# Patient Record
Sex: FEMALE | Race: WHITE | NOT HISPANIC OR LATINO | Employment: UNEMPLOYED | URBAN - METROPOLITAN AREA
[De-identification: names, ages, dates, MRNs, and addresses within clinical notes are randomized per-mention and may not be internally consistent; named-entity substitution may affect disease eponyms.]

---

## 2017-01-24 ENCOUNTER — APPOINTMENT (EMERGENCY)
Dept: RADIOLOGY | Facility: HOSPITAL | Age: 42
End: 2017-01-24
Payer: COMMERCIAL

## 2017-01-24 ENCOUNTER — HOSPITAL ENCOUNTER (EMERGENCY)
Facility: HOSPITAL | Age: 42
Discharge: HOME | End: 2017-01-24
Attending: EMERGENCY MEDICINE | Admitting: EMERGENCY MEDICINE
Payer: COMMERCIAL

## 2017-01-24 VITALS
RESPIRATION RATE: 20 BRPM | BODY MASS INDEX: 31.28 KG/M2 | TEMPERATURE: 98.1 F | WEIGHT: 170 LBS | OXYGEN SATURATION: 97 % | HEART RATE: 87 BPM | DIASTOLIC BLOOD PRESSURE: 75 MMHG | HEIGHT: 62 IN | SYSTOLIC BLOOD PRESSURE: 114 MMHG

## 2017-01-24 DIAGNOSIS — K80.20 CHOLELITHIASIS: ICD-10-CM

## 2017-01-24 DIAGNOSIS — S20.219A RIB CONTUSION: Primary | ICD-10-CM

## 2017-01-24 LAB — HCG UR QL: NORMAL

## 2017-01-24 PROCEDURE — 71101 X-RAY EXAM UNILAT RIBS/CHEST: CPT

## 2017-01-24 PROCEDURE — 74176 CT ABD & PELVIS W/O CONTRAST: CPT

## 2017-01-24 PROCEDURE — 99284 EMERGENCY DEPT VISIT MOD MDM: CPT

## 2017-01-24 PROCEDURE — 93005 ELECTROCARDIOGRAM TRACING: CPT | Performed by: EMERGENCY MEDICINE

## 2017-01-24 PROCEDURE — 81025 URINE PREGNANCY TEST: CPT | Performed by: EMERGENCY MEDICINE

## 2017-01-24 RX ORDER — OXYCODONE HYDROCHLORIDE AND ACETAMINOPHEN 5; 325 MG/1; MG/1
1 TABLET ORAL EVERY 8 HOURS PRN
Qty: 10 TABLET | Refills: 0 | Status: SHIPPED | OUTPATIENT
Start: 2017-01-24 | End: 2017-01-27

## 2017-01-24 RX ORDER — FENTANYL 25 UG/H
1 PATCH TRANSDERMAL
Status: ON HOLD | COMMUNITY
End: 2017-05-04

## 2017-01-26 LAB
ATRIAL RATE: 76 BPM
P AXIS: 61 DEGREES
PR INTERVAL: 164 MS
QRS AXIS: 51 DEGREES
QRSD INTERVAL: 78 MS
QT INTERVAL: 408 MS
QTC INTERVAL: 459 MS
T WAVE AXIS: 35 DEGREES
VENTRICULAR RATE: 76 BPM

## 2017-02-07 ENCOUNTER — HOSPITAL ENCOUNTER (EMERGENCY)
Facility: HOSPITAL | Age: 42
Discharge: HOME | End: 2017-02-07
Attending: EMERGENCY MEDICINE | Admitting: EMERGENCY MEDICINE
Payer: COMMERCIAL

## 2017-02-07 VITALS
RESPIRATION RATE: 20 BRPM | TEMPERATURE: 98.8 F | WEIGHT: 170 LBS | HEIGHT: 62 IN | DIASTOLIC BLOOD PRESSURE: 73 MMHG | BODY MASS INDEX: 31.28 KG/M2 | HEART RATE: 87 BPM | SYSTOLIC BLOOD PRESSURE: 124 MMHG | OXYGEN SATURATION: 95 %

## 2017-02-07 DIAGNOSIS — R53.1 WEAKNESS GENERALIZED: Primary | ICD-10-CM

## 2017-02-07 LAB
ALBUMIN SERPL BCP-MCNC: 3.8 G/DL (ref 3.5–5)
ALP SERPL-CCNC: 71 U/L (ref 46–116)
ALT SERPL W P-5'-P-CCNC: 45 U/L (ref 12–78)
AMPHETAMINES SERPL QL SCN: NEGATIVE
ANION GAP SERPL CALCULATED.3IONS-SCNC: 10 MMOL/L (ref 4–13)
AST SERPL W P-5'-P-CCNC: 53 U/L (ref 5–45)
ATRIAL RATE: 83 BPM
BARBITURATES UR QL: NEGATIVE
BASOPHILS # BLD AUTO: 0.2 THOUSANDS/ΜL (ref 0–0.1)
BASOPHILS NFR BLD AUTO: 2 % (ref 0–1)
BENZODIAZ UR QL: POSITIVE
BILIRUB SERPL-MCNC: 0.5 MG/DL (ref 0.2–1)
BUN SERPL-MCNC: 11 MG/DL (ref 5–25)
CALCIUM SERPL-MCNC: 8.2 MG/DL (ref 8.3–10.1)
CHLORIDE SERPL-SCNC: 103 MMOL/L (ref 100–108)
CO2 SERPL-SCNC: 26 MMOL/L (ref 21–32)
COCAINE UR QL: NEGATIVE
CREAT SERPL-MCNC: 0.84 MG/DL (ref 0.6–1.3)
EOSINOPHIL # BLD AUTO: 0.1 THOUSAND/ΜL (ref 0–0.61)
EOSINOPHIL NFR BLD AUTO: 2 % (ref 0–6)
ERYTHROCYTE [DISTWIDTH] IN BLOOD BY AUTOMATED COUNT: 15.3 % (ref 11.6–15.1)
ETHANOL SERPL-MCNC: <3 MG/DL (ref 0–3)
GFR SERPL CREATININE-BSD FRML MDRD: >60 ML/MIN/1.73SQ M
GLUCOSE SERPL-MCNC: 83 MG/DL (ref 65–140)
HCT VFR BLD AUTO: 41.3 % (ref 37–47)
HGB BLD-MCNC: 13.8 G/DL (ref 12–16)
LYMPHOCYTES # BLD AUTO: 1.8 THOUSANDS/ΜL (ref 0.6–4.47)
LYMPHOCYTES NFR BLD AUTO: 21 % (ref 14–44)
MCH RBC QN AUTO: 30.4 PG (ref 27–31)
MCHC RBC AUTO-ENTMCNC: 33.4 G/DL (ref 31.4–37.4)
MCV RBC AUTO: 91 FL (ref 82–98)
METHADONE UR QL: NEGATIVE
MONOCYTES # BLD AUTO: 0.5 THOUSAND/ΜL (ref 0.17–1.22)
MONOCYTES NFR BLD AUTO: 6 % (ref 4–12)
NEUTROPHILS # BLD AUTO: 6 THOUSANDS/ΜL (ref 1.85–7.62)
NEUTS SEG NFR BLD AUTO: 70 % (ref 43–75)
OPIATES UR QL SCN: NEGATIVE
P AXIS: 54 DEGREES
PCP UR QL: NEGATIVE
PLATELET # BLD AUTO: 186 THOUSANDS/UL (ref 130–400)
PMV BLD AUTO: 9.6 FL (ref 8.9–12.7)
POTASSIUM SERPL-SCNC: 5.5 MMOL/L (ref 3.5–5.3)
PR INTERVAL: 168 MS
PROT SERPL-MCNC: 7.7 G/DL (ref 6.4–8.2)
QRS AXIS: 51 DEGREES
QRSD INTERVAL: 76 MS
QT INTERVAL: 364 MS
QTC INTERVAL: 427 MS
RBC # BLD AUTO: 4.54 MILLION/UL (ref 4.2–5.4)
SODIUM SERPL-SCNC: 139 MMOL/L (ref 136–145)
T WAVE AXIS: 34 DEGREES
THC UR QL: NEGATIVE
VENTRICULAR RATE: 83 BPM
WBC # BLD AUTO: 8.6 THOUSAND/UL (ref 4.8–10.8)

## 2017-02-07 PROCEDURE — 36415 COLL VENOUS BLD VENIPUNCTURE: CPT | Performed by: EMERGENCY MEDICINE

## 2017-02-07 PROCEDURE — 80320 DRUG SCREEN QUANTALCOHOLS: CPT | Performed by: EMERGENCY MEDICINE

## 2017-02-07 PROCEDURE — 80053 COMPREHEN METABOLIC PANEL: CPT | Performed by: EMERGENCY MEDICINE

## 2017-02-07 PROCEDURE — 85025 COMPLETE CBC W/AUTO DIFF WBC: CPT | Performed by: EMERGENCY MEDICINE

## 2017-02-07 PROCEDURE — G0480 DRUG TEST DEF 1-7 CLASSES: HCPCS | Performed by: EMERGENCY MEDICINE

## 2017-02-07 PROCEDURE — 99284 EMERGENCY DEPT VISIT MOD MDM: CPT

## 2017-02-07 PROCEDURE — 80307 DRUG TEST PRSMV CHEM ANLYZR: CPT | Performed by: EMERGENCY MEDICINE

## 2017-02-07 PROCEDURE — 93005 ELECTROCARDIOGRAM TRACING: CPT | Performed by: EMERGENCY MEDICINE

## 2017-05-03 ENCOUNTER — ANESTHESIA EVENT (OUTPATIENT)
Dept: PERIOP | Facility: HOSPITAL | Age: 42
End: 2017-05-03
Payer: COMMERCIAL

## 2017-05-04 ENCOUNTER — HOSPITAL ENCOUNTER (OUTPATIENT)
Facility: HOSPITAL | Age: 42
Setting detail: OUTPATIENT SURGERY
Discharge: HOME | End: 2017-05-04
Attending: OBSTETRICS & GYNECOLOGY | Admitting: OBSTETRICS & GYNECOLOGY
Payer: COMMERCIAL

## 2017-05-04 ENCOUNTER — ANESTHESIA (OUTPATIENT)
Dept: PERIOP | Facility: HOSPITAL | Age: 42
End: 2017-05-04
Payer: COMMERCIAL

## 2017-05-04 VITALS
TEMPERATURE: 96.8 F | HEART RATE: 54 BPM | DIASTOLIC BLOOD PRESSURE: 70 MMHG | SYSTOLIC BLOOD PRESSURE: 108 MMHG | OXYGEN SATURATION: 100 % | RESPIRATION RATE: 18 BRPM

## 2017-05-04 PROCEDURE — C1771 REP DEV, URINARY, W/SLING: HCPCS | Performed by: OBSTETRICS & GYNECOLOGY

## 2017-05-04 PROCEDURE — A9270 NON-COVERED ITEM OR SERVICE: HCPCS | Performed by: ANESTHESIOLOGY

## 2017-05-04 DEVICE — IMPLANTABLE DEVICE: Type: IMPLANTABLE DEVICE | Site: VAGINA | Status: FUNCTIONAL

## 2017-05-04 RX ORDER — GLYCOPYRROLATE 0.2 MG/ML
INJECTION INTRAMUSCULAR; INTRAVENOUS AS NEEDED
Status: DISCONTINUED | OUTPATIENT
Start: 2017-05-04 | End: 2017-05-04 | Stop reason: SURG

## 2017-05-04 RX ORDER — FENTANYL CITRATE/PF 50 MCG/ML
25 SYRINGE (ML) INJECTION
Status: COMPLETED | OUTPATIENT
Start: 2017-05-04 | End: 2017-05-04

## 2017-05-04 RX ORDER — ONDANSETRON 2 MG/ML
INJECTION INTRAMUSCULAR; INTRAVENOUS AS NEEDED
Status: DISCONTINUED | OUTPATIENT
Start: 2017-05-04 | End: 2017-05-04 | Stop reason: SURG

## 2017-05-04 RX ORDER — ONDANSETRON 2 MG/ML
4 INJECTION INTRAMUSCULAR; INTRAVENOUS ONCE
Status: DISCONTINUED | OUTPATIENT
Start: 2017-05-04 | End: 2017-05-04 | Stop reason: HOSPADM

## 2017-05-04 RX ORDER — SODIUM CHLORIDE, SODIUM LACTATE, POTASSIUM CHLORIDE, CALCIUM CHLORIDE 600; 310; 30; 20 MG/100ML; MG/100ML; MG/100ML; MG/100ML
125 INJECTION, SOLUTION INTRAVENOUS CONTINUOUS
Status: DISCONTINUED | OUTPATIENT
Start: 2017-05-04 | End: 2017-05-04 | Stop reason: HOSPADM

## 2017-05-04 RX ORDER — OXYCODONE HYDROCHLORIDE AND ACETAMINOPHEN 5; 325 MG/1; MG/1
1 TABLET ORAL EVERY 4 HOURS PRN
Status: DISCONTINUED | OUTPATIENT
Start: 2017-05-04 | End: 2017-05-04 | Stop reason: HOSPADM

## 2017-05-04 RX ORDER — FENTANYL CITRATE 50 UG/ML
INJECTION, SOLUTION INTRAMUSCULAR; INTRAVENOUS AS NEEDED
Status: DISCONTINUED | OUTPATIENT
Start: 2017-05-04 | End: 2017-05-04 | Stop reason: SURG

## 2017-05-04 RX ORDER — PROPOFOL 10 MG/ML
INJECTION, EMULSION INTRAVENOUS AS NEEDED
Status: DISCONTINUED | OUTPATIENT
Start: 2017-05-04 | End: 2017-05-04 | Stop reason: SURG

## 2017-05-04 RX ORDER — SODIUM CHLORIDE, SODIUM LACTATE, POTASSIUM CHLORIDE, CALCIUM CHLORIDE 600; 310; 30; 20 MG/100ML; MG/100ML; MG/100ML; MG/100ML
125 INJECTION, SOLUTION INTRAVENOUS CONTINUOUS
Status: DISCONTINUED | OUTPATIENT
Start: 2017-05-04 | End: 2017-05-04 | Stop reason: SDUPTHER

## 2017-05-04 RX ORDER — MIDAZOLAM HYDROCHLORIDE 1 MG/ML
INJECTION INTRAMUSCULAR; INTRAVENOUS AS NEEDED
Status: DISCONTINUED | OUTPATIENT
Start: 2017-05-04 | End: 2017-05-04 | Stop reason: SURG

## 2017-05-04 RX ORDER — 0.9 % SODIUM CHLORIDE 0.9 %
VIAL (ML) INJECTION AS NEEDED
Status: DISCONTINUED | OUTPATIENT
Start: 2017-05-04 | End: 2017-05-04 | Stop reason: HOSPADM

## 2017-05-04 RX ORDER — ROCURONIUM BROMIDE 10 MG/ML
INJECTION, SOLUTION INTRAVENOUS AS NEEDED
Status: DISCONTINUED | OUTPATIENT
Start: 2017-05-04 | End: 2017-05-04 | Stop reason: SURG

## 2017-05-04 RX ORDER — LEVOFLOXACIN 5 MG/ML
500 INJECTION, SOLUTION INTRAVENOUS ONCE
Status: COMPLETED | OUTPATIENT
Start: 2017-05-04 | End: 2017-05-04

## 2017-05-04 RX ADMIN — LEVOFLOXACIN 500 MG: 5 INJECTION, SOLUTION INTRAVENOUS at 07:40

## 2017-05-04 RX ADMIN — NEOSTIGMINE METHYLSULFATE 4 MG: 1 INJECTION INTRAMUSCULAR; INTRAVENOUS; SUBCUTANEOUS at 09:41

## 2017-05-04 RX ADMIN — SODIUM CHLORIDE, SODIUM LACTATE, POTASSIUM CHLORIDE, AND CALCIUM CHLORIDE 125 ML/HR: .6; .31; .03; .02 INJECTION, SOLUTION INTRAVENOUS at 07:13

## 2017-05-04 RX ADMIN — ONDANSETRON 4 MG: 2 INJECTION INTRAMUSCULAR; INTRAVENOUS at 09:41

## 2017-05-04 RX ADMIN — PROPOFOL 200 MG: 10 INJECTION, EMULSION INTRAVENOUS at 07:44

## 2017-05-04 RX ADMIN — OXYCODONE HYDROCHLORIDE AND ACETAMINOPHEN 1 TABLET: 5; 325 TABLET ORAL at 11:17

## 2017-05-04 RX ADMIN — LEVOFLOXACIN 500 MG: 5 INJECTION, SOLUTION INTRAVENOUS at 07:14

## 2017-05-04 RX ADMIN — FENTANYL CITRATE 50 MCG: 50 INJECTION, SOLUTION INTRAMUSCULAR; INTRAVENOUS at 08:46

## 2017-05-04 RX ADMIN — GLYCOPYRROLATE 0.2 MG: 0.2 INJECTION, SOLUTION INTRAMUSCULAR; INTRAVENOUS at 09:41

## 2017-05-04 RX ADMIN — FENTANYL CITRATE 50 MCG: 50 INJECTION, SOLUTION INTRAMUSCULAR; INTRAVENOUS at 07:44

## 2017-05-04 RX ADMIN — ROCURONIUM BROMIDE 50 MG: 10 INJECTION, SOLUTION INTRAVENOUS at 07:44

## 2017-05-04 RX ADMIN — FENTANYL CITRATE 50 MCG: 50 INJECTION, SOLUTION INTRAMUSCULAR; INTRAVENOUS at 08:53

## 2017-05-04 RX ADMIN — DEXAMETHASONE SODIUM PHOSPHATE 4 MG: 10 INJECTION INTRAMUSCULAR; INTRAVENOUS at 09:41

## 2017-05-04 RX ADMIN — FENTANYL CITRATE 50 MCG: 50 INJECTION, SOLUTION INTRAMUSCULAR; INTRAVENOUS at 07:42

## 2017-05-04 RX ADMIN — FENTANYL CITRATE 25 MCG: 50 INJECTION INTRAMUSCULAR; INTRAVENOUS at 10:33

## 2017-05-04 RX ADMIN — FENTANYL CITRATE 25 MCG: 50 INJECTION INTRAMUSCULAR; INTRAVENOUS at 11:10

## 2017-05-04 RX ADMIN — SODIUM CHLORIDE, SODIUM LACTATE, POTASSIUM CHLORIDE, AND CALCIUM CHLORIDE: .6; .31; .03; .02 INJECTION, SOLUTION INTRAVENOUS at 07:30

## 2017-05-04 RX ADMIN — FENTANYL CITRATE 25 MCG: 50 INJECTION INTRAMUSCULAR; INTRAVENOUS at 11:04

## 2017-05-04 RX ADMIN — FENTANYL CITRATE 25 MCG: 50 INJECTION INTRAMUSCULAR; INTRAVENOUS at 10:54

## 2017-05-04 RX ADMIN — MIDAZOLAM HYDROCHLORIDE 2 MG: 1 INJECTION, SOLUTION INTRAMUSCULAR; INTRAVENOUS at 07:35

## 2017-07-16 ENCOUNTER — HOSPITAL ENCOUNTER (EMERGENCY)
Facility: HOSPITAL | Age: 42
Discharge: HOME | End: 2017-07-17
Attending: EMERGENCY MEDICINE | Admitting: EMERGENCY MEDICINE
Payer: COMMERCIAL

## 2017-07-16 VITALS
WEIGHT: 175.93 LBS | OXYGEN SATURATION: 98 % | HEIGHT: 62 IN | TEMPERATURE: 97.7 F | DIASTOLIC BLOOD PRESSURE: 76 MMHG | HEART RATE: 80 BPM | RESPIRATION RATE: 18 BRPM | SYSTOLIC BLOOD PRESSURE: 120 MMHG | BODY MASS INDEX: 32.37 KG/M2

## 2017-07-16 DIAGNOSIS — Z76.0 MEDICINE REFILL: ICD-10-CM

## 2017-07-16 DIAGNOSIS — M54.9 EXACERBATION OF CHRONIC BACK PAIN: ICD-10-CM
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
## 2017-07-16 DIAGNOSIS — G89.29 EXACERBATION OF CHRONIC BACK PAIN: ICD-10-CM

## 2017-07-16 RX ORDER — HYDROMORPHONE HYDROCHLORIDE 8 MG/1
8 TABLET ORAL DAILY
COMMUNITY
End: 2018-07-12

## 2017-07-17 ENCOUNTER — APPOINTMENT (EMERGENCY)
Dept: RADIOLOGY | Facility: HOSPITAL | Age: 42
End: 2017-07-17
Payer: COMMERCIAL

## 2017-07-17 PROCEDURE — 99283 EMERGENCY DEPT VISIT LOW MDM: CPT

## 2017-07-17 PROCEDURE — 72100 X-RAY EXAM L-S SPINE 2/3 VWS: CPT

## 2017-07-17 RX ORDER — OXYCODONE HYDROCHLORIDE AND ACETAMINOPHEN 5; 325 MG/1; MG/1
2 TABLET ORAL ONCE
Status: COMPLETED | OUTPATIENT
Start: 2017-07-17 | End: 2017-07-17

## 2017-07-17 RX ADMIN — OXYCODONE HYDROCHLORIDE AND ACETAMINOPHEN 2 TABLET: 5; 325 TABLET ORAL at 00:46

## 2017-08-03 ENCOUNTER — ALLSCRIPTS OFFICE VISIT (OUTPATIENT)
Dept: OTHER | Facility: OTHER | Age: 42
End: 2017-08-03

## 2017-08-03 ENCOUNTER — TRANSCRIBE ORDERS (OUTPATIENT)
Dept: ADMINISTRATIVE | Facility: HOSPITAL | Age: 42
End: 2017-08-03

## 2017-08-03 DIAGNOSIS — R42 DIZZINESS AND GIDDINESS: ICD-10-CM

## 2017-08-03 DIAGNOSIS — R07.9 CHEST PAIN: ICD-10-CM

## 2017-08-03 DIAGNOSIS — I73.9 PERIPHERAL VASCULAR DISEASE (HCC): ICD-10-CM

## 2017-08-03 DIAGNOSIS — R07.9 CHEST PAIN, UNSPECIFIED: Primary | ICD-10-CM

## 2017-08-14 ENCOUNTER — HOSPITAL ENCOUNTER (OUTPATIENT)
Dept: NON INVASIVE DIAGNOSTICS | Facility: HOSPITAL | Age: 42
Discharge: HOME | End: 2017-08-14
Attending: INTERNAL MEDICINE
Payer: COMMERCIAL

## 2017-08-14 ENCOUNTER — HOSPITAL ENCOUNTER (OUTPATIENT)
Dept: RADIOLOGY | Facility: HOSPITAL | Age: 42
Discharge: HOME | End: 2017-08-14
Attending: INTERNAL MEDICINE
Payer: COMMERCIAL

## 2017-08-14 DIAGNOSIS — R07.9 CHEST PAIN, UNSPECIFIED: ICD-10-CM

## 2017-08-14 LAB
MAX DIASTOLIC BP: 72 MMHG
MAX HEART RATE: 90 BPM
MAX PREDICTED HEART RATE: 179 BPM
MAX. SYSTOLIC BP: 135 MMHG
PROTOCOL NAME: NORMAL
TIME IN EXERCISE PHASE: 60 S

## 2017-08-14 PROCEDURE — 93017 CV STRESS TEST TRACING ONLY: CPT

## 2017-08-14 PROCEDURE — A9502 TC99M TETROFOSMIN: HCPCS

## 2017-08-14 PROCEDURE — 78452 HT MUSCLE IMAGE SPECT MULT: CPT

## 2017-08-14 RX ADMIN — REGADENOSON 0.4 MG: 0.08 INJECTION, SOLUTION INTRAVENOUS at 10:46

## 2017-09-05 ENCOUNTER — HOSPITAL ENCOUNTER (EMERGENCY)
Facility: HOSPITAL | Age: 42
Discharge: HOME | End: 2017-09-05
Attending: EMERGENCY MEDICINE | Admitting: EMERGENCY MEDICINE
Payer: COMMERCIAL

## 2017-09-05 VITALS
RESPIRATION RATE: 14 BRPM | OXYGEN SATURATION: 98 % | WEIGHT: 176 LBS | DIASTOLIC BLOOD PRESSURE: 83 MMHG | SYSTOLIC BLOOD PRESSURE: 141 MMHG | TEMPERATURE: 97.7 F | HEART RATE: 78 BPM | BODY MASS INDEX: 32.19 KG/M2

## 2017-09-05 DIAGNOSIS — B02.9 HERPES ZOSTER WITHOUT COMPLICATION: Primary | ICD-10-CM

## 2017-09-05 LAB
ANION GAP SERPL CALCULATED.3IONS-SCNC: 8 MMOL/L (ref 4–13)
BUN SERPL-MCNC: 22 MG/DL (ref 5–25)
CALCIUM SERPL-MCNC: 8.4 MG/DL (ref 8.3–10.1)
CHLORIDE SERPL-SCNC: 102 MMOL/L (ref 100–108)
CO2 SERPL-SCNC: 29 MMOL/L (ref 21–32)
CREAT SERPL-MCNC: 1.12 MG/DL (ref 0.6–1.3)
GFR SERPL CREATININE-BSD FRML MDRD: 61 ML/MIN/1.73SQ M
GLUCOSE SERPL-MCNC: 106 MG/DL (ref 65–140)
POTASSIUM SERPL-SCNC: 3.7 MMOL/L (ref 3.5–5.3)
SODIUM SERPL-SCNC: 139 MMOL/L (ref 136–145)

## 2017-09-05 PROCEDURE — 99283 EMERGENCY DEPT VISIT LOW MDM: CPT

## 2017-09-05 PROCEDURE — 36415 COLL VENOUS BLD VENIPUNCTURE: CPT | Performed by: EMERGENCY MEDICINE

## 2017-09-05 PROCEDURE — 80048 BASIC METABOLIC PNL TOTAL CA: CPT | Performed by: EMERGENCY MEDICINE

## 2017-09-05 RX ORDER — OXYCODONE HYDROCHLORIDE AND ACETAMINOPHEN 5; 325 MG/1; MG/1
1 TABLET ORAL ONCE
Status: COMPLETED | OUTPATIENT
Start: 2017-09-05 | End: 2017-09-05

## 2017-09-05 RX ORDER — OXYCODONE HYDROCHLORIDE AND ACETAMINOPHEN 5; 325 MG/1; MG/1
TABLET ORAL
Status: COMPLETED
Start: 2017-09-05 | End: 2017-09-05

## 2017-09-05 RX ORDER — ACYCLOVIR 200 MG/1
400 CAPSULE ORAL ONCE
Status: COMPLETED | OUTPATIENT
Start: 2017-09-05 | End: 2017-09-05

## 2017-09-05 RX ORDER — LIDOCAINE 50 MG/G
1 PATCH TOPICAL DAILY
Qty: 30 PATCH | Refills: 0 | Status: SHIPPED | OUTPATIENT
Start: 2017-09-05 | End: 2018-07-12

## 2017-09-05 RX ORDER — PREDNISONE 10 MG/1
20 TABLET ORAL DAILY
Qty: 10 TABLET | Refills: 0 | Status: SHIPPED | OUTPATIENT
Start: 2017-09-05 | End: 2017-09-10

## 2017-09-05 RX ORDER — ACYCLOVIR 200 MG/1
CAPSULE ORAL
Status: COMPLETED
Start: 2017-09-05 | End: 2017-09-05

## 2017-09-05 RX ORDER — PREDNISONE 20 MG/1
40 TABLET ORAL ONCE
Status: COMPLETED | OUTPATIENT
Start: 2017-09-05 | End: 2017-09-05

## 2017-09-05 RX ORDER — PREDNISONE 10 MG/1
TABLET ORAL
Status: DISCONTINUED
Start: 2017-09-05 | End: 2017-09-05 | Stop reason: WASHOUT

## 2017-09-05 RX ORDER — ACYCLOVIR 400 MG/1
400 TABLET ORAL 4 TIMES DAILY
Qty: 40 TABLET | Refills: 0 | Status: SHIPPED | OUTPATIENT
Start: 2017-09-05 | End: 2018-10-15

## 2017-09-05 RX ADMIN — OXYCODONE HYDROCHLORIDE AND ACETAMINOPHEN 1 TABLET: 5; 325 TABLET ORAL at 22:26

## 2017-09-05 RX ADMIN — ACYCLOVIR 400 MG: 200 CAPSULE ORAL at 22:27

## 2017-09-05 RX ADMIN — OXYCODONE HYDROCHLORIDE AND ACETAMINOPHEN 1 TABLET: 5; 325 TABLET ORAL at 21:15

## 2017-09-05 RX ADMIN — PREDNISONE 40 MG: 20 TABLET ORAL at 22:26

## 2017-09-13 ENCOUNTER — TELEPHONE (OUTPATIENT)
Dept: NON INVASIVE DIAGNOSTICS | Facility: HOSPITAL | Age: 42
End: 2017-09-13

## 2017-09-13 ENCOUNTER — GENERIC CONVERSION - ENCOUNTER (OUTPATIENT)
Dept: OTHER | Facility: OTHER | Age: 42
End: 2017-09-13

## 2017-09-14 ENCOUNTER — HOSPITAL ENCOUNTER (OUTPATIENT)
Dept: NON INVASIVE DIAGNOSTICS | Facility: HOSPITAL | Age: 42
Discharge: HOME | End: 2017-09-14
Attending: INTERNAL MEDICINE | Admitting: INTERNAL MEDICINE
Payer: COMMERCIAL

## 2017-09-14 ENCOUNTER — GENERIC CONVERSION - ENCOUNTER (OUTPATIENT)
Dept: OTHER | Facility: OTHER | Age: 42
End: 2017-09-14

## 2017-09-14 VITALS
TEMPERATURE: 98 F | OXYGEN SATURATION: 100 % | DIASTOLIC BLOOD PRESSURE: 80 MMHG | SYSTOLIC BLOOD PRESSURE: 141 MMHG | HEART RATE: 73 BPM

## 2017-09-14 DIAGNOSIS — R07.9 CHEST PAIN, UNSPECIFIED: ICD-10-CM

## 2017-09-14 LAB
ANION GAP SERPL CALCULATED.3IONS-SCNC: 11 MMOL/L (ref 4–13)
BUN SERPL-MCNC: 23 MG/DL (ref 5–25)
CALCIUM SERPL-MCNC: 9.2 MG/DL (ref 8.3–10.1)
CHLORIDE SERPL-SCNC: 103 MMOL/L (ref 100–108)
CO2 SERPL-SCNC: 25 MMOL/L (ref 21–32)
CREAT SERPL-MCNC: 1.01 MG/DL (ref 0.6–1.3)
GFR SERPL CREATININE-BSD FRML MDRD: 69 ML/MIN/1.73SQ M
GLUCOSE P FAST SERPL-MCNC: 96 MG/DL (ref 65–99)
GLUCOSE SERPL-MCNC: 96 MG/DL (ref 65–140)
POTASSIUM SERPL-SCNC: 4 MMOL/L (ref 3.5–5.3)
SODIUM SERPL-SCNC: 139 MMOL/L (ref 136–145)

## 2017-09-14 PROCEDURE — 80048 BASIC METABOLIC PNL TOTAL CA: CPT | Performed by: INTERNAL MEDICINE

## 2017-09-14 PROCEDURE — C1769 GUIDE WIRE: HCPCS

## 2017-09-14 PROCEDURE — 93458 L HRT ARTERY/VENTRICLE ANGIO: CPT

## 2017-09-14 PROCEDURE — C1894 INTRO/SHEATH, NON-LASER: HCPCS

## 2017-09-14 RX ORDER — DIPHENHYDRAMINE HYDROCHLORIDE 50 MG/ML
INJECTION INTRAMUSCULAR; INTRAVENOUS CODE/TRAUMA/SEDATION MEDICATION
Status: COMPLETED | OUTPATIENT
Start: 2017-09-14 | End: 2017-09-14

## 2017-09-14 RX ORDER — NITROGLYCERIN 20 MG/100ML
INJECTION INTRAVENOUS CODE/TRAUMA/SEDATION MEDICATION
Status: COMPLETED | OUTPATIENT
Start: 2017-09-14 | End: 2017-09-14

## 2017-09-14 RX ORDER — VERAPAMIL HCL 2.5 MG/ML
AMPUL (ML) INTRAVENOUS CODE/TRAUMA/SEDATION MEDICATION
Status: COMPLETED | OUTPATIENT
Start: 2017-09-14 | End: 2017-09-14

## 2017-09-14 RX ORDER — VERAPAMIL HYDROCHLORIDE 2.5 MG/ML
INJECTION, SOLUTION INTRAVENOUS CODE/TRAUMA/SEDATION MEDICATION
Status: COMPLETED | OUTPATIENT
Start: 2017-09-14 | End: 2017-09-14

## 2017-09-14 RX ORDER — HEPARIN SODIUM 1000 [USP'U]/ML
INJECTION, SOLUTION INTRAVENOUS; SUBCUTANEOUS CODE/TRAUMA/SEDATION MEDICATION
Status: COMPLETED | OUTPATIENT
Start: 2017-09-14 | End: 2017-09-14

## 2017-09-14 RX ORDER — FENTANYL CITRATE 50 UG/ML
INJECTION, SOLUTION INTRAMUSCULAR; INTRAVENOUS CODE/TRAUMA/SEDATION MEDICATION
Status: COMPLETED | OUTPATIENT
Start: 2017-09-14 | End: 2017-09-14

## 2017-09-14 RX ORDER — NITROGLYCERIN 0.4 MG/1
TABLET SUBLINGUAL CODE/TRAUMA/SEDATION MEDICATION
Status: COMPLETED | OUTPATIENT
Start: 2017-09-14 | End: 2017-09-14

## 2017-09-14 RX ORDER — LIDOCAINE HYDROCHLORIDE 10 MG/ML
INJECTION, SOLUTION INFILTRATION; PERINEURAL CODE/TRAUMA/SEDATION MEDICATION
Status: COMPLETED | OUTPATIENT
Start: 2017-09-14 | End: 2017-09-14

## 2017-09-14 RX ORDER — SODIUM CHLORIDE 9 MG/ML
100 INJECTION, SOLUTION INTRAVENOUS CONTINUOUS
Status: DISPENSED | OUTPATIENT
Start: 2017-09-14 | End: 2017-09-14

## 2017-09-14 RX ORDER — MIDAZOLAM HYDROCHLORIDE 1 MG/ML
INJECTION INTRAMUSCULAR; INTRAVENOUS CODE/TRAUMA/SEDATION MEDICATION
Status: COMPLETED | OUTPATIENT
Start: 2017-09-14 | End: 2017-09-14

## 2017-09-14 RX ADMIN — VERAPAMIL HYDROCHLORIDE 2.5 MG: 2.5 INJECTION, SOLUTION INTRAVENOUS at 07:47

## 2017-09-14 RX ADMIN — MIDAZOLAM HYDROCHLORIDE 1 MG: 1 INJECTION, SOLUTION INTRAMUSCULAR; INTRAVENOUS at 07:31

## 2017-09-14 RX ADMIN — VERAPAMIL HYDROCHLORIDE 2.5 MG: 2.5 INJECTION, SOLUTION INTRAVENOUS at 08:06

## 2017-09-14 RX ADMIN — DIPHENHYDRAMINE HYDROCHLORIDE 25 MG: 50 INJECTION INTRAMUSCULAR; INTRAVENOUS at 08:07

## 2017-09-14 RX ADMIN — FENTANYL CITRATE 25 MCG: 50 INJECTION, SOLUTION INTRAMUSCULAR; INTRAVENOUS at 07:50

## 2017-09-14 RX ADMIN — IODIXANOL 55 ML: 320 INJECTION, SOLUTION INTRAVASCULAR at 09:05

## 2017-09-14 RX ADMIN — MIDAZOLAM HYDROCHLORIDE 0.5 MG: 1 INJECTION, SOLUTION INTRAMUSCULAR; INTRAVENOUS at 07:38

## 2017-09-14 RX ADMIN — MIDAZOLAM HYDROCHLORIDE 1 MG: 1 INJECTION, SOLUTION INTRAMUSCULAR; INTRAVENOUS at 07:34

## 2017-09-14 RX ADMIN — NITROGLYCERIN 200 MCG: 20 INJECTION INTRAVENOUS at 07:47

## 2017-09-14 RX ADMIN — MIDAZOLAM HYDROCHLORIDE 0.5 MG: 1 INJECTION, SOLUTION INTRAMUSCULAR; INTRAVENOUS at 07:40

## 2017-09-14 RX ADMIN — FENTANYL CITRATE 25 MCG: 50 INJECTION, SOLUTION INTRAMUSCULAR; INTRAVENOUS at 07:38

## 2017-09-14 RX ADMIN — HEPARIN SODIUM 2000 UNITS: 1000 INJECTION INTRAVENOUS; SUBCUTANEOUS at 07:47

## 2017-09-14 RX ADMIN — FENTANYL CITRATE 25 MCG: 50 INJECTION, SOLUTION INTRAMUSCULAR; INTRAVENOUS at 07:40

## 2017-09-14 RX ADMIN — FENTANYL CITRATE 50 MCG: 50 INJECTION, SOLUTION INTRAMUSCULAR; INTRAVENOUS at 07:31

## 2017-09-14 RX ADMIN — MIDAZOLAM HYDROCHLORIDE 1 MG: 1 INJECTION, SOLUTION INTRAMUSCULAR; INTRAVENOUS at 07:50

## 2017-09-14 RX ADMIN — MIDAZOLAM HYDROCHLORIDE 1 MG: 1 INJECTION, SOLUTION INTRAMUSCULAR; INTRAVENOUS at 07:44

## 2017-09-14 RX ADMIN — NITROGLYCERIN 0.4 MG: 0.4 TABLET SUBLINGUAL at 08:01

## 2017-09-14 RX ADMIN — LIDOCAINE HYDROCHLORIDE 5 ML: 10 INJECTION, SOLUTION INFILTRATION; PERINEURAL at 07:40

## 2017-09-14 RX ADMIN — FENTANYL CITRATE 25 MCG: 50 INJECTION, SOLUTION INTRAMUSCULAR; INTRAVENOUS at 07:44

## 2017-09-14 RX ADMIN — DIPHENHYDRAMINE HYDROCHLORIDE 25 MG: 50 INJECTION INTRAMUSCULAR; INTRAVENOUS at 07:50

## 2018-01-12 NOTE — MISCELLANEOUS
Message  Return to work or school:   Praneeth Rocha is under my professional care  She was seen in my office on 06/06/2016       Patient will be undergoing spine surgery on 8/12/2016, she will likely be unable to attend her scheduled court date on 8/19/2016 due to postoperative pain levels and limited mobility at that time          Signatures   Electronically signed by : Raymundo Thornton, [facility]; Jul 26 2016  4:17PM EST                       (Author)

## 2018-01-13 NOTE — PROCEDURES
Procedures by Taco Roberts MD at 9/14/2017  10:00 AM      Author:  Taco Roberts MD Service:  Cardiology Author Type:  Physician    Filed:  9/14/2017 10:11 AM Date of Service:  9/14/2017 10:00 AM Status:  Signed    :  Taco Roberts MD (Physician)         Procedures  Cardiac Catheterization Operative Report    Brennan Anthony  [id number]  9/14/2017  Rashida Hilton MD    Indication: Abnormal stress test, smoking, hld  : Taco Roberts MD  Procedure: L Coronary Angiogram                     L Heart Catheterization  Access: R Radial  Contrast: 75cc    Procedure Details  The risks, benefits, complications, treatment options, and expected outcomes were discussed with the patient  The patient and/or family concurred with the proposed plan, giving informed consent  Patient was brought to the cath lab after IV hydration was  begun and oral premedication was given  She was further sedated with fentanyl and midazolam  She was difficult to sedate given prior opiate usage  She was prepped and draped in the usual manner  Using the modified Seldinger  access technique, a 5 Irish sheath was placed in the radial artery  Heparin, nitroglycerin, and verapamil was administered through the sheath  A left heart catheterization was done when JR crossed the aortic valve  A right coronary angiogram was completed  The patient was noted to have spasming of radial artery on advancement of JL catheter  Therefore a 4 German catheter was inserted after given vasodilators  Angiograms were done of the L coronary  After the procedure was completed, sedation was stopped  and the sheaths and catheters were all removed  Hemostasis was achieved with manual pressure  Findings:    Hemodynamics  LVEDP 9mmHg  No gradient on pullback   Left Main  Bifurcated to LAD and LCX  Widely patent  RCA  Dominant  Supplies average PL and PDA branch  Mild luminal irregularities   LAD  Average caliber vessel tapers distally   Minimal luminal irregularities   Circ  Non-dominant  Minimal luminal irregularities    LV  Normal LV size, Low normal EF50-55%  No focal wall motion abnormalities       Complications  None     Estimated Blood Loss:  Minimal         Complications:  None           A/P Minimal non-obstructive CAD         Chest pain likely non-cardiac         Opiate tolerance          Nicotine dependence  -- TR band 2 hours  -- follow-up in 2 weeks for arteriotomy check                   Viki GOLDEN    Sep 14 2017 10:12AM [facility] Standard Time

## 2018-01-14 VITALS — OXYGEN SATURATION: 80 % | DIASTOLIC BLOOD PRESSURE: 80 MMHG | SYSTOLIC BLOOD PRESSURE: 105 MMHG | HEART RATE: 75 BPM

## 2018-01-22 VITALS
BODY MASS INDEX: 33.33 KG/M2 | HEART RATE: 78 BPM | OXYGEN SATURATION: 98 % | DIASTOLIC BLOOD PRESSURE: 80 MMHG | WEIGHT: 181.13 LBS | SYSTOLIC BLOOD PRESSURE: 118 MMHG | HEIGHT: 62 IN

## 2018-07-12 ENCOUNTER — HOSPITAL ENCOUNTER (EMERGENCY)
Facility: HOSPITAL | Age: 43
Discharge: HOME | End: 2018-07-12
Attending: EMERGENCY MEDICINE
Payer: COMMERCIAL

## 2018-07-12 VITALS
WEIGHT: 190 LBS | BODY MASS INDEX: 34.75 KG/M2 | TEMPERATURE: 98.6 F | OXYGEN SATURATION: 98 % | SYSTOLIC BLOOD PRESSURE: 145 MMHG | DIASTOLIC BLOOD PRESSURE: 73 MMHG | HEART RATE: 78 BPM | RESPIRATION RATE: 16 BRPM

## 2018-07-12 DIAGNOSIS — T50.905A MEDICATION SIDE EFFECTS, INITIAL ENCOUNTER: Primary | ICD-10-CM

## 2018-07-12 PROCEDURE — 99283 EMERGENCY DEPT VISIT LOW MDM: CPT

## 2018-07-12 RX ORDER — BACLOFEN 10 MG/1
10 TABLET ORAL 3 TIMES DAILY
COMMUNITY
End: 2022-06-01

## 2018-07-12 NOTE — DISCHARGE INSTRUCTIONS
Safe Use of NSAIDs, Ambulatory Care   GENERAL INFORMATION:   NSAIDs  are medicines that are used to decrease pain, swelling, and fever  NSAIDs are available with or without a doctor's order  NSAIDs that you can buy without a doctor's order include aspirin, ibuprofen, and naproxen  Seek immediate care for the following symptoms:   · Swelling around your mouth or trouble breathing    · Fast breathing or heartbeat    · Nausea, vomiting, or abdominal pain    · Blood in your vomit or bowel movements    · A seizure  How to safely give NSAIDs to your child:   · Read the directions on the label  Find out if the medicine is right for your child's age and how much to give to your child  The dose for your child's weight or age should be listed  Do not  give your child more than the recommended amount  · Use the measuring tool that came with the medicine  Do not  use another measuring tool, such as a kitchen spoon  Other measuring tools do not provide the right amount of medicine  How to safely take NSAIDs:   · Read the directions on the label to learn how much medicine you should take and often to take it  Do not take more than the recommended amount  · Talk to your healthcare provider if you need take NSAIDs for more than 30 days  The longer you take NSAIDs, the higher your risk of side effects will be  You may need to take other medicines to decrease your risk of side effects such as stomach bleeding  · Do not take an over-the-counter NSAIDs with prescription NSAIDs  The combined amount of NSAIDs may be too high  · Tell your healthcare provider about other medicines you take  Some medicines can increase the risk of side effects from NSAIDs  Your healthcare provider will tell you if it is okay to take NSAIDs and how to take them  Who should not take NSAIDs:  Certain people should avoid or limit NSAIDs   Do not  give NSAIDs to children under 10months of age without direction from your child's healthcare provider  Do not give aspirin to children under 25years of age  Your child could develop Reye syndrome if he takes aspirin  Reye syndrome can cause life-threatening brain and liver damage  Check your child's medicine labels for aspirin, salicylates, or oil of wintergreen  Talk to your healthcare provider before you take NSAIDs if any of the following apply to you:  · You have reflux disease, a peptic ulcer, H  pylori infection, or bleeding in your stomach or intestines  · You have a bleeding disorder, or you take blood-thinning medicine  · You are allergic to aspirin or other NSAIDs  · You have liver or kidney or disease  · You have high blood pressure or heart disease  · You have 3 or more alcoholic drinks each day    · You are pregnant  What you need to know about NSAIDs overdose:  Certain health problems can occur if you take too much NSAIDs at one time or over time  These include nausea, vomiting, and abdominal pain  They also include gastritis, peptic ulcers, and stomach bleeding  You may also develop fluid retention, heart problems, and kidney problems  NSAIDs can also worsen high blood pressure  You may become confused, or you may have a headache, hearing loss, or hallucinations  An overdose of aspirin may also cause rapid breathing, a rapid heartbeat, or seizures  What to do if you think your child or you took too much NSAIDs:  Call the [facility] at [phone] immediately  CARE AGREEMENT:   You have the right to help plan your care  Learn about your health condition and how it may be treated  Discuss treatment options with your caregivers to decide what care you want to receive  You always have the right to refuse treatment  The above information is an  only  It is not intended as medical advice for individual conditions or treatments   Talk to your doctor, nurse or pharmacist before following any medical regimen to see if it is safe and effective for you  © 2014 [address] is for End User's use only and may not be sold, redistributed or otherwise used for commercial purposes  All illustrations and images included in CareNotes® are the copyrighted property of A D A M [employer]  or [facility]

## 2018-07-12 NOTE — ED PROVIDER NOTES
History  Chief Complaint   Patient presents with    Dizziness     client took a diet product called Fredrica France today at 6pm and has felt dizzy and faint like since     Patient is attempting to lose weight and tried a new product called ketocoffee today, which is a stimulant  The patient has felt anxious and nervous about her ingestion since and is lightheaded  Patient has a history of anxiety            Prior to Admission Medications   Prescriptions Last Dose Informant Patient Reported? Taking? ALPRAZolam (XANAX) 2 MG tablet 7/11/2018 at Unknown time  Yes Yes   Sig: Take 2 mg by mouth 3 (three) times a day Takes 1/2 tab a m ,and 1700 and 1 tab hs    Tapentadol HCl ER (NUCYNTA ER) 200 MG TB12 7/11/2018 at Unknown time  Yes Yes   Sig: Take by mouth 2 (two) times a day   acyclovir (ZOVIRAX) 400 MG tablet   No No   Sig: Take 1 tablet by mouth 4 (four) times a day for 10 days   baclofen 10 mg tablet 7/11/2018 at Unknown time  Yes Yes   Sig: Take 10 mg by mouth 3 (three) times a day   calcitriol (ROCALTROL) 0 25 mcg capsule 7/11/2018 at Unknown time  Yes Yes   Sig: Take 0 25 mcg by mouth daily  calcium gluconate 500 mg tablet 7/11/2018 at Unknown time  Yes Yes   Sig: Take 500 mg by mouth 4 (four) times a day  ferrous sulfate 325 (65 Fe) mg tablet 7/11/2018 at Unknown time  Yes Yes   Sig: Take 325 mg by mouth 2 (two) times a day    gabapentin (NEURONTIN) 600 MG tablet 7/11/2018 at Unknown time  Yes Yes   Sig: Take 600 mg by mouth 2 (two) times a day  gemfibrozil (LOPID) 600 mg tablet 7/11/2018 at Unknown time  Yes Yes   Sig: Take 600 mg by mouth 2 (two) times a day before meals  ibuprofen (MOTRIN) 200 mg tablet   Yes No   Sig: Take 200 mg by mouth every 6 (six) hours as needed for mild pain or headaches   levothyroxine 150 mcg tablet 7/11/2018 at Unknown time  Yes Yes   Sig: Take 150 mcg by mouth daily     magnesium oxide (MAG-OX) 400 mg 7/11/2018 at Unknown time  Yes Yes   Sig: Take 400 mg by mouth daily at bedtime   ondansetron (ZOFRAN) 4 mg tablet 2018 at Unknown time  Yes Yes   Sig: Take 4 mg by mouth every 8 (eight) hours as needed for nausea or vomiting  oxyCODONE-acetaminophen (PERCOCET) 5-325 mg per tablet 2018 at Unknown time  Yes Yes   Sig: Take 2 tablets by mouth 3 (three) times a day  potassium chloride (K-DUR,KLOR-CON) 20 mEq tablet 2018 at Unknown time  Yes Yes   Sig: Take 20 mEq by mouth 2 (two) times a day Takes 2 tabs bid       Facility-Administered Medications: None       Past Medical History:   Diagnosis Date    Acid reflux     Acute renal failure (HCC)     multiple episodes    Anemia     Anxiety     Chronic pain     DDD (degenerative disc disease), lumbar     Disease of thyroid gland     DVT (deep venous thrombosis) ([facility] [address] )     s/p ankle fracture    Hypercalcemia     Hyperlipidemia     Hyperthyroidism     Migraine     Psychiatric disorder     anxiety depression    Seizures (HCC)     Spondylolisthesis of lumbar region     Treatment     spinal pain injections  last was 2016       Past Surgical History:   Procedure Laterality Date    BACK SURGERY       SECTION      x5    DISCOGRAM      PARATHYROIDECTOMY      VT ANTERIOR COLPORRAPHY RPR CYSTOCELE W/CYSTO N/A 2017    Procedure: CYSTOCELE REPAIR;  Surgeon: Juliana James MD;  Location: [address] [place] [address];  Service: Gynecology    VT ARTHRODESIS POSTERIOR INTERBODY LUMBAR N/A 2016    Procedure: L4-S1 LUMBAR LAMINECTOMY/DECOMPRESSION;  INSTRUMENTED POSTEROLATERAL FUSION/ INTERBODY L5-S1; ALLOGRAFT AND AUTOGRAFT (IMPULSE) ;   Surgeon: Real Gutierrez MD;  Location:  MAIN OR;  Service: Orthopedics    VT SLING OPER STRES INCONTINENCE N/A 2017    Procedure: MID URETHRAL SLING;  Surgeon: Huyen Jorgensen MD;  Location: [facility];  Service: Urology    THYROIDECTOMY      TONSILECTOMY AND ADNOIDECTOMY         Family History   Problem Relation Age of Onset    Diabetes Mother     Hypertension Mother     Hyperlipidemia Father     Arrhythmia Father     Lung cancer Father     Diabetes Father      I have reviewed and agree with the history as documented  Social History   Substance Use Topics    Smoking status: Current Every Day Smoker     Packs/day: 0 50     Years: 25 00     Types: Cigarettes    Smokeless tobacco: Never Used      Comment: patient states she quit but had a few cigarettes 7/23    Alcohol use No        Review of Systems   Constitutional: Negative for chills and fever  HENT: Negative for congestion and sore throat  Respiratory: Negative for shortness of breath  Cardiovascular: Negative for chest pain and leg swelling  Gastrointestinal: Negative for abdominal pain and vomiting  Musculoskeletal: Negative for joint swelling  Skin: Negative for rash  Neurological: Positive for dizziness, weakness and light-headedness  Psychiatric/Behavioral: The patient is nervous/anxious  All other systems reviewed and are negative  Physical Exam  Physical Exam   Constitutional: She is oriented to person, place, and time  She appears well-developed and well-nourished  HENT:   Head: Normocephalic and atraumatic  Mouth/Throat: Oropharynx is clear and moist    Eyes: Conjunctivae are normal    Neck: Normal range of motion  Neck supple  Cardiovascular: Normal rate, regular rhythm and normal heart sounds  Pulmonary/Chest: Effort normal and breath sounds normal    Abdominal: Soft  Bowel sounds are normal  There is no tenderness  Musculoskeletal: Normal range of motion  She exhibits no edema  Neurological: She is alert and oriented to person, place, and time  Skin: Skin is warm and dry  Psychiatric: She has a normal mood and affect  Her behavior is normal    Nursing note and vitals reviewed        Vital Signs  ED Triage Vitals [07/12/18 0154]   Temperature Pulse Respirations Blood Pressure SpO2   98 6 °F (37 °C) 78 16 145/73 98 %      Temp Source Heart Rate Source Patient Position - Orthostatic VS BP Location FiO2 (%)   Tympanic Monitor Lying Right arm --      Pain Score       No Pain           Vitals:    07/12/18 0154   BP: 145/73   Pulse: 78   Patient Position - Orthostatic VS: Lying       Visual Acuity      ED Medications  Medications - No data to display    Diagnostic Studies  Results Reviewed     None                 No orders to display              Procedures  Procedures       Phone Contacts  ED Phone Contact    ED Course                               MDM  Number of Diagnoses or Management Options  Medication side effects, initial encounter:   Diagnosis management comments: Patient reassured that her dose of keto coffee is self-limited and safe  But I did advise her to not take anymore    CritCare Time    Disposition  Final diagnoses:   Medication side effects, initial encounter     Time reflects when diagnosis was documented in both MDM as applicable and the Disposition within this note     Time User Action Codes Description Comment    7/12/2018  2:23 AM Wesly Erwin [T88  7XXA] Medication side effects, initial encounter       ED Disposition     ED Disposition Condition Comment    Discharge  Fco Rai discharge to home/self care  Condition at discharge: Stable        Follow-up Information     Follow up With Specialties Details Why Contact Info    Lexi Myers MD Internal Medicine Schedule an appointment as soon as possible for a visit in 1 day As needed [address]  [phone]            Discharge Medication List as of 7/12/2018  2:24 AM      CONTINUE these medications which have NOT CHANGED    Details   ALPRAZolam (XANAX) 2 MG tablet Take 2 mg by mouth 3 (three) times a day Takes 1/2 tab a m ,and 1700 and 1 tab hs , Until Discontinued, Historical Med      baclofen 10 mg tablet Take 10 mg by mouth 3 (three) times a day, Historical Med      calcitriol (ROCALTROL) 0 25 mcg capsule Take 0 25 mcg by mouth daily  , Until Discontinued, Historical Med      calcium gluconate 500 mg tablet Take 500 mg by mouth 4 (four) times a day , Until Discontinued, Historical Med      ferrous sulfate 325 (65 Fe) mg tablet Take 325 mg by mouth 2 (two) times a day , Until Discontinued, Historical Med      gabapentin (NEURONTIN) 600 MG tablet Take 600 mg by mouth 2 (two) times a day , Until Discontinued, Historical Med      gemfibrozil (LOPID) 600 mg tablet Take 600 mg by mouth 2 (two) times a day before meals  , Until Discontinued, Historical Med      levothyroxine 150 mcg tablet Take 150 mcg by mouth daily  , Until Discontinued, Historical Med      magnesium oxide (MAG-OX) 400 mg Take 400 mg by mouth daily at bedtime, Until Discontinued, Historical Med      ondansetron (ZOFRAN) 4 mg tablet Take 4 mg by mouth every 8 (eight) hours as needed for nausea or vomiting , Until Discontinued, Historical Med      oxyCODONE-acetaminophen (PERCOCET) 5-325 mg per tablet Take 2 tablets by mouth 3 (three) times a day   , Until Discontinued, Historical Med      potassium chloride (K-DUR,KLOR-CON) 20 mEq tablet Take 20 mEq by mouth 2 (two) times a day Takes 2 tabs bid , Until Discontinued, Historical Med      Tapentadol HCl ER (NUCYNTA ER) 200 MG TB12 Take by mouth 2 (two) times a day, Historical Med      acyclovir (ZOVIRAX) 400 MG tablet Take 1 tablet by mouth 4 (four) times a day for 10 days, Starting Tue 9/5/2017, Until Fri 9/15/2017, Print      ibuprofen (MOTRIN) 200 mg tablet Take 200 mg by mouth every 6 (six) hours as needed for mild pain or headaches, Until Discontinued, Historical Med           No discharge procedures on file      ED Provider  Electronically Signed by           Parul Quezada MD  07/13/18 8776

## 2018-10-15 ENCOUNTER — APPOINTMENT (EMERGENCY)
Dept: RADIOLOGY | Facility: HOSPITAL | Age: 43
End: 2018-10-15
Payer: COMMERCIAL

## 2018-10-15 ENCOUNTER — HOSPITAL ENCOUNTER (EMERGENCY)
Facility: HOSPITAL | Age: 43
Discharge: HOME | End: 2018-10-15
Attending: EMERGENCY MEDICINE | Admitting: EMERGENCY MEDICINE
Payer: COMMERCIAL

## 2018-10-15 VITALS
RESPIRATION RATE: 16 BRPM | DIASTOLIC BLOOD PRESSURE: 73 MMHG | OXYGEN SATURATION: 99 % | HEART RATE: 89 BPM | TEMPERATURE: 98.4 F | SYSTOLIC BLOOD PRESSURE: 139 MMHG

## 2018-10-15 DIAGNOSIS — S93.402A LEFT ANKLE SPRAIN: Primary | ICD-10-CM

## 2018-10-15 PROCEDURE — 99283 EMERGENCY DEPT VISIT LOW MDM: CPT

## 2018-10-15 PROCEDURE — 73610 X-RAY EXAM OF ANKLE: CPT

## 2018-10-15 RX ORDER — NAPROXEN 500 MG/1
500 TABLET ORAL ONCE
Status: COMPLETED | OUTPATIENT
Start: 2018-10-15 | End: 2018-10-15

## 2018-10-15 RX ORDER — NAPROXEN 500 MG/1
500 TABLET ORAL 2 TIMES DAILY WITH MEALS
Qty: 20 TABLET | Refills: 0 | Status: SHIPPED | OUTPATIENT
Start: 2018-10-15 | End: 2018-11-08

## 2018-10-15 RX ADMIN — NAPROXEN 500 MG: 500 TABLET ORAL at 20:23

## 2018-10-16 NOTE — ED PROVIDER NOTES
History  Chief Complaint   Patient presents with    Ankle Pain     L ankle pain for two months after twisted ankle  never saw anyone for it  had severe pain in it after working last night     45-year-old female presents to the ER with complaint of left ankle pain for 2 months  Patient states that pain started after she twisted her left ankle getting out of the car  Patient did not seek any medical attention up until this point  Patient came to the ER as her swelling and pain persists  Patient is able to walk on the left ankle with some difficulty  Ankle Pain   Associated symptoms: no back pain and no fever        Prior to Admission Medications   Prescriptions Last Dose Informant Patient Reported? Taking? ALPRAZolam (XANAX) 2 MG tablet   Yes No   Sig: Take 2 mg by mouth 3 (three) times a day Takes 1/2 tab a m ,and 1700 and 1 tab hs    Tapentadol HCl ER (NUCYNTA ER) 200 MG TB12   Yes No   Sig: Take by mouth 2 (two) times a day   baclofen 10 mg tablet   Yes No   Sig: Take 10 mg by mouth 3 (three) times a day   calcitriol (ROCALTROL) 0 25 mcg capsule   Yes No   Sig: Take 0 25 mcg by mouth daily  calcium gluconate 500 mg tablet   Yes No   Sig: Take 500 mg by mouth 4 (four) times a day  ferrous sulfate 325 (65 Fe) mg tablet   Yes No   Sig: Take 325 mg by mouth 2 (two) times a day    gabapentin (NEURONTIN) 600 MG tablet   Yes No   Sig: Take 600 mg by mouth 2 (two) times a day  gemfibrozil (LOPID) 600 mg tablet   Yes No   Sig: Take 600 mg by mouth 2 (two) times a day before meals  ibuprofen (MOTRIN) 200 mg tablet   Yes No   Sig: Take 200 mg by mouth every 6 (six) hours as needed for mild pain or headaches   levothyroxine 150 mcg tablet   Yes No   Sig: Take 150 mcg by mouth daily  magnesium oxide (MAG-OX) 400 mg   Yes No   Sig: Take 400 mg by mouth daily at bedtime   ondansetron (ZOFRAN) 4 mg tablet   Yes No   Sig: Take 4 mg by mouth every 8 (eight) hours as needed for nausea or vomiting  oxyCODONE-acetaminophen (PERCOCET) 5-325 mg per tablet   Yes No   Sig: Take 2 tablets by mouth 3 (three) times a day  potassium chloride (K-DUR,KLOR-CON) 20 mEq tablet   Yes No   Sig: Take 20 mEq by mouth 2 (two) times a day Takes 2 tabs bid       Facility-Administered Medications: None       Past Medical History:   Diagnosis Date    Acid reflux     Acute renal failure (HCC)     multiple episodes    Anemia     Anxiety     Chronic pain     DDD (degenerative disc disease), lumbar     Disease of thyroid gland     DVT (deep venous thrombosis) ([facility] [address] )     s/p ankle fracture    Hypercalcemia     Hyperlipidemia     Hyperthyroidism     Migraine     Psychiatric disorder     anxiety depression    Seizures (HCC)     Spondylolisthesis of lumbar region     Treatment     spinal pain injections  last was 2016       Past Surgical History:   Procedure Laterality Date    BACK SURGERY       SECTION      x5    DISCOGRAM      PARATHYROIDECTOMY      [place] ANTERIOR COLPORRAPHY RPR CYSTOCELE W/CYSTO N/A 2017    Procedure: CYSTOCELE REPAIR;  Surgeon: Raza Du MD;  Location: [address] [place] [address];  Service: Gynecology    [place] ARTHRODESIS POSTERIOR INTERBODY LUMBAR N/A 2016    Procedure: L4-S1 LUMBAR LAMINECTOMY/DECOMPRESSION;  INSTRUMENTED POSTEROLATERAL FUSION/ INTERBODY L5-S1; ALLOGRAFT AND AUTOGRAFT (IMPULSE) ; Surgeon: Jadon Hester MD;  Location:  MAIN OR;  Service: Orthopedics    [place] SLING OPER STRES INCONTINENCE N/A 2017    Procedure: MID URETHRAL SLING;  Surgeon: Aneudy Carrera MD;  Location: [place] [facility];  Service: Urology    THYROIDECTOMY      TONSILECTOMY AND ADNOIDECTOMY         Family History   Problem Relation Age of Onset    Diabetes Mother     Hypertension Mother     Hyperlipidemia Father     Arrhythmia Father     Lung cancer Father     Diabetes Father      I have reviewed and agree with the history as documented      Social History   Substance Use Topics    Smoking status: Current Every Day Smoker     Packs/day: 0 50     Years: 25 00     Types: Cigarettes    Smokeless tobacco: Never Used      Comment: patient states she quit but had a few cigarettes 7/23    Alcohol use No        Review of Systems   Constitutional: Negative for activity change, appetite change, chills and fever  HENT: Negative for congestion and ear pain  Eyes: Negative for pain and discharge  Respiratory: Negative for cough, chest tightness, shortness of breath, wheezing and stridor  Cardiovascular: Negative for chest pain and palpitations  Gastrointestinal: Negative for abdominal distention, abdominal pain, constipation, diarrhea and nausea  Endocrine: Negative for cold intolerance  Genitourinary: Negative for dysuria, frequency and urgency  Musculoskeletal: Positive for arthralgias  Negative for back pain  Skin: Negative for color change and rash  Allergic/Immunologic: Negative for environmental allergies and food allergies  Neurological: Negative for dizziness, weakness, numbness and headaches  Hematological: Negative for adenopathy  Psychiatric/Behavioral: Negative for agitation, behavioral problems and confusion  The patient is not nervous/anxious  All other systems reviewed and are negative  Physical Exam  Physical Exam   Constitutional: She is oriented to person, place, and time  She appears well-developed and well-nourished  HENT:   Head: Normocephalic and atraumatic  Mouth/Throat: Oropharynx is clear and moist    Eyes: Conjunctivae and EOM are normal    Neck: Normal range of motion  Neck supple  Cardiovascular: Normal rate, regular rhythm, normal heart sounds and intact distal pulses  Pulmonary/Chest: Effort normal and breath sounds normal    Abdominal: Soft  Bowel sounds are normal  She exhibits no distension  There is no tenderness  Musculoskeletal: Normal range of motion  Pulses intact bilateral lower extremities    Mild edema noted anterior to lateral malleoli on the left side  No ecchymosis, erythema, warm to touch noted  Pain noted with eversion and inversion of left ankle  Neurological: She is alert and oriented to person, place, and time  Skin: Skin is warm and dry  Psychiatric: She has a normal mood and affect  Her behavior is normal  Judgment and thought content normal    Nursing note and vitals reviewed  Vital Signs  ED Triage Vitals [10/15/18 1824]   Temperature Pulse Respirations Blood Pressure SpO2   98 4 °F (36 9 °C) 89 16 139/73 99 %      Temp Source Heart Rate Source Patient Position - Orthostatic VS BP Location FiO2 (%)   Tympanic Monitor Sitting Right arm --      Pain Score       8           Vitals:    10/15/18 1824   BP: 139/73   Pulse: 89   Patient Position - Orthostatic VS: Sitting       Visual Acuity      ED Medications  Medications   naproxen (NAPROSYN) tablet 500 mg (not administered)       Diagnostic Studies  Results Reviewed     None                 XR ankle 3+ views LEFT   Final Result by Krys Ordonez MD (10/15 1941)      No acute osseous abnormality  Workstation performed: [id number]                    Procedures  Procedures       Phone Contacts  ED Phone Contact    ED Course                               MDM  Number of Diagnoses or Management Options  Left ankle sprain:   Diagnosis management comments: Obtain x-ray of left ankle to rule out any fractures  Give naproxen for pain  Amount and/or Complexity of Data Reviewed  Tests in the radiology section of CPT®: ordered and reviewed  Tests in the medicine section of CPT®: ordered and reviewed  Independent visualization of images, tracings, or specimens: yes    Risk of Complications, Morbidity, and/or Mortality  General comments: No fractures noted on x-ray  At this point patient's symptoms are secondary to musculoskeletal strain/sprain  Ace wrap and air cast applied in the ER and patient is discharged home with follow-up to orthopedic surgery  Patient states that she already has crutches at home  Close return instructions given to return to the ER for any worsening symptoms  Patient agrees with discharge plan  Patient well appearing at time of discharge  Patient Progress  Patient progress: stable    CritCare Time    Disposition  Final diagnoses:   Left ankle sprain     Time reflects when diagnosis was documented in both MDM as applicable and the Disposition within this note     Time User Action Codes Description Comment    10/15/2018  8:08 PM Harper Oliveira Add [[id number]] Left ankle sprain       ED Disposition     ED Disposition Condition Comment    Discharge  Albert Mcconnell discharge to home/self care  Condition at discharge: Good        Follow-up Information     Follow up With Specialties Details Why Contact Info    Brandie Guzman MD Orthopedic Surgery In 2 days  [facility] [address] [place] [address]  [phone]            Patient's Medications   Discharge Prescriptions    NAPROXEN (EC NAPROSYN) 500 MG EC TABLET    Take 1 tablet (500 mg total) by mouth 2 (two) times a day with meals       Start Date: 10/15/2018End Date: --       Order Dose: 500 mg       Quantity: 20 tablet    Refills: 0     No discharge procedures on file      ED Provider  Electronically Signed by           Kyle Flores DO  10/15/18 2013

## 2018-10-16 NOTE — DISCHARGE INSTRUCTIONS
Please take a list of all of your medications and discharge paperwork with you to all of your follow-up medical visits  Please take all of your medications as directed  Please call your family doctor or return to the ER if you have increased shortness of breath, chest pain, fevers, chills, nausea, vomiting, diarrhea, or any other worsening symptoms  Ankle Sprain   WHAT YOU NEED TO KNOW:   An ankle sprain happens when 1 or more ligaments in your ankle joint stretch or tear  Ligaments are tough tissues that connect bones  Ligaments support your joints and keep your bones in place  DISCHARGE INSTRUCTIONS:   Return to the emergency department if:   · You have severe pain in your ankle  · Your foot or toes are cold or numb  · Your ankle becomes more weak or unstable (wobbly)  · You are unable to put any weight on your ankle or foot  · Your swelling has increased or returned  Contact your healthcare provider if:   · Your pain does not go away, even after treatment  · You have questions or concerns about your condition or care  Medicines: You may need any of the following:  · NSAIDs , such as ibuprofen, help decrease swelling, pain, and fever  This medicine is available with or without a doctor's order  NSAIDs can cause stomach bleeding or kidney problems in certain people  If you take blood thinner medicine, always ask your healthcare provider if NSAIDs are safe for you  Always read the medicine label and follow directions  · Acetaminophen  decreases pain  It is available without a doctor's order  Ask how much to take and how often to take it  Follow directions  Acetaminophen can cause liver damage if not taken correctly  · Prescription pain medicine  may be given  Ask how to take this medicine safely  · Take your medicine as directed  Contact your healthcare provider if you think your medicine is not helping or if you have side effects   Tell him or her if you are allergic to any medicine  Keep a list of the medicines, vitamins, and herbs you take  Include the amounts, and when and why you take them  Bring the list or the pill bottles to follow-up visits  Carry your medicine list with you in case of an emergency  Self care:   · Use support devices,  such as a brace, cast, or splint, may be needed to limit your movement and protect your joint  You may need to use crutches to decrease your pain as you move around  · Go to physical therapy as directed  A physical therapist teaches you exercises to help improve movement and strength, and to decrease pain  · Rest  your ankle so that it can heal  Return to normal activities as directed  · Apply ice on your ankle for 15 to 20 minutes every hour or as directed  Use an ice pack, or put crushed ice in a plastic bag  Cover it with a towel  Ice helps prevent tissue damage and decreases swelling and pain  · Compress  your ankle  Ask if you should wrap an elastic bandage around your injured ligament  An elastic bandage provides support and helps decrease swelling and movement so your joint can heal  Wear as long as directed  · Elevate  your ankle above the level of your heart as often as you can  This will help decrease swelling and pain  Prop your ankle on pillows or blankets to keep it elevated comfortably  Prevent another ankle sprain:   · Let your ankle heal   Find out how long your ligament needs to heal  Do not do any physical activity until your healthcare provider says it is okay  If you start activity too soon, you may develop a more serious injury  · Always warm up and stretch  before you exercise or play sports  · Use the right equipment  Always wear shoes that fit well and are made for the activity that you are doing  You may also need ankle supports, elbow and knee pads, or braces  Follow up with your healthcare provider as directed:  Write down your questions so you remember to ask them during your visits     © 2017 [address] [facility] Information is for End User's use only and may not be sold, redistributed or otherwise used for commercial purposes  All illustrations and images included in CareNotes® are the copyrighted property of A D A M , Inc  or Satnam Horvath  The above information is an  only  It is not intended as medical advice for individual conditions or treatments  Talk to your doctor, nurse or pharmacist before following any medical regimen to see if it is safe and effective for you

## 2018-10-24 ENCOUNTER — OFFICE VISIT (OUTPATIENT)
Dept: OBGYN CLINIC | Facility: CLINIC | Age: 43
End: 2018-10-24
Payer: COMMERCIAL

## 2018-10-24 VITALS
SYSTOLIC BLOOD PRESSURE: 116 MMHG | HEIGHT: 62 IN | WEIGHT: 184 LBS | DIASTOLIC BLOOD PRESSURE: 76 MMHG | BODY MASS INDEX: 33.86 KG/M2 | HEART RATE: 81 BPM

## 2018-10-24 DIAGNOSIS — S93.402A SPRAIN OF UNSPECIFIED LIGAMENT OF LEFT ANKLE, INITIAL ENCOUNTER: Primary | ICD-10-CM

## 2018-10-24 PROCEDURE — 99214 OFFICE O/P EST MOD 30 MIN: CPT | Performed by: ORTHOPAEDIC SURGERY

## 2018-10-24 RX ORDER — CALCITRIOL 0.25 UG/1
1 CAPSULE, LIQUID FILLED ORAL DAILY
COMMUNITY
End: 2021-06-07 | Stop reason: SDUPTHER

## 2018-10-24 RX ORDER — GABAPENTIN 600 MG/1
TABLET ORAL 3 TIMES DAILY
COMMUNITY
End: 2018-10-24 | Stop reason: SDUPTHER

## 2018-10-24 RX ORDER — BIOTIN 1 MG
TABLET ORAL
COMMUNITY
End: 2020-09-06

## 2018-10-24 RX ORDER — ALPRAZOLAM 2 MG/1
1 TABLET ORAL
COMMUNITY
End: 2018-10-24 | Stop reason: SDUPTHER

## 2018-10-24 RX ORDER — OYSTER SHELL CALCIUM WITH VITAMIN D 500; 200 MG/1; [IU]/1
TABLET, FILM COATED ORAL
Status: ON HOLD | COMMUNITY
Start: 2018-12-07 | End: 2018-12-07

## 2018-10-24 RX ORDER — ONDANSETRON 8 MG/1
1 TABLET, ORALLY DISINTEGRATING ORAL
COMMUNITY
End: 2018-10-24 | Stop reason: SDUPTHER

## 2018-10-24 RX ORDER — VARENICLINE TARTRATE 1 MG/1
TABLET, FILM COATED ORAL
COMMUNITY
End: 2018-11-08

## 2018-10-24 RX ORDER — LEVOTHYROXINE SODIUM 0.15 MG/1
1 TABLET ORAL DAILY
COMMUNITY
End: 2021-06-07 | Stop reason: SDUPTHER

## 2018-10-24 RX ORDER — FERROUS SULFATE 325(65) MG
1 TABLET ORAL 2 TIMES DAILY
COMMUNITY
End: 2018-10-24 | Stop reason: SDUPTHER

## 2018-10-24 RX ORDER — BACLOFEN 10 MG/1
TABLET ORAL 3 TIMES DAILY
COMMUNITY
End: 2018-10-24 | Stop reason: SDUPTHER

## 2018-10-24 RX ORDER — GABAPENTIN 300 MG/1
CAPSULE ORAL 3 TIMES DAILY
COMMUNITY
End: 2018-11-08

## 2018-10-24 RX ORDER — GEMFIBROZIL 600 MG/1
TABLET, FILM COATED ORAL
COMMUNITY
Start: 2017-08-03 | End: 2018-10-24 | Stop reason: SDUPTHER

## 2018-10-24 NOTE — PROGRESS NOTES
Assessment/Plan:  1  Sprain of unspecified ligament of left ankle, initial encounter  Ambulatory referral to Physical Therapy       Scribe Attestation    I,:   Cony Shahid MA am acting as a scribe while in the presence of the attending physician :        I,:   Charleen Willis MD personally performed the services described in this documentation    as scribed in my presence :              I discussed with Vivek Montelongo today that her signs and symptoms are consistent with a left ankle sprain  Her ankle is stable on examination today but she is very stiff with ROM  X-rays were discussed with her today that demonstrates a well healed distal fibula fracture with no signs of any fracture of dislocations  Treatment options were discussed with her today in the form of formal physical therapy as well as a lace-up ankle brace  A referral was provided for physical therapy and she was fitted and provided with a lace-up ankle brace  She will follow up in 5 weeks for repeat clinical evaluation and if she does not see improvement for physical therapy we may consider ordering an MRI  Subjective:   Du Herrera is a 44 y o  female who presents to the office today for evaluation of left ankle pain  She states this has been ongoing for approximately 2 months after she rolled her ankle getting out of her car  She states about 1 month ago she rolled her ankle again  She does have a history of a left ankle fracture that was sustained in 2009 and treated nonoperatively  She states her left ankle pain is located on the lateral aspect of her ankle and is a sharp shooting pain and is increased with prolonged standing, walking, and stairs  She does note swelling after being on her feet for long period of times  She notes increased difficulty with ambulating  She presented to the ED on 10/15/18 where x-rays were taken and told to e normal and told to follow up with orthopedics   She does wear an OTC ankle brace which she states provided her with mild relief  She does take Percocet for her pain with she states provides her with mild pain relief  Review of Systems   Constitutional: Negative for chills and fever  HENT: Negative for drooling and sneezing  Eyes: Negative for redness  Respiratory: Negative for cough and wheezing  Gastrointestinal: Negative for nausea and vomiting  Musculoskeletal: Positive for arthralgias and myalgias  Neurological: Positive for dizziness, numbness and headaches  Negative for weakness  Psychiatric/Behavioral: Negative for behavioral problems  The patient is nervous/anxious  Past Medical History:   Diagnosis Date    Acid reflux     Acute renal failure (HCC)     multiple episodes    Anemia     Anxiety     Chronic pain     DDD (degenerative disc disease), lumbar     Disease of thyroid gland     DVT [facility] thrombosis) ([facility] [address] )     s/p ankle fracture    Hypercalcemia     Hyperlipidemia     Hyperthyroidism     Migraine     Psychiatric disorder     anxiety depression    Seizures (HCC)     Spondylolisthesis of lumbar region     Treatment     spinal pain injections  last was 2016       Past Surgical History:   Procedure Laterality Date    BACK SURGERY       SECTION      x5    DISCOGRAM      PARATHYROIDECTOMY      [place] ANTERIOR COLPORRAPHY RPR CYSTOCELE W/CYSTO N/A 2017    Procedure: CYSTOCELE REPAIR;  Surgeon: Felecia Renner MD;  Location: [address] [place] [address];  Service: Gynecology    [place] ARTHRODESIS POSTERIOR INTERBODY LUMBAR N/A 2016    Procedure: L4-S1 LUMBAR LAMINECTOMY/DECOMPRESSION;  INSTRUMENTED POSTEROLATERAL FUSION/ INTERBODY L5-S1; ALLOGRAFT AND AUTOGRAFT (IMPULSE) ;   Surgeon: Uche Fraser MD;  Location:  MAIN OR;  Service: Orthopedics    [place] SLING OPER STRES INCONTINENCE N/A 2017    Procedure: MID URETHRAL SLING;  Surgeon: Imelda Gilmore MD;  Location: [address] [place] [address];  Service: Urology    THYROIDECTOMY      TONSILECTOMY AND ADNOIDECTOMY         Family History   Problem Relation Age of Onset    Diabetes Mother     Hypertension Mother     Hyperlipidemia Father     Arrhythmia Father     Lung cancer Father     Diabetes Father        Social History     Occupational History    Not on file  Social History Main Topics    Smoking status: Current Every Day Smoker     Packs/day: 0 50     Years: 25 00     Types: Cigarettes    Smokeless tobacco: Never Used      Comment: patient states she quit but had a few cigarettes 7/23    Alcohol use No    Drug use: No    Sexual activity: Not on file         Current Outpatient Prescriptions:     ALPRAZolam (XANAX) 2 MG tablet, Take 2 mg by mouth 3 (three) times a day Takes 1/2 tab a m ,and 1700 and 1 tab hs , Disp: , Rfl:     baclofen 10 mg tablet, Take 10 mg by mouth 3 (three) times a day, Disp: , Rfl:     calcitriol (ROCALTROL) 0 25 mcg capsule, Take 1 capsule by mouth daily, Disp: , Rfl:     Calcium Carb-Cholecalciferol (CALCIUM 1000 + D) 1000-800 MG-UNIT TABS, Take 1 tablet by mouth 3 (three) times a day, Disp: , Rfl:     calcium gluconate 500 mg tablet, Take 500 mg by mouth 4 (four) times a day , Disp: , Rfl:     Calcium-Magnesium (CALCIUM MAGNESIUM 750) 300-300 MG TABS, Take by mouth, Disp: , Rfl:     calcium-vitamin D (OSCAL 500 + D) 500 mg-200 units per tablet, Calcium 500 + D (D3), Disp: , Rfl:     Cholecalciferol (VITAMIN D3) 1000 units CAPS, Vitamin D-3 with Aloe, Disp: , Rfl:     diclofenac sodium (VOLTAREN) 1 %, APPLY 2 GRAM TO THE AFFECTED AREA(S) BY TOPICAL ROUTE 4 TIMES PER DAY, Disp: , Rfl:     ferrous sulfate 325 (65 Fe) mg tablet, Take 325 mg by mouth 2 (two) times a day , Disp: , Rfl:     gabapentin (NEURONTIN) 300 mg capsule, 3 (three) times a day, Disp: , Rfl:     gabapentin (NEURONTIN) 600 MG tablet, Take 600 mg by mouth 2 (two) times a day , Disp: , Rfl:     gemfibrozil (LOPID) 600 mg tablet, Take 600 mg by mouth 2 (two) times a day before meals  , Disp: , Rfl:     ibuprofen (MOTRIN) 200 mg tablet, Take 200 mg by mouth every 6 (six) hours as needed for mild pain or headaches, Disp: , Rfl:     levothyroxine (SYNTHROID) 150 mcg tablet, Take 1 tablet by mouth daily, Disp: , Rfl:     magnesium oxide (MAG-OX) 400 mg, Take 400 mg by mouth daily at bedtime, Disp: , Rfl:     naproxen (EC NAPROSYN) 500 MG EC tablet, Take 1 tablet (500 mg total) by mouth 2 (two) times a day with meals, Disp: 20 tablet, Rfl: 0    ondansetron (ZOFRAN) 4 mg tablet, Take 4 mg by mouth every 8 (eight) hours as needed for nausea or vomiting , Disp: , Rfl:     oxyCODONE-acetaminophen (PERCOCET) 5-325 mg per tablet, Take 2 tablets by mouth 3 (three) times a day   , Disp: , Rfl:     potassium chloride (K-DUR,KLOR-CON) 20 mEq tablet, Take 20 mEq by mouth 2 (two) times a day Takes 2 tabs bid , Disp: , Rfl:     Tapentadol HCl ER (NUCYNTA ER) 200 MG TB12, every 12 (twelve) hours, Disp: , Rfl:     varenicline (CHANTIX) 1 mg tablet, Chantix 1 mg tablet, Disp: , Rfl:     Allergies   Allergen Reactions    Other Anaphylaxis     Adhesive from bandaids - denies Latex allergy      Vicodin [Hydrocodone-Acetaminophen] Rash    Morphine And Related GI Intolerance     Nausea         Objective:  Vitals:    10/24/18 1423   BP: 116/76   Pulse: 81       Left Ankle Exam     Range of Motion   Dorsiflexion: 5   Plantar flexion: 20   Inversion: 5   Eversion: 5     Muscle Strength   Dorsiflexion:  4/5   Plantar flexion:  4/5     Tests   Anterior drawer: negative    Other   Erythema: absent  Sensation: normal  Pulse: present    Comments:  3/5 eversion  4/5 inversion  Negative proximal squeeze test  Negative hoyman's           Strength/Myotome Testing     Left Ankle/Foot   Dorsiflexion: 4  Plantar flexion: 4      Physical Exam   Constitutional: She is oriented to person, place, and time  She appears well-developed and well-nourished  HENT:   Head: Normocephalic and atraumatic     Eyes: Conjunctivae and EOM are normal  Neck: Normal range of motion  Cardiovascular: Normal rate and intact distal pulses  Pulmonary/Chest: Effort normal  No respiratory distress  Musculoskeletal:   As noted in HPI   Neurological: She is alert and oriented to person, place, and time  Skin: Skin is warm and dry  Psychiatric: She has a normal mood and affect  Her behavior is normal  Judgment and thought content normal        I have personally reviewed pertinent films in PACS and my interpretation is as follows: Well healed distal fibula fracture  No signs of fractures or dislocations

## 2018-11-08 ENCOUNTER — HOSPITAL ENCOUNTER (EMERGENCY)
Facility: HOSPITAL | Age: 43
Discharge: HOME | End: 2018-11-08
Attending: EMERGENCY MEDICINE | Admitting: EMERGENCY MEDICINE
Payer: COMMERCIAL

## 2018-11-08 VITALS
BODY MASS INDEX: 31.09 KG/M2 | WEIGHT: 170 LBS | OXYGEN SATURATION: 97 % | HEART RATE: 77 BPM | DIASTOLIC BLOOD PRESSURE: 82 MMHG | TEMPERATURE: 99.2 F | RESPIRATION RATE: 18 BRPM | SYSTOLIC BLOOD PRESSURE: 138 MMHG

## 2018-11-08 DIAGNOSIS — R19.7 VOMITING AND DIARRHEA: ICD-10-CM

## 2018-11-08 DIAGNOSIS — R11.10 VOMITING AND DIARRHEA: ICD-10-CM

## 2018-11-08 DIAGNOSIS — N93.9 ABNORMAL VAGINAL BLEEDING: Primary | ICD-10-CM

## 2018-11-08 LAB
ALBUMIN SERPL BCP-MCNC: 3.9 G/DL (ref 3.5–5)
ALP SERPL-CCNC: 57 U/L (ref 46–116)
ALT SERPL W P-5'-P-CCNC: 26 U/L (ref 12–78)
ANION GAP SERPL CALCULATED.3IONS-SCNC: 9 MMOL/L (ref 4–13)
AST SERPL W P-5'-P-CCNC: 10 U/L (ref 5–45)
BACTERIA UR QL AUTO: ABNORMAL /HPF
BASOPHILS # BLD AUTO: 0.03 THOUSANDS/ΜL (ref 0–0.1)
BASOPHILS NFR BLD AUTO: 0 % (ref 0–1)
BILIRUB SERPL-MCNC: 0.4 MG/DL (ref 0.2–1)
BILIRUB UR QL STRIP: ABNORMAL
BUN SERPL-MCNC: 13 MG/DL (ref 5–25)
CALCIUM SERPL-MCNC: 8.6 MG/DL (ref 8.3–10.1)
CHLORIDE SERPL-SCNC: 106 MMOL/L (ref 100–108)
CLARITY UR: ABNORMAL
CO2 SERPL-SCNC: 25 MMOL/L (ref 21–32)
COLOR UR: ABNORMAL
CREAT SERPL-MCNC: 1.01 MG/DL (ref 0.6–1.3)
EOSINOPHIL # BLD AUTO: 0.12 THOUSAND/ΜL (ref 0–0.61)
EOSINOPHIL NFR BLD AUTO: 2 % (ref 0–6)
ERYTHROCYTE [DISTWIDTH] IN BLOOD BY AUTOMATED COUNT: 14.1 % (ref 11.6–15.1)
EXT PREG TEST URINE: NEGATIVE
GFR SERPL CREATININE-BSD FRML MDRD: 69 ML/MIN/1.73SQ M
GLUCOSE SERPL-MCNC: 88 MG/DL (ref 65–140)
GLUCOSE UR STRIP-MCNC: NEGATIVE MG/DL
HCT VFR BLD AUTO: 39.4 % (ref 34.8–46.1)
HGB BLD-MCNC: 12.5 G/DL (ref 11.5–15.4)
HGB UR QL STRIP.AUTO: ABNORMAL
IMM GRANULOCYTES # BLD AUTO: 0.02 THOUSAND/UL (ref 0–0.2)
IMM GRANULOCYTES NFR BLD AUTO: 0 % (ref 0–2)
KETONES UR STRIP-MCNC: NEGATIVE MG/DL
LEUKOCYTE ESTERASE UR QL STRIP: NEGATIVE
LIPASE SERPL-CCNC: 281 U/L (ref 73–393)
LYMPHOCYTES # BLD AUTO: 1.99 THOUSANDS/ΜL (ref 0.6–4.47)
LYMPHOCYTES NFR BLD AUTO: 29 % (ref 14–44)
MCH RBC QN AUTO: 28.9 PG (ref 26.8–34.3)
MCHC RBC AUTO-ENTMCNC: 31.7 G/DL (ref 31.4–37.4)
MCV RBC AUTO: 91 FL (ref 82–98)
MONOCYTES # BLD AUTO: 0.47 THOUSAND/ΜL (ref 0.17–1.22)
MONOCYTES NFR BLD AUTO: 7 % (ref 4–12)
NEUTROPHILS # BLD AUTO: 4.33 THOUSANDS/ΜL (ref 1.85–7.62)
NEUTS SEG NFR BLD AUTO: 62 % (ref 43–75)
NITRITE UR QL STRIP: NEGATIVE
NON-SQ EPI CELLS URNS QL MICRO: ABNORMAL /HPF
NRBC BLD AUTO-RTO: 0 /100 WBCS
PH UR STRIP.AUTO: 6 [PH] (ref 5–9)
PLATELET # BLD AUTO: 209 THOUSANDS/UL (ref 149–390)
PMV BLD AUTO: 10.5 FL (ref 8.9–12.7)
POTASSIUM SERPL-SCNC: 3.7 MMOL/L (ref 3.5–5.3)
PROT SERPL-MCNC: 7.5 G/DL (ref 6.4–8.2)
PROT UR STRIP-MCNC: ABNORMAL MG/DL
RBC # BLD AUTO: 4.32 MILLION/UL (ref 3.81–5.12)
RBC #/AREA URNS AUTO: ABNORMAL /HPF
SODIUM SERPL-SCNC: 140 MMOL/L (ref 136–145)
SP GR UR STRIP.AUTO: >=1.03 (ref 1–1.03)
UROBILINOGEN UR QL STRIP.AUTO: 0.2 E.U./DL
WBC # BLD AUTO: 6.96 THOUSAND/UL (ref 4.31–10.16)
WBC #/AREA URNS AUTO: ABNORMAL /HPF

## 2018-11-08 PROCEDURE — 96360 HYDRATION IV INFUSION INIT: CPT

## 2018-11-08 PROCEDURE — 85025 COMPLETE CBC W/AUTO DIFF WBC: CPT | Performed by: EMERGENCY MEDICINE

## 2018-11-08 PROCEDURE — 99284 EMERGENCY DEPT VISIT MOD MDM: CPT

## 2018-11-08 PROCEDURE — 36415 COLL VENOUS BLD VENIPUNCTURE: CPT

## 2018-11-08 PROCEDURE — 81001 URINALYSIS AUTO W/SCOPE: CPT | Performed by: EMERGENCY MEDICINE

## 2018-11-08 PROCEDURE — 87070 CULTURE OTHR SPECIMN AEROBIC: CPT | Performed by: EMERGENCY MEDICINE

## 2018-11-08 PROCEDURE — 87491 CHLMYD TRACH DNA AMP PROBE: CPT | Performed by: EMERGENCY MEDICINE

## 2018-11-08 PROCEDURE — 87591 N.GONORRHOEAE DNA AMP PROB: CPT | Performed by: EMERGENCY MEDICINE

## 2018-11-08 PROCEDURE — 80053 COMPREHEN METABOLIC PANEL: CPT | Performed by: EMERGENCY MEDICINE

## 2018-11-08 PROCEDURE — 81025 URINE PREGNANCY TEST: CPT | Performed by: EMERGENCY MEDICINE

## 2018-11-08 PROCEDURE — 83690 ASSAY OF LIPASE: CPT | Performed by: EMERGENCY MEDICINE

## 2018-11-08 RX ORDER — MEGESTROL ACETATE 40 MG/1
40 TABLET ORAL 2 TIMES DAILY
Status: ON HOLD | COMMUNITY
End: 2018-12-07

## 2018-11-08 RX ADMIN — SODIUM CHLORIDE 1000 ML: 0.9 INJECTION, SOLUTION INTRAVENOUS at 20:48

## 2018-11-09 LAB
C TRACH DNA SPEC QL NAA+PROBE: NEGATIVE
N GONORRHOEA DNA SPEC QL NAA+PROBE: NEGATIVE

## 2018-11-09 NOTE — ED NOTES
Vaginal exam done by ER MD and specimens obtained and sent to lab  Patient noted to have some bleeding prior to exam in vaginal area       Debora Yeung RN  11/08/18 4242

## 2018-11-09 NOTE — ED PROVIDER NOTES
History  Chief Complaint   Patient presents with    Vomiting     States vomiting and diarrhea for 3 days and seen by Dr Gertrude Mcbride and told acute sinusitis and flu  States her employer would not accept her work note and med that Dr Gertrude Mcbride prescribed she picked up from pharmacy but did not start any of them    Vaginal Bleeding     States Dr Asael Becerra put her on Megace 40 mg twice a day since 10/10 and she is still bleeding, states he gave her a work note to be off until Monday and her employer will not accept it  44 yowf c/o not feeling well x several days, has congestion, cough, vomiting and diarrhea   + feels dizzy and aches and fatigued  Saw PMD who prescribed meds but pt  Didn't start them yet  Says her work refused to accept work note from her PMD   Also c/o vaginal bleeding and passing clots that started last night, has h/o abnormal vaginal bleeding and is on Megace  Has appointment with DR Asael Becerra but wants us to check that as well  No fever  No abdominal pain  No syncope  History provided by:  Patient   used: No    Vomiting   Associated symptoms: diarrhea and myalgias    Associated symptoms: no abdominal pain, no cough, no fever and no headaches    Vaginal Bleeding   Associated symptoms: fatigue    Associated symptoms: no abdominal pain, no back pain, no dizziness, no dysuria, no fever and no nausea        Prior to Admission Medications   Prescriptions Last Dose Informant Patient Reported? Taking?    ALPRAZolam (XANAX) 2 MG tablet 11/8/2018 at Unknown time Self Yes Yes   Sig: Take 2 mg by mouth 2 (two) times a day Takes 1/2 tab a m ,and 1700 and 1 tab hs    Cholecalciferol (VITAMIN D3) 1000 units CAPS Past Month at Unknown time  Yes Yes   Sig: Vitamin D-3 with Aloe   Tapentadol HCl ER (NUCYNTA ER) 200 MG TB12 11/8/2018 at 0900  Yes Yes   Sig: every 12 (twelve) hours   baclofen 10 mg tablet 11/8/2018 at 0400  Yes Yes   Sig: Take 10 mg by mouth 3 (three) times a day calcitriol (ROCALTROL) 0 25 mcg capsule 11/8/2018 at Unknown time  Yes Yes   Sig: Take 1 capsule by mouth daily   calcium-vitamin D (OSCAL 500 + D) 500 mg-200 units per tablet 11/8/2018 at Unknown time  Yes Yes   Sig: Calcium 500 + D (D3)   diclofenac sodium (VOLTAREN) 1 % 11/8/2018 at Unknown time  Yes Yes   Sig: APPLY 2 GRAM TO THE AFFECTED AREA(S) BY TOPICAL ROUTE 4 TIMES PER DAY   ferrous sulfate 325 (65 Fe) mg tablet 11/8/2018 at Unknown time  Yes Yes   Sig: Take 325 mg by mouth 2 (two) times a day    gabapentin (NEURONTIN) 600 MG tablet 11/7/2018 at Unknown time Self Yes Yes   Sig: Take 600 mg by mouth 3 (three) times a day as needed     gemfibrozil (LOPID) 600 mg tablet 11/8/2018 at Unknown time  Yes Yes   Sig: Take 600 mg by mouth 2 (two) times a day before meals  levothyroxine (SYNTHROID) 150 mcg tablet 11/8/2018 at Unknown time  Yes Yes   Sig: Take 1 tablet by mouth daily   magnesium oxide (MAG-OX) 400 mg 11/8/2018 at Unknown time  Yes Yes   Sig: Take 400 mg by mouth daily at bedtime   megestrol (MEGACE) 40 mg tablet 11/8/2018 at 0900 Self Yes Yes   Sig: Take 40 mg by mouth 2 (two) times a day   ondansetron (ZOFRAN) 4 mg tablet 11/8/2018 at Unknown time  Yes Yes   Sig: Take 4 mg by mouth every 8 (eight) hours as needed for nausea or vomiting     oxyCODONE-acetaminophen (PERCOCET) 5-325 mg per tablet 11/8/2018 at 1700 Self Yes Yes   Sig: Take 2 tablets by mouth every 6 (six) hours as needed     potassium chloride (K-DUR,KLOR-CON) 20 mEq tablet 11/8/2018 at 1100  Yes Yes   Sig: Take 20 mEq by mouth 2 (two) times a day Takes 2 tabs bid       Facility-Administered Medications: None       Past Medical History:   Diagnosis Date    Acid reflux     Acute renal failure (HCC)     multiple episodes    Anemia     Anxiety     Chronic pain     DDD (degenerative disc disease), lumbar     Disease of thyroid gland     [facility]VT [facility]deep venous thrombosis) ([facility] [address] ) 2009    s/p ankle fracture    Hypercalcemia     Hyperlipidemia     Hyperthyroidism     Migraine     Psychiatric disorder     anxiety depression    Seizures (HCC)     Spondylolisthesis of lumbar region     Treatment     spinal pain injections  last was 2016       Past Surgical History:   Procedure Laterality Date    BACK SURGERY       SECTION      x5    DISCOGRAM      PARATHYROIDECTOMY      MT ANTERIOR COLPORRAPHY RPR CYSTOCELE W/CYSTO N/A 2017    Procedure: CYSTOCELE REPAIR;  Surgeon: Kaleb Finley MD;  Location: [address] [place] [address];  Service: Gynecology    MT ARTHRODESIS POSTERIOR INTERBODY LUMBAR N/A 2016    Procedure: L4-S1 LUMBAR LAMINECTOMY/DECOMPRESSION;  INSTRUMENTED POSTEROLATERAL FUSION/ INTERBODY L5-S1; ALLOGRAFT AND AUTOGRAFT (IMPULSE) ; Surgeon: Idalia Fleming MD;  Location:  MAIN OR;  Service: Orthopedics    MT SLING OPER STRES INCONTINENCE N/A 2017    Procedure: MID URETHRAL SLING;  Surgeon: Ivonne Butterfield MD;  Location: [facility];  Service: Urology    THYROIDECTOMY      TONSILECTOMY AND ADNOIDECTOMY         Family History   Problem Relation Age of Onset    Diabetes Mother     Hypertension Mother     Hyperlipidemia Father     Arrhythmia Father     Lung cancer Father     Diabetes Father      I have reviewed and agree with the history as documented  Social History   Substance Use Topics    Smoking status: Current Every Day Smoker     Packs/day: 0 50     Years: 25 00     Types: Cigarettes    Smokeless tobacco: Never Used      Comment: patient states she quit but had a few cigarettes     Alcohol use No        Review of Systems   Constitutional: Positive for fatigue  Negative for fever  HENT: Positive for congestion  Eyes: Negative  Respiratory: Negative  Negative for cough and shortness of breath  Cardiovascular: Negative  Negative for chest pain  Gastrointestinal: Positive for diarrhea and vomiting  Negative for abdominal pain and nausea     Genitourinary: Positive for vaginal bleeding  Negative for dysuria and flank pain  Musculoskeletal: Positive for myalgias  Negative for back pain  Skin: Negative  Negative for rash and wound  Neurological: Negative  Negative for dizziness and headaches  Hematological: Does not bruise/bleed easily  Psychiatric/Behavioral: Negative  All other systems reviewed and are negative  Physical Exam  Physical Exam   Constitutional: She is oriented to person, place, and time  She appears well-developed and well-nourished  No distress  HENT:   Head: Normocephalic and atraumatic  Eyes: Pupils are equal, round, and reactive to light  Conjunctivae are normal    Neck: Normal range of motion  Neck supple  Cardiovascular: Normal rate, regular rhythm and normal heart sounds  No murmur heard  Pulmonary/Chest: Effort normal and breath sounds normal  No respiratory distress  Abdominal: Soft  Bowel sounds are normal  She exhibits no distension  There is no tenderness  Genitourinary:   Genitourinary Comments: Os closed, no clots, mild-mod blood in vault  Musculoskeletal: Normal range of motion  She exhibits no edema or deformity  Neurological: She is alert and oriented to person, place, and time  No cranial nerve deficit  She exhibits normal muscle tone  Skin: Skin is warm and dry  No rash noted  She is not diaphoretic  No pallor  Psychiatric: She has a normal mood and affect  Her behavior is normal    Nursing note and vitals reviewed        Vital Signs  ED Triage Vitals [11/08/18 1939]   Temperature Pulse Respirations Blood Pressure SpO2   99 2 °F (37 3 °C) 77 18 138/82 97 %      Temp Source Heart Rate Source Patient Position - Orthostatic VS BP Location FiO2 (%)   Tympanic Monitor Sitting Left arm --      Pain Score       6           Vitals:    11/08/18 1939   BP: 138/82   Pulse: 77   Patient Position - Orthostatic VS: Sitting       Visual Acuity      ED Medications  Medications   sodium chloride 0 9 % bolus 1,000 mL (0 mL Intravenous Stopped 11/8/18 2156)       Diagnostic Studies  Results Reviewed     Procedure Component Value Units Date/Time    Genital Comprehensive Culture [[id number]] Collected:  11/08/18 2156    Lab Status: In process Specimen:  Genital from Cervix Updated:  11/08/18 2158    Urine Microscopic [[id number]]  (Abnormal) Collected:  11/08/18 2128    Lab Status:  Final result Specimen:  Urine from Urine, Clean Catch Updated:  11/08/18 2140     RBC, UA Innumerable (A) /hpf      WBC, UA 2-4 (A) /hpf      Epithelial Cells Occasional /hpf      Bacteria, UA None Seen /hpf     UA w Reflex to Microscopic [[id number]]  (Abnormal) Collected:  11/08/18 2128    Lab Status:  Final result Specimen:  Urine from Urine, Clean Catch Updated:  11/08/18 2135     Color, UA Ann     Clarity, UA Cloudy     Specific Gravity, UA >=1 030     pH, UA 6 0     Leukocytes, UA Negative     Nitrite, UA Negative     Protein, UA 30 (1+) (A) mg/dl      Glucose, UA Negative mg/dl      Ketones, UA Negative mg/dl      Urobilinogen, UA 0 2 E U /dl      Bilirubin, UA Interference- unable to analyze (A)     Blood, UA Large (A)    Chlamydia/GC amplified DNA by PCR [[id number]] Collected:  11/08/18 2128    Lab Status:   In process Specimen:  Urine from Urine, Other Updated:  11/08/18 2132    POCT pregnancy, urine [[id number]]  (Normal) Resulted:  11/08/18 2131    Lab Status:  Final result Updated:  11/08/18 2131     EXT PREG TEST UR (Ref: Negative) Negative    Comprehensive metabolic panel [[id number]] Collected:  11/08/18 2047    Lab Status:  Final result Specimen:  Blood from Arm, Left Updated:  11/08/18 2108     Sodium 140 mmol/L      Potassium 3 7 mmol/L      Chloride 106 mmol/L      CO2 25 mmol/L      ANION GAP 9 mmol/L      BUN 13 mg/dL      Creatinine 1 01 mg/dL      Glucose 88 mg/dL      Calcium 8 6 mg/dL      AST 10 U/L      ALT 26 U/L      Alkaline Phosphatase 57 U/L      Total Protein 7 5 g/dL      Albumin 3 9 g/dL      Total Bilirubin 0 40 mg/dL      eGFR 69 ml/min/1 73sq m     Narrative:         National Kidney Disease Education Program recommendations are as follows:  GFR calculation is accurate only with a steady state creatinine  Chronic Kidney disease less than 60 ml/min/1 73 sq  meters  Kidney failure less than 15 ml/min/1 73 sq  meters  Lipase [[id number]]  (Normal) Collected:  11/08/18 2047    Lab Status:  Final result Specimen:  Blood from Arm, Left Updated:  11/08/18 2108     Lipase 281 u/L     CBC and differential [[id number]] Collected:  11/08/18 2047    Lab Status:  Final result Specimen:  Blood from Arm, Left Updated:  11/08/18 2053     WBC 6 96 Thousand/uL      RBC 4 32 Million/uL      Hemoglobin 12 5 g/dL      Hematocrit 39 4 %      MCV 91 fL      MCH 28 9 pg      MCHC 31 7 g/dL      RDW 14 1 %      MPV 10 5 fL      Platelets 259 Thousands/uL      nRBC 0 /100 WBCs      Neutrophils Relative 62 %      Immat GRANS % 0 %      Lymphocytes Relative 29 %      Monocytes Relative 7 %      Eosinophils Relative 2 %      Basophils Relative 0 %      Neutrophils Absolute 4 33 Thousands/µL      Immature Grans Absolute 0 02 Thousand/uL      Lymphocytes Absolute 1 99 Thousands/µL      Monocytes Absolute 0 47 Thousand/µL      Eosinophils Absolute 0 12 Thousand/µL      Basophils Absolute 0 03 Thousands/µL                  No orders to display              Procedures  Procedures       Phone Contacts  ED Phone Contact    ED Course                               MDM  Number of Diagnoses or Management Options  Abnormal vaginal bleeding:   Vomiting and diarrhea:   Diagnosis management comments: Pt  Needs work note to specifically say she has to be on bedrest   Evaluation negative, pt  Given liter IVF  Will discharge, advised rest, fluids, follow up outpt  Pt  Has meds from Dr Elizabet Flores already prescribed      CritCare Time    Disposition  Final diagnoses:   Abnormal vaginal bleeding   Vomiting and diarrhea     Time reflects when diagnosis was documented in both MDM as applicable and the Disposition within this note     Time User Action Codes Description Comment    37/8/1077  3:90 PM Azam PALACIO Add [C77 9] Abnormal vaginal bleeding     83/6/4414  9:11 PM Mitch Kruse Add [B31 26,  R19 7] Vomiting and diarrhea       ED Disposition     ED Disposition Condition Comment    Discharge  Lauren Ponto discharge to home/self care  Condition at discharge: Stable        Follow-up Information     Follow up With Specialties Details Why Carlos Moss MD Obstetrics and Gynecology, Obstetrics, Gynecology  as planned [place] [id number]  [phone]            Patient's Medications   Discharge Prescriptions    No medications on file     No discharge procedures on file      ED Provider  Electronically Signed by           Reinier Yost MD  21/13/99 2119

## 2018-11-09 NOTE — DISCHARGE INSTRUCTIONS
Dysfunctional Uterine Bleeding   WHAT YOU NEED TO KNOW:   Dysfunctional uterine bleeding (DUB) is abnormal uterine bleeding that is caused by a problem with your hormones  You may have bleeding from your uterus at times other than your normal monthly period  Your monthly periods may last longer or shorter, and bleeding may be heavier or lighter than usual    DISCHARGE INSTRUCTIONS:   Medicines:   · Hormones  help decrease bleeding by making your monthly periods more regular  Sometimes this medicine may be given as birth control pills  · NSAIDs  help decrease swelling, pain, and fever  This medicine is available with or without a doctor's order  NSAIDs can cause stomach bleeding or kidney problems in certain people  If you take blood thinner medicine, always ask your healthcare provider if NSAIDs are safe for you  Always read the medicine label and follow directions  · Iron supplements  may be given if your blood iron level decreases because of heavy bleeding  Iron may make you constipated  Ask your healthcare provider for ways to prevent or treat constipation  Iron may also make your bowel movements turn dark or black  · Take your medicine as directed  Contact your healthcare provider if you think your medicine is not helping or if you have side effects  Tell him or her if you are allergic to any medicine  Keep a list of the medicines, vitamins, and herbs you take  Include the amounts, and when and why you take them  Bring the list or the pill bottles to follow-up visits  Carry your medicine list with you in case of an emergency  Follow up with your healthcare provider as directed:  Write down your questions so you remember to ask them during your visits  Self-care:   · Apply heat  on your lower abdomen for 20 to 30 minutes every 2 hours for as many days as directed  Heat helps decrease pain and muscle spasms  · Include foods high in iron  if needed   Examples of foods high in iron are leafy green vegetables, beef, pork, liver, eggs, and whole-grain breads and cereals  · Keep a diary of your menstrual cycles  Keep track of the number of tampons or pads you use each day  · Talk to your healthcare provider before you start a weight loss program   You may need to wait until the abnormal bleeding has stopped before you try to lose weight  The amount of iron in your blood should be normal before you lose weight  Contact your healthcare provider if:   · You need to change your sanitary pad or tampon more than once an hour  · Your medicine causes nausea, vomiting, or diarrhea  · You have questions or concerns about your condition or care  Return to the emergency department if:   · You continue to bleed heavily, or you feel faint  © 2017 [address]  Information is for End User's use only and may not be sold, redistributed or otherwise used for commercial purposes  All illustrations and images included in CareNotes® are the copyrighted property of [employer]  or Satnam Horvath  The above information is an  only  It is not intended as medical advice for individual conditions or treatments  Talk to your doctor, nurse or pharmacist before following any medical regimen to see if it is safe and effective for you

## 2018-11-11 LAB — BACTERIA GENITAL AEROBE CULT: NORMAL

## 2018-11-16 ENCOUNTER — TRANSCRIBE ORDERS (OUTPATIENT)
Dept: ADMINISTRATIVE | Facility: HOSPITAL | Age: 43
End: 2018-11-16

## 2018-11-16 DIAGNOSIS — Z01.818 PRE-OP TESTING: ICD-10-CM

## 2018-11-16 DIAGNOSIS — N92.0 EXCESSIVE OR FREQUENT MENSTRUATION: Primary | ICD-10-CM

## 2018-11-20 ENCOUNTER — HOSPITAL ENCOUNTER (OUTPATIENT)
Dept: RADIOLOGY | Facility: HOSPITAL | Age: 43
Discharge: HOME | End: 2018-11-20
Attending: OBSTETRICS & GYNECOLOGY
Payer: COMMERCIAL

## 2018-11-20 DIAGNOSIS — N92.0 EXCESSIVE OR FREQUENT MENSTRUATION: ICD-10-CM

## 2018-11-20 PROCEDURE — 76856 US EXAM PELVIC COMPLETE: CPT

## 2018-11-20 PROCEDURE — 76830 TRANSVAGINAL US NON-OB: CPT

## 2018-11-29 ENCOUNTER — APPOINTMENT (OUTPATIENT)
Dept: PREADMISSION TESTING | Facility: HOSPITAL | Age: 43
End: 2018-11-29
Payer: COMMERCIAL

## 2018-11-29 ENCOUNTER — APPOINTMENT (OUTPATIENT)
Dept: LAB | Facility: HOSPITAL | Age: 43
End: 2018-11-29
Attending: OBSTETRICS & GYNECOLOGY
Payer: COMMERCIAL

## 2018-11-29 DIAGNOSIS — Z01.818 PRE-OP TESTING: ICD-10-CM

## 2018-11-29 DIAGNOSIS — N92.0 EXCESSIVE OR FREQUENT MENSTRUATION: ICD-10-CM

## 2018-11-29 LAB
ALBUMIN SERPL BCP-MCNC: 3.6 G/DL (ref 3.5–5)
ALP SERPL-CCNC: 65 U/L (ref 46–116)
ALT SERPL W P-5'-P-CCNC: 31 U/L (ref 12–78)
ANION GAP SERPL CALCULATED.3IONS-SCNC: 13 MMOL/L (ref 4–13)
APTT PPP: 25 SECONDS (ref 24–33)
AST SERPL W P-5'-P-CCNC: 11 U/L (ref 5–45)
BACTERIA UR QL AUTO: ABNORMAL /HPF
BASOPHILS # BLD AUTO: 0.05 THOUSANDS/ΜL (ref 0–0.1)
BASOPHILS NFR BLD AUTO: 1 % (ref 0–1)
BILIRUB SERPL-MCNC: 0.3 MG/DL (ref 0.2–1)
BILIRUB UR QL STRIP: NEGATIVE
BUN SERPL-MCNC: 11 MG/DL (ref 5–25)
CALCIUM SERPL-MCNC: 8.8 MG/DL (ref 8.3–10.1)
CHLORIDE SERPL-SCNC: 106 MMOL/L (ref 100–108)
CLARITY UR: CLEAR
CO2 SERPL-SCNC: 22 MMOL/L (ref 21–32)
COLOR UR: YELLOW
CREAT SERPL-MCNC: 0.93 MG/DL (ref 0.6–1.3)
EOSINOPHIL # BLD AUTO: 0.15 THOUSAND/ΜL (ref 0–0.61)
EOSINOPHIL NFR BLD AUTO: 3 % (ref 0–6)
ERYTHROCYTE [DISTWIDTH] IN BLOOD BY AUTOMATED COUNT: 15.3 % (ref 11.6–15.1)
EST. AVERAGE GLUCOSE BLD GHB EST-MCNC: 108 MG/DL
GFR SERPL CREATININE-BSD FRML MDRD: 76 ML/MIN/1.73SQ M
GLUCOSE P FAST SERPL-MCNC: 101 MG/DL (ref 65–99)
GLUCOSE UR STRIP-MCNC: NEGATIVE MG/DL
HBA1C MFR BLD: 5.4 % (ref 4.2–6.3)
HCT VFR BLD AUTO: 38.6 % (ref 34.8–46.1)
HGB BLD-MCNC: 12.2 G/DL (ref 11.5–15.4)
HGB UR QL STRIP.AUTO: ABNORMAL
HYALINE CASTS #/AREA URNS LPF: ABNORMAL /LPF
IMM GRANULOCYTES # BLD AUTO: 0.03 THOUSAND/UL (ref 0–0.2)
IMM GRANULOCYTES NFR BLD AUTO: 1 % (ref 0–2)
INR PPP: 0.89 (ref 0.86–1.16)
KETONES UR STRIP-MCNC: NEGATIVE MG/DL
LEUKOCYTE ESTERASE UR QL STRIP: NEGATIVE
LYMPHOCYTES # BLD AUTO: 1.54 THOUSANDS/ΜL (ref 0.6–4.47)
LYMPHOCYTES NFR BLD AUTO: 28 % (ref 14–44)
MCH RBC QN AUTO: 29.2 PG (ref 26.8–34.3)
MCHC RBC AUTO-ENTMCNC: 31.6 G/DL (ref 31.4–37.4)
MCV RBC AUTO: 92 FL (ref 82–98)
MONOCYTES # BLD AUTO: 0.5 THOUSAND/ΜL (ref 0.17–1.22)
MONOCYTES NFR BLD AUTO: 9 % (ref 4–12)
MUCOUS THREADS UR QL AUTO: ABNORMAL
NEUTROPHILS # BLD AUTO: 3.27 THOUSANDS/ΜL (ref 1.85–7.62)
NEUTS SEG NFR BLD AUTO: 58 % (ref 43–75)
NITRITE UR QL STRIP: NEGATIVE
NON-SQ EPI CELLS URNS QL MICRO: ABNORMAL /HPF
NRBC BLD AUTO-RTO: 0 /100 WBCS
PH UR STRIP.AUTO: 5.5 [PH] (ref 5–9)
PLATELET # BLD AUTO: 277 THOUSANDS/UL (ref 149–390)
PMV BLD AUTO: 10.9 FL (ref 8.9–12.7)
POTASSIUM SERPL-SCNC: 4.1 MMOL/L (ref 3.5–5.3)
PROT SERPL-MCNC: 7.4 G/DL (ref 6.4–8.2)
PROT UR STRIP-MCNC: NEGATIVE MG/DL
PROTHROMBIN TIME: 9.4 SECONDS (ref 9.4–11.7)
RBC # BLD AUTO: 4.18 MILLION/UL (ref 3.81–5.12)
RBC #/AREA URNS AUTO: ABNORMAL /HPF
SODIUM SERPL-SCNC: 141 MMOL/L (ref 136–145)
SP GR UR STRIP.AUTO: >=1.03 (ref 1–1.03)
UROBILINOGEN UR QL STRIP.AUTO: 0.2 E.U./DL
WBC # BLD AUTO: 5.54 THOUSAND/UL (ref 4.31–10.16)
WBC #/AREA URNS AUTO: ABNORMAL /HPF

## 2018-11-29 PROCEDURE — 85025 COMPLETE CBC W/AUTO DIFF WBC: CPT | Performed by: OBSTETRICS & GYNECOLOGY

## 2018-11-29 PROCEDURE — 86850 RBC ANTIBODY SCREEN: CPT

## 2018-11-29 PROCEDURE — 85610 PROTHROMBIN TIME: CPT | Performed by: OBSTETRICS & GYNECOLOGY

## 2018-11-29 PROCEDURE — 81001 URINALYSIS AUTO W/SCOPE: CPT

## 2018-11-29 PROCEDURE — 80053 COMPREHEN METABOLIC PANEL: CPT

## 2018-11-29 PROCEDURE — 85730 THROMBOPLASTIN TIME PARTIAL: CPT

## 2018-11-29 PROCEDURE — 36415 COLL VENOUS BLD VENIPUNCTURE: CPT

## 2018-11-29 PROCEDURE — 86900 BLOOD TYPING SEROLOGIC ABO: CPT

## 2018-11-29 PROCEDURE — 87081 CULTURE SCREEN ONLY: CPT

## 2018-11-29 PROCEDURE — 86901 BLOOD TYPING SEROLOGIC RH(D): CPT

## 2018-11-29 PROCEDURE — 83036 HEMOGLOBIN GLYCOSYLATED A1C: CPT

## 2018-11-29 RX ORDER — IBUPROFEN 200 MG
400 TABLET ORAL EVERY 6 HOURS PRN
Status: ON HOLD | COMMUNITY
End: 2018-12-07

## 2018-11-29 NOTE — PRE-PROCEDURE INSTRUCTIONS
My Surgical Experience    The following information was developed to assist you to prepare for your operation  What do I need to do before coming to the hospital?   Arrange for a responsible person to drive you to and from the hospital    Arrange care for your children at home  Children are not allowed in the recovery areas of the hospital   Plan to wear clothing that is easy to put on and take off  If you are having shoulder surgery, wear a shirt that buttons or zippers in the front  Bathing  o Shower the evening before and the morning of your surgery with an antibacterial soap  Please refer to the Pre Op Showering Instructions for Surgery Patients Sheet   o Remove nail polish and all body piercing jewelry  o Do not shave any body part for at least 24 hours before surgery-this includes face, arms, legs and upper body  Food  o Nothing to eat or drink after midnight the night before your surgery  This includes candy and chewing gum  o Exception: If your surgery is after 12:00pm (noon), you may have clear liquids such as 7-Up®, ginger ale, apple or cranberry juice, Jell-O®, water, or clear broth until 8:00 am  o Do not drink milk or juice with pulp on the morning before surgery  o Do not drink alcohol 24 hours before surgery  Medicine  o Follow instructions you received from your surgeon about which medicines you may take on the day of surgery  o If instructed to take medicine on the morning of surgery, take pills with just a small sip of water  Call your prescribing doctor for specific infroamtion on what to do if you take insulin    What should I bring to the hospital?    Bring:  Landy Sweet or a walker, if you have them, for foot or knee surgery   A list of the daily medicines, vitamins, minerals, herbals and nutritional supplements you take   Include the dosages of medicines and the time you take them each day   Glasses, dentures or hearing aids   Minimal clothing; you will be wearing hospital sleepwear   Photo ID; required to verify your identity   If you have a Living Will or Power of , bring a copy of the documents   If you have an ostomy, bring an extra pouch and any supplies you use    Do not bring   Medicines or inhalers   Money, valuables or jewelry    What other information should I know about the day of surgery?  Notify your surgeons if you develop a cold, sore throat, cough, fever, rash or any other illness   Report to the Ambulatory Surgical/Same Day Surgery Unit   You will be instructed to stop at Registration only if you have not been pre-registered   Inform your  fi they do not stay that they will be asked by the staff to leave a phone number where they can be reached   Be available to be reached before surgery  In the event the operating room schedule changes, you may be asked to come in earlier or later than expected    *It is important to tell your doctor and others involved in your health care if you are taking or have been taking any non-prescription drugs, vitamins, minerals, herbals or other nutritional supplements  Any of these may interact with some food or medicines and cause a reaction      Pre-Surgery Instructions:   Medication Instructions    ALPRAZolam (XANAX) 2 MG tablet Instructed patient per Anesthesia Guidelines   baclofen 10 mg tablet Instructed patient per Anesthesia Guidelines   calcitriol (ROCALTROL) 0 25 mcg capsule Instructed patient per Anesthesia Guidelines   calcium-vitamin D (OSCAL 500 + D) 500 mg-200 units per tablet Instructed patient per Anesthesia Guidelines   ferrous sulfate 325 (65 Fe) mg tablet Instructed patient per Anesthesia Guidelines   gabapentin (NEURONTIN) 600 MG tablet Instructed patient per Anesthesia Guidelines   gemfibrozil (LOPID) 600 mg tablet Instructed patient per Anesthesia Guidelines   ibuprofen (MOTRIN) 200 mg tablet Instructed patient per Anesthesia Guidelines      levothyroxine (SYNTHROID) 150 mcg tablet Instructed patient per Anesthesia Guidelines   magnesium oxide (MAG-OX) 400 mg Instructed patient per Anesthesia Guidelines   megestrol (MEGACE) 40 mg tablet Instructed patient per Anesthesia Guidelines   oxyCODONE-acetaminophen (PERCOCET) 5-325 mg per tablet Instructed patient per Anesthesia Guidelines   potassium chloride (K-DUR,KLOR-CON) 20 mEq tablet Instructed patient per Anesthesia Guidelines   Tapentadol HCl ER (NUCYNTA ER) 200 MG TB12 Instructed patient per Anesthesia Guidelines  To take synthroid, xanax, calcitriol a m  Of surgery

## 2018-11-30 ENCOUNTER — ANESTHESIA EVENT (OUTPATIENT)
Dept: PERIOP | Facility: HOSPITAL | Age: 43
End: 2018-11-30
Payer: COMMERCIAL

## 2018-11-30 LAB
ABO GROUP BLD: NORMAL
BLD GP AB SCN SERPL QL: NEGATIVE
MRSA NOSE QL CULT: NORMAL
RH BLD: POSITIVE
SPECIMEN EXPIRATION DATE: NORMAL

## 2018-12-02 NOTE — ANESTHESIA PREPROCEDURE EVALUATION
Review of Systems/Medical History  Patient summary reviewed  Chart reviewed  No history of anesthetic complications     Cardiovascular  Hyperlipidemia, DVT (2009)   Pulmonary  Smoker cigarette smoker  ,        GI/Hepatic    GERD poorly controlled,             Endo/Other  History of thyroid disease (s/p thyroidectomy) , hypothyroidism, Parathyroid disease (s/p parathyroidectomy) hypoparathyroidism,   Obesity    GYN      Comment: S/p tubal     Hematology  Anemia ,     Musculoskeletal  Back pain (DDD) , lumbar pain,   Comment: Neuropathy in right leg Arthritis     Neurology  Seizures (petit mal 4 years ago) well controlled,  Headaches (migraines),    Psychology   Anxiety, Depression ,   Chronic pain,            Physical Exam    Airway    Mallampati score: III  TM Distance: >3 FB  Neck ROM: full     Dental       Cardiovascular  Rhythm: regular, Rate: normal,     Pulmonary  Breath sounds clear to auscultation,     Other Findings        Anesthesia Plan  ASA Score- 3     Anesthesia Type- general and regional with ASA Monitors  Additional Monitors:   Airway Plan: ETT  Comment: Bilateral TAP block with Exparel at end of procedure prior to emergence  Plan Factors-    Induction- intravenous  Postoperative Plan- Plan for postoperative opioid use  Planned trial extubation    Informed Consent- Anesthetic plan and risks discussed with patient  I personally reviewed this patient with the CRNA  Discussed and agreed on the Anesthesia Plan with the CRNA  Edilma Peralta

## 2018-12-03 ENCOUNTER — ANESTHESIA (OUTPATIENT)
Dept: PERIOP | Facility: HOSPITAL | Age: 43
End: 2018-12-03
Payer: COMMERCIAL

## 2018-12-03 ENCOUNTER — HOSPITAL ENCOUNTER (OUTPATIENT)
Facility: HOSPITAL | Age: 43
Setting detail: OUTPATIENT SURGERY
Discharge: HOME | End: 2018-12-03
Attending: UROLOGY | Admitting: UROLOGY
Payer: COMMERCIAL

## 2018-12-03 VITALS
BODY MASS INDEX: 33.13 KG/M2 | DIASTOLIC BLOOD PRESSURE: 71 MMHG | SYSTOLIC BLOOD PRESSURE: 125 MMHG | RESPIRATION RATE: 18 BRPM | WEIGHT: 180 LBS | HEIGHT: 62 IN | OXYGEN SATURATION: 96 % | HEART RATE: 98 BPM | TEMPERATURE: 98.6 F

## 2018-12-03 LAB — GLUCOSE SERPL-MCNC: 108 MG/DL (ref 65–140)

## 2018-12-03 PROCEDURE — 82948 REAGENT STRIP/BLOOD GLUCOSE: CPT

## 2018-12-03 RX ORDER — SODIUM CHLORIDE 9 MG/ML
75 INJECTION, SOLUTION INTRAVENOUS CONTINUOUS
Status: DISCONTINUED | OUTPATIENT
Start: 2018-12-03 | End: 2018-12-03 | Stop reason: HOSPADM

## 2018-12-03 NOTE — PROGRESS NOTES
This morning Dayne Johns (CRNA) came to the pharmacy with Fentanyl citrate 50 mcg syringe and Midazolam 2mg syringe that needs to be wasted because the patient surgery was cancelled  Contacted Dayne Johns and suggested to add a progress note in epic in case it happens in the future     Wasted Midazolam 2 mg syringe and Fentanyl Citrate 50 mcg and witnessed by Joe Posey (PharmD )

## 2018-12-06 ENCOUNTER — ANESTHESIA EVENT (OUTPATIENT)
Dept: PERIOP | Facility: HOSPITAL | Age: 43
DRG: 743 | End: 2018-12-06
Payer: COMMERCIAL

## 2018-12-06 NOTE — ANESTHESIA PREPROCEDURE EVALUATION
Review of Systems/Medical History  Patient summary reviewed  Chart reviewed  No history of anesthetic complications     Cardiovascular  Hyperlipidemia, DVT (2009)   Pulmonary  Smoker cigarette smoker  ,        GI/Hepatic    GERD poorly controlled,             Endo/Other  History of thyroid disease (s/p thyroidectomy) , hypothyroidism, Parathyroid disease (s/p parathyroidectomy) hypoparathyroidism,   Obesity    GYN      Comment: S/p tubal     Hematology  Anemia ,     Musculoskeletal  Back pain (DDD) , lumbar pain,   Comment: Neuropathy in right leg Arthritis     Neurology  Seizures (petit mal 4 years ago) well controlled,  Headaches (migraines),    Psychology   Anxiety, Depression ,   Chronic pain,            Physical Exam    Airway    Mallampati score: III  TM Distance: >3 FB  Neck ROM: full     Dental       Cardiovascular  Rhythm: regular, Rate: normal,     Pulmonary  Breath sounds clear to auscultation,     Other Findings        Anesthesia Plan  ASA Score- 3     Anesthesia Type- general and regional with ASA Monitors  Additional Monitors:   Airway Plan: ETT  Comment: Bilateral TAP block with Exparel at end of procedure prior to emergence  Plan Factors-    Induction- intravenous  Postoperative Plan- Plan for postoperative opioid use  Planned trial extubation    Informed Consent- Anesthetic plan and risks discussed with patient  I personally reviewed this patient with the CRNA  Discussed and agreed on the Anesthesia Plan with the CRNA  Marcia Bryson

## 2018-12-07 ENCOUNTER — ANESTHESIA (OUTPATIENT)
Dept: PERIOP | Facility: HOSPITAL | Age: 43
DRG: 743 | End: 2018-12-07
Payer: COMMERCIAL

## 2018-12-07 ENCOUNTER — HOSPITAL ENCOUNTER (INPATIENT)
Facility: HOSPITAL | Age: 43
LOS: 2 days | Discharge: HOME | DRG: 743 | End: 2018-12-09
Attending: UROLOGY | Admitting: OBSTETRICS & GYNECOLOGY
Payer: COMMERCIAL

## 2018-12-07 DIAGNOSIS — R10.2 PELVIC AND PERINEAL PAIN: ICD-10-CM

## 2018-12-07 DIAGNOSIS — N92.0 EXCESSIVE AND FREQUENT MENSTRUATION WITH REGULAR CYCLE: ICD-10-CM

## 2018-12-07 LAB — GLUCOSE SERPL-MCNC: 87 MG/DL (ref 65–140)

## 2018-12-07 PROCEDURE — 0UT90ZL RESECTION OF UTERUS, SUPRACERVICAL, OPEN APPROACH: ICD-10-PCS | Performed by: OBSTETRICS & GYNECOLOGY

## 2018-12-07 PROCEDURE — 88302 TISSUE EXAM BY PATHOLOGIST: CPT | Performed by: PATHOLOGY

## 2018-12-07 PROCEDURE — 88307 TISSUE EXAM BY PATHOLOGIST: CPT | Performed by: PATHOLOGY

## 2018-12-07 PROCEDURE — 0T784ZZ DILATION OF BILATERAL URETERS, PERCUTANEOUS ENDOSCOPIC APPROACH: ICD-10-PCS | Performed by: UROLOGY

## 2018-12-07 PROCEDURE — C1765 ADHESION BARRIER: HCPCS | Performed by: UROLOGY

## 2018-12-07 PROCEDURE — 82948 REAGENT STRIP/BLOOD GLUCOSE: CPT

## 2018-12-07 PROCEDURE — 87081 CULTURE SCREEN ONLY: CPT | Performed by: OBSTETRICS & GYNECOLOGY

## 2018-12-07 PROCEDURE — C9290 INJ, BUPIVACAINE LIPOSOME: HCPCS | Performed by: ANESTHESIOLOGY

## 2018-12-07 RX ORDER — FENTANYL CITRATE/PF 50 MCG/ML
25 SYRINGE (ML) INJECTION
Status: DISCONTINUED | OUTPATIENT
Start: 2018-12-07 | End: 2018-12-07 | Stop reason: HOSPADM

## 2018-12-07 RX ORDER — ALPRAZOLAM 0.5 MG/1
2 TABLET ORAL
Status: DISCONTINUED | OUTPATIENT
Start: 2018-12-07 | End: 2018-12-09 | Stop reason: HOSPADM

## 2018-12-07 RX ORDER — LEVOTHYROXINE SODIUM 0.15 MG/1
150 TABLET ORAL
Status: DISCONTINUED | OUTPATIENT
Start: 2018-12-08 | End: 2018-12-09 | Stop reason: HOSPADM

## 2018-12-07 RX ORDER — LIDOCAINE HYDROCHLORIDE 10 MG/ML
INJECTION, SOLUTION INFILTRATION; PERINEURAL AS NEEDED
Status: DISCONTINUED | OUTPATIENT
Start: 2018-12-07 | End: 2018-12-07 | Stop reason: SURG

## 2018-12-07 RX ORDER — FENTANYL CITRATE/PF 50 MCG/ML
50 SYRINGE (ML) INJECTION
Status: DISCONTINUED | OUTPATIENT
Start: 2018-12-07 | End: 2018-12-07 | Stop reason: HOSPADM

## 2018-12-07 RX ORDER — B-COMPLEX WITH VITAMIN C
1 TABLET ORAL ONCE
Status: COMPLETED | OUTPATIENT
Start: 2018-12-07 | End: 2018-12-07

## 2018-12-07 RX ORDER — ONDANSETRON 2 MG/ML
4 INJECTION INTRAMUSCULAR; INTRAVENOUS ONCE AS NEEDED
Status: COMPLETED | OUTPATIENT
Start: 2018-12-07 | End: 2018-12-07

## 2018-12-07 RX ORDER — GLYCOPYRROLATE 0.2 MG/ML
INJECTION INTRAMUSCULAR; INTRAVENOUS AS NEEDED
Status: DISCONTINUED | OUTPATIENT
Start: 2018-12-07 | End: 2018-12-07 | Stop reason: SURG

## 2018-12-07 RX ORDER — POTASSIUM CHLORIDE 20 MEQ/1
20 TABLET, EXTENDED RELEASE ORAL 2 TIMES DAILY
Status: DISCONTINUED | OUTPATIENT
Start: 2018-12-07 | End: 2018-12-07

## 2018-12-07 RX ORDER — PROMETHAZINE HYDROCHLORIDE 25 MG/ML
12.5 INJECTION, SOLUTION INTRAMUSCULAR; INTRAVENOUS ONCE AS NEEDED
Status: DISCONTINUED | OUTPATIENT
Start: 2018-12-07 | End: 2018-12-07 | Stop reason: HOSPADM

## 2018-12-07 RX ORDER — ALPRAZOLAM 0.5 MG/1
2 TABLET ORAL 2 TIMES DAILY
Status: DISCONTINUED | OUTPATIENT
Start: 2018-12-07 | End: 2018-12-09 | Stop reason: HOSPADM

## 2018-12-07 RX ORDER — BACLOFEN 10 MG/1
10 TABLET ORAL 3 TIMES DAILY PRN
Status: DISCONTINUED | OUTPATIENT
Start: 2018-12-07 | End: 2018-12-09 | Stop reason: HOSPADM

## 2018-12-07 RX ORDER — ALPRAZOLAM 0.5 MG/1
1 TABLET ORAL 2 TIMES DAILY PRN
Status: DISCONTINUED | OUTPATIENT
Start: 2018-12-07 | End: 2018-12-09 | Stop reason: HOSPADM

## 2018-12-07 RX ORDER — FENTANYL CITRATE 50 UG/ML
INJECTION, SOLUTION INTRAMUSCULAR; INTRAVENOUS AS NEEDED
Status: DISCONTINUED | OUTPATIENT
Start: 2018-12-07 | End: 2018-12-07 | Stop reason: SURG

## 2018-12-07 RX ORDER — ONDANSETRON 4 MG/1
4 TABLET, ORALLY DISINTEGRATING ORAL EVERY 8 HOURS PRN
Status: DISCONTINUED | OUTPATIENT
Start: 2018-12-07 | End: 2018-12-09 | Stop reason: HOSPADM

## 2018-12-07 RX ORDER — CEFAZOLIN SODIUM 2 G/50ML
SOLUTION INTRAVENOUS AS NEEDED
Status: DISCONTINUED | OUTPATIENT
Start: 2018-12-07 | End: 2018-12-07 | Stop reason: SURG

## 2018-12-07 RX ORDER — HYDROMORPHONE HCL/PF 1 MG/ML
0.5 SYRINGE (ML) INJECTION
Status: DISCONTINUED | OUTPATIENT
Start: 2018-12-07 | End: 2018-12-09 | Stop reason: HOSPADM

## 2018-12-07 RX ORDER — ONDANSETRON 4 MG/1
4 TABLET, FILM COATED ORAL EVERY 8 HOURS PRN
Status: DISCONTINUED | OUTPATIENT
Start: 2018-12-07 | End: 2018-12-07 | Stop reason: CLARIF

## 2018-12-07 RX ORDER — HYDROMORPHONE HCL/PF 1 MG/ML
0.5 SYRINGE (ML) INJECTION
Status: DISCONTINUED | OUTPATIENT
Start: 2018-12-07 | End: 2018-12-07 | Stop reason: HOSPADM

## 2018-12-07 RX ORDER — B-COMPLEX WITH VITAMIN C
1 TABLET ORAL
COMMUNITY
End: 2020-12-05 | Stop reason: SDUPTHER

## 2018-12-07 RX ORDER — MIDAZOLAM HYDROCHLORIDE 1 MG/ML
INJECTION INTRAMUSCULAR; INTRAVENOUS AS NEEDED
Status: DISCONTINUED | OUTPATIENT
Start: 2018-12-07 | End: 2018-12-07 | Stop reason: SURG

## 2018-12-07 RX ORDER — MEPERIDINE HYDROCHLORIDE 25 MG/ML
12.5 INJECTION INTRAMUSCULAR; INTRAVENOUS; SUBCUTANEOUS
Status: DISCONTINUED | OUTPATIENT
Start: 2018-12-07 | End: 2018-12-07 | Stop reason: HOSPADM

## 2018-12-07 RX ORDER — SODIUM CHLORIDE, SODIUM LACTATE, POTASSIUM CHLORIDE, CALCIUM CHLORIDE 600; 310; 30; 20 MG/100ML; MG/100ML; MG/100ML; MG/100ML
125 INJECTION, SOLUTION INTRAVENOUS CONTINUOUS
Status: DISCONTINUED | OUTPATIENT
Start: 2018-12-07 | End: 2018-12-09 | Stop reason: HOSPADM

## 2018-12-07 RX ORDER — BIOTIN 1 MG
TABLET ORAL DAILY
Status: DISCONTINUED | OUTPATIENT
Start: 2018-12-07 | End: 2018-12-07

## 2018-12-07 RX ORDER — GABAPENTIN 300 MG/1
600 CAPSULE ORAL DAILY
Status: DISCONTINUED | OUTPATIENT
Start: 2018-12-07 | End: 2018-12-09 | Stop reason: HOSPADM

## 2018-12-07 RX ORDER — POTASSIUM CHLORIDE 20 MEQ/1
20 TABLET, EXTENDED RELEASE ORAL EVERY 12 HOURS
Status: DISCONTINUED | OUTPATIENT
Start: 2018-12-07 | End: 2018-12-09 | Stop reason: HOSPADM

## 2018-12-07 RX ORDER — B-COMPLEX WITH VITAMIN C
1 TABLET ORAL DAILY
Status: DISCONTINUED | OUTPATIENT
Start: 2018-12-10 | End: 2018-12-07 | Stop reason: SDUPTHER

## 2018-12-07 RX ORDER — NEOSTIGMINE METHYLSULFATE 1 MG/ML
INJECTION INTRAVENOUS AS NEEDED
Status: DISCONTINUED | OUTPATIENT
Start: 2018-12-07 | End: 2018-12-07 | Stop reason: SURG

## 2018-12-07 RX ORDER — OYSTER SHELL CALCIUM WITH VITAMIN D 500; 200 MG/1; [IU]/1
1 TABLET, FILM COATED ORAL DAILY
Status: DISCONTINUED | OUTPATIENT
Start: 2018-12-07 | End: 2018-12-07 | Stop reason: CLARIF

## 2018-12-07 RX ORDER — MELATONIN
1000 DAILY
Status: DISCONTINUED | OUTPATIENT
Start: 2018-12-08 | End: 2018-12-09 | Stop reason: HOSPADM

## 2018-12-07 RX ORDER — MAGNESIUM HYDROXIDE 1200 MG/15ML
LIQUID ORAL AS NEEDED
Status: DISCONTINUED | OUTPATIENT
Start: 2018-12-07 | End: 2018-12-07 | Stop reason: HOSPADM

## 2018-12-07 RX ORDER — ALPRAZOLAM 1 MG/1
1 TABLET ORAL DAILY
COMMUNITY
End: 2022-07-30 | Stop reason: ALTCHOICE

## 2018-12-07 RX ORDER — OXYCODONE HYDROCHLORIDE 10 MG/1
10 TABLET ORAL EVERY 4 HOURS PRN
Status: DISCONTINUED | OUTPATIENT
Start: 2018-12-07 | End: 2018-12-09 | Stop reason: HOSPADM

## 2018-12-07 RX ORDER — CALCITRIOL 0.25 UG/1
0.25 CAPSULE, LIQUID FILLED ORAL DAILY
Status: DISCONTINUED | OUTPATIENT
Start: 2018-12-07 | End: 2018-12-09 | Stop reason: HOSPADM

## 2018-12-07 RX ORDER — SODIUM CHLORIDE 9 MG/ML
75 INJECTION, SOLUTION INTRAVENOUS CONTINUOUS
Status: DISCONTINUED | OUTPATIENT
Start: 2018-12-07 | End: 2018-12-07

## 2018-12-07 RX ORDER — ONDANSETRON 2 MG/ML
4 INJECTION INTRAMUSCULAR; INTRAVENOUS EVERY 6 HOURS PRN
Status: DISCONTINUED | OUTPATIENT
Start: 2018-12-07 | End: 2018-12-09 | Stop reason: HOSPADM

## 2018-12-07 RX ORDER — ONDANSETRON 2 MG/ML
INJECTION INTRAMUSCULAR; INTRAVENOUS AS NEEDED
Status: DISCONTINUED | OUTPATIENT
Start: 2018-12-07 | End: 2018-12-07 | Stop reason: SURG

## 2018-12-07 RX ORDER — GEMFIBROZIL 600 MG/1
600 TABLET, FILM COATED ORAL
Status: DISCONTINUED | OUTPATIENT
Start: 2018-12-07 | End: 2018-12-09 | Stop reason: HOSPADM

## 2018-12-07 RX ORDER — PROPOFOL 10 MG/ML
INJECTION, EMULSION INTRAVENOUS AS NEEDED
Status: DISCONTINUED | OUTPATIENT
Start: 2018-12-07 | End: 2018-12-07 | Stop reason: SURG

## 2018-12-07 RX ORDER — ROCURONIUM BROMIDE 10 MG/ML
INJECTION, SOLUTION INTRAVENOUS AS NEEDED
Status: DISCONTINUED | OUTPATIENT
Start: 2018-12-07 | End: 2018-12-07 | Stop reason: SURG

## 2018-12-07 RX ORDER — B-COMPLEX WITH VITAMIN C
1 TABLET ORAL 4 TIMES DAILY
Status: DISCONTINUED | OUTPATIENT
Start: 2018-12-07 | End: 2018-12-09 | Stop reason: HOSPADM

## 2018-12-07 RX ADMIN — NEOSTIGMINE METHYLSULFATE 5 MG: 1 INJECTION INTRAVENOUS at 10:00

## 2018-12-07 RX ADMIN — FENTANYL CITRATE 50 MCG: 50 INJECTION, SOLUTION INTRAMUSCULAR; INTRAVENOUS at 08:22

## 2018-12-07 RX ADMIN — Medication 400 MG: at 22:43

## 2018-12-07 RX ADMIN — HYDROMORPHONE HYDROCHLORIDE 0.5 MG: 1 INJECTION, SOLUTION INTRAMUSCULAR; INTRAVENOUS; SUBCUTANEOUS at 17:53

## 2018-12-07 RX ADMIN — FENTANYL CITRATE 25 MCG: 50 INJECTION, SOLUTION INTRAMUSCULAR; INTRAVENOUS at 08:28

## 2018-12-07 RX ADMIN — FENTANYL CITRATE 25 MCG: 50 INJECTION, SOLUTION INTRAMUSCULAR; INTRAVENOUS at 08:26

## 2018-12-07 RX ADMIN — CALCIUM CARBONATE 500 MG (1,250 MG)-VITAMIN D3 200 UNIT TABLET 1 TABLET: at 19:44

## 2018-12-07 RX ADMIN — POTASSIUM CHLORIDE 20 MEQ: 1500 TABLET, EXTENDED RELEASE ORAL at 22:44

## 2018-12-07 RX ADMIN — SODIUM CHLORIDE, SODIUM LACTATE, POTASSIUM CHLORIDE, AND CALCIUM CHLORIDE 125 ML/HR: .6; .31; .03; .02 INJECTION, SOLUTION INTRAVENOUS at 14:03

## 2018-12-07 RX ADMIN — FENTANYL CITRATE 50 MCG: 50 INJECTION, SOLUTION INTRAMUSCULAR; INTRAVENOUS at 11:12

## 2018-12-07 RX ADMIN — OXYCODONE HYDROCHLORIDE 10 MG: 10 TABLET ORAL at 22:43

## 2018-12-07 RX ADMIN — ONDANSETRON 4 MG: 2 INJECTION INTRAMUSCULAR; INTRAVENOUS at 10:56

## 2018-12-07 RX ADMIN — POTASSIUM CHLORIDE 20 MEQ: 1500 TABLET, EXTENDED RELEASE ORAL at 14:19

## 2018-12-07 RX ADMIN — SODIUM CHLORIDE, SODIUM LACTATE, POTASSIUM CHLORIDE, AND CALCIUM CHLORIDE 125 ML/HR: .6; .31; .03; .02 INJECTION, SOLUTION INTRAVENOUS at 22:05

## 2018-12-07 RX ADMIN — ONDANSETRON 4 MG: 2 INJECTION INTRAMUSCULAR; INTRAVENOUS at 13:32

## 2018-12-07 RX ADMIN — SODIUM CHLORIDE 75 ML/HR: 0.9 INJECTION, SOLUTION INTRAVENOUS at 07:05

## 2018-12-07 RX ADMIN — CALCITRIOL 0.25 MCG: 0.25 CAPSULE ORAL at 15:07

## 2018-12-07 RX ADMIN — ONDANSETRON 4 MG: 2 INJECTION INTRAMUSCULAR; INTRAVENOUS at 19:53

## 2018-12-07 RX ADMIN — PROPOFOL 200 MG: 10 INJECTION, EMULSION INTRAVENOUS at 07:49

## 2018-12-07 RX ADMIN — ROCURONIUM BROMIDE 10 MG: 10 INJECTION INTRAVENOUS at 08:24

## 2018-12-07 RX ADMIN — CALCIUM CARBONATE 500 MG (1,250 MG)-VITAMIN D3 200 UNIT TABLET 1 TABLET: at 22:45

## 2018-12-07 RX ADMIN — CALCIUM CARBONATE 500 MG (1,250 MG)-VITAMIN D3 200 UNIT TABLET 1 TABLET: at 17:54

## 2018-12-07 RX ADMIN — MIDAZOLAM HYDROCHLORIDE 1 MG: 1 INJECTION, SOLUTION INTRAMUSCULAR; INTRAVENOUS at 07:40

## 2018-12-07 RX ADMIN — DEXAMETHASONE SODIUM PHOSPHATE 8 MG: 10 INJECTION INTRAMUSCULAR; INTRAVENOUS at 07:55

## 2018-12-07 RX ADMIN — HYDROMORPHONE HYDROCHLORIDE 0.5 MG: 1 INJECTION, SOLUTION INTRAMUSCULAR; INTRAVENOUS; SUBCUTANEOUS at 13:32

## 2018-12-07 RX ADMIN — FENTANYL CITRATE 50 MCG: 50 INJECTION, SOLUTION INTRAMUSCULAR; INTRAVENOUS at 10:31

## 2018-12-07 RX ADMIN — FENTANYL CITRATE 50 MCG: 50 INJECTION, SOLUTION INTRAMUSCULAR; INTRAVENOUS at 08:20

## 2018-12-07 RX ADMIN — ROCURONIUM BROMIDE 50 MG: 10 INJECTION INTRAVENOUS at 07:49

## 2018-12-07 RX ADMIN — Medication 800 MG: at 16:55

## 2018-12-07 RX ADMIN — ONDANSETRON 4 MG: 2 INJECTION INTRAMUSCULAR; INTRAVENOUS at 07:55

## 2018-12-07 RX ADMIN — GLYCOPYRROLATE 0.8 MG: 0.2 INJECTION, SOLUTION INTRAMUSCULAR; INTRAVENOUS at 10:00

## 2018-12-07 RX ADMIN — MIDAZOLAM HYDROCHLORIDE 1 MG: 1 INJECTION, SOLUTION INTRAMUSCULAR; INTRAVENOUS at 07:46

## 2018-12-07 RX ADMIN — LIDOCAINE HYDROCHLORIDE 50 MG: 10 INJECTION, SOLUTION INFILTRATION; PERINEURAL at 07:49

## 2018-12-07 RX ADMIN — FENTANYL CITRATE 25 MCG: 50 INJECTION, SOLUTION INTRAMUSCULAR; INTRAVENOUS at 12:37

## 2018-12-07 RX ADMIN — CEFAZOLIN SODIUM 2000 MG: 2 SOLUTION INTRAVENOUS at 07:55

## 2018-12-07 RX ADMIN — GABAPENTIN 600 MG: 300 CAPSULE ORAL at 14:20

## 2018-12-07 RX ADMIN — OXYCODONE HYDROCHLORIDE 10 MG: 10 TABLET ORAL at 14:21

## 2018-12-07 RX ADMIN — GEMFIBROZIL 600 MG: 600 TABLET ORAL at 22:43

## 2018-12-07 RX ADMIN — FENTANYL CITRATE 100 MCG: 50 INJECTION, SOLUTION INTRAMUSCULAR; INTRAVENOUS at 07:49

## 2018-12-07 RX ADMIN — ROCURONIUM BROMIDE 10 MG: 10 INJECTION INTRAVENOUS at 09:04

## 2018-12-07 RX ADMIN — MAGNESIUM OXIDE TAB 400 MG (241.3 MG ELEMENTAL MG) 400 MG: 400 (241.3 MG) TAB at 14:19

## 2018-12-07 RX ADMIN — FENTANYL CITRATE 25 MCG: 50 INJECTION, SOLUTION INTRAMUSCULAR; INTRAVENOUS at 12:43

## 2018-12-07 RX ADMIN — FENTANYL CITRATE 50 MCG: 50 INJECTION, SOLUTION INTRAMUSCULAR; INTRAVENOUS at 11:58

## 2018-12-07 NOTE — ANESTHESIA PROCEDURE NOTES
Peripheral Block    Patient location during procedure: OR  Start time: 12/7/2018 10:06 AM  Reason for block: post-op pain management  Staffing  Anesthesiologist: Bakari Mary  Resident/CRNA: Colt Galan  Preanesthetic Checklist  Completed: patient identified, site marked, surgical consent, pre-op evaluation, timeout performed, IV checked, risks and benefits discussed and monitors and equipment checked  Peripheral Block  Patient position: supine  Prep: ChloraPrep  Patient monitoring: heart rate, cardiac monitor, continuous pulse ox and frequent blood pressure checks  Block type: TAP  Laterality: bilateral  Injection technique: single-shot  Procedures: ultrasound guided  Ultrasound permanent image saved  Local infiltration: bupivacaine (Exparel 20ml and Normal Saline 20 ml, mixed with bupivacaine and distributed 30 ml to each side)  Infiltration strength: 0 5 %  Dose: 20 mL  Needle  Needle type: Stimuplex   Needle gauge: 22 G  Needle length: 15 cm  Needle localization: ultrasound guidance  Assessment  Injection assessment: negative aspiration for heme  Paresthesia pain: none  Heart rate change: no  Slow fractionated injection: yes  Post-procedure:  site cleaned  patient tolerated the procedure well with no immediate complications

## 2018-12-07 NOTE — PERIOPERATIVE NURSING NOTE
Awaiting available bed on [address]  Pain decreased after second dose of fentanyl   Urine output adequate

## 2018-12-07 NOTE — H&P
Updated H&P is on the chart from the office; no changes; heart and lungs are normal today--patient to have supracervical hysterectomy with ovarian conservation because of excessive bleeding unresponsive to medical or prior surgical treatment

## 2018-12-07 NOTE — OP NOTE
OPERATIVE REPORT  PATIENT NAME: Carol Last    :  1975  MRN: [id number]  Pt Location: [facility] OR ROOM 01    SURGERY DATE: 2018    Surgeon(s) and Role:  Panel 1:     * Brenda Lancaster MD - Primary    Panel 2:     * Allan Whittaker MD - Primary     * Dorothy Servin MD - Assisting    Preop Diagnosis:  Excessive and frequent menstruation with regular cycle [N92 0]  Pelvic and perineal pain [R10 2]    Post-Op Diagnosis Codes:     * Excessive and frequent menstruation with regular cycle [N92 0]     * Pelvic and perineal pain [R10 2]    Procedure(s) (LRB):  INSERTION URETERAL CATHETERS PREOP (Bilateral)  SUPRACERVICAL HYSTERECTOMY (N/A)    Specimen(s):  ID Type Source Tests Collected by Time Destination   1 :  Tissue Fallopian Tube, Left TISSUE EXAM Brenda Lancaster MD 2018 3075    2 :  Tissue Uterus TISSUE EXAM Allan Whittaker MD 2018 0841        Estimated Blood Loss:   80 mL    Drains:  Urethral Catheter Latex 16 Fr  (Active)   Number of days: 0       [REMOVED] Ureteral Drain/Stent Right ureter (Removed)   Number of days: 0       [REMOVED] Ureteral Drain/Stent Left ureter (Removed)   Number of days: 0       Anesthesia Type:   General    Operative Indications:  Excessive and frequent menstruation with regular cycle [N92 0]  Pelvic and perineal pain [R10 2]  Excessive uterine bleeding unresponsive to medical treatment    Operative Findings:  Enlarged boggy uterus possibly secondary to adenomyosis; normal-appearing ovaries; absent right fallopian tube    Complications:   None    Procedure and Technique:  The patient was brought into the operating room placed in lithotomy position prepped and draped in usual manner  Dr Smith Held:  Amaury Harper was inserted bilateral ureteral catheters with delineation of the ureters during this procedure    This was done because of the patient's history of 5 prior  sections; after the bilateral catheters were inserted these were connected to an indwelling Florence catheter  The patient was then placed in supine position prepped and draped in usual manner; 1st knife was used to make a transverse suprapubic modified Pfannenstiel incision above the patient's prior  section scar 2nd knife was used to dissect through subcutaneous and fat tissue to the fascial layer  The fascia was then nicked in the midline extended laterally on both sides and dissected off the rectus muscle  The rectus muscle was then split in the midline revealing the peritoneum  Dr Charlette Navarrete who was my surgical assistant for this case dissected the omentum off of the anterior abdominal wall without damage to surrounding organs  A self-retaining retractor was placed within the incision with the skin edges guarded using wet Ray-Randi sponges the abdominal contents were elevated out of the pelvis using the bowel for extender and the bladder was pushed down into the pelvis with a bladder retractor  The uterus appeared enlarged and boggy to touch  Both fallopian tubes had benign cysts on them and both were punctured and aspirated     The right fallopian tube was not present the left fallopian tube was adherent to the left ovary; a uterine tenaculum was placed on the fundus of the uterus  Both round ligaments were cut and ligated the left fallopian was removed from the left ovary; a rent was made in the ligaments underneath both adnexal complexes--Mihaela clamps were placed through the rent close to the uterus in order to isolate and dissect them off of their attachments to the uterus which was done without bleeding  The bladder was dissected off of the surrounding tissue without injury to the bladder    The uterine vessels were doubly clamped and cut after identifying that the ureters were not near the surgical field but down deep in the pelvis; ; a wet sponge was placed in back of the uterus guarding the rectum---the uterine fundus was dissected off of the cervix without damage to surrounding organs; the cervical canal was fulgurated to prevent endometrial cells from descending into the cervix and causing postoperative bleeding; all aspects of the cervix were fixed to the lateral ligaments after which the anterior to posterior portions of the cervix were reapproximated with excellent hemostasis  The pelvis was lavaged until clear and there was no bleeding; whatsoever in the pelvis a piece of Interceed adhesion barrier was placed on top of both adnexal complexes on the raw edge of the cervix to prevent postoperative adhesions  With no bleeding whatsoever and the Florence showing no gross blood  The rectus muscle was reapproximated in the midline with interrupted sutures of 0 Vicryl  The fascia was closed on the lateral borders meeting in the midline with sutures of 0 Vicryl subcutaneous and fat area was reapproximated using interrupted sutures of plain gut and the skin was closed using 4 on dyed Vicryl in a subcuticular fashion with Steri-Strips approximating the skin edges the wound was then covered with the water occlusive dressing at this point anesthesia was going to put a tap block to decrease postoperative pain along the incision after they did this the patient was to go to recovery    She was left in good condition   I was present for the entire procedure    Patient Disposition:  PACU     SIGNATURE: Karin Rider MD  DATE: December 7, 2018  TIME: 10:09 AM

## 2018-12-07 NOTE — OP NOTE
OPERATIVE REPORT- Dr Ronny Pham  PATIENT NAME: Deondre March    :  1975  MRN: [id number]  Pt Location: [facility] ROOM 01    SURGERY DATE: 2018    Surgeon: Rajeev Jacques MD    Pre-op Diagnosis:  1  Menorrhagia  2  Need for ureteral catheters intraop    Post-op Diagnosis:  1  same    Procedure:  1  Cystoscopy bilateral ureteral catheters    Specimen(s): * No specimens in log *    Estimated Blood Loss: Minimal    Complications: None    Drains:  1  Bilateral whistle tip catheters    Anesthesia type: General    Indications for surgery:  1  Ureteral catheters requested for ureteral ID during supracervical hysterectomy    Findings:  1  No BT or stones  No issues with ureters  Procedure and Technique:   After obtaining consent and identifying the patient, antibiotics were given as ordered and the patient was brought to the room  All appropriate leads and monitors were placed and the patient was appropriately positioned on the table  Anesthesia was administered and the patient was sterilely prepped and draped  A timeout was performed where the patient name, , procedure, antibiotics, allergies, etc were discussed  All in the room were satisfied before the start of the operative procedure  What follows are the operative findings and events  Cystoscopy was undertaken  The bladder was surveyed and found to be free of stones tumors or infection  The left ureteral orifice was identified and cannulated with a five Western Kylie whistle-tip catheter  The catheter was placed up to the 22 cm bruce  The same procedure was done on the right side  The procedure was terminated and the patient was transferred to the treating surgeon without incident and he was advised to remove the catheters if there was no injury to the ureters and to contact me with any questions       Plan:  1   As above    SIGNATURE: Rajeev Jacques MD  DATE: 2018  TIME: 8:15 AM    Portions of the record may have been created with voice recognition software   Occasional wrong word or "sound alike" substitutions may have occurred due to the inherent limitations of voice recognition software   Read the chart carefully and recognize, using context, where substitutions have occurred

## 2018-12-07 NOTE — CONSULTS
Consultation - Charis Smith 44 y o  female MRN: [id number]    Unit/Bed#: [address] [place] [address] Encounter: [id number]      Assessment/Plan     Assessment:  Status post supracervical hysterectomy  Excessive in frequent menstruation  Chronic pain syndrome  Status post thyroidectomy postop hypothyroid  Hypercalcemia  Hypocalcemia  History of seizure  History of migraine  Chronic intractable back pain  Plan:  Patient is postop complaining of pain pain CB control under current regimen NPO IV fluids  Once patient's start p  O  Then resume home meds  The patient is on Xanax for chronic anxiety and panic disorder  Check CBC BMP in a m  DVT prophylaxis     History of Present Illness     HPI: Charis Smith is a 44y o  year old female who presents with excessive and frequent menstruation and the medical and prior surgical therapy unsuccessful so patient underwent supracervical hysterectomy patient is postop complaining of pain no chills fever chest pain difficulty breathing some headaches and some nausea multiple medical condition are listed in past medical history all reviewed the labs reviewed    Consults all consult reviewed    Review of Systems   Constitutional: Positive for activity change, appetite change and chills  Negative for diaphoresis, fatigue, fever and unexpected weight change  HENT: Negative for congestion, dental problem, drooling, ear discharge, ear pain, facial swelling, hearing loss, mouth sores, nosebleeds, postnasal drip, rhinorrhea, sinus pressure, sneezing, sore throat, tinnitus, trouble swallowing and voice change  Eyes: Negative for photophobia, pain, discharge, redness, itching and visual disturbance  Respiratory: Positive for cough  Negative for apnea, choking, chest tightness, shortness of breath, wheezing and stridor  Cardiovascular: Negative for chest pain, palpitations and leg swelling     Gastrointestinal: Negative for abdominal distention, abdominal pain, anal bleeding, blood in stool, constipation, diarrhea, nausea, rectal pain and vomiting  Endocrine: Negative for cold intolerance, heat intolerance, polydipsia, polyphagia and polyuria  Genitourinary: Positive for dysuria, frequency, menstrual problem, pelvic pain, vaginal bleeding and vaginal pain  Negative for decreased urine volume, difficulty urinating, enuresis, flank pain, genital sores, hematuria and urgency  Musculoskeletal: Negative for arthralgias, back pain, gait problem, joint swelling, myalgias, neck pain and neck stiffness  Skin: Negative for color change, pallor, rash and wound  Allergic/Immunologic: Negative  Negative for environmental allergies, food allergies and immunocompromised state  Neurological: Negative for dizziness, tremors, seizures, syncope, facial asymmetry, speech difficulty, weakness, light-headedness, numbness and headaches  Psychiatric/Behavioral: Negative for agitation, behavioral problems, confusion, decreased concentration, dysphoric mood, hallucinations, self-injury, sleep disturbance and suicidal ideas  The patient is not nervous/anxious and is not hyperactive  Historical Information   Past Medical History:   Diagnosis Date    Acid reflux     Acute renal failure (HCC)     multiple episodes    Anemia     Anxiety     Chronic pain     DDD (degenerative disc disease), lumbar     Disease of thyroid gland     had surgery and now hypo    DVT (deep venous thrombosis) ([facility] [address] )     s/p ankle fracture    Hypercalcemia     Hyperlipidemia     Hyperthyroidism     Hypocalcemia     post op 2016    Migraine     Psychiatric disorder     anxiety depression    Seizures (HCC)     petit mal x1  4 years ago- all tests were neg      Spondylolisthesis of lumbar region     Treatment     spinal pain injections  last was 2016     Past Surgical History:   Procedure Laterality Date    BACK SURGERY       SECTION      x5    DISCOGRAM      PARATHYROIDECTOMY      MI ANTERIOR COLPORRAPHY RPR CYSTOCELE W/CYSTO N/A 5/4/2017    Procedure: CYSTOCELE REPAIR;  Surgeon: Paolo Nair MD;  Location: [address] [place] [address];  Service: Gynecology    AK ARTHRODESIS POSTERIOR INTERBODY LUMBAR N/A 8/12/2016    Procedure: L4-S1 LUMBAR LAMINECTOMY/DECOMPRESSION;  INSTRUMENTED POSTEROLATERAL FUSION/ INTERBODY L5-S1; ALLOGRAFT AND AUTOGRAFT (IMPULSE) ; Surgeon: Frieda Reyez MD;  [facility];  Service: Orthopedics    AK SLING OPER STRES INCONTINENCE N/A 5/4/2017    Procedure: MID URETHRAL SLING;  Surgeon: Faviola Dodson MD;  Location: [address] [place] [address];  Service: Urology    THYROIDECTOMY      TONSILECTOMY AND ADNOIDECTOMY      TONSILLECTOMY      TUBAL LIGATION       Social History   History   Alcohol Use No     History   Drug Use No     History   Smoking Status    Current Every Day Smoker    Packs/day: 0 50    Years: 25 00    Types: Cigarettes   Smokeless Tobacco    Never Used     Comment: patient states she quit but had a few cigarettes 7/23     Family History:   Family History   Problem Relation Age of Onset    Diabetes Mother     Hypertension Mother     Hyperlipidemia Father     Arrhythmia Father     Lung cancer Father     Diabetes Father        Meds/Allergies   all current active meds have been reviewed  Allergies   Allergen Reactions    Other Anaphylaxis     Adhesive from bandaids - denies Latex allergy      Vicodin [Hydrocodone-Acetaminophen] Rash    Morphine And Related GI Intolerance     Nausea         Objective   Vitals: Blood pressure 126/73, pulse 70, temperature 98 4 °F (36 9 °C), temperature source Oral, resp  rate 20, height 5' 2" (1 575 m), weight 81 6 kg (180 lb), last menstrual period 11/27/2018, SpO2 99 %, not currently breastfeeding      Intake/Output Summary (Last 24 hours) at 12/07/18 1348  Last data filed at 12/07/18 1121   Gross per 24 hour   Intake             1000 ml   Output              410 ml   Net              590 ml     Invasive Devices Peripheral Intravenous Line            Peripheral IV 12/03/18 Right Antecubital 4 days    Peripheral IV 12/07/18 Right Antecubital less than 1 day          Drain            Urethral Catheter Latex 16 Fr  less than 1 day                Physical Exam   Constitutional: She is oriented to person, place, and time  She appears well-developed and well-nourished  Non-toxic appearance  She does not have a sickly appearance  She does not appear ill  She appears distressed  HENT:   Head: Normocephalic and atraumatic  Right Ear: External ear normal    Left Ear: External ear normal    Nose: Nose normal    Mouth/Throat: Oropharynx is clear and moist  No oropharyngeal exudate  Eyes: Pupils are equal, round, and reactive to light  Conjunctivae, EOM and lids are normal  Lids are everted and swept, no foreign bodies found  Right eye exhibits no discharge  Left eye exhibits no discharge  No scleral icterus  Neck: Normal range of motion  Neck supple  Normal carotid pulses, no hepatojugular reflux and no JVD present  No tracheal tenderness, no spinous process tenderness and no muscular tenderness present  Carotid bruit is not present  No neck rigidity  No tracheal deviation, no edema, no erythema and normal range of motion present  No thyromegaly present  Cardiovascular: Normal rate, regular rhythm, normal heart sounds, intact distal pulses and normal pulses  Exam reveals no gallop and no friction rub  No murmur heard  Pulmonary/Chest: Effort normal and breath sounds normal  No stridor  No respiratory distress  She has no wheezes  She has no rales  She exhibits no tenderness  Abdominal: Soft  Bowel sounds are normal  She exhibits distension  She exhibits no ascites and no mass  There is tenderness  There is no rebound and no guarding  Musculoskeletal: Normal range of motion  She exhibits edema  She exhibits no tenderness or deformity     Lymphadenopathy:        Head (right side): No submental, no submandibular, no tonsillar, no preauricular and no posterior auricular adenopathy present  Head (left side): No submental, no submandibular, no tonsillar, no preauricular, no posterior auricular and no occipital adenopathy present  She has no cervical adenopathy  Right cervical: No superficial cervical, no deep cervical and no posterior cervical adenopathy present  Left cervical: No superficial cervical, no deep cervical and no posterior cervical adenopathy present  Right axillary: No pectoral and no lateral adenopathy present  Left axillary: No pectoral and no lateral adenopathy present  Neurological: She is alert and oriented to person, place, and time  She has normal strength  She displays no tremor and normal reflexes  No cranial nerve deficit or sensory deficit  She exhibits normal muscle tone  She displays a negative Romberg sign  She displays no seizure activity  Coordination and gait normal    Skin: Skin is warm and dry  No rash noted  She is not diaphoretic  No erythema  No pallor  Psychiatric: She has a normal mood and affect  Her behavior is normal  Judgment and thought content normal        Lab Results: I have personally reviewed pertinent reports  Imaging Studies: I have personally reviewed pertinent reports  EKG, Pathology, and Other Studies: I have personally reviewed pertinent reports  VTE Prophylaxis: Sequential compression device Bernida Massing)     Code Status: Prior  Advance Directive and Living Will:      Power of :    POLST:      Counseling / Coordination of Care  Total floor / unit time spent today 55 minutes  Greater than 50% of total time was spent with the patient and / or family counseling and / or coordination of care  A description of the counseling / coordination of care:   At length with the nurse patient

## 2018-12-08 LAB
ERYTHROCYTE [DISTWIDTH] IN BLOOD BY AUTOMATED COUNT: 15.4 % (ref 11.6–15.1)
HCT VFR BLD AUTO: 29.2 % (ref 34.8–46.1)
HGB BLD-MCNC: 9.4 G/DL (ref 11.5–15.4)
MCH RBC QN AUTO: 29.6 PG (ref 26.8–34.3)
MCHC RBC AUTO-ENTMCNC: 32.2 G/DL (ref 31.4–37.4)
MCV RBC AUTO: 92 FL (ref 82–98)
PLATELET # BLD AUTO: 167 THOUSANDS/UL (ref 149–390)
PMV BLD AUTO: 10.5 FL (ref 8.9–12.7)
RBC # BLD AUTO: 3.18 MILLION/UL (ref 3.81–5.12)
WBC # BLD AUTO: 7.68 THOUSAND/UL (ref 4.31–10.16)

## 2018-12-08 PROCEDURE — 85027 COMPLETE CBC AUTOMATED: CPT | Performed by: OBSTETRICS & GYNECOLOGY

## 2018-12-08 RX ADMIN — GEMFIBROZIL 600 MG: 600 TABLET ORAL at 21:33

## 2018-12-08 RX ADMIN — SODIUM CHLORIDE, SODIUM LACTATE, POTASSIUM CHLORIDE, AND CALCIUM CHLORIDE 125 ML/HR: .6; .31; .03; .02 INJECTION, SOLUTION INTRAVENOUS at 06:19

## 2018-12-08 RX ADMIN — SODIUM CHLORIDE, SODIUM LACTATE, POTASSIUM CHLORIDE, AND CALCIUM CHLORIDE 125 ML/HR: .6; .31; .03; .02 INJECTION, SOLUTION INTRAVENOUS at 13:29

## 2018-12-08 RX ADMIN — POTASSIUM CHLORIDE 20 MEQ: 1500 TABLET, EXTENDED RELEASE ORAL at 11:33

## 2018-12-08 RX ADMIN — OXYCODONE HYDROCHLORIDE 10 MG: 10 TABLET ORAL at 03:54

## 2018-12-08 RX ADMIN — POTASSIUM CHLORIDE 20 MEQ: 1500 TABLET, EXTENDED RELEASE ORAL at 21:32

## 2018-12-08 RX ADMIN — Medication 800 MG: at 11:34

## 2018-12-08 RX ADMIN — ALPRAZOLAM 1 MG: 0.5 TABLET ORAL at 09:50

## 2018-12-08 RX ADMIN — OXYCODONE HYDROCHLORIDE 10 MG: 10 TABLET ORAL at 18:03

## 2018-12-08 RX ADMIN — ALPRAZOLAM 1 MG: 0.5 TABLET ORAL at 18:03

## 2018-12-08 RX ADMIN — CALCIUM CARBONATE 500 MG (1,250 MG)-VITAMIN D3 200 UNIT TABLET 1 TABLET: at 21:33

## 2018-12-08 RX ADMIN — Medication 400 MG: at 21:32

## 2018-12-08 RX ADMIN — CALCITRIOL 0.25 MCG: 0.25 CAPSULE ORAL at 11:33

## 2018-12-08 RX ADMIN — CALCIUM CARBONATE 500 MG (1,250 MG)-VITAMIN D3 200 UNIT TABLET 1 TABLET: at 11:36

## 2018-12-08 RX ADMIN — Medication 800 MG: at 09:47

## 2018-12-08 RX ADMIN — OXYCODONE HYDROCHLORIDE 10 MG: 10 TABLET ORAL at 09:04

## 2018-12-08 RX ADMIN — GEMFIBROZIL 600 MG: 600 TABLET ORAL at 11:29

## 2018-12-08 RX ADMIN — CALCIUM CARBONATE 500 MG (1,250 MG)-VITAMIN D3 200 UNIT TABLET 1 TABLET: at 13:31

## 2018-12-08 RX ADMIN — ALPRAZOLAM 2 MG: 0.5 TABLET ORAL at 00:07

## 2018-12-08 RX ADMIN — CALCIUM CARBONATE 500 MG (1,250 MG)-VITAMIN D3 200 UNIT TABLET 1 TABLET: at 19:12

## 2018-12-08 RX ADMIN — OXYCODONE HYDROCHLORIDE 10 MG: 10 TABLET ORAL at 13:25

## 2018-12-08 RX ADMIN — ENOXAPARIN SODIUM 40 MG: 40 INJECTION SUBCUTANEOUS at 11:28

## 2018-12-08 NOTE — PLAN OF CARE
DISCHARGE PLANNING     Discharge to home or other facility with appropriate resources Progressing        GASTROINTESTINAL - ADULT     Minimal or absence of nausea and/or vomiting Progressing     Maintains or returns to baseline bowel function Progressing     Maintains adequate nutritional intake Progressing     Establish and maintain optimal ostomy function Progressing        METABOLIC, FLUID AND ELECTROLYTES - ADULT     Electrolytes maintained within normal limits Progressing     Fluid balance maintained Progressing     Glucose maintained within target range Progressing        MUSCULOSKELETAL - ADULT     Maintain or return mobility to safest level of function Progressing     Maintain proper alignment of affected body part Progressing        PAIN - ADULT     Verbalizes/displays adequate comfort level or baseline comfort level Progressing        SKIN/TISSUE INTEGRITY - ADULT     Skin integrity remains intact Progressing     Incision(s), wounds(s) or drain site(s) healing without S/S of infection Progressing     Oral mucous membranes remain intact Progressing

## 2018-12-08 NOTE — UTILIZATION REVIEW
Initial Clinical Review    Age/Sex: 44 y o  female    Surgery Date: 12/7/18    Procedure: Procedure(s) (LRB):  INSERTION URETERAL CATHETERS PREOP (Bilateral)  SUPRACERVICAL HYSTERECTOMY (N/A)    Anesthesia: GENERAL    Admission Orders: Date/Time/Statement: 12/7/18 @ 1036     Orders Placed This Encounter   Procedures    Inpatient Admission     Standing Status:   Standing     Number of Occurrences:   1     Order Specific Question:   Admitting Physician     Answer:   Vitor Salas [[id number]]     Order Specific Question:   Level of Care     Answer:   Med Surg [16]     Order Specific Question:   Bed request comments     Answer:   nl med surg bed     Order Specific Question:   Estimated length of stay     Answer:   More than 2 Midnights     Order Specific Question:   Certification     Answer:   I certify that inpatient services are medically necessary for this patient for a duration of greater than two midnights  See H&P and MD Progress Notes for additional information about the patient's course of treatment  Comments:   yes       Vital Signs: /58 (BP Location: Left arm)   Pulse 82   Temp 98 7 °F (37 1 °C) (Oral)   Resp 18   Ht 5' 2" (1 575 m)   Wt 81 6 kg (180 lb) Comment: Stated  LMP 12/06/2018   SpO2 96%   BMI 32 92 kg/m²     Diet:        Diet Orders            Start     Ordered    12/08/18 0855  Diet Regular; Regular House; Full Liquid  Diet effective now     Comments:  X 6 hours; then advance to full liquid diet until patient passes flatus or has BM; then advance to regular diet   Question Answer Comment   Diet Type Regular    Regular Regular House    Other Restriction(s): Full Liquid    RD to adjust diet per protocol?  Yes        12/08/18 0856    12/08/18 0807  Room Service  Once     Question:  Type of Service  Answer:  Room Service-Appropriate    12/08/18 7293          Mobility: UP AS TOLERATES    DVT Prophylaxis: LOVENOX    Pain Control:   Pain Medications             baclofen 10 mg tablet Take 10 mg by mouth 3 (three) times a day as needed      gabapentin (NEURONTIN) 600 MG tablet Take 600 mg by mouth daily as needed      oxyCODONE-acetaminophen (PERCOCET) 5-325 mg per tablet Take 2 tablets by mouth every 6 (six) hours as needed          IS  MAINTAIN URINARY CATHETER  Scheduled Meds:  Current Facility-Administered Medications:  ALPRAZolam 1 mg Oral BID PRN Allan Whittaker MD    ALPRAZolam 2 mg Oral BID Allan Whittaker MD    ALPRAZolam 2 mg Oral HS PRN Allan Whittaker MD    baclofen 10 mg Oral TID PRN Allan Whittaker MD    calcitriol 0 25 mcg Oral Daily Allan Whittaker MD    calcium carbonate-vitamin D 1 tablet Oral 4x Daily Allan Whittaker MD    cholecalciferol 1,000 Units Oral Daily Allan Whittaker MD    enoxaparin 30 mg Subcutaneous Q24H [address] Allan Whittaker MD    gabapentin 600 mg Oral Daily Allan Whittaker MD    gemfibrozil 600 mg Oral BID AC Allan Whittaker MD    HYDROmorphone 0 5 mg Intravenous Q1H PRN Allan Whittaker MD    lactated ringers 125 mL/hr Intravenous Continuous Allan Whittaker MD Last Rate: 125 mL/hr (12/08/18 2171)   levothyroxine 150 mcg Oral Early Morning Allan Whittaker MD    magnesium oxide 400 mg Oral Daily Allan Whittaker MD    magnesium oxide 800 mg Oral Daily Allan Whittaker MD    ondansetron 4 mg Intravenous Q6H PRN Allan Whittaker MD    ondansetron 4 mg Oral Q8H PRN Allan Whittaker MD    oxyCODONE 10 mg Oral Q4H PRN Allan Whittaker MD    potassium chloride 20 mEq Oral Q12H Allan Whittaker MD      Continuous Infusions:  lactated ringers 125 mL/hr Last Rate: 125 mL/hr (12/08/18 8019)     PRN Meds:   ALPRAZolam    ALPRAZolam    baclofen    HYDROmorphone    ondansetron    ondansetron    oxyCODONE

## 2018-12-08 NOTE — UTILIZATION REVIEW
Continued Stay Review    Date/POD#: 12/8/19 POD 1    Vital Signs: /58 (BP Location: Left arm)   Pulse 82   Temp 98 7 °F (37 1 °C) (Oral)   Resp 18   Ht 5' 2" (1 575 m)   Wt 81 6 kg (180 lb) Comment: Stated  LMP 12/06/2018   SpO2 96%   BMI 32 92 kg/m²     POD 1  IS  ADVANCE DIET REGULAR   XANAX X2  OXYCODONE X2   Medication:   Scheduled Meds:   Current Facility-Administered Medications:  ALPRAZolam 1 mg Oral BID PRN Christopher Sloan MD    ALPRAZolam 2 mg Oral BID Christopher Sloan MD    ALPRAZolam 2 mg Oral HS PRN Christopher Sloan MD    baclofen 10 mg Oral TID PRN Christopher Sloan MD    calcitriol 0 25 mcg Oral Daily Christopher Sloan MD    calcium carbonate-vitamin D 1 tablet Oral 4x Daily Christopher Sloan MD    cholecalciferol 1,000 Units Oral Daily Christopher Sloan MD    enoxaparin 30 mg Subcutaneous Q24H [address] Christopher Sloan MD    gabapentin 600 mg Oral Daily Christopher Sloan MD    gemfibrozil 600 mg Oral BID AC Christopher Sloan MD    HYDROmorphone 0 5 mg Intravenous Q1H PRN Christopher Sloan MD    lactated ringers 125 mL/hr Intravenous Continuous Christopher Sloan MD Last Rate: 125 mL/hr (12/08/18 1916)   levothyroxine 150 mcg Oral Early Morning Christopher Sloan MD    magnesium oxide 400 mg Oral Daily Christopher Sloan MD    magnesium oxide 800 mg Oral Daily Christopher Sloan MD    ondansetron 4 mg Intravenous Q6H PRN Christopher Sloan MD    ondansetron 4 mg Oral Q8H PRN Christopher Sloan MD    oxyCODONE 10 mg Oral Q4H PRN Christopher Sloan MD    potassium chloride 20 mEq Oral Q12H Christopher Sloan MD      Continuous Infusions:   lactated ringers 125 mL/hr Last Rate: 125 mL/hr (12/08/18 0619)     PRN Meds:   ALPRAZolam    ALPRAZolam    baclofen    HYDROmorphone    ondansetron    ondansetron    oxyCODONE    Abnormal Labs/Diagnostic Results: H/H 9 4/29 2     Age/Sex: 44 y o  female     SURGEON  Vsstable, afebrile  Pain control adeq w PO medication  BS+, flatus +---> to have regular diet w lunch today  Dressing intact  No calf or leg pain  Pt to get OOB today w removal of venodyne stockings  To be eval for disch charlene'w am if able to get OOB, pain control is good

## 2018-12-08 NOTE — PROGRESS NOTES
Progress Note - Carol Dus 44 y o  female MRN: [id number]    Unit/Bed#: 2 [place] [address] Encounter: [id number]      Subjective:   Patient postop supracervical hysterectomy doing fine complains of pain comfortable no bowel movements yet no other acute events overnight remaining review of system unremarkable unchanged since yesterday total of 12 done    Objective:       Vitals: Blood pressure 109/58, pulse 82, temperature 98 7 °F (37 1 °C), temperature source Oral, resp  rate 18, height 5' 2" (1 575 m), weight 81 6 kg (180 lb), last menstrual period 12/06/2018, SpO2 96 %, not currently breastfeeding  ,Body mass index is 32 92 kg/m²      Intake/Output:    Intake/Output Summary (Last 24 hours) at 12/08/18 1141  Last data filed at 12/08/18 0900   Gross per 24 hour   Intake             2125 ml   Output             4050 ml   Net            -1925 ml       Physical Exam:  General Appearance:  Alert orient x3 not any acute distress  Skin:  No rash  H/ENT:  no congestion  Eyes:  No redness Cardiac:  Regular  Pulmonary:  No wheezing  Gastrointestinal:  No mass palpable abdomen diffuse tenderness more so lower abdomen no guarding rigidity Extremities:  No edema  Musculoskeletal:  No new joint swelling  Neuro:  No new deficit    Current Facility-Administered Medications   Medication Dose Route Frequency    ALPRAZolam (XANAX) tablet 1 mg  1 mg Oral BID PRN    ALPRAZolam (XANAX) tablet 2 mg  2 mg Oral BID    ALPRAZolam (XANAX) tablet 2 mg  2 mg Oral HS PRN    baclofen tablet 10 mg  10 mg Oral TID PRN    calcitriol (ROCALTROL) capsule 0 25 mcg  0 25 mcg Oral Daily    calcium carbonate-vitamin D (OSCAL-D) 500 mg-200 units per tablet 1 tablet  1 tablet Oral 4x Daily    cholecalciferol (VITAMIN D3) tablet 1,000 Units  1,000 Units Oral Daily    enoxaparin (LOVENOX) subcutaneous injection 40 mg  40 mg Subcutaneous Q24H [facility]    gabapentin (NEURONTIN) capsule 600 mg  600 mg Oral Daily    gemfibrozil (LOPID) tablet 600 mg 600 mg Oral BID AC    HYDROmorphone (DILAUDID) injection 0 5 mg  0 5 mg Intravenous Q1H PRN    lactated ringers infusion  125 mL/hr Intravenous Continuous    levothyroxine tablet 150 mcg  150 mcg Oral Early Morning    magnesium oxide (MAG-OX) tablet 400 mg  400 mg Oral Daily    magnesium oxide (MAG-OX) tablet 800 mg  800 mg Oral Daily    ondansetron (ZOFRAN) injection 4 mg  4 mg Intravenous Q6H PRN    ondansetron (ZOFRAN-ODT) dispersible tablet 4 mg  4 mg Oral Q8H PRN    oxyCODONE (ROXICODONE) immediate release tablet 10 mg  10 mg Oral Q4H PRN    potassium chloride (K-DUR,KLOR-CON) CR tablet 20 mEq  20 mEq Oral Q12H       Radiology Results:  Reviewed    Lab, Imaging and other studies:   CBC: Lab Results   Component Value Date    WBC 7 68 12/08/2018    RBC 3 18 (L) 12/08/2018     BMP: Lab Results   Component Value Date    SODIUM 141 11/29/2018    CO2 22 11/29/2018    BUN 11 11/29/2018    CREATININE 0 93 11/29/2018    CALCIUM 8 8 11/29/2018     Coagulation:   Lab Results   Component Value Date    INR 0 89 11/29/2018     Cardiac markers: No results found for: CKMB, TROPONINT, MYOGLOBIN  ABGs: No results found for: [address]         Invalid input(s): LABALBU    Assessment:  Active Problems:    * No active hospital problems   *   Postop supracervical hysterectomy  History of seizure disorder  History of migraine headache  Chronic pain syndrome  Chronic anxiety  Anemia due to blood loss due to surgery  History of hypothyroidism  History of hyper and hypocalcemia    Plan:  Continue current treatment  Cut her to current regimen  Out of bed in chair ambulate as tolerated  Continue DVT prophylaxis  Check H&H      VTE Pharmacologic Prophylaxis: Enoxaparin (Lovenox)    Jonas Ramos MD  12/8/2018, 11:41 AM

## 2018-12-08 NOTE — PROGRESS NOTES
Vsstable, afebrile  Pain control adeq w PO medication  BS+, flatus +---> to have regular diet w lunch today  Dressing intact  No calf or leg pain  Pt to get OOB today w removal of venodyne stockings  To be eval for disch charlene'w am if able to get OOB, pain control is good

## 2018-12-08 NOTE — PROGRESS NOTES
Dr Erasmo Flores was made aware about patient's concern regarding her home med list, on the time it is being profiled here in the hospital and the mg that she is taking at home as oppose to what is being ordered here  Dr Erasmo Flores had ordered via telephone to give the xanax and oscal according to how the patient takes it at home  RN placed these orders on behalf of Dr Erasmo Flores as requested

## 2018-12-09 VITALS
OXYGEN SATURATION: 96 % | SYSTOLIC BLOOD PRESSURE: 106 MMHG | HEART RATE: 75 BPM | HEIGHT: 62 IN | RESPIRATION RATE: 18 BRPM | WEIGHT: 180 LBS | BODY MASS INDEX: 33.13 KG/M2 | TEMPERATURE: 98.8 F | DIASTOLIC BLOOD PRESSURE: 63 MMHG

## 2018-12-09 LAB — MRSA NOSE QL CULT: NORMAL

## 2018-12-09 RX ADMIN — CALCITRIOL 0.25 MCG: 0.25 CAPSULE ORAL at 11:04

## 2018-12-09 RX ADMIN — POTASSIUM CHLORIDE 20 MEQ: 1500 TABLET, EXTENDED RELEASE ORAL at 10:06

## 2018-12-09 RX ADMIN — OXYCODONE HYDROCHLORIDE 10 MG: 10 TABLET ORAL at 05:19

## 2018-12-09 RX ADMIN — OXYCODONE HYDROCHLORIDE 10 MG: 10 TABLET ORAL at 00:51

## 2018-12-09 RX ADMIN — ENOXAPARIN SODIUM 40 MG: 40 INJECTION SUBCUTANEOUS at 10:06

## 2018-12-09 RX ADMIN — SODIUM CHLORIDE, SODIUM LACTATE, POTASSIUM CHLORIDE, AND CALCIUM CHLORIDE 125 ML/HR: .6; .31; .03; .02 INJECTION, SOLUTION INTRAVENOUS at 00:42

## 2018-12-09 RX ADMIN — ALPRAZOLAM 1 MG: 0.5 TABLET ORAL at 08:29

## 2018-12-09 RX ADMIN — Medication 800 MG: at 10:07

## 2018-12-09 RX ADMIN — LEVOTHYROXINE SODIUM 150 MCG: 150 TABLET ORAL at 08:29

## 2018-12-09 RX ADMIN — OXYCODONE HYDROCHLORIDE 10 MG: 10 TABLET ORAL at 09:30

## 2018-12-09 RX ADMIN — CALCIUM CARBONATE 500 MG (1,250 MG)-VITAMIN D3 200 UNIT TABLET 1 TABLET: at 10:06

## 2018-12-09 RX ADMIN — GEMFIBROZIL 600 MG: 600 TABLET ORAL at 11:04

## 2018-12-09 NOTE — PROGRESS NOTES
Progress Note - Severiano Overland 44 y o  female MRN: [id number]    Unit/Bed#: 2 [place] [address] Encounter: [id number]      Subjective:   Patient denies any complaints no difficulty breathing no chest pain no shortness of breath no leg pain no fever chills no other acute events overnight remaining review of system unremarkable unchanged since yesterday total of 12 done    Objective:       Vitals: Blood pressure 106/63, pulse 75, temperature 98 8 °F (37 1 °C), temperature source Oral, resp  rate 18, height 5' 2" (1 575 m), weight 81 6 kg (180 lb), last menstrual period 12/06/2018, SpO2 96 %, not currently breastfeeding  ,Body mass index is 32 92 kg/m²      Intake/Output:    Intake/Output Summary (Last 24 hours) at 12/09/18 1254  Last data filed at 12/09/18 7155   Gross per 24 hour   Intake                0 ml   Output             3300 ml   Net            -3300 ml       Physical Exam:  General Appearance:  Alert orient x3 not any acute distress normal  Skin:  No rash  H/ENT:  no congestion  Eyes:  No redness Cardiac:  Regular  Pulmonary:  No wheezing  Gastrointestinal:  No mass palpable abdomen diffuse tenderness more so lower abdomen no guarding rigidity normal exam Extremities:  No edema  Musculoskeletal:  No new joint swelling  Neuro:  No new deficit  Complete exam done completely normal the except the pain at the incision site and tenderness minimal no discharge  Current Facility-Administered Medications   Medication Dose Route Frequency    ALPRAZolam (XANAX) tablet 1 mg  1 mg Oral BID PRN    ALPRAZolam (XANAX) tablet 2 mg  2 mg Oral BID    ALPRAZolam (XANAX) tablet 2 mg  2 mg Oral HS PRN    baclofen tablet 10 mg  10 mg Oral TID PRN    calcitriol (ROCALTROL) capsule 0 25 mcg  0 25 mcg Oral Daily    calcium carbonate-vitamin D (OSCAL-D) 500 mg-200 units per tablet 1 tablet  1 tablet Oral 4x Daily    cholecalciferol (VITAMIN D3) tablet 1,000 Units  1,000 Units Oral Daily    enoxaparin (LOVENOX) subcutaneous injection 40 mg  40 mg Subcutaneous Q24H [facility]    gabapentin (NEURONTIN) capsule 600 mg  600 mg Oral Daily    gemfibrozil (LOPID) tablet 600 mg  600 mg Oral BID AC    HYDROmorphone (DILAUDID) injection 0 5 mg  0 5 mg Intravenous Q1H PRN    lactated ringers infusion  125 mL/hr Intravenous Continuous    levothyroxine tablet 150 mcg  150 mcg Oral Early Morning    magnesium oxide (MAG-OX) tablet 400 mg  400 mg Oral Daily    magnesium oxide (MAG-OX) tablet 800 mg  800 mg Oral Daily    ondansetron (ZOFRAN) injection 4 mg  4 mg Intravenous Q6H PRN    ondansetron (ZOFRAN-ODT) dispersible tablet 4 mg  4 mg Oral Q8H PRN    oxyCODONE (ROXICODONE) immediate release tablet 10 mg  10 mg Oral Q4H PRN    potassium chloride (K-DUR,KLOR-CON) CR tablet 20 mEq  20 mEq Oral Q12H       Radiology Results:  Reviewed    Lab, Imaging and other studies:   CBC:   Lab Results   Component Value Date    WBC 7 68 12/08/2018    RBC 3 18 (L) 12/08/2018     BMP:   Lab Results   Component Value Date    SODIUM 141 11/29/2018    CO2 22 11/29/2018    BUN 11 11/29/2018    CREATININE 0 93 11/29/2018    CALCIUM 8 8 11/29/2018     Coagulation:   Lab Results   Component Value Date    INR 0 89 11/29/2018     Cardiac markers: No results found for: CKMB, TROPONINT, MYOGLOBIN  ABGs: No results found for: [address]         Invalid input(s): LABALBU    Assessment:  Active Problems:    * No active hospital problems   *   Postop supracervical hysterectomy  History of seizure disorder  History of migraine headache  Chronic pain syndrome  Chronic anxiety  Anemia due to blood loss due to surgery  History of hypothyroidism  History of hyper and hypocalcemia    Plan:  The patient medically stable exam unremarkable medically stable to be discharged today      VTE Pharmacologic Prophylaxis: Enoxaparin (Lovenox)    Liban Child MD  12/9/2018, 12:54 PM

## 2018-12-09 NOTE — PLAN OF CARE
DISCHARGE PLANNING     Discharge to home or other facility with appropriate resources Adequate for Discharge        GASTROINTESTINAL - ADULT     Minimal or absence of nausea and/or vomiting Adequate for Discharge     Maintains or returns to baseline bowel function Adequate for Discharge     Maintains adequate nutritional intake Adequate for Discharge     Establish and maintain optimal ostomy function Adequate for Discharge        METABOLIC, FLUID AND ELECTROLYTES - ADULT     Electrolytes maintained within normal limits Adequate for Discharge     Fluid balance maintained Adequate for Discharge     Glucose maintained within target range Adequate for Discharge        MUSCULOSKELETAL - ADULT     Maintain or return mobility to safest level of function Adequate for Discharge     Maintain proper alignment of affected body part Adequate for Discharge        PAIN - ADULT     Verbalizes/displays adequate comfort level or baseline comfort level Adequate for Discharge        Potential for Falls     Patient will remain free of falls Adequate for Discharge        SKIN/TISSUE INTEGRITY - ADULT     Skin integrity remains intact Adequate for Discharge     Incision(s), wounds(s) or drain site(s) healing without S/S of infection Adequate for Discharge     Oral mucous membranes remain intact Adequate for Discharge

## 2018-12-09 NOTE — PROGRESS NOTES
12/09/18 [facility]    Patient Information   Mental Status Alert   Primary Caregiver Self   Support System Immediate family   Activities of Daily Living Prior to Admission   Functional Status Independent   Assistive Device No device needed   Living Arrangement House   Ambulation Independent     Pt deemed medically stable for DC to home today  Pt DC prior to meeting with CM  Per unit RN, pt is independent without DCP needs

## 2018-12-09 NOTE — PLAN OF CARE
DISCHARGE PLANNING     Discharge to home or other facility with appropriate resources Progressing        GASTROINTESTINAL - ADULT     Minimal or absence of nausea and/or vomiting Progressing     Maintains or returns to baseline bowel function Progressing     Maintains adequate nutritional intake Progressing     Establish and maintain optimal ostomy function Progressing        METABOLIC, FLUID AND ELECTROLYTES - ADULT     Electrolytes maintained within normal limits Progressing     Fluid balance maintained Progressing     Glucose maintained within target range Progressing        MUSCULOSKELETAL - ADULT     Maintain or return mobility to safest level of function Progressing     Maintain proper alignment of affected body part Progressing        PAIN - ADULT     Verbalizes/displays adequate comfort level or baseline comfort level Progressing        Potential for Falls     Patient will remain free of falls Progressing        SKIN/TISSUE INTEGRITY - ADULT     Skin integrity remains intact Progressing     Incision(s), wounds(s) or drain site(s) healing without S/S of infection Progressing     Oral mucous membranes remain intact Progressing

## 2018-12-09 NOTE — DISCHARGE SUMMARY
Post-op day #2  vsstable , afebrile  Pt OOB w assistance  Tolerating diet well; BS+, flatus +  Dressing dry and intact; adeq urine output/clear  Calves NT, neg Jenny's sign  Pt to be disch this am w RTO in 1 week  Post-op instructions given

## 2018-12-09 NOTE — NURSING NOTE
Pt d/c from unit to home  D/c instructions and medications reviewed with the pt, all questions answered  Pt verbalized understanding  One prescription given to pt  IV removed prior to d/c  Pt left the unit via wheelchair accompanied by her friend who is providing a ride home for her  Pt left the unit with all belongings in her possession

## 2018-12-18 ENCOUNTER — HOSPITAL ENCOUNTER (EMERGENCY)
Facility: HOSPITAL | Age: 43
Discharge: HOME | End: 2018-12-19
Attending: EMERGENCY MEDICINE | Admitting: EMERGENCY MEDICINE
Payer: COMMERCIAL

## 2018-12-18 DIAGNOSIS — R42 LIGHTHEADEDNESS: Primary | ICD-10-CM

## 2018-12-18 PROCEDURE — 84443 ASSAY THYROID STIM HORMONE: CPT | Performed by: EMERGENCY MEDICINE

## 2018-12-18 PROCEDURE — 36415 COLL VENOUS BLD VENIPUNCTURE: CPT | Performed by: EMERGENCY MEDICINE

## 2018-12-18 PROCEDURE — 80053 COMPREHEN METABOLIC PANEL: CPT | Performed by: EMERGENCY MEDICINE

## 2018-12-18 PROCEDURE — 85025 COMPLETE CBC W/AUTO DIFF WBC: CPT | Performed by: EMERGENCY MEDICINE

## 2018-12-18 PROCEDURE — 99282 EMERGENCY DEPT VISIT SF MDM: CPT

## 2018-12-19 VITALS
SYSTOLIC BLOOD PRESSURE: 144 MMHG | OXYGEN SATURATION: 97 % | WEIGHT: 180 LBS | BODY MASS INDEX: 33.13 KG/M2 | DIASTOLIC BLOOD PRESSURE: 65 MMHG | HEIGHT: 62 IN | RESPIRATION RATE: 20 BRPM | TEMPERATURE: 98.5 F | HEART RATE: 96 BPM

## 2018-12-19 LAB
ALBUMIN SERPL BCP-MCNC: 3.5 G/DL (ref 3.5–5)
ALP SERPL-CCNC: 82 U/L (ref 46–116)
ALT SERPL W P-5'-P-CCNC: 22 U/L (ref 12–78)
ANION GAP SERPL CALCULATED.3IONS-SCNC: 13 MMOL/L (ref 4–13)
AST SERPL W P-5'-P-CCNC: 11 U/L (ref 5–45)
BASOPHILS # BLD AUTO: 0.11 THOUSANDS/ΜL (ref 0–0.1)
BASOPHILS NFR BLD AUTO: 1 % (ref 0–1)
BILIRUB SERPL-MCNC: 0.3 MG/DL (ref 0.2–1)
BUN SERPL-MCNC: 11 MG/DL (ref 5–25)
CALCIUM SERPL-MCNC: 9 MG/DL (ref 8.3–10.1)
CHLORIDE SERPL-SCNC: 104 MMOL/L (ref 100–108)
CO2 SERPL-SCNC: 24 MMOL/L (ref 21–32)
CREAT SERPL-MCNC: 1.12 MG/DL (ref 0.6–1.3)
EOSINOPHIL # BLD AUTO: 0.6 THOUSAND/ΜL (ref 0–0.61)
EOSINOPHIL NFR BLD AUTO: 7 % (ref 0–6)
ERYTHROCYTE [DISTWIDTH] IN BLOOD BY AUTOMATED COUNT: 15.9 % (ref 11.6–15.1)
GFR SERPL CREATININE-BSD FRML MDRD: 61 ML/MIN/1.73SQ M
GLUCOSE SERPL-MCNC: 138 MG/DL (ref 65–140)
HCT VFR BLD AUTO: 32.9 % (ref 34.8–46.1)
HGB BLD-MCNC: 10.5 G/DL (ref 11.5–15.4)
IMM GRANULOCYTES # BLD AUTO: 0.08 THOUSAND/UL (ref 0–0.2)
IMM GRANULOCYTES NFR BLD AUTO: 1 % (ref 0–2)
LYMPHOCYTES # BLD AUTO: 2.01 THOUSANDS/ΜL (ref 0.6–4.47)
LYMPHOCYTES NFR BLD AUTO: 23 % (ref 14–44)
MCH RBC QN AUTO: 29.1 PG (ref 26.8–34.3)
MCHC RBC AUTO-ENTMCNC: 31.9 G/DL (ref 31.4–37.4)
MCV RBC AUTO: 91 FL (ref 82–98)
MONOCYTES # BLD AUTO: 0.44 THOUSAND/ΜL (ref 0.17–1.22)
MONOCYTES NFR BLD AUTO: 5 % (ref 4–12)
NEUTROPHILS # BLD AUTO: 5.4 THOUSANDS/ΜL (ref 1.85–7.62)
NEUTS SEG NFR BLD AUTO: 63 % (ref 43–75)
NRBC BLD AUTO-RTO: 0 /100 WBCS
PLATELET # BLD AUTO: 265 THOUSANDS/UL (ref 149–390)
PMV BLD AUTO: 10.2 FL (ref 8.9–12.7)
POTASSIUM SERPL-SCNC: 3.5 MMOL/L (ref 3.5–5.3)
PROT SERPL-MCNC: 6.9 G/DL (ref 6.4–8.2)
RBC # BLD AUTO: 3.61 MILLION/UL (ref 3.81–5.12)
SODIUM SERPL-SCNC: 141 MMOL/L (ref 136–145)
TSH SERPL DL<=0.05 MIU/L-ACNC: 0.19 UIU/ML (ref 0.36–3.74)
WBC # BLD AUTO: 8.64 THOUSAND/UL (ref 4.31–10.16)

## 2018-12-19 NOTE — ED PROVIDER NOTES
History  Chief Complaint   Patient presents with    Blood Pressure Check     Patient states she is having low blood pressure,feelings of hotness that comes and goes,feels sob at times with light headedness     Patient was at her physician today to  a refill on her narcotic pain medication which she has been on for about 4 years, in addition to Xanax which she has been taking since age 17  She states she did not feel right tonight lightheaded and feels like her blood pressure was low  Patient states this is how she feels when her thyroid was off  She states that she had had a partial hysterectomy for bleeding during which time her thyroid was regulated and now she thinks it is out of regulation and would like it checked tonight  Patient's blood pressure is noted to be normal on arrival   She continues to smoke daily            Prior to Admission Medications   Prescriptions Last Dose Informant Patient Reported? Taking? ALPRAZolam (XANAX) 1 mg tablet   Yes Yes   Sig: Take 1 mg by mouth 2 (two) times a day 9am and 5pm   ALPRAZolam (XANAX) 2 MG tablet  Self Yes Yes   Sig: Take 2 mg by mouth daily at bedtime     Cholecalciferol (VITAMIN D3) 1000 units CAPS   Yes Yes   Sig: Vitamin D-3 with Aloe   baclofen 10 mg tablet   Yes Yes   Sig: Take 10 mg by mouth 3 (three) times a day as needed     calcitriol (ROCALTROL) 0 25 mcg capsule   Yes Yes   Sig: Take 1 capsule by mouth daily   calcium carbonate-vitamin D (OSCAL-D) 500 mg-200 units per tablet   Yes Yes   Sig: Take 1 tablet by mouth 4 (four) times a day 9a 2p 7p 9p   gabapentin (NEURONTIN) 600 MG tablet  Self Yes Yes   Sig: Take 600 mg by mouth daily as needed     gemfibrozil (LOPID) 600 mg tablet   Yes Yes   Sig: Take 600 mg by mouth 2 (two) times a day before meals     levothyroxine (SYNTHROID) 150 mcg tablet   Yes Yes   Sig: Take 1 tablet by mouth daily   magnesium oxide (MAG-OX) 400 mg   Yes Yes   Sig: Take 400 mg by mouth daily 9pm   ondansetron (ZOFRAN) 4 mg tablet   Yes Yes   Sig: Take 4 mg by mouth every 8 (eight) hours as needed for nausea or vomiting  oxyCODONE-acetaminophen (PERCOCET) 5-325 mg per tablet  Self Yes Yes   Sig: Take 2 tablets by mouth every 6 (six) hours as needed     potassium chloride (K-DUR,KLOR-CON) 20 mEq tablet   Yes Yes   Sig: Take 20 mEq by mouth 2 (two) times a day Takes 2 tabs bid       Facility-Administered Medications: None       Past Medical History:   Diagnosis Date    Acid reflux     Acute renal failure (HCC)     multiple episodes    Anemia     Anxiety     Chronic pain     DDD (degenerative disc disease), lumbar     Disease of thyroid gland     had surgery and now hypo    DVT (deep venous thrombosis) ([facility] [address] )     s/p ankle fracture    Hypercalcemia     Hyperlipidemia     Hyperthyroidism     Hypocalcemia     post op 2016    Migraine     Psychiatric disorder     anxiety depression    Seizures (HCC)     petit mal x1  4 years ago- all tests were neg   Spondylolisthesis of lumbar region     Treatment     spinal pain injections  last was 2016       Past Surgical History:   Procedure Laterality Date    BACK SURGERY       SECTION      x5    DISCOGRAM      PARATHYROIDECTOMY      WA ANTERIOR COLPORRAPHY RPR CYSTOCELE W/CYSTO N/A 2017    Procedure: CYSTOCELE REPAIR;  Surgeon: Russell Jones MD;  Location: [address] [place] [address];  Service: Gynecology    WA ARTHRODESIS POSTERIOR INTERBODY LUMBAR N/A 2016    Procedure: L4-S1 LUMBAR LAMINECTOMY/DECOMPRESSION;  INSTRUMENTED POSTEROLATERAL FUSION/ INTERBODY L5-S1; ALLOGRAFT AND AUTOGRAFT (IMPULSE) ;   Surgeon: Diana Savage MD;  [facility];  Service: Orthopedics    WA CYSTOURETHROSCOPY,URETER CATHETER Bilateral 2018    Procedure: INSERTION URETERAL CATHETERS PREOP;  Surgeon: Clarita Brower MD;  Location: [address] [place] [address];  Service: Urology    WA SLING OPER STRES INCONTINENCE N/A 2017    Procedure: MID URETHRAL SLING;  Surgeon: Freddie Vidal Gala Gamino MD;  Location: [address] [place] [address];  Service: Urology    DE SUPRACERV ABD HYSTERECTOMY N/A 12/7/2018    Procedure: SUPRACERVICAL HYSTERECTOMY;  Surgeon: Kaleb Finley MD;  Location: [facility];  Service: Gynecology    THYROIDECTOMY      TONSILECTOMY AND ADNOIDECTOMY      TONSILLECTOMY      TUBAL LIGATION         Family History   Problem Relation Age of Onset    Diabetes Mother     Hypertension Mother     Hyperlipidemia Father     Arrhythmia Father     Lung cancer Father     Diabetes Father      I have reviewed and agree with the history as documented  Social History   Substance Use Topics    Smoking status: Current Every Day Smoker     Packs/day: 0 50     Years: 25 00     Types: Cigarettes    Smokeless tobacco: Never Used      Comment: patient states she quit but had a few cigarettes 7/23    Alcohol use No        Review of Systems   Constitutional: Negative for chills and fever  HENT: Negative for congestion and sore throat  Eyes: Negative for visual disturbance  Respiratory: Negative for shortness of breath  Cardiovascular: Negative for chest pain  Gastrointestinal: Negative for abdominal pain and vomiting  Genitourinary: Negative for dysuria  Musculoskeletal: Positive for arthralgias, back pain and neck pain  Skin: Negative for rash and wound  Neurological: Positive for weakness, light-headedness and headaches  Hematological: Bruises/bleeds easily  Psychiatric/Behavioral: The patient is nervous/anxious  All other systems reviewed and are negative  Physical Exam  Physical Exam   Constitutional: She is oriented to person, place, and time  She appears well-developed and well-nourished  HENT:   Head: Normocephalic and atraumatic  Mouth/Throat: Oropharynx is clear and moist    Eyes: Conjunctivae are normal    Neck: Normal range of motion  Neck supple  Well-healed thyroidectomy scar    There is no masses   Cardiovascular: Normal rate, regular rhythm and normal heart sounds  Pulmonary/Chest: Effort normal and breath sounds normal    Abdominal: Soft  Bowel sounds are normal  There is no tenderness  Musculoskeletal: Normal range of motion  She exhibits no edema  Neurological: She is alert and oriented to person, place, and time  Skin: Skin is warm and dry  Psychiatric: She has a normal mood and affect  Her behavior is normal    Nursing note and vitals reviewed  Vital Signs  ED Triage Vitals [12/18/18 2335]   Temperature Pulse Respirations Blood Pressure SpO2   98 5 °F (36 9 °C) 105 20 144/65 98 %      Temp Source Heart Rate Source Patient Position - Orthostatic VS BP Location FiO2 (%)   Tympanic Monitor Lying Left arm --      Pain Score       --           Vitals:    12/18/18 2335 12/19/18 0000 12/19/18 0015 12/19/18 0030   BP: 144/65      Pulse: 105 92 96 96   Patient Position - Orthostatic VS: Lying          Visual Acuity      ED Medications  Medications - No data to display    Diagnostic Studies  Results Reviewed     Procedure Component Value Units Date/Time    Comprehensive metabolic panel [[id number]] Collected:  12/18/18 2357    Lab Status:  Final result Specimen:  Blood from Arm, Right Updated:  12/19/18 0039     Sodium 141 mmol/L      Potassium 3 5 mmol/L      Chloride 104 mmol/L      CO2 24 mmol/L      ANION GAP 13 mmol/L      BUN 11 mg/dL      Creatinine 1 12 mg/dL      Glucose 138 mg/dL      Calcium 9 0 mg/dL      AST 11 U/L      ALT 22 U/L      Alkaline Phosphatase 82 U/L      Total Protein 6 9 g/dL      Albumin 3 5 g/dL      Total Bilirubin 0 30 mg/dL      eGFR 61 ml/min/1 73sq m     Narrative:         National Kidney Disease Education Program recommendations are as follows:  GFR calculation is accurate only with a steady state creatinine  Chronic Kidney disease less than 60 ml/min/1 73 sq  meters  Kidney failure less than 15 ml/min/1 73 sq  meters      TSH [[id number]]  (Abnormal) Collected:  12/18/18 2357    Lab Status:  Final result Specimen: Blood from Arm, Right Updated:  12/19/18 0039     TSH 3RD GENERATON 0 192 (L) uIU/mL     Narrative:         Patients undergoing fluorescein dye angiography may retain small amounts of fluorescein in the body for 48-72 hours post procedure  Samples containing fluorescein can produce falsely depressed TSH values  If the patient had this procedure,a specimen should be resubmitted post fluorescein clearance  The recommended reference ranges for TSH during pregnancy are as follows:  First trimester 0 1 to 2 5 uIU/mL  Second trimester  0 2 to 3 0 uIU/mL  Third trimester 0 3 to 3 0 uIU/m      CBC and differential [[id number]]  (Abnormal) Collected:  12/18/18 6000    Lab Status:  Final result Specimen:  Blood from Arm, Right Updated:  12/19/18 0002     WBC 8 64 Thousand/uL      RBC 3 61 (L) Million/uL      Hemoglobin 10 5 (L) g/dL      Hematocrit 32 9 (L) %      MCV 91 fL      MCH 29 1 pg      MCHC 31 9 g/dL      RDW 15 9 (H) %      MPV 10 2 fL      Platelets 641 Thousands/uL      nRBC 0 /100 WBCs      Neutrophils Relative 63 %      Immat GRANS % 1 %      Lymphocytes Relative 23 %      Monocytes Relative 5 %      Eosinophils Relative 7 (H) %      Basophils Relative 1 %      Neutrophils Absolute 5 40 Thousands/µL      Immature Grans Absolute 0 08 Thousand/uL      Lymphocytes Absolute 2 01 Thousands/µL      Monocytes Absolute 0 44 Thousand/µL      Eosinophils Absolute 0 60 Thousand/µL      Basophils Absolute 0 11 (H) Thousands/µL                  No orders to display              Procedures  Procedures       Phone Contacts  ED Phone Contact    ED Course                               MDM  Number of Diagnoses or Management Options  Diagnosis management comments: I cautioned the patient against use of long-term addictive medications both narcotics and benzodiazepines in combination which in my opinion is malpractice      CritCare Time    Disposition  Final diagnoses:   Lightheadedness     Time reflects when diagnosis was documented in both MDM as applicable and the Disposition within this note     Time User Action Codes Description Comment    12/19/2018 12:42 AM Danielito Teran Add [R42] Lightheadedness       ED Disposition     ED Disposition Condition Comment    Discharge  Trinda Mirza discharge to home/self care  Condition at discharge: Stable        Follow-up Information     Follow up With Specialties Details Why Contact Info    Gurmeet Campbell MD Internal Medicine Schedule an appointment as soon as possible for a visit in 1 day  [facility]  [phone]            Patient's Medications   Discharge Prescriptions    No medications on file     No discharge procedures on file      ED Provider  Electronically Signed by           Mickey Grace MD  12/19/18 9772

## 2018-12-19 NOTE — DISCHARGE INSTRUCTIONS
Lightheadedness   WHAT YOU NEED TO KNOW:   Lightheadedness is the feeling that you may faint, but you do not  Your heartbeat may be fast or feel like it flutters  Lightheadedness may occur when you take certain medicines, such as medicine to lower your blood pressure  Dehydration, low sodium, low blood sugar, an abnormal heart rhythm, and anxiety are other common causes  DISCHARGE INSTRUCTIONS:   Return to the emergency department if:   · You have sudden chest pain  · You have trouble breathing or shortness of breath  · You have vision changes, are sweating, and have nausea while you are sitting or lying down  · You feel flushed and your heart is fluttering  · You faint  Contact your healthcare provider if:   · You feel lightheaded often  · Your heart beats faster or slower than usual      · You have questions or concerns about your condition or care  Follow up with your healthcare provider as directed: You may need more tests to help find the cause of your lightheadedness  The tests will help healthcare providers plan the best treatment for you  Write down your questions so you remember to ask them during your visits  Self-care:  Talk with your healthcare provider about these and other ways to manage your symptoms:  · Lie down  when you feel lightheaded, your throat gets tight, or your vision changes  Raise your legs above the level of your heart  · Stand up slowly  Sit on the side of the bed or couch for a few minutes before you stand up  · Take slow, deep breaths when you feel lightheaded  This can help decrease the feeling that you might faint  · Ask if you need to avoid hot baths and saunas  These may make your symptoms worse  Watch for signs of low blood sugar: These include hunger, nervousness, sweating, and fast or fluttery heartbeats  Talk with your healthcare provider about ways to keep your blood sugar level steady    Check your blood pressure often:  You should do this especially if you take medicine to lower your blood pressure  Check your blood pressure when you are lying down and when you are standing  Ask how often to check during the day  Keep a record of your blood pressure numbers  Your healthcare provider may use the record to help plan your treatment  Keep a record of your lightheadedness episodes:  Include your symptoms and your activity before and after the episode  The record can help your healthcare provider find the cause of your lightheadedness and help you manage episodes  © 2017 [address] [facility] Information is for End User's use only and may not be sold, redistributed or otherwise used for commercial purposes  All illustrations and images included in CareNotes® are the copyrighted property of A D A M , Inc  or Satnam Horvath  The above information is an  only  It is not intended as medical advice for individual conditions or treatments  Talk to your doctor, nurse or pharmacist before following any medical regimen to see if it is safe and effective for you

## 2019-02-25 ENCOUNTER — TELEPHONE (OUTPATIENT)
Dept: CARDIOLOGY CLINIC | Facility: CLINIC | Age: 44
End: 2019-02-25

## 2019-02-25 NOTE — TELEPHONE ENCOUNTER
Fax rcd requesting chantix  Patient has not been seen since 2017  Made pharmacy aware that she needs an appointment before we can refill prescription for this patient

## 2019-07-16 ENCOUNTER — TELEPHONE (OUTPATIENT)
Dept: GASTROENTEROLOGY | Facility: AMBULARY SURGERY CENTER | Age: 44
End: 2019-07-16

## 2019-07-16 NOTE — TELEPHONE ENCOUNTER
lmom for pt to contact office re appt w/ dr Susy Cheng 7/17/19  No labs have been scanned into pt chart and no blood work has been done since 12/2018

## 2019-07-17 ENCOUNTER — OFFICE VISIT (OUTPATIENT)
Dept: GASTROENTEROLOGY | Facility: CLINIC | Age: 44
End: 2019-07-17
Payer: COMMERCIAL

## 2019-07-17 VITALS
DIASTOLIC BLOOD PRESSURE: 60 MMHG | RESPIRATION RATE: 16 BRPM | HEART RATE: 87 BPM | HEIGHT: 62 IN | SYSTOLIC BLOOD PRESSURE: 118 MMHG | WEIGHT: 180 LBS | BODY MASS INDEX: 33.13 KG/M2 | TEMPERATURE: 98.5 F

## 2019-07-17 DIAGNOSIS — R74.01 ELEVATED ALT MEASUREMENT: Primary | ICD-10-CM

## 2019-07-17 PROCEDURE — 99244 OFF/OP CNSLTJ NEW/EST MOD 40: CPT | Performed by: INTERNAL MEDICINE

## 2019-07-17 NOTE — PROGRESS NOTES
Consultation - [facility]  Luciano Bullard 44 y o  female MRN: [id number]          Assessment & Plan:    52-year-old female with a history of a hypothyroidism, chronic pain, elevated ALT and history of loose stools  1  Elevated ALT:  Generally not to be concerning given the degree of elevation is about twice the upper limit of normal  -we will repeat LFTs at a chronic hepatitis panel given her history of multiple tattoos  -further workup thereafter, if LFTs remain elevated or worsening will consider more extensive serologic evaluation  2  Loose stools:  Patient reports she has had chronic loose stools for the past several years since her thyroid surgery  -we will see the patient back in 3 years time for further evaluation        _____________________________________________________________        CC:  Elevated LFTs    HPI:  Luciano Bullard is a 44 y  o female who was referred for evaluation of elevated ALT  As you know this is a 52-year-old female treated for chronic back pain, hypothyroidism, recent laboratory studies demonstrated an ALT of 61  She had normal ALT in December  No recent change in medications, no history of alcohol use  Patient denies any abdominal pain, nausea, vomiting  Reports that her bowel movements have been loose for several years about 3 to 4 bowel movements on a regular basis  Denies any melena, occasions me small amounts of rectal bleeding  This has started since her thyroid surgery  Patient denies any nausea, vomiting, heartburn, dysphagia  Her weight has been stable  She has had multiple surgeries including recent hysterectomy and bladder sling  Smokes about half pack a day denies any alcohol, no history of drug use, she works at [employer]  She does have multiple tattoos  Unclear if she has had blood transfusions in the past     Family history is notable for liver disease in her father          ROS:  The remainder of the ROS was negative except for the pertinent positives mentioned in HPI  Allergies: Other; Vicodin [hydrocodone-acetaminophen]; and Morphine and related    Medications:   Current Outpatient Medications:     ALPRAZolam (XANAX) 1 mg tablet, Take 1 mg by mouth 2 (two) times a day 9am and 5pm, Disp: , Rfl:     ALPRAZolam (XANAX) 2 MG tablet, Take 2 mg by mouth daily at bedtime  , Disp: , Rfl:     baclofen 10 mg tablet, Take 10 mg by mouth 3 (three) times a day as needed  , Disp: , Rfl:     calcitriol (ROCALTROL) 0 25 mcg capsule, Take 1 capsule by mouth daily, Disp: , Rfl:     calcium carbonate-vitamin D (OSCAL-D) 500 mg-200 units per tablet, Take 1 tablet by mouth 4 (four) times a day 9a 2p 7p 9p, Disp: , Rfl:     Cholecalciferol (VITAMIN D3) 1000 units CAPS, Vitamin D-3 with Aloe, Disp: , Rfl:     gemfibrozil (LOPID) 600 mg tablet, Take 600 mg by mouth 2 (two) times a day before meals  , Disp: , Rfl:     levothyroxine (SYNTHROID) 150 mcg tablet, Take 1 tablet by mouth daily, Disp: , Rfl:     magnesium oxide (MAG-OX) 400 mg, Take 400 mg by mouth daily 9pm, Disp: , Rfl:     ondansetron (ZOFRAN) 4 mg tablet, Take 4 mg by mouth every 8 (eight) hours as needed for nausea or vomiting , Disp: , Rfl:     oxyCODONE-acetaminophen (PERCOCET) 5-325 mg per tablet, Take 2 tablets by mouth every 6 (six) hours as needed  , Disp: , Rfl:     potassium chloride (K-DUR,KLOR-CON) 20 mEq tablet, Take 20 mEq by mouth 2 (two) times a day Takes 2 tabs bid , Disp: , Rfl:     gabapentin (NEURONTIN) 600 MG tablet, Take 600 mg by mouth daily as needed  , Disp: , Rfl: '    Past Medical History:   Diagnosis Date    Acid reflux     Acute renal failure (HCC)     multiple episodes    Anemia     Anxiety     Chronic pain     DDD (degenerative disc disease), lumbar     Disease of thyroid gland     had surgery and now hypo    DVT (deep venous thrombosis) ([facility] [address] ) 2009    s/p ankle fracture    Hypercalcemia     Hyperlipidemia     Hyperthyroidism     Hypocalcemia     post op 2016    Migraine     Psychiatric disorder     anxiety depression    Seizures (HCC)     petit mal x1  4 years ago- all tests were neg   Spondylolisthesis of lumbar region     Treatment     spinal pain injections  last was 2016       Past Surgical History:   Procedure Laterality Date    BACK SURGERY       SECTION      x5    DISCOGRAM      PARATHYROIDECTOMY      MA ANTERIOR COLPORRAPHY RPR CYSTOCELE W/CYSTO N/A 2017    Procedure: CYSTOCELE REPAIR;  Surgeon: Chaparro Venegas MD;  Location: [address] [place] [address];  Service: Gynecology    MA ARTHRODESIS POSTERIOR INTERBODY LUMBAR N/A 2016    Procedure: L4-S1 LUMBAR LAMINECTOMY/DECOMPRESSION;  INSTRUMENTED POSTEROLATERAL FUSION/ INTERBODY L5-S1; ALLOGRAFT AND AUTOGRAFT (IMPULSE) ; Surgeon: Kathleen Whyte MD;  Location:  MAIN OR;  Service: Orthopedics    MA CYSTOURETHROSCOPY,URETER CATHETER Bilateral 2018    Procedure: INSERTION URETERAL CATHETERS PREOP;  Surgeon: Kerry Reardon MD;  Location: [address] [place] [address];  Service: Urology    MA SLING OPER STRES INCONTINENCE N/A 2017    Procedure: MID URETHRAL SLING;  Surgeon: Jazmyne Boateng MD;  Location: [address] [place] [address];  Service: Urology    MA SUPRACERV ABD HYSTERECTOMY N/A 2018    Procedure: SUPRACERVICAL HYSTERECTOMY;  Surgeon: Chaparro Venegas MD;  [facility];  Service: Gynecology    THYROIDECTOMY      TONSILECTOMY AND ADNOIDECTOMY      TONSILLECTOMY      TUBAL LIGATION         Family History   Problem Relation Age of Onset    Diabetes Mother     Hypertension Mother     Hyperlipidemia Father     Arrhythmia Father     Lung cancer Father     Diabetes Father         reports that she has been smoking cigarettes  She has a 12 50 pack-year smoking history  She has never used smokeless tobacco  She reports that she does not drink alcohol or use drugs            Physical Exam:     /60 (BP Location: Left arm, Patient Position: Sitting, Cuff Size: Standard)   Pulse 87   Temp 98 5 °F (36 9 °C) (Tympanic)   Resp 16   Ht 5' 2" (1 575 m)   Wt 81 6 kg (180 lb)   BMI 32 92 kg/m²     Gen: wn/wd, NAD  HEENT: anicteric, MMM, no cervical LAD  CVS: RRR, no m/r/g  CHEST: CTA b/l  ABD: +BS, soft, NT,ND, no hepatosplenomegaly  EXT: no c/c/e  NEURO: aaox3  SKIN: NO rashes, multiple tattoos

## 2019-07-17 NOTE — LETTER
July 17, 2019     Robin Martines MD  [address] [facility] [address]    Patient: Betsey Simons   YOB: 1975   Date of Visit: 7/17/2019       Dear Dr Ar Lopez: Thank you for referring Betsey Simons to me for evaluation  Below are my notes for this consultation  If you have questions, please do not hesitate to call me  I look forward to following your patient along with you  Sincerely,        Noemi Cifuentes MD        CC: No Recipients  Noemi Cifuentes MD  7/17/2019 12:47 PM  Sign at close encounter  Consultation - [address] [facility]  Yanira Wiseman [address] 44 y o  female MRN: [id number]          Assessment & Plan:    43-year-old female with a history of a hypothyroidism, chronic pain, elevated ALT and history of loose stools  1  Elevated ALT:  Generally not to be concerning given the degree of elevation is about twice the upper limit of normal  -we will repeat LFTs at a chronic hepatitis panel given her history of multiple tattoos  -further workup thereafter, if LFTs remain elevated or worsening will consider more extensive serologic evaluation  2  Loose stools:  Patient reports she has had chronic loose stools for the past several years since her thyroid surgery  -we will see the patient back in 3 years time for further evaluation        _____________________________________________________________        CC:  Elevated LFTs    HPI:  Betsey Parent is a 44 y  o female who was referred for evaluation of elevated ALT  As you know this is a 43-year-old female treated for chronic back pain, hypothyroidism, recent laboratory studies demonstrated an ALT of 61  She had normal ALT in December  No recent change in medications, no history of alcohol use  Patient denies any abdominal pain, nausea, vomiting  Reports that her bowel movements have been loose for several years about 3 to 4 bowel movements on a regular basis    Denies any melena, occasions me small amounts of rectal bleeding  This has started since her thyroid surgery  Patient denies any nausea, vomiting, heartburn, dysphagia  Her weight has been stable  She has had multiple surgeries including recent hysterectomy and bladder sling  Smokes about half pack a day denies any alcohol, no history of drug use, she works at [employer]  She does have multiple tattoos  Unclear if she has had blood transfusions in the past     Family history is notable for liver disease in her father  ROS:  The remainder of the ROS was negative except for the pertinent positives mentioned in HPI  Allergies: Other; Vicodin [hydrocodone-acetaminophen]; and Morphine and related    Medications:   Current Outpatient Medications:     ALPRAZolam (XANAX) 1 mg tablet, Take 1 mg by mouth 2 (two) times a day 9am and 5pm, Disp: , Rfl:     ALPRAZolam (XANAX) 2 MG tablet, Take 2 mg by mouth daily at bedtime  , Disp: , Rfl:     baclofen 10 mg tablet, Take 10 mg by mouth 3 (three) times a day as needed  , Disp: , Rfl:     calcitriol (ROCALTROL) 0 25 mcg capsule, Take 1 capsule by mouth daily, Disp: , Rfl:     calcium carbonate-vitamin D (OSCAL-D) 500 mg-200 units per tablet, Take 1 tablet by mouth 4 (four) times a day 9a 2p 7p 9p, Disp: , Rfl:     Cholecalciferol (VITAMIN D3) 1000 units CAPS, Vitamin D-3 with Aloe, Disp: , Rfl:     gemfibrozil (LOPID) 600 mg tablet, Take 600 mg by mouth 2 (two) times a day before meals  , Disp: , Rfl:     levothyroxine (SYNTHROID) 150 mcg tablet, Take 1 tablet by mouth daily, Disp: , Rfl:     magnesium oxide (MAG-OX) 400 mg, Take 400 mg by mouth daily 9pm, Disp: , Rfl:     ondansetron (ZOFRAN) 4 mg tablet, Take 4 mg by mouth every 8 (eight) hours as needed for nausea or vomiting , Disp: , Rfl:     oxyCODONE-acetaminophen (PERCOCET) 5-325 mg per tablet, Take 2 tablets by mouth every 6 (six) hours as needed  , Disp: , Rfl:     potassium chloride (K-DUR,KLOR-CON) 20 mEq tablet, Take 20 mEq by mouth 2 (two) times a day Takes 2 tabs bid , Disp: , Rfl:     gabapentin (NEURONTIN) 600 MG tablet, Take 600 mg by mouth daily as needed  , Disp: , Rfl: '    Past Medical History:   Diagnosis Date    Acid reflux     Acute renal failure (HCC)     multiple episodes    Anemia     Anxiety     Chronic pain     DDD (degenerative disc disease), lumbar     Disease of thyroid gland     had surgery and now hypo    DVT (deep venous thrombosis) ([facility] [address] )     s/p ankle fracture    Hypercalcemia     Hyperlipidemia     Hyperthyroidism     Hypocalcemia     post op 2016    Migraine     Psychiatric disorder     anxiety depression    Seizures (HCC)     petit mal x1  4 years ago- all tests were neg   Spondylolisthesis of lumbar region     Treatment     spinal pain injections  last was 2016       Past Surgical History:   Procedure Laterality Date    BACK SURGERY       SECTION      x5    DISCOGRAM      PARATHYROIDECTOMY      [place] ANTERIOR COLPORRAPHY RPR CYSTOCELE W/CYSTO N/A 2017    Procedure: CYSTOCELE REPAIR;  Surgeon: Glenis Aguilar MD;  Location: [address] [place] [address];  Service: Gynecology    DC ARTHRODESIS POSTERIOR INTERBODY LUMBAR N/A 2016    Procedure: L4-S1 LUMBAR LAMINECTOMY/DECOMPRESSION;  INSTRUMENTED POSTEROLATERAL FUSION/ INTERBODY L5-S1; ALLOGRAFT AND AUTOGRAFT (IMPULSE) ;   Surgeon: Cori Mccormack MD;  [facility];  Service: Orthopedics    DC CYSTOURETHROSCOPY,URETER CATHETER Bilateral 2018    Procedure: INSERTION URETERAL CATHETERS PREOP;  Surgeon: Nia Rai MD;  Location: [address] [place] [address];  Service: Urology    DC SLING OPER STRES INCONTINENCE N/A 2017    Procedure: MID URETHRAL SLING;  Surgeon: Lattie Hatchet, MD;  Location: [address] [place] [address];  Service: Urology    DC [address] ABD HYSTERECTOMY N/A 2018    Procedure: SUPRACERVICAL HYSTERECTOMY;  Surgeon: Glenis Aguilar MD;  Location: [address] [place] [address];  Service: Gynecology    THYROIDECTOMY      TONSILECTOMY AND ADNOIDECTOMY      TONSILLECTOMY      TUBAL LIGATION         Family History   Problem Relation Age of Onset    Diabetes Mother     Hypertension Mother     Hyperlipidemia Father     Arrhythmia Father     Lung cancer Father     Diabetes Father         reports that she has been smoking cigarettes  She has a 12 50 pack-year smoking history  She has never used smokeless tobacco  She reports that she does not drink alcohol or use drugs            Physical Exam:     /60 (BP Location: Left arm, Patient Position: Sitting, Cuff Size: Standard)   Pulse 87   Temp 98 5 °F (36 9 °C) (Tympanic)   Resp 16   Ht 5' 2" (1 575 m)   Wt 81 6 kg (180 lb)   BMI 32 92 kg/m²      Gen: wn/wd, NAD  HEENT: anicteric, MMM, no cervical LAD  CVS: RRR, no m/r/g  CHEST: CTA b/l  ABD: +BS, soft, NT,ND, no hepatosplenomegaly  EXT: no c/c/e  NEURO: aaox3  SKIN: NO rashes, multiple tattoos

## 2019-07-19 ENCOUNTER — HOSPITAL ENCOUNTER (OUTPATIENT)
Dept: RADIOLOGY | Facility: HOSPITAL | Age: 44
Discharge: HOME | End: 2019-07-19
Attending: INTERNAL MEDICINE
Payer: COMMERCIAL

## 2019-07-19 DIAGNOSIS — R74.01 ELEVATED ALT MEASUREMENT: ICD-10-CM

## 2019-07-19 PROCEDURE — 76705 ECHO EXAM OF ABDOMEN: CPT

## 2019-07-29 ENCOUNTER — TELEPHONE (OUTPATIENT)
Dept: GASTROENTEROLOGY | Facility: CLINIC | Age: 44
End: 2019-07-29

## 2019-07-29 NOTE — TELEPHONE ENCOUNTER
----- Message from Batsheva Marcos MD sent at 7/28/2019 10:34 PM EDT -----  Please inform the patient that ultrasound shows small amount of fatty changes in her liver, some gallstones are noted as well  We will continue to monitor her LFTs, please have her call with any questions or concerns  These changes are not concerning changes and can be followed

## 2019-07-29 NOTE — LETTER
July 29, 2019     Trishkami Vernon  [address] [id number]      Dear Ms Lopezvickeydavid: We have attempted to reach you regarding your results  We ask that you please contact our office upon receipt of this letter to receive your results  Thank you in advance for your cooperation and assistance          Sincerely,   Reece Gonsalves [facility] Staff  [phone]

## 2020-03-07 ENCOUNTER — APPOINTMENT (EMERGENCY)
Dept: RADIOLOGY | Facility: HOSPITAL | Age: 45
End: 2020-03-07
Attending: EMERGENCY MEDICINE
Payer: COMMERCIAL

## 2020-03-07 ENCOUNTER — HOSPITAL ENCOUNTER (EMERGENCY)
Facility: HOSPITAL | Age: 45
Discharge: HOME | End: 2020-03-08
Attending: EMERGENCY MEDICINE | Admitting: EMERGENCY MEDICINE
Payer: COMMERCIAL

## 2020-03-07 DIAGNOSIS — E83.51 HYPOCALCEMIA: Primary | ICD-10-CM

## 2020-03-07 LAB
ALBUMIN SERPL BCP-MCNC: 3.7 G/DL (ref 3.5–5)
ALP SERPL-CCNC: 67 U/L (ref 46–116)
ALT SERPL W P-5'-P-CCNC: 39 U/L (ref 12–78)
ANION GAP SERPL CALCULATED.3IONS-SCNC: 11 MMOL/L (ref 4–13)
AST SERPL W P-5'-P-CCNC: 13 U/L (ref 5–45)
BASOPHILS # BLD AUTO: 0.06 THOUSANDS/ΜL (ref 0–0.1)
BASOPHILS NFR BLD AUTO: 1 % (ref 0–1)
BILIRUB SERPL-MCNC: 0.3 MG/DL (ref 0.2–1)
BUN SERPL-MCNC: 24 MG/DL (ref 5–25)
CA-I BLD-SCNC: 0.88 MMOL/L (ref 1.12–1.32)
CALCIUM SERPL-MCNC: 7 MG/DL (ref 8.3–10.1)
CHLORIDE SERPL-SCNC: 101 MMOL/L (ref 100–108)
CO2 SERPL-SCNC: 27 MMOL/L (ref 21–32)
CREAT SERPL-MCNC: 1.02 MG/DL (ref 0.6–1.3)
EOSINOPHIL # BLD AUTO: 0.09 THOUSAND/ΜL (ref 0–0.61)
EOSINOPHIL NFR BLD AUTO: 1 % (ref 0–6)
ERYTHROCYTE [DISTWIDTH] IN BLOOD BY AUTOMATED COUNT: 15.9 % (ref 11.6–15.1)
GFR SERPL CREATININE-BSD FRML MDRD: 68 ML/MIN/1.73SQ M
GLUCOSE SERPL-MCNC: 132 MG/DL (ref 65–140)
HCT VFR BLD AUTO: 40.9 % (ref 34.8–46.1)
HGB BLD-MCNC: 13.1 G/DL (ref 11.5–15.4)
IMM GRANULOCYTES # BLD AUTO: 0.05 THOUSAND/UL (ref 0–0.2)
IMM GRANULOCYTES NFR BLD AUTO: 1 % (ref 0–2)
LYMPHOCYTES # BLD AUTO: 2.57 THOUSANDS/ΜL (ref 0.6–4.47)
LYMPHOCYTES NFR BLD AUTO: 32 % (ref 14–44)
MAGNESIUM SERPL-MCNC: 1.8 MG/DL (ref 1.6–2.6)
MCH RBC QN AUTO: 29.6 PG (ref 26.8–34.3)
MCHC RBC AUTO-ENTMCNC: 32 G/DL (ref 31.4–37.4)
MCV RBC AUTO: 92 FL (ref 82–98)
MONOCYTES # BLD AUTO: 0.66 THOUSAND/ΜL (ref 0.17–1.22)
MONOCYTES NFR BLD AUTO: 8 % (ref 4–12)
NEUTROPHILS # BLD AUTO: 4.72 THOUSANDS/ΜL (ref 1.85–7.62)
NEUTS SEG NFR BLD AUTO: 57 % (ref 43–75)
NRBC BLD AUTO-RTO: 0 /100 WBCS
PLATELET # BLD AUTO: 204 THOUSANDS/UL (ref 149–390)
PMV BLD AUTO: 9.8 FL (ref 8.9–12.7)
POTASSIUM SERPL-SCNC: 3.9 MMOL/L (ref 3.5–5.3)
PROT SERPL-MCNC: 7.2 G/DL (ref 6.4–8.2)
RBC # BLD AUTO: 4.43 MILLION/UL (ref 3.81–5.12)
SODIUM SERPL-SCNC: 139 MMOL/L (ref 136–145)
TROPONIN I SERPL-MCNC: <0.02 NG/ML
TSH SERPL DL<=0.05 MIU/L-ACNC: 7.42 UIU/ML (ref 0.36–3.74)
WBC # BLD AUTO: 8.15 THOUSAND/UL (ref 4.31–10.16)

## 2020-03-07 PROCEDURE — 93005 ELECTROCARDIOGRAM TRACING: CPT

## 2020-03-07 PROCEDURE — 99285 EMERGENCY DEPT VISIT HI MDM: CPT

## 2020-03-07 PROCEDURE — 85025 COMPLETE CBC W/AUTO DIFF WBC: CPT | Performed by: EMERGENCY MEDICINE

## 2020-03-07 PROCEDURE — 82330 ASSAY OF CALCIUM: CPT | Performed by: EMERGENCY MEDICINE

## 2020-03-07 PROCEDURE — 83735 ASSAY OF MAGNESIUM: CPT | Performed by: EMERGENCY MEDICINE

## 2020-03-07 PROCEDURE — 84439 ASSAY OF FREE THYROXINE: CPT | Performed by: EMERGENCY MEDICINE

## 2020-03-07 PROCEDURE — 96365 THER/PROPH/DIAG IV INF INIT: CPT

## 2020-03-07 PROCEDURE — 99285 EMERGENCY DEPT VISIT HI MDM: CPT | Performed by: EMERGENCY MEDICINE

## 2020-03-07 PROCEDURE — 84443 ASSAY THYROID STIM HORMONE: CPT | Performed by: EMERGENCY MEDICINE

## 2020-03-07 PROCEDURE — 84484 ASSAY OF TROPONIN QUANT: CPT | Performed by: EMERGENCY MEDICINE

## 2020-03-07 PROCEDURE — 36415 COLL VENOUS BLD VENIPUNCTURE: CPT | Performed by: EMERGENCY MEDICINE

## 2020-03-07 PROCEDURE — 80053 COMPREHEN METABOLIC PANEL: CPT | Performed by: EMERGENCY MEDICINE

## 2020-03-07 PROCEDURE — 71046 X-RAY EXAM CHEST 2 VIEWS: CPT

## 2020-03-07 RX ORDER — FERROUS SULFATE 325(65) MG
325 TABLET ORAL
COMMUNITY

## 2020-03-07 RX ORDER — CALCIUM GLUCONATE 20 MG/ML
2 INJECTION, SOLUTION INTRAVENOUS ONCE
Status: COMPLETED | OUTPATIENT
Start: 2020-03-07 | End: 2020-03-08

## 2020-03-07 RX ORDER — FENOFIBRATE 160 MG/1
160 TABLET ORAL DAILY
COMMUNITY

## 2020-03-07 RX ORDER — PREGABALIN 75 MG/1
75 CAPSULE ORAL 3 TIMES DAILY PRN
COMMUNITY

## 2020-03-07 RX ADMIN — CALCIUM GLUCONATE 2 G: 20 INJECTION, SOLUTION INTRAVENOUS at 23:51

## 2020-03-08 VITALS
SYSTOLIC BLOOD PRESSURE: 113 MMHG | RESPIRATION RATE: 18 BRPM | WEIGHT: 190 LBS | HEIGHT: 62 IN | HEART RATE: 74 BPM | TEMPERATURE: 97.5 F | DIASTOLIC BLOOD PRESSURE: 64 MMHG | OXYGEN SATURATION: 95 % | BODY MASS INDEX: 34.96 KG/M2

## 2020-03-08 LAB — T4 FREE SERPL-MCNC: 1.43 NG/DL (ref 0.76–1.46)

## 2020-03-08 NOTE — ED NOTES
Received pt on CM with SR noted, VSS MD at bedside to eval  Skin warm pink and dry resp easy, unlabored, pt reports feeling sob, breathing deeply  Speaking in full sentences, symptoms have been intermittent and ongoing "for months" but increased in severity last night  ekg was completed  Pt denies any episodes of chest pain but reports entire body numbness including trunk face extremities and tongue b/l  Denies headache or visual disturbances  Reports increased anxiety and panic attacks as of late despite taking xanax   Has been taking care of ill son recently           Sivakumar Montenegro, DELMER  03/07/20 7013

## 2020-03-08 NOTE — ED PROVIDER NOTES
History  Chief Complaint   Patient presents with    Shortness of Breath     patient c/o shortness of breath, and numb and tingly feeling, does admit to hyperventilating and panic attacks, did take a xanax earlier today, this episode started yesterday     HPI     50-year-old female presents emergency department for evaluation of numb, tingly feeling over her entire body over the past several days  Denies any chest pain  Does describe shortness of breath  Did improve somewhat with Xanax  Concerned about hypocalcemia, similar to previous episodes  Does have history of parathyroidectomy several years ago  Denies any nausea, vomiting  Describes chronic diarrhea  No aggravating or alleviating symptoms  Denies any focal weakness  Prior to Admission Medications   Prescriptions Last Dose Informant Patient Reported? Taking?    ALPRAZolam (XANAX) 1 mg tablet 3/7/2020 at Unknown time  Yes Yes   Sig: Take 1 mg by mouth daily am   ALPRAZolam (XANAX) 2 MG tablet 3/6/2020 at Unknown time Self Yes Yes   Sig: Take 2 mg by mouth daily at bedtime as needed    Cholecalciferol (VITAMIN D3) 1000 units CAPS 3/7/2020 at Unknown time  Yes Yes   Sig: Vitamin D-3 with Aloe   baclofen 10 mg tablet Past Week at Unknown time  Yes Yes   Sig: Take 10 mg by mouth 3 (three) times a day as needed     calcitriol (ROCALTROL) 0 25 mcg capsule 3/7/2020 at Unknown time  Yes Yes   Sig: Take 1 capsule by mouth daily   calcium carbonate-vitamin D (OSCAL-D) 500 mg-200 units per tablet 3/7/2020 at Unknown time  Yes Yes   Sig: Take 1 tablet by mouth 4 (four) times a day 9a 2p 7p 9p   fenofibrate (TRIGLIDE) 160 MG tablet 3/7/2020 at Unknown time  Yes Yes   Sig: Take 160 mg by mouth daily   ferrous sulfate 325 (65 Fe) mg tablet 3/7/2020 at Unknown time  Yes Yes   Sig: Take 325 mg by mouth daily with breakfast   levothyroxine (SYNTHROID) 150 mcg tablet 3/7/2020 at Unknown time  Yes Yes   Sig: Take 1 tablet by mouth daily   magnesium oxide (MAG-OX) 400 mg 3/7/2020 at Unknown time  Yes Yes   Sig: Take 400 mg by mouth 3 (three) times a day 9pm    ondansetron (ZOFRAN) 4 mg tablet Past Week at Unknown time  Yes Yes   Sig: Take 4 mg by mouth every 8 (eight) hours as needed for nausea or vomiting  oxyCODONE-acetaminophen (PERCOCET) 5-325 mg per tablet 3/7/2020 at Unknown time Self Yes Yes   Sig: Take 2 tablets by mouth every 6 (six) hours as needed     potassium chloride (K-DUR,KLOR-CON) 20 mEq tablet 3/7/2020 at Unknown time  Yes Yes   Sig: Take 20 mEq by mouth 2 (two) times a day Takes 2 tabs bid    pregabalin (LYRICA) 75 mg capsule 3/7/2020 at Unknown time  Yes Yes   Sig: Take 75 mg by mouth 2 (two) times a day      Facility-Administered Medications: None       Past Medical History:   Diagnosis Date    Acid reflux     Acute renal failure (HCC)     multiple episodes    Anemia     Anxiety     Chronic pain     DDD (degenerative disc disease), lumbar     Disease of thyroid gland     had surgery and now hypo    DVT (deep venous thrombosis) ([facility] [address] )     s/p ankle fracture    Hypercalcemia     Hyperlipidemia     Hyperthyroidism     Hypocalcemia     post op 2016    Migraine     Psychiatric disorder     anxiety depression    Seizures (HCC)     petit mal x1  4 years ago- all tests were neg   Spondylolisthesis of lumbar region     Treatment     spinal pain injections  last was 2016       Past Surgical History:   Procedure Laterality Date    BACK SURGERY       SECTION      x5    DISCOGRAM      PARATHYROIDECTOMY      [place] ANTERIOR COLPORRAPHY RPR CYSTOCELE W/CYSTO N/A 2017    Procedure: CYSTOCELE REPAIR;  Surgeon: Stevphen Ganser, MD;  Location: [address] [place] [address];  Service: Gynecology    [place] ARTHRODESIS POSTERIOR INTERBODY LUMBAR N/A 2016    Procedure: L4-S1 LUMBAR LAMINECTOMY/DECOMPRESSION;  INSTRUMENTED POSTEROLATERAL FUSION/ INTERBODY L5-S1; ALLOGRAFT AND AUTOGRAFT (IMPULSE) ;   Surgeon: Stevenson Finley MD;  Location:  MAIN OR; Service: Orthopedics    OK CYSTOURETHROSCOPY,URETER CATHETER Bilateral 12/7/2018    Procedure: INSERTION URETERAL CATHETERS PREOP;  Surgeon: Anirudh Allen MD;  Location: [address] [place] [address];  Service: Urology    OK SLING OPER STRES INCONTINENCE N/A 5/4/2017    Procedure: MID URETHRAL SLING;  Surgeon: Marilu Baltazar MD;  Location: [address] [place] [address];  Service: Urology    OK SUPRACERV ABD HYSTERECTOMY N/A 12/7/2018    Procedure: SUPRACERVICAL HYSTERECTOMY;  Surgeon: Mely Colon MD;  Location: [facility];  Service: Gynecology    THYROIDECTOMY      TONSILECTOMY AND ADNOIDECTOMY      TONSILLECTOMY      TUBAL LIGATION         Family History   Problem Relation Age of Onset    Diabetes Mother     Hypertension Mother     Hyperlipidemia Father     Arrhythmia Father     Lung cancer Father     Diabetes Father      I have reviewed and agree with the history as documented  E-Cigarette/Vaping     E-Cigarette/Vaping Substances     Social History     Tobacco Use    Smoking status: Current Every Day Smoker     Packs/day: 0 50     Years: 25 00     Pack years: 12 50     Types: Cigarettes    Smokeless tobacco: Never Used    Tobacco comment: patient states she quit but had a few cigarettes 7/23   Substance Use Topics    Alcohol use: No    Drug use: No       Review of Systems   Constitutional: Negative for activity change, appetite change, chills, diaphoresis, fatigue and fever  HENT: Negative for congestion, dental problem, ear discharge, facial swelling, nosebleeds, rhinorrhea, sinus pressure and trouble swallowing  Eyes: Negative for photophobia, discharge, itching and visual disturbance  Respiratory: Positive for shortness of breath  Negative for choking and chest tightness  Cardiovascular: Negative for chest pain, palpitations and leg swelling  Gastrointestinal: Negative for abdominal distention, abdominal pain, constipation, diarrhea, nausea and vomiting     Endocrine: Negative for polydipsia and polyphagia  Genitourinary: Negative for decreased urine volume, difficulty urinating, dysuria, flank pain, frequency, hematuria, vaginal bleeding and vaginal discharge  Musculoskeletal: Negative for back pain, gait problem, joint swelling, neck pain and neck stiffness  Skin: Negative for color change and rash  Neurological: Positive for numbness  Negative for dizziness, facial asymmetry, speech difficulty, weakness and light-headedness  Tingling   Psychiatric/Behavioral: Negative for agitation and behavioral problems  The patient is not nervous/anxious and is not hyperactive  All other systems reviewed and are negative  Physical Exam  Physical Exam   Constitutional: She is oriented to person, place, and time  She appears well-developed and well-nourished  No distress  HENT:   Head: Normocephalic and atraumatic  Eyes: Pupils are equal, round, and reactive to light  EOM are normal    Neck: Normal range of motion  Neck supple  Cardiovascular: Normal rate, regular rhythm and normal heart sounds  No murmur heard  Pulmonary/Chest: Effort normal and breath sounds normal  No respiratory distress  She has no wheezes  She has no rales  Abdominal: Soft  Bowel sounds are normal  She exhibits no distension  There is no tenderness  There is no rebound and no guarding  Musculoskeletal: Normal range of motion  She exhibits no edema or deformity  Lymphadenopathy:     She has no cervical adenopathy  Neurological: She is alert and oriented to person, place, and time  No cranial nerve deficit  She exhibits normal muscle tone  Coordination normal    Normal cranial nerve exam   Normal strength and sensation bilateral upper and lower extremities  Normal coordination, normal gait  Skin: Capillary refill takes less than 2 seconds  No rash noted  No erythema  Psychiatric: She has a normal mood and affect  Her behavior is normal    Nursing note and vitals reviewed        Vital Signs  ED Triage Vitals   Temperature Pulse Respirations Blood Pressure SpO2   03/07/20 2242 03/07/20 2242 03/07/20 2242 03/07/20 2242 03/07/20 2242   97 5 °F (36 4 °C) 88 20 146/97 99 %      Temp Source Heart Rate Source Patient Position - Orthostatic VS BP Location FiO2 (%)   03/07/20 2242 03/07/20 2302 03/07/20 2302 03/07/20 2302 --   Oral Monitor Sitting Right arm       Pain Score       03/07/20 2242       No Pain           Vitals:    03/07/20 2345 03/08/20 0000 03/08/20 0015 03/08/20 0045   BP:  106/60 113/66 113/64   Pulse: 76 74 74 74   Patient Position - Orthostatic VS:             Visual Acuity  Visual Acuity      Most Recent Value   L Pupil Size (mm)  4   R Pupil Size (mm)  4          ED Medications  Medications   calcium gluconate 2 g in sodium chloride 0 9% 100 mL (premix) (0 g Intravenous Stopped 3/8/20 0059)       Diagnostic Studies  Results Reviewed     Procedure Component Value Units Date/Time    TSH, 3rd generation with Free T4 reflex [[id number]]  (Abnormal) Collected:  03/07/20 2300    Lab Status:  Final result Specimen:  Blood from Arm, Left Updated:  03/07/20 2335     TSH 3RD GENERATON 7 417 uIU/mL     Narrative:       Patients undergoing fluorescein dye angiography may retain small amounts of fluorescein in the body for 48-72 hours post procedure  Samples containing fluorescein can produce falsely depressed TSH values  If the patient had this procedure,a specimen should be resubmitted post fluorescein clearance  T4, free [[id number]] Collected:  03/07/20 2300    Lab Status:   In process Specimen:  Blood from Arm, Left Updated:  03/07/20 2335    Comprehensive metabolic panel [[id number]]  (Abnormal) Collected:  03/07/20 2300    Lab Status:  Final result Specimen:  Blood from Arm, Left Updated:  03/07/20 2335     Sodium 139 mmol/L      Potassium 3 9 mmol/L      Chloride 101 mmol/L      CO2 27 mmol/L      ANION GAP 11 mmol/L      BUN 24 mg/dL      Creatinine 1 02 mg/dL      Glucose 132 mg/dL      Calcium 7 0 mg/dL      AST 13 U/L      ALT 39 U/L      Alkaline Phosphatase 67 U/L      Total Protein 7 2 g/dL      Albumin 3 7 g/dL      Total Bilirubin 0 30 mg/dL      eGFR 68 ml/min/1 73sq m     Narrative:       [place] guidelines for Chronic Kidney Disease (CKD):     Stage 1 with normal or high GFR (GFR > 90 mL/min/1 73 square meters)    Stage 2 Mild CKD (GFR = 60-89 mL/min/1 73 square meters)    Stage 3A Moderate CKD (GFR = 45-59 mL/min/1 73 square meters)    Stage 3B Moderate CKD (GFR = 30-44 mL/min/1 73 square meters)    Stage 4 Severe CKD (GFR = 15-29 mL/min/1 73 square meters)    Stage 5 End Stage CKD (GFR <15 mL/min/1 73 square meters)  Note: GFR calculation is accurate only with a steady state creatinine    Magnesium [[id number]]  (Normal) Collected:  03/07/20 2300    Lab Status:  Final result Specimen:  Blood from Arm, Left Updated:  03/07/20 2335     Magnesium 1 8 mg/dL     Troponin I [[id number]]  (Normal) Collected:  03/07/20 2300    Lab Status:  Final result Specimen:  Blood from Arm, Left Updated:  03/07/20 2330     Troponin I <0 02 ng/mL     Calcium, ionized [[id number]]  (Abnormal) Collected:  03/07/20 2300    Lab Status:  Final result Specimen:  Blood from Arm, Left Updated:  03/07/20 2311     Calcium, Ionized 0 88 mmol/L     CBC and differential [[id number]]  (Abnormal) Collected:  03/07/20 2300    Lab Status:  Final result Specimen:  Blood from Arm, Left Updated:  03/07/20 2310     WBC 8 15 Thousand/uL      RBC 4 43 Million/uL      Hemoglobin 13 1 g/dL      Hematocrit 40 9 %      MCV 92 fL      MCH 29 6 pg      MCHC 32 0 g/dL      RDW 15 9 %      MPV 9 8 fL      Platelets 456 Thousands/uL      nRBC 0 /100 WBCs      Neutrophils Relative 57 %      Immat GRANS % 1 %      Lymphocytes Relative 32 %      Monocytes Relative 8 %      Eosinophils Relative 1 %      Basophils Relative 1 %      Neutrophils Absolute 4 72 Thousands/µL      Immature Grans Absolute 0 05 Thousand/uL Lymphocytes Absolute 2 57 Thousands/µL      Monocytes Absolute 0 66 Thousand/µL      Eosinophils Absolute 0 09 Thousand/µL      Basophils Absolute 0 06 Thousands/µL                  XR chest 2 views   ED Interpretation by Gray Gutierrez MD (03/07 2313)   NAD  Procedures  ECG 12 Lead Documentation Only  Date/Time: 3/8/2020 12:00 AM  Performed by: Gray Gutierrez MD  Authorized by: Gray Gutierrez MD     Indications / Diagnosis:  SOB  ECG reviewed by me, the ED Provider: yes    Rate:     ECG rate:  89    ECG rate assessment: normal    Rhythm:     Rhythm: sinus rhythm    Ectopy:     Ectopy: none    QRS:     QRS axis:  Normal    QRS intervals:  Normal  Conduction:     Conduction: normal    ST segments:     ST segments:  Normal  T waves:     T waves: normal    Comments:      QTc 464             ED Course         HEART Risk Score      Most Recent Value   Heart Score Risk Calculator   History  0 Filed at: 03/08/2020 0112   ECG  0 Filed at: 03/08/2020 0112   Age  0 Filed at: 03/08/2020 0112   Risk Factors  1 Filed at: 03/08/2020 0112   Troponin  0 Filed at: 03/08/2020 0112   HEART Score  1 Filed at: 03/08/2020 0112                            MDM  Number of Diagnoses or Management Options  Hypocalcemia: new and requires workup  Diagnosis management comments: Labs consistent with hypocalcemia  Patient symptomatic   2 g IV calcium given to patient  Strongly recommended admission for continued calcium, monitoring  EKG with no prolonged QTC  Does not wish to be admitted to the hospital at this time  Had extensive discussion with patient regarding return precautions for palpitations, worsening symptoms  She feels better after ER treatment and thus wishes to be discharged home  She understands risks of discharge  She will follow-up with her endocrinologist this week or return to ER with any new or concerning symptoms  Patient ambulatory, no acute distress at time of discharge    No chest pain, heart score 1, no 2nd troponin indicated  Amount and/or Complexity of Data Reviewed  Clinical lab tests: ordered and reviewed  Tests in the radiology section of CPT®: ordered and reviewed  Tests in the medicine section of CPT®: ordered and reviewed  Independent visualization of images, tracings, or specimens: yes    Risk of Complications, Morbidity, and/or Mortality  Presenting problems: high  Diagnostic procedures: moderate  Management options: high    Patient Progress  Patient progress: improved        Disposition  Final diagnoses:   Hypocalcemia     Time reflects when diagnosis was documented in both MDM as applicable and the Disposition within this note     Time User Action Codes Description Comment    3/8/2020  1:04 AM Carlos Shepherd Add [E83 51] Hypocalcemia       ED Disposition     ED Disposition Condition Date/Time Comment    Discharge Stable Sun Mar 8, 2020  1:04 AM [facility] discharge to home/self care  Follow-up Information     Follow up With Specialties Details Why Contact Info Additional Information    Fermin Steele MD Internal Medicine Schedule an appointment as soon as possible for a visit in 1 week  [facility]  [phone]       Your endocrinoligist   Call in 1 day Further instruction regarding low calcium level  [facility] Emergency Department Emergency Medicine Go to  If symptoms worsen (numbness, tingling, heart palpitations  ) [facility]  [phone] [facility] ED, [facility], [address]          Patient's Medications   Discharge Prescriptions    No medications on file     No discharge procedures on file      PDMP Review     None          ED Provider  Electronically Signed by           Qiana Solano MD  03/08/20 1100

## 2020-03-08 NOTE — ED NOTES
Pt reports she does not want to stay in hospital requesting discharge home, MD made aware      Angel Chang RN  03/08/20 9974

## 2020-03-08 NOTE — DISCHARGE INSTRUCTIONS
Your calcium level was 7 0  Your ionized calcium level was 0 88  You were given 2g of calcium gluconate in the ER

## 2020-03-09 LAB
ATRIAL RATE: 89 BPM
P AXIS: 49 DEGREES
PR INTERVAL: 152 MS
QRS AXIS: 36 DEGREES
QRSD INTERVAL: 80 MS
QT INTERVAL: 382 MS
QTC INTERVAL: 464 MS
T WAVE AXIS: 53 DEGREES
VENTRICULAR RATE: 89 BPM

## 2020-03-09 PROCEDURE — 93010 ELECTROCARDIOGRAM REPORT: CPT | Performed by: INTERNAL MEDICINE

## 2020-04-20 ENCOUNTER — TELEMEDICINE (OUTPATIENT)
Dept: FAMILY MEDICINE CLINIC | Facility: CLINIC | Age: 45
End: 2020-04-20
Payer: COMMERCIAL

## 2020-04-20 DIAGNOSIS — Z20.828 EXPOSURE TO SARS-ASSOCIATED CORONAVIRUS: Primary | ICD-10-CM

## 2020-04-20 PROCEDURE — 99213 OFFICE O/P EST LOW 20 MIN: CPT | Performed by: FAMILY MEDICINE

## 2020-04-21 DIAGNOSIS — Z20.828 EXPOSURE TO SARS-ASSOCIATED CORONAVIRUS: ICD-10-CM

## 2020-04-27 LAB — CORONAVIRUS 2019-NCOV: NOT DETECTED

## 2020-04-28 ENCOUNTER — TELEPHONE (OUTPATIENT)
Dept: FAMILY MEDICINE CLINIC | Facility: CLINIC | Age: 45
End: 2020-04-28

## 2020-06-02 ENCOUNTER — APPOINTMENT (EMERGENCY)
Dept: RADIOLOGY | Facility: HOSPITAL | Age: 45
End: 2020-06-02
Payer: COMMERCIAL

## 2020-06-02 ENCOUNTER — HOSPITAL ENCOUNTER (EMERGENCY)
Facility: HOSPITAL | Age: 45
Discharge: HOME | End: 2020-06-02
Attending: EMERGENCY MEDICINE | Admitting: EMERGENCY MEDICINE
Payer: COMMERCIAL

## 2020-06-02 VITALS
BODY MASS INDEX: 34.88 KG/M2 | DIASTOLIC BLOOD PRESSURE: 80 MMHG | TEMPERATURE: 99.3 F | SYSTOLIC BLOOD PRESSURE: 131 MMHG | OXYGEN SATURATION: 95 % | RESPIRATION RATE: 20 BRPM | WEIGHT: 190.7 LBS | HEART RATE: 115 BPM

## 2020-06-02 DIAGNOSIS — M54.9 MUSCULOSKELETAL BACK PAIN: Primary | ICD-10-CM

## 2020-06-02 PROCEDURE — 72072 X-RAY EXAM THORAC SPINE 3VWS: CPT

## 2020-06-02 PROCEDURE — 96372 THER/PROPH/DIAG INJ SC/IM: CPT

## 2020-06-02 PROCEDURE — 99284 EMERGENCY DEPT VISIT MOD MDM: CPT

## 2020-06-02 PROCEDURE — 99284 EMERGENCY DEPT VISIT MOD MDM: CPT | Performed by: EMERGENCY MEDICINE

## 2020-06-02 PROCEDURE — 72100 X-RAY EXAM L-S SPINE 2/3 VWS: CPT

## 2020-06-02 RX ORDER — KETOROLAC TROMETHAMINE 30 MG/ML
30 INJECTION, SOLUTION INTRAMUSCULAR; INTRAVENOUS ONCE
Status: COMPLETED | OUTPATIENT
Start: 2020-06-02 | End: 2020-06-02

## 2020-06-02 RX ORDER — IBUPROFEN 600 MG/1
600 TABLET ORAL EVERY 6 HOURS PRN
Qty: 16 TABLET | Refills: 0 | Status: SHIPPED | OUTPATIENT
Start: 2020-06-02

## 2020-06-02 RX ADMIN — KETOROLAC TROMETHAMINE 30 MG: 30 INJECTION, SOLUTION INTRAMUSCULAR at 20:35

## 2020-08-03 ENCOUNTER — TELEPHONE (OUTPATIENT)
Dept: NEUROLOGY | Facility: CLINIC | Age: 45
End: 2020-08-03

## 2020-08-07 ENCOUNTER — TRANSCRIBE ORDERS (OUTPATIENT)
Dept: NEUROLOGY | Facility: CLINIC | Age: 45
End: 2020-08-07

## 2020-08-07 ENCOUNTER — TELEPHONE (OUTPATIENT)
Dept: NEUROLOGY | Facility: CLINIC | Age: 45
End: 2020-08-07

## 2020-08-07 DIAGNOSIS — M54.2 CERVICALGIA: Primary | ICD-10-CM

## 2020-08-11 ENCOUNTER — HOSPITAL ENCOUNTER (EMERGENCY)
Facility: HOSPITAL | Age: 45
Discharge: HOME | End: 2020-08-11
Attending: EMERGENCY MEDICINE | Admitting: EMERGENCY MEDICINE
Payer: COMMERCIAL

## 2020-08-11 VITALS
TEMPERATURE: 96.8 F | RESPIRATION RATE: 20 BRPM | HEART RATE: 62 BPM | OXYGEN SATURATION: 97 % | SYSTOLIC BLOOD PRESSURE: 101 MMHG | DIASTOLIC BLOOD PRESSURE: 57 MMHG

## 2020-08-11 DIAGNOSIS — E83.51 LOW CALCIUM LEVELS: ICD-10-CM

## 2020-08-11 DIAGNOSIS — T67.5XXA HEAT EXHAUSTION, INITIAL ENCOUNTER: Primary | ICD-10-CM

## 2020-08-11 LAB
ANION GAP SERPL CALCULATED.3IONS-SCNC: 12 MMOL/L (ref 4–13)
BASOPHILS # BLD AUTO: 0.05 THOUSANDS/ΜL (ref 0–0.1)
BASOPHILS NFR BLD AUTO: 1 % (ref 0–1)
BILIRUB UR QL STRIP: NEGATIVE
BUN SERPL-MCNC: 16 MG/DL (ref 5–25)
CALCIUM SERPL-MCNC: 7.1 MG/DL (ref 8.3–10.1)
CHLORIDE SERPL-SCNC: 100 MMOL/L (ref 100–108)
CLARITY UR: NORMAL
CO2 SERPL-SCNC: 25 MMOL/L (ref 21–32)
COLOR UR: NORMAL
CREAT SERPL-MCNC: 0.96 MG/DL (ref 0.6–1.3)
EOSINOPHIL # BLD AUTO: 0.09 THOUSAND/ΜL (ref 0–0.61)
EOSINOPHIL NFR BLD AUTO: 2 % (ref 0–6)
ERYTHROCYTE [DISTWIDTH] IN BLOOD BY AUTOMATED COUNT: 16.1 % (ref 11.6–15.1)
GFR SERPL CREATININE-BSD FRML MDRD: 72 ML/MIN/1.73SQ M
GLUCOSE SERPL-MCNC: 143 MG/DL (ref 65–140)
GLUCOSE UR STRIP-MCNC: NEGATIVE MG/DL
HCT VFR BLD AUTO: 37.7 % (ref 34.8–46.1)
HGB BLD-MCNC: 12.6 G/DL (ref 11.5–15.4)
HGB UR QL STRIP.AUTO: NEGATIVE
IMM GRANULOCYTES # BLD AUTO: 0.06 THOUSAND/UL (ref 0–0.2)
IMM GRANULOCYTES NFR BLD AUTO: 1 % (ref 0–2)
KETONES UR STRIP-MCNC: NEGATIVE MG/DL
LEUKOCYTE ESTERASE UR QL STRIP: NEGATIVE
LYMPHOCYTES # BLD AUTO: 1.86 THOUSANDS/ΜL (ref 0.6–4.47)
LYMPHOCYTES NFR BLD AUTO: 33 % (ref 14–44)
MAGNESIUM SERPL-MCNC: 1.8 MG/DL (ref 1.6–2.6)
MCH RBC QN AUTO: 30 PG (ref 26.8–34.3)
MCHC RBC AUTO-ENTMCNC: 33.4 G/DL (ref 31.4–37.4)
MCV RBC AUTO: 90 FL (ref 82–98)
MONOCYTES # BLD AUTO: 0.47 THOUSAND/ΜL (ref 0.17–1.22)
MONOCYTES NFR BLD AUTO: 8 % (ref 4–12)
NEUTROPHILS # BLD AUTO: 3.15 THOUSANDS/ΜL (ref 1.85–7.62)
NEUTS SEG NFR BLD AUTO: 55 % (ref 43–75)
NITRITE UR QL STRIP: NEGATIVE
NRBC BLD AUTO-RTO: 0 /100 WBCS
PH UR STRIP.AUTO: 7.5 [PH]
PHOSPHATE SERPL-MCNC: 3.7 MG/DL (ref 2.7–4.5)
PLATELET # BLD AUTO: 178 THOUSANDS/UL (ref 149–390)
PMV BLD AUTO: 10.6 FL (ref 8.9–12.7)
POTASSIUM SERPL-SCNC: 3.6 MMOL/L (ref 3.5–5.3)
PROT UR STRIP-MCNC: NEGATIVE MG/DL
RBC # BLD AUTO: 4.2 MILLION/UL (ref 3.81–5.12)
SODIUM SERPL-SCNC: 137 MMOL/L (ref 136–145)
SP GR UR STRIP.AUTO: 1.01 (ref 1–1.03)
UROBILINOGEN UR QL STRIP.AUTO: 0.2 E.U./DL
WBC # BLD AUTO: 5.68 THOUSAND/UL (ref 4.31–10.16)

## 2020-08-11 PROCEDURE — 84100 ASSAY OF PHOSPHORUS: CPT | Performed by: EMERGENCY MEDICINE

## 2020-08-11 PROCEDURE — 81003 URINALYSIS AUTO W/O SCOPE: CPT | Performed by: EMERGENCY MEDICINE

## 2020-08-11 PROCEDURE — 83735 ASSAY OF MAGNESIUM: CPT | Performed by: EMERGENCY MEDICINE

## 2020-08-11 PROCEDURE — 99284 EMERGENCY DEPT VISIT MOD MDM: CPT

## 2020-08-11 PROCEDURE — 99282 EMERGENCY DEPT VISIT SF MDM: CPT | Performed by: EMERGENCY MEDICINE

## 2020-08-11 PROCEDURE — 36415 COLL VENOUS BLD VENIPUNCTURE: CPT | Performed by: EMERGENCY MEDICINE

## 2020-08-11 PROCEDURE — 96361 HYDRATE IV INFUSION ADD-ON: CPT

## 2020-08-11 PROCEDURE — 96365 THER/PROPH/DIAG IV INF INIT: CPT

## 2020-08-11 PROCEDURE — 80048 BASIC METABOLIC PNL TOTAL CA: CPT | Performed by: EMERGENCY MEDICINE

## 2020-08-11 PROCEDURE — 85025 COMPLETE CBC W/AUTO DIFF WBC: CPT | Performed by: EMERGENCY MEDICINE

## 2020-08-11 RX ORDER — CALCIUM GLUCONATE 20 MG/ML
1 INJECTION, SOLUTION INTRAVENOUS ONCE
Status: COMPLETED | OUTPATIENT
Start: 2020-08-11 | End: 2020-08-11

## 2020-08-11 RX ADMIN — CALCIUM GLUCONATE 1 G: 20 INJECTION, SOLUTION INTRAVENOUS at 18:59

## 2020-08-11 RX ADMIN — SODIUM CHLORIDE 1000 ML: 0.9 INJECTION, SOLUTION INTRAVENOUS at 17:30

## 2020-08-11 RX ADMIN — MAGNESIUM OXIDE TAB 400 MG (241.3 MG ELEMENTAL MG) 400 MG: 400 (241.3 MG) TAB at 18:58

## 2020-08-11 NOTE — ED PROVIDER NOTES
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  Patient then started to feel some tingling sensation and shortness of breath  Patient was not sure whether this was her anxiety versus she was having heat exhaustion  Patient felt mildly lightheaded  Patient then called EMS and was brought to the ED for further evaluation  By the time patient arrived to the ED she started to feel better under color temperature  Patient would like to have her electrolytes especially magnesium, potassium, calcium checked make sure she is not having any electrolyte imbalances that are causing her symptoms today  Patient states that when she gets extremely anxious she also feels tingling sensation  Patient is followed by PCP for her anxiety  Patient is currently on Xanax to control her anxiety  History provided by:  Patient      Prior to Admission Medications   Prescriptions Last Dose Informant Patient Reported? Taking?    ALPRAZolam (XANAX) 1 mg tablet   Yes No   Sig: Take 1 mg by mouth daily am   ALPRAZolam (XANAX) 2 MG tablet  Self Yes No   Sig: Take 2 mg by mouth daily at bedtime as needed    Cholecalciferol (VITAMIN D3) 1000 units CAPS   Yes No   Sig: Vitamin D-3 with Aloe   baclofen 10 mg tablet   Yes No   Sig: Take 10 mg by mouth 3 (three) times a day as needed     calcitriol (ROCALTROL) 0 25 mcg capsule   Yes No   Sig: Take 1 capsule by mouth daily   calcium carbonate-vitamin D (OSCAL-D) 500 mg-200 units per tablet   Yes No   Sig: Take 1 tablet by mouth 4 (four) times a day 9a 2p 7p 9p   fenofibrate (TRIGLIDE) 160 MG tablet   Yes No   Sig: Take 160 mg by mouth daily   ferrous sulfate 325 (65 Fe) mg tablet   Yes No   Sig: Take 325 mg by mouth daily with breakfast   ibuprofen (MOTRIN) 600 mg tablet   No No   Sig: Take 1 tablet (600 mg total) by mouth every 6 (six) hours as needed for mild pain or moderate pain   levothyroxine (SYNTHROID) 150 mcg tablet   Yes No   Sig: Take 1 tablet by mouth daily   magnesium oxide (MAG-OX) 400 mg   Yes No   Sig: Take 400 mg by mouth 3 (three) times a day 9pm    ondansetron (ZOFRAN) 4 mg tablet   Yes No   Sig: Take 4 mg by mouth every 8 (eight) hours as needed for nausea or vomiting  oxyCODONE-acetaminophen (PERCOCET) 5-325 mg per tablet  Self Yes No   Sig: Take 2 tablets by mouth every 6 (six) hours as needed     potassium chloride (K-DUR,KLOR-CON) 20 mEq tablet   Yes No   Sig: Take 20 mEq by mouth 2 (two) times a day Takes 2 tabs bid    pregabalin (LYRICA) 75 mg capsule   Yes No   Sig: Take 75 mg by mouth 2 (two) times a day      Facility-Administered Medications: None       Past Medical History:   Diagnosis Date    Acid reflux     Acute renal failure (HCC)     multiple episodes    Anemia     Anxiety     Chronic pain     DDD (degenerative disc disease), lumbar     Disease of thyroid gland     had surgery and now hypo    DVT (deep venous thrombosis) ([facility] [address] )     s/p ankle fracture    Hypercalcemia     Hyperlipidemia     Hyperthyroidism     Hypocalcemia     post op 2016    Migraine     Psychiatric disorder     anxiety depression    Seizures (HCC)     petit mal x1  4 years ago- all tests were neg      Spondylolisthesis of lumbar region     Treatment     spinal pain injections  last was 2016       Past Surgical History:   Procedure Laterality Date    BACK SURGERY       SECTION      x5    CYSTOCELE REPAIR 05/04/2017    DISCOGRAM      PARATHYROIDECTOMY      SD ANTERIOR COLPORRAPHY RPR CYSTOCELE W/CYSTO N/A 5/4/2017    Procedure: CYSTOCELE REPAIR;  Surgeon: Allan Whittaker MD;  Location: [address] [place] [address];  Service: Gynecology    SD ARTHRODESIS POSTERIOR INTERBODY LUMBAR N/A 8/12/2016    Procedure: L4-S1 LUMBAR LAMINECTOMY/DECOMPRESSION;  INSTRUMENTED POSTEROLATERAL FUSION/ INTERBODY L5-S1; ALLOGRAFT AND AUTOGRAFT (IMPULSE) ; Surgeon: Jil Landeros MD;  Location:  MAIN OR;  Service: Orthopedics    SD CYSTOURETHROSCOPY,URETER CATHETER Bilateral 12/7/2018    Procedure: INSERTION URETERAL CATHETERS PREOP;  Surgeon: Brenda Lancaster MD;  Location: [address] [place] [address];  Service: Urology    SD SLING OPER STRES INCONTINENCE N/A 5/4/2017    Procedure: MID URETHRAL SLING;  Surgeon: Tony Hall MD;  Location: [address] [place] [address];  Service: Urology    SD SUPRACERV ABD HYSTERECTOMY N/A 12/7/2018    Procedure: SUPRACERVICAL HYSTERECTOMY;  Surgeon: Allan Whittaker MD;  Location: [place] [facility];  Service: Gynecology    THYROIDECTOMY      TONSILECTOMY AND ADNOIDECTOMY      TONSILLECTOMY      TUBAL LIGATION         Family History   Problem Relation Age of Onset    Diabetes Mother     Hypertension Mother     Hyperlipidemia Father     Arrhythmia Father     Lung cancer Father     Diabetes Father      I have reviewed and agree with the history as documented  E-Cigarette/Vaping    E-Cigarette Use Never User      E-Cigarette/Vaping Substances     Social History     Tobacco Use    Smoking status: Current Every Day Smoker     Packs/day: 0 50     Years: 25 00     Pack years: 12 50     Types: Cigarettes    Smokeless tobacco: Never Used    Tobacco comment: patient states she quit but had a few cigarettes 7/23   Substance Use Topics    Alcohol use: No     Comment: Alcohol intake:   Occasional  - As per Umm Pennington Drug use: No       Review of Systems   Constitutional: Negative for activity change, appetite change, chills and fever  HENT: Negative for congestion and ear pain  Eyes: Negative for pain and discharge  Respiratory: Negative for cough, chest tightness, shortness of breath, wheezing and stridor  Cardiovascular: Negative for chest pain and palpitations  Gastrointestinal: Negative for abdominal distention, abdominal pain, constipation, diarrhea and nausea  Endocrine: Negative for cold intolerance  Genitourinary: Negative for dysuria, frequency and urgency  Musculoskeletal: Negative for arthralgias and back pain  Skin: Negative for color change and rash  Allergic/Immunologic: Negative for environmental allergies and food allergies  Neurological: Positive for light-headedness  Negative for dizziness, weakness, numbness and headaches  Tingling sensation   Hematological: Negative for adenopathy  Psychiatric/Behavioral: Negative for agitation, behavioral problems and confusion  The patient is not nervous/anxious  All other systems reviewed and are negative  Physical Exam  Physical Exam  Vitals signs and nursing note reviewed  Constitutional:       Appearance: She is well-developed  HENT:      Head: Normocephalic and atraumatic  Eyes:      Conjunctiva/sclera: Conjunctivae normal    Neck:      Musculoskeletal: Normal range of motion and neck supple  Cardiovascular:      Rate and Rhythm: Normal rate and regular rhythm  Heart sounds: Normal heart sounds  Pulmonary:      Effort: Pulmonary effort is normal       Breath sounds: Normal breath sounds  Abdominal:      General: Bowel sounds are normal  There is no distension  Palpations: Abdomen is soft  Tenderness: There is no abdominal tenderness  Musculoskeletal: Normal range of motion  Skin:     General: Skin is warm and dry  Neurological:      Mental Status: She is alert and oriented to person, place, and time  Psychiatric:         Behavior: Behavior normal          Thought Content:  Thought content normal  Judgment: Judgment normal          Vital Signs  ED Triage Vitals   Temperature Pulse Respirations Blood Pressure SpO2   08/11/20 1720 08/11/20 1708 08/11/20 1708 08/11/20 1708 08/11/20 1708   (!) 96 8 °F (36 °C) 87 20 128/66 97 %      Temp Source Heart Rate Source Patient Position - Orthostatic VS BP Location FiO2 (%)   08/11/20 1720 08/11/20 1708 08/11/20 1708 08/11/20 1708 --   Tympanic Monitor Lying Right arm       Pain Score       08/11/20 1708       No Pain           Vitals:    08/11/20 1715 08/11/20 1730 08/11/20 1830 08/11/20 1932   BP: 128/66 108/74 102/66 101/57   Pulse: 88 86 80 62   Patient Position - Orthostatic VS:  Lying Lying Lying         Visual Acuity  Visual Acuity      Most Recent Value   L Pupil Size (mm)  4   R Pupil Size (mm)  4          ED Medications  Medications   sodium chloride 0 9 % bolus 1,000 mL (0 mL Intravenous Stopped 8/11/20 1837)   calcium gluconate 1 g in sodium chloride 0 9% 50 mL (premix) (0 g Intravenous Stopped 8/11/20 1929)   magnesium oxide (MAG-OX) tablet 400 mg (400 mg Oral Given 8/11/20 1858)       Diagnostic Studies  Results Reviewed     Procedure Component Value Units Date/Time    UA w Reflex to Microscopic w Reflex to Culture [[id number]] Collected:  08/11/20 1806    Lab Status:  Final result Specimen:  Urine, Clean Catch Updated:  08/11/20 1816     Color, UA Light Yellow     Clarity, UA Slightly Cloudy     Specific Gravity, UA 1 015     pH, UA 7 5     Leukocytes, UA Negative     Nitrite, UA Negative     Protein, UA Negative mg/dl      Glucose, UA Negative mg/dl      Ketones, UA Negative mg/dl      Urobilinogen, UA 0 2 E U /dl      Bilirubin, UA Negative     Blood, UA Negative    Basic metabolic panel [[id number]]  (Abnormal) Collected:  08/11/20 1730    Lab Status:  Final result Specimen:  Blood from Arm, Left Updated:  08/11/20 1749     Sodium 137 mmol/L      Potassium 3 6 mmol/L      Chloride 100 mmol/L      CO2 25 mmol/L      ANION GAP 12 mmol/L      BUN 16 mg/dL Creatinine 0 96 mg/dL      Glucose 143 mg/dL      Calcium 7 1 mg/dL      eGFR 72 ml/min/1 73sq m     Narrative:       National Kidney Disease Foundation guidelines for Chronic Kidney Disease (CKD):     Stage 1 with normal or high GFR (GFR > 90 mL/min/1 73 square meters)    Stage 2 Mild CKD (GFR = 60-89 mL/min/1 73 square meters)    Stage 3A Moderate CKD (GFR = 45-59 mL/min/1 73 square meters)    Stage 3B Moderate CKD (GFR = 30-44 mL/min/1 73 square meters)    Stage 4 Severe CKD (GFR = 15-29 mL/min/1 73 square meters)    Stage 5 End Stage CKD (GFR <15 mL/min/1 73 square meters)  Note: GFR calculation is accurate only with a steady state creatinine    Phosphorus [[id number]]  (Normal) Collected:  08/11/20 1730    Lab Status:  Final result Specimen:  Blood from Arm, Left Updated:  08/11/20 1749     Phosphorus 3 7 mg/dL     Magnesium [[id number]]  (Normal) Collected:  08/11/20 1730    Lab Status:  Final result Specimen:  Blood from Arm, Left Updated:  08/11/20 1749     Magnesium 1 8 mg/dL     CBC and differential [[id number]]  (Abnormal) Collected:  08/11/20 1730    Lab Status:  Final result Specimen:  Blood from Arm, Left Updated:  08/11/20 1735     WBC 5 68 Thousand/uL      RBC 4 20 Million/uL      Hemoglobin 12 6 g/dL      Hematocrit 37 7 %      MCV 90 fL      MCH 30 0 pg      MCHC 33 4 g/dL      RDW 16 1 %      MPV 10 6 fL      Platelets 161 Thousands/uL      nRBC 0 /100 WBCs      Neutrophils Relative 55 %      Immat GRANS % 1 %      Lymphocytes Relative 33 %      Monocytes Relative 8 %      Eosinophils Relative 2 %      Basophils Relative 1 %      Neutrophils Absolute 3 15 Thousands/µL      Immature Grans Absolute 0 06 Thousand/uL      Lymphocytes Absolute 1 86 Thousands/µL      Monocytes Absolute 0 47 Thousand/µL      Eosinophils Absolute 0 09 Thousand/µL      Basophils Absolute 0 05 Thousands/µL                  No orders to display              Procedures  Procedures         ED Course       US AUDIT Most Recent Value   Initial Alcohol Screen: US AUDIT-C    1  How often do you have a drink containing alcohol? 3 Filed at: 08/11/2020 1706   2  How many drinks containing alcohol do you have on a typical day you are drinking? 1 Filed at: 08/11/2020 1706   3b  FEMALE Any Age, or MALE 65+: How often do you have 4 or more drinks on one occassion? 0 Filed at: 08/11/2020 1706   Audit-C Score  4 Filed at: 08/11/2020 1706                  COLETTE/DAST-10      Most Recent Value   How many times in the past year have you    Used an illegal drug or used a prescription medication for non-medical reasons? Never Filed at: 08/11/2020 1707                                MDM  Number of Diagnoses or Management Options  Heat exhaustion, initial encounter: new and requires workup  Low calcium levels: new and requires workup  Diagnosis management comments: Obtain blood work, UA  Give IV fluids and reassess       Amount and/or Complexity of Data Reviewed  Clinical lab tests: ordered and reviewed  Tests in the medicine section of CPT®: ordered and reviewed  Review and summarize past medical records: yes  Independent visualization of images, tracings, or specimens: yes    Risk of Complications, Morbidity, and/or Mortality  General comments: Patient's calcium and magnesium level was mildly low in the ED  Calcium and magnesium was repleted  Patient also takes over-the-counter calcium and magnesium supplements at home  Patient felt much better in the cooler temperature in the ED along with IV fluids  At this time symptoms are most likely secondary to heat exhaustion  At this time patient is discharged home on continuation of her home medications and follow-up to PCP  Close return instructions given to return to the ER for any worsening symptoms  Patient agrees with discharge plan  Patient well appearing at time of discharge        Patient Progress  Patient progress: improved        Disposition  Final diagnoses:   Heat exhaustion, initial encounter   Low calcium levels     Time reflects when diagnosis was documented in both MDM as applicable and the Disposition within this note     Time User Action Codes Description Comment    8/11/2020  7:23 PM Louis Signs Add Teresa Biggs  5XXA] Heat exhaustion, initial encounter     8/11/2020  7:23 PM Louis Signs Add [E83 51] Low calcium levels       ED Disposition     ED Disposition Condition Date/Time Comment    Discharge Stable Tue Aug 11, 2020  7:23 PM [facility] discharge to home/self care              Follow-up Information     Follow up With Specialties Details Why Contact Info    Andrei Haque MD Internal Medicine In 2 days  [place]  [phone]            Discharge Medication List as of 8/11/2020  7:29 PM      CONTINUE these medications which have NOT CHANGED    Details   !! ALPRAZolam (XANAX) 1 mg tablet Take 1 mg by mouth daily am, Historical Med      !! ALPRAZolam (XANAX) 2 MG tablet Take 2 mg by mouth daily at bedtime as needed , Starting Fri 12/7/2018, Historical Med      baclofen 10 mg tablet Take 10 mg by mouth 3 (three) times a day as needed  , Historical Med      calcitriol (ROCALTROL) 0 25 mcg capsule Take 1 capsule by mouth daily, Historical Med      calcium carbonate-vitamin D (OSCAL-D) 500 mg-200 units per tablet Take 1 tablet by mouth 4 (four) times a day 9a 2p 7p 9p, Historical Med      Cholecalciferol (VITAMIN D3) 1000 units CAPS Vitamin D-3 with Aloe, Historical Med      fenofibrate (TRIGLIDE) 160 MG tablet Take 160 mg by mouth daily, Historical Med      ferrous sulfate 325 (65 Fe) mg tablet Take 325 mg by mouth daily with breakfast, Historical Med      ibuprofen (MOTRIN) 600 mg tablet Take 1 tablet (600 mg total) by mouth every 6 (six) hours as needed for mild pain or moderate pain, Starting Tue 6/2/2020, Normal      levothyroxine (SYNTHROID) 150 mcg tablet Take 1 tablet by mouth daily, Historical Med      magnesium oxide (MAG-OX) 400 mg Take 400 mg by mouth 3 (three) times a day 9pm , Historical Med      ondansetron (ZOFRAN) 4 mg tablet Take 4 mg by mouth every 8 (eight) hours as needed for nausea or vomiting , Until Discontinued, Historical Med      oxyCODONE-acetaminophen (PERCOCET) 5-325 mg per tablet Take 2 tablets by mouth every 6 (six) hours as needed  , Historical Med      potassium chloride (K-DUR,KLOR-CON) 20 mEq tablet Take 20 mEq by mouth 2 (two) times a day Takes 2 tabs bid , Until Discontinued, Historical Med      pregabalin (LYRICA) 75 mg capsule Take 75 mg by mouth 2 (two) times a day, Historical Med       !! - Potential duplicate medications found  Please discuss with provider  No discharge procedures on file      PDMP Review     None          ED Provider  Electronically Signed by           Josiah Fraser DO  08/11/20 2014

## 2020-08-24 NOTE — TELEPHONE ENCOUNTER
1st attempt new order lm schedule consult   [place] asia/Spondylosis w/o Myelopathy or radiculopathy,[facility]    Secondary insurance only accepted in [address] office

## 2020-08-25 NOTE — TELEPHONE ENCOUNTER
2nd attempt new order lm schedule consult   [place] asia/Spondylosis w/o Myelopathy or radiculopathy,[email or web address]     Secondary insurance only accepted in [address] office

## 2020-08-26 NOTE — TELEPHONE ENCOUNTER
3rd attempt letter sent  new order lm schedule consult   [place] asia/Spondylosis w/o Myelopathy or radiculopathy,Thoracic/[employer] [facility]     Secondary insurance only accepted in [address] office

## 2020-09-06 ENCOUNTER — HOSPITAL ENCOUNTER (EMERGENCY)
Facility: HOSPITAL | Age: 45
Discharge: HOME | End: 2020-09-06
Attending: EMERGENCY MEDICINE
Payer: COMMERCIAL

## 2020-09-06 VITALS
HEART RATE: 64 BPM | TEMPERATURE: 96.7 F | WEIGHT: 182 LBS | BODY MASS INDEX: 33.29 KG/M2 | RESPIRATION RATE: 18 BRPM | SYSTOLIC BLOOD PRESSURE: 125 MMHG | OXYGEN SATURATION: 99 % | DIASTOLIC BLOOD PRESSURE: 75 MMHG

## 2020-09-06 DIAGNOSIS — R20.2 PARESTHESIAS: Primary | ICD-10-CM

## 2020-09-06 DIAGNOSIS — E83.51 HYPOCALCEMIA: ICD-10-CM

## 2020-09-06 LAB
ALBUMIN SERPL BCP-MCNC: 3.3 G/DL (ref 3.5–5)
ALP SERPL-CCNC: 73 U/L (ref 46–116)
ALT SERPL W P-5'-P-CCNC: 37 U/L (ref 12–78)
ANION GAP SERPL CALCULATED.3IONS-SCNC: 14 MMOL/L (ref 4–13)
AST SERPL W P-5'-P-CCNC: 11 U/L (ref 5–45)
BASOPHILS # BLD AUTO: 0.06 THOUSANDS/ΜL (ref 0–0.1)
BASOPHILS NFR BLD AUTO: 1 % (ref 0–1)
BILIRUB SERPL-MCNC: 0.3 MG/DL (ref 0.2–1)
BUN SERPL-MCNC: 24 MG/DL (ref 5–25)
CALCIUM SERPL-MCNC: 6.7 MG/DL (ref 8.3–10.1)
CHLORIDE SERPL-SCNC: 103 MMOL/L (ref 100–108)
CO2 SERPL-SCNC: 23 MMOL/L (ref 21–32)
CREAT SERPL-MCNC: 0.96 MG/DL (ref 0.6–1.3)
EOSINOPHIL # BLD AUTO: 0.15 THOUSAND/ΜL (ref 0–0.61)
EOSINOPHIL NFR BLD AUTO: 2 % (ref 0–6)
ERYTHROCYTE [DISTWIDTH] IN BLOOD BY AUTOMATED COUNT: 16.3 % (ref 11.6–15.1)
GFR SERPL CREATININE-BSD FRML MDRD: 72 ML/MIN/1.73SQ M
GLUCOSE SERPL-MCNC: 117 MG/DL (ref 65–140)
HCT VFR BLD AUTO: 38.9 % (ref 34.8–46.1)
HGB BLD-MCNC: 12.6 G/DL (ref 11.5–15.4)
HOLD SPECIMEN: NORMAL
IMM GRANULOCYTES # BLD AUTO: 0.05 THOUSAND/UL (ref 0–0.2)
IMM GRANULOCYTES NFR BLD AUTO: 1 % (ref 0–2)
LYMPHOCYTES # BLD AUTO: 2.47 THOUSANDS/ΜL (ref 0.6–4.47)
LYMPHOCYTES NFR BLD AUTO: 36 % (ref 14–44)
MAGNESIUM SERPL-MCNC: 1.9 MG/DL (ref 1.6–2.6)
MCH RBC QN AUTO: 29.5 PG (ref 26.8–34.3)
MCHC RBC AUTO-ENTMCNC: 32.4 G/DL (ref 31.4–37.4)
MCV RBC AUTO: 91 FL (ref 82–98)
MONOCYTES # BLD AUTO: 0.65 THOUSAND/ΜL (ref 0.17–1.22)
MONOCYTES NFR BLD AUTO: 10 % (ref 4–12)
NEUTROPHILS # BLD AUTO: 3.4 THOUSANDS/ΜL (ref 1.85–7.62)
NEUTS SEG NFR BLD AUTO: 50 % (ref 43–75)
NRBC BLD AUTO-RTO: 0 /100 WBCS
PLATELET # BLD AUTO: 174 THOUSANDS/UL (ref 149–390)
PMV BLD AUTO: 10.4 FL (ref 8.9–12.7)
POTASSIUM SERPL-SCNC: 3.6 MMOL/L (ref 3.5–5.3)
PROT SERPL-MCNC: 6.9 G/DL (ref 6.4–8.2)
RBC # BLD AUTO: 4.27 MILLION/UL (ref 3.81–5.12)
SODIUM SERPL-SCNC: 140 MMOL/L (ref 136–145)
WBC # BLD AUTO: 6.78 THOUSAND/UL (ref 4.31–10.16)

## 2020-09-06 PROCEDURE — 96365 THER/PROPH/DIAG IV INF INIT: CPT

## 2020-09-06 PROCEDURE — 99285 EMERGENCY DEPT VISIT HI MDM: CPT | Performed by: EMERGENCY MEDICINE

## 2020-09-06 PROCEDURE — 85025 COMPLETE CBC W/AUTO DIFF WBC: CPT | Performed by: EMERGENCY MEDICINE

## 2020-09-06 PROCEDURE — 80053 COMPREHEN METABOLIC PANEL: CPT | Performed by: EMERGENCY MEDICINE

## 2020-09-06 PROCEDURE — 99284 EMERGENCY DEPT VISIT MOD MDM: CPT

## 2020-09-06 PROCEDURE — 93005 ELECTROCARDIOGRAM TRACING: CPT

## 2020-09-06 PROCEDURE — 83735 ASSAY OF MAGNESIUM: CPT | Performed by: EMERGENCY MEDICINE

## 2020-09-06 PROCEDURE — 36415 COLL VENOUS BLD VENIPUNCTURE: CPT | Performed by: EMERGENCY MEDICINE

## 2020-09-06 RX ORDER — CALCIUM GLUCONATE 20 MG/ML
1 INJECTION, SOLUTION INTRAVENOUS ONCE
Status: COMPLETED | OUTPATIENT
Start: 2020-09-06 | End: 2020-09-06

## 2020-09-06 RX ADMIN — CALCIUM GLUCONATE 1 G: 20 INJECTION, SOLUTION INTRAVENOUS at 08:44

## 2020-09-06 NOTE — ED PROVIDER NOTES
History  Chief Complaint   Patient presents with    Numbness     Pt reports she thinks her calcium is low again  Reports she gets numb and tingly when it is and it started again a few hours ago  Patient states she had a bad night last night  Approximately 0400 hours in the morning she experience numbness and tingling in her extremities  She had no muscle twitching or tetany  She had no fever chills  Patient has a history of hypoparathyroidism due to thyroidectomy with recurrent hypocalcemia  Prior to Admission Medications   Prescriptions Last Dose Informant Patient Reported? Taking? ALPRAZolam (XANAX) 1 mg tablet 9/5/2020 at Unknown time  Yes Yes   Sig: Take 1 mg by mouth 2 (two) times a day as needed am   baclofen 10 mg tablet Past Week at Unknown time  Yes Yes   Sig: Take 10 mg by mouth 3 (three) times a day as needed     calcitriol (ROCALTROL) 0 25 mcg capsule 9/5/2020 at Unknown time  Yes Yes   Sig: Take 1 capsule by mouth daily   calcium carbonate-vitamin D (OSCAL-D) 500 mg-200 units per tablet 9/5/2020 at Unknown time  Yes Yes   Sig: Take 1 tablet by mouth 4 (four) times a day 9a 2p 7p 9p   fenofibrate (TRIGLIDE) 160 MG tablet 9/5/2020 at Unknown time  Yes Yes   Sig: Take 160 mg by mouth daily   ferrous sulfate 325 (65 Fe) mg tablet 9/5/2020 at Unknown time  Yes Yes   Sig: Take 325 mg by mouth daily with breakfast   ibuprofen (MOTRIN) 600 mg tablet Past Month at Unknown time  No Yes   Sig: Take 1 tablet (600 mg total) by mouth every 6 (six) hours as needed for mild pain or moderate pain   levothyroxine (SYNTHROID) 150 mcg tablet 9/5/2020 at Unknown time  Yes Yes   Sig: Take 1 tablet by mouth daily   magnesium oxide (MAG-OX) 400 mg 9/5/2020 at Unknown time  Yes Yes   Sig: Take 400 mg by mouth 3 (three) times a day 9pm    ondansetron (ZOFRAN) 4 mg tablet Unknown at Unknown time  Yes No   Sig: Take 4 mg by mouth every 8 (eight) hours as needed for nausea or vomiting  oxyCODONE-acetaminophen (PERCOCET) 5-325 mg per tablet 2020 at Unknown time Self Yes Yes   Sig: Take 2 tablets by mouth every 6 (six) hours as needed     potassium chloride (K-DUR,KLOR-CON) 20 mEq tablet 2020 at Unknown time  Yes Yes   Sig: Take 20 mEq by mouth 2 (two) times a day Takes 2 tabs bid    pregabalin (LYRICA) 75 mg capsule Past Week at Unknown time  Yes Yes   Sig: Take 75 mg by mouth 3 (three) times a day as needed       Facility-Administered Medications: None       Past Medical History:   Diagnosis Date    Acid reflux     Acute renal failure (HCC)     multiple episodes    Anemia     Anxiety     Chronic pain     DDD (degenerative disc disease), lumbar     Disease of thyroid gland     had surgery and now hypo    DVT (deep venous thrombosis) ([facility] [address] )     s/p ankle fracture    Hypercalcemia     Hyperlipidemia     Hyperthyroidism     Hypocalcemia     post op 2016    Migraine     Psychiatric disorder     anxiety depression    Seizures (HCC)     petit mal x1  4 years ago- all tests were neg   Spondylolisthesis of lumbar region     Treatment     spinal pain injections  last was 2016       Past Surgical History:   Procedure Laterality Date    BACK SURGERY       SECTION      x5    CYSTOCELE REPAIR  2017    DISCOGRAM      PARATHYROIDECTOMY      [place] ANTERIOR COLPORRAPHY RPR CYSTOCELE W/CYSTO N/A 2017    Procedure: CYSTOCELE REPAIR;  Surgeon: Christopher Sloan MD;  Location: [address] [place] [address];  Service: Gynecology    [place] ARTHRODESIS POSTERIOR INTERBODY LUMBAR N/A 2016    Procedure: L4-S1 LUMBAR LAMINECTOMY/DECOMPRESSION;  INSTRUMENTED POSTEROLATERAL FUSION/ INTERBODY L5-S1; ALLOGRAFT AND AUTOGRAFT (IMPULSE) ;   Surgeon: Fidelia Levin MD;  [facility];  Service: Orthopedics    [place] CYSTOURETHROSCOPY,URETER CATHETER Bilateral 2018    Procedure: INSERTION URETERAL CATHETERS PREOP;  Surgeon: Irving Paz MD;  Location: [address] [place] [address];  Service: Urology    MS SLING OPER STRES INCONTINENCE N/A 5/4/2017    Procedure: MID URETHRAL SLING;  Surgeon: Ming Cantu MD;  Location: [address] [place] [address];  Service: Urology    MS SUPRACERV ABD HYSTERECTOMY N/A 12/7/2018    Procedure: SUPRACERVICAL HYSTERECTOMY;  Surgeon: Christopher Sloan MD;  Location: [facility];  Service: Gynecology    THYROIDECTOMY      TONSILECTOMY AND ADNOIDECTOMY      TONSILLECTOMY      TUBAL LIGATION         Family History   Problem Relation Age of Onset    Diabetes Mother     Hypertension Mother     Hyperlipidemia Father     Arrhythmia Father     Lung cancer Father     Diabetes Father      I have reviewed and agree with the history as documented  E-Cigarette/Vaping    E-Cigarette Use Never User      E-Cigarette/Vaping Substances     Social History     Tobacco Use    Smoking status: Current Every Day Smoker     Packs/day: 0 50     Years: 25 00     Pack years: 12 50     Types: Cigarettes    Smokeless tobacco: Never Used   Substance Use Topics    Alcohol use: Not Currently     Frequency: 2-4 times a month     Comment: Alcohol intake:   Occasional  - As per Balaji Mckeon Drug use: No       Review of Systems   Constitutional: Negative for chills and fever  HENT: Negative for congestion and sore throat  Eyes: Negative for visual disturbance  Respiratory: Negative for shortness of breath  Cardiovascular: Positive for palpitations  Negative for chest pain and leg swelling  Gastrointestinal: Negative for abdominal pain  Genitourinary: Negative for dysuria  Musculoskeletal: Negative for back pain  Neurological: Positive for numbness  Negative for headaches  Hematological: Does not bruise/bleed easily  Psychiatric/Behavioral: The patient is nervous/anxious  All other systems reviewed and are negative  Physical Exam  Physical Exam  Vitals signs and nursing note reviewed  Constitutional:       Appearance: Normal appearance  HENT:      Head: Normocephalic  Right Ear: External ear normal       Left Ear: External ear normal       Nose: Nose normal       Mouth/Throat:      Pharynx: Oropharynx is clear  Eyes:      Conjunctiva/sclera: Conjunctivae normal    Neck:      Musculoskeletal: Normal range of motion and neck supple  Cardiovascular:      Rate and Rhythm: Normal rate and regular rhythm  Pulmonary:      Effort: Pulmonary effort is normal       Breath sounds: Normal breath sounds  Abdominal:      Palpations: Abdomen is soft  Tenderness: There is no abdominal tenderness  Musculoskeletal:         General: No swelling or tenderness  Skin:     General: Skin is warm and dry  Capillary Refill: Capillary refill takes less than 2 seconds  Neurological:      General: No focal deficit present  Mental Status: She is alert     Psychiatric:         Mood and Affect: Mood normal          Behavior: Behavior normal          Vital Signs  ED Triage Vitals [09/06/20 0705]   Temperature Pulse Respirations Blood Pressure SpO2   (!) 96 7 °F (35 9 °C) 80 18 157/93 99 %      Temp src Heart Rate Source Patient Position - Orthostatic VS BP Location FiO2 (%)   -- Monitor Sitting Right arm --      Pain Score       --           Vitals:    09/06/20 0705   BP: 157/93   Pulse: 80   Patient Position - Orthostatic VS: Sitting         Visual Acuity      ED Medications  Medications   calcium gluconate 1 g in sodium chloride 0 9% 50 mL (premix) (1 g Intravenous New Bag 9/6/20 0844)       Diagnostic Studies  Results Reviewed     Procedure Component Value Units Date/Time    Comprehensive metabolic panel [[id number]]  (Abnormal) Collected:  09/06/20 0736    Lab Status:  Final result Specimen:  Blood from Arm, Left Updated:  09/06/20 0804     Sodium 140 mmol/L      Potassium 3 6 mmol/L      Chloride 103 mmol/L      CO2 23 mmol/L      ANION GAP 14 mmol/L      BUN 24 mg/dL      Creatinine 0 96 mg/dL      Glucose 117 mg/dL      Calcium 6 7 mg/dL      AST 11 U/L      ALT 37 U/L Alkaline Phosphatase 73 U/L      Total Protein 6 9 g/dL      Albumin 3 3 g/dL      Total Bilirubin 0 30 mg/dL      eGFR 72 ml/min/1 73sq m     Narrative:       National Kid[employer] Disease Foundation guidelines for Chronic Kidney Disease (CKD):     Stage 1 with normal or high GFR (GFR > 90 mL/min/1 73 square meters)    Stage 2 Mild CKD (GFR = 60-89 mL/min/1 73 square meters)    Stage 3A Moderate CKD (GFR = 45-59 mL/min/1 73 square meters)    Stage 3B Moderate CKD (GFR = 30-44 mL/min/1 73 square meters)    Stage 4 Severe CKD (GFR = 15-29 mL/min/1 73 square meters)    Stage 5 End Stage CKD (GFR <15 mL/min/1 73 square meters)  Note: GFR calculation is accurate only with a steady state creatinine    Magnesium [[id number]]  (Normal) Collected:  09/06/20 0736    Lab Status:  Final result Specimen:  Blood from Arm, Left Updated:  09/06/20 0804     Magnesium 1 9 mg/dL     CBC and differential [[id number]]  (Abnormal) Collected:  09/06/20 0736    Lab Status:  Final result Specimen:  Blood from Arm, Left Updated:  09/06/20 0744     WBC 6 78 Thousand/uL      RBC 4 27 Million/uL      Hemoglobin 12 6 g/dL      Hematocrit 38 9 %      MCV 91 fL      MCH 29 5 pg      MCHC 32 4 g/dL      RDW 16 3 %      MPV 10 4 fL      Platelets 064 Thousands/uL      nRBC 0 /100 WBCs      Neutrophils Relative 50 %      Immat GRANS % 1 %      Lymphocytes Relative 36 %      Monocytes Relative 10 %      Eosinophils Relative 2 %      Basophils Relative 1 %      Neutrophils Absolute 3 40 Thousands/µL      Immature Grans Absolute 0 05 Thousand/uL      Lymphocytes Absolute 2 47 Thousands/µL      Monocytes Absolute 0 65 Thousand/µL      Eosinophils Absolute 0 15 Thousand/µL      Basophils Absolute 0 06 Thousands/µL     [place] draw [[id number]] Collected:  09/06/20 0736    Lab Status: In process Specimen:  Blood from Arm, Left Updated:  09/06/20 0740    Narrative: The following orders were created for panel order [place] draw    Procedure Abnormality         Status                     ---------                               -----------         ------                     Freddie Ferris / Black tube on XISS[[id number]]                       In process                   Please view results for these tests on the individual orders  No orders to display              Procedures  ECG 12 Lead Documentation Only    Date/Time: 9/6/2020 7:40 AM  Performed by: Adilia Hernandez MD  Authorized by: Adilia Hernandez MD     Indications / Diagnosis:  Hypocalcemia  ECG reviewed by me, the ED Provider: yes    Patient location:  ED  Interpretation:     Interpretation: normal    Rate:     ECG rate:  69    ECG rate assessment: normal    Rhythm:     Rhythm: sinus rhythm    Ectopy:     Ectopy: none    QRS:     QRS axis:  Normal    QRS intervals:  Normal  Conduction:     Conduction: normal    ST segments:     ST segments:  Normal  T waves:     T waves: normal               ED Course       US AUDIT      Most Recent Value   Initial Alcohol Screen: US AUDIT-C    1  How often do you have a drink containing alcohol? 3 Filed at: 09/06/2020 0706   Audit-C Score  3 Filed at: 09/06/2020 6740                  COLETTE/DAST-10      Most Recent Value   How many times in the past year have you    Used an illegal drug or used a prescription medication for non-medical reasons? Never Filed at: 09/06/2020 0706                                MDM  Number of Diagnoses or Management Options  Hypocalcemia:   Paresthesias:   Diagnosis management comments: Hypocalcemia likely due to low parathyroid hormone    Will maximize vitamin-D, consider increasing Rocaltrol 2 5  Patient has been on initial starting dose since surgery 2 years ago        Disposition  Final diagnoses:   Paresthesias   Hypocalcemia     Time reflects when diagnosis was documented in both MDM as applicable and the Disposition within this note     Time User Action Codes Description Comment    9/6/2020  8:45 AM Latrice Erazo Add [R20 2] Paresthesias     9/6/2020  8:46 AM Latrice Erazo Add [E83 51] Hypocalcemia       ED Disposition     ED Disposition Condition Date/Time Comment    Discharge Stable Sun Sep 6, 2020  8:45 AM [facility] discharge to home/self care  Follow-up Information     Follow up With Specialties Details Why Contact Info    Endocrinologist   Call on Tuesday           Patient's Medications   Discharge Prescriptions    No medications on file     No discharge procedures on file      PDMP Review     None          ED Provider  Electronically Signed by           Kayy Mooney MD  09/06/20 0900

## 2020-09-06 NOTE — DISCHARGE INSTRUCTIONS
Call your endocrinologist on Tuesday    Consider possibly increasing Rocaltrol to 0 5 daily    Continue vitamin-D as discussed

## 2020-09-08 LAB
ATRIAL RATE: 69 BPM
P AXIS: 50 DEGREES
PR INTERVAL: 170 MS
QRS AXIS: 31 DEGREES
QRSD INTERVAL: 74 MS
QT INTERVAL: 402 MS
QTC INTERVAL: 430 MS
T WAVE AXIS: 38 DEGREES
VENTRICULAR RATE: 69 BPM

## 2020-09-08 PROCEDURE — 93010 ELECTROCARDIOGRAM REPORT: CPT | Performed by: INTERNAL MEDICINE

## 2020-09-20 ENCOUNTER — HOSPITAL ENCOUNTER (EMERGENCY)
Facility: HOSPITAL | Age: 45
Discharge: HOME | End: 2020-09-21
Attending: EMERGENCY MEDICINE | Admitting: EMERGENCY MEDICINE
Payer: COMMERCIAL

## 2020-09-20 ENCOUNTER — HOSPITAL ENCOUNTER (EMERGENCY)
Facility: HOSPITAL | Age: 45
Discharge: HOME | End: 2020-09-20
Attending: EMERGENCY MEDICINE | Admitting: EMERGENCY MEDICINE
Payer: COMMERCIAL

## 2020-09-20 ENCOUNTER — APPOINTMENT (EMERGENCY)
Dept: RADIOLOGY | Facility: HOSPITAL | Age: 45
End: 2020-09-20
Payer: COMMERCIAL

## 2020-09-20 VITALS
SYSTOLIC BLOOD PRESSURE: 127 MMHG | DIASTOLIC BLOOD PRESSURE: 68 MMHG | OXYGEN SATURATION: 98 % | BODY MASS INDEX: 33.29 KG/M2 | TEMPERATURE: 96.8 F | RESPIRATION RATE: 18 BRPM | HEART RATE: 76 BPM | WEIGHT: 182 LBS

## 2020-09-20 DIAGNOSIS — E83.51 HYPOCALCEMIA: Primary | ICD-10-CM

## 2020-09-20 DIAGNOSIS — R20.2 PARESTHESIAS: ICD-10-CM

## 2020-09-20 LAB
ANION GAP SERPL CALCULATED.3IONS-SCNC: 16 MMOL/L (ref 4–13)
BASOPHILS # BLD AUTO: 0.04 THOUSANDS/ΜL (ref 0–0.1)
BASOPHILS NFR BLD AUTO: 0 % (ref 0–1)
BUN SERPL-MCNC: 27 MG/DL (ref 5–25)
CA-I BLD-SCNC: 0.91 MMOL/L (ref 1.12–1.32)
CALCIUM SERPL-MCNC: 7 MG/DL (ref 8.3–10.1)
CHLORIDE SERPL-SCNC: 100 MMOL/L (ref 100–108)
CO2 SERPL-SCNC: 21 MMOL/L (ref 21–32)
CREAT SERPL-MCNC: 1.25 MG/DL (ref 0.6–1.3)
EOSINOPHIL # BLD AUTO: 0.09 THOUSAND/ΜL (ref 0–0.61)
EOSINOPHIL NFR BLD AUTO: 1 % (ref 0–6)
ERYTHROCYTE [DISTWIDTH] IN BLOOD BY AUTOMATED COUNT: 16.3 % (ref 11.6–15.1)
GFR SERPL CREATININE-BSD FRML MDRD: 52 ML/MIN/1.73SQ M
GLUCOSE SERPL-MCNC: 97 MG/DL (ref 65–140)
HCT VFR BLD AUTO: 41.1 % (ref 34.8–46.1)
HGB BLD-MCNC: 13.4 G/DL (ref 11.5–15.4)
IMM GRANULOCYTES # BLD AUTO: 0.11 THOUSAND/UL (ref 0–0.2)
IMM GRANULOCYTES NFR BLD AUTO: 1 % (ref 0–2)
LYMPHOCYTES # BLD AUTO: 2.63 THOUSANDS/ΜL (ref 0.6–4.47)
LYMPHOCYTES NFR BLD AUTO: 28 % (ref 14–44)
MAGNESIUM SERPL-MCNC: 2.2 MG/DL (ref 1.6–2.6)
MCH RBC QN AUTO: 30.1 PG (ref 26.8–34.3)
MCHC RBC AUTO-ENTMCNC: 32.6 G/DL (ref 31.4–37.4)
MCV RBC AUTO: 92 FL (ref 82–98)
MONOCYTES # BLD AUTO: 0.86 THOUSAND/ΜL (ref 0.17–1.22)
MONOCYTES NFR BLD AUTO: 9 % (ref 4–12)
NEUTROPHILS # BLD AUTO: 5.58 THOUSANDS/ΜL (ref 1.85–7.62)
NEUTS SEG NFR BLD AUTO: 61 % (ref 43–75)
NRBC BLD AUTO-RTO: 0 /100 WBCS
PLATELET # BLD AUTO: 231 THOUSANDS/UL (ref 149–390)
PMV BLD AUTO: 10.4 FL (ref 8.9–12.7)
POTASSIUM SERPL-SCNC: 4 MMOL/L (ref 3.5–5.3)
RBC # BLD AUTO: 4.45 MILLION/UL (ref 3.81–5.12)
SODIUM SERPL-SCNC: 137 MMOL/L (ref 136–145)
TROPONIN I SERPL-MCNC: <0.02 NG/ML
WBC # BLD AUTO: 9.31 THOUSAND/UL (ref 4.31–10.16)

## 2020-09-20 PROCEDURE — 99285 EMERGENCY DEPT VISIT HI MDM: CPT

## 2020-09-20 PROCEDURE — 96365 THER/PROPH/DIAG IV INF INIT: CPT

## 2020-09-20 PROCEDURE — 80048 BASIC METABOLIC PNL TOTAL CA: CPT | Performed by: EMERGENCY MEDICINE

## 2020-09-20 PROCEDURE — 36415 COLL VENOUS BLD VENIPUNCTURE: CPT | Performed by: EMERGENCY MEDICINE

## 2020-09-20 PROCEDURE — 81025 URINE PREGNANCY TEST: CPT | Performed by: EMERGENCY MEDICINE

## 2020-09-20 PROCEDURE — 93005 ELECTROCARDIOGRAM TRACING: CPT

## 2020-09-20 PROCEDURE — 71045 X-RAY EXAM CHEST 1 VIEW: CPT

## 2020-09-20 PROCEDURE — 84484 ASSAY OF TROPONIN QUANT: CPT | Performed by: EMERGENCY MEDICINE

## 2020-09-20 PROCEDURE — 99285 EMERGENCY DEPT VISIT HI MDM: CPT | Performed by: EMERGENCY MEDICINE

## 2020-09-20 PROCEDURE — 83735 ASSAY OF MAGNESIUM: CPT | Performed by: EMERGENCY MEDICINE

## 2020-09-20 PROCEDURE — 82330 ASSAY OF CALCIUM: CPT | Performed by: EMERGENCY MEDICINE

## 2020-09-20 PROCEDURE — 85025 COMPLETE CBC W/AUTO DIFF WBC: CPT | Performed by: EMERGENCY MEDICINE

## 2020-09-20 RX ORDER — CALCIUM GLUCONATE 20 MG/ML
1 INJECTION, SOLUTION INTRAVENOUS ONCE
Status: COMPLETED | OUTPATIENT
Start: 2020-09-20 | End: 2020-09-20

## 2020-09-20 RX ADMIN — SODIUM CHLORIDE 1000 ML: 0.9 INJECTION, SOLUTION INTRAVENOUS at 06:00

## 2020-09-20 RX ADMIN — CALCIUM GLUCONATE 1 G: 20 INJECTION, SOLUTION INTRAVENOUS at 06:29

## 2020-09-20 NOTE — ED PROVIDER NOTES
History  Chief Complaint   Patient presents with    Tingling     Pt states tingling all over her whole body   Chest Pain     Pt states pain in the center of her chest which started a few hours ago  HPI    This is a 26-year-old female who presents today with tingling along with chest pain  Patient states she has tingling all over her body  Patient states she has history of thyroid resection and no parathyroid gland  Patient states she often becomes hypocalcemic which results in tingling  Patient states she has been compliant with her calcium at home but ends up having episodes of low calcium  Patient states she has tingling all over her body  Denies any abdominal pain  She does states she has some chest pain in the center which has been having for the past few hours  States he has tingling in her chest   No shortness of breath  No travel outside the country  No history of cardiac disease  41 year female presents today with tingling  Will check basic blood work cardiac workup and treat accordingly    Prior to Admission Medications   Prescriptions Last Dose Informant Patient Reported? Taking?    ALPRAZolam (XANAX) 1 mg tablet 9/19/2020 at Unknown time  Yes Yes   Sig: Take 1 mg by mouth 2 (two) times a day as needed am   baclofen 10 mg tablet 9/19/2020 at Unknown time  Yes Yes   Sig: Take 10 mg by mouth 3 (three) times a day as needed     calcitriol (ROCALTROL) 0 25 mcg capsule 9/19/2020 at Unknown time  Yes Yes   Sig: Take 1 capsule by mouth 2 (two) times a day    calcium carbonate-vitamin D (OSCAL-D) 500 mg-200 units per tablet 9/19/2020 at Unknown time  Yes Yes   Sig: Take 1 tablet by mouth 5 (five) times a day 9a 2p 7p 9p    fenofibrate (TRIGLIDE) 160 MG tablet 9/19/2020 at Unknown time  Yes Yes   Sig: Take 160 mg by mouth daily   ferrous sulfate 325 (65 Fe) mg tablet 9/19/2020 at Unknown time  Yes Yes   Sig: Take 325 mg by mouth 2 (two) times a day    ibuprofen (MOTRIN) 600 mg tablet Not Taking at Unknown time  No No   Sig: Take 1 tablet (600 mg total) by mouth every 6 (six) hours as needed for mild pain or moderate pain   Patient not taking: Reported on 2020   levothyroxine (SYNTHROID) 150 mcg tablet 2020 at Unknown time  Yes Yes   Sig: Take 1 tablet by mouth daily   magnesium oxide (MAG-OX) 400 mg 2020 at Unknown time  Yes Yes   Sig: Take 400 mg by mouth 3 (three) times a day 9pm    ondansetron (ZOFRAN) 4 mg tablet 2020 at Unknown time  Yes Yes   Sig: Take 4 mg by mouth every 8 (eight) hours as needed for nausea or vomiting  oxyCODONE-acetaminophen (PERCOCET) 5-325 mg per tablet 2020 at Unknown time Self Yes Yes   Sig: Take 2 tablets by mouth every 6 (six) hours as needed     potassium chloride (K-DUR,KLOR-CON) 20 mEq tablet 2020 at Unknown time  Yes Yes   Sig: Take 20 mEq by mouth 2 (two) times a day Takes 2 tabs bid    pregabalin (LYRICA) 75 mg capsule Past Week at Unknown time  Yes Yes   Sig: Take 75 mg by mouth 3 (three) times a day as needed       Facility-Administered Medications: None       Past Medical History:   Diagnosis Date    Acid reflux     Acute renal failure (HCC)     multiple episodes    Anemia     Anxiety     Chronic pain     DDD (degenerative disc disease), lumbar     Disease of thyroid gland     had surgery and now hypo    DVT (deep venous thrombosis) ([facility] [address] )     s/p ankle fracture    Hypercalcemia     Hyperlipidemia     Hyperthyroidism     Hypocalcemia     post op 2016    Migraine     Psychiatric disorder     anxiety depression    Seizures (HCC)     petit mal x1  4 years ago- all tests were neg      Spondylolisthesis of lumbar region     Treatment     spinal pain injections  last was 2016       Past Surgical History:   Procedure Laterality Date    BACK SURGERY       SECTION      x5    CYSTOCELE REPAIR  2017    DISCOGRAM      PARATHYROIDECTOMY      VT ANTERIOR COLPORRAPHY RPR CYSTOCELE W/CYSTO N/A 2017 Procedure: CYSTOCELE REPAIR;  Surgeon: Jessica Palacio MD;  Location: [address] [place] [address];  Service: Gynecology    AK ARTHRODESIS POSTERIOR INTERBODY LUMBAR N/A 8/12/2016    Procedure: L4-S1 LUMBAR LAMINECTOMY/DECOMPRESSION;  INSTRUMENTED POSTEROLATERAL FUSION/ INTERBODY L5-S1; ALLOGRAFT AND AUTOGRAFT (IMPULSE) ; Surgeon: Veena nAderson MD;  Location:  MAIN OR;  Service: Orthopedics    AK CYSTOURETHROSCOPY,URETER CATHETER Bilateral 12/7/2018    Procedure: INSERTION URETERAL CATHETERS PREOP;  Surgeon: Johnathan Russ MD;  Location: [address] [place] [address];  Service: Urology    AK SLING OPER STRES INCONTINENCE N/A 5/4/2017    Procedure: MID URETHRAL SLING;  Surgeon: Mariaelena Carbone MD;  Location: [address] [place] [address];  Service: Urology    AK SUPRACERV ABD HYSTERECTOMY N/A 12/7/2018    Procedure: SUPRACERVICAL HYSTERECTOMY;  Surgeon: Jessica Palacio MD;  Location: [facility];  Service: Gynecology    THYROIDECTOMY      TONSILECTOMY AND ADNOIDECTOMY      TONSILLECTOMY      TUBAL LIGATION         Family History   Problem Relation Age of Onset    Diabetes Mother     Hypertension Mother     Hyperlipidemia Father     Arrhythmia Father     Lung cancer Father     Diabetes Father      I have reviewed and agree with the history as documented  E-Cigarette/Vaping    E-Cigarette Use Never User      E-Cigarette/Vaping Substances     Social History     Tobacco Use    Smoking status: Current Every Day Smoker     Packs/day: 0 50     Years: 25 00     Pack years: 12 50     Types: Cigarettes    Smokeless tobacco: Never Used   Substance Use Topics    Alcohol use: Not Currently     Frequency: 2-4 times a month     Comment: Alcohol intake:   Occasional  - As per Julee Daily Drug use: No       Review of Systems   Constitutional: Negative  Negative for diaphoresis and fever  HENT: Negative  Respiratory: Negative  Negative for cough, shortness of breath and wheezing  Cardiovascular: Positive for chest pain   Negative for palpitations and leg swelling  Gastrointestinal: Negative for abdominal distention, abdominal pain, nausea and vomiting  Genitourinary: Negative  Musculoskeletal: Negative  Skin: Negative  Neurological:        Paresthesias   Psychiatric/Behavioral: Negative  All other systems reviewed and are negative  Physical Exam  Physical Exam  Constitutional:       Appearance: She is well-developed  HENT:      Head: Normocephalic and atraumatic  Eyes:      Pupils: Pupils are equal, round, and reactive to light  Neck:      Musculoskeletal: Normal range of motion and neck supple  Cardiovascular:      Rate and Rhythm: Normal rate and regular rhythm  Heart sounds: Normal heart sounds  No murmur  Pulmonary:      Effort: Pulmonary effort is normal       Breath sounds: Normal breath sounds  Abdominal:      General: Bowel sounds are normal  There is no distension  Palpations: Abdomen is soft  Tenderness: There is no abdominal tenderness  There is no guarding or rebound  Musculoskeletal: Normal range of motion  Skin:     General: Skin is warm and dry  Neurological:      Mental Status: She is alert and oriented to person, place, and time           Vital Signs  ED Triage Vitals   Temperature Pulse Respirations Blood Pressure SpO2   09/20/20 0542 09/20/20 0544 09/20/20 0544 09/20/20 0544 09/20/20 0544   (!) 96 8 °F (36 °C) 79 16 163/81 99 %      Temp Source Heart Rate Source Patient Position - Orthostatic VS BP Location FiO2 (%)   09/20/20 0542 09/20/20 0544 09/20/20 0544 09/20/20 0544 --   Tympanic Monitor Sitting Left arm       Pain Score       09/20/20 0542       5           Vitals:    09/20/20 0544 09/20/20 0637 09/20/20 0645   BP: 163/81 129/71 127/68   Pulse: 79 82 76   Patient Position - Orthostatic VS: Sitting Lying          Visual Acuity      ED Medications  Medications   sodium chloride 0 9 % bolus 1,000 mL (0 mL Intravenous Stopped 9/20/20 0706)   calcium gluconate 1 g in sodium chloride 0 9% 50 mL (premix) (0 g Intravenous Stopped 9/20/20 0706)       Diagnostic Studies  Results Reviewed     Procedure Component Value Units Date/Time    Troponin I [[id number]]  (Normal) Collected:  09/20/20 0556    Lab Status:  Final result Specimen:  Blood from Arm, Left Updated:  09/20/20 0626     Troponin I <0 02 ng/mL     Basic metabolic panel [[id number]]  (Abnormal) Collected:  09/20/20 0556    Lab Status:  Final result Specimen:  Blood from Arm, Left Updated:  09/20/20 0616     Sodium 137 mmol/L      Potassium 4 0 mmol/L      Chloride 100 mmol/L      CO2 21 mmol/L      ANION GAP 16 mmol/L      BUN 27 mg/dL      Creatinine 1 25 mg/dL      Glucose 97 mg/dL      Calcium 7 0 mg/dL      eGFR 52 ml/min/1 73sq m     Narrative:       National Kid[employer] Disease Foundation guidelines for Chronic Kidney Disease (CKD):     Stage 1 with normal or high GFR (GFR > 90 mL/min/1 73 square meters)    Stage 2 Mild CKD (GFR = 60-89 mL/min/1 73 square meters)    Stage 3A Moderate CKD (GFR = 45-59 mL/min/1 73 square meters)    Stage 3B Moderate CKD (GFR = 30-44 mL/min/1 73 square meters)    Stage 4 Severe CKD (GFR = 15-29 mL/min/1 73 square meters)    Stage 5 End Stage CKD (GFR <15 mL/min/1 73 square meters)  Note: GFR calculation is accurate only with a steady state creatinine    Magnesium [[id number]]  (Normal) Collected:  09/20/20 0556    Lab Status:  Final result Specimen:  Blood from Arm, Left Updated:  09/20/20 0616     Magnesium 2 2 mg/dL     CBC and differential [[id number]]  (Abnormal) Collected:  09/20/20 0556    Lab Status:  Final result Specimen:  Blood from Arm, Left Updated:  09/20/20 0606     WBC 9 31 Thousand/uL      RBC 4 45 Million/uL      Hemoglobin 13 4 g/dL      Hematocrit 41 1 %      MCV 92 fL      MCH 30 1 pg      MCHC 32 6 g/dL      RDW 16 3 %      MPV 10 4 fL      Platelets 233 Thousands/uL      nRBC 0 /100 WBCs      Neutrophils Relative 61 %      Immat GRANS % 1 %      Lymphocytes Relative 28 % Monocytes Relative 9 %      Eosinophils Relative 1 %      Basophils Relative 0 %      Neutrophils Absolute 5 58 Thousands/µL      Immature Grans Absolute 0 11 Thousand/uL      Lymphocytes Absolute 2 63 Thousands/µL      Monocytes Absolute 0 86 Thousand/µL      Eosinophils Absolute 0 09 Thousand/µL      Basophils Absolute 0 04 Thousands/µL     Calcium, ionized [[id number]]  (Abnormal) Collected:  09/20/20 0556    Lab Status:  Final result Specimen:  Blood from Arm, Left Updated:  09/20/20 0605     Calcium, Ionized 0 91 mmol/L                  XR chest 1 view portable   Final Result by Celestino Lal MD (09/20 1031)      No acute cardiopulmonary disease  Workstation performed: [id number]                    Procedures  Procedures         ED Course  ED Course as of Sep 21 0405   Sun Sep 20, 2020   7250 Procedure Note: EKG  Date/Time: 09/20/20 5:48 AM   Performed by: Katherine Beach by: German Noriega   Indications / Diagnosis: tinglign  ECG reviewed by me, the ED Provider: yes   The EKG demonstrates:  Rhythm: normal sinus  Intervals: normal intervals  Axis: normal axis  QRS/Blocks: normal QRS  ST Changes: No acute ST Changes, no STD/KISHORE                                             MDM    Disposition  Final diagnoses:   Hypocalcemia     Time reflects when diagnosis was documented in both MDM as applicable and the Disposition within this note     Time User Action Codes Description Comment    9/20/2020  6:38 AM Mazin Aj Add [E83 [id number]] Hypocalcemia       ED Disposition     ED Disposition Condition Date/Time Comment    Discharge Stable [place] Sep 20, 2020  6:38 AM Demar Tsang [address] discharge to home/self care              Follow-up Information     Follow up With Specialties Details Why Contact Info    Inna Parra MD Endocrinology, Internal Medicine Schedule an appointment as soon as possible for a visit   [address]  [address] [place] [address] Rd  [phone]            Discharge Medication List as of 9/20/2020  6:39 AM      CONTINUE these medications which have NOT CHANGED    Details   ALPRAZolam (XANAX) 1 mg tablet Take 1 mg by mouth 2 (two) times a day as needed am, Historical Med      baclofen 10 mg tablet Take 10 mg by mouth 3 (three) times a day as needed  , Historical Med      calcitriol (ROCALTROL) 0 25 mcg capsule Take 1 capsule by mouth 2 (two) times a day , Historical Med      calcium carbonate-vitamin D (OSCAL-D) 500 mg-200 units per tablet Take 1 tablet by mouth 5 (five) times a day 9a 2p 7p 9p , Historical Med      fenofibrate (TRIGLIDE) 160 MG tablet Take 160 mg by mouth daily, Historical Med      ferrous sulfate 325 (65 Fe) mg tablet Take 325 mg by mouth 2 (two) times a day , Historical Med      levothyroxine (SYNTHROID) 150 mcg tablet Take 1 tablet by mouth daily, Historical Med      magnesium oxide (MAG-OX) 400 mg Take 400 mg by mouth 3 (three) times a day 9pm , Historical Med      oxyCODONE-acetaminophen (PERCOCET) 5-325 mg per tablet Take 2 tablets by mouth every 6 (six) hours as needed  , Historical Med      potassium chloride (K-DUR,KLOR-CON) 20 mEq tablet Take 20 mEq by mouth 2 (two) times a day Takes 2 tabs bid , Until Discontinued, Historical Med      pregabalin (LYRICA) 75 mg capsule Take 75 mg by mouth 3 (three) times a day as needed , Historical Med      ibuprofen (MOTRIN) 600 mg tablet Take 1 tablet (600 mg total) by mouth every 6 (six) hours as needed for mild pain or moderate pain, Starting Tue 6/2/2020, Normal      ondansetron (ZOFRAN) 4 mg tablet Take 4 mg by mouth every 8 (eight) hours as needed for nausea or vomiting , Until Discontinued, Historical Med           No discharge procedures on file      PDMP Review     None          ED Provider  Electronically Signed by           Princess Matt MD  09/21/20 4348

## 2020-09-21 ENCOUNTER — APPOINTMENT (EMERGENCY)
Dept: RADIOLOGY | Facility: HOSPITAL | Age: 45
End: 2020-09-21
Payer: COMMERCIAL

## 2020-09-21 VITALS
TEMPERATURE: 97.8 F | HEART RATE: 72 BPM | DIASTOLIC BLOOD PRESSURE: 64 MMHG | OXYGEN SATURATION: 96 % | SYSTOLIC BLOOD PRESSURE: 106 MMHG | RESPIRATION RATE: 16 BRPM

## 2020-09-21 LAB
ANION GAP SERPL CALCULATED.3IONS-SCNC: 12 MMOL/L (ref 4–13)
BACTERIA UR QL AUTO: ABNORMAL /HPF
BASOPHILS # BLD AUTO: 0.04 THOUSANDS/ΜL (ref 0–0.1)
BASOPHILS NFR BLD AUTO: 1 % (ref 0–1)
BILIRUB UR QL STRIP: NEGATIVE
BUN SERPL-MCNC: 23 MG/DL (ref 5–25)
CA-I BLD-SCNC: 0.97 MMOL/L (ref 1.12–1.32)
CALCIUM SERPL-MCNC: 7.6 MG/DL (ref 8.3–10.1)
CHLORIDE SERPL-SCNC: 100 MMOL/L (ref 100–108)
CLARITY UR: ABNORMAL
CO2 SERPL-SCNC: 26 MMOL/L (ref 21–32)
COLOR UR: YELLOW
CREAT SERPL-MCNC: 1.17 MG/DL (ref 0.6–1.3)
EOSINOPHIL # BLD AUTO: 0.12 THOUSAND/ΜL (ref 0–0.61)
EOSINOPHIL NFR BLD AUTO: 2 % (ref 0–6)
ERYTHROCYTE [DISTWIDTH] IN BLOOD BY AUTOMATED COUNT: 16.2 % (ref 11.6–15.1)
EXT PREG TEST URINE: NEGATIVE
EXT. CONTROL ED NAV: NORMAL
GFR SERPL CREATININE-BSD FRML MDRD: 57 ML/MIN/1.73SQ M
GLUCOSE SERPL-MCNC: 125 MG/DL (ref 65–140)
GLUCOSE UR STRIP-MCNC: NEGATIVE MG/DL
HCT VFR BLD AUTO: 40.3 % (ref 34.8–46.1)
HGB BLD-MCNC: 13 G/DL (ref 11.5–15.4)
HGB UR QL STRIP.AUTO: NEGATIVE
IMM GRANULOCYTES # BLD AUTO: 0.09 THOUSAND/UL (ref 0–0.2)
IMM GRANULOCYTES NFR BLD AUTO: 1 % (ref 0–2)
KETONES UR STRIP-MCNC: NEGATIVE MG/DL
LEUKOCYTE ESTERASE UR QL STRIP: ABNORMAL
LYMPHOCYTES # BLD AUTO: 2.45 THOUSANDS/ΜL (ref 0.6–4.47)
LYMPHOCYTES NFR BLD AUTO: 31 % (ref 14–44)
MAGNESIUM SERPL-MCNC: 1.9 MG/DL (ref 1.6–2.6)
MCH RBC QN AUTO: 29.6 PG (ref 26.8–34.3)
MCHC RBC AUTO-ENTMCNC: 32.3 G/DL (ref 31.4–37.4)
MCV RBC AUTO: 92 FL (ref 82–98)
MONOCYTES # BLD AUTO: 0.63 THOUSAND/ΜL (ref 0.17–1.22)
MONOCYTES NFR BLD AUTO: 8 % (ref 4–12)
NEUTROPHILS # BLD AUTO: 4.57 THOUSANDS/ΜL (ref 1.85–7.62)
NEUTS SEG NFR BLD AUTO: 57 % (ref 43–75)
NITRITE UR QL STRIP: NEGATIVE
NON-SQ EPI CELLS URNS QL MICRO: ABNORMAL /HPF
NRBC BLD AUTO-RTO: 0 /100 WBCS
PH UR STRIP.AUTO: 8.5 [PH]
PLATELET # BLD AUTO: 216 THOUSANDS/UL (ref 149–390)
PMV BLD AUTO: 10 FL (ref 8.9–12.7)
POTASSIUM SERPL-SCNC: 3.9 MMOL/L (ref 3.5–5.3)
PROT UR STRIP-MCNC: NEGATIVE MG/DL
RBC # BLD AUTO: 4.39 MILLION/UL (ref 3.81–5.12)
RBC #/AREA URNS AUTO: ABNORMAL /HPF
SODIUM SERPL-SCNC: 138 MMOL/L (ref 136–145)
SP GR UR STRIP.AUTO: 1.01 (ref 1–1.03)
TROPONIN I SERPL-MCNC: <0.02 NG/ML
TSH SERPL DL<=0.05 MIU/L-ACNC: 2.47 UIU/ML (ref 0.36–3.74)
UROBILINOGEN UR QL STRIP.AUTO: 0.2 E.U./DL
WBC # BLD AUTO: 7.9 THOUSAND/UL (ref 4.31–10.16)
WBC #/AREA URNS AUTO: ABNORMAL /HPF

## 2020-09-21 PROCEDURE — 83735 ASSAY OF MAGNESIUM: CPT | Performed by: EMERGENCY MEDICINE

## 2020-09-21 PROCEDURE — 81001 URINALYSIS AUTO W/SCOPE: CPT | Performed by: EMERGENCY MEDICINE

## 2020-09-21 PROCEDURE — 84484 ASSAY OF TROPONIN QUANT: CPT | Performed by: EMERGENCY MEDICINE

## 2020-09-21 PROCEDURE — 82330 ASSAY OF CALCIUM: CPT | Performed by: EMERGENCY MEDICINE

## 2020-09-21 PROCEDURE — 71046 X-RAY EXAM CHEST 2 VIEWS: CPT

## 2020-09-21 PROCEDURE — 99285 EMERGENCY DEPT VISIT HI MDM: CPT | Performed by: EMERGENCY MEDICINE

## 2020-09-21 PROCEDURE — 84443 ASSAY THYROID STIM HORMONE: CPT | Performed by: EMERGENCY MEDICINE

## 2020-09-21 PROCEDURE — 80048 BASIC METABOLIC PNL TOTAL CA: CPT | Performed by: EMERGENCY MEDICINE

## 2020-09-21 PROCEDURE — 96361 HYDRATE IV INFUSION ADD-ON: CPT

## 2020-09-21 PROCEDURE — 85025 COMPLETE CBC W/AUTO DIFF WBC: CPT | Performed by: EMERGENCY MEDICINE

## 2020-09-21 PROCEDURE — 96365 THER/PROPH/DIAG IV INF INIT: CPT

## 2020-09-21 PROCEDURE — 36415 COLL VENOUS BLD VENIPUNCTURE: CPT | Performed by: EMERGENCY MEDICINE

## 2020-09-21 RX ORDER — CALCIUM GLUCONATE 20 MG/ML
1 INJECTION, SOLUTION INTRAVENOUS ONCE
Status: COMPLETED | OUTPATIENT
Start: 2020-09-21 | End: 2020-09-21

## 2020-09-21 RX ADMIN — CALCIUM GLUCONATE 1 G: 20 INJECTION, SOLUTION INTRAVENOUS at 01:12

## 2020-09-21 RX ADMIN — SODIUM CHLORIDE 1000 ML: 0.9 INJECTION, SOLUTION INTRAVENOUS at 00:25

## 2020-09-21 NOTE — ED PROVIDER NOTES
History  Chief Complaint   Patient presents with    Chest Pain     pt c/o chest pains starting at 0800 pm  Pt c/o tingling all day, not as bad as yesterday  Pt stating that she took calcium pills at 9pm and rocaltrol at 8pm and felt a little better   Tingling     13-year-old female with past history of hypothyroidism after thyroidectomy, hypocalcemia, anxiety, depression, hyperlipidemia, anemia, degenerative disc disease, migraine, presents to the ED for evaluation of tingling sensation and chest discomfort  Patient was seen last night for similar complaints  Patient's calcium was found to be low  Calcium was repleted  Patient is on calcium supplement at home  Earlier this evening she did take her calcium supplement and felt a little better  Patient came to the ED for further evaluation as she feels her calcium level is low again  Patient is scheduled for appointment with an endocrinologist this week  Patient is compliant with her home medications  Patient has been seen multiple times in the ED for complaints of paresthesia and hypercalcemia in the past       History provided by:  Patient  Chest Pain   Associated symptoms: no abdominal pain, no back pain, no cough, no dizziness, no fever, no headache, no nausea, no numbness, no palpitations, no shortness of breath and no weakness        Prior to Admission Medications   Prescriptions Last Dose Informant Patient Reported? Taking?    ALPRAZolam (XANAX) 1 mg tablet 9/20/2020 at Unknown time  Yes Yes   Sig: Take 1 mg by mouth 2 (two) times a day as needed am   baclofen 10 mg tablet 9/20/2020 at Unknown time  Yes Yes   Sig: Take 10 mg by mouth 3 (three) times a day as needed     calcitriol (ROCALTROL) 0 25 mcg capsule 9/20/2020 at Unknown time  Yes Yes   Sig: Take 1 capsule by mouth 2 (two) times a day    calcium carbonate-vitamin D (OSCAL-D) 500 mg-200 units per tablet 9/20/2020 at Unknown time  Yes Yes   Sig: Take 1 tablet by mouth 5 (five) times a day 9a 2p 7p 9p    fenofibrate (TRIGLIDE) 160 MG tablet 9/20/2020 at Unknown time  Yes Yes   Sig: Take 160 mg by mouth daily   ferrous sulfate 325 (65 Fe) mg tablet 9/20/2020 at Unknown time  Yes Yes   Sig: Take 325 mg by mouth 2 (two) times a day    ibuprofen (MOTRIN) 600 mg tablet Not Taking at Unknown time  No No   Sig: Take 1 tablet (600 mg total) by mouth every 6 (six) hours as needed for mild pain or moderate pain   Patient not taking: Reported on 9/20/2020   levothyroxine (SYNTHROID) 150 mcg tablet 9/20/2020 at Unknown time  Yes Yes   Sig: Take 1 tablet by mouth daily   magnesium oxide (MAG-OX) 400 mg 9/20/2020 at Unknown time  Yes Yes   Sig: Take 400 mg by mouth 3 (three) times a day 9pm    ondansetron (ZOFRAN) 4 mg tablet Unknown at Unknown time  Yes No   Sig: Take 4 mg by mouth every 8 (eight) hours as needed for nausea or vomiting  oxyCODONE-acetaminophen (PERCOCET) 5-325 mg per tablet 9/20/2020 at Unknown time Self Yes Yes   Sig: Take 2 tablets by mouth every 6 (six) hours as needed     potassium chloride (K-DUR,KLOR-CON) 20 mEq tablet 9/20/2020 at Unknown time  Yes Yes   Sig: Take 20 mEq by mouth 2 (two) times a day Takes 2 tabs bid    pregabalin (LYRICA) 75 mg capsule 9/19/2020 at Unknown time  Yes Yes   Sig: Take 75 mg by mouth 3 (three) times a day as needed       Facility-Administered Medications: None       Past Medical History:   Diagnosis Date    Acid reflux     Acute renal failure (HCC)     multiple episodes    Anemia     Anxiety     Chronic pain     DDD (degenerative disc disease), lumbar     Disease of thyroid gland     had surgery and now hypo    [facility] thrombosis) ([facility] [address] ) 2009    s/p ankle fracture    Hypercalcemia     Hyperlipidemia     Hyperthyroidism     Hypocalcemia     post op 2016    Migraine     Psychiatric disorder     anxiety depression    Seizures (HCC)     petit mal x1  4 years ago- all tests were neg      Spondylolisthesis of lumbar region     Treatment spinal pain injections  last was 2016       Past Surgical History:   Procedure Laterality Date    BACK SURGERY       SECTION      x5    CYSTOCELE REPAIR  2017    DISCOGRAM      PARATHYROIDECTOMY      DC ANTERIOR COLPORRAPHY RPR CYSTOCELE W/CYSTO N/A 2017    Procedure: CYSTOCELE REPAIR;  Surgeon: Gustavo Whyte MD;  Location: [address] [place] [address];  Service: Gynecology    DC ARTHRODESIS POSTERIOR INTERBODY LUMBAR N/A 2016    Procedure: L4-S1 LUMBAR LAMINECTOMY/DECOMPRESSION;  INSTRUMENTED POSTEROLATERAL FUSION/ INTERBODY L5-S1; ALLOGRAFT AND AUTOGRAFT (IMPULSE) ; Surgeon: Franklyn Brower MD;  Location:  MAIN OR;  Service: Orthopedics    DC CYSTOURETHROSCOPY,URETER CATHETER Bilateral 2018    Procedure: INSERTION URETERAL CATHETERS PREOP;  Surgeon: Олег Gallegos MD;  Location: [address] [place] [address];  Service: Urology    DC SLING OPER STRES INCONTINENCE N/A 2017    Procedure: MID URETHRAL SLING;  Surgeon: Caterina Clemens MD;  Location: [address] [place] [address];  Service: Urology    DC SUPRACERV ABD HYSTERECTOMY N/A 2018    Procedure: SUPRACERVICAL HYSTERECTOMY;  Surgeon: Gustavo Whyte MD;  Location: [facility];  Service: Gynecology    THYROIDECTOMY      TONSILECTOMY AND ADNOIDECTOMY      TONSILLECTOMY      TUBAL LIGATION         Family History   Problem Relation Age of Onset    Diabetes Mother     Hypertension Mother     Hyperlipidemia Father     Arrhythmia Father     Lung cancer Father     Diabetes Father      I have reviewed and agree with the history as documented      E-Cigarette/Vaping    E-Cigarette Use Never User      E-Cigarette/Vaping Substances     Social History     Tobacco Use    Smoking status: Current Every Day Smoker     Packs/day: 0 50     Years: 25 00     Pack years: 12 50     Types: Cigarettes    Smokeless tobacco: Never Used   Substance Use Topics    Alcohol use: Not Currently     Frequency: 2-4 times a month     Comment: Alcohol intake:   Occasional  - As per Annie Nolan Drug use: No       Review of Systems   Constitutional: Negative for activity change, appetite change, chills and fever  HENT: Negative for congestion and ear pain  Eyes: Negative for pain and discharge  Respiratory: Negative for cough, chest tightness, shortness of breath, wheezing and stridor  Cardiovascular: Negative for palpitations  Chest discomfort   Gastrointestinal: Negative for abdominal distention, abdominal pain, constipation, diarrhea and nausea  Endocrine: Negative for cold intolerance  Genitourinary: Negative for dysuria, frequency and urgency  Musculoskeletal: Negative for arthralgias and back pain  Skin: Negative for color change and rash  Allergic/Immunologic: Negative for environmental allergies and food allergies  Neurological: Negative for dizziness, weakness, numbness and headaches  Paresthesia   Hematological: Negative for adenopathy  Psychiatric/Behavioral: Negative for agitation, behavioral problems and confusion  The patient is not nervous/anxious  All other systems reviewed and are negative  Physical Exam  Physical Exam  Vitals signs and nursing note reviewed  Constitutional:       Appearance: She is well-developed  HENT:      Head: Normocephalic and atraumatic  Eyes:      Conjunctiva/sclera: Conjunctivae normal    Neck:      Musculoskeletal: Normal range of motion and neck supple  Cardiovascular:      Rate and Rhythm: Normal rate and regular rhythm  Heart sounds: Normal heart sounds  Pulmonary:      Effort: Pulmonary effort is normal       Breath sounds: Normal breath sounds  Abdominal:      General: Bowel sounds are normal  There is no distension  Palpations: Abdomen is soft  Tenderness: There is no abdominal tenderness  Musculoskeletal: Normal range of motion  Skin:     General: Skin is warm and dry  Neurological:      Mental Status: She is alert and oriented to person, place, and time  Psychiatric:         Behavior: Behavior normal          Thought Content:  Thought content normal          Judgment: Judgment normal          Vital Signs  ED Triage Vitals [09/20/20 2350]   Temperature Pulse Respirations Blood Pressure SpO2   97 8 °F (36 6 °C) 84 18 137/80 96 %      Temp Source Heart Rate Source Patient Position - Orthostatic VS BP Location FiO2 (%)   Temporal Monitor Lying Right arm --      Pain Score       6           Vitals:    09/21/20 0030 09/21/20 0045 09/21/20 0115 09/21/20 0130   BP: 155/86 115/69 107/67 100/61   Pulse: 76 74 74 72   Patient Position - Orthostatic VS: Lying  Lying Lying         Visual Acuity  Visual Acuity      Most Recent Value   L Pupil Size (mm)  4   R Pupil Size (mm)  4          ED Medications  Medications   calcium gluconate 1 g in sodium chloride 0 9% 50 mL (premix) (1 g Intravenous New Bag 9/21/20 0112)   sodium chloride 0 9 % bolus 1,000 mL (1,000 mL Intravenous New Bag 9/21/20 0025)       Diagnostic Studies  Results Reviewed     Procedure Component Value Units Date/Time    Basic metabolic panel [[id number]]  (Abnormal) Collected:  09/21/20 0007    Lab Status:  Final result Specimen:  Blood from Arm, Left Updated:  09/21/20 0054     Sodium 138 mmol/L      Potassium 3 9 mmol/L      Chloride 100 mmol/L      CO2 26 mmol/L      ANION GAP 12 mmol/L      BUN 23 mg/dL      Creatinine 1 17 mg/dL      Glucose 125 mg/dL      Calcium 7 6 mg/dL      eGFR 57 ml/min/1 73sq m     Narrative:       [place] guidelines for Chronic Kidney Disease (CKD):     Stage 1 with normal or high GFR (GFR > 90 mL/min/1 73 square meters)    Stage 2 Mild CKD (GFR = 60-89 mL/min/1 73 square meters)    Stage 3A Moderate CKD (GFR = 45-59 mL/min/1 73 square meters)    Stage 3B Moderate CKD (GFR = 30-44 mL/min/1 73 square meters)    Stage 4 Severe CKD (GFR = 15-29 mL/min/1 73 square meters)    Stage 5 End Stage CKD (GFR <15 mL/min/1 73 square meters)  Note: GFR calculation is accurate only with a steady state creatinine    TSH, 3rd generation with Free T4 reflex [[id number]]  (Normal) Collected:  09/21/20 0007    Lab Status:  Final result Specimen:  Blood from Arm, Left Updated:  09/21/20 0054     TSH 3RD GENERATON 2 468 uIU/mL     Narrative:       Patients undergoing fluorescein dye angiography may retain small amounts of fluorescein in the body for 48-72 hours post procedure  Samples containing fluorescein can produce falsely depressed TSH values  If the patient had this procedure,a specimen should be resubmitted post fluorescein clearance        Magnesium [[id number]]  (Normal) Collected:  09/21/20 0007    Lab Status:  Final result Specimen:  Blood from Arm, Left Updated:  09/21/20 0054     Magnesium 1 9 mg/dL     Troponin I [[id number]]  (Normal) Collected:  09/21/20 0007    Lab Status:  Final result Specimen:  Blood from Arm, Left Updated:  09/21/20 0049     Troponin I <0 02 ng/mL     Urine Microscopic [[id number]]  (Abnormal) Collected:  09/21/20 0026    Lab Status:  Final result Specimen:  Urine, Clean Catch Updated:  09/21/20 0043     RBC, UA None Seen /hpf      WBC, UA 2-4 /hpf      Epithelial Cells Innumerable /hpf      Bacteria, UA Occasional /hpf     UA w Reflex to Microscopic w Reflex to Culture [[id number]]  (Abnormal) Collected:  09/21/20 0026    Lab Status:  Final result Specimen:  Urine, Clean Catch Updated:  09/21/20 0034     Color, UA Yellow     Clarity, UA Cloudy     Specific Gravity, UA 1 015     pH, UA 8 5     Leukocytes, UA Trace     Nitrite, UA Negative     Protein, UA Negative mg/dl      Glucose, UA Negative mg/dl      Ketones, UA Negative mg/dl      Urobilinogen, UA 0 2 E U /dl      Bilirubin, UA Negative     Blood, UA Negative    POCT pregnancy, urine [[id number]]  (Normal) Resulted:  09/21/20 0031    Lab Status:  Final result Updated:  09/21/20 0031     EXT PREG TEST UR (Ref: Negative) negative     Control valid    CBC and differential [[id number]]  (Abnormal) Collected:  09/21/20 0007    Lab Status:  Final result Specimen:  Blood from Arm, Left Updated:  09/21/20 0030     WBC 7 90 Thousand/uL      RBC 4 39 Million/uL      Hemoglobin 13 0 g/dL      Hematocrit 40 3 %      MCV 92 fL      MCH 29 6 pg      MCHC 32 3 g/dL      RDW 16 2 %      MPV 10 0 fL      Platelets 647 Thousands/uL      nRBC 0 /100 WBCs      Neutrophils Relative 57 %      Immat GRANS % 1 %      Lymphocytes Relative 31 %      Monocytes Relative 8 %      Eosinophils Relative 2 %      Basophils Relative 1 %      Neutrophils Absolute 4 57 Thousands/µL      Immature Grans Absolute 0 09 Thousand/uL      Lymphocytes Absolute 2 45 Thousands/µL      Monocytes Absolute 0 63 Thousand/µL      Eosinophils Absolute 0 12 Thousand/µL      Basophils Absolute 0 04 Thousands/µL     Calcium, ionized [[id number]]  (Abnormal) Collected:  09/21/20 0007    Lab Status:  Final result Specimen:  Blood from Arm, Left Updated:  09/21/20 0030     Calcium, Ionized 0 97 mmol/L                  XR chest 2 views    (Results Pending)              Procedures  ECG 12 Lead Documentation Only    Date/Time: 9/20/2020 11:51 PM  Performed by: Miko Florian DO  Authorized by: Miko Florian DO     Indications / Diagnosis:  Paresthesia, chest discomfort  ECG reviewed by me, the ED Provider: yes    Patient location:  ED  Previous ECG:     Previous ECG:  Compared to current    Similarity:  Changes noted    Comparison to cardiac monitor: Yes    Interpretation:     Interpretation: non-specific    Comments:      Sinus rhythm rate 79, right axis deviation, no acute ST/T-wave abnormalities noted, otherwise unremarkable EKG  ED Course  ED Course as of Sep 21 0137   Mon Sep 21, 2020   0132 Patient re-examined at bedside  Patient states that she feels significantly better after receiving IV calcium  Patient like to go home                                            MDM  Number of Diagnoses or Management Options  Hypocalcemia: new and requires workup  Paresthesias: new and requires workup  Diagnosis management comments: Obtain blood work including TSH, CBC, BMP, ionized calcium level, mac, EKG, chest x-ray  Give IV fluids and replete calcium if it is low  Amount and/or Complexity of Data Reviewed  Clinical lab tests: ordered and reviewed  Tests in the radiology section of CPT®: ordered and reviewed  Tests in the medicine section of CPT®: ordered and reviewed  Review and summarize past medical records: yes  Independent visualization of images, tracings, or specimens: yes    Risk of Complications, Morbidity, and/or Mortality  General comments: Patient's calcium level was higher compared to yesterday however still in the lower range  All other lab work and radiology results were unremarkable  Patient felt significantly better after given calcium gluconate IV in the ED  At this time patient is discharged home with continuation of her home medication regimen and follow up with PCP as well as endocrinologist   Patient has an appointment with her endocrinologist next week  Close return instructions given to return to the ER for any worsening symptoms  Patient agrees with discharge plan  Patient well appearing at time of discharge  Patient Progress  Patient progress: improved      Disposition  Final diagnoses:   Hypocalcemia   Paresthesias     Time reflects when diagnosis was documented in both MDM as applicable and the Disposition within this note     Time User Action Codes Description Comment    9/21/2020  1:34 AM Eliel Woods Add [E83 51] Hypocalcemia     9/21/2020  1:34 AM Eliel Woods Add [R20 2] Paresthesias       ED Disposition     ED Disposition Condition Date/Time Comment    Discharge Stable Mon Sep 21, 2020  1:35 AM 1204 [facility] discharge to home/self care              Follow-up Information     Follow up With Specialties Details Why Contact Info    Liban Child MD Internal Medicine In 3 days  [address] [place] [facility] [id number]  [phone]          Please follow-up with your endocrinologist as scheduled  Patient's Medications   Discharge Prescriptions    No medications on file     No discharge procedures on file      PDMP Review     None          ED Provider  Electronically Signed by           Elvin Thakkar DO  09/21/20 6550

## 2020-09-24 LAB
ATRIAL RATE: 76 BPM
ATRIAL RATE: 79 BPM
P AXIS: 54 DEGREES
PR INTERVAL: 154 MS
PR INTERVAL: 156 MS
QRS AXIS: 145 DEGREES
QRS AXIS: 26 DEGREES
QRSD INTERVAL: 80 MS
QRSD INTERVAL: 80 MS
QT INTERVAL: 394 MS
QT INTERVAL: 416 MS
QTC INTERVAL: 451 MS
QTC INTERVAL: 468 MS
T WAVE AXIS: 133 DEGREES
T WAVE AXIS: 42 DEGREES
VENTRICULAR RATE: 76 BPM
VENTRICULAR RATE: 79 BPM

## 2020-09-24 PROCEDURE — 93010 ELECTROCARDIOGRAM REPORT: CPT | Performed by: INTERNAL MEDICINE

## 2020-10-09 ENCOUNTER — HOSPITAL ENCOUNTER (EMERGENCY)
Facility: HOSPITAL | Age: 45
Discharge: HOME | End: 2020-10-09
Attending: EMERGENCY MEDICINE | Admitting: EMERGENCY MEDICINE
Payer: COMMERCIAL

## 2020-10-09 VITALS
RESPIRATION RATE: 21 BRPM | DIASTOLIC BLOOD PRESSURE: 59 MMHG | OXYGEN SATURATION: 95 % | SYSTOLIC BLOOD PRESSURE: 110 MMHG | HEART RATE: 84 BPM | TEMPERATURE: 97.2 F

## 2020-10-09 DIAGNOSIS — E83.51 HYPOCALCEMIA: Primary | ICD-10-CM

## 2020-10-09 LAB
ANION GAP SERPL CALCULATED.3IONS-SCNC: 12 MMOL/L (ref 4–13)
BUN SERPL-MCNC: 20 MG/DL (ref 5–25)
CA-I BLD-SCNC: 1.06 MMOL/L (ref 1.12–1.32)
CALCIUM SERPL-MCNC: 8.5 MG/DL (ref 8.3–10.1)
CHLORIDE SERPL-SCNC: 100 MMOL/L (ref 100–108)
CO2 SERPL-SCNC: 26 MMOL/L (ref 21–32)
CREAT SERPL-MCNC: 1.02 MG/DL (ref 0.6–1.3)
GFR SERPL CREATININE-BSD FRML MDRD: 67 ML/MIN/1.73SQ M
GLUCOSE SERPL-MCNC: 124 MG/DL (ref 65–140)
MAGNESIUM SERPL-MCNC: 1.9 MG/DL (ref 1.6–2.6)
POTASSIUM SERPL-SCNC: 3.6 MMOL/L (ref 3.5–5.3)
SODIUM SERPL-SCNC: 138 MMOL/L (ref 136–145)

## 2020-10-09 PROCEDURE — 80048 BASIC METABOLIC PNL TOTAL CA: CPT | Performed by: EMERGENCY MEDICINE

## 2020-10-09 PROCEDURE — 96365 THER/PROPH/DIAG IV INF INIT: CPT

## 2020-10-09 PROCEDURE — 93005 ELECTROCARDIOGRAM TRACING: CPT

## 2020-10-09 PROCEDURE — 99284 EMERGENCY DEPT VISIT MOD MDM: CPT

## 2020-10-09 PROCEDURE — 82330 ASSAY OF CALCIUM: CPT | Performed by: EMERGENCY MEDICINE

## 2020-10-09 PROCEDURE — 36415 COLL VENOUS BLD VENIPUNCTURE: CPT | Performed by: EMERGENCY MEDICINE

## 2020-10-09 PROCEDURE — 99284 EMERGENCY DEPT VISIT MOD MDM: CPT | Performed by: EMERGENCY MEDICINE

## 2020-10-09 PROCEDURE — 83735 ASSAY OF MAGNESIUM: CPT | Performed by: EMERGENCY MEDICINE

## 2020-10-09 RX ORDER — CALCIUM GLUCONATE 20 MG/ML
1 INJECTION, SOLUTION INTRAVENOUS ONCE
Status: COMPLETED | OUTPATIENT
Start: 2020-10-09 | End: 2020-10-09

## 2020-10-09 RX ADMIN — CALCIUM GLUCONATE 1 G: 20 INJECTION, SOLUTION INTRAVENOUS at 02:19

## 2020-10-09 RX ADMIN — SODIUM CHLORIDE 500 ML: 0.9 INJECTION, SOLUTION INTRAVENOUS at 02:20

## 2020-10-12 LAB
ATRIAL RATE: 91 BPM
P AXIS: 53 DEGREES
PR INTERVAL: 170 MS
QRS AXIS: 40 DEGREES
QRSD INTERVAL: 78 MS
QT INTERVAL: 366 MS
QTC INTERVAL: 450 MS
T WAVE AXIS: 42 DEGREES
VENTRICULAR RATE: 91 BPM

## 2020-10-12 PROCEDURE — 93010 ELECTROCARDIOGRAM REPORT: CPT | Performed by: INTERNAL MEDICINE

## 2020-10-19 ENCOUNTER — APPOINTMENT (EMERGENCY)
Dept: RADIOLOGY | Facility: HOSPITAL | Age: 45
End: 2020-10-19
Payer: COMMERCIAL

## 2020-10-19 ENCOUNTER — HOSPITAL ENCOUNTER (EMERGENCY)
Facility: HOSPITAL | Age: 45
Discharge: HOME | End: 2020-10-19
Attending: EMERGENCY MEDICINE | Admitting: EMERGENCY MEDICINE
Payer: COMMERCIAL

## 2020-10-19 VITALS
OXYGEN SATURATION: 97 % | TEMPERATURE: 97.9 F | HEART RATE: 76 BPM | WEIGHT: 190 LBS | DIASTOLIC BLOOD PRESSURE: 64 MMHG | RESPIRATION RATE: 21 BRPM | SYSTOLIC BLOOD PRESSURE: 126 MMHG | BODY MASS INDEX: 34.75 KG/M2

## 2020-10-19 DIAGNOSIS — R07.9 CHEST PAIN: ICD-10-CM

## 2020-10-19 DIAGNOSIS — E83.51 HYPOCALCEMIA: Primary | ICD-10-CM

## 2020-10-19 DIAGNOSIS — Z86.59 HISTORY OF ANXIETY: ICD-10-CM

## 2020-10-19 LAB
ALBUMIN SERPL BCP-MCNC: 4 G/DL (ref 3.5–5)
ALP SERPL-CCNC: 64 U/L (ref 46–116)
ALT SERPL W P-5'-P-CCNC: 45 U/L (ref 12–78)
ANION GAP SERPL CALCULATED.3IONS-SCNC: 11 MMOL/L (ref 4–13)
AST SERPL W P-5'-P-CCNC: 22 U/L (ref 5–45)
ATRIAL RATE: 83 BPM
BASOPHILS # BLD AUTO: 0.05 THOUSANDS/ΜL (ref 0–0.1)
BASOPHILS NFR BLD AUTO: 1 % (ref 0–1)
BILIRUB SERPL-MCNC: 0.5 MG/DL (ref 0.2–1)
BUN SERPL-MCNC: 20 MG/DL (ref 5–25)
CA-I BLD-SCNC: 1.08 MMOL/L (ref 1.12–1.32)
CALCIUM SERPL-MCNC: 8.6 MG/DL (ref 8.3–10.1)
CHLORIDE SERPL-SCNC: 102 MMOL/L (ref 100–108)
CO2 SERPL-SCNC: 25 MMOL/L (ref 21–32)
CREAT SERPL-MCNC: 1.19 MG/DL (ref 0.6–1.3)
EOSINOPHIL # BLD AUTO: 0.08 THOUSAND/ΜL (ref 0–0.61)
EOSINOPHIL NFR BLD AUTO: 1 % (ref 0–6)
ERYTHROCYTE [DISTWIDTH] IN BLOOD BY AUTOMATED COUNT: 15.8 % (ref 11.6–15.1)
GFR SERPL CREATININE-BSD FRML MDRD: 56 ML/MIN/1.73SQ M
GLUCOSE SERPL-MCNC: 112 MG/DL (ref 65–140)
HCT VFR BLD AUTO: 41.4 % (ref 34.8–46.1)
HGB BLD-MCNC: 13.5 G/DL (ref 11.5–15.4)
IMM GRANULOCYTES # BLD AUTO: 0.03 THOUSAND/UL (ref 0–0.2)
IMM GRANULOCYTES NFR BLD AUTO: 0 % (ref 0–2)
LYMPHOCYTES # BLD AUTO: 2.69 THOUSANDS/ΜL (ref 0.6–4.47)
LYMPHOCYTES NFR BLD AUTO: 37 % (ref 14–44)
MCH RBC QN AUTO: 29.6 PG (ref 26.8–34.3)
MCHC RBC AUTO-ENTMCNC: 32.6 G/DL (ref 31.4–37.4)
MCV RBC AUTO: 91 FL (ref 82–98)
MONOCYTES # BLD AUTO: 0.61 THOUSAND/ΜL (ref 0.17–1.22)
MONOCYTES NFR BLD AUTO: 8 % (ref 4–12)
NEUTROPHILS # BLD AUTO: 3.89 THOUSANDS/ΜL (ref 1.85–7.62)
NEUTS SEG NFR BLD AUTO: 53 % (ref 43–75)
NRBC BLD AUTO-RTO: 0 /100 WBCS
P AXIS: 20 DEGREES
PLATELET # BLD AUTO: 193 THOUSANDS/UL (ref 149–390)
PMV BLD AUTO: 10.5 FL (ref 8.9–12.7)
POTASSIUM SERPL-SCNC: 3.7 MMOL/L (ref 3.5–5.3)
PR INTERVAL: 160 MS
PROT SERPL-MCNC: 7.8 G/DL (ref 6.4–8.2)
QRS AXIS: 32 DEGREES
QRSD INTERVAL: 82 MS
QT INTERVAL: 392 MS
QTC INTERVAL: 460 MS
RBC # BLD AUTO: 4.56 MILLION/UL (ref 3.81–5.12)
SODIUM SERPL-SCNC: 138 MMOL/L (ref 136–145)
T WAVE AXIS: 34 DEGREES
TROPONIN I SERPL-MCNC: <0.02 NG/ML
VENTRICULAR RATE: 83 BPM
WBC # BLD AUTO: 7.35 THOUSAND/UL (ref 4.31–10.16)

## 2020-10-19 PROCEDURE — 36415 COLL VENOUS BLD VENIPUNCTURE: CPT | Performed by: EMERGENCY MEDICINE

## 2020-10-19 PROCEDURE — 96365 THER/PROPH/DIAG IV INF INIT: CPT

## 2020-10-19 PROCEDURE — 93010 ELECTROCARDIOGRAM REPORT: CPT | Performed by: INTERNAL MEDICINE

## 2020-10-19 PROCEDURE — 82330 ASSAY OF CALCIUM: CPT | Performed by: EMERGENCY MEDICINE

## 2020-10-19 PROCEDURE — 99285 EMERGENCY DEPT VISIT HI MDM: CPT | Performed by: EMERGENCY MEDICINE

## 2020-10-19 PROCEDURE — 99285 EMERGENCY DEPT VISIT HI MDM: CPT

## 2020-10-19 PROCEDURE — 85025 COMPLETE CBC W/AUTO DIFF WBC: CPT | Performed by: EMERGENCY MEDICINE

## 2020-10-19 PROCEDURE — 80053 COMPREHEN METABOLIC PANEL: CPT | Performed by: EMERGENCY MEDICINE

## 2020-10-19 PROCEDURE — 93005 ELECTROCARDIOGRAM TRACING: CPT

## 2020-10-19 PROCEDURE — 84484 ASSAY OF TROPONIN QUANT: CPT | Performed by: EMERGENCY MEDICINE

## 2020-10-19 PROCEDURE — 71045 X-RAY EXAM CHEST 1 VIEW: CPT

## 2020-10-19 RX ORDER — ZOLPIDEM TARTRATE 6.25 MG/1
6.25 TABLET, FILM COATED, EXTENDED RELEASE ORAL
COMMUNITY
End: 2022-04-19

## 2020-10-19 RX ORDER — CALCIUM GLUCONATE 20 MG/ML
1 INJECTION, SOLUTION INTRAVENOUS ONCE
Status: COMPLETED | OUTPATIENT
Start: 2020-10-19 | End: 2020-10-19

## 2020-10-19 RX ADMIN — CALCIUM GLUCONATE 1 G: 20 INJECTION, SOLUTION INTRAVENOUS at 02:43

## 2020-11-16 ENCOUNTER — APPOINTMENT (EMERGENCY)
Dept: RADIOLOGY | Facility: HOSPITAL | Age: 45
End: 2020-11-16
Payer: COMMERCIAL

## 2020-11-16 ENCOUNTER — HOSPITAL ENCOUNTER (EMERGENCY)
Facility: HOSPITAL | Age: 45
Discharge: HOME | End: 2020-11-16
Attending: EMERGENCY MEDICINE | Admitting: EMERGENCY MEDICINE
Payer: COMMERCIAL

## 2020-11-16 VITALS
DIASTOLIC BLOOD PRESSURE: 69 MMHG | WEIGHT: 189.8 LBS | OXYGEN SATURATION: 100 % | HEART RATE: 68 BPM | BODY MASS INDEX: 34.71 KG/M2 | TEMPERATURE: 97.5 F | RESPIRATION RATE: 18 BRPM | SYSTOLIC BLOOD PRESSURE: 110 MMHG

## 2020-11-16 DIAGNOSIS — E83.51 HYPOCALCEMIA: Primary | ICD-10-CM

## 2020-11-16 LAB
ALBUMIN SERPL BCP-MCNC: 4.1 G/DL (ref 3.5–5)
ALP SERPL-CCNC: 58 U/L (ref 46–116)
ALT SERPL W P-5'-P-CCNC: 43 U/L (ref 12–78)
ANION GAP SERPL CALCULATED.3IONS-SCNC: 10 MMOL/L (ref 4–13)
AST SERPL W P-5'-P-CCNC: 21 U/L (ref 5–45)
BASOPHILS # BLD AUTO: 0.03 THOUSANDS/ΜL (ref 0–0.1)
BASOPHILS NFR BLD AUTO: 1 % (ref 0–1)
BILIRUB SERPL-MCNC: 0.6 MG/DL (ref 0.2–1)
BUN SERPL-MCNC: 14 MG/DL (ref 5–25)
CA-I BLD-SCNC: 1.04 MMOL/L (ref 1.12–1.32)
CALCIUM SERPL-MCNC: 8.5 MG/DL (ref 8.3–10.1)
CHLORIDE SERPL-SCNC: 99 MMOL/L (ref 100–108)
CO2 SERPL-SCNC: 28 MMOL/L (ref 21–32)
CREAT SERPL-MCNC: 1.02 MG/DL (ref 0.6–1.3)
EOSINOPHIL # BLD AUTO: 0.1 THOUSAND/ΜL (ref 0–0.61)
EOSINOPHIL NFR BLD AUTO: 2 % (ref 0–6)
ERYTHROCYTE [DISTWIDTH] IN BLOOD BY AUTOMATED COUNT: 15.7 % (ref 11.6–15.1)
GFR SERPL CREATININE-BSD FRML MDRD: 67 ML/MIN/1.73SQ M
GLUCOSE SERPL-MCNC: 97 MG/DL (ref 65–140)
HCT VFR BLD AUTO: 41.6 % (ref 34.8–46.1)
HGB BLD-MCNC: 13.4 G/DL (ref 11.5–15.4)
IMM GRANULOCYTES # BLD AUTO: 0.02 THOUSAND/UL (ref 0–0.2)
IMM GRANULOCYTES NFR BLD AUTO: 0 % (ref 0–2)
LYMPHOCYTES # BLD AUTO: 1.5 THOUSANDS/ΜL (ref 0.6–4.47)
LYMPHOCYTES NFR BLD AUTO: 28 % (ref 14–44)
MCH RBC QN AUTO: 29.5 PG (ref 26.8–34.3)
MCHC RBC AUTO-ENTMCNC: 32.2 G/DL (ref 31.4–37.4)
MCV RBC AUTO: 92 FL (ref 82–98)
MONOCYTES # BLD AUTO: 0.39 THOUSAND/ΜL (ref 0.17–1.22)
MONOCYTES NFR BLD AUTO: 7 % (ref 4–12)
NEUTROPHILS # BLD AUTO: 3.4 THOUSANDS/ΜL (ref 1.85–7.62)
NEUTS SEG NFR BLD AUTO: 62 % (ref 43–75)
NRBC BLD AUTO-RTO: 0 /100 WBCS
PLATELET # BLD AUTO: 187 THOUSANDS/UL (ref 149–390)
PMV BLD AUTO: 10.2 FL (ref 8.9–12.7)
POTASSIUM SERPL-SCNC: 3.8 MMOL/L (ref 3.5–5.3)
PROT SERPL-MCNC: 8 G/DL (ref 6.4–8.2)
RBC # BLD AUTO: 4.54 MILLION/UL (ref 3.81–5.12)
SODIUM SERPL-SCNC: 137 MMOL/L (ref 136–145)
TROPONIN I SERPL-MCNC: <0.02 NG/ML
TSH SERPL DL<=0.05 MIU/L-ACNC: 2.04 UIU/ML (ref 0.36–3.74)
WBC # BLD AUTO: 5.44 THOUSAND/UL (ref 4.31–10.16)

## 2020-11-16 PROCEDURE — 71046 X-RAY EXAM CHEST 2 VIEWS: CPT

## 2020-11-16 PROCEDURE — 93005 ELECTROCARDIOGRAM TRACING: CPT

## 2020-11-16 PROCEDURE — 82330 ASSAY OF CALCIUM: CPT | Performed by: EMERGENCY MEDICINE

## 2020-11-16 PROCEDURE — 84443 ASSAY THYROID STIM HORMONE: CPT | Performed by: EMERGENCY MEDICINE

## 2020-11-16 PROCEDURE — 99285 EMERGENCY DEPT VISIT HI MDM: CPT | Performed by: EMERGENCY MEDICINE

## 2020-11-16 PROCEDURE — 85025 COMPLETE CBC W/AUTO DIFF WBC: CPT | Performed by: EMERGENCY MEDICINE

## 2020-11-16 PROCEDURE — 80053 COMPREHEN METABOLIC PANEL: CPT | Performed by: EMERGENCY MEDICINE

## 2020-11-16 PROCEDURE — 96365 THER/PROPH/DIAG IV INF INIT: CPT

## 2020-11-16 PROCEDURE — 84484 ASSAY OF TROPONIN QUANT: CPT | Performed by: EMERGENCY MEDICINE

## 2020-11-16 PROCEDURE — 96375 TX/PRO/DX INJ NEW DRUG ADDON: CPT

## 2020-11-16 PROCEDURE — 99284 EMERGENCY DEPT VISIT MOD MDM: CPT

## 2020-11-16 PROCEDURE — 36415 COLL VENOUS BLD VENIPUNCTURE: CPT | Performed by: EMERGENCY MEDICINE

## 2020-11-16 RX ORDER — ACETAMINOPHEN 325 MG/1
650 TABLET ORAL ONCE
Status: COMPLETED | OUTPATIENT
Start: 2020-11-16 | End: 2020-11-16

## 2020-11-16 RX ORDER — KETOROLAC TROMETHAMINE 30 MG/ML
15 INJECTION, SOLUTION INTRAMUSCULAR; INTRAVENOUS ONCE
Status: COMPLETED | OUTPATIENT
Start: 2020-11-16 | End: 2020-11-16

## 2020-11-16 RX ORDER — CALCIUM GLUCONATE 20 MG/ML
1 INJECTION, SOLUTION INTRAVENOUS ONCE
Status: COMPLETED | OUTPATIENT
Start: 2020-11-16 | End: 2020-11-16

## 2020-11-16 RX ADMIN — ACETAMINOPHEN 650 MG: 325 TABLET, FILM COATED ORAL at 19:56

## 2020-11-16 RX ADMIN — CALCIUM GLUCONATE 1 G: 20 INJECTION, SOLUTION INTRAVENOUS at 20:36

## 2020-11-16 RX ADMIN — KETOROLAC TROMETHAMINE 15 MG: 30 INJECTION, SOLUTION INTRAMUSCULAR at 20:31

## 2020-11-18 LAB
ATRIAL RATE: 75 BPM
P AXIS: 49 DEGREES
PR INTERVAL: 176 MS
QRS AXIS: 34 DEGREES
QRSD INTERVAL: 82 MS
QT INTERVAL: 420 MS
QTC INTERVAL: 469 MS
T WAVE AXIS: 35 DEGREES
VENTRICULAR RATE: 75 BPM

## 2020-11-18 PROCEDURE — 93010 ELECTROCARDIOGRAM REPORT: CPT | Performed by: INTERNAL MEDICINE

## 2020-12-05 ENCOUNTER — HOSPITAL ENCOUNTER (EMERGENCY)
Facility: HOSPITAL | Age: 45
Discharge: HOME | End: 2020-12-05
Attending: EMERGENCY MEDICINE | Admitting: EMERGENCY MEDICINE
Payer: COMMERCIAL

## 2020-12-05 VITALS
TEMPERATURE: 97.4 F | HEART RATE: 62 BPM | OXYGEN SATURATION: 99 % | BODY MASS INDEX: 35.96 KG/M2 | DIASTOLIC BLOOD PRESSURE: 59 MMHG | WEIGHT: 196.6 LBS | SYSTOLIC BLOOD PRESSURE: 110 MMHG | RESPIRATION RATE: 18 BRPM

## 2020-12-05 DIAGNOSIS — R20.2 PARESTHESIAS: Primary | ICD-10-CM

## 2020-12-05 DIAGNOSIS — E83.51 HYPOCALCEMIA: ICD-10-CM

## 2020-12-05 LAB
ALBUMIN SERPL BCP-MCNC: 3.6 G/DL (ref 3.5–5)
ALP SERPL-CCNC: 77 U/L (ref 46–116)
ALT SERPL W P-5'-P-CCNC: 39 U/L (ref 12–78)
ANION GAP SERPL CALCULATED.3IONS-SCNC: 11 MMOL/L (ref 4–13)
AST SERPL W P-5'-P-CCNC: 15 U/L (ref 5–45)
BASOPHILS # BLD AUTO: 0.03 THOUSANDS/ΜL (ref 0–0.1)
BASOPHILS NFR BLD AUTO: 0 % (ref 0–1)
BILIRUB SERPL-MCNC: 0.2 MG/DL (ref 0.2–1)
BUN SERPL-MCNC: 12 MG/DL (ref 5–25)
CA-I BLD-SCNC: 0.89 MMOL/L (ref 1.12–1.32)
CALCIUM SERPL-MCNC: 6.7 MG/DL (ref 8.3–10.1)
CHLORIDE SERPL-SCNC: 104 MMOL/L (ref 100–108)
CO2 SERPL-SCNC: 25 MMOL/L (ref 21–32)
CREAT SERPL-MCNC: 1 MG/DL (ref 0.6–1.3)
EOSINOPHIL # BLD AUTO: 0.08 THOUSAND/ΜL (ref 0–0.61)
EOSINOPHIL NFR BLD AUTO: 1 % (ref 0–6)
ERYTHROCYTE [DISTWIDTH] IN BLOOD BY AUTOMATED COUNT: 15.2 % (ref 11.6–15.1)
GFR SERPL CREATININE-BSD FRML MDRD: 69 ML/MIN/1.73SQ M
GLUCOSE SERPL-MCNC: 126 MG/DL (ref 65–140)
HCT VFR BLD AUTO: 41.4 % (ref 34.8–46.1)
HGB BLD-MCNC: 13.1 G/DL (ref 11.5–15.4)
IMM GRANULOCYTES # BLD AUTO: 0.15 THOUSAND/UL (ref 0–0.2)
IMM GRANULOCYTES NFR BLD AUTO: 2 % (ref 0–2)
LIPASE SERPL-CCNC: 234 U/L (ref 73–393)
LYMPHOCYTES # BLD AUTO: 2.19 THOUSANDS/ΜL (ref 0.6–4.47)
LYMPHOCYTES NFR BLD AUTO: 26 % (ref 14–44)
MAGNESIUM SERPL-MCNC: 1.6 MG/DL (ref 1.6–2.6)
MCH RBC QN AUTO: 29.8 PG (ref 26.8–34.3)
MCHC RBC AUTO-ENTMCNC: 31.6 G/DL (ref 31.4–37.4)
MCV RBC AUTO: 94 FL (ref 82–98)
MONOCYTES # BLD AUTO: 0.71 THOUSAND/ΜL (ref 0.17–1.22)
MONOCYTES NFR BLD AUTO: 8 % (ref 4–12)
NEUTROPHILS # BLD AUTO: 5.34 THOUSANDS/ΜL (ref 1.85–7.62)
NEUTS SEG NFR BLD AUTO: 63 % (ref 43–75)
NRBC BLD AUTO-RTO: 0 /100 WBCS
PLATELET # BLD AUTO: 173 THOUSANDS/UL (ref 149–390)
PMV BLD AUTO: 10.4 FL (ref 8.9–12.7)
POTASSIUM SERPL-SCNC: 3.1 MMOL/L (ref 3.5–5.3)
PROT SERPL-MCNC: 7.2 G/DL (ref 6.4–8.2)
RBC # BLD AUTO: 4.4 MILLION/UL (ref 3.81–5.12)
SODIUM SERPL-SCNC: 140 MMOL/L (ref 136–145)
WBC # BLD AUTO: 8.5 THOUSAND/UL (ref 4.31–10.16)

## 2020-12-05 PROCEDURE — 82330 ASSAY OF CALCIUM: CPT | Performed by: EMERGENCY MEDICINE

## 2020-12-05 PROCEDURE — 93005 ELECTROCARDIOGRAM TRACING: CPT

## 2020-12-05 PROCEDURE — 83690 ASSAY OF LIPASE: CPT | Performed by: EMERGENCY MEDICINE

## 2020-12-05 PROCEDURE — 36415 COLL VENOUS BLD VENIPUNCTURE: CPT | Performed by: EMERGENCY MEDICINE

## 2020-12-05 PROCEDURE — 99284 EMERGENCY DEPT VISIT MOD MDM: CPT

## 2020-12-05 PROCEDURE — 99284 EMERGENCY DEPT VISIT MOD MDM: CPT | Performed by: EMERGENCY MEDICINE

## 2020-12-05 PROCEDURE — 96365 THER/PROPH/DIAG IV INF INIT: CPT

## 2020-12-05 PROCEDURE — 96366 THER/PROPH/DIAG IV INF ADDON: CPT

## 2020-12-05 PROCEDURE — 85025 COMPLETE CBC W/AUTO DIFF WBC: CPT | Performed by: EMERGENCY MEDICINE

## 2020-12-05 PROCEDURE — 83735 ASSAY OF MAGNESIUM: CPT | Performed by: EMERGENCY MEDICINE

## 2020-12-05 PROCEDURE — 80053 COMPREHEN METABOLIC PANEL: CPT | Performed by: EMERGENCY MEDICINE

## 2020-12-05 RX ORDER — POTASSIUM CHLORIDE 20 MEQ/1
20 TABLET, EXTENDED RELEASE ORAL 2 TIMES DAILY
Qty: 30 TABLET | Refills: 0 | Status: SHIPPED | OUTPATIENT
Start: 2020-12-05 | End: 2021-06-07 | Stop reason: SDUPTHER

## 2020-12-05 RX ORDER — B-COMPLEX WITH VITAMIN C
1 TABLET ORAL
Qty: 150 TABLET | Refills: 0 | Status: SHIPPED | OUTPATIENT
Start: 2020-12-05 | End: 2021-03-07 | Stop reason: SDUPTHER

## 2020-12-05 RX ORDER — POTASSIUM CHLORIDE 20 MEQ/1
40 TABLET, EXTENDED RELEASE ORAL ONCE
Status: COMPLETED | OUTPATIENT
Start: 2020-12-05 | End: 2020-12-05

## 2020-12-05 RX ORDER — CALCIUM GLUCONATE 20 MG/ML
2 INJECTION, SOLUTION INTRAVENOUS ONCE
Status: COMPLETED | OUTPATIENT
Start: 2020-12-05 | End: 2020-12-05

## 2020-12-05 RX ADMIN — CALCIUM GLUCONATE 2 G: 20 INJECTION, SOLUTION INTRAVENOUS at 16:41

## 2020-12-05 RX ADMIN — POTASSIUM CHLORIDE 40 MEQ: 1500 TABLET, EXTENDED RELEASE ORAL at 17:01

## 2020-12-09 LAB
ATRIAL RATE: 73 BPM
P AXIS: 49 DEGREES
PR INTERVAL: 164 MS
QRS AXIS: 26 DEGREES
QRSD INTERVAL: 86 MS
QT INTERVAL: 426 MS
QTC INTERVAL: 469 MS
T WAVE AXIS: 32 DEGREES
VENTRICULAR RATE: 73 BPM

## 2020-12-09 PROCEDURE — 93010 ELECTROCARDIOGRAM REPORT: CPT | Performed by: INTERNAL MEDICINE

## 2020-12-15 ENCOUNTER — OFFICE VISIT (OUTPATIENT)
Dept: OBGYN CLINIC | Facility: CLINIC | Age: 45
End: 2020-12-15
Payer: COMMERCIAL

## 2020-12-15 VITALS
SYSTOLIC BLOOD PRESSURE: 146 MMHG | HEART RATE: 96 BPM | BODY MASS INDEX: 36.07 KG/M2 | HEIGHT: 62 IN | DIASTOLIC BLOOD PRESSURE: 87 MMHG | WEIGHT: 196 LBS

## 2020-12-15 DIAGNOSIS — G56.03 CARPAL TUNNEL SYNDROME, BILATERAL: Primary | ICD-10-CM

## 2020-12-15 PROCEDURE — 99213 OFFICE O/P EST LOW 20 MIN: CPT | Performed by: ORTHOPAEDIC SURGERY

## 2020-12-26 ENCOUNTER — APPOINTMENT (EMERGENCY)
Dept: RADIOLOGY | Facility: HOSPITAL | Age: 45
End: 2020-12-26
Payer: COMMERCIAL

## 2020-12-26 ENCOUNTER — HOSPITAL ENCOUNTER (EMERGENCY)
Facility: HOSPITAL | Age: 45
Discharge: HOME | End: 2020-12-26
Attending: EMERGENCY MEDICINE | Admitting: EMERGENCY MEDICINE
Payer: COMMERCIAL

## 2020-12-26 VITALS
OXYGEN SATURATION: 96 % | DIASTOLIC BLOOD PRESSURE: 85 MMHG | RESPIRATION RATE: 15 BRPM | SYSTOLIC BLOOD PRESSURE: 152 MMHG | TEMPERATURE: 97.6 F | HEART RATE: 66 BPM | BODY MASS INDEX: 34.75 KG/M2 | WEIGHT: 190 LBS

## 2020-12-26 DIAGNOSIS — M62.81 MUSCLE LEFT ARM WEAKNESS: Primary | ICD-10-CM

## 2020-12-26 LAB
ALBUMIN SERPL BCP-MCNC: 3.9 G/DL (ref 3.5–5)
ALP SERPL-CCNC: 77 U/L (ref 46–116)
ALT SERPL W P-5'-P-CCNC: 42 U/L (ref 12–78)
ANION GAP SERPL CALCULATED.3IONS-SCNC: 11 MMOL/L (ref 4–13)
AST SERPL W P-5'-P-CCNC: 23 U/L (ref 5–45)
BASOPHILS # BLD AUTO: 0.04 THOUSANDS/ΜL (ref 0–0.1)
BASOPHILS NFR BLD AUTO: 1 % (ref 0–1)
BILIRUB SERPL-MCNC: 0.3 MG/DL (ref 0.2–1)
BUN SERPL-MCNC: 19 MG/DL (ref 5–25)
CALCIUM SERPL-MCNC: 8.8 MG/DL (ref 8.3–10.1)
CHLORIDE SERPL-SCNC: 101 MMOL/L (ref 100–108)
CO2 SERPL-SCNC: 25 MMOL/L (ref 21–32)
CREAT SERPL-MCNC: 1.13 MG/DL (ref 0.6–1.3)
EOSINOPHIL # BLD AUTO: 0.1 THOUSAND/ΜL (ref 0–0.61)
EOSINOPHIL NFR BLD AUTO: 2 % (ref 0–6)
ERYTHROCYTE [DISTWIDTH] IN BLOOD BY AUTOMATED COUNT: 14.9 % (ref 11.6–15.1)
GFR SERPL CREATININE-BSD FRML MDRD: 59 ML/MIN/1.73SQ M
GLUCOSE SERPL-MCNC: 102 MG/DL (ref 65–140)
HCT VFR BLD AUTO: 42.7 % (ref 34.8–46.1)
HGB BLD-MCNC: 13.7 G/DL (ref 11.5–15.4)
IMM GRANULOCYTES # BLD AUTO: 0.02 THOUSAND/UL (ref 0–0.2)
IMM GRANULOCYTES NFR BLD AUTO: 0 % (ref 0–2)
LYMPHOCYTES # BLD AUTO: 2.25 THOUSANDS/ΜL (ref 0.6–4.47)
LYMPHOCYTES NFR BLD AUTO: 40 % (ref 14–44)
MCH RBC QN AUTO: 29.5 PG (ref 26.8–34.3)
MCHC RBC AUTO-ENTMCNC: 32.1 G/DL (ref 31.4–37.4)
MCV RBC AUTO: 92 FL (ref 82–98)
MONOCYTES # BLD AUTO: 0.5 THOUSAND/ΜL (ref 0.17–1.22)
MONOCYTES NFR BLD AUTO: 9 % (ref 4–12)
NEUTROPHILS # BLD AUTO: 2.72 THOUSANDS/ΜL (ref 1.85–7.62)
NEUTS SEG NFR BLD AUTO: 48 % (ref 43–75)
NRBC BLD AUTO-RTO: 0 /100 WBCS
PLATELET # BLD AUTO: 186 THOUSANDS/UL (ref 149–390)
PMV BLD AUTO: 9.7 FL (ref 8.9–12.7)
POTASSIUM SERPL-SCNC: 4.1 MMOL/L (ref 3.5–5.3)
PROT SERPL-MCNC: 7.9 G/DL (ref 6.4–8.2)
RBC # BLD AUTO: 4.65 MILLION/UL (ref 3.81–5.12)
SODIUM SERPL-SCNC: 137 MMOL/L (ref 136–145)
TROPONIN I SERPL-MCNC: <0.02 NG/ML
WBC # BLD AUTO: 5.63 THOUSAND/UL (ref 4.31–10.16)

## 2020-12-26 PROCEDURE — 99285 EMERGENCY DEPT VISIT HI MDM: CPT | Performed by: EMERGENCY MEDICINE

## 2020-12-26 PROCEDURE — 96374 THER/PROPH/DIAG INJ IV PUSH: CPT

## 2020-12-26 PROCEDURE — 36415 COLL VENOUS BLD VENIPUNCTURE: CPT | Performed by: EMERGENCY MEDICINE

## 2020-12-26 PROCEDURE — 80053 COMPREHEN METABOLIC PANEL: CPT | Performed by: EMERGENCY MEDICINE

## 2020-12-26 PROCEDURE — 85025 COMPLETE CBC W/AUTO DIFF WBC: CPT | Performed by: EMERGENCY MEDICINE

## 2020-12-26 PROCEDURE — 70498 CT ANGIOGRAPHY NECK: CPT

## 2020-12-26 PROCEDURE — 99285 EMERGENCY DEPT VISIT HI MDM: CPT

## 2020-12-26 PROCEDURE — 73060 X-RAY EXAM OF HUMERUS: CPT

## 2020-12-26 PROCEDURE — 84484 ASSAY OF TROPONIN QUANT: CPT | Performed by: EMERGENCY MEDICINE

## 2020-12-26 PROCEDURE — 93005 ELECTROCARDIOGRAM TRACING: CPT

## 2020-12-26 PROCEDURE — G1004 CDSM NDSC: HCPCS

## 2020-12-26 PROCEDURE — 70496 CT ANGIOGRAPHY HEAD: CPT

## 2020-12-26 RX ORDER — KETOROLAC TROMETHAMINE 30 MG/ML
15 INJECTION, SOLUTION INTRAMUSCULAR; INTRAVENOUS ONCE
Status: COMPLETED | OUTPATIENT
Start: 2020-12-26 | End: 2020-12-26

## 2020-12-26 RX ADMIN — IOHEXOL 85 ML: 350 INJECTION, SOLUTION INTRAVENOUS at 03:15

## 2020-12-26 RX ADMIN — KETOROLAC TROMETHAMINE 15 MG: 30 INJECTION, SOLUTION INTRAMUSCULAR at 03:09

## 2020-12-27 LAB
ATRIAL RATE: 75 BPM
P AXIS: 23 DEGREES
PR INTERVAL: 144 MS
QRS AXIS: 23 DEGREES
QRSD INTERVAL: 82 MS
QT INTERVAL: 396 MS
QTC INTERVAL: 442 MS
T WAVE AXIS: 31 DEGREES
VENTRICULAR RATE: 75 BPM

## 2020-12-27 PROCEDURE — 93010 ELECTROCARDIOGRAM REPORT: CPT | Performed by: INTERNAL MEDICINE

## 2021-01-11 ENCOUNTER — APPOINTMENT (OUTPATIENT)
Dept: RADIOLOGY | Facility: CLINIC | Age: 46
End: 2021-01-11
Payer: COMMERCIAL

## 2021-01-11 ENCOUNTER — OFFICE VISIT (OUTPATIENT)
Dept: OBGYN CLINIC | Facility: CLINIC | Age: 46
End: 2021-01-11
Payer: COMMERCIAL

## 2021-01-11 VITALS
WEIGHT: 190 LBS | DIASTOLIC BLOOD PRESSURE: 83 MMHG | BODY MASS INDEX: 34.96 KG/M2 | SYSTOLIC BLOOD PRESSURE: 117 MMHG | HEART RATE: 80 BPM | HEIGHT: 62 IN

## 2021-01-11 DIAGNOSIS — M67.912 TENDINOPATHY OF LEFT ROTATOR CUFF: Primary | ICD-10-CM

## 2021-01-11 DIAGNOSIS — M54.2 NECK PAIN: ICD-10-CM

## 2021-01-11 DIAGNOSIS — M77.12 LATERAL EPICONDYLITIS OF LEFT ELBOW: ICD-10-CM

## 2021-01-11 DIAGNOSIS — M25.512 LEFT SHOULDER PAIN, UNSPECIFIED CHRONICITY: ICD-10-CM

## 2021-01-11 DIAGNOSIS — R29.898 LEFT ARM WEAKNESS: ICD-10-CM

## 2021-01-11 DIAGNOSIS — S46.812A TRAPEZIUS MUSCLE STRAIN, LEFT, INITIAL ENCOUNTER: ICD-10-CM

## 2021-01-11 PROCEDURE — 3008F BODY MASS INDEX DOCD: CPT | Performed by: ORTHOPAEDIC SURGERY

## 2021-01-11 PROCEDURE — 72040 X-RAY EXAM NECK SPINE 2-3 VW: CPT

## 2021-01-11 PROCEDURE — 73030 X-RAY EXAM OF SHOULDER: CPT

## 2021-01-11 PROCEDURE — 99214 OFFICE O/P EST MOD 30 MIN: CPT | Performed by: ORTHOPAEDIC SURGERY

## 2021-01-28 ENCOUNTER — EVALUATION (OUTPATIENT)
Dept: PHYSICAL THERAPY | Facility: CLINIC | Age: 46
End: 2021-01-28
Payer: COMMERCIAL

## 2021-01-28 DIAGNOSIS — R29.898 LEFT ARM WEAKNESS: ICD-10-CM

## 2021-01-28 DIAGNOSIS — M54.12 CERVICAL RADICULOPATHY: Primary | ICD-10-CM

## 2021-01-28 DIAGNOSIS — M67.912 TENDINOPATHY OF LEFT ROTATOR CUFF: ICD-10-CM

## 2021-01-28 DIAGNOSIS — M77.12 LATERAL EPICONDYLITIS OF LEFT ELBOW: ICD-10-CM

## 2021-01-28 DIAGNOSIS — S46.812D STRAIN OF LEFT TRAPEZIUS MUSCLE, SUBSEQUENT ENCOUNTER: ICD-10-CM

## 2021-01-28 PROCEDURE — 97162 PT EVAL MOD COMPLEX 30 MIN: CPT | Performed by: PHYSICAL THERAPIST
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
## 2021-02-04 ENCOUNTER — TELEPHONE (OUTPATIENT)
Dept: PHYSICAL THERAPY | Facility: CLINIC | Age: 46
End: 2021-02-04

## 2021-02-04 NOTE — TELEPHONE ENCOUNTER
Call placed to pt to verify virtual PT appt for tomorrow 2/5 at 2 pm as well as review HEP issued and sent to pt via email after eval last week  Advised pt to call back with any questions   Zoom link sent to pt via email for tomorrow's Virtual session

## 2021-02-04 NOTE — TELEPHONE ENCOUNTER
Call placed to pt re: HEP  Pt states she is doing her HEP as tolerated  Pt reporting she is having a difficult time with her ADLs and any overhead activity due to pain L LOGAN  Will follow up again with pt tomorrow for virtual PT session

## 2021-02-05 ENCOUNTER — TELEMEDICINE (OUTPATIENT)
Dept: PHYSICAL THERAPY | Facility: CLINIC | Age: 46
End: 2021-02-05
Payer: COMMERCIAL

## 2021-02-05 DIAGNOSIS — S46.812D STRAIN OF LEFT TRAPEZIUS MUSCLE, SUBSEQUENT ENCOUNTER: ICD-10-CM

## 2021-02-05 DIAGNOSIS — M54.12 CERVICAL RADICULOPATHY: ICD-10-CM

## 2021-02-05 DIAGNOSIS — M77.12 LATERAL EPICONDYLITIS OF LEFT ELBOW: ICD-10-CM

## 2021-02-05 DIAGNOSIS — M67.912 TENDINOPATHY OF LEFT ROTATOR CUFF: Primary | ICD-10-CM

## 2021-02-05 PROCEDURE — 97112 NEUROMUSCULAR REEDUCATION: CPT | Performed by: PHYSICAL THERAPIST

## 2021-02-05 PROCEDURE — 97110 THERAPEUTIC EXERCISES: CPT | Performed by: PHYSICAL THERAPIST
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
## 2021-02-12 ENCOUNTER — TELEMEDICINE (OUTPATIENT)
Dept: PHYSICAL THERAPY | Facility: CLINIC | Age: 46
End: 2021-02-12
Payer: COMMERCIAL

## 2021-02-12 DIAGNOSIS — R29.898 LEFT ARM WEAKNESS: Primary | ICD-10-CM

## 2021-02-12 DIAGNOSIS — M77.12 LATERAL EPICONDYLITIS OF LEFT ELBOW: ICD-10-CM

## 2021-02-12 DIAGNOSIS — M67.912 TENDINOPATHY OF LEFT ROTATOR CUFF: ICD-10-CM

## 2021-02-12 DIAGNOSIS — S46.812D STRAIN OF LEFT TRAPEZIUS MUSCLE, SUBSEQUENT ENCOUNTER: ICD-10-CM

## 2021-02-12 PROCEDURE — 97112 NEUROMUSCULAR REEDUCATION: CPT | Performed by: PHYSICAL THERAPIST

## 2021-02-12 PROCEDURE — 97110 THERAPEUTIC EXERCISES: CPT | Performed by: PHYSICAL THERAPIST
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
## 2021-02-17 ENCOUNTER — TELEPHONE (OUTPATIENT)
Dept: PHYSICAL THERAPY | Facility: CLINIC | Age: 46
End: 2021-02-17

## 2021-02-17 NOTE — TELEPHONE ENCOUNTER
Call placed to pt re: call placed to Dr Anne Nye office to report that pt reporting no improvement with PT  Pt stating that symptoms are worsening  Call placed to pt and I advised pt to call MD office and make another follow up  Pt to see Enedina Bay on Tues 2/23  Will f/u with pt after MD visit

## 2021-02-18 ENCOUNTER — APPOINTMENT (OUTPATIENT)
Dept: PHYSICAL THERAPY | Facility: CLINIC | Age: 46
End: 2021-02-18
Payer: COMMERCIAL

## 2021-02-19 ENCOUNTER — APPOINTMENT (OUTPATIENT)
Dept: PHYSICAL THERAPY | Facility: CLINIC | Age: 46
End: 2021-02-19
Payer: COMMERCIAL

## 2021-02-23 ENCOUNTER — APPOINTMENT (OUTPATIENT)
Dept: RADIOLOGY | Facility: CLINIC | Age: 46
End: 2021-02-23
Payer: COMMERCIAL

## 2021-02-23 ENCOUNTER — OFFICE VISIT (OUTPATIENT)
Dept: OBGYN CLINIC | Facility: CLINIC | Age: 46
End: 2021-02-23
Payer: COMMERCIAL

## 2021-02-23 VITALS
SYSTOLIC BLOOD PRESSURE: 96 MMHG | WEIGHT: 190 LBS | DIASTOLIC BLOOD PRESSURE: 68 MMHG | HEART RATE: 85 BPM | BODY MASS INDEX: 34.96 KG/M2 | HEIGHT: 62 IN

## 2021-02-23 DIAGNOSIS — M24.812 INTERNAL DERANGEMENT OF LEFT SHOULDER: Primary | ICD-10-CM

## 2021-02-23 DIAGNOSIS — M77.12 LATERAL EPICONDYLITIS OF LEFT ELBOW: ICD-10-CM

## 2021-02-23 DIAGNOSIS — M25.522 PAIN IN LEFT ELBOW: ICD-10-CM

## 2021-02-23 PROCEDURE — 99213 OFFICE O/P EST LOW 20 MIN: CPT | Performed by: ORTHOPAEDIC SURGERY

## 2021-02-23 PROCEDURE — 73080 X-RAY EXAM OF ELBOW: CPT

## 2021-02-23 RX ORDER — DICLOFENAC SODIUM 75 MG/1
75 TABLET, DELAYED RELEASE ORAL 2 TIMES DAILY
Qty: 60 TABLET | Refills: 0 | Status: SHIPPED | OUTPATIENT
Start: 2021-02-23 | End: 2021-07-12
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
## 2021-02-25 DIAGNOSIS — U07.1 COVID-19: ICD-10-CM

## 2021-02-25 PROCEDURE — U0003 INFECTIOUS AGENT DETECTION BY NUCLEIC ACID (DNA OR RNA); SEVERE ACUTE RESPIRATORY SYNDROME CORONAVIRUS 2 (SARS-COV-2) (CORONAVIRUS DISEASE [COVID-19]), AMPLIFIED PROBE TECHNIQUE, MAKING USE OF HIGH THROUGHPUT TECHNOLOGIES AS DESCRIBED BY CMS-2020-01-R: HCPCS | Performed by: INTERNAL MEDICINE

## 2021-02-25 PROCEDURE — U0005 INFEC AGEN DETEC AMPLI PROBE: HCPCS | Performed by: INTERNAL MEDICINE

## 2021-02-26 ENCOUNTER — APPOINTMENT (OUTPATIENT)
Dept: PHYSICAL THERAPY | Facility: CLINIC | Age: 46
End: 2021-02-26
Payer: COMMERCIAL

## 2021-02-26 LAB
CREAT ?TM UR-SCNC: 109.5 UMOL/L
EXT MICROALBUMIN URINE RANDOM: 4.3
MICROALBUMIN/CREAT UR: 4 MG/G{CREAT}
SARS-COV-2 RNA RESP QL NAA+PROBE: NEGATIVE

## 2021-03-07 ENCOUNTER — HOSPITAL ENCOUNTER (EMERGENCY)
Facility: HOSPITAL | Age: 46
Discharge: HOME | End: 2021-03-07
Attending: EMERGENCY MEDICINE | Admitting: EMERGENCY MEDICINE
Payer: COMMERCIAL

## 2021-03-07 VITALS
TEMPERATURE: 97.8 F | OXYGEN SATURATION: 98 % | DIASTOLIC BLOOD PRESSURE: 64 MMHG | RESPIRATION RATE: 23 BRPM | SYSTOLIC BLOOD PRESSURE: 113 MMHG | HEART RATE: 67 BPM

## 2021-03-07 DIAGNOSIS — R20.2 PARESTHESIAS: Primary | ICD-10-CM

## 2021-03-07 DIAGNOSIS — E83.51 HYPOCALCEMIA: ICD-10-CM

## 2021-03-07 LAB
ALBUMIN SERPL BCP-MCNC: 3.6 G/DL (ref 3.5–5)
ALP SERPL-CCNC: 66 U/L (ref 46–116)
ALT SERPL W P-5'-P-CCNC: 55 U/L (ref 12–78)
ANION GAP SERPL CALCULATED.3IONS-SCNC: 10 MMOL/L (ref 4–13)
AST SERPL W P-5'-P-CCNC: 18 U/L (ref 5–45)
BASOPHILS # BLD AUTO: 0.05 THOUSANDS/ΜL (ref 0–0.1)
BASOPHILS NFR BLD AUTO: 1 % (ref 0–1)
BILIRUB SERPL-MCNC: 0.3 MG/DL (ref 0.2–1)
BUN SERPL-MCNC: 19 MG/DL (ref 5–25)
CA-I BLD-SCNC: 0.94 MMOL/L (ref 1.12–1.32)
CALCIUM SERPL-MCNC: 6.9 MG/DL (ref 8.3–10.1)
CHLORIDE SERPL-SCNC: 101 MMOL/L (ref 100–108)
CO2 SERPL-SCNC: 26 MMOL/L (ref 21–32)
CREAT SERPL-MCNC: 0.89 MG/DL (ref 0.6–1.3)
EOSINOPHIL # BLD AUTO: 0.14 THOUSAND/ΜL (ref 0–0.61)
EOSINOPHIL NFR BLD AUTO: 1 % (ref 0–6)
ERYTHROCYTE [DISTWIDTH] IN BLOOD BY AUTOMATED COUNT: 15.8 % (ref 11.6–15.1)
GFR SERPL CREATININE-BSD FRML MDRD: 79 ML/MIN/1.73SQ M
GLUCOSE SERPL-MCNC: 99 MG/DL (ref 65–140)
HCG SERPL QL: NEGATIVE
HCT VFR BLD AUTO: 42.7 % (ref 34.8–46.1)
HGB BLD-MCNC: 13.7 G/DL (ref 11.5–15.4)
IMM GRANULOCYTES # BLD AUTO: 0.14 THOUSAND/UL (ref 0–0.2)
IMM GRANULOCYTES NFR BLD AUTO: 1 % (ref 0–2)
LYMPHOCYTES # BLD AUTO: 2.63 THOUSANDS/ΜL (ref 0.6–4.47)
LYMPHOCYTES NFR BLD AUTO: 27 % (ref 14–44)
MAGNESIUM SERPL-MCNC: 1.9 MG/DL (ref 1.6–2.6)
MCH RBC QN AUTO: 30 PG (ref 26.8–34.3)
MCHC RBC AUTO-ENTMCNC: 32.1 G/DL (ref 31.4–37.4)
MCV RBC AUTO: 94 FL (ref 82–98)
MONOCYTES # BLD AUTO: 0.92 THOUSAND/ΜL (ref 0.17–1.22)
MONOCYTES NFR BLD AUTO: 9 % (ref 4–12)
NEUTROPHILS # BLD AUTO: 6.03 THOUSANDS/ΜL (ref 1.85–7.62)
NEUTS SEG NFR BLD AUTO: 61 % (ref 43–75)
NRBC BLD AUTO-RTO: 0 /100 WBCS
PLATELET # BLD AUTO: 226 THOUSANDS/UL (ref 149–390)
PMV BLD AUTO: 10 FL (ref 8.9–12.7)
POTASSIUM SERPL-SCNC: 3.8 MMOL/L (ref 3.5–5.3)
PROT SERPL-MCNC: 7.2 G/DL (ref 6.4–8.2)
RBC # BLD AUTO: 4.56 MILLION/UL (ref 3.81–5.12)
SODIUM SERPL-SCNC: 137 MMOL/L (ref 136–145)
WBC # BLD AUTO: 9.91 THOUSAND/UL (ref 4.31–10.16)

## 2021-03-07 PROCEDURE — 83735 ASSAY OF MAGNESIUM: CPT | Performed by: EMERGENCY MEDICINE

## 2021-03-07 PROCEDURE — 99284 EMERGENCY DEPT VISIT MOD MDM: CPT

## 2021-03-07 PROCEDURE — 82330 ASSAY OF CALCIUM: CPT | Performed by: EMERGENCY MEDICINE

## 2021-03-07 PROCEDURE — 96365 THER/PROPH/DIAG IV INF INIT: CPT

## 2021-03-07 PROCEDURE — 84703 CHORIONIC GONADOTROPIN ASSAY: CPT | Performed by: EMERGENCY MEDICINE

## 2021-03-07 PROCEDURE — 36415 COLL VENOUS BLD VENIPUNCTURE: CPT | Performed by: EMERGENCY MEDICINE

## 2021-03-07 PROCEDURE — 80053 COMPREHEN METABOLIC PANEL: CPT | Performed by: EMERGENCY MEDICINE

## 2021-03-07 PROCEDURE — 99285 EMERGENCY DEPT VISIT HI MDM: CPT | Performed by: EMERGENCY MEDICINE

## 2021-03-07 PROCEDURE — 85025 COMPLETE CBC W/AUTO DIFF WBC: CPT | Performed by: EMERGENCY MEDICINE

## 2021-03-07 PROCEDURE — 93005 ELECTROCARDIOGRAM TRACING: CPT

## 2021-03-07 RX ORDER — CALCIUM GLUCONATE 20 MG/ML
2 INJECTION, SOLUTION INTRAVENOUS ONCE
Status: COMPLETED | OUTPATIENT
Start: 2021-03-07 | End: 2021-03-07

## 2021-03-07 RX ORDER — B-COMPLEX WITH VITAMIN C
1 TABLET ORAL
Qty: 150 TABLET | Refills: 0 | Status: SHIPPED | OUTPATIENT
Start: 2021-03-07 | End: 2021-03-31 | Stop reason: SDUPTHER

## 2021-03-07 RX ADMIN — CALCIUM GLUCONATE 2 G: 20 INJECTION, SOLUTION INTRAVENOUS at 21:43

## 2021-03-08 LAB
ATRIAL RATE: 68 BPM
P AXIS: 48 DEGREES
PR INTERVAL: 158 MS
QRS AXIS: 24 DEGREES
QRSD INTERVAL: 80 MS
QT INTERVAL: 430 MS
QTC INTERVAL: 457 MS
T WAVE AXIS: 33 DEGREES
VENTRICULAR RATE: 68 BPM

## 2021-03-08 PROCEDURE — 93010 ELECTROCARDIOGRAM REPORT: CPT | Performed by: INTERNAL MEDICINE

## 2021-03-08 NOTE — ED PROVIDER NOTES
History  Chief Complaint   Patient presents with    Numbness     c/o generalized numbness and tingling, feels like my calcium is off again       History provided by:  Patient   used: No      Patient has a history of hypocalcemia and presents for evaluation of generalized numbness and tingling feels like her hypocalcemia  Patient states she had back injections several days ago, stopped taking her calcium after the procedure and has had the symptoms for roughly 1 day  Attempted to take additional oral calcium home without improvement  Denies any new or different symptoms  Denies any focal numbness, weakness, tingling  Denies any difficulty with her speech  Reports chronic diarrhea  Prior to Admission Medications   Prescriptions Last Dose Informant Patient Reported? Taking?    ALPRAZolam (XANAX) 1 mg tablet   Yes No   Sig: Take 1 mg by mouth 2 (two) times a day as needed am   baclofen 10 mg tablet   Yes No   Sig: Take 10 mg by mouth 3 (three) times a day as needed     calcitriol (ROCALTROL) 0 25 mcg capsule  Self Yes No   Sig: Take 1 capsule by mouth daily    calcium carbonate-vitamin D (OSCAL-D) 500 mg-200 units per tablet   No No   Sig: Take 1 tablet by mouth 5 (five) times a day 9a 2p 7p 9p   calcium carbonate-vitamin D (OSCAL-D) 500 mg-200 units per tablet   No No   Sig: Take 1 tablet by mouth 5 (five) times a day 9a 2p 7p 9p   diclofenac (VOLTAREN) 75 mg EC tablet   No No   Sig: Take 1 tablet (75 mg total) by mouth 2 (two) times a day   fenofibrate (TRIGLIDE) 160 MG tablet   Yes No   Sig: Take 160 mg by mouth daily   ferrous sulfate 325 (65 Fe) mg tablet   Yes No   Sig: Take 325 mg by mouth 2 (two) times a day    ibuprofen (MOTRIN) 600 mg tablet   No No   Sig: Take 1 tablet (600 mg total) by mouth every 6 (six) hours as needed for mild pain or moderate pain   levothyroxine (SYNTHROID) 150 mcg tablet  Self Yes No   Sig: Take 1 tablet by mouth daily Every day except for sundays magnesium oxide (MAG-OX) 400 mg   Yes No   Sig: Take 400 mg by mouth 3 (three) times a day 9pm    ondansetron (ZOFRAN) 4 mg tablet   Yes No   Sig: Take 4 mg by mouth every 8 (eight) hours as needed for nausea or vomiting  oxyCODONE-acetaminophen (PERCOCET) 5-325 mg per tablet  Self Yes No   Sig: Take 2 tablets by mouth every 6 (six) hours as needed     potassium chloride (K-DUR,KLOR-CON) 20 mEq tablet   No No   Sig: Take 1 tablet (20 mEq total) by mouth 2 (two) times a day Takes 2 tabs bid   pregabalin (LYRICA) 75 mg capsule   Yes No   Sig: Take 75 mg by mouth 3 (three) times a day as needed    zolpidem (AMBIEN CR) 6 25 MG CR tablet  Self Yes No   Sig: Take 6 25 mg by mouth daily at bedtime as needed for sleep      Facility-Administered Medications: None       Past Medical History:   Diagnosis Date    Acid reflux     Acute renal failure (HCC)     multiple episodes    Anemia     Anxiety     Chronic pain     DDD (degenerative disc disease), lumbar     Disease of thyroid gland     had surgery and now hypo    DVT (deep venous thrombosis) ([facility] [address] )     s/p ankle fracture    Hypercalcemia     Hyperlipidemia     Hyperthyroidism     Hypocalcemia     post op 2016    Migraine     Psychiatric disorder     anxiety depression    Seizures (HCC)     petit mal x1  4 years ago- all tests were neg   Spondylolisthesis of lumbar region     Treatment     spinal pain injections  last was 2016       Past Surgical History:   Procedure Laterality Date    BACK SURGERY       SECTION      x5    CYSTOCELE REPAIR  2017    DISCOGRAM      PARATHYROIDECTOMY      NE ANTERIOR COLPORRAPHY RPR CYSTOCELE W/CYSTO N/A 2017    Procedure: CYSTOCELE REPAIR;  Surgeon: Yahir Lackey MD;  Location: [address] [place] [address];  Service: Gynecology    NE ARTHRODESIS POSTERIOR INTERBODY LUMBAR N/A 2016    Procedure: L4-S1 LUMBAR LAMINECTOMY/DECOMPRESSION;  INSTRUMENTED POSTEROLATERAL FUSION/ INTERBODY L5-S1;   ALLOGRAFT AND AUTOGRAFT (IMPULSE) ; Surgeon: Enedina Manning MD;  Location:  MAIN OR;  Service: Orthopedics    SD CYSTOURETHROSCOPY,URETER CATHETER Bilateral 12/7/2018    Procedure: INSERTION URETERAL CATHETERS PREOP;  Surgeon: Carolyn Pearson MD;  Location: [address] [place] [address];  Service: Urology    SD SLING OPER STRES INCONTINENCE N/A 5/4/2017    Procedure: MID URETHRAL SLING;  Surgeon: Tata Salas MD;  Location: [address] [place] [address];  Service: Urology    SD SUPRACERV ABD HYSTERECTOMY N/A 12/7/2018    Procedure: SUPRACERVICAL HYSTERECTOMY;  Surgeon: Taco Michel MD;  Location: [facility];  Service: Gynecology    THYROIDECTOMY      TONSILECTOMY AND ADNOIDECTOMY      TONSILLECTOMY      TUBAL LIGATION         Family History   Problem Relation Age of Onset    Diabetes Mother     Hypertension Mother     Hyperlipidemia Father     Arrhythmia Father     Lung cancer Father     Diabetes Father      I have reviewed and agree with the history as documented  E-Cigarette/Vaping    E-Cigarette Use Never User      E-Cigarette/Vaping Substances    Nicotine No     THC No     CBD No     Flavoring No     Other No     Unknown No      Social History     Tobacco Use    Smoking status: Current Every Day Smoker     Packs/day: 0 50     Years: 25 00     Pack years: 12 50     Types: Cigarettes    Smokeless tobacco: Never Used   Substance Use Topics    Alcohol use: Not Currently     Frequency: 2-4 times a month     Comment: Alcohol intake:   Occasional  - As per Marisela Crawford Drug use: No       Review of Systems   Neurological: Positive for numbness  All other systems reviewed and are negative  Physical Exam  Physical Exam  Vitals signs and nursing note reviewed  Constitutional:       General: She is not in acute distress  Appearance: She is well-developed  HENT:      Head: Normocephalic and atraumatic  Eyes:      Pupils: Pupils are equal, round, and reactive to light     Neck:      Musculoskeletal: Normal range of motion and neck supple  Cardiovascular:      Rate and Rhythm: Normal rate and regular rhythm  Heart sounds: Normal heart sounds  No murmur  Pulmonary:      Effort: Pulmonary effort is normal  No respiratory distress  Breath sounds: Normal breath sounds  No wheezing or rales  Abdominal:      General: Bowel sounds are normal  There is no distension  Palpations: Abdomen is soft  Tenderness: There is no abdominal tenderness  There is no guarding or rebound  Musculoskeletal: Normal range of motion  General: No deformity  Lymphadenopathy:      Cervical: No cervical adenopathy  Skin:     Capillary Refill: Capillary refill takes less than 2 seconds  Findings: No erythema or rash  Neurological:      Mental Status: She is alert and oriented to person, place, and time  Cranial Nerves: No cranial nerve deficit  Motor: No abnormal muscle tone  Coordination: Coordination normal       Comments: Normal cranial nerve exam   Normal strength and sensation bilateral upper and lower extremities  Normal coordination, normal gait     Psychiatric:         Behavior: Behavior normal          Vital Signs  ED Triage Vitals [03/07/21 1913]   Temperature Pulse Respirations Blood Pressure SpO2   97 8 °F (36 6 °C) 82 16 135/80 96 %      Temp Source Heart Rate Source Patient Position - Orthostatic VS BP Location FiO2 (%)   Tympanic Monitor Sitting Right arm --      Pain Score       No Pain           Vitals:    03/07/21 1913 03/07/21 2253   BP: 135/80 113/64   Pulse: 82 67   Patient Position - Orthostatic VS: Sitting Lying         Visual Acuity      ED Medications  Medications   calcium gluconate 2 g in sodium chloride 0 9% 100 mL (premix) (0 g Intravenous Stopped 3/7/21 2251)       Diagnostic Studies  Results Reviewed     Procedure Component Value Units Date/Time    Magnesium [[id number]]  (Normal) Collected: 03/07/21 2106    Lab Status: Final result Specimen: Blood from Arm, Left Updated: 03/07/21 2148     Magnesium 1 9 mg/dL     hCG, qualitative pregnancy [[id number]]  (Normal) Collected: 03/07/21 2106    Lab Status: Final result Specimen: Blood from Arm, Left Updated: 03/07/21 2148     Preg, Serum Negative    Comprehensive metabolic panel [[id number]]  (Abnormal) Collected: 03/07/21 2106    Lab Status: Final result Specimen: Blood from Arm, Left Updated: 03/07/21 2127     Sodium 137 mmol/L      Potassium 3 8 mmol/L      Chloride 101 mmol/L      CO2 26 mmol/L      ANION GAP 10 mmol/L      BUN 19 mg/dL      Creatinine 0 89 mg/dL      Glucose 99 mg/dL      Calcium 6 9 mg/dL      AST 18 U/L      ALT 55 U/L      Alkaline Phosphatase 66 U/L      Total Protein 7 2 g/dL      Albumin 3 6 g/dL      Total Bilirubin 0 30 mg/dL      eGFR 79 ml/min/1 73sq m     Narrative:      [place] guidelines for Chronic Kidney Disease (CKD):     Stage 1 with normal or high GFR (GFR > 90 mL/min/1 73 square meters)    Stage 2 Mild CKD (GFR = 60-89 mL/min/1 73 square meters)    Stage 3A Moderate CKD (GFR = 45-59 mL/min/1 73 square meters)    Stage 3B Moderate CKD (GFR = 30-44 mL/min/1 73 square meters)    Stage 4 Severe CKD (GFR = 15-29 mL/min/1 73 square meters)    Stage 5 End Stage CKD (GFR <15 mL/min/1 73 square meters)  Note: GFR calculation is accurate only with a steady state creatinine    CBC and differential [[id number]]  (Abnormal) Collected: 03/07/21 2106    Lab Status: Final result Specimen: Blood from Arm, Left Updated: 03/07/21 2111     WBC 9 91 Thousand/uL      RBC 4 56 Million/uL      Hemoglobin 13 7 g/dL      Hematocrit 42 7 %      MCV 94 fL      MCH 30 0 pg      MCHC 32 1 g/dL      RDW 15 8 %      MPV 10 0 fL      Platelets 612 Thousands/uL      nRBC 0 /100 WBCs      Neutrophils Relative 61 %      Immat GRANS % 1 %      Lymphocytes Relative 27 %      Monocytes Relative 9 %      Eosinophils Relative 1 %      Basophils Relative 1 %      Neutrophils Absolute 6 03 Thousands/µL Immature Grans Absolute 0 14 Thousand/uL      Lymphocytes Absolute 2 63 Thousands/µL      Monocytes Absolute 0 92 Thousand/µL      Eosinophils Absolute 0 14 Thousand/µL      Basophils Absolute 0 05 Thousands/µL     Calcium, ionized [[id number]]  (Abnormal) Collected: 03/07/21 2106    Lab Status: Final result Specimen: Blood from Arm, Left Updated: 03/07/21 2111     Calcium, Ionized 0 94 mmol/L                  No orders to display              Procedures  ECG 12 Lead Documentation Only    Date/Time: 3/7/2021 9:07 PM  Performed by: Carlos Serrato MD  Authorized by: Carlos Serrato MD     Indications / Diagnosis:  Hypocalcemia  ECG reviewed by me, the ED Provider: yes    Patient location:  ED  Interpretation:     Interpretation: normal    Rate:     ECG rate:  68    ECG rate assessment: normal    Rhythm:     Rhythm: sinus rhythm    Ectopy:     Ectopy: none    QRS:     QRS axis:  Normal    QRS intervals:  Normal  Conduction:     Conduction: normal    ST segments:     ST segments:  Normal  T waves:     T waves: normal    Comments:                   ED Course                                           MDM  Number of Diagnoses or Management Options  Hypocalcemia: new and requires workup  Paresthesias: new and requires workup  Diagnosis management comments: Ionized calcium 0 94, patient symptomatic, 2 g IV calcium ordered for patient  Will monitor on telemetry in ER  Will check EKG, additional laboratory studies  Patient typically improves after IV calcium  Additional labs okay  No EKG abnormalities  Patient feels better after 2 g of IV calcium  Recommended continued outpatient follow-up with her PCP, endocrinologist   Patient given refill prescription for outpatient calcium supplementation as she is not sure if she has enough at home         Amount and/or Complexity of Data Reviewed  Clinical lab tests: ordered and reviewed  Tests in the medicine section of CPT®: reviewed and ordered    Risk of Complications, Morbidity, and/or Mortality  Presenting problems: high  Diagnostic procedures: moderate  Management options: moderate    Patient Progress  Patient progress: improved      Disposition  Final diagnoses:   Paresthesias   Hypocalcemia     Time reflects when diagnosis was documented in both MDM as applicable and the Disposition within this note     Time User Action Codes Description Comment    3/7/2021  9:13 PM Geradine Dex[place] Add [R20 2] Paresthesias     3/7/2021  9:13 PM Geradine [place] Add [E83 51] Hypocalcemia       ED Disposition     ED Disposition Condition Date/Time Comment    Discharge Stable Sun Mar 7, 2021 10:56 PM [facility] discharge to home/self care              Follow-up Information     Follow up With Specialties Details Why Contact Info Additional Information    Joaquina Russ MD Internal Medicine Schedule an appointment as soon as possible for a visit in 1 day As needed [address] [facility] Emergency Medicine Go to  If symptoms worsen [address] [address] Emergency Department, [place], [place], [facility]    Your endocrinologist  Schedule an appointment as soon as possible for a visit in 2 days ER follow-up for hypocalcemia            Current Discharge Medication List      CONTINUE these medications which have CHANGED    Details   calcium carbonate-vitamin D (OSCAL-D) 500 mg-200 units per tablet Take 1 tablet by mouth 5 (five) times a day 9a 2p 7p 9p  Qty: 150 tablet, Refills: 0    Associated Diagnoses: Hypocalcemia         CONTINUE these medications which have NOT CHANGED    Details   ALPRAZolam (XANAX) 1 mg tablet Take 1 mg by mouth 2 (two) times a day as needed am      baclofen 10 mg tablet Take 10 mg by mouth 3 (three) times a day as needed        calcitriol (ROCALTROL) 0 25 mcg capsule Take 1 capsule by mouth daily       diclofenac (VOLTAREN) 75 mg EC tablet Take 1 tablet (75 mg total) by mouth 2 (two) times a day  Qty: 60 tablet, Refills: 0    Associated Diagnoses: Pain in left elbow; Lateral epicondylitis of left elbow      fenofibrate (TRIGLIDE) 160 MG tablet Take 160 mg by mouth daily      ferrous sulfate 325 (65 Fe) mg tablet Take 325 mg by mouth 2 (two) times a day       ibuprofen (MOTRIN) 600 mg tablet Take 1 tablet (600 mg total) by mouth every 6 (six) hours as needed for mild pain or moderate pain  Qty: 16 tablet, Refills: 0    Associated Diagnoses: Musculoskeletal back pain      levothyroxine (SYNTHROID) 150 mcg tablet Take 1 tablet by mouth daily Every day except for sundays      magnesium oxide (MAG-OX) 400 mg Take 400 mg by mouth 3 (three) times a day 9pm       ondansetron (ZOFRAN) 4 mg tablet Take 4 mg by mouth every 8 (eight) hours as needed for nausea or vomiting  oxyCODONE-acetaminophen (PERCOCET) 5-325 mg per tablet Take 2 tablets by mouth every 6 (six) hours as needed        potassium chloride (K-DUR,KLOR-CON) 20 mEq tablet Take 1 tablet (20 mEq total) by mouth 2 (two) times a day Takes 2 tabs bid  Qty: 30 tablet, Refills: 0    Associated Diagnoses: Hypocalcemia      pregabalin (LYRICA) 75 mg capsule Take 75 mg by mouth 3 (three) times a day as needed       zolpidem (AMBIEN CR) 6 25 MG CR tablet Take 6 25 mg by mouth daily at bedtime as needed for sleep           No discharge procedures on file      PDMP Review     None          ED Provider  Electronically Signed by           Hector Farfan MD  03/07/21 7493

## 2021-03-11 ENCOUNTER — TELEPHONE (OUTPATIENT)
Dept: HEMATOLOGY ONCOLOGY | Facility: CLINIC | Age: 46
End: 2021-03-11

## 2021-03-11 NOTE — TELEPHONE ENCOUNTER
New Patient Encounter    New Patient Intake Form   Patient Details:  Salina Ward  1975  [id number]    Background Information:  [address] starts by opening a telephone encounter and gathering the following information   Who is calling to schedule? If not self, relationship to patient? self   Referring Provider Dr Jen Marin   What is the diagnosis? Abnormal bloodwork    Is this diagnosis confirmed? Yes   When was the diagnosis? 3/2021   Is there a confirmed diagnosis from a biopsy/tissue reviewed by pathology? na   Were outside slides requested? NA   Is patient aware of diagnosis? Yes   Is there a personal history and what kind? No   Is there a family history and what kind? No   Reason for visit? New Diagnosis   Have you had any imaging or labs done? If so: when, where? Yes - [facility]   Are records in [facility]? yes   If patient has a prior history of breast cancer were old records obtained? NA   Was the patient told to bring a disk? No   Does the patient smoke or Vape? Yes   If yes, how many packs or cartridges per day? 1/2 pk/day   Scheduling Information:   Preferred [place]:  [place]     Are there any dates/time the patient cannot be seen? Miscellaneous:    After completing the above information, please route to Financial Counselor and the appropriate Nurse Navigator for review

## 2021-03-15 ENCOUNTER — DOCUMENTATION (OUTPATIENT)
Dept: HEMATOLOGY ONCOLOGY | Facility: MEDICAL CENTER | Age: 46
End: 2021-03-15

## 2021-03-18 ENCOUNTER — CONSULT (OUTPATIENT)
Dept: HEMATOLOGY ONCOLOGY | Facility: MEDICAL CENTER | Age: 46
End: 2021-03-18
Payer: COMMERCIAL

## 2021-03-18 ENCOUNTER — TRANSCRIBE ORDERS (OUTPATIENT)
Dept: HEMATOLOGY ONCOLOGY | Facility: MEDICAL CENTER | Age: 46
End: 2021-03-18

## 2021-03-18 VITALS
OXYGEN SATURATION: 99 % | RESPIRATION RATE: 18 BRPM | DIASTOLIC BLOOD PRESSURE: 100 MMHG | WEIGHT: 202 LBS | TEMPERATURE: 96.8 F | HEART RATE: 90 BPM | HEIGHT: 62 IN | SYSTOLIC BLOOD PRESSURE: 140 MMHG | BODY MASS INDEX: 37.17 KG/M2

## 2021-03-18 DIAGNOSIS — R79.89 ELEVATED FERRITIN: Primary | ICD-10-CM

## 2021-03-18 DIAGNOSIS — Z83.2 FAMILY HISTORY OF COAGULATION DISORDER: ICD-10-CM

## 2021-03-18 DIAGNOSIS — Z86.2 HISTORY OF COAGULOPATHY: Primary | ICD-10-CM

## 2021-03-18 DIAGNOSIS — E78.2 HYPERCHOLESTEROLEMIA WITH HYPERTRIGLYCERIDEMIA: ICD-10-CM

## 2021-03-18 DIAGNOSIS — E83.51 HYPOCALCEMIA: ICD-10-CM

## 2021-03-18 DIAGNOSIS — Z86.39 HISTORY OF IRON DEFICIENCY: ICD-10-CM

## 2021-03-18 DIAGNOSIS — K76.0 NAFLD (NONALCOHOLIC FATTY LIVER DISEASE): ICD-10-CM

## 2021-03-18 DIAGNOSIS — Z86.2 HISTORY OF COAGULOPATHY: ICD-10-CM

## 2021-03-18 PROCEDURE — 99214 OFFICE O/P EST MOD 30 MIN: CPT | Performed by: PHYSICIAN ASSISTANT

## 2021-03-18 NOTE — PROGRESS NOTES
[facility] [address] [address]  Hematology Ambulatory Consult  Clive Barreto, 1975, [id number]  3/18/2021    Assessment/Plan:  1  Elevated ferritin  2/26/21 ferritin = 552, iron saturation = 25%, TIBC = 356    Patient was referred to us by endocrinology secondary to elevated ferritin levels in the concern for iron overload  Patient has been taking iron sulfate twice a day over the past several months  Moreover, the patient has had significant issues with underlying disease including  Fatty liver and hypercholesterolemia  typically patients who have underlying iron overload syndrome will have iron saturations greater than 40% females  Well patient's ferritin is elevated, this may actually in fact reflect underlying disease such as fatty liver disease  CT scan from 2017 did demonstrate that the patient has fatty liver  I did not order repeat ultrasound at this time  Patient has not had a menstruation in a year and a half  I do not believe that natural blood letting is contributing to iron saturation results  Regardless, workup in order to investigate a primary iron overload syndrome has been requested  Patient will go for hemochromatosis evaluation followed by repeat ferritin levels and iron saturation along with inflammatory markers to possibly explain other etiologies to elevated ferritin  CBC has also been requested  - CBC and differential; Future  - Hemochromatosis mutation; Future  - Ferritin; Future  - Iron Saturation %; Future  - Sedimentation rate, automated; Future  - C-reactive protein; Future    2  Hypercholesterolemia with hypertriglyceridemia; 3  NAFLD (nonalcoholic fatty liver disease); 4  Hypocalcemia    Patient has significant endocrinology issues  Patient has been seeing an endocrinologist outside of the [address] [place] [address] system and is looking to transition her care    Patient does become symptomatic of hypocalcemia  Patient has been to the emergency room for this issue  This office will help arrange follow-up with Endocrinology  I remain available to anybody in the endocrinology department if they have any questions or concerns regarding hematology issues  - Ambulatory referral to Endocrinology; Future  - Sedimentation rate, automated; Future  - C-reactive protein; Future    5  History of iron deficiency  See 1  Above  Historically patient has been very iron deficient  This is likely secondary to history of coagulopathy that is unknown and undiagnosed to the patient's knowledge  Regardless, patient has been taking iron supplements twice a day for several years  Considering the elevated ferritin level I have asked her to discontinue this and take a pill every other day  Regimen:    Iron  Sulfate 325 mg by mouth Monday, Wednesday and Friday  - Ferritin; Future    6  History of coagulopathy; 7  Family history of coagulation disorder    As a child, patient describes diagnostics for bleeding time result  Patient had this done repeatedly while she was young  Interestingly she states that her father had the same procedure  Patient has had several surgeries for which she has needed blood transfusions after each  This would include  thyroid removal and back surgery  Patient also states that she has had some problems with chronic blood loss due to  tract  Patient had surgery approximately year and half ago   Due to ongoing bleeding  I do not have PT / PTT results available  I will request this in addition to a bleeding diathesis workup  Patient gets blood work completed at [id number] and the appropriate referrals have been made for this blood test     - Protime-INR; Future  - APTT; Future    The patient is scheduled for follow-up in approximately 2-3 weeks with Dr Annie Marshall  Patient voiced agreement and understanding to the above   Patient knows to call the Hematology/Oncology office with any questions and concerns regarding the above  Barrier(s) to care: None  The patient is able to self care     -------------------------------------------------------------------------------------------------------    Chief Complaint   Patient presents with    Consult     Abnormal Labs     Referring provider:  No referring provider defined for this encounter  History of present illness: This is a 26-year-old female with past medical history of postsurgical hypo parathyroid is an, postsurgical hypothyroidism, degenerative disc disease status post fusion L4 - L5, Provoked DVT during Casting of left ankle, extremely elevated cholesterol and hypertriglyceridemia on Fenofibrate with some improvement,   Bladder prolapse S/P surgical intervention and symptomatic hypocalcemia seen by endocrinology,  history of  I childhood bleeding disorder who presents to the Hematology Clinic at the request of endocrinology due to elevated ferritin level  Patient denies history of this, but admits to iron deficiency in the past and is presently taking iron twice a day daily  Patient has had several surgeries  Patient reports that after the majority of her surgery she has needed blood products  Patient does state that her memory isn't as good as it used to be  Patient knows for certain that after her C-sections she needed blood products  Patient had several C-sections, five pregnancies  Patient recalls needing blood products after her thyroidectomy and approximately 2016 and patient believes that with her partial hysterectomy and bladder reconstruction, she also needed blood products however, in the computer I do not see any blood product transfusion at that time  In her childhood, patient remembers having her arm cut with a "razor blade "   And that she was diagnosed with some medic training in blood disorder    Review of patient's blood test did not demonstrate microcytic hemoglobin in fact MCV is rather normal at 90  Patient states her father also had the same disorder  Question von Willebrand's disease vs other bleeding disorder ( Factor VIII, Factor IX)  Patient's ancestry includes a mother who has "American "and a father from [place], [facility]  Patient's father had a similar bleeding disorder  Patient's 213year-old son has issues with magnesium problems in similar disorders for which his mother has  No specific issue with bleeding disorder  Other children seem unaffected  Interval history:    Patient notes that overall her health is quite poor  Patient states that her ability to remember anything has become compromised  Since that time does not exist   Patient has had a hospitalization recently secondary to hypocalcemia which was a result of removal of the parathyroid scans during thyroidectomy  Patient is looking to transition care to [facility]  Patient is in the process of looking for an endocrinologist     Primary Care provider:   Angelia Khan MD    Review of Systems   Constitutional: Positive for fatigue  Negative for appetite change, fever and unexpected weight change  HENT: Negative for nosebleeds  Respiratory: Negative for cough, choking and shortness of breath  Negative hemoptysis  Cardiovascular: Negative for chest pain, palpitations and leg swelling  Gastrointestinal: Negative  Negative for abdominal distention, abdominal pain, anal bleeding, blood in stool, constipation, diarrhea, nausea and vomiting  Endocrine: Negative  Negative for cold intolerance  Genitourinary: Negative  Negative for hematuria, menstrual problem, vaginal bleeding, vaginal discharge and vaginal pain  Musculoskeletal: Negative  Negative for arthralgias, myalgias, neck pain and neck stiffness  Skin: Negative  Negative for color change, pallor and rash  Allergic/Immunologic: Negative  Negative for immunocompromised state  Neurological: Negative  Negative for weakness and headaches  Hematological: Negative for adenopathy  Does not bruise/bleed easily  Psychiatric/Behavioral:        Memory imparment     All other systems reviewed and are negative  Patient Active Problem List   Diagnosis    DDD (degenerative disc disease), lumbar    [facility]ar[facility]roidism [facility]    [facility]    Postoperative hypothyroidism    Elevated ALT measurement     Past Medical History:   Diagnosis Date    Acid reflux     Acute renal failure (HCC)     multiple episodes    Anemia     Anxiety     Chronic pain     DDD (degenerative disc disease), lumbar     Disease of thyroid gland     had surgery and now hypo    DVT (deep venous thrombosis) ([facility] )     s/p ankle fracture    Hypercalcemia     Hyperlipidemia     Hyperthyroidism     Hypocalcemia     post op     Migraine     Psychiatric disorder     anxiety depression    Seizures (HCC)     petit mal x1  4 years ago- all tests were neg   Spondylolisthesis of lumbar region     Treatment     spinal pain injections  last was 2016     Past Surgical History:   Procedure Laterality Date    BACK SURGERY       SECTION      x5    CYSTOCELE REPAIR  2017    DISCOGRAM      PARATHYROIDECTOMY      [place] ANTERIOR COLPORRAPHY RPR CYSTOCELE W/CYSTO N/A 2017    Procedure: CYSTOCELE REPAIR;  Surgeon: Gabriela Smith MD;  Location: [address] [place] [address];  Service: Gynecology    [place] ARTHRODESIS POSTERIOR INTERBODY LUMBAR N/A 2016    Procedure: L4-S1 LUMBAR LAMINECTOMY/DECOMPRESSION;  INSTRUMENTED POSTEROLATERAL FUSION/ INTERBODY L5-S1; ALLOGRAFT AND AUTOGRAFT (IMPULSE) ;   Surgeon: Melissa Braswell MD;  Location:  MAIN OR;  Service: Orthopedics    [place] CYSTOURETHROSCOPY,URETER CATHETER Bilateral 2018    Procedure: INSERTION URETERAL CATHETERS PREOP;  Surgeon: Klairssa Bradley MD;  Location: [address] [place] [address];  Service: Urology    [place] [facility] INCONTINENCE N/A 5/4/2017    Procedure: MID URETHRAL SLING;  Surgeon: Errol Hester MD;  Location: [facility];  Service: Urology    [place] [address] ABD HYSTERECTOMY N/A 12/7/2018    Procedure: SUPRACERVICAL HYSTERECTOMY;  Surgeon: Adamaris Gross MD;  Location: [facility];  Service: Gynecology    THYROIDECTOMY      TONSILECTOMY AND ADNOIDECTOMY      TONSILLECTOMY      TUBAL LIGATION       Family History   Problem Relation Age of Onset    Diabetes Mother     Hypertension Mother     Hyperlipidemia Father     Arrhythmia Father     Lung cancer Father     Diabetes Father      Social History     Socioeconomic History    Marital status: /Civil Union     Spouse name: None    Number of children: None    Years of education: 15    Highest education level: None   Occupational History    None   Social Needs    Financial resource strain: None    Food insecurity     Worry: None     Inability: None    Transportation needs     Medical: None     Non-medical: None   Tobacco Use    Smoking status: Current Every Day Smoker     Packs/day: 0 50     Years: 25 00     Pack years: 12 50     Types: Cigarettes    Smokeless tobacco: Never Used   Substance and Sexual Activity    Alcohol use: Not Currently     Frequency: 2-4 times a month     Comment: Alcohol intake:   Occasional  - As per Zamzam Fermin Drug use: No    Sexual activity: Yes   Lifestyle    Physical activity     Days per week: None     Minutes per session: None    Stress: None   Relationships    Social connections     Talks on phone: None     Gets together: None     Attends Yazidism service: None     Active member of club or organization: None     Attends meetings of clubs or organizations: None     Relationship status: None    Intimate partner violence     Fear of current or ex partner: None     Emotionally abused: None     Physically abused: None     Forced sexual activity: None   Other Topics Concern    None   Social History Narrative    Most recent tobacco use screenin2018      General stress level:   Medium       Exercise level:   Occasional       Diet:   Regular      Caffeine intake:   Occasional     As per Bonnita Schlatter        Current Outpatient Medications:     ALPRAZolam (XANAX) 1 mg tablet, Take 1 mg by mouth daily am, Disp: , Rfl:     baclofen 10 mg tablet, Take 10 mg by mouth 3 (three) times a day , Disp: , Rfl:     calcitriol (ROCALTROL) 0 25 mcg capsule, Take 1 capsule by mouth daily , Disp: , Rfl:     calcium carbonate-vitamin D (OSCAL-D) 500 mg-200 units per tablet, Take 1 tablet by mouth 5 (five) times a day 9a 2p 7p 9p, Disp: 150 tablet, Rfl: 0    fenofibrate (TRIGLIDE) 160 MG tablet, Take 160 mg by mouth daily, Disp: , Rfl:     ferrous sulfate 325 (65 Fe) mg tablet, Take 325 mg by mouth Once Monday, wed, Friday, Disp: , Rfl:     ibuprofen (MOTRIN) 600 mg tablet, Take 1 tablet (600 mg total) by mouth every 6 (six) hours as needed for mild pain or moderate pain, Disp: 16 tablet, Rfl: 0    levothyroxine (SYNTHROID) 150 mcg tablet, Take 1 tablet by mouth daily Every day except for sundays, Disp: , Rfl:     magnesium oxide (MAG-OX) 400 mg, Take 400 mg by mouth 3 (three) times a day 9pm , Disp: , Rfl:     ondansetron (ZOFRAN) 4 mg tablet, Take 4 mg by mouth every 8 (eight) hours as needed for nausea or vomiting , Disp: , Rfl:     oxyCODONE-acetaminophen (PERCOCET) 5-325 mg per tablet, Take 2 tablets by mouth every 6 (six) hours as needed  , Disp: , Rfl:     potassium chloride (K-DUR,KLOR-CON) 20 mEq tablet, Take 1 tablet (20 mEq total) by mouth 2 (two) times a day Takes 2 tabs bid, Disp: 30 tablet, Rfl: 0    pregabalin (LYRICA) 75 mg capsule, Take 75 mg by mouth 3 (three) times a day as needed , Disp: , Rfl:     zolpidem (AMBIEN CR) 6 25 MG CR tablet, Take 6 25 mg by mouth daily at bedtime as needed for sleep, Disp: , Rfl:     diclofenac (VOLTAREN) 75 mg EC tablet, Take 1 tablet (75 mg total) by mouth 2 (two) times a day (Patient not taking: Reported on 3/18/2021), Disp: 60 tablet, Rfl: 0    Allergies   Allergen Reactions    Vicodin [Hydrocodone-Acetaminophen] Rash    Morphine And Related GI Intolerance     Nausea      Adhesive [Medical Tape] Rash     Objective:  /100 (BP Location: Left arm, Patient Position: Sitting, Cuff Size: Large)   Pulse 90   Temp (!) 96 8 °F (36 °C)   Resp 18   Ht 5' 2" (1 575 m)   Wt 91 6 kg (202 lb)   LMP 12/06/2018   SpO2 99%   BMI 36 95 kg/m²   Physical Exam  Constitutional:       General: She is not in acute distress  Appearance: She is well-developed  HENT:      Head: Normocephalic and atraumatic  Eyes:      General: No scleral icterus  Pupils: Pupils are equal, round, and reactive to light  Cardiovascular:      Rate and Rhythm: Normal rate and regular rhythm  Heart sounds: No murmur  Pulmonary:      Effort: Pulmonary effort is normal  No respiratory distress  Abdominal:      General: There is no distension  Musculoskeletal:      Right lower leg: No edema  Left lower leg: No edema  Skin:     General: Skin is warm  Coloration: Skin is not pale  Findings: No rash  Neurological:      Mental Status: She is alert and oriented to person, place, and time  Psychiatric:         Thought Content:  Thought content normal          Result Review  Labs:  Admission on 03/07/2021, Discharged on 03/07/2021   Component Date Value Ref Range Status    WBC 03/07/2021 9 91  4 31 - 10 16 Thousand/uL Final    RBC 03/07/2021 4 56  3 81 - 5 12 Million/uL Final    Hemoglobin 03/07/2021 13 7  11 5 - 15 4 g/dL Final    Hematocrit 03/07/2021 42 7  34 8 - 46 1 % Final    MCV 03/07/2021 94  82 - 98 fL Final    MCH 03/07/2021 30 0  26 8 - 34 3 pg Final    MCHC 03/07/2021 32 1  31 4 - 37 4 g/dL Final    RDW 03/07/2021 15 8* 11 6 - 15 1 % Final    MPV 03/07/2021 10 0  8 9 - 12 7 fL Final    Platelets 52/51/3463 226  149 - 390 Thousands/uL Final    nRBC 03/07/2021 0 /100 WBCs Final    Neutrophils Relative 03/07/2021 61  43 - 75 % Final    Immat GRANS % 03/07/2021 1  0 - 2 % Final    Lymphocytes Relative 03/07/2021 27  14 - 44 % Final    Monocytes Relative 03/07/2021 9  4 - 12 % Final    Eosinophils Relative 03/07/2021 1  0 - 6 % Final    Basophils Relative 03/07/2021 1  0 - 1 % Final    Neutrophils Absolute 03/07/2021 6 03  1 85 - 7 62 Thousands/µL Final    Immature Grans Absolute 03/07/2021 0 14  0 00 - 0 20 Thousand/uL Final    Lymphocytes Absolute 03/07/2021 2 63  0 60 - 4 47 Thousands/µL Final    Monocytes Absolute 03/07/2021 0 92  0 17 - 1 22 Thousand/µL Final    Eosinophils Absolute 03/07/2021 0 14  0 00 - 0 61 Thousand/µL Final    Basophils Absolute 03/07/2021 0 05  0 00 - 0 10 Thousands/µL Final    Sodium 03/07/2021 137  136 - 145 mmol/L Final    Potassium 03/07/2021 3 8  3 5 - 5 3 mmol/L Final    Chloride 03/07/2021 101  100 - 108 mmol/L Final    CO2 03/07/2021 26  21 - 32 mmol/L Final    ANION GAP 03/07/2021 10  4 - 13 mmol/L Final    BUN 03/07/2021 19  5 - 25 mg/dL Final    Creatinine 03/07/2021 0 89  0 60 - 1 30 mg/dL Final    Standardized to IDMS reference method    Glucose 03/07/2021 99  65 - 140 mg/dL Final    If the patient is fasting, the ADA then defines impaired fasting glucose as > 100 mg/dL and diabetes as > or equal to 123 mg/dL  Specimen collection should occur prior to Sulfasalazine administration due to the potential for falsely depressed results  Specimen collection should occur prior to Sulfapyridine administration due to the potential for falsely elevated results   Calcium 03/07/2021 6 9* 8 3 - 10 1 mg/dL Final    AST 03/07/2021 18  5 - 45 U/L Final    Specimen collection should occur prior to Sulfasalazine administration due to the potential for falsely depressed results       ALT 03/07/2021 55  12 - 78 U/L Final    Specimen collection should occur prior to Sulfasalazine administration due to the potential for falsely depressed results   Alkaline Phosphatase 03/07/2021 66  46 - 116 U/L Final    Total Protein 03/07/2021 7 2  6 4 - 8 2 g/dL Final    Albumin 03/07/2021 3 6  3 5 - 5 0 g/dL Final    Total Bilirubin 03/07/2021 0 30  0 20 - 1 00 mg/dL Final    Use of this assay is not recommended for patients undergoing treatment with eltrombopag due to the potential for falsely elevated results   eGFR 03/07/2021 79  ml/min/1 73sq m Final    Magnesium 03/07/2021 1 9  1 6 - 2 6 mg/dL Final    Preg, Serum 03/07/2021 Negative  Negative Final    Calcium, Ionized 03/07/2021 0 94* 1 12 - 1 32 mmol/L Final    Ventricular Rate 03/07/2021 68  BPM Final    Atrial Rate 03/07/2021 68  BPM Final    MI Interval 03/07/2021 158  ms Final    QRSD Interval 03/07/2021 80  ms Final    QT Interval 03/07/2021 430  ms Final    QTC Interval 03/07/2021 457  ms Final    P Axis 03/07/2021 48  degrees Final    QRS Axis 03/07/2021 24  degrees Final    T Wave Axis 03/07/2021 33  degrees Final     Lab Results   Component Value Date    CALCIUM 6 9 (L) 03/07/2021    PHOS 3 7 08/11/2020     Lab Results   Component Value Date    PTT 25 11/29/2018     Lab Results   Component Value Date    INR 0 89 11/29/2018    INR 0 89 08/01/2016    INR 0 92 02/29/2016    PROTIME 9 4 11/29/2018    PROTIME 12 2 08/01/2016    PROTIME 9 7 02/29/2016       Imaging:     CT AB & Pelvis w/o IV Contrast 1/24/2017  LIVER/BILIARY TREE:  Extensive fatty change is present in the liver  Liver is of normal size  IMPRESSION:  1  Fatty change in the liver  2   Suspected small gallstone in the gallbladder  This can be confirmed with an ultrasound  3   Postoperative changes in the lower lumbar spine as described above  Findings are consistent with the preliminary report from [facility] which was provided shortly after completion of the exam    Please note: This report has been generated by a voice recognition software system   Therefore there may be syntax, spelling, and/or grammatical errors  Please call if you have any questions

## 2021-03-25 NOTE — PROGRESS NOTES
3/25/21: Pt DC from skilled PT to MD for further medical assessment due to lack of progress with PT and increase in symptoms

## 2021-03-31 ENCOUNTER — CONSULT (OUTPATIENT)
Dept: ENDOCRINOLOGY | Facility: CLINIC | Age: 46
End: 2021-03-31
Payer: COMMERCIAL

## 2021-03-31 VITALS
BODY MASS INDEX: 36.29 KG/M2 | SYSTOLIC BLOOD PRESSURE: 136 MMHG | HEIGHT: 62 IN | WEIGHT: 197.2 LBS | HEART RATE: 88 BPM | DIASTOLIC BLOOD PRESSURE: 82 MMHG | TEMPERATURE: 97.3 F

## 2021-03-31 DIAGNOSIS — E83.51 HYPOCALCEMIA: ICD-10-CM

## 2021-03-31 DIAGNOSIS — E89.0 POSTOPERATIVE HYPOTHYROIDISM: Chronic | ICD-10-CM

## 2021-03-31 DIAGNOSIS — E55.9 VITAMIN D DEFICIENCY: ICD-10-CM

## 2021-03-31 DIAGNOSIS — E89.2 POSTSURGICAL HYPOPARATHYROIDISM (HCC): Primary | Chronic | ICD-10-CM

## 2021-03-31 DIAGNOSIS — F41.9 ANXIETY: ICD-10-CM

## 2021-03-31 PROCEDURE — 99245 OFF/OP CONSLTJ NEW/EST HI 55: CPT | Performed by: INTERNAL MEDICINE

## 2021-03-31 PROCEDURE — 3008F BODY MASS INDEX DOCD: CPT | Performed by: INTERNAL MEDICINE

## 2021-03-31 RX ORDER — ERGOCALCIFEROL 1.25 MG/1
50000 CAPSULE ORAL WEEKLY
Qty: 8 CAPSULE | Refills: 0 | Status: SHIPPED | OUTPATIENT
Start: 2021-03-31 | End: 2022-04-19

## 2021-03-31 RX ORDER — B-COMPLEX WITH VITAMIN C
2 TABLET ORAL
Qty: 180 TABLET | Refills: 3 | Status: SHIPPED | OUTPATIENT
Start: 2021-03-31 | End: 2021-07-12

## 2021-03-31 NOTE — PROGRESS NOTES
Jose Barnes 41 y o  female MRN: [id number]    Encounter: [id number]      Assessment/Plan     1   postoperative hypoparathyroidism   2  Hypocalcemia  3  Vitamin-D deficiency  - Increase calcium supplementation to 1000 mg 3 times a day with meals   - Continue Rocaltrol 0 25 mg orally daily   - start vitamin D2 50,000 international units orally once a week for 8 weeks  Then start maintenance with vitamin D3 2000 international units orally daily  -  Advised to take additional calcium, Tums if she develops symptoms of hypocalcemia  -  Check BMP, magnesium level in 1-2 weeks  - Repeat vitamin-D level in 3 months     4  Postoperative hypothyroidism   Has symptoms that could be associated with both hypo and hyperthyroidism  Continue levothyroxine 150 mcg orally daily for now   -check TSH, free T4      5  Anxiety- follow-up with primary care/behavioral health     CC:  Hypothyroidism, hypocalcemia    History of Present Illness     HPI:  Jose Barnes is a 41 y o  female who is here for a new consult for hypocalcemia  History of post operative hypothyroidism and hypoparathyroidism  Has a h/o goiter with multiple thyroid nodule with compressive symptoms   H/o total thyroidectomy approx 5 years ago at [address] hypocalcemia; says she was admitted to the ICU for 5 days, had stroke like symptoms       Has been following up with Dr Candance Plummer     Currently on the following   Levothyroxine 150 mcg orally daily   complaint, on an empty stomach   Takes calcium 1 hr after     Rocaltrol 0 25 mg orally daily   Calcium 500mg- VitaminD3 :  2 tablets with Breakfast, 1 lunch, 2 with dinner     says that if she takes 2 tablets daily her calcium drops and she needs hospitalization     Gets numbness/tingling whole body once in 2 weeks   When that happens also has difficulty in breathing and feels like she needs to gasp for air  Gets panic attacks  - takes nyquil and     C/o fatigue   Gaining weight  C/o diarrhea Feels hot all the time   Gets occasional chest pain   Gets shakes, no palpitations   Has anxiety X 2 years    Labs 2021:   Vitamin D 10 9   TSH 0 9, free T4 1 7   PTH 6    All other systems were reviewed and are negative  Review of Systems    Historical Information   Past Medical History:   Diagnosis Date    Acid reflux     Acute renal failure (HCC)     multiple episodes    Anemia     Anxiety     Chronic pain     DDD (degenerative disc disease), lumbar     Disease of thyroid gland     had surgery and now hypo    DVT (deep venous thrombosis) ([facility] [address] )     s/p ankle fracture    Hypercalcemia     Hyperlipidemia     Hyperthyroidism     Hypocalcemia     post op 2016    Migraine     Psychiatric disorder     anxiety depression    Seizures (HCC)     petit mal x1  4 years ago- all tests were neg   Spondylolisthesis of lumbar region     Treatment     spinal pain injections  last was 2016     Past Surgical History:   Procedure Laterality Date    BACK SURGERY       SECTION      x5    CYSTOCELE REPAIR  2017    DISCOGRAM      PARATHYROIDECTOMY      [place] ANTERIOR COLPORRAPHY RPR CYSTOCELE W/CYSTO N/A 2017    Procedure: CYSTOCELE REPAIR;  Surgeon: Frances Hua MD;  Location: [address] [place] [address];  Service: Gynecology    [place] ARTHRODESIS POSTERIOR INTERBODY LUMBAR N/A 2016    Procedure: L4-S1 LUMBAR LAMINECTOMY/DECOMPRESSION;  INSTRUMENTED POSTEROLATERAL FUSION/ INTERBODY L5-S1; ALLOGRAFT AND AUTOGRAFT (IMPULSE) ;   Surgeon: Ruddy Moreira MD;  [facility];  Service: Orthopedics    [place] CYSTOURETHROSCOPY,URETER CATHETER Bilateral 2018    Procedure: INSERTION URETERAL CATHETERS PREOP;  Surgeon: Carlos Teran MD;  Location: [address] [place] [address];  Service: Urology    [place] SLING OPER STRES INCONTINENCE N/A 2017    Procedure: MID URETHRAL SLING;  Surgeon: Kelly Meyer MD;  Location: [address] [place] [address];  Service: Urology    [place] [address] ABD HYSTERECTOMY N/A 2018 Procedure: SUPRACERVICAL HYSTERECTOMY;  Surgeon: Layton Avendaño MD;  [facility];  Service: Gynecology    THYROIDECTOMY      TONSILECTOMY AND ADNOIDECTOMY      TONSILLECTOMY      TUBAL LIGATION       Social History   Social History     Substance and Sexual Activity   Alcohol Use Not Currently    Frequency: 2-4 times a month    Comment: Alcohol intake:   Occasional  - As per [place]      Social History     Substance and Sexual Activity   Drug Use No     Social History     Tobacco Use   Smoking Status Current Every Day Smoker    Packs/day: 0 50    Years: 25 00    Pack years: 12 50    Types: Cigarettes   Smokeless Tobacco Never Used     Family History:   Family History   Problem Relation Age of Onset    Diabetes Mother     Hypertension Mother     Hyperlipidemia Father     Arrhythmia Father     Lung cancer Father     Diabetes Father        Meds/Allergies   Current Outpatient Medications   Medication Sig Dispense Refill    ALPRAZolam (XANAX) 1 mg tablet Take 1 mg by mouth daily am      baclofen 10 mg tablet Take 10 mg by mouth 3 (three) times a day       calcitriol (ROCALTROL) 0 25 mcg capsule Take 1 capsule by mouth daily       calcium carbonate-vitamin D (OSCAL-D) 500 mg-200 units per tablet Take 1 tablet by mouth 5 (five) times a day 9a 2p 7p 9p 150 tablet 0    fenofibrate (TRIGLIDE) 160 MG tablet Take 160 mg by mouth daily      ferrous sulfate 325 (65 Fe) mg tablet Take 325 mg by mouth Once Monday, wed, Friday      ibuprofen (MOTRIN) 600 mg tablet Take 1 tablet (600 mg total) by mouth every 6 (six) hours as needed for mild pain or moderate pain 16 tablet 0    levothyroxine (SYNTHROID) 150 mcg tablet Take 1 tablet by mouth daily Every day except for sundays      magnesium oxide (MAG-OX) 400 mg Take 400 mg by mouth 3 (three) times a day 9pm       ondansetron (ZOFRAN) 4 mg tablet Take 4 mg by mouth every 8 (eight) hours as needed for nausea or vomiting        oxyCODONE-acetaminophen (PERCOCET) 5-325 mg per tablet Take 2 tablets by mouth daily       potassium chloride (K-DUR,KLOR-CON) 20 mEq tablet Take 1 tablet (20 mEq total) by mouth 2 (two) times a day Takes 2 tabs bid 30 tablet 0    pregabalin (LYRICA) 75 mg capsule Take 75 mg by mouth 3 (three) times a day as needed       zolpidem (AMBIEN CR) 6 25 MG CR tablet Take 6 25 mg by mouth daily at bedtime as needed for sleep      diclofenac (VOLTAREN) 75 mg EC tablet Take 1 tablet (75 mg total) by mouth 2 (two) times a day (Patient not taking: Reported on 3/18/2021) 60 tablet 0     No current facility-administered medications for this visit  Allergies   Allergen Reactions    Vicodin [Hydrocodone-Acetaminophen] Rash    Morphine And Related GI Intolerance     Nausea      Adhesive [Medical Tape] Rash       Objective   Vitals: Blood pressure 136/82, pulse 88, temperature (!) 97 3 °F (36 3 °C), height 5' 2" (1 575 m), weight 89 4 kg (197 lb 3 2 oz), last menstrual period 12/06/2018, not currently breastfeeding  Physical Exam  Constitutional:       Appearance: She is well-developed  HENT:      Head: Normocephalic and atraumatic  Eyes:      Conjunctiva/sclera: Conjunctivae normal       Pupils: Pupils are equal, round, and reactive to light  Neck:      Musculoskeletal: Normal range of motion and neck supple  Thyroid: No thyromegaly  Comments: Well healed anterior scar +  Cardiovascular:      Rate and Rhythm: Normal rate and regular rhythm  Heart sounds: Normal heart sounds  No murmur  Pulmonary:      Effort: Pulmonary effort is normal       Breath sounds: Normal breath sounds  No wheezing  Abdominal:      General: There is no distension  Palpations: Abdomen is soft  Tenderness: There is no abdominal tenderness  Musculoskeletal: Normal range of motion  Lymphadenopathy:      Cervical: No cervical adenopathy  Skin:     General: Skin is warm and dry     Neurological:      Mental Status: She is alert and oriented to person, place, and time  Deep Tendon Reflexes: Reflexes normal       Comments: Fine tremors on the outstretched arms, left > right      Psychiatric:         Behavior: Behavior normal          The history was obtained from the review of the chart, patient  Lab Results:   Lab Results   Component Value Date/Time    TSH 3RD GENERATON 2 042 11/16/2020 07:53 PM    TSH 3RD GENERATON 2 468 09/21/2020 12:07 AM     Lab Results   Component Value Date    WBC 9 91 03/07/2021    HGB 13 7 03/07/2021    HCT 42 7 03/07/2021    MCV 94 03/07/2021     03/07/2021     Lab Results   Component Value Date    CREATININE 0 89 03/07/2021    BUN 19 03/07/2021    K 3 8 03/07/2021     03/07/2021    CO2 26 03/07/2021     Lab Results   Component Value Date    HGBA1C 5 4 11/29/2018     Component      Latest Ref Rng & Units 11/16/2020 12/5/2020 12/26/2020 3/7/2021   Calcium      8 3 - 10 1 mg/dL 8 5 6 7 (L) 8 8 6 9 (L)   AST      5 - 45 U/L 21 15 23 18   ALT      12 - 78 U/L 43 39 42 55   Alkaline Phosphatase      46 - 116 U/L 58 77 77 66   Total Protein      6 4 - 8 2 g/dL 8 0 7 2 7 9 7 2   Albumin      3 5 - 5 0 g/dL 4 1 3 6 3 9 3 6   TOTAL BILIRUBIN      0 20 - 1 00 mg/dL 0 60 0 20 0 30 0 30   eGFR      ml/min/1 73sq m 67 69 59 79   Phosphorus      2 7 - 4 5 mg/dL       Calcium, Ionized      1 12 - 1 32 mmol/L 1 04 (L) 0 89 (L)  0 94 (L)       Imaging Studies:        I have personally reviewed pertinent reports  Portions of the record may have been created with voice recognition software  Occasional wrong word or "sound a like" substitutions may have occurred due to the inherent limitations of voice recognition software  Read the chart carefully and recognize, using context, where substitutions have occurred

## 2021-03-31 NOTE — PATIENT INSTRUCTIONS
Continue levothyroxine 150 mg orally daily on an empty stomach   Increase calcium supplementation to 2 tablets 3 times a day with meals   Continue Rocaltrol 0 25 mg orally daily   If you have symptoms of hypocalcemia, please take additional calcium supplementation, it is okay to take calcium in the form of Tums     please have labs done as ordered in 1-2 weeks  Repeat vitamin D levels in 3 months    Follow up in 2-3 months

## 2021-05-10 DIAGNOSIS — U07.1 COVID-19: ICD-10-CM

## 2021-05-10 PROCEDURE — U0003 INFECTIOUS AGENT DETECTION BY NUCLEIC ACID (DNA OR RNA); SEVERE ACUTE RESPIRATORY SYNDROME CORONAVIRUS 2 (SARS-COV-2) (CORONAVIRUS DISEASE [COVID-19]), AMPLIFIED PROBE TECHNIQUE, MAKING USE OF HIGH THROUGHPUT TECHNOLOGIES AS DESCRIBED BY CMS-2020-01-R: HCPCS | Performed by: INTERNAL MEDICINE

## 2021-05-10 PROCEDURE — U0005 INFEC AGEN DETEC AMPLI PROBE: HCPCS | Performed by: INTERNAL MEDICINE

## 2021-05-11 LAB — SARS-COV-2 RNA RESP QL NAA+PROBE: NEGATIVE

## 2021-05-12 DIAGNOSIS — E83.51 HYPOCALCEMIA: ICD-10-CM

## 2021-05-12 RX ORDER — POTASSIUM CHLORIDE 20 MEQ/1
20 TABLET, EXTENDED RELEASE ORAL 2 TIMES DAILY
Qty: 60 TABLET | Refills: 3 | OUTPATIENT
Start: 2021-05-12 | End: 2021-06-11

## 2021-05-12 NOTE — TELEPHONE ENCOUNTER
Called pt and advised needs to be filled by PCP  She stated her other Endocrinologist use to refill for her,  Stated Dr Mikayla Taylor will not refill  She stated that she will go back to previous Endo doctor and hung up on me

## 2021-05-18 PROCEDURE — 99284 EMERGENCY DEPT VISIT MOD MDM: CPT

## 2021-05-19 ENCOUNTER — HOSPITAL ENCOUNTER (EMERGENCY)
Facility: HOSPITAL | Age: 46
Discharge: HOME | End: 2021-05-19
Attending: EMERGENCY MEDICINE
Payer: COMMERCIAL

## 2021-05-19 VITALS
SYSTOLIC BLOOD PRESSURE: 135 MMHG | OXYGEN SATURATION: 98 % | RESPIRATION RATE: 18 BRPM | DIASTOLIC BLOOD PRESSURE: 78 MMHG | TEMPERATURE: 96.7 F | HEART RATE: 62 BPM

## 2021-05-19 DIAGNOSIS — R20.2 TINGLING: Primary | ICD-10-CM

## 2021-05-19 LAB
ANION GAP SERPL CALCULATED.3IONS-SCNC: 13 MMOL/L (ref 4–13)
BUN SERPL-MCNC: 19 MG/DL (ref 5–25)
CA-I BLD-SCNC: 1.15 MMOL/L (ref 1.12–1.32)
CALCIUM SERPL-MCNC: 9.6 MG/DL (ref 8.3–10.1)
CHLORIDE SERPL-SCNC: 99 MMOL/L (ref 100–108)
CO2 SERPL-SCNC: 26 MMOL/L (ref 21–32)
CREAT SERPL-MCNC: 1.03 MG/DL (ref 0.6–1.3)
GFR SERPL CREATININE-BSD FRML MDRD: 66 ML/MIN/1.73SQ M
GLUCOSE SERPL-MCNC: 121 MG/DL (ref 65–140)
POTASSIUM SERPL-SCNC: 3.7 MMOL/L (ref 3.5–5.3)
SODIUM SERPL-SCNC: 138 MMOL/L (ref 136–145)

## 2021-05-19 PROCEDURE — 80048 BASIC METABOLIC PNL TOTAL CA: CPT | Performed by: EMERGENCY MEDICINE

## 2021-05-19 PROCEDURE — 96361 HYDRATE IV INFUSION ADD-ON: CPT

## 2021-05-19 PROCEDURE — 96365 THER/PROPH/DIAG IV INF INIT: CPT

## 2021-05-19 PROCEDURE — 82330 ASSAY OF CALCIUM: CPT | Performed by: EMERGENCY MEDICINE

## 2021-05-19 PROCEDURE — 99284 EMERGENCY DEPT VISIT MOD MDM: CPT | Performed by: EMERGENCY MEDICINE

## 2021-05-19 PROCEDURE — 36415 COLL VENOUS BLD VENIPUNCTURE: CPT | Performed by: EMERGENCY MEDICINE

## 2021-05-19 RX ORDER — CALCIUM GLUCONATE 20 MG/ML
1 INJECTION, SOLUTION INTRAVENOUS ONCE
Status: COMPLETED | OUTPATIENT
Start: 2021-05-19 | End: 2021-05-19

## 2021-05-19 RX ADMIN — CALCIUM GLUCONATE 1 G: 20 INJECTION, SOLUTION INTRAVENOUS at 01:53

## 2021-05-19 RX ADMIN — SODIUM CHLORIDE 1000 ML: 0.9 INJECTION, SOLUTION INTRAVENOUS at 01:54

## 2021-05-19 NOTE — ED PROVIDER NOTES
History  Chief Complaint   Patient presents with    Tingling     Pt reports feeling tingling similar to past experiences with hypocalcemia  67-year-old female with past history of hypothyroidism after thyroidectomy, hypocalcemia, anxiety, depression, hyperlipidemia, anemia, degenerative disc disease, migraine, presents to the ED for evaluation of tingling sensation throughout her body  Patient has history of hypocalcemia  Patient has outpatient calcium supplementation ordered  Patient states that her last calcium infusion was a month ago  Her medication regimen was also changed  Patient feels like her calcium level is low again  Patient started tingling sensation of her extremities earlier today  Patient came to the ED for calcium infusion  History provided by:  Patient      Prior to Admission Medications   Prescriptions Last Dose Informant Patient Reported? Taking?    ALPRAZolam (XANAX) 1 mg tablet  Self Yes No   Sig: Take 1 mg by mouth daily am   baclofen 10 mg tablet  Self Yes No   Sig: Take 10 mg by mouth 3 (three) times a day    calcitriol (ROCALTROL) 0 25 mcg capsule  Self Yes No   Sig: Take 1 capsule by mouth daily    calcium carbonate-vitamin D (OSCAL-D) 500 mg-200 units per tablet   No No   Sig: Take 2 tablets by mouth 3 (three) times a day with meals   diclofenac (VOLTAREN) 75 mg EC tablet  Self No No   Sig: Take 1 tablet (75 mg total) by mouth 2 (two) times a day   Patient not taking: Reported on 3/18/2021   ergocalciferol (VITAMIN D2) 50,000 units   No No   Sig: Take 1 capsule (50,000 Units total) by mouth once a week   fenofibrate (TRIGLIDE) 160 MG tablet  Self Yes No   Sig: Take 160 mg by mouth daily   ferrous sulfate 325 (65 Fe) mg tablet  Self Yes No   Sig: Take 325 mg by mouth Once Monday, wed, Friday   ibuprofen (MOTRIN) 600 mg tablet  Self No No   Sig: Take 1 tablet (600 mg total) by mouth every 6 (six) hours as needed for mild pain or moderate pain   levothyroxine (SYNTHROID) 150 mcg tablet  Self Yes No   Sig: Take 1 tablet by mouth daily Every day except for sundays   magnesium oxide (MAG-OX) 400 mg  Self Yes No   Sig: Take 400 mg by mouth 3 (three) times a day 9pm    ondansetron (ZOFRAN) 4 mg tablet  Self Yes No   Sig: Take 4 mg by mouth every 8 (eight) hours as needed for nausea or vomiting  oxyCODONE-acetaminophen (PERCOCET) 5-325 mg per tablet  Self Yes No   Sig: Take 2 tablets by mouth daily    potassium chloride (K-DUR,KLOR-CON) 20 mEq tablet   No No   Sig: Take 1 tablet (20 mEq total) by mouth 2 (two) times a day Takes 2 tabs bid   pregabalin (LYRICA) 75 mg capsule  Self Yes No   Sig: Take 75 mg by mouth 3 (three) times a day as needed    zolpidem (AMBIEN CR) 6 25 MG CR tablet  Self Yes No   Sig: Take 6 25 mg by mouth daily at bedtime as needed for sleep      Facility-Administered Medications: None       Past Medical History:   Diagnosis Date    Acid reflux     Acute renal failure (HCC)     multiple episodes    Anemia     Anxiety     Chronic pain     DDD (degenerative disc disease), lumbar     Disease of thyroid gland     had surgery and now hypo    DVT (deep venous thrombosis) ([facility] [address] )     s/p ankle fracture    Hypercalcemia     Hyperlipidemia     Hyperthyroidism     Hypocalcemia     post op 2016    Migraine     Psychiatric disorder     anxiety depression    Seizures (HCC)     petit mal x1  4 years ago- all tests were neg      Spondylolisthesis of lumbar region     Treatment     spinal pain injections  last was 2016       Past Surgical History:   Procedure Laterality Date    BACK SURGERY       SECTION      x5    CYSTOCELE REPAIR  2017    DISCOGRAM      PARATHYROIDECTOMY      [place] ANTERIOR COLPORRAPHY RPR CYSTOCELE W/CYSTO N/A 2017    Procedure: CYSTOCELE REPAIR;  Surgeon: Russell Jones MD;  Location: [address] [place] [address];  Service: Gynecology    [place] ARTHRODESIS POSTERIOR INTERBODY LUMBAR N/A 2016    Procedure: L4-S1 LUMBAR LAMINECTOMY/DECOMPRESSION;  INSTRUMENTED POSTEROLATERAL FUSION/ INTERBODY L5-S1; ALLOGRAFT AND AUTOGRAFT (IMPULSE) ; Surgeon: Sanam Moore MD;  Location:  MAIN OR;  Service: Orthopedics    [place] CYSTOURETHROSCOPY,URETER CATHETER Bilateral 12/7/2018    Procedure: INSERTION URETERAL CATHETERS PREOP;  Surgeon: Wally Jones MD;  Location: [address] [place] [address];  Service: Urology    [place] SLING OPER STRES INCONTINENCE N/A 5/4/2017    Procedure: MID URETHRAL SLING;  Surgeon: Raymon Shore MD;  Location: [address] [place] [address];  Service: Urology    [place] SUPRACERV ABD HYSTERECTOMY N/A 12/7/2018    Procedure: SUPRACERVICAL HYSTERECTOMY;  Surgeon: Miguel Mayer MD;  [facility];  Service: Gynecology    THYROIDECTOMY      TONSILECTOMY AND ADNOIDECTOMY      TONSILLECTOMY      TUBAL LIGATION         Family History   Problem Relation Age of Onset    Diabetes Mother     Hypertension Mother     Hyperlipidemia Father     Arrhythmia Father     Lung cancer Father     Diabetes Father      I have reviewed and agree with the history as documented  E-Cigarette/Vaping    E-Cigarette Use Never User      E-Cigarette/Vaping Substances    Nicotine No     THC No     CBD No     Flavoring No     Other No     Unknown No      Social History     Tobacco Use    Smoking status: Current Every Day Smoker     Packs/day: 0 50     Years: 25 00     Pack years: 12 50     Types: Cigarettes    Smokeless tobacco: Never Used   Substance Use Topics    Alcohol use: Not Currently     Frequency: 2-4 times a month     Comment: Alcohol intake:   Occasional  - As per Clydia Chew Drug use: No       Review of Systems   Constitutional: Negative for chills and fever  HENT: Negative for ear pain and sore throat  Eyes: Negative for pain and visual disturbance  Respiratory: Negative for cough and shortness of breath  Cardiovascular: Negative for chest pain and palpitations  Gastrointestinal: Negative for abdominal pain and vomiting  Genitourinary: Negative for dysuria and hematuria  Musculoskeletal: Negative for arthralgias and back pain  Skin: Negative for color change and rash  Neurological: Negative for seizures and syncope  Tingling sensation   All other systems reviewed and are negative  Physical Exam  Physical Exam  Vitals signs and nursing note reviewed  Constitutional:       General: She is not in acute distress  Appearance: She is well-developed  HENT:      Head: Normocephalic and atraumatic  Eyes:      Conjunctiva/sclera: Conjunctivae normal    Neck:      Musculoskeletal: Neck supple  Cardiovascular:      Rate and Rhythm: Normal rate and regular rhythm  Heart sounds: No murmur  Pulmonary:      Effort: Pulmonary effort is normal  No respiratory distress  Breath sounds: Normal breath sounds  Abdominal:      Palpations: Abdomen is soft  Tenderness: There is no abdominal tenderness  Musculoskeletal: Normal range of motion  Skin:     General: Skin is warm and dry  Neurological:      General: No focal deficit present  Mental Status: She is alert and oriented to person, place, and time     Psychiatric:         Mood and Affect: Mood normal          Vital Signs  ED Triage Vitals [05/19/21 0033]   Temperature Pulse Respirations Blood Pressure SpO2   (!) 96 7 °F (35 9 °C) 62 18 135/78 98 %      Temp Source Heart Rate Source Patient Position - Orthostatic VS BP Location FiO2 (%)   Tympanic Monitor Sitting Right arm --      Pain Score       --           Vitals:    05/19/21 0033   BP: 135/78   Pulse: 62   Patient Position - Orthostatic VS: Sitting         Visual Acuity      ED Medications  Medications   sodium chloride 0 9 % bolus 1,000 mL (1,000 mL Intravenous New Bag 5/19/21 0154)   calcium gluconate 1 g in sodium chloride 0 9% 50 mL (premix) (0 g Intravenous Stopped 5/19/21 2463)       Diagnostic Studies  Results Reviewed     Procedure Component Value Units Date/Time    Basic metabolic panel [[id number]]  (Abnormal) Collected: 05/19/21 0053    Lab Status: Final result Specimen: Blood from Arm, Right Updated: 05/19/21 0109     Sodium 138 mmol/L      Potassium 3 7 mmol/L      Chloride 99 mmol/L      CO2 26 mmol/L      ANION GAP 13 mmol/L      BUN 19 mg/dL      Creatinine 1 03 mg/dL      Glucose 121 mg/dL      Calcium 9 6 mg/dL      eGFR 66 ml/min/1 73sq m     Narrative:      [place] guidelines for Chronic Kidney Disease (CKD):     Stage 1 with normal or high GFR (GFR > 90 mL/min/1 73 square meters)    Stage 2 Mild CKD (GFR = 60-89 mL/min/1 73 square meters)    Stage 3A Moderate CKD (GFR = 45-59 mL/min/1 73 square meters)    Stage 3B Moderate CKD (GFR = 30-44 mL/min/1 73 square meters)    Stage 4 Severe CKD (GFR = 15-29 mL/min/1 73 square meters)    Stage 5 End Stage CKD (GFR <15 mL/min/1 73 square meters)  Note: GFR calculation is accurate only with a steady state creatinine    Calcium, ionized [[id number]]  (Normal) Collected: 05/19/21 0053    Lab Status: Final result Specimen: Blood from Arm, Right Updated: 05/19/21 0100     Calcium, Ionized 1 15 mmol/L                  No orders to display              Procedures  Procedures         ED Course  ED Course as of May 19 0256   Wed May 19, 2021   0235 Patient completed IV fluids and calcium  Patient feels much better and would like to go home  SBIRT 22yo+      Most Recent Value   SBIRT (24 yo +)   In order to provide better care to our patients, we are screening all of our patients for alcohol and drug use  Would it be okay to ask you these screening questions?   No Filed at: 05/19/2021 0103                    MDM  Number of Diagnoses or Management Options  Tingling: new and requires workup  Diagnosis management comments: Obtain BMP, ionized calcium level       Amount and/or Complexity of Data Reviewed  Clinical lab tests: ordered and reviewed  Tests in the medicine section of CPT®: ordered and reviewed  Review and summarize past medical records: yes  Independent visualization of images, tracings, or specimens: yes    Risk of Complications, Morbidity, and/or Mortality  General comments: Patient's calcium level was within normal range  Patient was given IV fluids and IV calcium gluconate and she felt much better  At this time patient discharged home with follow-up to her physicians  Close return instructions given to return to the ER for any worsening symptoms  Patient agrees with discharge plan  Patient well appearing at time of discharge  Please Note: Fluency Direct voice recognition software may have been used in the creation of this document  Wrong words or sound a like substitutions may have occurred due to the inherent limitations of the voice software  Patient Progress  Patient progress: improved      Disposition  Final diagnoses:   Tingling     Time reflects when diagnosis was documented in both MDM as applicable and the Disposition within this note     Time User Action Codes Description Comment    5/19/2021  2:47 AM Hobert Mustapha Add [R20 2] Tingling     5/19/2021  2:47 AM Hobert Mustapha Add [E83 51] Hypocalcemia syndrome     5/19/2021  2:47 AM Hobert Mustapha Remove [E83 51] Hypocalcemia syndrome       ED Disposition     ED Disposition Condition Date/Time Comment    Discharge Stable Wed May 19, 2021  2:47 AM 1204 [facility] discharge to home/self care  Follow-up Information     Follow up With Specialties Details Why Contact Info    Nima Harkins MD Internal Medicine In 2 days  [facility]   [phone]            Patient's Medications   Discharge Prescriptions    No medications on file     No discharge procedures on file      PDMP Review     None          ED Provider  Electronically Signed by           Jhon Harvey DO  05/19/21 9357

## 2021-05-30 ENCOUNTER — HOSPITAL ENCOUNTER (EMERGENCY)
Facility: HOSPITAL | Age: 46
Discharge: HOME | End: 2021-05-30
Attending: EMERGENCY MEDICINE
Payer: COMMERCIAL

## 2021-05-30 ENCOUNTER — APPOINTMENT (EMERGENCY)
Dept: RADIOLOGY | Facility: HOSPITAL | Age: 46
End: 2021-05-30
Attending: EMERGENCY MEDICINE
Payer: COMMERCIAL

## 2021-05-30 VITALS
RESPIRATION RATE: 16 BRPM | OXYGEN SATURATION: 99 % | SYSTOLIC BLOOD PRESSURE: 134 MMHG | TEMPERATURE: 97.2 F | DIASTOLIC BLOOD PRESSURE: 70 MMHG | HEART RATE: 72 BPM | WEIGHT: 195.55 LBS | BODY MASS INDEX: 35.77 KG/M2

## 2021-05-30 DIAGNOSIS — E83.51 HYPOCALCEMIA: ICD-10-CM

## 2021-05-30 DIAGNOSIS — R20.2 PARESTHESIAS: Primary | ICD-10-CM

## 2021-05-30 DIAGNOSIS — M79.644 THUMB PAIN, RIGHT: ICD-10-CM

## 2021-05-30 LAB
ANION GAP SERPL CALCULATED.3IONS-SCNC: 19 MMOL/L (ref 4–13)
BUN SERPL-MCNC: 22 MG/DL (ref 5–25)
CA-I BLD-SCNC: 1.02 MMOL/L (ref 1.12–1.32)
CALCIUM SERPL-MCNC: 7.7 MG/DL (ref 8.3–10.1)
CHLORIDE SERPL-SCNC: 103 MMOL/L (ref 100–108)
CO2 SERPL-SCNC: 19 MMOL/L (ref 21–32)
CREAT SERPL-MCNC: 0.99 MG/DL (ref 0.6–1.3)
GFR SERPL CREATININE-BSD FRML MDRD: 70 ML/MIN/1.73SQ M
GLUCOSE SERPL-MCNC: 138 MG/DL (ref 65–140)
MAGNESIUM SERPL-MCNC: 2.2 MG/DL (ref 1.6–2.6)
POTASSIUM SERPL-SCNC: 4.1 MMOL/L (ref 3.5–5.3)
SODIUM SERPL-SCNC: 141 MMOL/L (ref 136–145)

## 2021-05-30 PROCEDURE — 82330 ASSAY OF CALCIUM: CPT | Performed by: EMERGENCY MEDICINE

## 2021-05-30 PROCEDURE — 73130 X-RAY EXAM OF HAND: CPT

## 2021-05-30 PROCEDURE — 96365 THER/PROPH/DIAG IV INF INIT: CPT

## 2021-05-30 PROCEDURE — 36415 COLL VENOUS BLD VENIPUNCTURE: CPT | Performed by: EMERGENCY MEDICINE

## 2021-05-30 PROCEDURE — 99284 EMERGENCY DEPT VISIT MOD MDM: CPT

## 2021-05-30 PROCEDURE — 83735 ASSAY OF MAGNESIUM: CPT | Performed by: EMERGENCY MEDICINE

## 2021-05-30 PROCEDURE — 99285 EMERGENCY DEPT VISIT HI MDM: CPT | Performed by: EMERGENCY MEDICINE

## 2021-05-30 PROCEDURE — 80048 BASIC METABOLIC PNL TOTAL CA: CPT | Performed by: EMERGENCY MEDICINE

## 2021-05-30 RX ORDER — CALCIUM GLUCONATE 20 MG/ML
2 INJECTION, SOLUTION INTRAVENOUS ONCE
Status: COMPLETED | OUTPATIENT
Start: 2021-05-30 | End: 2021-05-30

## 2021-05-30 RX ADMIN — CALCIUM GLUCONATE 2 G: 20 INJECTION, SOLUTION INTRAVENOUS at 01:37

## 2021-05-30 RX ADMIN — SODIUM CHLORIDE 1000 ML: 0.9 INJECTION, SOLUTION INTRAVENOUS at 01:37

## 2021-05-30 NOTE — ED PROVIDER NOTES
History  Chief Complaint   Patient presents with    Numbness     Pt states "I think it's my calcium again  I am numb and tingly all over "    Thumb Pain     R thumb pain w/ uk injury  Patient presents for evaluation of tingling all over starting tonight  Patient thinks her calcium is low as she has felt this way when that has happened before  States she has been taking calcium supplements home as well as her medications  Patient also complains of right thumb pain states it has been hurting for for over 2 weeks he is not sure how she injured it but would like it checked out today as well  History provided by:  Patient   used: No        Prior to Admission Medications   Prescriptions Last Dose Informant Patient Reported? Taking?    ALPRAZolam (XANAX) 1 mg tablet  Self Yes No   Sig: Take 1 mg by mouth daily am   baclofen 10 mg tablet  Self Yes No   Sig: Take 10 mg by mouth 3 (three) times a day    calcitriol (ROCALTROL) 0 25 mcg capsule  Self Yes No   Sig: Take 1 capsule by mouth daily    calcium carbonate-vitamin D (OSCAL-D) 500 mg-200 units per tablet 5/30/2021 at Unknown time  No Yes   Sig: Take 2 tablets by mouth 3 (three) times a day with meals   diclofenac (VOLTAREN) 75 mg EC tablet  Self No No   Sig: Take 1 tablet (75 mg total) by mouth 2 (two) times a day   Patient not taking: Reported on 3/18/2021   ergocalciferol (VITAMIN D2) 50,000 units   No No   Sig: Take 1 capsule (50,000 Units total) by mouth once a week   fenofibrate (TRIGLIDE) 160 MG tablet  Self Yes No   Sig: Take 160 mg by mouth daily   ferrous sulfate 325 (65 Fe) mg tablet  Self Yes No   Sig: Take 325 mg by mouth Once Monday, wed, Friday   ibuprofen (MOTRIN) 600 mg tablet  Self No No   Sig: Take 1 tablet (600 mg total) by mouth every 6 (six) hours as needed for mild pain or moderate pain   levothyroxine (SYNTHROID) 150 mcg tablet  Self Yes No   Sig: Take 1 tablet by mouth daily Every day except for sundays   magnesium oxide (MAG-OX) 400 mg  Self Yes No   Sig: Take 400 mg by mouth 3 (three) times a day 9pm    ondansetron (ZOFRAN) 4 mg tablet  Self Yes No   Sig: Take 4 mg by mouth every 8 (eight) hours as needed for nausea or vomiting  oxyCODONE-acetaminophen (PERCOCET) 5-325 mg per tablet  Self Yes No   Sig: Take 2 tablets by mouth daily    potassium chloride (K-DUR,KLOR-CON) 20 mEq tablet   No No   Sig: Take 1 tablet (20 mEq total) by mouth 2 (two) times a day Takes 2 tabs bid   pregabalin (LYRICA) 75 mg capsule  Self Yes No   Sig: Take 75 mg by mouth 3 (three) times a day as needed    zolpidem (AMBIEN CR) 6 25 MG CR tablet  Self Yes No   Sig: Take 6 25 mg by mouth daily at bedtime as needed for sleep      Facility-Administered Medications: None       Past Medical History:   Diagnosis Date    Acid reflux     Acute renal failure (HCC)     multiple episodes    Anemia     Anxiety     Chronic pain     DDD (degenerative disc disease), lumbar     Disease of thyroid gland     had surgery and now hypo    DVT (deep venous thrombosis) ([facility] [address] )     s/p ankle fracture    Hypercalcemia     Hyperlipidemia     Hyperthyroidism     Hypocalcemia     post op 2016    Migraine     Psychiatric disorder     anxiety depression    Seizures (HCC)     petit mal x1  4 years ago- all tests were neg   Spondylolisthesis of lumbar region     Treatment     spinal pain injections  last was 2016       Past Surgical History:   Procedure Laterality Date    BACK SURGERY       SECTION      x5    CYSTOCELE REPAIR  2017    DISCOGRAM      PARATHYROIDECTOMY      AK ANTERIOR COLPORRAPHY RPR CYSTOCELE W/CYSTO N/A 2017    Procedure: CYSTOCELE REPAIR;  Surgeon: Allan Whittaker MD;  Location: [address] [place] [address];  Service: Gynecology    AK ARTHRODESIS POSTERIOR INTERBODY LUMBAR N/A 2016    Procedure: L4-S1 LUMBAR LAMINECTOMY/DECOMPRESSION;  INSTRUMENTED POSTEROLATERAL FUSION/ INTERBODY L5-S1;   ALLOGRAFT AND AUTOGRAFT (IMPULSE) ; Surgeon: Tiffany Back MD;  Location:  MAIN OR;  Service: Orthopedics    ID CYSTOURETHROSCOPY,URETER CATHETER Bilateral 12/7/2018    Procedure: INSERTION URETERAL CATHETERS PREOP;  Surgeon: Lorrayne Cooks, MD;  Location: [address] [place] [address];  Service: Urology    ID SLING OPER STRES INCONTINENCE N/A 5/4/2017    Procedure: MID URETHRAL SLING;  Surgeon: Brenda Johnson MD;  Location: [address] [place] [address];  Service: Urology    ID SUPRACERV ABD HYSTERECTOMY N/A 12/7/2018    Procedure: SUPRACERVICAL HYSTERECTOMY;  Surgeon: Davis Carrel, MD;  Location: [facility];  Service: Gynecology    THYROIDECTOMY      TONSILECTOMY AND ADNOIDECTOMY      TONSILLECTOMY      TUBAL LIGATION         Family History   Problem Relation Age of Onset    Diabetes Mother     Hypertension Mother     Hyperlipidemia Father     Arrhythmia Father     Lung cancer Father     Diabetes Father      I have reviewed and agree with the history as documented  E-Cigarette/Vaping    E-Cigarette Use Never User      E-Cigarette/Vaping Substances    Nicotine No     THC No     CBD No     Flavoring No     Other No     Unknown No      Social History     Tobacco Use    Smoking status: Current Every Day Smoker     Packs/day: 0 50     Years: 25 00     Pack years: 12 50     Types: Cigarettes    Smokeless tobacco: Never Used   Substance Use Topics    Alcohol use: Not Currently     Frequency: 2-4 times a month     Comment: Alcohol intake:   Occasional  - As per Nani Croabran Drug use: No       Review of Systems   Constitutional: Negative for chills and fever  HENT: Negative for congestion and trouble swallowing  Respiratory: Negative for cough and shortness of breath  Cardiovascular: Negative for chest pain and palpitations  Gastrointestinal: Negative for abdominal pain, nausea and vomiting  Genitourinary: Negative for difficulty urinating and dysuria  Musculoskeletal: Negative for back pain and neck pain  Neurological: Positive for numbness  Negative for weakness and headaches  All other systems reviewed and are negative  Physical Exam  Physical Exam  Vitals signs and nursing note reviewed  Constitutional:       General: She is not in acute distress  Appearance: Normal appearance  HENT:      Head: Atraumatic  Right Ear: External ear normal       Left Ear: External ear normal       Nose: Nose normal       Mouth/Throat:      Mouth: Mucous membranes are moist       Pharynx: Oropharynx is clear  Eyes:      General: No scleral icterus  Conjunctiva/sclera: Conjunctivae normal    Cardiovascular:      Rate and Rhythm: Normal rate and regular rhythm  Pulses: Normal pulses  Pulmonary:      Effort: Pulmonary effort is normal  No respiratory distress  Breath sounds: Normal breath sounds  Abdominal:      General: Abdomen is flat  Bowel sounds are normal  There is no distension  Palpations: Abdomen is soft  Tenderness: There is no abdominal tenderness  There is no guarding or rebound  Musculoskeletal: Normal range of motion  General: Tenderness present  No deformity  Arms:    Skin:     Capillary Refill: Capillary refill takes less than 2 seconds  Findings: No rash  Neurological:      General: No focal deficit present  Mental Status: She is alert and oriented to person, place, and time  Cranial Nerves: No cranial nerve deficit  Sensory: No sensory deficit  Motor: No weakness           Vital Signs  ED Triage Vitals [05/30/21 0050]   Temperature Pulse Respirations Blood Pressure SpO2   (!) 97 2 °F (36 2 °C) 89 16 142/78 98 %      Temp Source Heart Rate Source Patient Position - Orthostatic VS BP Location FiO2 (%)   Tympanic Monitor Sitting Right arm --      Pain Score       No Pain           Vitals:    05/30/21 0050 05/30/21 0100 05/30/21 0300   BP: 142/78 142/78 134/70   Pulse: 89 82 72   Patient Position - Orthostatic VS: Sitting  Lying         Visual Acuity      ED Medications  Medications   sodium chloride 0 9 % bolus 1,000 mL (0 mL Intravenous Stopped 5/30/21 0301)   calcium gluconate 2 g in sodium chloride 0 9% 100 mL (premix) (0 g Intravenous Stopped 5/30/21 0301)       Diagnostic Studies  Results Reviewed     Procedure Component Value Units Date/Time    Calcium, ionized [[id number]]  (Abnormal) Collected: 05/30/21 0124    Lab Status: Final result Specimen: Blood from Arm, Right Updated: 05/30/21 0130     Calcium, Ionized 1 02 mmol/L     Basic metabolic panel [[id number]]  (Abnormal) Collected: 05/30/21 0103    Lab Status: Final result Specimen: Blood from Arm, Right Updated: 05/30/21 0128     Sodium 141 mmol/L      Potassium 4 1 mmol/L      Chloride 103 mmol/L      CO2 19 mmol/L      ANION GAP 19 mmol/L      BUN 22 mg/dL      Creatinine 0 99 mg/dL      Glucose 138 mg/dL      Calcium 7 7 mg/dL      eGFR 70 ml/min/1 73sq m     Narrative:      [place] guidelines for Chronic Kidney Disease (CKD):     Stage 1 with normal or high GFR (GFR > 90 mL/min/1 73 square meters)    Stage 2 Mild CKD (GFR = 60-89 mL/min/1 73 square meters)    Stage 3A Moderate CKD (GFR = 45-59 mL/min/1 73 square meters)    Stage 3B Moderate CKD (GFR = 30-44 mL/min/1 73 square meters)    Stage 4 Severe CKD (GFR = 15-29 mL/min/1 73 square meters)    Stage 5 End Stage CKD (GFR <15 mL/min/1 73 square meters)  Note: GFR calculation is accurate only with a steady state creatinine    Magnesium [[id number]]  (Normal) Collected: 05/30/21 0103    Lab Status: Final result Specimen: Blood from Arm, Right Updated: 05/30/21 0128     Magnesium 2 2 mg/dL                  XR hand 3+ views RIGHT    (Results Pending)              Procedures  Procedures         ED Course                             SBIRT 20yo+      Most Recent Value   SBIRT (22 yo +)   In order to provide better care to our patients, we are screening all of our patients for alcohol and drug use   Would it be okay to ask you these screening questions? No Filed at: 05/30/2021 0101                    [facility]  Number of Diagnoses or Management Options  Hypocalcemia:   Paresthesias:   Thumb pain, right:   Diagnosis management comments: Pulse ox 99% on room air indicating adequate oxygenation  Xray R hand: no fx or dislocation as read by me    Calcium replaced had IV in the ER  Patient reported feeling better  Will give outpatient follow-up with hand surgery further evaluation of her thumb pain  Amount and/or Complexity of Data Reviewed  Clinical lab tests: ordered and reviewed  Tests in the radiology section of CPT®: ordered and reviewed  Decide to obtain previous medical records or to obtain history from someone other than the patient: yes  Review and summarize past medical records: yes  Independent visualization of images, tracings, or specimens: yes    Patient Progress  Patient progress: stable      Disposition  Final diagnoses:   Paresthesias   Hypocalcemia   Thumb pain, right     Time reflects when diagnosis was documented in both MDM as applicable and the Disposition within this note     Time User Action Codes Description Comment    5/30/2021  2:41 AM Billie DIXON Add [R20 2] Paresthesias     5/30/2021  2:41 AM Caleb Woodward Add [E83 51] Hypocalcemia     5/30/2021  2:41 AM Caleb Woodward Add [G43 328] Thumb pain, right       ED Disposition     ED Disposition Condition Date/Time Comment    Discharge Stable Sun May 30, 2021  2:41 AM Nanda Osborn discharge to home/self care              Follow-up Information     Follow up With Specialties Details Why Contact Info    Jaylyn Mcghee MD Internal Medicine In 1 week  [address]  [facility] [address]  Tayla Wilde, DO Orthopedic Surgery, Hand Surgery In 1 week  [facility], [address] [place] [address]  [phone]            Discharge Medication List as of 5/30/2021  2:42 AM      CONTINUE these medications which have NOT CHANGED    Details   calcium carbonate-vitamin D (OSCAL-D) 500 mg-200 units per tablet Take 2 tablets by mouth 3 (three) times a day with meals, Starting Wed 3/31/2021, Until Sun 5/30/2021, Normal      ALPRAZolam (XANAX) 1 mg tablet Take 1 mg by mouth daily am, Historical Med      baclofen 10 mg tablet Take 10 mg by mouth 3 (three) times a day , Historical Med      calcitriol (ROCALTROL) 0 25 mcg capsule Take 1 capsule by mouth daily , Historical Med      diclofenac (VOLTAREN) 75 mg EC tablet Take 1 tablet (75 mg total) by mouth 2 (two) times a day, Starting Tue 2/23/2021, Normal      ergocalciferol (VITAMIN D2) 50,000 units Take 1 capsule (50,000 Units total) by mouth once a week, Starting Wed 3/31/2021, Normal      fenofibrate (TRIGLIDE) 160 MG tablet Take 160 mg by mouth daily, Historical Med      ferrous sulfate 325 (65 Fe) mg tablet Take 325 mg by mouth Once Monday, wed, Friday, Historical Med      ibuprofen (MOTRIN) 600 mg tablet Take 1 tablet (600 mg total) by mouth every 6 (six) hours as needed for mild pain or moderate pain, Starting Tue 6/2/2020, Normal      levothyroxine (SYNTHROID) 150 mcg tablet Take 1 tablet by mouth daily Every day except for sundays, Historical Med      magnesium oxide (MAG-OX) 400 mg Take 400 mg by mouth 3 (three) times a day 9pm , Historical Med      ondansetron (ZOFRAN) 4 mg tablet Take 4 mg by mouth every 8 (eight) hours as needed for nausea or vomiting , Historical Med      oxyCODONE-acetaminophen (PERCOCET) 5-325 mg per tablet Take 2 tablets by mouth daily , Historical Med      potassium chloride (K-DUR,KLOR-CON) 20 mEq tablet Take 1 tablet (20 mEq total) by mouth 2 (two) times a day Takes 2 tabs bid, Starting Sat 12/5/2020, Until Wed 3/31/2021, Normal      pregabalin (LYRICA) 75 mg capsule Take 75 mg by mouth 3 (three) times a day as needed , Historical Med      zolpidem (AMBIEN CR) 6 25 MG CR tablet Take 6 25 mg by mouth daily at bedtime as needed for sleep, Historical Med           No discharge procedures on file      PDMP Review     None          ED Provider  Electronically Signed by           Low Raman DO  05/30/21 5846

## 2021-06-07 DIAGNOSIS — E83.51 HYPOCALCEMIA: ICD-10-CM

## 2021-06-07 RX ORDER — LEVOTHYROXINE SODIUM 0.15 MG/1
150 TABLET ORAL DAILY
Qty: 90 TABLET | Refills: 1 | Status: SHIPPED | OUTPATIENT
Start: 2021-06-07 | End: 2022-01-20

## 2021-06-07 RX ORDER — POTASSIUM CHLORIDE 20 MEQ/1
20 TABLET, EXTENDED RELEASE ORAL 2 TIMES DAILY
Qty: 90 TABLET | Refills: 1 | Status: SHIPPED | OUTPATIENT
Start: 2021-06-07 | End: 2021-07-12

## 2021-06-07 RX ORDER — CALCITRIOL 0.25 UG/1
0.25 CAPSULE, LIQUID FILLED ORAL DAILY
Qty: 90 CAPSULE | Refills: 0 | Status: SHIPPED | OUTPATIENT
Start: 2021-06-07 | End: 2021-08-30

## 2021-06-18 ENCOUNTER — HOSPITAL ENCOUNTER (EMERGENCY)
Facility: HOSPITAL | Age: 46
Discharge: HOME | End: 2021-06-18
Attending: EMERGENCY MEDICINE | Admitting: EMERGENCY MEDICINE
Payer: COMMERCIAL

## 2021-06-18 ENCOUNTER — APPOINTMENT (EMERGENCY)
Dept: RADIOLOGY | Facility: HOSPITAL | Age: 46
End: 2021-06-18
Attending: EMERGENCY MEDICINE
Payer: COMMERCIAL

## 2021-06-18 VITALS
TEMPERATURE: 97.4 F | DIASTOLIC BLOOD PRESSURE: 70 MMHG | HEART RATE: 80 BPM | OXYGEN SATURATION: 97 % | SYSTOLIC BLOOD PRESSURE: 130 MMHG | RESPIRATION RATE: 20 BRPM

## 2021-06-18 DIAGNOSIS — R20.2 PARESTHESIAS: Primary | ICD-10-CM

## 2021-06-18 DIAGNOSIS — E83.51 LOW CALCIUM LEVELS: ICD-10-CM

## 2021-06-18 LAB
ALBUMIN SERPL BCP-MCNC: 3.2 G/DL (ref 3.5–5)
ALP SERPL-CCNC: 87 U/L (ref 46–116)
ALT SERPL W P-5'-P-CCNC: 54 U/L (ref 12–78)
ANION GAP SERPL CALCULATED.3IONS-SCNC: 16 MMOL/L (ref 4–13)
AST SERPL W P-5'-P-CCNC: 46 U/L (ref 5–45)
BASOPHILS # BLD AUTO: 0.04 THOUSANDS/ΜL (ref 0–0.1)
BASOPHILS NFR BLD AUTO: 1 % (ref 0–1)
BILIRUB SERPL-MCNC: 0.28 MG/DL (ref 0.2–1)
BILIRUB UR QL STRIP: NEGATIVE
BUN SERPL-MCNC: 17 MG/DL (ref 5–25)
CALCIUM ALBUM COR SERPL-MCNC: 7.7 MG/DL (ref 8.3–10.1)
CALCIUM SERPL-MCNC: 7.1 MG/DL (ref 8.3–10.1)
CHLORIDE SERPL-SCNC: 102 MMOL/L (ref 100–108)
CLARITY UR: NORMAL
CO2 SERPL-SCNC: 20 MMOL/L (ref 21–32)
COLOR UR: NORMAL
CREAT SERPL-MCNC: 0.98 MG/DL (ref 0.6–1.3)
EOSINOPHIL # BLD AUTO: 0.1 THOUSAND/ΜL (ref 0–0.61)
EOSINOPHIL NFR BLD AUTO: 2 % (ref 0–6)
ERYTHROCYTE [DISTWIDTH] IN BLOOD BY AUTOMATED COUNT: 15.6 % (ref 11.6–15.1)
EXT PREG TEST URINE: NEGATIVE
EXT. CONTROL ED NAV: NORMAL
GFR SERPL CREATININE-BSD FRML MDRD: 70 ML/MIN/1.73SQ M
GLUCOSE SERPL-MCNC: 145 MG/DL (ref 65–140)
GLUCOSE UR STRIP-MCNC: NEGATIVE MG/DL
HCT VFR BLD AUTO: 35.9 % (ref 34.8–46.1)
HGB BLD-MCNC: 12.1 G/DL (ref 11.5–15.4)
HGB UR QL STRIP.AUTO: NEGATIVE
IMM GRANULOCYTES # BLD AUTO: 0.05 THOUSAND/UL (ref 0–0.2)
IMM GRANULOCYTES NFR BLD AUTO: 1 % (ref 0–2)
KETONES UR STRIP-MCNC: NEGATIVE MG/DL
LEUKOCYTE ESTERASE UR QL STRIP: NEGATIVE
LYMPHOCYTES # BLD AUTO: 1.89 THOUSANDS/ΜL (ref 0.6–4.47)
LYMPHOCYTES NFR BLD AUTO: 33 % (ref 14–44)
MAGNESIUM SERPL-MCNC: 1.8 MG/DL (ref 1.6–2.6)
MCH RBC QN AUTO: 31.3 PG (ref 26.8–34.3)
MCHC RBC AUTO-ENTMCNC: 33.7 G/DL (ref 31.4–37.4)
MCV RBC AUTO: 93 FL (ref 82–98)
MONOCYTES # BLD AUTO: 0.36 THOUSAND/ΜL (ref 0.17–1.22)
MONOCYTES NFR BLD AUTO: 6 % (ref 4–12)
NEUTROPHILS # BLD AUTO: 3.22 THOUSANDS/ΜL (ref 1.85–7.62)
NEUTS SEG NFR BLD AUTO: 57 % (ref 43–75)
NITRITE UR QL STRIP: NEGATIVE
NRBC BLD AUTO-RTO: 0 /100 WBCS
PH UR STRIP.AUTO: 8 [PH]
PHOSPHATE SERPL-MCNC: 4.7 MG/DL (ref 2.7–4.5)
PLATELET # BLD AUTO: 157 THOUSANDS/UL (ref 149–390)
PMV BLD AUTO: 10.4 FL (ref 8.9–12.7)
POTASSIUM SERPL-SCNC: 3.6 MMOL/L (ref 3.5–5.3)
PROT SERPL-MCNC: 6.7 G/DL (ref 6.4–8.2)
PROT UR STRIP-MCNC: NEGATIVE MG/DL
RBC # BLD AUTO: 3.86 MILLION/UL (ref 3.81–5.12)
SODIUM SERPL-SCNC: 138 MMOL/L (ref 136–145)
SP GR UR STRIP.AUTO: 1.02 (ref 1–1.03)
TROPONIN I SERPL-MCNC: <0.02 NG/ML
UROBILINOGEN UR QL STRIP.AUTO: 0.2 E.U./DL
WBC # BLD AUTO: 5.66 THOUSAND/UL (ref 4.31–10.16)

## 2021-06-18 PROCEDURE — 36415 COLL VENOUS BLD VENIPUNCTURE: CPT | Performed by: EMERGENCY MEDICINE

## 2021-06-18 PROCEDURE — 96365 THER/PROPH/DIAG IV INF INIT: CPT

## 2021-06-18 PROCEDURE — 84100 ASSAY OF PHOSPHORUS: CPT | Performed by: EMERGENCY MEDICINE

## 2021-06-18 PROCEDURE — 81003 URINALYSIS AUTO W/O SCOPE: CPT | Performed by: EMERGENCY MEDICINE

## 2021-06-18 PROCEDURE — 93005 ELECTROCARDIOGRAM TRACING: CPT

## 2021-06-18 PROCEDURE — 71045 X-RAY EXAM CHEST 1 VIEW: CPT

## 2021-06-18 PROCEDURE — 99284 EMERGENCY DEPT VISIT MOD MDM: CPT

## 2021-06-18 PROCEDURE — 85025 COMPLETE CBC W/AUTO DIFF WBC: CPT | Performed by: EMERGENCY MEDICINE

## 2021-06-18 PROCEDURE — 83735 ASSAY OF MAGNESIUM: CPT | Performed by: EMERGENCY MEDICINE

## 2021-06-18 PROCEDURE — 99285 EMERGENCY DEPT VISIT HI MDM: CPT | Performed by: EMERGENCY MEDICINE

## 2021-06-18 PROCEDURE — 80053 COMPREHEN METABOLIC PANEL: CPT | Performed by: EMERGENCY MEDICINE

## 2021-06-18 PROCEDURE — 81025 URINE PREGNANCY TEST: CPT | Performed by: EMERGENCY MEDICINE

## 2021-06-18 PROCEDURE — 84484 ASSAY OF TROPONIN QUANT: CPT | Performed by: EMERGENCY MEDICINE

## 2021-06-18 RX ORDER — CALCIUM GLUCONATE 20 MG/ML
2 INJECTION, SOLUTION INTRAVENOUS ONCE
Status: COMPLETED | OUTPATIENT
Start: 2021-06-18 | End: 2021-06-18

## 2021-06-18 RX ADMIN — CALCIUM GLUCONATE 2 G: 20 INJECTION, SOLUTION INTRAVENOUS at 22:52

## 2021-06-18 NOTE — Clinical Note
Kira Zoila was seen and treated in our emergency department on 6/18/2021  Diagnosis: low calcium    Senait Mcgowan    She may return on this date: 06/19/2021         If you have any questions or concerns, please don't hesitate to call        Gonzalo Robert MD    ______________________________           _______________          _______________  [facility] Representative                              Date                                Time

## 2021-06-19 NOTE — ED PROVIDER NOTES
History  Chief Complaint   Patient presents with    Numbness     numbness and tingling,  started at work today, generalized in face, usually happens when her calcium is low     HPI    42 yo F hx of anxiety chronic back pain on oxy and lyrica, hypercalcemia, hypocalcemia, presents to ed for eval of numbness and tingling  numbness is distributed everywhere, tongue eyes nose, whole body per patient  Started at work  Occurred in past when patient had low calcium, so coming to ER  She states she has intermittent sob but that this is chronic for her  No other complaints on ROS  Smokes 1/2 pack per day    Lmp: had hysterectomy 2 years ago    Prior to Admission Medications   Prescriptions Last Dose Informant Patient Reported? Taking?    ALPRAZolam (XANAX) 1 mg tablet  Self Yes No   Sig: Take 1 mg by mouth daily am   baclofen 10 mg tablet  Self Yes No   Sig: Take 10 mg by mouth 3 (three) times a day    calcitriol (ROCALTROL) 0 25 mcg capsule   No No   Sig: Take 1 capsule (0 25 mcg total) by mouth daily   calcium carbonate-vitamin D (OSCAL-D) 500 mg-200 units per tablet   No No   Sig: Take 2 tablets by mouth 3 (three) times a day with meals   diclofenac (VOLTAREN) 75 mg EC tablet  Self No No   Sig: Take 1 tablet (75 mg total) by mouth 2 (two) times a day   Patient not taking: Reported on 3/18/2021   ergocalciferol (VITAMIN D2) 50,000 units   No No   Sig: Take 1 capsule (50,000 Units total) by mouth once a week   fenofibrate (TRIGLIDE) 160 MG tablet  Self Yes No   Sig: Take 160 mg by mouth daily   ferrous sulfate 325 (65 Fe) mg tablet  Self Yes No   Sig: Take 325 mg by mouth Once Monday, wed, Friday   ibuprofen (MOTRIN) 600 mg tablet  Self No No   Sig: Take 1 tablet (600 mg total) by mouth every 6 (six) hours as needed for mild pain or moderate pain   levothyroxine (Synthroid) 150 mcg tablet   No No   Sig: Take 1 tablet (150 mcg total) by mouth daily   magnesium oxide (MAG-OX) 400 mg   No No   Sig: Take 1 tablet (400 mg total) by mouth 3 (three) times a day 9pm   ondansetron (ZOFRAN) 4 mg tablet  Self Yes No   Sig: Take 4 mg by mouth every 8 (eight) hours as needed for nausea or vomiting  oxyCODONE-acetaminophen (PERCOCET) 5-325 mg per tablet  Self Yes No   Sig: Take 2 tablets by mouth daily    potassium chloride (K-DUR,KLOR-CON) 20 mEq tablet   No No   Sig: Take 1 tablet (20 mEq total) by mouth 2 (two) times a day   pregabalin (LYRICA) 75 mg capsule  Self Yes No   Sig: Take 75 mg by mouth 3 (three) times a day as needed    zolpidem (AMBIEN CR) 6 25 MG CR tablet  Self Yes No   Sig: Take 6 25 mg by mouth daily at bedtime as needed for sleep      Facility-Administered Medications: None       Past Medical History:   Diagnosis Date    Acid reflux     Acute renal failure (HCC)     multiple episodes    Anemia     Anxiety     Chronic pain     DDD (degenerative disc disease), lumbar     Disease of thyroid gland     had surgery and now hypo    DVT (deep venous thrombosis) ([facility] [address] )     s/p ankle fracture    Hypercalcemia     Hyperlipidemia     Hyperthyroidism     Hypocalcemia     post op 2016    Migraine     Psychiatric disorder     anxiety depression    Seizures (HCC)     petit mal x1  4 years ago- all tests were neg   Spondylolisthesis of lumbar region     Treatment     spinal pain injections  last was 2016       Past Surgical History:   Procedure Laterality Date    BACK SURGERY       SECTION      x5    CYSTOCELE REPAIR  2017    DISCOGRAM      PARATHYROIDECTOMY      [place] ANTERIOR COLPORRAPHY RPR CYSTOCELE W/CYSTO N/A 2017    Procedure: CYSTOCELE REPAIR;  Surgeon: Jose Lenz MD;  Location: [address] [place] [address];  Service: Gynecology    [place] ARTHRODESIS POSTERIOR INTERBODY LUMBAR N/A 2016    Procedure: L4-S1 LUMBAR LAMINECTOMY/DECOMPRESSION;  INSTRUMENTED POSTEROLATERAL FUSION/ INTERBODY L5-S1; ALLOGRAFT AND AUTOGRAFT (IMPULSE) ;   Surgeon: Walter Diaz MD;  Location:  MAIN OR;  Service: Orthopedics    MI CYSTOURETHROSCOPY,URETER CATHETER Bilateral 12/7/2018    Procedure: INSERTION URETERAL CATHETERS PREOP;  Surgeon: Brenda Lancaster MD;  Location: [address] [place] [address];  Service: Urology    MI SLING OPER STRES INCONTINENCE N/A 5/4/2017    Procedure: MID URETHRAL SLING;  Surgeon: Tony Hall MD;  Location: [address] [place] [address];  Service: Urology    MI SUPRACERV ABD HYSTERECTOMY N/A 12/7/2018    Procedure: SUPRACERVICAL HYSTERECTOMY;  Surgeon: Allan Whittaker MD;  Location[facility];  Service: Gynecology    THYROIDECTOMY      TONSILECTOMY AND ADNOIDECTOMY      TONSILLECTOMY      TUBAL LIGATION         Family History   Problem Relation Age of Onset    Diabetes Mother     Hypertension Mother     Hyperlipidemia Father     Arrhythmia Father     Lung cancer Father     Diabetes Father      I have reviewed and agree with the history as documented  E-Cigarette/Vaping    E-Cigarette Use Never User      E-Cigarette/Vaping Substances    Nicotine No     THC No     CBD No     Flavoring No     Other No     Unknown No      Social History     Tobacco Use    Smoking status: Current Every Day Smoker     Packs/day: 0 50     Years: 25 00     Pack years: 12 50     Types: Cigarettes    Smokeless tobacco: Never Used   Vaping Use    Vaping Use: Never used   Substance Use Topics    Alcohol use: Not Currently     Comment: Alcohol intake:   Occasional  - As per Umm Pennington Drug use: No       Review of Systems   Constitutional: Negative for chills, fatigue and fever  HENT: Negative for sore throat  Eyes: Negative for redness and visual disturbance  Respiratory: Positive for shortness of breath  Negative for cough  Cardiovascular: Negative for chest pain  Gastrointestinal: Negative for abdominal pain, diarrhea and nausea  Genitourinary: Negative for difficulty urinating, dysuria and pelvic pain  Musculoskeletal: Negative for back pain  Skin: Negative for rash     Neurological: Positive for numbness  Negative for syncope, weakness and headaches  All other systems reviewed and are negative  Physical Exam  Physical Exam  Vitals and nursing note reviewed  Constitutional:       General: She is not in acute distress  HENT:      Head: Normocephalic and atraumatic  Eyes:      Conjunctiva/sclera: Conjunctivae normal    Cardiovascular:      Rate and Rhythm: Normal rate and regular rhythm  Heart sounds: Normal heart sounds  Pulmonary:      Effort: Pulmonary effort is normal  No respiratory distress  Breath sounds: Normal breath sounds  Abdominal:      General: Bowel sounds are normal       Palpations: Abdomen is soft  Tenderness: There is no abdominal tenderness  Musculoskeletal:         General: Normal range of motion  Cervical back: Normal range of motion  Skin:     General: Skin is warm and dry  Findings: No rash  Neurological:      Mental Status: She is alert and oriented to person, place, and time  Cranial Nerves: No cranial nerve deficit  Sensory: No sensory deficit  Motor: No abnormal muscle tone        Coordination: Coordination normal          Vital Signs  ED Triage Vitals [06/18/21 2103]   Temperature Pulse Respirations Blood Pressure SpO2   (!) 97 4 °F (36 3 °C) 77 20 134/77 97 %      Temp Source Heart Rate Source Patient Position - Orthostatic VS BP Location FiO2 (%)   Tympanic Monitor Sitting Right arm --      Pain Score       No Pain           Vitals:    06/18/21 2103 06/18/21 2245   BP: 134/77 130/70   Pulse: 77 80   Patient Position - Orthostatic VS: Sitting Lying         Visual Acuity      ED Medications  Medications   calcium gluconate 2 g in sodium chloride 0 9% 100 mL (premix) (0 g Intravenous Stopped 6/18/21 7287)       Diagnostic Studies  Results Reviewed     Procedure Component Value Units Date/Time    POCT pregnancy, urine [[id number]]  (Normal) Resulted: 06/18/21 2254    Lab Status: Final result Updated: 06/18/21 2254     EXT PREG TEST UR (Ref: Negative) negative     Control valid    Comprehensive metabolic panel [[id number]]  (Abnormal) Collected: 06/18/21 2146    Lab Status: Final result Specimen: Blood from Arm, Right Updated: 06/18/21 2241     Sodium 138 mmol/L      Potassium 3 6 mmol/L      Chloride 102 mmol/L      CO2 20 mmol/L      ANION GAP 16 mmol/L      BUN 17 mg/dL      Creatinine 0 98 mg/dL      Glucose 145 mg/dL      Calcium 7 1 mg/dL      Corrected Calcium 7 7 mg/dL      AST 46 U/L      ALT 54 U/L      Alkaline Phosphatase 87 U/L      Total Protein 6 7 g/dL      Albumin 3 2 g/dL      Total Bilirubin 0 28 mg/dL      eGFR 70 ml/min/1 73sq m     Narrative:      [place] guidelines for Chronic Kidney Disease (CKD):     Stage 1 with normal or high GFR (GFR > 90 mL/min/1 73 square meters)    Stage 2 Mild CKD (GFR = 60-89 mL/min/1 73 square meters)    Stage 3A Moderate CKD (GFR = 45-59 mL/min/1 73 square meters)    Stage 3B Moderate CKD (GFR = 30-44 mL/min/1 73 square meters)    Stage 4 Severe CKD (GFR = 15-29 mL/min/1 73 square meters)    Stage 5 End Stage CKD (GFR <15 mL/min/1 73 square meters)  Note: GFR calculation is accurate only with a steady state creatinine    Phosphorus [[id number]]  (Abnormal) Collected: 06/18/21 2146    Lab Status: Final result Specimen: Blood from Arm, Right Updated: 06/18/21 2241     Phosphorus 4 7 mg/dL     Magnesium [[id number]]  (Normal) Collected: 06/18/21 2146    Lab Status: Final result Specimen: Blood from Arm, Right Updated: 06/18/21 2241     Magnesium 1 8 mg/dL     UA (URINE) with reflex to Scope [[id number]] Collected: 06/18/21 2232    Lab Status: Final result Specimen: Urine, Clean Catch Updated: 06/18/21 2239     Color, UA Light Yellow     Clarity, UA Cloudy     Specific [place], UA 1 020     pH, UA 8 0     Leukocytes, UA Negative     Nitrite, UA Negative     Protein, UA Negative mg/dl      Glucose, UA Negative mg/dl      Ketones, UA Negative mg/dl Urobilinogen, UA 0 2 E U /dl      Bilirubin, UA Negative     Blood, UA Negative    Troponin I [[id number]]  (Normal) Collected: 06/18/21 2146    Lab Status: Final result Specimen: Blood from Arm, Right Updated: 06/18/21 2218     Troponin I <0 02 ng/mL     CBC and differential [[id number]]  (Abnormal) Collected: 06/18/21 2146    Lab Status: Final result Specimen: Blood from Arm, Right Updated: 06/18/21 2157     WBC 5 66 Thousand/uL      RBC 3 86 Million/uL      Hemoglobin 12 1 g/dL      Hematocrit 35 9 %      MCV 93 fL      MCH 31 3 pg      MCHC 33 7 g/dL      RDW 15 6 %      MPV 10 4 fL      Platelets 322 Thousands/uL      nRBC 0 /100 WBCs      Neutrophils Relative 57 %      Immat GRANS % 1 %      Lymphocytes Relative 33 %      Monocytes Relative 6 %      Eosinophils Relative 2 %      Basophils Relative 1 %      Neutrophils Absolute 3 22 Thousands/µL      Immature Grans Absolute 0 05 Thousand/uL      Lymphocytes Absolute 1 89 Thousands/µL      Monocytes Absolute 0 36 Thousand/µL      Eosinophils Absolute 0 10 Thousand/µL      Basophils Absolute 0 04 Thousands/µL                  XR chest 1 view portable   ED Interpretation by Ghassan Butterfield MD (06/18 0632)   No acute cardiopulmonary pathology                 Procedures  Procedures         ED Course             MDM     40 yo F presents with paresthesias and sob  ekg nsr no acute st changes trop neg  cxr wnl  Patient has hx of hypocalcemia  Low calcium, given Ca here  Discharged with pcp follow up  States she has already been going to endocrinologist for past 5 years with no answers  The patient was instructed to follow up as documented  Strict return precautions were discussed with the patient and the patient was instructed to return to the emergency department immediately if symptoms worsen  The patient/patient family member acknowledged and were in agreement with plan         Disposition  Final diagnoses:   Paresthesias   Low calcium levels     Time reflects when diagnosis was documented in both MDM as applicable and the Disposition within this note     Time User Action Codes Description Comment    6/18/2021 11:44  [address], [address] [R20 2] Paresthesias     6/18/2021 11:44 PM Bridget Morris Add [E83 [id number]] Low calcium levels       ED Disposition     ED Disposition Condition Date/Time Comment    Discharge Stable Fri Jun 18, 2021 11:44 PM Nadeen Gowers discharge to home/self care  Follow-up Information     Follow up With Specialties Details Why Contact Info    Heather Gomez MD Internal Medicine Schedule an appointment as soon as possible for a visit in 1 day For follow up regarding your symptoms and recheck [facility] [address]  [phone]      Infolink  Call  To find a primary care doctor [phone]            Patient's Medications   Discharge Prescriptions    No medications on file     No discharge procedures on file      PDMP Review     None          ED Provider  Electronically Signed by           Jaspal Allen MD  06/18/21 6676

## 2021-06-23 LAB
ATRIAL RATE: 67 BPM
P AXIS: 46 DEGREES
PR INTERVAL: 172 MS
QRS AXIS: 20 DEGREES
QRSD INTERVAL: 80 MS
QT INTERVAL: 428 MS
QTC INTERVAL: 452 MS
T WAVE AXIS: 37 DEGREES
VENTRICULAR RATE: 67 BPM

## 2021-06-23 PROCEDURE — 93010 ELECTROCARDIOGRAM REPORT: CPT | Performed by: INTERNAL MEDICINE

## 2021-07-07 ENCOUNTER — HOSPITAL ENCOUNTER (EMERGENCY)
Facility: HOSPITAL | Age: 46
Discharge: HOME | End: 2021-07-07
Attending: EMERGENCY MEDICINE | Admitting: EMERGENCY MEDICINE
Payer: COMMERCIAL

## 2021-07-07 VITALS
RESPIRATION RATE: 18 BRPM | OXYGEN SATURATION: 98 % | SYSTOLIC BLOOD PRESSURE: 132 MMHG | WEIGHT: 197 LBS | HEART RATE: 72 BPM | TEMPERATURE: 98 F | DIASTOLIC BLOOD PRESSURE: 71 MMHG | BODY MASS INDEX: 36.03 KG/M2

## 2021-07-07 DIAGNOSIS — E83.51 HYPOCALCEMIA: Primary | ICD-10-CM

## 2021-07-07 DIAGNOSIS — R07.9 CHEST PAIN: ICD-10-CM

## 2021-07-07 LAB
ANION GAP SERPL CALCULATED.3IONS-SCNC: 18 MMOL/L (ref 4–13)
BASOPHILS # BLD AUTO: 0.03 THOUSANDS/ΜL (ref 0–0.1)
BASOPHILS NFR BLD AUTO: 1 % (ref 0–1)
BUN SERPL-MCNC: 17 MG/DL (ref 5–25)
CALCIUM SERPL-MCNC: 7.3 MG/DL (ref 8.3–10.1)
CHLORIDE SERPL-SCNC: 102 MMOL/L (ref 100–108)
CO2 SERPL-SCNC: 18 MMOL/L (ref 21–32)
CREAT SERPL-MCNC: 1.1 MG/DL (ref 0.6–1.3)
EOSINOPHIL # BLD AUTO: 0.1 THOUSAND/ΜL (ref 0–0.61)
EOSINOPHIL NFR BLD AUTO: 2 % (ref 0–6)
ERYTHROCYTE [DISTWIDTH] IN BLOOD BY AUTOMATED COUNT: 15.9 % (ref 11.6–15.1)
GFR SERPL CREATININE-BSD FRML MDRD: 61 ML/MIN/1.73SQ M
GLUCOSE SERPL-MCNC: 125 MG/DL (ref 65–140)
HCT VFR BLD AUTO: 38.6 % (ref 34.8–46.1)
HGB BLD-MCNC: 12.6 G/DL (ref 11.5–15.4)
IMM GRANULOCYTES # BLD AUTO: 0.02 THOUSAND/UL (ref 0–0.2)
IMM GRANULOCYTES NFR BLD AUTO: 0 % (ref 0–2)
LYMPHOCYTES # BLD AUTO: 1.8 THOUSANDS/ΜL (ref 0.6–4.47)
LYMPHOCYTES NFR BLD AUTO: 31 % (ref 14–44)
MAGNESIUM SERPL-MCNC: 2 MG/DL (ref 1.6–2.6)
MCH RBC QN AUTO: 29.6 PG (ref 26.8–34.3)
MCHC RBC AUTO-ENTMCNC: 32.6 G/DL (ref 31.4–37.4)
MCV RBC AUTO: 91 FL (ref 82–98)
MONOCYTES # BLD AUTO: 0.51 THOUSAND/ΜL (ref 0.17–1.22)
MONOCYTES NFR BLD AUTO: 9 % (ref 4–12)
NEUTROPHILS # BLD AUTO: 3.3 THOUSANDS/ΜL (ref 1.85–7.62)
NEUTS SEG NFR BLD AUTO: 57 % (ref 43–75)
NRBC BLD AUTO-RTO: 0 /100 WBCS
PHOSPHATE SERPL-MCNC: 5.1 MG/DL (ref 2.7–4.5)
PLATELET # BLD AUTO: 183 THOUSANDS/UL (ref 149–390)
PMV BLD AUTO: 10.7 FL (ref 8.9–12.7)
POTASSIUM SERPL-SCNC: 4 MMOL/L (ref 3.5–5.3)
RBC # BLD AUTO: 4.25 MILLION/UL (ref 3.81–5.12)
SODIUM SERPL-SCNC: 138 MMOL/L (ref 136–145)
TROPONIN I SERPL-MCNC: <0.02 NG/ML
WBC # BLD AUTO: 5.76 THOUSAND/UL (ref 4.31–10.16)

## 2021-07-07 PROCEDURE — 36415 COLL VENOUS BLD VENIPUNCTURE: CPT | Performed by: PHYSICIAN ASSISTANT

## 2021-07-07 PROCEDURE — 93005 ELECTROCARDIOGRAM TRACING: CPT

## 2021-07-07 PROCEDURE — 83735 ASSAY OF MAGNESIUM: CPT | Performed by: PHYSICIAN ASSISTANT

## 2021-07-07 PROCEDURE — 99284 EMERGENCY DEPT VISIT MOD MDM: CPT | Performed by: PHYSICIAN ASSISTANT

## 2021-07-07 PROCEDURE — 80048 BASIC METABOLIC PNL TOTAL CA: CPT | Performed by: PHYSICIAN ASSISTANT

## 2021-07-07 PROCEDURE — 84484 ASSAY OF TROPONIN QUANT: CPT | Performed by: PHYSICIAN ASSISTANT

## 2021-07-07 PROCEDURE — 85025 COMPLETE CBC W/AUTO DIFF WBC: CPT | Performed by: PHYSICIAN ASSISTANT

## 2021-07-07 PROCEDURE — 84100 ASSAY OF PHOSPHORUS: CPT | Performed by: PHYSICIAN ASSISTANT

## 2021-07-07 PROCEDURE — 96365 THER/PROPH/DIAG IV INF INIT: CPT

## 2021-07-07 PROCEDURE — 99285 EMERGENCY DEPT VISIT HI MDM: CPT

## 2021-07-07 RX ORDER — CALCIUM GLUCONATE 20 MG/ML
2 INJECTION, SOLUTION INTRAVENOUS ONCE
Status: COMPLETED | OUTPATIENT
Start: 2021-07-07 | End: 2021-07-07

## 2021-07-07 RX ORDER — CALCIUM GLUCONATE 20 MG/ML
1 INJECTION, SOLUTION INTRAVENOUS ONCE
Status: DISCONTINUED | OUTPATIENT
Start: 2021-07-07 | End: 2021-07-07

## 2021-07-07 RX ADMIN — CALCIUM GLUCONATE 2 G: 20 INJECTION, SOLUTION INTRAVENOUS at 18:26

## 2021-07-07 RX ADMIN — SODIUM CHLORIDE 1000 ML: 0.9 INJECTION, SOLUTION INTRAVENOUS at 18:25
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
## 2021-07-08 LAB
ATRIAL RATE: 80 BPM
P AXIS: 50 DEGREES
PR INTERVAL: 166 MS
QRS AXIS: 57 DEGREES
QRSD INTERVAL: 78 MS
QT INTERVAL: 388 MS
QTC INTERVAL: 447 MS
T WAVE AXIS: 61 DEGREES
VENTRICULAR RATE: 80 BPM

## 2021-07-08 PROCEDURE — 93010 ELECTROCARDIOGRAM REPORT: CPT | Performed by: INTERNAL MEDICINE

## 2021-07-09 ENCOUNTER — HOSPITAL ENCOUNTER (EMERGENCY)
Facility: HOSPITAL | Age: 46
Discharge: HOME | End: 2021-07-10
Attending: EMERGENCY MEDICINE | Admitting: EMERGENCY MEDICINE
Payer: COMMERCIAL

## 2021-07-09 DIAGNOSIS — E83.51 HYPOCALCEMIA: Primary | ICD-10-CM

## 2021-07-09 DIAGNOSIS — R20.2 TINGLING: ICD-10-CM

## 2021-07-09 LAB
BASOPHILS # BLD AUTO: 0.05 THOUSANDS/ΜL (ref 0–0.1)
BASOPHILS NFR BLD AUTO: 1 % (ref 0–1)
CA-I BLD-SCNC: 1.06 MMOL/L (ref 1.12–1.32)
EOSINOPHIL # BLD AUTO: 0.11 THOUSAND/ΜL (ref 0–0.61)
EOSINOPHIL NFR BLD AUTO: 2 % (ref 0–6)
ERYTHROCYTE [DISTWIDTH] IN BLOOD BY AUTOMATED COUNT: 15.5 % (ref 11.6–15.1)
HCT VFR BLD AUTO: 40 % (ref 34.8–46.1)
HGB BLD-MCNC: 13.7 G/DL (ref 11.5–15.4)
IMM GRANULOCYTES # BLD AUTO: 0.01 THOUSAND/UL (ref 0–0.2)
IMM GRANULOCYTES NFR BLD AUTO: 0 % (ref 0–2)
LYMPHOCYTES # BLD AUTO: 2.02 THOUSANDS/ΜL (ref 0.6–4.47)
LYMPHOCYTES NFR BLD AUTO: 32 % (ref 14–44)
MCH RBC QN AUTO: 31.4 PG (ref 26.8–34.3)
MCHC RBC AUTO-ENTMCNC: 34.3 G/DL (ref 31.4–37.4)
MCV RBC AUTO: 92 FL (ref 82–98)
MONOCYTES # BLD AUTO: 0.45 THOUSAND/ΜL (ref 0.17–1.22)
MONOCYTES NFR BLD AUTO: 7 % (ref 4–12)
NEUTROPHILS # BLD AUTO: 3.74 THOUSANDS/ΜL (ref 1.85–7.62)
NEUTS SEG NFR BLD AUTO: 58 % (ref 43–75)
NRBC BLD AUTO-RTO: 0 /100 WBCS
PLATELET # BLD AUTO: 200 THOUSANDS/UL (ref 149–390)
PMV BLD AUTO: 10.6 FL (ref 8.9–12.7)
RBC # BLD AUTO: 4.37 MILLION/UL (ref 3.81–5.12)
WBC # BLD AUTO: 6.38 THOUSAND/UL (ref 4.31–10.16)

## 2021-07-09 PROCEDURE — 85025 COMPLETE CBC W/AUTO DIFF WBC: CPT | Performed by: EMERGENCY MEDICINE

## 2021-07-09 PROCEDURE — 82330 ASSAY OF CALCIUM: CPT | Performed by: EMERGENCY MEDICINE

## 2021-07-09 PROCEDURE — 36415 COLL VENOUS BLD VENIPUNCTURE: CPT | Performed by: EMERGENCY MEDICINE

## 2021-07-09 PROCEDURE — 96361 HYDRATE IV INFUSION ADD-ON: CPT

## 2021-07-09 PROCEDURE — 99283 EMERGENCY DEPT VISIT LOW MDM: CPT

## 2021-07-09 PROCEDURE — 84100 ASSAY OF PHOSPHORUS: CPT | Performed by: EMERGENCY MEDICINE

## 2021-07-09 PROCEDURE — 80048 BASIC METABOLIC PNL TOTAL CA: CPT | Performed by: EMERGENCY MEDICINE

## 2021-07-09 PROCEDURE — 83735 ASSAY OF MAGNESIUM: CPT | Performed by: EMERGENCY MEDICINE

## 2021-07-09 RX ADMIN — SODIUM CHLORIDE 1000 ML: 0.9 INJECTION, SOLUTION INTRAVENOUS at 23:51

## 2021-07-10 VITALS
HEART RATE: 75 BPM | TEMPERATURE: 98.7 F | SYSTOLIC BLOOD PRESSURE: 145 MMHG | DIASTOLIC BLOOD PRESSURE: 51 MMHG | RESPIRATION RATE: 18 BRPM | OXYGEN SATURATION: 98 %

## 2021-07-10 LAB
AMORPH URATE CRY URNS QL MICRO: NORMAL /HPF
ANION GAP SERPL CALCULATED.3IONS-SCNC: 18 MMOL/L (ref 4–13)
BACTERIA UR QL AUTO: NORMAL /HPF
BILIRUB UR QL STRIP: NEGATIVE
BUN SERPL-MCNC: 16 MG/DL (ref 5–25)
CALCIUM SERPL-MCNC: 8 MG/DL (ref 8.3–10.1)
CHLORIDE SERPL-SCNC: 101 MMOL/L (ref 100–108)
CLARITY UR: ABNORMAL
CO2 SERPL-SCNC: 21 MMOL/L (ref 21–32)
COLOR UR: YELLOW
CREAT SERPL-MCNC: 0.94 MG/DL (ref 0.6–1.3)
EXT PREG TEST URINE: NEGATIVE
EXT. CONTROL ED NAV: NORMAL
GFR SERPL CREATININE-BSD FRML MDRD: 74 ML/MIN/1.73SQ M
GLUCOSE SERPL-MCNC: 159 MG/DL (ref 65–140)
GLUCOSE UR STRIP-MCNC: NEGATIVE MG/DL
HGB UR QL STRIP.AUTO: NEGATIVE
KETONES UR STRIP-MCNC: NEGATIVE MG/DL
LEUKOCYTE ESTERASE UR QL STRIP: ABNORMAL
MAGNESIUM SERPL-MCNC: 2 MG/DL (ref 1.6–2.6)
NITRITE UR QL STRIP: NEGATIVE
NON-SQ EPI CELLS URNS QL MICRO: NORMAL /HPF
PH UR STRIP.AUTO: 6 [PH]
PHOSPHATE SERPL-MCNC: 5.8 MG/DL (ref 2.7–4.5)
POTASSIUM SERPL-SCNC: 3.7 MMOL/L (ref 3.5–5.3)
PROT UR STRIP-MCNC: NEGATIVE MG/DL
RBC #/AREA URNS AUTO: NORMAL /HPF
SODIUM SERPL-SCNC: 140 MMOL/L (ref 136–145)
SP GR UR STRIP.AUTO: 1.02 (ref 1–1.03)
UROBILINOGEN UR QL STRIP.AUTO: 0.2 E.U./DL
WBC #/AREA URNS AUTO: NORMAL /HPF

## 2021-07-10 PROCEDURE — 96361 HYDRATE IV INFUSION ADD-ON: CPT

## 2021-07-10 PROCEDURE — 81025 URINE PREGNANCY TEST: CPT | Performed by: EMERGENCY MEDICINE

## 2021-07-10 PROCEDURE — 96365 THER/PROPH/DIAG IV INF INIT: CPT

## 2021-07-10 PROCEDURE — 96366 THER/PROPH/DIAG IV INF ADDON: CPT

## 2021-07-10 PROCEDURE — 81001 URINALYSIS AUTO W/SCOPE: CPT | Performed by: EMERGENCY MEDICINE

## 2021-07-10 PROCEDURE — 99284 EMERGENCY DEPT VISIT MOD MDM: CPT | Performed by: EMERGENCY MEDICINE

## 2021-07-10 RX ORDER — CALCIUM GLUCONATE 20 MG/ML
1 INJECTION, SOLUTION INTRAVENOUS ONCE
Status: COMPLETED | OUTPATIENT
Start: 2021-07-10 | End: 2021-07-10

## 2021-07-10 RX ADMIN — CALCIUM GLUCONATE 1 G: 20 INJECTION, SOLUTION INTRAVENOUS at 01:10

## 2021-07-10 RX ADMIN — SODIUM CHLORIDE 1000 ML: 0.9 INJECTION, SOLUTION INTRAVENOUS at 03:00

## 2021-07-10 NOTE — ED PROVIDER NOTES
History  Chief Complaint   Patient presents with    Abnormal Lab     pt presents to the ed stating that she " is in renal failure" and was at [facility] this morning  18-year-old female with past history of hypothyroidism after thyroidectomy, hypocalcemia, anxiety, depression, hyperlipidemia, anemia, degenerative disc disease, migraine, presents to the ED for evaluation of tingling sensation throughout her body  Patient has history of hypocalcemia  Patient has outpatient calcium supplementation ordered  Patient states that she has some tingling sensation in her extremities earlier today  Patient was seen at another hospital and had blood culture done that showed mild elevation and phosphorus level  Patient's calcium level was unremarkable at the hospital apparently  Patient was asked if she wanted to be admitted however patient wanted to come to our hospital as all of her follow-up physicians are here  History provided by:  Patient      Prior to Admission Medications   Prescriptions Last Dose Informant Patient Reported? Taking?    ALPRAZolam (XANAX) 1 mg tablet  Self Yes No   Sig: Take 1 mg by mouth daily am   baclofen 10 mg tablet  Self Yes No   Sig: Take 10 mg by mouth 3 (three) times a day    calcitriol (ROCALTROL) 0 25 mcg capsule   No No   Sig: Take 1 capsule (0 25 mcg total) by mouth daily   calcium carbonate-vitamin D (OSCAL-D) 500 mg-200 units per tablet   No No   Sig: Take 2 tablets by mouth 3 (three) times a day with meals   diclofenac (VOLTAREN) 75 mg EC tablet  Self No No   Sig: Take 1 tablet (75 mg total) by mouth 2 (two) times a day   Patient not taking: Reported on 3/18/2021   ergocalciferol (VITAMIN D2) 50,000 units   No No   Sig: Take 1 capsule (50,000 Units total) by mouth once a week   fenofibrate (TRIGLIDE) 160 MG tablet  Self Yes No   Sig: Take 160 mg by mouth daily   ferrous sulfate 325 (65 Fe) mg tablet  Self Yes No   Sig: Take 325 mg by mouth Once Monday, wed, Friday   ibuprofen (MOTRIN) 600 mg tablet  Self No No   Sig: Take 1 tablet (600 mg total) by mouth every 6 (six) hours as needed for mild pain or moderate pain   levothyroxine (Synthroid) 150 mcg tablet   No No   Sig: Take 1 tablet (150 mcg total) by mouth daily   magnesium oxide (MAG-OX) 400 mg   No No   Sig: Take 1 tablet (400 mg total) by mouth 3 (three) times a day 9pm   ondansetron (ZOFRAN) 4 mg tablet  Self Yes No   Sig: Take 4 mg by mouth every 8 (eight) hours as needed for nausea or vomiting  oxyCODONE-acetaminophen (PERCOCET) 5-325 mg per tablet  Self Yes No   Sig: Take 2 tablets by mouth daily    potassium chloride (K-DUR,KLOR-CON) 20 mEq tablet   No No   Sig: Take 1 tablet (20 mEq total) by mouth 2 (two) times a day   pregabalin (LYRICA) 75 mg capsule  Self Yes No   Sig: Take 75 mg by mouth 3 (three) times a day as needed    zolpidem (AMBIEN CR) 6 25 MG CR tablet  Self Yes No   Sig: Take 6 25 mg by mouth daily at bedtime as needed for sleep      Facility-Administered Medications: None       Past Medical History:   Diagnosis Date    Acid reflux     Acute renal failure (HCC)     multiple episodes    Anemia     Anxiety     Chronic pain     DDD (degenerative disc disease), lumbar     Disease of thyroid gland     had surgery and now hypo    DVT (deep venous thrombosis) ([facility] [address] )     s/p ankle fracture    Hypercalcemia     Hyperlipidemia     Hyperthyroidism     Hypocalcemia     post op 2016    Migraine     Psychiatric disorder     anxiety depression    Seizures (HCC)     petit mal x1  4 years ago- all tests were neg      Spondylolisthesis of lumbar region     Treatment     spinal pain injections  last was 2016       Past Surgical History:   Procedure Laterality Date    BACK SURGERY       SECTION      x5    CYSTOCELE REPAIR  2017    DISCOGRAM      PARATHYROIDECTOMY      W[place] ANTERIOR COLPORRAPHY RPR CYSTOCELE W/CYSTO N/A 2017    Procedure: CYSTOCELE REPAIR;  Surgeon: Glenis Aguilar MD; Location: [facility];  Service: Gynecology    NC ARTHRODESIS POSTERIOR INTERBODY LUMBAR N/A 8/12/2016    Procedure: L4-S1 LUMBAR LAMINECTOMY/DECOMPRESSION;  INSTRUMENTED POSTEROLATERAL FUSION/ INTERBODY L5-S1; ALLOGRAFT AND AUTOGRAFT (IMPULSE) ; Surgeon: Jyotsna Bejarano MD;  Location:  MAIN OR;  Service: Orthopedics    NC CYSTOURETHROSCOPY,URETER CATHETER Bilateral 12/7/2018    Procedure: INSERTION URETERAL CATHETERS PREOP;  Surgeon: Rome Tim MD;  Location: [address] [place] [address];  Service: Urology    NC SLING OPER STRES INCONTINENCE N/A 5/4/2017    Procedure: MID URETHRAL SLING;  Surgeon: Liane Amado MD;  Location: [address] [place] [address];  Service: Urology    NC SUPRACERV ABD HYSTERECTOMY N/A 12/7/2018    Procedure: SUPRACERVICAL HYSTERECTOMY;  Surgeon: Mark Anthony Moore MD;  Location: [facility];  Service: Gynecology    THYROIDECTOMY      TONSILECTOMY AND ADNOIDECTOMY      TONSILLECTOMY      TUBAL LIGATION         Family History   Problem Relation Age of Onset    Diabetes Mother     Hypertension Mother     Hyperlipidemia Father     Arrhythmia Father     Lung cancer Father     Diabetes Father      I have reviewed and agree with the history as documented  E-Cigarette/Vaping    E-Cigarette Use Never User      E-Cigarette/Vaping Substances    Nicotine No     THC No     CBD No     Flavoring No     Other No     Unknown No      Social History     Tobacco Use    Smoking status: Current Every Day Smoker     Packs/day: 0 50     Years: 25 00     Pack years: 12 50     Types: Cigarettes    Smokeless tobacco: Never Used   Vaping Use    Vaping Use: Never used   Substance Use Topics    Alcohol use: Not Currently     Comment: Alcohol intake:   Occasional  - As per Everlena [place] Drug use: No       Review of Systems   Constitutional: Negative for chills and fever  HENT: Negative for ear pain and sore throat  Eyes: Negative for pain and visual disturbance     Respiratory: Negative for cough and shortness of breath  Cardiovascular: Negative for chest pain and palpitations  Gastrointestinal: Negative for abdominal pain and vomiting  Genitourinary: Negative for dysuria and hematuria  Musculoskeletal: Negative for arthralgias and back pain  Skin: Negative for color change and rash  Neurological: Negative for seizures and syncope  Tingling sensation   All other systems reviewed and are negative  Physical Exam  Physical Exam  Vitals and nursing note reviewed  Constitutional:       General: She is not in acute distress  Appearance: She is well-developed  HENT:      Head: Normocephalic and atraumatic  Eyes:      Conjunctiva/sclera: Conjunctivae normal    Cardiovascular:      Rate and Rhythm: Normal rate and regular rhythm  Heart sounds: Normal heart sounds  No murmur heard  Pulmonary:      Effort: Pulmonary effort is normal  No respiratory distress  Breath sounds: Normal breath sounds  Abdominal:      General: Bowel sounds are normal  There is no distension  Palpations: Abdomen is soft  Tenderness: There is no abdominal tenderness  Musculoskeletal:         General: Normal range of motion  Cervical back: Normal range of motion and neck supple  Skin:     General: Skin is warm and dry  Neurological:      General: No focal deficit present  Mental Status: She is alert and oriented to person, place, and time  Psychiatric:         Mood and Affect: Mood normal          Behavior: Behavior normal          Thought Content:  Thought content normal          Judgment: Judgment normal          Vital Signs  ED Triage Vitals   Temperature Pulse Respirations Blood Pressure SpO2   07/10/21 0059 07/10/21 0030 07/10/21 0059 07/10/21 0059 07/10/21 0030   98 7 °F (37 1 °C) 82 18 155/54 99 %      Temp src Heart Rate Source Patient Position - Orthostatic VS BP Location FiO2 (%)   -- -- -- -- --             Pain Score       --                  Vitals:    07/10/21 0030 07/10/21 0059   BP:  155/54   Pulse: 82          Visual Acuity      ED Medications  Medications   sodium chloride 0 9 % bolus 1,000 mL (1,000 mL Intravenous New Bag 7/10/21 0300)   sodium chloride 0 9 % bolus 1,000 mL (0 mL Intravenous Stopped 7/10/21 0300)   calcium gluconate 1 g in sodium chloride 0 9% 50 mL (premix) (0 g Intravenous Stopped 7/10/21 0300)       Diagnostic Studies  Results Reviewed     Procedure Component Value Units Date/Time    Urine Microscopic [[id number]] Collected: 07/10/21 0059    Lab Status: Final result Specimen: Urine, Clean Catch Updated: 07/10/21 0124     RBC, UA 0-1 /hpf      WBC, UA 0-1 /hpf      Epithelial Cells Occasional /hpf      Bacteria, UA Occasional /hpf      AMORPH URATES Occasional /hpf     UA w Reflex to Microscopic w Reflex to Culture [[id number]]  (Abnormal) Collected: 07/10/21 0059    Lab Status: Final result Specimen: Urine, Clean Catch Updated: 07/10/21 0114     Color, UA Yellow     Clarity, UA Slightly Cloudy     Specific [place], UA 1 020     pH, UA 6 0     Leukocytes, UA Small     Nitrite, UA Negative     Protein, UA Negative mg/dl      Glucose, UA Negative mg/dl      Ketones, UA Negative mg/dl      Urobilinogen, UA 0 2 E U /dl      Bilirubin, UA Negative     Blood, UA Negative    POCT pregnancy, urine [[id number]]  (Normal) Resulted: 07/10/21 0105    Lab Status: Final result Updated: 07/10/21 0105     EXT PREG TEST UR (Ref: Negative) negative     Control valid    Basic metabolic panel [[id number]]  (Abnormal) Collected: 07/09/21 2351    Lab Status: Final result Specimen: Blood from Arm, Left Updated: 07/10/21 0011     Sodium 140 mmol/L      Potassium 3 7 mmol/L      Chloride 101 mmol/L      CO2 21 mmol/L      ANION GAP 18 mmol/L      BUN 16 mg/dL      Creatinine 0 94 mg/dL      Glucose 159 mg/dL      Calcium 8 0 mg/dL      eGFR 74 ml/min/1 73sq m     Narrative:      [place] guidelines for Chronic Kidney Disease (CKD):     Stage 1 with normal or high GFR (GFR > 90 mL/min/1 73 square meters)    Stage 2 Mild CKD (GFR = 60-89 mL/min/1 73 square meters)    Stage 3A Moderate CKD (GFR = 45-59 mL/min/1 73 square meters)    Stage 3B Moderate CKD (GFR = 30-44 mL/min/1 73 square meters)    Stage 4 Severe CKD (GFR = 15-29 mL/min/1 73 square meters)    Stage 5 End Stage CKD (GFR <15 mL/min/1 73 square meters)  Note: GFR calculation is accurate only with a steady state creatinine    Magnesium [[id number]]  (Normal) Collected: 07/09/21 2351    Lab Status: Final result Specimen: Blood from Arm, Left Updated: 07/10/21 0011     Magnesium 2 0 mg/dL     Phosphorus [[id number]]  (Abnormal) Collected: 07/09/21 2351    Lab Status: Final result Specimen: Blood from Arm, Left Updated: 07/10/21 0011     Phosphorus 5 8 mg/dL     Calcium, ionized [[id number]]  (Abnormal) Collected: 07/09/21 2351    Lab Status: Final result Specimen: Blood from Arm, Left Updated: 07/09/21 2357     Calcium, Ionized 1 06 mmol/L     CBC and differential [[id number]]  (Abnormal) Collected: 07/09/21 2351    Lab Status: Final result Specimen: Blood from Arm, Left Updated: 07/09/21 2357     WBC 6 38 Thousand/uL      RBC 4 37 Million/uL      Hemoglobin 13 7 g/dL      Hematocrit 40 0 %      MCV 92 fL      MCH 31 4 pg      MCHC 34 3 g/dL      RDW 15 5 %      MPV 10 6 fL      Platelets 869 Thousands/uL      nRBC 0 /100 WBCs      Neutrophils Relative 58 %      Immat GRANS % 0 %      Lymphocytes Relative 32 %      Monocytes Relative 7 %      Eosinophils Relative 2 %      Basophils Relative 1 %      Neutrophils Absolute 3 74 Thousands/µL      Immature Grans Absolute 0 01 Thousand/uL      Lymphocytes Absolute 2 02 Thousands/µL      Monocytes Absolute 0 45 Thousand/µL      Eosinophils Absolute 0 11 Thousand/µL      Basophils Absolute 0 05 Thousands/µL                  No orders to display              Procedures  Procedures         ED Course  ED Course as of Jul 10 0352   Sat Jul 10, 2021   4756 Patient examined at bedside  Patient is feeling better with IV fluids and calcium  Patient will be discharged after 2 L of fluid is completed  MDM  Number of Diagnoses or Management Options  Hypocalcemia: new and requires workup  Tingling: new and requires workup  Diagnosis management comments: Obtain CBC, BMP, ionized calcium level, UA  Give IV fluids and calcium repletion if needed       Amount and/or Complexity of Data Reviewed  Clinical lab tests: ordered and reviewed  Tests in the radiology section of CPT®: ordered and reviewed  Tests in the medicine section of CPT®: ordered and reviewed  Review and summarize past medical records: yes  Independent visualization of images, tracings, or specimens: yes    Risk of Complications, Morbidity, and/or Mortality  General comments: Patient's ionized calcium was low in the ED  After calcium was repleted along with IV fluids, patient felt much better  Patient's phosphorus level was mildly elevated  At this time patient is discharged home with follow-up to PCP in 2-3 days  Patient told to discuss repeat blood work with PCP to recheck phosphorus level  Close return instructions given to return to the ER for any worsening symptoms  Patient agrees with discharge plan  Patient well appearing at time of discharge  Please Note: Fluency Direct voice recognition software may have been used in the creation of this document  Wrong words or sound a like substitutions may have occurred due to the inherent limitations of the voice software         Patient Progress  Patient progress: improved      Disposition  Final diagnoses:   Hypocalcemia   Tingling     Time reflects when diagnosis was documented in both MDM as applicable and the Disposition within this note     Time User Action Codes Description Comment    7/10/2021  3:42 AM Rohini Brasher Add [E83 51] Hypocalcemia     7/10/2021  3:42 AM Timothy Logan Add [R20 2] Tingling       ED Disposition     ED Disposition Condition Date/Time Comment    Discharge Stable Sat Jul 10, 2021  3:42 AM [facility] discharge to home/self care  Follow-up Information     Follow up With Specialties Details Why Tara Obrien MD Internal Medicine In 3 days You phosphorus level was mildly elevated today  You will need a follow-up repeat blood work in a few days  Please discuss this with your family physician  [address]  [phone]            Patient's Medications   Discharge Prescriptions    No medications on file     No discharge procedures on file      PDMP Review     None          ED Provider  Electronically Signed by           Allison Claire DO  07/10/21 3039

## 2021-07-11 PROCEDURE — 99284 EMERGENCY DEPT VISIT MOD MDM: CPT

## 2021-07-12 ENCOUNTER — HOSPITAL ENCOUNTER (EMERGENCY)
Facility: HOSPITAL | Age: 46
Discharge: HOME | End: 2021-07-12
Attending: EMERGENCY MEDICINE | Admitting: EMERGENCY MEDICINE
Payer: COMMERCIAL

## 2021-07-12 VITALS
OXYGEN SATURATION: 98 % | TEMPERATURE: 97.6 F | RESPIRATION RATE: 16 BRPM | DIASTOLIC BLOOD PRESSURE: 72 MMHG | BODY MASS INDEX: 36.25 KG/M2 | HEIGHT: 62 IN | WEIGHT: 197 LBS | HEART RATE: 72 BPM | SYSTOLIC BLOOD PRESSURE: 140 MMHG

## 2021-07-12 DIAGNOSIS — E83.51 HYPOCALCEMIA: Primary | ICD-10-CM

## 2021-07-12 LAB
ALBUMIN SERPL BCP-MCNC: 3.4 G/DL (ref 3.5–5)
ALP SERPL-CCNC: 64 U/L (ref 46–116)
ALT SERPL W P-5'-P-CCNC: 43 U/L (ref 12–78)
ANION GAP SERPL CALCULATED.3IONS-SCNC: 12 MMOL/L (ref 4–13)
AST SERPL W P-5'-P-CCNC: 29 U/L (ref 5–45)
BASOPHILS # BLD AUTO: 0.04 THOUSANDS/ΜL (ref 0–0.1)
BASOPHILS NFR BLD AUTO: 1 % (ref 0–1)
BILIRUB SERPL-MCNC: 0.34 MG/DL (ref 0.2–1)
BUN SERPL-MCNC: 14 MG/DL (ref 5–25)
CA-I BLD-SCNC: 1.01 MMOL/L (ref 1.12–1.32)
CALCIUM ALBUM COR SERPL-MCNC: 8.3 MG/DL (ref 8.3–10.1)
CALCIUM SERPL-MCNC: 7.8 MG/DL (ref 8.3–10.1)
CHLORIDE SERPL-SCNC: 103 MMOL/L (ref 100–108)
CO2 SERPL-SCNC: 26 MMOL/L (ref 21–32)
CREAT SERPL-MCNC: 1.03 MG/DL (ref 0.6–1.3)
EOSINOPHIL # BLD AUTO: 0.14 THOUSAND/ΜL (ref 0–0.61)
EOSINOPHIL NFR BLD AUTO: 2 % (ref 0–6)
ERYTHROCYTE [DISTWIDTH] IN BLOOD BY AUTOMATED COUNT: 15.5 % (ref 11.6–15.1)
GFR SERPL CREATININE-BSD FRML MDRD: 66 ML/MIN/1.73SQ M
GLUCOSE SERPL-MCNC: 118 MG/DL (ref 65–140)
HCT VFR BLD AUTO: 38.3 % (ref 34.8–46.1)
HGB BLD-MCNC: 12.5 G/DL (ref 11.5–15.4)
IMM GRANULOCYTES # BLD AUTO: 0.02 THOUSAND/UL (ref 0–0.2)
IMM GRANULOCYTES NFR BLD AUTO: 0 % (ref 0–2)
LYMPHOCYTES # BLD AUTO: 1.91 THOUSANDS/ΜL (ref 0.6–4.47)
LYMPHOCYTES NFR BLD AUTO: 30 % (ref 14–44)
MAGNESIUM SERPL-MCNC: 2.1 MG/DL (ref 1.6–2.6)
MCH RBC QN AUTO: 29.8 PG (ref 26.8–34.3)
MCHC RBC AUTO-ENTMCNC: 32.6 G/DL (ref 31.4–37.4)
MCV RBC AUTO: 91 FL (ref 82–98)
MONOCYTES # BLD AUTO: 0.38 THOUSAND/ΜL (ref 0.17–1.22)
MONOCYTES NFR BLD AUTO: 6 % (ref 4–12)
NEUTROPHILS # BLD AUTO: 3.92 THOUSANDS/ΜL (ref 1.85–7.62)
NEUTS SEG NFR BLD AUTO: 61 % (ref 43–75)
NRBC BLD AUTO-RTO: 0 /100 WBCS
PHOSPHATE SERPL-MCNC: 5.2 MG/DL (ref 2.7–4.5)
PLATELET # BLD AUTO: 158 THOUSANDS/UL (ref 149–390)
PMV BLD AUTO: 10.4 FL (ref 8.9–12.7)
POTASSIUM SERPL-SCNC: 3.6 MMOL/L (ref 3.5–5.3)
PROT SERPL-MCNC: 7.1 G/DL (ref 6.4–8.2)
RBC # BLD AUTO: 4.2 MILLION/UL (ref 3.81–5.12)
SODIUM SERPL-SCNC: 141 MMOL/L (ref 136–145)
WBC # BLD AUTO: 6.41 THOUSAND/UL (ref 4.31–10.16)

## 2021-07-12 PROCEDURE — 80053 COMPREHEN METABOLIC PANEL: CPT | Performed by: EMERGENCY MEDICINE

## 2021-07-12 PROCEDURE — 96365 THER/PROPH/DIAG IV INF INIT: CPT

## 2021-07-12 PROCEDURE — 36415 COLL VENOUS BLD VENIPUNCTURE: CPT | Performed by: EMERGENCY MEDICINE

## 2021-07-12 PROCEDURE — 84100 ASSAY OF PHOSPHORUS: CPT | Performed by: EMERGENCY MEDICINE

## 2021-07-12 PROCEDURE — 82330 ASSAY OF CALCIUM: CPT | Performed by: EMERGENCY MEDICINE

## 2021-07-12 PROCEDURE — 99282 EMERGENCY DEPT VISIT SF MDM: CPT | Performed by: EMERGENCY MEDICINE

## 2021-07-12 PROCEDURE — 85025 COMPLETE CBC W/AUTO DIFF WBC: CPT | Performed by: EMERGENCY MEDICINE

## 2021-07-12 PROCEDURE — 83735 ASSAY OF MAGNESIUM: CPT | Performed by: EMERGENCY MEDICINE

## 2021-07-12 RX ORDER — CALCIUM GLUCONATE 20 MG/ML
2 INJECTION, SOLUTION INTRAVENOUS ONCE
Status: COMPLETED | OUTPATIENT
Start: 2021-07-12 | End: 2021-07-12

## 2021-07-12 RX ADMIN — CALCIUM GLUCONATE 2 G: 20 INJECTION, SOLUTION INTRAVENOUS at 02:36

## 2021-07-12 NOTE — ED CARE HANDOFF
Emergency Department Sign Out Note        Sign out and transfer of care from previous provider  See Separate Emergency Department note  The patient, Wali Fiztpatrick, was evaluated by the previous provider for hypocalcemia  Workup Completed:  Blood work    ED Course / Workup Pending (followup): Calcium gluconate infusion                                  ED Course as of Jul 12 0336 Mon Jul 12, 2021   7298 Patient resides at bedside  Patient feels significantly better after calcium gluconate infusion  Patient would like to go home  Procedures  MDM  Number of Diagnoses or Management Options  Risk of Complications, Morbidity, and/or Mortality  General comments: Patient has history of hypocalcemia  Patient came to the ED today for tingling sensation  Patient was evaluated by previous provider who had blood work done that showed low calcium level  Calcium gluconate was ordered  Patient felt better after calcium gluconate infusion  At this time patient is discharged home with follow-up to her endocrinologist as soon as possible  Close return instructions given to return to the ER for any worsening symptoms  Patient agrees with discharge plan  Patient well appearing at time of discharge  Please Note: Fluency Direct voice recognition software may have been used in the creation of this document  Wrong words or sound a like substitutions may have occurred due to the inherent limitations of the voice software         Patient Progress  Patient progress: improved      Disposition  Final diagnoses:   Hypocalcemia     Time reflects when diagnosis was documented in both MDM as applicable and the Disposition within this note     Time User Action Codes Description Comment    7/12/2021  2:00 AM Any Hutson Add [B13 35] Hypocalcemia       ED Disposition     ED Disposition Condition Date/Time Comment    Discharge Stable Mon Jul 12, 2021  2:00 AM Wali Fitzpatrick discharge to home/self care             Follow-up Information     Follow up With Specialties Details Why Prince Caraballo MD Internal Medicine Schedule an appointment as soon as possible for a visit  As needed [facility]   [phone]          Please follow-up with your endocrinologist as soon as possible  Patient's Medications   Discharge Prescriptions    No medications on file     No discharge procedures on file         ED Provider  Electronically Signed by     Magdaleno Ragland DO  07/12/21 5996

## 2021-07-13 PROCEDURE — U0003 INFECTIOUS AGENT DETECTION BY NUCLEIC ACID (DNA OR RNA); SEVERE ACUTE RESPIRATORY SYNDROME CORONAVIRUS 2 (SARS-COV-2) (CORONAVIRUS DISEASE [COVID-19]), AMPLIFIED PROBE TECHNIQUE, MAKING USE OF HIGH THROUGHPUT TECHNOLOGIES AS DESCRIBED BY CMS-2020-01-R: HCPCS | Performed by: INTERNAL MEDICINE

## 2021-07-13 PROCEDURE — U0005 INFEC AGEN DETEC AMPLI PROBE: HCPCS | Performed by: INTERNAL MEDICINE

## 2021-07-19 ENCOUNTER — HOSPITAL ENCOUNTER (EMERGENCY)
Facility: HOSPITAL | Age: 46
Discharge: HOME | End: 2021-07-19
Attending: EMERGENCY MEDICINE
Payer: COMMERCIAL

## 2021-07-19 VITALS
BODY MASS INDEX: 36.07 KG/M2 | HEART RATE: 80 BPM | OXYGEN SATURATION: 98 % | DIASTOLIC BLOOD PRESSURE: 60 MMHG | TEMPERATURE: 97.6 F | WEIGHT: 196 LBS | HEIGHT: 62 IN | RESPIRATION RATE: 18 BRPM | SYSTOLIC BLOOD PRESSURE: 131 MMHG

## 2021-07-19 DIAGNOSIS — E83.51 HYPOCALCEMIA: ICD-10-CM

## 2021-07-19 DIAGNOSIS — R20.2 PARESTHESIAS: Primary | ICD-10-CM

## 2021-07-19 LAB
ALBUMIN SERPL BCP-MCNC: 3.4 G/DL (ref 3.5–5)
ALP SERPL-CCNC: 83 U/L (ref 46–116)
ALT SERPL W P-5'-P-CCNC: 50 U/L (ref 12–78)
ANION GAP SERPL CALCULATED.3IONS-SCNC: 18 MMOL/L (ref 4–13)
AST SERPL W P-5'-P-CCNC: 49 U/L (ref 5–45)
BASOPHILS # BLD AUTO: 0.05 THOUSANDS/ΜL (ref 0–0.1)
BASOPHILS NFR BLD AUTO: 1 % (ref 0–1)
BILIRUB SERPL-MCNC: 0.34 MG/DL (ref 0.2–1)
BUN SERPL-MCNC: 20 MG/DL (ref 5–25)
CA-I BLD-SCNC: 0.9 MMOL/L (ref 1.12–1.32)
CALCIUM ALBUM COR SERPL-MCNC: 7.2 MG/DL (ref 8.3–10.1)
CALCIUM SERPL-MCNC: 6.7 MG/DL (ref 8.3–10.1)
CHLORIDE SERPL-SCNC: 100 MMOL/L (ref 100–108)
CO2 SERPL-SCNC: 20 MMOL/L (ref 21–32)
CREAT SERPL-MCNC: 0.88 MG/DL (ref 0.6–1.3)
EOSINOPHIL # BLD AUTO: 0.15 THOUSAND/ΜL (ref 0–0.61)
EOSINOPHIL NFR BLD AUTO: 2 % (ref 0–6)
ERYTHROCYTE [DISTWIDTH] IN BLOOD BY AUTOMATED COUNT: 15.6 % (ref 11.6–15.1)
GFR SERPL CREATININE-BSD FRML MDRD: 80 ML/MIN/1.73SQ M
GLUCOSE SERPL-MCNC: 132 MG/DL (ref 65–140)
HCT VFR BLD AUTO: 37.9 % (ref 34.8–46.1)
HGB BLD-MCNC: 12.6 G/DL (ref 11.5–15.4)
IMM GRANULOCYTES # BLD AUTO: 0.04 THOUSAND/UL (ref 0–0.2)
IMM GRANULOCYTES NFR BLD AUTO: 1 % (ref 0–2)
LYMPHOCYTES # BLD AUTO: 2.46 THOUSANDS/ΜL (ref 0.6–4.47)
LYMPHOCYTES NFR BLD AUTO: 30 % (ref 14–44)
MCH RBC QN AUTO: 30 PG (ref 26.8–34.3)
MCHC RBC AUTO-ENTMCNC: 33.2 G/DL (ref 31.4–37.4)
MCV RBC AUTO: 90 FL (ref 82–98)
MONOCYTES # BLD AUTO: 0.63 THOUSAND/ΜL (ref 0.17–1.22)
MONOCYTES NFR BLD AUTO: 8 % (ref 4–12)
NEUTROPHILS # BLD AUTO: 4.88 THOUSANDS/ΜL (ref 1.85–7.62)
NEUTS SEG NFR BLD AUTO: 58 % (ref 43–75)
NRBC BLD AUTO-RTO: 0 /100 WBCS
PLATELET # BLD AUTO: 158 THOUSANDS/UL (ref 149–390)
PMV BLD AUTO: 10 FL (ref 8.9–12.7)
POTASSIUM SERPL-SCNC: 3.8 MMOL/L (ref 3.5–5.3)
PROT SERPL-MCNC: 7 G/DL (ref 6.4–8.2)
RBC # BLD AUTO: 4.2 MILLION/UL (ref 3.81–5.12)
SODIUM SERPL-SCNC: 138 MMOL/L (ref 136–145)
WBC # BLD AUTO: 8.21 THOUSAND/UL (ref 4.31–10.16)

## 2021-07-19 PROCEDURE — 85025 COMPLETE CBC W/AUTO DIFF WBC: CPT | Performed by: EMERGENCY MEDICINE

## 2021-07-19 PROCEDURE — 99284 EMERGENCY DEPT VISIT MOD MDM: CPT

## 2021-07-19 PROCEDURE — 80053 COMPREHEN METABOLIC PANEL: CPT | Performed by: EMERGENCY MEDICINE

## 2021-07-19 PROCEDURE — 82330 ASSAY OF CALCIUM: CPT | Performed by: EMERGENCY MEDICINE

## 2021-07-19 PROCEDURE — 36415 COLL VENOUS BLD VENIPUNCTURE: CPT | Performed by: EMERGENCY MEDICINE

## 2021-07-19 PROCEDURE — 99284 EMERGENCY DEPT VISIT MOD MDM: CPT | Performed by: EMERGENCY MEDICINE

## 2021-07-19 PROCEDURE — 96365 THER/PROPH/DIAG IV INF INIT: CPT

## 2021-07-19 RX ORDER — POTASSIUM CHLORIDE 20 MEQ/1
20 TABLET, EXTENDED RELEASE ORAL 2 TIMES DAILY
COMMUNITY

## 2021-07-19 RX ORDER — CALCIUM GLUCONATE 20 MG/ML
2 INJECTION, SOLUTION INTRAVENOUS ONCE
Status: COMPLETED | OUTPATIENT
Start: 2021-07-19 | End: 2021-07-19

## 2021-07-19 RX ADMIN — CALCIUM GLUCONATE 2 G: 20 INJECTION, SOLUTION INTRAVENOUS at 19:24

## 2021-07-19 NOTE — ED PROVIDER NOTES
History  Chief Complaint   Patient presents with    Numbness     Patient states she has numbness and tingling over entire body that started last night  States last time she was here for the same thing her calcium was low  40 yo female with h/o hypocalcemia c/o feeling numb and tingly all over her body since last night which is typically how she feels when her calcium is very low  No associated symptoms  No recent illness  She is followed by endocrinology  History provided by:  Patient   used: No        Prior to Admission Medications   Prescriptions Last Dose Informant Patient Reported? Taking?    ALPRAZolam (XANAX) 1 mg tablet 7/19/2021 at [address] [facility] Yes Yes   Sig: Take 1 mg by mouth daily am   Calcium Carb-Cholecalciferol (CALCIUM 500+D PO) 7/19/2021 at Unknown time  Yes Yes   Sig: Take 500 mg by mouth 6 (six) times a day   baclofen 10 mg tablet Past Week at Unknown time Self Yes Yes   Sig: Take 10 mg by mouth 3 (three) times a day    calcitriol (ROCALTROL) 0 25 mcg capsule 7/19/2021 at 0845  No Yes   Sig: Take 1 capsule (0 25 mcg total) by mouth daily   ergocalciferol (VITAMIN D2) 50,000 units Past Month at Unknown time  No Yes   Sig: Take 1 capsule (50,000 Units total) by mouth once a week   fenofibrate (TRIGLIDE) 160 MG tablet Past Week at Unknown time Self Yes Yes   Sig: Take 160 mg by mouth daily   ferrous sulfate 325 (65 Fe) mg tablet 7/19/2021 at Unknown time Self Yes Yes   Sig: Take 325 mg by mouth Once Monday, wed, Friday   ibuprofen (MOTRIN) 600 mg tablet Past Month at Unknown time Self No Yes   Sig: Take 1 tablet (600 mg total) by mouth every 6 (six) hours as needed for mild pain or moderate pain   levothyroxine (Synthroid) 150 mcg tablet 7/19/2021 at 0845  No Yes   Sig: Take 1 tablet (150 mcg total) by mouth daily   magnesium oxide (MAG-OX) 400 mg 7/19/2021 at Unknown time  No Yes   Sig: Take 1 tablet (400 mg total) by mouth 3 (three) times a day 9pm   ondansetron (ZOFRAN) 4 mg tablet Past Month at Unknown time Self Yes Yes   Sig: Take 4 mg by mouth every 8 (eight) hours as needed for nausea or vomiting  oxyCODONE-acetaminophen (PERCOCET) 5-325 mg per tablet 2021 at Unknown time Self Yes Yes   Sig: Take 2 tablets by mouth daily    potassium chloride (K-DUR,KLOR-CON) 20 mEq tablet 2021 at Unknown time  Yes Yes   Sig: Take 20 mEq by mouth 2 (two) times a day   pregabalin (LYRICA) 75 mg capsule Past Week at Unknown time Self Yes Yes   Sig: Take 75 mg by mouth 3 (three) times a day as needed    zolpidem (AMBIEN CR) 6 25 MG CR tablet More than a month at Unknown time Self Yes No   Sig: Take 6 25 mg by mouth daily at bedtime as needed for sleep      Facility-Administered Medications: None       Past Medical History:   Diagnosis Date    Acid reflux     Acute renal failure (HCC)     multiple episodes    Anemia     Anxiety     Chronic pain     DDD (degenerative disc disease), lumbar     Disease of thyroid gland     had surgery and now hypo    DVT (deep venous thrombosis) ([facility] [address] )     s/p ankle fracture    Hypercalcemia     Hyperlipidemia     Hyperthyroidism     Hypocalcemia     post op 2016    Migraine     Psychiatric disorder     anxiety depression    Seizures (HCC)     petit mal x1  4 years ago- all tests were neg   Spondylolisthesis of lumbar region     Treatment     spinal pain injections  last was 2016       Past Surgical History:   Procedure Laterality Date    BACK SURGERY       SECTION      x5    CYSTOCELE REPAIR  2017    DISCOGRAM      PARATHYROIDECTOMY      MT ANTERIOR COLPORRAPHY RPR CYSTOCELE W/CYSTO N/A 2017    Procedure: CYSTOCELE REPAIR;  Surgeon: Brian Umana MD;  Location: [address] [place] [address];  Service: Gynecology    MT ARTHRODESIS POSTERIOR INTERBODY LUMBAR N/A 2016    Procedure: L4-S1 LUMBAR LAMINECTOMY/DECOMPRESSION;  INSTRUMENTED POSTEROLATERAL FUSION/ INTERBODY L5-S1;   ALLOGRAFT AND AUTOGRAFT (IMPULSE) ; Surgeon: Cori Mccormack MD;  Location:  MAIN OR;  Service: Orthopedics    CO CYSTOURETHROSCOPY,URETER CATHETER Bilateral 12/7/2018    Procedure: INSERTION URETERAL CATHETERS PREOP;  Surgeon: Nia Rai MD;  Location: [address] [place] [address];  Service: Urology    CO SLING OPER STRES INCONTINENCE N/A 5/4/2017    Procedure: MID URETHRAL SLING;  Surgeon: Lattie Hatchet, MD;  Location: [address] [place] [address];  Service: Urology    CO SUPRACERV ABD HYSTERECTOMY N/A 12/7/2018    Procedure: SUPRACERVICAL HYSTERECTOMY;  Surgeon: Glenis Aguilar MD;  Location: [facility];  Service: Gynecology    THYROIDECTOMY      TONSILECTOMY AND ADNOIDECTOMY      TONSILLECTOMY      TUBAL LIGATION         Family History   Problem Relation Age of Onset    Diabetes Mother     Hypertension Mother     Hyperlipidemia Father     Arrhythmia Father     Lung cancer Father     Diabetes Father      I have reviewed and agree with the history as documented  E-Cigarette/Vaping    E-Cigarette Use Never User      E-Cigarette/Vaping Substances    Nicotine No     THC No     CBD No     Flavoring No     Other No     Unknown No      Social History     Tobacco Use    Smoking status: Current Every Day Smoker     Packs/day: 0 50     Years: 25 00     Pack years: 12 50     Types: Cigarettes    Smokeless tobacco: Never Used   Vaping Use    Vaping Use: Never used   Substance Use Topics    Alcohol use: Not Currently     Comment: Alcohol intake:   Occasional  - As per Farhan Mckinley Drug use: No       Review of Systems   Constitutional: Negative for fever  Respiratory: Negative for cough and shortness of breath  Cardiovascular: Negative for chest pain  Gastrointestinal: Negative for diarrhea and vomiting  Genitourinary: Negative for dysuria  Musculoskeletal: Negative for back pain  Skin: Negative for rash and wound  Neurological: Positive for numbness  All other systems reviewed and are negative        Physical Exam  Physical Exam  Vitals and nursing note reviewed  Constitutional:       General: She is not in acute distress  Appearance: She is well-developed  She is not ill-appearing, toxic-appearing or diaphoretic  HENT:      Head: Normocephalic and atraumatic  Eyes:      Conjunctiva/sclera: Conjunctivae normal    Cardiovascular:      Rate and Rhythm: Normal rate and regular rhythm  Heart sounds: Normal heart sounds  No murmur heard  Pulmonary:      Effort: Pulmonary effort is normal  No respiratory distress  Breath sounds: Normal breath sounds  Abdominal:      General: There is no distension  Palpations: Abdomen is soft  Tenderness: There is no abdominal tenderness  Musculoskeletal:         General: No deformity  Normal range of motion  Cervical back: Neck supple  Right lower leg: No edema  Left lower leg: No edema  Skin:     General: Skin is warm and dry  Coloration: Skin is not pale  Findings: No rash  Neurological:      General: No focal deficit present  Mental Status: She is alert and oriented to person, place, and time  Cranial Nerves: No cranial nerve deficit     Psychiatric:         Mood and Affect: Mood normal          Behavior: Behavior normal          Vital Signs  ED Triage Vitals   Temperature Pulse Respirations Blood Pressure SpO2   07/19/21 1749 07/19/21 1749 07/19/21 1749 07/19/21 1749 07/19/21 1749   (!) 97 3 °F (36 3 °C) 89 19 118/79 96 %      Temp Source Heart Rate Source Patient Position - Orthostatic VS BP Location FiO2 (%)   07/19/21 1749 07/19/21 1749 07/19/21 1749 07/19/21 1749 --   Tympanic Monitor Sitting Right arm       Pain Score       07/19/21 1833       5           Vitals:    07/19/21 1749   BP: 118/79   Pulse: 89   Patient Position - Orthostatic VS: Sitting         Visual Acuity      ED Medications  Medications   calcium gluconate 2 g in sodium chloride 0 9% 100 mL (premix) (2 g Intravenous New Bag 7/19/21 1924)       Diagnostic Studies  Results Reviewed     Procedure Component Value Units Date/Time    Comprehensive metabolic panel [[id number]]  (Abnormal) Collected: 07/19/21 1828    Lab Status: Final result Specimen: Blood from Arm, Right Updated: 07/19/21 1904     Sodium 138 mmol/L      Potassium 3 8 mmol/L      Chloride 100 mmol/L      CO2 20 mmol/L      ANION GAP 18 mmol/L      BUN 20 mg/dL      Creatinine 0 88 mg/dL      Glucose 132 mg/dL      Calcium 6 7 mg/dL      Corrected Calcium 7 2 mg/dL      AST 49 U/L      ALT 50 U/L      Alkaline Phosphatase 83 U/L      Total Protein 7 0 g/dL      Albumin 3 4 g/dL      Total Bilirubin 0 34 mg/dL      eGFR 80 ml/min/1 73sq m     Narrative:      [employer] guidelines for Chronic Kidney Disease (CKD):     Stage 1 with normal or high GFR (GFR > 90 mL/min/1 73 square meters)    Stage 2 Mild CKD (GFR = 60-89 mL/min/1 73 square meters)    Stage 3A Moderate CKD (GFR = 45-59 mL/min/1 73 square meters)    Stage 3B Moderate CKD (GFR = 30-44 mL/min/1 73 square meters)    Stage 4 Severe CKD (GFR = 15-29 mL/min/1 73 square meters)    Stage 5 End Stage CKD (GFR <15 mL/min/1 73 square meters)  Note: GFR calculation is accurate only with a steady state creatinine    Calcium, ionized [[id number]]  (Abnormal) Collected: 07/19/21 1828    Lab Status: Final result Specimen: Blood from Arm, Right Updated: 07/19/21 1836     Calcium, Ionized 0 90 mmol/L     CBC and differential [[id number]]  (Abnormal) Collected: 07/19/21 1828    Lab Status: Final result Specimen: Blood from Arm, Right Updated: 07/19/21 1835     WBC 8 21 Thousand/uL      RBC 4 20 Million/uL      Hemoglobin 12 6 g/dL      Hematocrit 37 9 %      MCV 90 fL      MCH 30 0 pg      MCHC 33 2 g/dL      RDW 15 6 %      MPV 10 0 fL      Platelets 203 Thousands/uL      nRBC 0 /100 WBCs      Neutrophils Relative 58 %      Immat GRANS % 1 %      Lymphocytes Relative 30 %      Monocytes Relative 8 %      Eosinophils Relative 2 %      Basophils Relative 1 %      Neutrophils Absolute 4 88 Thousands/µL      Immature Grans Absolute 0 04 Thousand/uL      Lymphocytes Absolute 2 46 Thousands/µL      Monocytes Absolute 0 63 Thousand/µL      Eosinophils Absolute 0 15 Thousand/µL      Basophils Absolute 0 05 Thousands/µL                  No orders to display              Procedures  Procedures         ED Course                                           MDM  Number of Diagnoses or Management Options  Diagnosis management comments: Prior records reviewed  Will replace calcium and discharge  Disposition  Final diagnoses:   Paresthesias   Hypocalcemia     Time reflects when diagnosis was documented in both MDM as applicable and the Disposition within this note     Time User Action Codes Description Comment    5/67/3820  5:33 PM Delrae Dear Add [Z44 2] Paresthesias     3/04/1029  2:46 PM Delrae Dear Add [A23 68] Hypocalcemia       ED Disposition     ED Disposition Condition Date/Time Comment    Discharge Stable Mon Jul 19, 2021  8:04 PM Salma Fluke discharge to home/self care  Follow-up Information     Follow up With Specialties Details Why Contact Info    your endocrinologist              Patient's Medications   Discharge Prescriptions    No medications on file     No discharge procedures on file      PDMP Review     None          ED Provider  Electronically Signed by           Demi Bateman MD  47/92/29 8511

## 2021-07-26 ENCOUNTER — HOSPITAL ENCOUNTER (EMERGENCY)
Facility: HOSPITAL | Age: 46
Discharge: HOME | End: 2021-07-27
Attending: EMERGENCY MEDICINE | Admitting: EMERGENCY MEDICINE
Payer: COMMERCIAL

## 2021-07-26 VITALS
HEART RATE: 84 BPM | RESPIRATION RATE: 18 BRPM | TEMPERATURE: 97.9 F | BODY MASS INDEX: 35.48 KG/M2 | DIASTOLIC BLOOD PRESSURE: 72 MMHG | WEIGHT: 194 LBS | OXYGEN SATURATION: 99 % | SYSTOLIC BLOOD PRESSURE: 135 MMHG

## 2021-07-26 DIAGNOSIS — E83.51 HYPOCALCEMIA: Primary | ICD-10-CM

## 2021-07-26 LAB
ANION GAP SERPL CALCULATED.3IONS-SCNC: 15 MMOL/L (ref 4–13)
BASOPHILS # BLD AUTO: 0.04 THOUSANDS/ΜL (ref 0–0.1)
BASOPHILS NFR BLD AUTO: 1 % (ref 0–1)
BUN SERPL-MCNC: 18 MG/DL (ref 5–25)
CA-I BLD-SCNC: 1.01 MMOL/L (ref 1.12–1.32)
CALCIUM SERPL-MCNC: 7.5 MG/DL (ref 8.3–10.1)
CHLORIDE SERPL-SCNC: 101 MMOL/L (ref 100–108)
CO2 SERPL-SCNC: 22 MMOL/L (ref 21–32)
CREAT SERPL-MCNC: 0.92 MG/DL (ref 0.6–1.3)
EOSINOPHIL # BLD AUTO: 0.13 THOUSAND/ΜL (ref 0–0.61)
EOSINOPHIL NFR BLD AUTO: 2 % (ref 0–6)
ERYTHROCYTE [DISTWIDTH] IN BLOOD BY AUTOMATED COUNT: 16.5 % (ref 11.6–15.1)
GFR SERPL CREATININE-BSD FRML MDRD: 75 ML/MIN/1.73SQ M
GLUCOSE SERPL-MCNC: 131 MG/DL (ref 65–140)
HCT VFR BLD AUTO: 37.1 % (ref 34.8–46.1)
HGB BLD-MCNC: 12.1 G/DL (ref 11.5–15.4)
LYMPHOCYTES # BLD AUTO: 2 THOUSANDS/ΜL (ref 0.6–4.47)
LYMPHOCYTES NFR BLD AUTO: 33 % (ref 14–44)
MAGNESIUM SERPL-MCNC: 2.1 MG/DL (ref 1.6–2.6)
MCH RBC QN AUTO: 30.1 PG (ref 26.8–34.3)
MCHC RBC AUTO-ENTMCNC: 32.6 G/DL (ref 31.4–37.4)
MCV RBC AUTO: 92 FL (ref 82–98)
MONOCYTES # BLD AUTO: 0.47 THOUSAND/ΜL (ref 0.17–1.22)
MONOCYTES NFR BLD AUTO: 8 % (ref 4–12)
NEUTROPHILS # BLD AUTO: 3.45 THOUSANDS/ΜL (ref 1.85–7.62)
NEUTS SEG NFR BLD AUTO: 56 % (ref 43–75)
PHOSPHATE SERPL-MCNC: 4.4 MG/DL (ref 2.7–4.5)
PLATELET # BLD AUTO: 148 THOUSANDS/UL (ref 149–390)
PMV BLD AUTO: 10.5 FL (ref 8.9–12.7)
POTASSIUM SERPL-SCNC: 4.7 MMOL/L (ref 3.5–5.3)
RBC # BLD AUTO: 4.02 MILLION/UL (ref 3.81–5.12)
SODIUM SERPL-SCNC: 138 MMOL/L (ref 136–145)
WBC # BLD AUTO: 6.09 THOUSAND/UL (ref 4.31–10.16)

## 2021-07-26 PROCEDURE — 85025 COMPLETE CBC W/AUTO DIFF WBC: CPT | Performed by: EMERGENCY MEDICINE

## 2021-07-26 PROCEDURE — 93005 ELECTROCARDIOGRAM TRACING: CPT

## 2021-07-26 PROCEDURE — 82330 ASSAY OF CALCIUM: CPT

## 2021-07-26 PROCEDURE — 36415 COLL VENOUS BLD VENIPUNCTURE: CPT | Performed by: EMERGENCY MEDICINE

## 2021-07-26 PROCEDURE — 80048 BASIC METABOLIC PNL TOTAL CA: CPT | Performed by: EMERGENCY MEDICINE

## 2021-07-26 PROCEDURE — 96365 THER/PROPH/DIAG IV INF INIT: CPT

## 2021-07-26 PROCEDURE — 99285 EMERGENCY DEPT VISIT HI MDM: CPT | Performed by: EMERGENCY MEDICINE

## 2021-07-26 PROCEDURE — 84100 ASSAY OF PHOSPHORUS: CPT | Performed by: EMERGENCY MEDICINE

## 2021-07-26 PROCEDURE — 83735 ASSAY OF MAGNESIUM: CPT | Performed by: EMERGENCY MEDICINE

## 2021-07-26 PROCEDURE — 99284 EMERGENCY DEPT VISIT MOD MDM: CPT

## 2021-07-26 RX ORDER — CALCIUM GLUCONATE 20 MG/ML
1 INJECTION, SOLUTION INTRAVENOUS ONCE
Status: COMPLETED | OUTPATIENT
Start: 2021-07-26 | End: 2021-07-27

## 2021-07-26 RX ADMIN — CALCIUM GLUCONATE 1 G: 20 INJECTION, SOLUTION INTRAVENOUS at 23:13

## 2021-07-27 LAB
ATRIAL RATE: 71 BPM
P AXIS: 26 DEGREES
PR INTERVAL: 168 MS
QRS AXIS: 13 DEGREES
QRSD INTERVAL: 82 MS
QT INTERVAL: 424 MS
QTC INTERVAL: 460 MS
T WAVE AXIS: 38 DEGREES
VENTRICULAR RATE: 71 BPM

## 2021-07-27 PROCEDURE — 93010 ELECTROCARDIOGRAM REPORT: CPT | Performed by: INTERNAL MEDICINE

## 2021-07-27 NOTE — ED PROVIDER NOTES
History  Chief Complaint   Patient presents with    Tingling     Co of tinglings, possible low calcium     39 y/o female presents with tingling all over body, history of hypocalcemia  History of parathyroidectomy with subsequent hypocalcemia currently on calcium supplements at home  Recent visits to noted to the ED with similar complaints had low calcium which was repleted in the ED and discharged  She says she has been trying to get in with endocrinologist however is having difficulty  No other symptoms noted  History provided by:  Patient   used: No        Prior to Admission Medications   Prescriptions Last Dose Informant Patient Reported? Taking? ALPRAZolam (XANAX) 1 mg tablet  Self Yes No   Sig: Take 1 mg by mouth daily am   Calcium Carb-Cholecalciferol (CALCIUM 500+D PO) 7/26/2021 at Unknown time  Yes Yes   Sig: Take 500 mg by mouth 6 (six) times a day   baclofen 10 mg tablet  Self Yes No   Sig: Take 10 mg by mouth 3 (three) times a day    calcitriol (ROCALTROL) 0 25 mcg capsule   No No   Sig: Take 1 capsule (0 25 mcg total) by mouth daily   ergocalciferol (VITAMIN D2) 50,000 units   No No   Sig: Take 1 capsule (50,000 Units total) by mouth once a week   fenofibrate (TRIGLIDE) 160 MG tablet  Self Yes No   Sig: Take 160 mg by mouth daily   ferrous sulfate 325 (65 Fe) mg tablet  Self Yes No   Sig: Take 325 mg by mouth Once Monday, wed, Friday   ibuprofen (MOTRIN) 600 mg tablet  Self No No   Sig: Take 1 tablet (600 mg total) by mouth every 6 (six) hours as needed for mild pain or moderate pain   levothyroxine (Synthroid) 150 mcg tablet   No No   Sig: Take 1 tablet (150 mcg total) by mouth daily   magnesium oxide (MAG-OX) 400 mg   No No   Sig: Take 1 tablet (400 mg total) by mouth 3 (three) times a day 9pm   ondansetron (ZOFRAN) 4 mg tablet  Self Yes No   Sig: Take 4 mg by mouth every 8 (eight) hours as needed for nausea or vomiting     oxyCODONE-acetaminophen (PERCOCET) 5-325 mg per tablet  Self Yes No   Sig: Take 2 tablets by mouth daily    potassium chloride (K-DUR,KLOR-CON) 20 mEq tablet   Yes No   Sig: Take 20 mEq by mouth 2 (two) times a day   pregabalin (LYRICA) 75 mg capsule  Self Yes No   Sig: Take 75 mg by mouth 3 (three) times a day as needed    zolpidem (AMBIEN CR) 6 25 MG CR tablet  Self Yes No   Sig: Take 6 25 mg by mouth daily at bedtime as needed for sleep      Facility-Administered Medications: None       Past Medical History:   Diagnosis Date    Acid reflux     Acute renal failure (HCC)     multiple episodes    Anemia     Anxiety     Chronic pain     DDD (degenerative disc disease), lumbar     Disease of thyroid gland     had surgery and now hypo    DVT (deep venous thrombosis) ([facility] [address] )     s/p ankle fracture    Hypercalcemia     Hyperlipidemia     Hyperthyroidism     Hypocalcemia     post op 2016    Migraine     Psychiatric disorder     anxiety depression    Seizures (HCC)     petit mal x1  4 years ago- all tests were neg   Spondylolisthesis of lumbar region     Treatment     spinal pain injections  last was 2016       Past Surgical History:   Procedure Laterality Date    BACK SURGERY       SECTION      x5    CYSTOCELE REPAIR  2017    DISCOGRAM      PARATHYROIDECTOMY      AR ANTERIOR COLPORRAPHY RPR CYSTOCELE W/CYSTO N/A 2017    Procedure: CYSTOCELE REPAIR;  Surgeon: Tori Moore MD;  Location: [address] [place] [address];  Service: Gynecology    AR ARTHRODESIS POSTERIOR INTERBODY LUMBAR N/A 2016    Procedure: L4-S1 LUMBAR LAMINECTOMY/DECOMPRESSION;  INSTRUMENTED POSTEROLATERAL FUSION/ INTERBODY L5-S1; ALLOGRAFT AND AUTOGRAFT (IMPULSE) ;   Surgeon: Silvia Nixon MD;  Location:  MAIN OR;  Service: Orthopedics    AR CYSTOURETHROSCOPY,URETER CATHETER Bilateral 2018    Procedure: INSERTION URETERAL CATHETERS PREOP;  Surgeon: Belén Geller MD;  Location: [facility];  Service: Urology    AR SLING OPER STRES INCONTINENCE N/A 5/4/2017    Procedure: MID URETHRAL SLING;  Surgeon: Isaias Valdez MD;  Location: [address] [place] [address];  Service: Urology    OR SUPRACERV ABD HYSTERECTOMY N/A 12/7/2018    Procedure: SUPRACERVICAL HYSTERECTOMY;  Surgeon: Erma León MD;  Location: [facility];  Service: Gynecology    THYROIDECTOMY      TONSILECTOMY AND ADNOIDECTOMY      TONSILLECTOMY      TUBAL LIGATION         Family History   Problem Relation Age of Onset    Diabetes Mother     Hypertension Mother     Hyperlipidemia Father     Arrhythmia Father     Lung cancer Father     Diabetes Father      I have reviewed and agree with the history as documented  E-Cigarette/Vaping    E-Cigarette Use Never User      E-Cigarette/Vaping Substances    Nicotine No     THC No     CBD No     Flavoring No     Other No     Unknown No      Social History     Tobacco Use    Smoking status: Current Every Day Smoker     Packs/day: 0 50     Years: 25 00     Pack years: 12 50     Types: Cigarettes    Smokeless tobacco: Never Used   Vaping Use    Vaping Use: Never used   Substance Use Topics    Alcohol use: Not Currently     Comment: Alcohol intake:   Occasional  - As per Luciano Gilma Drug use: No       Review of Systems   Constitutional: Negative  HENT: Negative  Eyes: Negative  Respiratory: Negative  Cardiovascular: Negative  Gastrointestinal: Negative  Endocrine: Negative  Genitourinary: Negative  Musculoskeletal: Negative  Skin: Negative  Allergic/Immunologic: Negative  Neurological: Negative  Tingling paresthesias   Hematological: Negative  Psychiatric/Behavioral: Negative  All other systems reviewed and are negative  Physical Exam  Physical Exam  Constitutional:       Appearance: Normal appearance  HENT:      Head: Normocephalic and atraumatic  Nose: Nose normal       Mouth/Throat:      Mouth: Mucous membranes are moist    Eyes:      Extraocular Movements: Extraocular movements intact  Pupils: Pupils are equal, round, and reactive to light  Cardiovascular:      Rate and Rhythm: Normal rate and regular rhythm  Pulmonary:      Effort: Pulmonary effort is normal       Breath sounds: Normal breath sounds  Abdominal:      General: Abdomen is flat  Bowel sounds are normal       Palpations: Abdomen is soft  Musculoskeletal:         General: Normal range of motion  Cervical back: Normal range of motion and neck supple  Skin:     General: Skin is warm  Capillary Refill: Capillary refill takes less than 2 seconds  Neurological:      General: No focal deficit present  Mental Status: She is alert and oriented to person, place, and time  Mental status is at baseline  Cranial Nerves: No cranial nerve deficit  Sensory: No sensory deficit  Motor: No weakness  Coordination: Coordination normal       Gait: Gait normal       Deep Tendon Reflexes: Reflexes normal    Psychiatric:         Mood and Affect: Mood normal          Thought Content:  Thought content normal          Vital Signs  ED Triage Vitals [07/26/21 2040]   Temperature Pulse Respirations Blood Pressure SpO2   97 9 °F (36 6 °C) 84 18 135/72 99 %      Temp Source Heart Rate Source Patient Position - Orthostatic VS BP Location FiO2 (%)   Temporal Monitor Sitting Right arm --      Pain Score       No Pain           Vitals:    07/26/21 2040   BP: 135/72   Pulse: 84   Patient Position - Orthostatic VS: Sitting         Visual Acuity      ED Medications  Medications   calcium gluconate 1 g in sodium chloride 0 9% 50 mL (premix) (0 g Intravenous Stopped 7/27/21 0043)       Diagnostic Studies  Results Reviewed     Procedure Component Value Units Date/Time    Magnesium [[id number]]  (Normal) Collected: 07/26/21 2219    Lab Status: Final result Specimen: Blood from Arm, Left Updated: 07/26/21 2242     Magnesium 2 1 mg/dL     Phosphorus [[id number]]  (Normal) Collected: 07/26/21 2219    Lab Status: Final result Specimen: Blood from Arm, Left Updated: 07/26/21 2242     Phosphorus 4 4 mg/dL     Basic metabolic panel [[id number]]  (Abnormal) Collected: 07/26/21 2219    Lab Status: Final result Specimen: Blood from Arm, Left Updated: 07/26/21 2242     Sodium 138 mmol/L      Potassium 4 7 mmol/L      Chloride 101 mmol/L      CO2 22 mmol/L      ANION GAP 15 mmol/L      BUN 18 mg/dL      Creatinine 0 92 mg/dL      Glucose 131 mg/dL      Calcium 7 5 mg/dL      eGFR 75 ml/min/1 73sq m     Narrative:      National Kidney Disease Foundation guidelines for Chronic Kidney Disease (CKD):     Stage 1 with normal or high GFR (GFR > 90 mL/min/1 73 square meters)    Stage 2 Mild CKD (GFR = 60-89 mL/min/1 73 square meters)    Stage 3A Moderate CKD (GFR = 45-59 mL/min/1 73 square meters)    Stage 3B Moderate CKD (GFR = 30-44 mL/min/1 73 square meters)    Stage 4 Severe CKD (GFR = 15-29 mL/min/1 73 square meters)    Stage 5 End Stage CKD (GFR <15 mL/min/1 73 square meters)  Note: GFR calculation is accurate only with a steady state creatinine    CBC and differential [[id number]]  (Abnormal) Collected: 07/26/21 2216    Lab Status: Final result Specimen: Blood from Arm, Right Updated: 07/26/21 2228     WBC 6 09 Thousand/uL      RBC 4 02 Million/uL      Hemoglobin 12 1 g/dL      Hematocrit 37 1 %      MCV 92 fL      MCH 30 1 pg      MCHC 32 6 g/dL      RDW 16 5 %      MPV 10 5 fL      Platelets 995 Thousands/uL      Neutrophils Relative 56 %      Lymphocytes Relative 33 %      Monocytes Relative 8 %      Eosinophils Relative 2 %      Basophils Relative 1 %      Neutrophils Absolute 3 45 Thousands/µL      Lymphocytes Absolute 2 00 Thousands/µL      Monocytes Absolute 0 47 Thousand/µL      Eosinophils Absolute 0 13 Thousand/µL      Basophils Absolute 0 04 Thousands/µL     Calcium, ionized [[id number]]  (Abnormal) Collected: 07/26/21 2216    Lab Status: Final result Specimen: Blood from Arm, Right Updated: 07/26/21 2225     Calcium, Ionized 1 01 mmol/L No orders to display              Procedures  ECG 12 Lead Documentation Only    Date/Time: 7/26/2021 10:10 PM  Performed by: Candace Mahoney DO  Authorized by: Candace Mahoney DO     ECG reviewed by me, the ED Provider: yes    Patient location:  ED  Previous ECG:     Previous ECG:  Unavailable    Comparison to cardiac monitor: Yes    Interpretation:     Interpretation: non-specific    Rate:     ECG rate assessment: normal    Rhythm:     Rhythm: sinus rhythm    Ectopy:     Ectopy: none    QRS:     QRS axis:  Normal  Conduction:     Conduction: normal    ST segments:     ST segments:  Non-specific  T waves:     T waves: non-specific               ED Course                                           MDM  Number of Diagnoses or Management Options  Hypocalcemia  Diagnosis management comments: I told patient I would contact the Endocrinologist she was trying to get in touch with and recommend patient's name and date of birth so the doctor can have her office call the patient to expedite the appointment  I did tiger text Dr Aretha Anderson  Supplemented the Calcium and discharged  Amount and/or Complexity of Data Reviewed  Clinical lab tests: reviewed and ordered  Tests in the medicine section of CPT®: ordered and reviewed    Patient Progress  Patient progress: stable      Disposition  Final diagnoses:   Hypocalcemia     Time reflects when diagnosis was documented in both MDM as applicable and the Disposition within this note     Time User Action Codes Description Comment    7/26/2021 11:59 PM Bandar Escamilla Add [E83 51] Hypocalcemia       ED Disposition     ED Disposition Condition Date/Time Comment    Discharge Stable Mon Jul 26, 2021 11:59 PM [facility] discharge to home/self care              Follow-up Information     Follow up With Specialties Details Why Contact Info Stephanie Wiseman MD Internal Medicine Schedule an appointment as soon as possible for a visit   [phone] [place] [address] Emergency Department Emergency Medicine  If symptoms worsen [address] [address]  [address] [address] Emergency Department, Emmanuel Sheriff, [place], [address]    Emily Darby MD Endocrinology, Internal Medicine   [address]  [phone]             Discharge Medication List as of 7/26/2021 11:59 PM      CONTINUE these medications which have NOT CHANGED    Details   Calcium Carb-Cholecalciferol (CALCIUM 500+D PO) Take 500 mg by mouth 6 (six) times a day, Historical Med      ALPRAZolam (XANAX) 1 mg tablet Take 1 mg by mouth daily am, Historical Med      baclofen 10 mg tablet Take 10 mg by mouth 3 (three) times a day , Historical Med      calcitriol (ROCALTROL) 0 25 mcg capsule Take 1 capsule (0 25 mcg total) by mouth daily, Starting Mon 6/7/2021, Normal      ergocalciferol (VITAMIN D2) 50,000 units Take 1 capsule (50,000 Units total) by mouth once a week, Starting Wed 3/31/2021, Normal      fenofibrate (TRIGLIDE) 160 MG tablet Take 160 mg by mouth daily, Historical Med      ferrous sulfate 325 (65 Fe) mg tablet Take 325 mg by mouth Once Monday, wed, Friday, Historical Med      ibuprofen (MOTRIN) 600 mg tablet Take 1 tablet (600 mg total) by mouth every 6 (six) hours as needed for mild pain or moderate pain, Starting Tue 6/2/2020, Normal      levothyroxine (Synthroid) 150 mcg tablet Take 1 tablet (150 mcg total) by mouth daily, Starting Mon 6/7/2021, Normal      magnesium oxide (MAG-OX) 400 mg Take 1 tablet (400 mg total) by mouth 3 (three) times a day 9pm, Starting Mon 6/7/2021, Normal      ondansetron (ZOFRAN) 4 mg tablet Take 4 mg by mouth every 8 (eight) hours as needed for nausea or vomiting , Historical Med      oxyCODONE-acetaminophen (PERCOCET) 5-325 mg per tablet Take 2 tablets by mouth daily , Historical Med potassium chloride (K-DUR,KLOR-CON) 20 mEq tablet Take 20 mEq by mouth 2 (two) times a day, Historical Med      pregabalin (LYRICA) 75 mg capsule Take 75 mg by mouth 3 (three) times a day as needed , Historical Med      zolpidem (AMBIEN CR) 6 25 MG CR tablet Take 6 25 mg by mouth daily at bedtime as needed for sleep, Historical Med           No discharge procedures on file      PDMP Review     None          ED Provider  Electronically Signed by           Willi Macedo DO  07/27/21 0784

## 2021-07-28 ENCOUNTER — TELEPHONE (OUTPATIENT)
Dept: ENDOCRINOLOGY | Facility: CLINIC | Age: 46
End: 2021-07-28

## 2021-07-28 NOTE — TELEPHONE ENCOUNTER
Dr vinson  reach out to Dr Peter Christie  On behalf of a mutual  Patient  For early  Appointment  Both time I reach out to patient  Left a coupled of voice messages no respond  Will let Dr Peter Christie  Know that our office did reach out in her  half

## 2021-07-29 ENCOUNTER — TELEPHONE (OUTPATIENT)
Dept: ENDOCRINOLOGY | Facility: CLINIC | Age: 46
End: 2021-07-29

## 2021-07-29 NOTE — TELEPHONE ENCOUNTER
Patient call back after I  left  Several message  She is only [place] on  Tues and Thursdays  I explain to Patient  Dr Magui Saul is  Out until Jan for follow up  She is will to do Telemed- is something come up  I also explain to her that a message  Was  Sent to  [facility]  To see if she  could come in early  i reach out to her several time  because we did have a few cancellations  I will continued to check  see if we have any opening give patient a call back

## 2021-08-12 ENCOUNTER — HOSPITAL ENCOUNTER (EMERGENCY)
Facility: HOSPITAL | Age: 46
Discharge: HOME | End: 2021-08-12
Attending: EMERGENCY MEDICINE | Admitting: EMERGENCY MEDICINE
Payer: COMMERCIAL

## 2021-08-12 VITALS
WEIGHT: 199.74 LBS | HEIGHT: 62 IN | SYSTOLIC BLOOD PRESSURE: 112 MMHG | TEMPERATURE: 98.8 F | RESPIRATION RATE: 16 BRPM | OXYGEN SATURATION: 94 % | DIASTOLIC BLOOD PRESSURE: 68 MMHG | BODY MASS INDEX: 36.76 KG/M2 | HEART RATE: 66 BPM

## 2021-08-12 DIAGNOSIS — R20.2 PARESTHESIAS: Primary | ICD-10-CM

## 2021-08-12 LAB
ALBUMIN SERPL BCP-MCNC: 3.4 G/DL (ref 3.5–5)
ALP SERPL-CCNC: 59 U/L (ref 46–116)
ALT SERPL W P-5'-P-CCNC: 53 U/L (ref 12–78)
ANION GAP SERPL CALCULATED.3IONS-SCNC: 13 MMOL/L (ref 4–13)
AST SERPL W P-5'-P-CCNC: 24 U/L (ref 5–45)
BASOPHILS # BLD AUTO: 0.04 THOUSANDS/ΜL (ref 0–0.1)
BASOPHILS NFR BLD AUTO: 1 % (ref 0–1)
BILIRUB SERPL-MCNC: 0.3 MG/DL (ref 0.2–1)
BUN SERPL-MCNC: 16 MG/DL (ref 5–25)
CA-I BLD-SCNC: 1.13 MMOL/L (ref 1.12–1.32)
CALCIUM ALBUM COR SERPL-MCNC: 9.3 MG/DL (ref 8.3–10.1)
CALCIUM SERPL-MCNC: 8.8 MG/DL (ref 8.3–10.1)
CHLORIDE SERPL-SCNC: 103 MMOL/L (ref 100–108)
CO2 SERPL-SCNC: 23 MMOL/L (ref 21–32)
CREAT SERPL-MCNC: 1.22 MG/DL (ref 0.6–1.3)
EOSINOPHIL # BLD AUTO: 0.16 THOUSAND/ΜL (ref 0–0.61)
EOSINOPHIL NFR BLD AUTO: 3 % (ref 0–6)
ERYTHROCYTE [DISTWIDTH] IN BLOOD BY AUTOMATED COUNT: 15.8 % (ref 11.6–15.1)
GFR SERPL CREATININE-BSD FRML MDRD: 54 ML/MIN/1.73SQ M
GLUCOSE SERPL-MCNC: 126 MG/DL (ref 65–140)
HCT VFR BLD AUTO: 37 % (ref 34.8–46.1)
HGB BLD-MCNC: 12.1 G/DL (ref 11.5–15.4)
IMM GRANULOCYTES # BLD AUTO: 0.01 THOUSAND/UL (ref 0–0.2)
IMM GRANULOCYTES NFR BLD AUTO: 0 % (ref 0–2)
LYMPHOCYTES # BLD AUTO: 1.78 THOUSANDS/ΜL (ref 0.6–4.47)
LYMPHOCYTES NFR BLD AUTO: 32 % (ref 14–44)
MCH RBC QN AUTO: 29.8 PG (ref 26.8–34.3)
MCHC RBC AUTO-ENTMCNC: 32.7 G/DL (ref 31.4–37.4)
MCV RBC AUTO: 91 FL (ref 82–98)
MONOCYTES # BLD AUTO: 0.46 THOUSAND/ΜL (ref 0.17–1.22)
MONOCYTES NFR BLD AUTO: 8 % (ref 4–12)
NEUTROPHILS # BLD AUTO: 3.1 THOUSANDS/ΜL (ref 1.85–7.62)
NEUTS SEG NFR BLD AUTO: 56 % (ref 43–75)
NRBC BLD AUTO-RTO: 0 /100 WBCS
PLATELET # BLD AUTO: 171 THOUSANDS/UL (ref 149–390)
PMV BLD AUTO: 10.1 FL (ref 8.9–12.7)
POTASSIUM SERPL-SCNC: 3.8 MMOL/L (ref 3.5–5.3)
PROT SERPL-MCNC: 6.9 G/DL (ref 6.4–8.2)
RBC # BLD AUTO: 4.06 MILLION/UL (ref 3.81–5.12)
SODIUM SERPL-SCNC: 139 MMOL/L (ref 136–145)
WBC # BLD AUTO: 5.55 THOUSAND/UL (ref 4.31–10.16)

## 2021-08-12 PROCEDURE — 99282 EMERGENCY DEPT VISIT SF MDM: CPT | Performed by: EMERGENCY MEDICINE

## 2021-08-12 PROCEDURE — 80053 COMPREHEN METABOLIC PANEL: CPT | Performed by: EMERGENCY MEDICINE

## 2021-08-12 PROCEDURE — 99284 EMERGENCY DEPT VISIT MOD MDM: CPT

## 2021-08-12 PROCEDURE — 82330 ASSAY OF CALCIUM: CPT | Performed by: EMERGENCY MEDICINE

## 2021-08-12 PROCEDURE — 36415 COLL VENOUS BLD VENIPUNCTURE: CPT | Performed by: EMERGENCY MEDICINE

## 2021-08-12 PROCEDURE — 85025 COMPLETE CBC W/AUTO DIFF WBC: CPT | Performed by: EMERGENCY MEDICINE

## 2021-08-12 NOTE — Clinical Note
Karen Tucker was seen and treated in our emergency department on 8/12/2021  Diagnosis:     Kisha Durant  may return to work on return date  She may return on this date: 08/13/2021         If you have any questions or concerns, please don't hesitate to call        Tod Salazar DO    ______________________________           _______________          _______________  Hospital Representative                              Date                                Time

## 2021-08-12 NOTE — ED PROVIDER NOTES
History  Chief Complaint   Patient presents with    Numbness     Patient has chronic low calcium levels; started with generalized numbness and tingling tonight aroung 2000  Last calcium infusion was last week  Patient here with complaint of diffuse paresthesias, similar to what she experiences when she is hypocalcemic  Symptoms shortly after 11p  Patient's last calcium infusion was last week  She denies nausea, vomiting, fevers or chills  She denies other somatic complaints  History provided by:  Patient   used: No        Prior to Admission Medications   Prescriptions Last Dose Informant Patient Reported? Taking? ALPRAZolam (XANAX) 1 mg tablet  Self Yes No   Sig: Take 1 mg by mouth daily am   Calcium Carb-Cholecalciferol (CALCIUM 500+D PO)   Yes No   Sig: Take 500 mg by mouth 6 (six) times a day   baclofen 10 mg tablet  Self Yes No   Sig: Take 10 mg by mouth 3 (three) times a day    calcitriol (ROCALTROL) 0 25 mcg capsule   No No   Sig: Take 1 capsule (0 25 mcg total) by mouth daily   ergocalciferol (VITAMIN D2) 50,000 units   No No   Sig: Take 1 capsule (50,000 Units total) by mouth once a week   fenofibrate (TRIGLIDE) 160 MG tablet  Self Yes No   Sig: Take 160 mg by mouth daily   ferrous sulfate 325 (65 Fe) mg tablet  Self Yes No   Sig: Take 325 mg by mouth Once Monday, wed, Friday   ibuprofen (MOTRIN) 600 mg tablet  Self No No   Sig: Take 1 tablet (600 mg total) by mouth every 6 (six) hours as needed for mild pain or moderate pain   levothyroxine (Synthroid) 150 mcg tablet   No No   Sig: Take 1 tablet (150 mcg total) by mouth daily   magnesium oxide (MAG-OX) 400 mg   No No   Sig: Take 1 tablet (400 mg total) by mouth 3 (three) times a day 9pm   ondansetron (ZOFRAN) 4 mg tablet  Self Yes No   Sig: Take 4 mg by mouth every 8 (eight) hours as needed for nausea or vomiting     oxyCODONE-acetaminophen (PERCOCET) 5-325 mg per tablet  Self Yes No   Sig: Take 2 tablets by mouth daily potassium chloride (K-DUR,KLOR-CON) 20 mEq tablet   Yes No   Sig: Take 20 mEq by mouth 2 (two) times a day   pregabalin (LYRICA) 75 mg capsule  Self Yes No   Sig: Take 75 mg by mouth 3 (three) times a day as needed    zolpidem (AMBIEN CR) 6 25 MG CR tablet  Self Yes No   Sig: Take 6 25 mg by mouth daily at bedtime as needed for sleep      Facility-Administered Medications: None       Past Medical History:   Diagnosis Date    Acid reflux     Acute renal failure (HCC)     multiple episodes    Anemia     Anxiety     Chronic pain     DDD (degenerative disc disease), lumbar     Disease of thyroid gland     had surgery and now hypo    DVT (deep venous thrombosis) ([facility] [address] )     s/p ankle fracture    Hypercalcemia     Hyperlipidemia     Hyperthyroidism     Hypocalcemia     post op 2016    Migraine     Psychiatric disorder     anxiety depression    Seizures (HCC)     petit mal x1  4 years ago- all tests were neg   Spondylolisthesis of lumbar region     Treatment     spinal pain injections  last was 2016       Past Surgical History:   Procedure Laterality Date    BACK SURGERY       SECTION      x5    CYSTOCELE REPAIR  2017    DISCOGRAM      PARATHYROIDECTOMY      DC ANTERIOR COLPORRAPHY RPR CYSTOCELE W/CYSTO N/A 2017    Procedure: CYSTOCELE REPAIR;  Surgeon: Santana Mcdaniel MD;  Location: [address] [place] [address];  Service: Gynecology    DC ARTHRODESIS POSTERIOR INTERBODY LUMBAR N/A 2016    Procedure: L4-S1 LUMBAR LAMINECTOMY/DECOMPRESSION;  INSTRUMENTED POSTEROLATERAL FUSION/ INTERBODY L5-S1; ALLOGRAFT AND AUTOGRAFT (IMPULSE) ;   Surgeon: Ericka Moise MD;  [facility];  Service: Orthopedics    DC CYSTOURETHROSCOPY,URETER CATHETER Bilateral 2018    Procedure: INSERTION URETERAL CATHETERS PREOP;  Surgeon: Campos Newell MD;  Location: [address] [place] [address];  Service: Urology    DC SLING OPER STRES INCONTINENCE N/A 2017    Procedure: MID URETHRAL SLING;  Surgeon: Kellen Marino Meenu Carrasco MD;  Location: [facility];  Service: Urology    OR SUPRACERV ABD HYSTERECTOMY N/A 12/7/2018    Procedure: SUPRACERVICAL HYSTERECTOMY;  Surgeon: Marlon Cee MD;  Location: [facility];  Service: Gynecology    THYROIDECTOMY      TONSILECTOMY AND ADNOIDECTOMY      TONSILLECTOMY      TUBAL LIGATION         Family History   Problem Relation Age of Onset    Diabetes Mother     Hypertension Mother     Hyperlipidemia Father     Arrhythmia Father     Lung cancer Father     Diabetes Father      I have reviewed and agree with the history as documented  E-Cigarette/Vaping    E-Cigarette Use Never User      E-Cigarette/Vaping Substances    Nicotine No     THC No     CBD No     Flavoring No     Other No     Unknown No      Social History     Tobacco Use    Smoking status: Current Every Day Smoker     Packs/day: 0 50     Years: 25 00     Pack years: 12 50     Types: Cigarettes    Smokeless tobacco: Never Used   Vaping Use    Vaping Use: Never used   Substance Use Topics    Alcohol use: Not Currently     Comment: Alcohol intake:   Occasional  - As per [employer] New Drug use: No       Review of Systems   Constitutional: Negative for chills and fever  Respiratory: Negative for cough, chest tightness and shortness of breath  Gastrointestinal: Negative for abdominal pain, diarrhea, nausea and vomiting  Genitourinary: Negative for dysuria, frequency, hematuria and urgency  Musculoskeletal: Negative for back pain, neck pain and neck stiffness  Neurological: Negative for dizziness, facial asymmetry, weakness, light-headedness, numbness and headaches  All other systems reviewed and are negative  Physical Exam  Physical Exam  Vitals and nursing note reviewed  Constitutional:       General: She is not in acute distress  Appearance: She is well-developed  She is not diaphoretic  HENT:      Head: Normocephalic and atraumatic     Eyes:      Conjunctiva/sclera: Conjunctivae normal  Pupils: Pupils are equal, round, and reactive to light  Cardiovascular:      Rate and Rhythm: Normal rate and regular rhythm  Heart sounds: Normal heart sounds  No murmur heard  Pulmonary:      Effort: Pulmonary effort is normal  No respiratory distress  Breath sounds: Normal breath sounds  Abdominal:      General: Bowel sounds are normal  There is no distension  Palpations: Abdomen is soft  Tenderness: There is no abdominal tenderness  Musculoskeletal:         General: No deformity  Normal range of motion  Cervical back: Normal range of motion and neck supple  Skin:     General: Skin is warm and dry  Coloration: Skin is not pale  Findings: No rash  Neurological:      Mental Status: She is alert  Cranial Nerves: No cranial nerve deficit     Psychiatric:         Behavior: Behavior normal          Vital Signs  ED Triage Vitals [08/12/21 0114]   Temperature Pulse Respirations Blood Pressure SpO2   98 8 °F (37 1 °C) 73 16 124/66 95 %      Temp Source Heart Rate Source Patient Position - Orthostatic VS BP Location FiO2 (%)   Oral Monitor Lying Right arm --      Pain Score       --           Vitals:    08/12/21 0114 08/12/21 0145 08/12/21 0215   BP: 124/66 133/67 112/68   Pulse: 73 74 66   Patient Position - Orthostatic VS: Lying Sitting Sitting         Visual Acuity  Visual Acuity      Most Recent Value   L Pupil Size (mm)  3   R Pupil Size (mm)  3          ED Medications  Medications - No data to display    Diagnostic Studies  Results Reviewed     Procedure Component Value Units Date/Time    Comprehensive metabolic panel [[id number]]  (Abnormal) Collected: 08/12/21 0124    Lab Status: Final result Specimen: Blood from Arm, Right Updated: 08/12/21 0148     Sodium 139 mmol/L      Potassium 3 8 mmol/L      Chloride 103 mmol/L      CO2 23 mmol/L      ANION GAP 13 mmol/L      BUN 16 mg/dL      Creatinine 1 22 mg/dL      Glucose 126 mg/dL      Calcium 8 8 mg/dL Corrected Calcium 9 3 mg/dL      AST 24 U/L      ALT 53 U/L      Alkaline Phosphatase 59 U/L      Total Protein 6 9 g/dL      Albumin 3 4 g/dL      Total Bilirubin 0 30 mg/dL      eGFR 54 ml/min/1 73sq m     Narrative:      [place] guidelines for Chronic Kidney Disease (CKD):     Stage 1 with normal or high GFR (GFR > 90 mL/min/1 73 square meters)    Stage 2 Mild CKD (GFR = 60-89 mL/min/1 73 square meters)    Stage 3A Moderate CKD (GFR = 45-59 mL/min/1 73 square meters)    Stage 3B Moderate CKD (GFR = 30-44 mL/min/1 73 square meters)    Stage 4 Severe CKD (GFR = 15-29 mL/min/1 73 square meters)    Stage 5 End Stage CKD (GFR <15 mL/min/1 73 square meters)  Note: GFR calculation is accurate only with a steady state creatinine    Calcium, ionized [[id number]]  (Normal) Collected: 08/12/21 0124    Lab Status: Final result Specimen: Blood from Arm, Right Updated: 08/12/21 0132     Calcium, Ionized 1 13 mmol/L     CBC and differential [[id number]]  (Abnormal) Collected: 08/12/21 0124    Lab Status: Final result Specimen: Blood from Arm, Right Updated: 08/12/21 0132     WBC 5 55 Thousand/uL      RBC 4 06 Million/uL      Hemoglobin 12 1 g/dL      Hematocrit 37 0 %      MCV 91 fL      MCH 29 8 pg      MCHC 32 7 g/dL      RDW 15 8 %      MPV 10 1 fL      Platelets 479 Thousands/uL      nRBC 0 /100 WBCs      Neutrophils Relative 56 %      Immat GRANS % 0 %      Lymphocytes Relative 32 %      Monocytes Relative 8 %      Eosinophils Relative 3 %      Basophils Relative 1 %      Neutrophils Absolute 3 10 Thousands/µL      Immature Grans Absolute 0 01 Thousand/uL      Lymphocytes Absolute 1 78 Thousands/µL      Monocytes Absolute 0 46 Thousand/µL      Eosinophils Absolute 0 16 Thousand/µL      Basophils Absolute 0 04 Thousands/µL                  No orders to display              Procedures  Procedures         ED Course                             SBIRT 22yo+      Most Recent Value   SBIRT (22 yo +) In order to provide better care to our patients, we are screening all of our patients for alcohol and drug use  Would it be okay to ask you these screening questions? No Filed at: 08/12/2021 0215                    [facility]  Number of Diagnoses or Management Options  Paresthesias: new and requires workup     Amount and/or Complexity of Data Reviewed  Clinical lab tests: ordered and reviewed    Risk of Complications, Morbidity, and/or Mortality  Presenting problems: high  Diagnostic procedures: high  Management options: high    Patient Progress  Patient progress: improved      Disposition  Final diagnoses:   Paresthesias     Time reflects when diagnosis was documented in both MDM as applicable and the Disposition within this note     Time User Action Codes Description Comment    8/12/2021  2:08 AM Torey Kenney Add [R20 2] Paresthesias       ED Disposition     ED Disposition Condition Date/Time Comment    Discharge Stable u Aug 12, 2021  2:08 AM Virgie Vivian discharge to home/self care              Follow-up Information     Follow up With Specialties Details Why Jaguar Nguyen MD Internal Medicine Schedule an appointment as soon as possible for a visit in 2 days for follow up [address] [place] [id number] [facility]      Carroll Puente MD Neurology Schedule an appointment as soon as possible for a visit in 2 days for follow up [address]  [phone]            Discharge Medication List as of 8/12/2021  2:09 AM      CONTINUE these medications which have NOT CHANGED    Details   ALPRAZolam (XANAX) 1 mg tablet Take 1 mg by mouth daily am, Historical Med      baclofen 10 mg tablet Take 10 mg by mouth 3 (three) times a day , Historical Med      calcitriol (ROCALTROL) 0 25 mcg capsule Take 1 capsule (0 25 mcg total) by mouth daily, Starting Mon 6/7/2021, Normal      Calcium Carb-Cholecalciferol (CALCIUM 500+D PO) Take 500 mg by mouth 6 (six) times a day, Historical Med      ergocalciferol (VITAMIN D2) 50,000 units Take 1 capsule (50,000 Units total) by mouth once a week, Starting Wed 3/31/2021, Normal      fenofibrate (TRIGLIDE) 160 MG tablet Take 160 mg by mouth daily, Historical Med      ferrous sulfate 325 (65 Fe) mg tablet Take 325 mg by mouth Once Monday, wed, Friday, Historical Med      ibuprofen (MOTRIN) 600 mg tablet Take 1 tablet (600 mg total) by mouth every 6 (six) hours as needed for mild pain or moderate pain, Starting Tue 6/2/2020, Normal      levothyroxine (Synthroid) 150 mcg tablet Take 1 tablet (150 mcg total) by mouth daily, Starting Mon 6/7/2021, Normal      magnesium oxide (MAG-OX) 400 mg Take 1 tablet (400 mg total) by mouth 3 (three) times a day 9pm, Starting Mon 6/7/2021, Normal      ondansetron (ZOFRAN) 4 mg tablet Take 4 mg by mouth every 8 (eight) hours as needed for nausea or vomiting , Historical Med      oxyCODONE-acetaminophen (PERCOCET) 5-325 mg per tablet Take 2 tablets by mouth daily , Historical Med      potassium chloride (K-DUR,KLOR-CON) 20 mEq tablet Take 20 mEq by mouth 2 (two) times a day, Historical Med      pregabalin (LYRICA) 75 mg capsule Take 75 mg by mouth 3 (three) times a day as needed , Historical Med      zolpidem (AMBIEN CR) 6 25 MG CR tablet Take 6 25 mg by mouth daily at bedtime as needed for sleep, Historical Med           No discharge procedures on file      PDMP Review     None          ED Provider  Electronically Signed by           Gustavo Bush DO  08/12/21 6483

## 2021-08-12 NOTE — DISCHARGE INSTRUCTIONS
Return to the ER for further concerns or worsening symptoms  Follow up with your primary care physician and neurology in 1-2 days

## 2021-08-15 PROCEDURE — 87635 SARS-COV-2 COVID-19 AMP PRB: CPT | Performed by: INTERNAL MEDICINE

## 2021-08-26 ENCOUNTER — HOSPITAL ENCOUNTER (EMERGENCY)
Facility: HOSPITAL | Age: 46
Discharge: HOME | End: 2021-08-26
Attending: EMERGENCY MEDICINE
Payer: COMMERCIAL

## 2021-08-26 VITALS
RESPIRATION RATE: 20 BRPM | BODY MASS INDEX: 36.58 KG/M2 | DIASTOLIC BLOOD PRESSURE: 76 MMHG | SYSTOLIC BLOOD PRESSURE: 114 MMHG | TEMPERATURE: 98.2 F | WEIGHT: 200 LBS | OXYGEN SATURATION: 96 % | HEART RATE: 98 BPM

## 2021-08-26 DIAGNOSIS — R42 LIGHTHEADEDNESS: Primary | ICD-10-CM

## 2021-08-26 LAB
ALBUMIN SERPL BCP-MCNC: 3.7 G/DL (ref 3.5–5)
ALP SERPL-CCNC: 74 U/L (ref 46–116)
ALT SERPL W P-5'-P-CCNC: 63 U/L (ref 12–78)
ANION GAP SERPL CALCULATED.3IONS-SCNC: 12 MMOL/L (ref 4–13)
AST SERPL W P-5'-P-CCNC: 27 U/L (ref 5–45)
BASOPHILS # BLD AUTO: 0.05 THOUSANDS/ΜL (ref 0–0.1)
BASOPHILS NFR BLD AUTO: 1 % (ref 0–1)
BILIRUB SERPL-MCNC: 0.33 MG/DL (ref 0.2–1)
BUN SERPL-MCNC: 20 MG/DL (ref 5–25)
CA-I BLD-SCNC: 1.12 MMOL/L (ref 1.12–1.32)
CALCIUM SERPL-MCNC: 8.8 MG/DL (ref 8.3–10.1)
CHLORIDE SERPL-SCNC: 102 MMOL/L (ref 100–108)
CO2 SERPL-SCNC: 27 MMOL/L (ref 21–32)
CREAT SERPL-MCNC: 1.18 MG/DL (ref 0.6–1.3)
EOSINOPHIL # BLD AUTO: 0.13 THOUSAND/ΜL (ref 0–0.61)
EOSINOPHIL NFR BLD AUTO: 1 % (ref 0–6)
ERYTHROCYTE [DISTWIDTH] IN BLOOD BY AUTOMATED COUNT: 15.5 % (ref 11.6–15.1)
GFR SERPL CREATININE-BSD FRML MDRD: 56 ML/MIN/1.73SQ M
GLUCOSE SERPL-MCNC: 146 MG/DL (ref 65–140)
HCT VFR BLD AUTO: 39.2 % (ref 34.8–46.1)
HGB BLD-MCNC: 12.9 G/DL (ref 11.5–15.4)
IMM GRANULOCYTES # BLD AUTO: 0.02 THOUSAND/UL (ref 0–0.2)
IMM GRANULOCYTES NFR BLD AUTO: 0 % (ref 0–2)
LYMPHOCYTES # BLD AUTO: 1.93 THOUSANDS/ΜL (ref 0.6–4.47)
LYMPHOCYTES NFR BLD AUTO: 20 % (ref 14–44)
MAGNESIUM SERPL-MCNC: 1.9 MG/DL (ref 1.6–2.6)
MCH RBC QN AUTO: 29.3 PG (ref 26.8–34.3)
MCHC RBC AUTO-ENTMCNC: 32.9 G/DL (ref 31.4–37.4)
MCV RBC AUTO: 89 FL (ref 82–98)
MONOCYTES # BLD AUTO: 0.54 THOUSAND/ΜL (ref 0.17–1.22)
MONOCYTES NFR BLD AUTO: 6 % (ref 4–12)
NEUTROPHILS # BLD AUTO: 6.81 THOUSANDS/ΜL (ref 1.85–7.62)
NEUTS SEG NFR BLD AUTO: 72 % (ref 43–75)
NRBC BLD AUTO-RTO: 0 /100 WBCS
PLATELET # BLD AUTO: 173 THOUSANDS/UL (ref 149–390)
PMV BLD AUTO: 10.5 FL (ref 8.9–12.7)
POTASSIUM SERPL-SCNC: 3.5 MMOL/L (ref 3.5–5.3)
PROT SERPL-MCNC: 7.6 G/DL (ref 6.4–8.2)
RBC # BLD AUTO: 4.4 MILLION/UL (ref 3.81–5.12)
SODIUM SERPL-SCNC: 141 MMOL/L (ref 136–145)
WBC # BLD AUTO: 9.48 THOUSAND/UL (ref 4.31–10.16)

## 2021-08-26 PROCEDURE — 85025 COMPLETE CBC W/AUTO DIFF WBC: CPT

## 2021-08-26 PROCEDURE — 83735 ASSAY OF MAGNESIUM: CPT | Performed by: EMERGENCY MEDICINE

## 2021-08-26 PROCEDURE — 82330 ASSAY OF CALCIUM: CPT

## 2021-08-26 PROCEDURE — 99284 EMERGENCY DEPT VISIT MOD MDM: CPT | Performed by: EMERGENCY MEDICINE

## 2021-08-26 PROCEDURE — 80053 COMPREHEN METABOLIC PANEL: CPT

## 2021-08-26 PROCEDURE — 36415 COLL VENOUS BLD VENIPUNCTURE: CPT

## 2021-08-26 PROCEDURE — 99283 EMERGENCY DEPT VISIT LOW MDM: CPT

## 2021-08-26 PROCEDURE — 96365 THER/PROPH/DIAG IV INF INIT: CPT

## 2021-08-26 RX ORDER — MAGNESIUM SULFATE HEPTAHYDRATE 40 MG/ML
2 INJECTION, SOLUTION INTRAVENOUS ONCE
Status: DISCONTINUED | OUTPATIENT
Start: 2021-08-26 | End: 2021-08-26

## 2021-08-26 RX ORDER — CALCIUM GLUCONATE 20 MG/ML
1 INJECTION, SOLUTION INTRAVENOUS ONCE
Status: COMPLETED | OUTPATIENT
Start: 2021-08-26 | End: 2021-08-26

## 2021-08-26 RX ADMIN — CALCIUM GLUCONATE 1 G: 20 INJECTION, SOLUTION INTRAVENOUS at 02:20

## 2021-08-26 NOTE — ED PROVIDER NOTES
History  Chief Complaint   Patient presents with    Abnormal Lab     C/O numbness and tingling in B/L UE  Patient states "My calcium is low again "     History of parathyroidectomy presents with lightheadedness and parasthesias  Feels the same as when her calcium level is low  Frequent visits to the ED for the same  No other complaints  History provided by:  Patient   used: No        Prior to Admission Medications   Prescriptions Last Dose Informant Patient Reported? Taking? ALPRAZolam (XANAX) 1 mg tablet  Self Yes No   Sig: Take 1 mg by mouth daily am   Calcium Carb-Cholecalciferol (CALCIUM 500+D PO)   Yes No   Sig: Take 500 mg by mouth 6 (six) times a day   baclofen 10 mg tablet  Self Yes No   Sig: Take 10 mg by mouth 3 (three) times a day    calcitriol (ROCALTROL) 0 25 mcg capsule   No No   Sig: Take 1 capsule (0 25 mcg total) by mouth daily   ergocalciferol (VITAMIN D2) 50,000 units   No No   Sig: Take 1 capsule (50,000 Units total) by mouth once a week   fenofibrate (TRIGLIDE) 160 MG tablet  Self Yes No   Sig: Take 160 mg by mouth daily   ferrous sulfate 325 (65 Fe) mg tablet  Self Yes No   Sig: Take 325 mg by mouth Once Monday, wed, Friday   ibuprofen (MOTRIN) 600 mg tablet  Self No No   Sig: Take 1 tablet (600 mg total) by mouth every 6 (six) hours as needed for mild pain or moderate pain   levothyroxine (Synthroid) 150 mcg tablet   No No   Sig: Take 1 tablet (150 mcg total) by mouth daily   magnesium oxide (MAG-OX) 400 mg   No No   Sig: Take 1 tablet (400 mg total) by mouth 3 (three) times a day 9pm   ondansetron (ZOFRAN) 4 mg tablet  Self Yes No   Sig: Take 4 mg by mouth every 8 (eight) hours as needed for nausea or vomiting     oxyCODONE-acetaminophen (PERCOCET) 5-325 mg per tablet  Self Yes No   Sig: Take 2 tablets by mouth daily    potassium chloride (K-DUR,KLOR-CON) 20 mEq tablet   Yes No   Sig: Take 20 mEq by mouth 2 (two) times a day   pregabalin (LYRICA) 75 mg capsule  Self Yes No   Sig: Take 75 mg by mouth 3 (three) times a day as needed    zolpidem (AMBIEN CR) 6 25 MG CR tablet  Self Yes No   Sig: Take 6 25 mg by mouth daily at bedtime as needed for sleep      Facility-Administered Medications: None       Past Medical History:   Diagnosis Date    Acid reflux     Acute renal failure (HCC)     multiple episodes    Anemia     Anxiety     Chronic pain     DDD (degenerative disc disease), lumbar     Disease of thyroid gland     had surgery and now hypo    DVT (deep venous thrombosis) ([facility] [address] )     s/p ankle fracture    Hypercalcemia     Hyperlipidemia     Hyperthyroidism     Hypocalcemia     post op 2016    Migraine     Psychiatric disorder     anxiety depression    Seizures (HCC)     petit mal x1  4 years ago- all tests were neg   Spondylolisthesis of lumbar region     Treatment     spinal pain injections  last was 2016       Past Surgical History:   Procedure Laterality Date    BACK SURGERY       SECTION      x5    CYSTOCELE REPAIR  2017    DISCOGRAM      PARATHYROIDECTOMY      IA ANTERIOR COLPORRAPHY RPR CYSTOCELE W/CYSTO N/A 2017    Procedure: CYSTOCELE REPAIR;  Surgeon: La Paulino MD;  Location: [address] [place] [address];  Service: Gynecology    IA ARTHRODESIS POSTERIOR INTERBODY LUMBAR N/A 2016    Procedure: L4-S1 LUMBAR LAMINECTOMY/DECOMPRESSION;  INSTRUMENTED POSTEROLATERAL FUSION/ INTERBODY L5-S1; ALLOGRAFT AND AUTOGRAFT (IMPULSE) ;   Surgeon: Garen Nyhan, MD;  [facility];  Service: Orthopedics    IA CYSTOURETHROSCOPY,URETER CATHETER Bilateral 2018    Procedure: INSERTION URETERAL CATHETERS PREOP;  Surgeon: Olaf Moreno MD;  Location: [address] [place] [address];  Service: Urology    IA SLING OPER STRES INCONTINENCE N/A 2017    Procedure: MID URETHRAL SLING;  Surgeon: Mitchel Smith MD;  Location: [address] [place] [address];  Service: Urology    IA [address] ABD HYSTERECTOMY N/A 2018    Procedure: SUPRACERVICAL HYSTERECTOMY; Surgeon: Campbell Blunt MD;  Location: [facility];  Service: Gynecology    THYROIDECTOMY      TONSILECTOMY AND ADNOIDECTOMY      TONSILLECTOMY      TUBAL LIGATION         Family History   Problem Relation Age of Onset    Diabetes Mother     Hypertension Mother     Hyperlipidemia Father     Arrhythmia Father     Lung cancer Father     Diabetes Father      I have reviewed and agree with the history as documented  E-Cigarette/Vaping    E-Cigarette Use Never User      E-Cigarette/Vaping Substances    Nicotine No     THC No     CBD No     Flavoring No     Other No     Unknown No      Social History     Tobacco Use    Smoking status: Current Every Day Smoker     Packs/day: 0 50     Years: 25 00     Pack years: 12 50     Types: Cigarettes    Smokeless tobacco: Never Used   Vaping Use    Vaping Use: Never used   Substance Use Topics    Alcohol use: Not Currently     Comment: Alcohol intake:   Occasional  - As per [employer] Drug use: No       Review of Systems   Constitutional: Negative  HENT: Negative  Eyes: Negative  Respiratory: Negative  Cardiovascular: Negative  Gastrointestinal: Negative  Endocrine: Negative  Genitourinary: Negative  Musculoskeletal: Negative  Skin: Negative  Allergic/Immunologic: Negative  Neurological: Positive for light-headedness  Hematological: Negative  Psychiatric/Behavioral: Negative  All other systems reviewed and are negative  Physical Exam  Physical Exam  Constitutional:       Appearance: Normal appearance  HENT:      Head: Normocephalic and atraumatic  Nose: Nose normal       Mouth/Throat:      Mouth: Mucous membranes are moist    Eyes:      Extraocular Movements: Extraocular movements intact  Pupils: Pupils are equal, round, and reactive to light  Cardiovascular:      Rate and Rhythm: Normal rate and regular rhythm     Pulmonary:      Effort: Pulmonary effort is normal       Breath sounds: Normal breath sounds  Abdominal:      General: Abdomen is flat  Bowel sounds are normal       Palpations: Abdomen is soft  Musculoskeletal:         General: Normal range of motion  Cervical back: Normal range of motion and neck supple  Skin:     General: Skin is warm  Capillary Refill: Capillary refill takes less than 2 seconds  Neurological:      General: No focal deficit present  Mental Status: She is alert and oriented to person, place, and time  Mental status is at baseline  Psychiatric:         Mood and Affect: Mood normal          Thought Content:  Thought content normal          Vital Signs  ED Triage Vitals [08/26/21 0107]   Temperature Pulse Respirations Blood Pressure SpO2   98 2 °F (36 8 °C) 98 20 114/76 96 %      Temp Source Heart Rate Source Patient Position - Orthostatic VS BP Location FiO2 (%)   Tympanic Monitor Sitting Right arm --      Pain Score       --           Vitals:    08/26/21 0107   BP: 114/76   Pulse: 98   Patient Position - Orthostatic VS: Sitting         Visual Acuity  Visual Acuity      Most Recent Value   L Pupil Size (mm)  3   R Pupil Size (mm)  3          ED Medications  Medications   calcium gluconate 1 g in sodium chloride 0 9% 50 mL (premix) (0 g Intravenous Stopped 8/26/21 0255)       Diagnostic Studies  Results Reviewed     Procedure Component Value Units Date/Time    Comprehensive metabolic panel [[id number]]  (Abnormal) Collected: 08/26/21 0119    Lab Status: Final result Specimen: Blood from Arm, Left Updated: 08/26/21 0147     Sodium 141 mmol/L      Potassium 3 5 mmol/L      Chloride 102 mmol/L      CO2 27 mmol/L      ANION GAP 12 mmol/L      BUN 20 mg/dL      Creatinine 1 18 mg/dL      Glucose 146 mg/dL      Calcium 8 8 mg/dL      AST 27 U/L      ALT 63 U/L      Alkaline Phosphatase 74 U/L      Total Protein 7 6 g/dL      Albumin 3 7 g/dL      Total Bilirubin 0 33 mg/dL      eGFR 56 ml/min/1 73sq m     Narrative:      [place] guidelines for Chronic Kidney Disease (CKD):     Stage 1 with normal or high GFR (GFR > 90 mL/min/1 73 square meters)    Stage 2 Mild CKD (GFR = 60-89 mL/min/1 73 square meters)    Stage 3A Moderate CKD (GFR = 45-59 mL/min/1 73 square meters)    Stage 3B Moderate CKD (GFR = 30-44 mL/min/1 73 square meters)    Stage 4 Severe CKD (GFR = 15-29 mL/min/1 73 square meters)    Stage 5 End Stage CKD (GFR <15 mL/min/1 73 square meters)  Note: GFR calculation is accurate only with a steady state creatinine    Magnesium [[id number]]  (Normal) Collected: 08/26/21 0119    Lab Status: Final result Specimen: Blood from Arm, Left Updated: 08/26/21 0147     Magnesium 1 9 mg/dL     CBC and differential [[id number]]  (Abnormal) Collected: 08/26/21 0119    Lab Status: Final result Specimen: Blood from Arm, Left Updated: 08/26/21 0130     WBC 9 48 Thousand/uL      RBC 4 40 Million/uL      Hemoglobin 12 9 g/dL      Hematocrit 39 2 %      MCV 89 fL      MCH 29 3 pg      MCHC 32 9 g/dL      RDW 15 5 %      MPV 10 5 fL      Platelets 815 Thousands/uL      nRBC 0 /100 WBCs      Neutrophils Relative 72 %      Immat GRANS % 0 %      Lymphocytes Relative 20 %      Monocytes Relative 6 %      Eosinophils Relative 1 %      Basophils Relative 1 %      Neutrophils Absolute 6 81 Thousands/µL      Immature Grans Absolute 0 02 Thousand/uL      Lymphocytes Absolute 1 93 Thousands/µL      Monocytes Absolute 0 54 Thousand/µL      Eosinophils Absolute 0 13 Thousand/µL      Basophils Absolute 0 05 Thousands/µL     Calcium, ionized [[id number]]  (Normal) Collected: 08/26/21 0119    Lab Status: Final result Specimen: Blood from Arm, Left Updated: 08/26/21 0130     Calcium, Ionized 1 12 mmol/L                  No orders to display              Procedures  Procedures         ED Course                                           MDM  Number of Diagnoses or Management Options     Amount and/or Complexity of Data Reviewed  Clinical lab tests: ordered and reviewed  Tests in the medicine section of CPT®: reviewed and ordered    Patient Progress  Patient progress: stable      Disposition  Final diagnoses:   Lightheadedness     Time reflects when diagnosis was documented in both MDM as applicable and the Disposition within this note     Time User Action Codes Description Comment    8/26/2021  2:28 AM Rody Escamilla Add [R42] Lightheadedness       ED Disposition     ED Disposition Condition Date/Time Comment    Discharge Stable Thu Aug 26, 2021  2:28 AM Mitra Mcclain discharge to home/self care              Follow-up Information     Follow up With Specialties Details Why Contact Info Additional Information    Niyah Wakefield MD Internal Medicine Schedule an appointment as soon as possible for a visit   [id number]  [facility] [id number] [facility] Emergency Department Emergency Medicine  If symptoms worsen [address] [address] Emergency Department, [place], [place], [address]          Discharge Medication List as of 8/26/2021  3:13 AM      CONTINUE these medications which have NOT CHANGED    Details   ALPRAZolam (XANAX) 1 mg tablet Take 1 mg by mouth daily am, Historical Med      baclofen 10 mg tablet Take 10 mg by mouth 3 (three) times a day , Historical Med      calcitriol (ROCALTROL) 0 25 mcg capsule Take 1 capsule (0 25 mcg total) by mouth daily, Starting Mon 6/7/2021, Normal      Calcium Carb-Cholecalciferol (CALCIUM 500+D PO) Take 500 mg by mouth 6 (six) times a day, Historical Med      ergocalciferol (VITAMIN D2) 50,000 units Take 1 capsule (50,000 Units total) by mouth once a week, Starting Wed 3/31/2021, Normal      fenofibrate (TRIGLIDE) 160 MG tablet Take 160 mg by mouth daily, Historical Med      ferrous sulfate 325 (65 Fe) mg tablet Take 325 mg by mouth Once Monday, wed, Friday, Historical Med      ibuprofen (MOTRIN) 600 mg tablet Take 1 tablet (600 mg total) by mouth every 6 (six) hours as needed for mild pain or moderate pain, Starting Tue 6/2/2020, Normal      levothyroxine (Synthroid) 150 mcg tablet Take 1 tablet (150 mcg total) by mouth daily, Starting Mon 6/7/2021, Normal      magnesium oxide (MAG-OX) 400 mg Take 1 tablet (400 mg total) by mouth 3 (three) times a day 9pm, Starting Mon 6/7/2021, Normal      ondansetron (ZOFRAN) 4 mg tablet Take 4 mg by mouth every 8 (eight) hours as needed for nausea or vomiting , Historical Med      oxyCODONE-acetaminophen (PERCOCET) 5-325 mg per tablet Take 2 tablets by mouth daily , Historical Med      potassium chloride (K-DUR,KLOR-CON) 20 mEq tablet Take 20 mEq by mouth 2 (two) times a day, Historical Med      pregabalin (LYRICA) 75 mg capsule Take 75 mg by mouth 3 (three) times a day as needed , Historical Med      zolpidem (AMBIEN CR) 6 25 MG CR tablet Take 6 25 mg by mouth daily at bedtime as needed for sleep, Historical Med           No discharge procedures on file      PDMP Review     None          ED Provider  Electronically Signed by           Willi Macedo DO  08/27/21 0691

## 2021-08-30 DIAGNOSIS — E83.51 HYPOCALCEMIA: ICD-10-CM

## 2021-08-30 RX ORDER — CALCITRIOL 0.25 UG/1
CAPSULE, LIQUID FILLED ORAL
Qty: 90 CAPSULE | Refills: 0 | Status: SHIPPED | OUTPATIENT
Start: 2021-08-30 | End: 2021-11-29

## 2021-09-07 ENCOUNTER — HOSPITAL ENCOUNTER (EMERGENCY)
Facility: HOSPITAL | Age: 46
Discharge: HOME | End: 2021-09-08
Attending: EMERGENCY MEDICINE
Payer: COMMERCIAL

## 2021-09-07 DIAGNOSIS — E83.52 HYPERCALCEMIA: Primary | ICD-10-CM

## 2021-09-07 LAB
ANION GAP SERPL CALCULATED.3IONS-SCNC: 12 MMOL/L (ref 4–13)
BASOPHILS # BLD AUTO: 0.05 THOUSANDS/ΜL (ref 0–0.1)
BASOPHILS NFR BLD AUTO: 1 % (ref 0–1)
BUN SERPL-MCNC: 27 MG/DL (ref 5–25)
CA-I BLD-SCNC: 1.39 MMOL/L (ref 1.12–1.32)
CALCIUM SERPL-MCNC: 11.2 MG/DL (ref 8.3–10.1)
CHLORIDE SERPL-SCNC: 104 MMOL/L (ref 100–108)
CO2 SERPL-SCNC: 26 MMOL/L (ref 21–32)
CREAT SERPL-MCNC: 1.26 MG/DL (ref 0.6–1.3)
D DIMER PPP FEU-MCNC: 0.32 UG/ML FEU
EOSINOPHIL # BLD AUTO: 0.11 THOUSAND/ΜL (ref 0–0.61)
EOSINOPHIL NFR BLD AUTO: 2 % (ref 0–6)
ERYTHROCYTE [DISTWIDTH] IN BLOOD BY AUTOMATED COUNT: 16.2 % (ref 11.6–15.1)
GFR SERPL CREATININE-BSD FRML MDRD: 52 ML/MIN/1.73SQ M
GLUCOSE SERPL-MCNC: 120 MG/DL (ref 65–140)
HCT VFR BLD AUTO: 38.1 % (ref 34.8–46.1)
HGB BLD-MCNC: 12.4 G/DL (ref 11.5–15.4)
IMM GRANULOCYTES # BLD AUTO: 0.03 THOUSAND/UL (ref 0–0.2)
IMM GRANULOCYTES NFR BLD AUTO: 1 % (ref 0–2)
LYMPHOCYTES # BLD AUTO: 1.76 THOUSANDS/ΜL (ref 0.6–4.47)
LYMPHOCYTES NFR BLD AUTO: 32 % (ref 14–44)
MAGNESIUM SERPL-MCNC: 2.2 MG/DL (ref 1.6–2.6)
MCH RBC QN AUTO: 29.9 PG (ref 26.8–34.3)
MCHC RBC AUTO-ENTMCNC: 32.5 G/DL (ref 31.4–37.4)
MCV RBC AUTO: 92 FL (ref 82–98)
MONOCYTES # BLD AUTO: 0.43 THOUSAND/ΜL (ref 0.17–1.22)
MONOCYTES NFR BLD AUTO: 8 % (ref 4–12)
NEUTROPHILS # BLD AUTO: 3.2 THOUSANDS/ΜL (ref 1.85–7.62)
NEUTS SEG NFR BLD AUTO: 56 % (ref 43–75)
NRBC BLD AUTO-RTO: 0 /100 WBCS
PHOSPHATE SERPL-MCNC: 5.1 MG/DL (ref 2.7–4.5)
PLATELET # BLD AUTO: 147 THOUSANDS/UL (ref 149–390)
PMV BLD AUTO: 10.3 FL (ref 8.9–12.7)
POTASSIUM SERPL-SCNC: 3.7 MMOL/L (ref 3.5–5.3)
RBC # BLD AUTO: 4.15 MILLION/UL (ref 3.81–5.12)
SARS-COV-2 RNA RESP QL NAA+PROBE: NEGATIVE
SODIUM SERPL-SCNC: 142 MMOL/L (ref 136–145)
TROPONIN I SERPL-MCNC: <0.02 NG/ML
WBC # BLD AUTO: 5.58 THOUSAND/UL (ref 4.31–10.16)

## 2021-09-07 PROCEDURE — 85025 COMPLETE CBC W/AUTO DIFF WBC: CPT | Performed by: EMERGENCY MEDICINE

## 2021-09-07 PROCEDURE — 83735 ASSAY OF MAGNESIUM: CPT | Performed by: EMERGENCY MEDICINE

## 2021-09-07 PROCEDURE — 84484 ASSAY OF TROPONIN QUANT: CPT | Performed by: EMERGENCY MEDICINE

## 2021-09-07 PROCEDURE — 93005 ELECTROCARDIOGRAM TRACING: CPT

## 2021-09-07 PROCEDURE — 85379 FIBRIN DEGRADATION QUANT: CPT | Performed by: EMERGENCY MEDICINE

## 2021-09-07 PROCEDURE — 84100 ASSAY OF PHOSPHORUS: CPT | Performed by: EMERGENCY MEDICINE

## 2021-09-07 PROCEDURE — 99285 EMERGENCY DEPT VISIT HI MDM: CPT | Performed by: EMERGENCY MEDICINE

## 2021-09-07 PROCEDURE — 36415 COLL VENOUS BLD VENIPUNCTURE: CPT | Performed by: EMERGENCY MEDICINE

## 2021-09-07 PROCEDURE — U0005 INFEC AGEN DETEC AMPLI PROBE: HCPCS | Performed by: EMERGENCY MEDICINE

## 2021-09-07 PROCEDURE — U0003 INFECTIOUS AGENT DETECTION BY NUCLEIC ACID (DNA OR RNA); SEVERE ACUTE RESPIRATORY SYNDROME CORONAVIRUS 2 (SARS-COV-2) (CORONAVIRUS DISEASE [COVID-19]), AMPLIFIED PROBE TECHNIQUE, MAKING USE OF HIGH THROUGHPUT TECHNOLOGIES AS DESCRIBED BY CMS-2020-01-R: HCPCS | Performed by: EMERGENCY MEDICINE

## 2021-09-07 PROCEDURE — U0005 INFEC AGEN DETEC AMPLI PROBE: HCPCS | Performed by: INTERNAL MEDICINE

## 2021-09-07 PROCEDURE — 99285 EMERGENCY DEPT VISIT HI MDM: CPT

## 2021-09-07 PROCEDURE — U0003 INFECTIOUS AGENT DETECTION BY NUCLEIC ACID (DNA OR RNA); SEVERE ACUTE RESPIRATORY SYNDROME CORONAVIRUS 2 (SARS-COV-2) (CORONAVIRUS DISEASE [COVID-19]), AMPLIFIED PROBE TECHNIQUE, MAKING USE OF HIGH THROUGHPUT TECHNOLOGIES AS DESCRIBED BY CMS-2020-01-R: HCPCS | Performed by: INTERNAL MEDICINE

## 2021-09-07 PROCEDURE — 82330 ASSAY OF CALCIUM: CPT | Performed by: EMERGENCY MEDICINE

## 2021-09-07 PROCEDURE — 80048 BASIC METABOLIC PNL TOTAL CA: CPT | Performed by: EMERGENCY MEDICINE

## 2021-09-08 ENCOUNTER — TELEPHONE (OUTPATIENT)
Dept: ENDOCRINOLOGY | Facility: CLINIC | Age: 46
End: 2021-09-08

## 2021-09-08 VITALS
WEIGHT: 197 LBS | TEMPERATURE: 97.8 F | HEART RATE: 74 BPM | BODY MASS INDEX: 36.03 KG/M2 | SYSTOLIC BLOOD PRESSURE: 143 MMHG | OXYGEN SATURATION: 98 % | DIASTOLIC BLOOD PRESSURE: 83 MMHG | RESPIRATION RATE: 16 BRPM

## 2021-09-08 LAB — TROPONIN I SERPL-MCNC: <0.02 NG/ML

## 2021-09-08 PROCEDURE — 96360 HYDRATION IV INFUSION INIT: CPT

## 2021-09-08 PROCEDURE — 84484 ASSAY OF TROPONIN QUANT: CPT

## 2021-09-08 PROCEDURE — 36415 COLL VENOUS BLD VENIPUNCTURE: CPT

## 2021-09-08 RX ADMIN — SODIUM CHLORIDE 1000 ML: 0.9 INJECTION, SOLUTION INTRAVENOUS at 01:19

## 2021-09-08 NOTE — TELEPHONE ENCOUNTER
Patient needs follow-up in clinic  Has had multiple visits to the ER changes to her medications   If she was taking calcium 500 mg 7 times a day when her calcium was 11 3, she should reduce it to 5 times a day   Should keep well hydrated  Repeat total calcium , ionized calcium level in 3 days

## 2021-09-08 NOTE — ED PROVIDER NOTES
Final Diagnosis:  1  Hypercalcemia        Chief Complaint   Patient presents with    Chest Pain     c/o mid CP ,SOB and numbness and tigling all over the body started today  Pt report she has hx hypocalcemia taking calcium [id number]/day and states that if her calcium low she has numbness and tigngling sensation   Shortness of Breath    Numbness     HPI  39 yo with history of thyroidectomy parathyroidectomy electrolyte up abnormalities presents with numbness tingling as well as chest pain  She had a dose increase in her calcium to 8 times daily  She is unvaccinated for COVID  She also reports some shortness of breath but no cough  No fevers  No known sick contacts  Chest pain started 3:30 p m  Day presentation as she was driving home from work  No associated vomiting diaphoresis  No radiation     - No language barrier    - History obtained from patient  - There are no limitations to the history obtained  - Previous charting underwent limited review with attention to last ED visits, labs, ekgs, and prior imaging  PMH:   has a past medical history of Acid reflux, Acute renal failure ([address] ), Anemia, Anxiety, Chronic pain, DDD (degenerative disc disease), lumbar, Disease of thyroid gland, DVT (deep venous thrombosis) ([address] ) (), Hypercalcemia, Hyperlipidemia, Hyperthyroidism, Hypocalcemia, Migraine, Psychiatric disorder, Seizures ([address] ), Spondylolisthesis of lumbar region, and Treatment  PSH:   has a past surgical history that includes  section; TONSILECTOMY AND ADNOIDECTOMY; Thyroidectomy; Parathyroidectomy; Discogram; Back surgery; pr arthrodesis posterior interbody lumbar (N/A, 2016); pr anterior colporraphy rpr cystocele w/cysto (N/A, 2017); pr sling oper stres incontinence (N/A, 2017); Tonsillectomy; Tubal ligation; pr cystourethroscopy,ureter catheter (Bilateral, 2018); pr supracerv abd hysterectomy (N/A, 2018); and Cystocele repair (2017)  ROS:  Review of Systems   Constitutional: Negative for chills and fever  HENT: Negative for ear pain and sore throat  Eyes: Negative for pain and visual disturbance  Respiratory: Positive for shortness of breath  Negative for cough  Cardiovascular: Positive for chest pain  Negative for palpitations  Gastrointestinal: Positive for abdominal pain  Negative for vomiting  Genitourinary: Negative for dysuria and hematuria  Musculoskeletal: Negative for arthralgias and back pain  Skin: Negative for color change and rash  Neurological: Positive for numbness  Negative for seizures and syncope  All other systems reviewed and are negative  PE:     Physical exam highlights:   Physical Exam       Vitals:    09/07/21 1903 09/08/21 0026 09/08/21 0158   BP: 159/83 142/78 143/83   BP Location: Right arm Right arm Left arm   Pulse: 69 68 74   Resp: 20 18 16   Temp: 97 8 °F (36 6 °C)     TempSrc: Temporal     SpO2: 99% 100% 98%   Weight: 89 4 kg (197 lb)       Vitals reviewed by me  Nursing note reviewed  Chaperone present for all sensitive exam   Const: No acute distress  Alert  Nontoxic  Not diaphoretic  HEENT: External ears normal  No protrusion drainage swelling  Nose normal  No drainage/traumatic deformity  MMM  Mouth with baseline/symmetric movement  No trismus  Eyes: No squinting  No icterus  Tracks through the room with normal EOM  No tearing/swelling/drainage  Neck: ROM normal  No rigidity  No meningismus  Cards: Rate as per vitals  Compared to monitor sinus unless documented above  Regular  Well perfused  Pulm: able to verbalize without additional effort  Effort and excursion normal  No disress  No audible wheezing/ stridor  Normal resp rate  Abd: No distension beyond baseline  No fluctuant wave  Patient without peritoneal pain with shifting/bumping the bed  MSK: ROM normal and baseline  No deformity  Skin: No new rashes visible  Well perfused  Neuro: Nonfocal  Baseline   CN grossly intact  Moving all four with coordination  Psych: Normal behavior and affect  A:  - Nursing note reviewed  Ddx and MDM  Electrolyte abnormality  ACS                     ED Course as of Sep 08 0335   Tue Sep 07, 2021   5958 Procedure Note: EKG  Date/Time: 09/07/21 11:19 PM   Interpreted by: Stephan Carcamo  Indications / Diagnosis: CP  ECG reviewed by me, the ED Provider: yes   The EKG demonstrates:  Rhythm: sinus    Intervals: normal intervals  Axis: normal axis  QRS/Blocks: normal QRS  ST Changes: No acute ST Changes, no STD/KISHORE    T wave changes: none            No orders to display     Orders Placed This Encounter   Procedures    Novel Coronavirus (Covid-19),PCR [facility]    CBC and differential    Basic metabolic panel    Calcium, ionized    Troponin I    D-dimer, quantitative    Magnesium    Phosphorus    Troponin I    ECG 12 lead     Labs Reviewed   CBC AND DIFFERENTIAL - Abnormal       Result Value Ref Range Status    WBC 5 58  4 31 - 10 16 Thousand/uL Final    RBC 4 15  3 81 - 5 12 Million/uL Final    Hemoglobin 12 4  11 5 - 15 4 g/dL Final    Hematocrit 38 1  34 8 - 46 1 % Final    MCV 92  82 - 98 fL Final    MCH 29 9  26 8 - 34 3 pg Final    MCHC 32 5  31 4 - 37 4 g/dL Final    RDW 16 2 (*) 11 6 - 15 1 % Final    MPV 10 3  8 9 - 12 7 fL Final    Platelets 806 (*) 437 - 390 Thousands/uL Final    nRBC 0  /100 WBCs Final    Neutrophils Relative 56  43 - 75 % Final    Immat GRANS % 1  0 - 2 % Final    Lymphocytes Relative 32  14 - 44 % Final    Monocytes Relative 8  4 - 12 % Final    Eosinophils Relative 2  0 - 6 % Final    Basophils Relative 1  0 - 1 % Final    Neutrophils Absolute 3 20  1 85 - 7 62 Thousands/µL Final    Immature Grans Absolute 0 03  0 00 - 0 20 Thousand/uL Final    Lymphocytes Absolute 1 76  0 60 - 4 47 Thousands/µL Final    Monocytes Absolute 0 43  0 17 - 1 22 Thousand/µL Final    Eosinophils Absolute 0 11  0 00 - 0 61 Thousand/µL Final    Basophils Absolute 0 05 0 00 - 0 10 Thousands/µL Final   BASIC METABOLIC PANEL - Abnormal    Sodium 142  136 - 145 mmol/L Final    Potassium 3 7  3 5 - 5 3 mmol/L Final    Chloride 104  100 - 108 mmol/L Final    CO2 26  21 - 32 mmol/L Final    ANION GAP 12  4 - 13 mmol/L Final    BUN 27 (*) 5 - 25 mg/dL Final    Creatinine 1 26  0 60 - 1 30 mg/dL Final    Comment: Standardized to IDMS reference method    Glucose 120  65 - 140 mg/dL Final    Comment: If the patient is fasting, the ADA then defines impaired fasting glucose as > 100 mg/dL and diabetes as > or equal to 123 mg/dL  Specimen collection should occur prior to Sulfasalazine administration due to the potential for falsely depressed results  Specimen collection should occur prior to Sulfapyridine administration due to the potential for falsely elevated results  Calcium 11 2 (*) 8 3 - 10 1 mg/dL Final    eGFR 52  ml/min/1 73sq m Final    Narrative:     [place] guidelines for Chronic Kidney Disease (CKD):     Stage 1 with normal or high GFR (GFR > 90 mL/min/1 73 square meters)    Stage 2 Mild CKD (GFR = 60-89 mL/min/1 73 square meters)    Stage 3A Moderate CKD (GFR = 45-59 mL/min/1 73 square meters)    Stage 3B Moderate CKD (GFR = 30-44 mL/min/1 73 square meters)    Stage 4 Severe CKD (GFR = 15-29 mL/min/1 73 square meters)    Stage 5 End Stage CKD (GFR <15 mL/min/1 73 square meters)  Note: GFR calculation is accurate only with a steady state creatinine   CALCIUM, IONIZED - Abnormal    Calcium, Ionized 1 39 (*) 1 12 - 1 32 mmol/L Final   PHOSPHORUS - Abnormal    Phosphorus 5 1 (*) 2 7 - 4 5 mg/dL Final   NOVEL CORONAVIRUS (COVID-19), PCR [facility] - Normal    SARS-CoV-2 Negative  Negative Final    Narrative:      The specimen collection materials, transport medium, and/or testing methodology utilized in the production of these test results have been proven to be reliable in a limited validation with an abbreviated program under the Emergency Utilization Authorization provided by the FDA  Testing reported as "Presumptive positive" will be confirmed with secondary testing to ensure result accuracy  Clinical caution and judgement should be used with the interpretation of these results with consideration of the clinical impression and other laboratory testing  Testing reported as "Positive" or "Negative" has been proven to be accurate according to standard laboratory validation requirements  All testing is performed with control materials showing appropriate reactivity at standard intervals  TROPONIN I - Normal    Troponin I <0 02  <=0 04 ng/mL Final    Comment: Siemens Chemistry analyzer 99% cutoff is > 0 04 ng/mL in network labs     o cTnI 99% cutoff is useful only when applied to patients in the clinical setting of myocardial ischemia   o cTnI 99% cutoff should be interpreted in the context of clinical history, ECG findings and possibly cardiac imaging to establish correct diagnosis  o cTnI 99% cutoff may be suggestive but clearly not indicative of a coronary event without the clinical setting of myocardial ischemia  D-DIMER, QUANTITATIVE - Normal    D-Dimer, Quant 0 32  <0 50 ug/ml FEU Final    Comment: Reference and upper limits to exclude DVT and PE are the same  Do not use to exclude if clinical symptoms are present  Pregnant women:  1st trimester:  <0 22 - 1 06 ug/ml FEU  2nd trimester:  <0 22 - 1 88 ug/ml FEU  3rd trimester:   0 24 - 3 28 ug/ml FEU    Note: Normal ranges may not apply to patients who are transgender, non-binary, or whose legal sex, sex at birth, and gender identity differ       MAGNESIUM - Normal    Magnesium 2 2  1 6 - 2 6 mg/dL Final   TROPONIN I - Normal    Troponin I <0 02  <=0 04 ng/mL Final    Comment: Siemens Chemistry analyzer 99% cutoff is > 0 04 ng/mL in network labs     o cTnI 99% cutoff is useful only when applied to patients in the clinical setting of myocardial ischemia   o cTnI 99% cutoff should be interpreted in the context of clinical history, ECG findings and possibly cardiac imaging to establish correct diagnosis  o cTnI 99% cutoff may be suggestive but clearly not indicative of a coronary event without the clinical setting of myocardial ischemia  Final Diagnosis:  1  Hypercalcemia        P:  - hospital tx includes fluid resuscitation  - disposition   - additional tx intended, if consistent with primary provider decrease calcium to 7 daily  - patient to follow with PCP/endocrinology   - patient will call their PCP to let them know they were in the emergency department  We discuss return precautions     Medications   sodium chloride 0 9 % bolus 1,000 mL (0 mL Intravenous Stopped 9/8/21 0154)     Time reflects when diagnosis was documented in both MDM as applicable and the Disposition within this note     Time User Action Codes Description Comment    9/8/2021  1:13 AM Ana Carlson Add [E83 52] Hypercalcemia       ED Disposition     ED Disposition Condition Date/Time Comment    Discharge Stable Wed Sep 8, 2021  1:13 AM Nigel Freire discharge to home/self care              Follow-up Information     Follow up With Specialties Details Why Contact Info    Guido Benitez MD Internal Medicine   [address] [place] [address]      Rico Vasques MD Endocrinology, Internal Medicine   [address]  [phone]          Discharge Medication List as of 9/8/2021  1:14 AM      CONTINUE these medications which have NOT CHANGED    Details   ALPRAZolam (XANAX) 1 mg tablet Take 1 mg by mouth daily am, Historical Med      baclofen 10 mg tablet Take 10 mg by mouth 3 (three) times a day , Historical Med      calcitriol (ROCALTROL) 0 25 mcg capsule TAKE 1 CAPSULE BY MOUTH EVERY DAY, Normal      Calcium Carb-Cholecalciferol (CALCIUM 500+D PO) Take 500 mg by mouth 6 (six) times a day, Historical Med      ergocalciferol (VITAMIN D2) 50,000 units Take 1 capsule (50,000 Units total) by mouth once a week, Starting Wed 3/31/2021, Normal      fenofibrate (TRIGLIDE) 160 MG tablet Take 160 mg by mouth daily, Historical Med      ferrous sulfate 325 (65 Fe) mg tablet Take 325 mg by mouth Once Monday, wed, Friday, Historical Med      ibuprofen (MOTRIN) 600 mg tablet Take 1 tablet (600 mg total) by mouth every 6 (six) hours as needed for mild pain or moderate pain, Starting Tue 6/2/2020, Normal      levothyroxine (Synthroid) 150 mcg tablet Take 1 tablet (150 mcg total) by mouth daily, Starting Mon 6/7/2021, Normal      magnesium oxide (MAG-OX) 400 mg Take 1 tablet (400 mg total) by mouth 3 (three) times a day 9pm, Starting Mon 6/7/2021, Normal      ondansetron (ZOFRAN) 4 mg tablet Take 4 mg by mouth every 8 (eight) hours as needed for nausea or vomiting , Historical Med      oxyCODONE-acetaminophen (PERCOCET) 5-325 mg per tablet Take 2 tablets by mouth daily , Historical Med      potassium chloride (K-DUR,KLOR-CON) 20 mEq tablet Take 20 mEq by mouth 2 (two) times a day, Historical Med      pregabalin (LYRICA) 75 mg capsule Take 75 mg by mouth 3 (three) times a day as needed , Historical Med      zolpidem (AMBIEN CR) 6 25 MG CR tablet Take 6 25 mg by mouth daily at bedtime as needed for sleep, Historical Med           No discharge procedures on file  Prior to Admission Medications   Prescriptions Last Dose Informant Patient Reported? Taking?    ALPRAZolam (XANAX) 1 mg tablet  Self Yes No   Sig: Take 1 mg by mouth daily am   Calcium Carb-Cholecalciferol (CALCIUM 500+D PO)   Yes No   Sig: Take 500 mg by mouth 6 (six) times a day   baclofen 10 mg tablet  Self Yes No   Sig: Take 10 mg by mouth 3 (three) times a day    calcitriol (ROCALTROL) 0 25 mcg capsule   No No   Sig: TAKE 1 CAPSULE BY MOUTH EVERY DAY   ergocalciferol (VITAMIN D2) 50,000 units   No No   Sig: Take 1 capsule (50,000 Units total) by mouth once a week   fenofibrate (TRIGLIDE) 160 MG tablet  Self Yes No   Sig: Take 160 mg by mouth daily   ferrous sulfate 325 (65 Fe) mg tablet  Self Yes No   Sig: Take 325 mg by mouth Once Monday, wed, Friday   ibuprofen (MOTRIN) 600 mg tablet  Self No No   Sig: Take 1 tablet (600 mg total) by mouth every 6 (six) hours as needed for mild pain or moderate pain   levothyroxine (Synthroid) 150 mcg tablet   No No   Sig: Take 1 tablet (150 mcg total) by mouth daily   magnesium oxide (MAG-OX) 400 mg   No No   Sig: Take 1 tablet (400 mg total) by mouth 3 (three) times a day 9pm   ondansetron (ZOFRAN) 4 mg tablet  Self Yes No   Sig: Take 4 mg by mouth every 8 (eight) hours as needed for nausea or vomiting  oxyCODONE-acetaminophen (PERCOCET) 5-325 mg per tablet  Self Yes No   Sig: Take 2 tablets by mouth daily    potassium chloride (K-DUR,KLOR-CON) 20 mEq tablet   Yes No   Sig: Take 20 mEq by mouth 2 (two) times a day   pregabalin (LYRICA) 75 mg capsule  Self Yes No   Sig: Take 75 mg by mouth 3 (three) times a day as needed    zolpidem (AMBIEN CR) 6 25 MG CR tablet  Self Yes No   Sig: Take 6 25 mg by mouth daily at bedtime as needed for sleep      Facility-Administered Medications: None       Portions of the record may have been created with voice recognition software  Occasional wrong word or "sound a like" substitutions may have occurred due to the inherent limitations of voice recognition software  Read the chart carefully and recognize, using context, where substitutions have occurred      Electronically signed by:  MD Jessy Srivastava MD  09/08/21 2286

## 2021-09-08 NOTE — TELEPHONE ENCOUNTER
Patient called was in the ED last night with high Calciu  11 3  Wants to know how much calcium she should be taking    Currently taking 5 mg 7 times a day

## 2021-09-09 ENCOUNTER — HOSPITAL ENCOUNTER (EMERGENCY)
Facility: HOSPITAL | Age: 46
Discharge: HOME | End: 2021-09-09
Attending: EMERGENCY MEDICINE
Payer: COMMERCIAL

## 2021-09-09 VITALS
OXYGEN SATURATION: 98 % | HEART RATE: 88 BPM | TEMPERATURE: 99.8 F | RESPIRATION RATE: 16 BRPM | DIASTOLIC BLOOD PRESSURE: 72 MMHG | SYSTOLIC BLOOD PRESSURE: 130 MMHG

## 2021-09-09 DIAGNOSIS — R11.0 NAUSEA: ICD-10-CM

## 2021-09-09 DIAGNOSIS — R20.2 PARESTHESIAS: Primary | ICD-10-CM

## 2021-09-09 LAB
ANION GAP SERPL CALCULATED.3IONS-SCNC: 14 MMOL/L (ref 4–13)
BUN SERPL-MCNC: 23 MG/DL (ref 5–25)
CA-I BLD-SCNC: 1.18 MMOL/L (ref 1.12–1.32)
CALCIUM SERPL-MCNC: 9.1 MG/DL (ref 8.3–10.1)
CHLORIDE SERPL-SCNC: 105 MMOL/L (ref 100–108)
CO2 SERPL-SCNC: 26 MMOL/L (ref 21–32)
CREAT SERPL-MCNC: 1.4 MG/DL (ref 0.6–1.3)
GFR SERPL CREATININE-BSD FRML MDRD: 45 ML/MIN/1.73SQ M
GLUCOSE SERPL-MCNC: 97 MG/DL (ref 65–140)
HCG SERPL QL: NEGATIVE
POTASSIUM SERPL-SCNC: 3.8 MMOL/L (ref 3.5–5.3)
SODIUM SERPL-SCNC: 145 MMOL/L (ref 136–145)
T4 FREE SERPL-MCNC: 1.15 NG/DL (ref 0.76–1.46)
TSH SERPL DL<=0.05 MIU/L-ACNC: 4.68 UIU/ML (ref 0.36–3.74)

## 2021-09-09 PROCEDURE — 80048 BASIC METABOLIC PNL TOTAL CA: CPT | Performed by: EMERGENCY MEDICINE

## 2021-09-09 PROCEDURE — 99284 EMERGENCY DEPT VISIT MOD MDM: CPT | Performed by: EMERGENCY MEDICINE

## 2021-09-09 PROCEDURE — 82330 ASSAY OF CALCIUM: CPT | Performed by: EMERGENCY MEDICINE

## 2021-09-09 PROCEDURE — 99283 EMERGENCY DEPT VISIT LOW MDM: CPT

## 2021-09-09 PROCEDURE — 36415 COLL VENOUS BLD VENIPUNCTURE: CPT | Performed by: EMERGENCY MEDICINE

## 2021-09-09 PROCEDURE — 84439 ASSAY OF FREE THYROXINE: CPT | Performed by: EMERGENCY MEDICINE

## 2021-09-09 PROCEDURE — 84703 CHORIONIC GONADOTROPIN ASSAY: CPT | Performed by: EMERGENCY MEDICINE

## 2021-09-09 PROCEDURE — 84443 ASSAY THYROID STIM HORMONE: CPT | Performed by: EMERGENCY MEDICINE

## 2021-09-09 RX ORDER — METOCLOPRAMIDE 10 MG/1
10 TABLET ORAL ONCE
Status: COMPLETED | OUTPATIENT
Start: 2021-09-09 | End: 2021-09-09

## 2021-09-09 RX ORDER — METOCLOPRAMIDE 10 MG/1
10 TABLET ORAL EVERY 6 HOURS
Qty: 30 TABLET | Refills: 0 | Status: SHIPPED | OUTPATIENT
Start: 2021-09-09 | End: 2022-04-19

## 2021-09-09 RX ADMIN — METOCLOPRAMIDE 10 MG: 10 TABLET ORAL at 02:44

## 2021-09-11 LAB
ATRIAL RATE: 64 BPM
P AXIS: 51 DEGREES
PR INTERVAL: 186 MS
QRS AXIS: 20 DEGREES
QRSD INTERVAL: 84 MS
QT INTERVAL: 392 MS
QTC INTERVAL: 404 MS
T WAVE AXIS: 22 DEGREES
VENTRICULAR RATE: 64 BPM

## 2021-09-11 PROCEDURE — 93010 ELECTROCARDIOGRAM REPORT: CPT | Performed by: INTERNAL MEDICINE

## 2021-09-11 NOTE — ED PROVIDER NOTES
Final Diagnosis:  1  Paresthesias    2  Nausea        Chief Complaint   Patient presents with    Nausea     here last night with the same symptoms; c/o n/v and being unable to eat today  also c/o numbness and tingling  Thinks it's related to calcium  HPI   patient presents again for paresthesias in nausea  She is concerned about her prior diagnosis of hypercalcemia and feels as if her symptoms near what she has read about hypercalcemia in particular bones bones groans and psychiatric overtones  Presented yesterday found had mildly elevated calcium giving fluid bolus and told to decrease her calcium intake  She thereafter called her endocrinologist after discharge will still to further decrease her calcium intake which she did  However she has not had symptom relief so she presents here  It is sensation of tingling like her body has gone to sleep not numbness  She has a nonfocal neuro exam   She has no [place] sign her trip so sign      - No language barrier    - History obtained from patient  - There are no limitations to the history obtained  - Previous charting underwent limited review with attention to last ED visits, labs, ekgs, and prior imaging  PMH:   has a past medical history of Acid reflux, Acute renal failure ([address] ), Anemia, Anxiety, Chronic pain, DDD (degenerative disc disease), lumbar, Disease of thyroid gland, DVT (deep venous thrombosis) ([address] ) (), Hypercalcemia, Hyperlipidemia, Hyperthyroidism, Hypocalcemia, Migraine, Psychiatric disorder, Seizures ([address] ), Spondylolisthesis of lumbar region, and Treatment  PSH:   has a past surgical history that includes  section; TONSILECTOMY AND ADNOIDECTOMY; Thyroidectomy; Parathyroidectomy; Discogram; Back surgery; pr arthrodesis posterior interbody lumbar (N/A, 2016); pr anterior colporraphy rpr cystocele w/cysto (N/A, 2017); pr sling oper stres incontinence (N/A, 2017); Tonsillectomy;  Tubal ligation; pr cystourethroscopy,ureter catheter (Bilateral, 12/7/2018); pr supracerv abd hysterectomy (N/A, 12/7/2018); and Cystocele repair (05/04/2017)  Social History:   presents with her son has a smoking history    ROS:  Review of Systems   Constitutional: Negative for chills and fever  HENT: Negative for ear pain and sore throat  Eyes: Negative for pain and visual disturbance  Respiratory: Negative for cough and shortness of breath  Cardiovascular: Negative for chest pain and palpitations  Gastrointestinal: Positive for nausea  Negative for abdominal pain and vomiting  Genitourinary: Negative for dysuria and hematuria  Musculoskeletal: Negative for arthralgias and back pain  Skin: Negative for color change and rash  Neurological: Negative for seizures and syncope  All other systems reviewed and are negative  PE:     Physical exam highlights:   Physical Exam       Vitals:    09/09/21 0159 09/09/21 0513   BP: 159/87 130/72   BP Location: Right arm Right arm   Pulse: 81 88   Resp: 16 16   Temp: 99 8 °F (37 7 °C)    TempSrc: Oral    SpO2: 97% 98%     Vitals reviewed by me  Nursing note reviewed  Chaperone present for all sensitive exam   Const: No acute distress  Alert  Nontoxic  Not diaphoretic  HEENT: External ears normal  No protrusion drainage swelling  Nose normal  No drainage/traumatic deformity  MMM  Mouth with baseline/symmetric movement  No trismus  Eyes: No squinting  No icterus  Tracks through the room with normal EOM  No tearing/swelling/drainage  Neck: ROM normal  No rigidity  No meningismus  Cards: Rate as per vitals  Compared to monitor sinus unless documented above  Regular  Well perfused  Pulm: able to verbalize without additional effort  Effort and excursion normal  No disress  No audible wheezing/ stridor  Normal resp rate  Abd: No distension beyond baseline  No fluctuant wave  Patient without peritoneal pain with shifting/bumping the bed     MSK: ROM normal and baseline  No deformity  Skin: No new rashes visible  Well perfused  Neuro: Nonfocal  Baseline  CN grossly intact  Moving all four with coordination  Psych: Normal behavior and affect  A:  - Nursing note reviewed  Ddx and MDM   electrolyte abnormality thyroid dysfunction gastritis                      No orders to display     Orders Placed This Encounter   Procedures    Basic metabolic panel    Calcium, ionized    hCG, qualitative pregnancy    TSH, 3rd generation with Free T4 reflex     Labs Reviewed   BASIC METABOLIC PANEL - Abnormal       Result Value Ref Range Status    Sodium 145  136 - 145 mmol/L Final    Potassium 3 8  3 5 - 5 3 mmol/L Final    Chloride 105  100 - 108 mmol/L Final    CO2 26  21 - 32 mmol/L Final    ANION GAP 14 (*) 4 - 13 mmol/L Final    BUN 23  5 - 25 mg/dL Final    Creatinine 1 40 (*) 0 60 - 1 30 mg/dL Final    Comment: Standardized to IDMS reference method    Glucose 97  65 - 140 mg/dL Final    Comment: If the patient is fasting, the ADA then defines impaired fasting glucose as > 100 mg/dL and diabetes as > or equal to 123 mg/dL  Specimen collection should occur prior to Sulfasalazine administration due to the potential for falsely depressed results  Specimen collection should occur prior to Sulfapyridine administration due to the potential for falsely elevated results      Calcium 9 1  8 3 - 10 1 mg/dL Final    eGFR 45  ml/min/1 73sq m Final    Narrative:     [place] guidelines for Chronic Kidney Disease (CKD):     Stage 1 with normal or high GFR (GFR > 90 mL/min/1 73 square meters)    Stage 2 Mild CKD (GFR = 60-89 mL/min/1 73 square meters)    Stage 3A Moderate CKD (GFR = 45-59 mL/min/1 73 square meters)    Stage 3B Moderate CKD (GFR = 30-44 mL/min/1 73 square meters)    Stage 4 Severe CKD (GFR = 15-29 mL/min/1 73 square meters)    Stage 5 End Stage CKD (GFR <15 mL/min/1 73 square meters)  Note: GFR calculation is accurate only with a steady state creatinine   TSH, 3RD GENERATION WITH FREE T4 REFLEX - Abnormal    TSH 3RD GENERATON 4 679 (*) 0 358 - 3 740 uIU/mL Final    Comment: The recommended reference ranges for TSH during pregnancy are as follows:   First trimester 0 1 to 2 5 uIU/mL   Second trimester  0 2 to 3 0 uIU/mL   Third trimester 0 3 to 3 0 uIU/m    Note: Normal ranges may not apply to patients who are transgender, non-binary, or whose legal sex, sex at birth, and gender identity differ  Narrative:     Patients undergoing fluorescein dye angiography may retain small amounts of fluorescein in the body for 48-72 hours post procedure  Samples containing fluorescein can produce falsely depressed TSH values  If the patient had this procedure,a specimen should be resubmitted post fluorescein clearance  CALCIUM, IONIZED - Normal    Calcium, Ionized 1 18  1 12 - 1 32 mmol/L Final   PREGNANCY TEST (HCG QUALITATIVE) - Normal    Preg, Serum Negative  Negative Final       Final Diagnosis:  1  Paresthesias    2  Nausea        P:  - hospital tx includes reglan  - disposition home  - additional tx intended, if consistent with primary provider  - patient to follow with pcp   - patient will call their PCP to let them know they were in the emergency department  We discuss return precautions     Medications   metoclopramide (REGLAN) tablet 10 mg (10 mg Oral Given 9/9/21 0244)     Time reflects when diagnosis was documented in both MDM as applicable and the Disposition within this note     Time User Action Codes Description Comment    9/9/2021  4:31 AM Lily Melvin Add [R20 2] Paresthesias     9/9/2021  4:31 AM Lily Melvin Add [R11 0] Nausea       ED Disposition     ED Disposition Condition Date/Time Comment    Discharge Stable Thu Sep 9, 2021  4:31 AM 1204 [facility] discharge to home/self care              Follow-up Information     Follow up With Specialties Details Why Tere Matthews MD Internal Medicine Schedule an appointment as soon as possible for a visit   Ngoc Neves  [phone]          Discharge Medication List as of 9/9/2021  4:32 AM      START taking these medications    Details   metoclopramide (REGLAN) 10 mg tablet Take 1 tablet (10 mg total) by mouth every 6 (six) hours, Starting Thu 9/9/2021, Normal         CONTINUE these medications which have NOT CHANGED    Details   Calcium Carb-Cholecalciferol (CALCIUM 500+D PO) Take 500 mg by mouth 6 (six) times a day, Historical Med      ferrous sulfate 325 (65 Fe) mg tablet Take 325 mg by mouth Once Monday, wed, Friday, Historical Med      ALPRAZolam (XANAX) 1 mg tablet Take 1 mg by mouth daily am, Historical Med      baclofen 10 mg tablet Take 10 mg by mouth 3 (three) times a day , Historical Med      calcitriol (ROCALTROL) 0 25 mcg capsule TAKE 1 CAPSULE BY MOUTH EVERY DAY, Normal      ergocalciferol (VITAMIN D2) 50,000 units Take 1 capsule (50,000 Units total) by mouth once a week, Starting Wed 3/31/2021, Normal      fenofibrate (TRIGLIDE) 160 MG tablet Take 160 mg by mouth daily, Historical Med      ibuprofen (MOTRIN) 600 mg tablet Take 1 tablet (600 mg total) by mouth every 6 (six) hours as needed for mild pain or moderate pain, Starting Tue 6/2/2020, Normal      levothyroxine (Synthroid) 150 mcg tablet Take 1 tablet (150 mcg total) by mouth daily, Starting Mon 6/7/2021, Normal      magnesium oxide (MAG-OX) 400 mg Take 1 tablet (400 mg total) by mouth 3 (three) times a day 9pm, Starting Mon 6/7/2021, Normal      ondansetron (ZOFRAN) 4 mg tablet Take 4 mg by mouth every 8 (eight) hours as needed for nausea or vomiting , Historical Med      oxyCODONE-acetaminophen (PERCOCET) 5-325 mg per tablet Take 2 tablets by mouth daily , Historical Med      potassium chloride (K-DUR,KLOR-CON) 20 mEq tablet Take 20 mEq by mouth 2 (two) times a day, Historical Med      pregabalin (LYRICA) 75 mg capsule Take 75 mg by mouth 3 (three) times a day as needed , Historical Med      zolpidem (AMBIEN CR) 6 25 MG CR tablet Take 6 25 mg by mouth daily at bedtime as needed for sleep, Historical Med           No discharge procedures on file  Prior to Admission Medications   Prescriptions Last Dose Informant Patient Reported? Taking? ALPRAZolam (XANAX) 1 mg tablet  Self Yes No   Sig: Take 1 mg by mouth daily am   Calcium Carb-Cholecalciferol (CALCIUM 500+D PO) 9/9/2021 at Unknown time  Yes Yes   Sig: Take 500 mg by mouth 6 (six) times a day   baclofen 10 mg tablet  Self Yes No   Sig: Take 10 mg by mouth 3 (three) times a day    calcitriol (ROCALTROL) 0 25 mcg capsule   No No   Sig: TAKE 1 CAPSULE BY MOUTH EVERY DAY   ergocalciferol (VITAMIN D2) 50,000 units   No No   Sig: Take 1 capsule (50,000 Units total) by mouth once a week   fenofibrate (TRIGLIDE) 160 MG tablet  Self Yes No   Sig: Take 160 mg by mouth daily   ferrous sulfate 325 (65 Fe) mg tablet 9/9/2021 at Unknown time Self Yes Yes   Sig: Take 325 mg by mouth Once Monday, wed, Friday   ibuprofen (MOTRIN) 600 mg tablet  Self No No   Sig: Take 1 tablet (600 mg total) by mouth every 6 (six) hours as needed for mild pain or moderate pain   levothyroxine (Synthroid) 150 mcg tablet   No No   Sig: Take 1 tablet (150 mcg total) by mouth daily   magnesium oxide (MAG-OX) 400 mg   No No   Sig: Take 1 tablet (400 mg total) by mouth 3 (three) times a day 9pm   ondansetron (ZOFRAN) 4 mg tablet  Self Yes No   Sig: Take 4 mg by mouth every 8 (eight) hours as needed for nausea or vomiting     oxyCODONE-acetaminophen (PERCOCET) 5-325 mg per tablet  Self Yes No   Sig: Take 2 tablets by mouth daily    potassium chloride (K-DUR,KLOR-CON) 20 mEq tablet   Yes No   Sig: Take 20 mEq by mouth 2 (two) times a day   pregabalin (LYRICA) 75 mg capsule  Self Yes No   Sig: Take 75 mg by mouth 3 (three) times a day as needed    zolpidem (AMBIEN CR) 6 25 MG CR tablet  Self Yes No   Sig: Take 6 25 mg by mouth daily at bedtime as needed for sleep      Facility-Administered Medications: None       Portions of the record may have been created with voice recognition software  Occasional wrong word or "sound a like" substitutions may have occurred due to the inherent limitations of voice recognition software  Read the chart carefully and recognize, using context, where substitutions have occurred      Electronically signed by:  MD Jake Recio MD  09/11/21 [id number]

## 2021-09-22 PROCEDURE — U0003 INFECTIOUS AGENT DETECTION BY NUCLEIC ACID (DNA OR RNA); SEVERE ACUTE RESPIRATORY SYNDROME CORONAVIRUS 2 (SARS-COV-2) (CORONAVIRUS DISEASE [COVID-19]), AMPLIFIED PROBE TECHNIQUE, MAKING USE OF HIGH THROUGHPUT TECHNOLOGIES AS DESCRIBED BY CMS-2020-01-R: HCPCS | Performed by: INTERNAL MEDICINE

## 2021-09-22 PROCEDURE — U0005 INFEC AGEN DETEC AMPLI PROBE: HCPCS | Performed by: INTERNAL MEDICINE

## 2021-09-26 ENCOUNTER — APPOINTMENT (EMERGENCY)
Dept: RADIOLOGY | Facility: HOSPITAL | Age: 46
End: 2021-09-26
Payer: COMMERCIAL

## 2021-09-26 ENCOUNTER — HOSPITAL ENCOUNTER (EMERGENCY)
Facility: HOSPITAL | Age: 46
Discharge: HOME | End: 2021-09-26
Attending: EMERGENCY MEDICINE | Admitting: EMERGENCY MEDICINE
Payer: COMMERCIAL

## 2021-09-26 VITALS
OXYGEN SATURATION: 100 % | RESPIRATION RATE: 16 BRPM | WEIGHT: 204 LBS | BODY MASS INDEX: 37.31 KG/M2 | TEMPERATURE: 97 F | DIASTOLIC BLOOD PRESSURE: 83 MMHG | SYSTOLIC BLOOD PRESSURE: 138 MMHG | HEART RATE: 64 BPM

## 2021-09-26 DIAGNOSIS — R20.2 PARESTHESIAS: ICD-10-CM

## 2021-09-26 DIAGNOSIS — E83.51 HYPOCALCEMIA: Primary | ICD-10-CM

## 2021-09-26 DIAGNOSIS — J18.9 PNEUMONIA: ICD-10-CM

## 2021-09-26 LAB
ALBUMIN SERPL BCP-MCNC: 3.7 G/DL (ref 3.5–5)
ALP SERPL-CCNC: 74 U/L (ref 46–116)
ALT SERPL W P-5'-P-CCNC: 43 U/L (ref 12–78)
ANION GAP SERPL CALCULATED.3IONS-SCNC: 16 MMOL/L (ref 4–13)
AST SERPL W P-5'-P-CCNC: 16 U/L (ref 5–45)
BASOPHILS # BLD AUTO: 0.05 THOUSANDS/ΜL (ref 0–0.1)
BASOPHILS NFR BLD AUTO: 1 % (ref 0–1)
BILIRUB SERPL-MCNC: 0.26 MG/DL (ref 0.2–1)
BILIRUB UR QL STRIP: NEGATIVE
BUN SERPL-MCNC: 20 MG/DL (ref 5–25)
CA-I BLD-SCNC: 0.9 MMOL/L (ref 1.12–1.32)
CALCIUM SERPL-MCNC: 7 MG/DL (ref 8.3–10.1)
CHLORIDE SERPL-SCNC: 102 MMOL/L (ref 100–108)
CLARITY UR: NORMAL
CO2 SERPL-SCNC: 23 MMOL/L (ref 21–32)
COLOR UR: NORMAL
CREAT SERPL-MCNC: 0.93 MG/DL (ref 0.6–1.3)
D DIMER PPP FEU-MCNC: 0.55 UG/ML FEU
EOSINOPHIL # BLD AUTO: 0.14 THOUSAND/ΜL (ref 0–0.61)
EOSINOPHIL NFR BLD AUTO: 2 % (ref 0–6)
ERYTHROCYTE [DISTWIDTH] IN BLOOD BY AUTOMATED COUNT: 17 % (ref 11.6–15.1)
GFR SERPL CREATININE-BSD FRML MDRD: 74 ML/MIN/1.73SQ M
GLUCOSE SERPL-MCNC: 99 MG/DL (ref 65–140)
GLUCOSE UR STRIP-MCNC: NEGATIVE MG/DL
HCT VFR BLD AUTO: 38.7 % (ref 34.8–46.1)
HGB BLD-MCNC: 12.5 G/DL (ref 11.5–15.4)
HGB UR QL STRIP.AUTO: NEGATIVE
IMM GRANULOCYTES # BLD AUTO: 0.14 THOUSAND/UL (ref 0–0.2)
IMM GRANULOCYTES NFR BLD AUTO: 2 % (ref 0–2)
KETONES UR STRIP-MCNC: NEGATIVE MG/DL
LEUKOCYTE ESTERASE UR QL STRIP: NEGATIVE
LYMPHOCYTES # BLD AUTO: 2.71 THOUSANDS/ΜL (ref 0.6–4.47)
LYMPHOCYTES NFR BLD AUTO: 35 % (ref 14–44)
MAGNESIUM SERPL-MCNC: 1.8 MG/DL (ref 1.6–2.6)
MCH RBC QN AUTO: 29.8 PG (ref 26.8–34.3)
MCHC RBC AUTO-ENTMCNC: 32.3 G/DL (ref 31.4–37.4)
MCV RBC AUTO: 92 FL (ref 82–98)
MONOCYTES # BLD AUTO: 0.77 THOUSAND/ΜL (ref 0.17–1.22)
MONOCYTES NFR BLD AUTO: 10 % (ref 4–12)
NEUTROPHILS # BLD AUTO: 4.01 THOUSANDS/ΜL (ref 1.85–7.62)
NEUTS SEG NFR BLD AUTO: 50 % (ref 43–75)
NITRITE UR QL STRIP: NEGATIVE
NRBC BLD AUTO-RTO: 0 /100 WBCS
PH UR STRIP.AUTO: 5 [PH]
PLATELET # BLD AUTO: 187 THOUSANDS/UL (ref 149–390)
PMV BLD AUTO: 9.8 FL (ref 8.9–12.7)
POTASSIUM SERPL-SCNC: 4 MMOL/L (ref 3.5–5.3)
PROT SERPL-MCNC: 7.3 G/DL (ref 6.4–8.2)
PROT UR STRIP-MCNC: NEGATIVE MG/DL
RBC # BLD AUTO: 4.2 MILLION/UL (ref 3.81–5.12)
SARS-COV-2 RNA RESP QL NAA+PROBE: NEGATIVE
SODIUM SERPL-SCNC: 141 MMOL/L (ref 136–145)
SP GR UR STRIP.AUTO: 1.01 (ref 1–1.03)
TROPONIN I SERPL-MCNC: <0.02 NG/ML
UROBILINOGEN UR QL STRIP.AUTO: 0.2 E.U./DL
WBC # BLD AUTO: 7.82 THOUSAND/UL (ref 4.31–10.16)

## 2021-09-26 PROCEDURE — 80053 COMPREHEN METABOLIC PANEL: CPT | Performed by: EMERGENCY MEDICINE

## 2021-09-26 PROCEDURE — 99284 EMERGENCY DEPT VISIT MOD MDM: CPT

## 2021-09-26 PROCEDURE — 81003 URINALYSIS AUTO W/O SCOPE: CPT | Performed by: EMERGENCY MEDICINE

## 2021-09-26 PROCEDURE — 36415 COLL VENOUS BLD VENIPUNCTURE: CPT | Performed by: EMERGENCY MEDICINE

## 2021-09-26 PROCEDURE — U0003 INFECTIOUS AGENT DETECTION BY NUCLEIC ACID (DNA OR RNA); SEVERE ACUTE RESPIRATORY SYNDROME CORONAVIRUS 2 (SARS-COV-2) (CORONAVIRUS DISEASE [COVID-19]), AMPLIFIED PROBE TECHNIQUE, MAKING USE OF HIGH THROUGHPUT TECHNOLOGIES AS DESCRIBED BY CMS-2020-01-R: HCPCS | Performed by: EMERGENCY MEDICINE

## 2021-09-26 PROCEDURE — 99285 EMERGENCY DEPT VISIT HI MDM: CPT | Performed by: EMERGENCY MEDICINE

## 2021-09-26 PROCEDURE — G1004 CDSM NDSC: HCPCS

## 2021-09-26 PROCEDURE — 85379 FIBRIN DEGRADATION QUANT: CPT | Performed by: EMERGENCY MEDICINE

## 2021-09-26 PROCEDURE — 96365 THER/PROPH/DIAG IV INF INIT: CPT

## 2021-09-26 PROCEDURE — 71275 CT ANGIOGRAPHY CHEST: CPT

## 2021-09-26 PROCEDURE — 96361 HYDRATE IV INFUSION ADD-ON: CPT

## 2021-09-26 PROCEDURE — 83735 ASSAY OF MAGNESIUM: CPT | Performed by: EMERGENCY MEDICINE

## 2021-09-26 PROCEDURE — U0005 INFEC AGEN DETEC AMPLI PROBE: HCPCS | Performed by: EMERGENCY MEDICINE

## 2021-09-26 PROCEDURE — 85025 COMPLETE CBC W/AUTO DIFF WBC: CPT | Performed by: EMERGENCY MEDICINE

## 2021-09-26 PROCEDURE — 84484 ASSAY OF TROPONIN QUANT: CPT | Performed by: EMERGENCY MEDICINE

## 2021-09-26 PROCEDURE — 82330 ASSAY OF CALCIUM: CPT | Performed by: EMERGENCY MEDICINE

## 2021-09-26 RX ORDER — AZITHROMYCIN 250 MG/1
TABLET, FILM COATED ORAL
Qty: 6 TABLET | Refills: 0 | Status: SHIPPED | OUTPATIENT
Start: 2021-09-26 | End: 2021-09-30

## 2021-09-26 RX ORDER — ALBUTEROL SULFATE 90 UG/1
2 AEROSOL, METERED RESPIRATORY (INHALATION) ONCE
Status: COMPLETED | OUTPATIENT
Start: 2021-09-26 | End: 2021-09-26

## 2021-09-26 RX ORDER — AZITHROMYCIN 250 MG/1
500 TABLET, FILM COATED ORAL ONCE
Status: COMPLETED | OUTPATIENT
Start: 2021-09-26 | End: 2021-09-26

## 2021-09-26 RX ORDER — CALCIUM GLUCONATE 20 MG/ML
1 INJECTION, SOLUTION INTRAVENOUS ONCE
Status: COMPLETED | OUTPATIENT
Start: 2021-09-26 | End: 2021-09-26

## 2021-09-26 RX ORDER — ALBUTEROL SULFATE 90 UG/1
2 AEROSOL, METERED RESPIRATORY (INHALATION) EVERY 4 HOURS PRN
Qty: 18 G | Refills: 0 | Status: SHIPPED | OUTPATIENT
Start: 2021-09-26 | End: 2022-05-30

## 2021-09-26 RX ORDER — PREDNISONE 50 MG/1
50 TABLET ORAL DAILY
Qty: 5 TABLET | Refills: 0 | Status: SHIPPED | OUTPATIENT
Start: 2021-09-26 | End: 2021-10-01

## 2021-09-26 RX ADMIN — ALBUTEROL SULFATE 2 PUFF: 90 AEROSOL, METERED RESPIRATORY (INHALATION) at 19:30

## 2021-09-26 RX ADMIN — IOHEXOL 85 ML: 350 INJECTION, SOLUTION INTRAVENOUS at 16:04

## 2021-09-26 RX ADMIN — AZITHROMYCIN MONOHYDRATE 500 MG: 250 TABLET ORAL at 17:12

## 2021-09-26 RX ADMIN — CALCIUM GLUCONATE 1 G: 20 INJECTION, SOLUTION INTRAVENOUS at 15:32

## 2021-09-26 RX ADMIN — SODIUM CHLORIDE 1000 ML: 0.9 INJECTION, SOLUTION INTRAVENOUS at 14:55

## 2021-09-26 RX ADMIN — PREDNISONE 50 MG: 20 TABLET ORAL at 19:30

## 2021-09-28 ENCOUNTER — APPOINTMENT (OUTPATIENT)
Dept: LAB | Facility: HOSPITAL | Age: 46
End: 2021-09-28
Attending: INTERNAL MEDICINE
Payer: COMMERCIAL

## 2021-09-28 DIAGNOSIS — Z20.828 SARS-ASSOCIATED CORONAVIRUS EXPOSURE: ICD-10-CM

## 2021-09-28 LAB — SARS-COV-2 IGG+IGM SERPL QL IA: NORMAL

## 2021-09-28 PROCEDURE — 36415 COLL VENOUS BLD VENIPUNCTURE: CPT

## 2021-09-28 PROCEDURE — 86769 SARS-COV-2 COVID-19 ANTIBODY: CPT

## 2021-10-12 PROCEDURE — U0003 INFECTIOUS AGENT DETECTION BY NUCLEIC ACID (DNA OR RNA); SEVERE ACUTE RESPIRATORY SYNDROME CORONAVIRUS 2 (SARS-COV-2) (CORONAVIRUS DISEASE [COVID-19]), AMPLIFIED PROBE TECHNIQUE, MAKING USE OF HIGH THROUGHPUT TECHNOLOGIES AS DESCRIBED BY CMS-2020-01-R: HCPCS | Performed by: INTERNAL MEDICINE

## 2021-10-12 PROCEDURE — U0005 INFEC AGEN DETEC AMPLI PROBE: HCPCS | Performed by: INTERNAL MEDICINE

## 2021-10-17 PROCEDURE — U0003 INFECTIOUS AGENT DETECTION BY NUCLEIC ACID (DNA OR RNA); SEVERE ACUTE RESPIRATORY SYNDROME CORONAVIRUS 2 (SARS-COV-2) (CORONAVIRUS DISEASE [COVID-19]), AMPLIFIED PROBE TECHNIQUE, MAKING USE OF HIGH THROUGHPUT TECHNOLOGIES AS DESCRIBED BY CMS-2020-01-R: HCPCS | Performed by: INTERNAL MEDICINE

## 2021-10-17 PROCEDURE — U0005 INFEC AGEN DETEC AMPLI PROBE: HCPCS | Performed by: INTERNAL MEDICINE

## 2021-10-18 DIAGNOSIS — E83.51 HYPOCALCEMIA: ICD-10-CM

## 2021-10-26 PROCEDURE — U0005 INFEC AGEN DETEC AMPLI PROBE: HCPCS | Performed by: INTERNAL MEDICINE

## 2021-10-26 PROCEDURE — U0003 INFECTIOUS AGENT DETECTION BY NUCLEIC ACID (DNA OR RNA); SEVERE ACUTE RESPIRATORY SYNDROME CORONAVIRUS 2 (SARS-COV-2) (CORONAVIRUS DISEASE [COVID-19]), AMPLIFIED PROBE TECHNIQUE, MAKING USE OF HIGH THROUGHPUT TECHNOLOGIES AS DESCRIBED BY CMS-2020-01-R: HCPCS | Performed by: INTERNAL MEDICINE

## 2021-10-29 DIAGNOSIS — E83.51 HYPOCALCEMIA: Primary | ICD-10-CM

## 2021-10-29 PROCEDURE — U0005 INFEC AGEN DETEC AMPLI PROBE: HCPCS | Performed by: INTERNAL MEDICINE

## 2021-10-29 PROCEDURE — U0003 INFECTIOUS AGENT DETECTION BY NUCLEIC ACID (DNA OR RNA); SEVERE ACUTE RESPIRATORY SYNDROME CORONAVIRUS 2 (SARS-COV-2) (CORONAVIRUS DISEASE [COVID-19]), AMPLIFIED PROBE TECHNIQUE, MAKING USE OF HIGH THROUGHPUT TECHNOLOGIES AS DESCRIBED BY CMS-2020-01-R: HCPCS | Performed by: INTERNAL MEDICINE

## 2021-10-29 RX ORDER — MULTIVIT-MIN/IRON/FOLIC ACID/K 18-600-40
CAPSULE ORAL
Qty: 360 TABLET | Refills: 3 | Status: ON HOLD | OUTPATIENT
Start: 2021-10-29 | End: 2022-06-01 | Stop reason: SDUPTHER

## 2021-11-05 PROCEDURE — U0005 INFEC AGEN DETEC AMPLI PROBE: HCPCS | Performed by: INTERNAL MEDICINE

## 2021-11-05 PROCEDURE — U0003 INFECTIOUS AGENT DETECTION BY NUCLEIC ACID (DNA OR RNA); SEVERE ACUTE RESPIRATORY SYNDROME CORONAVIRUS 2 (SARS-COV-2) (CORONAVIRUS DISEASE [COVID-19]), AMPLIFIED PROBE TECHNIQUE, MAKING USE OF HIGH THROUGHPUT TECHNOLOGIES AS DESCRIBED BY CMS-2020-01-R: HCPCS | Performed by: INTERNAL MEDICINE

## 2021-11-12 PROCEDURE — U0003 INFECTIOUS AGENT DETECTION BY NUCLEIC ACID (DNA OR RNA); SEVERE ACUTE RESPIRATORY SYNDROME CORONAVIRUS 2 (SARS-COV-2) (CORONAVIRUS DISEASE [COVID-19]), AMPLIFIED PROBE TECHNIQUE, MAKING USE OF HIGH THROUGHPUT TECHNOLOGIES AS DESCRIBED BY CMS-2020-01-R: HCPCS | Performed by: INTERNAL MEDICINE

## 2021-11-12 PROCEDURE — U0005 INFEC AGEN DETEC AMPLI PROBE: HCPCS | Performed by: INTERNAL MEDICINE

## 2021-11-15 DIAGNOSIS — E83.51 HYPOCALCEMIA: ICD-10-CM

## 2021-11-15 RX ORDER — LANOLIN ALCOHOL/MO/W.PET/CERES
CREAM (GRAM) TOPICAL
Qty: 90 TABLET | Refills: 3 | Status: SHIPPED | OUTPATIENT
Start: 2021-11-15 | End: 2021-12-20

## 2021-11-18 ENCOUNTER — HOSPITAL ENCOUNTER (EMERGENCY)
Facility: HOSPITAL | Age: 46
Discharge: HOME | End: 2021-11-18
Attending: EMERGENCY MEDICINE
Payer: COMMERCIAL

## 2021-11-18 VITALS
OXYGEN SATURATION: 97 % | WEIGHT: 198 LBS | BODY MASS INDEX: 36.44 KG/M2 | RESPIRATION RATE: 16 BRPM | HEART RATE: 95 BPM | HEIGHT: 62 IN | DIASTOLIC BLOOD PRESSURE: 79 MMHG | TEMPERATURE: 97.2 F | SYSTOLIC BLOOD PRESSURE: 123 MMHG

## 2021-11-18 DIAGNOSIS — M54.30 SCIATIC PAIN: ICD-10-CM

## 2021-11-18 DIAGNOSIS — M54.9 BACK PAIN: Primary | ICD-10-CM

## 2021-11-18 LAB
ALBUMIN SERPL BCP-MCNC: 3.7 G/DL (ref 3.5–5)
ALP SERPL-CCNC: 86 U/L (ref 46–116)
ALT SERPL W P-5'-P-CCNC: 59 U/L (ref 12–78)
ANION GAP SERPL CALCULATED.3IONS-SCNC: 21 MMOL/L (ref 4–13)
AST SERPL W P-5'-P-CCNC: 38 U/L (ref 5–45)
BASOPHILS # BLD AUTO: 0.04 THOUSANDS/ΜL (ref 0–0.1)
BASOPHILS NFR BLD AUTO: 1 % (ref 0–1)
BILIRUB SERPL-MCNC: 0.34 MG/DL (ref 0.2–1)
BUN SERPL-MCNC: 13 MG/DL (ref 5–25)
CALCIUM SERPL-MCNC: 9.2 MG/DL (ref 8.3–10.1)
CHLORIDE SERPL-SCNC: 100 MMOL/L (ref 100–108)
CO2 SERPL-SCNC: 18 MMOL/L (ref 21–32)
CREAT SERPL-MCNC: 0.97 MG/DL (ref 0.6–1.3)
EOSINOPHIL # BLD AUTO: 0.12 THOUSAND/ΜL (ref 0–0.61)
EOSINOPHIL NFR BLD AUTO: 2 % (ref 0–6)
ERYTHROCYTE [DISTWIDTH] IN BLOOD BY AUTOMATED COUNT: 15.9 % (ref 11.6–15.1)
GFR SERPL CREATININE-BSD FRML MDRD: 71 ML/MIN/1.73SQ M
GLUCOSE SERPL-MCNC: 124 MG/DL (ref 65–140)
HCT VFR BLD AUTO: 40.3 % (ref 34.8–46.1)
HGB BLD-MCNC: 13.1 G/DL (ref 11.5–15.4)
IMM GRANULOCYTES # BLD AUTO: 0.02 THOUSAND/UL (ref 0–0.2)
IMM GRANULOCYTES NFR BLD AUTO: 0 % (ref 0–2)
LYMPHOCYTES # BLD AUTO: 1.83 THOUSANDS/ΜL (ref 0.6–4.47)
LYMPHOCYTES NFR BLD AUTO: 34 % (ref 14–44)
MCH RBC QN AUTO: 29.5 PG (ref 26.8–34.3)
MCHC RBC AUTO-ENTMCNC: 32.5 G/DL (ref 31.4–37.4)
MCV RBC AUTO: 91 FL (ref 82–98)
MONOCYTES # BLD AUTO: 0.46 THOUSAND/ΜL (ref 0.17–1.22)
MONOCYTES NFR BLD AUTO: 9 % (ref 4–12)
NEUTROPHILS # BLD AUTO: 2.94 THOUSANDS/ΜL (ref 1.85–7.62)
NEUTS SEG NFR BLD AUTO: 54 % (ref 43–75)
NRBC BLD AUTO-RTO: 0 /100 WBCS
PLATELET # BLD AUTO: 156 THOUSANDS/UL (ref 149–390)
PMV BLD AUTO: 9.7 FL (ref 8.9–12.7)
POTASSIUM SERPL-SCNC: 3.8 MMOL/L (ref 3.5–5.3)
PROT SERPL-MCNC: 7.6 G/DL (ref 6.4–8.2)
RBC # BLD AUTO: 4.44 MILLION/UL (ref 3.81–5.12)
SODIUM SERPL-SCNC: 139 MMOL/L (ref 136–145)
WBC # BLD AUTO: 5.41 THOUSAND/UL (ref 4.31–10.16)

## 2021-11-18 PROCEDURE — 36415 COLL VENOUS BLD VENIPUNCTURE: CPT | Performed by: EMERGENCY MEDICINE

## 2021-11-18 PROCEDURE — 99283 EMERGENCY DEPT VISIT LOW MDM: CPT

## 2021-11-18 PROCEDURE — 80053 COMPREHEN METABOLIC PANEL: CPT | Performed by: EMERGENCY MEDICINE

## 2021-11-18 PROCEDURE — 85025 COMPLETE CBC W/AUTO DIFF WBC: CPT | Performed by: EMERGENCY MEDICINE

## 2021-11-18 PROCEDURE — 99284 EMERGENCY DEPT VISIT MOD MDM: CPT | Performed by: EMERGENCY MEDICINE

## 2021-11-18 RX ORDER — BACLOFEN 20 MG/1
20 TABLET ORAL 3 TIMES DAILY
Qty: 30 TABLET | Refills: 0 | Status: SHIPPED | OUTPATIENT
Start: 2021-11-18

## 2021-11-18 RX ORDER — BACLOFEN 10 MG/1
20 TABLET ORAL ONCE
Status: COMPLETED | OUTPATIENT
Start: 2021-11-18 | End: 2021-11-18

## 2021-11-18 RX ORDER — KETOROLAC TROMETHAMINE 30 MG/ML
30 INJECTION, SOLUTION INTRAMUSCULAR; INTRAVENOUS ONCE
Status: DISCONTINUED | OUTPATIENT
Start: 2021-11-18 | End: 2021-11-18 | Stop reason: HOSPADM

## 2021-11-18 RX ORDER — PREDNISONE 20 MG/1
20 TABLET ORAL 2 TIMES DAILY
Qty: 20 TABLET | Refills: 0 | Status: SHIPPED | OUTPATIENT
Start: 2021-11-18 | End: 2021-11-28

## 2021-11-18 RX ADMIN — PREDNISONE 50 MG: 20 TABLET ORAL at 18:08

## 2021-11-18 RX ADMIN — BACLOFEN 20 MG: 10 TABLET ORAL at 18:04

## 2021-11-19 PROCEDURE — U0003 INFECTIOUS AGENT DETECTION BY NUCLEIC ACID (DNA OR RNA); SEVERE ACUTE RESPIRATORY SYNDROME CORONAVIRUS 2 (SARS-COV-2) (CORONAVIRUS DISEASE [COVID-19]), AMPLIFIED PROBE TECHNIQUE, MAKING USE OF HIGH THROUGHPUT TECHNOLOGIES AS DESCRIBED BY CMS-2020-01-R: HCPCS | Performed by: INTERNAL MEDICINE

## 2021-11-19 PROCEDURE — U0005 INFEC AGEN DETEC AMPLI PROBE: HCPCS | Performed by: INTERNAL MEDICINE

## 2021-11-26 PROCEDURE — U0005 INFEC AGEN DETEC AMPLI PROBE: HCPCS | Performed by: INTERNAL MEDICINE

## 2021-11-26 PROCEDURE — U0003 INFECTIOUS AGENT DETECTION BY NUCLEIC ACID (DNA OR RNA); SEVERE ACUTE RESPIRATORY SYNDROME CORONAVIRUS 2 (SARS-COV-2) (CORONAVIRUS DISEASE [COVID-19]), AMPLIFIED PROBE TECHNIQUE, MAKING USE OF HIGH THROUGHPUT TECHNOLOGIES AS DESCRIBED BY CMS-2020-01-R: HCPCS | Performed by: INTERNAL MEDICINE

## 2021-11-29 DIAGNOSIS — E83.51 HYPOCALCEMIA: ICD-10-CM

## 2021-11-29 RX ORDER — CALCITRIOL 0.25 UG/1
CAPSULE, LIQUID FILLED ORAL
Qty: 90 CAPSULE | Refills: 0 | Status: SHIPPED | OUTPATIENT
Start: 2021-11-29 | End: 2022-02-28

## 2021-12-05 ENCOUNTER — NURSE TRIAGE (OUTPATIENT)
Dept: OTHER | Facility: OTHER | Age: 46
End: 2021-12-05

## 2021-12-05 DIAGNOSIS — Z11.59 SPECIAL SCREENING EXAMINATION FOR VIRAL DISEASE: Primary | ICD-10-CM

## 2021-12-05 PROCEDURE — U0003 INFECTIOUS AGENT DETECTION BY NUCLEIC ACID (DNA OR RNA); SEVERE ACUTE RESPIRATORY SYNDROME CORONAVIRUS 2 (SARS-COV-2) (CORONAVIRUS DISEASE [COVID-19]), AMPLIFIED PROBE TECHNIQUE, MAKING USE OF HIGH THROUGHPUT TECHNOLOGIES AS DESCRIBED BY CMS-2020-01-R: HCPCS | Performed by: FAMILY MEDICINE

## 2021-12-05 PROCEDURE — U0005 INFEC AGEN DETEC AMPLI PROBE: HCPCS | Performed by: FAMILY MEDICINE

## 2021-12-06 ENCOUNTER — TELEPHONE (OUTPATIENT)
Dept: OTHER | Facility: OTHER | Age: 46
End: 2021-12-06

## 2021-12-15 PROCEDURE — U0003 INFECTIOUS AGENT DETECTION BY NUCLEIC ACID (DNA OR RNA); SEVERE ACUTE RESPIRATORY SYNDROME CORONAVIRUS 2 (SARS-COV-2) (CORONAVIRUS DISEASE [COVID-19]), AMPLIFIED PROBE TECHNIQUE, MAKING USE OF HIGH THROUGHPUT TECHNOLOGIES AS DESCRIBED BY CMS-2020-01-R: HCPCS | Performed by: INTERNAL MEDICINE

## 2021-12-15 PROCEDURE — U0005 INFEC AGEN DETEC AMPLI PROBE: HCPCS | Performed by: INTERNAL MEDICINE

## 2021-12-19 ENCOUNTER — HOSPITAL ENCOUNTER (EMERGENCY)
Facility: HOSPITAL | Age: 46
Discharge: HOME | End: 2021-12-19
Attending: EMERGENCY MEDICINE
Payer: COMMERCIAL

## 2021-12-19 ENCOUNTER — APPOINTMENT (EMERGENCY)
Dept: RADIOLOGY | Facility: HOSPITAL | Age: 46
End: 2021-12-19
Payer: COMMERCIAL

## 2021-12-19 VITALS
DIASTOLIC BLOOD PRESSURE: 75 MMHG | TEMPERATURE: 97 F | OXYGEN SATURATION: 95 % | SYSTOLIC BLOOD PRESSURE: 126 MMHG | RESPIRATION RATE: 16 BRPM | HEART RATE: 78 BPM

## 2021-12-19 DIAGNOSIS — Z20.822 COVID-19 RULED OUT BY LABORATORY TESTING: ICD-10-CM

## 2021-12-19 DIAGNOSIS — B34.9 VIRAL SYNDROME: Primary | ICD-10-CM

## 2021-12-19 DIAGNOSIS — E83.51 HYPOCALCEMIA: ICD-10-CM

## 2021-12-19 LAB
ALBUMIN SERPL BCP-MCNC: 3.8 G/DL (ref 3.5–5)
ALP SERPL-CCNC: 89 U/L (ref 46–116)
ALT SERPL W P-5'-P-CCNC: 84 U/L (ref 12–78)
ANION GAP SERPL CALCULATED.3IONS-SCNC: 16 MMOL/L (ref 4–13)
AST SERPL W P-5'-P-CCNC: 55 U/L (ref 5–45)
BASOPHILS # BLD AUTO: 0.04 THOUSANDS/ΜL (ref 0–0.1)
BASOPHILS NFR BLD AUTO: 1 % (ref 0–1)
BILIRUB SERPL-MCNC: 0.41 MG/DL (ref 0.2–1)
BILIRUB UR QL STRIP: NEGATIVE
BUN SERPL-MCNC: 13 MG/DL (ref 5–25)
CA-I BLD-SCNC: 1.11 MMOL/L (ref 1.12–1.32)
CALCIUM SERPL-MCNC: 8.8 MG/DL (ref 8.3–10.1)
CHLORIDE SERPL-SCNC: 96 MMOL/L (ref 100–108)
CLARITY UR: NORMAL
CO2 SERPL-SCNC: 22 MMOL/L (ref 21–32)
COLOR UR: YELLOW
CREAT SERPL-MCNC: 1.09 MG/DL (ref 0.6–1.3)
EOSINOPHIL # BLD AUTO: 0.06 THOUSAND/ΜL (ref 0–0.61)
EOSINOPHIL NFR BLD AUTO: 1 % (ref 0–6)
ERYTHROCYTE [DISTWIDTH] IN BLOOD BY AUTOMATED COUNT: 16.6 % (ref 11.6–15.1)
GFR SERPL CREATININE-BSD FRML MDRD: 61 ML/MIN/1.73SQ M
GLUCOSE SERPL-MCNC: 153 MG/DL (ref 65–140)
GLUCOSE UR STRIP-MCNC: NEGATIVE MG/DL
HCT VFR BLD AUTO: 44.2 % (ref 34.8–46.1)
HGB BLD-MCNC: 14.2 G/DL (ref 11.5–15.4)
HGB UR QL STRIP.AUTO: NEGATIVE
IMM GRANULOCYTES # BLD AUTO: 0.02 THOUSAND/UL (ref 0–0.2)
IMM GRANULOCYTES NFR BLD AUTO: 0 % (ref 0–2)
KETONES UR STRIP-MCNC: NEGATIVE MG/DL
LEUKOCYTE ESTERASE UR QL STRIP: NEGATIVE
LYMPHOCYTES # BLD AUTO: 0.89 THOUSANDS/ΜL (ref 0.6–4.47)
LYMPHOCYTES NFR BLD AUTO: 18 % (ref 14–44)
MAGNESIUM SERPL-MCNC: 2.1 MG/DL (ref 1.6–2.6)
MCH RBC QN AUTO: 28.7 PG (ref 26.8–34.3)
MCHC RBC AUTO-ENTMCNC: 32.1 G/DL (ref 31.4–37.4)
MCV RBC AUTO: 89 FL (ref 82–98)
MONOCYTES # BLD AUTO: 0.52 THOUSAND/ΜL (ref 0.17–1.22)
MONOCYTES NFR BLD AUTO: 11 % (ref 4–12)
NEUTROPHILS # BLD AUTO: 3.35 THOUSANDS/ΜL (ref 1.85–7.62)
NEUTS SEG NFR BLD AUTO: 69 % (ref 43–75)
NITRITE UR QL STRIP: NEGATIVE
NRBC BLD AUTO-RTO: 0 /100 WBCS
PH UR STRIP.AUTO: 6 [PH]
PLATELET # BLD AUTO: 148 THOUSANDS/UL (ref 149–390)
PMV BLD AUTO: 10.2 FL (ref 8.9–12.7)
POTASSIUM SERPL-SCNC: 3.4 MMOL/L (ref 3.5–5.3)
PROT SERPL-MCNC: 8 G/DL (ref 6.4–8.2)
PROT UR STRIP-MCNC: NEGATIVE MG/DL
RBC # BLD AUTO: 4.95 MILLION/UL (ref 3.81–5.12)
SODIUM SERPL-SCNC: 134 MMOL/L (ref 136–145)
SP GR UR STRIP.AUTO: >=1.03 (ref 1–1.03)
UROBILINOGEN UR QL STRIP.AUTO: 0.2 E.U./DL
WBC # BLD AUTO: 4.88 THOUSAND/UL (ref 4.31–10.16)

## 2021-12-19 PROCEDURE — 83735 ASSAY OF MAGNESIUM: CPT | Performed by: EMERGENCY MEDICINE

## 2021-12-19 PROCEDURE — 99284 EMERGENCY DEPT VISIT MOD MDM: CPT

## 2021-12-19 PROCEDURE — 71045 X-RAY EXAM CHEST 1 VIEW: CPT

## 2021-12-19 PROCEDURE — 80053 COMPREHEN METABOLIC PANEL: CPT | Performed by: EMERGENCY MEDICINE

## 2021-12-19 PROCEDURE — 36415 COLL VENOUS BLD VENIPUNCTURE: CPT | Performed by: EMERGENCY MEDICINE

## 2021-12-19 PROCEDURE — 96361 HYDRATE IV INFUSION ADD-ON: CPT

## 2021-12-19 PROCEDURE — 99284 EMERGENCY DEPT VISIT MOD MDM: CPT | Performed by: EMERGENCY MEDICINE

## 2021-12-19 PROCEDURE — 87636 SARSCOV2 & INF A&B AMP PRB: CPT | Performed by: EMERGENCY MEDICINE

## 2021-12-19 PROCEDURE — 85025 COMPLETE CBC W/AUTO DIFF WBC: CPT | Performed by: EMERGENCY MEDICINE

## 2021-12-19 PROCEDURE — 82330 ASSAY OF CALCIUM: CPT | Performed by: EMERGENCY MEDICINE

## 2021-12-19 PROCEDURE — 81003 URINALYSIS AUTO W/O SCOPE: CPT | Performed by: EMERGENCY MEDICINE

## 2021-12-19 PROCEDURE — 96365 THER/PROPH/DIAG IV INF INIT: CPT

## 2021-12-19 RX ORDER — ONDANSETRON 4 MG/1
4 TABLET, ORALLY DISINTEGRATING ORAL EVERY 6 HOURS PRN
Qty: 12 TABLET | Refills: 0 | Status: SHIPPED | OUTPATIENT
Start: 2021-12-19 | End: 2022-06-01

## 2021-12-19 RX ORDER — CALCIUM GLUCONATE 20 MG/ML
1 INJECTION, SOLUTION INTRAVENOUS ONCE
Status: COMPLETED | OUTPATIENT
Start: 2021-12-19 | End: 2021-12-19

## 2021-12-19 RX ORDER — ONDANSETRON 4 MG/1
4 TABLET, ORALLY DISINTEGRATING ORAL ONCE
Status: COMPLETED | OUTPATIENT
Start: 2021-12-19 | End: 2021-12-19

## 2021-12-19 RX ORDER — CALCIUM GLUCONATE 20 MG/ML
2 INJECTION, SOLUTION INTRAVENOUS ONCE
Status: DISCONTINUED | OUTPATIENT
Start: 2021-12-19 | End: 2021-12-19

## 2021-12-19 RX ORDER — ALPRAZOLAM 0.5 MG/1
0.5 TABLET ORAL ONCE
Status: COMPLETED | OUTPATIENT
Start: 2021-12-19 | End: 2021-12-19

## 2021-12-19 RX ADMIN — CALCIUM GLUCONATE 1 G: 20 INJECTION, SOLUTION INTRAVENOUS at 21:15

## 2021-12-19 RX ADMIN — ONDANSETRON 4 MG: 4 TABLET, ORALLY DISINTEGRATING ORAL at 23:14

## 2021-12-19 RX ADMIN — SODIUM CHLORIDE 1000 ML: 0.9 INJECTION, SOLUTION INTRAVENOUS at 20:19

## 2021-12-19 RX ADMIN — ALPRAZOLAM 0.5 MG: 0.5 TABLET ORAL at 23:25

## 2021-12-20 DIAGNOSIS — E83.51 HYPOCALCEMIA: ICD-10-CM

## 2021-12-20 LAB
FLUAV RNA RESP QL NAA+PROBE: NEGATIVE
FLUBV RNA RESP QL NAA+PROBE: NEGATIVE
SARS-COV-2 RNA RESP QL NAA+PROBE: POSITIVE

## 2021-12-20 RX ORDER — LANOLIN ALCOHOL/MO/W.PET/CERES
CREAM (GRAM) TOPICAL
Qty: 90 TABLET | Refills: 3 | Status: SHIPPED | OUTPATIENT
Start: 2021-12-20

## 2021-12-21 ENCOUNTER — TELEPHONE (OUTPATIENT)
Dept: FAMILY MEDICINE CLINIC | Facility: CLINIC | Age: 46
End: 2021-12-21

## 2021-12-21 RX ORDER — ACETAMINOPHEN 325 MG/1
650 TABLET ORAL ONCE AS NEEDED
Status: DISCONTINUED | OUTPATIENT
Start: 2021-12-22 | End: 2021-12-25 | Stop reason: HOSPADM

## 2021-12-21 RX ORDER — ALBUTEROL SULFATE 90 UG/1
3 AEROSOL, METERED RESPIRATORY (INHALATION) ONCE AS NEEDED
Status: DISCONTINUED | OUTPATIENT
Start: 2021-12-22 | End: 2021-12-25 | Stop reason: HOSPADM

## 2021-12-21 RX ORDER — SODIUM CHLORIDE 9 MG/ML
20 INJECTION, SOLUTION INTRAVENOUS CONTINUOUS
Status: DISCONTINUED | OUTPATIENT
Start: 2021-12-22 | End: 2021-12-25 | Stop reason: HOSPADM

## 2021-12-21 RX ORDER — ONDANSETRON 2 MG/ML
4 INJECTION INTRAMUSCULAR; INTRAVENOUS ONCE AS NEEDED
Status: DISCONTINUED | OUTPATIENT
Start: 2021-12-22 | End: 2021-12-25 | Stop reason: HOSPADM

## 2021-12-22 ENCOUNTER — HOSPITAL ENCOUNTER (OUTPATIENT)
Dept: INFUSION CENTER | Facility: HOSPITAL | Age: 46
Discharge: HOME | End: 2021-12-22
Payer: COMMERCIAL

## 2021-12-22 VITALS
OXYGEN SATURATION: 98 % | HEART RATE: 80 BPM | TEMPERATURE: 97.3 F | RESPIRATION RATE: 18 BRPM | DIASTOLIC BLOOD PRESSURE: 77 MMHG | SYSTOLIC BLOOD PRESSURE: 106 MMHG

## 2021-12-22 PROCEDURE — M0245 HB BAMLAN AND ETESEV INF ADMIN: HCPCS | Performed by: INTERNAL MEDICINE

## 2021-12-22 RX ADMIN — SODIUM CHLORIDE 20 ML/HR: 9 INJECTION, SOLUTION INTRAVENOUS at 09:32

## 2021-12-22 RX ADMIN — SODIUM CHLORIDE 2100 MG COMBINED: 9 INJECTION, SOLUTION INTRAVENOUS at 09:33

## 2021-12-24 PROCEDURE — 99285 EMERGENCY DEPT VISIT HI MDM: CPT

## 2021-12-25 ENCOUNTER — APPOINTMENT (EMERGENCY)
Dept: RADIOLOGY | Facility: HOSPITAL | Age: 46
End: 2021-12-25
Payer: COMMERCIAL

## 2021-12-25 ENCOUNTER — HOSPITAL ENCOUNTER (EMERGENCY)
Facility: HOSPITAL | Age: 46
Discharge: HOME | End: 2021-12-25
Attending: EMERGENCY MEDICINE | Admitting: EMERGENCY MEDICINE
Payer: COMMERCIAL

## 2021-12-25 VITALS
OXYGEN SATURATION: 97 % | DIASTOLIC BLOOD PRESSURE: 94 MMHG | HEART RATE: 100 BPM | RESPIRATION RATE: 20 BRPM | WEIGHT: 198 LBS | SYSTOLIC BLOOD PRESSURE: 156 MMHG | HEIGHT: 62 IN | BODY MASS INDEX: 36.44 KG/M2

## 2021-12-25 DIAGNOSIS — R07.9 CHEST PAIN: Primary | ICD-10-CM

## 2021-12-25 DIAGNOSIS — E83.51 HYPOCALCEMIA: ICD-10-CM

## 2021-12-25 LAB
ALBUMIN SERPL BCP-MCNC: 3.8 G/DL (ref 3.5–5)
ALP SERPL-CCNC: 79 U/L (ref 46–116)
ALT SERPL W P-5'-P-CCNC: 96 U/L (ref 12–78)
ANION GAP SERPL CALCULATED.3IONS-SCNC: 10 MMOL/L (ref 4–13)
AST SERPL W P-5'-P-CCNC: 42 U/L (ref 5–45)
ATRIAL RATE: 82 BPM
BASOPHILS # BLD AUTO: 0.03 THOUSANDS/ΜL (ref 0–0.1)
BASOPHILS NFR BLD AUTO: 0 % (ref 0–1)
BILIRUB SERPL-MCNC: 0.53 MG/DL (ref 0.2–1)
BUN SERPL-MCNC: 18 MG/DL (ref 5–25)
CA-I BLD-SCNC: 1.04 MMOL/L (ref 1.12–1.32)
CALCIUM SERPL-MCNC: 8.4 MG/DL (ref 8.3–10.1)
CARDIAC TROPONIN I PNL SERPL HS: 2 NG/L
CHLORIDE SERPL-SCNC: 99 MMOL/L (ref 100–108)
CO2 SERPL-SCNC: 26 MMOL/L (ref 21–32)
CREAT SERPL-MCNC: 0.97 MG/DL (ref 0.6–1.3)
EOSINOPHIL # BLD AUTO: 0.11 THOUSAND/ΜL (ref 0–0.61)
EOSINOPHIL NFR BLD AUTO: 2 % (ref 0–6)
ERYTHROCYTE [DISTWIDTH] IN BLOOD BY AUTOMATED COUNT: 16.6 % (ref 11.6–15.1)
GFR SERPL CREATININE-BSD FRML MDRD: 70 ML/MIN/1.73SQ M
GLUCOSE SERPL-MCNC: 149 MG/DL (ref 65–140)
HCT VFR BLD AUTO: 44.3 % (ref 34.8–46.1)
HGB BLD-MCNC: 14.3 G/DL (ref 11.5–15.4)
IMM GRANULOCYTES # BLD AUTO: 0.01 THOUSAND/UL (ref 0–0.2)
IMM GRANULOCYTES NFR BLD AUTO: 0 % (ref 0–2)
LYMPHOCYTES # BLD AUTO: 2.02 THOUSANDS/ΜL (ref 0.6–4.47)
LYMPHOCYTES NFR BLD AUTO: 29 % (ref 14–44)
MCH RBC QN AUTO: 28.4 PG (ref 26.8–34.3)
MCHC RBC AUTO-ENTMCNC: 32.3 G/DL (ref 31.4–37.4)
MCV RBC AUTO: 88 FL (ref 82–98)
MONOCYTES # BLD AUTO: 0.39 THOUSAND/ΜL (ref 0.17–1.22)
MONOCYTES NFR BLD AUTO: 6 % (ref 4–12)
NEUTROPHILS # BLD AUTO: 4.41 THOUSANDS/ΜL (ref 1.85–7.62)
NEUTS SEG NFR BLD AUTO: 63 % (ref 43–75)
NRBC BLD AUTO-RTO: 0 /100 WBCS
P AXIS: 37 DEGREES
PLATELET # BLD AUTO: 136 THOUSANDS/UL (ref 149–390)
PMV BLD AUTO: 10 FL (ref 8.9–12.7)
POTASSIUM SERPL-SCNC: 3.7 MMOL/L (ref 3.5–5.3)
PR INTERVAL: 180 MS
PROT SERPL-MCNC: 7.8 G/DL (ref 6.4–8.2)
QRS AXIS: 24 DEGREES
QRSD INTERVAL: 74 MS
QT INTERVAL: 370 MS
QTC INTERVAL: 432 MS
RBC # BLD AUTO: 5.04 MILLION/UL (ref 3.81–5.12)
SODIUM SERPL-SCNC: 135 MMOL/L (ref 136–145)
T WAVE AXIS: 45 DEGREES
VENTRICULAR RATE: 82 BPM
WBC # BLD AUTO: 6.97 THOUSAND/UL (ref 4.31–10.16)

## 2021-12-25 PROCEDURE — 93005 ELECTROCARDIOGRAM TRACING: CPT

## 2021-12-25 PROCEDURE — 85025 COMPLETE CBC W/AUTO DIFF WBC: CPT | Performed by: EMERGENCY MEDICINE

## 2021-12-25 PROCEDURE — 82330 ASSAY OF CALCIUM: CPT | Performed by: EMERGENCY MEDICINE

## 2021-12-25 PROCEDURE — 93010 ELECTROCARDIOGRAM REPORT: CPT | Performed by: INTERNAL MEDICINE

## 2021-12-25 PROCEDURE — 96365 THER/PROPH/DIAG IV INF INIT: CPT

## 2021-12-25 PROCEDURE — 36415 COLL VENOUS BLD VENIPUNCTURE: CPT | Performed by: EMERGENCY MEDICINE

## 2021-12-25 PROCEDURE — 84484 ASSAY OF TROPONIN QUANT: CPT | Performed by: EMERGENCY MEDICINE

## 2021-12-25 PROCEDURE — 96361 HYDRATE IV INFUSION ADD-ON: CPT

## 2021-12-25 PROCEDURE — 99283 EMERGENCY DEPT VISIT LOW MDM: CPT | Performed by: EMERGENCY MEDICINE

## 2021-12-25 PROCEDURE — 80053 COMPREHEN METABOLIC PANEL: CPT | Performed by: EMERGENCY MEDICINE

## 2021-12-25 RX ORDER — CALCIUM GLUCONATE 20 MG/ML
1 INJECTION, SOLUTION INTRAVENOUS ONCE
Status: COMPLETED | OUTPATIENT
Start: 2021-12-25 | End: 2021-12-25

## 2021-12-25 RX ADMIN — CALCIUM GLUCONATE 1 G: 20 INJECTION, SOLUTION INTRAVENOUS at 03:17

## 2021-12-25 RX ADMIN — SODIUM CHLORIDE 1000 ML: 0.9 INJECTION, SOLUTION INTRAVENOUS at 02:25

## 2021-12-30 ENCOUNTER — HOSPITAL ENCOUNTER (EMERGENCY)
Facility: HOSPITAL | Age: 46
Discharge: HOME | End: 2021-12-31
Attending: EMERGENCY MEDICINE | Admitting: EMERGENCY MEDICINE
Payer: COMMERCIAL

## 2021-12-30 DIAGNOSIS — U07.1 COVID-19 VIRUS INFECTION: ICD-10-CM

## 2021-12-30 DIAGNOSIS — R11.0 NAUSEA: ICD-10-CM

## 2021-12-30 DIAGNOSIS — F41.9 ANXIETY: ICD-10-CM

## 2021-12-30 DIAGNOSIS — B34.9 VIRAL SYNDROME: Primary | ICD-10-CM

## 2021-12-30 PROCEDURE — 93005 ELECTROCARDIOGRAM TRACING: CPT

## 2021-12-30 PROCEDURE — 99285 EMERGENCY DEPT VISIT HI MDM: CPT

## 2021-12-31 ENCOUNTER — APPOINTMENT (EMERGENCY)
Dept: RADIOLOGY | Facility: HOSPITAL | Age: 46
End: 2021-12-31
Payer: COMMERCIAL

## 2021-12-31 VITALS
SYSTOLIC BLOOD PRESSURE: 116 MMHG | TEMPERATURE: 99 F | DIASTOLIC BLOOD PRESSURE: 79 MMHG | RESPIRATION RATE: 15 BRPM | HEART RATE: 97 BPM | OXYGEN SATURATION: 93 %

## 2021-12-31 LAB
ANION GAP SERPL CALCULATED.3IONS-SCNC: 15 MMOL/L (ref 4–13)
BASOPHILS # BLD AUTO: 0.04 THOUSANDS/ΜL (ref 0–0.1)
BASOPHILS NFR BLD AUTO: 1 % (ref 0–1)
BILIRUB UR QL STRIP: NEGATIVE
BUN SERPL-MCNC: 22 MG/DL (ref 5–25)
CA-I BLD-SCNC: 1.17 MMOL/L (ref 1.12–1.32)
CALCIUM SERPL-MCNC: 9.7 MG/DL (ref 8.3–10.1)
CARDIAC TROPONIN I PNL SERPL HS: 3 NG/L
CHLORIDE SERPL-SCNC: 99 MMOL/L (ref 100–108)
CLARITY UR: NORMAL
CO2 SERPL-SCNC: 20 MMOL/L (ref 21–32)
COLOR UR: NORMAL
CREAT SERPL-MCNC: 0.98 MG/DL (ref 0.6–1.3)
D DIMER PPP FEU-MCNC: 0.64 UG/ML FEU
EOSINOPHIL # BLD AUTO: 0.09 THOUSAND/ΜL (ref 0–0.61)
EOSINOPHIL NFR BLD AUTO: 1 % (ref 0–6)
ERYTHROCYTE [DISTWIDTH] IN BLOOD BY AUTOMATED COUNT: 16.2 % (ref 11.6–15.1)
EXT PREG TEST URINE: NEGATIVE
EXT. CONTROL ED NAV: NORMAL
GFR SERPL CREATININE-BSD FRML MDRD: 69 ML/MIN/1.73SQ M
GLUCOSE SERPL-MCNC: 153 MG/DL (ref 65–140)
GLUCOSE UR STRIP-MCNC: NEGATIVE MG/DL
HCT VFR BLD AUTO: 42.4 % (ref 34.8–46.1)
HGB BLD-MCNC: 14.1 G/DL (ref 11.5–15.4)
HGB UR QL STRIP.AUTO: NEGATIVE
IMM GRANULOCYTES # BLD AUTO: 0.02 THOUSAND/UL (ref 0–0.2)
IMM GRANULOCYTES NFR BLD AUTO: 0 % (ref 0–2)
KETONES UR STRIP-MCNC: NEGATIVE MG/DL
LEUKOCYTE ESTERASE UR QL STRIP: NEGATIVE
LYMPHOCYTES # BLD AUTO: 2.26 THOUSANDS/ΜL (ref 0.6–4.47)
LYMPHOCYTES NFR BLD AUTO: 31 % (ref 14–44)
MAGNESIUM SERPL-MCNC: 2.3 MG/DL (ref 1.6–2.6)
MCH RBC QN AUTO: 28.5 PG (ref 26.8–34.3)
MCHC RBC AUTO-ENTMCNC: 33.3 G/DL (ref 31.4–37.4)
MCV RBC AUTO: 86 FL (ref 82–98)
MONOCYTES # BLD AUTO: 0.5 THOUSAND/ΜL (ref 0.17–1.22)
MONOCYTES NFR BLD AUTO: 7 % (ref 4–12)
NEUTROPHILS # BLD AUTO: 4.5 THOUSANDS/ΜL (ref 1.85–7.62)
NEUTS SEG NFR BLD AUTO: 60 % (ref 43–75)
NITRITE UR QL STRIP: NEGATIVE
NRBC BLD AUTO-RTO: 0 /100 WBCS
PH UR STRIP.AUTO: 7.5 [PH]
PLATELET # BLD AUTO: 160 THOUSANDS/UL (ref 149–390)
PMV BLD AUTO: 9.8 FL (ref 8.9–12.7)
POTASSIUM SERPL-SCNC: 3.8 MMOL/L (ref 3.5–5.3)
PROT UR STRIP-MCNC: NEGATIVE MG/DL
RBC # BLD AUTO: 4.95 MILLION/UL (ref 3.81–5.12)
SODIUM SERPL-SCNC: 134 MMOL/L (ref 136–145)
SP GR UR STRIP.AUTO: 1.01 (ref 1–1.03)
UROBILINOGEN UR QL STRIP.AUTO: 0.2 E.U./DL
WBC # BLD AUTO: 7.41 THOUSAND/UL (ref 4.31–10.16)

## 2021-12-31 PROCEDURE — 81025 URINE PREGNANCY TEST: CPT | Performed by: EMERGENCY MEDICINE

## 2021-12-31 PROCEDURE — 82330 ASSAY OF CALCIUM: CPT | Performed by: EMERGENCY MEDICINE

## 2021-12-31 PROCEDURE — 80048 BASIC METABOLIC PNL TOTAL CA: CPT | Performed by: EMERGENCY MEDICINE

## 2021-12-31 PROCEDURE — G1004 CDSM NDSC: HCPCS

## 2021-12-31 PROCEDURE — 96361 HYDRATE IV INFUSION ADD-ON: CPT

## 2021-12-31 PROCEDURE — 83735 ASSAY OF MAGNESIUM: CPT | Performed by: EMERGENCY MEDICINE

## 2021-12-31 PROCEDURE — 85379 FIBRIN DEGRADATION QUANT: CPT | Performed by: EMERGENCY MEDICINE

## 2021-12-31 PROCEDURE — 85025 COMPLETE CBC W/AUTO DIFF WBC: CPT | Performed by: EMERGENCY MEDICINE

## 2021-12-31 PROCEDURE — 81003 URINALYSIS AUTO W/O SCOPE: CPT | Performed by: EMERGENCY MEDICINE

## 2021-12-31 PROCEDURE — 84484 ASSAY OF TROPONIN QUANT: CPT | Performed by: EMERGENCY MEDICINE

## 2021-12-31 PROCEDURE — 96374 THER/PROPH/DIAG INJ IV PUSH: CPT

## 2021-12-31 PROCEDURE — 71275 CT ANGIOGRAPHY CHEST: CPT

## 2021-12-31 PROCEDURE — 99285 EMERGENCY DEPT VISIT HI MDM: CPT | Performed by: EMERGENCY MEDICINE

## 2021-12-31 PROCEDURE — 36415 COLL VENOUS BLD VENIPUNCTURE: CPT | Performed by: EMERGENCY MEDICINE

## 2021-12-31 RX ORDER — ALPRAZOLAM 0.5 MG/1
1 TABLET ORAL ONCE
Status: COMPLETED | OUTPATIENT
Start: 2021-12-31 | End: 2021-12-31

## 2021-12-31 RX ORDER — METOCLOPRAMIDE 10 MG/1
10 TABLET ORAL EVERY 6 HOURS PRN
Qty: 30 TABLET | Refills: 0 | Status: SHIPPED | OUTPATIENT
Start: 2021-12-31 | End: 2022-06-01

## 2021-12-31 RX ORDER — METOCLOPRAMIDE HYDROCHLORIDE 5 MG/ML
10 INJECTION INTRAMUSCULAR; INTRAVENOUS ONCE
Status: COMPLETED | OUTPATIENT
Start: 2021-12-31 | End: 2021-12-31

## 2021-12-31 RX ADMIN — SODIUM CHLORIDE 1000 ML: 0.9 INJECTION, SOLUTION INTRAVENOUS at 01:03

## 2021-12-31 RX ADMIN — IOHEXOL 85 ML: 350 INJECTION, SOLUTION INTRAVENOUS at 03:56

## 2021-12-31 RX ADMIN — ALPRAZOLAM 1 MG: 0.5 TABLET ORAL at 05:18

## 2021-12-31 RX ADMIN — METOCLOPRAMIDE 10 MG: 5 INJECTION, SOLUTION INTRAMUSCULAR; INTRAVENOUS at 05:19

## 2021-12-31 NOTE — ED PROVIDER NOTES
History  Chief Complaint   Patient presents with    Chest Pain     patient covid positive and history of low calcium     56-year-old female with past history of hypothyroidism after thyroidectomy, hypocalcemia, anxiety, depression, hyperlipidemia, anemia, degenerative disc disease, migraine, presents to the ED for evaluation of generalized body aches, weakness, chest discomfort over the past several days  Chest discomfort started about 3 days ago  Patient is not sure whether this is secondary to hypocalcemia  Patient was diagnosed with COVID-19 infection on 12/19/2021  Patient states that since her diagnosis she has been feeling very weak with viral syndrome  Patient also thinks that her calcium level is low  Patient is feeling some pressure in her chest   Patient came to the ED for further evaluation  Patient is following quarantine measures at home for COVID-19 infection  History provided by:  Patient  Chest Pain  Associated symptoms: no abdominal pain, no back pain, no cough, no dizziness, no fever, no headache, no nausea, no numbness, no palpitations, no shortness of breath and no weakness        Prior to Admission Medications   Prescriptions Last Dose Informant Patient Reported? Taking?    ALPRAZolam (XANAX) 1 mg tablet  Self Yes No   Sig: Take 1 mg by mouth daily am   Calcium Carb-Cholecalciferol (Calcium 500+D) 500-400 MG-UNIT TABS   No No   Sig: Take 500 mg by mouth 6 (six) times a day   albuterol (Ventolin HFA) 90 mcg/act inhaler   No No   Sig: Inhale 2 puffs every 4 (four) hours as needed for wheezing or shortness of breath   baclofen 10 mg tablet  Self Yes No   Sig: Take 10 mg by mouth 3 (three) times a day    baclofen 20 mg tablet   No No   Sig: Take 1 tablet (20 mg total) by mouth 3 (three) times a day   calcitriol (ROCALTROL) 0 25 mcg capsule   No No   Sig: TAKE 1 CAPSULE BY MOUTH EVERY DAY   ergocalciferol (VITAMIN D2) 50,000 units   No No   Sig: Take 1 capsule (50,000 Units total) by mouth once a week   fenofibrate (TRIGLIDE) 160 MG tablet  Self Yes No   Sig: Take 160 mg by mouth daily   ferrous sulfate 325 (65 Fe) mg tablet  Self Yes No   Sig: Take 325 mg by mouth Once Monday, wed, Friday   ibuprofen (MOTRIN) 600 mg tablet  Self No No   Sig: Take 1 tablet (600 mg total) by mouth every 6 (six) hours as needed for mild pain or moderate pain   levothyroxine (Synthroid) 150 mcg tablet   No No   Sig: Take 1 tablet (150 mcg total) by mouth daily   magnesium Oxide (MAG-OX) 400 mg TABS   No No   Sig: TAKE 1 TABLET (400 MG TOTAL) BY MOUTH THREE (THREE) TIMES A DAY 9PM   metoclopramide (REGLAN) 10 mg tablet   No No   Sig: Take 1 tablet (10 mg total) by mouth every 6 (six) hours   ondansetron (ZOFRAN) 4 mg tablet  Self Yes No   Sig: Take 4 mg by mouth every 8 (eight) hours as needed for nausea or vomiting     ondansetron (ZOFRAN-ODT) 4 mg disintegrating tablet   No No   Sig: Take 1 tablet (4 mg total) by mouth every 6 (six) hours as needed for nausea or vomiting   oxyCODONE-acetaminophen (PERCOCET) 5-325 mg per tablet  Self Yes No   Sig: Take 2 tablets by mouth daily    potassium chloride (K-DUR,KLOR-CON) 20 mEq tablet   Yes No   Sig: Take 20 mEq by mouth 2 (two) times a day   pregabalin (LYRICA) 75 mg capsule  Self Yes No   Sig: Take 75 mg by mouth 3 (three) times a day as needed    zolpidem (AMBIEN CR) 6 25 MG CR tablet  Self Yes No   Sig: Take 6 25 mg by mouth daily at bedtime as needed for sleep      Facility-Administered Medications: None       Past Medical History:   Diagnosis Date    Acid reflux     Acute renal failure (HCC)     multiple episodes    Anemia     Anxiety     Chronic pain     DDD (degenerative disc disease), lumbar     Disease of thyroid gland     had surgery and now hypo    DVT [facility]deep venous thrombosis) ([facility] [address] ) 2009    s/p ankle fracture    Hypercalcemia     Hyperlipidemia     Hyperthyroidism     Hypocalcemia     post op 2016    Migraine     Psychiatric disorder     anxiety depression    Seizures (HCC)     petit mal x1  4 years ago- all tests were neg   Spondylolisthesis of lumbar region     Treatment     spinal pain injections  last was 2016       Past Surgical History:   Procedure Laterality Date    BACK SURGERY       SECTION      x5    CYSTOCELE REPAIR  2017    DISCOGRAM      PARATHYROIDECTOMY      CT ANTERIOR COLPORRAPHY RPR CYSTOCELE W/CYSTO N/A 2017    Procedure: CYSTOCELE REPAIR;  Surgeon: Jessica Palacio MD;  Location: [address] [place] [address];  Service: Gynecology    CT ARTHRODESIS POSTERIOR INTERBODY LUMBAR N/A 2016    Procedure: L4-S1 LUMBAR LAMINECTOMY/DECOMPRESSION;  INSTRUMENTED POSTEROLATERAL FUSION/ INTERBODY L5-S1; ALLOGRAFT AND AUTOGRAFT (IMPULSE) ; Surgeon: Veena Anderson MD;  Location:  MAIN OR;  Service: Orthopedics    CT CYSTOURETHROSCOPY,URETER CATHETER Bilateral 2018    Procedure: INSERTION URETERAL CATHETERS PREOP;  Surgeon: Johnathan Russ MD;  Location: [address] [place] [address];  Service: Urology    CT SLING OPER STRES INCONTINENCE N/A 2017    Procedure: MID URETHRAL SLING;  Surgeon: Mariaelena Carbone MD;  Location: [address] [place] [address];  Service: Urology    CT SUPRACERV ABD HYSTERECTOMY N/A 2018    Procedure: SUPRACERVICAL HYSTERECTOMY;  Surgeon: Jessica Palacio MD;  Location: [facility];  Service: Gynecology    THYROIDECTOMY      TONSILECTOMY AND ADNOIDECTOMY      TONSILLECTOMY      TUBAL LIGATION         Family History   Problem Relation Age of Onset    Diabetes Mother     Hypertension Mother     Hyperlipidemia Father     Arrhythmia Father     Lung cancer Father     Diabetes Father      I have reviewed and agree with the history as documented      E-Cigarette/Vaping    E-Cigarette Use Never User      E-Cigarette/Vaping Substances    Nicotine No     THC No     CBD No     Flavoring No     Other No     Unknown No      Social History     Tobacco Use    Smoking status: Current Every Day Smoker     Packs/day: 0 50 Years: 25 00     Pack years: 12 50     Types: Cigarettes    Smokeless tobacco: Never Used   Vaping Use    Vaping Use: Never used   Substance Use Topics    Alcohol use: Not Currently     Comment: Alcohol intake:   Occasional  - As per Carmelo Haines Drug use: No       Review of Systems   Constitutional: Negative for activity change, appetite change, chills and fever  HENT: Negative for congestion and ear pain  Eyes: Negative for pain and discharge  Respiratory: Negative for cough, chest tightness, shortness of breath, wheezing and stridor  Cardiovascular: Positive for chest pain  Negative for palpitations  Gastrointestinal: Negative for abdominal distention, abdominal pain, constipation, diarrhea and nausea  Endocrine: Negative for cold intolerance  Genitourinary: Negative for dysuria, frequency and urgency  Musculoskeletal: Negative for arthralgias and back pain  Skin: Negative for color change and rash  Allergic/Immunologic: Negative for environmental allergies and food allergies  Neurological: Negative for dizziness, weakness, numbness and headaches  Hematological: Negative for adenopathy  Psychiatric/Behavioral: Negative for agitation, behavioral problems and confusion  The patient is not nervous/anxious  All other systems reviewed and are negative  Physical Exam  Physical Exam  Vitals and nursing note reviewed  Constitutional:       Appearance: She is well-developed  HENT:      Head: Normocephalic and atraumatic  Eyes:      Conjunctiva/sclera: Conjunctivae normal    Cardiovascular:      Rate and Rhythm: Normal rate and regular rhythm  Heart sounds: Normal heart sounds  Pulmonary:      Effort: Pulmonary effort is normal       Breath sounds: Normal breath sounds  Comments: Lungs are clear to auscultation bilateral   Abdominal:      General: Bowel sounds are normal  There is no distension  Palpations: Abdomen is soft  Tenderness:  There is no abdominal tenderness  Musculoskeletal:         General: Normal range of motion  Cervical back: Normal range of motion and neck supple  Skin:     General: Skin is warm and dry  Neurological:      Mental Status: She is alert and oriented to person, place, and time  Psychiatric:         Behavior: Behavior normal          Thought Content:  Thought content normal          Judgment: Judgment normal          Vital Signs  ED Triage Vitals   Temperature Pulse Respirations Blood Pressure SpO2   12/31/21 0012 12/31/21 0000 12/31/21 0000 12/31/21 0012 12/31/21 0012   99 °F (37 2 °C) (!) 106 22 114/77 95 %      Temp Source Heart Rate Source Patient Position - Orthostatic VS BP Location FiO2 (%)   12/31/21 0012 12/31/21 0012 12/31/21 0012 12/31/21 0012 --   Oral Monitor Lying Right arm       Pain Score       12/31/21 0012       4           Vitals:    12/31/21 0200 12/31/21 0230 12/31/21 0315 12/31/21 0415   BP: 116/79 116/79     Pulse: 88 84 98 97   Patient Position - Orthostatic VS: Sitting Lying           Visual Acuity      ED Medications  Medications   sodium chloride 0 9 % bolus 1,000 mL (1,000 mL Intravenous New Bag 12/31/21 0103)   iohexol (OMNIPAQUE) 350 MG/ML injection (SINGLE-DOSE) 85 mL (85 mL Intravenous Given 12/31/21 0356)   ALPRAZolam (XANAX) tablet 1 mg (1 mg Oral Given 12/31/21 6618)   metoclopramide (REGLAN) injection 10 mg (10 mg Intravenous Given 12/31/21 3854)       Diagnostic Studies  Results Reviewed     Procedure Component Value Units Date/Time    UA w Reflex to Microscopic w Reflex to Culture [[id number]] Collected: 12/31/21 0357    Lab Status: Final result Specimen: Urine, Clean Catch Updated: 12/31/21 0405     Color, UA Light Yellow     Clarity, UA Slightly Cloudy     Specific Gravity, UA 1 015     pH, UA 7 5     Leukocytes, UA Negative     Nitrite, UA Negative     Protein, UA Negative mg/dl      Glucose, UA Negative mg/dl      Ketones, UA Negative mg/dl      Urobilinogen, UA 0 2 E U /dl      Bilirubin, UA Negative     Blood, UA Negative    POCT pregnancy, urine [[id number]]  (Normal) Resulted: 12/31/21 0317    Lab Status: Final result Updated: 12/31/21 0317     EXT PREG TEST UR (Ref: Negative) negative     Control valid    Basic metabolic panel [[id number]]  (Abnormal) Collected: 12/31/21 0102    Lab Status: Final result Specimen: Blood from Arm, Left Updated: 12/31/21 0208     Sodium 134 mmol/L      Potassium 3 8 mmol/L      Chloride 99 mmol/L      CO2 20 mmol/L      ANION GAP 15 mmol/L      BUN 22 mg/dL      Creatinine 0 98 mg/dL      Glucose 153 mg/dL      Calcium 9 7 mg/dL      eGFR 69 ml/min/1 73sq m     Narrative:      [place] guidelines for Chronic Kidney Disease (CKD):     Stage 1 with normal or high GFR (GFR > 90 mL/min/1 73 square meters)    Stage 2 Mild CKD (GFR = 60-89 mL/min/1 73 square meters)    Stage 3A Moderate CKD (GFR = 45-59 mL/min/1 73 square meters)    Stage 3B Moderate CKD (GFR = 30-44 mL/min/1 73 square meters)    Stage 4 Severe CKD (GFR = 15-29 mL/min/1 73 square meters)    Stage 5 End Stage CKD (GFR <15 mL/min/1 73 square meters)  Note: GFR calculation is accurate only with a steady state creatinine    Magnesium [[id number]]  (Normal) Collected: 12/31/21 0102    Lab Status: Final result Specimen: Blood from Arm, Left Updated: 12/31/21 0208     Magnesium 2 3 mg/dL     D-Dimer [[id number]]  (Abnormal) Collected: 12/31/21 0102    Lab Status: Final result Specimen: Blood from Arm, Left Updated: 12/31/21 0140     D-Dimer, Quant 0 64 ug/ml FEU     HS Troponin 0hr (reflex protocol) [[id number]]  (Normal) Collected: 12/31/21 0102    Lab Status: Final result Specimen: Blood from Arm, Left Updated: 12/31/21 0138     hs TnI 0hr 3 ng/L     CBC and differential [[id number]]  (Abnormal) Collected: 12/31/21 0102    Lab Status: Final result Specimen: Blood from Arm, Left Updated: 12/31/21 0111     WBC 7 41 Thousand/uL      RBC 4 95 Million/uL      Hemoglobin 14 1 g/dL Hematocrit 42 4 %      MCV 86 fL      MCH 28 5 pg      MCHC 33 3 g/dL      RDW 16 2 %      MPV 9 8 fL      Platelets 736 Thousands/uL      nRBC 0 /100 WBCs      Neutrophils Relative 60 %      Immat GRANS % 0 %      Lymphocytes Relative 31 %      Monocytes Relative 7 %      Eosinophils Relative 1 %      Basophils Relative 1 %      Neutrophils Absolute 4 50 Thousands/µL      Immature Grans Absolute 0 02 Thousand/uL      Lymphocytes Absolute 2 26 Thousands/µL      Monocytes Absolute 0 50 Thousand/µL      Eosinophils Absolute 0 09 Thousand/µL      Basophils Absolute 0 04 Thousands/µL     Calcium, ionized [[id number]]  (Normal) Collected: 12/31/21 0102    Lab Status: Final result Specimen: Blood from Arm, Left Updated: 12/31/21 0111     Calcium, Ionized 1 17 mmol/L                  CTA ED chest PE Study   Final Result by Poornima Lorenzo MD (12/31 9753)      No pulmonary embolism or aortic dissection  No effusion, airspace disease, or pneumothorax  Workstation performed: [id number]                    Procedures  ECG 12 Lead Documentation Only    Date/Time: 12/30/2021 11:54 PM  Performed by: Geoff Borja DO  Authorized by: Geoff Borja DO     Indications / Diagnosis:  Chest pain  ECG reviewed by me, the ED Provider: yes    Patient location:  ED  Previous ECG:     Previous ECG:  Compared to current    Similarity:  No change    Comparison to cardiac monitor: Yes    Interpretation:     Interpretation: abnormal    Comments:      Sinus rhythm, rate 115, normal axis, normal intervals, no acute ST elevations noted, low amplitude T-waves noted throughout, deep S-waves noted in V1 through V3 suggesting septal/anterior ischemic changes, unchan from previous study  ED Course                               SBIRT 22yo+      Most Recent Value   SBIRT (22 yo +)    In order to provide better care to our patients, we are screening all of our patients for alcohol and drug use   Would it be okay to ask you these screening questions? No Filed at: 12/31/2021 0129                    MDM  Number of Diagnoses or Management Options  Anxiety: new and requires workup  COVID-19 virus infection: new and requires workup  Nausea: new and requires workup  Viral syndrome: new and requires workup  Diagnosis management comments: Obtain blood work, EKG, ionized calcium  Give IV fluids, replete calcium if needed       Amount and/or Complexity of Data Reviewed  Clinical lab tests: ordered and reviewed  Tests in the radiology section of CPT®: ordered and reviewed  Tests in the medicine section of CPT®: ordered and reviewed  Review and summarize past medical records: yes  Independent visualization of images, tracings, or specimens: yes    Risk of Complications, Morbidity, and/or Mortality  General comments: Patient's calcium level was within normal range today  EKG was at baseline  First troponin was 3  Patient's D-dimer was mildly elevated  Subsequently CT PE study did not show any acute pulmonary embolism or any other abnormalities  Patient's BMP showed concern for dehydration  Patient feels significantly better after IV fluid hydration  Patient's ionized calcium was within normal range  Patient appeared to be very anxious secondary to her COVID-19 infection  Patient was given a dose of Xanax in the ED  Patient is prescribed Xanax at home for her anxiety  Patient states that when her anxiety gets really were she feels nauseous and is not able to eat  Patient does have Zofran at home but she does not think it is working properly  Patient was switched to Reglan p r n  Nausea  At this time patient is discharged with follow-up to her PCP  Close return instructions given to return to the ER for any worsening symptoms  Patient agrees with discharge plan  Patient well appearing at time of discharge  Please Note: Fluency Direct voice recognition software may have been used in the creation of this document   Wrong words or sound a like substitutions may have occurred due to the inherent limitations of the voice software  Patient Progress  Patient progress: improved      Disposition  Final diagnoses:   Viral syndrome   Nausea   Anxiety   COVID-19 virus infection     Time reflects when diagnosis was documented in both MDM as applicable and the Disposition within this note     Time User Action Codes Description Comment    12/31/2021  5:15 AM Angelina Macanese Add [B34 9] Viral syndrome     12/31/2021  5:15 AM Vandana Loganon Add [R11 0] Nausea     12/31/2021  5:15 AM Angelina Macanese Add [F41 9] Anxiety     12/31/2021  5:16 AM Angelina Macanese Modify [F41 9] Anxiety     12/31/2021  5:16 AM Vandana Encarnacion Add [U07 1] COVID-19 virus infection       ED Disposition     ED Disposition Condition Date/Time Comment    Discharge Stable Fri Dec 31, 2021  5:15 AM [facility] discharge to home/self care  Follow-up Information     Follow up With Specialties Details Why Magaly Bernardo MD Internal Medicine In 3 days  [address] [place] [address]  [phone]            Patient's Medications   Discharge Prescriptions    METOCLOPRAMIDE (REGLAN) 10 MG TABLET    Take 1 tablet (10 mg total) by mouth every 6 (six) hours as needed (nausea)       Start Date: 12/31/2021End Date: --       Order Dose: 10 mg       Quantity: 30 tablet    Refills: 0       No discharge procedures on file      PDMP Review     None          ED Provider  Electronically Signed by           Carol Wilson DO  12/31/21 0522

## 2022-01-04 LAB
ATRIAL RATE: 115 BPM
P AXIS: 43 DEGREES
PR INTERVAL: 184 MS
QRS AXIS: 32 DEGREES
QRSD INTERVAL: 74 MS
QT INTERVAL: 330 MS
QTC INTERVAL: 456 MS
T WAVE AXIS: 29 DEGREES
VENTRICULAR RATE: 115 BPM

## 2022-01-04 PROCEDURE — 93010 ELECTROCARDIOGRAM REPORT: CPT | Performed by: INTERNAL MEDICINE

## 2022-01-20 DIAGNOSIS — E83.51 HYPOCALCEMIA: ICD-10-CM

## 2022-01-20 RX ORDER — LEVOTHYROXINE SODIUM 0.15 MG/1
TABLET ORAL
Qty: 90 TABLET | Refills: 1 | Status: SHIPPED | OUTPATIENT
Start: 2022-01-20

## 2022-02-23 ENCOUNTER — HOSPITAL ENCOUNTER (EMERGENCY)
Facility: HOSPITAL | Age: 47
Discharge: HOME | End: 2022-02-23
Attending: GENERAL PRACTICE | Admitting: GENERAL PRACTICE
Payer: COMMERCIAL

## 2022-02-23 VITALS
SYSTOLIC BLOOD PRESSURE: 146 MMHG | TEMPERATURE: 96.8 F | BODY MASS INDEX: 34.96 KG/M2 | DIASTOLIC BLOOD PRESSURE: 91 MMHG | OXYGEN SATURATION: 96 % | HEART RATE: 103 BPM | HEIGHT: 62 IN | WEIGHT: 190 LBS | RESPIRATION RATE: 16 BRPM

## 2022-02-23 DIAGNOSIS — K64.4 EXTERNAL HEMORRHOIDS WITHOUT COMPLICATION: ICD-10-CM

## 2022-02-23 DIAGNOSIS — K62.5 RECTAL BLEEDING: Primary | ICD-10-CM

## 2022-02-23 PROCEDURE — 99282 EMERGENCY DEPT VISIT SF MDM: CPT | Performed by: PHYSICIAN ASSISTANT

## 2022-02-23 PROCEDURE — 99284 EMERGENCY DEPT VISIT MOD MDM: CPT

## 2022-02-23 NOTE — ED PROVIDER NOTES
History  Chief Complaint   Patient presents with    Rectal Bleeding     Pt c/o maroon color bleeding from her rectom X 1 day  Hx of hemmroids  denies blood thinners      Patient is a 78-year-old female with past medical history significant for anxiety, hypothyroidism, recent hysterectomy, hemorrhoids, who presents today for evaluation of rectal bleeding  Patient states for the past 3 days she has had episodes of blood in the toilet with bowel movements  She has had this problem before but not for several years  She denies bleeding except in the context of defecation  She has no complaints of dizziness, shortness of breath, abdominal pain, diarrhea, melena  She called her primary care doctor who told her to present to the emergency department  History provided by:  Patient  Rectal Bleeding - Minor  Quality:  Bright red  Amount: Moderate  Duration:  3 days  Timing:  Intermittent (With bowel movement)  Context: constipation, defecation and hemorrhoids    Context: not anal fissures, not rectal injury and not rectal pain    Similar prior episodes: yes    Relieved by:  None tried  Worsened by:  Defecation and wiping  Ineffective treatments:  None tried  Associated symptoms: no abdominal pain, no dizziness, no epistaxis, no fever, no hematemesis, no light-headedness, no loss of consciousness, no recent illness and no vomiting    Risk factors: no anticoagulant use, no hx of colorectal cancer, no hx of colorectal surgery, no hx of IBD, no liver disease, no NSAID use and no steroid use        Prior to Admission Medications   Prescriptions Last Dose Informant Patient Reported? Taking?    ALPRAZolam (XANAX) 1 mg tablet  Self Yes No   Sig: Take 1 mg by mouth daily am   Calcium Carb-Cholecalciferol (Calcium 500+D) 500-400 MG-UNIT TABS   No No   Sig: Take 500 mg by mouth 6 (six) times a day   albuterol (Ventolin HFA) 90 mcg/act inhaler   No No   Sig: Inhale 2 puffs every 4 (four) hours as needed for wheezing or shortness of breath   baclofen 10 mg tablet  Self Yes No   Sig: Take 10 mg by mouth 3 (three) times a day    baclofen 20 mg tablet   No No   Sig: Take 1 tablet (20 mg total) by mouth 3 (three) times a day   calcitriol (ROCALTROL) 0 25 mcg capsule   No No   Sig: TAKE 1 CAPSULE BY MOUTH EVERY DAY   ergocalciferol (VITAMIN D2) 50,000 units   No No   Sig: Take 1 capsule (50,000 Units total) by mouth once a week   fenofibrate (TRIGLIDE) 160 MG tablet  Self Yes No   Sig: Take 160 mg by mouth daily   ferrous sulfate 325 (65 Fe) mg tablet  Self Yes No   Sig: Take 325 mg by mouth Once Monday, wed, Friday   ibuprofen (MOTRIN) 600 mg tablet  Self No No   Sig: Take 1 tablet (600 mg total) by mouth every 6 (six) hours as needed for mild pain or moderate pain   levothyroxine 150 mcg tablet   No No   Sig: TAKE 1 TABLET BY MOUTH EVERY DAY   magnesium Oxide (MAG-OX) 400 mg TABS   No No   Sig: TAKE 1 TABLET (400 MG TOTAL) BY MOUTH THREE (THREE) TIMES A DAY 9PM   metoclopramide (REGLAN) 10 mg tablet   No No   Sig: Take 1 tablet (10 mg total) by mouth every 6 (six) hours   metoclopramide (REGLAN) 10 mg tablet   No No   Sig: Take 1 tablet (10 mg total) by mouth every 6 (six) hours as needed (nausea)   ondansetron (ZOFRAN-ODT) 4 mg disintegrating tablet   No No   Sig: Take 1 tablet (4 mg total) by mouth every 6 (six) hours as needed for nausea or vomiting   oxyCODONE-acetaminophen (PERCOCET) 5-325 mg per tablet  Self Yes No   Sig: Take 2 tablets by mouth daily    potassium chloride (K-DUR,KLOR-CON) 20 mEq tablet   Yes No   Sig: Take 20 mEq by mouth 2 (two) times a day   pregabalin (LYRICA) 75 mg capsule  Self Yes No   Sig: Take 75 mg by mouth 3 (three) times a day as needed    zolpidem (AMBIEN CR) 6 25 MG CR tablet  Self Yes No   Sig: Take 6 25 mg by mouth daily at bedtime as needed for sleep      Facility-Administered Medications: None       Past Medical History:   Diagnosis Date    Acid reflux     Acute renal failure (HCC) multiple episodes    Anemia     Anxiety     Chronic pain     DDD (degenerative disc disease), lumbar     Disease of thyroid gland     had surgery and now hypo    DVT (deep venous thrombosis) ([facility] [address] )     s/p ankle fracture    Hypercalcemia     Hyperlipidemia     Hyperthyroidism     Hypocalcemia     post op     Migraine     Psychiatric disorder     anxiety depression    Seizures (HCC)     petit mal x1  4 years ago- all tests were neg   Spondylolisthesis of lumbar region     Treatment     spinal pain injections  last was 2016       Past Surgical History:   Procedure Laterality Date    BACK SURGERY       SECTION      x5    CYSTOCELE REPAIR  2017    DISCOGRAM      PARATHYROIDECTOMY      OK ANTERIOR COLPORRAPHY RPR CYSTOCELE W/CYSTO N/A 2017    Procedure: CYSTOCELE REPAIR;  Surgeon: Isidro Hansen MD;  Location: [address] [place] [address];  Service: Gynecology    OK ARTHRODESIS POSTERIOR INTERBODY LUMBAR N/A 2016    Procedure: L4-S1 LUMBAR LAMINECTOMY/DECOMPRESSION;  INSTRUMENTED POSTEROLATERAL FUSION/ INTERBODY L5-S1; ALLOGRAFT AND AUTOGRAFT (IMPULSE) ;   Surgeon: Sharyn Tripp MD;  Location:  MAIN OR;  Service: Orthopedics    OK CYSTOURETHROSCOPY,URETER CATHETER Bilateral 2018    Procedure: INSERTION URETERAL CATHETERS PREOP;  Surgeon: Claribel Kim MD;  Location: [address] [place] [address];  Service: Urology    OK SLING OPER STRES INCONTINENCE N/A 2017    Procedure: MID URETHRAL SLING;  Surgeon: Rosa Hernandez MD;  Location: [address] [place] [address];  Service: Urology    OK SUPRACERV ABD HYSTERECTOMY N/A 2018    Procedure: SUPRACERVICAL HYSTERECTOMY;  Surgeon: Isidro Hansen MD;  Location: [facility];  Service: Gynecology    THYROIDECTOMY      TONSILECTOMY AND ADNOIDECTOMY      TONSILLECTOMY      TUBAL LIGATION         Family History   Problem Relation Age of Onset    Diabetes Mother     Hypertension Mother     Hyperlipidemia Father     Arrhythmia Father     Lung cancer Father     Diabetes Father      I have reviewed and agree with the history as documented  E-Cigarette/Vaping    E-Cigarette Use Never User      E-Cigarette/Vaping Substances    Nicotine No     THC No     CBD No     Flavoring No     Other No     Unknown No      Social History     Tobacco Use    Smoking status: Current Every Day Smoker     Packs/day: 0 50     Years: 25 00     Pack years: 12 50     Types: Cigarettes    Smokeless tobacco: Never Used   Vaping Use    Vaping Use: Never used   Substance Use Topics    Alcohol use: Not Currently     Comment: Alcohol intake:   Occasional  - As per Mattie Jami Drug use: No       Review of Systems   Constitutional: Negative for chills and fever  HENT: Negative for ear pain, nosebleeds and sore throat  Eyes: Negative for pain and visual disturbance  Respiratory: Negative for cough and shortness of breath  Cardiovascular: Negative for chest pain and palpitations  Gastrointestinal: Positive for anal bleeding, blood in stool, constipation and hematochezia  Negative for abdominal distention, abdominal pain, hematemesis, rectal pain and vomiting  Genitourinary: Negative for hematuria, menstrual problem and pelvic pain  Musculoskeletal: Negative for arthralgias and back pain  Skin: Negative for color change and rash  Neurological: Negative for dizziness, seizures, loss of consciousness, syncope and light-headedness  All other systems reviewed and are negative  Physical Exam  Physical Exam  Vitals and nursing note reviewed  Constitutional:       General: She is not in acute distress  Appearance: She is well-developed  HENT:      Head: Normocephalic and atraumatic  Nose: Nose normal       Mouth/Throat:      Mouth: Mucous membranes are moist    Eyes:      Conjunctiva/sclera: Conjunctivae normal       Comments: Conjunctiva pink and well perfused   Cardiovascular:      Rate and Rhythm: Normal rate and regular rhythm        Heart sounds: No murmur heard  Pulmonary:      Effort: Pulmonary effort is normal  No respiratory distress  Breath sounds: Normal breath sounds  Abdominal:      Palpations: Abdomen is soft  Tenderness: There is no abdominal tenderness  Genitourinary:     Rectum: Guaiac result positive  Comments: Hemorrhoids noted surrounding the rectum  Guaiac-positive  Musculoskeletal:      Cervical back: Normal range of motion and neck supple  Right lower leg: No edema  Left lower leg: No edema  Skin:     General: Skin is warm and dry  Capillary Refill: Capillary refill takes less than 2 seconds  Neurological:      General: No focal deficit present  Mental Status: She is alert and oriented to person, place, and time  Psychiatric:      Comments: Anxious and tearful         Vital Signs  ED Triage Vitals [02/23/22 1729]   Temperature Pulse Respirations Blood Pressure SpO2   (!) 96 8 °F (36 °C) 103 16 146/91 96 %      Temp Source Heart Rate Source Patient Position - Orthostatic VS BP Location FiO2 (%)   Probe Monitor Sitting Right arm --      Pain Score       No Pain           Vitals:    02/23/22 1729   BP: 146/91   Pulse: 103   Patient Position - Orthostatic VS: Sitting         Visual Acuity      ED Medications  Medications - No data to display    Diagnostic Studies  Results Reviewed     None                 No orders to display              Procedures  Procedures         ED Course                               SBIRT 20yo+      Most Recent Value   SBIRT (22 yo +)    In order to provide better care to our patients, we are screening all of our patients for alcohol and drug use  Would it be okay to ask you these screening questions? Yes Filed at: 02/23/2022 1857   Initial Alcohol Screen: US AUDIT-C     1  How often do you have a drink containing alcohol? 0 Filed at: 02/23/2022 1857   2  How many drinks containing alcohol do you have on a typical day you are drinking?   0 Filed at: 02/23/2022 1857   3a  Male UNDER 65: How often do you have five or more drinks on one occasion? 0 Filed at: 02/23/2022 1857   3b  FEMALE Any Age, or MALE 65+: How often do you have 4 or more drinks on one occassion? 0 Filed at: 02/23/2022 1857   Audit-C Score 0 Filed at: 02/23/2022 1857   COLETTE: How many times in the past year have you    Used an illegal drug or used a prescription medication for non-medical reasons?  Never Filed at: 02/23/2022 1857                    MDM  Number of Diagnoses or Management Options  External hemorrhoids without complication: new and requires workup  Rectal bleeding: new and requires workup  Diagnosis management comments: Rectal bleeding secondary to external hemorrhoids without complication  On visual exam no thrombosis noted  Guaiac positive  No evidence of anemia on physical exam  Referral to Gastroenterology for further evaluation  Topical medications as needed for reduction of irritation         Amount and/or Complexity of Data Reviewed  Clinical lab tests: reviewed  Tests in the medicine section of CPT®: reviewed  Decide to obtain previous medical records or to obtain history from someone other than the patient: yes  Review and summarize past medical records: yes  Independent visualization of images, tracings, or specimens: yes    Risk of Complications, Morbidity, and/or Mortality  Presenting problems: moderate  Diagnostic procedures: moderate  Management options: moderate    Patient Progress  Patient progress: stable      Disposition  Final diagnoses:   Rectal bleeding   External hemorrhoids without complication     Time reflects when diagnosis was documented in both MDM as applicable and the Disposition within this note     Time User Action Codes Description Comment    2/23/2022  6:37 PM Karlene Holliday Add [K62 5] Rectal bleeding     2/23/2022  6:40 PM Karlene Lion Add [K64 4] External hemorrhoids without complication       ED Disposition     ED Disposition Condition Date/Time Comment    Discharge Stable Wed Feb 23, 2022  6:42 PM Seth Marr discharge to home/self care              Follow-up Information     Follow up With Specialties Details Why Magaly Bernardo MD Internal Medicine Call  As needed [address]      Noris Castro MD Gastroenterology Schedule an appointment as soon as possible for a visit   [address] [facility]  [phone]            Discharge Medication List as of 2/23/2022  6:42 PM      START taking these medications    Details   witch hazel-glycerin (TUCKS) topical pad Insert 1 pad into the rectum 3 (three) times a day for 14 days, Starting Wed 2/23/2022, Until Wed 3/9/2022, Normal         CONTINUE these medications which have NOT CHANGED    Details   albuterol (Ventolin HFA) 90 mcg/act inhaler Inhale 2 puffs every 4 (four) hours as needed for wheezing or shortness of breath, Starting Sun 9/26/2021, Normal      ALPRAZolam (XANAX) 1 mg tablet Take 1 mg by mouth daily am, Historical Med      !! baclofen 10 mg tablet Take 10 mg by mouth 3 (three) times a day , Historical Med      !! baclofen 20 mg tablet Take 1 tablet (20 mg total) by mouth 3 (three) times a day, Starting Thu 11/18/2021, Normal      calcitriol (ROCALTROL) 0 25 mcg capsule TAKE 1 CAPSULE BY MOUTH EVERY DAY, Normal      Calcium Carb-Cholecalciferol (Calcium 500+D) 500-400 MG-UNIT TABS Take 500 mg by mouth 6 (six) times a day, Normal      ergocalciferol (VITAMIN D2) 50,000 units Take 1 capsule (50,000 Units total) by mouth once a week, Starting Wed 3/31/2021, Normal      fenofibrate (TRIGLIDE) 160 MG tablet Take 160 mg by mouth daily, Historical Med      ferrous sulfate 325 (65 Fe) mg tablet Take 325 mg by mouth Once Monday, wed, Friday, Historical Med      ibuprofen (MOTRIN) 600 mg tablet Take 1 tablet (600 mg total) by mouth every 6 (six) hours as needed for mild pain or moderate pain, Starting Tue 6/2/2020, Normal      levothyroxine 150 mcg tablet TAKE 1 TABLET BY MOUTH EVERY DAY, Normal      magnesium Oxide (MAG-OX) 400 mg TABS TAKE 1 TABLET (400 MG TOTAL) BY MOUTH THREE (THREE) TIMES A DAY 9PM, Normal      !! metoclopramide (REGLAN) 10 mg tablet Take 1 tablet (10 mg total) by mouth every 6 (six) hours, Starting Thu 9/9/2021, Normal      !! metoclopramide (REGLAN) 10 mg tablet Take 1 tablet (10 mg total) by mouth every 6 (six) hours as needed (nausea), Starting Fri 12/31/2021, Normal      ondansetron (ZOFRAN-ODT) 4 mg disintegrating tablet Take 1 tablet (4 mg total) by mouth every 6 (six) hours as needed for nausea or vomiting, Starting Sun 12/19/2021, Normal      oxyCODONE-acetaminophen (PERCOCET) 5-325 mg per tablet Take 2 tablets by mouth daily , Historical Med      potassium chloride (K-DUR,KLOR-CON) 20 mEq tablet Take 20 mEq by mouth 2 (two) times a day, Historical Med      pregabalin (LYRICA) 75 mg capsule Take 75 mg by mouth 3 (three) times a day as needed , Historical Med      zolpidem (AMBIEN CR) 6 25 MG CR tablet Take 6 25 mg by mouth daily at bedtime as needed for sleep, Historical Med       !! - Potential duplicate medications found  Please discuss with provider  No discharge procedures on file      PDMP Review     None          ED Provider  Electronically Signed by           Noe Harvey PA-C  02/23/22 1929

## 2022-02-25 ENCOUNTER — OFFICE VISIT (OUTPATIENT)
Dept: GASTROENTEROLOGY | Facility: CLINIC | Age: 47
End: 2022-02-25
Payer: COMMERCIAL

## 2022-02-25 VITALS
TEMPERATURE: 97.7 F | BODY MASS INDEX: 37.39 KG/M2 | DIASTOLIC BLOOD PRESSURE: 89 MMHG | SYSTOLIC BLOOD PRESSURE: 109 MMHG | WEIGHT: 203.2 LBS | HEIGHT: 62 IN | HEART RATE: 104 BPM

## 2022-02-25 DIAGNOSIS — K62.5 RECTAL BLEEDING: ICD-10-CM

## 2022-02-25 DIAGNOSIS — R19.5 LOOSE STOOLS: ICD-10-CM

## 2022-02-25 DIAGNOSIS — K21.9 GASTROESOPHAGEAL REFLUX DISEASE, UNSPECIFIED WHETHER ESOPHAGITIS PRESENT: Primary | ICD-10-CM

## 2022-02-25 PROCEDURE — 99214 OFFICE O/P EST MOD 30 MIN: CPT | Performed by: INTERNAL MEDICINE

## 2022-02-25 PROCEDURE — 3008F BODY MASS INDEX DOCD: CPT | Performed by: INTERNAL MEDICINE

## 2022-02-25 RX ORDER — CLINDAMYCIN HYDROCHLORIDE 300 MG/1
CAPSULE ORAL
COMMUNITY
End: 2022-04-09

## 2022-02-25 RX ORDER — HYDROCORTISONE ACETATE 25 MG/1
25 SUPPOSITORY RECTAL 2 TIMES DAILY
Qty: 12 SUPPOSITORY | Refills: 0 | Status: SHIPPED | OUTPATIENT
Start: 2022-02-25 | End: 2022-05-30

## 2022-02-25 RX ORDER — TRAZODONE HYDROCHLORIDE 50 MG/1
TABLET ORAL
COMMUNITY
End: 2022-04-19

## 2022-02-25 RX ORDER — QUETIAPINE FUMARATE 25 MG/1
25 TABLET, FILM COATED ORAL
COMMUNITY
End: 2022-05-30

## 2022-02-25 RX ORDER — SODIUM PICOSULFATE, MAGNESIUM OXIDE, AND ANHYDROUS CITRIC ACID 10; 3.5; 12 MG/160ML; G/160ML; G/160ML
1 LIQUID ORAL ONCE
Qty: 160 ML | Refills: 0 | Status: SHIPPED | OUTPATIENT
Start: 2022-02-25 | End: 2022-02-25

## 2022-02-25 RX ORDER — PANTOPRAZOLE SODIUM 40 MG/1
40 TABLET, DELAYED RELEASE ORAL DAILY
Qty: 30 TABLET | Refills: 3 | Status: SHIPPED | OUTPATIENT
Start: 2022-02-25 | End: 2022-04-25 | Stop reason: ALTCHOICE

## 2022-02-25 RX ORDER — HYDROCORTISONE 25 MG/G
CREAM TOPICAL 2 TIMES DAILY
Qty: 30 G | Refills: 1 | Status: SHIPPED | OUTPATIENT
Start: 2022-02-25 | End: 2022-05-30

## 2022-02-25 RX ORDER — QUETIAPINE FUMARATE 50 MG/1
TABLET, FILM COATED ORAL
COMMUNITY
End: 2022-04-19

## 2022-02-25 NOTE — LETTER
February 27, 2022     Jaylyn Mcghee MD  [address] [facility] [address]    Patient: Nanda Osborn   YOB: 1975   Date of Visit: 2/25/2022       Dear Dr Jesus Hemphill: Thank you for referring Nanda Osborn to me for evaluation  Below are my notes for this consultation  If you have questions, please do not hesitate to call me  I look forward to following your patient along with you  Sincerely,        Carmelina Bruner MD        CC: No Recipients  Carmelina Bruner MD  2/27/2022  9:58 PM  Sign when Signing Visit  [facility]  Marta Rogers [facility] [address] 40 y o  female MRN: [id number]            Assessment & Plan:    71-year-old female, longstanding history of loose stools recent change in bowel movements with short-term constipation in the setting of iron supplementation with bright red blood per rectum over the past 3 days  Also has had significant worsening reflux with early satiety and nausea since COVID infection  1  Rectal bleeding and loose stools:  Longstanding history of loose stools, likely secondary to thyroid dysfunction versus irritable bowel syndrome   -suspect that rectal bleeding secondary to hemorrhoids given clinical history  -patient was given prescription for Anusol suppositories and cream, recommended fiber supplementation  -we will check a CBC  -we will proceed with colonoscopy to exclude luminal pathology    2  GERD with early satiety nausea:  Likely secondary to dysmotility from COVID infection  -will start the patient on PPI therapy  -will proceed with an upper endoscopy at the same time, if her symptoms are resolved with PPI therapy this can potentially be canceled at that time    2   Elevated LFTs:  Patient has had history of elevated LFTs secondary to fatty liver disease in the past, we had recommend serologic evaluation several years ago, she has lost weight her LFTs have improved  - will repeat LFTs at this time and consider workup pending results        Agnes Melgar was seen today for follow-up  Diagnoses and all orders for this visit:    Gastroesophageal reflux disease, unspecified whether esophagitis present  -     pantoprazole (PROTONIX) 40 mg tablet; Take 1 tablet (40 mg total) by mouth daily    Loose stools  -     TSH, 3rd generation with Free T4 reflex; Future  -     Hepatic function panel; Future  -     Basic metabolic panel; Future  -     Sod Picosulfate-Mag Ox-Cit Acd (Clenpiq) 10-3 5-12 MG-GM -GM/160ML SOLN; Take 1 kit by mouth 1 (one) time for 1 dose    Rectal bleeding  -     CBC and differential; Future  -     hydrocortisone (ANUSOL-HC) 25 mg suppository; Insert 1 suppository (25 mg total) into the rectum 2 (two) times a day  -     hydrocortisone (ANUSOL-HC) 2 5 % rectal cream; Apply topically 2 (two) times a day  -     Sod Picosulfate-Mag Ox-Cit Acd (Clenpiq) 10-3 5-12 MG-GM -GM/160ML SOLN; Take 1 kit by mouth 1 (one) time for 1 dose            _____________________________________________________________        CC:  Rectal bleeding    HPI:  Jorge Bullard is a 40 y  o female who is here for rectal bleeding  This is a 46-year-old female, we had seen her in the past when she presented with loose stools, she reports she has had a long standing history of loose stools recently she has stopped her iron supplementation and upon stopping them she developed some hard stools  She had a bowel movement during which she had to push and strain, associated with that she had bright red blood per rectum with some clots, she has had perianal itching, continue to have over the last 2 days some blood-tinged stools  Presented to the emergency room  She reports some lightheadedness and dizziness  She noted some blood on the toilet paper  She also reports some mild lower abdominal discomfort      Patient did develop COVID infection recently and ever since then she has had lower abdominal discomfort, worsening heartburn symptoms early satiety, nausea frequent reflux for which she has been taking over-the-counter Tums  This has significantly worsened since her COVID infection  She denies any dysphagia  Her weight has been stable  She has never had a colonoscopy  Patient had a thyroidectomy,  x5, hysterectomy, bladder sling  She also reports some urinary incontinence  Denies any family history of GI or associated malignancies  ROS:  The remainder of the ROS was negative except for the pertinent positives mentioned in HPI        Allergies: Vicodin [hydrocodone-acetaminophen], Morphine and related, and Adhesive [medical tape]    Medications:   Current Outpatient Medications:     albuterol (Ventolin HFA) 90 mcg/act inhaler, Inhale 2 puffs every 4 (four) hours as needed for wheezing or shortness of breath, Disp: 18 g, Rfl: 0    ALPRAZolam (XANAX) 1 mg tablet, Take 1 mg by mouth daily am, Disp: , Rfl:     baclofen 10 mg tablet, Take 10 mg by mouth 3 (three) times a day , Disp: , Rfl:     calcitriol (ROCALTROL) 0 25 mcg capsule, TAKE 1 CAPSULE BY MOUTH EVERY DAY, Disp: 90 capsule, Rfl: 0    ergocalciferol (VITAMIN D2) 50,000 units, Take 1 capsule (50,000 Units total) by mouth once a week, Disp: 8 capsule, Rfl: 0    fenofibrate (TRIGLIDE) 160 MG tablet, Take 160 mg by mouth daily, Disp: , Rfl:     ferrous sulfate 325 (65 Fe) mg tablet, Take 325 mg by mouth Once Monday, wed, Friday, Disp: , Rfl:     levothyroxine 150 mcg tablet, TAKE 1 TABLET BY MOUTH EVERY DAY, Disp: 90 tablet, Rfl: 1    magnesium Oxide (MAG-OX) 400 mg TABS, TAKE 1 TABLET (400 MG TOTAL) BY MOUTH THREE (THREE) TIMES A DAY 9PM, Disp: 90 tablet, Rfl: 3    oxyCODONE-acetaminophen (PERCOCET) 5-325 mg per tablet, Take 2 tablets by mouth daily , Disp: , Rfl:     potassium chloride (K-DUR,KLOR-CON) 20 mEq tablet, Take 20 mEq by mouth 2 (two) times a day, Disp: , Rfl:     pregabalin (LYRICA) 75 mg capsule, Take 75 mg by mouth 3 (three) times a day as needed , Disp: , Rfl:     QUEtiapine (SEROquel) 25 mg tablet, quetiapine 25 mg tablet, Disp: , Rfl:     witch hazel-glycerin (TUCKS) topical pad, Insert 1 pad into the rectum 3 (three) times a day for 14 days, Disp: 42 pad, Rfl: 0    baclofen 20 mg tablet, Take 1 tablet (20 mg total) by mouth 3 (three) times a day (Patient not taking: Reported on 2/25/2022 ), Disp: 30 tablet, Rfl: 0    Calcium Carb-Cholecalciferol (Calcium 500+D) 500-400 MG-UNIT TABS, Take 500 mg by mouth 6 (six) times a day (Patient not taking: Reported on 2/25/2022 ), Disp: 360 tablet, Rfl: 3    clindamycin (CLEOCIN) 300 MG capsule, clindamycin HCl 300 mg capsule (Patient not taking: Reported on 2/25/2022), Disp: , Rfl:     hydrocortisone (ANUSOL-HC) 2 5 % rectal cream, Apply topically 2 (two) times a day, Disp: 30 g, Rfl: 1    hydrocortisone (ANUSOL-HC) 25 mg suppository, Insert 1 suppository (25 mg total) into the rectum 2 (two) times a day, Disp: 12 suppository, Rfl: 0    ibuprofen (MOTRIN) 600 mg tablet, Take 1 tablet (600 mg total) by mouth every 6 (six) hours as needed for mild pain or moderate pain (Patient not taking: Reported on 2/25/2022 ), Disp: 16 tablet, Rfl: 0    metoclopramide (REGLAN) 10 mg tablet, Take 1 tablet (10 mg total) by mouth every 6 (six) hours (Patient not taking: Reported on 2/25/2022 ), Disp: 30 tablet, Rfl: 0    metoclopramide (REGLAN) 10 mg tablet, Take 1 tablet (10 mg total) by mouth every 6 (six) hours as needed (nausea) (Patient not taking: Reported on 2/25/2022 ), Disp: 30 tablet, Rfl: 0    ondansetron (ZOFRAN-ODT) 4 mg disintegrating tablet, Take 1 tablet (4 mg total) by mouth every 6 (six) hours as needed for nausea or vomiting (Patient not taking: Reported on 2/25/2022 ), Disp: 12 tablet, Rfl: 0    pantoprazole (PROTONIX) 40 mg tablet, Take 1 tablet (40 mg total) by mouth daily, Disp: 30 tablet, Rfl: 3    QUEtiapine (SEROquel) 50 mg tablet, quetiapine 50 mg tablet  TAKE 1 TABLET BY MOUTH EVERYDAY AT BEDTIME (Patient not taking: Reported on 2022), Disp: , Rfl:     traZODone (DESYREL) 50 mg tablet, trazodone 50 mg tablet  TAKE 1 TABLET BY MOUTH EVERY DAY AT BEDTIME AS NEEDED (Patient not taking: Reported on 2022), Disp: , Rfl:     zolpidem (AMBIEN CR) 6 25 MG CR tablet, Take 6 25 mg by mouth daily at bedtime as needed for sleep (Patient not taking: Reported on 2022 ), Disp: , Rfl:     Past Medical History:   Diagnosis Date    Acid reflux     Acute renal failure (HCC)     multiple episodes    Anemia     Anxiety     Chronic pain     DDD (degenerative disc disease), lumbar     Disease of thyroid gland     had surgery and now hypo    DVT (deep venous thrombosis) ([facility] [address] )     s/p ankle fracture    Hypercalcemia     Hyperlipidemia     Hyperthyroidism     Hypocalcemia     post op 2016    Migraine     Psychiatric disorder     anxiety depression    Seizures (HCC)     petit mal x1  4 years ago- all tests were neg   Spondylolisthesis of lumbar region     Treatment     spinal pain injections  last was 2016       Past Surgical History:   Procedure Laterality Date    BACK SURGERY       SECTION      x5    CYSTOCELE REPAIR  2017    DISCOGRAM      PARATHYROIDECTOMY      SC ANTERIOR COLPORRAPHY RPR CYSTOCELE W/CYSTO N/A 2017    Procedure: CYSTOCELE REPAIR;  Surgeon: Edgar Whittaker MD;  Location: [address] [place] [address];  Service: Gynecology    SC ARTHRODESIS POSTERIOR INTERBODY LUMBAR N/A 2016    Procedure: L4-S1 LUMBAR LAMINECTOMY/DECOMPRESSION;  INSTRUMENTED POSTEROLATERAL FUSION/ INTERBODY L5-S1; ALLOGRAFT AND AUTOGRAFT (IMPULSE) ;   Surgeon: Clarita Haynes MD;  [facility];  Service: Orthopedics    SC CYSTOURETHROSCOPY,URETER CATHETER Bilateral 2018    Procedure: INSERTION URETERAL CATHETERS PREOP;  Surgeon: Teto Laguna MD;  Location: [address] [place] [address];  Service: Urology    SC SLING OPER STRES INCONTINENCE N/A 2017    Procedure: MID URETHRAL SLING;  Surgeon: Spencer Mendiola MD;  Location: [address] [place] [address];  Service: Urology    HI SUPRACERV ABD HYSTERECTOMY N/A 12/7/2018    Procedure: SUPRACERVICAL HYSTERECTOMY;  Surgeon: Alisha Roberts MD;  [facility];  Service: Gynecology    THYROIDECTOMY      TONSILECTOMY AND ADNOIDECTOMY      TONSILLECTOMY      TUBAL LIGATION         Family History   Problem Relation Age of Onset    Diabetes Mother     Hypertension Mother     Hyperlipidemia Father     Arrhythmia Father     Lung cancer Father     Diabetes Father         reports that she has been smoking cigarettes  She has a 12 50 pack-year smoking history  She has never used smokeless tobacco  She reports previous alcohol use  She reports that she does not use drugs        Physical Exam:    /89 (BP Location: Right arm, Patient Position: Sitting, Cuff Size: Standard)   Pulse 104   Temp 97 7 °F (36 5 °C) (Temporal)   Ht 5' 2" (1 575 m)   Wt 92 2 kg (203 lb 3 2 oz)   LMP 12/06/2018   BMI 37 17 kg/m²     Gen: wn/wd, NAD, healthy-appearing female  HEENT: anicteric, MMM, no cervical LAD  CVS: RRR, no m/r/g  CHEST: CTA b/l  ABD: +BS, soft, NT,ND, no hepatosplenomegaly  EXT: no c/c/e  NEURO: aaox3  SKIN: NO rashes

## 2022-02-28 NOTE — PROGRESS NOTES
[facility]  Eligio Tj 40 y o  female MRN: [id number]            Assessment & Plan:    56-year-old female, longstanding history of loose stools recent change in bowel movements with short-term constipation in the setting of iron supplementation with bright red blood per rectum over the past 3 days  Also has had significant worsening reflux with early satiety and nausea since COVID infection  1  Rectal bleeding and loose stools:  Longstanding history of loose stools, likely secondary to thyroid dysfunction versus irritable bowel syndrome   -suspect that rectal bleeding secondary to hemorrhoids given clinical history  -patient was given prescription for Anusol suppositories and cream, recommended fiber supplementation  -we will check a CBC  -we will proceed with colonoscopy to exclude luminal pathology    2  GERD with early satiety nausea:  Likely secondary to dysmotility from COVID infection  -will start the patient on PPI therapy  -will proceed with an upper endoscopy at the same time, if her symptoms are resolved with PPI therapy this can potentially be canceled at that time    2  Elevated LFTs:  Patient has had history of elevated LFTs secondary to fatty liver disease in the past, we had recommend serologic evaluation several years ago, she has lost weight her LFTs have improved  - will repeat LFTs at this time and consider workup pending results        Emily Pressley was seen today for follow-up  Diagnoses and all orders for this visit:    Gastroesophageal reflux disease, unspecified whether esophagitis present  -     pantoprazole (PROTONIX) 40 mg tablet; Take 1 tablet (40 mg total) by mouth daily    Loose stools  -     TSH, 3rd generation with Free T4 reflex; Future  -     Hepatic function panel; Future  -     Basic metabolic panel;  Future  -     Sod Picosulfate-Mag Ox-Cit Acd (Clenpiq) 10-3 5-12 MG-GM -GM/160ML SOLN; Take 1 kit by mouth 1 (one) time for 1 dose    Rectal bleeding  -     CBC and differential; Future  -     hydrocortisone (ANUSOL-HC) 25 mg suppository; Insert 1 suppository (25 mg total) into the rectum 2 (two) times a day  -     hydrocortisone (ANUSOL-HC) 2 5 % rectal cream; Apply topically 2 (two) times a day  -     Sod Picosulfate-Mag Ox-Cit Acd (Clenpiq) 10-3 5-12 MG-GM -GM/160ML SOLN; Take 1 kit by mouth 1 (one) time for 1 dose            _____________________________________________________________        CC:  Rectal bleeding    HPI:  Johanna Yee is a 40 y  o female who is here for rectal bleeding  This is a 71-year-old female, we had seen her in the past when she presented with loose stools, she reports she has had a long standing history of loose stools recently she has stopped her iron supplementation and upon stopping them she developed some hard stools  She had a bowel movement during which she had to push and strain, associated with that she had bright red blood per rectum with some clots, she has had perianal itching, continue to have over the last 2 days some blood-tinged stools  Presented to the emergency room  She reports some lightheadedness and dizziness  She noted some blood on the toilet paper  She also reports some mild lower abdominal discomfort  Patient did develop COVID infection recently and ever since then she has had lower abdominal discomfort, worsening heartburn symptoms early satiety, nausea frequent reflux for which she has been taking over-the-counter Tums  This has significantly worsened since her COVID infection  She denies any dysphagia  Her weight has been stable  She has never had a colonoscopy  Patient had a thyroidectomy,  x5, hysterectomy, bladder sling  She also reports some urinary incontinence  Denies any family history of GI or associated malignancies  ROS:  The remainder of the ROS was negative except for the pertinent positives mentioned in HPI        Allergies: Vicodin [hydrocodone-acetaminophen], Morphine and related, and Adhesive [medical tape]    Medications:   Current Outpatient Medications:     albuterol (Ventolin HFA) 90 mcg/act inhaler, Inhale 2 puffs every 4 (four) hours as needed for wheezing or shortness of breath, Disp: 18 g, Rfl: 0    ALPRAZolam (XANAX) 1 mg tablet, Take 1 mg by mouth daily am, Disp: , Rfl:     baclofen 10 mg tablet, Take 10 mg by mouth 3 (three) times a day , Disp: , Rfl:     calcitriol (ROCALTROL) 0 25 mcg capsule, TAKE 1 CAPSULE BY MOUTH EVERY DAY, Disp: 90 capsule, Rfl: 0    ergocalciferol (VITAMIN D2) 50,000 units, Take 1 capsule (50,000 Units total) by mouth once a week, Disp: 8 capsule, Rfl: 0    fenofibrate (TRIGLIDE) 160 MG tablet, Take 160 mg by mouth daily, Disp: , Rfl:     ferrous sulfate 325 (65 Fe) mg tablet, Take 325 mg by mouth Once Monday, wed, Friday, Disp: , Rfl:     levothyroxine 150 mcg tablet, TAKE 1 TABLET BY MOUTH EVERY DAY, Disp: 90 tablet, Rfl: 1    magnesium Oxide (MAG-OX) 400 mg TABS, TAKE 1 TABLET (400 MG TOTAL) BY MOUTH THREE (THREE) TIMES A DAY 9PM, Disp: 90 tablet, Rfl: 3    oxyCODONE-acetaminophen (PERCOCET) 5-325 mg per tablet, Take 2 tablets by mouth daily , Disp: , Rfl:     potassium chloride (K-DUR,KLOR-CON) 20 mEq tablet, Take 20 mEq by mouth 2 (two) times a day, Disp: , Rfl:     pregabalin (LYRICA) 75 mg capsule, Take 75 mg by mouth 3 (three) times a day as needed , Disp: , Rfl:     QUEtiapine (SEROquel) 25 mg tablet, quetiapine 25 mg tablet, Disp: , Rfl:     witch hazel-glycerin (TUCKS) topical pad, Insert 1 pad into the rectum 3 (three) times a day for 14 days, Disp: 42 pad, Rfl: 0    baclofen 20 mg tablet, Take 1 tablet (20 mg total) by mouth 3 (three) times a day (Patient not taking: Reported on 2/25/2022 ), Disp: 30 tablet, Rfl: 0    Calcium Carb-Cholecalciferol (Calcium 500+D) 500-400 MG-UNIT TABS, Take 500 mg by mouth 6 (six) times a day (Patient not taking: Reported on 2/25/2022 ), Disp: 360 tablet, Rfl: 3    clindamycin (CLEOCIN) 300 MG capsule, clindamycin HCl 300 mg capsule (Patient not taking: Reported on 2/25/2022), Disp: , Rfl:     hydrocortisone (ANUSOL-HC) 2 5 % rectal cream, Apply topically 2 (two) times a day, Disp: 30 g, Rfl: 1    hydrocortisone (ANUSOL-HC) 25 mg suppository, Insert 1 suppository (25 mg total) into the rectum 2 (two) times a day, Disp: 12 suppository, Rfl: 0    ibuprofen (MOTRIN) 600 mg tablet, Take 1 tablet (600 mg total) by mouth every 6 (six) hours as needed for mild pain or moderate pain (Patient not taking: Reported on 2/25/2022 ), Disp: 16 tablet, Rfl: 0    metoclopramide (REGLAN) 10 mg tablet, Take 1 tablet (10 mg total) by mouth every 6 (six) hours (Patient not taking: Reported on 2/25/2022 ), Disp: 30 tablet, Rfl: 0    metoclopramide (REGLAN) 10 mg tablet, Take 1 tablet (10 mg total) by mouth every 6 (six) hours as needed (nausea) (Patient not taking: Reported on 2/25/2022 ), Disp: 30 tablet, Rfl: 0    ondansetron (ZOFRAN-ODT) 4 mg disintegrating tablet, Take 1 tablet (4 mg total) by mouth every 6 (six) hours as needed for nausea or vomiting (Patient not taking: Reported on 2/25/2022 ), Disp: 12 tablet, Rfl: 0    pantoprazole (PROTONIX) 40 mg tablet, Take 1 tablet (40 mg total) by mouth daily, Disp: 30 tablet, Rfl: 3    QUEtiapine (SEROquel) 50 mg tablet, quetiapine 50 mg tablet  TAKE 1 TABLET BY MOUTH EVERYDAY AT BEDTIME (Patient not taking: Reported on 2/25/2022), Disp: , Rfl:     traZODone (DESYREL) 50 mg tablet, trazodone 50 mg tablet  TAKE 1 TABLET BY MOUTH EVERY DAY AT BEDTIME AS NEEDED (Patient not taking: Reported on 2/25/2022), Disp: , Rfl:     zolpidem (AMBIEN CR) 6 25 MG CR tablet, Take 6 25 mg by mouth daily at bedtime as needed for sleep (Patient not taking: Reported on 2/25/2022 ), Disp: , Rfl:     Past Medical History:   Diagnosis Date    Acid reflux     Acute renal failure (HCC)     multiple episodes    Anemia     Anxiety     Chronic pain     DDD (degenerative disc disease), lumbar     Disease of thyroid gland     had surgery and now hypo    DVT (deep venous thrombosis) ([facility] [address] )     s/p ankle fracture    Hypercalcemia     Hyperlipidemia     Hyperthyroidism     Hypocalcemia     post op 2016    Migraine     Psychiatric disorder     anxiety depression    Seizures (HCC)     petit mal x1  4 years ago- all tests were neg   Spondylolisthesis of lumbar region     Treatment     spinal pain injections  last was 2016       Past Surgical History:   Procedure Laterality Date    BACK SURGERY       SECTION      x5    CYSTOCELE REPAIR  2017    DISCOGRAM      PARATHYROIDECTOMY      ID ANTERIOR COLPORRAPHY RPR CYSTOCELE W/CYSTO N/A 2017    Procedure: CYSTOCELE REPAIR;  Surgeon: David Ramirez MD;  Location: [address] [place] [address];  Service: Gynecology    ID ARTHRODESIS POSTERIOR INTERBODY LUMBAR N/A 2016    Procedure: L4-S1 LUMBAR LAMINECTOMY/DECOMPRESSION;  INSTRUMENTED POSTEROLATERAL FUSION/ INTERBODY L5-S1; ALLOGRAFT AND AUTOGRAFT (IMPULSE) ;   Surgeon: Ruth Phillips MD;  Location:  MAIN OR;  Service: Orthopedics    ID CYSTOURETHROSCOPY,URETER CATHETER Bilateral 2018    Procedure: INSERTION URETERAL CATHETERS PREOP;  Surgeon: Nicole Gabriel MD;  Location: [address] [place] [address];  Service: Urology    ID SLING OPER STRES INCONTINENCE N/A 2017    Procedure: MID URETHRAL SLING;  Surgeon: Juan Patel MD;  Location: [address] [place] [address];  Service: Urology    ID SUPRACERV ABD HYSTERECTOMY N/A 2018    Procedure: SUPRACERVICAL HYSTERECTOMY;  Surgeon: David Ramirez MD;  Location: [facility];  Service: Gynecology    THYROIDECTOMY      TONSILECTOMY AND ADNOIDECTOMY      TONSILLECTOMY      TUBAL LIGATION         Family History   Problem Relation Age of Onset    Diabetes Mother     Hypertension Mother     Hyperlipidemia Father     Arrhythmia Father     Lung cancer Father     Diabetes Father         reports that she has been smoking cigarettes  She has a 12 50 pack-year smoking history  She has never used smokeless tobacco  She reports previous alcohol use  She reports that she does not use drugs        Physical Exam:    /89 (BP Location: Right arm, Patient Position: Sitting, Cuff Size: Standard)   Pulse 104   Temp 97 7 °F (36 5 °C) (Temporal)   Ht 5' 2" (1 575 m)   Wt 92 2 kg (203 lb 3 2 oz)   LMP 12/06/2018   BMI 37 17 kg/m²     Gen: wn/wd, NAD, healthy-appearing female  HEENT: anicteric, MMM, no cervical LAD  CVS: RRR, no m/r/g  CHEST: CTA b/l  ABD: +BS, soft, NT,ND, no hepatosplenomegaly  EXT: no c/c/e  NEURO: aaox3  SKIN: NO rashes

## 2022-03-04 ENCOUNTER — LAB (OUTPATIENT)
Dept: LAB | Facility: HOSPITAL | Age: 47
End: 2022-03-04
Attending: INTERNAL MEDICINE
Payer: COMMERCIAL

## 2022-03-04 DIAGNOSIS — Z83.2 FAMILY HISTORY OF COAGULATION DISORDER: ICD-10-CM

## 2022-03-04 DIAGNOSIS — E55.9 VITAMIN D DEFICIENCY: ICD-10-CM

## 2022-03-04 DIAGNOSIS — Z86.39 HISTORY OF IRON DEFICIENCY: ICD-10-CM

## 2022-03-04 DIAGNOSIS — E89.2 POSTSURGICAL HYPOPARATHYROIDISM (HCC): Chronic | ICD-10-CM

## 2022-03-04 DIAGNOSIS — E83.51 HYPOCALCEMIA: ICD-10-CM

## 2022-03-04 DIAGNOSIS — K76.0 NAFLD (NONALCOHOLIC FATTY LIVER DISEASE): ICD-10-CM

## 2022-03-04 DIAGNOSIS — R19.5 LOOSE STOOLS: ICD-10-CM

## 2022-03-04 DIAGNOSIS — E89.0 POSTOPERATIVE HYPOTHYROIDISM: Chronic | ICD-10-CM

## 2022-03-04 DIAGNOSIS — R79.89 ELEVATED FERRITIN: ICD-10-CM

## 2022-03-04 DIAGNOSIS — K62.5 RECTAL BLEEDING: ICD-10-CM

## 2022-03-04 DIAGNOSIS — Z86.2 HISTORY OF COAGULOPATHY: ICD-10-CM

## 2022-03-04 LAB
25(OH)D3 SERPL-MCNC: 11.2 NG/ML (ref 30–100)
ALBUMIN SERPL BCP-MCNC: 3.6 G/DL (ref 3.5–5)
ALP SERPL-CCNC: 98 U/L (ref 46–116)
ALT SERPL W P-5'-P-CCNC: 97 U/L (ref 12–78)
ANION GAP SERPL CALCULATED.3IONS-SCNC: 16 MMOL/L (ref 4–13)
APTT PPP: 30 SECONDS (ref 23–37)
AST SERPL W P-5'-P-CCNC: 33 U/L (ref 5–45)
BASOPHILS # BLD AUTO: 0.05 THOUSANDS/ΜL (ref 0–0.1)
BASOPHILS NFR BLD AUTO: 1 % (ref 0–1)
BILIRUB DIRECT SERPL-MCNC: 0.05 MG/DL (ref 0–0.2)
BILIRUB SERPL-MCNC: 0.39 MG/DL (ref 0.2–1)
BUN SERPL-MCNC: 17 MG/DL (ref 5–25)
CA-I BLD-SCNC: 1.13 MMOL/L (ref 1.12–1.32)
CALCIUM SERPL-MCNC: 8.6 MG/DL (ref 8.3–10.1)
CHLORIDE SERPL-SCNC: 98 MMOL/L (ref 100–108)
CO2 SERPL-SCNC: 21 MMOL/L (ref 21–32)
CREAT SERPL-MCNC: 0.81 MG/DL (ref 0.6–1.3)
CRP SERPL QL: 6.2 MG/L
EOSINOPHIL # BLD AUTO: 0.11 THOUSAND/ΜL (ref 0–0.61)
EOSINOPHIL NFR BLD AUTO: 2 % (ref 0–6)
ERYTHROCYTE [DISTWIDTH] IN BLOOD BY AUTOMATED COUNT: 16.5 % (ref 11.6–15.1)
ERYTHROCYTE [SEDIMENTATION RATE] IN BLOOD: 50 MM/HOUR (ref 0–19)
FERRITIN SERPL-MCNC: 790 NG/ML (ref 8–388)
GFR SERPL CREATININE-BSD FRML MDRD: 87 ML/MIN/1.73SQ M
GLUCOSE P FAST SERPL-MCNC: 144 MG/DL (ref 65–99)
HCT VFR BLD AUTO: 40.9 % (ref 34.8–46.1)
HGB BLD-MCNC: 13.4 G/DL (ref 11.5–15.4)
IMM GRANULOCYTES # BLD AUTO: 0.02 THOUSAND/UL (ref 0–0.2)
IMM GRANULOCYTES NFR BLD AUTO: 0 % (ref 0–2)
INR PPP: 0.88 (ref 0.84–1.19)
IRON SATN MFR SERPL: 42 % (ref 15–50)
IRON SERPL-MCNC: 76 UG/DL (ref 50–170)
LYMPHOCYTES # BLD AUTO: 1.56 THOUSANDS/ΜL (ref 0.6–4.47)
LYMPHOCYTES NFR BLD AUTO: 27 % (ref 14–44)
MAGNESIUM SERPL-MCNC: 2 MG/DL (ref 1.6–2.6)
MCH RBC QN AUTO: 28.8 PG (ref 26.8–34.3)
MCHC RBC AUTO-ENTMCNC: 32.8 G/DL (ref 31.4–37.4)
MCV RBC AUTO: 88 FL (ref 82–98)
MONOCYTES # BLD AUTO: 0.44 THOUSAND/ΜL (ref 0.17–1.22)
MONOCYTES NFR BLD AUTO: 8 % (ref 4–12)
NEUTROPHILS # BLD AUTO: 3.62 THOUSANDS/ΜL (ref 1.85–7.62)
NEUTS SEG NFR BLD AUTO: 62 % (ref 43–75)
NRBC BLD AUTO-RTO: 0 /100 WBCS
PLATELET # BLD AUTO: 166 THOUSANDS/UL (ref 149–390)
PMV BLD AUTO: 10.3 FL (ref 8.9–12.7)
POTASSIUM SERPL-SCNC: 4 MMOL/L (ref 3.5–5.3)
PROT SERPL-MCNC: 7.5 G/DL (ref 6.4–8.2)
PROTHROMBIN TIME: 11.8 SECONDS (ref 11.6–14.5)
RBC # BLD AUTO: 4.65 MILLION/UL (ref 3.81–5.12)
SODIUM SERPL-SCNC: 135 MMOL/L (ref 136–145)
T4 FREE SERPL-MCNC: 1.15 NG/DL (ref 0.76–1.46)
TIBC SERPL-MCNC: 183 UG/DL (ref 250–450)
TSH SERPL DL<=0.05 MIU/L-ACNC: 1.88 UIU/ML (ref 0.36–3.74)
WBC # BLD AUTO: 5.8 THOUSAND/UL (ref 4.31–10.16)

## 2022-03-04 PROCEDURE — 80076 HEPATIC FUNCTION PANEL: CPT

## 2022-03-04 PROCEDURE — 83550 IRON BINDING TEST: CPT

## 2022-03-04 PROCEDURE — 81256 HFE GENE: CPT

## 2022-03-04 PROCEDURE — 84439 ASSAY OF FREE THYROXINE: CPT

## 2022-03-04 PROCEDURE — 85245 CLOT FACTOR VIII VW RISTOCTN: CPT

## 2022-03-04 PROCEDURE — 85240 CLOT FACTOR VIII AHG 1 STAGE: CPT

## 2022-03-04 PROCEDURE — 85410 FIBRINOLYTIC ANTIPLASMIN: CPT

## 2022-03-04 PROCEDURE — 85291 CLOT FACTOR XIII FIBRIN SCRN: CPT

## 2022-03-04 PROCEDURE — 82728 ASSAY OF FERRITIN: CPT

## 2022-03-04 PROCEDURE — 82306 VITAMIN D 25 HYDROXY: CPT

## 2022-03-04 PROCEDURE — 85025 COMPLETE CBC W/AUTO DIFF WBC: CPT

## 2022-03-04 PROCEDURE — 86140 C-REACTIVE PROTEIN: CPT

## 2022-03-04 PROCEDURE — 83540 ASSAY OF IRON: CPT

## 2022-03-04 PROCEDURE — 85415 FIBRINOLYTIC PLASMINOGEN: CPT

## 2022-03-04 PROCEDURE — 84443 ASSAY THYROID STIM HORMONE: CPT

## 2022-03-04 PROCEDURE — 80048 BASIC METABOLIC PNL TOTAL CA: CPT

## 2022-03-04 PROCEDURE — 85246 CLOT FACTOR VIII VW ANTIGEN: CPT

## 2022-03-04 PROCEDURE — 85270 CLOT FACTOR XI PTA: CPT

## 2022-03-04 PROCEDURE — 85652 RBC SED RATE AUTOMATED: CPT

## 2022-03-04 PROCEDURE — 85384 FIBRINOGEN ACTIVITY: CPT

## 2022-03-04 PROCEDURE — 85730 THROMBOPLASTIN TIME PARTIAL: CPT

## 2022-03-04 PROCEDURE — 36415 COLL VENOUS BLD VENIPUNCTURE: CPT

## 2022-03-04 PROCEDURE — 85610 PROTHROMBIN TIME: CPT

## 2022-03-04 PROCEDURE — 85250 CLOT FACTOR IX PTC/CHRSTMAS: CPT

## 2022-03-04 PROCEDURE — 82330 ASSAY OF CALCIUM: CPT

## 2022-03-04 PROCEDURE — 83735 ASSAY OF MAGNESIUM: CPT

## 2022-03-10 LAB — MISCELLANEOUS LAB TEST RESULT: NORMAL

## 2022-03-11 LAB — HFE GENE MUT ANL BLD/T: NORMAL

## 2022-03-14 NOTE — RESULT ENCOUNTER NOTE
Spoke with patient and informed her of the information Katharina Bauman provided  Patient states she does not feel the need to be seen by our office right now  I told the patient that if this were to conklin to contact our office and we can schedule an appointment

## 2022-03-15 ENCOUNTER — APPOINTMENT (EMERGENCY)
Dept: RADIOLOGY | Facility: HOSPITAL | Age: 47
End: 2022-03-15

## 2022-03-15 ENCOUNTER — HOSPITAL ENCOUNTER (EMERGENCY)
Facility: HOSPITAL | Age: 47
Discharge: HOME | End: 2022-03-16
Attending: EMERGENCY MEDICINE | Admitting: EMERGENCY MEDICINE

## 2022-03-15 VITALS
DIASTOLIC BLOOD PRESSURE: 87 MMHG | SYSTOLIC BLOOD PRESSURE: 123 MMHG | WEIGHT: 195 LBS | TEMPERATURE: 97.6 F | OXYGEN SATURATION: 100 % | HEIGHT: 62 IN | RESPIRATION RATE: 18 BRPM | HEART RATE: 127 BPM | BODY MASS INDEX: 35.88 KG/M2

## 2022-03-15 DIAGNOSIS — M54.2 NECK PAIN: ICD-10-CM

## 2022-03-15 DIAGNOSIS — M54.9 BACK PAIN: ICD-10-CM
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
## 2022-03-15 PROCEDURE — 99284 EMERGENCY DEPT VISIT MOD MDM: CPT

## 2022-03-15 PROCEDURE — 72131 CT LUMBAR SPINE W/O DYE: CPT

## 2022-03-15 PROCEDURE — 72125 CT NECK SPINE W/O DYE: CPT

## 2022-03-15 PROCEDURE — 72128 CT CHEST SPINE W/O DYE: CPT

## 2022-03-15 PROCEDURE — G1004 CDSM NDSC: HCPCS

## 2022-03-15 PROCEDURE — 99284 EMERGENCY DEPT VISIT MOD MDM: CPT | Performed by: EMERGENCY MEDICINE

## 2022-03-15 PROCEDURE — 96372 THER/PROPH/DIAG INJ SC/IM: CPT

## 2022-03-15 RX ORDER — LIDOCAINE 50 MG/G
1 PATCH TOPICAL ONCE
Status: DISCONTINUED | OUTPATIENT
Start: 2022-03-15 | End: 2022-03-16 | Stop reason: HOSPADM

## 2022-03-15 RX ORDER — KETOROLAC TROMETHAMINE 30 MG/ML
30 INJECTION, SOLUTION INTRAMUSCULAR; INTRAVENOUS ONCE
Status: COMPLETED | OUTPATIENT
Start: 2022-03-15 | End: 2022-03-15

## 2022-03-15 RX ORDER — ACETAMINOPHEN 325 MG/1
650 TABLET ORAL ONCE
Status: DISCONTINUED | OUTPATIENT
Start: 2022-03-15 | End: 2022-03-16 | Stop reason: HOSPADM

## 2022-03-15 RX ADMIN — LIDOCAINE 5% 1 PATCH: 700 PATCH TOPICAL at 23:50

## 2022-03-15 RX ADMIN — KETOROLAC TROMETHAMINE 30 MG: 30 INJECTION, SOLUTION INTRAMUSCULAR at 23:49

## 2022-03-16 RX ORDER — METHOCARBAMOL 500 MG/1
500 TABLET, FILM COATED ORAL 2 TIMES DAILY
Qty: 20 TABLET | Refills: 0 | Status: SHIPPED | OUTPATIENT
Start: 2022-03-16 | End: 2022-05-30

## 2022-03-16 RX ORDER — NAPROXEN 500 MG/1
500 TABLET ORAL 2 TIMES DAILY WITH MEALS
Qty: 30 TABLET | Refills: 0 | Status: SHIPPED | OUTPATIENT
Start: 2022-03-16 | End: 2022-04-25 | Stop reason: ALTCHOICE
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
## 2022-04-01 PROCEDURE — U0005 INFEC AGEN DETEC AMPLI PROBE: HCPCS | Performed by: INTERNAL MEDICINE

## 2022-04-01 PROCEDURE — U0003 INFECTIOUS AGENT DETECTION BY NUCLEIC ACID (DNA OR RNA); SEVERE ACUTE RESPIRATORY SYNDROME CORONAVIRUS 2 (SARS-COV-2) (CORONAVIRUS DISEASE [COVID-19]), AMPLIFIED PROBE TECHNIQUE, MAKING USE OF HIGH THROUGHPUT TECHNOLOGIES AS DESCRIBED BY CMS-2020-01-R: HCPCS | Performed by: INTERNAL MEDICINE

## 2022-04-09 ENCOUNTER — HOSPITAL ENCOUNTER (EMERGENCY)
Facility: HOSPITAL | Age: 47
Discharge: HOME | End: 2022-04-09
Attending: EMERGENCY MEDICINE
Payer: COMMERCIAL

## 2022-04-09 VITALS
OXYGEN SATURATION: 98 % | TEMPERATURE: 97.9 F | SYSTOLIC BLOOD PRESSURE: 144 MMHG | DIASTOLIC BLOOD PRESSURE: 81 MMHG | WEIGHT: 208.2 LBS | RESPIRATION RATE: 22 BRPM | BODY MASS INDEX: 38.08 KG/M2 | HEART RATE: 74 BPM

## 2022-04-09 DIAGNOSIS — E83.51 HYPOCALCEMIA: Primary | ICD-10-CM

## 2022-04-09 LAB
ALBUMIN SERPL BCP-MCNC: 3.1 G/DL (ref 3.5–5)
ALP SERPL-CCNC: 105 U/L (ref 46–116)
ALT SERPL W P-5'-P-CCNC: 60 U/L (ref 12–78)
ANION GAP SERPL CALCULATED.3IONS-SCNC: 21 MMOL/L (ref 4–13)
AST SERPL W P-5'-P-CCNC: 64 U/L (ref 5–45)
BILIRUB SERPL-MCNC: 0.27 MG/DL (ref 0.2–1)
BUN SERPL-MCNC: 20 MG/DL (ref 5–25)
CA-I BLD-SCNC: 0.85 MMOL/L (ref 1.12–1.32)
CALCIUM ALBUM COR SERPL-MCNC: 7.9 MG/DL (ref 8.3–10.1)
CALCIUM SERPL-MCNC: 7.2 MG/DL (ref 8.3–10.1)
CHLORIDE SERPL-SCNC: 102 MMOL/L (ref 100–108)
CO2 SERPL-SCNC: 15 MMOL/L (ref 21–32)
CREAT SERPL-MCNC: 0.88 MG/DL (ref 0.6–1.3)
GFR SERPL CREATININE-BSD FRML MDRD: 79 ML/MIN/1.73SQ M
GLUCOSE SERPL-MCNC: 145 MG/DL (ref 65–140)
MAGNESIUM SERPL-MCNC: 1.9 MG/DL (ref 1.6–2.6)
POTASSIUM SERPL-SCNC: 4.2 MMOL/L (ref 3.5–5.3)
PROT SERPL-MCNC: 6.9 G/DL (ref 6.4–8.2)
SODIUM SERPL-SCNC: 138 MMOL/L (ref 136–145)

## 2022-04-09 PROCEDURE — 83735 ASSAY OF MAGNESIUM: CPT | Performed by: EMERGENCY MEDICINE

## 2022-04-09 PROCEDURE — 99284 EMERGENCY DEPT VISIT MOD MDM: CPT | Performed by: EMERGENCY MEDICINE

## 2022-04-09 PROCEDURE — 99284 EMERGENCY DEPT VISIT MOD MDM: CPT

## 2022-04-09 PROCEDURE — 82330 ASSAY OF CALCIUM: CPT | Performed by: EMERGENCY MEDICINE

## 2022-04-09 PROCEDURE — 36415 COLL VENOUS BLD VENIPUNCTURE: CPT | Performed by: EMERGENCY MEDICINE

## 2022-04-09 PROCEDURE — 80053 COMPREHEN METABOLIC PANEL: CPT | Performed by: EMERGENCY MEDICINE

## 2022-04-09 PROCEDURE — 93005 ELECTROCARDIOGRAM TRACING: CPT

## 2022-04-09 RX ORDER — CALCIUM GLUCONATE 20 MG/ML
2 INJECTION, SOLUTION INTRAVENOUS ONCE
Status: COMPLETED | OUTPATIENT
Start: 2022-04-09 | End: 2022-04-09

## 2022-04-09 RX ADMIN — CALCIUM GLUCONATE 2 G: 20 INJECTION, SOLUTION INTRAVENOUS at 21:14

## 2022-04-09 NOTE — ED PROVIDER NOTES
History  Chief Complaint   Patient presents with    Numbness     States she thinks that her calcium is low because she started yesterday with numbness and tingling entire body  States she takes 6 calcium tabs ( 600 mg ) daily and she took a extra one at 1500  Pt in the ER with c/o diffuse paresthesias x 1 day  She is concerned that she is hypocalcemic, as she has a hx of similar symptoms  Pt states that she is compliant with her meds  She has a history of hypothyroidism, hyperlipidemia, chronic pain, anxiety, depression  History provided by:  Patient   used: No        Prior to Admission Medications   Prescriptions Last Dose Informant Patient Reported? Taking?    ALPRAZolam (XANAX) 1 mg tablet  Self Yes No   Sig: Take 1 mg by mouth daily am   Calcium Carb-Cholecalciferol (Calcium 500+D) 500-400 MG-UNIT TABS  Self No No   Sig: Take 500 mg by mouth 6 (six) times a day   Patient not taking: Reported on 2/25/2022    QUEtiapine (SEROquel) 25 mg tablet  Self Yes No   Sig: quetiapine 25 mg tablet   QUEtiapine (SEROquel) 50 mg tablet  Self Yes No   Sig: quetiapine 50 mg tablet   TAKE 1 TABLET BY MOUTH EVERYDAY AT BEDTIME   Patient not taking: Reported on 2/25/2022   albuterol (Ventolin HFA) 90 mcg/act inhaler  Self No No   Sig: Inhale 2 puffs every 4 (four) hours as needed for wheezing or shortness of breath   baclofen 10 mg tablet  Self Yes No   Sig: Take 10 mg by mouth 3 (three) times a day    baclofen 20 mg tablet  Self No No   Sig: Take 1 tablet (20 mg total) by mouth 3 (three) times a day   Patient not taking: Reported on 2/25/2022    calcitriol (ROCALTROL) 0 25 mcg capsule   No No   Sig: TAKE 1 CAPSULE BY MOUTH EVERY DAY   ergocalciferol (VITAMIN D2) 50,000 units  Self No No   Sig: Take 1 capsule (50,000 Units total) by mouth once a week   fenofibrate (TRIGLIDE) 160 MG tablet  Self Yes No   Sig: Take 160 mg by mouth daily   ferrous sulfate 325 (65 Fe) mg tablet  Self Yes No   Sig: Take 325 mg by mouth Once Monday, wed, Friday   hydrocortisone (ANUSOL-HC) 2 5 % rectal cream   No No   Sig: Apply topically 2 (two) times a day   hydrocortisone (ANUSOL-HC) 25 mg suppository   No No   Sig: Insert 1 suppository (25 mg total) into the rectum 2 (two) times a day   ibuprofen (MOTRIN) 600 mg tablet  Self No No   Sig: Take 1 tablet (600 mg total) by mouth every 6 (six) hours as needed for mild pain or moderate pain   Patient not taking: Reported on 2/25/2022    levothyroxine 150 mcg tablet  Self No No   Sig: TAKE 1 TABLET BY MOUTH EVERY DAY   magnesium Oxide (MAG-OX) 400 mg TABS  Self No No   Sig: TAKE 1 TABLET (400 MG TOTAL) BY MOUTH THREE (THREE) TIMES A DAY 9PM   methocarbamol (ROBAXIN) 500 mg tablet   No No   Sig: Take 1 tablet (500 mg total) by mouth 2 (two) times a day   metoclopramide (REGLAN) 10 mg tablet  Self No No   Sig: Take 1 tablet (10 mg total) by mouth every 6 (six) hours   Patient not taking: Reported on 2/25/2022    metoclopramide (REGLAN) 10 mg tablet  Self No No   Sig: Take 1 tablet (10 mg total) by mouth every 6 (six) hours as needed (nausea)   Patient not taking: Reported on 2/25/2022    naproxen (NAPROSYN) 500 mg tablet   No No   Sig: Take 1 tablet (500 mg total) by mouth 2 (two) times a day with meals   ondansetron (ZOFRAN-ODT) 4 mg disintegrating tablet  Self No No   Sig: Take 1 tablet (4 mg total) by mouth every 6 (six) hours as needed for nausea or vomiting   Patient not taking: Reported on 2/25/2022    oxyCODONE-acetaminophen (PERCOCET) 5-325 mg per tablet  Self Yes No   Sig: Take 2 tablets by mouth daily    pantoprazole (PROTONIX) 40 mg tablet   No No   Sig: Take 1 tablet (40 mg total) by mouth daily   potassium chloride (K-DUR,KLOR-CON) 20 mEq tablet  Self Yes No   Sig: Take 20 mEq by mouth 2 (two) times a day   pregabalin (LYRICA) 75 mg capsule  Self Yes No   Sig: Take 75 mg by mouth 3 (three) times a day as needed    traZODone (DESYREL) 50 mg tablet  Self Yes No   Sig: trazodone 50 mg tablet   TAKE 1 TABLET BY MOUTH EVERY DAY AT BEDTIME AS NEEDED   Patient not taking: Reported on 2022   witch hazel-glycerin (TUCKS) topical pad  Self No No   Sig: Insert 1 pad into the rectum 3 (three) times a day for 14 days   zolpidem (AMBIEN CR) 6 25 MG CR tablet  Self Yes No   Sig: Take 6 25 mg by mouth daily at bedtime as needed for sleep   Patient not taking: Reported on 2022       Facility-Administered Medications: None       Past Medical History:   Diagnosis Date    Acid reflux     Acute renal failure (HCC)     multiple episodes    Anemia     Anxiety     Chronic pain     DDD (degenerative disc disease), lumbar     Disease of thyroid gland     had surgery and now hypo    DVT (deep venous thrombosis) ([facility] [address] )     s/p ankle fracture    Hypercalcemia     Hyperlipidemia     Hyperthyroidism     Hypocalcemia     post op 2016    Migraine     Psychiatric disorder     anxiety depression    Seizures (HCC)     petit mal x1  4 years ago- all tests were neg   Spondylolisthesis of lumbar region     Treatment     spinal pain injections  last was 2016       Past Surgical History:   Procedure Laterality Date    BACK SURGERY       SECTION      x5    CYSTOCELE REPAIR  2017    DISCOGRAM      PARATHYROIDECTOMY      [place] ANTERIOR COLPORRAPHY RPR CYSTOCELE W/CYSTO N/A 2017    Procedure: CYSTOCELE REPAIR;  Surgeon: Mely Colon MD;  Location: [address] [place] [address];  Service: Gynecology    [place] ARTHRODESIS POSTERIOR INTERBODY LUMBAR N/A 2016    Procedure: L4-S1 LUMBAR LAMINECTOMY/DECOMPRESSION;  INSTRUMENTED POSTEROLATERAL FUSION/ INTERBODY L5-S1; ALLOGRAFT AND AUTOGRAFT (IMPULSE) ;   Surgeon: Gaetano Porter MD;  [facility];  Service: Orthopedics    [place] CYSTOURETHROSCOPY,URETER CATHETER Bilateral 2018    Procedure: INSERTION URETERAL CATHETERS PREOP;  Surgeon: Anirudh Allen MD;  Location: [address] [place] [address];  Service: Urology    [place] SLING OPER [facility] [address] N/A 5/4/2017    Procedure: MID URETHRAL SLING;  Surgeon: Heriberto Scott MD;  Location: [address] [place] [address];  Service: Urology    [place] SUPRACERV ABD HYSTERECTOMY N/A 12/7/2018    Procedure: SUPRACERVICAL HYSTERECTOMY;  Surgeon: Ulises Franco MD;  Location: [facility];  Service: Gynecology    THYROIDECTOMY      TONSILECTOMY AND ADNOIDECTOMY      TONSILLECTOMY      TUBAL LIGATION         Family History   Problem Relation Age of Onset    Diabetes Mother     Hypertension Mother     Hyperlipidemia Father     Arrhythmia Father     Lung cancer Father     Diabetes Father      I have reviewed and agree with the history as documented  E-Cigarette/Vaping    E-Cigarette Use Never User      E-Cigarette/Vaping Substances    Nicotine No     THC No     CBD No     Flavoring No     Other No     Unknown No      Social History     Tobacco Use    Smoking status: Current Every Day Smoker     Packs/day: 0 50     Years: 25 00     Pack years: 12 50     Types: Cigarettes    Smokeless tobacco: Never Used   Vaping Use    Vaping Use: Never used   Substance Use Topics    Alcohol use: Not Currently     Comment: Alcohol intake:   Occasional  - As per Li Andersen Drug use: No       Review of Systems   Constitutional: Negative for chills and fever  Respiratory: Negative for cough, chest tightness and shortness of breath  Gastrointestinal: Negative for abdominal pain, diarrhea, nausea and vomiting  Genitourinary: Negative for dysuria, frequency, hematuria and urgency  Musculoskeletal: Negative for back pain, neck pain and neck stiffness  Skin: Negative for color change, pallor, rash and wound  Neurological: Negative for seizures and weakness  All other systems reviewed and are negative  Physical Exam  Physical Exam  Vitals and nursing note reviewed  Constitutional:       General: She is not in acute distress  Appearance: She is well-developed  She is not diaphoretic     HENT:      Head: Normocephalic and atraumatic  Eyes:      Extraocular Movements: Extraocular movements intact  Conjunctiva/sclera: Conjunctivae normal       Pupils: Pupils are equal, round, and reactive to light  Cardiovascular:      Rate and Rhythm: Normal rate and regular rhythm  Heart sounds: Normal heart sounds  No murmur heard  Pulmonary:      Effort: Pulmonary effort is normal  No respiratory distress  Breath sounds: Normal breath sounds  Abdominal:      General: Bowel sounds are normal  There is no distension  Palpations: Abdomen is soft  Tenderness: There is no abdominal tenderness  Musculoskeletal:         General: No deformity  Normal range of motion  Cervical back: Normal range of motion and neck supple  Skin:     General: Skin is warm and dry  Capillary Refill: Capillary refill takes less than 2 seconds  Coloration: Skin is not pale  Findings: No rash  Neurological:      General: No focal deficit present  Mental Status: She is alert and oriented to person, place, and time  Cranial Nerves: No cranial nerve deficit  Psychiatric:         Mood and Affect: Mood is anxious           Behavior: Behavior normal          Vital Signs  ED Triage Vitals [04/09/22 1906]   Temperature Pulse Respirations Blood Pressure SpO2   97 7 °F (36 5 °C) 96 20 137/85 96 %      Temp Source Heart Rate Source Patient Position - Orthostatic VS BP Location FiO2 (%)   Tympanic Monitor Sitting Left arm --      Pain Score       7           Vitals:    04/09/22 2100 04/09/22 2130 04/09/22 2200 04/09/22 2230   BP: 145/76 136/73 136/65 144/81   Pulse: 82 78 78 74   Patient Position - Orthostatic VS:   Sitting          Visual Acuity  Visual Acuity      Most Recent Value   L Pupil Size (mm) 3   R Pupil Size (mm) 3          ED Medications  Medications   calcium gluconate 2 g in sodium chloride 0 9% 100 mL (premix) (0 g Intravenous Stopped 4/9/22 2227)       Diagnostic Studies  Results Reviewed     Procedure Component Value Units Date/Time    Magnesium [[id number]]  (Normal) Collected: 04/09/22 2005    Lab Status: Final result Specimen: Blood from Arm, Left Updated: 04/09/22 2118     Magnesium 1 9 mg/dL     Comprehensive metabolic panel [[id number]]  (Abnormal) Collected: 04/09/22 2005    Lab Status: Final result Specimen: Blood from Arm, Left Updated: 04/09/22 2110     Sodium 138 mmol/L      Potassium 4 2 mmol/L      Chloride 102 mmol/L      CO2 15 mmol/L      ANION GAP 21 mmol/L      BUN 20 mg/dL      Creatinine 0 88 mg/dL      Glucose 145 mg/dL      Calcium 7 2 mg/dL      Corrected Calcium 7 9 mg/dL      AST 64 U/L      ALT 60 U/L      Alkaline Phosphatase 105 U/L      Total Protein 6 9 g/dL      Albumin 3 1 g/dL      Total Bilirubin 0 27 mg/dL      eGFR 79 ml/min/1 73sq m     Narrative:      [place] guidelines for Chronic Kidney Disease (CKD):     Stage 1 with normal or high GFR (GFR > 90 mL/min/1 73 square meters)    Stage 2 Mild CKD (GFR = 60-89 mL/min/1 73 square meters)    Stage 3A Moderate CKD (GFR = 45-59 mL/min/1 73 square meters)    Stage 3B Moderate CKD (GFR = 30-44 mL/min/1 73 square meters)    Stage 4 Severe CKD (GFR = 15-29 mL/min/1 73 square meters)    Stage 5 End Stage CKD (GFR <15 mL/min/1 73 square meters)  Note: GFR calculation is accurate only with a steady state creatinine    Calcium, ionized [[id number]]  (Abnormal) Collected: 04/09/22 2005    Lab Status: Final result Specimen: Blood from Arm, Left Updated: 04/09/22 2015     Calcium, Ionized 0 85 mmol/L                  No orders to display              Procedures  Procedures         ED Course                                             MDM  Number of Diagnoses or Management Options  Hypocalcemia: new and requires workup     Amount and/or Complexity of Data Reviewed  Clinical lab tests: ordered and reviewed    Risk of Complications, Morbidity, and/or Mortality  Presenting problems: high  Diagnostic procedures: high  Management options: high    Patient Progress  Patient progress: improved      Disposition  Final diagnoses:   Hypocalcemia     Time reflects when diagnosis was documented in both MDM as applicable and the Disposition within this note     Time User Action Codes Description Comment    4/9/2022 10:02 PM Yahir Sandhu Rip [E83 [id number]] Hypocalcemia       ED Disposition     ED Disposition Condition Date/Time Comment    Discharge Stable Sat Apr 9, 2022 10:01 PM [facility] discharge to home/self care              Follow-up Information     Follow up With Specialties Details Why Bing Greco MD Internal Medicine Schedule an appointment as soon as possible for a visit in 2 days for follow up [address]  [phone]            Discharge Medication List as of 4/9/2022 10:03 PM      CONTINUE these medications which have NOT CHANGED    Details   albuterol (Ventolin HFA) 90 mcg/act inhaler Inhale 2 puffs every 4 (four) hours as needed for wheezing or shortness of breath, Starting Sun 9/26/2021, Normal      ALPRAZolam (XANAX) 1 mg tablet Take 1 mg by mouth daily am, Historical Med      !! baclofen 10 mg tablet Take 10 mg by mouth 3 (three) times a day , Historical Med      !! baclofen 20 mg tablet Take 1 tablet (20 mg total) by mouth 3 (three) times a day, Starting Thu 11/18/2021, Normal      calcitriol (ROCALTROL) 0 25 mcg capsule TAKE 1 CAPSULE BY MOUTH EVERY DAY, Normal      Calcium Carb-Cholecalciferol (Calcium 500+D) 500-400 MG-UNIT TABS Take 500 mg by mouth 6 (six) times a day, Normal      ergocalciferol (VITAMIN D2) 50,000 units Take 1 capsule (50,000 Units total) by mouth once a week, Starting Wed 3/31/2021, Normal      fenofibrate (TRIGLIDE) 160 MG tablet Take 160 mg by mouth daily, Historical Med      ferrous sulfate 325 (65 Fe) mg tablet Take 325 mg by mouth Once Monday, wed, Friday, Historical Med      hydrocortisone (ANUSOL-HC) 2 5 % rectal cream Apply topically 2 (two) times a day, Starting Fri 2/25/2022, Normal      hydrocortisone (ANUSOL-HC) 25 mg suppository Insert 1 suppository (25 mg total) into the rectum 2 (two) times a day, Starting Fri 2/25/2022, Normal      ibuprofen (MOTRIN) 600 mg tablet Take 1 tablet (600 mg total) by mouth every 6 (six) hours as needed for mild pain or moderate pain, Starting Tue 6/2/2020, Normal      levothyroxine 150 mcg tablet TAKE 1 TABLET BY MOUTH EVERY DAY, Normal      magnesium Oxide (MAG-OX) 400 mg TABS TAKE 1 TABLET (400 MG TOTAL) BY MOUTH THREE (THREE) TIMES A DAY 9PM, Normal      methocarbamol (ROBAXIN) 500 mg tablet Take 1 tablet (500 mg total) by mouth 2 (two) times a day, Starting Wed 3/16/2022, Normal      !! metoclopramide (REGLAN) 10 mg tablet Take 1 tablet (10 mg total) by mouth every 6 (six) hours, Starting Thu 9/9/2021, Normal      !! metoclopramide (REGLAN) 10 mg tablet Take 1 tablet (10 mg total) by mouth every 6 (six) hours as needed (nausea), Starting Fri 12/31/2021, Normal      naproxen (NAPROSYN) 500 mg tablet Take 1 tablet (500 mg total) by mouth 2 (two) times a day with meals, Starting Wed 3/16/2022, Normal      ondansetron (ZOFRAN-ODT) 4 mg disintegrating tablet Take 1 tablet (4 mg total) by mouth every 6 (six) hours as needed for nausea or vomiting, Starting Sun 12/19/2021, Normal      oxyCODONE-acetaminophen (PERCOCET) 5-325 mg per tablet Take 2 tablets by mouth daily , Historical Med      pantoprazole (PROTONIX) 40 mg tablet Take 1 tablet (40 mg total) by mouth daily, Starting Fri 2/25/2022, Normal      potassium chloride (K-DUR,KLOR-CON) 20 mEq tablet Take 20 mEq by mouth 2 (two) times a day, Historical Med      pregabalin (LYRICA) 75 mg capsule Take 75 mg by mouth 3 (three) times a day as needed , Historical Med      !! QUEtiapine (SEROquel) 25 mg tablet quetiapine 25 mg tablet, Historical Med      !! QUEtiapine (SEROquel) 50 mg tablet quetiapine 50 mg tablet   TAKE 1 TABLET BY MOUTH EVERYDAY AT BEDTIME, Historical Med      traZODone (DESYREL) 50 mg tablet trazodone 50 mg tablet   TAKE 1 TABLET BY MOUTH EVERY DAY AT BEDTIME AS NEEDED, Historical Med      witch hazel-glycerin (TUCKS) topical pad Insert 1 pad into the rectum 3 (three) times a day for 14 days, Starting Wed 2/23/2022, Until Wed 3/9/2022, Normal      zolpidem (AMBIEN CR) 6 25 MG CR tablet Take 6 25 mg by mouth daily at bedtime as needed for sleep, Historical Med       !! - Potential duplicate medications found  Please discuss with provider  No discharge procedures on file      PDMP Review     None          ED Provider  Electronically Signed by           Shade Mello DO  04/12/22 8305

## 2022-04-11 ENCOUNTER — HOSPITAL ENCOUNTER (EMERGENCY)
Facility: HOSPITAL | Age: 47
Discharge: HOME | End: 2022-04-11
Attending: EMERGENCY MEDICINE
Payer: COMMERCIAL

## 2022-04-11 VITALS
BODY MASS INDEX: 38.3 KG/M2 | HEIGHT: 62 IN | TEMPERATURE: 98.1 F | SYSTOLIC BLOOD PRESSURE: 113 MMHG | HEART RATE: 78 BPM | RESPIRATION RATE: 17 BRPM | WEIGHT: 208.11 LBS | DIASTOLIC BLOOD PRESSURE: 69 MMHG | OXYGEN SATURATION: 98 %

## 2022-04-11 DIAGNOSIS — R19.7 DIARRHEA WITH DEHYDRATION: ICD-10-CM

## 2022-04-11 DIAGNOSIS — E83.51 HYPOCALCEMIA: Primary | ICD-10-CM

## 2022-04-11 LAB
ALBUMIN SERPL BCP-MCNC: 3.3 G/DL (ref 3.5–5)
ALP SERPL-CCNC: 111 U/L (ref 46–116)
ALT SERPL W P-5'-P-CCNC: 80 U/L (ref 12–78)
ANION GAP SERPL CALCULATED.3IONS-SCNC: 16 MMOL/L (ref 4–13)
ANION GAP SERPL CALCULATED.3IONS-SCNC: 22 MMOL/L (ref 4–13)
AST SERPL W P-5'-P-CCNC: 86 U/L (ref 5–45)
ATRIAL RATE: 79 BPM
BILIRUB SERPL-MCNC: 0.26 MG/DL (ref 0.2–1)
BUN SERPL-MCNC: 18 MG/DL (ref 5–25)
BUN SERPL-MCNC: 21 MG/DL (ref 5–25)
CA-I BLD-SCNC: 0.98 MMOL/L (ref 1.12–1.32)
CALCIUM ALBUM COR SERPL-MCNC: 8.4 MG/DL (ref 8.3–10.1)
CALCIUM SERPL-MCNC: 7.2 MG/DL (ref 8.3–10.1)
CALCIUM SERPL-MCNC: 7.8 MG/DL (ref 8.3–10.1)
CHLORIDE SERPL-SCNC: 100 MMOL/L (ref 100–108)
CHLORIDE SERPL-SCNC: 96 MMOL/L (ref 100–108)
CO2 SERPL-SCNC: 14 MMOL/L (ref 21–32)
CO2 SERPL-SCNC: 18 MMOL/L (ref 21–32)
CREAT SERPL-MCNC: 0.77 MG/DL (ref 0.6–1.3)
CREAT SERPL-MCNC: 0.94 MG/DL (ref 0.6–1.3)
GFR SERPL CREATININE-BSD FRML MDRD: 73 ML/MIN/1.73SQ M
GFR SERPL CREATININE-BSD FRML MDRD: 93 ML/MIN/1.73SQ M
GLUCOSE SERPL-MCNC: 148 MG/DL (ref 65–140)
GLUCOSE SERPL-MCNC: 209 MG/DL (ref 65–140)
MAGNESIUM SERPL-MCNC: 2 MG/DL (ref 1.6–2.6)
P AXIS: 52 DEGREES
POTASSIUM SERPL-SCNC: 3.5 MMOL/L (ref 3.5–5.3)
POTASSIUM SERPL-SCNC: 4.3 MMOL/L (ref 3.5–5.3)
PR INTERVAL: 162 MS
PROT SERPL-MCNC: 7.2 G/DL (ref 6.4–8.2)
QRS AXIS: 24 DEGREES
QRSD INTERVAL: 80 MS
QT INTERVAL: 400 MS
QTC INTERVAL: 458 MS
SODIUM SERPL-SCNC: 132 MMOL/L (ref 136–145)
SODIUM SERPL-SCNC: 134 MMOL/L (ref 136–145)
T WAVE AXIS: 36 DEGREES
VENTRICULAR RATE: 79 BPM

## 2022-04-11 PROCEDURE — 93010 ELECTROCARDIOGRAM REPORT: CPT | Performed by: INTERNAL MEDICINE

## 2022-04-11 PROCEDURE — 83735 ASSAY OF MAGNESIUM: CPT | Performed by: EMERGENCY MEDICINE

## 2022-04-11 PROCEDURE — 99284 EMERGENCY DEPT VISIT MOD MDM: CPT | Performed by: EMERGENCY MEDICINE

## 2022-04-11 PROCEDURE — 96360 HYDRATION IV INFUSION INIT: CPT

## 2022-04-11 PROCEDURE — 96365 THER/PROPH/DIAG IV INF INIT: CPT

## 2022-04-11 PROCEDURE — 80053 COMPREHEN METABOLIC PANEL: CPT | Performed by: EMERGENCY MEDICINE

## 2022-04-11 PROCEDURE — 80048 BASIC METABOLIC PNL TOTAL CA: CPT | Performed by: EMERGENCY MEDICINE

## 2022-04-11 PROCEDURE — 82330 ASSAY OF CALCIUM: CPT | Performed by: EMERGENCY MEDICINE

## 2022-04-11 PROCEDURE — 99284 EMERGENCY DEPT VISIT MOD MDM: CPT

## 2022-04-11 PROCEDURE — 96361 HYDRATE IV INFUSION ADD-ON: CPT

## 2022-04-11 PROCEDURE — 36415 COLL VENOUS BLD VENIPUNCTURE: CPT | Performed by: EMERGENCY MEDICINE

## 2022-04-11 PROCEDURE — 96366 THER/PROPH/DIAG IV INF ADDON: CPT

## 2022-04-11 RX ORDER — CALCIUM GLUCONATE 20 MG/ML
2 INJECTION, SOLUTION INTRAVENOUS ONCE
Status: COMPLETED | OUTPATIENT
Start: 2022-04-11 | End: 2022-04-11

## 2022-04-11 RX ADMIN — CALCIUM GLUCONATE 2 G: 20 INJECTION, SOLUTION INTRAVENOUS at 02:35

## 2022-04-11 RX ADMIN — SODIUM CHLORIDE 1000 ML: 0.9 INJECTION, SOLUTION INTRAVENOUS at 03:22

## 2022-04-11 NOTE — DISCHARGE INSTRUCTIONS
Return to the ER for further concerns or worsening symptoms  Follow up with your primary care physician in 1-2 days  Continue your medication as prescribed

## 2022-04-11 NOTE — ED PROVIDER NOTES
History  Chief Complaint   Patient presents with    Numbness     Pt states she was in ER yesterday for hypocalcemia and was treated  Pt states after she went home she started to have numbness and tingling all over her body which proceeded into today  Pt states that this morning she was more fatigued then normal with some weakness  Pt reports she takes 6 calcium pills daily but today she took 8 to help with the symptoms  Patient with a history of hypocalcemia presents to the ER with complaint of persistent paresthesias  She was evaluated in the ER yesterday with similar symptoms, hypocalcemia was treated and patient was discharged to home  Patient states that she is compliant with the oral calcium supplement as prescribed  She also complains of decreased p o  Intake today and chronic diarrhea  She denies abdominal pain, fevers or chills  Patient also has a history of anxiety, chronic pain, hyperlipidemia  History provided by:  Patient   used: No        Prior to Admission Medications   Prescriptions Last Dose Informant Patient Reported? Taking?    ALPRAZolam (XANAX) 1 mg tablet  Self Yes No   Sig: Take 1 mg by mouth daily am   Calcium Carb-Cholecalciferol (Calcium 500+D) 500-400 MG-UNIT TABS  Self No No   Sig: Take 500 mg by mouth 6 (six) times a day   Patient not taking: Reported on 2/25/2022    QUEtiapine (SEROquel) 25 mg tablet  Self Yes No   Sig: quetiapine 25 mg tablet   QUEtiapine (SEROquel) 50 mg tablet  Self Yes No   Sig: quetiapine 50 mg tablet   TAKE 1 TABLET BY MOUTH EVERYDAY AT BEDTIME   Patient not taking: Reported on 2/25/2022   albuterol (Ventolin HFA) 90 mcg/act inhaler  Self No No   Sig: Inhale 2 puffs every 4 (four) hours as needed for wheezing or shortness of breath   baclofen 10 mg tablet  Self Yes No   Sig: Take 10 mg by mouth 3 (three) times a day    baclofen 20 mg tablet  Self No No   Sig: Take 1 tablet (20 mg total) by mouth 3 (three) times a day   Patient not taking: Reported on 2/25/2022    calcitriol (ROCALTROL) 0 25 mcg capsule   No No   Sig: TAKE 1 CAPSULE BY MOUTH EVERY DAY   ergocalciferol (VITAMIN D2) 50,000 units  Self No No   Sig: Take 1 capsule (50,000 Units total) by mouth once a week   fenofibrate (TRIGLIDE) 160 MG tablet  Self Yes No   Sig: Take 160 mg by mouth daily   ferrous sulfate 325 (65 Fe) mg tablet  Self Yes No   Sig: Take 325 mg by mouth Once Monday, wed, Friday   hydrocortisone (ANUSOL-HC) 2 5 % rectal cream   No No   Sig: Apply topically 2 (two) times a day   hydrocortisone (ANUSOL-HC) 25 mg suppository   No No   Sig: Insert 1 suppository (25 mg total) into the rectum 2 (two) times a day   ibuprofen (MOTRIN) 600 mg tablet  Self No No   Sig: Take 1 tablet (600 mg total) by mouth every 6 (six) hours as needed for mild pain or moderate pain   Patient not taking: Reported on 2/25/2022    levothyroxine 150 mcg tablet  Self No No   Sig: TAKE 1 TABLET BY MOUTH EVERY DAY   magnesium Oxide (MAG-OX) 400 mg TABS  Self No No   Sig: TAKE 1 TABLET (400 MG TOTAL) BY MOUTH THREE (THREE) TIMES A DAY 9PM   methocarbamol (ROBAXIN) 500 mg tablet   No No   Sig: Take 1 tablet (500 mg total) by mouth 2 (two) times a day   metoclopramide (REGLAN) 10 mg tablet  Self No No   Sig: Take 1 tablet (10 mg total) by mouth every 6 (six) hours   Patient not taking: Reported on 2/25/2022    metoclopramide (REGLAN) 10 mg tablet  Self No No   Sig: Take 1 tablet (10 mg total) by mouth every 6 (six) hours as needed (nausea)   Patient not taking: Reported on 2/25/2022    naproxen (NAPROSYN) 500 mg tablet   No No   Sig: Take 1 tablet (500 mg total) by mouth 2 (two) times a day with meals   ondansetron (ZOFRAN-ODT) 4 mg disintegrating tablet  Self No No   Sig: Take 1 tablet (4 mg total) by mouth every 6 (six) hours as needed for nausea or vomiting   Patient not taking: Reported on 2/25/2022    oxyCODONE-acetaminophen (PERCOCET) 5-325 mg per tablet  Self Yes No   Sig: Take 2 tablets by mouth daily    pantoprazole (PROTONIX) 40 mg tablet   No No   Sig: Take 1 tablet (40 mg total) by mouth daily   potassium chloride (K-DUR,KLOR-CON) 20 mEq tablet  Self Yes No   Sig: Take 20 mEq by mouth 2 (two) times a day   pregabalin (LYRICA) 75 mg capsule  Self Yes No   Sig: Take 75 mg by mouth 3 (three) times a day as needed    traZODone (DESYREL) 50 mg tablet  Self Yes No   Sig: trazodone 50 mg tablet   TAKE 1 TABLET BY MOUTH EVERY DAY AT BEDTIME AS NEEDED   Patient not taking: Reported on 2022   witch hazel-glycerin (TUCKS) topical pad  Self No No   Sig: Insert 1 pad into the rectum 3 (three) times a day for 14 days   zolpidem (AMBIEN CR) 6 25 MG CR tablet  Self Yes No   Sig: Take 6 25 mg by mouth daily at bedtime as needed for sleep   Patient not taking: Reported on 2022       Facility-Administered Medications: None       Past Medical History:   Diagnosis Date    Acid reflux     Acute renal failure (HCC)     multiple episodes    Anemia     Anxiety     Chronic pain     DDD (degenerative disc disease), lumbar     Disease of thyroid gland     had surgery and now hypo    D[facility]deep [facility] thrombosis) ([facility])     s/p ankle fracture    Hypercalcemia     Hyperlipidemia     Hyperthyroidism     Hypocalcemia     post op 2016    Migraine     Psychiatric disorder     anxiety depression    Seizures (HCC)     petit mal x1  4 years ago- all tests were neg      Spondylolisthesis of lumbar region     Treatment     spinal pain injections  last was 2016       Past Surgical History:   Procedure Laterality Date    BACK SURGERY       SECTION      x5    CYSTOCELE REPAIR  2017    DISCOGRAM      PARATHYROIDECTOMY      [place] ANTERIOR COLPORRAPHY RPR CYSTOCELE W/CYSTO N/A 2017    Procedure: CYSTOCELE REPAIR;  Surgeon: Caitlin Conn MD;  Location: [address] [place] [address];  Service: Gynecology    RI ARTHRODESIS POSTERIOR INTERBODY LUMBAR N/A 2016    Procedure: L4-S1 LUMBAR LAMINECTOMY/DECOMPRESSION;  INSTRUMENTED POSTEROLATERAL FUSION/ INTERBODY L5-S1; ALLOGRAFT AND AUTOGRAFT (IMPULSE) ; Surgeon: Enedina Manning MD;  Location:  MAIN OR;  Service: Orthopedics    [place] CYSTOURETHROSCOPY,URETER CATHETER Bilateral 12/7/2018    Procedure: INSERTION URETERAL CATHETERS PREOP;  Surgeon: Carolyn Pearson MD;  Location: [address] [place] [address];  Service: Urology    [place] SLING OPER STRES INCONTINENCE N/A 5/4/2017    Procedure: MID URETHRAL SLING;  Surgeon: Tata Salas MD;  Location: [address] [place] [address];  Service: Urology    [place] SUPRACERV ABD HYSTERECTOMY N/A 12/7/2018    Procedure: SUPRACERVICAL HYSTERECTOMY;  Surgeon: Taco Michel MD;  Location: [facility];  Service: Gynecology    THYROIDECTOMY      TONSILECTOMY AND ADNOIDECTOMY      TONSILLECTOMY      TUBAL LIGATION         Family History   Problem Relation Age of Onset    Diabetes Mother     Hypertension Mother     Hyperlipidemia Father     Arrhythmia Father     Lung cancer Father     Diabetes Father      I have reviewed and agree with the history as documented  E-Cigarette/Vaping    E-Cigarette Use Never User      E-Cigarette/Vaping Substances    Nicotine No     THC No     CBD No     Flavoring No     Other No     Unknown No      Social History     Tobacco Use    Smoking status: Current Every Day Smoker     Packs/day: 0 50     Years: 25 00     Pack years: 12 50     Types: Cigarettes    Smokeless tobacco: Never Used   Vaping Use    Vaping Use: Never used   Substance Use Topics    Alcohol use: Not Currently     Comment: Alcohol intake:   Occasional  - As per [facility] Drug use: No       Review of Systems   Constitutional: Negative for activity change, chills and fever  HENT: Negative for congestion and facial swelling  Respiratory: Negative for cough, chest tightness and shortness of breath  Gastrointestinal: Positive for diarrhea  Negative for abdominal pain, nausea and vomiting     Genitourinary: Negative for dysuria, frequency, hematuria and urgency  Musculoskeletal: Negative for back pain, neck pain and neck stiffness  Neurological: Negative for seizures, weakness, numbness and headaches  All other systems reviewed and are negative  Physical Exam  Physical Exam  Vitals and nursing note reviewed  Constitutional:       General: She is not in acute distress  Appearance: She is well-developed  She is not diaphoretic  HENT:      Head: Normocephalic and atraumatic  Mouth/Throat:      Mouth: Mucous membranes are dry  Eyes:      Conjunctiva/sclera: Conjunctivae normal       Pupils: Pupils are equal, round, and reactive to light  Cardiovascular:      Rate and Rhythm: Normal rate and regular rhythm  Heart sounds: Normal heart sounds  No murmur heard  Pulmonary:      Effort: Pulmonary effort is normal  No respiratory distress  Breath sounds: Normal breath sounds  Abdominal:      General: Bowel sounds are normal  There is no distension  Palpations: Abdomen is soft  Tenderness: There is no abdominal tenderness  Musculoskeletal:         General: No deformity  Normal range of motion  Cervical back: Normal range of motion and neck supple  Skin:     General: Skin is warm and dry  Capillary Refill: Capillary refill takes less than 2 seconds  Coloration: Skin is not pale  Findings: No rash  Neurological:      General: No focal deficit present  Mental Status: She is alert and oriented to person, place, and time  Cranial Nerves: No cranial nerve deficit  Psychiatric:         Mood and Affect: Mood is anxious  Behavior: Behavior normal          Thought Content:  Thought content normal          Judgment: Judgment normal          Vital Signs  ED Triage Vitals [04/11/22 0057]   Temperature Pulse Respirations Blood Pressure SpO2   97 7 °F (36 5 °C) 99 18 149/87 97 %      Temp Source Heart Rate Source Patient Position - Orthostatic VS BP Location FiO2 (%) Oral Monitor Sitting Right arm --      Pain Score       --           Vitals:    04/11/22 0515 04/11/22 0545 04/11/22 0615 04/11/22 0645   BP: 122/64 112/58 152/72 113/69   Pulse: 78 74 72 78   Patient Position - Orthostatic VS: Sitting  Lying Sitting         Visual Acuity  Visual Acuity      Most Recent Value   L Pupil Size (mm) 3          ED Medications  Medications   calcium gluconate 2 g in sodium chloride 0 9% 100 mL (premix) (0 g Intravenous Stopped 4/11/22 0442)   sodium chloride 0 9 % bolus 1,000 mL (0 mL Intravenous Stopped 4/11/22 0513)       Diagnostic Studies  Results Reviewed     Procedure Component Value Units Date/Time    Basic metabolic panel [[id number]]  (Abnormal) Collected: 04/11/22 0514    Lab Status: Final result Specimen: Blood from Arm, Left Updated: 04/11/22 0604     Sodium 134 mmol/L      Potassium 3 5 mmol/L      Chloride 100 mmol/L      CO2 18 mmol/L      ANION GAP 16 mmol/L      BUN 18 mg/dL      Creatinine 0 77 mg/dL      Glucose 209 mg/dL      Calcium 7 2 mg/dL      eGFR 93 ml/min/1 73sq m     Narrative:      [place] guidelines for Chronic Kidney Disease (CKD):     Stage 1 with normal or high GFR (GFR > 90 mL/min/1 73 square meters)    Stage 2 Mild CKD (GFR = 60-89 mL/min/1 73 square meters)    Stage 3A Moderate CKD (GFR = 45-59 mL/min/1 73 square meters)    Stage 3B Moderate CKD (GFR = 30-44 mL/min/1 73 square meters)    Stage 4 Severe CKD (GFR = 15-29 mL/min/1 73 square meters)    Stage 5 End Stage CKD (GFR <15 mL/min/1 73 square meters)  Note: GFR calculation is accurate only with a steady state creatinine    Comprehensive metabolic panel [[id number]]  (Abnormal) Collected: 04/11/22 0111    Lab Status: Final result Specimen: Blood from Arm, Right Updated: 04/11/22 0156     Sodium 132 mmol/L      Potassium 4 3 mmol/L      Chloride 96 mmol/L      CO2 14 mmol/L      ANION GAP 22 mmol/L      BUN 21 mg/dL      Creatinine 0 94 mg/dL      Glucose 148 mg/dL Calcium 7 8 mg/dL      Corrected Calcium 8 4 mg/dL      AST 86 U/L      ALT 80 U/L      Alkaline Phosphatase 111 U/L      Total Protein 7 2 g/dL      Albumin 3 3 g/dL      Total Bilirubin 0 26 mg/dL      eGFR 73 ml/min/1 73sq m     Narrative:      [place] guidelines for Chronic Kidney Disease (CKD):     Stage 1 with normal or high GFR (GFR > 90 mL/min/1 73 square meters)    Stage 2 Mild CKD (GFR = 60-89 mL/min/1 73 square meters)    Stage 3A Moderate CKD (GFR = 45-59 mL/min/1 73 square meters)    Stage 3B Moderate CKD (GFR = 30-44 mL/min/1 73 square meters)    Stage 4 Severe CKD (GFR = 15-29 mL/min/1 73 square meters)    Stage 5 End Stage CKD (GFR <15 mL/min/1 73 square meters)  Note: GFR calculation is accurate only with a steady state creatinine    Magnesium [[id number]]  (Normal) Collected: 04/11/22 0111    Lab Status: Final result Specimen: Blood from Arm, Right Updated: 04/11/22 0156     Magnesium 2 0 mg/dL     Calcium, ionized [[id number]]  (Abnormal) Collected: 04/11/22 0111    Lab Status: Final result Specimen: Blood from Arm, Right Updated: 04/11/22 0119     Calcium, Ionized 0 98 mmol/L                  No orders to display              Procedures  Procedures         ED Course                                             MDM  Number of Diagnoses or Management Options  Diarrhea with dehydration: new and requires workup  Hypocalcemia: new and requires workup     Amount and/or Complexity of Data Reviewed  Clinical lab tests: ordered and reviewed    Risk of Complications, Morbidity, and/or Mortality  Presenting problems: high  Diagnostic procedures: high  Management options: high    Patient Progress  Patient progress: improved      Disposition  Final diagnoses:   Hypocalcemia   Diarrhea with dehydration     Time reflects when diagnosis was documented in both MDM as applicable and the Disposition within this note     Time User Action Codes Description Comment    4/11/2022  6:21 AM Shira Hurst Add [E83 51] Hypocalcemia     4/11/2022  6:21 AM Shira Hurst Add [R19 7] Diarrhea with dehydration       ED Disposition     ED Disposition Condition Date/Time Comment    Discharge Stable Mon Apr 11, 2022  6:21 AM Jose Minors discharge to home/self care              Follow-up Information     Follow up With Specialties Details Why Nikolay Banuelos MD Internal Medicine Schedule an appointment as soon as possible for a visit in 1 day for follow up [place]  [phone]            Discharge Medication List as of 4/11/2022  6:22 AM      CONTINUE these medications which have NOT CHANGED    Details   albuterol (Ventolin HFA) 90 mcg/act inhaler Inhale 2 puffs every 4 (four) hours as needed for wheezing or shortness of breath, Starting Sun 9/26/2021, Normal      ALPRAZolam (XANAX) 1 mg tablet Take 1 mg by mouth daily am, Historical Med      !! baclofen 10 mg tablet Take 10 mg by mouth 3 (three) times a day , Historical Med      !! baclofen 20 mg tablet Take 1 tablet (20 mg total) by mouth 3 (three) times a day, Starting Thu 11/18/2021, Normal      calcitriol (ROCALTROL) 0 25 mcg capsule TAKE 1 CAPSULE BY MOUTH EVERY DAY, Normal      Calcium Carb-Cholecalciferol (Calcium 500+D) 500-400 MG-UNIT TABS Take 500 mg by mouth 6 (six) times a day, Normal      ergocalciferol (VITAMIN D2) 50,000 units Take 1 capsule (50,000 Units total) by mouth once a week, Starting Wed 3/31/2021, Normal      fenofibrate (TRIGLIDE) 160 MG tablet Take 160 mg by mouth daily, Historical Med      ferrous sulfate 325 (65 Fe) mg tablet Take 325 mg by mouth Once Monday, wed, Friday, Historical Med      hydrocortisone (ANUSOL-HC) 2 5 % rectal cream Apply topically 2 (two) times a day, Starting Fri 2/25/2022, Normal      hydrocortisone (ANUSOL-HC) 25 mg suppository Insert 1 suppository (25 mg total) into the rectum 2 (two) times a day, Starting Fri 2/25/2022, Normal      ibuprofen (MOTRIN) 600 mg tablet Take 1 tablet (600 mg total) by mouth every 6 (six) hours as needed for mild pain or moderate pain, Starting Tue 6/2/2020, Normal      levothyroxine 150 mcg tablet TAKE 1 TABLET BY MOUTH EVERY DAY, Normal      magnesium Oxide (MAG-OX) 400 mg TABS TAKE 1 TABLET (400 MG TOTAL) BY MOUTH THREE (THREE) TIMES A DAY 9PM, Normal      methocarbamol (ROBAXIN) 500 mg tablet Take 1 tablet (500 mg total) by mouth 2 (two) times a day, Starting Wed 3/16/2022, Normal      !! metoclopramide (REGLAN) 10 mg tablet Take 1 tablet (10 mg total) by mouth every 6 (six) hours, Starting u 9/9/2021, Normal      !! metoclopramide (REGLAN) 10 mg tablet Take 1 tablet (10 mg total) by mouth every 6 (six) hours as needed (nausea), Starting Fri 12/31/2021, Normal      naproxen (NAPROSYN) 500 mg tablet Take 1 tablet (500 mg total) by mouth 2 (two) times a day with meals, Starting Wed 3/16/2022, Normal      ondansetron (ZOFRAN-ODT) 4 mg disintegrating tablet Take 1 tablet (4 mg total) by mouth every 6 (six) hours as needed for nausea or vomiting, Starting Sun 12/19/2021, Normal      oxyCODONE-acetaminophen (PERCOCET) 5-325 mg per tablet Take 2 tablets by mouth daily , Historical Med      pantoprazole (PROTONIX) 40 mg tablet Take 1 tablet (40 mg total) by mouth daily, Starting Fri 2/25/2022, Normal      potassium chloride (K-DUR,KLOR-CON) 20 mEq tablet Take 20 mEq by mouth 2 (two) times a day, Historical Med      pregabalin (LYRICA) 75 mg capsule Take 75 mg by mouth 3 (three) times a day as needed , Historical Med      !! QUEtiapine (SEROquel) 25 mg tablet quetiapine 25 mg tablet, Historical Med      !! QUEtiapine (SEROquel) 50 mg tablet quetiapine 50 mg tablet   TAKE 1 TABLET BY MOUTH EVERYDAY AT BEDTIME, Historical Med      traZODone (DESYREL) 50 mg tablet trazodone 50 mg tablet   TAKE 1 TABLET BY MOUTH EVERY DAY AT BEDTIME AS NEEDED, Historical Med      witch hazel-glycerin (TUCKS) topical pad Insert 1 pad into the rectum 3 (three) times a day for 14 days, Starting Wed 2/23/2022, Until Wed 3/9/2022, Normal      zolpidem (AMBIEN CR) 6 25 MG CR tablet Take 6 25 mg by mouth daily at bedtime as needed for sleep, Historical Med       !! - Potential duplicate medications found  Please discuss with provider  No discharge procedures on file      PDMP Review     None          ED Provider  Electronically Signed by           Ej Gupta DO  04/11/22 1584

## 2022-04-21 PROCEDURE — U0005 INFEC AGEN DETEC AMPLI PROBE: HCPCS | Performed by: INTERNAL MEDICINE

## 2022-04-21 PROCEDURE — U0003 INFECTIOUS AGENT DETECTION BY NUCLEIC ACID (DNA OR RNA); SEVERE ACUTE RESPIRATORY SYNDROME CORONAVIRUS 2 (SARS-COV-2) (CORONAVIRUS DISEASE [COVID-19]), AMPLIFIED PROBE TECHNIQUE, MAKING USE OF HIGH THROUGHPUT TECHNOLOGIES AS DESCRIBED BY CMS-2020-01-R: HCPCS | Performed by: INTERNAL MEDICINE

## 2022-04-25 ENCOUNTER — ANESTHESIA (OUTPATIENT)
Dept: GASTROENTEROLOGY | Facility: AMBULARY SURGERY CENTER | Age: 47
End: 2022-04-25

## 2022-04-25 ENCOUNTER — ANESTHESIA EVENT (OUTPATIENT)
Dept: GASTROENTEROLOGY | Facility: AMBULARY SURGERY CENTER | Age: 47
End: 2022-04-25

## 2022-04-25 ENCOUNTER — HOSPITAL ENCOUNTER (OUTPATIENT)
Dept: GASTROENTEROLOGY | Facility: AMBULARY SURGERY CENTER | Age: 47
Setting detail: OUTPATIENT SURGERY
Discharge: HOME | End: 2022-04-25
Attending: INTERNAL MEDICINE
Payer: COMMERCIAL

## 2022-04-25 VITALS
OXYGEN SATURATION: 100 % | HEIGHT: 62 IN | BODY MASS INDEX: 38.28 KG/M2 | SYSTOLIC BLOOD PRESSURE: 108 MMHG | TEMPERATURE: 97.6 F | WEIGHT: 208 LBS | HEART RATE: 79 BPM | RESPIRATION RATE: 16 BRPM | DIASTOLIC BLOOD PRESSURE: 77 MMHG

## 2022-04-25 DIAGNOSIS — R19.5 LOOSE STOOLS: ICD-10-CM

## 2022-04-25 DIAGNOSIS — K62.5 RECTAL BLEEDING: ICD-10-CM

## 2022-04-25 DIAGNOSIS — K21.9 GASTROESOPHAGEAL REFLUX DISEASE, UNSPECIFIED WHETHER ESOPHAGITIS PRESENT: ICD-10-CM

## 2022-04-25 PROCEDURE — 88305 TISSUE EXAM BY PATHOLOGIST: CPT | Performed by: PATHOLOGY

## 2022-04-25 PROCEDURE — 43239 EGD BIOPSY SINGLE/MULTIPLE: CPT | Performed by: INTERNAL MEDICINE

## 2022-04-25 PROCEDURE — 45380 COLONOSCOPY AND BIOPSY: CPT | Performed by: INTERNAL MEDICINE

## 2022-04-25 RX ORDER — PROPOFOL 10 MG/ML
INJECTION, EMULSION INTRAVENOUS CONTINUOUS PRN
Status: DISCONTINUED | OUTPATIENT
Start: 2022-04-25 | End: 2022-04-25

## 2022-04-25 RX ORDER — PROPOFOL 10 MG/ML
INJECTION, EMULSION INTRAVENOUS AS NEEDED
Status: DISCONTINUED | OUTPATIENT
Start: 2022-04-25 | End: 2022-04-25

## 2022-04-25 RX ORDER — LIDOCAINE HYDROCHLORIDE 20 MG/ML
INJECTION, SOLUTION EPIDURAL; INFILTRATION; INTRACAUDAL; PERINEURAL AS NEEDED
Status: DISCONTINUED | OUTPATIENT
Start: 2022-04-25 | End: 2022-04-25

## 2022-04-25 RX ORDER — PANTOPRAZOLE SODIUM 40 MG/1
40 TABLET, DELAYED RELEASE ORAL DAILY
Qty: 30 TABLET | Refills: 6 | Status: SHIPPED | OUTPATIENT
Start: 2022-04-25 | End: 2022-06-01

## 2022-04-25 RX ORDER — SODIUM CHLORIDE, SODIUM LACTATE, POTASSIUM CHLORIDE, CALCIUM CHLORIDE 600; 310; 30; 20 MG/100ML; MG/100ML; MG/100ML; MG/100ML
125 INJECTION, SOLUTION INTRAVENOUS CONTINUOUS
Status: DISCONTINUED | OUTPATIENT
Start: 2022-04-25 | End: 2022-04-29 | Stop reason: HOSPADM

## 2022-04-25 RX ADMIN — PROPOFOL 50 MG: 10 INJECTION, EMULSION INTRAVENOUS at 15:00

## 2022-04-25 RX ADMIN — PROPOFOL 50 MG: 10 INJECTION, EMULSION INTRAVENOUS at 14:53

## 2022-04-25 RX ADMIN — PROPOFOL 50 MG: 10 INJECTION, EMULSION INTRAVENOUS at 14:55

## 2022-04-25 RX ADMIN — PROPOFOL 140 MCG/KG/MIN: 10 INJECTION, EMULSION INTRAVENOUS at 15:00

## 2022-04-25 RX ADMIN — LIDOCAINE HYDROCHLORIDE 90 MG: 20 INJECTION, SOLUTION EPIDURAL; INFILTRATION; INTRACAUDAL; PERINEURAL at 14:52

## 2022-04-25 RX ADMIN — PROPOFOL 100 MG: 10 INJECTION, EMULSION INTRAVENOUS at 14:52

## 2022-04-25 RX ADMIN — PROPOFOL 50 MG: 10 INJECTION, EMULSION INTRAVENOUS at 14:57

## 2022-04-25 RX ADMIN — SODIUM CHLORIDE, SODIUM LACTATE, POTASSIUM CHLORIDE, AND CALCIUM CHLORIDE 125 ML/HR: .6; .31; .03; .02 INJECTION, SOLUTION INTRAVENOUS at 12:36

## 2022-04-25 NOTE — ANESTHESIA POSTPROCEDURE EVALUATION
Post-Op Assessment Note    CV Status:  Stable  Pain Score: 0    Pain management: adequate     Mental Status:  Alert and awake   Hydration Status:  Euvolemic   PONV Controlled:  Controlled   Airway Patency:  Patent      Post Op Vitals Reviewed: Yes      Staff: CRNA, Anesthesiologist   Comments: vss        No complications documented      BP   101/60   Temp      Pulse  70   Resp   16   SpO2   99

## 2022-04-25 NOTE — H&P
History and Physical -  [facility]  Christine Licea 40 y o  female MRN: [id number]    HPI: Christine Licea is a 40y o  year old female who presents with GERD, reflux, loose stools  Review of Systems    Historical Information   Past Medical History:   Diagnosis Date    Acid reflux     Acute renal failure (HCC)     multiple episodes    Anemia     Anxiety     Chronic pain     DDD (degenerative disc disease), lumbar     Disease of thyroid gland     had surgery and now hypo    [facility] ([facility] [address] )     s/p ankle fracture    Hypercalcemia     Hyperlipidemia     Hyperthyroidism     Hypocalcemia     post op 2016    Kidney stone     Migraine     Psychiatric disorder     anxiety depression    Seizures (HCC)     petit mal x1  4 years ago- all tests were neg   Spondylolisthesis of lumbar region     Treatment     spinal pain injections  last was 2016    Wears glasses      Past Surgical History:   Procedure Laterality Date    BACK SURGERY      L4-5, S1-fusion-plate/screws     SECTION      x5    CYSTOCELE REPAIR  2017    CYSTOSCOPY      DISCOGRAM      HYSTERECTOMY      PARATHYROIDECTOMY      [place] ANTERIOR COLPORRAPHY RPR CYSTOCELE W/CYSTO N/A 2017    Procedure: CYSTOCELE REPAIR;  Surgeon: Layton Avendaño MD;  Location: [address] [place] [address];  Service: Gynecology    [place] ARTHRODESIS POSTERIOR INTERBODY LUMBAR N/A 2016    Procedure: L4-S1 LUMBAR LAMINECTOMY/DECOMPRESSION;  INSTRUMENTED POSTEROLATERAL FUSION/ INTERBODY L5-S1; ALLOGRAFT AND AUTOGRAFT (IMPULSE) ;   Surgeon: Janessa Daniels MD;  Location:  MAIN OR;  Service: Orthopedics    [place] CYSTOURETHROSCOPY,URETER CATHETER Bilateral 2018    Procedure: INSERTION URETERAL CATHETERS PREOP;  Surgeon: Hood Terry MD;  Location: [address] [place] [address];  Service: Urology    [place] SLING OPER STRES INCONTINENCE N/A 2017    Procedure: MID URETHRAL SLING;  Surgeon: Ishaan Becerril MD; Location: [facility];  Service: Urology    [place] SUPRACERV ABD HYSTERECTOMY N/A 12/7/2018    Procedure: SUPRACERVICAL HYSTERECTOMY;  Surgeon: Chaitanya Harkins MD;  Location: [facility];  Service: Gynecology    THYROIDECTOMY      TONSILECTOMY AND ADNOIDECTOMY      TONSILLECTOMY      TUBAL LIGATION       Social History   Social History     Substance and Sexual Activity   Alcohol Use Not Currently     Social History     Substance and Sexual Activity   Drug Use No     Social History     Tobacco Use   Smoking Status Current Every Day Smoker    Packs/day: 0 50    Years: 25 00    Pack years: 12 50    Types: Cigarettes   Smokeless Tobacco Never Used     Family History   Problem Relation Age of Onset    Diabetes Mother     Hypertension Mother     Hyperlipidemia Father     Arrhythmia Father     Lung cancer Father     Diabetes Father        Meds/Allergies     (Not in a hospital admission)      Allergies   Allergen Reactions    Hydrocodone-Acetaminophen Rash    Vicodin [Hydrocodone-Acetaminophen] Rash    Morphine And Related GI Intolerance     Nausea/vomiting      Adhesive [Medical Tape] Rash     Bandaids       Objective     /92   Pulse 99   Temp 97 6 °F (36 4 °C) (Temporal)   Resp 18   Ht 5' 2" (1 575 m)   Wt 94 3 kg (208 lb)   LMP 12/06/2018   SpO2 96%   BMI 38 04 kg/m²       PHYSICAL EXAM    Gen: NAD  CV: RRR  CHEST: Clear  ABD: soft, NT/ND  EXT: no edema  Neuro: AAO      ASSESSMENT/PLAN:  This is a 40y o  year old female here for GERD, reflux, loose stools       PLAN:   Procedure: egd/colonoscopy

## 2022-04-25 NOTE — ANESTHESIA PREPROCEDURE EVALUATION
Procedure:  COLONOSCOPY  EGD    Relevant Problems   ENDO   (+) Postoperative hypothyroidism   (+) Postsurgical hypoparathyroidism (HCC)      GI/HEPATIC   (+) Acid reflux   (+) Rectal bleeding      MUSCULOSKELETAL   (+) DDD (degenerative disc disease), lumbar      NEURO/PSYCH   (+) Anxiety   (+) History of coagulopathy   (+) Seizure disorder (HCC)        Physical Exam    Airway    Mallampati score: II  TM Distance: >3 FB  Neck ROM: full     Dental   No notable dental hx     Cardiovascular  Cardiovascular exam normal    Pulmonary  Pulmonary exam normal     Other Findings        Anesthesia Plan  ASA Score- 3     Anesthesia Type- IV sedation with anesthesia with ASA Monitors  Additional Monitors:   Airway Plan:           Plan Factors-Exercise tolerance (METS): >4 METS  Chart reviewed  Imaging results reviewed  Existing labs reviewed  Patient summary reviewed  Patient is not a current smoker  Induction-     Postoperative Plan-     Informed Consent- Anesthetic plan and risks discussed with patient  I personally reviewed this patient with the CRNA  Discussed and agreed on the Anesthesia Plan with the CRNA  Jose Rudolph

## 2022-05-02 PROCEDURE — U0003 INFECTIOUS AGENT DETECTION BY NUCLEIC ACID (DNA OR RNA); SEVERE ACUTE RESPIRATORY SYNDROME CORONAVIRUS 2 (SARS-COV-2) (CORONAVIRUS DISEASE [COVID-19]), AMPLIFIED PROBE TECHNIQUE, MAKING USE OF HIGH THROUGHPUT TECHNOLOGIES AS DESCRIBED BY CMS-2020-01-R: HCPCS | Performed by: INTERNAL MEDICINE

## 2022-05-02 PROCEDURE — U0005 INFEC AGEN DETEC AMPLI PROBE: HCPCS | Performed by: INTERNAL MEDICINE

## 2022-05-05 ENCOUNTER — TELEPHONE (OUTPATIENT)
Dept: GASTROENTEROLOGY | Facility: CLINIC | Age: 47
End: 2022-05-05

## 2022-05-05 NOTE — TELEPHONE ENCOUNTER
----- Message from Mitch Porter MD sent at 5/5/2022  6:12 AM EDT -----  The biopsies from small intestine showed some mild inflammation  I have ordered some blood work to check for celiac sprue, please complete this lab test   The biopsies from small intestine also showed some melanosis which is likely from the iron pills  Biopsies from stomach were negative for H pylori  Biopsies from the colonoscopy were negative for microscopic colitis which is good news  As we discussed, please take a daily fiber supplementyou will need to have a repeat colonoscopy in 1 year time due to the suboptimal prep during this examination    You should have a 2 day bowel prep at that time Please follow-up in the office with 1 of our PAs in 3 months, call with any questions or concerns

## 2022-05-05 NOTE — TELEPHONE ENCOUNTER
Called and spoke to patient discussed results she will get the labs done; she had to end the call due to having a procedure done today         EGD recall placed   Colon recall placed

## 2022-05-07 ENCOUNTER — HOSPITAL ENCOUNTER (EMERGENCY)
Facility: HOSPITAL | Age: 47
Discharge: HOME | End: 2022-05-08
Attending: EMERGENCY MEDICINE
Payer: COMMERCIAL

## 2022-05-07 VITALS
HEART RATE: 92 BPM | WEIGHT: 204.4 LBS | BODY MASS INDEX: 37.39 KG/M2 | SYSTOLIC BLOOD PRESSURE: 145 MMHG | TEMPERATURE: 96.1 F | RESPIRATION RATE: 18 BRPM | OXYGEN SATURATION: 97 % | DIASTOLIC BLOOD PRESSURE: 72 MMHG

## 2022-05-07 DIAGNOSIS — E83.51 HYPOCALCEMIA: Primary | ICD-10-CM

## 2022-05-07 PROCEDURE — 99285 EMERGENCY DEPT VISIT HI MDM: CPT

## 2022-05-08 LAB
ANION GAP SERPL CALCULATED.3IONS-SCNC: 16 MMOL/L (ref 4–13)
BUN SERPL-MCNC: 23 MG/DL (ref 5–25)
CA-I BLD-SCNC: 1.09 MMOL/L (ref 1.12–1.32)
CALCIUM SERPL-MCNC: 8.3 MG/DL (ref 8.3–10.1)
CARDIAC TROPONIN I PNL SERPL HS: 3 NG/L
CHLORIDE SERPL-SCNC: 99 MMOL/L (ref 100–108)
CO2 SERPL-SCNC: 22 MMOL/L (ref 21–32)
CREAT SERPL-MCNC: 1.29 MG/DL (ref 0.6–1.3)
GFR SERPL CREATININE-BSD FRML MDRD: 50 ML/MIN/1.73SQ M
GLUCOSE SERPL-MCNC: 124 MG/DL (ref 65–140)
HCG SERPL QL: NEGATIVE
MAGNESIUM SERPL-MCNC: 2.2 MG/DL (ref 1.6–2.6)
POTASSIUM SERPL-SCNC: 4.1 MMOL/L (ref 3.5–5.3)
SODIUM SERPL-SCNC: 137 MMOL/L (ref 136–145)

## 2022-05-08 PROCEDURE — 83735 ASSAY OF MAGNESIUM: CPT | Performed by: EMERGENCY MEDICINE

## 2022-05-08 PROCEDURE — 84484 ASSAY OF TROPONIN QUANT: CPT | Performed by: EMERGENCY MEDICINE

## 2022-05-08 PROCEDURE — 82330 ASSAY OF CALCIUM: CPT | Performed by: EMERGENCY MEDICINE

## 2022-05-08 PROCEDURE — 80048 BASIC METABOLIC PNL TOTAL CA: CPT | Performed by: EMERGENCY MEDICINE

## 2022-05-08 PROCEDURE — 99285 EMERGENCY DEPT VISIT HI MDM: CPT | Performed by: EMERGENCY MEDICINE

## 2022-05-08 PROCEDURE — 36415 COLL VENOUS BLD VENIPUNCTURE: CPT | Performed by: EMERGENCY MEDICINE

## 2022-05-08 PROCEDURE — 84703 CHORIONIC GONADOTROPIN ASSAY: CPT | Performed by: EMERGENCY MEDICINE

## 2022-05-08 PROCEDURE — 96366 THER/PROPH/DIAG IV INF ADDON: CPT

## 2022-05-08 PROCEDURE — 96365 THER/PROPH/DIAG IV INF INIT: CPT

## 2022-05-08 RX ORDER — CALCIUM GLUCONATE 20 MG/ML
1 INJECTION, SOLUTION INTRAVENOUS ONCE
Status: COMPLETED | OUTPATIENT
Start: 2022-05-08 | End: 2022-05-08

## 2022-05-08 RX ADMIN — CALCIUM GLUCONATE 1 G: 20 INJECTION, SOLUTION INTRAVENOUS at 01:10

## 2022-05-08 NOTE — ED PROVIDER NOTES
Final Diagnosis:  1  Hypocalcemia        Chief Complaint   Patient presents with    Chest Pain     States she had a steroid injection a few days ago which usually causes her calcium to drop  States has taken 2 extra Calcium tablets ( 8 tablets instead of 6 ) for past several days  Started with numbness, tingling and chest pain last night  EKG done in triage     Numbness     HPI  Patient presents with multiple complaints  She attributes them to hypocalcemia  She had a thyroid ectomy a while back and parathyroid injury  She now takes calcium tablets daily  She presents frequently with low calcium  Today she had numbness tingling some chest discomfort  EKGs normal   No associated diaphoresis vomiting nonexertional speaking in full sentences no apparent shortness of breath  She had to miss last endocrine appt  covid, now it's months out    - No language barrier    - History obtained from patient  - There are no limitations to the history obtained  - Previous charting underwent limited review with attention to last ED visits, labs, ekgs, and prior imaging  PMH:   has a past medical history of Acid reflux, Acute renal failure ([address] ), Anemia, Anxiety, Chronic pain, DDD (degenerative disc disease), lumbar, Disease of thyroid gland, DVT (deep venous thrombosis) ([address] ) (), Hypercalcemia, Hyperlipidemia, Hyperthyroidism, Hypocalcemia, Kidney stone, Migraine, Psychiatric disorder, Seizures ([address] ), Spondylolisthesis of lumbar region, Treatment, and Wears glasses  PSH:   has a past surgical history that includes  section; TONSILECTOMY AND ADNOIDECTOMY; Thyroidectomy; Parathyroidectomy; Discogram; pr arthrodesis posterior interbody lumbar (N/A, 2016); pr anterior colporraphy rpr cystocele w/cysto (N/A, 2017); pr sling oper stres incontinence (N/A, 2017); Tonsillectomy;  Tubal ligation; pr cystourethroscopy,ureter catheter (Bilateral, 2018); pr supracerv abd hysterectomy (N/A, 12/7/2018); Cystocele repair (05/04/2017); Cystoscopy; Hysterectomy; and Back surgery  Social History:    Tobacco Use: High Risk    Smoking Tobacco Use: Current Every Day Smoker    Smokeless Tobacco Use: Never Used     Alcohol Use: Unknown    Frequency of Alcohol Consumption: 2-4 times a month    Average Number of Drinks: Not on file    Frequency of Binge Drinking: Not on file     Patient with no illicit use    ROS:    Pertinent positives/negatives:   Review of Systems   Constitutional: Positive for fatigue  Respiratory: Positive for chest tightness  Neurological: Positive for numbness  CONSTITUTIONAL:  No dizziness  No weakness  No unexpected weight loss  EYES:  No pain, erythema, or discharge  No loss of vision  ENT:  No tinnitus, decreased hearing  No epistaxis/purulent drainage  No voice change, airway closing, trismus  CARDIOVASCULAR:  No chest pain  No palpitations  No new lower extremity edema  RESPIRATORY:  No purulent cough  No hemoptysis  No dyspnea  No paroxysmal nocturnal dyspnea  No stridor, audible wheezing bedside  GASTROINTESTINAL:  Normal appetite  No vomiting, diarrhea  No pain  No bloating  No melena  GENITOURINARY:  No frequency, urgency, nocturia  No hematuria or dysuria  No discharge  No sores/adenopathy  MUSCULOSKELETAL:  No arthralgias or myalgias that are new  INTEGUMENTARY:  No swelling  No unexpected contusions  No abrasions  No lymphangitis  NEUROLOGIC:  No meningismus  No numbness of the extremities  No new focal weakness  No postural instability  PSYCHIATRIC:  No SI HI AVH  HEMATOLOGICAL:  No bleeding  No petechiae  No bruising  ALLERGIES:  No urticaria  No sudden abd cramping  No stridor  PE:     Physical exam highlights:   Physical Exam       Vitals:    05/07/22 2157   BP: 145/72   BP Location: Left arm   Pulse: 92   Resp: 18   Temp: (!) 96 1 °F (35 6 °C)   TempSrc: Tympanic   SpO2: 97%   Weight: 92 7 kg (204 lb 6 4 oz)     Vitals reviewed by me  Nursing note reviewed  Chaperone present for all sensitive exam   Const: No acute distress  Alert  Nontoxic  Not diaphoretic  HEENT: External ears normal  No protrusion drainage swelling  Nose normal  No drainage/traumatic deformity  MMM  Mouth with baseline/symmetric movement  No trismus  Eyes: No squinting  No icterus  Tracks through the room with normal EOM  No tearing/swelling/drainage  Neck: ROM normal  No rigidity  No meningismus  Cards: Rate as per vitals  Compared to monitor sinus unless documented above  Regular  Well perfused  Pulm: able to verbalize without additional effort  Effort and excursion normal  No disress  No audible wheezing/ stridor  Normal resp rate  Abd: No distension beyond baseline  No fluctuant wave  Patient without peritoneal pain with shifting/bumping the bed  MSK: ROM normal and baseline  No deformity  Skin: No new rashes visible  Well perfused  Neuro: Nonfocal  Baseline  CN grossly intact  Moving all four with coordination  Psych: Normal behavior and affect  A:  - Nursing note reviewed  Ddx and MDM  Check electrolytes  Replete      Procedure Note: EKG  Date/Time: 05/08/22 6:07 AM   Interpreted by: Sergei Haider  Indications / Diagnosis: CP  ECG reviewed by me, the ED Provider: yes   The EKG demonstrates:  Rhythm: sinus    Intervals: normal intervals  Axis: normal axis  QRS/Blocks: normal QRS  ST Changes: No acute ST Changes, no STD/KISHORE    T wave changes: none                          No orders to display     Orders Placed This Encounter   Procedures    Calcium, ionized    Basic metabolic panel    Magnesium    hCG, qualitative pregnancy    HS Troponin 0hr (reflex protocol)    ECG 12 lead     Labs Reviewed   CALCIUM, IONIZED - Abnormal       Result Value Ref Range Status    Calcium, Ionized 1 09 (*) 1 12 - 1 32 mmol/L Final   BASIC METABOLIC PANEL - Abnormal    Sodium 137  136 - 145 mmol/L Final    Potassium 4 1  3 5 - 5 3 mmol/L Final    Chloride 99 (*) 100 - 108 mmol/L Final    CO2 22  21 - 32 mmol/L Final    Comment: Verified by repeat analysis    ANION GAP 16 (*) 4 - 13 mmol/L Final    BUN 23  5 - 25 mg/dL Final    Creatinine 1 29  0 60 - 1 30 mg/dL Final    Comment: Standardized to IDMS reference method    Glucose 124  65 - 140 mg/dL Final    Comment: If the patient is fasting, the ADA then defines impaired fasting glucose as > 100 mg/dL and diabetes as > or equal to 123 mg/dL  Specimen collection should occur prior to Sulfasalazine administration due to the potential for falsely depressed results  Specimen collection should occur prior to Sulfapyridine administration due to the potential for falsely elevated results  Calcium 8 3  8 3 - 10 1 mg/dL Final    eGFR 50  ml/min/1 73sq m Final    Narrative:     [place] guidelines for Chronic Kidney Disease (CKD):     Stage 1 with normal or high GFR (GFR > 90 mL/min/1 73 square meters)    Stage 2 Mild CKD (GFR = 60-89 mL/min/1 73 square meters)    Stage 3A Moderate CKD (GFR = 45-59 mL/min/1 73 square meters)    Stage 3B Moderate CKD (GFR = 30-44 mL/min/1 73 square meters)    Stage 4 Severe CKD (GFR = 15-29 mL/min/1 73 square meters)    Stage 5 End Stage CKD (GFR <15 mL/min/1 73 square meters)  Note: GFR calculation is accurate only with a steady state creatinine   MAGNESIUM - Normal    Magnesium 2 2  1 6 - 2 6 mg/dL Final   PREGNANCY TEST (HCG QUALITATIVE) - Normal    Preg, Serum Negative  Negative Final   HS TROPONIN I 0HR - Normal    hs TnI 0hr 3  "Refer to ACS Flowchart"- see link ng/L Final    Comment:                                              Initial (time 0) result  If >=50 ng/L, Myocardial injury suggested ;  Type of myocardial injury and treatment strategy  to be determined  If 5-49 ng/L, a delta result at 2 hours and or 4 hours will be needed to further evaluate    If <4 ng/L, and chest pain has been >3 hours since onset, patient may qualify for discharge based on the HEART score in the ED  If <5 ng/L and <3hours since onset of chest pain, a delta result at 2 hours will be needed to further evaluate  HS Troponin 99th Percentile URL of a Health Population=12 ng/L with a 95% Confidence Interval of 8-18 ng/L  Second Troponin (time 2 hours)  If calculated delta >= 20 ng/L,  Myocardial injury suggested ; Type of myocardial injury and treatment strategy to be determined  If 5-49 ng/L and the calculated delta is 5-19 ng/L, consult medical service for evaluation  Continue evaluation for ischemia on ecg and other possible etiology and repeat hs troponin at 4 hours  If delta is <5 ng/L at 2 hours, consider discharge based on risk stratification via the HEART score (if in ED), or AB risk score in IP/Observation  HS Troponin 99th Percentile URL of a Health Population=12 ng/L with a 95% Confidence Interval of 8-18 ng/L  Final Diagnosis:  1  Hypocalcemia        P:  - hospital tx includes   Medications   calcium gluconate 1 g in sodium chloride 0 9% 50 mL (premix) (0 g Intravenous Stopped 5/8/22 0320)         - disposition  Time reflects when diagnosis was documented in both MDM as applicable and the Disposition within this note     Time User Action Codes Description Comment    5/8/2022  1:12 AM M[place]le Model Add [E83 43] Hypocalcemia       ED Disposition     ED Disposition Condition Date/Time Comment    Discharge Stable Sun May 8, 2022  1:12 AM [facility] discharge to home/self care              Follow-up Information     Follow up With Specialties Details Why Contact Info Additional [address] [facility]   [phone] [facility] [address]  [phone] [address] [facility] [place], [address], [place], [address] [facility]    [address] [facility]   [phone] [facility] [address] [phone]  [address] for [facility] and Endocrinology [facility] [address], [place], [place], [address], [address] [facility] and Endocr[facility]ology [facility]   [address]  [address] [address]  [address] [facility] Endocr[facility] [place], One [facility]          - patient will call their PCP to let them know they were in the emergency department   We discuss return precautions       - additional tx intended, if consistent with primary provider:  - patient to follow with :      Discharge Medication List as of 5/8/2022  1:13 AM      CONTINUE these medications which have NOT CHANGED    Details   albuterol (Ventolin HFA) 90 mcg/act inhaler Inhale 2 puffs every 4 (four) hours as needed for wheezing or shortness of breath, Starting Sun 9/26/2021, Normal      ALPRAZolam (XANAX) 1 mg tablet Take 1 mg by mouth daily am, Historical Med      !! baclofen 10 mg tablet Take 10 mg by mouth 3 (three) times a day , Historical Med      !! baclofen 20 mg tablet Take 1 tablet (20 mg total) by mouth 3 (three) times a day, Starting Thu 11/18/2021, Normal      calcitriol (ROCALTROL) 0 25 mcg capsule TAKE 1 CAPSULE BY MOUTH EVERY DAY, Normal      Calcium Carb-Cholecalciferol (Calcium 500+D) 500-400 MG-UNIT TABS Take 500 mg by mouth 6 (six) times a day, Normal      celecoxib (CeleBREX) 200 mg capsule Daily, Historical Med      fenofibrate (TRIGLIDE) 160 MG tablet Take 160 mg by mouth daily, Historical Med      ferrous sulfate 325 (65 Fe) mg tablet Take 325 mg by mouth Once Monday, wed, Friday-Last dose 4/1/22 , Historical Med      hydrocortisone (ANUSOL-HC) 2 5 % rectal cream Apply topically 2 (two) times a day, Starting Fri 2/25/2022, Normal      hydrocortisone (ANUSOL-HC) 25 mg suppository Insert 1 suppository (25 mg total) into the rectum 2 (two) times a day, Starting Fri 2/25/2022, Normal      ibuprofen (MOTRIN) 600 mg tablet Take 1 tablet (600 mg total) by mouth every 6 (six) hours as needed for mild pain or moderate pain, Starting Tue 6/2/2020, Normal      levothyroxine 150 mcg tablet TAKE 1 TABLET BY MOUTH EVERY DAY, Normal      Lidocaine-Menthol 4-1 % PTCH if needed  , Historical Med      magnesium Oxide (MAG-OX) 400 mg TABS TAKE 1 TABLET (400 MG TOTAL) BY MOUTH THREE (THREE) TIMES A DAY 9PM, Normal      methocarbamol (ROBAXIN) 500 mg tablet Take 1 tablet (500 mg total) by mouth 2 (two) times a day, Starting Wed 3/16/2022, Normal      metoclopramide (REGLAN) 10 mg tablet Take 1 tablet (10 mg total) by mouth every 6 (six) hours as needed (nausea), Starting Fri 12/31/2021, Normal      ondansetron (ZOFRAN-ODT) 4 mg disintegrating tablet Take 1 tablet (4 mg total) by mouth every 6 (six) hours as needed for nausea or vomiting, Starting Sun 12/19/2021, Normal      oxyCODONE-acetaminophen (PERCOCET)  mg per tablet Take 1 tablet by mouth 4 (four) times a day, Historical Med      pantoprazole (PROTONIX) 40 mg tablet Take 1 tablet (40 mg total) by mouth daily, Starting Mon 4/25/2022, Normal      potassium chloride (K-DUR,KLOR-CON) 20 mEq tablet Take 20 mEq by mouth 2 (two) times a day, Historical Med      pregabalin (LYRICA) 75 mg capsule Take 75 mg by mouth 3 (three) times a day as needed , Historical Med      QUEtiapine (SEROquel) 25 mg tablet 25 mg daily at bedtime  , Historical Med      witch hazel-glycerin (TUCKS) topical pad Insert 1 pad into the rectum 3 (three) times a day for 14 days, Starting Wed 2/23/2022, Until Tue 4/19/2022, Normal       !! - Potential duplicate medications found  Please discuss with provider  No discharge procedures on file  Prior to Admission Medications   Prescriptions Last Dose Informant Patient Reported? Taking?    ALPRAZolam (XANAX) 1 mg tablet  Self Yes No   Sig: Take 1 mg by mouth daily am Calcium Carb-Cholecalciferol (Calcium 500+D) 500-400 MG-UNIT TABS  Self No No   Sig: Take 500 mg by mouth 6 (six) times a day   Lidocaine-Menthol 4-1 % PTCH   Yes No   Sig: if needed     QUEtiapine (SEROquel) 25 mg tablet  Self Yes No   Si mg daily at bedtime     albuterol (Ventolin HFA) 90 mcg/act inhaler  Self No No   Sig: Inhale 2 puffs every 4 (four) hours as needed for wheezing or shortness of breath   baclofen 10 mg tablet  Self Yes No   Sig: Take 10 mg by mouth 3 (three) times a day    baclofen 20 mg tablet  Self No No   Sig: Take 1 tablet (20 mg total) by mouth 3 (three) times a day   Patient taking differently: Take 20 mg by mouth 3 (three) times a day as needed     calcitriol (ROCALTROL) 0 25 mcg capsule   No No   Sig: TAKE 1 CAPSULE BY MOUTH EVERY DAY   celecoxib (CeleBREX) 200 mg capsule   Yes No   Sig: Daily   fenofibrate (TRIGLIDE) 160 MG tablet  Self Yes No   Sig: Take 160 mg by mouth daily   ferrous sulfate 325 (65 Fe) mg tablet  Self Yes No   Sig: Take 325 mg by mouth Once Monday, wed, Friday-Last dose 22    hydrocortisone (ANUSOL-HC) 2 5 % rectal cream   No No   Sig: Apply topically 2 (two) times a day   Patient taking differently: Apply topically 2 (two) times a day as needed     hydrocortisone (ANUSOL-HC) 25 mg suppository   No No   Sig: Insert 1 suppository (25 mg total) into the rectum 2 (two) times a day   Patient taking differently: Insert 25 mg into the rectum 2 (two) times a day as needed     ibuprofen (MOTRIN) 600 mg tablet  Self No No   Sig: Take 1 tablet (600 mg total) by mouth every 6 (six) hours as needed for mild pain or moderate pain   levothyroxine 150 mcg tablet  Self No No   Sig: TAKE 1 TABLET BY MOUTH EVERY DAY   Patient taking differently: 150 mcg every morning     magnesium Oxide (MAG-OX) 400 mg TABS  Self No No   Sig: TAKE 1 TABLET (400 MG TOTAL) BY MOUTH THREE (THREE) TIMES A DAY 9PM   methocarbamol (ROBAXIN) 500 mg tablet   No No   Sig: Take 1 tablet (500 mg total) by mouth 2 (two) times a day   metoclopramide (REGLAN) 10 mg tablet  Self No No   Sig: Take 1 tablet (10 mg total) by mouth every 6 (six) hours as needed (nausea)   ondansetron (ZOFRAN-ODT) 4 mg disintegrating tablet  Self No No   Sig: Take 1 tablet (4 mg total) by mouth every 6 (six) hours as needed for nausea or vomiting   oxyCODONE-acetaminophen (PERCOCET)  mg per tablet   Yes No   Sig: Take 1 tablet by mouth 4 (four) times a day   pantoprazole (PROTONIX) 40 mg tablet   No No   Sig: Take 1 tablet (40 mg total) by mouth daily   potassium chloride (K-DUR,KLOR-CON) 20 mEq tablet  Self Yes No   Sig: Take 20 mEq by mouth 2 (two) times a day   pregabalin (LYRICA) 75 mg capsule  Self Yes No   Sig: Take 75 mg by mouth 3 (three) times a day as needed    witch hazel-glycerin (TUCKS) topical pad  Self No No   Sig: Insert 1 pad into the rectum 3 (three) times a day for 14 days      Facility-Administered Medications: None       Portions of the record may have been created with voice recognition software  Occasional wrong word or "sound a like" substitutions may have occurred due to the inherent limitations of voice recognition software  Read the chart carefully and recognize, using context, where substitutions have occurred      Electronically signed by:  MD Maty Aranda MD  05/08/22 5461

## 2022-05-09 ENCOUNTER — HOSPITAL ENCOUNTER (EMERGENCY)
Facility: HOSPITAL | Age: 47
Discharge: HOME | End: 2022-05-10
Attending: EMERGENCY MEDICINE | Admitting: EMERGENCY MEDICINE
Payer: COMMERCIAL

## 2022-05-09 VITALS
RESPIRATION RATE: 17 BRPM | OXYGEN SATURATION: 96 % | TEMPERATURE: 97.2 F | BODY MASS INDEX: 37.54 KG/M2 | DIASTOLIC BLOOD PRESSURE: 81 MMHG | HEIGHT: 62 IN | HEART RATE: 85 BPM | WEIGHT: 204 LBS | SYSTOLIC BLOOD PRESSURE: 136 MMHG

## 2022-05-09 DIAGNOSIS — E83.51 HYPOCALCEMIA: Primary | ICD-10-CM

## 2022-05-09 PROCEDURE — 80048 BASIC METABOLIC PNL TOTAL CA: CPT | Performed by: EMERGENCY MEDICINE

## 2022-05-09 PROCEDURE — 82330 ASSAY OF CALCIUM: CPT | Performed by: EMERGENCY MEDICINE

## 2022-05-09 PROCEDURE — 36415 COLL VENOUS BLD VENIPUNCTURE: CPT | Performed by: EMERGENCY MEDICINE

## 2022-05-09 PROCEDURE — 99283 EMERGENCY DEPT VISIT LOW MDM: CPT

## 2022-05-09 RX ORDER — CALCIUM GLUCONATE 20 MG/ML
1 INJECTION, SOLUTION INTRAVENOUS ONCE
Status: COMPLETED | OUTPATIENT
Start: 2022-05-10 | End: 2022-05-10

## 2022-05-09 RX ADMIN — SODIUM CHLORIDE 500 ML: 0.9 INJECTION, SOLUTION INTRAVENOUS at 23:54

## 2022-05-10 ENCOUNTER — HOSPITAL ENCOUNTER (EMERGENCY)
Facility: HOSPITAL | Age: 47
Discharge: HOME | End: 2022-05-11
Attending: EMERGENCY MEDICINE
Payer: COMMERCIAL

## 2022-05-10 VITALS
HEART RATE: 93 BPM | DIASTOLIC BLOOD PRESSURE: 78 MMHG | RESPIRATION RATE: 18 BRPM | BODY MASS INDEX: 38.08 KG/M2 | OXYGEN SATURATION: 97 % | SYSTOLIC BLOOD PRESSURE: 132 MMHG | WEIGHT: 208.2 LBS | TEMPERATURE: 96.2 F

## 2022-05-10 DIAGNOSIS — R20.0 NUMBNESS AND TINGLING: Primary | ICD-10-CM

## 2022-05-10 DIAGNOSIS — E83.51 HYPOCALCEMIA: ICD-10-CM

## 2022-05-10 DIAGNOSIS — R20.2 NUMBNESS AND TINGLING: Primary | ICD-10-CM

## 2022-05-10 LAB
ANION GAP SERPL CALCULATED.3IONS-SCNC: 17 MMOL/L (ref 4–13)
ANION GAP SERPL CALCULATED.3IONS-SCNC: 18 MMOL/L (ref 4–13)
BUN SERPL-MCNC: 19 MG/DL (ref 5–25)
BUN SERPL-MCNC: 29 MG/DL (ref 5–25)
CA-I BLD-SCNC: 0.96 MMOL/L (ref 1.12–1.32)
CA-I BLD-SCNC: 1.02 MMOL/L (ref 1.12–1.32)
CALCIUM SERPL-MCNC: 7.4 MG/DL (ref 8.3–10.1)
CALCIUM SERPL-MCNC: 7.9 MG/DL (ref 8.3–10.1)
CHLORIDE SERPL-SCNC: 100 MMOL/L (ref 100–108)
CHLORIDE SERPL-SCNC: 101 MMOL/L (ref 100–108)
CO2 SERPL-SCNC: 20 MMOL/L (ref 21–32)
CO2 SERPL-SCNC: 20 MMOL/L (ref 21–32)
CREAT SERPL-MCNC: 0.98 MG/DL (ref 0.6–1.3)
CREAT SERPL-MCNC: 1.24 MG/DL (ref 0.6–1.3)
GFR SERPL CREATININE-BSD FRML MDRD: 52 ML/MIN/1.73SQ M
GFR SERPL CREATININE-BSD FRML MDRD: 69 ML/MIN/1.73SQ M
GLUCOSE SERPL-MCNC: 142 MG/DL (ref 65–140)
GLUCOSE SERPL-MCNC: 156 MG/DL (ref 65–140)
POTASSIUM SERPL-SCNC: 4.1 MMOL/L (ref 3.5–5.3)
POTASSIUM SERPL-SCNC: 4.4 MMOL/L (ref 3.5–5.3)
SODIUM SERPL-SCNC: 138 MMOL/L (ref 136–145)
SODIUM SERPL-SCNC: 138 MMOL/L (ref 136–145)

## 2022-05-10 PROCEDURE — 99284 EMERGENCY DEPT VISIT MOD MDM: CPT

## 2022-05-10 PROCEDURE — 99285 EMERGENCY DEPT VISIT HI MDM: CPT | Performed by: EMERGENCY MEDICINE

## 2022-05-10 PROCEDURE — 99284 EMERGENCY DEPT VISIT MOD MDM: CPT | Performed by: EMERGENCY MEDICINE

## 2022-05-10 PROCEDURE — 96365 THER/PROPH/DIAG IV INF INIT: CPT

## 2022-05-10 PROCEDURE — 82330 ASSAY OF CALCIUM: CPT | Performed by: EMERGENCY MEDICINE

## 2022-05-10 PROCEDURE — 80048 BASIC METABOLIC PNL TOTAL CA: CPT | Performed by: EMERGENCY MEDICINE

## 2022-05-10 PROCEDURE — 93005 ELECTROCARDIOGRAM TRACING: CPT

## 2022-05-10 PROCEDURE — 36415 COLL VENOUS BLD VENIPUNCTURE: CPT | Performed by: EMERGENCY MEDICINE

## 2022-05-10 RX ORDER — CALCIUM GLUCONATE 20 MG/ML
1 INJECTION, SOLUTION INTRAVENOUS ONCE
Status: COMPLETED | OUTPATIENT
Start: 2022-05-10 | End: 2022-05-11

## 2022-05-10 RX ADMIN — CALCIUM GLUCONATE 1 G: 20 INJECTION, SOLUTION INTRAVENOUS at 23:51

## 2022-05-10 RX ADMIN — CALCIUM GLUCONATE 1 G: 20 INJECTION, SOLUTION INTRAVENOUS at 00:01

## 2022-05-10 NOTE — ED PROVIDER NOTES
History  Chief Complaint   Patient presents with    Abnormal Lab     Patient presents stating her Calcium is low again  States she was here on Saturday and received 1g of calcium  States they just started her on steriod injections and is worried that is hurting her ability to absorb calcium  C/O tingling all over     29-year-old female with past history of hypothyroidism after thyroidectomy, hypocalcemia, anxiety, depression, hyperlipidemia, anemia, degenerative disc disease, migraine, presents to the ED for evaluation of generalized body aches, weakness, since this morning  Patient states that she recently received steroid injection to her back for chronic back pain  At that time she was told that her calcium level may be low as the steroid can inhibit calcium absorption  Patient was here 2 days ago receiving calcium repletion  She felt better until this morning  Patient thinks her calcium level is low again  Patient came to the ED for further evaluation  History provided by:  Patient      Prior to Admission Medications   Prescriptions Last Dose Informant Patient Reported? Taking?    ALPRAZolam (XANAX) 1 mg tablet  Self Yes No   Sig: Take 1 mg by mouth daily am   Calcium Carb-Cholecalciferol (Calcium 500+D) 500-400 MG-UNIT TABS  Self No No   Sig: Take 500 mg by mouth 6 (six) times a day   Lidocaine-Menthol 4-1 % PTCH   Yes No   Sig: if needed     QUEtiapine (SEROquel) 25 mg tablet  Self Yes No   Si mg daily at bedtime     albuterol (Ventolin HFA) 90 mcg/act inhaler  Self No No   Sig: Inhale 2 puffs every 4 (four) hours as needed for wheezing or shortness of breath   baclofen 10 mg tablet  Self Yes No   Sig: Take 10 mg by mouth 3 (three) times a day    baclofen 20 mg tablet  Self No No   Sig: Take 1 tablet (20 mg total) by mouth 3 (three) times a day   Patient taking differently: Take 20 mg by mouth 3 (three) times a day as needed     calcitriol (ROCALTROL) 0 25 mcg capsule   No No   Sig: TAKE 1 CAPSULE BY MOUTH EVERY DAY   celecoxib (CeleBREX) 200 mg capsule   Yes No   Sig: Daily   fenofibrate (TRIGLIDE) 160 MG tablet  Self Yes No   Sig: Take 160 mg by mouth daily   ferrous sulfate 325 (65 Fe) mg tablet  Self Yes No   Sig: Take 325 mg by mouth Once Monday, wed, Friday-Last dose 4/1/22    hydrocortisone (ANUSOL-HC) 2 5 % rectal cream   No No   Sig: Apply topically 2 (two) times a day   Patient taking differently: Apply topically 2 (two) times a day as needed     hydrocortisone (ANUSOL-HC) 25 mg suppository   No No   Sig: Insert 1 suppository (25 mg total) into the rectum 2 (two) times a day   Patient taking differently: Insert 25 mg into the rectum 2 (two) times a day as needed     ibuprofen (MOTRIN) 600 mg tablet  Self No No   Sig: Take 1 tablet (600 mg total) by mouth every 6 (six) hours as needed for mild pain or moderate pain   levothyroxine 150 mcg tablet  Self No No   Sig: TAKE 1 TABLET BY MOUTH EVERY DAY   Patient taking differently: 150 mcg every morning     magnesium Oxide (MAG-OX) 400 mg TABS  Self No No   Sig: TAKE 1 TABLET (400 MG TOTAL) BY MOUTH THREE (THREE) TIMES A DAY 9PM   methocarbamol (ROBAXIN) 500 mg tablet   No No   Sig: Take 1 tablet (500 mg total) by mouth 2 (two) times a day   metoclopramide (REGLAN) 10 mg tablet  Self No No   Sig: Take 1 tablet (10 mg total) by mouth every 6 (six) hours as needed (nausea)   ondansetron (ZOFRAN-ODT) 4 mg disintegrating tablet  Self No No   Sig: Take 1 tablet (4 mg total) by mouth every 6 (six) hours as needed for nausea or vomiting   oxyCODONE-acetaminophen (PERCOCET)  mg per tablet   Yes No   Sig: Take 1 tablet by mouth 4 (four) times a day   pantoprazole (PROTONIX) 40 mg tablet   No No   Sig: Take 1 tablet (40 mg total) by mouth daily   potassium chloride (K-DUR,KLOR-CON) 20 mEq tablet  Self Yes No   Sig: Take 20 mEq by mouth 2 (two) times a day   pregabalin (LYRICA) 75 mg capsule  Self Yes No   Sig: Take 75 mg by mouth 3 (three) times a day as needed    witch hazel-glycerin (TUCKS) topical pad  Self No No   Sig: Insert 1 pad into the rectum 3 (three) times a day for 14 days      Facility-Administered Medications: None       Past Medical History:   Diagnosis Date    Acid reflux     Acute renal failure (HCC)     multiple episodes    Anemia     Anxiety     Chronic pain     DDD (degenerative disc disease), lumbar     Disease of thyroid gland     had surgery and now hypo    DVT (deep venous thrombosis) ([facility] [address] )     s/p ankle fracture    Hypercalcemia     Hyperlipidemia     Hyperthyroidism     Hypocalcemia     post op 2016    Kidney stone     Migraine     Psychiatric disorder     anxiety depression    Seizures (HCC)     petit mal x1  4 years ago- all tests were neg   Spondylolisthesis of lumbar region     Treatment     spinal pain injections  last was 2016    Wears glasses        Past Surgical History:   Procedure Laterality Date    BACK SURGERY      L4-5, S1-fusion-plate/screws     SECTION      x5    CYSTOCELE REPAIR  2017    CYSTOSCOPY      DISCOGRAM      HYSTERECTOMY      PARATHYROIDECTOMY      MS ANTERIOR COLPORRAPHY RPR CYSTOCELE W/CYSTO N/A 2017    Procedure: CYSTOCELE REPAIR;  Surgeon: Adamaris Gross MD;  Location: [address] [place] [address];  Service: Gynecology    MS ARTHRODESIS POSTERIOR INTERBODY LUMBAR N/A 2016    Procedure: L4-S1 LUMBAR LAMINECTOMY/DECOMPRESSION;  INSTRUMENTED POSTEROLATERAL FUSION/ INTERBODY L5-S1; ALLOGRAFT AND AUTOGRAFT (IMPULSE) ;   Surgeon: Carol Sánchez MD;  [facility];  Service: Orthopedics    MS CYSTOURETHROSCOPY,URETER CATHETER Bilateral 2018    Procedure: INSERTION URETERAL CATHETERS PREOP;  Surgeon: Gaurav Martinez MD;  Location: [address] [place] [address];  Service: Urology    MS SLING OPER STRES INCONTINENCE N/A 2017    Procedure: MID URETHRAL SLING;  Surgeon: Errol Hester MD;  Location: [address] [place] [address];  Service: Urology    MS SUPRACERV ABD HYSTERECTOMY N/A 12/7/2018    Procedure: SUPRACERVICAL HYSTERECTOMY;  Surgeon: Caitlin Conn MD;  Location[facility];  Service: Gynecology    THYROIDECTOMY      TONSILECTOMY AND ADNOIDECTOMY      TONSILLECTOMY      TUBAL LIGATION         Family History   Problem Relation Age of Onset    Diabetes Mother     Hypertension Mother     Hyperlipidemia Father     Arrhythmia Father     Lung cancer Father     Diabetes Father      I have reviewed and agree with the history as documented  E-Cigarette/Vaping    E-Cigarette Use Never User      E-Cigarette/Vaping Substances    Nicotine No     THC No     CBD No     Flavoring No     Other No     Unknown No      Social History     Tobacco Use    Smoking status: Current Every Day Smoker     Packs/day: 0 50     Years: 25 00     Pack years: 12 50     Types: Cigarettes    Smokeless tobacco: Never Used   Vaping Use    Vaping Use: Never used   Substance Use Topics    Alcohol use: Not Currently    Drug use: No       Review of Systems   Constitutional: Negative for activity change, appetite change, chills and fever  HENT: Negative for congestion and ear pain  Eyes: Negative for pain and discharge  Respiratory: Negative for cough, chest tightness, shortness of breath, wheezing and stridor  Cardiovascular: Negative for chest pain and palpitations  Gastrointestinal: Negative for abdominal distention, abdominal pain, constipation, diarrhea and nausea  Endocrine: Negative for cold intolerance  Genitourinary: Negative for dysuria, frequency and urgency  Musculoskeletal: Negative for arthralgias and back pain  Skin: Negative for color change and rash  Allergic/Immunologic: Negative for environmental allergies and food allergies  Neurological: Positive for weakness  Negative for dizziness, numbness and headaches  Hematological: Negative for adenopathy  Psychiatric/Behavioral: Negative for agitation, behavioral problems and confusion   The patient is not nervous/anxious  All other systems reviewed and are negative  Physical Exam  Physical Exam  Vitals and nursing note reviewed  Constitutional:       Appearance: She is well-developed  HENT:      Head: Normocephalic and atraumatic  Eyes:      Conjunctiva/sclera: Conjunctivae normal    Cardiovascular:      Rate and Rhythm: Normal rate and regular rhythm  Heart sounds: Normal heart sounds  Pulmonary:      Effort: Pulmonary effort is normal       Breath sounds: Normal breath sounds  Abdominal:      General: Bowel sounds are normal  There is no distension  Palpations: Abdomen is soft  Tenderness: There is no abdominal tenderness  Musculoskeletal:         General: Normal range of motion  Cervical back: Normal range of motion and neck supple  Skin:     General: Skin is warm and dry  Neurological:      General: No focal deficit present  Mental Status: She is alert and oriented to person, place, and time  Psychiatric:         Behavior: Behavior normal          Thought Content:  Thought content normal          Judgment: Judgment normal          Vital Signs  ED Triage Vitals [05/09/22 2343]   Temperature Pulse Respirations Blood Pressure SpO2   (!) 97 2 °F (36 2 °C) 85 17 136/81 96 %      Temp Source Heart Rate Source Patient Position - Orthostatic VS BP Location FiO2 (%)   Oral Monitor Sitting Right arm --      Pain Score       --           Vitals:    05/09/22 2343   BP: 136/81   Pulse: 85   Patient Position - Orthostatic VS: Sitting         Visual Acuity      ED Medications  Medications   sodium chloride 0 9 % bolus 500 mL (500 mL Intravenous New Bag 5/9/22 2354)   calcium gluconate 1 g in sodium chloride 0 9% 50 mL (premix) (1 g Intravenous New Bag 5/10/22 0001)       Diagnostic Studies  Results Reviewed     Procedure Component Value Units Date/Time    Basic metabolic panel [[id number]]  (Abnormal) Collected: 05/09/22 2353    Lab Status: Final result Specimen: Blood from Arm, Right Updated: 05/10/22 0011     Sodium 138 mmol/L      Potassium 4 1 mmol/L      Chloride 101 mmol/L      CO2 20 mmol/L      ANION GAP 17 mmol/L      BUN 29 mg/dL      Creatinine 1 24 mg/dL      Glucose 142 mg/dL      Calcium 7 4 mg/dL      eGFR 52 ml/min/1 73sq m     Narrative:      [place] guidelines for Chronic Kidney Disease (CKD):     Stage 1 with normal or high GFR (GFR > 90 mL/min/1 73 square meters)    Stage 2 Mild CKD (GFR = 60-89 mL/min/1 73 square meters)    Stage 3A Moderate CKD (GFR = 45-59 mL/min/1 73 square meters)    Stage 3B Moderate CKD (GFR = 30-44 mL/min/1 73 square meters)    Stage 4 Severe CKD (GFR = 15-29 mL/min/1 73 square meters)    Stage 5 End Stage CKD (GFR <15 mL/min/1 73 square meters)  Note: GFR calculation is accurate only with a steady state creatinine    Calcium, ionized [[id number]]  (Abnormal) Collected: 05/09/22 2353    Lab Status: Final result Specimen: Blood from Arm, Right Updated: 05/10/22 0001     Calcium, Ionized 0 96 mmol/L                  No orders to display              Procedures  Procedures         ED Course  ED Course as of 05/10/22 0051   Tue May 10, 2022   0011 Patient will be discharged after IV calcium is completed  SBIRT 22yo+      Most Recent Value   SBIRT (22 yo +)    In order to provide better care to our patients, we are screening all of our patients for alcohol and drug use  Would it be okay to ask you these screening questions?  No Filed at: 05/09/2022 2359                    MDM  Number of Diagnoses or Management Options  Hypocalcemia  Diagnosis management comments: Obtain BMP, ionized calcium  Replete calcium is low       Amount and/or Complexity of Data Reviewed  Clinical lab tests: reviewed and ordered  Tests in the medicine section of CPT®: ordered and reviewed  Review and summarize past medical records: yes  Independent visualization of images, tracings, or specimens: yes    Risk of Complications, Morbidity, and/or Mortality  General comments: Patient's BMP showed mild elevation in anion gap suggesting some dehydration  IV fluid bolus given in the ED  Patient's ionized calcium was low  Patient given 1 g of calcium gluconate IV  At this time patient discharged home with follow-up to her endocrinologist and PCP  Patient felt better after IV fluids and calcium bolus  Close return instructions given to return to the ER for any worsening symptoms  Patient agrees with discharge plan  Patient well appearing at time of discharge  Please Note: Fluency Direct voice recognition software may have been used in the creation of this document  Wrong words or sound a like substitutions may have occurred due to the inherent limitations of the voice software  Patient Progress  Patient progress: improved      Disposition  Final diagnoses:   Hypocalcemia     Time reflects when diagnosis was documented in both MDM as applicable and the Disposition within this note     Time User Action Codes Description Comment    5/10/2022 12:45 AM Aly Kimble Add [E83 [id number]] Hypocalcemia       ED Disposition     ED Disposition Condition Date/Time Comment    Discharge Stable Tue May 10, 2022 12:45 AM Francia Pagan discharge to home/self care  Follow-up Information     Follow up With Specialties Details Why Contact Info    Ruslan Hardy MD Endocrinology, Internal Medicine Schedule an appointment as soon as possible for a visit   [address] MD Internal Medicine In 2 days  [place] [id number]  [phone]            Patient's Medications   Discharge Prescriptions    No medications on file       No discharge procedures on file      PDMP Review     None          ED Provider  Electronically Signed by           Dorcas Davies DO  05/10/22 7385

## 2022-05-11 PROCEDURE — 96366 THER/PROPH/DIAG IV INF ADDON: CPT

## 2022-05-11 RX ORDER — LORAZEPAM 1 MG/1
1 TABLET ORAL ONCE
Status: COMPLETED | OUTPATIENT
Start: 2022-05-11 | End: 2022-05-11

## 2022-05-11 RX ADMIN — LORAZEPAM 1 MG: 1 TABLET ORAL at 00:18

## 2022-05-11 NOTE — DISCHARGE INSTRUCTIONS
Continue current meds staying at the 8 pills of the calcium divided throughout the day and please check in with your endocrinologist tomorrow - let them know that you have had to come in 3 times in the last 4 days and see if they have any suggestions for you - maybe ask if you can go up on the Calcitrol?

## 2022-05-11 NOTE — ED PROVIDER NOTES
History  Chief Complaint   Patient presents with    Numbness     States numbness , tingling and fatigue  Seen here yesterday for same  41 yo female well known to ER c/o continued numbness and tingling all over her body  She has been seen twice for the same in the last 3 days  She has h/o chronic hypocalcemia due to lack of parathyroid glands  She did get calcium gluconate infusion yesterday but says it didn't help and she was still numb and tingly when she was discharged  She took 8 oral calcium pills today and that also didn't help  She says she can't sleep due to the symptoms  No fever, cough, vomiting  She did recently get steroid injections in her back for chronic pain and she thinks the steroid injection has made her hypocalcemia worse  History provided by:  Patient   used: No        Prior to Admission Medications   Prescriptions Last Dose Informant Patient Reported? Taking?    ALPRAZolam (XANAX) 1 mg tablet  Self Yes No   Sig: Take 1 mg by mouth daily am   Calcium Carb-Cholecalciferol (Calcium 500+D) 500-400 MG-UNIT TABS  Self No No   Sig: Take 500 mg by mouth 6 (six) times a day   Lidocaine-Menthol 4-1 % PTCH   Yes No   Sig: if needed     QUEtiapine (SEROquel) 25 mg tablet  Self Yes No   Si mg daily at bedtime     albuterol (Ventolin HFA) 90 mcg/act inhaler  Self No No   Sig: Inhale 2 puffs every 4 (four) hours as needed for wheezing or shortness of breath   baclofen 10 mg tablet  Self Yes No   Sig: Take 10 mg by mouth 3 (three) times a day    baclofen 20 mg tablet  Self No No   Sig: Take 1 tablet (20 mg total) by mouth 3 (three) times a day   Patient taking differently: Take 20 mg by mouth 3 (three) times a day as needed     calcitriol (ROCALTROL) 0 25 mcg capsule   No No   Sig: TAKE 1 CAPSULE BY MOUTH EVERY DAY   celecoxib (CeleBREX) 200 mg capsule   Yes No   Sig: Daily   fenofibrate (TRIGLIDE) 160 MG tablet  Self Yes No   Sig: Take 160 mg by mouth daily   ferrous sulfate 325 (65 Fe) mg tablet  Self Yes No   Sig: Take 325 mg by mouth Once Monday, wed, Friday-Last dose 4/1/22    hydrocortisone (ANUSOL-HC) 2 5 % rectal cream   No No   Sig: Apply topically 2 (two) times a day   Patient taking differently: Apply topically 2 (two) times a day as needed     hydrocortisone (ANUSOL-HC) 25 mg suppository   No No   Sig: Insert 1 suppository (25 mg total) into the rectum 2 (two) times a day   Patient taking differently: Insert 25 mg into the rectum 2 (two) times a day as needed     ibuprofen (MOTRIN) 600 mg tablet  Self No No   Sig: Take 1 tablet (600 mg total) by mouth every 6 (six) hours as needed for mild pain or moderate pain   levothyroxine 150 mcg tablet  Self No No   Sig: TAKE 1 TABLET BY MOUTH EVERY DAY   Patient taking differently: 150 mcg every morning     magnesium Oxide (MAG-OX) 400 mg TABS  Self No No   Sig: TAKE 1 TABLET (400 MG TOTAL) BY MOUTH THREE (THREE) TIMES A DAY 9PM   methocarbamol (ROBAXIN) 500 mg tablet   No No   Sig: Take 1 tablet (500 mg total) by mouth 2 (two) times a day   metoclopramide (REGLAN) 10 mg tablet  Self No No   Sig: Take 1 tablet (10 mg total) by mouth every 6 (six) hours as needed (nausea)   ondansetron (ZOFRAN-ODT) 4 mg disintegrating tablet  Self No No   Sig: Take 1 tablet (4 mg total) by mouth every 6 (six) hours as needed for nausea or vomiting   oxyCODONE-acetaminophen (PERCOCET)  mg per tablet   Yes No   Sig: Take 1 tablet by mouth 4 (four) times a day   pantoprazole (PROTONIX) 40 mg tablet   No No   Sig: Take 1 tablet (40 mg total) by mouth daily   potassium chloride (K-DUR,KLOR-CON) 20 mEq tablet  Self Yes No   Sig: Take 20 mEq by mouth 2 (two) times a day   pregabalin (LYRICA) 75 mg capsule  Self Yes No   Sig: Take 75 mg by mouth 3 (three) times a day as needed    witch hazel-glycerin (TUCKS) topical pad  Self No No   Sig: Insert 1 pad into the rectum 3 (three) times a day for 14 days      Facility-Administered Medications: None Past Medical History:   Diagnosis Date    Acid reflux     Acute renal failure (HCC)     multiple episodes    Anemia     Anxiety     Chronic pain     DDD (degenerative disc disease), lumbar     Disease of thyroid gland     had surgery and now hypo    DVT (deep venous thrombosis) ([facility] [address] )     s/p ankle fracture    Hypercalcemia     Hyperlipidemia     Hyperthyroidism     Hypocalcemia     post op 2016    Kidney stone     Migraine     Psychiatric disorder     anxiety depression    Seizures (HCC)     petit mal x1  4 years ago- all tests were neg   Spondylolisthesis of lumbar region     Treatment     spinal pain injections  last was 2016    Wears glasses        Past Surgical History:   Procedure Laterality Date    BACK SURGERY      L4-5, S1-fusion-plate/screws     SECTION      x5    CYSTOCELE REPAIR  2017    CYSTOSCOPY      DISCOGRAM      HYSTERECTOMY      PARATHYROIDECTOMY      AR ANTERIOR COLPORRAPHY RPR CYSTOCELE W/CYSTO N/A 2017    Procedure: CYSTOCELE REPAIR;  Surgeon: Ephraim Little MD;  Location: [address] [place] [address];  Service: Gynecology    AR ARTHRODESIS POSTERIOR INTERBODY LUMBAR N/A 2016    Procedure: L4-S1 LUMBAR LAMINECTOMY/DECOMPRESSION;  INSTRUMENTED POSTEROLATERAL FUSION/ INTERBODY L5-S1; ALLOGRAFT AND AUTOGRAFT (IMPULSE) ;   Surgeon: Eufemia Ham MD;  [facility]: [facility];  Service: Orthopedics    AR CYSTOURETHROSCOPY,URETER CATHETER Bilateral 2018    Procedure: INSERTION URETERAL CATHETERS PREOP;  Surgeon: Blue Knutson MD;  Location: [address] [place] [address];  Service: Urology    AR SLING OPER STRES INCONTINENCE N/A 2017    Procedure: MID URETHRAL SLING;  Surgeon: Sandra Mcdonald MD;  Location: [address] [place] [address];  Service: Urology    AR [address] ABD HYSTERECTOMY N/A 2018    Procedure: SUPRACERVICAL HYSTERECTOMY;  Surgeon: Ephraim Little MD;  Location: [address] [place] [address];  Service: Gynecology    THYROIDECTOMY      TONSILECTOMY AND ADNOIDECTOMY  TONSILLECTOMY      TUBAL LIGATION         Family History   Problem Relation Age of Onset    Diabetes Mother     Hypertension Mother     Hyperlipidemia Father     Arrhythmia Father     Lung cancer Father     Diabetes Father      I have reviewed and agree with the history as documented  E-Cigarette/Vaping    E-Cigarette Use Never User      E-Cigarette/Vaping Substances    Nicotine No     THC No     CBD No     Flavoring No     Other No     Unknown No      Social History     Tobacco Use    Smoking status: Current Every Day Smoker     Packs/day: 0 50     Years: 25 00     Pack years: 12 50     Types: Cigarettes    Smokeless tobacco: Never Used   Vaping Use    Vaping Use: Never used   Substance Use Topics    Alcohol use: Not Currently    Drug use: No       Review of Systems   Constitutional: Negative  Negative for fever  HENT: Negative  Eyes: Negative  Respiratory: Negative  Negative for cough and shortness of breath  Cardiovascular: Negative  Negative for chest pain  Gastrointestinal: Positive for diarrhea  Negative for abdominal pain, nausea and vomiting  Genitourinary: Negative  Negative for dysuria and flank pain  Musculoskeletal: Positive for back pain  Negative for myalgias  Skin: Negative  Negative for rash  Neurological: Positive for weakness and numbness  Negative for dizziness and headaches  Hematological: Does not bruise/bleed easily  Psychiatric/Behavioral: Negative  All other systems reviewed and are negative  Physical Exam  Physical Exam  Vitals and nursing note reviewed  Constitutional:       General: She is not in acute distress  Appearance: She is well-developed  She is not ill-appearing or diaphoretic  HENT:      Head: Normocephalic and atraumatic  Eyes:      General: No scleral icterus  Conjunctiva/sclera: Conjunctivae normal    Cardiovascular:      Rate and Rhythm: Normal rate and regular rhythm        Heart sounds: Normal heart sounds  No murmur heard  Pulmonary:      Effort: Pulmonary effort is normal  No respiratory distress  Breath sounds: Normal breath sounds  Abdominal:      General: Bowel sounds are normal  There is no distension  Palpations: Abdomen is soft  Tenderness: There is no abdominal tenderness  Musculoskeletal:         General: No deformity  Normal range of motion  Cervical back: Normal range of motion and neck supple  Right lower leg: No edema  Left lower leg: No edema  Skin:     General: Skin is warm and dry  Coloration: Skin is not pale  Findings: No rash  Neurological:      General: No focal deficit present  Mental Status: She is alert and oriented to person, place, and time  Cranial Nerves: No cranial nerve deficit  Motor: No weakness     Psychiatric:         Mood and Affect: Mood normal          Behavior: Behavior normal          Vital Signs  ED Triage Vitals [05/10/22 2221]   Temperature Pulse Respirations Blood Pressure SpO2   (!) 96 2 °F (35 7 °C) 93 18 132/78 97 %      Temp Source Heart Rate Source Patient Position - Orthostatic VS BP Location FiO2 (%)   Tympanic Monitor Sitting Left arm --      Pain Score       4           Vitals:    05/10/22 2221   BP: 132/78   Pulse: 93   Patient Position - Orthostatic VS: Sitting         Visual Acuity      ED Medications  Medications   calcium gluconate 1 g in sodium chloride 0 9% 50 mL (premix) (0 g Intravenous Stopped 5/11/22 0132)   LORazepam (ATIVAN) tablet 1 mg (1 mg Oral Given 5/11/22 0018)       Diagnostic Studies  Results Reviewed     Procedure Component Value Units Date/Time    Basic metabolic panel [[id number]]  (Abnormal) Collected: 05/10/22 2324    Lab Status: Final result Specimen: Blood from Line, Venous Updated: 05/10/22 2189     Sodium 138 mmol/L      Potassium 4 4 mmol/L      Chloride 100 mmol/L      CO2 20 mmol/L      ANION GAP 18 mmol/L      BUN 19 mg/dL      Creatinine 0 98 mg/dL Glucose 156 mg/dL      Calcium 7 9 mg/dL      eGFR 69 ml/min/1 73sq m     Narrative:      [place] guidelines for Chronic Kidney Disease (CKD):     Stage 1 with normal or high GFR (GFR > 90 mL/min/1 73 square meters)    Stage 2 Mild CKD (GFR = 60-89 mL/min/1 73 square meters)    Stage 3A Moderate CKD (GFR = 45-59 mL/min/1 73 square meters)    Stage 3B Moderate CKD (GFR = 30-44 mL/min/1 73 square meters)    Stage 4 Severe CKD (GFR = 15-29 mL/min/1 73 square meters)    Stage 5 End Stage CKD (GFR <15 mL/min/1 73 square meters)  Note: GFR calculation is accurate only with a steady state creatinine    Calcium, ionized [[id number]]  (Abnormal) Collected: 05/10/22 2324    Lab Status: Final result Specimen: Blood from Line, Venous Updated: 05/10/22 2329     Calcium, Ionized 1 02 mmol/L                  No orders to display              Procedures  ECG 12 Lead Documentation Only    Date/Time: 5/10/2022 11:20 PM  Performed by: Siddhartha Dobbs MD  Authorized by: Siddhartha Dobbs MD     Indications / Diagnosis:  Numbness, tingling  ECG reviewed by me, the ED Provider: yes    Patient location:  ED  Previous ECG:     Previous ECG:  Unavailable  Interpretation:     Interpretation: normal    Rate:     ECG rate:  88    ECG rate assessment: normal    Rhythm:     Rhythm: sinus rhythm    Ectopy:     Ectopy: none    QRS:     QRS axis:  Normal  Conduction:     Conduction: normal    ST segments:     ST segments:  Normal  T waves:     T waves: normal               ED Course                                             MDM  Number of Diagnoses or Management Options  Hypocalcemia  Numbness and tingling  Diagnosis management comments: Prior records reviewed  Magnesium was checked and normal 3 days ago and has been normal every time it has been checked in the last 6 months  IV calcium given again, pt  Advised follow up with endocrinology        Disposition  Final diagnoses:   Numbness and tingling   Hypocalcemia Time reflects when diagnosis was documented in both MDM as applicable and the Disposition within this note     Time User Action Codes Description Comment    2/29/8640 49:51 PM Obdulia Evans A Add [X13 1,  R20 2] Numbness and tingling     7/92/5082 48:96 PM Jacinda Heart Add [W76 55] Hypocalcemia       ED Disposition     ED Disposition   Discharge    Condition   Stable    Date/Time   Tue May 10, 2022 11:55 PM    Comment   [facility] discharge to home/self care                 Follow-up Information     Follow up With Specialties Details Why Jean Briggs MD Endocrinology Call  tomorrow [address] [facility]   [phone]            Discharge Medication List as of 5/10/2022 11:57 PM      CONTINUE these medications which have NOT CHANGED    Details   albuterol (Ventolin HFA) 90 mcg/act inhaler Inhale 2 puffs every 4 (four) hours as needed for wheezing or shortness of breath, Starting Sun 9/26/2021, Normal      ALPRAZolam (XANAX) 1 mg tablet Take 1 mg by mouth daily am, Historical Med      !! baclofen 10 mg tablet Take 10 mg by mouth 3 (three) times a day , Historical Med      !! baclofen 20 mg tablet Take 1 tablet (20 mg total) by mouth 3 (three) times a day, Starting Thu 11/18/2021, Normal      calcitriol (ROCALTROL) 0 25 mcg capsule TAKE 1 CAPSULE BY MOUTH EVERY DAY, Normal      Calcium Carb-Cholecalciferol (Calcium 500+D) 500-400 MG-UNIT TABS Take 500 mg by mouth 6 (six) times a day, Normal      celecoxib (CeleBREX) 200 mg capsule Daily, Historical Med      fenofibrate (TRIGLIDE) 160 MG tablet Take 160 mg by mouth daily, Historical Med      ferrous sulfate 325 (65 Fe) mg tablet Take 325 mg by mouth Once Monday, wed, Friday-Last dose 4/1/22 , Historical Med      hydrocortisone (ANUSOL-HC) 2 5 % rectal cream Apply topically 2 (two) times a day, Starting Fri 2/25/2022, Normal      hydrocortisone (ANUSOL-HC) 25 mg suppository Insert 1 suppository (25 mg total) into the rectum 2 (two) times a day, Starting Fri 2/25/2022, Normal      ibuprofen (MOTRIN) 600 mg tablet Take 1 tablet (600 mg total) by mouth every 6 (six) hours as needed for mild pain or moderate pain, Starting Tue 6/2/2020, Normal      levothyroxine 150 mcg tablet TAKE 1 TABLET BY MOUTH EVERY DAY, Normal      Lidocaine-Menthol 4-1 % PTCH if needed  , Historical Med      magnesium Oxide (MAG-OX) 400 mg TABS TAKE 1 TABLET (400 MG TOTAL) BY MOUTH THREE (THREE) TIMES A DAY 9PM, Normal      methocarbamol (ROBAXIN) 500 mg tablet Take 1 tablet (500 mg total) by mouth 2 (two) times a day, Starting Wed 3/16/2022, Normal      metoclopramide (REGLAN) 10 mg tablet Take 1 tablet (10 mg total) by mouth every 6 (six) hours as needed (nausea), Starting Fri 12/31/2021, Normal      ondansetron (ZOFRAN-ODT) 4 mg disintegrating tablet Take 1 tablet (4 mg total) by mouth every 6 (six) hours as needed for nausea or vomiting, Starting Sun 12/19/2021, Normal      oxyCODONE-acetaminophen (PERCOCET)  mg per tablet Take 1 tablet by mouth 4 (four) times a day, Historical Med      pantoprazole (PROTONIX) 40 mg tablet Take 1 tablet (40 mg total) by mouth daily, Starting Mon 4/25/2022, Normal      potassium chloride (K-DUR,KLOR-CON) 20 mEq tablet Take 20 mEq by mouth 2 (two) times a day, Historical Med      pregabalin (LYRICA) 75 mg capsule Take 75 mg by mouth 3 (three) times a day as needed , Historical Med      QUEtiapine (SEROquel) 25 mg tablet 25 mg daily at bedtime  , Historical Med      witch hazel-glycerin (TUCKS) topical pad Insert 1 pad into the rectum 3 (three) times a day for 14 days, Starting Wed 2/23/2022, Until Tue 4/19/2022, Normal       !! - Potential duplicate medications found  Please discuss with provider  No discharge procedures on file      PDMP Review     None          ED Provider  Electronically Signed by           Jose Wilson MD  48/59/07 0653

## 2022-05-12 LAB
ATRIAL RATE: 88 BPM
ATRIAL RATE: 90 BPM
P AXIS: 29 DEGREES
P AXIS: 34 DEGREES
PR INTERVAL: 156 MS
PR INTERVAL: 162 MS
QRS AXIS: 26 DEGREES
QRS AXIS: 26 DEGREES
QRSD INTERVAL: 66 MS
QRSD INTERVAL: 72 MS
QT INTERVAL: 372 MS
QT INTERVAL: 376 MS
QTC INTERVAL: 454 MS
QTC INTERVAL: 455 MS
T WAVE AXIS: 35 DEGREES
T WAVE AXIS: 40 DEGREES
VENTRICULAR RATE: 88 BPM
VENTRICULAR RATE: 90 BPM

## 2022-05-12 PROCEDURE — 93010 ELECTROCARDIOGRAM REPORT: CPT | Performed by: INTERNAL MEDICINE

## 2022-05-13 DIAGNOSIS — U07.1 COVID-19: ICD-10-CM

## 2022-05-13 PROCEDURE — U0005 INFEC AGEN DETEC AMPLI PROBE: HCPCS | Performed by: INTERNAL MEDICINE

## 2022-05-13 PROCEDURE — U0003 INFECTIOUS AGENT DETECTION BY NUCLEIC ACID (DNA OR RNA); SEVERE ACUTE RESPIRATORY SYNDROME CORONAVIRUS 2 (SARS-COV-2) (CORONAVIRUS DISEASE [COVID-19]), AMPLIFIED PROBE TECHNIQUE, MAKING USE OF HIGH THROUGHPUT TECHNOLOGIES AS DESCRIBED BY CMS-2020-01-R: HCPCS | Performed by: INTERNAL MEDICINE

## 2022-05-14 LAB — SARS-COV-2 RNA RESP QL NAA+PROBE: NEGATIVE

## 2022-05-29 ENCOUNTER — APPOINTMENT (EMERGENCY)
Dept: RADIOLOGY | Facility: HOSPITAL | Age: 47
End: 2022-05-29
Payer: COMMERCIAL

## 2022-05-29 ENCOUNTER — HOSPITAL ENCOUNTER (EMERGENCY)
Facility: HOSPITAL | Age: 47
Discharge: HOME | End: 2022-05-29
Attending: EMERGENCY MEDICINE
Payer: COMMERCIAL

## 2022-05-29 VITALS
HEART RATE: 78 BPM | OXYGEN SATURATION: 98 % | TEMPERATURE: 98.7 F | SYSTOLIC BLOOD PRESSURE: 151 MMHG | RESPIRATION RATE: 20 BRPM | DIASTOLIC BLOOD PRESSURE: 86 MMHG

## 2022-05-29 DIAGNOSIS — N17.9 AKI (ACUTE KIDNEY INJURY) (HCC): ICD-10-CM

## 2022-05-29 DIAGNOSIS — R53.1 WEAKNESS: Primary | ICD-10-CM

## 2022-05-29 DIAGNOSIS — E83.52 HYPERCALCEMIA: ICD-10-CM

## 2022-05-29 DIAGNOSIS — E86.0 DEHYDRATION: ICD-10-CM

## 2022-05-29 LAB
2HR DELTA HS TROPONIN: 1 NG/L
ALBUMIN SERPL BCP-MCNC: 4.1 G/DL (ref 3.5–5)
ALP SERPL-CCNC: 80 U/L (ref 46–116)
ALT SERPL W P-5'-P-CCNC: 54 U/L (ref 12–78)
ANION GAP SERPL CALCULATED.3IONS-SCNC: 10 MMOL/L (ref 4–13)
APTT PPP: 26 SECONDS (ref 23–37)
AST SERPL W P-5'-P-CCNC: 28 U/L (ref 5–45)
BASOPHILS # BLD AUTO: 0.04 THOUSANDS/ΜL (ref 0–0.1)
BASOPHILS NFR BLD AUTO: 1 % (ref 0–1)
BILIRUB SERPL-MCNC: 0.55 MG/DL (ref 0.2–1)
BILIRUB UR QL STRIP: NEGATIVE
BUN SERPL-MCNC: 26 MG/DL (ref 5–25)
CA-I BLD-SCNC: 1.67 MMOL/L (ref 1.12–1.32)
CALCIUM SERPL-MCNC: 14.6 MG/DL (ref 8.3–10.1)
CARDIAC TROPONIN I PNL SERPL HS: 3 NG/L
CARDIAC TROPONIN I PNL SERPL HS: 4 NG/L
CHLORIDE SERPL-SCNC: 98 MMOL/L (ref 100–108)
CLARITY UR: NORMAL
CO2 SERPL-SCNC: 29 MMOL/L (ref 21–32)
COLOR UR: YELLOW
CREAT SERPL-MCNC: 2.11 MG/DL (ref 0.6–1.3)
EOSINOPHIL # BLD AUTO: 0.1 THOUSAND/ΜL (ref 0–0.61)
EOSINOPHIL NFR BLD AUTO: 1 % (ref 0–6)
ERYTHROCYTE [DISTWIDTH] IN BLOOD BY AUTOMATED COUNT: 16.5 % (ref 11.6–15.1)
FLUAV RNA RESP QL NAA+PROBE: NEGATIVE
FLUBV RNA RESP QL NAA+PROBE: NEGATIVE
GFR SERPL CREATININE-BSD FRML MDRD: 27 ML/MIN/1.73SQ M
GLUCOSE SERPL-MCNC: 115 MG/DL (ref 65–140)
GLUCOSE UR STRIP-MCNC: NEGATIVE MG/DL
HCT VFR BLD AUTO: 39.1 % (ref 34.8–46.1)
HGB BLD-MCNC: 13.2 G/DL (ref 11.5–15.4)
HGB UR QL STRIP.AUTO: NEGATIVE
IMM GRANULOCYTES # BLD AUTO: 0.03 THOUSAND/UL (ref 0–0.2)
IMM GRANULOCYTES NFR BLD AUTO: 0 % (ref 0–2)
INR PPP: 0.92 (ref 0.84–1.19)
KETONES UR STRIP-MCNC: NEGATIVE MG/DL
LEUKOCYTE ESTERASE UR QL STRIP: NEGATIVE
LYMPHOCYTES # BLD AUTO: 1.77 THOUSANDS/ΜL (ref 0.6–4.47)
LYMPHOCYTES NFR BLD AUTO: 22 % (ref 14–44)
MCH RBC QN AUTO: 29.5 PG (ref 26.8–34.3)
MCHC RBC AUTO-ENTMCNC: 33.8 G/DL (ref 31.4–37.4)
MCV RBC AUTO: 87 FL (ref 82–98)
MONOCYTES # BLD AUTO: 0.59 THOUSAND/ΜL (ref 0.17–1.22)
MONOCYTES NFR BLD AUTO: 7 % (ref 4–12)
NEUTROPHILS # BLD AUTO: 5.44 THOUSANDS/ΜL (ref 1.85–7.62)
NEUTS SEG NFR BLD AUTO: 69 % (ref 43–75)
NITRITE UR QL STRIP: NEGATIVE
NRBC BLD AUTO-RTO: 0 /100 WBCS
PH UR STRIP.AUTO: 7 [PH]
PLATELET # BLD AUTO: 187 THOUSANDS/UL (ref 149–390)
PMV BLD AUTO: 10.8 FL (ref 8.9–12.7)
POTASSIUM SERPL-SCNC: 3.8 MMOL/L (ref 3.5–5.3)
PROT SERPL-MCNC: 7.9 G/DL (ref 6.4–8.2)
PROT UR STRIP-MCNC: NEGATIVE MG/DL
PROTHROMBIN TIME: 12.2 SECONDS (ref 11.6–14.5)
RBC # BLD AUTO: 4.48 MILLION/UL (ref 3.81–5.12)
RSV RNA RESP QL NAA+PROBE: NEGATIVE
SARS-COV-2 RNA RESP QL NAA+PROBE: NEGATIVE
SODIUM SERPL-SCNC: 137 MMOL/L (ref 136–145)
SP GR UR STRIP.AUTO: 1.01 (ref 1–1.03)
TSH SERPL DL<=0.05 MIU/L-ACNC: 2.25 UIU/ML (ref 0.45–4.5)
UROBILINOGEN UR QL STRIP.AUTO: 0.2 E.U./DL
WBC # BLD AUTO: 7.97 THOUSAND/UL (ref 4.31–10.16)

## 2022-05-29 PROCEDURE — 85610 PROTHROMBIN TIME: CPT | Performed by: EMERGENCY MEDICINE

## 2022-05-29 PROCEDURE — 84484 ASSAY OF TROPONIN QUANT: CPT | Performed by: EMERGENCY MEDICINE

## 2022-05-29 PROCEDURE — 99284 EMERGENCY DEPT VISIT MOD MDM: CPT | Performed by: EMERGENCY MEDICINE

## 2022-05-29 PROCEDURE — 99284 EMERGENCY DEPT VISIT MOD MDM: CPT

## 2022-05-29 PROCEDURE — 96361 HYDRATE IV INFUSION ADD-ON: CPT

## 2022-05-29 PROCEDURE — 85730 THROMBOPLASTIN TIME PARTIAL: CPT | Performed by: EMERGENCY MEDICINE

## 2022-05-29 PROCEDURE — 85025 COMPLETE CBC W/AUTO DIFF WBC: CPT | Performed by: EMERGENCY MEDICINE

## 2022-05-29 PROCEDURE — 0241U HB NFCT DS VIR RESP RNA 4 TRGT: CPT | Performed by: EMERGENCY MEDICINE

## 2022-05-29 PROCEDURE — 96360 HYDRATION IV INFUSION INIT: CPT

## 2022-05-29 PROCEDURE — 84443 ASSAY THYROID STIM HORMONE: CPT | Performed by: EMERGENCY MEDICINE

## 2022-05-29 PROCEDURE — 81003 URINALYSIS AUTO W/O SCOPE: CPT | Performed by: EMERGENCY MEDICINE

## 2022-05-29 PROCEDURE — 87086 URINE CULTURE/COLONY COUNT: CPT | Performed by: EMERGENCY MEDICINE

## 2022-05-29 PROCEDURE — 93005 ELECTROCARDIOGRAM TRACING: CPT

## 2022-05-29 PROCEDURE — 36415 COLL VENOUS BLD VENIPUNCTURE: CPT | Performed by: EMERGENCY MEDICINE

## 2022-05-29 PROCEDURE — 71045 X-RAY EXAM CHEST 1 VIEW: CPT

## 2022-05-29 PROCEDURE — 80053 COMPREHEN METABOLIC PANEL: CPT | Performed by: EMERGENCY MEDICINE

## 2022-05-29 PROCEDURE — 82330 ASSAY OF CALCIUM: CPT | Performed by: EMERGENCY MEDICINE

## 2022-05-29 RX ADMIN — SODIUM CHLORIDE 1000 ML: 0.9 INJECTION, SOLUTION INTRAVENOUS at 19:08

## 2022-05-29 NOTE — ED PROVIDER NOTES
History  Chief Complaint   Patient presents with    Fatigue     Pt states she has been sleeping a lot this past week  States she feels nauseous, has no appetite, hasn't been drinking water, vomited yesterday and today  Also complaining of urinary incontinence     43-year-old female states that she has been sleeping a lot this past week has been feeling nauseous states she has been drinking water has had couple episodes of vomiting yesterday and today also states that she is incontinent of urine when she gets up and walks to the bathroom  States that she already had her bladder lifted once  States she is not sure what is happening but she just feels constantly tired the whole time and is tired of feeling this way  Patient's medical history and medications are listed in her chart  History provided by:  Patient   used: No        Prior to Admission Medications   Prescriptions Last Dose Informant Patient Reported? Taking?    ALPRAZolam (XANAX) 1 mg tablet 2022 at Unknown time Self Yes Yes   Sig: Take 1 mg by mouth daily am   Calcium Carb-Cholecalciferol (Calcium 500+D) 500-400 MG-UNIT TABS 2022 at Unknown time Self No Yes   Sig: Take 500 mg by mouth 6 (six) times a day   Lidocaine-Menthol 4-1 % PTCH   Yes No   Sig: if needed     QUEtiapine (SEROquel) 25 mg tablet Past Week at Unknown time Self Yes Yes   Si mg daily at bedtime     albuterol (Ventolin HFA) 90 mcg/act inhaler Unknown at Unknown time Self No No   Sig: Inhale 2 puffs every 4 (four) hours as needed for wheezing or shortness of breath   baclofen 10 mg tablet 2022 at Unknown time Self Yes Yes   Sig: Take 10 mg by mouth 3 (three) times a day    baclofen 20 mg tablet  Self No No   Sig: Take 1 tablet (20 mg total) by mouth 3 (three) times a day   Patient taking differently: Take 20 mg by mouth 3 (three) times a day as needed     calcitriol (ROCALTROL) 0 25 mcg capsule 2022 at Unknown time  No Yes   Sig: TAKE 1 CAPSULE BY MOUTH EVERY DAY   celecoxib (CeleBREX) 200 mg capsule   Yes No   Sig: Daily   fenofibrate (TRIGLIDE) 160 MG tablet 5/29/2022 at Unknown time Self Yes Yes   Sig: Take 160 mg by mouth daily   ferrous sulfate 325 (65 Fe) mg tablet 5/29/2022 at Unknown time Self Yes Yes   Sig: Take 325 mg by mouth Once Monday, wed, Friday-Last dose 4/1/22    hydrocortisone (ANUSOL-HC) 2 5 % rectal cream   No No   Sig: Apply topically 2 (two) times a day   Patient taking differently: Apply topically 2 (two) times a day as needed     hydrocortisone (ANUSOL-HC) 25 mg suppository   No No   Sig: Insert 1 suppository (25 mg total) into the rectum 2 (two) times a day   Patient taking differently: Insert 25 mg into the rectum 2 (two) times a day as needed     ibuprofen (MOTRIN) 600 mg tablet Unknown at Unknown time Self No No   Sig: Take 1 tablet (600 mg total) by mouth every 6 (six) hours as needed for mild pain or moderate pain   levothyroxine 150 mcg tablet  Self No No   Sig: TAKE 1 TABLET BY MOUTH EVERY DAY   Patient taking differently: 150 mcg every morning     magnesium Oxide (MAG-OX) 400 mg TABS 5/29/2022 at Unknown time Self No Yes   Sig: TAKE 1 TABLET (400 MG TOTAL) BY MOUTH THREE (THREE) TIMES A DAY 9PM   methocarbamol (ROBAXIN) 500 mg tablet Unknown at Unknown time  No No   Sig: Take 1 tablet (500 mg total) by mouth 2 (two) times a day   metoclopramide (REGLAN) 10 mg tablet Not Taking at Unknown time Self No No   Sig: Take 1 tablet (10 mg total) by mouth every 6 (six) hours as needed (nausea)   Patient not taking: Reported on 5/29/2022   ondansetron (ZOFRAN-ODT) 4 mg disintegrating tablet  Self No No   Sig: Take 1 tablet (4 mg total) by mouth every 6 (six) hours as needed for nausea or vomiting   oxyCODONE-acetaminophen (PERCOCET)  mg per tablet Past Week at Unknown time  Yes Yes   Sig: Take 1 tablet by mouth 4 (four) times a day   pantoprazole (PROTONIX) 40 mg tablet Not Taking at Unknown time  No No   Sig: Take 1 tablet (40 mg total) by mouth daily   Patient not taking: Reported on 2022   potassium chloride (K-DUR,KLOR-CON) 20 mEq tablet 2022 at Unknown time Self Yes Yes   Sig: Take 20 mEq by mouth 2 (two) times a day   pregabalin (LYRICA) 75 mg capsule Past Week at Unknown time Self Yes Yes   Sig: Take 75 mg by mouth 3 (three) times a day as needed    witch hazel-glycerin (TUCKS) topical pad  Self No No   Sig: Insert 1 pad into the rectum 3 (three) times a day for 14 days      Facility-Administered Medications: None       Past Medical History:   Diagnosis Date    Acid reflux     Acute renal failure (HCC)     multiple episodes    Anemia     Anxiety     Chronic pain     DDD (degenerative disc disease), lumbar     Disease of thyroid gland     had surgery and now hypo    DVT (deep venous thrombosis) ([facility] [address] )     s/p ankle fracture    Hypercalcemia     Hyperlipidemia     Hyperthyroidism     Hypocalcemia     post op 2016    Kidney stone     Migraine     Psychiatric disorder     anxiety depression    Seizures (HCC)     petit mal x1  4 years ago- all tests were neg   Spondylolisthesis of lumbar region     Treatment     spinal pain injections  last was 2016    Wears glasses        Past Surgical History:   Procedure Laterality Date    BACK SURGERY      L4-5, S1-fusion-plate/screws     SECTION      x5    CYSTOCELE REPAIR  2017    CYSTOSCOPY      DISCOGRAM      HYSTERECTOMY      PARATHYROIDECTOMY      MI ANTERIOR COLPORRAPHY RPR CYSTOCELE W/CYSTO N/A 2017    Procedure: CYSTOCELE REPAIR;  Surgeon: Layton Avendaño MD;  Location: [address] [place] [address];  Service: Gynecology    MI ARTHRODESIS POSTERIOR INTERBODY LUMBAR N/A 2016    Procedure: L4-S1 LUMBAR LAMINECTOMY/DECOMPRESSION;  INSTRUMENTED POSTEROLATERAL FUSION/ INTERBODY L5-S1; ALLOGRAFT AND AUTOGRAFT (IMPULSE) ;   Surgeon: Janessa Daniels MD;  [facility];  Service: Orthopedics    MI CYSTOURETHROSCOPY,URETER CATHETER Bilateral 12/7/2018    Procedure: INSERTION URETERAL CATHETERS PREOP;  Surgeon: Colletta Feathers, MD;  Location: [address] [place] [address];  Service: Urology    MI SLING OPER STRES INCONTINENCE N/A 5/4/2017    Procedure: MID URETHRAL SLING;  Surgeon: Edy Alicea MD;  Location: [address] [place] [address];  Service: Urology    MI SUPRACERV ABD HYSTERECTOMY N/A 12/7/2018    Procedure: SUPRACERVICAL HYSTERECTOMY;  Surgeon: Linden Garvey MD;  Location: [facility];  Service: Gynecology    THYROIDECTOMY      TONSILECTOMY AND ADNOIDECTOMY      TONSILLECTOMY      TUBAL LIGATION         Family History   Problem Relation Age of Onset    Diabetes Mother     Hypertension Mother     Hyperlipidemia Father     Arrhythmia Father     Lung cancer Father     Diabetes Father      I have reviewed and agree with the history as documented  E-Cigarette/Vaping    E-Cigarette Use Never User      E-Cigarette/Vaping Substances    Nicotine No     THC No     CBD No     Flavoring No     Other No     Unknown No      Social History     Tobacco Use    Smoking status: Current Every Day Smoker     Packs/day: 0 50     Years: 25 00     Pack years: 12 50     Types: Cigarettes    Smokeless tobacco: Never Used   Vaping Use    Vaping Use: Never used   Substance Use Topics    Alcohol use: Not Currently    Drug use: No       Review of Systems   Constitutional: Positive for fatigue  Negative for activity change, chills, diaphoresis and fever  HENT: Negative for congestion, ear pain, nosebleeds, sore throat, trouble swallowing and voice change  Eyes: Negative for pain, discharge and redness  Respiratory: Negative for apnea, cough, choking, shortness of breath, wheezing and stridor  Cardiovascular: Negative for chest pain and palpitations  Gastrointestinal: Negative for abdominal distention, abdominal pain, constipation, diarrhea, nausea and vomiting  Endocrine: Negative for polydipsia     Genitourinary: Negative for difficulty urinating, dysuria, flank pain, frequency, hematuria and urgency  Musculoskeletal: Negative for back pain, gait problem, joint swelling, myalgias, neck pain and neck stiffness  Skin: Negative for pallor and rash  Neurological: Positive for weakness  Negative for dizziness, tremors, syncope, speech difficulty, numbness and headaches  Hematological: Negative for adenopathy  Psychiatric/Behavioral: Negative for confusion, hallucinations, self-injury and suicidal ideas  The patient is not nervous/anxious  Physical Exam  Physical Exam  Vitals and nursing note reviewed  Constitutional:       General: She is not in acute distress  Appearance: She is well-developed  She is not diaphoretic  HENT:      Head: Normocephalic and atraumatic  Right Ear: External ear normal       Left Ear: External ear normal       Nose: Nose normal    Eyes:      Conjunctiva/sclera: Conjunctivae normal       Pupils: Pupils are equal, round, and reactive to light  Cardiovascular:      Rate and Rhythm: Normal rate and regular rhythm  Heart sounds: Normal heart sounds  Pulmonary:      Effort: Pulmonary effort is normal       Breath sounds: Normal breath sounds  Abdominal:      General: Bowel sounds are normal       Palpations: Abdomen is soft  Musculoskeletal:         General: Normal range of motion  Cervical back: Normal range of motion and neck supple  Skin:     General: Skin is warm and dry  Neurological:      Mental Status: She is alert and oriented to person, place, and time  Deep Tendon Reflexes: Reflexes are normal and symmetric  Psychiatric:         Behavior: Behavior is cooperative           Vital Signs  ED Triage Vitals [05/29/22 1817]   Temperature Pulse Respirations Blood Pressure SpO2   98 7 °F (37 1 °C) (!) 114 18 143/87 96 %      Temp Source Heart Rate Source Patient Position - Orthostatic VS BP Location FiO2 (%)   Oral Monitor Lying Right arm --      Pain Score       -- Vitals:    05/29/22 1915 05/29/22 2030 05/29/22 2045 05/29/22 2145   BP: 127/83 136/82 136/82 151/86   Pulse: 94 80 80 78   Patient Position - Orthostatic VS: Lying Lying Lying Lying         Visual Acuity      ED Medications  Medications   sodium chloride 0 9 % bolus 1,000 mL (0 mL Intravenous Stopped 5/29/22 2151)       Diagnostic Studies  Results Reviewed     Procedure Component Value Units Date/Time    HS Troponin I 2hr [[id number]]  (Normal) Collected: 05/29/22 2041    Lab Status: Final result Specimen: Blood from Line, Venous Updated: 05/29/22 2139     hs TnI 2hr 4 ng/L      Delta 2hr hsTnI 1 ng/L     HS Troponin 0hr (reflex protocol) [[id number]]  (Normal) Collected: 05/29/22 1859    Lab Status: Final result Specimen: Blood from Arm, Right Updated: 05/29/22 2026     hs TnI 0hr 3 ng/L     HS Troponin I 4hr [[id number]]     Lab Status: No result Specimen: Blood     COVID/FLU/RSV - 2 hour TAT [[id number]]  (Normal) Collected: 05/29/22 1859    Lab Status: Final result Specimen: Nares from Nose Updated: 05/29/22 2024     SARS-CoV-2 Negative     INFLUENZA A PCR Negative     INFLUENZA B PCR Negative     RSV PCR Negative    Narrative:      FOR PEDIATRIC PATIENTS - copy/paste COVID Guidelines URL to browser: [email or web address]    SARS-CoV-2 assay is a Nucleic Acid Amplification assay intended for the  qualitative detection of nucleic acid from SARS-CoV-2 in nasopharyngeal  swabs  Results are for the presumptive identification of SARS-CoV-2 RNA  Positive results are indicative of infection with SARS-CoV-2, the virus  causing COVID-19, but do not rule out bacterial infection or co-infection  with other viruses  Laboratories within the [place] and its  territories are required to report all positive results to the appropriate  public health authorities   Negative results do not preclude SARS-CoV-2  infection and should not be used as the sole basis for treatment or other  patient management decisions  Negative results must be combined with  clinical observations, patient history, and epidemiological information  This test has not been FDA cleared or approved  This test has been authorized by FDA under an Emergency Use Authorization  (EUA)  This test is only authorized for the duration of time the  declaration that circumstances exist justifying the authorization of the  emergency use of an in vitro diagnostic tests for detection of SARS-CoV-2  virus and/or diagnosis of COVID-19 infection under section 564(b)(1) of  the Act, 21 U  S C  147DSL-1(P)(3), unless the authorization is terminated  or revoked sooner  The test has been validated but independent review by FDA  and CLIA is pending  Test performed using [employer] Gene[employer]pert: This RT-PCR assay targets N2,  a region unique to SARS-CoV-2  A conserved region in the E-gene was chosen  for pan-Sarbecovirus detection which includes SARS-CoV-2      Comprehensive metabolic panel [[id number]]  (Abnormal) Collected: 05/29/22 1859    Lab Status: Final result Specimen: Blood from Arm, Right Updated: 05/29/22 1955     Sodium 137 mmol/L      Potassium 3 8 mmol/L      Chloride 98 mmol/L      CO2 29 mmol/L      ANION GAP 10 mmol/L      BUN 26 mg/dL      Creatinine 2 11 mg/dL      Glucose 115 mg/dL      Calcium 14 6 mg/dL      AST 28 U/L      ALT 54 U/L      Alkaline Phosphatase 80 U/L      Total Protein 7 9 g/dL      Albumin 4 1 g/dL      Total Bilirubin 0 55 mg/dL      eGFR 27 ml/min/1 73sq m     Narrative:      [place] guidelines for Chronic Kidney Disease (CKD):     Stage 1 with normal or high GFR (GFR > 90 mL/min/1 73 square meters)    Stage 2 Mild CKD (GFR = 60-89 mL/min/1 73 square meters)    Stage 3A Moderate CKD (GFR = 45-59 mL/min/1 73 square meters)    Stage 3B Moderate CKD (GFR = 30-44 mL/min/1 73 square meters)    Stage 4 Severe CKD (GFR = 15-29 mL/min/1 73 square meters)    Stage 5 End Stage CKD (GFR <15 mL/min/1 73 square meters)  Note: GFR calculation is accurate only with a steady state creatinine    TSH [[id number]]  (Normal) Collected: 05/29/22 1859    Lab Status: Final result Specimen: Blood from Arm, Right Updated: 05/29/22 1950     TSH 3RD GENERATON 2 255 uIU/mL     Narrative:      Patients undergoing fluorescein dye angiography may retain small amounts of fluorescein in the body for 48-72 hours post procedure  Samples containing fluorescein can produce falsely depressed TSH values  If the patient had this procedure,a specimen should be resubmitted post fluorescein clearance  Calcium, ionized [[id number]]  (Abnormal) Collected: 05/29/22 1932    Lab Status: Final result Specimen: Blood from Arm, Right Updated: 05/29/22 1946     Calcium, Ionized 1 67 mmol/L     Protime-INR [[id number]]  (Normal) Collected: 05/29/22 1859    Lab Status: Final result Specimen: Blood from Arm, Right Updated: 05/29/22 1933     Protime 12 2 seconds      INR 0 92    APTT [[id number]]  (Normal) Collected: 05/29/22 1859    Lab Status: Final result Specimen: Blood from Arm, Right Updated: 05/29/22 1933     PTT 26 seconds     UA (URINE) with reflex to Scope [[id number]] Collected: 05/29/22 1904    Lab Status: Final result Specimen: Urine, Clean Catch Updated: 05/29/22 1925     Color, UA Yellow     Clarity, UA Slightly Cloudy     Specific Gravity, UA 1 015     pH, UA 7 0     Leukocytes, UA Negative     Nitrite, UA Negative     Protein, UA Negative mg/dl      Glucose, UA Negative mg/dl      Ketones, UA Negative mg/dl      Urobilinogen, UA 0 2 E U /dl      Bilirubin, UA Negative     Blood, UA Negative    Urine culture [[id number]] Collected: 05/29/22 1904    Lab Status:  In process Specimen: Urine, Clean Catch Updated: 05/29/22 1915    CBC and differential [[id number]]  (Abnormal) Collected: 05/29/22 1859    Lab Status: Final result Specimen: Blood from Arm, Right Updated: 05/29/22 1910     WBC 7 97 Thousand/uL RBC 4 48 Million/uL      Hemoglobin 13 2 g/dL      Hematocrit 39 1 %      MCV 87 fL      MCH 29 5 pg      MCHC 33 8 g/dL      RDW 16 5 %      MPV 10 8 fL      Platelets 188 Thousands/uL      nRBC 0 /100 WBCs      Neutrophils Relative 69 %      Immat GRANS % 0 %      Lymphocytes Relative 22 %      Monocytes Relative 7 %      Eosinophils Relative 1 %      Basophils Relative 1 %      Neutrophils Absolute 5 44 Thousands/µL      Immature Grans Absolute 0 03 Thousand/uL      Lymphocytes Absolute 1 77 Thousands/µL      Monocytes Absolute 0 59 Thousand/µL      Eosinophils Absolute 0 10 Thousand/µL      Basophils Absolute 0 04 Thousands/µL                  XR chest 1 view portable    (Results Pending)              Procedures  Procedures         ED Course                               SBIRT 22yo+    Flowsheet Row Most Recent Value   SBIRT (23 yo +)    In order to provide better care to our patients, we are screening all of our patients for alcohol and drug use  Would it be okay to ask you these screening questions? No Filed at: 05/29/2022 1932                    MDM    Disposition  Final diagnoses:   Weakness   Hypercalcemia   [place] (acute kidney injury) ([facility] [place] [address] )   Dehydration     Time reflects when diagnosis was documented in both MDM as applicable and the Disposition within this note     Time User Action Codes Description Comment    5/29/2022  9:43 PM Darra Santiago Add [R53 1] Weakness     5/29/2022  9:43 PM Darra Santiago Add [E83 52] Hypercalcemia     5/29/2022  9:43 PM Jeb DIXON Add [N17 9] LELAND (acute kidney injury) ([facility] [place] [address] )     5/29/2022  9:43 PM Darra Santiago Add [E86 0] Dehydration       ED Disposition     ED Disposition   Discharge    Condition   Stable    Date/Time   Sun May 29, 2022  9:43 PM    Comment   [facility] [facility] discharge to home/self care                 Follow-up Information     Follow up With Specialties Details Why Liila Reyes MD Internal Medicine Schedule an appointment as soon as possible for a visit  As needed [address]  [phone]            Discharge Medication List as of 5/29/2022  9:44 PM      CONTINUE these medications which have NOT CHANGED    Details   ALPRAZolam (XANAX) 1 mg tablet Take 1 mg by mouth daily am, Historical Med      !! baclofen 10 mg tablet Take 10 mg by mouth 3 (three) times a day , Historical Med      calcitriol (ROCALTROL) 0 25 mcg capsule TAKE 1 CAPSULE BY MOUTH EVERY DAY, Normal      Calcium Carb-Cholecalciferol (Calcium 500+D) 500-400 MG-UNIT TABS Take 500 mg by mouth 6 (six) times a day, Normal      fenofibrate (TRIGLIDE) 160 MG tablet Take 160 mg by mouth daily, Historical Med      ferrous sulfate 325 (65 Fe) mg tablet Take 325 mg by mouth Once Monday, wed, Friday-Last dose 4/1/22 , Historical Med      magnesium Oxide (MAG-OX) 400 mg TABS TAKE 1 TABLET (400 MG TOTAL) BY MOUTH THREE (THREE) TIMES A DAY 9PM, Normal      oxyCODONE-acetaminophen (PERCOCET)  mg per tablet Take 1 tablet by mouth 4 (four) times a day, Historical Med      potassium chloride (K-DUR,KLOR-CON) 20 mEq tablet Take 20 mEq by mouth 2 (two) times a day, Historical Med      pregabalin (LYRICA) 75 mg capsule Take 75 mg by mouth 3 (three) times a day as needed , Historical Med      QUEtiapine (SEROquel) 25 mg tablet 25 mg daily at bedtime  , Historical Med      albuterol (Ventolin HFA) 90 mcg/act inhaler Inhale 2 puffs every 4 (four) hours as needed for wheezing or shortness of breath, Starting Sun 9/26/2021, Normal      !! baclofen 20 mg tablet Take 1 tablet (20 mg total) by mouth 3 (three) times a day, Starting u 11/18/2021, Normal      celecoxib (CeleBREX) 200 mg capsule Daily, Historical Med      hydrocortisone (ANUSOL-HC) 2 5 % rectal cream Apply topically 2 (two) times a day, Starting Fri 2/25/2022, Normal      hydrocortisone (ANUSOL-HC) 25 mg suppository Insert 1 suppository (25 mg total) into the rectum 2 (two) times a day, Starting Fri 2/25/2022, Normal      ibuprofen (MOTRIN) 600 mg tablet Take 1 tablet (600 mg total) by mouth every 6 (six) hours as needed for mild pain or moderate pain, Starting Tue 6/2/2020, Normal      levothyroxine 150 mcg tablet TAKE 1 TABLET BY MOUTH EVERY DAY, Normal      Lidocaine-Menthol 4-1 % PTCH if needed  , Historical Med      methocarbamol (ROBAXIN) 500 mg tablet Take 1 tablet (500 mg total) by mouth 2 (two) times a day, Starting Wed 3/16/2022, Normal      metoclopramide (REGLAN) 10 mg tablet Take 1 tablet (10 mg total) by mouth every 6 (six) hours as needed (nausea), Starting Fri 12/31/2021, Normal      ondansetron (ZOFRAN-ODT) 4 mg disintegrating tablet Take 1 tablet (4 mg total) by mouth every 6 (six) hours as needed for nausea or vomiting, Starting Sun 12/19/2021, Normal      pantoprazole (PROTONIX) 40 mg tablet Take 1 tablet (40 mg total) by mouth daily, Starting Mon 4/25/2022, Normal      witch hazel-glycerin (TUCKS) topical pad Insert 1 pad into the rectum 3 (three) times a day for 14 days, Starting Wed 2/23/2022, Until Tue 4/19/2022, Normal       !! - Potential duplicate medications found  Please discuss with provider  No discharge procedures on file      PDMP Review     None          ED Provider  Electronically Signed by           Lily Mendez DO  05/29/22 7274

## 2022-05-30 ENCOUNTER — HOSPITAL ENCOUNTER (OUTPATIENT)
Facility: HOSPITAL | Age: 47
Setting detail: OBSERVATION
Discharge: LEFT BEFORE DISCHARGE | End: 2022-05-30
Attending: EMERGENCY MEDICINE | Admitting: INTERNAL MEDICINE
Payer: COMMERCIAL

## 2022-05-30 ENCOUNTER — HOSPITAL ENCOUNTER (OUTPATIENT)
Facility: HOSPITAL | Age: 47
Setting detail: OBSERVATION
Discharge: HOME | End: 2022-06-01
Attending: INTERNAL MEDICINE | Admitting: INTERNAL MEDICINE
Payer: COMMERCIAL

## 2022-05-30 VITALS
HEART RATE: 88 BPM | TEMPERATURE: 97.6 F | DIASTOLIC BLOOD PRESSURE: 82 MMHG | RESPIRATION RATE: 18 BRPM | SYSTOLIC BLOOD PRESSURE: 133 MMHG | OXYGEN SATURATION: 96 %

## 2022-05-30 DIAGNOSIS — E83.52 HYPERCALCEMIA: ICD-10-CM

## 2022-05-30 DIAGNOSIS — E83.51 HYPOCALCEMIA: ICD-10-CM

## 2022-05-30 DIAGNOSIS — N17.9 AKI (ACUTE KIDNEY INJURY) (HCC): Primary | ICD-10-CM

## 2022-05-30 DIAGNOSIS — F41.9 ANXIETY: ICD-10-CM

## 2022-05-30 LAB
ALBUMIN SERPL BCP-MCNC: 3.9 G/DL (ref 3.5–5)
ALP SERPL-CCNC: 78 U/L (ref 46–116)
ALT SERPL W P-5'-P-CCNC: 40 U/L (ref 12–78)
ANION GAP SERPL CALCULATED.3IONS-SCNC: 11 MMOL/L (ref 4–13)
AST SERPL W P-5'-P-CCNC: 19 U/L (ref 5–45)
ATRIAL RATE: 88 BPM
BILIRUB SERPL-MCNC: 0.52 MG/DL (ref 0.2–1)
BUN SERPL-MCNC: 33 MG/DL (ref 5–25)
CA-I BLD-SCNC: 1.37 MMOL/L (ref 1.12–1.32)
CALCIUM SERPL-MCNC: 11.6 MG/DL (ref 8.3–10.1)
CHLORIDE SERPL-SCNC: 100 MMOL/L (ref 100–108)
CO2 SERPL-SCNC: 26 MMOL/L (ref 21–32)
CREAT SERPL-MCNC: 2.16 MG/DL (ref 0.6–1.3)
FLUAV RNA RESP QL NAA+PROBE: NEGATIVE
FLUBV RNA RESP QL NAA+PROBE: NEGATIVE
GFR SERPL CREATININE-BSD FRML MDRD: 26 ML/MIN/1.73SQ M
GLUCOSE SERPL-MCNC: 113 MG/DL (ref 65–140)
P AXIS: 49 DEGREES
POTASSIUM SERPL-SCNC: 3.6 MMOL/L (ref 3.5–5.3)
PR INTERVAL: 170 MS
PROT SERPL-MCNC: 7.6 G/DL (ref 6.4–8.2)
QRS AXIS: 29 DEGREES
QRSD INTERVAL: 80 MS
QT INTERVAL: 336 MS
QTC INTERVAL: 406 MS
RSV RNA RESP QL NAA+PROBE: NEGATIVE
SARS-COV-2 RNA RESP QL NAA+PROBE: NEGATIVE
SODIUM SERPL-SCNC: 137 MMOL/L (ref 136–145)
T WAVE AXIS: 23 DEGREES
VENTRICULAR RATE: 88 BPM

## 2022-05-30 PROCEDURE — 80053 COMPREHEN METABOLIC PANEL: CPT | Performed by: EMERGENCY MEDICINE

## 2022-05-30 PROCEDURE — 0241U HB NFCT DS VIR RESP RNA 4 TRGT: CPT | Performed by: EMERGENCY MEDICINE

## 2022-05-30 PROCEDURE — 93010 ELECTROCARDIOGRAM REPORT: CPT | Performed by: INTERNAL MEDICINE

## 2022-05-30 PROCEDURE — 36415 COLL VENOUS BLD VENIPUNCTURE: CPT | Performed by: EMERGENCY MEDICINE

## 2022-05-30 PROCEDURE — 99220 PR INITIAL OBSERVATION CARE/DAY 70 MINUTES: CPT

## 2022-05-30 PROCEDURE — 99284 EMERGENCY DEPT VISIT MOD MDM: CPT | Performed by: EMERGENCY MEDICINE

## 2022-05-30 PROCEDURE — 96360 HYDRATION IV INFUSION INIT: CPT

## 2022-05-30 PROCEDURE — 99283 EMERGENCY DEPT VISIT LOW MDM: CPT

## 2022-05-30 PROCEDURE — 99284 EMERGENCY DEPT VISIT MOD MDM: CPT

## 2022-05-30 PROCEDURE — 82330 ASSAY OF CALCIUM: CPT | Performed by: EMERGENCY MEDICINE

## 2022-05-30 PROCEDURE — 93005 ELECTROCARDIOGRAM TRACING: CPT

## 2022-05-30 RX ORDER — FERROUS SULFATE 325(65) MG
325 TABLET ORAL
Status: DISCONTINUED | OUTPATIENT
Start: 2022-05-30 | End: 2022-06-01

## 2022-05-30 RX ORDER — ENOXAPARIN SODIUM 100 MG/ML
40 INJECTION SUBCUTANEOUS DAILY
Status: DISCONTINUED | OUTPATIENT
Start: 2022-05-31 | End: 2022-06-01 | Stop reason: HOSPADM

## 2022-05-30 RX ORDER — SODIUM CHLORIDE 9 MG/ML
100 INJECTION, SOLUTION INTRAVENOUS CONTINUOUS
Status: DISCONTINUED | OUTPATIENT
Start: 2022-05-30 | End: 2022-06-01 | Stop reason: HOSPADM

## 2022-05-30 RX ORDER — ALPRAZOLAM 0.5 MG/1
1 TABLET ORAL DAILY
Status: DISCONTINUED | OUTPATIENT
Start: 2022-05-31 | End: 2022-06-01 | Stop reason: HOSPADM

## 2022-05-30 RX ORDER — LEVOTHYROXINE SODIUM 0.15 MG/1
150 TABLET ORAL EVERY MORNING
Status: DISCONTINUED | OUTPATIENT
Start: 2022-05-31 | End: 2022-06-01 | Stop reason: HOSPADM

## 2022-05-30 RX ORDER — BACLOFEN 10 MG/1
10 TABLET ORAL 3 TIMES DAILY PRN
Status: DISCONTINUED | OUTPATIENT
Start: 2022-05-30 | End: 2022-06-01 | Stop reason: HOSPADM

## 2022-05-30 RX ORDER — ALPRAZOLAM 0.5 MG/1
0.5 TABLET ORAL ONCE
Status: DISCONTINUED | OUTPATIENT
Start: 2022-05-30 | End: 2022-05-30

## 2022-05-30 RX ORDER — LIDOCAINE 50 MG/G
1 PATCH TOPICAL DAILY
Status: DISCONTINUED | OUTPATIENT
Start: 2022-05-31 | End: 2022-06-01 | Stop reason: HOSPADM

## 2022-05-30 RX ORDER — ONDANSETRON 2 MG/ML
4 INJECTION INTRAMUSCULAR; INTRAVENOUS EVERY 6 HOURS PRN
Status: DISCONTINUED | OUTPATIENT
Start: 2022-05-30 | End: 2022-06-01 | Stop reason: HOSPADM

## 2022-05-30 RX ORDER — ALPRAZOLAM 0.5 MG/1
1 TABLET ORAL ONCE
Status: COMPLETED | OUTPATIENT
Start: 2022-05-30 | End: 2022-05-30

## 2022-05-30 RX ORDER — POTASSIUM CHLORIDE 20 MEQ/1
20 TABLET, EXTENDED RELEASE ORAL 2 TIMES DAILY
Status: DISCONTINUED | OUTPATIENT
Start: 2022-05-30 | End: 2022-06-01 | Stop reason: HOSPADM

## 2022-05-30 RX ORDER — OXYCODONE HYDROCHLORIDE AND ACETAMINOPHEN 5; 325 MG/1; MG/1
2 TABLET ORAL EVERY 6 HOURS PRN
Status: DISCONTINUED | OUTPATIENT
Start: 2022-05-30 | End: 2022-06-01 | Stop reason: HOSPADM

## 2022-05-30 RX ORDER — PRAMIPEXOLE DIHYDROCHLORIDE 0.5 MG/1
1 TABLET ORAL
Status: DISCONTINUED | OUTPATIENT
Start: 2022-05-30 | End: 2022-06-01 | Stop reason: HOSPADM

## 2022-05-30 RX ORDER — FENOFIBRATE 145 MG/1
145 TABLET, COATED ORAL DAILY
Status: DISCONTINUED | OUTPATIENT
Start: 2022-05-31 | End: 2022-06-01 | Stop reason: HOSPADM

## 2022-05-30 RX ADMIN — FERROUS SULFATE TAB 325 MG (65 MG ELEMENTAL FE) 325 MG: 325 (65 FE) TAB at 22:46

## 2022-05-30 RX ADMIN — SODIUM CHLORIDE 125 ML/HR: 0.9 INJECTION, SOLUTION INTRAVENOUS at 22:45

## 2022-05-30 RX ADMIN — ALPRAZOLAM 1 MG: 0.5 TABLET ORAL at 22:45

## 2022-05-30 RX ADMIN — OXYCODONE HYDROCHLORIDE AND ACETAMINOPHEN 2 TABLET: 5; 325 TABLET ORAL at 22:46

## 2022-05-30 RX ADMIN — POTASSIUM CHLORIDE 20 MEQ: 20 TABLET, EXTENDED RELEASE ORAL at 22:46

## 2022-05-30 RX ADMIN — PRAMIPEXOLE DIHYDROCHLORIDE 1 MG: 0.5 TABLET ORAL at 22:45

## 2022-05-30 RX ADMIN — SODIUM CHLORIDE 1000 ML: 0.9 INJECTION, SOLUTION INTRAVENOUS at 18:26

## 2022-05-30 RX ADMIN — MAGNESIUM OXIDE TAB 400 MG (241.3 MG ELEMENTAL MG) 400 MG: 400 (241.3 MG) TAB at 22:46

## 2022-05-30 NOTE — ED PROVIDER NOTES
History  Chief Complaint   Patient presents with    Abnormal Lab     Here yesterday for multiple symptoms and multiple labs that were off  Told to come back in for eval of labs     45-year-old female presents the ED have a recheck of her labs are abnormal yesterday states she has been drinking plain water tried get her creatinine down his 2 11 yesterday normally runs in the 1 range  Also she had a high calcium level which in the past she has been running low with calcium has received supplement at home  No other complaints states she feels like she has more energy than she did yesterday after hydrating  History provided by:  Patient   used: No        Prior to Admission Medications   Prescriptions Last Dose Informant Patient Reported? Taking?    ALPRAZolam (XANAX) 1 mg tablet  Self Yes No   Sig: Take 1 mg by mouth daily am   Calcium Carb-Cholecalciferol (Calcium 500+D) 500-400 MG-UNIT TABS  Self No No   Sig: Take 500 mg by mouth 6 (six) times a day   Lidocaine-Menthol 4-1 % PTCH   Yes No   Sig: if needed     QUEtiapine (SEROquel) 25 mg tablet  Self Yes No   Si mg daily at bedtime     albuterol (Ventolin HFA) 90 mcg/act inhaler  Self No No   Sig: Inhale 2 puffs every 4 (four) hours as needed for wheezing or shortness of breath   baclofen 10 mg tablet  Self Yes No   Sig: Take 10 mg by mouth 3 (three) times a day    baclofen 20 mg tablet  Self No No   Sig: Take 1 tablet (20 mg total) by mouth 3 (three) times a day   Patient taking differently: Take 20 mg by mouth 3 (three) times a day as needed     calcitriol (ROCALTROL) 0 25 mcg capsule   No No   Sig: TAKE 1 CAPSULE BY MOUTH EVERY DAY   celecoxib (CeleBREX) 200 mg capsule   Yes No   Sig: Daily   fenofibrate (TRIGLIDE) 160 MG tablet  Self Yes No   Sig: Take 160 mg by mouth daily   ferrous sulfate 325 (65 Fe) mg tablet  Self Yes No   Sig: Take 325 mg by mouth Once Monday, wed, Friday-Last dose 22    hydrocortisone (ANUSOL-HC) 2 5 % rectal cream   No No   Sig: Apply topically 2 (two) times a day   Patient taking differently: Apply topically 2 (two) times a day as needed     hydrocortisone (ANUSOL-HC) 25 mg suppository   No No   Sig: Insert 1 suppository (25 mg total) into the rectum 2 (two) times a day   Patient taking differently: Insert 25 mg into the rectum 2 (two) times a day as needed     ibuprofen (MOTRIN) 600 mg tablet  Self No No   Sig: Take 1 tablet (600 mg total) by mouth every 6 (six) hours as needed for mild pain or moderate pain   levothyroxine 150 mcg tablet  Self No No   Sig: TAKE 1 TABLET BY MOUTH EVERY DAY   Patient taking differently: 150 mcg every morning     magnesium Oxide (MAG-OX) 400 mg TABS  Self No No   Sig: TAKE 1 TABLET (400 MG TOTAL) BY MOUTH THREE (THREE) TIMES A DAY 9PM   methocarbamol (ROBAXIN) 500 mg tablet   No No   Sig: Take 1 tablet (500 mg total) by mouth 2 (two) times a day   metoclopramide (REGLAN) 10 mg tablet  Self No No   Sig: Take 1 tablet (10 mg total) by mouth every 6 (six) hours as needed (nausea)   Patient not taking: Reported on 5/29/2022   ondansetron (ZOFRAN-ODT) 4 mg disintegrating tablet  Self No No   Sig: Take 1 tablet (4 mg total) by mouth every 6 (six) hours as needed for nausea or vomiting   oxyCODONE-acetaminophen (PERCOCET)  mg per tablet   Yes No   Sig: Take 1 tablet by mouth 4 (four) times a day   pantoprazole (PROTONIX) 40 mg tablet   No No   Sig: Take 1 tablet (40 mg total) by mouth daily   Patient not taking: Reported on 5/29/2022   potassium chloride (K-DUR,KLOR-CON) 20 mEq tablet  Self Yes No   Sig: Take 20 mEq by mouth 2 (two) times a day   pregabalin (LYRICA) 75 mg capsule  Self Yes No   Sig: Take 75 mg by mouth 3 (three) times a day as needed    witch hazel-glycerin (TUCKS) topical pad  Self No No   Sig: Insert 1 pad into the rectum 3 (three) times a day for 14 days      Facility-Administered Medications: None       Past Medical History:   Diagnosis Date    Acid reflux  Acute renal failure (HCC)     multiple episodes    Anemia     Anxiety     Chronic pain     DDD (degenerative disc disease), lumbar     Disease of thyroid gland     had surgery and now hypo    DVT (deep venous thrombosis) ([facility] [address] [facility] )     s/p ankle fracture    Hypercalcemia     Hyperlipidemia     Hyperthyroidism     Hypocalcemia     post op 2016    Kidney stone     Migraine     Psychiatric disorder     anxiety depression    Seizures (HCC)     petit mal x1  4 years ago- all tests were neg   Spondylolisthesis of lumbar region     Treatment     spinal pain injections  last was 2016    Wears glasses        Past Surgical History:   Procedure Laterality Date    BACK SURGERY      L4-5, S1-fusion-plate/screws     SECTION      x5    CYSTOCELE REPAIR  2017    CYSTOSCOPY      DISCOGRAM      HYSTERECTOMY      PARATHYROIDECTOMY      KS ANTERIOR COLPORRAPHY RPR CYSTOCELE W/CYSTO N/A 2017    Procedure: CYSTOCELE REPAIR;  Surgeon: Ephraim Little MD;  Location: [address] [place] [address];  Service: Gynecology    [place] ARTHRODESIS POSTERIOR INTERBODY LUMBAR N/A 2016    Procedure: L4-S1 LUMBAR LAMINECTOMY/DECOMPRESSION;  INSTRUMENTED POSTEROLATERAL FUSION/ INTERBODY L5-S1; ALLOGRAFT AND AUTOGRAFT (IMPULSE) ;   Surgeon: Eufemia Ham MD;  Location:  MAIN OR;  Service: Orthopedics    KS CYSTOURETHROSCOPY,URETER CATHETER Bilateral 2018    Procedure: INSERTION URETERAL CATHETERS PREOP;  Surgeon: Blue Knutson MD;  Location: [address] [place] [address];  Service: Urology    KS SLING OPER STRES INCONTINENCE N/A 2017    Procedure: MID URETHRAL SLING;  Surgeon: Sandra Mcdonald MD;  Location: [address] [place] [address];  Service: Urology    [place] [address] ABD HYSTERECTOMY N/A 2018    Procedure: SUPRACERVICAL HYSTERECTOMY;  Surgeon: Ephraim Little MD;  Location: [facility];  Service: Gynecology    THYROIDECTOMY      TONSILECTOMY AND ADNOIDECTOMY      TONSILLECTOMY      TUBAL LIGATION         Family History Problem Relation Age of Onset    Diabetes Mother     Hypertension Mother     Hyperlipidemia Father     Arrhythmia Father     Lung cancer Father     Diabetes Father      I have reviewed and agree with the history as documented  E-Cigarette/Vaping    E-Cigarette Use Never User      E-Cigarette/Vaping Substances    Nicotine No     THC No     CBD No     Flavoring No     Other No     Unknown No      Social History     Tobacco Use    Smoking status: Current Every Day Smoker     Packs/day: 0 50     Years: 25 00     Pack years: 12 50     Types: Cigarettes    Smokeless tobacco: Never Used   Vaping Use    Vaping Use: Never used   Substance Use Topics    Alcohol use: Not Currently    Drug use: No       Review of Systems   Constitutional: Negative for activity change, chills, diaphoresis and fever  HENT: Negative for congestion, ear pain, nosebleeds, sore throat, trouble swallowing and voice change  Eyes: Negative for pain, discharge and redness  Respiratory: Negative for apnea, cough, choking, shortness of breath, wheezing and stridor  Cardiovascular: Negative for chest pain and palpitations  Gastrointestinal: Negative for abdominal distention, abdominal pain, constipation, diarrhea, nausea and vomiting  Endocrine: Negative for polydipsia  Genitourinary: Negative for difficulty urinating, dysuria, flank pain, frequency, hematuria and urgency  Musculoskeletal: Negative for back pain, gait problem, joint swelling, myalgias, neck pain and neck stiffness  Skin: Negative for pallor and rash  Neurological: Negative for dizziness, tremors, syncope, speech difficulty, weakness, numbness and headaches  Hematological: Negative for adenopathy  Psychiatric/Behavioral: Negative for confusion, hallucinations, self-injury and suicidal ideas  The patient is not nervous/anxious  Physical Exam  Physical Exam  Vitals and nursing note reviewed     Constitutional:       General: She is not in acute distress  Appearance: She is well-developed  She is not diaphoretic  HENT:      Head: Normocephalic and atraumatic  Right Ear: External ear normal       Left Ear: External ear normal       Nose: Nose normal    Eyes:      Conjunctiva/sclera: Conjunctivae normal       Pupils: Pupils are equal, round, and reactive to light  Cardiovascular:      Rate and Rhythm: Normal rate and regular rhythm  Heart sounds: Normal heart sounds  Pulmonary:      Effort: Pulmonary effort is normal       Breath sounds: Normal breath sounds  Abdominal:      General: Bowel sounds are normal       Palpations: Abdomen is soft  Musculoskeletal:         General: Normal range of motion  Cervical back: Normal range of motion and neck supple  Skin:     General: Skin is warm and dry  Neurological:      Mental Status: She is alert and oriented to person, place, and time  Deep Tendon Reflexes: Reflexes are normal and symmetric  Psychiatric:         Behavior: Behavior is cooperative  Vital Signs  ED Triage Vitals [05/30/22 1755]   Temperature Pulse Respirations Blood Pressure SpO2   97 6 °F (36 4 °C) (!) 114 20 133/82 95 %      Temp Source Heart Rate Source Patient Position - Orthostatic VS BP Location FiO2 (%)   Tympanic Monitor Sitting Right arm --      Pain Score       7           Vitals:    05/30/22 1755   BP: 133/82   Pulse: (!) 114   Patient Position - Orthostatic VS: Sitting         Visual Acuity      ED Medications  Medications   sodium chloride 0 9 % bolus 1,000 mL (1,000 mL Intravenous New Bag 5/30/22 1826)       Diagnostic Studies  Results Reviewed     Procedure Component Value Units Date/Time    COVID/FLU/RSV - 2 hour TAT [[id number]] Collected: 05/30/22 1943    Lab Status:  In process Specimen: Nares from Nose Updated: 05/30/22 1946    Comprehensive metabolic panel [[id number]]  (Abnormal) Collected: 05/30/22 1825    Lab Status: Final result Specimen: Blood from Arm, Right Updated: 05/30/22 1854     Sodium 137 mmol/L      Potassium 3 6 mmol/L      Chloride 100 mmol/L      CO2 26 mmol/L      ANION GAP 11 mmol/L      BUN 33 mg/dL      Creatinine 2 16 mg/dL      Glucose 113 mg/dL      Calcium 11 6 mg/dL      AST 19 U/L      ALT 40 U/L      Alkaline Phosphatase 78 U/L      Total Protein 7 6 g/dL      Albumin 3 9 g/dL      Total Bilirubin 0 52 mg/dL      eGFR 26 ml/min/1 73sq m     Narrative:      [place] guidelines for Chronic Kidney Disease (CKD):     Stage 1 with normal or high GFR (GFR > 90 mL/min/1 73 square meters)    Stage 2 Mild CKD (GFR = 60-89 mL/min/1 73 square meters)    Stage 3A Moderate CKD (GFR = 45-59 mL/min/1 73 square meters)    Stage 3B Moderate CKD (GFR = 30-44 mL/min/1 73 square meters)    Stage 4 Severe CKD (GFR = 15-29 mL/min/1 73 square meters)    Stage 5 End Stage CKD (GFR <15 mL/min/1 73 square meters)  Note: GFR calculation is accurate only with a steady state creatinine    Calcium, ionized [[id number]]  (Abnormal) Collected: 05/30/22 1825    Lab Status: Final result Specimen: Blood from Arm, Right Updated: 05/30/22 1837     Calcium, Ionized 1 37 mmol/L                  No orders to display              Procedures  Procedures         ED Course                                             MDM  Number of Diagnoses or Management Options  LELAND (acute kidney injury) ([facility] )  Diagnosis management comments: Patient now saying she will sign out against medical advice to go home and get her cooling pillow and her phone  and then she will return sign into the ER again to be admitted    Told her that is not the proper way to do that that she should not do that and she said she does not care because she has state insurance      Disposition  Final diagnoses:   LELAND (acute kidney injury) ([facility] )     Time reflects when diagnosis was documented in both MDM as applicable and the Disposition within this note     Time User Action Codes Description Comment    5/30/2022 7:23 PM Brandyn Inman Add [[id number]] LELAND (acute kidney injury) [facility])       ED Disposition     ED Disposition   AMA    Condition   --    Date/Time   Mon May 30, 2022  8:04 PM    Comment   Case was discussed with  Rosa and the patient's admission status was agreed to be Admission Status: observation status to the service of Dr Michelle Farias   Follow-up Information    None         Patient's Medications   Discharge Prescriptions    No medications on file       No discharge procedures on file      PDMP Review     None          ED Provider  Electronically Signed by           Trina Valladares DO  05/30/22 [address] DO [place]  05/30/22 2004

## 2022-05-30 NOTE — Clinical Note
Case was discussed with  Rosa and the patient's admission status was agreed to be Admission Status: observation status to the service of Dr Lj Diane

## 2022-05-31 ENCOUNTER — APPOINTMENT (OUTPATIENT)
Dept: RADIOLOGY | Facility: HOSPITAL | Age: 47
End: 2022-05-31
Payer: COMMERCIAL

## 2022-05-31 PROBLEM — E83.52 HYPERCALCEMIA: Status: ACTIVE | Noted: 2022-05-31

## 2022-05-31 PROBLEM — G89.4 CHRONIC PAIN SYNDROME: Status: ACTIVE | Noted: 2022-05-31

## 2022-05-31 PROBLEM — R11.2 NAUSEA AND VOMITING: Status: ACTIVE | Noted: 2022-05-31

## 2022-05-31 LAB
ANION GAP SERPL CALCULATED.3IONS-SCNC: 6 MMOL/L (ref 4–13)
ANION GAP SERPL CALCULATED.3IONS-SCNC: 8 MMOL/L (ref 4–13)
ATRIAL RATE: 84 BPM
BASOPHILS # BLD AUTO: 0.03 THOUSANDS/ΜL (ref 0–0.1)
BASOPHILS NFR BLD AUTO: 1 % (ref 0–1)
BUN SERPL-MCNC: 25 MG/DL (ref 5–25)
BUN SERPL-MCNC: 33 MG/DL (ref 5–25)
CALCIUM SERPL-MCNC: 9.2 MG/DL (ref 8.3–10.1)
CALCIUM SERPL-MCNC: 9.7 MG/DL (ref 8.3–10.1)
CHLORIDE SERPL-SCNC: 105 MMOL/L (ref 100–108)
CHLORIDE SERPL-SCNC: 107 MMOL/L (ref 100–108)
CO2 SERPL-SCNC: 26 MMOL/L (ref 21–32)
CO2 SERPL-SCNC: 27 MMOL/L (ref 21–32)
CREAT SERPL-MCNC: 1.41 MG/DL (ref 0.6–1.3)
CREAT SERPL-MCNC: 1.76 MG/DL (ref 0.6–1.3)
EOSINOPHIL # BLD AUTO: 0.13 THOUSAND/ΜL (ref 0–0.61)
EOSINOPHIL NFR BLD AUTO: 3 % (ref 0–6)
ERYTHROCYTE [DISTWIDTH] IN BLOOD BY AUTOMATED COUNT: 16.7 % (ref 11.6–15.1)
GFR SERPL CREATININE-BSD FRML MDRD: 34 ML/MIN/1.73SQ M
GFR SERPL CREATININE-BSD FRML MDRD: 45 ML/MIN/1.73SQ M
GLUCOSE SERPL-MCNC: 123 MG/DL (ref 65–140)
GLUCOSE SERPL-MCNC: 131 MG/DL (ref 65–140)
HCT VFR BLD AUTO: 32 % (ref 34.8–46.1)
HGB BLD-MCNC: 10.3 G/DL (ref 11.5–15.4)
IMM GRANULOCYTES # BLD AUTO: 0.02 THOUSAND/UL (ref 0–0.2)
IMM GRANULOCYTES NFR BLD AUTO: 0 % (ref 0–2)
LYMPHOCYTES # BLD AUTO: 1.74 THOUSANDS/ΜL (ref 0.6–4.47)
LYMPHOCYTES NFR BLD AUTO: 38 % (ref 14–44)
MCH RBC QN AUTO: 29.5 PG (ref 26.8–34.3)
MCHC RBC AUTO-ENTMCNC: 32.2 G/DL (ref 31.4–37.4)
MCV RBC AUTO: 92 FL (ref 82–98)
MONOCYTES # BLD AUTO: 0.3 THOUSAND/ΜL (ref 0.17–1.22)
MONOCYTES NFR BLD AUTO: 7 % (ref 4–12)
NEUTROPHILS # BLD AUTO: 2.4 THOUSANDS/ΜL (ref 1.85–7.62)
NEUTS SEG NFR BLD AUTO: 51 % (ref 43–75)
NRBC BLD AUTO-RTO: 0 /100 WBCS
P AXIS: 53 DEGREES
PLATELET # BLD AUTO: 138 THOUSANDS/UL (ref 149–390)
PMV BLD AUTO: 10 FL (ref 8.9–12.7)
POTASSIUM SERPL-SCNC: 3.4 MMOL/L (ref 3.5–5.3)
POTASSIUM SERPL-SCNC: 3.5 MMOL/L (ref 3.5–5.3)
PR INTERVAL: 176 MS
QRS AXIS: 26 DEGREES
QRSD INTERVAL: 80 MS
QT INTERVAL: 348 MS
QTC INTERVAL: 411 MS
RBC # BLD AUTO: 3.49 MILLION/UL (ref 3.81–5.12)
SODIUM SERPL-SCNC: 139 MMOL/L (ref 136–145)
SODIUM SERPL-SCNC: 140 MMOL/L (ref 136–145)
T WAVE AXIS: 19 DEGREES
VENTRICULAR RATE: 84 BPM
WBC # BLD AUTO: 4.62 THOUSAND/UL (ref 4.31–10.16)

## 2022-05-31 PROCEDURE — 80048 BASIC METABOLIC PNL TOTAL CA: CPT

## 2022-05-31 PROCEDURE — 99225 PR SBSQ OBSERVATION CARE/DAY 25 MINUTES: CPT | Performed by: FAMILY MEDICINE

## 2022-05-31 PROCEDURE — 74176 CT ABD & PELVIS W/O CONTRAST: CPT

## 2022-05-31 PROCEDURE — 93010 ELECTROCARDIOGRAM REPORT: CPT | Performed by: INTERNAL MEDICINE

## 2022-05-31 PROCEDURE — G1004 CDSM NDSC: HCPCS

## 2022-05-31 PROCEDURE — 80048 BASIC METABOLIC PNL TOTAL CA: CPT | Performed by: FAMILY MEDICINE

## 2022-05-31 PROCEDURE — 85025 COMPLETE CBC W/AUTO DIFF WBC: CPT

## 2022-05-31 RX ORDER — CALCIUM CARBONATE 500(1250)
1 TABLET ORAL 4 TIMES DAILY
Status: DISCONTINUED | OUTPATIENT
Start: 2022-06-01 | End: 2022-06-01 | Stop reason: HOSPADM

## 2022-05-31 RX ORDER — CALCITRIOL 0.25 UG/1
0.25 CAPSULE, LIQUID FILLED ORAL DAILY
Status: DISCONTINUED | OUTPATIENT
Start: 2022-05-31 | End: 2022-06-01 | Stop reason: HOSPADM

## 2022-05-31 RX ORDER — ALPRAZOLAM 0.5 MG/1
1 TABLET ORAL ONCE
Status: COMPLETED | OUTPATIENT
Start: 2022-05-31 | End: 2022-05-31

## 2022-05-31 RX ORDER — CALCIUM CARBONATE 500(1250)
1 TABLET ORAL 4 TIMES DAILY
Status: DISCONTINUED | OUTPATIENT
Start: 2022-05-31 | End: 2022-05-31

## 2022-05-31 RX ORDER — CALCIUM CARBONATE 500(1250)
1 TABLET ORAL 2 TIMES DAILY
Status: COMPLETED | OUTPATIENT
Start: 2022-05-31 | End: 2022-06-01

## 2022-05-31 RX ADMIN — MAGNESIUM OXIDE TAB 400 MG (241.3 MG ELEMENTAL MG) 400 MG: 400 (241.3 MG) TAB at 21:14

## 2022-05-31 RX ADMIN — CALCIUM 1 TABLET: 500 TABLET ORAL at 21:15

## 2022-05-31 RX ADMIN — POTASSIUM CHLORIDE 20 MEQ: 20 TABLET, EXTENDED RELEASE ORAL at 08:59

## 2022-05-31 RX ADMIN — CALCITRIOL 0.25 MCG: 0.25 CAPSULE, LIQUID FILLED ORAL at 18:36

## 2022-05-31 RX ADMIN — LIDOCAINE 5% 1 PATCH: 700 PATCH TOPICAL at 08:58

## 2022-05-31 RX ADMIN — FENOFIBRATE 145 MG: 145 TABLET, FILM COATED ORAL at 08:59

## 2022-05-31 RX ADMIN — MAGNESIUM OXIDE TAB 400 MG (241.3 MG ELEMENTAL MG) 400 MG: 400 (241.3 MG) TAB at 16:53

## 2022-05-31 RX ADMIN — CALCIUM 1 TABLET: 500 TABLET ORAL at 16:54

## 2022-05-31 RX ADMIN — CALCIUM 1 TABLET: 500 TABLET ORAL at 12:14

## 2022-05-31 RX ADMIN — LIDOCAINE 5% 1 PATCH: 700 PATCH TOPICAL at 08:57

## 2022-05-31 RX ADMIN — MAGNESIUM OXIDE TAB 400 MG (241.3 MG ELEMENTAL MG) 400 MG: 400 (241.3 MG) TAB at 08:58

## 2022-05-31 RX ADMIN — POTASSIUM CHLORIDE 20 MEQ: 20 TABLET, EXTENDED RELEASE ORAL at 16:53

## 2022-05-31 RX ADMIN — LEVOTHYROXINE SODIUM 150 MCG: 150 TABLET ORAL at 09:05

## 2022-05-31 RX ADMIN — ALPRAZOLAM 1 MG: 0.5 TABLET ORAL at 08:59

## 2022-05-31 RX ADMIN — PRAMIPEXOLE DIHYDROCHLORIDE 1 MG: 0.5 TABLET ORAL at 21:15

## 2022-05-31 RX ADMIN — ENOXAPARIN SODIUM 40 MG: 40 INJECTION SUBCUTANEOUS at 08:58

## 2022-05-31 RX ADMIN — ALPRAZOLAM 1 MG: 0.5 TABLET ORAL at 21:14

## 2022-05-31 RX ADMIN — LEVOTHYROXINE SODIUM 150 MCG: 150 TABLET ORAL at 06:07

## 2022-05-31 RX ADMIN — FERROUS SULFATE TAB 325 MG (65 MG ELEMENTAL FE) 325 MG: 325 (65 FE) TAB at 16:53

## 2022-05-31 RX ADMIN — SODIUM CHLORIDE 125 ML/HR: 0.9 INJECTION, SOLUTION INTRAVENOUS at 18:36

## 2022-05-31 NOTE — H&P
[address]  H&P- Lamberto Mendenhall 1975, 40 y o  female MRN: [id number]  Unit/Bed#: [address] [place] [address] Encounter: [id number]  Primary Care Provider: Saray Ellis MD   Date and time admitted to hospital: 5/30/2022  8:58 PM    * LELAND (acute kidney injury) [facility])  Assessment & Plan  Patient presents with fatigue, weakness, decreased appetite for the last week  · Creatinine 2 16, baseline 0 8-1 0   Suspect due to dehydration, poor oral intake   Start IVFs   Avoid nephrotoxins   Monitor with BMP      Hypercalcemia  Assessment & Plan  Calcium 11 6, ionized 1 37  · Patient usually presents with hypocalcemia  · Patient on calcium, calcitriol at home - no recent increase in dose  · Hold calcium, calcitriol    Anxiety  Assessment & Plan  Continue xanax prn    Postoperative hypothyroidism  Assessment & Plan  History of thyroidectomy, parathyroidectomy  · Continue levothyroxine 150mcg  · TSH normal      DDD (degenerative disc disease), lumbar  Assessment & Plan  Continue percocet, lidocaine patches for chronic pain    VTE Pharmacologic Prophylaxis: VTE Score: 3 Moderate Risk (Score 3-4) - Pharmacological DVT Prophylaxis Ordered: enoxaparin (Lovenox)  Code Status: Level 1 - Full Code   Discussion with family: Updated  (son) at bedside  Anticipated Length of Stay: Patient will be admitted on an observation basis with an anticipated length of stay of less than 2 midnights secondary to LELAND, hypercalcemia  Total Time for Visit, including Counseling / Coordination of Care: 45 minutes Greater than 50% of this total time spent on direct patient counseling and coordination of care  Chief Complaint: weakness    History of Present Illness:  Lamberto Mendenhall is a 40 y o  female with a PMH of thyroidectomy, parathyroidectomy, hypocalcemia who presents with weakness  Patient states that for the last week she has felt weakness and generalized malaise    Over the past few days she had a couple episodes of vomiting  She states she has not eaten or drank anything for the past week  She came to the ED yesterday and was found to have hypercalcemia and LELAND  Patient did not want to be admitted and went home  Today patient states she is feeling much better and less fatigued, resumed a normal diet, no n/v  Her calcium level is improved today but her creatinine is still rising  Denies hesitancy, decreased urination  Denies any fevers, chills, chest pain, palpitations, shortness of breath, cough, abdominal pain, constipation/diarrhea, dysuria, hematuria  Review of Systems:  Review of Systems   Constitutional: Positive for fatigue  HENT: Negative  Respiratory: Negative for cough, shortness of breath and wheezing  Gastrointestinal: Negative for abdominal pain, constipation and diarrhea  Genitourinary: Negative for difficulty urinating, flank pain and hematuria  Musculoskeletal: Negative  Neurological: Positive for weakness  Negative for dizziness, light-headedness and headaches  Hematological: Negative  Psychiatric/Behavioral: Negative  Past Medical and Surgical History:   Past Medical History:   Diagnosis Date    Acid reflux     Acute renal failure (HCC)     multiple episodes    Anemia     Anxiety     Arthritis     Asthma     Chronic pain     DDD (degenerative disc disease), lumbar     Disease of thyroid gland     had surgery and now hypo    DVT (deep venous thrombosis) ([facility] [address] ) 2009    s/p ankle fracture    History of transfusion     Hypercalcemia     Hyperlipidemia     Hyperthyroidism     Hypocalcemia     post op 2016    Kidney stone     Migraine     Psychiatric disorder     anxiety depression    Seizures (HCC)     petit mal x1  4 years ago- all tests were neg      Spondylolisthesis of lumbar region     Treatment     spinal pain injections  last was 7-7-2016    Wears glasses        Past Surgical History:   Procedure Laterality Date    BACK SURGERY      L4-5, S1-fusion-plate/screws     SECTION      x5    CYSTOCELE REPAIR  2017    CYSTOSCOPY      DISCOGRAM      HYSTERECTOMY      PARATHYROIDECTOMY      [place] ANTERIOR COLPORRAPHY RPR CYSTOCELE W/CYSTO N/A 2017    Procedure: CYSTOCELE REPAIR;  Surgeon: Cornelius Marina MD;  Location: [address] [place] [address];  Service: Gynecology    [place] ARTHRODESIS POSTERIOR INTERBODY LUMBAR N/A 2016    Procedure: L4-S1 LUMBAR LAMINECTOMY/DECOMPRESSION;  INSTRUMENTED POSTEROLATERAL FUSION/ INTERBODY L5-S1; ALLOGRAFT AND AUTOGRAFT (IMPULSE) ; Surgeon: Celina Lopez MD;  [facility];  Service: Orthopedics    RI CYSTOURETHROSCOPY,URETER CATHETER Bilateral 2018    Procedure: INSERTION URETERAL CATHETERS PREOP;  Surgeon: Jackie Gill MD;  Location: [address] [place] [address];  Service: Urology    RI SLING OPER STRES INCONTINENCE N/A 2017    Procedure: MID URETHRAL SLING;  Surgeon: Inna Whipple MD;  Location: [address] [place] [address];  Service: Urology    RI [address] ABD HYSTERECTOMY N/A 2018    Procedure: SUPRACERVICAL HYSTERECTOMY;  Surgeon: Cornelius Marina MD;  Location: [address] [place] [address];  Service: Gynecology    THYROIDECTOMY      TONSILECTOMY AND ADNOIDECTOMY      TONSILLECTOMY      TUBAL LIGATION         Meds/Allergies:  Prior to Admission medications    Medication Sig Start Date End Date Taking?  Authorizing Provider   ALPRAZolam [place]) 1 mg tablet Take 1 mg by mouth daily am   Yes Historical Provider, MD   baclofen 10 mg tablet Take 10 mg by mouth 3 (three) times a day    Yes Historical Provider, MD   calcitriol (ROCALTROL) 0 25 mcg capsule TAKE 1 CAPSULE BY MOUTH EVERY DAY 22  Yes Patsy Deal MD   fenofibrate (TRIGLIDE) 160 MG tablet Take 160 mg by mouth daily   Yes Historical Provider, MD   ferrous sulfate 325 (65 Fe) mg tablet Take 325 mg by mouth Once Monday, wed, Friday-Last dose 22    Yes Historical Provider, MD   ibuprofen (MOTRIN) 600 mg tablet Take 1 tablet (600 mg total) by mouth every 6 (six) hours as needed for mild pain or moderate pain 6/2/20  Yes Natalie Baker,    levothyroxine 150 mcg tablet TAKE 1 TABLET BY MOUTH EVERY DAY  Patient taking differently: 150 mcg every morning 1/20/22  Yes Farzana Paris MD   magnesium Oxide (MAG-OX) 400 mg TABS TAKE 1 TABLET (400 MG TOTAL) BY MOUTH THREE (THREE) TIMES A DAY 9PM 12/20/21  Yes Itzel Russo MD   oxyCODONE-acetaminophen (PERCOCET)  mg per tablet Take 1 tablet by mouth 4 (four) times a day   Yes Historical Provider, MD   potassium chloride (K-DUR,KLOR-CON) 20 mEq tablet Take 20 mEq by mouth 2 (two) times a day   Yes Historical Provider, MD   pregabalin (LYRICA) 75 mg capsule Take 75 mg by mouth 3 (three) times a day as needed    Yes Historical Provider, MD   baclofen 20 mg tablet Take 1 tablet (20 mg total) by mouth 3 (three) times a day  Patient taking differently: Take 20 mg by mouth 3 (three) times a day as needed   11/18/21   Paulo Raymond MD   Calcium Carb-Cholecalciferol (Calcium 500+D) 500-400 MG-UNIT TABS Take 500 mg by mouth 6 (six) times a day  Patient taking differently: 600 mg Take 500 mg by mouth 6 (six) times a day 10/29/21   Farzana Paris MD   celecoxib (CeleBREX) 200 mg capsule Daily    Historical Provider, MD   Lidocaine-Menthol 4-1 % PTCH if needed      Historical Provider, MD   metoclopramide (REGLAN) 10 mg tablet Take 1 tablet (10 mg total) by mouth every 6 (six) hours as needed (nausea)  Patient not taking: No sig reported 12/31/21   Trinity Parody Ferdous, DO   ondansetron (ZOFRAN-ODT) 4 mg disintegrating tablet Take 1 tablet (4 mg total) by mouth every 6 (six) hours as needed for nausea or vomiting  Patient not taking: Reported on 5/30/2022 12/19/21   Trinity Parody Ferdous, DO   pantoprazole (PROTONIX) 40 mg tablet Take 1 tablet (40 mg total) by mouth daily  Patient not taking: No sig reported 4/25/22   Huseyin Plate, MD   witch hazel-glycerin (TUCKS) topical pad Insert 1 pad into the rectum 3 (three) times a day for 14 days 2/23/22 4/19/22  Pam Higgins PA-C   albuterol (Ventolin HFA) 90 mcg/act inhaler Inhale 2 puffs every 4 (four) hours as needed for wheezing or shortness of breath 9/26/21 5/30/22  Georgeana Cooks Ferdous, DO   hydrocortisone (ANUSOL-HC) 2 5 % rectal cream Apply topically 2 (two) times a day  Patient taking differently: Apply topically 2 (two) times a day as needed 2/25/22 5/30/22  Jaguar Estrada MD   hydrocortisone (ANUSOL-HC) 25 mg suppository Insert 1 suppository (25 mg total) into the rectum 2 (two) times a day  Patient taking differently: Insert 25 mg into the rectum 2 (two) times a day as needed 2/25/22 5/30/22  Jaguar Estrada MD   methocarbamol (ROBAXIN) 500 mg tablet Take 1 tablet (500 mg total) by mouth 2 (two) times a day 3/16/22 5/30/22  Alice Tello MD   QUEtiapine (SEROquel) 25 mg tablet 25 mg daily at bedtime    5/30/22  Historical Provider, MD     I have reviewed home medications with patient personally  Allergies:    Allergies   Allergen Reactions    Hydrocodone-Acetaminophen Rash    Vicodin [Hydrocodone-Acetaminophen] Rash    Morphine And Related GI Intolerance     Nausea/vomiting      Adhesive [Medical Tape] Rash     Bandaids       Social History:  Marital Status: /Civil Union   Occupation:   Patient Pre-hospital Living Situation: Home  Patient Pre-hospital Level of Mobility: walks  Patient Pre-hospital Diet Restrictions: none  Substance Use History:   Social History     Substance and Sexual Activity   Alcohol Use Not Currently     Social History     Tobacco Use   Smoking Status Current Every Day Smoker    Packs/day: 0 50    Years: 25 00    Pack years: 12 50    Types: Cigarettes   Smokeless Tobacco Never Used     Social History     Substance and Sexual Activity   Drug Use No       Family History:  Family History   Problem Relation Age of Onset    Diabetes Mother     Hypertension Mother     Hyperlipidemia Father     Arrhythmia Father     Lung cancer Father     Diabetes Father        Physical Exam:     Vitals:   Blood Pressure: 153/88 (05/30/22 2213)  Pulse: 83 (05/30/22 2213)  Temperature: (!) 97 1 °F (36 2 °C) (05/30/22 2213)  Temp Source: Tympanic (05/30/22 2054)  Respirations: 20 (05/30/22 2054)  Height: 5' 2" (157 5 cm) (05/30/22 2213)  Weight - Scale: 92 6 kg (204 lb 3 2 oz) (05/30/22 2213)  SpO2: 97 % (05/30/22 2213)    Physical Exam  Vitals reviewed  Constitutional:       General: She is not in acute distress  Appearance: She is well-developed  HENT:      Head: Normocephalic and atraumatic  Eyes:      Conjunctiva/sclera: Conjunctivae normal    Cardiovascular:      Rate and Rhythm: Normal rate and regular rhythm  Heart sounds: No murmur heard  Pulmonary:      Effort: Pulmonary effort is normal  No respiratory distress  Breath sounds: Normal breath sounds  Abdominal:      Palpations: Abdomen is soft  Tenderness: There is no abdominal tenderness  Skin:     General: Skin is warm and dry  Neurological:      Mental Status: She is alert and oriented to person, place, and time  Motor: No weakness  Additional Data:     Lab Results:  Results from last 7 days   Lab Units 05/29/22  1859   WBC Thousand/uL 7 97   HEMOGLOBIN g/dL 13 2   HEMATOCRIT % 39 1   PLATELETS Thousands/uL 187   NEUTROS PCT % 69   LYMPHS PCT % 22   MONOS PCT % 7   EOS PCT % 1     Results from last 7 days   Lab Units 05/30/22  1825   SODIUM mmol/L 137   POTASSIUM mmol/L 3 6   CHLORIDE mmol/L 100   CO2 mmol/L 26   BUN mg/dL 33*   CREATININE mg/dL 2 16*   ANION GAP mmol/L 11   CALCIUM mg/dL 11 6*   ALBUMIN g/dL 3 9   TOTAL BILIRUBIN mg/dL 0 52   ALK PHOS U/L 78   ALT U/L 40   AST U/L 19   GLUCOSE RANDOM mg/dL 113     Results from last 7 days   Lab Units 05/29/22  1859   INR  0 92                   Imaging: No pertinent imaging reviewed  No orders to display       EKG and Other Studies Reviewed on Admission:   · EKG: NSR   HR 60     ** Please Note: This note has been constructed using a voice recognition system   **

## 2022-05-31 NOTE — ED PROVIDER NOTES
History  Chief Complaint   Patient presents with    Abnormal Lab     Here earlier and signed out so she could get her pillow and   Back for admission as she is in kidney failure     Patient was seen earlier today and it was decided that she could be admitted to the hospital for acute kidney injury  Patient decided to sign out against medical advice go home and get some things for home and return back to the ED  No new complaints since she left approximately 1 hour ago  Please see previous chart for full workup  Cannot display prior to admission medications because the patient has not been admitted in this contact  Past Medical History:   Diagnosis Date    Acid reflux     Acute renal failure (HCC)     multiple episodes    Anemia     Anxiety     Chronic pain     DDD (degenerative disc disease), lumbar     Disease of thyroid gland     had surgery and now hypo    DVT (deep venous thrombosis) ([facility] [address] )     s/p ankle fracture    Hypercalcemia     Hyperlipidemia     Hyperthyroidism     Hypocalcemia     post op 2016    Kidney stone     Migraine     Psychiatric disorder     anxiety depression    Seizures (HCC)     petit mal x1  4 years ago- all tests were neg   Spondylolisthesis of lumbar region     Treatment     spinal pain injections  last was 2016    Wears glasses        Past Surgical History:   Procedure Laterality Date    BACK SURGERY      L4-5, S1-fusion-plate/screws     SECTION      x5    CYSTOCELE REPAIR  2017    CYSTOSCOPY      DISCOGRAM      HYSTERECTOMY      PARATHYROIDECTOMY      [place] ANTERIOR COLPORRAPHY RPR CYSTOCELE W/CYSTO N/A 2017    Procedure: CYSTOCELE REPAIR;  Surgeon: Guanakito Hutson MD;  Location: [address] [place] [address];  Service: Gynecology    [place] ARTHRODESIS POSTERIOR INTERBODY LUMBAR N/A 2016    Procedure: L4-S1 LUMBAR LAMINECTOMY/DECOMPRESSION;  INSTRUMENTED POSTEROLATERAL FUSION/ INTERBODY L5-S1;   ALLOGRAFT AND AUTOGRAFT (IMPULSE) ;  Surgeon: Haylee Sarah MD;  Location:  MAIN OR;  Service: Orthopedics    NE CYSTOURETHROSCOPY,URETER CATHETER Bilateral 12/7/2018    Procedure: INSERTION URETERAL CATHETERS PREOP;  Surgeon: Wes Joy MD;  Location: [address] [place] [address];  Service: Urology    NE SLING OPER STRES INCONTINENCE N/A 5/4/2017    Procedure: MID URETHRAL SLING;  Surgeon: Radha Cardenas MD;  Location: [address] [place] [address];  Service: Urology    NE SUPRACERV ABD HYSTERECTOMY N/A 12/7/2018    Procedure: SUPRACERVICAL HYSTERECTOMY;  Surgeon: Jabier Cogan, MD;  Location: [facility];  Service: Gynecology    THYROIDECTOMY      TONSILECTOMY AND ADNOIDECTOMY      TONSILLECTOMY      TUBAL LIGATION         Family History   Problem Relation Age of Onset    Diabetes Mother     Hypertension Mother     Hyperlipidemia Father     Arrhythmia Father     Lung cancer Father     Diabetes Father      I have reviewed and agree with the history as documented  E-Cigarette/Vaping    E-Cigarette Use Never User      E-Cigarette/Vaping Substances    Nicotine No     THC No     CBD No     Flavoring No     Other No     Unknown No      Social History     Tobacco Use    Smoking status: Current Every Day Smoker     Packs/day: 0 50     Years: 25 00     Pack years: 12 50     Types: Cigarettes    Smokeless tobacco: Never Used   Vaping Use    Vaping Use: Never used   Substance Use Topics    Alcohol use: Not Currently    Drug use: No       Review of Systems   Constitutional: Negative for activity change, chills, diaphoresis and fever  HENT: Negative for congestion, ear pain, nosebleeds, sore throat, trouble swallowing and voice change  Eyes: Negative for pain, discharge and redness  Respiratory: Negative for apnea, cough, choking, shortness of breath, wheezing and stridor  Cardiovascular: Negative for chest pain and palpitations  Gastrointestinal: Negative for abdominal distention, abdominal pain, constipation, diarrhea, nausea and vomiting  Endocrine: Negative for polydipsia  Genitourinary: Negative for difficulty urinating, dysuria, flank pain, frequency, hematuria and urgency  Musculoskeletal: Negative for back pain, gait problem, joint swelling, myalgias, neck pain and neck stiffness  Skin: Negative for pallor and rash  Neurological: Positive for weakness  Negative for dizziness, tremors, syncope, speech difficulty, numbness and headaches  Hematological: Negative for adenopathy  Psychiatric/Behavioral: Negative for confusion, hallucinations, self-injury and suicidal ideas  The patient is not nervous/anxious  Physical Exam  Physical Exam  Vitals and nursing note reviewed  Constitutional:       General: She is not in acute distress  Appearance: She is well-developed  She is not diaphoretic  HENT:      Head: Normocephalic and atraumatic  Right Ear: External ear normal       Left Ear: External ear normal       Nose: Nose normal    Eyes:      Conjunctiva/sclera: Conjunctivae normal       Pupils: Pupils are equal, round, and reactive to light  Cardiovascular:      Rate and Rhythm: Normal rate and regular rhythm  Heart sounds: Normal heart sounds  Pulmonary:      Effort: Pulmonary effort is normal       Breath sounds: Normal breath sounds  Abdominal:      General: Bowel sounds are normal       Palpations: Abdomen is soft  Musculoskeletal:         General: Normal range of motion  Cervical back: Normal range of motion and neck supple  Skin:     General: Skin is warm and dry  Neurological:      Mental Status: She is alert and oriented to person, place, and time  Deep Tendon Reflexes: Reflexes are normal and symmetric  Psychiatric:         Behavior: Behavior is cooperative           Vital Signs  ED Triage Vitals [05/30/22 2054]   Temperature Pulse Respirations Blood Pressure SpO2   (!) 97 3 °F (36 3 °C) (!) 122 20 135/84 97 %      Temp Source Heart Rate Source Patient Position - Orthostatic VS BP Location FiO2 (%)   Tympanic Monitor Sitting Right arm --      Pain Score       --           Vitals:    05/30/22 2054   BP: 135/84   Pulse: (!) 122   Patient Position - Orthostatic VS: Sitting         Visual Acuity      ED Medications  Medications - No data to display    Diagnostic Studies  Results Reviewed     None                 No orders to display              Procedures  Procedures         ED Course                                             MDM    Disposition  Final diagnoses:   [facility]   Hypercalcemia     Time reflects when diagnosis was documented in both MDM as applicable and the Disposition within this note     Time User Action Codes Description Comment    5/30/2022  8:55 PM Angely DIXON Add [N17 [id number]] LELAND (acute kidney injury) ([facility] [address] )     5/30/2022  8:55 PM Leonard Marcus Add [[id number]] Hypercalcemia       ED Disposition     ED Disposition   Admit    Condition   Stable    Date/Time   Mon May 30, 2022  8:55 PM    Comment   Case was discussed with Rosa and the patient's admission status was agreed to be Admission Status: observation status to the service of Dr Aviva Driver   Follow-up Information    None         Patient's Medications   Discharge Prescriptions    No medications on file       No discharge procedures on file      PDMP Review     None          ED Provider  Electronically Signed by           Moose Marquez DO  05/30/22 2055

## 2022-05-31 NOTE — UTILIZATION REVIEW
Initial Clinical Review    Admission: Date/Time/Statement:   Admission Orders (From admission, onward)     Ordered        05/30/22 2055  Place in Observation  Once                      Orders Placed This Encounter   Procedures    Place in Observation     Standing Status:   Standing     Number of Occurrences:   1     Order Specific Question:   Level of Care     Answer:   Med Surg [16]     ED Arrival Information     Expected   -    Arrival   5/30/2022 20:43    Acuity   Urgent            Means of arrival   Walk-In    Escorted by   Self    Service   Hospitalist    Admission type   Urgent            Arrival complaint   abnormal labs           Chief Complaint   Patient presents with    Abnormal Lab     Here earlier and signed out so she could get her pillow and   Back for admission as she is in kidney failure       Initial Presentation:   40 yof  was seen earlier today and it was decided that she could be admitted to the hospital for acute kidney injury  Patient decided to sign out against medical advice go home and get some things for home and return back to the ED  No new complaints since she left approximately 1 hour ago    LELAND (acute kidney injury) [facility])  Assessment & Plan  Patient presents with fatigue, weakness, decreased appetite for the last week  · Creatinine 2 16, baseline 0 8-1 0  · Suspect due to dehydration, poor oral intake  · Start IVFs  · Avoid nephrotoxins  · Monitor with BMP        Hypercalcemia  Assessment & Plan  Calcium 11 6, ionized 1 37  · Patient usually presents with hypocalcemia  · Patient on calcium, calcitriol at home - no recent increase in dose  · Hold calcium, calcitriol        ED Triage Vitals   Temperature Pulse Respirations Blood Pressure SpO2   05/30/22 2054 05/30/22 2054 05/30/22 2054 05/30/22 2054 05/30/22 2054   (!) 97 3 °F (36 3 °C) (!) 122 20 135/84 97 %      Temp Source Heart Rate Source Patient Position - Orthostatic VS BP Location FiO2 (%)   05/30/22 2054 05/30/22 2054 05/30/22 2054 05/30/22 2054 --   Tympanic Monitor Sitting Right arm       Pain Score       05/30/22 2221       7          Wt Readings from Last 1 Encounters:   05/30/22 92 6 kg (204 lb 3 2 oz)     Additional Vital Signs:   Date/Time Temp Pulse Resp BP MAP (mmHg) SpO2 O2 Device Patient Position - Orthostatic VS   05/31/22 07:45:56 98 6 °F (37 °C) 75 -- 133/75 94 90 % -- --   05/30/22 22:13:29 97 1 °F (36 2 °C) Abnormal  83 -- 153/88 110 97 % -- --   05/30/22 2054 97 3 °F (36 3 °C) Abnormal  122 Abnormal  20 135/84 -- 97 % None (Room air) Sitting       Pertinent Labs/Diagnostic Test Results:   5/29  CXR=  No acute cardiopulmonary disease    5/29  Ekg=  Normal sinus rhythm  Nonspecific T wave abnormality    Results from last 7 days   Lab Units 05/30/22  1943 05/29/22  1859   SARS-COV-2  Negative Negative     Results from last 7 days   Lab Units 05/31/22  0456 05/29/22  1859   WBC Thousand/uL 4 62 7 97   HEMOGLOBIN g/dL 10 3* 13 2   HEMATOCRIT % 32 0* 39 1   PLATELETS Thousands/uL 138* 187   NEUTROS ABS Thousands/µL 2 40 5 44         Results from last 7 days   Lab Units 05/31/22 0456 05/30/22 1825 05/29/22  1932 05/29/22  1859   SODIUM mmol/L 140 137  --  137   POTASSIUM mmol/L 3 4* 3 6  --  3 8   CHLORIDE mmol/L 107 100  --  98*   CO2 mmol/L 27 26  --  29   ANION GAP mmol/L 6 11  --  10   BUN mg/dL 33* 33*  --  26*   CREATININE mg/dL 1 76* 2 16*  --  2 11*   EGFR ml/min/1 73sq m 34 26  --  27   CALCIUM mg/dL 9 7 11 6*  --  14 6*   CALCIUM, IONIZED mmol/L  --  1 37* 1 67*  --      Results from last 7 days   Lab Units 05/30/22 1825 05/29/22  1859   AST U/L 19 28   ALT U/L 40 54   ALK PHOS U/L 78 80   TOTAL PROTEIN g/dL 7 6 7 9   ALBUMIN g/dL 3 9 4 1   TOTAL BILIRUBIN mg/dL 0 52 0 55         Results from last 7 days   Lab Units 05/31/22  0456 05/30/22  1825 05/29/22  1859   GLUCOSE RANDOM mg/dL 131 113 115             No results found for: BETA-HYDROXYBUTYRATE                   Results from last 7 days   Lab Units 05/29/22 2041 05/29/22  1859   HS TNI 0HR ng/L  --  3   HS TNI 2HR ng/L 4  --    HSTNI D2 ng/L 1  --          Results from last 7 days   Lab Units 05/29/22  1859   PROTIME seconds 12 2   INR  0 92   PTT seconds 26     Results from last 7 days   Lab Units 05/29/22  1859   TSH 3RD GENERATON uIU/mL 2 255                                                 Results from last 7 days   Lab Units 05/29/22  1904   CLARITY UA  Slightly Cloudy   COLOR UA  Yellow   SPEC GRAV UA  1 015   PH UA  7 0   GLUCOSE UA mg/dl Negative   KETONES UA mg/dl Negative   BLOOD UA  Negative   PROTEIN UA mg/dl Negative   NITRITE UA  Negative   BILIRUBIN UA  Negative   UROBILINOGEN UA E U /dl 0 2   LEUKOCYTES UA  Negative     Results from last 7 days   Lab Units 05/30/22 1943 05/29/22 1859   INFLUENZA A PCR  Negative Negative   INFLUENZA B PCR  Negative Negative   RSV PCR  Negative Negative                                             Past Medical History:   Diagnosis Date    Acid reflux     Acute renal failure (HCC)     multiple episodes    Anemia     Anxiety     Arthritis     Asthma     Chronic pain     DDD (degenerative disc disease), lumbar     Disease of thyroid gland     had surgery and now hypo    [facility] 2009    s/p ankle fracture    History of transfusion     Hypercalcemia     Hyperlipidemia     Hyperthyroidism     Hypocalcemia     post op 2016    Kidney stone     Migraine     Psychiatric disorder     anxiety depression    Seizures (HCC)     petit mal x1  4 years ago- all tests were neg      Spondylolisthesis of lumbar region     Treatment     spinal pain injections  last was 7-7-2016    Wears glasses      Present on Admission:   Postoperative hypothyroidism   Anxiety   DDD (degenerative disc disease), lumbar      Admitting Diagnosis: Hypercalcemia [E83 52]  High calcium levels [E83 52]  LELAND (acute kidney injury) ([facility] ) [N17 9]  Age/Sex: 40 y o  female  Admission Orders:  Scd/foot pumps    Scheduled Medications:  ALPRAZolam, 1 mg, Oral, Daily  enoxaparin, 40 mg, Subcutaneous, Daily  fenofibrate, 145 mg, Oral, Daily  ferrous sulfate, 325 mg, Oral, Before Dinner  levothyroxine, 150 mcg, Oral, QAM  lidocaine, 1 patch, Topical, Daily  lidocaine, 1 patch, Topical, Daily  magnesium oxide, 400 mg, Oral, TID  potassium chloride, 20 mEq, Oral, BID  pramipexole, 1 mg, Oral, HS      Continuous IV Infusions:  sodium chloride, 125 mL/hr, Intravenous, Continuous      PRN Meds:  baclofen, 10 mg, Oral, TID PRN  ondansetron, 4 mg, Intravenous, Q6H PRN  oxyCODONE-acetaminophen, 2 tablet, Oral, Q6H PRN        IP CONSULT TO CASE MANAGEMENT    Network Utilization Review Department  ATTENTION: Please call with any questions or concerns to [phone] and carefully listen to the prompts so that you are directed to the right person  All voicemails are confidential   [employer] all requests for admission clinical reviews, approved or denied determinations and any other requests to dedicated fax number below belonging to the campus where the patient is receiving treatment   List of dedicated fax numbers for the Facilities:  [address] DENIALS (Administrative/Medical Necessity) [phone]   [address] (Maternity/NICU/Pediatrics) [phone]   [address]  [address] [address] [place] [address] [address] [facility] [place] [address] [phone]

## 2022-05-31 NOTE — ASSESSMENT & PLAN NOTE
Patient presents with fatigue, weakness, decreased appetite for the last week  · Creatinine 2 16, baseline 0 8-1 0   Suspect due to dehydration, poor oral intake   Start IVFs   Avoid nephrotoxins   Monitor with BMP

## 2022-05-31 NOTE — PLAN OF CARE
Problem: Potential for Falls  Goal: Patient will remain free of falls  Description: INTERVENTIONS:  - Educate patient/family on patient safety including physical limitations  - Instruct patient to call for assistance with activity   - Consult OT/PT to assist with strengthening/mobility   - Keep Call bell within reach  - Keep bed low and locked with side rails adjusted as appropriate  - Keep care items and personal belongings within reach  - Initiate and maintain comfort rounds  - Make Fall Risk Sign visible to staff  - Offer Toileting every 2 Hours, in advance of need  - Initiate/Maintain bed alarm  - Obtain necessary fall risk management equipment: yes  - Apply yellow socks and bracelet for high fall risk patients  - Consider moving patient to room near nurses station  Outcome: Progressing     Problem: Knowledge Deficit  Goal: Patient/family/caregiver demonstrates understanding of disease process, treatment plan, medications, and discharge instructions  Description: Complete learning assessment and assess knowledge base    Interventions:  - Provide teaching at level of understanding  - Provide teaching via preferred learning methods  Outcome: Progressing     Problem: GENITOURINARY - ADULT  Goal: Maintains or returns to baseline urinary function  Description: INTERVENTIONS:  - Assess urinary function  - Encourage oral fluids to ensure adequate hydration if ordered  - Administer IV fluids as ordered to ensure adequate hydration  - Administer ordered medications as needed  - Offer frequent toileting  - Follow urinary retention protocol if ordered  Outcome: Progressing     Problem: METABOLIC, FLUID AND ELECTROLYTES - ADULT  Goal: Electrolytes maintained within normal limits  Description: INTERVENTIONS:  - Monitor labs and assess patient for signs and symptoms of electrolyte imbalances  - Administer electrolyte replacement as ordered  - Monitor response to electrolyte replacements, including repeat lab results as appropriate  - Instruct patient on fluid and nutrition as appropriate  Outcome: Progressing

## 2022-05-31 NOTE — ASSESSMENT & PLAN NOTE
Calcium 11 6, ionized 1 37  · Patient usually presents with hypocalcemia  · Patient on calcium, calcitriol at home - no recent increase in dose  · Hold calcium, calcitriol

## 2022-06-01 ENCOUNTER — TELEPHONE (OUTPATIENT)
Dept: NEPHROLOGY | Facility: CLINIC | Age: 47
End: 2022-06-01

## 2022-06-01 VITALS
BODY MASS INDEX: 37.58 KG/M2 | TEMPERATURE: 97.9 F | HEIGHT: 62 IN | RESPIRATION RATE: 18 BRPM | DIASTOLIC BLOOD PRESSURE: 98 MMHG | SYSTOLIC BLOOD PRESSURE: 147 MMHG | WEIGHT: 204.2 LBS | HEART RATE: 86 BPM | OXYGEN SATURATION: 96 %

## 2022-06-01 PROBLEM — R11.2 NAUSEA AND VOMITING: Status: RESOLVED | Noted: 2022-05-31 | Resolved: 2022-06-01

## 2022-06-01 LAB
ALBUMIN SERPL BCP-MCNC: 3.3 G/DL (ref 3.5–5)
ALP SERPL-CCNC: 72 U/L (ref 46–116)
ALT SERPL W P-5'-P-CCNC: 38 U/L (ref 12–78)
ANION GAP SERPL CALCULATED.3IONS-SCNC: 10 MMOL/L (ref 4–13)
ANION GAP SERPL CALCULATED.3IONS-SCNC: 9 MMOL/L (ref 4–13)
AST SERPL W P-5'-P-CCNC: 18 U/L (ref 5–45)
BACTERIA UR CULT: NORMAL
BILIRUB SERPL-MCNC: 0.21 MG/DL (ref 0.2–1)
BUN SERPL-MCNC: 20 MG/DL (ref 5–25)
BUN SERPL-MCNC: 22 MG/DL (ref 5–25)
CALCIUM ALBUM COR SERPL-MCNC: 10 MG/DL (ref 8.3–10.1)
CALCIUM SERPL-MCNC: 9.1 MG/DL (ref 8.3–10.1)
CALCIUM SERPL-MCNC: 9.4 MG/DL (ref 8.3–10.1)
CHLORIDE SERPL-SCNC: 106 MMOL/L (ref 100–108)
CHLORIDE SERPL-SCNC: 107 MMOL/L (ref 100–108)
CO2 SERPL-SCNC: 23 MMOL/L (ref 21–32)
CO2 SERPL-SCNC: 26 MMOL/L (ref 21–32)
CREAT SERPL-MCNC: 1.3 MG/DL (ref 0.6–1.3)
CREAT SERPL-MCNC: 1.4 MG/DL (ref 0.6–1.3)
FLUAV RNA RESP QL NAA+PROBE: NEGATIVE
FLUBV RNA RESP QL NAA+PROBE: NEGATIVE
GFR SERPL CREATININE-BSD FRML MDRD: 45 ML/MIN/1.73SQ M
GFR SERPL CREATININE-BSD FRML MDRD: 49 ML/MIN/1.73SQ M
GLUCOSE P FAST SERPL-MCNC: 117 MG/DL (ref 65–99)
GLUCOSE SERPL-MCNC: 117 MG/DL (ref 65–140)
GLUCOSE SERPL-MCNC: 136 MG/DL (ref 65–140)
POTASSIUM SERPL-SCNC: 3.5 MMOL/L (ref 3.5–5.3)
POTASSIUM SERPL-SCNC: 3.6 MMOL/L (ref 3.5–5.3)
PROT SERPL-MCNC: 6.5 G/DL (ref 6.4–8.2)
RSV RNA RESP QL NAA+PROBE: NEGATIVE
SARS-COV-2 RNA RESP QL NAA+PROBE: NEGATIVE
SODIUM SERPL-SCNC: 139 MMOL/L (ref 136–145)
SODIUM SERPL-SCNC: 142 MMOL/L (ref 136–145)

## 2022-06-01 PROCEDURE — 0241U HB NFCT DS VIR RESP RNA 4 TRGT: CPT | Performed by: PHYSICIAN ASSISTANT

## 2022-06-01 PROCEDURE — 80053 COMPREHEN METABOLIC PANEL: CPT | Performed by: PHYSICIAN ASSISTANT

## 2022-06-01 PROCEDURE — 80048 BASIC METABOLIC PNL TOTAL CA: CPT | Performed by: FAMILY MEDICINE

## 2022-06-01 PROCEDURE — 99217 PR OBSERVATION CARE DISCHARGE MANAGEMENT: CPT | Performed by: PHYSICIAN ASSISTANT

## 2022-06-01 RX ORDER — MULTIVIT-MIN/IRON/FOLIC ACID/K 18-600-40
CAPSULE ORAL
Qty: 360 TABLET | Refills: 0 | Status: SHIPPED | OUTPATIENT
Start: 2022-06-01

## 2022-06-01 RX ORDER — FERROUS SULFATE 325(65) MG
325 TABLET ORAL 2 TIMES DAILY WITH MEALS
Status: DISCONTINUED | OUTPATIENT
Start: 2022-06-01 | End: 2022-06-01

## 2022-06-01 RX ORDER — FERROUS SULFATE 325(65) MG
325 TABLET ORAL 2 TIMES DAILY WITH MEALS
Status: DISCONTINUED | OUTPATIENT
Start: 2022-06-01 | End: 2022-06-01 | Stop reason: HOSPADM

## 2022-06-01 RX ORDER — MULTIVIT-MIN/IRON/FOLIC ACID/K 18-600-40
CAPSULE ORAL
Qty: 360 TABLET | Refills: 0 | Status: SHIPPED | OUTPATIENT
Start: 2022-06-01 | End: 2022-06-01 | Stop reason: SDUPTHER

## 2022-06-01 RX ADMIN — ONDANSETRON 4 MG: 2 INJECTION INTRAMUSCULAR; INTRAVENOUS at 08:00

## 2022-06-01 RX ADMIN — CALCIUM 1 TABLET: 500 TABLET ORAL at 09:12

## 2022-06-01 RX ADMIN — ALPRAZOLAM 1 MG: 0.5 TABLET ORAL at 09:12

## 2022-06-01 RX ADMIN — POTASSIUM CHLORIDE 20 MEQ: 20 TABLET, EXTENDED RELEASE ORAL at 09:12

## 2022-06-01 RX ADMIN — CALCIUM 1 TABLET: 500 TABLET ORAL at 00:03

## 2022-06-01 RX ADMIN — MAGNESIUM OXIDE TAB 400 MG (241.3 MG ELEMENTAL MG) 400 MG: 400 (241.3 MG) TAB at 09:12

## 2022-06-01 RX ADMIN — MAGNESIUM OXIDE TAB 400 MG (241.3 MG ELEMENTAL MG) 400 MG: 400 (241.3 MG) TAB at 12:35

## 2022-06-01 RX ADMIN — FENOFIBRATE 145 MG: 145 TABLET, FILM COATED ORAL at 09:12

## 2022-06-01 RX ADMIN — CALCIUM 1 TABLET: 500 TABLET ORAL at 10:53

## 2022-06-01 RX ADMIN — CALCITRIOL 0.25 MCG: 0.25 CAPSULE, LIQUID FILLED ORAL at 09:12

## 2022-06-01 RX ADMIN — FERROUS SULFATE TAB 325 MG (65 MG ELEMENTAL FE) 325 MG: 325 (65 FE) TAB at 12:19

## 2022-06-01 RX ADMIN — OXYCODONE HYDROCHLORIDE AND ACETAMINOPHEN 2 TABLET: 5; 325 TABLET ORAL at 00:03

## 2022-06-01 RX ADMIN — LEVOTHYROXINE SODIUM 150 MCG: 150 TABLET ORAL at 06:35

## 2022-06-01 NOTE — TELEPHONE ENCOUNTER
[employer] called office, she is currently in the hospital and wanted to schedule an apt with our office  She states she is been there for 3 days and she was told that she has LELAND and a nephrologist will come to see her but as of today no one has and she will get discharged  She is adamant that we schedule an apt  Explained to pt the if she needed to be seen by a nephrologist in the hospital I'm positive that they would have consulted us, or when she gets discharged they would recommend that she calls us and schedule an apt, so will be best to wait until after discharge to see, this way we avoid an unnecessary apt  I will ask Dr Saad Rubio ( since he is the provider that she would like to see) to review chart and he feels that pt will need to be seen we will call her to schedule apt

## 2022-06-01 NOTE — ASSESSMENT & PLAN NOTE
· History of thyroidectomy, parathyroidectomy  · Continue levothyroxine 150mcg  · TSH within normal limits

## 2022-06-01 NOTE — PLAN OF CARE
Problem: METABOLIC, FLUID AND ELECTROLYTES - ADULT  Goal: Electrolytes maintained within normal limits  Description: INTERVENTIONS:  - Monitor labs and assess patient for signs and symptoms of electrolyte imbalances  - Administer electrolyte replacement as ordered  - Monitor response to electrolyte replacements, including repeat lab results as appropriate  - Instruct patient on fluid and nutrition as appropriate  Outcome: Progressing     Problem: Knowledge Deficit  Goal: Patient/family/caregiver demonstrates understanding of disease process, treatment plan, medications, and discharge instructions  Description: Complete learning assessment and assess knowledge base    Interventions:  - Provide teaching at level of understanding  - Provide teaching via preferred learning methods  Outcome: Progressing

## 2022-06-01 NOTE — ASSESSMENT & PLAN NOTE
· Patient on calcium, calcitriol at home - no recent increase in dose and reports compliance with her medications  · Most likely in the setting of dehydration/Jayne  · Calcium level continues to improve with IVF hydration   · Discussed with endocrinology on-call on discharge will continue calcitriol 0 25 mcg daily and decrease calcium carbonate to 500 mg 3 times a day  · Will repeat outpatient lab work and encourage follow-up with primary endocrinologist

## 2022-06-01 NOTE — PROGRESS NOTES
As she is not getting her medications [facility] Internal Medicine Progress Note  Patient: Roberto Mitchell 40 y o  female   MRN: [id number]  PCP: Melodie Hernandez MD  Unit/Bed#: [address] [place] [address] Encounter: [id number]  Date Of Visit: 05/31/22    Problem List:    Principal Problem:    JAYNE (acute kidney injury) ([facility] )  Active Problems:    Hypercalcemia    Nausea and vomiting    DDD (degenerative disc disease), lumbar    Postoperative hypothyroidism    Anxiety      Assessment & Plan:    * JAYNE (acute kidney injury) ([facility] )  Assessment & Plan  Patient presented with fatigue, weakness, decreased appetite for the last week  · Creatinine 2 16, baseline 0 8-1 0   Suspect due to dehydration, poor oral intake   Continue IVF  Creatinine is trending down although not at baseline   Avoid nephrotoxins   Monitor with BMP    Results from last 7 days   Lab Units 05/31/22  1804 05/31/22  0456 05/30/22  1825 05/29/22  1859   BUN mg/dL 25 33* 33* 26*   CREATININE mg/dL 1 41* 1 76* 2 16* 2 11*         Hypercalcemia  Assessment & Plan  Calcium 11 6, ionized 1 37  · Patient usually presents with hypocalcemia  · Patient on calcium, calcitriol at home - no recent increase in dose and reports compliance with her medications  · Most likely in the setting of dehydration/Jayne  · Calcium level continues to improve  Patient adamant about getting her medications as she is extremely concerned that she is going to become hypocalcemic right away if she does not get her calcitriol and p o  Calcium  · After a long discussion with patient will do lower dose of calcium today 4 times a day  · Case was also discussed with endocrinology today and will discuss again tomorrow    Nausea and vomiting  Assessment & Plan  Patient with decreased p o  Intake, nausea and vomiting at home  Patient with episodes of nausea and vomiting even while here  CT abdomen/pelvis was ordered with p o   Contrast which she could not tolerate and therefore done without contrast which was negative for acute pathology  Supportive treatment    Anxiety  Assessment & Plan  On xanax prn    Postoperative hypothyroidism  Assessment & Plan  History of thyroidectomy, parathyroidectomy  · Continue levothyroxine 150mcg  · TSH within normal limits      DDD (degenerative disc disease), lumbar  Assessment & Plan  Continue lidocaine patch, percocet for chronic pain            VTE Pharmacologic Prophylaxis: VTE Score: 3 Moderate Risk (Score 3-4) - Pharmacological DVT Prophylaxis Ordered: enoxaparin (Lovenox)  Patient Centered Rounds: I performed bedside rounds with nursing staff today  Discussions with Specialists or Other Care Team Provider: yes - endo    Education and Discussions with Family / Patient: yes    Time Spent for Care: 45 minutes  More than 50% of total time spent on counseling and coordination of care as described above  Current Length of Stay: 0 day(s)  Current Patient Status: Observation   Certification Statement: The patient will continue to require additional inpatient hospital stay due to Acute kidney injury, hypercalcemia, nausea, vomiting  Discharge Plan: Anticipate discharge in 24-48 hrs to home  Code Status: Level 1 - Full Code    Subjective:     Patient seen earlier this morning and later in the evening  Patient wanted to go home as she is not getting her medications at the same time that she takes at home  Patient also adamant about getting her calcitriol and calcium as she is concerned that she is going to become hypocalcemic    Objective:     Vitals:   Temp (24hrs), Av 9 °F (36 6 °C), Min:97 1 °F (36 2 °C), Max:98 6 °F (37 °C)    Temp:  [97 1 °F (36 2 °C)-98 6 °F (37 °C)] 97 5 °F (36 4 °C)  HR:  [75-83] 75  Resp:  [16] 16  BP: (133-168)/() 168/103  SpO2:  [90 %-97 %] 96 %  Body mass index is 37 35 kg/m²  Input and Output Summary (last 24 hours):      Intake/Output Summary (Last 24 hours) at 2022 210  Last data filed at 2022 1001  Gross per 24 hour   Intake 140 ml   Output --   Net 140 ml       Physical Exam:   Physical Exam  Constitutional:       General: She is not in acute distress  Appearance: She is not diaphoretic  HENT:      Head: Normocephalic and atraumatic  Eyes:      General: No scleral icterus  Cardiovascular:      Rate and Rhythm: Normal rate and regular rhythm  Pulmonary:      Effort: Pulmonary effort is normal  No respiratory distress  Breath sounds: Normal breath sounds  No wheezing or rales  Abdominal:      General: Bowel sounds are normal  There is no distension  Palpations: Abdomen is soft  Tenderness: There is no abdominal tenderness  Neurological:      Mental Status: She is alert and oriented to person, place, and time  Additional Data:     Labs:  Results from last 7 days   Lab Units 05/31/22  0456   WBC Thousand/uL 4 62   HEMOGLOBIN g/dL 10 3*   HEMATOCRIT % 32 0*   PLATELETS Thousands/uL 138*   NEUTROS PCT % 51   LYMPHS PCT % 38   MONOS PCT % 7   EOS PCT % 3     Results from last 7 days   Lab Units 05/31/22  1804 05/31/22  0456 05/30/22  1825   SODIUM mmol/L 139   < > 137   POTASSIUM mmol/L 3 5   < > 3 6   CHLORIDE mmol/L 105   < > 100   CO2 mmol/L 26   < > 26   BUN mg/dL 25   < > 33*   CREATININE mg/dL 1 41*   < > 2 16*   ANION GAP mmol/L 8   < > 11   CALCIUM mg/dL 9 2   < > 11 6*   ALBUMIN g/dL  --   --  3 9   TOTAL BILIRUBIN mg/dL  --   --  0 52   ALK PHOS U/L  --   --  78   ALT U/L  --   --  40   AST U/L  --   --  19   GLUCOSE RANDOM mg/dL 123   < > 113    < > = values in this interval not displayed       Results from last 7 days   Lab Units 05/29/22  1859   INR  0 92                   Lines/Drains:  Invasive Devices  Report    Peripheral Intravenous Line  Duration           Peripheral IV 05/30/22 Right Forearm <1 day                      Imaging: Reviewed radiology reports from this admission including: abdominal/pelvic CT    Recent Cultures (last 7 days):   Results from last 7 days Lab Units 05/29/22 1904   URINE CULTURE  Culture too young- will reincubate       Last 24 Hours Medication List:   Current Facility-Administered Medications   Medication Dose Route Frequency Provider Last Rate    ALPRAZolam  1 mg Oral Daily Sandrita Vecellio, PA-SHI      ALPRAZolam  1 mg Oral Once Verle Ly, PA-C      baclofen  10 mg Oral TID PRN Verle Ly, PA-C      calcitriol  0 25 mcg Oral Daily Shirin Revankar, DO      calcium carbonate  1 tablet Oral BID Shirin Revankar, DO      [START ON 6/1/2022] calcium carbonate  1 tablet Oral 4x Daily Shirin Revankar, DO      enoxaparin  40 mg Subcutaneous Daily Sandrita Vecellio, PA-C      fenofibrate  145 mg Oral Daily Sandrita Vecellio, PA-C      ferrous sulfate  325 mg Oral Before Dinner Verle Ly, PA-C      levothyroxine  150 mcg Oral QAM Verle Ly, PA-C      lidocaine  1 patch Topical Daily Verle Ly, PA-C      lidocaine  1 patch Topical Daily Sandrita Vecellio, PA-C      magnesium oxide  400 mg Oral TID Verle Ly, PA-C      ondansetron  4 mg Intravenous Q6H PRN Verle Ly, PA-C      oxyCODONE-acetaminophen  2 tablet Oral Q6H PRN Verle Ly, PA-C      potassium chloride  20 mEq Oral BID Verle Ly, PA-C      pramipexole  1 mg Oral HS Sandrita Vecellio, PA-C      sodium chloride  125 mL/hr Intravenous Continuous Sandrita Vecellio, PA-C 125 mL/hr (05/31/22 1836)        Today, Patient Was Seen By: Darshana Almonte DO    ** Please Note: "This note has been constructed using a voice recognition system  Therefore there may be syntax, spelling, and/or grammatical errors   Please call if you have any questions  "**

## 2022-06-01 NOTE — NURSING NOTE
Patient's IV removed  Patient left with all belongings  AVS reviewed with patient  Patient verbalized understanding of AVS  Patient left in stable condition and given blanket upon discharge

## 2022-06-01 NOTE — UTILIZATION REVIEW
Continued Stay Review    Date: 6-1-22                          Current Patient Class: observation  Current Level of Care: med surg    HPI:46 y o  female initially admitted on 5-30-22     Assessment/Plan:     New orders for calcium daily and ferrous sulfate bid  Continue iv  9% ns bolus        Vital Signs:       Date/  Time Temp Pulse Resp BP MAP (mmHg) SpO2 O2 Device   06/01/22 07:23:05 97 8 °F (36 6 °C) 76 18 149/99 116 97 % None (Room air)   05/31/22 23:03:42 96 7 °F (35 9 °C)   Abnormal  76 18 134/86 102 97 % None (Room air)   05/31/22 19:36:41 97 5 °F (36 4 °C) 75 17 168/103   Abnormal  125 96 % None (Room air)   05/31/22 15:12:33 98 3 °F (36 8 °C) 82 -- 137/78 98 94 % --   05/31/22 07:45:56 98 6 °F (37 °C) 75 16 133/75 94 90 % None (Room air)           Pertinent Labs/Diagnostic Results:     Results from last 7 days   Lab Units 05/30/22  1943 05/29/22  1859   SARS-COV-2  Negative Negative     Results from last 7 days   Lab Units 05/31/22  0456 05/29/22  1859   WBC Thousand/uL 4 62 7 97   HEMOGLOBIN g/dL 10 3* 13 2   HEMATOCRIT % 32 0* 39 1   PLATELETS Thousands/uL 138* 187   NEUTROS ABS Thousands/µL 2 40 5 44         Results from last 7 days   Lab Units 06/01/22  0642 05/31/22  1804 05/31/22  0456 05/30/22  1825 05/29/22  1932 05/29/22  1859   SODIUM mmol/L 139 139 140 137  --  137   POTASSIUM mmol/L 3 6 3 5 3 4* 3 6  --  3 8   CHLORIDE mmol/L 107 105 107 100  --  98*   CO2 mmol/L 23 26 27 26  --  29   ANION GAP mmol/L 9 8 6 11  --  10   BUN mg/dL 22 25 33* 33*  --  26*   CREATININE mg/dL 1 30 1 41* 1 76* 2 16*  --  2 11*   EGFR ml/min/1 73sq m 49 45 34 26  --  27   CALCIUM mg/dL 9 1 9 2 9 7 11 6*  --  14 6*   CALCIUM, IONIZED mmol/L  --   --   --  1 37* 1 67*  --      Results from last 7 days   Lab Units 05/30/22  1825 05/29/22  1859   AST U/L 19 28   ALT U/L 40 54   ALK PHOS U/L 78 80   TOTAL PROTEIN g/dL 7 6 7 9   ALBUMIN g/dL 3 9 4 1   TOTAL BILIRUBIN mg/dL 0 52 0 55         Results from last 7 days   Lab Units 06/01/22  4188 05/31/22  1804 05/31/22  0456 05/30/22  1825 05/29/22  1859   GLUCOSE RANDOM mg/dL 117 123 131 113 115       Results from last 7 days   Lab Units 05/29/22 2041 05/29/22  1859   HS TNI 0HR ng/L  --  3   HS TNI 2HR ng/L 4  --    HSTNI D2 ng/L 1  --          Results from last 7 days   Lab Units 05/29/22  1859   PROTIME seconds 12 2   INR  0 92   PTT seconds 26     Results from last 7 days   Lab Units 05/29/22  1859   TSH 3RD GENERATON uIU/mL 2 255       Results from last 7 days   Lab Units 05/29/22  1904   CLARITY UA  Slightly Cloudy   COLOR UA  Yellow   SPEC GRAV UA  1 015   PH UA  7 0   GLUCOSE UA mg/dl Negative   KETONES UA mg/dl Negative   BLOOD UA  Negative   PROTEIN UA mg/dl Negative   NITRITE UA  Negative   BILIRUBIN UA  Negative   UROBILINOGEN UA E U /dl 0 2   LEUKOCYTES UA  Negative     Results from last 7 days   Lab Units 05/30/22  1943 05/29/22  1859   INFLUENZA A PCR  Negative Negative   INFLUENZA B PCR  Negative Negative   RSV PCR  Negative Negative       Results from last 7 days   Lab Units 05/29/22 1904   URINE CULTURE  50,000-59,000 cfu/ml        Scheduled Medications:    ALPRAZolam, 1 mg, Oral, Daily  calcitriol, 0 25 mcg, Oral, Daily  calcium carbonate, 1 tablet, Oral, 4x Daily  enoxaparin, 40 mg, Subcutaneous, Daily  fenofibrate, 145 mg, Oral, Daily  ferrous sulfate, 325 mg, Oral, BID With Meals  levothyroxine, 150 mcg, Oral, QAM  lidocaine, 1 patch, Topical, Daily  lidocaine, 1 patch, Topical, Daily  magnesium oxide, 400 mg, Oral, TID  potassium chloride, 20 mEq, Oral, BID  pramipexole, 1 mg, Oral, HS      Continuous IV Infusions:  sodium chloride, 100 mL/hr, Intravenous, Continuous      PRN Meds:  baclofen, 10 mg, Oral, TID PRN  ondansetron, 4 mg, Intravenous, Q6H PRN  oxyCODONE-acetaminophen, 2 tablet, Oral, Q6H PRN        Discharge Plan: to be determined     Network Utilization Review Department  ATTENTION: Please call with any questions or concerns to [phone] and carefully listen to the prompts so that you are directed to the right person  All voicemails are confidential   [place] [employer] all requests for admission clinical reviews, approved or denied determinations and any other requests to dedicated fax number below belonging to the campus where the patient is receiving treatment   List of dedicated fax numbers for the Facilities:  [address] DENIALS (Administrative/Medical Necessity) [phone]   [address] (Maternity/NICU/Pediatrics) [phone]   [address]  [address] Medical [place] [address] [address] [place] [address] [address] [place] [address] [facility] [phone]

## 2022-06-01 NOTE — DISCHARGE SUMMARY
Joseph 46  Discharge- Anisa Quinones 1975, 40 y o  female MRN: [id number]  Unit/Bed#: [address] [place] [address] Encounter: [id number]  Primary Care Provider: Angelia Khan MD   Date and time admitted to hospital: 5/30/2022  8:58 PM    * LELAND (acute kidney injury) [facility])  Assessment & Plan  · Patient presented with fatigue, weakness, decreased appetite for the last week  · Creatinine 2 16, baseline seems to go up to 1 3-1 4    Improving and close to BL with IVF and PO diet    We discussed outpatient labs Friday and f/u with PCP     Hypercalcemia  Assessment & Plan  · Patient on calcium, calcitriol at home - no recent increase in dose and reports compliance with her medications  · Most likely in the setting of dehydration/Leland  · Calcium level continues to improve with IVF hydration   · Discussed with endocrinology on-call on discharge will continue calcitriol 0 25 mcg daily and decrease calcium carbonate to 500 mg 3 times a day  · Will repeat outpatient lab work and encourage follow-up with primary endocrinologist    Postoperative hypothyroidism  Assessment & Plan  · History of thyroidectomy, parathyroidectomy  · Continue levothyroxine 150mcg  · TSH within normal limits    Anxiety  Assessment & Plan  · On xanax prn    DDD (degenerative disc disease), lumbar  Assessment & Plan  · Continue lidocaine patch, percocet for chronic pain    Nausea and vomiting-resolved as of 6/1/2022  Assessment & Plan  Now resolved     Medical Problems             Resolved Problems  Date Reviewed: 6/1/2022          Resolved    Nausea and vomiting 6/1/2022     Resolved by  Nicholas Sharif PA-C              Discharging Physician / Practitioner: Nicholas Sharif PA-C  PCP: Angelia Khan MD  Admission Date:   Admission Orders (From admission, onward)     Ordered        05/30/22 2055  Place in Observation  Once                      Discharge Date: 06/01/22    Consultations During [facility] Stay:  · None     Procedures Performed:   · None     Significant Findings / Test Results:   · Acute kidney injury   · Hypercalcemia     Incidental Findings:   · None      Test Results Pending at Discharge (will require follow up): · None      Outpatient Tests Requested:  · Follow up with PCP and endo     Complications:  None     Reason for Admission: N/V/[place]     Hospital Course:   Kitty Pineda is a 40 y o  female patient with past medical history significant for chronic pain, postoperative hypothyroidism, anxiety, who originally presented to the hospital on 5/30/2022 due to poor oral intake and acute renal failure  Patient was admitted and also found to have hypercalcemia  Patient was markedly dehydrated and treated with aggressive IV fluids  Creatinine improved and calcium also normalized  Case was discussed with on-call endocrinology and home calcium regimen has been decreased  We ordered outpatient follow-up lab work and encouraged follow-up with her care team in the outpatient setting  Patient expressed understanding and agreed with plan  Please see above list of diagnoses and related plan for additional information  Condition at Discharge: stable    Discharge Day Visit / Exam:   Subjective:  Patient seen examined at bedside  Diet intake improved  No numbness or tingling  No constipation  Vitals: Blood Pressure: 147/98 (06/01/22 1508)  Pulse: 86 (06/01/22 1508)  Temperature: 97 9 °F (36 6 °C) (06/01/22 1508)  Temp Source: Oral (06/01/22 0723)  Respirations: 18 (06/01/22 0723)  Height: 5' 2" (157 5 cm) (05/30/22 2213)  Weight - Scale: 92 6 kg (204 lb 3 2 oz) (05/30/22 2213)  SpO2: 96 % (06/01/22 1508)    Exam:   Physical Exam  Constitutional:       Appearance: Normal appearance  She is obese  She is not ill-appearing  HENT:      Head: Normocephalic and atraumatic        Mouth/Throat:      Mouth: Mucous membranes are moist    Eyes:      Extraocular Movements: Extraocular movements intact  Cardiovascular:      Rate and Rhythm: Normal rate and regular rhythm  Pulmonary:      Effort: Pulmonary effort is normal       Breath sounds: Normal breath sounds  Abdominal:      General: Abdomen is flat  Palpations: Abdomen is soft  Musculoskeletal:      Cervical back: Normal range of motion and neck supple  Skin:     General: Skin is warm and dry  Neurological:      General: No focal deficit present  Mental Status: She is alert and oriented to person, place, and time  Psychiatric:         Mood and Affect: Mood normal        Discussion with Family: Patient declined call to   Discharge instructions/Information to patient and family:   See after visit summary for information provided to patient and family  Provisions for Follow-Up Care:  See after visit summary for information related to follow-up care and any pertinent home health orders  Disposition:   Home    Planned Readmission: none     Discharge Statement:  I spent 45 minutes discharging the patient  This time was spent on the day of discharge  I had direct contact with the patient on the day of discharge  Greater than 50% of the total time was spent examining patient, answering all patient questions, arranging and discussing plan of care with patient as well as directly providing post-discharge instructions  Additional time then spent on discharge activities  Discharge Medications:  See after visit summary for reconciled discharge medications provided to patient and/or family        **Please Note: This note may have been constructed using a voice recognition system**

## 2022-06-01 NOTE — ASSESSMENT & PLAN NOTE
· Patient presented with fatigue, weakness, decreased appetite for the last week  · Creatinine 2 16, baseline seems to go up to 1 3-1 4    Improving and close to BL with IVF and PO diet    We discussed outpatient labs Friday and f/u with PCP

## 2022-06-01 NOTE — CASE MANAGEMENT
Case Management Assessment & Discharge Planning Note    Patient name Jose Barnes  Location [address] 201/2 850 [facility] MRN [id number]  : 1975 Date 2022       Current Admission Date: 2022  Current Admission Diagnosis:LELAND (acute kidney injury) [facility])   Patient Active Problem List    Diagnosis Date Noted    Hypercalcemia 2022    Nausea and vomiting 2022    LELAND (acute kidney injury) ([facility] [address] ) 2022    Acid reflux     Loose stools     Rectal bleeding     Vitamin D deficiency 2021    Hypocalcemia 2021    Anxiety 2021    History of coagulopathy 2021    Elevated ALT measurement 2019    Postsurgical hypoparathyroidism ([facility] [address] ) 2016    [facility]    Postoperative hypothyroidism 2016    DDD (degenerative disc disease), lumbar       LOS (days): 0  Geometric Mean LOS (GMLOS) (days):   Days to GMLOS:     OBJECTIVE:     Current admission status: Observation  Referral Reason: Advance Directive    Preferred Pharmacy:   [address], [address] [place] [address]  Phone: [phone] Fax: [phone] [address] [place] [address], [address] [place] [address]  Phone: [phone] Fax: [phone]    Primary Care Provider: Hussain Garcia MD    Primary Insurance: BLUE CROSS  Secondary Insurance: [address] [place], [place] [address]e [facility]    ASSESSMENT:  [facility] [place], [facility] Representative - Daughter   Primary Phone: [phone] (Mobile)               Advance Directives  Does patient have a [address] [place] [address] Avenue?: No  Was patient offered paperwork?: Yes ([facility] form given to pt)  Does patient currently have a Health Care decision maker?: Yes, please see Health Care Proxy section  Does patient have Advance Directives?: No  Was patient offered paperwork?: Yes (Living will form given to pt)  Primary Contact: [address] [facility]    Readmission Root Cause  30 Day Readmission: No    Patient Information  Admitted from[de-identified] Home  Mental Status: Alert  During Assessment patient was accompanied by: Not accompanied during assessment  Assessment information provided by[de-identified] Patient  Primary Caregiver: Self  Support Systems: Spouse/significant other, Terrell Giles Dr of Residence: [address] [place] [address] do you live in?: [place]  Type of Current Residence: Other (Comment) (private residence)  In the last 12 months, was there a time when you were not able to pay the mortgage or rent on time?: No  In the last 12 months, how many places have you lived?: 1  In the last 12 months, was there a time when you did not have a steady place to sleep or slept in a shelter (including now)?: No  Homeless/housing insecurity resource given?: N/A  Living Arrangements: Lives w/ Spouse/significant other, Other (Comment) (Lives with children)    Activities of Daily Living Prior to Admission  Functional Status: Independent  Completes ADLs independently?: Yes  Ambulates independently?: Yes  Does patient use assisted devices?: No  Does patient currently own DME?: No  Does patient have a history of Outpatient Therapy (PT/OT)?: No  Does the patient have a history of Short-Term Rehab?: No  Does patient have a history of HHC?: Yes (after back surgery)  Does patient currently have [address]?: No    Patient Information Continued  Income Source: Unemployed  Does patient have prescription coverage?: Yes ([employer])  Within the past 12 months, you worried that your food would run out before you got the money to buy more : Never true  Within the past 12 months, the food you bought just didn't last and you didn't have money to get more : Never true  Food insecurity resource given?: N/A  Does patient receive dialysis treatments?: No  Does patient have a history of substance abuse?: No  Does patient have a history of Mental Health Diagnosis?: No    Means of Transportation  Means of Transport to Appts[de-identified] Drives Self  In the past 12 months, has lack of transportation kept you from medical appointments or from getting medications?: No  In the past 12 months, has lack of transportation kept you from meetings, work, or from getting things needed for daily living?: No  Was application for public transport provided?: N/A      DISCHARGE DETAILS:    Discharge planning discussed with[de-identified] Patient  Freedom of Choice: Yes  Comments - Freedom of Choice: SW met with pt to assess needs and discuss plans  Pt's plan is to return home with family when discharged  No discharge needs expressed or anticipated by pt at this time  Offered assistance in future if needed    CM contacted family/caregiver?: No- see comments (per pt)  Were Treatment Team discharge recommendations reviewed with patient/caregiver?: Yes  Did patient/caregiver verbalize understanding of patient care needs?: Yes  Were patient/caregiver advised of the risks associated with not following Treatment Team discharge recommendations?: Yes    Requested [address]         Is the patient interested in [place] [address] at discharge?: No    DME Referral Provided  Referral made for DME?: No    Other Referral/Resources/Interventions Provided:  Interventions: Advanced Directives    Treatment Team Recommendation: Home  Discharge Destination Plan[de-identified] Home  Transport at Discharge : Self, Automobile (car in parking lot)

## 2022-06-01 NOTE — TELEPHONE ENCOUNTER
Given the acute nature the patient really ideally needs to see any nephrologist for the quickest appointment! Eventually she could switch over to me if she would prefer but I would strongly urge her to see anyone that has availability!   Thank you

## 2022-06-02 ENCOUNTER — TELEPHONE (OUTPATIENT)
Dept: NEPHROLOGY | Facility: CLINIC | Age: 47
End: 2022-06-02

## 2022-06-02 ENCOUNTER — HOSPITAL ENCOUNTER (EMERGENCY)
Facility: HOSPITAL | Age: 47
Discharge: HOME | End: 2022-06-03
Attending: EMERGENCY MEDICINE
Payer: COMMERCIAL

## 2022-06-02 VITALS
SYSTOLIC BLOOD PRESSURE: 143 MMHG | OXYGEN SATURATION: 98 % | BODY MASS INDEX: 37.46 KG/M2 | WEIGHT: 204.8 LBS | TEMPERATURE: 97.9 F | RESPIRATION RATE: 20 BRPM | DIASTOLIC BLOOD PRESSURE: 85 MMHG | HEART RATE: 102 BPM

## 2022-06-02 DIAGNOSIS — R20.2 PARESTHESIAS: Primary | ICD-10-CM

## 2022-06-02 DIAGNOSIS — E83.51 HYPOCALCEMIA: ICD-10-CM

## 2022-06-02 DIAGNOSIS — R51.9 HEADACHE: ICD-10-CM

## 2022-06-02 PROCEDURE — 80053 COMPREHEN METABOLIC PANEL: CPT | Performed by: EMERGENCY MEDICINE

## 2022-06-02 PROCEDURE — 99284 EMERGENCY DEPT VISIT MOD MDM: CPT

## 2022-06-02 PROCEDURE — 36415 COLL VENOUS BLD VENIPUNCTURE: CPT | Performed by: EMERGENCY MEDICINE

## 2022-06-02 PROCEDURE — 82330 ASSAY OF CALCIUM: CPT | Performed by: EMERGENCY MEDICINE

## 2022-06-02 PROCEDURE — 99284 EMERGENCY DEPT VISIT MOD MDM: CPT | Performed by: EMERGENCY MEDICINE

## 2022-06-02 NOTE — TELEPHONE ENCOUNTER
Left patient a message to let them know we have a new patient appointment opening tomorrow (6/3) with Dr Keiko Jarrett at 2 pm in the [place] [address] office  if she is available  According to the Drs  they would like her to be seen as soon as possible

## 2022-06-02 NOTE — TELEPHONE ENCOUNTER
New Patient Intake Form   Patient Details   Anabella Jhaveri [facility]     1975     [id number]     Appointment Information   Who is calling to schedule? If not patient, what is callers name? Patient    Referring Provider  Dr Cristo Do   Referring Provider Number n/a   Reason for Appt (Diagnosis) LELAND   Is patient aware of why they are being referred? yes   Does Patient have labs done at [place] [address]? If not, where do they go? yes    Has patient had labs / urine work done? List date of most recent lab / urine work yes    Has patient had a BMP & CBC done in the past 2 years? If so, list the date yes    Has patient been hospitalized recently? If yes, list name and location of hospital they were in yes - [facility]   Has patient been seen by a Nephrologist before? If yes, list name, location and phone number  no   Has patient been see by another Specialty before (ex  Neurology, urology, cardiology)? If yes, please list name, and specialty  endo- Dr Elin Stark   Has the patient had imaging done? If so, list the most recent date and type of imaging yes    Does the patient has a stone analysis report if history of kidney stones?   no   Appointment Details   Is there a referral on file? no    Appointment Date  6/3   Location [place]    Miscellaneous

## 2022-06-03 ENCOUNTER — CONSULT (OUTPATIENT)
Dept: NEPHROLOGY | Facility: CLINIC | Age: 47
End: 2022-06-03
Payer: COMMERCIAL

## 2022-06-03 VITALS
SYSTOLIC BLOOD PRESSURE: 130 MMHG | WEIGHT: 205 LBS | HEIGHT: 62 IN | BODY MASS INDEX: 37.73 KG/M2 | DIASTOLIC BLOOD PRESSURE: 70 MMHG | HEART RATE: 80 BPM

## 2022-06-03 DIAGNOSIS — E83.51 HYPOCALCEMIA: ICD-10-CM

## 2022-06-03 DIAGNOSIS — N17.9 AKI (ACUTE KIDNEY INJURY) (HCC): ICD-10-CM

## 2022-06-03 DIAGNOSIS — F41.9 ANXIETY: ICD-10-CM

## 2022-06-03 DIAGNOSIS — E83.42 HYPOMAGNESEMIA: Primary | ICD-10-CM

## 2022-06-03 LAB
ALBUMIN SERPL BCP-MCNC: 4 G/DL (ref 3.5–5)
ALP SERPL-CCNC: 83 U/L (ref 46–116)
ALT SERPL W P-5'-P-CCNC: 58 U/L (ref 12–78)
ANION GAP SERPL CALCULATED.3IONS-SCNC: 10 MMOL/L (ref 4–13)
AST SERPL W P-5'-P-CCNC: 28 U/L (ref 5–45)
BILIRUB SERPL-MCNC: 0.32 MG/DL (ref 0.2–1)
BUN SERPL-MCNC: 19 MG/DL (ref 5–25)
CA-I BLD-SCNC: 1.08 MMOL/L (ref 1.12–1.32)
CALCIUM SERPL-MCNC: 8.8 MG/DL (ref 8.3–10.1)
CHLORIDE SERPL-SCNC: 102 MMOL/L (ref 100–108)
CO2 SERPL-SCNC: 27 MMOL/L (ref 21–32)
CREAT SERPL-MCNC: 1.33 MG/DL (ref 0.6–1.3)
GFR SERPL CREATININE-BSD FRML MDRD: 48 ML/MIN/1.73SQ M
GLUCOSE SERPL-MCNC: 118 MG/DL (ref 65–140)
POTASSIUM SERPL-SCNC: 3.5 MMOL/L (ref 3.5–5.3)
PROT SERPL-MCNC: 7.7 G/DL (ref 6.4–8.2)
SODIUM SERPL-SCNC: 139 MMOL/L (ref 136–145)

## 2022-06-03 PROCEDURE — 96375 TX/PRO/DX INJ NEW DRUG ADDON: CPT

## 2022-06-03 PROCEDURE — 99204 OFFICE O/P NEW MOD 45 MIN: CPT | Performed by: INTERNAL MEDICINE

## 2022-06-03 PROCEDURE — 96365 THER/PROPH/DIAG IV INF INIT: CPT

## 2022-06-03 RX ORDER — PRAMIPEXOLE DIHYDROCHLORIDE 0.5 MG/1
0.5 TABLET ORAL
COMMUNITY

## 2022-06-03 RX ORDER — SERTRALINE HYDROCHLORIDE 25 MG/1
25 TABLET, FILM COATED ORAL DAILY
Qty: 30 TABLET | Refills: 5 | Status: SHIPPED | OUTPATIENT
Start: 2022-06-03 | End: 2022-06-16 | Stop reason: SDUPTHER

## 2022-06-03 RX ORDER — HYDROMORPHONE HCL/PF 1 MG/ML
0.5 SYRINGE (ML) INJECTION ONCE
Status: COMPLETED | OUTPATIENT
Start: 2022-06-03 | End: 2022-06-03

## 2022-06-03 RX ORDER — ACETAMINOPHEN 325 MG/1
650 TABLET ORAL ONCE
Status: COMPLETED | OUTPATIENT
Start: 2022-06-03 | End: 2022-06-03

## 2022-06-03 RX ORDER — CALCIUM GLUCONATE 20 MG/ML
1 INJECTION, SOLUTION INTRAVENOUS ONCE
Status: COMPLETED | OUTPATIENT
Start: 2022-06-03 | End: 2022-06-03

## 2022-06-03 RX ORDER — DIPHENHYDRAMINE HYDROCHLORIDE 50 MG/ML
50 INJECTION INTRAMUSCULAR; INTRAVENOUS ONCE
Status: COMPLETED | OUTPATIENT
Start: 2022-06-03 | End: 2022-06-03

## 2022-06-03 RX ADMIN — CALCIUM GLUCONATE 1 G: 20 INJECTION, SOLUTION INTRAVENOUS at 01:08

## 2022-06-03 RX ADMIN — ACETAMINOPHEN 650 MG: 325 TABLET ORAL at 00:21

## 2022-06-03 RX ADMIN — DIPHENHYDRAMINE HYDROCHLORIDE 50 MG: 50 INJECTION, SOLUTION INTRAMUSCULAR; INTRAVENOUS at 00:21

## 2022-06-03 RX ADMIN — HYDROMORPHONE HYDROCHLORIDE 0.5 MG: 1 INJECTION, SOLUTION INTRAMUSCULAR; INTRAVENOUS; SUBCUTANEOUS at 00:47

## 2022-06-03 NOTE — PROGRESS NOTES
Consultation - Nephrology   Alma Jalloh 40 y o  female MRN: [id number]  Unit/Bed#:  Encounter: [id number]      Assessment/Plan     Assessment / Plan:  1  Renal      Patient's baseline normal renal function with a creatinine is 0 8  She was admitted to the hospital in May with nausea vomiting in creatinine was elevated to 2 4 and with fluids it has declined to 1 3  No Nephrology saw her in the hospital but she took it under her own knowledge to make an appointment to follow-up  Creatinine was done yesterday was down to 1 3 etiology was related to volume depletion  She still not quite eating as much and having some nausea and vomiting during the day  We will repeat labs to make sure renal function returns to normal baseline  Repeat BMP and will contact the patient  2  Hypocalcemia    The patient was actually admitted with hypercalcemia  In talking to her she states that she has a history of under active parathyroid glands and has been on calcium and vitamin-D for many years  I suspect she became volume depleted was taking her supplements as she stated she continue to and developed hypercalcemia  With volume calcium corrected to normal   I am just going to check a PTH level continue calcium and vitamin-D and will monitor manage  The patient has had very frequent visits to the emergency room recently and she states that usually for tingling and concern over her low calcium  PTH intact, calcium and magnesium    2  Nausea/vomiting    Patient apparently had some nausea and vomiting recently  She did tell me that she had been on a benzodiazepine for anxiety for many years and her dose has been tapered and she feels that is when her sleeps really become disturbed in nausea and vomiting have occurred  She states she still has a lot of anxiety and I told her that maybe with the lower dose of the benzodiazepine these are symptoms related to that      We discussed in her going to start her on Zoloft 25 mg a day and she was told if there is any side effects to stop the medication let me know and hopefully this will help  We can titrate if the effect is not positive with a low dose  We did discuss that she probably should see a psychiatrist and she said she did contact and has an appointment in 4 months  History of Present Illness   Physician Requesting Consult: No att  providers found  Reason for Consult / Principal Problem:  Acute kidney injury  Hx and PE limited by:   HPI: Suellen Jauregui is a 40y o  year old female who has no history of chronic kidney disease but apparently has history of hypocalcemia as she states that she had parathyroids removed during thyroid surgery  She presented to the hospital as she has been having a lot of visits there for anxiety nausea vomiting periorbital numbness  She was admitted with acute kidney injury and actually had hypercalcemia was treated and is here for follow-up post hospitalization to establish care with Nephrology  History obtained from chart review and the patient  Consults    Review of Systems   Constitutional: Positive for appetite change and fatigue  Negative for chills, diaphoresis and fever  HENT: Negative for nosebleeds  Occasional periorbital tingling when her calcium is low  Eyes: Negative  Respiratory: Negative  Negative for cough, chest tightness, shortness of breath and wheezing  Cardiovascular: Negative  Negative for chest pain, palpitations and leg swelling  Gastrointestinal: Positive for nausea and vomiting  Negative for abdominal distention, abdominal pain and diarrhea  Endocrine: Negative  Genitourinary: Negative for difficulty urinating, dysuria, flank pain and hematuria  Skin: Negative  Negative for rash  Neurological: Negative for dizziness, seizures, syncope, light-headedness and headaches     Psychiatric/Behavioral: Negative for agitation, behavioral problems, confusion and hallucinations  The patient is nervous/anxious  Historical Information   Patient Active Problem List   Diagnosis    DDD (degenerative disc disease), lumbar    [facility]    [facility]    Postoperative hypothyroidism    Elevated ALT measurement    History of coagulopathy    Vitamin D deficiency    Hypocalcemia    Anxiety    Acid reflux    Loose stools    Rectal bleeding    LELAND [facility]acute kidney [facility]) [facility]    Hypercalcemia     Past Medical History:   Diagnosis Date    Acid reflux     Acute renal failure (HCC)     multiple episodes    Anemia     Anxiety     Arthritis     Asthma     Chronic pain     DDD (degenerative disc disease), lumbar     Disease of thyroid gland     had surgery and now hypo    DVT [facility]deep venous thrombosis) ([facility] )     s/p ankle fracture    History of transfusion     Hypercalcemia     Hyperlipidemia     Hyperthyroidism     Hypocalcemia     post op 2016    Kidney stone     Migraine     Psychiatric disorder     anxiety depression    Seizures (HCC)     petit mal x1  4 years ago- all tests were neg   Spondylolisthesis of lumbar region     Treatment     spinal pain injections  last was 2016    Wears glasses    No history of diabetes, heart disease, lung disease, cancer, stroke  Past Surgical History:   Procedure Laterality Date    BACK SURGERY      L4-5, S1-fusion-plate/screws     SECTION      x5    CYSTOCELE REPAIR  2017    CYSTOSCOPY      DISCOGRAM      HYSTERECTOMY      PARATHYROIDECTOMY      ME ANTERIOR COLPORRAPHY RPR CYSTOCELE W/CYSTO N/A 2017    Procedure: CYSTOCELE REPAIR;  Surgeon: Mackenzie Landa MD;  Location: [address] [place] [address];  Service: Gynecology    ME ARTHRODESIS POSTERIOR INTERBODY LUMBAR N/A 2016    Procedure: L4-S1 LUMBAR LAMINECTOMY/DECOMPRESSION;  INSTRUMENTED POSTEROLATERAL FUSION/ INTERBODY L5-S1; ALLOGRAFT AND AUTOGRAFT (IMPULSE) ;   Surgeon: Liban Ly MD; Location:  MAIN OR;  Service: Orthopedics    [place] CYSTOURETHROSCOPY,URETER CATHETER Bilateral 12/7/2018    Procedure: INSERTION URETERAL CATHETERS PREOP;  Surgeon: Carlos Teran MD;  Location: [facility];  Service: Urology    [place] SLING OPER STRES INCONTINENCE N/A 5/4/2017    Procedure: MID URETHRAL SLING;  Surgeon: Kelly Meyer MD;  Location: [facility];  Service: Urology    [place] SUPRACERV ABD HYSTERECTOMY N/A 12/7/2018    Procedure: SUPRACERVICAL HYSTERECTOMY;  Surgeon: Frances Hua MD;  Location: [facility];  Service: Gynecology    THYROIDECTOMY      TONSILECTOMY AND ADNOIDECTOMY      TONSILLECTOMY      TUBAL LIGATION       Social History   Social History     Substance and Sexual Activity   Alcohol Use Not Currently     Social History     Substance and Sexual Activity   Drug Use No     Social History     Tobacco Use   Smoking Status Current Every Day Smoker    Packs/day: 0 50    Years: 25 00    Pack years: 12 50    Types: Cigarettes   Smokeless Tobacco Never Used     Family History   Problem Relation Age of Onset    Diabetes Mother     Hypertension Mother     Hyperlipidemia Father     Arrhythmia Father     Lung cancer Father     Diabetes Father        Meds/Allergies   current meds:   No current facility-administered medications for this visit  Allergies   Allergen Reactions    Hydrocodone-Acetaminophen Rash    Vicodin [Hydrocodone-Acetaminophen] Rash    Morphine And Related GI Intolerance     Nausea/vomiting      Adhesive [Medical Tape] Rash     Bandaids       Objective   [unfilled]  Body mass index is 37 49 kg/m²  Invasive Devices:        PHYSICAL EXAM:  /70 (BP Location: Left arm, Patient Position: Sitting, Cuff Size: Standard)   Pulse 80   Ht 5' 2" (1 575 m)   Wt 93 kg (205 lb)   LMP 12/06/2018 Comment: partial hysterectomy   BMI 37 49 kg/m²     Physical Exam  Constitutional:       General: She is not in acute distress       Appearance: She is not ill-appearing, toxic-appearing or diaphoretic  HENT:      Head: Normocephalic and atraumatic  Nose:      Comments: Wearing mask  Mouth/Throat:      Comments: Wearing mask  Eyes:      General: No scleral icterus  Extraocular Movements: Extraocular movements intact  Cardiovascular:      Rate and Rhythm: Normal rate and regular rhythm  Heart sounds: No friction rub  No gallop  Comments: No edema  Pulmonary:      Effort: Pulmonary effort is normal  No respiratory distress  Breath sounds: Normal breath sounds  No wheezing, rhonchi or rales  Abdominal:      General: Bowel sounds are normal  There is no distension  Palpations: Abdomen is soft  Tenderness: There is no abdominal tenderness  There is no rebound  Musculoskeletal:      Cervical back: Normal range of motion and neck supple  Neurological:      General: No focal deficit present  Mental Status: She is alert and oriented to person, place, and time  Mental status is at baseline  Psychiatric:         Mood and Affect: Mood normal          Behavior: Behavior normal          Thought Content:  Thought content normal          Judgment: Judgment normal            Current Weight: Weight - Scale: 93 kg (205 lb)  First Weight: Weight - Scale: 93 kg (205 lb)    Lab Results:    Results from last 7 days   Lab Units 05/31/22  0456   WBC Thousand/uL 4 62   HEMOGLOBIN g/dL 10 3*   HEMATOCRIT % 32 0*   PLATELETS Thousands/uL 138*     Results from last 7 days   Lab Units 06/02/22  2351   POTASSIUM mmol/L 3 5   CHLORIDE mmol/L 102   CO2 mmol/L 27   BUN mg/dL 19   CREATININE mg/dL 1 33*   CALCIUM mg/dL 8 8     Results from last 7 days   Lab Units 06/02/22  2351   POTASSIUM mmol/L 3 5   CHLORIDE mmol/L 102   CO2 mmol/L 27   BUN mg/dL 19   CREATININE mg/dL 1 33*   CALCIUM mg/dL 8 8   ALK PHOS U/L 83   ALT U/L 58   AST U/L 28

## 2022-06-03 NOTE — ED PROVIDER NOTES
History  Chief Complaint   Patient presents with    Numbness     States was discharged yesterday , admitted for hypercalcemia and LELAND  Appointment with nephrology tomorrow  Started with headache tonight and feels numb and tingly and states her calcium had been decreased and now thinks her calcium level is low  41 yo female well known to ER c/o numbness and tingling today and worried her calcium is low again  She also has headache which started yesterday and thinks it is from decreasing her medicine  Had been in hospital last few days with dehydration and LELAND and calcium was high  Yesterday Cr  1 4 and calcium 9 4  Her oral calcium was decreased from 6x/day to 3x/day  She is scheduled to see the nephrologist tomorrow  She wants something for her headache  History provided by:  Patient   used: No        Prior to Admission Medications   Prescriptions Last Dose Informant Patient Reported? Taking?    ALPRAZolam (XANAX) 1 mg tablet  Self Yes No   Sig: Take 1 mg by mouth daily am   Calcium Carb-Cholecalciferol (Calcium 500+D) 500-400 MG-UNIT TABS   No No   Sig: Take 600 mg by mouth 3 times a day   Lidocaine-Menthol 4-1 % PTCH   Yes No   Sig: if needed     baclofen 20 mg tablet  Self No No   Sig: Take 1 tablet (20 mg total) by mouth 3 (three) times a day   Patient taking differently: Take 20 mg by mouth 3 (three) times a day as needed     calcitriol (ROCALTROL) 0 25 mcg capsule   No No   Sig: TAKE 1 CAPSULE BY MOUTH EVERY DAY   fenofibrate (TRIGLIDE) 160 MG tablet  Self Yes No   Sig: Take 160 mg by mouth daily   ferrous sulfate 325 (65 Fe) mg tablet  Self Yes No   Sig: Take 325 mg by mouth Once Monday, wed, Friday-Last dose 4/1/22    ibuprofen (MOTRIN) 600 mg tablet  Self No No   Sig: Take 1 tablet (600 mg total) by mouth every 6 (six) hours as needed for mild pain or moderate pain   levothyroxine 150 mcg tablet   No No   Sig: TAKE 1 TABLET BY MOUTH EVERY DAY   Patient taking differently: 150 mcg every morning   magnesium Oxide (MAG-OX) 400 mg TABS  Self No No   Sig: TAKE 1 TABLET (400 MG TOTAL) BY MOUTH THREE (THREE) TIMES A DAY 9PM   oxyCODONE-acetaminophen (PERCOCET)  mg per tablet   Yes No   Sig: Take 1 tablet by mouth 4 (four) times a day   potassium chloride (K-DUR,KLOR-CON) 20 mEq tablet  Self Yes No   Sig: Take 20 mEq by mouth 2 (two) times a day   pregabalin (LYRICA) 75 mg capsule  Self Yes No   Sig: Take 75 mg by mouth 3 (three) times a day as needed       Facility-Administered Medications: None       Past Medical History:   Diagnosis Date    Acid reflux     Acute renal failure (HCC)     multiple episodes    Anemia     Anxiety     Arthritis     Asthma     Chronic pain     DDD (degenerative disc disease), lumbar     Disease of thyroid gland     had surgery and now hypo    [facility])     s/p ankle fracture    History of transfusion     Hypercalcemia     Hyperlipidemia     Hyperthyroidism     Hypocalcemia     post op 2016    Kidney stone     Migraine     Psychiatric disorder     anxiety depression    Seizures (HCC)     petit mal x1  4 years ago- all tests were neg   Spondylolisthesis of lumbar region     Treatment     spinal pain injections  last was 2016    Wears glasses        Past Surgical History:   Procedure Laterality Date    BACK SURGERY      L4-5, S1-fusion-plate/screws     SECTION      x5    CYSTOCELE REPAIR  2017    CYSTOSCOPY      DISCOGRAM      HYSTERECTOMY      PARATHYROIDECTOMY      [place] ANTERIOR COLPORRAPHY RPR CYSTOCELE W/CYSTO N/A 2017    Procedure: CYSTOCELE REPAIR;  Surgeon: Chaitanya Harkins MD;  Location: [address] [place] [address];  Service: Gynecology    [place] ARTHRODESIS POSTERIOR INTERBODY LUMBAR N/A 2016    Procedure: L4-S1 LUMBAR LAMINECTOMY/DECOMPRESSION;  INSTRUMENTED POSTEROLATERAL FUSION/ INTERBODY L5-S1; ALLOGRAFT AND AUTOGRAFT (IMPULSE) ;   Surgeon: Abbey Hoyt MD;  Location: [facility] OR;  Service: Orthopedics    [place] CYSTOURETHROSCOPY,URETER CATHETER Bilateral 12/7/2018    Procedure: INSERTION URETERAL CATHETERS PREOP;  Surgeon: Rosa Chambers MD;  Location: [address] [place] [address];  Service: Urology    [place] SLING OPER STRES INCONTINENCE N/A 5/4/2017    Procedure: MID URETHRAL SLING;  Surgeon: Sunny Bearden MD;  Location: [address] [place] [address];  Service: Urology    [place] SUPRACERV ABD HYSTERECTOMY N/A 12/7/2018    Procedure: SUPRACERVICAL HYSTERECTOMY;  Surgeon: Samir Marrero MD;  Location: [facility];  Service: Gynecology    THYROIDECTOMY      TONSILECTOMY AND ADNOIDECTOMY      TONSILLECTOMY      TUBAL LIGATION         Family History   Problem Relation Age of Onset    Diabetes Mother     Hypertension Mother     Hyperlipidemia Father     Arrhythmia Father     Lung cancer Father     Diabetes Father      I have reviewed and agree with the history as documented  E-Cigarette/Vaping    E-Cigarette Use Never User      E-Cigarette/Vaping Substances    Nicotine No     THC No     CBD No     Flavoring No     Other No     Unknown No      Social History     Tobacco Use    Smoking status: Current Every Day Smoker     Packs/day: 0 50     Years: 25 00     Pack years: 12 50     Types: Cigarettes    Smokeless tobacco: Never Used   Vaping Use    Vaping Use: Never used   Substance Use Topics    Alcohol use: Not Currently    Drug use: No       Review of Systems   Constitutional: Negative for fever  Respiratory: Negative for cough  Gastrointestinal: Positive for diarrhea  Negative for vomiting  Neurological: Positive for numbness and headaches  Physical Exam  Physical Exam  Vitals and nursing note reviewed  Constitutional:       General: She is not in acute distress  Appearance: She is well-developed  She is not ill-appearing or diaphoretic  HENT:      Head: Normocephalic and atraumatic        Right Ear: External ear normal       Left Ear: External ear normal    Eyes:      General: No scleral icterus  Conjunctiva/sclera: Conjunctivae normal    Cardiovascular:      Rate and Rhythm: Normal rate and regular rhythm  Heart sounds: Normal heart sounds  No murmur heard  Pulmonary:      Effort: Pulmonary effort is normal  No respiratory distress  Breath sounds: Normal breath sounds  Abdominal:      General: There is no distension  Palpations: Abdomen is soft  Tenderness: There is no abdominal tenderness  Musculoskeletal:         General: No deformity  Normal range of motion  Cervical back: Normal range of motion and neck supple  Right lower leg: No edema  Left lower leg: No edema  Skin:     General: Skin is warm and dry  Coloration: Skin is not pale  Findings: No rash  Neurological:      General: No focal deficit present  Mental Status: She is alert and oriented to person, place, and time  Cranial Nerves: No cranial nerve deficit  Motor: No weakness     Psychiatric:         Mood and Affect: Mood normal          Behavior: Behavior normal          Vital Signs  ED Triage Vitals [06/02/22 2221]   Temperature Pulse Respirations Blood Pressure SpO2   97 9 °F (36 6 °C) 102 20 143/85 98 %      Temp Source Heart Rate Source Patient Position - Orthostatic VS BP Location FiO2 (%)   Tympanic Monitor Sitting Left arm --      Pain Score       7           Vitals:    06/02/22 2221   BP: 143/85   Pulse: 102   Patient Position - Orthostatic VS: Sitting         Visual Acuity      ED Medications  Medications   calcium gluconate 1 g in sodium chloride 0 9% 50 mL (premix) (has no administration in time range)   HYDROmorphone (DILAUDID) injection 0 5 mg (has no administration in time range)   acetaminophen (TYLENOL) tablet 650 mg (650 mg Oral Given 6/3/22 0021)   diphenhydrAMINE (BENADRYL) injection 50 mg (50 mg Intravenous Given 6/3/22 0021)       Diagnostic Studies  Results Reviewed     Procedure Component Value Units Date/Time    Comprehensive metabolic panel [[id number]]  (Abnormal) Collected: 06/02/22 2351    Lab Status: Final result Specimen: Blood from Line, Venous Updated: 06/03/22 0029     Sodium 139 mmol/L      Potassium 3 5 mmol/L      Chloride 102 mmol/L      CO2 27 mmol/L      ANION GAP 10 mmol/L      BUN 19 mg/dL      Creatinine 1 33 mg/dL      Glucose 118 mg/dL      Calcium 8 8 mg/dL      AST 28 U/L      ALT 58 U/L      Alkaline Phosphatase 83 U/L      Total Protein 7 7 g/dL      Albumin 4 0 g/dL      Total Bilirubin 0 32 mg/dL      eGFR 48 ml/min/1 73sq m     Narrative:      [place] guidelines for Chronic Kidney Disease (CKD):     Stage 1 with normal or high GFR (GFR > 90 mL/min/1 73 square meters)    Stage 2 Mild CKD (GFR = 60-89 mL/min/1 73 square meters)    Stage 3A Moderate CKD (GFR = 45-59 mL/min/1 73 square meters)    Stage 3B Moderate CKD (GFR = 30-44 mL/min/1 73 square meters)    Stage 4 Severe CKD (GFR = 15-29 mL/min/1 73 square meters)    Stage 5 End Stage CKD (GFR <15 mL/min/1 73 square meters)  Note: GFR calculation is accurate only with a steady state creatinine    Calcium, ionized [[id number]]  (Abnormal) Collected: 06/02/22 2351    Lab Status: Final result Specimen: Blood from Line, Venous Updated: 06/03/22 0002     Calcium, Ionized 1 08 mmol/L                  No orders to display              Procedures  Procedures         ED Course                                             MDM  Number of Diagnoses or Management Options  Headache  Hypocalcemia  Paresthesias  Diagnosis management comments: Pt  Wants the IV calcium since her ionized calcium is low and says the benadryl didn't help her headache  Can't do NDSAID due to kidney function  Will give her IV calcium gluconate, dilaudid for pain and discharge and pt  Will be seen by nephrology tomorrow as planned        Disposition  Final diagnoses:   Paresthesias   Headache   Hypocalcemia     Time reflects when diagnosis was documented in both MDM as applicable and the Disposition within this note     Time User Action Codes Description Comment    2/5/0552 74:23 AM Melisa Genevieveshandraer A Add [N81 7] Paresthesias     9/4/2393 70:32 AM Melisa Genevieveshandraer A Add [Q67 8] Headache     4/4/4471 98:84 AM Pauline  Add [P71 80] Hypocalcemia       ED Disposition     ED Disposition   Discharge    Condition   Stable    Date/Time   Fri Alberto 3, 2022 12:37 AM    Comment   [facility] discharge to home/self care  Follow-up Information    None         Patient's Medications   Discharge Prescriptions    No medications on file       No discharge procedures on file      PDMP Review       Value Time User    PDMP Reviewed  Yes 5/30/2022  9:45 PM Becca Jordan PA-C          ED Provider  Electronically Signed by           Mauro Hart MD  45/66/42 [id number]

## 2022-06-03 NOTE — LETTER
Katherine 3, 2022     Saray Ellis MD  [address] [facility] [address]    Patient: Lamberto Mendenhall   YOB: 1975   Date of Visit: 6/3/2022       Dear Dr Ml Vincent: Thank you for referring Lamberto Mendenhall to me for evaluation  Below are my notes for this consultation  If you have questions, please do not hesitate to call me  I look forward to following your patient along with you  Sincerely,        Ladan Mueller MD        CC: No Recipients  Ladan Mueller MD  6/3/2022  4:41 PM  Sign when Signing Visit  Consultation - Nephrology   Laine Muhammad [address] 40 y o  female MRN: [id number]  Unit/Bed#:  Encounter: [id number]      Assessment/Plan     Assessment / Plan:  1  Renal      Patient's baseline normal renal function with a creatinine is 0 8  She was admitted to the hospital in May with nausea vomiting in creatinine was elevated to 2 4 and with fluids it has declined to 1 3  No Nephrology saw her in the hospital but she took it under her own knowledge to make an appointment to follow-up  Creatinine was done yesterday was down to 1 3 etiology was related to volume depletion  She still not quite eating as much and having some nausea and vomiting during the day  We will repeat labs to make sure renal function returns to normal baseline  Repeat BMP and will contact the patient  2  Hypocalcemia    The patient was actually admitted with hypercalcemia  In talking to her she states that she has a history of under active parathyroid glands and has been on calcium and vitamin-D for many years  I suspect she became volume depleted was taking her supplements as she stated she continue to and developed hypercalcemia  With volume calcium corrected to normal   I am just going to check a PTH level continue calcium and vitamin-D and will monitor manage      The patient has had very frequent visits to the emergency room recently and she states that usually for tingling and concern over her low calcium  PTH intact, calcium and magnesium    2  Nausea/vomiting    Patient apparently had some nausea and vomiting recently  She did tell me that she had been on a benzodiazepine for anxiety for many years and her dose has been tapered and she feels that is when her sleeps really become disturbed in nausea and vomiting have occurred  She states she still has a lot of anxiety and I told her that maybe with the lower dose of the benzodiazepine these are symptoms related to that  We discussed in her going to start her on Zoloft 25 mg a day and she was told if there is any side effects to stop the medication let me know and hopefully this will help  We can titrate if the effect is not positive with a low dose  We did discuss that she probably should see a psychiatrist and she said she did contact and has an appointment in 4 months  History of Present Illness   Physician Requesting Consult: No att  providers found  Reason for Consult / Principal Problem:  Acute kidney injury  Hx and PE limited by:   HPI: Tawnya Dodson is a 40y o  year old female who has no history of chronic kidney disease but apparently has history of hypocalcemia as she states that she had parathyroids removed during thyroid surgery  She presented to the hospital as she has been having a lot of visits there for anxiety nausea vomiting periorbital numbness  She was admitted with acute kidney injury and actually had hypercalcemia was treated and is here for follow-up post hospitalization to establish care with Nephrology  History obtained from chart review and the patient  Consults    Review of Systems   Constitutional: Positive for appetite change and fatigue  Negative for chills, diaphoresis and fever  HENT: Negative for nosebleeds  Occasional periorbital tingling when her calcium is low  Eyes: Negative  Respiratory: Negative    Negative for cough, chest tightness, shortness of breath and wheezing  Cardiovascular: Negative  Negative for chest pain, palpitations and leg swelling  Gastrointestinal: Positive for nausea and vomiting  Negative for abdominal distention, abdominal pain and diarrhea  Endocrine: Negative  Genitourinary: Negative for difficulty urinating, dysuria, flank pain and hematuria  Skin: Negative  Negative for rash  Neurological: Negative for dizziness, seizures, syncope, light-headedness and headaches  Psychiatric/Behavioral: Negative for agitation, behavioral problems, confusion and hallucinations  The patient is nervous/anxious  Historical Information   Patient Active Problem List   Diagnosis    DDD (degenerative disc disease), lumbar    [facility]roidism [facility]    Seizure disorder ([facility]    Postoperative hypothyroidism    Elevated ALT measurement    History of coagulopathy    Vitamin D deficiency    Hypocalcemia    Anxiety    Acid reflux    Loose stools    Rectal bleeding    LELAND (acute kidney injury) ([facility] )    Hypercalcemia     Past Medical History:   Diagnosis Date    Acid reflux     Acute renal failure (HCC)     multiple episodes    Anemia     Anxiety     Arthritis     Asthma     Chronic pain     DDD (degenerative disc disease), lumbar     Disease of thyroid gland     had surgery and now hypo    DVT (deep venous thrombosis) ([facility] ) 2009    s/p ankle fracture    History of transfusion     Hypercalcemia     Hyperlipidemia     Hyperthyroidism     Hypocalcemia     post op 2016    Kidney stone     Migraine     Psychiatric disorder     anxiety depression    Seizures (HCC)     petit mal x1  4 years ago- all tests were neg   Spondylolisthesis of lumbar region     Treatment     spinal pain injections  last was 7-7-2016    Wears glasses    No history of diabetes, heart disease, lung disease, cancer, stroke    Past Surgical History:   Procedure Laterality Date    BACK SURGERY      L4-5, S1-fusion-plate/screws   SECTION      x5    CYSTOCELE REPAIR  2017    CYSTOSCOPY      DISCOGRAM      HYSTERECTOMY      PARATHYROIDECTOMY      ND ANTERIOR COLPORRAPHY RPR CYSTOCELE W/CYSTO N/A 2017    Procedure: CYSTOCELE REPAIR;  Surgeon: Sarahi Landaverde MD;  Location: [address] [place] [address];  Service: Gynecology    ND ARTHRODESIS POSTERIOR INTERBODY LUMBAR N/A 2016    Procedure: L4-S1 LUMBAR LAMINECTOMY/DECOMPRESSION;  INSTRUMENTED POSTEROLATERAL FUSION/ INTERBODY L5-S1; ALLOGRAFT AND AUTOGRAFT (IMPULSE) ; Surgeon: Angelia Barker MD;  Location:  MAIN OR;  Service: Orthopedics    ND CYSTOURETHROSCOPY,URETER CATHETER Bilateral 2018    Procedure: INSERTION URETERAL CATHETERS PREOP;  Surgeon: Og Madrigal MD;  Location: [facility];  Service: Urology    ND SLING OPER STRES INCONTINENCE N/A 2017    Procedure: MID URETHRAL SLING;  Surgeon: Rosendo Rodas MD;  Location: [facility];  Service: Urology    ND SUPRACERV ABD HYSTERECTOMY N/A 2018    Procedure: SUPRACERVICAL HYSTERECTOMY;  Surgeon: Saraih Landaverde MD;  Location: [facility];  Service: Gynecology    THYROIDECTOMY      TONSILECTOMY AND ADNOIDECTOMY      TONSILLECTOMY      TUBAL LIGATION       Social History   Social History     Substance and Sexual Activity   Alcohol Use Not Currently     Social History     Substance and Sexual Activity   Drug Use No     Social History     Tobacco Use   Smoking Status Current Every Day Smoker    Packs/day: 0 50    Years: 25 00    Pack years: 12 50    Types: Cigarettes   Smokeless Tobacco Never Used     Family History   Problem Relation Age of Onset    Diabetes Mother     Hypertension Mother     Hyperlipidemia Father     Arrhythmia Father     Lung cancer Father     Diabetes Father        Meds/Allergies   current meds:   No current facility-administered medications for this visit       Allergies   Allergen Reactions    Hydrocodone-Acetaminophen Rash    Vicodin [Hydrocodone-Acetaminophen] Rash    Morphine And Related GI Intolerance     Nausea/vomiting      Adhesive [Medical Tape] Rash     Bandaids       Objective   [unfilled]  Body mass index is 37 49 kg/m²  Invasive Devices:        PHYSICAL EXAM:  /70 (BP Location: Left arm, Patient Position: Sitting, Cuff Size: Standard)   Pulse 80   Ht 5' 2" (1 575 m)   Wt 93 kg (205 lb)   LMP 12/06/2018 Comment: partial hysterectomy   BMI 37 49 kg/m²     Physical Exam  Constitutional:       General: She is not in acute distress  Appearance: She is not ill-appearing, toxic-appearing or diaphoretic  HENT:      Head: Normocephalic and atraumatic  Nose:      Comments: Wearing mask  Mouth/Throat:      Comments: Wearing mask  Eyes:      General: No scleral icterus  Extraocular Movements: Extraocular movements intact  Cardiovascular:      Rate and Rhythm: Normal rate and regular rhythm  Heart sounds: No friction rub  No gallop  Comments: No edema  Pulmonary:      Effort: Pulmonary effort is normal  No respiratory distress  Breath sounds: Normal breath sounds  No wheezing, rhonchi or rales  Abdominal:      General: Bowel sounds are normal  There is no distension  Palpations: Abdomen is soft  Tenderness: There is no abdominal tenderness  There is no rebound  Musculoskeletal:      Cervical back: Normal range of motion and neck supple  Neurological:      General: No focal deficit present  Mental Status: She is alert and oriented to person, place, and time  Mental status is at baseline  Psychiatric:         Mood and Affect: Mood normal          Behavior: Behavior normal          Thought Content:  Thought content normal          Judgment: Judgment normal            Current Weight: Weight - Scale: 93 kg (205 lb)  First Weight: Weight - Scale: 93 kg (205 lb)    Lab Results:    Results from last 7 days   Lab Units 05/31/22  0456   WBC Thousand/uL 4 62   HEMOGLOBIN g/dL 10 3*   HEMATOCRIT % 32 0* PLATELETS Thousands/uL 138*     Results from last 7 days   Lab Units 06/02/22  2351   POTASSIUM mmol/L 3 5   CHLORIDE mmol/L 102   CO2 mmol/L 27   BUN mg/dL 19   CREATININE mg/dL 1 33*   CALCIUM mg/dL 8 8     Results from last 7 days   Lab Units 06/02/22  2351   POTASSIUM mmol/L 3 5   CHLORIDE mmol/L 102   CO2 mmol/L 27   BUN mg/dL 19   CREATININE mg/dL 1 33*   CALCIUM mg/dL 8 8   ALK PHOS U/L 83   ALT U/L 58   AST U/L 28

## 2022-06-03 NOTE — PATIENT INSTRUCTIONS
You are here for your initial visit post hospitalization  It sounds like you went in very dehydrated with not eating having vomiting and your creatinine which is the measure of the kidney function was elevated when it is usually normal   You were given fluids and was improving and the last 1 was down to 1 3 yesterday which is approaching a normal level  This should return to normal and we will monitor this  You also told me that you do not have parathyroid function due to surgery in the past and you been on calcium and vitamin-D  Your frequent visits the ER for symptoms of low calcium so we need to make sure your on a good dose and make sure that is maintained  For now due to 4 calcium tablets today with the calcitriol obtain lab work Monday and I will contact you with results and will go from there  Also you are having a lot of anxiety and you told me how you been taper down on her chronic use of Xanax it sounds like this could be causing her vomiting your anxiety your not sleeping  You do have been appointment to see psychiatry but it is in 4 months  We are going to start Zoloft 25 mg a day hopefully this will help with your anxiety  When I call you with your lab results Will tell me how your feeling  If there is any side effects from the medication just stop it let me know  Obtain lab work next week I will call you with results and will discuss follow-up and how your feeling

## 2022-06-06 ENCOUNTER — APPOINTMENT (OUTPATIENT)
Dept: LAB | Facility: HOSPITAL | Age: 47
End: 2022-06-06
Payer: COMMERCIAL

## 2022-06-06 DIAGNOSIS — R19.5 LOOSE STOOLS: ICD-10-CM

## 2022-06-06 DIAGNOSIS — E83.52 HYPERCALCEMIA: ICD-10-CM

## 2022-06-06 DIAGNOSIS — N17.9 AKI (ACUTE KIDNEY INJURY) (HCC): ICD-10-CM

## 2022-06-06 LAB
ALBUMIN SERPL BCP-MCNC: 3.6 G/DL (ref 3.5–5)
ALP SERPL-CCNC: 87 U/L (ref 46–116)
ALT SERPL W P-5'-P-CCNC: 43 U/L (ref 12–78)
ANION GAP SERPL CALCULATED.3IONS-SCNC: 11 MMOL/L (ref 4–13)
AST SERPL W P-5'-P-CCNC: 14 U/L (ref 5–45)
BILIRUB SERPL-MCNC: 0.43 MG/DL (ref 0.2–1)
BUN SERPL-MCNC: 17 MG/DL (ref 5–25)
CALCIUM SERPL-MCNC: 8.8 MG/DL (ref 8.3–10.1)
CHLORIDE SERPL-SCNC: 101 MMOL/L (ref 100–108)
CO2 SERPL-SCNC: 25 MMOL/L (ref 21–32)
CREAT SERPL-MCNC: 1.1 MG/DL (ref 0.6–1.3)
GFR SERPL CREATININE-BSD FRML MDRD: 60 ML/MIN/1.73SQ M
GLUCOSE SERPL-MCNC: 129 MG/DL (ref 65–140)
POTASSIUM SERPL-SCNC: 3.9 MMOL/L (ref 3.5–5.3)
PROT SERPL-MCNC: 7.5 G/DL (ref 6.4–8.2)
SODIUM SERPL-SCNC: 137 MMOL/L (ref 136–145)

## 2022-06-06 PROCEDURE — 80053 COMPREHEN METABOLIC PANEL: CPT

## 2022-06-06 PROCEDURE — 82784 ASSAY IGA/IGD/IGG/IGM EACH: CPT

## 2022-06-06 PROCEDURE — 86231 EMA EACH IG CLASS: CPT

## 2022-06-06 PROCEDURE — 86258 DGP ANTIBODY EACH IG CLASS: CPT

## 2022-06-06 PROCEDURE — 86364 TISS TRNSGLTMNASE EA IG CLAS: CPT

## 2022-06-07 LAB
ENDOMYSIUM IGA SER QL: NEGATIVE
GLIADIN PEPTIDE IGA SER-ACNC: 9 UNITS (ref 0–19)
GLIADIN PEPTIDE IGG SER-ACNC: 17 UNITS (ref 0–19)
IGA SERPL-MCNC: 245 MG/DL (ref 87–352)
TTG IGA SER-ACNC: 4 U/ML (ref 0–3)
TTG IGG SER-ACNC: <2 U/ML (ref 0–5)

## 2022-06-08 DIAGNOSIS — K21.9 GASTROESOPHAGEAL REFLUX DISEASE, UNSPECIFIED WHETHER ESOPHAGITIS PRESENT: Primary | ICD-10-CM

## 2022-06-08 RX ORDER — PANTOPRAZOLE SODIUM 40 MG/1
40 TABLET, DELAYED RELEASE ORAL DAILY
Qty: 30 TABLET | Refills: 3 | Status: SHIPPED | OUTPATIENT
Start: 2022-06-08 | End: 2022-07-30

## 2022-06-08 NOTE — TELEPHONE ENCOUNTER
I sent prescription for pantoprazole 40 mg daily 1/2 hour prior to breakfast to the pharmacy  Please notify patient    Thank you

## 2022-06-08 NOTE — TELEPHONE ENCOUNTER
Patient contacted the office back relayed the results in detail     Patient stated she was seen in the hospital and was told she will be needing a PPI medication but never received one from the pharmacy

## 2022-06-14 ENCOUNTER — TELEPHONE (OUTPATIENT)
Dept: GASTROENTEROLOGY | Facility: CLINIC | Age: 47
End: 2022-06-14

## 2022-06-14 DIAGNOSIS — K21.9 GASTROESOPHAGEAL REFLUX DISEASE, UNSPECIFIED WHETHER ESOPHAGITIS PRESENT: Primary | ICD-10-CM

## 2022-06-14 RX ORDER — OMEPRAZOLE 40 MG/1
40 CAPSULE, DELAYED RELEASE ORAL DAILY
Qty: 30 CAPSULE | Refills: 2 | Status: SHIPPED | OUTPATIENT
Start: 2022-06-14 | End: 2022-07-30 | Stop reason: SDUPTHER

## 2022-06-14 NOTE — TELEPHONE ENCOUNTER
Patients GI provider:  Dr Coelho [place]     Number to return call: [phone]     Reason for call: Pt calling stated she is returning a call regarding a prescription that is not covered by her insurance       Scheduled procedure/appointment date if applicable: Apt/procedure

## 2022-06-14 NOTE — TELEPHONE ENCOUNTER
Spoke with pt and with pharmacy, apparently 20mg will be covered, not 40mg  Started a CoverMyMeds for the last rx of omeprazole 40mg     Key: [id number]

## 2022-06-14 NOTE — TELEPHONE ENCOUNTER
Patients GI provider:  Dr Juan Francisco Ly     Number to return call: [phone]    Reason for call: Pt called and stated Pantoprazole medication is not cover under her insurance and wants to know if there is another medication that  can prescribe?      Scheduled procedure/appointment date if applicable: Appt 54/90/1420

## 2022-06-14 NOTE — TELEPHONE ENCOUNTER
We can try omeprazole and see if that is covered so I sent that to [facility] pharmacy

## 2022-06-16 ENCOUNTER — TELEPHONE (OUTPATIENT)
Dept: NEPHROLOGY | Facility: CLINIC | Age: 47
End: 2022-06-16

## 2022-06-16 DIAGNOSIS — F41.9 ANXIETY: ICD-10-CM

## 2022-06-16 DIAGNOSIS — N18.9 CHRONIC RENAL IMPAIRMENT, UNSPECIFIED CKD STAGE: Primary | ICD-10-CM

## 2022-06-20 ENCOUNTER — TELEPHONE (OUTPATIENT)
Dept: GASTROENTEROLOGY | Facility: CLINIC | Age: 47
End: 2022-06-20

## 2022-06-20 ENCOUNTER — APPOINTMENT (OUTPATIENT)
Dept: LAB | Facility: HOSPITAL | Age: 47
End: 2022-06-20
Payer: COMMERCIAL

## 2022-06-20 DIAGNOSIS — N18.9 CHRONIC RENAL IMPAIRMENT, UNSPECIFIED CKD STAGE: ICD-10-CM

## 2022-06-20 LAB
ANION GAP SERPL CALCULATED.3IONS-SCNC: 9 MMOL/L (ref 4–13)
BUN SERPL-MCNC: 19 MG/DL (ref 5–25)
CALCIUM SERPL-MCNC: 8.7 MG/DL (ref 8.3–10.1)
CHLORIDE SERPL-SCNC: 105 MMOL/L (ref 100–108)
CO2 SERPL-SCNC: 25 MMOL/L (ref 21–32)
CREAT SERPL-MCNC: 1.2 MG/DL (ref 0.6–1.3)
GFR SERPL CREATININE-BSD FRML MDRD: 54 ML/MIN/1.73SQ M
GLUCOSE SERPL-MCNC: 121 MG/DL (ref 65–140)
POTASSIUM SERPL-SCNC: 4 MMOL/L (ref 3.5–5.3)
SODIUM SERPL-SCNC: 139 MMOL/L (ref 136–145)

## 2022-06-20 PROCEDURE — 36415 COLL VENOUS BLD VENIPUNCTURE: CPT

## 2022-06-20 PROCEDURE — 80048 BASIC METABOLIC PNL TOTAL CA: CPT

## 2022-06-20 NOTE — TELEPHONE ENCOUNTER
Advised pt approval of omeprazole 40mg 1 capsule by mouth daily      Advised [facility] pharmacy of approval: 06/18/22-06/18/23    [place]: Hung MERCER Case ID: [id number]

## 2022-06-21 ENCOUNTER — TELEPHONE (OUTPATIENT)
Dept: NEPHROLOGY | Facility: CLINIC | Age: 47
End: 2022-06-21

## 2022-06-21 NOTE — TELEPHONE ENCOUNTER
----- Message from Damian Thomas MD sent at 6/20/2022  6:12 PM EDT -----  Call and let her know that her labs look great   ----- Message -----  From: Lab, Background User  Sent: 6/20/2022   2:08 PM EDT  To: Damian Thomas MD

## 2022-06-22 ENCOUNTER — HOSPITAL ENCOUNTER (EMERGENCY)
Facility: HOSPITAL | Age: 47
Discharge: HOME | End: 2022-06-22
Attending: EMERGENCY MEDICINE
Payer: COMMERCIAL

## 2022-06-22 VITALS
RESPIRATION RATE: 16 BRPM | SYSTOLIC BLOOD PRESSURE: 148 MMHG | TEMPERATURE: 97 F | HEIGHT: 62 IN | DIASTOLIC BLOOD PRESSURE: 89 MMHG | WEIGHT: 200 LBS | OXYGEN SATURATION: 96 % | BODY MASS INDEX: 36.8 KG/M2 | HEART RATE: 90 BPM

## 2022-06-22 DIAGNOSIS — R20.2 PARESTHESIAS: Primary | ICD-10-CM

## 2022-06-22 DIAGNOSIS — E83.51 HYPOCALCEMIA: ICD-10-CM

## 2022-06-22 LAB
ALBUMIN SERPL BCP-MCNC: 3.6 G/DL (ref 3.5–5)
ALP SERPL-CCNC: 92 U/L (ref 46–116)
ALT SERPL W P-5'-P-CCNC: 44 U/L (ref 12–78)
ANION GAP SERPL CALCULATED.3IONS-SCNC: 14 MMOL/L (ref 4–13)
AST SERPL W P-5'-P-CCNC: 19 U/L (ref 5–45)
BILIRUB SERPL-MCNC: 0.31 MG/DL (ref 0.2–1)
BUN SERPL-MCNC: 22 MG/DL (ref 5–25)
CA-I BLD-SCNC: 1.05 MMOL/L (ref 1.12–1.32)
CALCIUM SERPL-MCNC: 8.3 MG/DL (ref 8.3–10.1)
CHLORIDE SERPL-SCNC: 101 MMOL/L (ref 100–108)
CO2 SERPL-SCNC: 25 MMOL/L (ref 21–32)
CREAT SERPL-MCNC: 1.24 MG/DL (ref 0.6–1.3)
GFR SERPL CREATININE-BSD FRML MDRD: 52 ML/MIN/1.73SQ M
GLUCOSE SERPL-MCNC: 137 MG/DL (ref 65–140)
POTASSIUM SERPL-SCNC: 3.7 MMOL/L (ref 3.5–5.3)
PROT SERPL-MCNC: 7.3 G/DL (ref 6.4–8.2)
SODIUM SERPL-SCNC: 140 MMOL/L (ref 136–145)

## 2022-06-22 PROCEDURE — 96365 THER/PROPH/DIAG IV INF INIT: CPT

## 2022-06-22 PROCEDURE — 99282 EMERGENCY DEPT VISIT SF MDM: CPT | Performed by: EMERGENCY MEDICINE

## 2022-06-22 PROCEDURE — 99284 EMERGENCY DEPT VISIT MOD MDM: CPT

## 2022-06-22 PROCEDURE — 80053 COMPREHEN METABOLIC PANEL: CPT | Performed by: EMERGENCY MEDICINE

## 2022-06-22 PROCEDURE — 36415 COLL VENOUS BLD VENIPUNCTURE: CPT | Performed by: EMERGENCY MEDICINE

## 2022-06-22 PROCEDURE — 82330 ASSAY OF CALCIUM: CPT | Performed by: EMERGENCY MEDICINE

## 2022-06-22 RX ORDER — CALCIUM GLUCONATE 20 MG/ML
1 INJECTION, SOLUTION INTRAVENOUS ONCE
Status: COMPLETED | OUTPATIENT
Start: 2022-06-22 | End: 2022-06-22

## 2022-06-22 RX ORDER — ONDANSETRON 2 MG/ML
INJECTION INTRAMUSCULAR; INTRAVENOUS
Status: DISCONTINUED
Start: 2022-06-22 | End: 2022-06-22 | Stop reason: HOSPADM

## 2022-06-22 RX ADMIN — CALCIUM GLUCONATE 1 G: 20 INJECTION, SOLUTION INTRAVENOUS at 03:52

## 2022-06-23 NOTE — ED PROVIDER NOTES
History  Chief Complaint   Patient presents with    Tingling     Patient has history of low calcium levels; states she feels numb and tingly like she usually does when her levels are off  HPI    This is a 52-year-old female that presents today with tingling  Patient states she has history of low calcium and often comes here to get calcium replacement  Patient states she feels numbness and tingling all over  Denies any weakness  No other complaints of chest pain shortness of breath  40 year female that presents today with paresthesias  Will do ionzed calcium and replace as needed  Patient feels better after calcium    Prior to Admission Medications   Prescriptions Last Dose Informant Patient Reported? Taking?    ALPRAZolam (XANAX) 1 mg tablet  Self Yes No   Sig: Take 1 mg by mouth daily am   Calcium Carb-Cholecalciferol (Calcium 500+D) 500-400 MG-UNIT TABS  Self No No   Sig: Take 600 mg by mouth 3 times a day   Lidocaine-Menthol 4-1 % PTCH  Self Yes No   Sig: if needed     baclofen 20 mg tablet  Self No No   Sig: Take 1 tablet (20 mg total) by mouth 3 (three) times a day   Patient taking differently: Take 20 mg by mouth 3 (three) times a day as needed   calcitriol (ROCALTROL) 0 25 mcg capsule  Self No No   Sig: TAKE 1 CAPSULE BY MOUTH EVERY DAY   fenofibrate (TRIGLIDE) 160 MG tablet  Self Yes No   Sig: Take 160 mg by mouth daily   ferrous sulfate 325 (65 Fe) mg tablet  Self Yes No   Sig: Take 325 mg by mouth Once Monday, wed, Friday-Last dose 4/1/22    ibuprofen (MOTRIN) 600 mg tablet  Self No No   Sig: Take 1 tablet (600 mg total) by mouth every 6 (six) hours as needed for mild pain or moderate pain   levothyroxine 150 mcg tablet  Self No No   Sig: TAKE 1 TABLET BY MOUTH EVERY DAY   Patient taking differently: 150 mcg every morning   magnesium Oxide (MAG-OX) 400 mg TABS  Self No No   Sig: TAKE 1 TABLET (400 MG TOTAL) BY MOUTH THREE (THREE) TIMES A DAY 9PM   omeprazole (PriLOSEC) 40 MG capsule   No No Sig: Take 1 capsule (40 mg total) by mouth daily   oxyCODONE-acetaminophen (PERCOCET)  mg per tablet  Self Yes No   Sig: Take 1 tablet by mouth 4 (four) times a day   pantoprazole (PROTONIX) 40 mg tablet   No No   Sig: Take 1 tablet (40 mg total) by mouth daily Take 1/2 hour prior to breakfast daily in a haily Rudolph potassium chloride (K-DUR,KLOR-CON) 20 mEq tablet  Self Yes No   Sig: Take 20 mEq by mouth 2 (two) times a day   pramipexole (MIRAPEX) 0 5 mg tablet  Self Yes No   Sig: Take 0 5 mg by mouth daily at bedtime   pregabalin (LYRICA) 75 mg capsule  Self Yes No   Sig: Take 75 mg by mouth 3 (three) times a day as needed    sertraline (Zoloft) 50 mg tablet   No No   Sig: Take 1 tablet (50 mg total) by mouth daily      Facility-Administered Medications: None       Past Medical History:   Diagnosis Date    Acid reflux     Acute renal failure (HCC)     multiple episodes    Anemia     Anxiety     Arthritis     Asthma     Chronic pain     DDD (degenerative disc disease), lumbar     Disease of thyroid gland     had surgery and now hypo    DVT (deep venous thrombosis) ([facility] [address] )     s/p ankle fracture    History of transfusion     Hypercalcemia     Hyperlipidemia     Hyperthyroidism     Hypocalcemia     post op 2016    Kidney stone     Migraine     Psychiatric disorder     anxiety depression    Seizures (HCC)     petit mal x1  4 years ago- all tests were neg      Spondylolisthesis of lumbar region     Treatment     spinal pain injections  last was 2016    Wears glasses        Past Surgical History:   Procedure Laterality Date    BACK SURGERY      L4-5, S1-fusion-plate/screws     SECTION      x5    CYSTOCELE REPAIR  2017    CYSTOSCOPY      DISCOGRAM      HYSTERECTOMY      PARATHYROIDECTOMY      UT ANTERIOR COLPORRAPHY RPR CYSTOCELE W/CYSTO N/A 2017    Procedure: CYSTOCELE REPAIR;  Surgeon: Ephraim Little MD;  [facility];  Service: Gynecology    UT ARTHRODESIS POSTERIOR INTERBODY LUMBAR N/A 8/12/2016    Procedure: L4-S1 LUMBAR LAMINECTOMY/DECOMPRESSION;  INSTRUMENTED POSTEROLATERAL FUSION/ INTERBODY L5-S1; ALLOGRAFT AND AUTOGRAFT (IMPULSE) ; Surgeon: Hilda Bowser MD;  Location:  MAIN OR;  Service: Orthopedics    CA CYSTOURETHROSCOPY,URETER CATHETER Bilateral 12/7/2018    Procedure: INSERTION URETERAL CATHETERS PREOP;  Surgeon: Roberto Singh MD;  Location: [address] [place] [address];  Service: Urology    CA SLING OPER STRES INCONTINENCE N/A 5/4/2017    Procedure: MID URETHRAL SLING;  Surgeon: Emily Thomson MD;  Location: [address] [place] [address];  Service: Urology    CA SUPRACERV ABD HYSTERECTOMY N/A 12/7/2018    Procedure: SUPRACERVICAL HYSTERECTOMY;  Surgeon: Ivelisse Estrada MD;  Location: [facility];  Service: Gynecology    THYROIDECTOMY      TONSILECTOMY AND ADNOIDECTOMY      TONSILLECTOMY      TUBAL LIGATION         Family History   Problem Relation Age of Onset    Diabetes Mother     Hypertension Mother     Hyperlipidemia Father     Arrhythmia Father     Lung cancer Father     Diabetes Father      I have reviewed and agree with the history as documented  E-Cigarette/Vaping    E-Cigarette Use Never User      E-Cigarette/Vaping Substances    Nicotine No     THC No     CBD No     Flavoring No     Other No     Unknown No      Social History     Tobacco Use    Smoking status: Current Every Day Smoker     Packs/day: 0 50     Years: 25 00     Pack years: 12 50     Types: Cigarettes    Smokeless tobacco: Never Used   Vaping Use    Vaping Use: Never used   Substance Use Topics    Alcohol use: Not Currently    Drug use: No       Review of Systems   Constitutional: Negative  Negative for diaphoresis and fever  HENT: Negative  Respiratory: Negative  Negative for cough, shortness of breath and wheezing  Cardiovascular: Negative  Negative for chest pain, palpitations and leg swelling     Gastrointestinal: Negative for abdominal distention, abdominal pain, nausea and vomiting  Genitourinary: Negative  Musculoskeletal: Negative  Skin: Negative  Neurological: Negative  Psychiatric/Behavioral: Negative  All other systems reviewed and are negative  Physical Exam  Physical Exam  Vitals and nursing note reviewed  Constitutional:       General: She is not in acute distress  Appearance: She is well-developed  HENT:      Head: Normocephalic and atraumatic  Eyes:      Conjunctiva/sclera: Conjunctivae normal    Cardiovascular:      Rate and Rhythm: Normal rate and regular rhythm  Heart sounds: No murmur heard  Pulmonary:      Effort: Pulmonary effort is normal  No respiratory distress  Breath sounds: Normal breath sounds  Abdominal:      Palpations: Abdomen is soft  Tenderness: There is no abdominal tenderness  Musculoskeletal:      Cervical back: Neck supple  Skin:     General: Skin is warm and dry  Neurological:      Mental Status: She is alert           Vital Signs  ED Triage Vitals [06/22/22 0248]   Temperature Pulse Respirations Blood Pressure SpO2   (!) 97 °F (36 1 °C) 90 16 148/89 96 %      Temp Source Heart Rate Source Patient Position - Orthostatic VS BP Location FiO2 (%)   Oral Monitor Sitting Left arm --      Pain Score       No Pain           Vitals:    06/22/22 0248   BP: 148/89   Pulse: 90   Patient Position - Orthostatic VS: Sitting         Visual Acuity      ED Medications  Medications   calcium gluconate 1 g in sodium chloride 0 9% 50 mL (premix) (0 g Intravenous Stopped 6/22/22 0456)       Diagnostic Studies  Results Reviewed     Procedure Component Value Units Date/Time    Comprehensive metabolic panel [[id number]]  (Abnormal) Collected: 06/22/22 0258    Lab Status: Final result Specimen: Blood from Arm, Right Updated: 06/22/22 2637     Sodium 140 mmol/L      Potassium 3 7 mmol/L      Chloride 101 mmol/L      CO2 25 mmol/L      ANION GAP 14 mmol/L      BUN 22 mg/dL      Creatinine 1 24 mg/dL Glucose 137 mg/dL      Calcium 8 3 mg/dL      AST 19 U/L      ALT 44 U/L      Alkaline Phosphatase 92 U/L      Total Protein 7 3 g/dL      Albumin 3 6 g/dL      Total Bilirubin 0 31 mg/dL      eGFR 52 ml/min/1 73sq m     Narrative:      [place] guidelines for Chronic Kidney Disease (CKD):     Stage 1 with normal or high GFR (GFR > 90 mL/min/1 73 square meters)    Stage 2 Mild CKD (GFR = 60-89 mL/min/1 73 square meters)    Stage 3A Moderate CKD (GFR = 45-59 mL/min/1 73 square meters)    Stage 3B Moderate CKD (GFR = 30-44 mL/min/1 73 square meters)    Stage 4 Severe CKD (GFR = 15-29 mL/min/1 73 square meters)    Stage 5 End Stage CKD (GFR <15 mL/min/1 73 square meters)  Note: GFR calculation is accurate only with a steady state creatinine    Calcium, ionized [[id number]]  (Abnormal) Collected: 06/22/22 0258    Lab Status: Final result Specimen: Blood from Arm, Right Updated: 06/22/22 0304     Calcium, Ionized 1 05 mmol/L                  No orders to display              Procedures  Procedures         ED Course  ED Course as of 06/23/22 0209   Wed Jun 22, 2022   1350 Patient is feeling better will discharge                               SBIRT 22yo+    Flowsheet Row Most Recent Value   SBIRT (25 yo +)    In order to provide better care to our patients, we are screening all of our patients for alcohol and drug use  Would it be okay to ask you these screening questions?  No Filed at: 06/22/2022 0251                    MDM    Disposition  Final diagnoses:   Paresthesias   Hypocalcemia     Time reflects when diagnosis was documented in both MDM as applicable and the Disposition within this note     Time User Action Codes Description Comment    6/22/2022  4:39 AM Luciano Erwin [R20 2] Paresthesias     6/22/2022  4:39 AM Luciano Erwin [E83 51] Hypocalcemia       ED Disposition     ED Disposition   Discharge    Condition   Stable    Date/Time   Wed Jun 22, 2022  4:39 AM    Comment Roberto Eduin discharge to home/self care  Follow-up Information     Follow up With Specialties Details Why Sharan Beckford MD Internal Medicine Schedule an appointment as soon as possible for a visit   Ngoc Neves  [phone]            Discharge Medication List as of 6/22/2022  4:56 AM      CONTINUE these medications which have NOT CHANGED    Details   ALPRAZolam (XANAX) 1 mg tablet Take 1 mg by mouth daily am, Historical Med      baclofen 20 mg tablet Take 1 tablet (20 mg total) by mouth 3 (three) times a day, Starting Thu 11/18/2021, Normal      calcitriol (ROCALTROL) 0 25 mcg capsule TAKE 1 CAPSULE BY MOUTH EVERY DAY, Normal      Calcium Carb-Cholecalciferol (Calcium 500+D) 500-400 MG-UNIT TABS Take 600 mg by mouth 3 times a day, Normal      fenofibrate (TRIGLIDE) 160 MG tablet Take 160 mg by mouth daily, Historical Med      ferrous sulfate 325 (65 Fe) mg tablet Take 325 mg by mouth Once Monday, wed, Friday-Last dose 4/1/22 , Historical Med      ibuprofen (MOTRIN) 600 mg tablet Take 1 tablet (600 mg total) by mouth every 6 (six) hours as needed for mild pain or moderate pain, Starting Tue 6/2/2020, Normal      levothyroxine 150 mcg tablet TAKE 1 TABLET BY MOUTH EVERY DAY, Normal      Lidocaine-Menthol 4-1 % PTCH if needed  , Historical Med      magnesium Oxide (MAG-OX) 400 mg TABS TAKE 1 TABLET (400 MG TOTAL) BY MOUTH THREE (THREE) TIMES A DAY 9PM, Normal      omeprazole (PriLOSEC) 40 MG capsule Take 1 capsule (40 mg total) by mouth daily, Starting Tue 6/14/2022, Normal      oxyCODONE-acetaminophen (PERCOCET)  mg per tablet Take 1 tablet by mouth 4 (four) times a day, Historical Med      pantoprazole (PROTONIX) 40 mg tablet Take 1 tablet (40 mg total) by mouth daily Take 1/2 hour prior to breakfast daily in a m   , Starting Wed 6/8/2022, Until Fri 7/8/2022, Normal      potassium chloride (K-DUR,KLOR-CON) 20 mEq tablet Take 20 mEq by mouth 2 (two) times a day, Historical Med      pramipexole (MIRAPEX) 0 5 mg tablet Take 0 5 mg by mouth daily at bedtime, Historical Med      pregabalin (LYRICA) 75 mg capsule Take 75 mg by mouth 3 (three) times a day as needed , Historical Med      sertraline (Zoloft) 50 mg tablet Take 1 tablet (50 mg total) by mouth daily, Starting Thu 6/16/2022, Normal             No discharge procedures on file      PDMP Review       Value Time User    PDMP Reviewed  Yes 5/30/2022  9:45 PM Toshia Auguste PA-C          ED Provider  Electronically Signed by           Eric Hassan MD  06/23/22 8661

## 2022-06-27 ENCOUNTER — TELEPHONE (OUTPATIENT)
Dept: PSYCHIATRY | Facility: CLINIC | Age: 47
End: 2022-06-27

## 2022-06-27 ENCOUNTER — TELEPHONE (OUTPATIENT)
Dept: NEPHROLOGY | Facility: CLINIC | Age: 47
End: 2022-06-27

## 2022-06-27 NOTE — TELEPHONE ENCOUNTER
Patient stopped by the EO and requests a call back from Dr Tisha Mendez regarding ordering more lab work as she went to the hospital and was told that her "kidney function" went up from 1 0 to 2 0 to 2 4  Patient requests new lab work orders and a call from Dr Tisha Mendez  [phone]

## 2022-06-27 NOTE — TELEPHONE ENCOUNTER
Called patient and informed her of the following:    Dr Ab Mello is off today and will return on July 5th after the holiday  Rachid Austin PA-C reviewed labs  Last creatinine was 1 24 on 6/22  This seems to be stable after her LELAND in May as it has been in the low 1's since that time  She does have an order for a BMP that Dr Ab Mello ordered on 6/16 if she would like to repeat this  Patient seemed very upset with this information  I informed the patient that the numbers are still within range  Patient stated multiples times "yes its stable but its going up and that's what worries me "    I repeated that she could go get the repeat blood work drawn and we can discuss then  Patient stated she will have the blood work drawn

## 2022-06-27 NOTE — TELEPHONE ENCOUNTER
Reviewed labs  Last creatinine was 1 24 on 6/22  This seems to be stable after her LELAND in May as it has been in the low 1's since that time  She does have an order for a BMP that Dr Jerry Manley ordered on 6/16 if she would like to repeat

## 2022-06-29 ENCOUNTER — APPOINTMENT (OUTPATIENT)
Dept: LAB | Facility: HOSPITAL | Age: 47
End: 2022-06-29
Payer: COMMERCIAL

## 2022-06-29 ENCOUNTER — NURSE TRIAGE (OUTPATIENT)
Dept: OTHER | Facility: OTHER | Age: 47
End: 2022-06-29

## 2022-06-29 ENCOUNTER — TELEPHONE (OUTPATIENT)
Dept: OTHER | Facility: HOSPITAL | Age: 47
End: 2022-06-29

## 2022-06-29 DIAGNOSIS — N18.9 CHRONIC KIDNEY DISEASE, UNSPECIFIED CKD STAGE: ICD-10-CM

## 2022-06-29 DIAGNOSIS — E83.52 HYPERCALCEMIA: Primary | ICD-10-CM

## 2022-06-29 LAB
ANION GAP SERPL CALCULATED.3IONS-SCNC: 10 MMOL/L (ref 4–13)
BUN SERPL-MCNC: 25 MG/DL (ref 5–25)
CALCIUM SERPL-MCNC: 11.1 MG/DL (ref 8.3–10.1)
CHLORIDE SERPL-SCNC: 100 MMOL/L (ref 100–108)
CO2 SERPL-SCNC: 27 MMOL/L (ref 21–32)
CREAT SERPL-MCNC: 1.42 MG/DL (ref 0.6–1.3)
GFR SERPL CREATININE-BSD FRML MDRD: 44 ML/MIN/1.73SQ M
GLUCOSE P FAST SERPL-MCNC: 113 MG/DL (ref 65–99)
POTASSIUM SERPL-SCNC: 4.1 MMOL/L (ref 3.5–5.3)
SODIUM SERPL-SCNC: 137 MMOL/L (ref 136–145)

## 2022-06-29 PROCEDURE — 36415 COLL VENOUS BLD VENIPUNCTURE: CPT

## 2022-06-29 PROCEDURE — 80048 BASIC METABOLIC PNL TOTAL CA: CPT

## 2022-06-30 ENCOUNTER — HOSPITAL ENCOUNTER (EMERGENCY)
Facility: HOSPITAL | Age: 47
Discharge: HOME | End: 2022-07-01
Attending: EMERGENCY MEDICINE
Payer: COMMERCIAL

## 2022-06-30 DIAGNOSIS — R20.2 PARESTHESIAS: Primary | ICD-10-CM

## 2022-06-30 LAB
ANION GAP SERPL CALCULATED.3IONS-SCNC: 12 MMOL/L (ref 4–13)
BASOPHILS # BLD AUTO: 0.05 THOUSANDS/ΜL (ref 0–0.1)
BASOPHILS NFR BLD AUTO: 1 % (ref 0–1)
BUN SERPL-MCNC: 25 MG/DL (ref 5–25)
CA-I BLD-SCNC: 1.18 MMOL/L (ref 1.12–1.32)
CALCIUM SERPL-MCNC: 9.5 MG/DL (ref 8.3–10.1)
CHLORIDE SERPL-SCNC: 99 MMOL/L (ref 100–108)
CO2 SERPL-SCNC: 28 MMOL/L (ref 21–32)
CREAT SERPL-MCNC: 1.46 MG/DL (ref 0.6–1.3)
EOSINOPHIL # BLD AUTO: 0.16 THOUSAND/ΜL (ref 0–0.61)
EOSINOPHIL NFR BLD AUTO: 2 % (ref 0–6)
ERYTHROCYTE [DISTWIDTH] IN BLOOD BY AUTOMATED COUNT: 17.3 % (ref 11.6–15.1)
GFR SERPL CREATININE-BSD FRML MDRD: 43 ML/MIN/1.73SQ M
GLUCOSE SERPL-MCNC: 134 MG/DL (ref 65–140)
HCT VFR BLD AUTO: 37.9 % (ref 34.8–46.1)
HGB BLD-MCNC: 12.4 G/DL (ref 11.5–15.4)
IMM GRANULOCYTES # BLD AUTO: 0.02 THOUSAND/UL (ref 0–0.2)
IMM GRANULOCYTES NFR BLD AUTO: 0 % (ref 0–2)
LYMPHOCYTES # BLD AUTO: 2.17 THOUSANDS/ΜL (ref 0.6–4.47)
LYMPHOCYTES NFR BLD AUTO: 32 % (ref 14–44)
MCH RBC QN AUTO: 29 PG (ref 26.8–34.3)
MCHC RBC AUTO-ENTMCNC: 32.7 G/DL (ref 31.4–37.4)
MCV RBC AUTO: 89 FL (ref 82–98)
MONOCYTES # BLD AUTO: 0.41 THOUSAND/ΜL (ref 0.17–1.22)
MONOCYTES NFR BLD AUTO: 6 % (ref 4–12)
NEUTROPHILS # BLD AUTO: 3.89 THOUSANDS/ΜL (ref 1.85–7.62)
NEUTS SEG NFR BLD AUTO: 59 % (ref 43–75)
NRBC BLD AUTO-RTO: 0 /100 WBCS
PLATELET # BLD AUTO: 184 THOUSANDS/UL (ref 149–390)
PMV BLD AUTO: 10.1 FL (ref 8.9–12.7)
POTASSIUM SERPL-SCNC: 3.6 MMOL/L (ref 3.5–5.3)
RBC # BLD AUTO: 4.27 MILLION/UL (ref 3.81–5.12)
SODIUM SERPL-SCNC: 139 MMOL/L (ref 136–145)
WBC # BLD AUTO: 6.7 THOUSAND/UL (ref 4.31–10.16)

## 2022-06-30 PROCEDURE — 80048 BASIC METABOLIC PNL TOTAL CA: CPT | Performed by: EMERGENCY MEDICINE

## 2022-06-30 PROCEDURE — 99284 EMERGENCY DEPT VISIT MOD MDM: CPT

## 2022-06-30 PROCEDURE — 82330 ASSAY OF CALCIUM: CPT | Performed by: EMERGENCY MEDICINE

## 2022-06-30 PROCEDURE — 85025 COMPLETE CBC W/AUTO DIFF WBC: CPT | Performed by: EMERGENCY MEDICINE

## 2022-06-30 PROCEDURE — 36415 COLL VENOUS BLD VENIPUNCTURE: CPT | Performed by: EMERGENCY MEDICINE

## 2022-06-30 NOTE — TELEPHONE ENCOUNTER
Received tiger text from health call team, Patient called saying   ""I have a raised creatinine of 1 42 and calcium of 11 1, I have a really bad headache and nausea and I wasn't sure if because of these levels I need to be in the hospital or if I am ok?"  - informed to inform patient to stop taking all calcium supplements as her calcium level is elevated   It appears that she is also on calcitriol which could be contributing  May also need to adjust calcitriol dose if Calcium level remains elevated  Check BMP in one week   -also cr elevated to 1 4 likely prerenal due to hypercalcemia  Stressed on oral hydration  -PTH level was low at < 6 3 on June 6th  -BMP in one week ordered  -IF patient does not feel better and has worsening of symptoms, she can go to ED  -will inform her nephrologist Dr Tony May and her endocrinologist Dr Jeremiah Mendoza

## 2022-06-30 NOTE — TELEPHONE ENCOUNTER
Reason for Disposition   Patient sounds very sick or weak to the triager    Answer Assessment - Initial Assessment Questions  1  LOCATION: "Where does it hurt?"       Frontal area    2  ONSET: "When did the headache start?" (Minutes, hours or days)       Couple days ago    3  PATTERN: "Does the pain come and go, or has it been constant since it started?"      Constant    4  SEVERITY: "How bad is the pain?" and "What does it keep you from doing?"  (e g , Scale 1-10; mild, moderate, or severe)    - MILD (1-3): doesn't interfere with normal activities     - MODERATE (4-7): interferes with normal activities or awakens from sleep     - SEVERE (8-10): excruciating pain, unable to do any normal activities         3/10    5  RECURRENT SYMPTOM: "Have you ever had headaches before?" If Yes, ask: "When was the last time?" and "What happened that time?"       In the past    6  CAUSE: "What do you think is causing the headache?"      Lab tests that are elevated     7  MIGRAINE: "Have you been diagnosed with migraine headaches?" If Yes, ask: "Is this headache similar?"       Not in a long time    8  HEAD INJURY: "Has there been any recent injury to the head?"       Denies    9  OTHER SYMPTOMS: "Do you have any other symptoms?" (fever, stiff neck, eye pain, sore throat, cold symptoms)      Nausea, fatigue, fever 99 2    10   PREGNANCY: "Is there any chance you are pregnant?" "When was your last menstrual period?"        Denies    Protocols used: HEADACHE-ADULT-

## 2022-06-30 NOTE — TELEPHONE ENCOUNTER
Agree with holding all calcium supplementation, keeping well hydrated   Should have repeat BMP in 1 week   Needs sooner follow-up, any location  has not been seen in clinic > 1 year

## 2022-06-30 NOTE — TELEPHONE ENCOUNTER
Regarding: Nausea / Headache (Raised Calcium & Creatinine)  ----- Message from Kt Ibrahim sent at 6/29/2022  8:09 PM EDT -----  "I have a raised creatinine of 1 42 and calcium of 11 1, I have a really bad headache and nausea and I wasn't sure if because of these levels I need to be in the hospital or if I am ok?"

## 2022-07-01 VITALS
OXYGEN SATURATION: 96 % | HEART RATE: 79 BPM | WEIGHT: 201.4 LBS | BODY MASS INDEX: 36.84 KG/M2 | RESPIRATION RATE: 12 BRPM | TEMPERATURE: 98.7 F | DIASTOLIC BLOOD PRESSURE: 66 MMHG | SYSTOLIC BLOOD PRESSURE: 126 MMHG

## 2022-07-01 DIAGNOSIS — E83.51 HYPOCALCEMIA: Primary | ICD-10-CM

## 2022-07-01 PROCEDURE — 99284 EMERGENCY DEPT VISIT MOD MDM: CPT | Performed by: EMERGENCY MEDICINE

## 2022-07-01 NOTE — TELEPHONE ENCOUNTER
Left detailed message with recommendations to hold calcium supplements and keep well hydrated    Mailed lab to be done in 1 week

## 2022-07-01 NOTE — TELEPHONE ENCOUNTER
Patient call back offer earlier appt with another  Endo  Doctor  She  Decline she said that she have panic attacks and can not travel that  Far   She had one schedule for  Oct already plus add to wait list

## 2022-07-01 NOTE — ED PROVIDER NOTES
History  Chief Complaint   Patient presents with    Numbness     States she was told not to take her calcium because it was too high  Had bedtime tablet  States she thinks her calcium is low because she feels numb and tingly     26-year-old female presents to the ED stating that she has been not taking her calcium because her level was too high she states that she has had some numbness and tingling in her extremities and is not sure whether it is her withdrawing from her Xanax that she is coming off or whether it is calcium is off again  Social like to get it checked  History provided by:  Patient   used: No        Prior to Admission Medications   Prescriptions Last Dose Informant Patient Reported? Taking?    ALPRAZolam (XANAX) 1 mg tablet  Self Yes No   Sig: Take 1 mg by mouth daily am   Calcium Carb-Cholecalciferol (Calcium 500+D) 500-400 MG-UNIT TABS  Self No No   Sig: Take 600 mg by mouth 3 times a day   Lidocaine-Menthol 4-1 % PTCH  Self Yes No   Sig: if needed     baclofen 20 mg tablet  Self No No   Sig: Take 1 tablet (20 mg total) by mouth 3 (three) times a day   Patient taking differently: Take 20 mg by mouth 3 (three) times a day as needed   calcitriol (ROCALTROL) 0 25 mcg capsule  Self No No   Sig: TAKE 1 CAPSULE BY MOUTH EVERY DAY   fenofibrate (TRIGLIDE) 160 MG tablet  Self Yes No   Sig: Take 160 mg by mouth daily   ferrous sulfate 325 (65 Fe) mg tablet  Self Yes No   Sig: Take 325 mg by mouth Once Monday, wed, Friday-Last dose 4/1/22    ibuprofen (MOTRIN) 600 mg tablet  Self No No   Sig: Take 1 tablet (600 mg total) by mouth every 6 (six) hours as needed for mild pain or moderate pain   levothyroxine 150 mcg tablet  Self No No   Sig: TAKE 1 TABLET BY MOUTH EVERY DAY   Patient taking differently: 150 mcg every morning   magnesium Oxide (MAG-OX) 400 mg TABS  Self No No   Sig: TAKE 1 TABLET (400 MG TOTAL) BY MOUTH THREE (THREE) TIMES A DAY 9PM   omeprazole (PriLOSEC) 40 MG capsule No No   Sig: Take 1 capsule (40 mg total) by mouth daily   oxyCODONE-acetaminophen (PERCOCET)  mg per tablet  Self Yes No   Sig: Take 1 tablet by mouth 4 (four) times a day   pantoprazole (PROTONIX) 40 mg tablet   No No   Sig: Take 1 tablet (40 mg total) by mouth daily Take 1/2 hour prior to breakfast daily in a [facility] potassium chloride (K-DUR,KLOR-CON) 20 mEq tablet  Self Yes No   Sig: Take 20 mEq by mouth 2 (two) times a day   pramipexole (MIRAPEX) 0 5 mg tablet  Self Yes No   Sig: Take 0 5 mg by mouth daily at bedtime   pregabalin (LYRICA) 75 mg capsule  Self Yes No   Sig: Take 75 mg by mouth 3 (three) times a day as needed    sertraline (Zoloft) 50 mg tablet   No No   Sig: Take 1 tablet (50 mg total) by mouth daily      Facility-Administered Medications: None       Past Medical History:   Diagnosis Date    Acid reflux     Acute renal failure (HCC)     multiple episodes    Anemia     Anxiety     Arthritis     Asthma     Chronic pain     DDD (degenerative disc disease), lumbar     Disease of thyroid gland     had surgery and now hypo    DVT (deep venous thrombosis) ([facility] [address] )     s/p ankle fracture    History of transfusion     Hypercalcemia     Hyperlipidemia     Hyperthyroidism     Hypocalcemia     post op 2016    Kidney stone     Migraine     Psychiatric disorder     anxiety depression    Seizures (HCC)     petit mal x1  4 years ago- all tests were neg      Spondylolisthesis of lumbar region     Treatment     spinal pain injections  last was 2016    Wears glasses        Past Surgical History:   Procedure Laterality Date    BACK SURGERY      L4-5, S1-fusion-plate/screws     SECTION      x5    CYSTOCELE REPAIR  2017    CYSTOSCOPY      DISCOGRAM      HYSTERECTOMY      PARATHYROIDECTOMY      TN ANTERIOR COLPORRAPHY RPR CYSTOCELE W/CYSTO N/A 2017    Procedure: CYSTOCELE REPAIR;  Surgeon: Russell Jones MD;  Location: [facility];  Service: Gynecology    TN ARTHRODESIS POSTERIOR INTERBODY LUMBAR N/A 8/12/2016    Procedure: L4-S1 LUMBAR LAMINECTOMY/DECOMPRESSION;  INSTRUMENTED POSTEROLATERAL FUSION/ INTERBODY L5-S1; ALLOGRAFT AND AUTOGRAFT (IMPULSE) ; Surgeon: Sneha Becerril MD;  Location:  MAIN OR;  Service: Orthopedics    OH CYSTOURETHROSCOPY,URETER CATHETER Bilateral 12/7/2018    Procedure: INSERTION URETERAL CATHETERS PREOP;  Surgeon: Rey Clemons MD;  Location: [address] [place] [address];  Service: Urology    OH SLING OPER STRES INCONTINENCE N/A 5/4/2017    Procedure: MID URETHRAL SLING;  Surgeon: Ag Leija MD;  Location: [address] [place] [address];  Service: Urology    OH SUPRACERV ABD HYSTERECTOMY N/A 12/7/2018    Procedure: SUPRACERVICAL HYSTERECTOMY;  Surgeon: Diamond Hillman MD;  Location: [facility];  Service: Gynecology    THYROIDECTOMY      TONSILECTOMY AND ADNOIDECTOMY      TONSILLECTOMY      TUBAL LIGATION         Family History   Problem Relation Age of Onset    Diabetes Mother     Hypertension Mother     Hyperlipidemia Father     Arrhythmia Father     Lung cancer Father     Diabetes Father      I have reviewed and agree with the history as documented  E-Cigarette/Vaping    E-Cigarette Use Never User      E-Cigarette/Vaping Substances    Nicotine No     THC No     CBD No     Flavoring No     Other No     Unknown No      Social History     Tobacco Use    Smoking status: Current Every Day Smoker     Packs/day: 0 50     Years: 25 00     Pack years: 12 50     Types: Cigarettes    Smokeless tobacco: Never Used   Vaping Use    Vaping Use: Never used   Substance Use Topics    Alcohol use: Not Currently    Drug use: No       Review of Systems   Constitutional: Negative for activity change, chills, diaphoresis and fever  HENT: Negative for congestion, ear pain, nosebleeds, sore throat, trouble swallowing and voice change  Eyes: Negative for pain, discharge and redness     Respiratory: Negative for apnea, cough, choking, shortness of breath, wheezing and stridor  Cardiovascular: Negative for chest pain and palpitations  Gastrointestinal: Negative for abdominal distention, abdominal pain, constipation, diarrhea, nausea and vomiting  Endocrine: Negative for polydipsia  Genitourinary: Negative for difficulty urinating, dysuria, flank pain, frequency, hematuria and urgency  Musculoskeletal: Negative for back pain, gait problem, joint swelling, myalgias, neck pain and neck stiffness  Skin: Negative for pallor and rash  Neurological: Positive for weakness  Negative for dizziness, tremors, syncope, speech difficulty, numbness and headaches  Hematological: Negative for adenopathy  Psychiatric/Behavioral: Negative for confusion, hallucinations, self-injury and suicidal ideas  The patient is not nervous/anxious  Physical Exam  Physical Exam  Vitals and nursing note reviewed  Constitutional:       General: She is not in acute distress  Appearance: She is well-developed  She is not diaphoretic  HENT:      Head: Normocephalic and atraumatic  Right Ear: External ear normal       Left Ear: External ear normal       Nose: Nose normal    Eyes:      Conjunctiva/sclera: Conjunctivae normal       Pupils: Pupils are equal, round, and reactive to light  Cardiovascular:      Rate and Rhythm: Normal rate and regular rhythm  Heart sounds: Normal heart sounds  Pulmonary:      Effort: Pulmonary effort is normal       Breath sounds: Normal breath sounds  Abdominal:      General: Bowel sounds are normal       Palpations: Abdomen is soft  Musculoskeletal:         General: Normal range of motion  Cervical back: Normal range of motion and neck supple  Skin:     General: Skin is warm and dry  Neurological:      Mental Status: She is alert and oriented to person, place, and time  Deep Tendon Reflexes: Reflexes are normal and symmetric  Psychiatric:         Behavior: Behavior is cooperative           Vital Signs  ED Triage Vitals [06/30/22 2214]   Temperature Pulse Respirations Blood Pressure SpO2   98 7 °F (37 1 °C) 96 20 122/74 100 %      Temp Source Heart Rate Source Patient Position - Orthostatic VS BP Location FiO2 (%)   Tympanic Monitor Sitting Left arm --      Pain Score       --           Vitals:    06/30/22 2214 07/01/22 0017   BP: 122/74 126/66   Pulse: 96 79   Patient Position - Orthostatic VS: Sitting Sitting         Visual Acuity      ED Medications  Medications - No data to display    Diagnostic Studies  Results Reviewed     Procedure Component Value Units Date/Time    Basic metabolic panel [[id number]]  (Abnormal) Collected: 06/30/22 2325    Lab Status: Final result Specimen: Blood from Arm, Right Updated: 06/30/22 2348     Sodium 139 mmol/L      Potassium 3 6 mmol/L      Chloride 99 mmol/L      CO2 28 mmol/L      ANION GAP 12 mmol/L      BUN 25 mg/dL      Creatinine 1 46 mg/dL      Glucose 134 mg/dL      Calcium 9 5 mg/dL      eGFR 43 ml/min/1 73sq m     Narrative:      [place] guidelines for Chronic Kidney Disease (CKD):     Stage 1 with normal or high GFR (GFR > 90 mL/min/1 73 square meters)    Stage 2 Mild CKD (GFR = 60-89 mL/min/1 73 square meters)    Stage 3A Moderate CKD (GFR = 45-59 mL/min/1 73 square meters)    Stage 3B Moderate CKD (GFR = 30-44 mL/min/1 73 square meters)    Stage 4 Severe CKD (GFR = 15-29 mL/min/1 73 square meters)    Stage 5 End Stage CKD (GFR <15 mL/min/1 73 square meters)  Note: GFR calculation is accurate only with a steady state creatinine    Calcium, ionized [[id number]]  (Normal) Collected: 06/30/22 2325    Lab Status: Final result Specimen: Blood from Arm, Right Updated: 06/30/22 2333     Calcium, Ionized 1 18 mmol/L     CBC and differential [[id number]]  (Abnormal) Collected: 06/30/22 2325    Lab Status: Final result Specimen: Blood from Arm, Right Updated: 06/30/22 2331     WBC 6 70 Thousand/uL      RBC 4 27 Million/uL      Hemoglobin 12 4 g/dL Hematocrit 37 9 %      MCV 89 fL      MCH 29 0 pg      MCHC 32 7 g/dL      RDW 17 3 %      MPV 10 1 fL      Platelets 988 Thousands/uL      nRBC 0 /100 WBCs      Neutrophils Relative 59 %      Immat GRANS % 0 %      Lymphocytes Relative 32 %      Monocytes Relative 6 %      Eosinophils Relative 2 %      Basophils Relative 1 %      Neutrophils Absolute 3 89 Thousands/µL      Immature Grans Absolute 0 02 Thousand/uL      Lymphocytes Absolute 2 17 Thousands/µL      Monocytes Absolute 0 41 Thousand/µL      Eosinophils Absolute 0 16 Thousand/µL      Basophils Absolute 0 05 Thousands/µL                  No orders to display              Procedures  Procedures         ED Course                               SBIRT 22yo+    Flowsheet Row Most Recent Value   SBIRT (25 yo +)    In order to provide better care to our patients, we are screening all of our patients for alcohol and drug use  Would it be okay to ask you these screening questions? Yes Filed at: 06/30/2022 2342   Initial Alcohol Screen: US AUDIT-C     1  How often do you have a drink containing alcohol? 0 Filed at: 06/30/2022 2342   2  How many drinks containing alcohol do you have on a typical day you are drinking? 0 Filed at: 06/30/2022 2342   3a  Male UNDER 65: How often do you have five or more drinks on one occasion? 0 Filed at: 06/30/2022 2342   3b  FEMALE Any Age, or MALE 65+: How often do you have 4 or more drinks on one occassion? 0 Filed at: 06/30/2022 2342   Audit-C Score 0 Filed at: 06/30/2022 2342   COLETTE: How many times in the past year have you    Used an illegal drug or used a prescription medication for non-medical reasons?  Never Filed at: 06/30/2022 2342                    MDM    Disposition  Final diagnoses:   Paresthesias     Time reflects when diagnosis was documented in both MDM as applicable and the Disposition within this note     Time User Action Codes Description Comment    7/1/2022 12:10 AM Amy Erwin [R20 2] Paresthesias ED Disposition     ED Disposition   Discharge    Condition   Stable    Date/Time   Fri Jul 1, 2022 12:10 AM    Comment   [facility] discharge to home/self care                 Follow-up Information     Follow up With Specialties Details Why Contact Meghann Walker MD Internal Medicine Schedule an appointment as soon as possible for a visit  As needed [address]  [phone]            Discharge Medication List as of 7/1/2022 12:11 AM      CONTINUE these medications which have NOT CHANGED    Details   ALPRAZolam (XANAX) 1 mg tablet Take 1 mg by mouth daily am, Historical Med      baclofen 20 mg tablet Take 1 tablet (20 mg total) by mouth 3 (three) times a day, Starting Thu 11/18/2021, Normal      calcitriol (ROCALTROL) 0 25 mcg capsule TAKE 1 CAPSULE BY MOUTH EVERY DAY, Normal      Calcium Carb-Cholecalciferol (Calcium 500+D) 500-400 MG-UNIT TABS Take 600 mg by mouth 3 times a day, Normal      fenofibrate (TRIGLIDE) 160 MG tablet Take 160 mg by mouth daily, Historical Med      ferrous sulfate 325 (65 Fe) mg tablet Take 325 mg by mouth Once Monday, wed, Friday-Last dose 4/1/22 , Historical Med      ibuprofen (MOTRIN) 600 mg tablet Take 1 tablet (600 mg total) by mouth every 6 (six) hours as needed for mild pain or moderate pain, Starting Tue 6/2/2020, Normal      levothyroxine 150 mcg tablet TAKE 1 TABLET BY MOUTH EVERY DAY, Normal      Lidocaine-Menthol 4-1 % PTCH if needed  , Historical Med      magnesium Oxide (MAG-OX) 400 mg TABS TAKE 1 TABLET (400 MG TOTAL) BY MOUTH THREE (THREE) TIMES A DAY 9PM, Normal      omeprazole (PriLOSEC) 40 MG capsule Take 1 capsule (40 mg total) by mouth daily, Starting Tue 6/14/2022, Normal      oxyCODONE-acetaminophen (PERCOCET)  mg per tablet Take 1 tablet by mouth 4 (four) times a day, Historical Med      pantoprazole (PROTONIX) 40 mg tablet Take 1 tablet (40 mg total) by mouth daily Take 1/2 hour prior to breakfast daily in a m   , Starting Wed 6/8/2022, Until Fri 7/8/2022, Normal      potassium chloride (K-DUR,KLOR-CON) 20 mEq tablet Take 20 mEq by mouth 2 (two) times a day, Historical Med      pramipexole (MIRAPEX) 0 5 mg tablet Take 0 5 mg by mouth daily at bedtime, Historical Med      pregabalin (LYRICA) 75 mg capsule Take 75 mg by mouth 3 (three) times a day as needed , Historical Med      sertraline (Zoloft) 50 mg tablet Take 1 tablet (50 mg total) by mouth daily, Starting Thu 6/16/2022, Normal             No discharge procedures on file      PDMP Review       Value Time User    PDMP Reviewed  Yes 5/30/2022  9:45 PM Roshan Palomares PA-C          ED Provider  Electronically Signed by           Priya Gallegos DO  07/01/22 2082

## 2022-07-01 NOTE — TELEPHONE ENCOUNTER
Left  message to let her know still no sooner appt  but will see if any of our Endo office have any sooner  appt

## 2022-07-06 ENCOUNTER — TELEPHONE (OUTPATIENT)
Dept: NEPHROLOGY | Facility: CLINIC | Age: 47
End: 2022-07-06

## 2022-07-06 NOTE — TELEPHONE ENCOUNTER
----- Message from Jaspal Aquino MD sent at 7/6/2022 11:31 AM EDT -----  I did respond there is already a BMP slip ordered from 07/01 she can go get that done when she feels she needs to   ----- Message -----  From: Bradley Mcmanus  Sent: 7/6/2022  10:34 AM EDT  To: Jaspal Aquino MD    Please advise     ----- Message -----  From: Shanita Snyder  Sent: 7/5/2022   1:25 PM EDT  To: Jaspal Aquino MD, [facility]    Patient was recently hospitalized and was worried about blood test results  She requested if possible, could you check her tests and send her new scripts to get more tests done if needed

## 2022-07-11 ENCOUNTER — APPOINTMENT (OUTPATIENT)
Dept: LAB | Facility: HOSPITAL | Age: 47
End: 2022-07-11
Payer: COMMERCIAL

## 2022-07-11 DIAGNOSIS — E83.51 HYPOCALCEMIA: ICD-10-CM

## 2022-07-11 LAB
ANION GAP SERPL CALCULATED.3IONS-SCNC: 14 MMOL/L (ref 4–13)
BUN SERPL-MCNC: 15 MG/DL (ref 5–25)
CALCIUM SERPL-MCNC: 8.3 MG/DL (ref 8.3–10.1)
CHLORIDE SERPL-SCNC: 97 MMOL/L (ref 100–108)
CO2 SERPL-SCNC: 22 MMOL/L (ref 21–32)
CREAT SERPL-MCNC: 0.91 MG/DL (ref 0.6–1.3)
GFR SERPL CREATININE-BSD FRML MDRD: 76 ML/MIN/1.73SQ M
GLUCOSE P FAST SERPL-MCNC: 158 MG/DL (ref 65–99)
POTASSIUM SERPL-SCNC: 4.3 MMOL/L (ref 3.5–5.3)
SODIUM SERPL-SCNC: 133 MMOL/L (ref 136–145)

## 2022-07-11 PROCEDURE — 80048 BASIC METABOLIC PNL TOTAL CA: CPT

## 2022-07-11 PROCEDURE — 36415 COLL VENOUS BLD VENIPUNCTURE: CPT

## 2022-07-12 ENCOUNTER — TELEPHONE (OUTPATIENT)
Dept: NEPHROLOGY | Facility: CLINIC | Age: 47
End: 2022-07-12

## 2022-07-12 DIAGNOSIS — E83.51 HYPOCALCEMIA: ICD-10-CM

## 2022-07-12 DIAGNOSIS — N17.9 AKI (ACUTE KIDNEY INJURY) (HCC): Primary | ICD-10-CM

## 2022-07-12 NOTE — TELEPHONE ENCOUNTER
Lm for the patient advising that CR and calcium  are back to normal  No changes to be made at this time  - no drinking more than 2000 ml per day / 67 ounces     - BMP in epic  Mailed out

## 2022-07-12 NOTE — TELEPHONE ENCOUNTER
----- Message from [address] sent at 7/12/2022 11:32 AM EDT -----  Repeat BMP reviewed  Calcium better back to normal as well as creatinine  Okay to return to normal oral intake  No more then 2000 ml of fluid for now  Well repeat BMP in one week to ensure stability   thanks

## 2022-07-13 NOTE — TELEPHONE ENCOUNTER
Lm for the patient x2 no changes are to be made at this time  - do not exceed no more than 67oz per day   - BMP due in 1 week

## 2022-07-22 ENCOUNTER — APPOINTMENT (OUTPATIENT)
Dept: LAB | Facility: HOSPITAL | Age: 47
End: 2022-07-22
Payer: COMMERCIAL

## 2022-07-22 DIAGNOSIS — N17.9 AKI (ACUTE KIDNEY INJURY) (HCC): ICD-10-CM

## 2022-07-22 DIAGNOSIS — E83.51 HYPOCALCEMIA: ICD-10-CM

## 2022-07-22 LAB
ANION GAP SERPL CALCULATED.3IONS-SCNC: 14 MMOL/L (ref 4–13)
BUN SERPL-MCNC: 28 MG/DL (ref 5–25)
CALCIUM SERPL-MCNC: 9 MG/DL (ref 8.3–10.1)
CHLORIDE SERPL-SCNC: 102 MMOL/L (ref 96–108)
CO2 SERPL-SCNC: 21 MMOL/L (ref 21–32)
CREAT SERPL-MCNC: 1.18 MG/DL (ref 0.6–1.3)
GFR SERPL CREATININE-BSD FRML MDRD: 55 ML/MIN/1.73SQ M
GLUCOSE P FAST SERPL-MCNC: 137 MG/DL (ref 65–99)
POTASSIUM SERPL-SCNC: 4.3 MMOL/L (ref 3.5–5.3)
SODIUM SERPL-SCNC: 137 MMOL/L (ref 135–147)

## 2022-07-22 PROCEDURE — 36415 COLL VENOUS BLD VENIPUNCTURE: CPT

## 2022-07-22 PROCEDURE — 80048 BASIC METABOLIC PNL TOTAL CA: CPT

## 2022-07-23 ENCOUNTER — HOSPITAL ENCOUNTER (EMERGENCY)
Facility: HOSPITAL | Age: 47
Discharge: HOME | End: 2022-07-23
Attending: EMERGENCY MEDICINE
Payer: COMMERCIAL

## 2022-07-23 ENCOUNTER — APPOINTMENT (EMERGENCY)
Dept: RADIOLOGY | Facility: HOSPITAL | Age: 47
End: 2022-07-23
Payer: COMMERCIAL

## 2022-07-23 VITALS
DIASTOLIC BLOOD PRESSURE: 63 MMHG | SYSTOLIC BLOOD PRESSURE: 100 MMHG | RESPIRATION RATE: 17 BRPM | TEMPERATURE: 98 F | WEIGHT: 200 LBS | OXYGEN SATURATION: 95 % | HEART RATE: 77 BPM | BODY MASS INDEX: 36.58 KG/M2

## 2022-07-23 DIAGNOSIS — M79.646 THUMB PAIN: Primary | ICD-10-CM

## 2022-07-23 DIAGNOSIS — E83.51 HYPOCALCEMIA: ICD-10-CM

## 2022-07-23 LAB
ANION GAP SERPL CALCULATED.3IONS-SCNC: 16 MMOL/L (ref 4–13)
BASOPHILS # BLD AUTO: 0.05 THOUSANDS/ΜL (ref 0–0.1)
BASOPHILS NFR BLD AUTO: 1 % (ref 0–1)
BUN SERPL-MCNC: 22 MG/DL (ref 5–25)
CA-I BLD-SCNC: 1.07 MMOL/L (ref 1.12–1.32)
CALCIUM SERPL-MCNC: 8.3 MG/DL (ref 8.3–10.1)
CHLORIDE SERPL-SCNC: 100 MMOL/L (ref 96–108)
CO2 SERPL-SCNC: 20 MMOL/L (ref 21–32)
CREAT SERPL-MCNC: 1.2 MG/DL (ref 0.6–1.3)
EOSINOPHIL # BLD AUTO: 0.11 THOUSAND/ΜL (ref 0–0.61)
EOSINOPHIL NFR BLD AUTO: 2 % (ref 0–6)
ERYTHROCYTE [DISTWIDTH] IN BLOOD BY AUTOMATED COUNT: 17.7 % (ref 11.6–15.1)
GFR SERPL CREATININE-BSD FRML MDRD: 54 ML/MIN/1.73SQ M
GLUCOSE SERPL-MCNC: 136 MG/DL (ref 65–140)
HCT VFR BLD AUTO: 38 % (ref 34.8–46.1)
HGB BLD-MCNC: 12.2 G/DL (ref 11.5–15.4)
IMM GRANULOCYTES # BLD AUTO: 0.02 THOUSAND/UL (ref 0–0.2)
IMM GRANULOCYTES NFR BLD AUTO: 0 % (ref 0–2)
LYMPHOCYTES # BLD AUTO: 2.25 THOUSANDS/ΜL (ref 0.6–4.47)
LYMPHOCYTES NFR BLD AUTO: 33 % (ref 14–44)
MCH RBC QN AUTO: 28.4 PG (ref 26.8–34.3)
MCHC RBC AUTO-ENTMCNC: 32.1 G/DL (ref 31.4–37.4)
MCV RBC AUTO: 89 FL (ref 82–98)
MONOCYTES # BLD AUTO: 0.47 THOUSAND/ΜL (ref 0.17–1.22)
MONOCYTES NFR BLD AUTO: 7 % (ref 4–12)
NEUTROPHILS # BLD AUTO: 3.87 THOUSANDS/ΜL (ref 1.85–7.62)
NEUTS SEG NFR BLD AUTO: 57 % (ref 43–75)
NRBC BLD AUTO-RTO: 0 /100 WBCS
PLATELET # BLD AUTO: 170 THOUSANDS/UL (ref 149–390)
PMV BLD AUTO: 10 FL (ref 8.9–12.7)
POTASSIUM SERPL-SCNC: 3.8 MMOL/L (ref 3.5–5.3)
RBC # BLD AUTO: 4.29 MILLION/UL (ref 3.81–5.12)
SODIUM SERPL-SCNC: 136 MMOL/L (ref 135–147)
WBC # BLD AUTO: 6.77 THOUSAND/UL (ref 4.31–10.16)

## 2022-07-23 PROCEDURE — 85025 COMPLETE CBC W/AUTO DIFF WBC: CPT | Performed by: EMERGENCY MEDICINE

## 2022-07-23 PROCEDURE — 96365 THER/PROPH/DIAG IV INF INIT: CPT

## 2022-07-23 PROCEDURE — 82330 ASSAY OF CALCIUM: CPT | Performed by: EMERGENCY MEDICINE

## 2022-07-23 PROCEDURE — 99282 EMERGENCY DEPT VISIT SF MDM: CPT | Performed by: EMERGENCY MEDICINE

## 2022-07-23 PROCEDURE — 80048 BASIC METABOLIC PNL TOTAL CA: CPT | Performed by: EMERGENCY MEDICINE

## 2022-07-23 PROCEDURE — 73140 X-RAY EXAM OF FINGER(S): CPT

## 2022-07-23 PROCEDURE — 36415 COLL VENOUS BLD VENIPUNCTURE: CPT | Performed by: EMERGENCY MEDICINE

## 2022-07-23 PROCEDURE — 99284 EMERGENCY DEPT VISIT MOD MDM: CPT

## 2022-07-23 RX ORDER — CALCIUM GLUCONATE 20 MG/ML
1 INJECTION, SOLUTION INTRAVENOUS ONCE
Status: COMPLETED | OUTPATIENT
Start: 2022-07-23 | End: 2022-07-23

## 2022-07-23 RX ADMIN — CALCIUM GLUCONATE 1 G: 20 INJECTION, SOLUTION INTRAVENOUS at 21:00

## 2022-07-24 NOTE — ED PROVIDER NOTES
History  Chief Complaint   Patient presents with    Thumb Pain     R thumb pain and generalized body (head to toe) numbness     HPI    56 year female that presents today with right thumb pain along with generalized body numbness  Patient states for past month she has been having pain in the thumb  Denies any trauma or overuse injury  Patient denies any numbness of the thumb  She states pain diffusely  No bruising  Patient states she has history of hypokalemia  Often has generalized numbness  Her ionized calcium was low  She has been compliant with medications at home  Patient states today feeling numbness  56 year female that presents with right thumb pain and generalized numbness  Found to have slight low calcium will give her calcium  Will get an x-ray of the thumb  Prior to Admission Medications   Prescriptions Last Dose Informant Patient Reported? Taking?    ALPRAZolam (XANAX) 1 mg tablet  Self Yes No   Sig: Take 1 mg by mouth daily am   Calcium Carb-Cholecalciferol (Calcium 500+D) 500-400 MG-UNIT TABS  Self No No   Sig: Take 600 mg by mouth 3 times a day   Lidocaine-Menthol 4-1 % PTCH  Self Yes No   Sig: if needed     baclofen 20 mg tablet  Self No No   Sig: Take 1 tablet (20 mg total) by mouth 3 (three) times a day   Patient taking differently: Take 20 mg by mouth 3 (three) times a day as needed   calcitriol (ROCALTROL) 0 25 mcg capsule  Self No No   Sig: TAKE 1 CAPSULE BY MOUTH EVERY DAY   fenofibrate (TRIGLIDE) 160 MG tablet  Self Yes No   Sig: Take 160 mg by mouth daily   ferrous sulfate 325 (65 Fe) mg tablet  Self Yes No   Sig: Take 325 mg by mouth Once Monday, wed, Friday-Last dose 4/1/22    ibuprofen (MOTRIN) 600 mg tablet  Self No No   Sig: Take 1 tablet (600 mg total) by mouth every 6 (six) hours as needed for mild pain or moderate pain   levothyroxine 150 mcg tablet  Self No No   Sig: TAKE 1 TABLET BY MOUTH EVERY DAY   Patient taking differently: 150 mcg every morning   magnesium Oxide (MAG-OX) 400 mg TABS  Self No No   Sig: TAKE 1 TABLET (400 MG TOTAL) BY MOUTH THREE (THREE) TIMES A DAY 9PM   omeprazole (PriLOSEC) 40 MG capsule   No No   Sig: Take 1 capsule (40 mg total) by mouth daily   oxyCODONE-acetaminophen (PERCOCET)  mg per tablet  Self Yes No   Sig: Take 1 tablet by mouth 4 (four) times a day   pantoprazole (PROTONIX) 40 mg tablet   No No   Sig: Take 1 tablet (40 mg total) by mouth daily Take 1/2 hour prior to breakfast daily in a [facility] potassium chloride (K-DUR,KLOR-CON) 20 mEq tablet  Self Yes No   Sig: Take 20 mEq by mouth 2 (two) times a day   pramipexole (MIRAPEX) 0 5 mg tablet  Self Yes No   Sig: Take 0 5 mg by mouth daily at bedtime   pregabalin (LYRICA) 75 mg capsule  Self Yes No   Sig: Take 75 mg by mouth 3 (three) times a day as needed    sertraline (Zoloft) 50 mg tablet   No No   Sig: Take 1 tablet (50 mg total) by mouth daily      Facility-Administered Medications: None       Past Medical History:   Diagnosis Date    Acid reflux     Acute renal failure (HCC)     multiple episodes    Anemia     Anxiety     Arthritis     Asthma     Chronic pain     DDD (degenerative disc disease), lumbar     Disease of thyroid gland     had surgery and now hypo    DVT (deep venous thrombosis) ([facility] [address] )     s/p ankle fracture    History of transfusion     Hypercalcemia     Hyperlipidemia     Hyperthyroidism     Hypocalcemia     post op 2016    Kidney stone     Migraine     Psychiatric disorder     anxiety depression    Seizures (HCC)     petit mal x1  4 years ago- all tests were neg      Spondylolisthesis of lumbar region     Treatment     spinal pain injections  last was 2016    Wears glasses        Past Surgical History:   Procedure Laterality Date    BACK SURGERY      L4-5, S1-fusion-plate/screws     SECTION      x5    CYSTOCELE REPAIR  2017    CYSTOSCOPY      DISCOGRAM      HYSTERECTOMY      PARATHYROIDECTOMY      NH ANTERIOR COLPORRAPHY RPR CYSTOCELE W/CYSTO N/A 5/4/2017    Procedure: CYSTOCELE REPAIR;  Surgeon: Russell Jones MD;  Location: [address] [place] [address];  Service: Gynecology    NH ARTHRODESIS POSTERIOR INTERBODY LUMBAR N/A 8/12/2016    Procedure: L4-S1 LUMBAR LAMINECTOMY/DECOMPRESSION;  INSTRUMENTED POSTEROLATERAL FUSION/ INTERBODY L5-S1; ALLOGRAFT AND AUTOGRAFT (IMPULSE) ; Surgeon: Diana Savage MD;  Location:  MAIN OR;  Service: Orthopedics    NH CYSTOURETHROSCOPY,URETER CATHETER Bilateral 12/7/2018    Procedure: INSERTION URETERAL CATHETERS PREOP;  Surgeon: Clarita Brower MD;  Location: [address] [place] [address];  Service: Urology    NH SLING OPER STRES INCONTINENCE N/A 5/4/2017    Procedure: MID URETHRAL SLING;  Surgeon: Amanda Rodriguez MD;  Location: [address] [place] [address];  Service: Urology    NH SUPRACERV ABD HYSTERECTOMY N/A 12/7/2018    Procedure: SUPRACERVICAL HYSTERECTOMY;  Surgeon: Russell Jones MD;  Location: [facility];  Service: Gynecology    THYROIDECTOMY      TONSILECTOMY AND ADNOIDECTOMY      TONSILLECTOMY      TUBAL LIGATION         Family History   Problem Relation Age of Onset    Diabetes Mother     Hypertension Mother     Hyperlipidemia Father     Arrhythmia Father     Lung cancer Father     Diabetes Father      I have reviewed and agree with the history as documented  E-Cigarette/Vaping    E-Cigarette Use Never User      E-Cigarette/Vaping Substances    Nicotine No     THC No     CBD No     Flavoring No     Other No     Unknown No      Social History     Tobacco Use    Smoking status: Current Every Day Smoker     Packs/day: 0 50     Years: 25 00     Pack years: 12 50     Types: Cigarettes    Smokeless tobacco: Never Used   Vaping Use    Vaping Use: Never used   Substance Use Topics    Alcohol use: Not Currently    Drug use: No       Review of Systems   Constitutional: Negative  Negative for diaphoresis and fever  HENT: Negative  Respiratory: Negative    Negative for cough, shortness of breath and wheezing  Cardiovascular: Negative  Negative for chest pain, palpitations and leg swelling  Gastrointestinal: Negative for abdominal distention, abdominal pain, nausea and vomiting  Genitourinary: Negative  Musculoskeletal:        Right thumb: soft compartments  No bruising  Able to flex and extend without issues  No obvious deformity    Skin: Negative  Neurological: Positive for numbness  Psychiatric/Behavioral: Negative  All other systems reviewed and are negative  Physical Exam  Physical Exam  Constitutional:       Appearance: She is well-developed  HENT:      Head: Normocephalic and atraumatic  Eyes:      Pupils: Pupils are equal, round, and reactive to light  Cardiovascular:      Rate and Rhythm: Normal rate and regular rhythm  Heart sounds: Normal heart sounds  No murmur heard  Pulmonary:      Effort: Pulmonary effort is normal       Breath sounds: Normal breath sounds  Abdominal:      General: Bowel sounds are normal  There is no distension  Palpations: Abdomen is soft  Tenderness: There is no abdominal tenderness  There is no guarding or rebound  Musculoskeletal:         General: Normal range of motion  Cervical back: Normal range of motion and neck supple  Comments: Right thumb: soft compartments  No bruising  Able to flex and extend without issues  No obvious deformity      Skin:     General: Skin is warm and dry  Neurological:      Mental Status: She is alert and oriented to person, place, and time           Vital Signs  ED Triage Vitals [07/23/22 2019]   Temperature Pulse Respirations Blood Pressure SpO2   98 °F (36 7 °C) 102 18 121/84 98 %      Temp Source Heart Rate Source Patient Position - Orthostatic VS BP Location FiO2 (%)   Oral Monitor Sitting Right arm --      Pain Score       8           Vitals:    07/23/22 2045 07/23/22 2100 07/23/22 2130 07/23/22 2200   BP: 116/69 108/67 111/63 100/63   Pulse: 91 82 82 77   Patient Position - Orthostatic VS: Sitting Lying Sitting Lying         Visual Acuity      ED Medications  Medications   calcium gluconate 1 g in sodium chloride 0 9% 50 mL (premix) (0 g Intravenous Stopped 7/23/22 2159)       Diagnostic Studies  Results Reviewed     Procedure Component Value Units Date/Time    Basic metabolic panel [[id number]]  (Abnormal) Collected: 07/23/22 2042    Lab Status: Final result Specimen: Blood from Line, Venous Updated: 07/23/22 2059     Sodium 136 mmol/L      Potassium 3 8 mmol/L      Chloride 100 mmol/L      CO2 20 mmol/L      ANION GAP 16 mmol/L      BUN 22 mg/dL      Creatinine 1 20 mg/dL      Glucose 136 mg/dL      Calcium 8 3 mg/dL      eGFR 54 ml/min/1 73sq m     Narrative:      [place] guidelines for Chronic Kidney Disease (CKD):     Stage 1 with normal or high GFR (GFR > 90 mL/min/1 73 square meters)    Stage 2 Mild CKD (GFR = 60-89 mL/min/1 73 square meters)    Stage 3A Moderate CKD (GFR = 45-59 mL/min/1 73 square meters)    Stage 3B Moderate CKD (GFR = 30-44 mL/min/1 73 square meters)    Stage 4 Severe CKD (GFR = 15-29 mL/min/1 73 square meters)    Stage 5 End Stage CKD (GFR <15 mL/min/1 73 square meters)  Note: GFR calculation is accurate only with a steady state creatinine    Calcium, ionized [[id number]]  (Abnormal) Collected: 07/23/22 2042    Lab Status: Final result Specimen: Blood from Line, Venous Updated: 07/23/22 2049     Calcium, Ionized 1 07 mmol/L     CBC and differential [[id number]]  (Abnormal) Collected: 07/23/22 2042    Lab Status: Final result Specimen: Blood from Line, Venous Updated: 07/23/22 2049     WBC 6 77 Thousand/uL      RBC 4 29 Million/uL      Hemoglobin 12 2 g/dL      Hematocrit 38 0 %      MCV 89 fL      MCH 28 4 pg      MCHC 32 1 g/dL      RDW 17 7 %      MPV 10 0 fL      Platelets 277 Thousands/uL      nRBC 0 /100 WBCs      Neutrophils Relative 57 %      Immat GRANS % 0 %      Lymphocytes Relative 33 %      Monocytes Relative 7 % Eosinophils Relative 2 %      Basophils Relative 1 %      Neutrophils Absolute 3 87 Thousands/µL      Immature Grans Absolute 0 02 Thousand/uL      Lymphocytes Absolute 2 25 Thousands/µL      Monocytes Absolute 0 47 Thousand/µL      Eosinophils Absolute 0 11 Thousand/µL      Basophils Absolute 0 05 Thousands/µL                  XR thumb right first digit-min 2v   Final Result by Evelyne Tavares MD (07/24 1038)      No acute osseous abnormality  Workstation performed: [id number]                    Procedures  Procedures         ED Course                               SBIRT 22yo+    Flowsheet Row Most Recent Value   SBIRT (25 yo +)    In order to provide better care to our patients, we are screening all of our patients for alcohol and drug use  Would it be okay to ask you these screening questions? No Filed at: 07/23/2022 2051                    MDM    Disposition  Final diagnoses:   Thumb pain   Hypocalcemia     Time reflects when diagnosis was documented in both MDM as applicable and the Disposition within this note     Time User Action Codes Description Comment    7/23/2022  9:55 PM Cheryle Southerly Add [M79 646] Thumb pain     7/23/2022  9:55 PM Cheryle Southerly Add [E83 51] Hypocalcemia       ED Disposition     ED Disposition   Discharge    Condition   Stable    Date/Time   Sat Jul 23, 2022  9:55 PM    Comment   [address] [facility] discharge to home/self care                 Follow-up Information     Follow up With Specialties Details Why Contact Info Additional [address] [facility] Orthopedic Surgery Schedule an appointment as soon as possible for a visit   [address], [place] [address], [facility] [address] [facility], [address] [place] [address], [id number] [phone]          Discharge Medication List as of 7/23/2022  9:59 PM      CONTINUE these medications which have NOT CHANGED    Details   ALPRAZolam (XANAX) 1 mg tablet Take 1 mg by mouth daily am, Historical Med      baclofen 20 mg tablet Take 1 tablet (20 mg total) by mouth 3 (three) times a day, Starting Thu 11/18/2021, Normal      calcitriol (ROCALTROL) 0 25 mcg capsule TAKE 1 CAPSULE BY MOUTH EVERY DAY, Normal      Calcium Carb-Cholecalciferol (Calcium 500+D) 500-400 MG-UNIT TABS Take 600 mg by mouth 3 times a day, Normal      fenofibrate (TRIGLIDE) 160 MG tablet Take 160 mg by mouth daily, Historical Med      ferrous sulfate 325 (65 Fe) mg tablet Take 325 mg by mouth Once Monday, wed, Friday-Last dose 4/1/22 , Historical Med      ibuprofen (MOTRIN) 600 mg tablet Take 1 tablet (600 mg total) by mouth every 6 (six) hours as needed for mild pain or moderate pain, Starting Tue 6/2/2020, Normal      levothyroxine 150 mcg tablet TAKE 1 TABLET BY MOUTH EVERY DAY, Normal      Lidocaine-Menthol 4-1 % PTCH if needed  , Historical Med      magnesium Oxide (MAG-OX) 400 mg TABS TAKE 1 TABLET (400 MG TOTAL) BY MOUTH THREE (THREE) TIMES A DAY 9PM, Normal      omeprazole (PriLOSEC) 40 MG capsule Take 1 capsule (40 mg total) by mouth daily, Starting Tue 6/14/2022, Normal      oxyCODONE-acetaminophen (PERCOCET)  mg per tablet Take 1 tablet by mouth 4 (four) times a day, Historical Med      pantoprazole (PROTONIX) 40 mg tablet Take 1 tablet (40 mg total) by mouth daily Take 1/2 hour prior to breakfast daily in a m   , Starting Wed 6/8/2022, Until Fri 7/8/2022, Normal      potassium chloride (K-DUR,KLOR-CON) 20 mEq tablet Take 20 mEq by mouth 2 (two) times a day, Historical Med      pramipexole (MIRAPEX) 0 5 mg tablet Take 0 5 mg by mouth daily at bedtime, Historical Med      pregabalin (LYRICA) 75 mg capsule Take 75 mg by mouth 3 (three) times a day as needed , Historical Med      sertraline (Zoloft) 50 mg tablet Take 1 tablet (50 mg total) by mouth daily, Starting u 6/16/2022, Normal             No discharge procedures on file      PDMP Review       Value Time User    PDMP Reviewed  Yes 5/30/2022  9:45 PM Ken Reddy PA-C          ED Provider  Electronically Signed by           Rachel Hendricks MD  07/24/22 24 [id number]

## 2022-07-25 ENCOUNTER — DOCUMENTATION (OUTPATIENT)
Dept: NEPHROLOGY | Facility: CLINIC | Age: 47
End: 2022-07-25

## 2022-07-25 NOTE — PROGRESS NOTES
Called pt  Per Dr Ju Luo, calcium and lytes normal   However she was in the ER and would like to talk with Dr Ju Luo directly about her levels      Please call her [phone]        Let her know calcium and electrolytes normal

## 2022-07-30 PROBLEM — G89.29 CHRONIC INTRACTABLE PAIN: Status: ACTIVE | Noted: 2022-07-30

## 2022-07-30 PROBLEM — F41.0 PANIC ATTACKS: Status: ACTIVE | Noted: 2022-07-30

## 2022-07-31 ENCOUNTER — HOSPITAL ENCOUNTER (EMERGENCY)
Facility: HOSPITAL | Age: 47
Discharge: HOME | End: 2022-08-01
Attending: EMERGENCY MEDICINE
Payer: COMMERCIAL

## 2022-07-31 DIAGNOSIS — E83.51 HYPOCALCEMIA: Primary | ICD-10-CM

## 2022-07-31 LAB
BASOPHILS # BLD AUTO: 0.05 THOUSANDS/ΜL (ref 0–0.1)
BASOPHILS NFR BLD AUTO: 1 % (ref 0–1)
EOSINOPHIL # BLD AUTO: 0.11 THOUSAND/ΜL (ref 0–0.61)
EOSINOPHIL NFR BLD AUTO: 2 % (ref 0–6)
ERYTHROCYTE [DISTWIDTH] IN BLOOD BY AUTOMATED COUNT: 17.7 % (ref 11.6–15.1)
HCT VFR BLD AUTO: 39 % (ref 34.8–46.1)
HGB BLD-MCNC: 12.9 G/DL (ref 11.5–15.4)
IMM GRANULOCYTES # BLD AUTO: 0.05 THOUSAND/UL (ref 0–0.2)
IMM GRANULOCYTES NFR BLD AUTO: 1 % (ref 0–2)
LYMPHOCYTES # BLD AUTO: 2.12 THOUSANDS/ΜL (ref 0.6–4.47)
LYMPHOCYTES NFR BLD AUTO: 28 % (ref 14–44)
MCH RBC QN AUTO: 29.3 PG (ref 26.8–34.3)
MCHC RBC AUTO-ENTMCNC: 33.1 G/DL (ref 31.4–37.4)
MCV RBC AUTO: 88 FL (ref 82–98)
MONOCYTES # BLD AUTO: 0.39 THOUSAND/ΜL (ref 0.17–1.22)
MONOCYTES NFR BLD AUTO: 5 % (ref 4–12)
NEUTROPHILS # BLD AUTO: 4.86 THOUSANDS/ΜL (ref 1.85–7.62)
NEUTS SEG NFR BLD AUTO: 63 % (ref 43–75)
NRBC BLD AUTO-RTO: 0 /100 WBCS
PLATELET # BLD AUTO: 162 THOUSANDS/UL (ref 149–390)
PMV BLD AUTO: 10.7 FL (ref 8.9–12.7)
RBC # BLD AUTO: 4.41 MILLION/UL (ref 3.81–5.12)
WBC # BLD AUTO: 7.58 THOUSAND/UL (ref 4.31–10.16)

## 2022-07-31 PROCEDURE — 84484 ASSAY OF TROPONIN QUANT: CPT | Performed by: EMERGENCY MEDICINE

## 2022-07-31 PROCEDURE — 82330 ASSAY OF CALCIUM: CPT | Performed by: EMERGENCY MEDICINE

## 2022-07-31 PROCEDURE — 85025 COMPLETE CBC W/AUTO DIFF WBC: CPT | Performed by: EMERGENCY MEDICINE

## 2022-07-31 PROCEDURE — 36415 COLL VENOUS BLD VENIPUNCTURE: CPT | Performed by: EMERGENCY MEDICINE

## 2022-07-31 PROCEDURE — 99285 EMERGENCY DEPT VISIT HI MDM: CPT

## 2022-07-31 PROCEDURE — 93005 ELECTROCARDIOGRAM TRACING: CPT

## 2022-07-31 PROCEDURE — 99285 EMERGENCY DEPT VISIT HI MDM: CPT | Performed by: EMERGENCY MEDICINE

## 2022-07-31 PROCEDURE — 80048 BASIC METABOLIC PNL TOTAL CA: CPT | Performed by: EMERGENCY MEDICINE

## 2022-08-01 ENCOUNTER — OFFICE VISIT (OUTPATIENT)
Dept: INTERNAL MEDICINE CLINIC | Facility: CLINIC | Age: 47
End: 2022-08-01
Payer: COMMERCIAL

## 2022-08-01 VITALS
BODY MASS INDEX: 37.54 KG/M2 | WEIGHT: 204 LBS | HEIGHT: 62 IN | TEMPERATURE: 98.2 F | DIASTOLIC BLOOD PRESSURE: 70 MMHG | HEART RATE: 110 BPM | SYSTOLIC BLOOD PRESSURE: 108 MMHG | OXYGEN SATURATION: 96 %

## 2022-08-01 VITALS
SYSTOLIC BLOOD PRESSURE: 129 MMHG | DIASTOLIC BLOOD PRESSURE: 83 MMHG | RESPIRATION RATE: 18 BRPM | TEMPERATURE: 98 F | HEART RATE: 80 BPM | OXYGEN SATURATION: 96 % | BODY MASS INDEX: 37.6 KG/M2 | WEIGHT: 205.6 LBS

## 2022-08-01 DIAGNOSIS — M51.36 DDD (DEGENERATIVE DISC DISEASE), LUMBAR: Chronic | ICD-10-CM

## 2022-08-01 DIAGNOSIS — F41.0 PANIC ATTACK: ICD-10-CM

## 2022-08-01 DIAGNOSIS — Z86.2 HISTORY OF COAGULOPATHY: ICD-10-CM

## 2022-08-01 DIAGNOSIS — E83.52 HYPERCALCEMIA: ICD-10-CM

## 2022-08-01 DIAGNOSIS — N17.9 AKI (ACUTE KIDNEY INJURY) (HCC): ICD-10-CM

## 2022-08-01 DIAGNOSIS — E83.51 HYPOCALCEMIA: ICD-10-CM

## 2022-08-01 DIAGNOSIS — F41.9 ANXIETY: ICD-10-CM

## 2022-08-01 DIAGNOSIS — F41.0 PANIC ATTACKS: ICD-10-CM

## 2022-08-01 DIAGNOSIS — E55.9 VITAMIN D DEFICIENCY: ICD-10-CM

## 2022-08-01 DIAGNOSIS — E89.2 POSTSURGICAL HYPOPARATHYROIDISM (HCC): Primary | Chronic | ICD-10-CM

## 2022-08-01 DIAGNOSIS — R74.01 ELEVATED ALT MEASUREMENT: ICD-10-CM

## 2022-08-01 DIAGNOSIS — E89.0 POSTOPERATIVE HYPOTHYROIDISM: Chronic | ICD-10-CM

## 2022-08-01 LAB
ANION GAP SERPL CALCULATED.3IONS-SCNC: 15 MMOL/L (ref 4–13)
ATRIAL RATE: 92 BPM
BUN SERPL-MCNC: 25 MG/DL (ref 5–25)
CA-I BLD-SCNC: 1.07 MMOL/L (ref 1.12–1.32)
CALCIUM SERPL-MCNC: 8.5 MG/DL (ref 8.3–10.1)
CARDIAC TROPONIN I PNL SERPL HS: 2 NG/L
CHLORIDE SERPL-SCNC: 100 MMOL/L (ref 96–108)
CO2 SERPL-SCNC: 22 MMOL/L (ref 21–32)
CREAT SERPL-MCNC: 1.08 MG/DL (ref 0.6–1.3)
GFR SERPL CREATININE-BSD FRML MDRD: 61 ML/MIN/1.73SQ M
GLUCOSE SERPL-MCNC: 120 MG/DL (ref 65–140)
P AXIS: 46 DEGREES
POTASSIUM SERPL-SCNC: 3.8 MMOL/L (ref 3.5–5.3)
PR INTERVAL: 166 MS
QRS AXIS: 25 DEGREES
QRSD INTERVAL: 74 MS
QT INTERVAL: 362 MS
QTC INTERVAL: 447 MS
SODIUM SERPL-SCNC: 137 MMOL/L (ref 135–147)
T WAVE AXIS: 33 DEGREES
VENTRICULAR RATE: 92 BPM

## 2022-08-01 PROCEDURE — 96365 THER/PROPH/DIAG IV INF INIT: CPT

## 2022-08-01 PROCEDURE — 99213 OFFICE O/P EST LOW 20 MIN: CPT | Performed by: INTERNAL MEDICINE

## 2022-08-01 PROCEDURE — 93010 ELECTROCARDIOGRAM REPORT: CPT | Performed by: INTERNAL MEDICINE

## 2022-08-01 RX ORDER — CALCIUM GLUCONATE 20 MG/ML
1 INJECTION, SOLUTION INTRAVENOUS ONCE
Status: COMPLETED | OUTPATIENT
Start: 2022-08-01 | End: 2022-08-01

## 2022-08-01 RX ORDER — ALPRAZOLAM 0.5 MG/1
0.5 TABLET ORAL 2 TIMES DAILY PRN
COMMUNITY
End: 2022-08-01 | Stop reason: SDUPTHER

## 2022-08-01 RX ORDER — ALPRAZOLAM 0.5 MG/1
0.5 TABLET ORAL 2 TIMES DAILY PRN
Qty: 45 TABLET | Refills: 0 | Status: SHIPPED | OUTPATIENT
Start: 2022-08-01 | End: 2022-08-08

## 2022-08-01 RX ADMIN — CALCIUM GLUCONATE 1 G: 20 INJECTION, SOLUTION INTRAVENOUS at 00:50

## 2022-08-01 NOTE — ASSESSMENT & PLAN NOTE
Admission on 07/31/2022, Discharged on 08/01/2022   Component Date Value    WBC 07/31/2022 7 58     RBC 07/31/2022 4 41     Hemoglobin 07/31/2022 12 9     Hematocrit 07/31/2022 39 0     MCV 07/31/2022 88     MCH 07/31/2022 29 3     MCHC 07/31/2022 33 1     RDW 07/31/2022 17 7 (A)    MPV 07/31/2022 10 7     Platelets 32/83/9215 162     nRBC 07/31/2022 0     Neutrophils Relative 07/31/2022 63     Immat GRANS % 07/31/2022 1     Lymphocytes Relative 07/31/2022 28     Monocytes Relative 07/31/2022 5     Eosinophils Relative 07/31/2022 2     Basophils Relative 07/31/2022 1     Neutrophils Absolute 07/31/2022 4 86     Immature Grans Absolute 07/31/2022 0 05     Lymphocytes Absolute 07/31/2022 2 12     Monocytes Absolute 07/31/2022 0 39     Eosinophils Absolute 07/31/2022 0 11     Basophils Absolute 07/31/2022 0 05     Sodium 07/31/2022 137     Potassium 07/31/2022 3 8     Chloride 07/31/2022 100     CO2 07/31/2022 22     ANION GAP 07/31/2022 15 (A)    BUN 07/31/2022 25     Creatinine 07/31/2022 1 08     Glucose 07/31/2022 120     Calcium 07/31/2022 8 5     eGFR 07/31/2022 61     Calcium, Ionized 07/31/2022 1 07 (A)    hs TnI 0hr 07/31/2022 2     Ventricular Rate 07/31/2022 92     Atrial Rate 07/31/2022 92     MN Interval 07/31/2022 166     QRSD Interval 07/31/2022 74     QT Interval 07/31/2022 362     QTC Interval 07/31/2022 447     P Axis 07/31/2022 46     QRS Axis 07/31/2022 25     T Wave [place] 07/31/2022 33    Admission on 07/23/2022, Discharged on 07/23/2022   Component Date Value    WBC 07/23/2022 6 77     RBC 07/23/2022 4 29     Hemoglobin 07/23/2022 12 2     Hematocrit 07/23/2022 38 0     MCV 07/23/2022 89     MCH 07/23/2022 28 4     MCHC 07/23/2022 32 1     RDW 07/23/2022 17 7 (A)    MPV 07/23/2022 10 0     Platelets 30/20/3037 170     nRBC 07/23/2022 0     Neutrophils Relative 07/23/2022 57     Immat GRANS % 07/23/2022 0     Lymphocytes Relative 07/23/2022 33     Monocytes Relative 07/23/2022 7     Eosinophils Relative 07/23/2022 2     Basophils Relative 07/23/2022 1     Neutrophils Absolute 07/23/2022 3 87     Immature Grans Absolute 07/23/2022 0 02     Lymphocytes Absolute 07/23/2022 2 25     Monocytes Absolute 07/23/2022 0 47     Eosinophils Absolute 07/23/2022 0 11     Basophils Absolute 07/23/2022 0 05     Sodium 07/23/2022 136     Potassium 07/23/2022 3 8     Chloride 07/23/2022 100     CO2 07/23/2022 20 (A)    ANION GAP 07/23/2022 16 (A)    BUN 07/23/2022 22     Creatinine 07/23/2022 1 20     Glucose 07/23/2022 136     Calcium 07/23/2022 8 3     eGFR 07/23/2022 54     Calcium, Ionized 07/23/2022 1 07 (A)   Appointment on 07/22/2022   Component Date Value    Sodium 07/22/2022 137     Potassium 07/22/2022 4 3     Chloride 07/22/2022 102     CO2 07/22/2022 21     ANION GAP 07/22/2022 14 (A)    BUN 07/22/2022 28 (A)    Creatinine 07/22/2022 1 18     Glucose, Fasting 07/22/2022 137 (A)    Calcium 07/22/2022 9 0     eGFR 07/22/2022 55    Appointment on 07/11/2022   Component Date Value    Sodium 07/11/2022 133 (A)    Potassium 07/11/2022 4 3     Chloride 07/11/2022 97 (A)    CO2 07/11/2022 22     ANION GAP 07/11/2022 14 (A)    BUN 07/11/2022 15     Creatinine 07/11/2022 0 91     Glucose, Fasting 07/11/2022 158 (A)    Calcium 07/11/2022 8 3     eGFR 07/11/2022 76    Admission on 06/30/2022, Discharged on 07/01/2022   Component Date Value    Calcium, Ionized 06/30/2022 1 18     Sodium 06/30/2022 139     Potassium 06/30/2022 3 6     Chloride 06/30/2022 99 (A)    CO2 06/30/2022 28     ANION GAP 06/30/2022 12     BUN 06/30/2022 25     Creatinine 06/30/2022 1 46 (A)    Glucose 06/30/2022 134     Calcium 06/30/2022 9 5     eGFR 06/30/2022 43     WBC 06/30/2022 6 70     RBC 06/30/2022 4 27     Hemoglobin 06/30/2022 12 4     Hematocrit 06/30/2022 37 9     MCV 06/30/2022 89     MCH 06/30/2022 29 0     MCHC 06/30/2022 32 7     RDW 06/30/2022 17 3 (A)    MPV 06/30/2022 10 1     Platelets 19/18/0126 184     nRBC 06/30/2022 0     Neutrophils Relative 06/30/2022 59     Immat GRANS % 06/30/2022 0     Lymphocytes Relative 06/30/2022 32     Monocytes Relative 06/30/2022 6     Eosinophils Relative 06/30/2022 2     Basophils Relative 06/30/2022 1     Neutrophils Absolute 06/30/2022 3 89     Immature Grans Absolute 06/30/2022 0 02     Lymphocytes Absolute 06/30/2022 2 17     Monocytes Absolute 06/30/2022 0 41     Eosinophils Absolute 06/30/2022 0 16     Basophils Absolute 06/30/2022 0 05    Appointment on 06/29/2022   Component Date Value    Sodium 06/29/2022 137     Potassium 06/29/2022 4 1     Chloride 06/29/2022 100     CO2 06/29/2022 27     ANION GAP 06/29/2022 10     BUN 06/29/2022 25     Creatinine 06/29/2022 1 42 (A)    Glucose, Fasting 06/29/2022 113 (A)    Calcium 06/29/2022 11 1 (A)    eGFR 06/29/2022 44    Admission on 06/22/2022, Discharged on 06/22/2022   Component Date Value    Sodium 06/22/2022 140     Potassium 06/22/2022 3 7     Chloride 06/22/2022 101     CO2 06/22/2022 25     ANION GAP 06/22/2022 14 (A)    BUN 06/22/2022 22     Creatinine 06/22/2022 1 24     Glucose 06/22/2022 137     Calcium 06/22/2022 8 3     AST 06/22/2022 19     ALT 06/22/2022 44     Alkaline Phosphatase 06/22/2022 92     Total Protein 06/22/2022 7 3     Albumin 06/22/2022 3 6     Total Bilirubin 06/22/2022 0 31     eGFR 06/22/2022 52     Calcium, Ionized 06/22/2022 1 05 (A)   Appointment on 06/20/2022   Component Date Value    Sodium 06/20/2022 139     Potassium 06/20/2022 4 0     Chloride 06/20/2022 105     CO2 06/20/2022 25     ANION GAP 06/20/2022 9     BUN 06/20/2022 19     Creatinine 06/20/2022 1 20     Glucose 06/20/2022 121     Calcium 06/20/2022 8 7     eGFR 06/20/2022 54    Transcribe Orders on 06/06/2022   Component Date Value    PTH 06/06/2022 <6 3 (A)    Magnesium 06/06/2022 2 1 Appointment on 06/06/2022   Component Date Value    IgA 06/06/2022 245     Gliadin IgA 06/06/2022 9     Gliadin IgG 06/06/2022 17     Tissue Transglut Ab IGG 06/06/2022 <2     TISSUE TRANSGLUTAMINASE * 06/06/2022 4 (A)    Endomysial IgA 06/06/2022 Negative     Sodium 06/06/2022 137     Potassium 06/06/2022 3 9     Chloride 06/06/2022 101     CO2 06/06/2022 25     ANION GAP 06/06/2022 11     BUN 06/06/2022 17     Creatinine 06/06/2022 1 10     Glucose 06/06/2022 129     Calcium 06/06/2022 8 8     AST 06/06/2022 14     ALT 06/06/2022 43     Alkaline Phosphatase 06/06/2022 87     Total Protein 06/06/2022 7 5     Albumin 06/06/2022 3 6     Total Bilirubin 06/06/2022 0 43     eGFR 06/06/2022 60    There may be more visits with results that are not included      - 6 moths labs

## 2022-08-01 NOTE — PATIENT INSTRUCTIONS
Follow with Consultants as per their and our suggestion    Follow up in one month or as needed earlier    Follow all instructions as advised and discussed  Take your medications as prescribed  Call the office immediately if you experience any side effects  Ask questions if you do not understand  Keep your scheduled appointment as advised or come sooner if necessary or in doubt  Best time to call for non-urgent matter or questions on weekdays is between 9am and 12 noon  See physician for any new symptoms or worsening of current symptoms  Urgent or emergent situations call 911 and report to nearest emergency room      Patient should recommended to be seen by endocrinologist in to put    Patient is to get labs 1 week(s) prior to next visit if advised

## 2022-08-01 NOTE — ASSESSMENT & PLAN NOTE
Admission on 07/31/2022, Discharged on 08/01/2022   Component Date Value    WBC 07/31/2022 7 58     RBC 07/31/2022 4 41     Hemoglobin 07/31/2022 12 9     Hematocrit 07/31/2022 39 0     MCV 07/31/2022 88     MCH 07/31/2022 29 3     MCHC 07/31/2022 33 1     RDW 07/31/2022 17 7 (A)    MPV 07/31/2022 10 7     Platelets 53/48/6252 162     nRBC 07/31/2022 0     Neutrophils Relative 07/31/2022 63     Immat GRANS % 07/31/2022 1     Lymphocytes Relative 07/31/2022 28     Monocytes Relative 07/31/2022 5     Eosinophils Relative 07/31/2022 2     Basophils Relative 07/31/2022 1     Neutrophils Absolute 07/31/2022 4 86     Immature Grans Absolute 07/31/2022 0 05     Lymphocytes Absolute 07/31/2022 2 12     Monocytes Absolute 07/31/2022 0 39     Eosinophils Absolute 07/31/2022 0 11     Basophils Absolute 07/31/2022 0 05     Sodium 07/31/2022 137     Potassium 07/31/2022 3 8     Chloride 07/31/2022 100     CO2 07/31/2022 22     ANION GAP 07/31/2022 15 (A)    BUN 07/31/2022 25     Creatinine 07/31/2022 1 08     Glucose 07/31/2022 120     Calcium 07/31/2022 8 5     eGFR 07/31/2022 61     Calcium, Ionized 07/31/2022 1 07 (A)    hs TnI 0hr 07/31/2022 2     Ventricular Rate 07/31/2022 92     Atrial Rate 07/31/2022 92     NH Interval 07/31/2022 166     QRSD Interval 07/31/2022 74     QT Interval 07/31/2022 362     QTC Interval 07/31/2022 447     P Axis 07/31/2022 46     QRS Axis 07/31/2022 25     T Wave [place] 07/31/2022 33    Admission on 07/23/2022, Discharged on 07/23/2022   Component Date Value    WBC 07/23/2022 6 77     RBC 07/23/2022 4 29     Hemoglobin 07/23/2022 12 2     Hematocrit 07/23/2022 38 0     MCV 07/23/2022 89     MCH 07/23/2022 28 4     MCHC 07/23/2022 32 1     RDW 07/23/2022 17 7 (A)    MPV 07/23/2022 10 0     Platelets 15/36/4219 170     nRBC 07/23/2022 0     Neutrophils Relative 07/23/2022 57     Immat GRANS % 07/23/2022 0     Lymphocytes Relative 07/23/2022 33     Monocytes Relative 07/23/2022 7     Eosinophils Relative 07/23/2022 2     Basophils Relative 07/23/2022 1     Neutrophils Absolute 07/23/2022 3 87     Immature Grans Absolute 07/23/2022 0 02     Lymphocytes Absolute 07/23/2022 2 25     Monocytes Absolute 07/23/2022 0 47     Eosinophils Absolute 07/23/2022 0 11     Basophils Absolute 07/23/2022 0 05     Sodium 07/23/2022 136     Potassium 07/23/2022 3 8     Chloride 07/23/2022 100     CO2 07/23/2022 20 (A)    ANION GAP 07/23/2022 16 (A)    BUN 07/23/2022 22     Creatinine 07/23/2022 1 20     Glucose 07/23/2022 136     Calcium 07/23/2022 8 3     eGFR 07/23/2022 54     Calcium, Ionized 07/23/2022 1 07 (A)   Appointment on 07/22/2022   Component Date Value    Sodium 07/22/2022 137     Potassium 07/22/2022 4 3     Chloride 07/22/2022 102     CO2 07/22/2022 21     ANION GAP 07/22/2022 14 (A)    BUN 07/22/2022 28 (A)    Creatinine 07/22/2022 1 18     Glucose, Fasting 07/22/2022 137 (A)    Calcium 07/22/2022 9 0     eGFR 07/22/2022 55    Appointment on 07/11/2022   Component Date Value    Sodium 07/11/2022 133 (A)    Potassium 07/11/2022 4 3     Chloride 07/11/2022 97 (A)    CO2 07/11/2022 22     ANION GAP 07/11/2022 14 (A)    BUN 07/11/2022 15     Creatinine 07/11/2022 0 91     Glucose, Fasting 07/11/2022 158 (A)    Calcium 07/11/2022 8 3     eGFR 07/11/2022 76     - 2 month labs  Patient follow-up with the endocrinologist check calcium potassium magnesium level

## 2022-08-01 NOTE — ASSESSMENT & PLAN NOTE
Patient prescribed Xanax 0 5 mg twice a day as needed for panic attacks and anxiety dispense 45 pills

## 2022-08-01 NOTE — ASSESSMENT & PLAN NOTE
Patient followed by pain management and prescribed pain med by Pain Management follow-up monthly  pain management

## 2022-08-01 NOTE — ED PROVIDER NOTES
History  Chief Complaint   Patient presents with    Numbness     States " I think my calcium is low "  States entire body with numbness and tingling   Chest Pain     States started this morning with chest pain  Pain upper chest, no radiation, intermittent  HPI    This is a 56 year female who presents today with numbness  Patient states she has history of low calcium and often gets calcium infusions  Patient states her entire body feels numb and tingly  States she has chest pain sometimes whenever she has hypocalcemia  Patient denies any radiation of the pain no shortness of breath  Describes it as a mild pain  She believes is due to her low calcium  56 year female presents today with numbness    Prior to Admission Medications   Prescriptions Last Dose Informant Patient Reported? Taking?    Calcium Carb-Cholecalciferol (Calcium 500+D) 500-400 MG-UNIT TABS  Self No No   Sig: Take 600 mg by mouth 3 times a day   Lidocaine-Menthol 4-1 % PTCH  Self Yes No   Sig: if needed     baclofen 20 mg tablet  Self No No   Sig: Take 1 tablet (20 mg total) by mouth 3 (three) times a day   Patient taking differently: Take 20 mg by mouth 3 (three) times a day as needed   calcitriol (ROCALTROL) 0 25 mcg capsule  Self No No   Sig: TAKE 1 CAPSULE BY MOUTH EVERY DAY   fenofibrate (TRIGLIDE) 160 MG tablet  Self Yes No   Sig: Take 160 mg by mouth daily   ferrous sulfate 325 (65 Fe) mg tablet  Self Yes No   Sig: Take 325 mg by mouth Once Monday, wed, Friday-Last dose 4/1/22    ibuprofen (MOTRIN) 600 mg tablet  Self No No   Sig: Take 1 tablet (600 mg total) by mouth every 6 (six) hours as needed for mild pain or moderate pain   levothyroxine 150 mcg tablet  Self No No   Sig: TAKE 1 TABLET BY MOUTH EVERY DAY   Patient taking differently: 150 mcg every morning   magnesium Oxide (MAG-OX) 400 mg TABS  Self No No   Sig: TAKE 1 TABLET (400 MG TOTAL) BY MOUTH THREE (THREE) TIMES A DAY 9PM   oxyCODONE-acetaminophen (PERCOCET)  mg per tablet  Self Yes No   Sig: Take 1 tablet by mouth 4 (four) times a day   pantoprazole (PROTONIX) 40 mg tablet   No No   Sig: Take 1 tablet (40 mg total) by mouth daily Take 1/2 hour prior to breakfast daily in a [facility] potassium chloride (K-DUR,KLOR-CON) 20 mEq tablet  Self Yes No   Sig: Take 20 mEq by mouth 2 (two) times a day   pramipexole (MIRAPEX) 0 5 mg tablet  Self Yes No   Sig: Take 0 5 mg by mouth daily at bedtime   pregabalin (LYRICA) 75 mg capsule  Self Yes No   Sig: Take 75 mg by mouth 3 (three) times a day as needed    sertraline (Zoloft) 50 mg tablet   No No   Sig: Take 1 tablet (50 mg total) by mouth daily      Facility-Administered Medications: None       Past Medical History:   Diagnosis Date    Abdominal pain     Acid reflux     Acute renal failure (HCC)     multiple episodes    Anemia     Anxiety     Arthritis     Asthma     Back pain     Chronic pain     DDD (degenerative disc disease), lumbar     Depression     Disease of thyroid gland     had surgery and now hypo    DVT (deep venous thrombosis) ([facility] [address] )     s/p ankle fracture    Headache     History of transfusion     Hypercalcemia     Hyperlipidemia     Hyperthyroidism     Hypocalcemia     post op 2016    Kidney stone     Lightheadedness     Migraine     Obesity     Palpitations     Psychiatric disorder     anxiety depression    Seizures (HCC)     petit mal x1  4 years ago- all tests were neg      SOB (shortness of breath)     Spondylolisthesis of lumbar region     Treatment     spinal pain injections  last was 2016    Wears glasses        Past Surgical History:   Procedure Laterality Date    BACK SURGERY      L4-5, S1-fusion-plate/screws     SECTION      x5    CYSTOCELE REPAIR  2017    CYSTOSCOPY      DISCOGRAM      HYSTERECTOMY      PARATHYROIDECTOMY      NH ANTERIOR COLPORRAPHY RPR CYSTOCELE W/CYSTO N/A 2017    Procedure: CYSTOCELE REPAIR;  Surgeon: Bakari Shaw MD; Location: [facility];  Service: Gynecology    MD ARTHRODESIS POSTERIOR INTERBODY LUMBAR N/A 8/12/2016    Procedure: L4-S1 LUMBAR LAMINECTOMY/DECOMPRESSION;  INSTRUMENTED POSTEROLATERAL FUSION/ INTERBODY L5-S1; ALLOGRAFT AND AUTOGRAFT (IMPULSE) ; Surgeon: Roberto Guadarrama MD;  Location:  MAIN OR;  Service: Orthopedics    MD CYSTOURETHROSCOPY,URETER CATHETER Bilateral 12/7/2018    Procedure: INSERTION URETERAL CATHETERS PREOP;  Surgeon: Edgar Baltazar MD;  Location: [address] [place] [address];  Service: Urology    MD SLING OPER STRES INCONTINENCE N/A 5/4/2017    Procedure: MID URETHRAL SLING;  Surgeon: Andi Little MD;  Location: [address] [place] [address];  Service: Urology    MD SUPRACERV ABD HYSTERECTOMY N/A 12/7/2018    Procedure: SUPRACERVICAL HYSTERECTOMY;  Surgeon: Michelle Sesay MD;  Location: [facility];  Service: Gynecology    THYROIDECTOMY      TONSILECTOMY AND ADNOIDECTOMY      TONSILLECTOMY      TUBAL LIGATION         Family History   Problem Relation Age of Onset    Diabetes Mother     Hypertension Mother     Hyperlipidemia Father     Arrhythmia Father     Lung cancer Father     Diabetes Father      I have reviewed and agree with the history as documented  E-Cigarette/Vaping    E-Cigarette Use Never User      E-Cigarette/Vaping Substances    Nicotine No     THC No     CBD No     Flavoring No     Other No     Unknown No      Social History     Tobacco Use    Smoking status: Current Every Day Smoker     Packs/day: 0 50     Years: 25 00     Pack years: 12 50     Types: Cigarettes    Smokeless tobacco: Never Used   Vaping Use    Vaping Use: Never used   Substance Use Topics    Alcohol use: Not Currently    Drug use: No       Review of Systems   Constitutional: Negative  Negative for diaphoresis and fever  HENT: Negative  Respiratory: Negative  Negative for cough, shortness of breath and wheezing  Cardiovascular: Positive for chest pain  Negative for palpitations and leg swelling  Gastrointestinal: Negative for abdominal distention, abdominal pain, nausea and vomiting  Genitourinary: Negative  Musculoskeletal: Negative  Skin: Negative  Neurological: Positive for numbness  Psychiatric/Behavioral: Negative  All other systems reviewed and are negative  Physical Exam  Physical Exam  Constitutional:       Appearance: She is well-developed  HENT:      Head: Normocephalic and atraumatic  Eyes:      Pupils: Pupils are equal, round, and reactive to light  Cardiovascular:      Rate and Rhythm: Normal rate and regular rhythm  Heart sounds: Normal heart sounds  No murmur heard  Pulmonary:      Effort: Pulmonary effort is normal       Breath sounds: Normal breath sounds  Abdominal:      General: Bowel sounds are normal  There is no distension  Palpations: Abdomen is soft  Tenderness: There is no abdominal tenderness  There is no guarding or rebound  Musculoskeletal:         General: Normal range of motion  Cervical back: Normal range of motion and neck supple  Skin:     General: Skin is warm and dry  Capillary Refill: Capillary refill takes less than 2 seconds  Neurological:      General: No focal deficit present  Mental Status: She is alert and oriented to person, place, and time           Vital Signs  ED Triage Vitals [07/31/22 2107]   Temperature Pulse Respirations Blood Pressure SpO2   98 °F (36 7 °C) 100 20 128/80 97 %      Temp Source Heart Rate Source Patient Position - Orthostatic VS BP Location FiO2 (%)   Tympanic Monitor Sitting Left arm --      Pain Score       3           Vitals:    07/31/22 2107 07/31/22 2345 08/01/22 0153   BP: 128/80 119/84 129/83   Pulse: 100 91 80   Patient Position - Orthostatic VS: Sitting Lying Lying         Visual Acuity  Visual Acuity    Flowsheet Row Most Recent Value   L Pupil Size (mm) 3   R Pupil Size (mm) 3          ED Medications  Medications   calcium gluconate 1 g in sodium chloride 0 9% 50 mL (premix) (0 g Intravenous Stopped 8/1/22 0120)       Diagnostic Studies  Results Reviewed     Procedure Component Value Units Date/Time    HS Troponin 0hr (reflex protocol) [[id number]]  (Normal) Collected: 07/31/22 2350    Lab Status: Final result Specimen: Blood from Arm, Right Updated: 08/01/22 0029     hs TnI 0hr 2 ng/L     Calcium, ionized [[id number]]  (Abnormal) Collected: 07/31/22 2350    Lab Status: Final result Specimen: Blood from Arm, Right Updated: 08/01/22 0017     Calcium, Ionized 1 07 mmol/L     Basic metabolic panel [[id number]]  (Abnormal) Collected: 07/31/22 2350    Lab Status: Final result Specimen: Blood from Arm, Right Updated: 08/01/22 0016     Sodium 137 mmol/L      Potassium 3 8 mmol/L      Chloride 100 mmol/L      CO2 22 mmol/L      ANION GAP 15 mmol/L      BUN 25 mg/dL      Creatinine 1 08 mg/dL      Glucose 120 mg/dL      Calcium 8 5 mg/dL      eGFR 61 ml/min/1 73sq m     Narrative:      [facility] guidelines for Chronic Kidney Disease (CKD):     Stage 1 with normal or high GFR (GFR > 90 mL/min/1 73 square meters)    Stage 2 Mild CKD (GFR = 60-89 mL/min/1 73 square meters)    Stage 3A Moderate CKD (GFR = 45-59 mL/min/1 73 square meters)    Stage 3B Moderate CKD (GFR = 30-44 mL/min/1 73 square meters)    Stage 4 Severe CKD (GFR = 15-29 mL/min/1 73 square meters)    Stage 5 End Stage CKD (GFR <15 mL/min/1 73 square meters)  Note: GFR calculation is accurate only with a steady state creatinine    CBC and differential [[id number]]  (Abnormal) Collected: 07/31/22 2350    Lab Status: Final result Specimen: Blood from Arm, Right Updated: 07/31/22 2359     WBC 7 58 Thousand/uL      RBC 4 41 Million/uL      Hemoglobin 12 9 g/dL      Hematocrit 39 0 %      MCV 88 fL      MCH 29 3 pg      MCHC 33 1 g/dL      RDW 17 7 %      MPV 10 7 fL      Platelets 220 Thousands/uL      nRBC 0 /100 WBCs      Neutrophils Relative 63 %      Immat GRANS % 1 %      Lymphocytes Relative 28 % Monocytes Relative 5 %      Eosinophils Relative 2 %      Basophils Relative 1 %      Neutrophils Absolute 4 86 Thousands/µL      Immature Grans Absolute 0 05 Thousand/uL      Lymphocytes Absolute 2 12 Thousands/µL      Monocytes Absolute 0 39 Thousand/µL      Eosinophils Absolute 0 11 Thousand/µL      Basophils Absolute 0 05 Thousands/µL                  No orders to display              Procedures  Procedures         ED Course  ED Course as of 08/01/22 0552   [facility] Aug 01, 2022   0031 Procedure Note: EKG  Date/Time: 08/01/22 12:31 AM   Performed by: Agnes Matthews   Authorized by: Agnes Matthews   Indications / Diagnosis: CP  ECG reviewed by me, the ED Provider: yes   The EKG demonstrates:  Rhythm: normal sinus  Intervals: normal intervals  Axis: normal axis  QRS/Blocks: normal QRS  ST Changes No acute ST Changes, no STD/KISHORE  SBIRT 22yo+    Flowsheet Row Most Recent Value   SBIRT (23 yo +)    In order to provide better care to our patients, we are screening all of our patients for alcohol and drug use  Would it be okay to ask you these screening questions? No Filed at: 07/31/2022 2355                    MDM    Disposition  Final diagnoses:   Hypocalcemia     Time reflects when diagnosis was documented in both MDM as applicable and the Disposition within this note     Time User Action Codes Description Comment    8/1/2022  1:32 AM Sonja Qureshi Add [E83 [id number]] Hypocalcemia       ED Disposition     ED Disposition   Discharge    Condition   Stable    Date/Time   Mon Aug 1, 2022  1:32 AM    Comment   [address] discharge to home/self care                 Follow-up Information     Follow up With Specialties Details Why Contact Jenny Jett MD Internal Medicine  As needed [address]  [phone]            Discharge Medication List as of 8/1/2022  1:32 AM      CONTINUE these medications which have NOT CHANGED    Details baclofen 20 mg tablet Take 1 tablet (20 mg total) by mouth 3 (three) times a day, Starting Thu 11/18/2021, Normal      calcitriol (ROCALTROL) 0 25 mcg capsule TAKE 1 CAPSULE BY MOUTH EVERY DAY, Normal      Calcium Carb-Cholecalciferol (Calcium 500+D) 500-400 MG-UNIT TABS Take 600 mg by mouth 3 times a day, Normal      fenofibrate (TRIGLIDE) 160 MG tablet Take 160 mg by mouth daily, Historical Med      ferrous sulfate 325 (65 Fe) mg tablet Take 325 mg by mouth Once Monday, wed, Friday-Last dose 4/1/22 , Historical Med      ibuprofen (MOTRIN) 600 mg tablet Take 1 tablet (600 mg total) by mouth every 6 (six) hours as needed for mild pain or moderate pain, Starting Tue 6/2/2020, Normal      levothyroxine 150 mcg tablet TAKE 1 TABLET BY MOUTH EVERY DAY, Normal      Lidocaine-Menthol 4-1 % PTCH if needed  , Historical Med      magnesium Oxide (MAG-OX) 400 mg TABS TAKE 1 TABLET (400 MG TOTAL) BY MOUTH THREE (THREE) TIMES A DAY 9PM, Normal      oxyCODONE-acetaminophen (PERCOCET)  mg per tablet Take 1 tablet by mouth 4 (four) times a day, Historical Med      pantoprazole (PROTONIX) 40 mg tablet Take 1 tablet (40 mg total) by mouth daily Take 1/2 hour prior to breakfast daily in a m   , Starting Wed 6/8/2022, Until Sat 7/30/2022, Normal      potassium chloride (K-DUR,KLOR-CON) 20 mEq tablet Take 20 mEq by mouth 2 (two) times a day, Historical Med      pramipexole (MIRAPEX) 0 5 mg tablet Take 0 5 mg by mouth daily at bedtime, Historical Med      pregabalin (LYRICA) 75 mg capsule Take 75 mg by mouth 3 (three) times a day as needed , Historical Med      sertraline (Zoloft) 50 mg tablet Take 1 tablet (50 mg total) by mouth daily, Starting Thu 6/16/2022, Normal             No discharge procedures on file      PDMP Review       Value Time User    PDMP Reviewed  Yes 5/30/2022  9:45 PM Juvenal Palma PA-C          ED Provider  Electronically Signed by           Rula Cash MD  08/01/22 0751

## 2022-08-01 NOTE — PROGRESS NOTES
Dr Carpenter Foil Office Visit Note  22     Eligio Spencer 56 y o  female MRN: [id number]  : 1975    Assessment:     1   Postsurgical hypoparathyroidism [facility])  Assessment & Plan:  Admission on 2022, Discharged on 2022   Component Date Value    WBC 2022 7 58     RBC 2022 4 41     Hemoglobin 2022 12 9     Hematocrit 2022 39 0     MCV 2022 88     MCH 2022 29 3     MCHC 2022 33 1     RDW 2022 17 7 (A)    MPV 2022 10 7     Platelets  162     nRBC 2022 0     Neutrophils Relative 2022 63     Immat GRANS % 2022 1     Lymphocytes Relative 2022 28     Monocytes Relative 2022 5     Eosinophils Relative 2022 2     Basophils Relative 2022 1     Neutrophils Absolute 2022 4 86     Immature Grans Absolute 2022 0 05     Lymphocytes Absolute 2022 2 12     Monocytes Absolute 2022 0 39     Eosinophils Absolute 2022 0 11     Basophils Absolute 2022 0 05     Sodium 2022 137     Potassium 2022 3 8     Chloride 2022 100     CO2 2022 22     ANION GAP 2022 15 (A)    BUN 2022 25     Creatinine 2022 1 08     Glucose 2022 120     Calcium 2022 8 5     eGFR 2022 61     Calcium, Ionized 2022 1 07 (A)    hs TnI 0hr 2022 2     Ventricular Rate 2022 92     Atrial Rate 2022 92     NC Interval 2022 166     QRSD Interval 2022 74     QT Interval 2022 362     QTC Interval 2022 447     P Axis 2022 46     QRS Axis 2022 25     T Wave [place] 2022 33    Admission on 2022, Discharged on 2022   Component Date Value    WBC 2022 6 77     RBC 2022 4 29     Hemoglobin 2022 12 2     Hematocrit 2022 38 0     MCV 2022 89     MCH 2022 28 4     MCHC 2022 32 1     RDW 2022 17 7 (A)    MPV 07/23/2022 10 0     Platelets 71/32/4596 170     nRBC 07/23/2022 0     Neutrophils Relative 07/23/2022 57     Immat GRANS % 07/23/2022 0     Lymphocytes Relative 07/23/2022 33     Monocytes Relative 07/23/2022 7     Eosinophils Relative 07/23/2022 2     Basophils Relative 07/23/2022 1     Neutrophils Absolute 07/23/2022 3 87     Immature Grans Absolute 07/23/2022 0 02     Lymphocytes Absolute 07/23/2022 2 25     Monocytes Absolute 07/23/2022 0 47     Eosinophils Absolute 07/23/2022 0 11     Basophils Absolute 07/23/2022 0 05     Sodium 07/23/2022 136     Potassium 07/23/2022 3 8     Chloride 07/23/2022 100     CO2 07/23/2022 20 (A)    ANION GAP 07/23/2022 16 (A)    BUN 07/23/2022 22     Creatinine 07/23/2022 1 20     Glucose 07/23/2022 136     Calcium 07/23/2022 8 3     eGFR 07/23/2022 54     Calcium, Ionized 07/23/2022 1 07 (A)   Appointment on 07/22/2022   Component Date Value    Sodium 07/22/2022 137     Potassium 07/22/2022 4 3     Chloride 07/22/2022 102     CO2 07/22/2022 21     ANION GAP 07/22/2022 14 (A)    BUN 07/22/2022 28 (A)    Creatinine 07/22/2022 1 18     Glucose, Fasting 07/22/2022 137 (A)    Calcium 07/22/2022 9 0     eGFR 07/22/2022 55    Appointment on 07/11/2022   Component Date Value    Sodium 07/11/2022 133 (A)    Potassium 07/11/2022 4 3     Chloride 07/11/2022 97 (A)    CO2 07/11/2022 22     ANION GAP 07/11/2022 14 (A)    BUN 07/11/2022 15     Creatinine 07/11/2022 0 91     Glucose, Fasting 07/11/2022 158 (A)    Calcium 07/11/2022 8 3     eGFR 07/11/2022 76     - 2 month labs  Patient follow-up with the endocrinologist check calcium potassium magnesium level      2   Postoperative hypothyroidism  Assessment & Plan:  Admission on 07/31/2022, Discharged on 08/01/2022   Component Date Value    WBC 07/31/2022 7 58     RBC 07/31/2022 4 41     Hemoglobin 07/31/2022 12 9     Hematocrit 07/31/2022 39 0     MCV 07/31/2022 88     MCH 07/31/2022 29 3     MCHC 07/31/2022 33 1     RDW 07/31/2022 17 7 (A)    MPV 07/31/2022 10 7     Platelets 92/44/4942 162     nRBC 07/31/2022 0     Neutrophils Relative 07/31/2022 63     Immat GRANS % 07/31/2022 1     Lymphocytes Relative 07/31/2022 28     Monocytes Relative 07/31/2022 5     Eosinophils Relative 07/31/2022 2     Basophils Relative 07/31/2022 1     Neutrophils Absolute 07/31/2022 4 86     Immature Grans Absolute 07/31/2022 0 05     Lymphocytes Absolute 07/31/2022 2 12     Monocytes Absolute 07/31/2022 0 39     Eosinophils Absolute 07/31/2022 0 11     Basophils Absolute 07/31/2022 0 05     Sodium 07/31/2022 137     Potassium 07/31/2022 3 8     Chloride 07/31/2022 100     CO2 07/31/2022 22     ANION GAP 07/31/2022 15 (A)    BUN 07/31/2022 25     Creatinine 07/31/2022 1 08     Glucose 07/31/2022 120     Calcium 07/31/2022 8 5     eGFR 07/31/2022 61     Calcium, Ionized 07/31/2022 1 07 (A)    hs TnI 0hr 07/31/2022 2     Ventricular Rate 07/31/2022 92     Atrial Rate 07/31/2022 92     OH Interval 07/31/2022 166     QRSD Interval 07/31/2022 74     QT Interval 07/31/2022 362     QTC Interval 07/31/2022 447     P Axis 07/31/2022 46     QRS Axis 07/31/2022 25     T Wave [place] 07/31/2022 33    Admission on 07/23/2022, Discharged on 07/23/2022   Component Date Value    WBC 07/23/2022 6 77     RBC 07/23/2022 4 29     Hemoglobin 07/23/2022 12 2     Hematocrit 07/23/2022 38 0     MCV 07/23/2022 89     MCH 07/23/2022 28 4     MCHC 07/23/2022 32 1     RDW 07/23/2022 17 7 (A)    MPV 07/23/2022 10 0     Platelets 46/31/5594 170     nRBC 07/23/2022 0     Neutrophils Relative 07/23/2022 57     Immat GRANS % 07/23/2022 0     Lymphocytes Relative 07/23/2022 33     Monocytes Relative 07/23/2022 7     Eosinophils Relative 07/23/2022 2     Basophils Relative 07/23/2022 1     Neutrophils Absolute 07/23/2022 3 87     Immature Grans Absolute 07/23/2022 0 02     Lymphocytes Absolute 07/23/2022 2 25     Monocytes Absolute 07/23/2022 0 47     Eosinophils Absolute 07/23/2022 0 11     Basophils Absolute 07/23/2022 0 05     Sodium 07/23/2022 136     Potassium 07/23/2022 3 8     Chloride 07/23/2022 100     CO2 07/23/2022 20 (A)    ANION GAP 07/23/2022 16 (A)    BUN 07/23/2022 22     Creatinine 07/23/2022 1 20     Glucose 07/23/2022 136     Calcium 07/23/2022 8 3     eGFR 07/23/2022 54     Calcium, Ionized 07/23/2022 1 07 (A)   Appointment on 07/22/2022   Component Date Value    Sodium 07/22/2022 137     Potassium 07/22/2022 4 3     Chloride 07/22/2022 102     CO2 07/22/2022 21     ANION GAP 07/22/2022 14 (A)    BUN 07/22/2022 28 (A)    Creatinine 07/22/2022 1 18     Glucose, Fasting 07/22/2022 137 (A)    Calcium 07/22/2022 9 0     eGFR 07/22/2022 55    Appointment on 07/11/2022   Component Date Value    Sodium 07/11/2022 133 (A)    Potassium 07/11/2022 4 3     Chloride 07/11/2022 97 (A)    CO2 07/11/2022 22     ANION GAP 07/11/2022 14 (A)    BUN 07/11/2022 15     Creatinine 07/11/2022 0 91     Glucose, Fasting 07/11/2022 158 (A)    Calcium 07/11/2022 8 3     eGFR 07/11/2022 76    Admission on 06/30/2022, Discharged on 07/01/2022   Component Date Value    Calcium, Ionized 06/30/2022 1 18     Sodium 06/30/2022 139     Potassium 06/30/2022 3 6     Chloride 06/30/2022 99 (A)    CO2 06/30/2022 28     ANION GAP 06/30/2022 12     BUN 06/30/2022 25     Creatinine 06/30/2022 1 46 (A)    Glucose 06/30/2022 134     Calcium 06/30/2022 9 5     eGFR 06/30/2022 43     WBC 06/30/2022 6 70     RBC 06/30/2022 4 27     Hemoglobin 06/30/2022 12 4     Hematocrit 06/30/2022 37 9     MCV 06/30/2022 89     MCH 06/30/2022 29 0     MCHC 06/30/2022 32 7     RDW 06/30/2022 17 3 (A)    MPV 06/30/2022 10 1     Platelets 24/46/3857 184     nRBC 06/30/2022 0     Neutrophils Relative 06/30/2022 59     Immat GRANS % 06/30/2022 0     Lymphocytes Relative 06/30/2022 32  Monocytes Relative 06/30/2022 6     Eosinophils Relative 06/30/2022 2     Basophils Relative 06/30/2022 1     Neutrophils Absolute 06/30/2022 3 89     Immature Grans Absolute 06/30/2022 0 02     Lymphocytes Absolute 06/30/2022 2 17     Monocytes Absolute 06/30/2022 0 41     Eosinophils Absolute 06/30/2022 0 16     Basophils Absolute 06/30/2022 0 05    Appointment on 06/29/2022   Component Date Value    Sodium 06/29/2022 137     Potassium 06/29/2022 4 1     Chloride 06/29/2022 100     CO2 06/29/2022 27     ANION GAP 06/29/2022 10     BUN 06/29/2022 25     Creatinine 06/29/2022 1 42 (A)    Glucose, Fasting 06/29/2022 113 (A)    Calcium 06/29/2022 11 1 (A)    eGFR 06/29/2022 44    Admission on 06/22/2022, Discharged on 06/22/2022   Component Date Value    Sodium 06/22/2022 140     Potassium 06/22/2022 3 7     Chloride 06/22/2022 101     CO2 06/22/2022 25     ANION GAP 06/22/2022 14 (A)    BUN 06/22/2022 22     Creatinine 06/22/2022 1 24     Glucose 06/22/2022 137     Calcium 06/22/2022 8 3     AST 06/22/2022 19     ALT 06/22/2022 44     Alkaline Phosphatase 06/22/2022 92     Total Protein 06/22/2022 7 3     Albumin 06/22/2022 3 6     Total Bilirubin 06/22/2022 0 31     eGFR 06/22/2022 52     Calcium, Ionized 06/22/2022 1 05 (A)   Appointment on 06/20/2022   Component Date Value    Sodium 06/20/2022 139     Potassium 06/20/2022 4 0     Chloride 06/20/2022 105     CO2 06/20/2022 25     ANION GAP 06/20/2022 9     BUN 06/20/2022 19     Creatinine 06/20/2022 1 20     Glucose 06/20/2022 121     Calcium 06/20/2022 8 7     eGFR 06/20/2022 54    Transcribe Orders on 06/06/2022   Component Date Value    PTH 06/06/2022 <6 3 (A)    Magnesium 06/06/2022 2 1    Appointment on 06/06/2022   Component Date Value    IgA 06/06/2022 245     Gliadin IgA 06/06/2022 9     Gliadin IgG 06/06/2022 17     Tissue Transglut Ab IGG 06/06/2022 <2     TISSUE TRANSGLUTAMINASE * 06/06/2022 4 (A)  Endomysial IgA 06/06/2022 Negative     Sodium 06/06/2022 137     Potassium 06/06/2022 3 9     Chloride 06/06/2022 101     CO2 06/06/2022 25     ANION GAP 06/06/2022 11     BUN 06/06/2022 17     Creatinine 06/06/2022 1 10     Glucose 06/06/2022 129     Calcium 06/06/2022 8 8     AST 06/06/2022 14     ALT 06/06/2022 43     Alkaline Phosphatase 06/06/2022 87     Total Protein 06/06/2022 7 5     Albumin 06/06/2022 3 6     Total Bilirubin 06/06/2022 0 43     eGFR 06/06/2022 60    There may be more visits with results that are not included  - 6 moths labs      3  [facility] ([facility] )    4  DDD (degenerative disc disease), lumbar    5  History of coagulopathy    6  LELAND (acute kidney injury) ([facility] )    7  Elevated ALT measurement    8  Vitamin D deficiency    9  Hypocalcemia    10  Anxiety    11  Hypercalcemia    12  Panic attacks  Assessment & Plan:  Patient prescribed Xanax 0 5 mg twice a day as needed for panic attacks and anxiety dispense 45 pills      13  Panic attack  -     ALPRAZolam (XANAX) 0 5 mg tablet; Take 1 tablet (0 5 mg total) by mouth 2 (two) times a day as needed for anxiety (panic attack)          Discussion Summary and Plan: Today's care plan and medications were reviewed with patient in detail and all their questions answered to their satisfaction  Subjective:  HPI  Patient came in for follow-up for anxiety attacks refill of her meds Xanax  The following portions of the patient's history were reviewed and updated as appropriate: allergies, current medications, past family history, past medical history, past social history, past surgical history and problem list     Review of Systems   Constitutional: Positive for activity change  Negative for chills and fever  HENT: Negative for ear pain and sore throat  Eyes: Negative for pain and visual disturbance  Respiratory: Negative for cough and shortness of breath  Cardiovascular: Negative for chest pain and palpitations  Gastrointestinal: Negative for abdominal pain and vomiting  Genitourinary: Negative for dysuria and hematuria  Musculoskeletal: Positive for arthralgias  Negative for back pain  Skin: Negative for color change and rash  Neurological: Positive for dizziness, tremors and light-headedness  Negative for seizures and syncope  Psychiatric/Behavioral: Positive for agitation  The patient is nervous/anxious  All other systems reviewed and are negative  Historical Information   Patient Active Problem List   Diagnosis    DDD (degenerative disc disease), lumbar    Postsurgical hypoparathyroidism (HCC)    Postoperative hypothyroidism    Elevated ALT measurement    Vitamin D deficiency    Hypocalcemia    Anxiety    [facility]    Hypercalcemia    Panic attacks    Chronic intractable pain     Past Medical History:   Diagnosis Date    Abdominal pain     Acid reflux     Acute renal failure (HCC)     multiple episodes    Anemia     Anxiety     Arthritis     Asthma     Back pain     Chronic pain     DDD (degenerative disc disease), lumbar     Depression     Disease of thyroid gland     had surgery and now hypo    [facility]deep [facility] thrombosis) ([facility] ) 2009    s/p ankle fracture    Headache     History of transfusion     Hypercalcemia     Hyperlipidemia     Hyperthyroidism     Hypocalcemia     post op 2016    Kidney stone     Lightheadedness     Migraine     Obesity     Palpitations     Psychiatric disorder     anxiety depression    Seizures (HCC)     petit mal x1  4 years ago- all tests were neg      SOB (shortness of breath)     Spondylolisthesis of lumbar region     Treatment     spinal pain injections  last was 2016    Wears glasses      Past Surgical History:   Procedure Laterality Date    BACK SURGERY      L4-5, S1-fusion-plate/screws     SECTION      x5    CYSTOCELE REPAIR  2017    CYSTOSCOPY      DISCOGRAM      HYSTERECTOMY  PARATHYROIDECTOMY      UT ANTERIOR COLPORRAPHY RPR CYSTOCELE W/CYSTO N/A 5/4/2017    Procedure: CYSTOCELE REPAIR;  Surgeon: Diamond Hillman MD;  Location: [address] [place] [address];  Service: Gynecology    UT ARTHRODESIS POSTERIOR INTERBODY LUMBAR N/A 8/12/2016    Procedure: L4-S1 LUMBAR LAMINECTOMY/DECOMPRESSION;  INSTRUMENTED POSTEROLATERAL FUSION/ INTERBODY L5-S1; ALLOGRAFT AND AUTOGRAFT (IMPULSE) ;   Surgeon: Sneha Becerril MD;  Location:  MAIN OR;  Service: Orthopedics    UT CYSTOURETHROSCOPY,URETER CATHETER Bilateral 12/7/2018    Procedure: INSERTION URETERAL CATHETERS PREOP;  Surgeon: Rey Clemons MD;  Location: [facility] MAIN [facility];  Service: Urology    UT SLING OPER STRES INCONTINENCE N/A 5/4/2017    Procedure: MID URETHRAL SLING;  Surgeon: Ag Leija MD;  Location: [address] [place] [address];  Service: Urology    UT SUPRACERV ABD HYSTERECTOMY N/A 12/7/2018    Procedure: SUPRACERVICAL HYSTERECTOMY;  Surgeon: Diamond Hillman MD;  Location: [facility];  Service: Gynecology    THYROIDECTOMY      TONSILECTOMY AND ADNOIDECTOMY      TONSILLECTOMY      TUBAL LIGATION       Social History     Substance and Sexual Activity   Alcohol Use Not Currently     Social History     Substance and Sexual Activity   Drug Use No     Social History     Tobacco Use   Smoking Status Current Every Day Smoker    Packs/day: 0 50    Years: 25 00    Pack years: 12 50    Types: Cigarettes   Smokeless Tobacco Never Used     Family History   Problem Relation Age of Onset    Diabetes Mother     Hypertension Mother     Hyperlipidemia Father     Arrhythmia Father     Lung cancer Father     Diabetes Father      Health Maintenance Due   Topic    Hepatitis C Screening     COVID-19 Vaccine (1)    Pneumococcal Vaccine: Pediatrics (0 to 5 Years) and At-Risk Patients (6 to 59 Years) (1 - PCV)    Depression Screening     HIV Screening     BMI: Followup Plan     Annual Physical     DTaP,Tdap,and Td Vaccines (1 - Tdap)    Cervical Cancer Screening     Breast Cancer Screening: Mammogram     Influenza Vaccine (1)      Meds/Allergies       Current Outpatient Medications:     ALPRAZolam (XANAX) 0 5 mg tablet, Take 1 tablet (0 5 mg total) by mouth 2 (two) times a day as needed for anxiety (panic attack), Disp: 45 tablet, Rfl: 0    baclofen 20 mg tablet, Take 1 tablet (20 mg total) by mouth 3 (three) times a day (Patient taking differently: Take 20 mg by mouth 3 (three) times a day as needed), Disp: 30 tablet, Rfl: 0    calcitriol (ROCALTROL) 0 25 mcg capsule, TAKE 1 CAPSULE BY MOUTH EVERY DAY, Disp: 30 capsule, Rfl: 0    Calcium Carb-Cholecalciferol (Calcium 500+D) 500-400 MG-UNIT TABS, Take 600 mg by mouth 3 times a day, Disp: 360 tablet, Rfl: 0    fenofibrate (TRIGLIDE) 160 MG tablet, Take 160 mg by mouth daily, Disp: , Rfl:     ferrous sulfate 325 (65 Fe) mg tablet, Take 325 mg by mouth Once Monday, wed, Friday-Last dose 4/1/22 , Disp: , Rfl:     ibuprofen (MOTRIN) 600 mg tablet, Take 1 tablet (600 mg total) by mouth every 6 (six) hours as needed for mild pain or moderate pain, Disp: 16 tablet, Rfl: 0    levothyroxine 150 mcg tablet, TAKE 1 TABLET BY MOUTH EVERY DAY (Patient taking differently: 150 mcg every morning), Disp: 90 tablet, Rfl: 1    Lidocaine-Menthol 4-1 % PTCH, if needed  , Disp: , Rfl:     magnesium Oxide (MAG-OX) 400 mg TABS, TAKE 1 TABLET (400 MG TOTAL) BY MOUTH THREE (THREE) TIMES A DAY 9PM, Disp: 90 tablet, Rfl: 3    oxyCODONE-acetaminophen (PERCOCET)  mg per tablet, Take 1 tablet by mouth 4 (four) times a day, Disp: , Rfl:     pantoprazole (PROTONIX) 40 mg tablet, Take 1 tablet (40 mg total) by mouth daily Take 1/2 hour prior to breakfast daily in a m , Disp: 30 tablet, Rfl: 3    potassium chloride (K-DUR,KLOR-CON) 20 mEq tablet, Take 20 mEq by mouth 2 (two) times a day, Disp: , Rfl:     pramipexole (MIRAPEX) 0 5 mg tablet, Take 0 5 mg by mouth daily at bedtime, Disp: , Rfl:     pregabalin (LYRICA) 75 mg capsule, Take 75 mg by mouth 3 (three) times a day as needed , Disp: , Rfl:     sertraline (Zoloft) 50 mg tablet, Take 1 tablet (50 mg total) by mouth daily, Disp: 30 tablet, Rfl: 5  No current facility-administered medications for this visit  Objective:    Vitals:   /70   Pulse (!) 110   Temp 98 2 °F (36 8 °C)   Ht 5' 2" (1 575 m)   Wt 92 5 kg (204 lb)   LMP 12/06/2018 Comment: partial hysterectomy   SpO2 96%   BMI 37 31 kg/m²   Body mass index is 37 31 kg/m²  Vitals:    08/01/22 1508   Weight: 92 5 kg (204 lb)       Physical Exam  Constitutional:       General: She is not in acute distress  Appearance: She is well-developed  She is not ill-appearing, toxic-appearing or diaphoretic  HENT:      Head: Normocephalic and atraumatic  Right Ear: External ear normal       Left Ear: External ear normal       Nose: Nose normal       Mouth/Throat:      Pharynx: No oropharyngeal exudate  Eyes:      General: Lids are normal  Lids are everted, no foreign bodies appreciated  No scleral icterus  Right eye: No discharge  Left eye: No discharge  Conjunctiva/sclera: Conjunctivae normal       Pupils: Pupils are equal, round, and reactive to light  Neck:      Thyroid: No thyromegaly  Vascular: Normal carotid pulses  No carotid bruit, hepatojugular reflux or JVD  Trachea: No tracheal tenderness or tracheal deviation  Cardiovascular:      Rate and Rhythm: Normal rate and regular rhythm  Pulses: Normal pulses  Heart sounds: Normal heart sounds  No murmur heard  No friction rub  No gallop  Pulmonary:      Effort: Pulmonary effort is normal  No respiratory distress  Breath sounds: Normal breath sounds  No stridor  No wheezing or rales  Chest:      Chest wall: No tenderness  Abdominal:      General: Bowel sounds are normal  There is no distension  Palpations: Abdomen is soft  There is no mass  Tenderness: There is no abdominal tenderness  There is no guarding or rebound  Musculoskeletal:         General: No tenderness or deformity  Normal range of motion  Cervical back: Normal range of motion and neck supple  No edema, erythema or rigidity  No spinous process tenderness or muscular tenderness  Normal range of motion  Right lower leg: Edema present  Left lower leg: Edema present  Lymphadenopathy:      Head:      Right side of head: No submental, submandibular, tonsillar, preauricular or posterior auricular adenopathy  Left side of head: No submental, submandibular, tonsillar, preauricular, posterior auricular or occipital adenopathy  Cervical: No cervical adenopathy  Right cervical: No superficial, deep or posterior cervical adenopathy  Left cervical: No superficial, deep or posterior cervical adenopathy  Upper Body:      Right upper body: No pectoral adenopathy  Left upper body: No pectoral adenopathy  Skin:     General: Skin is warm and dry  Coloration: Skin is not pale  Findings: No erythema or rash  Neurological:      Mental Status: She is alert and oriented to person, place, and time  Cranial Nerves: No cranial nerve deficit  Sensory: No sensory deficit  Motor: Weakness present  No tremor, abnormal muscle tone or seizure activity  Coordination: Coordination normal       Gait: Gait normal       Deep Tendon Reflexes: Reflexes are normal and symmetric  Reflexes normal    Psychiatric:         Behavior: Behavior normal          Thought Content:  Thought content normal          Judgment: Judgment normal          Lab Review   Admission on 07/31/2022, Discharged on 08/01/2022   Component Date Value Ref Range Status    WBC 07/31/2022 7 58  4 31 - 10 16 Thousand/uL Final    RBC 07/31/2022 4 41  3 81 - 5 12 Million/uL Final    Hemoglobin 07/31/2022 12 9  11 5 - 15 4 g/dL Final    Hematocrit 07/31/2022 39 0  34 8 - 46 1 % Final    MCV 07/31/2022 88  82 - 98 fL Final   [facility] 07/31/2022 29 3  26 8 - 34 3 pg Final    MCHC 07/31/2022 33 1  31 4 - 37 4 g/dL Final    RDW 07/31/2022 17 7 (A) 11 6 - 15 1 % Final    MPV 07/31/2022 10 7  8 9 - 12 7 fL Final    Platelets 93/82/4791 162  149 - 390 Thousands/uL Final    nRBC 07/31/2022 0  /100 WBCs Final    Neutrophils Relative 07/31/2022 63  43 - 75 % Final    Immat GRANS % 07/31/2022 1  0 - 2 % Final    Lymphocytes Relative 07/31/2022 28  14 - 44 % Final    Monocytes Relative 07/31/2022 5  4 - 12 % Final    Eosinophils Relative 07/31/2022 2  0 - 6 % Final    Basophils Relative 07/31/2022 1  0 - 1 % Final    Neutrophils Absolute 07/31/2022 4 86  1 85 - 7 62 Thousands/µL Final    Immature Grans Absolute 07/31/2022 0 05  0 00 - 0 20 Thousand/uL Final    Lymphocytes Absolute 07/31/2022 2 12  0 60 - 4 47 Thousands/µL Final    Monocytes Absolute 07/31/2022 0 39  0 17 - 1 22 Thousand/µL Final    Eosinophils Absolute 07/31/2022 0 11  0 00 - 0 61 Thousand/µL Final    Basophils Absolute 07/31/2022 0 05  0 00 - 0 10 Thousands/µL Final    Sodium 07/31/2022 137  135 - 147 mmol/L Final    Potassium 07/31/2022 3 8  3 5 - 5 3 mmol/L Final    Chloride 07/31/2022 100  96 - 108 mmol/L Final    CO2 07/31/2022 22  21 - 32 mmol/L Final    ANION GAP 07/31/2022 15 (A) 4 - 13 mmol/L Final    BUN 07/31/2022 25  5 - 25 mg/dL Final    Creatinine 07/31/2022 1 08  0 60 - 1 30 mg/dL Final    Standardized to IDMS reference method    Glucose 07/31/2022 120  65 - 140 mg/dL Final    If the patient is fasting, the ADA then defines impaired fasting glucose as > 100 mg/dL and diabetes as > or equal to 123 mg/dL  Specimen collection should occur prior to Sulfasalazine administration due to the potential for falsely depressed results  Specimen collection should occur prior to Sulfapyridine administration due to the potential for falsely elevated results      Calcium 07/31/2022 8 5  8 3 - 10 1 mg/dL Final    eGFR 07/31/2022 61  ml/min/1 73sq m Final    Calcium, Ionized 07/31/2022 1 07 (A) 1 12 - 1 32 mmol/L Final    hs TnI 0hr 07/31/2022 2  "Refer to ACS Flowchart"- see link ng/L Final    Comment:                                              Initial (time 0) result  If >=50 ng/L, Myocardial injury suggested ;  Type of myocardial injury and treatment strategy  to be determined  If 5-49 ng/L, a delta result at 2 hours and or 4 hours will be needed to further evaluate  If <4 ng/L, and chest pain has been >3 hours since onset, patient may qualify for discharge based on the HEART score in the ED  If <5 ng/L and <3hours since onset of chest pain, a delta result at 2 hours will be needed to further evaluate  HS Troponin 99th Percentile [facility] of [facility] Health Population=12 ng/L with a 95% Confidence Interval of 8-18 ng/L  Second Troponin (time 2 hours)  If calculated delta >= 20 ng/L,  Myocardial injury suggested ; Type of myocardial injury and treatment strategy to be determined  If 5-49 ng/L and the calculated delta is 5-19 ng/L, consult medical service for evaluation  Continue evaluation for ischemia on ecg and other possible etiology and repeat hs troponin at 4 hours  If delta                            is <5 ng/L at 2 hours, consider discharge based on risk stratification via the HEART score (if in ED), or AB risk score in IP/Observation  HS Troponin 99th Percentile URL of a Health Population=12 ng/L with a 95% Confidence Interval of 8-18 ng/L      Ventricular Rate 07/31/2022 92  BPM Final    Atrial Rate 07/31/2022 92  BPM Final    SD Interval 07/31/2022 166  ms Final    QRSD Interval 07/31/2022 74  ms Final    QT Interval 07/31/2022 362  ms Final    QTC Interval 07/31/2022 447  ms Final    P Axis 07/31/2022 46  degrees Final    QRS Axis 07/31/2022 25  degrees Final    T Wave Axis 07/31/2022 33  degrees Final   Admission on 07/23/2022, Discharged on 07/23/2022   Component Date Value Ref Range Status    WBC 07/23/2022 6 77  4 31 - 10 16 Thousand/uL Final    RBC 07/23/2022 4 29  3 81 - 5 12 Million/uL Final    Hemoglobin 07/23/2022 12 2  11 5 - 15 4 g/dL Final    Hematocrit 07/23/2022 38 0  34 8 - 46 1 % Final    MCV 07/23/2022 89  82 - 98 fL Final    MCH 07/23/2022 28 4  26 8 - 34 3 pg Final    MCHC 07/23/2022 32 1  31 4 - 37 4 g/dL Final    RDW 07/23/2022 17 7 (A) 11 6 - 15 1 % Final    MPV 07/23/2022 10 0  8 9 - 12 7 fL Final    Platelets 39/49/8776 170  149 - 390 Thousands/uL Final    nRBC 07/23/2022 0  /100 WBCs Final    Neutrophils Relative 07/23/2022 57  43 - 75 % Final    Immat GRANS % 07/23/2022 0  0 - 2 % Final    Lymphocytes Relative 07/23/2022 33  14 - 44 % Final    Monocytes Relative 07/23/2022 7  4 - 12 % Final    Eosinophils Relative 07/23/2022 2  0 - 6 % Final    Basophils Relative 07/23/2022 1  0 - 1 % Final    Neutrophils Absolute 07/23/2022 3 87  1 85 - 7 62 Thousands/µL Final    Immature Grans Absolute 07/23/2022 0 02  0 00 - 0 20 Thousand/uL Final    Lymphocytes Absolute 07/23/2022 2 25  0 60 - 4 47 Thousands/µL Final    Monocytes Absolute 07/23/2022 0 47  0 17 - 1 22 Thousand/µL Final    Eosinophils Absolute 07/23/2022 0 11  0 00 - 0 61 Thousand/µL Final    Basophils Absolute 07/23/2022 0 05  0 00 - 0 10 Thousands/µL Final    Sodium 07/23/2022 136  135 - 147 mmol/L Final    Potassium 07/23/2022 3 8  3 5 - 5 3 mmol/L Final    Chloride 07/23/2022 100  96 - 108 mmol/L Final    CO2 07/23/2022 20 (A) 21 - 32 mmol/L Final    ANION GAP 07/23/2022 16 (A) 4 - 13 mmol/L Final    BUN 07/23/2022 22  5 - 25 mg/dL Final    Creatinine 07/23/2022 1 20  0 60 - 1 30 mg/dL Final    Standardized to IDMS reference method    Glucose 07/23/2022 136  65 - 140 mg/dL Final    If the patient is fasting, the ADA then defines impaired fasting glucose as > 100 mg/dL and diabetes as > or equal to 123 mg/dL  Specimen collection should occur prior to Sulfasalazine administration due to the potential for falsely depressed results   Specimen collection should occur prior to Sulfapyridine administration due to the potential for falsely elevated results   Calcium 07/23/2022 8 3  8 3 - 10 1 mg/dL Final    eGFR 07/23/2022 54  ml/min/1 73sq m Final    Calcium, Ionized 07/23/2022 1 07 (A) 1 12 - 1 32 mmol/L Final   Appointment on 07/22/2022   Component Date Value Ref Range Status    Sodium 07/22/2022 137  135 - 147 mmol/L Final    Potassium 07/22/2022 4 3  3 5 - 5 3 mmol/L Final    Chloride 07/22/2022 102  96 - 108 mmol/L Final    CO2 07/22/2022 21  21 - 32 mmol/L Final    ANION GAP 07/22/2022 14 (A) 4 - 13 mmol/L Final    BUN 07/22/2022 28 (A) 5 - 25 mg/dL Final    Creatinine 07/22/2022 1 18  0 60 - 1 30 mg/dL Final    Standardized to IDMS reference method    Glucose, Fasting 07/22/2022 137 (A) 65 - 99 mg/dL Final    Specimen collection should occur prior to Sulfasalazine administration due to the potential for falsely depressed results  Specimen collection should occur prior to Sulfapyridine administration due to the potential for falsely elevated results   Calcium 07/22/2022 9 0  8 3 - 10 1 mg/dL Final    eGFR 07/22/2022 55  ml/min/1 73sq m Final   Appointment on 07/11/2022   Component Date Value Ref Range Status    Sodium 07/11/2022 133 (A) 136 - 145 mmol/L Final    Potassium 07/11/2022 4 3  3 5 - 5 3 mmol/L Final    Slightly Hemolyzed; Results May be Affected    Chloride 07/11/2022 97 (A) 100 - 108 mmol/L Final    CO2 07/11/2022 22  21 - 32 mmol/L Final    ANION GAP 07/11/2022 14 (A) 4 - 13 mmol/L Final    BUN 07/11/2022 15  5 - 25 mg/dL Final    Creatinine 07/11/2022 0 91  0 60 - 1 30 mg/dL Final    Standardized to IDMS reference method    Glucose, Fasting 07/11/2022 158 (A) 65 - 99 mg/dL Final    Specimen collection should occur prior to Sulfasalazine administration due to the potential for falsely depressed results   Specimen collection should occur prior to Sulfapyridine administration due to the potential for falsely elevated results   Calcium 07/11/2022 8 3  8 3 - 10 1 mg/dL Final    eGFR 07/11/2022 76  ml/min/1 73sq m Final   Admission on 06/30/2022, Discharged on 07/01/2022   Component Date Value Ref Range Status    Calcium, Ionized 06/30/2022 1 18  1 12 - 1 32 mmol/L Final    Sodium 06/30/2022 139  136 - 145 mmol/L Final    Potassium 06/30/2022 3 6  3 5 - 5 3 mmol/L Final    Chloride 06/30/2022 99 (A) 100 - 108 mmol/L Final    CO2 06/30/2022 28  21 - 32 mmol/L Final    ANION GAP 06/30/2022 12  4 - 13 mmol/L Final    BUN 06/30/2022 25  5 - 25 mg/dL Final    Creatinine 06/30/2022 1 46 (A) 0 60 - 1 30 mg/dL Final    Standardized to IDMS reference method    Glucose 06/30/2022 134  65 - 140 mg/dL Final    If the patient is fasting, the ADA then defines impaired fasting glucose as > 100 mg/dL and diabetes as > or equal to 123 mg/dL  Specimen collection should occur prior to Sulfasalazine administration due to the potential for falsely depressed results  Specimen collection should occur prior to Sulfapyridine administration due to the potential for falsely elevated results      Calcium 06/30/2022 9 5  8 3 - 10 1 mg/dL Final    eGFR 06/30/2022 43  ml/min/1 73sq m Final    WBC 06/30/2022 6 70  4 31 - 10 16 Thousand/uL Final    RBC 06/30/2022 4 27  3 81 - 5 12 Million/uL Final    Hemoglobin 06/30/2022 12 4  11 5 - 15 4 g/dL Final    Hematocrit 06/30/2022 37 9  34 8 - 46 1 % Final    MCV 06/30/2022 89  82 - 98 fL Final    MCH 06/30/2022 29 0  26 8 - 34 3 pg Final    MCHC 06/30/2022 32 7  31 4 - 37 4 g/dL Final    RDW 06/30/2022 17 3 (A) 11 6 - 15 1 % Final    MPV 06/30/2022 10 1  8 9 - 12 7 fL Final    Platelets 40/42/1008 184  149 - 390 Thousands/uL Final    nRBC 06/30/2022 0  /100 WBCs Final    Neutrophils Relative 06/30/2022 59  43 - 75 % Final    Immat GRANS % 06/30/2022 0  0 - 2 % Final    Lymphocytes Relative 06/30/2022 32  14 - 44 % Final    Monocytes Relative 06/30/2022 6  4 - 12 % Final    Eosinophils Relative 06/30/2022 2  0 - 6 % Final    Basophils Relative 06/30/2022 1  0 - 1 % Final    Neutrophils Absolute 06/30/2022 3 89  1 85 - 7 62 Thousands/µL Final    Immature Grans Absolute 06/30/2022 0 02  0 00 - 0 20 Thousand/uL Final    Lymphocytes Absolute 06/30/2022 2 17  0 60 - 4 47 Thousands/µL Final    Monocytes Absolute 06/30/2022 0 41  0 17 - 1 22 Thousand/µL Final    Eosinophils Absolute 06/30/2022 0 16  0 00 - 0 61 Thousand/µL Final    Basophils Absolute 06/30/2022 0 05  0 00 - 0 10 Thousands/µL Final   Appointment on 06/29/2022   Component Date Value Ref Range Status    Sodium 06/29/2022 137  136 - 145 mmol/L Final    Potassium 06/29/2022 4 1  3 5 - 5 3 mmol/L Final    Chloride 06/29/2022 100  100 - 108 mmol/L Final    CO2 06/29/2022 27  21 - 32 mmol/L Final    ANION GAP 06/29/2022 10  4 - 13 mmol/L Final    BUN 06/29/2022 25  5 - 25 mg/dL Final    Creatinine 06/29/2022 1 42 (A) 0 60 - 1 30 mg/dL Final    Standardized to IDMS reference method    Glucose, Fasting 06/29/2022 113 (A) 65 - 99 mg/dL Final    Specimen collection should occur prior to Sulfasalazine administration due to the potential for falsely depressed results  Specimen collection should occur prior to Sulfapyridine administration due to the potential for falsely elevated results      Calcium 06/29/2022 11 1 (A) 8 3 - 10 1 mg/dL Final    eGFR 06/29/2022 44  ml/min/1 73sq m Final   Admission on 06/22/2022, Discharged on 06/22/2022   Component Date Value Ref Range Status    Sodium 06/22/2022 140  136 - 145 mmol/L Final    Potassium 06/22/2022 3 7  3 5 - 5 3 mmol/L Final    Chloride 06/22/2022 101  100 - 108 mmol/L Final    CO2 06/22/2022 25  21 - 32 mmol/L Final    ANION GAP 06/22/2022 14 (A) 4 - 13 mmol/L Final    BUN 06/22/2022 22  5 - 25 mg/dL Final    Creatinine 06/22/2022 1 24  0 60 - 1 30 mg/dL Final    Standardized to IDMS reference method    Glucose 06/22/2022 137  65 - 140 mg/dL Final    If the patient is fasting, the ADA then defines impaired fasting glucose as > 100 mg/dL and diabetes as > or equal to 123 mg/dL  Specimen collection should occur prior to Sulfasalazine administration due to the potential for falsely depressed results  Specimen collection should occur prior to Sulfapyridine administration due to the potential for falsely elevated results   Calcium 06/22/2022 8 3  8 3 - 10 1 mg/dL Final    AST 06/22/2022 19  5 - 45 U/L Final    6  Specimen collection should occur prior to Sulfasalazine administration due to the potential for falsely depressed results   ALT 06/22/2022 44  12 - 78 U/L Final    Specimen collection should occur prior to Sulfasalazine administration due to the potential for falsely depressed results   Alkaline Phosphatase 06/22/2022 92  46 - 116 U/L Final    Total Protein 06/22/2022 7 3  6 4 - 8 2 g/dL Final    Albumin 06/22/2022 3 6  3 5 - 5 0 g/dL Final    Total Bilirubin 06/22/2022 0 31  0 20 - 1 00 mg/dL Final    Use of this assay is not recommended for patients undergoing treatment with eltrombopag due to the potential for falsely elevated results   eGFR 06/22/2022 52  ml/min/1 73sq m Final    Calcium, Ionized 06/22/2022 1 05 (A) 1 12 - 1 32 mmol/L Final   Appointment on 06/20/2022   Component Date Value Ref Range Status    Sodium 06/20/2022 139  136 - 145 mmol/L Final    Potassium 06/20/2022 4 0  3 5 - 5 3 mmol/L Final    Chloride 06/20/2022 105  100 - 108 mmol/L Final    CO2 06/20/2022 25  21 - 32 mmol/L Final    ANION GAP 06/20/2022 9  4 - 13 mmol/L Final    BUN 06/20/2022 19  5 - 25 mg/dL Final    Creatinine 06/20/2022 1 20  0 60 - 1 30 mg/dL Final    Standardized to IDMS reference method    Glucose 06/20/2022 121  65 - 140 mg/dL Final    If the patient is fasting, the ADA then defines impaired fasting glucose as > 100 mg/dL and diabetes as > or equal to 123 mg/dL    Specimen collection should occur prior to Sulfasalazine administration due to the potential for falsely depressed results  Specimen collection should occur prior to Sulfapyridine administration due to the potential for falsely elevated results   Calcium 06/20/2022 8 7  8 3 - 10 1 mg/dL Final    eGFR 06/20/2022 54  ml/min/1 73sq m Final   Transcribe Orders on 06/06/2022   Component Date Value Ref Range Status    PTH 06/06/2022 <6 3 (A) 18 4 - 80 1 pg/mL Final    Magnesium 06/06/2022 2 1  1 6 - 2 6 mg/dL Final   Appointment on 06/06/2022   Component Date Value Ref Range Status    IgA 06/06/2022 245  87 - 352 mg/dL Final    Gliadin IgA 06/06/2022 9  0 - 19 units Final                       Negative                   0 - 19                     Weak Positive             20 - 30                     Moderate to Strong Positive   >30    Gliadin IgG 06/06/2022 17  0 - 19 units Final                       Negative                   0 - 19                     Weak Positive             20 - 30                     Moderate to Strong Positive   >30    Tissue Transglut Ab IGG 06/06/2022 <2  0 - 5 U/mL Final                                  Negative        0 - 5                                Weak Positive   6 - 9                                Positive           >9    TISSUE TRANSGLUTAMINASE IGA 06/06/2022 4 (A) 0 - 3 U/mL Final                                  Negative        0 -  3                                Weak Positive   4 - 10                                Positive           >10   Tissue Transglutaminase (tTG) has been identified   as the endomysial antigen  Studies have demonstr-   ated that endomysial IgA antibodies have over 99%   specificity for gluten sensitive enteropathy      Endomysial IgA 06/06/2022 Negative  Negative Final    Sodium 06/06/2022 137  136 - 145 mmol/L Final    Potassium 06/06/2022 3 9  3 5 - 5 3 mmol/L Final    Chloride 06/06/2022 101  100 - 108 mmol/L Final    CO2 06/06/2022 25  21 - 32 mmol/L Final    ANION GAP 06/06/2022 11  4 - 13 mmol/L Final    BUN 06/06/2022 17  5 - 25 mg/dL Final    Creatinine 06/06/2022 1 10  0 60 - 1 30 mg/dL Final    Standardized to IDMS reference method    Glucose 06/06/2022 129  65 - 140 mg/dL Final    If the patient is fasting, the ADA then defines impaired fasting glucose as > 100 mg/dL and diabetes as > or equal to 123 mg/dL  Specimen collection should occur prior to Sulfasalazine administration due to the potential for falsely depressed results  Specimen collection should occur prior to Sulfapyridine administration due to the potential for falsely elevated results   Calcium 06/06/2022 8 8  8 3 - 10 1 mg/dL Final    AST 06/06/2022 14  5 - 45 U/L Final    Specimen collection should occur prior to Sulfasalazine administration due to the potential for falsely depressed results   ALT 06/06/2022 43  12 - 78 U/L Final    Specimen collection should occur prior to Sulfasalazine administration due to the potential for falsely depressed results   Alkaline Phosphatase 06/06/2022 87  46 - 116 U/L Final    Total Protein 06/06/2022 7 5  6 4 - 8 2 g/dL Final    Albumin 06/06/2022 3 6  3 5 - 5 0 g/dL Final    Total Bilirubin 06/06/2022 0 43  0 20 - 1 00 mg/dL Final    Use of this assay is not recommended for patients undergoing treatment with eltrombopag due to the potential for falsely elevated results   eGFR 06/06/2022 60  ml/min/1 73sq m Final   There may be more visits with results that are not included  Patient Instructions   Follow with Consultants as per their and our suggestion    Follow up in one month or as needed earlier    Follow all instructions as advised and discussed  Take your medications as prescribed  Call the office immediately if you experience any side effects  Ask questions if you do not understand  Keep your scheduled appointment as advised or come sooner if necessary or in doubt  Best time to call for non-urgent matter or questions on weekdays is between 9am and 12 noon      See physician for any new symptoms or worsening of current symptoms  Urgent or emergent situations call 911 and report to nearest emergency room  Patient should recommended to be seen by endocrinologist in to put    Patient is to get labs 1 week(s) prior to next visit if advised        Cyd Goodpasture, MD        "This note has been constructed using a voice recognition system  Therefore there may be syntax, spelling, and/or grammatical errors   Please call if you have any questions  "

## 2022-08-02 ENCOUNTER — TELEPHONE (OUTPATIENT)
Dept: PSYCHIATRY | Facility: CLINIC | Age: 47
End: 2022-08-02

## 2022-08-02 NOTE — TELEPHONE ENCOUNTER
Behavorial Health Outpatient Intake Questions    Referred by: PCP    Please advised interviewee that they need to answer all questions truthfully to allow for best care and any misrepresentations of information may affect their ability to be seen at this clinic   => Was this discussed? Yes     [facility]     Presenting Problem (in patient's words): Anxiety, depression   Are there any developmental disabilities? ? If yes, can they speak to you on the phone? If they are too limited to speak to you on phone, refer out No    Are you taking any psychiatric medications? Yes    => If yes, who prescribes? If yes, are they injectable medications? Yuliana Dubose MD   Zoloft; Nadiya Godwin MD  Does the patient have a language barrier or hearing impairment? No    Have you been treated at [facility] by a therapist or a doctor in the past? If yes, who? No    Has the patient been hospitalized for mental health? Yes   If yes, how long ago was last hospitalization and where was it? 16years old, [facility] and [address] [place] [address] you actively use alcohol or marijuana or illegal substances? If yes, what and how much - refer out to Drug and alcohol treatment if use is excessive or daily use of illegal substances No concerns of substance abuse are reported  Do you have a community treatment team or ? No    Legal History-     Does the patient have any history of arrests, FPC/group home time, or DUIs? No  If Yes-  1) What types of charges? 2) When were they last incarcerated? 3) Are they currently on parole or probation? Minor Child-    Who has custody of the child? Is there a custody agreement? If there is a custody agreement remind parent that they must bring a copy to the first appt or they will not be seen       Intake Team, please check with provider before scheduling if flags come up such as:  - complex case  - legal history (other than DUI)  - communication barrier concerns are present  - if, in your judgment, this needs further review    ACCEPTED as a patient Yes  => Appointment Date: August 3, 2022 at 3:00pm with Dr Guido Boateng virtually   Will be using her cell phone: [phone]    Referred Elsewhere? No    Name of Insurance Co: [employer] ID# [id number]  Insurance Phone #  If ins is primary or secondary  If patient is a minor, parents information such as Name, D  O B of guarantor

## 2022-08-04 ENCOUNTER — OFFICE VISIT (OUTPATIENT)
Dept: GASTROENTEROLOGY | Facility: CLINIC | Age: 47
End: 2022-08-04
Payer: COMMERCIAL

## 2022-08-04 VITALS
HEIGHT: 62 IN | SYSTOLIC BLOOD PRESSURE: 118 MMHG | TEMPERATURE: 98 F | WEIGHT: 203 LBS | DIASTOLIC BLOOD PRESSURE: 86 MMHG | BODY MASS INDEX: 37.36 KG/M2 | HEART RATE: 97 BPM

## 2022-08-04 DIAGNOSIS — R16.1 SPLENOMEGALY: Primary | ICD-10-CM

## 2022-08-04 PROCEDURE — 99213 OFFICE O/P EST LOW 20 MIN: CPT | Performed by: INTERNAL MEDICINE

## 2022-08-04 RX ORDER — POTASSIUM CHLORIDE 1500 MG/1
1 TABLET, FILM COATED, EXTENDED RELEASE ORAL 2 TIMES DAILY
COMMUNITY
Start: 2022-07-11

## 2022-08-04 RX ORDER — QUETIAPINE FUMARATE 25 MG/1
25 TABLET, FILM COATED ORAL
COMMUNITY
Start: 2022-07-11 | End: 2022-09-01

## 2022-08-04 RX ORDER — BACLOFEN 10 MG/1
10 TABLET ORAL 3 TIMES DAILY PRN
COMMUNITY
Start: 2022-07-22

## 2022-08-04 NOTE — LETTER
August 4, 2022     Heather Gomez MD  [address] [place] [id number]    Patient: Nadeen Gowers   YOB: 1975   Date of Visit: 8/4/2022       Dear Dr Saw Figueroa: Thank you for referring Nadeen Gowers to me for evaluation  Below are my notes for this consultation  If you have questions, please do not hesitate to call me  I look forward to following your patient along with you  Sincerely,        Mamadou Price MD        CC: No Recipients  Mamadou Price MD  8/4/2022 11:03 PM  Sign when Signing Visit  [facility]  Devonmyrna Gridery [address] 56 y o  female MRN: [id number]            Assessment & Plan:  56 y, with incidental splenomegaly, fatigue, poor appetite, weight loss, night sweats  # 1 Splenomegaly  -check EBV, hep panel  -check abd us with dopplers, eval for clot (pt has a hx of DVT)  -if persistent splenomegaly and no signs of portal hypertension, will refer to oncology    #2 Loss of appetite:  -check GES (maybe due to tapering Xanax)    # Celiac: mildly positive biopsies and mildly positive celiac panel  -GF diet, reevaluate in the future      Shawna Dillard was seen today for follow-up  Diagnoses and all orders for this visit:    Splenomegaly  -     US abdomen complete with doppler; Future  -     EBV acute panel; Future  -     Hepatitis C antibody; Future  -     Hepatitis B surface antigen; Future  -     Hepatitis B surface antibody; Future  -     Hepatitis A antibody, total; Future  -     NM gastric emptying; Future            _____________________________________________________________        CC: splenomegaly    HPI:  Nadeen Gowers is a 56 y  o female who is here for splenomegaly  [place] acute ill in May, possibly from xanax withdrawal, with poor sleep, loss of appetite, [place] incidentaly found to have splenomegaly on CT  Continues with poor appetite, 4-5 BM/day, night sweats      EGD/Colon in April, relatively unremarkable    No sick contacts  No etoh, had been trying to lose weight  EGD with possible celiac, she's been on GF diet  ROS:  The remainder of the ROS was negative except for the pertinent positives mentioned in HPI        Allergies: Hydrocodone-acetaminophen, Vicodin [hydrocodone-acetaminophen], Morphine and related, and Adhesive [medical tape]    Medications:   Current Outpatient Medications:     baclofen 10 mg tablet, Take 10 mg by mouth 3 (three) times a day as needed, Disp: , Rfl:     baclofen 20 mg tablet, Take 1 tablet (20 mg total) by mouth 3 (three) times a day (Patient taking differently: Take 20 mg by mouth 3 (three) times a day as needed), Disp: 30 tablet, Rfl: 0    calcitriol (ROCALTROL) 0 25 mcg capsule, TAKE 1 CAPSULE BY MOUTH EVERY DAY, Disp: 30 capsule, Rfl: 0    Calcium Carb-Cholecalciferol (Calcium 500+D) 500-400 MG-UNIT TABS, Take 600 mg by mouth 3 times a day, Disp: 360 tablet, Rfl: 0    fenofibrate (TRIGLIDE) 160 MG tablet, Take 160 mg by mouth daily, Disp: , Rfl:     ferrous sulfate 325 (65 Fe) mg tablet, Take 325 mg by mouth Once Monday, wed, Friday-Last dose 4/1/22 , Disp: , Rfl:     ibuprofen (MOTRIN) 600 mg tablet, Take 1 tablet (600 mg total) by mouth every 6 (six) hours as needed for mild pain or moderate pain, Disp: 16 tablet, Rfl: 0    levothyroxine 150 mcg tablet, TAKE 1 TABLET BY MOUTH EVERY DAY (Patient taking differently: 150 mcg every morning), Disp: 90 tablet, Rfl: 1    Lidocaine-Menthol 4-1 % PTCH, if needed  , Disp: , Rfl:     magnesium Oxide (MAG-OX) 400 mg TABS, TAKE 1 TABLET (400 MG TOTAL) BY MOUTH THREE (THREE) TIMES A DAY 9PM, Disp: 90 tablet, Rfl: 3    oxyCODONE-acetaminophen (PERCOCET)  mg per tablet, Take 1 tablet by mouth 4 (four) times a day, Disp: , Rfl:     potassium chloride (K-DUR,KLOR-CON) 20 mEq tablet, Take 20 mEq by mouth 2 (two) times a day, Disp: , Rfl:     Potassium Chloride ER 20 MEQ TBCR, Take 1 tablet by mouth 2 (two) times a day, Disp: , Rfl:     pramipexole (MIRAPEX) 0 5 mg tablet, Take 0 5 mg by mouth daily at bedtime, Disp: , Rfl:     pregabalin (LYRICA) 75 mg capsule, Take 75 mg by mouth 3 (three) times a day as needed , Disp: , Rfl:     QUEtiapine (SEROquel) 25 mg tablet, Take 25 mg by mouth daily at bedtime, Disp: , Rfl:     sertraline (Zoloft) 50 mg tablet, Take 1 tablet (50 mg total) by mouth daily, Disp: 30 tablet, Rfl: 5    ALPRAZolam (XANAX) 0 5 mg tablet, Take 1 tablet (0 5 mg total) by mouth 2 (two) times a day as needed for anxiety (panic attack) (Patient not taking: Reported on 2022), Disp: 45 tablet, Rfl: 0    pantoprazole (PROTONIX) 40 mg tablet, Take 1 tablet (40 mg total) by mouth daily Take 1/2 hour prior to breakfast daily in a m , Disp: 30 tablet, Rfl: 3    Past Medical History:   Diagnosis Date    Abdominal pain     Acid reflux     Acute renal failure (HCC)     multiple episodes    Anemia     Anxiety     Arthritis     Asthma     Back pain     Chronic pain     DDD (degenerative disc disease), lumbar     Depression     Disease of thyroid gland     had surgery and now hypo    DVT (deep venous thrombosis) ([facility] [address] )     s/p ankle fracture    Headache     History of transfusion     Hypercalcemia     Hyperlipidemia     Hyperthyroidism     Hypocalcemia     post op 2016    Kidney stone     Lightheadedness     Migraine     Obesity     Palpitations     Psychiatric disorder     anxiety depression    Seizures (HCC)     petit mal x1  4 years ago- all tests were neg      SOB (shortness of breath)     Spondylolisthesis of lumbar region     Treatment     spinal pain injections  last was 2016    Wears glasses        Past Surgical History:   Procedure Laterality Date    BACK SURGERY      L4-5, S1-fusion-plate/screws     SECTION      x5    CYSTOCELE REPAIR  2017    CYSTOSCOPY      DISCOGRAM      HYSTERECTOMY      PARATHYROIDECTOMY      MT ANTERIOR COLPORRAPHY RPR CYSTOCELE W/CYSTO N/A 5/4/2017    Procedure: CYSTOCELE REPAIR;  Surgeon: Grant Mendoza MD;  Location: [address] [place] [address];  Service: Gynecology    IL ARTHRODESIS POSTERIOR INTERBODY LUMBAR N/A 8/12/2016    Procedure: L4-S1 LUMBAR LAMINECTOMY/DECOMPRESSION;  INSTRUMENTED POSTEROLATERAL FUSION/ INTERBODY L5-S1; ALLOGRAFT AND AUTOGRAFT (IMPULSE) ; Surgeon: Bryan Rockwell MD;  Location:  MAIN OR;  Service: Orthopedics    IL CYSTOURETHROSCOPY,URETER CATHETER Bilateral 12/7/2018    Procedure: INSERTION URETERAL CATHETERS PREOP;  Surgeon: Kristine Covarrubias MD;  Location: [address] [place] [address];  Service: Urology    IL SLING OPER STRES INCONTINENCE N/A 5/4/2017    Procedure: MID URETHRAL SLING;  Surgeon: Ming Velazquez MD;  Location: [address] [place] [address];  Service: Urology    IL SUPRACERV ABD HYSTERECTOMY N/A 12/7/2018    Procedure: SUPRACERVICAL HYSTERECTOMY;  Surgeon: Grant Mendoza MD;  Location: [facility];  Service: Gynecology    THYROIDECTOMY      TONSILECTOMY AND ADNOIDECTOMY      TONSILLECTOMY      TUBAL LIGATION         Family History   Problem Relation Age of Onset    Diabetes Mother     Hypertension Mother     Hyperlipidemia Father     Arrhythmia Father     Lung cancer Father     Diabetes Father         reports that she has been smoking cigarettes  She has a 12 50 pack-year smoking history  She has never used smokeless tobacco  She reports previous alcohol use  She reports that she does not use drugs        Physical Exam:    /86 (BP Location: Left arm, Patient Position: Sitting, Cuff Size: Standard)   Pulse 97   Temp 98 °F (36 7 °C) (Temporal)   Ht 5' 2" (1 575 m)   Wt 92 1 kg (203 lb)   LMP 12/06/2018 Comment: partial hysterectomy   BMI 37 13 kg/m²     Gen: wn/wd, NAD, overweight  HEENT: anicteric, MMM, no cervical LAD  CVS: RRR, no m/r/g  CHEST: CTA b/l  ABD: +BS, soft, left sided abd tenderness, no R/G  EXT: no c/c/e  NEURO: aaox3  SKIN: NO rashes

## 2022-08-05 ENCOUNTER — OFFICE VISIT (OUTPATIENT)
Dept: PSYCHIATRY | Facility: CLINIC | Age: 47
End: 2022-08-05
Payer: COMMERCIAL

## 2022-08-05 DIAGNOSIS — F41.9 ANXIETY: Primary | ICD-10-CM

## 2022-08-05 PROBLEM — F13.930 BENZODIAZEPINE WITHDRAWAL WITHOUT COMPLICATION (HCC): Status: ACTIVE | Noted: 2022-08-05

## 2022-08-05 PROBLEM — F13.230 BENZODIAZEPINE WITHDRAWAL WITHOUT COMPLICATION (HCC): Status: ACTIVE | Noted: 2022-08-05

## 2022-08-05 PROCEDURE — 90792 PSYCH DIAG EVAL W/MED SRVCS: CPT | Performed by: NURSE PRACTITIONER

## 2022-08-05 NOTE — PROGRESS NOTES
[facility]  Priya James 56 y o  female MRN: [id number]            Assessment & Plan:  56 y, with incidental splenomegaly, fatigue, poor appetite, weight loss, night sweats  # 1 Splenomegaly  -check EBV, hep panel  -check abd us with dopplers, eval for clot (pt has a hx of DVT)  -if persistent splenomegaly and no signs of portal hypertension, will refer to oncology    #2 Loss of appetite:  -check GES (maybe due to tapering Xanax)    # Celiac: mildly positive biopsies and mildly positive celiac panel  -GF diet, reevaluate in the future      Brandy Jerry was seen today for follow-up  Diagnoses and all orders for this visit:    Splenomegaly  -     US abdomen complete with doppler; Future  -     EBV acute panel; Future  -     Hepatitis C antibody; Future  -     Hepatitis B surface antigen; Future  -     Hepatitis B surface antibody; Future  -     Hepatitis A antibody, total; Future  -     NM gastric emptying; Future            _____________________________________________________________        CC: splenomegaly    HPI:  Priya James is a 56 y  o female who is here for splenomegaly  [place] acute ill in May, possibly from xanax withdrawal, with poor sleep, loss of appetite, [place] incidentaly found to have splenomegaly on CT  Continues with poor appetite, 4-5 BM/day, night sweats  EGD/Colon in April, relatively unremarkable    No sick contacts  No etoh, had been trying to lose weight  EGD with possible celiac, she's been on GF diet  ROS:  The remainder of the ROS was negative except for the pertinent positives mentioned in HPI        Allergies: Hydrocodone-acetaminophen, Vicodin [hydrocodone-acetaminophen], Morphine and related, and Adhesive [medical tape]    Medications:   Current Outpatient Medications:     baclofen 10 mg tablet, Take 10 mg by mouth 3 (three) times a day as needed, Disp: , Rfl:     baclofen 20 mg tablet, Take 1 tablet (20 mg total) by mouth 3 (three) times a day (Patient taking differently: Take 20 mg by mouth 3 (three) times a day as needed), Disp: 30 tablet, Rfl: 0    calcitriol (ROCALTROL) 0 25 mcg capsule, TAKE 1 CAPSULE BY MOUTH EVERY DAY, Disp: 30 capsule, Rfl: 0    Calcium Carb-Cholecalciferol (Calcium 500+D) 500-400 MG-UNIT TABS, Take 600 mg by mouth 3 times a day, Disp: 360 tablet, Rfl: 0    fenofibrate (TRIGLIDE) 160 MG tablet, Take 160 mg by mouth daily, Disp: , Rfl:     ferrous sulfate 325 (65 Fe) mg tablet, Take 325 mg by mouth Once Monday, wed, Friday-Last dose 4/1/22 , Disp: , Rfl:     ibuprofen (MOTRIN) 600 mg tablet, Take 1 tablet (600 mg total) by mouth every 6 (six) hours as needed for mild pain or moderate pain, Disp: 16 tablet, Rfl: 0    levothyroxine 150 mcg tablet, TAKE 1 TABLET BY MOUTH EVERY DAY (Patient taking differently: 150 mcg every morning), Disp: 90 tablet, Rfl: 1    Lidocaine-Menthol 4-1 % PTCH, if needed  , Disp: , Rfl:     magnesium Oxide (MAG-OX) 400 mg TABS, TAKE 1 TABLET (400 MG TOTAL) BY MOUTH THREE (THREE) TIMES A DAY 9PM, Disp: 90 tablet, Rfl: 3    oxyCODONE-acetaminophen (PERCOCET)  mg per tablet, Take 1 tablet by mouth 4 (four) times a day, Disp: , Rfl:     potassium chloride (K-DUR,KLOR-CON) 20 mEq tablet, Take 20 mEq by mouth 2 (two) times a day, Disp: , Rfl:     Potassium Chloride ER 20 MEQ TBCR, Take 1 tablet by mouth 2 (two) times a day, Disp: , Rfl:     pramipexole (MIRAPEX) 0 5 mg tablet, Take 0 5 mg by mouth daily at bedtime, Disp: , Rfl:     pregabalin (LYRICA) 75 mg capsule, Take 75 mg by mouth 3 (three) times a day as needed , Disp: , Rfl:     QUEtiapine (SEROquel) 25 mg tablet, Take 25 mg by mouth daily at bedtime, Disp: , Rfl:     sertraline (Zoloft) 50 mg tablet, Take 1 tablet (50 mg total) by mouth daily, Disp: 30 tablet, Rfl: 5    ALPRAZolam (XANAX) 0 5 mg tablet, Take 1 tablet (0 5 mg total) by mouth 2 (two) times a day as needed for anxiety (panic attack) (Patient not taking: Reported on 2022), Disp: 45 tablet, Rfl: 0    pantoprazole (PROTONIX) 40 mg tablet, Take 1 tablet (40 mg total) by mouth daily Take 1/2 hour prior to breakfast daily in a m , Disp: 30 tablet, Rfl: 3    Past Medical History:   Diagnosis Date    Abdominal pain     Acid reflux     Acute renal failure (HCC)     multiple episodes    Anemia     Anxiety     Arthritis     Asthma     Back pain     Chronic pain     DDD (degenerative disc disease), lumbar     Depression     Disease of thyroid gland     had surgery and now hypo    D[facility]) ([facility] [address] )     s/p ankle fracture    Headache     History of transfusion     Hypercalcemia     Hyperlipidemia     Hyperthyroidism     Hypocalcemia     post op 2016    Kidney stone     Lightheadedness     Migraine     Obesity     Palpitations     Psychiatric disorder     anxiety depression    Seizures (HCC)     petit mal x1  4 years ago- all tests were neg   SOB (shortness of breath)     Spondylolisthesis of lumbar region     Treatment     spinal pain injections  last was 2016    Wears glasses        Past Surgical History:   Procedure Laterality Date    BACK SURGERY      L4-5, S1-fusion-plate/screws     SECTION      x5    CYSTOCELE REPAIR  2017    CYSTOSCOPY      DISCOGRAM      HYSTERECTOMY      PARATHYROIDECTOMY      [place] ANTERIOR COLPORRAPHY RPR CYSTOCELE W/CYSTO N/A 2017    Procedure: CYSTOCELE REPAIR;  Surgeon: Naeem Hansen MD;  Location: [address] [place] [address];  Service: Gynecology    [place] ARTHRODESIS POSTERIOR INTERBODY LUMBAR N/A 2016    Procedure: L4-S1 LUMBAR LAMINECTOMY/DECOMPRESSION;  INSTRUMENTED POSTEROLATERAL FUSION/ INTERBODY L5-S1; ALLOGRAFT AND AUTOGRAFT (IMPULSE) ;   Surgeon: Clare Bynum MD;  [facility];  Service: Orthopedics    [place] CYSTOURETHROSCOPY,URETER CATHETER Bilateral 2018    Procedure: INSERTION URETERAL CATHETERS PREOP;  Surgeon: Kaylah Chavez MD;  Location: [facility] MA[facility];  Service: Urology    CT SLING OPER STRES INCONTINENCE N/A 5/4/2017    Procedure: MID URETHRAL SLING;  Surgeon: Aneudy Carrera MD;  Location: [address] [place] [address];  Service: Urology    CT SUPRACERV ABD HYSTERECTOMY N/A 12/7/2018    Procedure: SUPRACERVICAL HYSTERECTOMY;  Surgeon: Raza Du MD;  Location: [facility];  Service: Gynecology    THYROIDECTOMY      TONSILECTOMY AND ADNOIDECTOMY      TONSILLECTOMY      TUBAL LIGATION         Family History   Problem Relation Age of Onset    Diabetes Mother     Hypertension Mother     Hyperlipidemia Father     Arrhythmia Father     Lung cancer Father     Diabetes Father         reports that she has been smoking cigarettes  She has a 12 50 pack-year smoking history  She has never used smokeless tobacco  She reports previous alcohol use  She reports that she does not use drugs        Physical Exam:    /86 (BP Location: Left arm, Patient Position: Sitting, Cuff Size: Standard)   Pulse 97   Temp 98 °F (36 7 °C) (Temporal)   Ht 5' 2" (1 575 m)   Wt 92 1 kg (203 lb)   LMP 12/06/2018 Comment: partial hysterectomy   BMI 37 13 kg/m²     Gen: wn/wd, NAD, overweight  HEENT: anicteric, MMM, no cervical LAD  CVS: RRR, no m/r/g  CHEST: CTA b/l  ABD: +BS, soft, left sided abd tenderness, no R/G  EXT: no c/c/e  NEURO: aaox3  SKIN: NO rashes
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
## 2022-08-05 NOTE — BH TREATMENT PLAN
TREATMENT PLAN (Medication Management Only)        [facility]    Name and Date of Birth:  Morena Caballero 56 y o  1975  Date of Treatment Plan: August 5, 2022  Diagnosis/Diagnoses:  No diagnosis found  Strengths/Personal Resources for Self-Care: supportive family, supportive friends, taking medications as prescribed, ability to communicate well, ability to listen, ability to reason, average or above intelligence, general fund of knowledge, motivation for treatment  Area/Areas of need (in own words): anxiety symptoms  1  Long Term Goal: decrease anxiety  Target Date:1 month - 9/5/2022  Person/Persons responsible for completion of goal: Beverly Palmer  2  Short Term Objective (s) - How will we reach this goal?:   A  Provider new recommended medication/dosage changes and/or continue medication(s): continue current medications as prescribed  B  N/A   C  N/A  Target Date:3 months - 11/5/2022  Person/Persons Responsible for Completion of Goal: Beverly Palmer  Progress Towards Goals: continuing treatment  Treatment Modality: medication management every 3 months  Review due 180 days from date of this plan: 3 months - 11/5/2022  Expected length of service: ongoing treatment  My Physician/PA/NP and I have developed this plan together and I agree to work on the goals and objectives  I understand the treatment goals that were developed for my treatment

## 2022-08-08 ENCOUNTER — TELEPHONE (OUTPATIENT)
Dept: PSYCHIATRY | Facility: CLINIC | Age: 47
End: 2022-08-08

## 2022-08-08 DIAGNOSIS — F41.9 ANXIETY: Primary | ICD-10-CM

## 2022-08-08 RX ORDER — ALPRAZOLAM 0.5 MG/1
0.5 TABLET ORAL
Qty: 90 TABLET | Refills: 3 | Status: SHIPPED | OUTPATIENT
Start: 2022-08-08 | End: 2022-09-02

## 2022-08-08 RX ORDER — SERTRALINE HYDROCHLORIDE 100 MG/1
100 TABLET, FILM COATED ORAL DAILY
Qty: 30 TABLET | Refills: 3 | Status: SHIPPED | OUTPATIENT
Start: 2022-08-08 | End: 2022-10-03

## 2022-08-08 NOTE — TELEPHONE ENCOUNTER
Patient called in to get refills and clarification of medications ALPRAZolam (XANAX) 0 5 mg tablet and sertraline (Zoloft) 50 mg tablet   Patient states Dilan Jin has increased both of these medications and was to call these into the [address] [facility] at her last visit which was a new patient visit on 8/5/2022 and her next visit is virtual with Dilan Jin on 9/1/2022  Please advise for further instructions

## 2022-08-11 ENCOUNTER — HOSPITAL ENCOUNTER (EMERGENCY)
Facility: HOSPITAL | Age: 47
Discharge: HOME | End: 2022-08-11
Attending: EMERGENCY MEDICINE
Payer: COMMERCIAL

## 2022-08-11 ENCOUNTER — APPOINTMENT (EMERGENCY)
Dept: RADIOLOGY | Facility: HOSPITAL | Age: 47
End: 2022-08-11
Payer: COMMERCIAL

## 2022-08-11 VITALS
TEMPERATURE: 98.6 F | DIASTOLIC BLOOD PRESSURE: 79 MMHG | HEART RATE: 80 BPM | RESPIRATION RATE: 18 BRPM | SYSTOLIC BLOOD PRESSURE: 130 MMHG | OXYGEN SATURATION: 98 %

## 2022-08-11 DIAGNOSIS — M54.6 CHRONIC THORACIC SPINE PAIN: ICD-10-CM

## 2022-08-11 DIAGNOSIS — G89.29 CHRONIC THORACIC SPINE PAIN: ICD-10-CM

## 2022-08-11 DIAGNOSIS — E83.51 HYPOCALCEMIA: ICD-10-CM

## 2022-08-11 DIAGNOSIS — M79.672 LEFT FOOT PAIN: Primary | ICD-10-CM

## 2022-08-11 LAB
ALBUMIN SERPL BCP-MCNC: 3.5 G/DL (ref 3.5–5)
ALP SERPL-CCNC: 106 U/L (ref 46–116)
ALT SERPL W P-5'-P-CCNC: 40 U/L (ref 12–78)
ANION GAP SERPL CALCULATED.3IONS-SCNC: 15 MMOL/L (ref 4–13)
AST SERPL W P-5'-P-CCNC: 15 U/L (ref 5–45)
BASOPHILS # BLD AUTO: 0.05 THOUSANDS/ΜL (ref 0–0.1)
BASOPHILS NFR BLD AUTO: 1 % (ref 0–1)
BILIRUB SERPL-MCNC: 0.26 MG/DL (ref 0.2–1)
BUN SERPL-MCNC: 15 MG/DL (ref 5–25)
CA-I BLD-SCNC: 1.06 MMOL/L (ref 1.12–1.32)
CALCIUM SERPL-MCNC: 7.9 MG/DL (ref 8.3–10.1)
CHLORIDE SERPL-SCNC: 102 MMOL/L (ref 96–108)
CO2 SERPL-SCNC: 20 MMOL/L (ref 21–32)
CREAT SERPL-MCNC: 1.15 MG/DL (ref 0.6–1.3)
EOSINOPHIL # BLD AUTO: 0.14 THOUSAND/ΜL (ref 0–0.61)
EOSINOPHIL NFR BLD AUTO: 2 % (ref 0–6)
ERYTHROCYTE [DISTWIDTH] IN BLOOD BY AUTOMATED COUNT: 17.7 % (ref 11.6–15.1)
GFR SERPL CREATININE-BSD FRML MDRD: 57 ML/MIN/1.73SQ M
GLUCOSE SERPL-MCNC: 175 MG/DL (ref 65–140)
HCT VFR BLD AUTO: 40 % (ref 34.8–46.1)
HGB BLD-MCNC: 13 G/DL (ref 11.5–15.4)
IMM GRANULOCYTES # BLD AUTO: 0.02 THOUSAND/UL (ref 0–0.2)
IMM GRANULOCYTES NFR BLD AUTO: 0 % (ref 0–2)
LYMPHOCYTES # BLD AUTO: 2.14 THOUSANDS/ΜL (ref 0.6–4.47)
LYMPHOCYTES NFR BLD AUTO: 29 % (ref 14–44)
MCH RBC QN AUTO: 29 PG (ref 26.8–34.3)
MCHC RBC AUTO-ENTMCNC: 32.5 G/DL (ref 31.4–37.4)
MCV RBC AUTO: 89 FL (ref 82–98)
MONOCYTES # BLD AUTO: 0.46 THOUSAND/ΜL (ref 0.17–1.22)
MONOCYTES NFR BLD AUTO: 6 % (ref 4–12)
NEUTROPHILS # BLD AUTO: 4.49 THOUSANDS/ΜL (ref 1.85–7.62)
NEUTS SEG NFR BLD AUTO: 62 % (ref 43–75)
NRBC BLD AUTO-RTO: 0 /100 WBCS
PLATELET # BLD AUTO: 210 THOUSANDS/UL (ref 149–390)
PMV BLD AUTO: 10.3 FL (ref 8.9–12.7)
POTASSIUM SERPL-SCNC: 3.9 MMOL/L (ref 3.5–5.3)
PROT SERPL-MCNC: 7.5 G/DL (ref 6.4–8.4)
RBC # BLD AUTO: 4.48 MILLION/UL (ref 3.81–5.12)
SODIUM SERPL-SCNC: 137 MMOL/L (ref 135–147)
WBC # BLD AUTO: 7.3 THOUSAND/UL (ref 4.31–10.16)

## 2022-08-11 PROCEDURE — 80053 COMPREHEN METABOLIC PANEL: CPT | Performed by: EMERGENCY MEDICINE

## 2022-08-11 PROCEDURE — 73630 X-RAY EXAM OF FOOT: CPT

## 2022-08-11 PROCEDURE — 36415 COLL VENOUS BLD VENIPUNCTURE: CPT | Performed by: EMERGENCY MEDICINE

## 2022-08-11 PROCEDURE — 85025 COMPLETE CBC W/AUTO DIFF WBC: CPT | Performed by: EMERGENCY MEDICINE

## 2022-08-11 PROCEDURE — 96375 TX/PRO/DX INJ NEW DRUG ADDON: CPT

## 2022-08-11 PROCEDURE — 99284 EMERGENCY DEPT VISIT MOD MDM: CPT | Performed by: EMERGENCY MEDICINE

## 2022-08-11 PROCEDURE — 82330 ASSAY OF CALCIUM: CPT | Performed by: EMERGENCY MEDICINE

## 2022-08-11 PROCEDURE — 99284 EMERGENCY DEPT VISIT MOD MDM: CPT

## 2022-08-11 PROCEDURE — 96365 THER/PROPH/DIAG IV INF INIT: CPT

## 2022-08-11 RX ORDER — METHYLPREDNISOLONE 4 MG/1
TABLET ORAL
Qty: 21 TABLET | Refills: 0 | Status: SHIPPED | OUTPATIENT
Start: 2022-08-11 | End: 2022-10-06

## 2022-08-11 RX ORDER — CALCIUM GLUCONATE 20 MG/ML
1 INJECTION, SOLUTION INTRAVENOUS ONCE
Status: COMPLETED | OUTPATIENT
Start: 2022-08-11 | End: 2022-08-11

## 2022-08-11 RX ORDER — KETOROLAC TROMETHAMINE 30 MG/ML
15 INJECTION, SOLUTION INTRAMUSCULAR; INTRAVENOUS ONCE
Status: COMPLETED | OUTPATIENT
Start: 2022-08-11 | End: 2022-08-11

## 2022-08-11 RX ORDER — PREDNISONE 20 MG/1
60 TABLET ORAL ONCE
Status: COMPLETED | OUTPATIENT
Start: 2022-08-11 | End: 2022-08-11

## 2022-08-11 RX ADMIN — CALCIUM GLUCONATE 1 G: 20 INJECTION, SOLUTION INTRAVENOUS at 04:33

## 2022-08-11 RX ADMIN — PREDNISONE 60 MG: 20 TABLET ORAL at 05:11

## 2022-08-11 RX ADMIN — KETOROLAC TROMETHAMINE 15 MG: 30 INJECTION, SOLUTION INTRAMUSCULAR at 02:57
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
## 2022-08-13 ENCOUNTER — HOSPITAL ENCOUNTER (EMERGENCY)
Facility: HOSPITAL | Age: 47
Discharge: HOME | End: 2022-08-13
Attending: EMERGENCY MEDICINE
Payer: COMMERCIAL

## 2022-08-13 VITALS
SYSTOLIC BLOOD PRESSURE: 129 MMHG | DIASTOLIC BLOOD PRESSURE: 78 MMHG | RESPIRATION RATE: 19 BRPM | OXYGEN SATURATION: 98 % | HEART RATE: 61 BPM | TEMPERATURE: 98.1 F

## 2022-08-13 DIAGNOSIS — R20.2 PARESTHESIAS: Primary | ICD-10-CM

## 2022-08-13 DIAGNOSIS — E83.51 HYPOCALCEMIA: ICD-10-CM

## 2022-08-13 LAB
ANION GAP SERPL CALCULATED.3IONS-SCNC: 13 MMOL/L (ref 4–13)
BUN SERPL-MCNC: 25 MG/DL (ref 5–25)
CA-I BLD-SCNC: 0.97 MMOL/L (ref 1.12–1.32)
CALCIUM SERPL-MCNC: 7.7 MG/DL (ref 8.3–10.1)
CHLORIDE SERPL-SCNC: 99 MMOL/L (ref 96–108)
CO2 SERPL-SCNC: 24 MMOL/L (ref 21–32)
CREAT SERPL-MCNC: 0.98 MG/DL (ref 0.6–1.3)
GFR SERPL CREATININE-BSD FRML MDRD: 69 ML/MIN/1.73SQ M
GLUCOSE SERPL-MCNC: 112 MG/DL (ref 65–140)
POTASSIUM SERPL-SCNC: 4.1 MMOL/L (ref 3.5–5.3)
SODIUM SERPL-SCNC: 136 MMOL/L (ref 135–147)

## 2022-08-13 PROCEDURE — 96365 THER/PROPH/DIAG IV INF INIT: CPT

## 2022-08-13 PROCEDURE — 36415 COLL VENOUS BLD VENIPUNCTURE: CPT | Performed by: EMERGENCY MEDICINE

## 2022-08-13 PROCEDURE — 80048 BASIC METABOLIC PNL TOTAL CA: CPT | Performed by: EMERGENCY MEDICINE

## 2022-08-13 PROCEDURE — 82330 ASSAY OF CALCIUM: CPT | Performed by: EMERGENCY MEDICINE

## 2022-08-13 PROCEDURE — 93005 ELECTROCARDIOGRAM TRACING: CPT

## 2022-08-13 PROCEDURE — 99285 EMERGENCY DEPT VISIT HI MDM: CPT | Performed by: EMERGENCY MEDICINE

## 2022-08-13 PROCEDURE — 99284 EMERGENCY DEPT VISIT MOD MDM: CPT

## 2022-08-13 RX ORDER — CALCIUM GLUCONATE 20 MG/ML
1 INJECTION, SOLUTION INTRAVENOUS ONCE
Status: COMPLETED | OUTPATIENT
Start: 2022-08-13 | End: 2022-08-13

## 2022-08-13 RX ADMIN — CALCIUM GLUCONATE 1 G: 20 INJECTION, SOLUTION INTRAVENOUS at 15:47

## 2022-08-13 NOTE — ED PROVIDER NOTES
History  Chief Complaint   Patient presents with    Numbness     Pt states she thinks her calcium is low  Generalized numbness and tingling and chest pain      46-year-old female with past history of hypothyroidism after thyroidectomy, hypocalcemia, anxiety, depression, hyperlipidemia, anemia, degenerative disc disease, migraine, presents to the ED for evaluation of generalized tingling sensation throughout her body since early this morning  Patient has had recurrent episodes of hypocalcemia requiring calcium bolus in the emergency department  Patient is followed by her endocrinologist   Patient has an appointment scheduled in the future for follow-up of her hypocalcemia  Patient thinks her calcium level is low again today  Patient came to the ED for further evaluation  Patient denies any focal neuro deficits  Patient denies any weakness or dizziness  Patient denies any fevers or chills  Patient was seen in the emergency department 2 days ago for hypocalcemia and similar symptoms  History provided by:  Patient      Prior to Admission Medications   Prescriptions Last Dose Informant Patient Reported? Taking?    ALPRAZolam (XANAX) 0 5 mg tablet   No No   Sig: Take 1 tablet (0 5 mg total) by mouth daily at bedtime as needed for anxiety Take TWO o 5 mg tabs in am and ONE 0 5 mg in om   Calcium Carb-Cholecalciferol (Calcium 500+D) 500-400 MG-UNIT TABS  Self No No   Sig: Take 600 mg by mouth 3 times a day   Lidocaine-Menthol 4-1 % PTCH  Self Yes No   Sig: if needed     Potassium Chloride ER 20 MEQ TBCR  Self Yes No   Sig: Take 1 tablet by mouth 2 (two) times a day   QUEtiapine (SEROquel) 25 mg tablet  Self Yes No   Sig: Take 25 mg by mouth daily at bedtime   baclofen 10 mg tablet  Self Yes No   Sig: Take 10 mg by mouth 3 (three) times a day as needed   baclofen 20 mg tablet  Self No No   Sig: Take 1 tablet (20 mg total) by mouth 3 (three) times a day   Patient taking differently: Take 20 mg by mouth 3 (three) times a day as needed   calcitriol (ROCALTROL) 0 25 mcg capsule  Self No No   Sig: TAKE 1 CAPSULE BY MOUTH EVERY DAY   fenofibrate (TRIGLIDE) 160 MG tablet  Self Yes No   Sig: Take 160 mg by mouth daily   ferrous sulfate 325 (65 Fe) mg tablet  Self Yes No   Sig: Take 325 mg by mouth Once Monday, wed, Friday-Last dose 4/1/22    ibuprofen (MOTRIN) 600 mg tablet  Self No No   Sig: Take 1 tablet (600 mg total) by mouth every 6 (six) hours as needed for mild pain or moderate pain   levothyroxine 150 mcg tablet  Self No No   Sig: TAKE 1 TABLET BY MOUTH EVERY DAY   Patient taking differently: 150 mcg every morning   magnesium Oxide (MAG-OX) 400 mg TABS  Self No No   Sig: TAKE 1 TABLET (400 MG TOTAL) BY MOUTH THREE (THREE) TIMES A DAY 9PM   methylPREDNISolone 4 MG tablet therapy pack   No No   Sig: Use as directed on package   oxyCODONE-acetaminophen (PERCOCET)  mg per tablet  Self Yes No   Sig: Take 1 tablet by mouth 4 (four) times a day   pantoprazole (PROTONIX) 40 mg tablet   No No   Sig: Take 1 tablet (40 mg total) by mouth daily Take 1/2 hour prior to breakfast daily in a [facility]    potassium chloride (K-DUR,KLOR-CON) 20 mEq tablet  Self Yes No   Sig: Take 20 mEq by mouth 2 (two) times a day   pramipexole (MIRAPEX) 0 5 mg tablet  Self Yes No   Sig: Take 0 5 mg by mouth daily at bedtime   pregabalin (LYRICA) 75 mg capsule  Self Yes No   Sig: Take 75 mg by mouth 3 (three) times a day as needed    sertraline (ZOLOFT) 100 mg tablet   No No   Sig: Take 1 tablet (100 mg total) by mouth daily      Facility-Administered Medications: None       Past Medical History:   Diagnosis Date    Abdominal pain     Acid reflux     Acute renal failure (HCC)     multiple episodes    Anemia     Anxiety     Arthritis     Asthma     Back pain     Chronic pain     DDD (degenerative disc disease), lumbar     Depression     Disease of thyroid gland     had surgery and now hypo    [facility]p [facility]    Headache     History of transfusion     Hypercalcemia     Hyperlipidemia     Hyperthyroidism     Hypocalcemia     post op 2016    Kidney stone     Lightheadedness     Migraine     Obesity     Palpitations     Psychiatric disorder     anxiety depression    Seizures (HCC)     petit mal x1  4 years ago- all tests were neg   SOB (shortness of breath)     Spondylolisthesis of lumbar region     Treatment     spinal pain injections  last was 2016    Wears glasses        Past Surgical History:   Procedure Laterality Date    BACK SURGERY      L4-5, S1-fusion-plate/screws     SECTION      x5    CYSTOCELE REPAIR  2017    CYSTOSCOPY      DISCOGRAM      HYSTERECTOMY      PARATHYROIDECTOMY      NC ANTERIOR COLPORRAPHY RPR CYSTOCELE W/CYSTO N/A 2017    Procedure: CYSTOCELE REPAIR;  Surgeon: Renae Lopez MD;  Location: [address] [place] [address];  Service: Gynecology    NC ARTHRODESIS POSTERIOR INTERBODY LUMBAR N/A 2016    Procedure: L4-S1 LUMBAR LAMINECTOMY/DECOMPRESSION;  INSTRUMENTED POSTEROLATERAL FUSION/ INTERBODY L5-S1; ALLOGRAFT AND AUTOGRAFT (IMPULSE) ;   Surgeon: Corbin Tomlinson MD;  Location:  MAIN OR;  Service: Orthopedics    NC CYSTOURETHROSCOPY,URETER CATHETER Bilateral 2018    Procedure: INSERTION URETERAL CATHETERS PREOP;  Surgeon: Zayda Quezada MD;  Location: [address] [place] [address];  Service: Urology    NC SLING OPER STRES INCONTINENCE N/A 2017    Procedure: MID URETHRAL SLING;  Surgeon: Joyce Kevin MD;  Location: [address] [place] [address];  Service: Urology    NC SUPRACERV ABD HYSTERECTOMY N/A 2018    Procedure: SUPRACERVICAL HYSTERECTOMY;  Surgeon: Renae Lopez MD;  Location: [facility];  Service: Gynecology    THYROIDECTOMY      TONSILECTOMY AND ADNOIDECTOMY      TONSILLECTOMY      TUBAL LIGATION         Family History   Problem Relation Age of Onset    Diabetes Mother     Hypertension Mother     Hyperlipidemia Father     Arrhythmia Father     Lung cancer Father     Diabetes Father      I have reviewed and agree with the history as documented  E-Cigarette/Vaping    E-Cigarette Use Never User      E-Cigarette/Vaping Substances    Nicotine No     THC No     CBD No     Flavoring No     Other No     Unknown No      Social History     Tobacco Use    Smoking status: Current Every Day Smoker     Packs/day: 0 50     Years: 25 00     Pack years: 12 50     Types: Cigarettes    Smokeless tobacco: Never Used   Vaping Use    Vaping Use: Never used   Substance Use Topics    Alcohol use: Not Currently    Drug use: No       Review of Systems   Constitutional: Negative for activity change, appetite change, chills and fever  HENT: Negative for congestion and ear pain  Eyes: Negative for pain and discharge  Respiratory: Negative for cough, chest tightness, shortness of breath, wheezing and stridor  Cardiovascular: Negative for chest pain and palpitations  Gastrointestinal: Negative for abdominal distention, abdominal pain, constipation, diarrhea and nausea  Endocrine: Negative for cold intolerance  Genitourinary: Negative for dysuria, frequency and urgency  Musculoskeletal: Negative for arthralgias and back pain  Skin: Negative for color change and rash  Allergic/Immunologic: Negative for environmental allergies and food allergies  Neurological: Negative for dizziness, weakness, numbness and headaches  Paresthesia   Hematological: Negative for adenopathy  Psychiatric/Behavioral: Negative for agitation, behavioral problems and confusion  The patient is not nervous/anxious  All other systems reviewed and are negative  Physical Exam  Physical Exam  Vitals and nursing note reviewed  Constitutional:       Appearance: She is well-developed  HENT:      Head: Normocephalic and atraumatic     Eyes:      Conjunctiva/sclera: Conjunctivae normal    Cardiovascular:      Rate and Rhythm: Normal rate and regular rhythm  Heart sounds: Normal heart sounds  Pulmonary:      Effort: Pulmonary effort is normal       Breath sounds: Normal breath sounds  Abdominal:      General: Bowel sounds are normal  There is no distension  Palpations: Abdomen is soft  Tenderness: There is no abdominal tenderness  Musculoskeletal:         General: Normal range of motion  Cervical back: Normal range of motion and neck supple  Skin:     General: Skin is warm and dry  Neurological:      Mental Status: She is alert and oriented to person, place, and time  Psychiatric:         Behavior: Behavior normal          Thought Content:  Thought content normal          Judgment: Judgment normal          Vital Signs  ED Triage Vitals   Temperature Pulse Respirations Blood Pressure SpO2   08/13/22 1334 08/13/22 1334 08/13/22 1334 08/13/22 1334 08/13/22 1334   (!) 96 4 °F (35 8 °C) 87 18 156/88 98 %      Temp Source Heart Rate Source Patient Position - Orthostatic VS BP Location FiO2 (%)   08/13/22 1334 08/13/22 1334 08/13/22 1334 08/13/22 1334 --   Tympanic Monitor Sitting Right arm       Pain Score       08/13/22 1457       5           Vitals:    08/13/22 1334 08/13/22 1457 08/13/22 1630   BP: 156/88 116/72 129/78   Pulse: 87 66 61   Patient Position - Orthostatic VS: Sitting Lying Lying         Visual Acuity      ED Medications  Medications   calcium gluconate 1 g in sodium chloride 0 9% 50 mL (premix) (0 g Intravenous Stopped 8/13/22 1637)       Diagnostic Studies  Results Reviewed     Procedure Component Value Units Date/Time    Basic metabolic panel [[id number]]  (Abnormal) Collected: 08/13/22 1443    Lab Status: Final result Specimen: Blood from Arm, Right Updated: 08/13/22 1501     Sodium 136 mmol/L      Potassium 4 1 mmol/L      Chloride 99 mmol/L      CO2 24 mmol/L      ANION GAP 13 mmol/L      BUN 25 mg/dL      Creatinine 0 98 mg/dL      Glucose 112 mg/dL      Calcium 7 7 mg/dL      eGFR 69 ml/min/1 73sq m     Narrative: [facility] guidelines for Chronic Kidney Disease (CKD):     Stage 1 with normal or high GFR (GFR > 90 mL/min/1 73 square meters)    Stage 2 Mild CKD (GFR = 60-89 mL/min/1 73 square meters)    Stage 3A Moderate CKD (GFR = 45-59 mL/min/1 73 square meters)    Stage 3B Moderate CKD (GFR = 30-44 mL/min/1 73 square meters)    Stage 4 Severe CKD (GFR = 15-29 mL/min/1 73 square meters)    Stage 5 End Stage CKD (GFR <15 mL/min/1 73 square meters)  Note: GFR calculation is accurate only with a steady state creatinine    Calcium, ionized [[id number]]  (Abnormal) Collected: 08/13/22 1443    Lab Status: Final result Specimen: Blood from Arm, Right Updated: 08/13/22 1456     Calcium, Ionized 0 97 mmol/L                  No orders to display              Procedures  ECG 12 Lead Documentation Only    Date/Time: 8/13/2022 1:35 PM  Performed by: Meryle Sloop, DO  Authorized by: Meryle Sloop, DO     Indications / Diagnosis:  Paresthesia  ECG reviewed by me, the ED Provider: yes    Patient location:  ED  Previous ECG:     Previous ECG:  Compared to current    Similarity:  No change    Comparison to cardiac monitor: Yes    Interpretation:     Interpretation: non-specific    Comments:      Sinus rhythm rate 76, normal axis, normal intervals, no acute ST elevations noted, low amplitude T-waves noted throughout suggesting nonspecific T-wave abnormality is that is unchanged from previous study  ED Course  ED Course as of 08/13/22 1719   Sat Aug 13, 2022   1645 Patient examined at bedside  She is feeling much better after IV calcium bolus  Patient is requesting to go home                                               MDM  Number of Diagnoses or Management Options  Hypocalcemia: new and requires workup  Paresthesias: new and requires workup  Diagnosis management comments: Obtain BMP and ionized calcium       Amount and/or Complexity of Data Reviewed  Clinical lab tests: ordered and reviewed  Tests in the medicine section of CPT®: ordered and reviewed  Review and summarize past medical records: yes  Independent visualization of images, tracings, or specimens: yes    Risk of Complications, Morbidity, and/or Mortality  General comments: Patient's ionized calcium was decrease in the ED  Patient given calcium gluconate bolus in the ED after which she felt significantly better  At this time patient is discharged home with follow-up to her PCP as well as endocrinologist    Close return instructions given to return to the ER for any worsening symptoms  Patient agrees with discharge plan  Patient well appearing at time of discharge  Please Note: Fluency Direct voice recognition software may have been used in the creation of this document  Wrong words or sound a like substitutions may have occurred due to the inherent limitations of the voice software  Patient Progress  Patient progress: improved      Disposition  Final diagnoses:   Paresthesias   Hypocalcemia     Time reflects when diagnosis was documented in both MDM as applicable and the Disposition within this note     Time User Action Codes Description Comment    8/13/2022  5:03 PM Irean Frankel Add [R20 2] Paresthesias     8/13/2022  5:03 PM Irean Frankel Add [E83 51] Hypocalcemia       ED Disposition     ED Disposition   Discharge    Condition   Stable    Date/Time   Sat Aug 13, 2022  5:03 PM    [address] discharge to home/self care                 Follow-up Information     Follow up With Specialties Details Why Junior Velasquez MD Internal Medicine In 3 days  [facility]   [phone]      Your endocrinologist  Schedule an appointment as soon as possible for a visit             Discharge Medication List as of 8/13/2022  5:03 PM      CONTINUE these medications which have NOT CHANGED    Details   ALPRAZolam (XANAX) 0 5 mg tablet Take 1 tablet (0 5 mg total) by mouth daily at bedtime as needed for anxiety Take TWO o 5 mg tabs in am and ONE 0 5 mg in om, Starting Mon 8/8/2022, Normal      !! baclofen 10 mg tablet Take 10 mg by mouth 3 (three) times a day as needed, Starting Fri 7/22/2022, Historical Med      !! baclofen 20 mg tablet Take 1 tablet (20 mg total) by mouth 3 (three) times a day, Starting Thu 11/18/2021, Normal      calcitriol (ROCALTROL) 0 25 mcg capsule TAKE 1 CAPSULE BY MOUTH EVERY DAY, Normal      Calcium Carb-Cholecalciferol (Calcium 500+D) 500-400 MG-UNIT TABS Take 600 mg by mouth 3 times a day, Normal      fenofibrate (TRIGLIDE) 160 MG tablet Take 160 mg by mouth daily, Historical Med      ferrous sulfate 325 (65 Fe) mg tablet Take 325 mg by mouth Once Monday, wed, Friday-Last dose 4/1/22 , Historical Med      ibuprofen (MOTRIN) 600 mg tablet Take 1 tablet (600 mg total) by mouth every 6 (six) hours as needed for mild pain or moderate pain, Starting Tue 6/2/2020, Normal      levothyroxine 150 mcg tablet TAKE 1 TABLET BY MOUTH EVERY DAY, Normal      Lidocaine-Menthol 4-1 % PTCH if needed  , Historical Med      magnesium Oxide (MAG-OX) 400 mg TABS TAKE 1 TABLET (400 MG TOTAL) BY MOUTH THREE (THREE) TIMES A DAY 9PM, Normal      methylPREDNISolone 4 MG tablet therapy pack Use as directed on package, Normal      oxyCODONE-acetaminophen (PERCOCET)  mg per tablet Take 1 tablet by mouth 4 (four) times a day, Historical Med      pantoprazole (PROTONIX) 40 mg tablet Take 1 tablet (40 mg total) by mouth daily Take 1/2 hour prior to breakfast daily in a m   , Starting Wed 6/8/2022, Until Sat 7/30/2022, Normal      potassium chloride (K-DUR,KLOR-CON) 20 mEq tablet Take 20 mEq by mouth 2 (two) times a day, Historical Med      Potassium Chloride ER 20 MEQ TBCR Take 1 tablet by mouth 2 (two) times a day, Starting Mon 7/11/2022, Historical Med      pramipexole (MIRAPEX) 0 5 mg tablet Take 0 5 mg by mouth daily at bedtime, Historical Med      pregabalin (LYRICA) 75 mg capsule Take 75 mg by mouth 3 (three) times a day as needed , Historical Med      QUEtiapine (SEROquel) 25 mg tablet Take 25 mg by mouth daily at bedtime, Starting Mon 7/11/2022, Historical Med      sertraline (ZOLOFT) 100 mg tablet Take 1 tablet (100 mg total) by mouth daily, Starting Mon 8/8/2022, Normal       !! - Potential duplicate medications found  Please discuss with provider  No discharge procedures on file      PDMP Review       Value Time User    PDMP Reviewed  Yes 5/30/2022  9:45 PM Edgar Ngael PA-C          ED Provider  Electronically Signed by           Mike Rocha DO  08/13/22 Daron Mckeon DO  08/13/22 1059

## 2022-08-14 ENCOUNTER — HOSPITAL ENCOUNTER (EMERGENCY)
Facility: HOSPITAL | Age: 47
Discharge: HOME | End: 2022-08-14
Attending: EMERGENCY MEDICINE
Payer: COMMERCIAL

## 2022-08-14 VITALS
SYSTOLIC BLOOD PRESSURE: 102 MMHG | RESPIRATION RATE: 16 BRPM | WEIGHT: 203 LBS | TEMPERATURE: 97.7 F | HEIGHT: 62 IN | DIASTOLIC BLOOD PRESSURE: 71 MMHG | OXYGEN SATURATION: 98 % | BODY MASS INDEX: 37.36 KG/M2 | HEART RATE: 66 BPM

## 2022-08-14 DIAGNOSIS — E83.51 HYPOCALCEMIA: ICD-10-CM

## 2022-08-14 DIAGNOSIS — R20.2 PARESTHESIAS: Primary | ICD-10-CM

## 2022-08-14 LAB — CA-I BLD-SCNC: 1.03 MMOL/L (ref 1.12–1.32)

## 2022-08-14 PROCEDURE — 99284 EMERGENCY DEPT VISIT MOD MDM: CPT | Performed by: EMERGENCY MEDICINE

## 2022-08-14 PROCEDURE — 96365 THER/PROPH/DIAG IV INF INIT: CPT

## 2022-08-14 PROCEDURE — 99284 EMERGENCY DEPT VISIT MOD MDM: CPT

## 2022-08-14 PROCEDURE — 36415 COLL VENOUS BLD VENIPUNCTURE: CPT | Performed by: EMERGENCY MEDICINE

## 2022-08-14 PROCEDURE — 82330 ASSAY OF CALCIUM: CPT | Performed by: EMERGENCY MEDICINE

## 2022-08-14 RX ORDER — CALCIUM GLUCONATE 20 MG/ML
1 INJECTION, SOLUTION INTRAVENOUS ONCE
Status: COMPLETED | OUTPATIENT
Start: 2022-08-14 | End: 2022-08-14

## 2022-08-14 RX ADMIN — CALCIUM GLUCONATE 1 G: 20 INJECTION, SOLUTION INTRAVENOUS at 03:13

## 2022-08-14 NOTE — ED PROVIDER NOTES
History  Chief Complaint   Patient presents with    Numbness     Patient who was just here yesterday day time, and received calcium gluconate then, comes again this am complaining of same complaint from yesterday and her frequent visits as well of numbness and tingling     HPI    56 year female who presents today with numbness and tingling  Vision has been seen in the emergency department before for numbness and tingling secondary to low calcium  States she was here yesterday during the day for low calcium received calcium gluconate 1 Sarah still was not feeling any better so she came in again today  Denies any chest pain shortness of breath  56 year female who presents today with paresthesias    Prior to Admission Medications   Prescriptions Last Dose Informant Patient Reported? Taking?    ALPRAZolam (XANAX) 0 5 mg tablet   No No   Sig: Take 1 tablet (0 5 mg total) by mouth daily at bedtime as needed for anxiety Take TWO o 5 mg tabs in am and ONE 0 5 mg in om   Calcium Carb-Cholecalciferol (Calcium 500+D) 500-400 MG-UNIT TABS  Self No No   Sig: Take 600 mg by mouth 3 times a day   Lidocaine-Menthol 4-1 % PTCH  Self Yes No   Sig: if needed     Potassium Chloride ER 20 MEQ TBCR  Self Yes No   Sig: Take 1 tablet by mouth 2 (two) times a day   QUEtiapine (SEROquel) 25 mg tablet  Self Yes No   Sig: Take 25 mg by mouth daily at bedtime   baclofen 10 mg tablet  Self Yes No   Sig: Take 10 mg by mouth 3 (three) times a day as needed   baclofen 20 mg tablet  Self No No   Sig: Take 1 tablet (20 mg total) by mouth 3 (three) times a day   Patient taking differently: Take 20 mg by mouth 3 (three) times a day as needed   calcitriol (ROCALTROL) 0 25 mcg capsule  Self No No   Sig: TAKE 1 CAPSULE BY MOUTH EVERY DAY   fenofibrate (TRIGLIDE) 160 MG tablet  Self Yes No   Sig: Take 160 mg by mouth daily   ferrous sulfate 325 (65 Fe) mg tablet  Self Yes No   Sig: Take 325 mg by mouth Once Monday, wed, Friday-Last dose 4/1/22 ibuprofen (MOTRIN) 600 mg tablet  Self No No   Sig: Take 1 tablet (600 mg total) by mouth every 6 (six) hours as needed for mild pain or moderate pain   levothyroxine 150 mcg tablet  Self No No   Sig: TAKE 1 TABLET BY MOUTH EVERY DAY   Patient taking differently: 150 mcg every morning   magnesium Oxide (MAG-OX) 400 mg TABS  Self No No   Sig: TAKE 1 TABLET (400 MG TOTAL) BY MOUTH THREE (THREE) TIMES A DAY 9PM   methylPREDNISolone 4 MG tablet therapy pack   No No   Sig: Use as directed on package   oxyCODONE-acetaminophen (PERCOCET)  mg per tablet  Self Yes No   Sig: Take 1 tablet by mouth 4 (four) times a day   pantoprazole (PROTONIX) 40 mg tablet   No No   Sig: Take 1 tablet (40 mg total) by mouth daily Take 1/2 hour prior to breakfast daily in a m  Marcia Burow    potassium chloride (K-DUR,KLOR-CON) 20 mEq tablet  Self Yes No   Sig: Take 20 mEq by mouth 2 (two) times a day   pramipexole (MIRAPEX) 0 5 mg tablet  Self Yes No   Sig: Take 0 5 mg by mouth daily at bedtime   pregabalin (LYRICA) 75 mg capsule  Self Yes No   Sig: Take 75 mg by mouth 3 (three) times a day as needed    sertraline (ZOLOFT) 100 mg tablet   No No   Sig: Take 1 tablet (100 mg total) by mouth daily      Facility-Administered Medications: None       Past Medical History:   Diagnosis Date    Abdominal pain     Acid reflux     Acute renal failure (HCC)     multiple episodes    Anemia     Anxiety     Arthritis     Asthma     Back pain     Chronic pain     DDD (degenerative disc disease), lumbar     Depression     Disease of thyroid gland     had surgery and now hypo    DVT (deep venous thrombosis) ([facility] [address] ) 2009    s/p ankle fracture    Headache     History of transfusion     Hypercalcemia     Hyperlipidemia     Hyperthyroidism     Hypocalcemia     post op 2016    Kidney stone     Lightheadedness     Migraine     Obesity     Palpitations     Psychiatric disorder     anxiety depression    Seizures (HCC)     petit mal x1  4 years ago- all tests were neg   SOB (shortness of breath)     Spondylolisthesis of lumbar region     Treatment     spinal pain injections  last was 2016    Wears glasses        Past Surgical History:   Procedure Laterality Date    BACK SURGERY      L4-5, S1-fusion-plate/screws     SECTION      x5    CYSTOCELE REPAIR  2017    CYSTOSCOPY      DISCOGRAM      HYSTERECTOMY      PARATHYROIDECTOMY      SD ANTERIOR COLPORRAPHY RPR CYSTOCELE W/CYSTO N/A 2017    Procedure: CYSTOCELE REPAIR;  Surgeon: Gibran Toney MD;  Location: [address] [place] [address];  Service: Gynecology    SD ARTHRODESIS POSTERIOR INTERBODY LUMBAR N/A 2016    Procedure: L4-S1 LUMBAR LAMINECTOMY/DECOMPRESSION;  INSTRUMENTED POSTEROLATERAL FUSION/ INTERBODY L5-S1; ALLOGRAFT AND AUTOGRAFT (IMPULSE) ; Surgeon: Jazlyn Mena MD;  Location:  MAIN OR;  Service: Orthopedics    SD CYSTOURETHROSCOPY,URETER CATHETER Bilateral 2018    Procedure: INSERTION URETERAL CATHETERS PREOP;  Surgeon: Analy Pina MD;  Location: [address] [place] [address];  Service: Urology    SD SLING OPER STRES INCONTINENCE N/A 2017    Procedure: MID URETHRAL SLING;  Surgeon: Deb Benavidez MD;  Location: [address] [place] [address];  Service: Urology    SD SUPRACERV ABD HYSTERECTOMY N/A 2018    Procedure: SUPRACERVICAL HYSTERECTOMY;  Surgeon: Gibran Toney MD;  Location: [facility];  Service: Gynecology    THYROIDECTOMY      TONSILECTOMY AND ADNOIDECTOMY      TONSILLECTOMY      TUBAL LIGATION         Family History   Problem Relation Age of Onset    Diabetes Mother     Hypertension Mother     Hyperlipidemia Father     Arrhythmia Father     Lung cancer Father     Diabetes Father      I have reviewed and agree with the history as documented      E-Cigarette/Vaping    E-Cigarette Use Never User      E-Cigarette/Vaping Substances    Nicotine No     THC No     CBD No     Flavoring No     Other No     Unknown No      Social History     Tobacco Use    Smoking status: Current Every Day Smoker     Packs/day: 0 50     Years: 25 00     Pack years: 12 50     Types: Cigarettes    Smokeless tobacco: Never Used   Vaping Use    Vaping Use: Never used   Substance Use Topics    Alcohol use: Not Currently    Drug use: No       Review of Systems   Constitutional: Negative  Negative for diaphoresis and fever  HENT: Negative  Respiratory: Negative  Negative for cough, shortness of breath and wheezing  Cardiovascular: Negative  Negative for chest pain, palpitations and leg swelling  Gastrointestinal: Negative for abdominal distention, abdominal pain, nausea and vomiting  Genitourinary: Negative  Musculoskeletal: Negative  Skin: Negative  Neurological: Positive for numbness  Psychiatric/Behavioral: Negative  All other systems reviewed and are negative  Physical Exam  Physical Exam  Vitals and nursing note reviewed  Constitutional:       General: She is not in acute distress  Appearance: She is well-developed  HENT:      Head: Normocephalic and atraumatic  Eyes:      Conjunctiva/sclera: Conjunctivae normal    Cardiovascular:      Rate and Rhythm: Normal rate and regular rhythm  Heart sounds: No murmur heard  Pulmonary:      Effort: Pulmonary effort is normal  No respiratory distress  Breath sounds: Normal breath sounds  Abdominal:      Palpations: Abdomen is soft  Tenderness: There is no abdominal tenderness  Musculoskeletal:      Cervical back: Neck supple  Skin:     General: Skin is warm and dry  Capillary Refill: Capillary refill takes less than 2 seconds  Neurological:      General: No focal deficit present  Mental Status: She is alert     Psychiatric:         Mood and Affect: Mood normal          Vital Signs  ED Triage Vitals [08/14/22 0225]   Temperature Pulse Respirations Blood Pressure SpO2   98 °F (36 7 °C) 97 12 140/80 97 %      Temp Source Heart Rate Source Patient Position - Orthostatic VS BP Location FiO2 (%)   Oral Monitor Sitting Right arm --      Pain Score       4           Vitals:    08/14/22 0225 08/14/22 0408   BP: 140/80 102/71   Pulse: 97 66   Patient Position - Orthostatic VS: Sitting Lying         Visual Acuity  Visual Acuity    Flowsheet Row Most Recent Value   L Pupil Size (mm) 3   R Pupil Size (mm) 3          ED Medications  Medications   calcium gluconate 1 g in sodium chloride 0 9% 50 mL (premix) (0 g Intravenous Stopped 8/14/22 0402)       Diagnostic Studies  Results Reviewed     Procedure Component Value Units Date/Time    Calcium, ionized [[id number]]  (Abnormal) Collected: 08/14/22 0237    Lab Status: Final result Specimen: Blood from Arm, Right Updated: 08/14/22 0243     Calcium, Ionized 1 03 mmol/L                  No orders to display              Procedures  Procedures         ED Course                                             MDM    Disposition  Final diagnoses:   Paresthesias   Hypocalcemia     Time reflects when diagnosis was documented in both MDM as applicable and the Disposition within this note     Time User Action Codes Description Comment    8/14/2022  3:58 AM Cory Morales Add [R20 2] Paresthesias     8/14/2022  3:58 AM Juan Mcfarland Add [E83 51] Hypocalcemia       ED Disposition     ED Disposition   Discharge    Condition   Stable    Date/Time   Sun Aug 14, 2022  3:58 AM    Comment   [address] [facility] discharge to home/self care                 Follow-up Information     Follow up With Specialties Details Why Contact Info Additional Information    [facility]   [address] [place] [id number]  [address], [address] [phone]          Discharge Medication List as of 8/14/2022  3:59 AM      CONTINUE these medications which have NOT CHANGED    Details   ALPRAZolam (XANAX) 0 5 mg tablet Take 1 tablet (0 5 mg total) by mouth daily at bedtime as needed for anxiety Take TWO o 5 mg tabs in am and ONE 0 5 mg in om, Starting Mon 8/8/2022, Normal      !! baclofen 10 mg tablet Take 10 mg by mouth 3 (three) times a day as needed, Starting Fri 7/22/2022, Historical Med      !! baclofen 20 mg tablet Take 1 tablet (20 mg total) by mouth 3 (three) times a day, Starting Thu 11/18/2021, Normal      calcitriol (ROCALTROL) 0 25 mcg capsule TAKE 1 CAPSULE BY MOUTH EVERY DAY, Normal      Calcium Carb-Cholecalciferol (Calcium 500+D) 500-400 MG-UNIT TABS Take 600 mg by mouth 3 times a day, Normal      fenofibrate (TRIGLIDE) 160 MG tablet Take 160 mg by mouth daily, Historical Med      ferrous sulfate 325 (65 Fe) mg tablet Take 325 mg by mouth Once Monday, wed, Friday-Last dose 4/1/22 , Historical Med      ibuprofen (MOTRIN) 600 mg tablet Take 1 tablet (600 mg total) by mouth every 6 (six) hours as needed for mild pain or moderate pain, Starting Tue 6/2/2020, Normal      levothyroxine 150 mcg tablet TAKE 1 TABLET BY MOUTH EVERY DAY, Normal      Lidocaine-Menthol 4-1 % PTCH if needed  , Historical Med      magnesium Oxide (MAG-OX) 400 mg TABS TAKE 1 TABLET (400 MG TOTAL) BY MOUTH THREE (THREE) TIMES A DAY 9PM, Normal      methylPREDNISolone 4 MG tablet therapy pack Use as directed on package, Normal      oxyCODONE-acetaminophen (PERCOCET)  mg per tablet Take 1 tablet by mouth 4 (four) times a day, Historical Med      pantoprazole (PROTONIX) 40 mg tablet Take 1 tablet (40 mg total) by mouth daily Take 1/2 hour prior to breakfast daily in a m   , Starting Wed 6/8/2022, Until Sat 7/30/2022, Normal      potassium chloride (K-DUR,KLOR-CON) 20 mEq tablet Take 20 mEq by mouth 2 (two) times a day, Historical Med      Potassium Chloride ER 20 MEQ TBCR Take 1 tablet by mouth 2 (two) times a day, Starting Mon 7/11/2022, Historical Med      pramipexole (MIRAPEX) 0 5 mg tablet Take 0 5 mg by mouth daily at bedtime, Historical Med      pregabalin (LYRICA) 75 mg capsule Take 75 mg by mouth 3 (three) times a day as needed , Historical Med      QUEtiapine (SEROquel) 25 mg tablet Take 25 mg by mouth daily at bedtime, Starting Mon 7/11/2022, Historical Med      sertraline (ZOLOFT) 100 mg tablet Take 1 tablet (100 mg total) by mouth daily, Starting Mon 8/8/2022, Normal       !! - Potential duplicate medications found  Please discuss with provider  No discharge procedures on file      PDMP Review       Value Time User    PDMP Reviewed  Yes 5/30/2022  9:45 PM Roshan Palomares PA-C          ED Provider  Electronically Signed by           Reji Gibson MD  08/14/22 9577

## 2022-08-16 LAB
ATRIAL RATE: 76 BPM
P AXIS: 40 DEGREES
PR INTERVAL: 156 MS
QRS AXIS: 28 DEGREES
QRSD INTERVAL: 80 MS
QT INTERVAL: 404 MS
QTC INTERVAL: 454 MS
T WAVE AXIS: 49 DEGREES
VENTRICULAR RATE: 76 BPM

## 2022-08-16 PROCEDURE — 93010 ELECTROCARDIOGRAM REPORT: CPT | Performed by: INTERNAL MEDICINE

## 2022-08-19 DIAGNOSIS — E83.51 HYPOCALCEMIA: Primary | ICD-10-CM

## 2022-08-30 ENCOUNTER — HOSPITAL ENCOUNTER (EMERGENCY)
Facility: HOSPITAL | Age: 47
Discharge: HOME | End: 2022-08-30
Attending: EMERGENCY MEDICINE
Payer: COMMERCIAL

## 2022-08-30 ENCOUNTER — HOSPITAL ENCOUNTER (EMERGENCY)
Facility: HOSPITAL | Age: 47
Discharge: HOME | End: 2022-08-31
Attending: EMERGENCY MEDICINE
Payer: COMMERCIAL

## 2022-08-30 VITALS
SYSTOLIC BLOOD PRESSURE: 130 MMHG | DIASTOLIC BLOOD PRESSURE: 76 MMHG | WEIGHT: 210.8 LBS | OXYGEN SATURATION: 97 % | HEART RATE: 84 BPM | TEMPERATURE: 97 F | RESPIRATION RATE: 16 BRPM | BODY MASS INDEX: 38.56 KG/M2

## 2022-08-30 DIAGNOSIS — R20.2 PARESTHESIAS: Primary | ICD-10-CM

## 2022-08-30 DIAGNOSIS — E83.51 HYPOCALCEMIA: ICD-10-CM

## 2022-08-30 LAB
ANION GAP SERPL CALCULATED.3IONS-SCNC: 13 MMOL/L (ref 4–13)
ANION GAP SERPL CALCULATED.3IONS-SCNC: 14 MMOL/L (ref 4–13)
BUN SERPL-MCNC: 15 MG/DL (ref 5–25)
BUN SERPL-MCNC: 16 MG/DL (ref 5–25)
CA-I BLD-SCNC: 1.02 MMOL/L (ref 1.12–1.32)
CA-I BLD-SCNC: 1.04 MMOL/L (ref 1.12–1.32)
CALCIUM SERPL-MCNC: 7.6 MG/DL (ref 8.3–10.1)
CALCIUM SERPL-MCNC: 7.7 MG/DL (ref 8.3–10.1)
CHLORIDE SERPL-SCNC: 103 MMOL/L (ref 96–108)
CHLORIDE SERPL-SCNC: 104 MMOL/L (ref 96–108)
CO2 SERPL-SCNC: 22 MMOL/L (ref 21–32)
CO2 SERPL-SCNC: 24 MMOL/L (ref 21–32)
CREAT SERPL-MCNC: 1.1 MG/DL (ref 0.6–1.3)
CREAT SERPL-MCNC: 1.11 MG/DL (ref 0.6–1.3)
GFR SERPL CREATININE-BSD FRML MDRD: 59 ML/MIN/1.73SQ M
GFR SERPL CREATININE-BSD FRML MDRD: 60 ML/MIN/1.73SQ M
GLUCOSE SERPL-MCNC: 136 MG/DL (ref 65–140)
GLUCOSE SERPL-MCNC: 144 MG/DL (ref 65–140)
MAGNESIUM SERPL-MCNC: 1.9 MG/DL (ref 1.6–2.6)
POTASSIUM SERPL-SCNC: 3.7 MMOL/L (ref 3.5–5.3)
POTASSIUM SERPL-SCNC: 4 MMOL/L (ref 3.5–5.3)
SODIUM SERPL-SCNC: 139 MMOL/L (ref 135–147)
SODIUM SERPL-SCNC: 141 MMOL/L (ref 135–147)

## 2022-08-30 PROCEDURE — 80048 BASIC METABOLIC PNL TOTAL CA: CPT | Performed by: EMERGENCY MEDICINE

## 2022-08-30 PROCEDURE — 99284 EMERGENCY DEPT VISIT MOD MDM: CPT

## 2022-08-30 PROCEDURE — 82330 ASSAY OF CALCIUM: CPT | Performed by: EMERGENCY MEDICINE

## 2022-08-30 PROCEDURE — 36415 COLL VENOUS BLD VENIPUNCTURE: CPT | Performed by: EMERGENCY MEDICINE

## 2022-08-30 PROCEDURE — 96365 THER/PROPH/DIAG IV INF INIT: CPT

## 2022-08-30 PROCEDURE — 83735 ASSAY OF MAGNESIUM: CPT | Performed by: EMERGENCY MEDICINE

## 2022-08-30 PROCEDURE — 99284 EMERGENCY DEPT VISIT MOD MDM: CPT | Performed by: EMERGENCY MEDICINE

## 2022-08-30 PROCEDURE — 96361 HYDRATE IV INFUSION ADD-ON: CPT

## 2022-08-30 RX ORDER — CALCIUM GLUCONATE 20 MG/ML
1 INJECTION, SOLUTION INTRAVENOUS ONCE
Status: COMPLETED | OUTPATIENT
Start: 2022-08-30 | End: 2022-08-30

## 2022-08-30 RX ORDER — POTASSIUM CHLORIDE 20 MEQ/1
40 TABLET, EXTENDED RELEASE ORAL ONCE
Status: COMPLETED | OUTPATIENT
Start: 2022-08-30 | End: 2022-08-30

## 2022-08-30 RX ORDER — CALCIUM GLUCONATE 20 MG/ML
2 INJECTION, SOLUTION INTRAVENOUS ONCE
Status: COMPLETED | OUTPATIENT
Start: 2022-08-30 | End: 2022-08-31

## 2022-08-30 RX ADMIN — SODIUM CHLORIDE 1000 ML: 0.9 INJECTION, SOLUTION INTRAVENOUS at 02:19

## 2022-08-30 RX ADMIN — CALCIUM GLUCONATE 1 G: 20 INJECTION, SOLUTION INTRAVENOUS at 02:19

## 2022-08-30 RX ADMIN — CALCIUM GLUCONATE 2 G: 20 INJECTION, SOLUTION INTRAVENOUS at 23:56

## 2022-08-30 RX ADMIN — POTASSIUM CHLORIDE 40 MEQ: 1500 TABLET, EXTENDED RELEASE ORAL at 02:56

## 2022-08-30 RX ADMIN — MAGNESIUM OXIDE TAB 400 MG (241.3 MG ELEMENTAL MG) 400 MG: 400 (241.3 MG) TAB at 02:58

## 2022-08-30 NOTE — ED PROVIDER NOTES
History  Chief Complaint   Patient presents with    Numbness     Numbness, thinks its her calcium  52-year-old female with past history of hypothyroidism after thyroidectomy, hypocalcemia, anxiety, depression, hyperlipidemia, anemia, degenerative disc disease, migraine, presents to the ED for evaluation of generalized tingling sensation throughout her body since early this morning  Patient has had recurrent episodes of hypocalcemia requiring calcium bolus in the emergency department  Patient is followed by her endocrinologist   Patient has an appointment scheduled in the future for follow-up of her hypocalcemia  Patient thinks her calcium level is low again today  Patient came to the ED for further evaluation  Patient denies any focal neuro deficits  Patient denies any weakness or dizziness  Patient denies any fevers or chills  Patient was seen in the emergency department about 2 weeks ago with similar symptoms  Patient states that she is extremely stressed out as one of her family member is terminally ill  History provided by:  Patient      Prior to Admission Medications   Prescriptions Last Dose Informant Patient Reported? Taking?    ALPRAZolam (XANAX) 0 5 mg tablet   No No   Sig: Take 1 tablet (0 5 mg total) by mouth daily at bedtime as needed for anxiety Take TWO o 5 mg tabs in am and ONE 0 5 mg in om   Calcium Carb-Cholecalciferol (Calcium 500+D) 500-400 MG-UNIT TABS  Self No No   Sig: Take 600 mg by mouth 3 times a day   Calcium Carbonate-Vitamin D3 600-400 MG-UNIT TABS   No No   Sig: TAKE 1 TABLET BY MOUTH 6 (SIX) TIMES A DAY   Lidocaine-Menthol 4-1 % PTCH  Self Yes No   Sig: if needed     Potassium Chloride ER 20 MEQ TBCR  Self Yes No   Sig: Take 1 tablet by mouth 2 (two) times a day   QUEtiapine (SEROquel) 25 mg tablet  Self Yes No   Sig: Take 25 mg by mouth daily at bedtime   baclofen 10 mg tablet  Self Yes No   Sig: Take 10 mg by mouth 3 (three) times a day as needed   baclofen 20 mg tablet  Self No No   Sig: Take 1 tablet (20 mg total) by mouth 3 (three) times a day   Patient taking differently: Take 20 mg by mouth 3 (three) times a day as needed   calcitriol (ROCALTROL) 0 25 mcg capsule  Self No No   Sig: TAKE 1 CAPSULE BY MOUTH EVERY DAY   fenofibrate (TRIGLIDE) 160 MG tablet  Self Yes No   Sig: Take 160 mg by mouth daily   ferrous sulfate 325 (65 Fe) mg tablet  Self Yes No   Sig: Take 325 mg by mouth Once Monday, wed, Friday-Last dose 4/1/22    ibuprofen (MOTRIN) 600 mg tablet  Self No No   Sig: Take 1 tablet (600 mg total) by mouth every 6 (six) hours as needed for mild pain or moderate pain   levothyroxine 150 mcg tablet  Self No No   Sig: TAKE 1 TABLET BY MOUTH EVERY DAY   Patient taking differently: 150 mcg every morning   magnesium Oxide (MAG-OX) 400 mg TABS  Self No No   Sig: TAKE 1 TABLET (400 MG TOTAL) BY MOUTH THREE (THREE) TIMES A DAY 9PM   methylPREDNISolone 4 MG tablet therapy pack   No No   Sig: Use as directed on package   oxyCODONE-acetaminophen (PERCOCET)  mg per tablet  Self Yes No   Sig: Take 1 tablet by mouth 4 (four) times a day   pantoprazole (PROTONIX) 40 mg tablet   No No   Sig: Take 1 tablet (40 mg total) by mouth daily Take 1/2 hour prior to breakfast daily in a [facility]    potassium chloride (K-DUR,KLOR-CON) 20 mEq tablet  Self Yes No   Sig: Take 20 mEq by mouth 2 (two) times a day   pramipexole (MIRAPEX) 0 5 mg tablet  Self Yes No   Sig: Take 0 5 mg by mouth daily at bedtime   pregabalin (LYRICA) 75 mg capsule  Self Yes No   Sig: Take 75 mg by mouth 3 (three) times a day as needed    sertraline (ZOLOFT) 100 mg tablet   No No   Sig: Take 1 tablet (100 mg total) by mouth daily      Facility-Administered Medications: None       Past Medical History:   Diagnosis Date    Abdominal pain     Acid reflux     Acute renal failure (HCC)     multiple episodes    Anemia     Anxiety     Arthritis     Asthma     Back pain     Chronic pain     DDD (degenerative disc disease), lumbar     Depression     Disease of thyroid gland     had surgery and now hypo    DVT (deep venous thrombosis) ([facility] [address] )     s/p ankle fracture    Headache     History of transfusion     Hypercalcemia     Hyperlipidemia     Hyperthyroidism     Hypocalcemia     post op 2016    Kidney stone     Lightheadedness     Migraine     Obesity     Palpitations     Psychiatric disorder     anxiety depression    Seizures (HCC)     petit mal x1  4 years ago- all tests were neg   SOB (shortness of breath)     Spondylolisthesis of lumbar region     Treatment     spinal pain injections  last was 2016    Wears glasses        Past Surgical History:   Procedure Laterality Date    BACK SURGERY      L4-5, S1-fusion-plate/screws     SECTION      x5    CYSTOCELE REPAIR  2017    CYSTOSCOPY      DISCOGRAM      HYSTERECTOMY      PARATHYROIDECTOMY      MA ANTERIOR COLPORRAPHY RPR CYSTOCELE W/CYSTO N/A 2017    Procedure: CYSTOCELE REPAIR;  Surgeon: Naeem Hansen MD;  Location: [address] [place] [address];  Service: Gynecology    MA ARTHRODESIS POSTERIOR INTERBODY LUMBAR N/A 2016    Procedure: L4-S1 LUMBAR LAMINECTOMY/DECOMPRESSION;  INSTRUMENTED POSTEROLATERAL FUSION/ INTERBODY L5-S1; ALLOGRAFT AND AUTOGRAFT (IMPULSE) ;   Surgeon: Clare Bynum MD;  [facility];  Service: Orthopedics    MA CYSTOURETHROSCOPY,URETER CATHETER Bilateral 2018    Procedure: INSERTION URETERAL CATHETERS PREOP;  Surgeon: Kaylah Chavez MD;  Location: [address] [place] [address];  Service: Urology    MA SLING OPER STRES INCONTINENCE N/A 2017    Procedure: MID URETHRAL SLING;  Surgeon: Jamshid Quezada MD;  Location: [address] [place] [address];  Service: Urology    MA [address] ABD HYSTERECTOMY N/A 2018    Procedure: SUPRACERVICAL HYSTERECTOMY;  Surgeon: Naeem Hansen MD;  Location: [address] [place] [address];  Service: Gynecology    THYROIDECTOMY      TONSILECTOMY AND ADNOIDECTOMY      TONSILLECTOMY      TUBAL LIGATION Family History   Problem Relation Age of Onset    Diabetes Mother     Hypertension Mother     Hyperlipidemia Father     Arrhythmia Father     Lung cancer Father     Diabetes Father      I have reviewed and agree with the history as documented  E-Cigarette/Vaping    E-Cigarette Use Never User      E-Cigarette/Vaping Substances    Nicotine No     THC No     CBD No     Flavoring No     Other No     Unknown No      Social History     Tobacco Use    Smoking status: Current Every Day Smoker     Packs/day: 1 00     Years: 25 00     Pack years: 25 00     Types: Cigarettes    Smokeless tobacco: Never Used   Vaping Use    Vaping Use: Never used   Substance Use Topics    Alcohol use: Not Currently    Drug use: No       Review of Systems   Constitutional: Negative for activity change, appetite change, chills and fever  HENT: Negative for congestion and ear pain  Eyes: Negative for pain and discharge  Respiratory: Negative for cough, chest tightness, shortness of breath, wheezing and stridor  Cardiovascular: Negative for chest pain and palpitations  Gastrointestinal: Negative for abdominal distention, abdominal pain, constipation, diarrhea and nausea  Endocrine: Negative for cold intolerance  Genitourinary: Negative for dysuria, frequency and urgency  Musculoskeletal: Negative for arthralgias and back pain  Skin: Negative for color change and rash  Allergic/Immunologic: Negative for environmental allergies and food allergies  Neurological: Negative for dizziness, weakness, numbness and headaches  Paresthesia   Hematological: Negative for adenopathy  Psychiatric/Behavioral: Negative for agitation, behavioral problems and confusion  The patient is not nervous/anxious  All other systems reviewed and are negative  Physical Exam  Physical Exam  Vitals and nursing note reviewed  Constitutional:       Appearance: She is well-developed     HENT:      Head: Normocephalic and atraumatic  Eyes:      Conjunctiva/sclera: Conjunctivae normal    Cardiovascular:      Rate and Rhythm: Normal rate and regular rhythm  Heart sounds: Normal heart sounds  Pulmonary:      Effort: Pulmonary effort is normal       Breath sounds: Normal breath sounds  Abdominal:      General: Bowel sounds are normal  There is no distension  Palpations: Abdomen is soft  Tenderness: There is no abdominal tenderness  Musculoskeletal:         General: Normal range of motion  Cervical back: Normal range of motion and neck supple  Skin:     General: Skin is warm and dry  Neurological:      General: No focal deficit present  Mental Status: She is alert and oriented to person, place, and time  Comments: No focal neuro deficits noted  Psychiatric:         Behavior: Behavior normal          Thought Content:  Thought content normal          Judgment: Judgment normal          Vital Signs  ED Triage Vitals [08/30/22 0137]   Temperature Pulse Respirations Blood Pressure SpO2   (!) 97 °F (36 1 °C) 84 16 130/76 97 %      Temp Source Heart Rate Source Patient Position - Orthostatic VS BP Location FiO2 (%)   Tympanic Monitor Sitting Left arm --      Pain Score       No Pain           Vitals:    08/30/22 0137   BP: 130/76   Pulse: 84   Patient Position - Orthostatic VS: Sitting         Visual Acuity      ED Medications  Medications   calcium gluconate 1 g in sodium chloride 0 9% 50 mL (premix) (0 g Intravenous Stopped 8/30/22 0241)   sodium chloride 0 9 % bolus 1,000 mL (1,000 mL Intravenous New Bag 8/30/22 0219)   potassium chloride (K-DUR,KLOR-CON) CR tablet 40 mEq (40 mEq Oral Given 8/30/22 0256)   magnesium oxide (MAG-OX) tablet 400 mg (400 mg Oral Given 8/30/22 0258)       Diagnostic Studies  Results Reviewed     Procedure Component Value Units Date/Time    Magnesium [[id number]]  (Normal) Collected: 08/30/22 0145    Lab Status: Final result Specimen: Blood from Arm, Right Updated: 08/30/22 0219     Magnesium 1 9 mg/dL     Basic metabolic panel [[id number]]  (Abnormal) Collected: 08/30/22 0145    Lab Status: Final result Specimen: Blood from Arm, Right Updated: 08/30/22 0205     Sodium 139 mmol/L      Potassium 3 7 mmol/L      Chloride 103 mmol/L      CO2 22 mmol/L      ANION GAP 14 mmol/L      BUN 15 mg/dL      Creatinine 1 11 mg/dL      Glucose 144 mg/dL      Calcium 7 7 mg/dL      eGFR 59 ml/min/1 73sq m     Narrative:      [place] guidelines for Chronic Kidney Disease (CKD):     Stage 1 with normal or high GFR (GFR > 90 mL/min/1 73 square meters)    Stage 2 Mild CKD (GFR = 60-89 mL/min/1 73 square meters)    Stage 3A Moderate CKD (GFR = 45-59 mL/min/1 73 square meters)    Stage 3B Moderate CKD (GFR = 30-44 mL/min/1 73 square meters)    Stage 4 Severe CKD (GFR = 15-29 mL/min/1 73 square meters)    Stage 5 End Stage CKD (GFR <15 mL/min/1 73 square meters)  Note: GFR calculation is accurate only with a steady state creatinine    Calcium, ionized [[id number]]  (Abnormal) Collected: 08/30/22 0145    Lab Status: Final result Specimen: Blood from Arm, Right Updated: 08/30/22 0152     Calcium, Ionized 1 02 mmol/L                  No orders to display              Procedures  Procedures         ED Course                                             MDM  Number of Diagnoses or Management Options  Hypocalcemia: new and requires workup  Paresthesias: new and requires workup  Diagnosis management comments: Obtain BMP, magnesium level, ionized calcium level       Amount and/or Complexity of Data Reviewed  Clinical lab tests: ordered and reviewed  Tests in the medicine section of CPT®: ordered and reviewed  Review and summarize past medical records: yes  Independent visualization of images, tracings, or specimens: yes    Risk of Complications, Morbidity, and/or Mortality  General comments: Patient's anion gap was mildly elevated suggesting some dehydration  Patient's potassium and magnesium was mildly decreased  Potassium and magnesium were repleted in the ED  Ionized calcium was also low  Patient give 1 g of IV calcium gluconate as well as 1 L of IV fluid bolus  Patient felt better after treatment  At this time patient discharged home with follow-up to her endocrinologist and PCP  Close return instructions given to return to the ER for any worsening symptoms  Patient agrees with discharge plan  Patient well appearing at time of discharge  Please Note: Fluency Direct voice recognition software may have been used in the creation of this document  Wrong words or sound a like substitutions may have occurred due to the inherent limitations of the voice software  Patient Progress  Patient progress: improved      Disposition  Final diagnoses:   Paresthesias   Hypocalcemia     Time reflects when diagnosis was documented in both MDM as applicable and the Disposition within this note     Time User Action Codes Description Comment    8/30/2022  2:42 AM Zahraa Pries Add [R20 2] Paresthesias     8/30/2022  2:42 AM Zahraa Pries Add [E83 52] Hypercalcemia     8/30/2022  2:43 AM Melissa Logan Remove [E83 52] Hypercalcemia     8/30/2022  2:43 AM Zahraa Pries Add [E83 51] Hypocalcemia       ED Disposition     ED Disposition   Discharge    Condition   Stable    Date/Time   Tue Aug 30, 2022  3:16 AM    Comment   [facility] discharge to home/self care  Follow-up Information     Follow up With Specialties Details Why Contact Info    Jaylyn Mcghee MD Internal Medicine In 2 days  [facility]  [address] [place] [address]  [phone]      Your Endocrinologist  Schedule an appointment as soon as possible for a visit             Patient's Medications   Discharge Prescriptions    No medications on file       No discharge procedures on file      PDMP Review       Value Time User    PDMP Reviewed  Yes 5/30/2022  9:45 PM Clinton Rendon PA-C          ED Provider  Electronically Signed by           Doris Schmitz,   08/30/22 0244

## 2022-08-31 VITALS
DIASTOLIC BLOOD PRESSURE: 64 MMHG | RESPIRATION RATE: 18 BRPM | SYSTOLIC BLOOD PRESSURE: 123 MMHG | OXYGEN SATURATION: 99 % | TEMPERATURE: 97.8 F | HEART RATE: 77 BPM

## 2022-08-31 PROCEDURE — 99284 EMERGENCY DEPT VISIT MOD MDM: CPT | Performed by: EMERGENCY MEDICINE

## 2022-08-31 NOTE — ED PROVIDER NOTES
73-year-old female with past history of hypothyroidism after thyroidectomy, hypocalcemia, anxiety, depression, hyperlipidemia, anemia, degenerative disc disease, migraine, presents to the ED for evaluation of generalized tingling sensation throughout her body since early this morning  Patient has had recurrent episodes of hypocalcemia requiring calcium bolus in the emergency department  Patient is followed by her endocrinologist   Patient has an appointment scheduled in the future for follow-up of her hypocalcemia  Patient thinks her calcium level is low again today  Patient came to the ED for further evaluation  Patient denies any focal neuro deficits  Patient denies any weakness or dizziness  Patient denies any fevers or chills  Patient was seen in the emergency department about 2 weeks ago with similar symptoms  Patient states that she is extremely stressed out as one of her family member is terminally ill  History  Chief Complaint   Patient presents with    Numbness     Patient states her calcium is low, she is feeling numb and tingly     73-year-old female with past history of hypothyroidism after thyroidectomy, hypocalcemia, anxiety, depression, hyperlipidemia, anemia, degenerative disc disease, migraine, presents to the ED for evaluation of generalized tingling sensation throughout her body since early this morning  Patient has had recurrent episodes of hypocalcemia requiring calcium bolus in the emergency department  Patient was last seen at the emergency department for these symptoms last night  Patient states she does not think she received enough IV calcium last night as she continues to feel some paresthesias this morning  Patient is followed by her endocrinologist   Patient has an appointment scheduled in the future for follow-up of her hypocalcemia  Patient thinks her calcium level is low again today  Patient came to the ED for further evaluation    Patient denies any focal neuro deficits  Patient denies any weakness or dizziness  Patient denies any fevers or chills  Patient was seen in the emergency department about 2 weeks ago with similar symptoms  Patient states that she is extremely stressed out as one of her family member has been terminally ill and he passed away this morning  History provided by:  Patient      Prior to Admission Medications   Prescriptions Last Dose Informant Patient Reported? Taking?    ALPRAZolam (XANAX) 0 5 mg tablet   No No   Sig: Take 1 tablet (0 5 mg total) by mouth daily at bedtime as needed for anxiety Take TWO o 5 mg tabs in am and ONE 0 5 mg in om   Calcium Carb-Cholecalciferol (Calcium 500+D) 500-400 MG-UNIT TABS  Self No No   Sig: Take 600 mg by mouth 3 times a day   Calcium Carbonate-Vitamin D3 600-400 MG-UNIT TABS   No No   Sig: TAKE 1 TABLET BY MOUTH 6 (SIX) TIMES A DAY   Lidocaine-Menthol 4-1 % PTCH  Self Yes No   Sig: if needed     Potassium Chloride ER 20 MEQ TBCR  Self Yes No   Sig: Take 1 tablet by mouth 2 (two) times a day   QUEtiapine (SEROquel) 25 mg tablet  Self Yes No   Sig: Take 25 mg by mouth daily at bedtime   baclofen 10 mg tablet  Self Yes No   Sig: Take 10 mg by mouth 3 (three) times a day as needed   baclofen 20 mg tablet  Self No No   Sig: Take 1 tablet (20 mg total) by mouth 3 (three) times a day   Patient taking differently: Take 20 mg by mouth 3 (three) times a day as needed   calcitriol (ROCALTROL) 0 25 mcg capsule  Self No No   Sig: TAKE 1 CAPSULE BY MOUTH EVERY DAY   fenofibrate (TRIGLIDE) 160 MG tablet  Self Yes No   Sig: Take 160 mg by mouth daily   ferrous sulfate 325 (65 Fe) mg tablet  Self Yes No   Sig: Take 325 mg by mouth Once Monday, wed, Friday-Last dose 4/1/22    ibuprofen (MOTRIN) 600 mg tablet  Self No No   Sig: Take 1 tablet (600 mg total) by mouth every 6 (six) hours as needed for mild pain or moderate pain   levothyroxine 150 mcg tablet  Self No No   Sig: TAKE 1 TABLET BY MOUTH EVERY DAY   Patient taking differently: 150 mcg every morning   magnesium Oxide (MAG-OX) 400 mg TABS  Self No No   Sig: TAKE 1 TABLET (400 MG TOTAL) BY MOUTH THREE (THREE) TIMES A DAY 9PM   methylPREDNISolone 4 MG tablet therapy pack   No No   Sig: Use as directed on package   oxyCODONE-acetaminophen (PERCOCET)  mg per tablet  Self Yes No   Sig: Take 1 tablet by mouth 4 (four) times a day   pantoprazole (PROTONIX) 40 mg tablet   No No   Sig: Take 1 tablet (40 mg total) by mouth daily Take 1/2 hour prior to breakfast daily in a   Baxter Leonardo potassium chloride (K-DUR,KLOR-CON) 20 mEq tablet  Self Yes No   Sig: Take 20 mEq by mouth 2 (two) times a day   pramipexole (MIRAPEX) 0 5 mg tablet  Self Yes No   Sig: Take 0 5 mg by mouth daily at bedtime   pregabalin (LYRICA) 75 mg capsule  Self Yes No   Sig: Take 75 mg by mouth 3 (three) times a day as needed    sertraline (ZOLOFT) 100 mg tablet   No No   Sig: Take 1 tablet (100 mg total) by mouth daily      Facility-Administered Medications: None       Past Medical History:   Diagnosis Date    Abdominal pain     Acid reflux     Acute renal failure (HCC)     multiple episodes    Anemia     Anxiety     Arthritis     Asthma     Back pain     Chronic pain     DDD (degenerative disc disease), lumbar     Depression     Disease of thyroid gland     had surgery and now hypo    DVT (deep venous thrombosis) ([facility] [address] ) 2009    s/p ankle fracture    Headache     History of transfusion     Hypercalcemia     Hyperlipidemia     Hyperthyroidism     Hypocalcemia     post op 2016    Kidney stone     Lightheadedness     Migraine     Obesity     Palpitations     Psychiatric disorder     anxiety depression    Seizures (HCC)     petit mal x1  4 years ago- all tests were neg      SOB (shortness of breath)     Spondylolisthesis of lumbar region     Treatment     spinal pain injections  last was 7-7-2016    Wears glasses        Past Surgical History:   Procedure Laterality Date    BACK SURGERY L4-5, S1-fusion-plate/screws     SECTION      x5    CYSTOCELE REPAIR  2017    CYSTOSCOPY      DISCOGRAM      HYSTERECTOMY      PARATHYROIDECTOMY      TN ANTERIOR COLPORRAPHY RPR CYSTOCELE W/CYSTO N/A 2017    Procedure: CYSTOCELE REPAIR;  Surgeon: Raza Du MD;  Location: [address] [place] [address];  Service: Gynecology    TN ARTHRODESIS POSTERIOR INTERBODY LUMBAR N/A 2016    Procedure: L4-S1 LUMBAR LAMINECTOMY/DECOMPRESSION;  INSTRUMENTED POSTEROLATERAL FUSION/ INTERBODY L5-S1; ALLOGRAFT AND AUTOGRAFT (IMPULSE) ; Surgeon: Jadon Hester MD;  Location:  MAIN OR;  Service: Orthopedics    TN CYSTOURETHROSCOPY,URETER CATHETER Bilateral 2018    Procedure: INSERTION URETERAL CATHETERS PREOP;  Surgeon: Adrianne Bonilla MD;  Location: [address] [place] [address];  Service: Urology    TN SLING OPER STRES INCONTINENCE N/A 2017    Procedure: MID URETHRAL SLING;  Surgeon: Aneudy Carrera MD;  Location: [address] [place] [address];  Service: Urology    TN SUPRACERV ABD HYSTERECTOMY N/A 2018    Procedure: SUPRACERVICAL HYSTERECTOMY;  Surgeon: Raza Du MD;  Location: [facility];  Service: Gynecology    THYROIDECTOMY      TONSILECTOMY AND ADNOIDECTOMY      TONSILLECTOMY      TUBAL LIGATION         Family History   Problem Relation Age of Onset    Diabetes Mother     Hypertension Mother     Hyperlipidemia Father     Arrhythmia Father     Lung cancer Father     Diabetes Father      I have reviewed and agree with the history as documented      E-Cigarette/Vaping    E-Cigarette Use Never User      E-Cigarette/Vaping Substances    Nicotine No     THC No     CBD No     Flavoring No     Other No     Unknown No      Social History     Tobacco Use    Smoking status: Current Every Day Smoker     Packs/day: 1 00     Years: 25 00     Pack years: 25 00     Types: Cigarettes    Smokeless tobacco: Never Used   Vaping Use    Vaping Use: Never used   Substance Use Topics    Alcohol use: Not Currently    Drug use: No       Review of Systems   Constitutional: Negative for activity change, appetite change, chills and fever  HENT: Negative for congestion and ear pain  Eyes: Negative for pain and discharge  Respiratory: Negative for cough, chest tightness, shortness of breath, wheezing and stridor  Cardiovascular: Negative for chest pain and palpitations  Gastrointestinal: Negative for abdominal distention, abdominal pain, constipation, diarrhea and nausea  Endocrine: Negative for cold intolerance  Genitourinary: Negative for dysuria, frequency and urgency  Musculoskeletal: Negative for arthralgias and back pain  Skin: Negative for color change and rash  Allergic/Immunologic: Negative for environmental allergies and food allergies  Neurological: Negative for dizziness, weakness, numbness and headaches  Paresthesia   Hematological: Negative for adenopathy  Psychiatric/Behavioral: Negative for agitation, behavioral problems and confusion  The patient is not nervous/anxious  All other systems reviewed and are negative  Physical Exam  Physical Exam  Vitals and nursing note reviewed  Constitutional:       Appearance: She is well-developed  HENT:      Head: Normocephalic and atraumatic  Eyes:      Conjunctiva/sclera: Conjunctivae normal    Cardiovascular:      Rate and Rhythm: Normal rate and regular rhythm  Heart sounds: Normal heart sounds  Pulmonary:      Effort: Pulmonary effort is normal       Breath sounds: Normal breath sounds  Abdominal:      General: Bowel sounds are normal  There is no distension  Palpations: Abdomen is soft  Tenderness: There is no abdominal tenderness  Musculoskeletal:         General: Normal range of motion  Cervical back: Normal range of motion and neck supple  Skin:     General: Skin is warm and dry  Neurological:      General: No focal deficit present        Mental Status: She is alert and oriented to person, place, and time  Comments: No focal neuro deficits noted on exam   Psychiatric:         Behavior: Behavior normal          Thought Content:  Thought content normal          Judgment: Judgment normal          Vital Signs  ED Triage Vitals   Temperature Pulse Respirations Blood Pressure SpO2   08/30/22 2243 08/30/22 2251 08/30/22 2243 08/30/22 2243 08/30/22 2243   97 8 °F (36 6 °C) 87 18 125/68 99 %      Temp Source Heart Rate Source Patient Position - Orthostatic VS BP Location FiO2 (%)   08/30/22 2243 08/30/22 2243 08/31/22 0059 08/30/22 2243 --   Probe Monitor Lying Right arm       Pain Score       08/30/22 2317       4           Vitals:    08/30/22 2243 08/30/22 2251 08/31/22 0059   BP: 125/68  123/64   Pulse:  87 77   Patient Position - Orthostatic VS:   Lying         Visual Acuity      ED Medications  Medications   calcium gluconate 2 g in sodium chloride 0 9% 100 mL (premix) (0 g Intravenous Stopped 8/31/22 0056)       Diagnostic Studies  Results Reviewed     Procedure Component Value Units Date/Time    Basic metabolic panel [[id number]]  (Abnormal) Collected: 08/30/22 2306    Lab Status: Final result Specimen: Blood from Arm, Left Updated: 08/30/22 2324     Sodium 141 mmol/L      Potassium 4 0 mmol/L      Chloride 104 mmol/L      CO2 24 mmol/L      ANION GAP 13 mmol/L      BUN 16 mg/dL      Creatinine 1 10 mg/dL      Glucose 136 mg/dL      Calcium 7 6 mg/dL      eGFR 60 ml/min/1 73sq m     Narrative:      [place] guidelines for Chronic Kidney Disease (CKD):     Stage 1 with normal or high GFR (GFR > 90 mL/min/1 73 square meters)    Stage 2 Mild CKD (GFR = 60-89 mL/min/1 73 square meters)    Stage 3A Moderate CKD (GFR = 45-59 mL/min/1 73 square meters)    Stage 3B Moderate CKD (GFR = 30-44 mL/min/1 73 square meters)    Stage 4 Severe CKD (GFR = 15-29 mL/min/1 73 square meters)    Stage 5 End Stage CKD (GFR <15 mL/min/1 73 square meters)  Note: GFR calculation is accurate only with a steady state creatinine    Calcium, ionized [[id number]]  (Abnormal) Collected: 08/30/22 2306    Lab Status: Final result Specimen: Blood from Arm, Left Updated: 08/30/22 2314     Calcium, Ionized 1 04 mmol/L                  No orders to display              Procedures  Procedures         ED Course                               SBIRT 20yo+    Flowsheet Row Most Recent Value   SBIRT (23 yo +)    In order to provide better care to our patients, we are screening all of our patients for alcohol and drug use  Would it be okay to ask you these screening questions? No Filed at: 08/30/2022 2317                    MDM  Number of Diagnoses or Management Options  Hypocalcemia: new and requires workup  Paresthesias: new and requires workup  Diagnosis management comments: Obtain BMP as well as calcium       Amount and/or Complexity of Data Reviewed  Clinical lab tests: ordered and reviewed  Tests in the medicine section of CPT®: ordered and reviewed  Review and summarize past medical records: yes  Independent visualization of images, tracings, or specimens: yes    Risk of Complications, Morbidity, and/or Mortality  General comments: Patient's ionized calcium is low again today  Patient given 2 g of calcium gluconate IV  At this time patient feels better  Patient discharged with follow-up to her PCP and endocrinologist   Close return instructions given to return to the ER for any worsening symptoms  Patient agrees with discharge plan  Patient well appearing at time of discharge  Please Note: Fluency Direct voice recognition software may have been used in the creation of this document  Wrong words or sound a like substitutions may have occurred due to the inherent limitations of the voice software         Patient Progress  Patient progress: improved      Disposition  Final diagnoses:   Paresthesias   Hypocalcemia     Time reflects when diagnosis was documented in both MDM as applicable and the Disposition within this note Time User Action Codes Description Comment    8/31/2022  1:16 AM Cathy Linklisa Add [R20 2] Paresthesias     8/31/2022  1:16 AM Cathy Linklisa Add [E83 51] Hypocalcemia       ED Disposition     ED Disposition   Discharge    Condition   Stable    Date/Time   Wed Aug 31, 2022  1:16 AM    Comment   [facility] discharge to home/self care                 Follow-up Information     Follow up With Specialties Details Why Contact Info    Teri Grove MD Internal Medicine Schedule an appointment as soon as possible for a visit in 2 days  [address] [address]  [phone]      Your endocrinologist  Schedule an appointment as soon as possible for a visit             Discharge Medication List as of 8/31/2022  1:16 AM      CONTINUE these medications which have NOT CHANGED    Details   ALPRAZolam (XANAX) 0 5 mg tablet Take 1 tablet (0 5 mg total) by mouth daily at bedtime as needed for anxiety Take TWO o 5 mg tabs in am and ONE 0 5 mg in om, Starting Mon 8/8/2022, Normal      !! baclofen 10 mg tablet Take 10 mg by mouth 3 (three) times a day as needed, Starting Fri 7/22/2022, Historical Med      !! baclofen 20 mg tablet Take 1 tablet (20 mg total) by mouth 3 (three) times a day, Starting Thu 11/18/2021, Normal      calcitriol (ROCALTROL) 0 25 mcg capsule TAKE 1 CAPSULE BY MOUTH EVERY DAY, Normal      Calcium Carb-Cholecalciferol (Calcium 500+D) 500-400 MG-UNIT TABS Take 600 mg by mouth 3 times a day, Normal      Calcium Carbonate-Vitamin D3 600-400 MG-UNIT TABS TAKE 1 TABLET BY MOUTH 6 (SIX) TIMES A DAY, Normal      fenofibrate (TRIGLIDE) 160 MG tablet Take 160 mg by mouth daily, Historical Med      ferrous sulfate 325 (65 Fe) mg tablet Take 325 mg by mouth Once Monday, wed, Friday-Last dose 4/1/22 , Historical Med      ibuprofen (MOTRIN) 600 mg tablet Take 1 tablet (600 mg total) by mouth every 6 (six) hours as needed for mild pain or moderate pain, Starting Tue 6/2/2020, Normal      levothyroxine 150 mcg tablet TAKE 1 TABLET BY MOUTH EVERY DAY, Normal      Lidocaine-Menthol 4-1 % PTCH if needed  , Historical Med      magnesium Oxide (MAG-OX) 400 mg TABS TAKE 1 TABLET (400 MG TOTAL) BY MOUTH THREE (THREE) TIMES A DAY 9PM, Normal      methylPREDNISolone 4 MG tablet therapy pack Use as directed on package, Normal      oxyCODONE-acetaminophen (PERCOCET)  mg per tablet Take 1 tablet by mouth 4 (four) times a day, Historical Med      pantoprazole (PROTONIX) 40 mg tablet Take 1 tablet (40 mg total) by mouth daily Take 1/2 hour prior to breakfast daily in a m   , Starting Wed 6/8/2022, Until Sat 7/30/2022, Normal      potassium chloride (K-DUR,KLOR-CON) 20 mEq tablet Take 20 mEq by mouth 2 (two) times a day, Historical Med      Potassium Chloride ER 20 MEQ TBCR Take 1 tablet by mouth 2 (two) times a day, Starting Mon 7/11/2022, Historical Med      pramipexole (MIRAPEX) 0 5 mg tablet Take 0 5 mg by mouth daily at bedtime, Historical Med      pregabalin (LYRICA) 75 mg capsule Take 75 mg by mouth 3 (three) times a day as needed , Historical Med      QUEtiapine (SEROquel) 25 mg tablet Take 25 mg by mouth daily at bedtime, Starting Mon 7/11/2022, Historical Med      sertraline (ZOLOFT) 100 mg tablet Take 1 tablet (100 mg total) by mouth daily, Starting Mon 8/8/2022, Normal       !! - Potential duplicate medications found  Please discuss with provider  No discharge procedures on file      PDMP Review       Value Time User    PDMP Reviewed  Yes 5/30/2022  9:45 PM Cameron Willson PA-C          ED Provider  Electronically Signed by           Drew Drake DO  08/31/22 0126

## 2022-09-01 ENCOUNTER — OFFICE VISIT (OUTPATIENT)
Dept: PSYCHIATRY | Facility: CLINIC | Age: 47
End: 2022-09-01
Payer: COMMERCIAL

## 2022-09-01 ENCOUNTER — TELEPHONE (OUTPATIENT)
Dept: PSYCHIATRY | Facility: CLINIC | Age: 47
End: 2022-09-01

## 2022-09-01 DIAGNOSIS — F41.0 PANIC ATTACKS: ICD-10-CM

## 2022-09-01 DIAGNOSIS — F51.01 PRIMARY INSOMNIA: Primary | ICD-10-CM

## 2022-09-01 DIAGNOSIS — F41.9 ANXIETY: ICD-10-CM

## 2022-09-01 PROCEDURE — 90833 PSYTX W PT W E/M 30 MIN: CPT | Performed by: NURSE PRACTITIONER

## 2022-09-01 PROCEDURE — 99214 OFFICE O/P EST MOD 30 MIN: CPT | Performed by: NURSE PRACTITIONER

## 2022-09-01 RX ORDER — DOXEPIN HYDROCHLORIDE 25 MG/1
25 CAPSULE ORAL
Qty: 30 CAPSULE | Refills: 3 | Status: SHIPPED | OUTPATIENT
Start: 2022-09-01

## 2022-09-01 NOTE — PSYCH
This note was not shared with the patient due to reasonable likelihood of causing patient harm    PROGRESS NOTE        [facility]      Name and Date of Birth:  Morena Caballero 56 y o  1975    Date of Visit: 09/01/22    SUBJECTIVE:    Beverly Palmer was seen today for follow-up medication management  She was casually dressed, alert and oriented 3 X, and maintained good eye contact  Her speech was clear and of normal rate, rhythm and volume  Thought processes were clear and goal-directed  She reports continued anxiety and thought that the 1 5 mg of Xanax was not enough  Today I agreed to an increase to 2 mg, while reminding her that this is something that we need to eventually decrease and or change  No depression was reported today  She has difficulties with sleep and today I am giving her 25 mg of doxepin at bedtime no other new symptoms or side effects were reported  She denies suicidal ideation, intent or plan at present, has no suicidal ideation, intent or plan at present  She denies any auditory hallucinations and visual hallucinations, denies any other delusional thinking, denies any delusional thinking  She denies any side effects from medications    HPI ROS Appetite Changes and Sleep: normal appetite, normal sleep    Review Of Systems:      Constitutional As noted in HPI   ENT As noted in HPI   Cardiovascular As noted in HPI   Respiratory As noted in HPI   Gastrointestinal As noted in HPI   Genitourinary As noted in HPI   Musculoskeletal As noted in HPI   Integumentary As noted in HPI   Neurological As noted in HPI   Endocrine As noted in HPI   Other Symptoms Negative and None       Laboratory Results: No results found for this or any previous visit      Substance Abuse History:    Social History     Substance and Sexual Activity   Drug Use No       Family Psychiatric History:     Family History   Problem Relation Age of Onset    Diabetes Mother     Hypertension Mother     Hyperlipidemia Father     Arrhythmia Father     Lung cancer Father     Diabetes Father        The following portions of the patient's history were reviewed and updated as appropriate: past family history, past medical history, past social history, past surgical history and problem list     Social History     Socioeconomic History    Marital status: /Civil Union     Spouse name: Not on file    Number of children: Not on file    Years of education: 15    Highest education level: Not on file   Occupational History    Not on file   Tobacco Use    Smoking status: Current Every Day Smoker     Packs/day: 1 00     Years: 25 00     Pack years: 25 00     Types: Cigarettes    Smokeless tobacco: Never Used   Vaping Use    Vaping Use: Never used   Substance and Sexual Activity    Alcohol use: Not Currently    Drug use: No    Sexual activity: Yes     Birth control/protection: Surgical     Comment: hysterectomy   Other Topics Concern    Not on file   Social History Narrative    Most recent tobacco use screenin2018      General stress level:   Medium       Exercise level:   Occasional       Diet:   Regular      Caffeine intake:   Occasional     As per [place]      Social Determinants of Health     Financial Resource Strain: Not on file   Food Insecurity: No Food Insecurity    Worried About Running Out of Food in the Last Year: Never true    [place] of Food in the Last Year: Never true   Transportation Needs: No Transportation Needs    Lack of Transportation (Medical): No    Lack of Transportation (Non-Medical):  No   Physical Activity: Not on file   Stress: Not on file   Social Connections: Not on file   Intimate Partner Violence: Not on file   Housing Stability: Low Risk     Unable to Pay for Housing in the Last Year: No    Number of Places Lived in the Last Year: 1    Unstable Housing in the Last Year: No     Social History     Social History Narrative    Most recent tobacco use screenin2018      General stress level:   Medium       Exercise level:   Occasional       Diet:   Regular      Caffeine intake:   Occasional     As per [employer]         Social History     Tobacco History     Smoking Status  Current Every Day Smoker Smoking Frequency  1 pack/day for 25 years (25 pk yrs) Smoking Tobacco Type  Cigarettes    Smokeless Tobacco Use  Never Used          Alcohol History     Alcohol Use Status  Not Currently          Drug Use     Drug Use Status  No          Sexual Activity     Sexually Active  Yes Birth Control/Protection  Surgical Comment  hysterectomy          Activities of Daily Living    Not Asked                     OBJECTIVE:     Mental Status Evaluation:    Appearance age appropriate, casually dressed   Behavior pleasant, cooperative   Speech normal volume, normal pitch   Mood Appropriate   Affect Appropriate and full   Thought Processes logical   Associations intact associations   Thought Content normal   Perceptual Disturbances: none   Abnormal Thoughts  Risk Potential Suicidal ideation - None  Homicidal ideation - None  Potential for aggression - No   Orientation oriented to person, place, time/date and situation   Memory recent and remote memory grossly intact   Cosciousness alert and awake   Attention Span attention span and concentration are age appropriate   Intellect Appears to be of Average Intelligence   Insight age appropriate    Judgement good    Muscle Strength and  Gait muscle strength and tone were normal   Language Intact   Fund of Knowledge Intact   Pain none   Pain Scale 0       Assessment/Plan:       Diagnoses and all orders for this visit:    Primary insomnia  -     doxepin (SINEquan) 25 mg capsule;  Take 1 capsule (25 mg total) by mouth daily at bedtime    Anxiety    Panic attacks          Treatment Recommendations/Precautions:    Continue current medications:    Risks/Benefits      Risks, Benefits And Possible Side Effects Of Medications:    Risks, benefits, and possible side effects of medications explained to patient and patient verbalizes understanding and agreement for treatment  Controlled Medication Discussion:   Discussed with patient the risks of sedation, respiratory depression, impairment of ability to drive and potential for abuse and addiction related to treatment with benzodiazepine medications  The patient understands risk of treatment with benzodiazepine medications, agrees to not drive if feels impaired and agrees to take medications as prescribed  Psychotherapy Provided:     Individual psychotherapy provided:   Today I spent 20 minutes counseling with Laquita Soria

## 2022-09-01 NOTE — TELEPHONE ENCOUNTER
Pt saw Dr Sendy Chavez today and inquired about her mothers mental health records  (mom passed away in 2013)  She stated she was power of  along with her sister  I explained to her that we would need to see the legal documents showing that she is the power of   We would need her to sign a release of information as well  Her sister is experiencing some mental health concerns and is going to see a therapist   The therapist for her sister was asking pt if there was any mental health problems that ran in the family  That is the reason pt is asking for her mothers records' Lmm for pt to call the office to explain that    we are not able to give the records to her but we can send them to the therapist for her sisters care  We would need the name of the therapist and address

## 2022-09-01 NOTE — BH TREATMENT PLAN
TREATMENT PLAN (Medication Management Only)        [facility]    Name and Date of Birth:  Qing Garvey 56 y o  1975  Date of Treatment Plan: September 1, 2022  Diagnosis/Diagnoses:    1  Primary insomnia    2  Anxiety    3  Panic attacks      Strengths/Personal Resources for Self-Care: supportive family, supportive friends, taking medications as prescribed, ability to communicate well, ability to listen, average or above intelligence, motivation for treatment  Area/Areas of need (in own words): anxiety symptoms  1  Long Term Goal: improve anxiety  Target Date:3 months - 12/1/2022  Person/Persons responsible for completion of goal: Charles  2  Short Term Objective (s) - How will we reach this goal?:   A  Provider new recommended medication/dosage changes and/or continue medication(s): continue current medications as prescribed  B  N/A   C  N/A  Target Date:3 months - 12/1/2022  Person/Persons Responsible for Completion of Goal: Charles  Progress Towards Goals: improving gradually  Treatment Modality: medication management every 3 months  Review due 180 days from date of this plan: 3 months - 12/1/2022  Expected length of service: ongoing treatment  My Physician/PA/NP and I have developed this plan together and I agree to work on the goals and objectives  I understand the treatment goals that were developed for my treatment

## 2022-09-02 ENCOUNTER — TELEPHONE (OUTPATIENT)
Dept: PSYCHIATRY | Facility: CLINIC | Age: 47
End: 2022-09-02

## 2022-09-02 DIAGNOSIS — F41.9 ANXIETY: ICD-10-CM

## 2022-09-02 RX ORDER — ALPRAZOLAM 0.5 MG/1
1 TABLET ORAL 2 TIMES DAILY PRN
Qty: 60 TABLET | Refills: 3 | Status: SHIPPED | OUTPATIENT
Start: 2022-09-02 | End: 2022-10-06

## 2022-09-03 PROBLEM — G93.32 CHRONIC FATIGUE SYNDROME: Status: ACTIVE | Noted: 2022-04-22

## 2022-09-03 PROBLEM — F41.9 CHRONIC ANXIETY: Status: ACTIVE | Noted: 2022-04-22

## 2022-09-03 PROBLEM — R53.82 CHRONIC FATIGUE SYNDROME: Status: ACTIVE | Noted: 2022-04-22

## 2022-09-03 PROBLEM — E20.9 HYPOPARATHYROIDISM (HCC): Status: ACTIVE | Noted: 2022-04-22

## 2022-09-03 PROBLEM — F17.200 NICOTINE DEPENDENCE: Status: ACTIVE | Noted: 2017-09-13

## 2022-09-07 DIAGNOSIS — G47.00 INSOMNIA, UNSPECIFIED: ICD-10-CM

## 2022-09-07 RX ORDER — QUETIAPINE FUMARATE 25 MG/1
TABLET, FILM COATED ORAL
Qty: 30 TABLET | Refills: 2 | Status: SHIPPED | OUTPATIENT
Start: 2022-09-07 | End: 2022-10-02

## 2022-09-10 ENCOUNTER — NURSE TRIAGE (OUTPATIENT)
Dept: OTHER | Facility: OTHER | Age: 47
End: 2022-09-10

## 2022-09-10 NOTE — TELEPHONE ENCOUNTER
C/o poison ivy rash on face, and BUE, denies signs of infection  Patient asking to take medication given to her on 8/11 for back pain  Educated on use of medication prescribed for different purpose  No additional symptoms reported  Care advice given  Informed to call back if worsening symptoms  Verbalized understanding and agreeable with disposition  No further questions

## 2022-09-10 NOTE — TELEPHONE ENCOUNTER
Regarding: position edd -rash  ----- Message from Mytonomy Commander sent at 9/10/2022  1:11 AM EDT -----  "I ended up getting poison ivy, I had it before and the doctor gave me medicine methylPREDNISolone 4mg could I use this?"

## 2022-09-10 NOTE — TELEPHONE ENCOUNTER
Reason for Disposition   Mild rash from poison ivy, oak or sumac   [1] Face, eyes, lips or genitals are involved AND [2] more than a small rash    Answer Assessment - Initial Assessment Questions  1  APPEARANCE of RASH: "Describe the rash "      Red, and swelling  2  LOCATION: "Where is the rash located?"     Face , and arm   3  SIZE: "How large is the rash?"       Face, and arm (eye lids)  4  ONSET: "When did the rash begin?"       9/9  5  ITCHING: "Does the rash itch?" If Yes, ask: "How bad is it?"    - MILD - doesn't interfere with normal activities    - MODERATE-SEVERE: interferes with work, school, sleep, or other activities       Mild  6  PREGNANCY: "Is there any chance you are pregnant?" "When was your last menstrual period?"      Denies       Protocols used: POISON IVY - OAK - SUMAC-ADULT-

## 2022-09-11 ENCOUNTER — HOSPITAL ENCOUNTER (EMERGENCY)
Facility: HOSPITAL | Age: 47
Discharge: HOME | End: 2022-09-11
Attending: EMERGENCY MEDICINE
Payer: COMMERCIAL

## 2022-09-11 VITALS
OXYGEN SATURATION: 96 % | DIASTOLIC BLOOD PRESSURE: 55 MMHG | HEART RATE: 94 BPM | BODY MASS INDEX: 37.9 KG/M2 | TEMPERATURE: 97.4 F | WEIGHT: 207.2 LBS | RESPIRATION RATE: 18 BRPM | SYSTOLIC BLOOD PRESSURE: 113 MMHG

## 2022-09-11 DIAGNOSIS — E83.51 HYPOCALCEMIA: ICD-10-CM

## 2022-09-11 DIAGNOSIS — R20.2 PARESTHESIAS: Primary | ICD-10-CM

## 2022-09-11 LAB
ANION GAP SERPL CALCULATED.3IONS-SCNC: 14 MMOL/L (ref 4–13)
BUN SERPL-MCNC: 18 MG/DL (ref 5–25)
CA-I BLD-SCNC: 0.98 MMOL/L (ref 1.12–1.32)
CALCIUM SERPL-MCNC: 7.7 MG/DL (ref 8.3–10.1)
CHLORIDE SERPL-SCNC: 103 MMOL/L (ref 96–108)
CO2 SERPL-SCNC: 22 MMOL/L (ref 21–32)
CREAT SERPL-MCNC: 1.22 MG/DL (ref 0.6–1.3)
GFR SERPL CREATININE-BSD FRML MDRD: 53 ML/MIN/1.73SQ M
GLUCOSE SERPL-MCNC: 148 MG/DL (ref 65–140)
POTASSIUM SERPL-SCNC: 4.6 MMOL/L (ref 3.5–5.3)
SODIUM SERPL-SCNC: 139 MMOL/L (ref 135–147)

## 2022-09-11 PROCEDURE — 82330 ASSAY OF CALCIUM: CPT | Performed by: EMERGENCY MEDICINE

## 2022-09-11 PROCEDURE — 80048 BASIC METABOLIC PNL TOTAL CA: CPT | Performed by: EMERGENCY MEDICINE

## 2022-09-11 PROCEDURE — 99284 EMERGENCY DEPT VISIT MOD MDM: CPT

## 2022-09-11 PROCEDURE — 36415 COLL VENOUS BLD VENIPUNCTURE: CPT | Performed by: EMERGENCY MEDICINE

## 2022-09-11 PROCEDURE — 96365 THER/PROPH/DIAG IV INF INIT: CPT

## 2022-09-11 PROCEDURE — 99284 EMERGENCY DEPT VISIT MOD MDM: CPT | Performed by: EMERGENCY MEDICINE

## 2022-09-11 RX ORDER — CALCIUM GLUCONATE 20 MG/ML
2 INJECTION, SOLUTION INTRAVENOUS ONCE
Status: COMPLETED | OUTPATIENT
Start: 2022-09-11 | End: 2022-09-11

## 2022-09-11 RX ADMIN — CALCIUM GLUCONATE 2 G: 20 INJECTION, SOLUTION INTRAVENOUS at 21:42

## 2022-09-12 DIAGNOSIS — F41.9 ANXIETY: ICD-10-CM

## 2022-09-12 RX ORDER — ALPRAZOLAM 1 MG/1
1 TABLET ORAL 2 TIMES DAILY
Qty: 60 TABLET | Refills: 0 | OUTPATIENT
Start: 2022-09-12

## 2022-09-12 RX ORDER — ALPRAZOLAM 1 MG/1
1 TABLET ORAL
COMMUNITY
End: 2022-10-02

## 2022-09-12 NOTE — DISCHARGE INSTRUCTIONS
You were given 2 grams of calcium IV tonight  Continue current care  Rest as needed  Follow up for any problems or worsening

## 2022-09-12 NOTE — ED PROVIDER NOTES
History  Chief Complaint   Patient presents with    Numbness     States she is feeling numb and her calcium is low  Seen frequently in ED for same      57 yo female well known to ER c/o recurrent numbness and tingling since yesterday  She did take extra oral calcium yesterday but it got worse today  Has h/o same, requiring IV calcium frequently  No new or different symptoms  No fever, cough, vomiting, diarrhea  History provided by:  Patient   used: No        Prior to Admission Medications   Prescriptions Last Dose Informant Patient Reported? Taking?    ALPRAZolam (XANAX) 0 5 mg tablet   No No   Sig: Take 2 tablets (1 mg total) by mouth 2 (two) times a day as needed for anxiety Take TWO o 5 mg tabs in am and ONE 0 5 mg in om   Calcium Carb-Cholecalciferol (Calcium 500+D) 500-400 MG-UNIT TABS  Self No No   Sig: Take 600 mg by mouth 3 times a day   Calcium Carbonate-Vitamin D3 600-400 MG-UNIT TABS   No No   Sig: TAKE 1 TABLET BY MOUTH 6 (SIX) TIMES A DAY   Lidocaine-Menthol 4-1 % PTCH  Self Yes No   Sig: if needed     Potassium Chloride ER 20 MEQ TBCR  Self Yes No   Sig: Take 1 tablet by mouth 2 (two) times a day   QUEtiapine (SEROquel) 25 mg tablet   No No   Sig: TAKE 1 TABLET BY MOUTH EVERYDAY AT BEDTIME   baclofen 10 mg tablet  Self Yes No   Sig: Take 10 mg by mouth 3 (three) times a day as needed   baclofen 20 mg tablet  Self No No   Sig: Take 1 tablet (20 mg total) by mouth 3 (three) times a day   Patient taking differently: Take 20 mg by mouth 3 (three) times a day as needed   calcitriol (ROCALTROL) 0 25 mcg capsule  Self No No   Sig: TAKE 1 CAPSULE BY MOUTH EVERY DAY   doxepin (SINEquan) 25 mg capsule   No No   Sig: Take 1 capsule (25 mg total) by mouth daily at bedtime   fenofibrate (TRIGLIDE) 160 MG tablet  Self Yes No   Sig: Take 160 mg by mouth daily   ferrous sulfate 325 (65 Fe) mg tablet  Self Yes No   Sig: Take 325 mg by mouth Once Monday, wed, Friday-Last dose 4/1/22 ibuprofen (MOTRIN) 600 mg tablet  Self No No   Sig: Take 1 tablet (600 mg total) by mouth every 6 (six) hours as needed for mild pain or moderate pain   levothyroxine 150 mcg tablet  Self No No   Sig: TAKE 1 TABLET BY MOUTH EVERY DAY   Patient taking differently: 150 mcg every morning   magnesium Oxide (MAG-OX) 400 mg TABS  Self No No   Sig: TAKE 1 TABLET (400 MG TOTAL) BY MOUTH THREE (THREE) TIMES A DAY 9PM   methylPREDNISolone 4 MG tablet therapy pack   No No   Sig: Use as directed on package   oxyCODONE-acetaminophen (PERCOCET)  mg per tablet  Self Yes No   Sig: Take 1 tablet by mouth 4 (four) times a day   pantoprazole (PROTONIX) 40 mg tablet   No No   Sig: Take 1 tablet (40 mg total) by mouth daily Take 1/2 hour prior to breakfast daily in [facility]    potassium chloride (K-DUR,KLOR-CON) 20 mEq tablet  Self Yes No   Sig: Take 20 mEq by mouth 2 (two) times a day   pramipexole (MIRAPEX) 0 5 mg tablet  Self Yes No   Sig: Take 0 5 mg by mouth daily at bedtime   pregabalin (LYRICA) 75 mg capsule  Self Yes No   Sig: Take 75 mg by mouth 3 (three) times a day as needed    sertraline (ZOLOFT) 100 mg tablet   No No   Sig: Take 1 tablet (100 mg total) by mouth daily      Facility-Administered Medications: None       Past Medical History:   Diagnosis Date    Abdominal pain     Acid reflux     Acute renal failure (HCC)     multiple episodes    Anemia     Anxiety     Arthritis     Asthma     Back pain     Chronic pain     DDD (degenerative disc disease), lumbar     Depression     Disease of thyroid gland     had surgery and now hypo    DVT (deep venous thrombosis) ([facility] [address] ) 2009    s/p ankle fracture    Headache     History of transfusion     Hypercalcemia     Hyperlipidemia     Hyperthyroidism     Hypocalcemia     post op 2016    Kidney stone     Lightheadedness     Migraine     Obesity     Palpitations     Psychiatric disorder     anxiety depression    Seizures (HCC)     petit mal x1  4 years ago- all tests were neg   SOB (shortness of breath)     Spondylolisthesis of lumbar region     Treatment     spinal pain injections  last was 2016    Wears glasses        Past Surgical History:   Procedure Laterality Date    BACK SURGERY      L4-5, S1-fusion-plate/screws     SECTION      x5    CYSTOCELE REPAIR  2017    CYSTOSCOPY      DISCOGRAM      HYSTERECTOMY      PARATHYROIDECTOMY      UT ANTERIOR COLPORRAPHY RPR CYSTOCELE W/CYSTO N/A 2017    Procedure: CYSTOCELE REPAIR;  Surgeon: Raza Du MD;  Location: [address] [place] [address];  Service: Gynecology    UT ARTHRODESIS POSTERIOR INTERBODY LUMBAR N/A 2016    Procedure: L4-S1 LUMBAR LAMINECTOMY/DECOMPRESSION;  INSTRUMENTED POSTEROLATERAL FUSION/ INTERBODY L5-S1; ALLOGRAFT AND AUTOGRAFT (IMPULSE) ; Surgeon: Jadon Hester MD;  Location:  MAIN OR;  Service: Orthopedics    UT CYSTOURETHROSCOPY,URETER CATHETER Bilateral 2018    Procedure: INSERTION URETERAL CATHETERS PREOP;  Surgeon: Adrianne Bonilla MD;  Location: [address] [place] [address];  Service: Urology    UT SLING OPER STRES INCONTINENCE N/A 2017    Procedure: MID URETHRAL SLING;  Surgeon: Aneudy Carrera MD;  Location: [address] [place] [address];  Service: Urology    UT SUPRACERV ABD HYSTERECTOMY N/A 2018    Procedure: SUPRACERVICAL HYSTERECTOMY;  Surgeon: Raza Du MD;  Location: [facility];  Service: Gynecology    THYROIDECTOMY      TONSILECTOMY AND ADNOIDECTOMY      TONSILLECTOMY      TUBAL LIGATION         Family History   Problem Relation Age of Onset    Diabetes Mother     Hypertension Mother     Hyperlipidemia Father     Arrhythmia Father     Lung cancer Father     Diabetes Father      I have reviewed and agree with the history as documented      E-Cigarette/Vaping    E-Cigarette Use Never User      E-Cigarette/Vaping Substances    Nicotine No     THC No     CBD No     Flavoring No     Other No     Unknown No      Social History     Tobacco Use    Smoking status: Current Every Day Smoker     Packs/day: 1 00     Years: 25 00     Pack years: 25 00     Types: Cigarettes    Smokeless tobacco: Never Used   Vaping Use    Vaping Use: Never used   Substance Use Topics    Alcohol use: Not Currently    Drug use: No       Review of Systems   Constitutional: Negative for fever  Respiratory: Negative for cough  Gastrointestinal: Negative for diarrhea and vomiting  Skin: Negative for rash  Neurological: Positive for numbness  All other systems reviewed and are negative  Physical Exam  Physical Exam  Vitals and nursing note reviewed  Constitutional:       General: She is not in acute distress  Appearance: She is well-developed  She is not ill-appearing or diaphoretic  HENT:      Head: Normocephalic and atraumatic  Eyes:      General: No scleral icterus  Conjunctiva/sclera: Conjunctivae normal    Cardiovascular:      Rate and Rhythm: Normal rate and regular rhythm  Heart sounds: Normal heart sounds  No murmur heard  Pulmonary:      Effort: Pulmonary effort is normal  No respiratory distress  Breath sounds: Normal breath sounds  Abdominal:      General: Bowel sounds are normal  There is no distension  Palpations: Abdomen is soft  Tenderness: There is no abdominal tenderness  Musculoskeletal:         General: No deformity  Normal range of motion  Cervical back: Normal range of motion and neck supple  Right lower leg: No edema  Left lower leg: No edema  Skin:     General: Skin is warm and dry  Coloration: Skin is not pale  Findings: No rash  Neurological:      General: No focal deficit present  Mental Status: She is alert and oriented to person, place, and time  Cranial Nerves: No cranial nerve deficit  Motor: No weakness     Psychiatric:         Mood and Affect: Mood normal          Behavior: Behavior normal          Vital Signs  ED Triage Vitals [09/11/22 2038]   Temperature Pulse Respirations Blood Pressure SpO2   (!) 97 4 °F (36 3 °C) (!) 113 20 130/80 100 %      Temp Source Heart Rate Source Patient Position - Orthostatic VS BP Location FiO2 (%)   Tympanic Monitor Sitting Left arm --      Pain Score       --           Vitals:    09/11/22 2038   BP: 130/80   Pulse: (!) 113   Patient Position - Orthostatic VS: Sitting         Visual Acuity  Visual Acuity    Flowsheet Row Most Recent Value   L Pupil Size (mm) 3   R Pupil Size (mm) 3          ED Medications  Medications   calcium gluconate 2 g in sodium chloride 0 9% 100 mL (premix) (2 g Intravenous New Bag 9/11/22 2142)       Diagnostic Studies  Results Reviewed     Procedure Component Value Units Date/Time    Calcium, ionized [[id number]]  (Abnormal) Collected: 09/11/22 2054    Lab Status: Final result Specimen: Blood from Arm, Right Updated: 09/11/22 2122     Calcium, Ionized 0 98 mmol/L     Basic metabolic panel [[id number]]  (Abnormal) Collected: 09/11/22 2054    Lab Status: Final result Specimen: Blood from Arm, Right Updated: 09/11/22 2117     Sodium 139 mmol/L      Potassium 4 6 mmol/L      Chloride 103 mmol/L      CO2 22 mmol/L      ANION GAP 14 mmol/L      BUN 18 mg/dL      Creatinine 1 22 mg/dL      Glucose 148 mg/dL      Calcium 7 7 mg/dL      eGFR 53 ml/min/1 73sq m     Narrative:      [place] guidelines for Chronic Kidney Disease (CKD):     Stage 1 with normal or high GFR (GFR > 90 mL/min/1 73 square meters)    Stage 2 Mild CKD (GFR = 60-89 mL/min/1 73 square meters)    Stage 3A Moderate CKD (GFR = 45-59 mL/min/1 73 square meters)    Stage 3B Moderate CKD (GFR = 30-44 mL/min/1 73 square meters)    Stage 4 Severe CKD (GFR = 15-29 mL/min/1 73 square meters)    Stage 5 End Stage CKD (GFR <15 mL/min/1 73 square meters)  Note: GFR calculation is accurate only with a steady state creatinine                 No orders to display              Procedures  Procedures         ED Course MDM  Number of Diagnoses or Management Options  Hypocalcemia  Paresthesias  Diagnosis management comments: Given 2 g Calcium gluconate  Advised continue current care and follow up outpt  Disposition  Final diagnoses:   Paresthesias   Hypocalcemia     Time reflects when diagnosis was documented in both MDM as applicable and the Disposition within this note     Time User Action Codes Description Comment    1/19/9316 76:86 PM Ligia Vieras Add [N06 0] Paresthesias     6/27/6382 00:74 PM Ligia Vieras Add [K05 29] Hypocalcemia       ED Disposition     ED Disposition   Discharge    Condition   Stable    Date/Time   Sun Sep 11, 2022 10:12 PM    Comment   [facility] discharge to home/self care  Follow-up Information     Follow up With Specialties Details Why Contact Carolyn Adams MD Internal Medicine  As needed [id number] [address]  [phone]            Patient's Medications   Discharge Prescriptions    No medications on file       No discharge procedures on file      PDMP Review       Value Time User    PDMP Reviewed  Yes 9/3/2022  5:53 PM Juan Anna MD          ED Provider  Electronically Signed by           Shda Giles MD  57/06/55 [id number]

## 2022-09-21 ENCOUNTER — HOSPITAL ENCOUNTER (OUTPATIENT)
Dept: RADIOLOGY | Facility: HOSPITAL | Age: 47
Discharge: HOME | End: 2022-09-21
Attending: INTERNAL MEDICINE
Payer: COMMERCIAL

## 2022-09-21 DIAGNOSIS — R16.1 SPLENOMEGALY: ICD-10-CM

## 2022-09-21 PROCEDURE — 76700 US EXAM ABDOM COMPLETE: CPT

## 2022-09-22 ENCOUNTER — OFFICE VISIT (OUTPATIENT)
Dept: ENDOCRINOLOGY | Facility: CLINIC | Age: 47
End: 2022-09-22
Payer: COMMERCIAL

## 2022-09-22 VITALS
OXYGEN SATURATION: 94 % | HEART RATE: 102 BPM | DIASTOLIC BLOOD PRESSURE: 76 MMHG | SYSTOLIC BLOOD PRESSURE: 128 MMHG | BODY MASS INDEX: 37.79 KG/M2 | WEIGHT: 206.6 LBS

## 2022-09-22 DIAGNOSIS — E55.9 VITAMIN D DEFICIENCY: ICD-10-CM

## 2022-09-22 DIAGNOSIS — E89.0 POSTOPERATIVE HYPOTHYROIDISM: ICD-10-CM

## 2022-09-22 DIAGNOSIS — E83.51 HYPOCALCEMIA: ICD-10-CM

## 2022-09-22 DIAGNOSIS — E89.2 POSTSURGICAL HYPOPARATHYROIDISM (HCC): Primary | ICD-10-CM

## 2022-09-22 PROCEDURE — 99214 OFFICE O/P EST MOD 30 MIN: CPT | Performed by: INTERNAL MEDICINE

## 2022-09-22 RX ORDER — PREGABALIN 100 MG/1
CAPSULE ORAL
COMMUNITY
Start: 2022-09-20

## 2022-09-22 RX ORDER — CALCITRIOL 0.5 UG/1
0.5 CAPSULE, LIQUID FILLED ORAL DAILY
Qty: 90 CAPSULE | Refills: 1 | Status: SHIPPED | OUTPATIENT
Start: 2022-09-22
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
## 2022-09-22 NOTE — PATIENT INSTRUCTIONS
Increase Rocaltrol to 0 5 mg orally daily   Continue current dose of Synthroid, calcium supplementation   Please confirm what dose of vitamin-D you are taking  Please have labs done in 1 week   Keep well hydrated

## 2022-09-23 ENCOUNTER — TELEPHONE (OUTPATIENT)
Dept: GASTROENTEROLOGY | Facility: CLINIC | Age: 47
End: 2022-09-23

## 2022-09-23 ENCOUNTER — TELEPHONE (OUTPATIENT)
Dept: OTHER | Facility: OTHER | Age: 47
End: 2022-09-23

## 2022-09-23 DIAGNOSIS — R16.1 SPLENOMEGALY: Primary | ICD-10-CM

## 2022-09-23 DIAGNOSIS — R61 NIGHT SWEATS: ICD-10-CM

## 2022-09-23 NOTE — TELEPHONE ENCOUNTER
I spoke to patient, since she has been back on her Xanax her sweats have improved  However I discussed with her since she came to us with sweats particularly at night and mild splenomegaly I think she should still follow-up with Hematology and Oncology to exclude malignancy  She is in agreement with this    We should see her back in follow-up afterwards that most of this is all likely due to portal hypertension from her fatty liver disease      Patient was in agreement

## 2022-09-23 NOTE — TELEPHONE ENCOUNTER
Called and spoke to patient  She wanted to let me know that her symptoms of fatigue, night sweats were during the time of Xanax withdrawal   I discussed that I still would recommend oncology follow-up due to abnormal ultrasound findings which she is agreeable to  She has multiple questions regarding her ultrasound  I discussed again that our most likely suspected etiology of her abnormal ultrasound findings is fatty liver disease  However, I did discuss that once malignancy is ruled out, we will further investigate the liver  I discussed that this may include a liver biopsy to ensure no other etiology of her hepatomegaly  Patient asks if findings on ultrasound are life threatening  I discussed at this point there is no urgency for further evaluation of her liver  Our biggest concern would be ruling out malignancy and further liver workup could follow after that

## 2022-09-23 NOTE — TELEPHONE ENCOUNTER
Called and spoke to patient again after discussing with Dr Becky Cooper  Currently suspecting splenomegaly may be related to portal hypertension from fatty liver disease  However, due to persistent splenomegaly with some vague symptoms such as night sweats, would recommend referral to Oncology for further evaluation  This was all discussed with the patient  I placed referral and gave her number of office to call  I discussed that if oncologic etiology is ruled out we can further evaluate from a liver standpoint with possible liver biopsy and pressures to diagnose/confirm portal hypertension  Answered many questions and tried to provide reassurance for patient

## 2022-09-23 NOTE — TELEPHONE ENCOUNTER
Called and spoke to patient about results  Will discuss with Dr Le Santo but discussed with patient further evaluation likely including ultrasound elastography as well as oncology referral   Discussed hepatic steatosis in the importance of healthy diet and healthy weight loss

## 2022-09-23 NOTE — TELEPHONE ENCOUNTER
Patients GI provider:  Dr David Aldridge    Number to return call: [phone]    Reason for call: Romi from Radiology called with significant findings for the US ABD      Scheduled procedure/appointment date if applicable: Appt  77/99/18

## 2022-09-26 ENCOUNTER — TELEPHONE (OUTPATIENT)
Dept: INTERNAL MEDICINE CLINIC | Facility: CLINIC | Age: 47
End: 2022-09-26

## 2022-09-26 DIAGNOSIS — K21.9 GASTROESOPHAGEAL REFLUX DISEASE, UNSPECIFIED WHETHER ESOPHAGITIS PRESENT: Primary | ICD-10-CM

## 2022-09-26 PROBLEM — F13.930 BENZODIAZEPINE WITHDRAWAL WITHOUT COMPLICATION (HCC): Status: RESOLVED | Noted: 2022-08-05 | Resolved: 2022-09-26

## 2022-09-26 RX ORDER — OMEPRAZOLE 40 MG/1
40 CAPSULE, DELAYED RELEASE ORAL DAILY
Qty: 30 CAPSULE | Refills: 2 | Status: SHIPPED | OUTPATIENT
Start: 2022-09-26

## 2022-09-26 NOTE — TELEPHONE ENCOUNTER
Patient called regarding a follow up to her request to have seizure DX removed from her record , she is not able to file for disability for her back with that diagnosis on her record   Please update record and call patient so she can file her disability

## 2022-09-26 NOTE — TELEPHONE ENCOUNTER
Patients GI provider:  Dr Mishel Mcfarland    Number to return call: [phone]    Reason for call: Pt calling to refill her omeprazole  Omeprazole not coming up in her medication, chart reflects that it has been discontinued      Scheduled procedure/appointment date if applicable: Appt: 52/68/1515

## 2022-09-28 ENCOUNTER — APPOINTMENT (OUTPATIENT)
Dept: LAB | Facility: HOSPITAL | Age: 47
End: 2022-09-28
Payer: COMMERCIAL

## 2022-09-28 DIAGNOSIS — E89.2 POSTSURGICAL HYPOPARATHYROIDISM (HCC): ICD-10-CM

## 2022-09-28 DIAGNOSIS — R16.1 SPLENOMEGALY: ICD-10-CM

## 2022-09-28 DIAGNOSIS — E55.9 VITAMIN D DEFICIENCY: ICD-10-CM

## 2022-09-28 DIAGNOSIS — E83.51 HYPOCALCEMIA: ICD-10-CM

## 2022-09-28 DIAGNOSIS — E89.0 POSTOPERATIVE HYPOTHYROIDISM: ICD-10-CM

## 2022-09-28 LAB
25(OH)D3 SERPL-MCNC: 25.6 NG/ML (ref 30–100)
ALBUMIN SERPL BCP-MCNC: 3.9 G/DL (ref 3.5–5)
ALP SERPL-CCNC: 93 U/L (ref 46–116)
ALT SERPL W P-5'-P-CCNC: 43 U/L (ref 12–78)
ANION GAP SERPL CALCULATED.3IONS-SCNC: 12 MMOL/L (ref 4–13)
AST SERPL W P-5'-P-CCNC: 18 U/L (ref 5–45)
BILIRUB SERPL-MCNC: 0.37 MG/DL (ref 0.2–1)
BUN SERPL-MCNC: 23 MG/DL (ref 5–25)
CALCIUM SERPL-MCNC: 8.6 MG/DL (ref 8.3–10.1)
CHLORIDE SERPL-SCNC: 102 MMOL/L (ref 96–108)
CO2 SERPL-SCNC: 26 MMOL/L (ref 21–32)
CREAT SERPL-MCNC: 1.13 MG/DL (ref 0.6–1.3)
GFR SERPL CREATININE-BSD FRML MDRD: 58 ML/MIN/1.73SQ M
GLUCOSE P FAST SERPL-MCNC: 130 MG/DL (ref 65–99)
PHOSPHATE SERPL-MCNC: 5.7 MG/DL (ref 2.7–4.5)
POTASSIUM SERPL-SCNC: 4.1 MMOL/L (ref 3.5–5.3)
PROT SERPL-MCNC: 7.8 G/DL (ref 6.4–8.4)
PTH-INTACT SERPL-MCNC: <6.3 PG/ML (ref 18.4–80.1)
SODIUM SERPL-SCNC: 140 MMOL/L (ref 135–147)
T4 FREE SERPL-MCNC: 1.12 NG/DL (ref 0.76–1.46)
TSH SERPL DL<=0.05 MIU/L-ACNC: 5.43 UIU/ML (ref 0.45–4.5)

## 2022-09-28 PROCEDURE — 84100 ASSAY OF PHOSPHORUS: CPT

## 2022-09-28 PROCEDURE — 83970 ASSAY OF PARATHORMONE: CPT

## 2022-09-28 PROCEDURE — 80053 COMPREHEN METABOLIC PANEL: CPT

## 2022-09-28 PROCEDURE — 84439 ASSAY OF FREE THYROXINE: CPT

## 2022-09-28 PROCEDURE — 86663 EPSTEIN-BARR ANTIBODY: CPT

## 2022-09-28 PROCEDURE — 87340 HEPATITIS B SURFACE AG IA: CPT

## 2022-09-28 PROCEDURE — 86706 HEP B SURFACE ANTIBODY: CPT

## 2022-09-28 PROCEDURE — 86708 HEPATITIS A ANTIBODY: CPT

## 2022-09-28 PROCEDURE — 86665 EPSTEIN-BARR CAPSID VCA: CPT

## 2022-09-28 PROCEDURE — 36415 COLL VENOUS BLD VENIPUNCTURE: CPT

## 2022-09-28 PROCEDURE — 84443 ASSAY THYROID STIM HORMONE: CPT

## 2022-09-28 PROCEDURE — 86803 HEPATITIS C AB TEST: CPT

## 2022-09-28 PROCEDURE — 82306 VITAMIN D 25 HYDROXY: CPT

## 2022-09-28 PROCEDURE — 86664 EPSTEIN-BARR NUCLEAR ANTIGEN: CPT

## 2022-09-29 ENCOUNTER — TELEPHONE (OUTPATIENT)
Dept: ENDOCRINOLOGY | Facility: CLINIC | Age: 47
End: 2022-09-29

## 2022-09-29 LAB
EBV NA IGG SER IA-ACNC: 285 U/ML (ref 0–17.9)
EBV VCA IGG SER IA-ACNC: >600 U/ML (ref 0–17.9)
EBV VCA IGM SER IA-ACNC: 53.7 U/ML (ref 0–35.9)
HAV AB SER QL IA: NORMAL
HBV SURFACE AB SER-ACNC: 4.54 MIU/ML
HBV SURFACE AG SER QL: NORMAL
HCV AB SER QL: NORMAL
INTERPRETATION: ABNORMAL

## 2022-09-29 NOTE — TELEPHONE ENCOUNTER
Contacted via TC message that patient was requesting a call back from endocrinology to discuss results of abnormal labwork " and is afraid she might have a stroke"  Spoke with patient over the telephone, she denied any specific ongoing symptoms but reports that she wanted to discuss the abnormal results from her recent lab work  She thought her phosphorus level was high >5000 which she rechecked during our conversation and actually confirmed that it is 5 7  She also asked for a comment about her EBV antibody testing that was ordered by CATY ANTONY and I advised that she contact her gastroenterologist in the morning to discuss this  She had no further questions and appreciated the call back

## 2022-09-29 NOTE — TELEPHONE ENCOUNTER
Paged ramy via TC    Patient stated she recived abnormal lab results and is afraid she might have a stroke  She is requesting a call back from Patricia mccann

## 2022-09-30 ENCOUNTER — TELEPHONE (OUTPATIENT)
Dept: GASTROENTEROLOGY | Facility: CLINIC | Age: 47
End: 2022-09-30

## 2022-09-30 DIAGNOSIS — E55.9 VITAMIN D DEFICIENCY: Primary | ICD-10-CM

## 2022-09-30 DIAGNOSIS — E83.51 HYPOCALCEMIA: ICD-10-CM

## 2022-09-30 NOTE — TELEPHONE ENCOUNTER
I do not believe we ordered any recent lab work for her  I believe she is talking about endocrinology lab work    Please call to clarify however recommend reaching out to endo

## 2022-09-30 NOTE — TELEPHONE ENCOUNTER
Patients GI provider:  Dr Frankey Bryant    Number to return call: [phone]    Reason for call: Pt calling to go over lab results  Scheduled procedure/appointment date if applicable: Appt   10/13/22

## 2022-09-30 NOTE — TELEPHONE ENCOUNTER
Called and spoke to patient and she is aware  Nothing further at this time  She has follow-up with Oncology as we previously recommended

## 2022-09-30 NOTE — TELEPHONE ENCOUNTER
Spoke with patient, she received a call from endocrinologist but she is asking about the EBV acute panel she had done  The levels are elevated and she was told they are "dangerously high" she is not sure what this mean and would like a call back by end of today

## 2022-10-02 ENCOUNTER — APPOINTMENT (OUTPATIENT)
Dept: RADIOLOGY | Facility: HOSPITAL | Age: 47
End: 2022-10-02
Payer: COMMERCIAL

## 2022-10-02 ENCOUNTER — HOSPITAL ENCOUNTER (EMERGENCY)
Facility: HOSPITAL | Age: 47
Discharge: HOME | End: 2022-10-02
Attending: EMERGENCY MEDICINE
Payer: COMMERCIAL

## 2022-10-02 VITALS
TEMPERATURE: 97.9 F | DIASTOLIC BLOOD PRESSURE: 75 MMHG | RESPIRATION RATE: 17 BRPM | OXYGEN SATURATION: 97 % | HEART RATE: 64 BPM | SYSTOLIC BLOOD PRESSURE: 119 MMHG

## 2022-10-02 DIAGNOSIS — E83.51 HYPOCALCEMIA: Primary | ICD-10-CM

## 2022-10-02 LAB
ALBUMIN SERPL BCP-MCNC: 3.8 G/DL (ref 3.5–5)
ALP SERPL-CCNC: 95 U/L (ref 46–116)
ALT SERPL W P-5'-P-CCNC: 59 U/L (ref 12–78)
ANION GAP SERPL CALCULATED.3IONS-SCNC: 12 MMOL/L (ref 4–13)
AST SERPL W P-5'-P-CCNC: 23 U/L (ref 5–45)
BASOPHILS # BLD AUTO: 0.05 THOUSANDS/ΜL (ref 0–0.1)
BASOPHILS NFR BLD AUTO: 1 % (ref 0–1)
BILIRUB SERPL-MCNC: 0.4 MG/DL (ref 0.2–1)
BUN SERPL-MCNC: 17 MG/DL (ref 5–25)
CA-I BLD-SCNC: 1.04 MMOL/L (ref 1.12–1.32)
CALCIUM SERPL-MCNC: 8.3 MG/DL (ref 8.3–10.1)
CARDIAC TROPONIN I PNL SERPL HS: <2 NG/L
CHLORIDE SERPL-SCNC: 102 MMOL/L (ref 96–108)
CO2 SERPL-SCNC: 23 MMOL/L (ref 21–32)
CREAT SERPL-MCNC: 1.09 MG/DL (ref 0.6–1.3)
D DIMER PPP FEU-MCNC: 0.4 UG/ML FEU
EOSINOPHIL # BLD AUTO: 0.1 THOUSAND/ΜL (ref 0–0.61)
EOSINOPHIL NFR BLD AUTO: 2 % (ref 0–6)
ERYTHROCYTE [DISTWIDTH] IN BLOOD BY AUTOMATED COUNT: 16.6 % (ref 11.6–15.1)
GFR SERPL CREATININE-BSD FRML MDRD: 61 ML/MIN/1.73SQ M
GLUCOSE SERPL-MCNC: 111 MG/DL (ref 65–140)
HCT VFR BLD AUTO: 40.1 % (ref 34.8–46.1)
HGB BLD-MCNC: 13.1 G/DL (ref 11.5–15.4)
IMM GRANULOCYTES # BLD AUTO: 0.02 THOUSAND/UL (ref 0–0.2)
IMM GRANULOCYTES NFR BLD AUTO: 0 % (ref 0–2)
LIPASE SERPL-CCNC: 460 U/L (ref 73–393)
LYMPHOCYTES # BLD AUTO: 1.99 THOUSANDS/ΜL (ref 0.6–4.47)
LYMPHOCYTES NFR BLD AUTO: 35 % (ref 14–44)
MAGNESIUM SERPL-MCNC: 1.8 MG/DL (ref 1.6–2.6)
MCH RBC QN AUTO: 28.7 PG (ref 26.8–34.3)
MCHC RBC AUTO-ENTMCNC: 32.7 G/DL (ref 31.4–37.4)
MCV RBC AUTO: 88 FL (ref 82–98)
MONOCYTES # BLD AUTO: 0.41 THOUSAND/ΜL (ref 0.17–1.22)
MONOCYTES NFR BLD AUTO: 7 % (ref 4–12)
NEUTROPHILS # BLD AUTO: 3.1 THOUSANDS/ΜL (ref 1.85–7.62)
NEUTS SEG NFR BLD AUTO: 55 % (ref 43–75)
NRBC BLD AUTO-RTO: 0 /100 WBCS
NT-PROBNP SERPL-MCNC: 50 PG/ML
PHOSPHATE SERPL-MCNC: 3.8 MG/DL (ref 2.7–4.5)
PLATELET # BLD AUTO: 178 THOUSANDS/UL (ref 149–390)
PMV BLD AUTO: 10.5 FL (ref 8.9–12.7)
POTASSIUM SERPL-SCNC: 3.9 MMOL/L (ref 3.5–5.3)
PROT SERPL-MCNC: 7.6 G/DL (ref 6.4–8.4)
RBC # BLD AUTO: 4.57 MILLION/UL (ref 3.81–5.12)
SODIUM SERPL-SCNC: 137 MMOL/L (ref 135–147)
TSH SERPL DL<=0.05 MIU/L-ACNC: 3.73 UIU/ML (ref 0.45–4.5)
WBC # BLD AUTO: 5.67 THOUSAND/UL (ref 4.31–10.16)

## 2022-10-02 PROCEDURE — 83690 ASSAY OF LIPASE: CPT | Performed by: PHYSICIAN ASSISTANT

## 2022-10-02 PROCEDURE — 99285 EMERGENCY DEPT VISIT HI MDM: CPT

## 2022-10-02 PROCEDURE — 36415 COLL VENOUS BLD VENIPUNCTURE: CPT | Performed by: PHYSICIAN ASSISTANT

## 2022-10-02 PROCEDURE — 93005 ELECTROCARDIOGRAM TRACING: CPT

## 2022-10-02 PROCEDURE — 96365 THER/PROPH/DIAG IV INF INIT: CPT

## 2022-10-02 PROCEDURE — 96366 THER/PROPH/DIAG IV INF ADDON: CPT

## 2022-10-02 PROCEDURE — 96367 TX/PROPH/DG ADDL SEQ IV INF: CPT

## 2022-10-02 PROCEDURE — 83735 ASSAY OF MAGNESIUM: CPT | Performed by: PHYSICIAN ASSISTANT

## 2022-10-02 PROCEDURE — 84443 ASSAY THYROID STIM HORMONE: CPT | Performed by: PHYSICIAN ASSISTANT

## 2022-10-02 PROCEDURE — 85025 COMPLETE CBC W/AUTO DIFF WBC: CPT | Performed by: PHYSICIAN ASSISTANT

## 2022-10-02 PROCEDURE — 82330 ASSAY OF CALCIUM: CPT | Performed by: PHYSICIAN ASSISTANT

## 2022-10-02 PROCEDURE — 83880 ASSAY OF NATRIURETIC PEPTIDE: CPT | Performed by: PHYSICIAN ASSISTANT

## 2022-10-02 PROCEDURE — 85379 FIBRIN DEGRADATION QUANT: CPT | Performed by: PHYSICIAN ASSISTANT

## 2022-10-02 PROCEDURE — 80053 COMPREHEN METABOLIC PANEL: CPT | Performed by: PHYSICIAN ASSISTANT

## 2022-10-02 PROCEDURE — 99284 EMERGENCY DEPT VISIT MOD MDM: CPT | Performed by: PHYSICIAN ASSISTANT

## 2022-10-02 PROCEDURE — 84100 ASSAY OF PHOSPHORUS: CPT | Performed by: PHYSICIAN ASSISTANT

## 2022-10-02 PROCEDURE — 71046 X-RAY EXAM CHEST 2 VIEWS: CPT

## 2022-10-02 PROCEDURE — 84484 ASSAY OF TROPONIN QUANT: CPT | Performed by: PHYSICIAN ASSISTANT

## 2022-10-02 RX ORDER — CALCIUM GLUCONATE 20 MG/ML
2 INJECTION, SOLUTION INTRAVENOUS ONCE
Status: COMPLETED | OUTPATIENT
Start: 2022-10-02 | End: 2022-10-02

## 2022-10-02 RX ORDER — CALCIUM GLUCONATE 20 MG/ML
1 INJECTION, SOLUTION INTRAVENOUS ONCE
Status: COMPLETED | OUTPATIENT
Start: 2022-10-02 | End: 2022-10-02

## 2022-10-02 RX ADMIN — CALCIUM GLUCONATE 2 G: 20 INJECTION, SOLUTION INTRAVENOUS at 16:23

## 2022-10-02 RX ADMIN — SODIUM CHLORIDE, SODIUM LACTATE, POTASSIUM CHLORIDE, AND CALCIUM CHLORIDE 1000 ML: .6; .31; .03; .02 INJECTION, SOLUTION INTRAVENOUS at 14:38

## 2022-10-02 RX ADMIN — CALCIUM GLUCONATE 1 G: 20 INJECTION, SOLUTION INTRAVENOUS at 17:24

## 2022-10-02 NOTE — ED PROVIDER NOTES
History  Chief Complaint   Patient presents with    Chest Pain    Numbness     Pt c/o chest pain with tingling all over body  Pt has hx of problems with her calcium levels in the past      Patient is a 67-year-old female with past medical history significant for current everyday smoker, hypothyroidism following thyroid resection, hypocalcemia following parathyroidectomy, chronic back pain, anxiety, depression, GERD, and iron deficiency anemia who presents today for evaluation of chest pain that started last night approximately 11:00 p m  Bing Mackenzie Patient states that she was afraid to go to sleep last night because of the chest pain which she describes as sharp and stabbing located on the left parasternal area with radiation to the left shoulder  Patient states also states she had mild shortness of breath and later developed tingling in her hands  Patient denies dizziness, fatigue, fever, headache, lower extremity edema, nausea, near syncope, orthopnea, palpitations or PND  She does endorse back pain which is chronic        Chest Pain  Pain location:  Substernal area  Pain quality: sharp and stabbing    Pain radiates to:  L shoulder  Pain radiates to the back: no    Pain severity:  Moderate  Onset quality:  Sudden  Duration:  1 day  Timing:  Intermittent  Progression:  Worsening  Chronicity:  New  Context: at rest    Context: not breathing, no drug use, not eating, no intercourse, not lifting, no movement, not raising an arm, no stress and no trauma    Relieved by:  Nothing  Worsened by:  Nothing tried  Ineffective treatments:  None tried  Associated symptoms: anxiety, back pain and numbness    Associated symptoms: no abdominal pain, no AICD problem, no altered mental status, no anorexia, no claudication, no cough, no diaphoresis, no dizziness, no dysphagia, no fatigue, no fever, no headache, no heartburn, no lower extremity edema, no nausea, no near-syncope, no orthopnea, no palpitations, no PND, no shortness of breath, no syncope, not vomiting and no weakness    Risk factors: smoking    Risk factors: no aortic disease, no birth control, no coronary artery disease, no diabetes mellitus, no Win-Danlos syndrome, no high cholesterol, no hypertension, no immobilization, not male, no Marfan's syndrome, not obese, not pregnant, no prior DVT/PE and no surgery        Prior to Admission Medications   Prescriptions Last Dose Informant Patient Reported? Taking?    ALPRAZolam (XANAX) 0 5 mg tablet   No No   Sig: Take 2 tablets (1 mg total) by mouth 2 (two) times a day as needed for anxiety Take TWO o 5 mg tabs in am and ONE 0 5 mg in om   Patient taking differently: Take 2 mg by mouth Take TWO o 5 mg tabs in am and ONE 0 5 mg in om   Calcium Carb-Cholecalciferol (Calcium 500+D) 500-400 MG-UNIT TABS  Self No No   Sig: Take 600 mg by mouth 3 times a day   Patient not taking: Reported on 9/22/2022   Calcium Carbonate-Vitamin D3 600-400 MG-UNIT TABS   No No   Sig: TAKE 1 TABLET BY MOUTH 6 (SIX) TIMES A DAY   Lidocaine-Menthol 4-1 % PTCH  Self Yes No   Sig: if needed     Potassium Chloride ER 20 MEQ TBCR  Self Yes No   Sig: Take 1 tablet by mouth 2 (two) times a day   Patient not taking: Reported on 9/22/2022   baclofen 10 mg tablet  Self Yes No   Sig: Take 10 mg by mouth 3 (three) times a day as needed   baclofen 20 mg tablet   No No   Sig: Take 1 tablet (20 mg total) by mouth 3 (three) times a day   calcitriol (ROCALTROL) 0 5 MCG capsule   No No   Sig: Take 1 capsule (0 5 mcg total) by mouth daily   doxepin (SINEquan) 25 mg capsule   No Yes   Sig: Take 1 capsule (25 mg total) by mouth daily at bedtime   fenofibrate (TRIGLIDE) 160 MG tablet  Self Yes No   Sig: Take 160 mg by mouth daily   ferrous sulfate 325 (65 Fe) mg tablet  Self Yes No   Sig: Take 325 mg by mouth Once Monday, wed, Friday-Last dose 4/1/22    ibuprofen (MOTRIN) 600 mg tablet  Self No No   Sig: Take 1 tablet (600 mg total) by mouth every 6 (six) hours as needed for mild pain or moderate pain   levothyroxine 150 mcg tablet   No No   Sig: TAKE 1 TABLET BY MOUTH EVERY DAY   Patient taking differently: 150 mcg Take 6 days a week   magnesium Oxide (MAG-OX) 400 mg TABS  Self No No   Sig: TAKE 1 TABLET (400 MG TOTAL) BY MOUTH THREE (THREE) TIMES A DAY 9PM   methylPREDNISolone 4 MG tablet therapy pack   No No   Sig: Use as directed on package   Patient not taking: Reported on 9/22/2022   omeprazole (PriLOSEC) 40 MG capsule   No No   Sig: Take 1 capsule (40 mg total) by mouth daily   oxyCODONE-acetaminophen (PERCOCET)  mg per tablet  Self Yes No   Sig: Take 1 tablet by mouth 4 (four) times a day   pantoprazole (PROTONIX) 40 mg tablet   No No   Sig: Take 1 tablet (40 mg total) by mouth daily Take 1/2 hour prior to breakfast daily in a [facility]    Patient not taking: Reported on 9/22/2022   potassium chloride (K-DUR,KLOR-CON) 20 mEq tablet  Self Yes No   Sig: Take 20 mEq by mouth 2 (two) times a day   pramipexole (MIRAPEX) 0 5 mg tablet  Self Yes No   Sig: Take 0 5 mg by mouth daily at bedtime   pregabalin (LYRICA) 100 mg capsule   Yes No   Sig: TAKE 1 CAPSULE BY MOUTH TWICE A DAY AS DIRECTED FOR 30 DAYS   sertraline (ZOLOFT) 100 mg tablet   No Yes   Sig: Take 1 tablet (100 mg total) by mouth daily      Facility-Administered Medications: None       Past Medical History:   Diagnosis Date    Abdominal pain     Acid reflux     Acute renal failure (HCC)     multiple episodes    Anemia     Anxiety     Arthritis     Asthma     Back pain     Chronic pain     DDD (degenerative disc disease), lumbar     Depression     Disease of thyroid gland     had surgery and now hypo    D[facility]deep venous thrombosis) ([facility] [address] ) 2009    s/p ankle fracture    Headache     History of transfusion     Hypercalcemia     Hyperlipidemia     Hyperthyroidism     Hypocalcemia     post op 2016    Kidney stone     Lightheadedness     Migraine     Obesity     Palpitations     Psychiatric disorder     anxiety depression    Seizures (HCC)     petit mal x1  4 years ago- all tests were neg   SOB (shortness of breath)     Spondylolisthesis of lumbar region     Treatment     spinal pain injections  last was 2016    Wears glasses        Past Surgical History:   Procedure Laterality Date    BACK SURGERY      L4-5, S1-fusion-plate/screws     SECTION      x5    CYSTOCELE REPAIR  2017    CYSTOSCOPY      DISCOGRAM      HYSTERECTOMY      PARATHYROIDECTOMY      LA ANTERIOR COLPORRAPHY RPR CYSTOCELE W/CYSTO N/A 2017    Procedure: CYSTOCELE REPAIR;  Surgeon: Thao Griffin MD;  Location: [address] [place] [address];  Service: Gynecology    LA ARTHRODESIS POSTERIOR INTERBODY LUMBAR N/A 2016    Procedure: L4-S1 LUMBAR LAMINECTOMY/DECOMPRESSION;  INSTRUMENTED POSTEROLATERAL FUSION/ INTERBODY L5-S1; ALLOGRAFT AND AUTOGRAFT (IMPULSE) ; Surgeon: Violet Peters MD;  Location:  MAIN OR;  Service: Orthopedics    LA CYSTOURETHROSCOPY,URETER CATHETER Bilateral 2018    Procedure: INSERTION URETERAL CATHETERS PREOP;  Surgeon: Timothy Edward MD;  Location: [address] [place] [address];  Service: Urology    LA SLING OPER STRES INCONTINENCE N/A 2017    Procedure: MID URETHRAL SLING;  Surgeon: Joseph Askew MD;  Location: [address] [place] [address];  Service: Urology    LA SUPRACERV ABD HYSTERECTOMY N/A 2018    Procedure: SUPRACERVICAL HYSTERECTOMY;  Surgeon: Thao Griffin MD;  Location: [facility];  Service: Gynecology    THYROIDECTOMY      TONSILECTOMY AND ADNOIDECTOMY      TONSILLECTOMY      TUBAL LIGATION         Family History   Problem Relation Age of Onset    Diabetes Mother     Hypertension Mother     Hyperlipidemia Father     Arrhythmia Father     Lung cancer Father     Diabetes Father      I have reviewed and agree with the history as documented      E-Cigarette/Vaping    E-Cigarette Use Never User      E-Cigarette/Vaping Substances    Nicotine No     THC No     CBD No     Flavoring No     Other No  Unknown No      Social History     Tobacco Use    Smoking status: Current Every Day Smoker     Packs/day: 1 00     Years: 25 00     Pack years: 25 00     Types: Cigarettes    Smokeless tobacco: Never Used   Vaping Use    Vaping Use: Never used   Substance Use Topics    Alcohol use: Not Currently    Drug use: No       Review of Systems   Constitutional: Negative for chills, diaphoresis, fatigue and fever  HENT: Negative for ear pain, sore throat and trouble swallowing  Eyes: Negative for pain and visual disturbance  Respiratory: Negative for cough and shortness of breath  Cardiovascular: Positive for chest pain  Negative for palpitations, orthopnea, claudication, syncope, PND and near-syncope  Gastrointestinal: Negative for abdominal pain, anorexia, heartburn, nausea and vomiting  Endocrine: Negative  Genitourinary: Negative for dysuria and hematuria  Musculoskeletal: Positive for back pain  Negative for arthralgias  Skin: Negative for color change and rash  Allergic/Immunologic: Negative  Neurological: Positive for numbness  Negative for dizziness, seizures, syncope, weakness and headaches  Hematological: Negative  Psychiatric/Behavioral: Negative  All other systems reviewed and are negative  Physical Exam  Physical Exam  Vitals and nursing note reviewed  Constitutional:       General: She is not in acute distress  Appearance: She is well-developed  She is not ill-appearing  HENT:      Head: Normocephalic and atraumatic  Eyes:      Conjunctiva/sclera: Conjunctivae normal       Pupils: Pupils are equal, round, and reactive to light  Neck:      Vascular: No hepatojugular reflux or JVD  Cardiovascular:      Rate and Rhythm: Normal rate and regular rhythm  Pulses:           Carotid pulses are 2+ on the right side and 2+ on the left side  Radial pulses are 2+ on the right side and 2+ on the left side          Dorsalis pedis pulses are 2+ on the right side and 2+ on the left side  Posterior tibial pulses are 2+ on the right side and 2+ on the left side  Heart sounds: Normal heart sounds  No murmur heard  Pulmonary:      Effort: Pulmonary effort is normal  No accessory muscle usage or respiratory distress  Breath sounds: Normal breath sounds  No decreased breath sounds, wheezing, rhonchi or rales  Chest:      Chest wall: Tenderness present  Abdominal:      General: Bowel sounds are normal       Palpations: Abdomen is soft  There is hepatomegaly and splenomegaly  Tenderness: There is no abdominal tenderness  Musculoskeletal:         General: Normal range of motion  Cervical back: Normal range of motion and neck supple  Right lower leg: No tenderness  No edema  Left lower leg: Tenderness present  No edema  Skin:     General: Skin is warm and dry  Capillary Refill: Capillary refill takes less than 2 seconds  Neurological:      General: No focal deficit present  Mental Status: She is alert and oriented to person, place, and time  Cranial Nerves: No cranial nerve deficit           Vital Signs  ED Triage Vitals   Temperature Pulse Respirations Blood Pressure SpO2   10/02/22 1418 10/02/22 1415 10/02/22 1415 10/02/22 1415 10/02/22 1415   97 9 °F (36 6 °C) 92 21 116/71 95 %      Temp Source Heart Rate Source Patient Position - Orthostatic VS BP Location FiO2 (%)   10/02/22 1418 10/02/22 1418 10/02/22 1418 10/02/22 1418 --   Tympanic Monitor Lying Left arm       Pain Score       --                  Vitals:    10/02/22 1415 10/02/22 1418   BP: 116/71 119/75   Pulse: 92 92   Patient Position - Orthostatic VS:  Lying         Visual Acuity      ED Medications  Medications   lactated ringers bolus 1,000 mL (0 mL Intravenous Stopped 10/2/22 1610)   calcium gluconate 2 g in sodium chloride 0 9% 100 mL (premix) (0 g Intravenous Stopped 10/2/22 1724)     Followed by   calcium gluconate 1 g in sodium chloride 0 9% 50 mL (premix) (1 g Intravenous New Bag 10/2/22 1724)       Diagnostic Studies  Results Reviewed     Procedure Component Value Units Date/Time    Lipase [[id number]]  (Abnormal) Collected: 10/02/22 1434    Lab Status: Final result Specimen: Blood from Arm, Right Updated: 10/02/22 1728     Lipase 460 u/L     TSH [[id number]]  (Normal) Collected: 10/02/22 1434    Lab Status: Final result Specimen: Blood from Arm, Right Updated: 10/02/22 1519     TSH 3RD GENERATON 3 727 uIU/mL     Narrative:      Patients undergoing fluorescein dye angiography may retain small amounts of fluorescein in the body for 48-72 hours post procedure  Samples containing fluorescein can produce falsely depressed TSH values  If the patient had this procedure,a specimen should be resubmitted post fluorescein clearance        Magnesium [[id number]]  (Normal) Collected: 10/02/22 1434    Lab Status: Final result Specimen: Blood from Arm, Right Updated: 10/02/22 1519     Magnesium 1 8 mg/dL     Phosphorus [[id number]]  (Normal) Collected: 10/02/22 1434    Lab Status: Final result Specimen: Blood from Arm, Right Updated: 10/02/22 1519     Phosphorus 3 8 mg/dL     NT-BNP PRO [[id number]]  (Normal) Collected: 10/02/22 1434    Lab Status: Final result Specimen: Blood from Arm, Right Updated: 10/02/22 1519     NT-proBNP 50 pg/mL     HS Troponin 0hr (reflex protocol) [[id number]]  (Normal) Collected: 10/02/22 1434    Lab Status: Final result Specimen: Blood from Arm, Right Updated: 10/02/22 1519     hs TnI 0hr <2 ng/L     Comprehensive metabolic panel [[id number]] Collected: 10/02/22 1434    Lab Status: Final result Specimen: Blood from Arm, Right Updated: 10/02/22 1511     Sodium 137 mmol/L      Potassium 3 9 mmol/L      Chloride 102 mmol/L      CO2 23 mmol/L      ANION GAP 12 mmol/L      BUN 17 mg/dL      Creatinine 1 09 mg/dL      Glucose 111 mg/dL      Calcium 8 3 mg/dL      AST 23 U/L      ALT 59 U/L      Alkaline Phosphatase 95 U/L      Total Protein 7 6 g/dL Albumin 3 8 g/dL      Total Bilirubin 0 40 mg/dL      eGFR 61 ml/min/1 73sq m     Narrative:      [place] guidelines for Chronic Kidney Disease (CKD):     Stage 1 with normal or high GFR (GFR > 90 mL/min/1 73 square meters)    Stage 2 Mild CKD (GFR = 60-89 mL/min/1 73 square meters)    Stage 3A Moderate CKD (GFR = 45-59 mL/min/1 73 square meters)    Stage 3B Moderate CKD (GFR = 30-44 mL/min/1 73 square meters)    Stage 4 Severe CKD (GFR = 15-29 mL/min/1 73 square meters)    Stage 5 End Stage CKD (GFR <15 mL/min/1 73 square meters)  Note: GFR calculation is accurate only with a steady state creatinine    D-Dimer [[id number]]  (Normal) Collected: 10/02/22 1434    Lab Status: Final result Specimen: Blood from Arm, Right Updated: 10/02/22 1507     D-Dimer, Quant 0 40 ug/ml FEU     Calcium, ionized [[id number]]  (Abnormal) Collected: 10/02/22 1434    Lab Status: Final result Specimen: Blood from Arm, Right Updated: 10/02/22 1450     Calcium, Ionized 1 04 mmol/L     CBC and differential [[id number]]  (Abnormal) Collected: 10/02/22 1434    Lab Status: Final result Specimen: Blood from Arm, Right Updated: 10/02/22 1449     WBC 5 67 Thousand/uL      RBC 4 57 Million/uL      Hemoglobin 13 1 g/dL      Hematocrit 40 1 %      MCV 88 fL      MCH 28 7 pg      MCHC 32 7 g/dL      RDW 16 6 %      MPV 10 5 fL      Platelets 420 Thousands/uL      nRBC 0 /100 WBCs      Neutrophils Relative 55 %      Immat GRANS % 0 %      Lymphocytes Relative 35 %      Monocytes Relative 7 %      Eosinophils Relative 2 %      Basophils Relative 1 %      Neutrophils Absolute 3 10 Thousands/µL      Immature Grans Absolute 0 02 Thousand/uL      Lymphocytes Absolute 1 99 Thousands/µL      Monocytes Absolute 0 41 Thousand/µL      Eosinophils Absolute 0 10 Thousand/µL      Basophils Absolute 0 05 Thousands/µL                  XR chest 2 views   Final Result by Nicolás Reeves MD (10/02 1553)      No acute cardiopulmonary disease                    Workstation performed: [id number]                    Procedures  ECG 12 Lead Documentation Only    Date/Time: 10/2/2022 2:56 PM  Performed by: Diane Aldridge PA-C  Authorized by: Diane Aldridge PA-C     Patient location:  ED  Interpretation:     Interpretation: normal    Rate:     ECG rate:  89    ECG rate assessment: normal    Rhythm:     Rhythm: sinus rhythm    Ectopy:     Ectopy: none    Comments:      IVÁN [id number]             ED Course  ED Course as of 10/02/22 1800   Sun Oct 02, 2022   1609 Calcium, ionized(!)                                             MDM  Number of Diagnoses or Management Options  Hypocalcemia: new and requires workup  Diagnosis management comments: Patient with tingling and muscle cramping likely related to hypocalcemia  Calcium repleted with 3 g IV calcium gluconate  Patient discharged home in good condition  Patient educated on red flag symptoms that would necessitate return to the ED       Amount and/or Complexity of Data Reviewed  Clinical lab tests: ordered and reviewed  Tests in the radiology section of CPT®: ordered  Tests in the medicine section of CPT®: ordered and reviewed  Decide to obtain previous medical records or to obtain history from someone other than the patient: yes  Review and summarize past medical records: yes  Independent visualization of images, tracings, or specimens: yes    Risk of Complications, Morbidity, and/or Mortality  Presenting problems: moderate  Diagnostic procedures: moderate  Management options: moderate    Patient Progress  Patient progress: stable      Disposition  Final diagnoses:   Hypocalcemia     Time reflects when diagnosis was documented in both MDM as applicable and the Disposition within this note     Time User Action Codes Description Comment    10/2/2022  5:58 PM Jesica Dejesus Add [E83 51] Hypocalcemia       ED Disposition     ED Disposition   Discharge    Condition   Stable    Date/Time   Sun Oct 2, 2022  5:58 PM    Comment   [facility] discharge to home/self care  Follow-up Information     Follow up With Specialties Details Why Contact Info    Carlo Renteria MD Internal Medicine Call  As needed [address] Nicanor Mejia MD Endocrinology, Internal Medicine Schedule an appointment as soon as possible for a visit  As [place] [address]  [phone]            Patient's Medications   Discharge Prescriptions    No medications on file       No discharge procedures on file      PDMP Review       Value Time User    PDMP Reviewed  Yes 9/3/2022  5:53 PM Carlo Renteria MD          ED Provider  Electronically Signed by           Janice Chan PA-C  10/02/22 1800

## 2022-10-03 DIAGNOSIS — F41.9 ANXIETY: ICD-10-CM

## 2022-10-03 LAB
ATRIAL RATE: 89 BPM
P AXIS: 42 DEGREES
PR INTERVAL: 158 MS
QRS AXIS: 35 DEGREES
QRSD INTERVAL: 74 MS
QT INTERVAL: 364 MS
QTC INTERVAL: 442 MS
T WAVE AXIS: 58 DEGREES
VENTRICULAR RATE: 89 BPM

## 2022-10-03 PROCEDURE — 93010 ELECTROCARDIOGRAM REPORT: CPT | Performed by: INTERNAL MEDICINE

## 2022-10-03 RX ORDER — SERTRALINE HYDROCHLORIDE 100 MG/1
TABLET, FILM COATED ORAL
Qty: 90 TABLET | Refills: 2 | Status: SHIPPED | OUTPATIENT
Start: 2022-10-03 | End: 2022-10-28

## 2022-10-03 NOTE — TELEPHONE ENCOUNTER
Documentation by fellow reviewed  Also reviewed, patient presented to the ER yesterday 10/2 with chest pain, numbness  Was given IV calcium with improvement in symptoms  Of note, total calcium yesterday hospital was 8 3 which is at goal during management of chronic hypocalcemia, ionized calcium 1 04    Please call the patient and ask if she had missed any doses of calcitriol or supplemental calcium?    I would strongly recommend continuing the increased dose of rule calcitriol, taking a consistent amount of calcium, vitamin-D every day with repeat labs-CMP, phosphorus in 1 week      Component      Latest Ref Rng & Units 8/30/2022 8/30/2022 9/11/2022 9/28/2022           1:45 AM 11:06 PM     Calcium      8 3 - 10 1 mg/dL 7 7 (L) 7 6 (L) 7 7 (L) 8 6   AST      5 - 45 U/L    18   ALT      12 - 78 U/L    43   Alkaline Phosphatase      46 - 116 U/L    93   Total Protein      6 4 - 8 4 g/dL    7 8   Albumin      3 5 - 5 0 g/dL    3 9   TOTAL BILIRUBIN      0 20 - 1 00 mg/dL    0 37   eGFR      ml/min/1 73sq m 59 60 53 58   Glucose, Random      65 - 140 mg/dL 144 (H) 136 148 (H)    Calcium, Ionized      1 12 - 1 32 mmol/L 1 02 (L) 1 04 (L) 0 98 (L)    Vit D, 25-Hydroxy      30 0 - 100 0 ng/mL    25 6 (L)   PARATHYROID HORMONE      18 4 - 80 1 pg/mL    <6 3 (L)   Phosphorus      2 7 - 4 5 mg/dL         Component      Latest Ref Rng & Units 10/2/2022             Calcium      8 3 - 10 1 mg/dL 8 3   AST      5 - 45 U/L 23   ALT      12 - 78 U/L 59   Alkaline Phosphatase      46 - 116 U/L 95   Total Protein      6 4 - 8 4 g/dL 7 6   Albumin      3 5 - 5 0 g/dL 3 8   TOTAL BILIRUBIN      0 20 - 1 00 mg/dL 0 40   eGFR      ml/min/1 73sq m 61   Glucose, Random      65 - 140 mg/dL 111   Calcium, Ionized      1 12 - 1 32 mmol/L 1 04 (L)   Vit D, 25-Hydroxy      30 0 - 100 0 ng/mL    PARATHYROID HORMONE      18 4 - 80 1 pg/mL    Phosphorus      2 7 - 4 5 mg/dL 3 8

## 2022-10-05 ENCOUNTER — TELEMEDICINE (OUTPATIENT)
Dept: PSYCHIATRY | Facility: CLINIC | Age: 47
End: 2022-10-05

## 2022-10-05 ENCOUNTER — TELEPHONE (OUTPATIENT)
Dept: PSYCHIATRY | Facility: CLINIC | Age: 47
End: 2022-10-05

## 2022-10-05 DIAGNOSIS — F41.9 ANXIETY: Primary | ICD-10-CM

## 2022-10-05 PROCEDURE — NOSHOW: Performed by: NURSE PRACTITIONER

## 2022-10-05 NOTE — PSYCH
No Call  No Show   Charge    Rodo Carl no showed 10/05/22 appointment , staff called and left message to reschedule appointment     Treatment Plan not completed within required time limits due to: Rodo Carl no show appointment on 10/05/22

## 2022-10-06 ENCOUNTER — OFFICE VISIT (OUTPATIENT)
Dept: INTERNAL MEDICINE CLINIC | Facility: CLINIC | Age: 47
End: 2022-10-06
Payer: COMMERCIAL

## 2022-10-06 ENCOUNTER — TELEPHONE (OUTPATIENT)
Dept: PSYCHIATRY | Facility: CLINIC | Age: 47
End: 2022-10-06

## 2022-10-06 VITALS
DIASTOLIC BLOOD PRESSURE: 80 MMHG | HEIGHT: 62 IN | SYSTOLIC BLOOD PRESSURE: 120 MMHG | BODY MASS INDEX: 37.73 KG/M2 | TEMPERATURE: 98 F | OXYGEN SATURATION: 94 % | RESPIRATION RATE: 14 BRPM | WEIGHT: 205 LBS | HEART RATE: 100 BPM

## 2022-10-06 DIAGNOSIS — R07.9 CHEST PAIN, UNSPECIFIED TYPE: ICD-10-CM

## 2022-10-06 DIAGNOSIS — G47.30 SLEEP APNEA, UNSPECIFIED TYPE: Primary | ICD-10-CM

## 2022-10-06 DIAGNOSIS — E05.90 HYPERTHYROIDISM: ICD-10-CM

## 2022-10-06 DIAGNOSIS — E78.2 MIXED HYPERLIPIDEMIA: ICD-10-CM

## 2022-10-06 DIAGNOSIS — E89.2 POSTSURGICAL HYPOPARATHYROIDISM (HCC): Chronic | ICD-10-CM

## 2022-10-06 PROCEDURE — 99213 OFFICE O/P EST LOW 20 MIN: CPT | Performed by: INTERNAL MEDICINE

## 2022-10-06 RX ORDER — FENOFIBRATE 160 MG/1
160 TABLET ORAL DAILY
Qty: 90 TABLET | Refills: 3 | Status: SHIPPED | OUTPATIENT
Start: 2022-10-06

## 2022-10-06 NOTE — TELEPHONE ENCOUNTER
Medication Refill Request need update of 2 mg as per Dr Nic Henderson    Name of Medication xanax  Dose/Frequency 2mg/1tab daily  Quantity 30  Verified pharmacy   [x]  Verified ordering Provider   [x]  Does patient have enough for the next 3 days? Yes [] No [x]  Does patient have a follow-up appointment scheduled? Yes [x] No []    If so when is appointment: 10/07/22      As per your notes on last visit 09/01/22 you agreed to increase the dosage to 2 mg for a trial period :She reports continued anxiety and thought that the 1 5 mg of Xanax was not enough  Today I agreed to an increase to 2 mg, while reminding her that this is something that we need to eventually decrease and or change

## 2022-10-06 NOTE — TELEPHONE ENCOUNTER
Spoke to patient, she advised that she has not been taking just a vitamin D her PCP did not want to give her any because her calcium supplement has vit D in it   She is taking 4 tablets of calcium a day

## 2022-10-07 ENCOUNTER — TELEPHONE (OUTPATIENT)
Dept: PSYCHIATRY | Facility: CLINIC | Age: 47
End: 2022-10-07

## 2022-10-07 ENCOUNTER — OFFICE VISIT (OUTPATIENT)
Dept: PSYCHIATRY | Facility: CLINIC | Age: 47
End: 2022-10-07
Payer: COMMERCIAL

## 2022-10-07 DIAGNOSIS — F41.9 ANXIETY: Primary | ICD-10-CM

## 2022-10-07 DIAGNOSIS — F41.0 PANIC ATTACKS: ICD-10-CM

## 2022-10-07 DIAGNOSIS — F41.9 CHRONIC ANXIETY: ICD-10-CM

## 2022-10-07 PROCEDURE — 99214 OFFICE O/P EST MOD 30 MIN: CPT | Performed by: NURSE PRACTITIONER

## 2022-10-07 PROCEDURE — 90833 PSYTX W PT W E/M 30 MIN: CPT | Performed by: NURSE PRACTITIONER

## 2022-10-07 RX ORDER — ALPRAZOLAM 2 MG/1
2 TABLET, ORALLY DISINTEGRATING ORAL 2 TIMES DAILY
Qty: 60 TABLET | Refills: 3 | Status: SHIPPED | OUTPATIENT
Start: 2022-10-07 | End: 2022-10-07

## 2022-10-07 RX ORDER — ALPRAZOLAM 2 MG/1
2 TABLET, EXTENDED RELEASE ORAL 2 TIMES DAILY
Qty: 60 TABLET | Refills: 3 | Status: SHIPPED | OUTPATIENT
Start: 2022-10-07 | End: 2023-01-27 | Stop reason: SDUPTHER

## 2022-10-07 NOTE — PSYCH
This note was not shared with the patient due to reasonable likelihood of causing patient harm    PROGRESS NOTE        Júnior Levi      Name and Date of Birth:  Juan Olivas 56 y o  1975    Date of Visit: 10/07/22    SUBJECTIVE:    Jackie Perkins was seen today for follow-up and medication management  She was casually dressed, alert and oriented 3 X and maintained good eye contact  She ambulated with some difficulty on account of severe leg pain  Some depression was reported, a 4 out of a 10 point scale  Her anxiety continues and is not addressed by the 1 5 mg of alprazolam b i d  she requested an increase to 2 mg b i d  and I agreed to a prescription with some hesitation  I am getting her enrolled in individual psychotherapy and has her anxiety can be better controlled I do want to decrease the dose  She states that she has been on this dose since she was 16years old, will therefore she may require eventual at the detox and rehab  She denies suicidal ideation, intent or plan at present, has no suicidal ideation, intent or plan at present  She denies any auditory hallucinations and visual hallucinations, denies any other delusional thinking, denies any delusional thinking  She denies any side effects from medications    HPI ROS Appetite Changes and Sleep: normal appetite, normal sleep    Review Of Systems:      Constitutional As noted in HPI   ENT As noted in HPI   Cardiovascular As noted in HPI   Respiratory As noted in HPI   Gastrointestinal As noted in HPI   Genitourinary As noted in HPI   Musculoskeletal As noted in HPI   Integumentary As noted in HPI   Neurological As noted in HPI   Endocrine As noted in HPI   Other Symptoms Negative and None       Laboratory Results: No results found for this or any previous visit      Substance Abuse History:    Social History     Substance and Sexual Activity   Drug Use No       Family Psychiatric History:     Family History   Problem Relation Age of Onset    Diabetes Mother     Hypertension Mother     Hyperlipidemia Father     Arrhythmia Father     Lung cancer Father     Diabetes Father        The following portions of the patient's history were reviewed and updated as appropriate: past family history, past medical history, past social history, past surgical history and problem list     Social History     Socioeconomic History    Marital status: /Civil Union     Spouse name: Not on file    Number of children: Not on file    Years of education: 15    Highest education level: Not on file   Occupational History    Not on file   Tobacco Use    Smoking status: Current Every Day Smoker     Packs/day: 1 00     Years: 25 00     Pack years: 25 00     Types: Cigarettes    Smokeless tobacco: Never Used   Vaping Use    Vaping Use: Never used   Substance and Sexual Activity    Alcohol use: Not Currently    Drug use: No    Sexual activity: Yes     Birth control/protection: Surgical     Comment: hysterectomy   Other Topics Concern    Not on file   Social History Narrative    Most recent tobacco use screenin2018      General stress level:   Medium       Exercise level:   Occasional       Diet:   Regular      Caffeine intake:   Occasional     As per Delmy Briones      Social Determinants of Health     Financial Resource Strain: Not on file   Food Insecurity: No Food Insecurity    Worried About Running Out of Food in the Last Year: Never true    Barby of Food in the Last Year: Never true   Transportation Needs: No Transportation Needs    Lack of Transportation (Medical): No    Lack of Transportation (Non-Medical):  No   Physical Activity: Not on file   Stress: Not on file   Social Connections: Not on file   Intimate Partner Violence: Not on file   Housing Stability: Low Risk     Unable to Pay for Housing in the Last Year: No    Number of Places Lived in the Last Year: 1    Unstable Housing in the Last Year: No     Social History     Social History Narrative    Most recent tobacco use screenin2018      General stress level:   Medium       Exercise level:   Occasional       Diet:   Regular      Caffeine intake:   Occasional     As per [place]         Social History     Tobacco History     Smoking Status  Current Every Day Smoker Smoking Frequency  1 pack/day for 25 years (25 pk yrs) Smoking Tobacco Type  Cigarettes    Smokeless Tobacco Use  Never Used          Alcohol History     Alcohol Use Status  Not Currently          Drug Use     Drug Use Status  No          Sexual Activity     Sexually Active  Yes Birth Control/Protection  Surgical Comment  hysterectomy          Activities of Daily Living    Not Asked                     OBJECTIVE:     Mental Status Evaluation:    Appearance age appropriate, casually dressed   Behavior pleasant, cooperative   Speech normal volume, normal pitch   Mood Appropriate   Affect Appropriate   Thought Processes logical   Associations intact associations   Thought Content normal   Perceptual Disturbances: none   Abnormal Thoughts  Risk Potential Suicidal ideation - None  Homicidal ideation - None  Potential for aggression - No   Orientation oriented to person, place, time/date and situation   Memory recent and remote memory grossly intact   Cosciousness alert and awake   Attention Span attention span and concentration are age appropriate   Intellect Appears to be of Average Intelligence   Insight age appropriate    Judgement good    Muscle Strength and  Gait muscle strength and tone were normal   Language Intact   Fund of Knowledge Intact   Pain none   Pain Scale 0       Assessment/Plan:       Diagnoses and all orders for this visit:    Anxiety  -     ALPRAZolam (NIRAVAM) 2 MG dissolvable tablet;  Take 1 tablet (2 mg total) by mouth 2 (two) times a day    Panic attacks          Treatment Recommendations/Precautions:    Continue current medications:    Risks/Benefits      Risks, Benefits And Possible Side Effects Of Medications:    Risks, benefits, and possible side effects of medications explained to patient and patient verbalizes understanding and agreement for treatment  Controlled Medication Discussion:   Discussed with patient the risks of sedation, respiratory depression, impairment of ability to drive and potential for abuse and addiction related to treatment with benzodiazepine medications  The patient understands risk of treatment with benzodiazepine medications, agrees to not drive if feels impaired and agrees to take medications as prescribed  Psychotherapy Provided:     Individual psychotherapy provided:   Today I spent 20 minutes counseling with Charles

## 2022-10-07 NOTE — BH TREATMENT PLAN
TREATMENT PLAN (Medication Management Only)        "[employer]    Name and Date of Birth:  Priya Clay y o  1975  Date of Treatment Plan: October 7, 2022  Diagnosis/Diagnoses:    1  Anxiety    2  Panic attacks      Strengths/Personal Resources for Self-Care: supportive family, taking medications as prescribed, ability to communicate well, ability to listen, ability to reason, average or above intelligence, motivation for treatment  Area/Areas of need (in own words): anxiety symptoms  1  Long Term Goal: continue improvement in anxiety  Target Date:3 months - 1/7/2023  Person/Persons responsible for completion of goal: Brandy Jerry  2  Short Term Objective (s) - How will we reach this goal?:   A  Provider new recommended medication/dosage changes and/or continue medication(s): continue current medications as prescribed  B  N/A   C  N/A  Target Date:3 months - 1/7/2023  Person/Persons Responsible for Completion of Goal: Brandy Jerry  Progress Towards Goals: continuing treatment  Treatment Modality: medication management every 3 months  Review due 180 days from date of this plan: 3 months - 1/7/2023  Expected length of service: ongoing treatment  My Physician/PA/NP and I have developed this plan together and I agree to work on the goals and objectives  I understand the treatment goals that were developed for my treatment

## 2022-10-07 NOTE — TELEPHONE ENCOUNTER
Per chart another doctor was prescribing Alprazolam on 8/1/2022 but then Spencer Pimentel prescribed 8/8/2022

## 2022-10-07 NOTE — TELEPHONE ENCOUNTER
Medication Refill Request     Name of Medication Nirvam   Dose/Frequency 2 mg/1 tab 2x day  Quantity 60  Verified pharmacy   [x]  Verified ordering Provider   [x]  Does patient have enough for the next 3 days? Yes [x] No []  Does patient have a follow-up appointment scheduled? Yes [x] No []   If so when is appointment: 10/28/22  Patient called pharmacy they dont carry the dissolvable tab at [facility] patient is ok with the regular 2 mg pill

## 2022-10-08 NOTE — ASSESSMENT & PLAN NOTE
Admission on 10/02/2022, Discharged on 10/02/2022   Component Date Value    WBC 10/02/2022 5 67     RBC 10/02/2022 4 57     Hemoglobin 10/02/2022 13 1     Hematocrit 10/02/2022 40 1     MCV 10/02/2022 88     MCH 10/02/2022 28 7     MCHC 10/02/2022 32 7     RDW 10/02/2022 16 6 (A)    MPV 10/02/2022 10 5     Platelets 58/51/8557 178     nRBC 10/02/2022 0     Neutrophils Relative 10/02/2022 55     Immat GRANS % 10/02/2022 0     Lymphocytes Relative 10/02/2022 35     Monocytes Relative 10/02/2022 7     Eosinophils Relative 10/02/2022 2     Basophils Relative 10/02/2022 1     Neutrophils Absolute 10/02/2022 3 10     Immature Grans Absolute 10/02/2022 0 02     Lymphocytes Absolute 10/02/2022 1 99     Monocytes Absolute 10/02/2022 0 41     Eosinophils Absolute 10/02/2022 0 10     Basophils Absolute 10/02/2022 0 05     Sodium 10/02/2022 137     Potassium 10/02/2022 3 9     Chloride 10/02/2022 102     CO2 10/02/2022 23     ANION GAP 10/02/2022 12     BUN 10/02/2022 17     Creatinine 10/02/2022 1 09     Glucose 10/02/2022 111     Calcium 10/02/2022 8 3     AST 10/02/2022 23     ALT 10/02/2022 59     Alkaline Phosphatase 10/02/2022 95     Total Protein 10/02/2022 7 6     Albumin 10/02/2022 3 8     Total Bilirubin 10/02/2022 0 40     eGFR 10/02/2022 61     TSH 3RD GENERATON 10/02/2022 3 727     Magnesium 10/02/2022 1 8     Phosphorus 10/02/2022 3 8     D-Dimer, Quant 10/02/2022 0 40     NT-proBNP 10/02/2022 50     hs TnI 0hr 10/02/2022 <2     Calcium, Ionized 10/02/2022 1 04 (A)    Lipase 10/02/2022 460 (A)    Ventricular Rate 10/02/2022 89     Atrial Rate 10/02/2022 89     UT Interval 10/02/2022 158     QRSD Interval 10/02/2022 74     QT Interval 10/02/2022 364     QTC Interval 10/02/2022 442     P Axis 10/02/2022 42     QRS Axis 10/02/2022 35     T Wave [place] 10/02/2022 58    Appointment on 09/28/2022   Component Date Value    EBV VCA IgG 09/28/2022 >600 0 (A)    EBV VCA IgM 09/28/2022 53 7 (A)    EBV Nuclear Ag Ab 09/28/2022 285 0 (A)    INTERPRETATION 09/28/2022 Comment     Hepatitis C Ab 09/28/2022 Non-reactive     Hepatitis B Surface Ag 09/28/2022 Non-reactive     Hep B S Ab 09/28/2022 4 54     Hep A Total Ab 09/28/2022 Non-reactive     Vit D, 25-Hydroxy 09/28/2022 25 6 (A)    Sodium 09/28/2022 140     Potassium 09/28/2022 4 1     Chloride 09/28/2022 102     CO2 09/28/2022 26     ANION GAP 09/28/2022 12     BUN 09/28/2022 23     Creatinine 09/28/2022 1 13     Glucose, Fasting 09/28/2022 130 (A)    Calcium 09/28/2022 8 6     AST 09/28/2022 18     ALT 09/28/2022 43     Alkaline Phosphatase 09/28/2022 93     Total Protein 09/28/2022 7 8     Albumin 09/28/2022 3 9     Total Bilirubin 09/28/2022 0 37     eGFR 09/28/2022 58     Phosphorus 09/28/2022 5 7 (A)    TSH 3RD GENERATON 09/28/2022 5 431 (A)    Free T4 09/28/2022 1 12     PTH 09/28/2022 <6 3 (A)   Admission on 09/11/2022, Discharged on 09/11/2022   Component Date Value    Sodium 09/11/2022 139     Potassium 09/11/2022 4 6     Chloride 09/11/2022 103     CO2 09/11/2022 22     ANION GAP 09/11/2022 14 (A)    BUN 09/11/2022 18     Creatinine 09/11/2022 1 22     Glucose 09/11/2022 148 (A)    Calcium 09/11/2022 7 7 (A)    eGFR 09/11/2022 53     Calcium, Ionized 09/11/2022 0 98 (A)   Admission on 08/30/2022, Discharged on 08/31/2022   Component Date Value    Sodium 08/30/2022 141     Potassium 08/30/2022 4 0     Chloride 08/30/2022 104     CO2 08/30/2022 24     ANION GAP 08/30/2022 13     BUN 08/30/2022 16     Creatinine 08/30/2022 1 10     Glucose 08/30/2022 136     Calcium 08/30/2022 7 6 (A)    eGFR 08/30/2022 60     Calcium, Ionized 08/30/2022 1 04 (A)   Admission on 08/30/2022, Discharged on 08/30/2022   Component Date Value    Sodium 08/30/2022 139     Potassium 08/30/2022 3 7     Chloride 08/30/2022 103     CO2 08/30/2022 22     ANION GAP 08/30/2022 14 (A)    BUN 08/30/2022 15     Creatinine 08/30/2022 1 11     Glucose 08/30/2022 144 (A)    Calcium 08/30/2022 7 7 (A)    eGFR 08/30/2022 59     Calcium, Ionized 08/30/2022 1 02 (A)    Magnesium 08/30/2022 1 9    Admission on 08/14/2022, Discharged on 08/14/2022   Component Date Value    Calcium, Ionized 08/14/2022 1 03 (A)   Admission on 08/13/2022, Discharged on 08/13/2022   Component Date Value    Ventricular Rate 08/13/2022 76     Atrial Rate 08/13/2022 76     ID Interval 08/13/2022 156     QRSD Interval 08/13/2022 80     QT Interval 08/13/2022 404     QTC Interval 08/13/2022 454     P Axis 08/13/2022 40     QRS Axis 08/13/2022 28     T Wave Axis 08/13/2022 49     Sodium 08/13/2022 136     Potassium 08/13/2022 4 1     Chloride 08/13/2022 99     CO2 08/13/2022 24     ANION GAP 08/13/2022 13     BUN 08/13/2022 25     Creatinine 08/13/2022 0 98     Glucose 08/13/2022 112     Calcium 08/13/2022 7 7 (A)    eGFR 08/13/2022 69     Calcium, Ionized 08/13/2022 0 97 (A)   Admission on 08/11/2022, Discharged on 08/11/2022   Component Date Value    WBC 08/11/2022 7 30     RBC 08/11/2022 4 48     Hemoglobin 08/11/2022 13 0     Hematocrit 08/11/2022 40 0     MCV 08/11/2022 89     MCH 08/11/2022 29 0     MCHC 08/11/2022 32 5     RDW 08/11/2022 17 7 (A)    MPV 08/11/2022 10 3     Platelets 52/95/3943 210     nRBC 08/11/2022 0     Neutrophils Relative 08/11/2022 62     Immat GRANS % 08/11/2022 0     Lymphocytes Relative 08/11/2022 29     Monocytes Relative 08/11/2022 6     Eosinophils Relative 08/11/2022 2     Basophils Relative 08/11/2022 1     Neutrophils Absolute 08/11/2022 4 49     Immature Grans Absolute 08/11/2022 0 02     Lymphocytes Absolute 08/11/2022 2 14     Monocytes Absolute 08/11/2022 0 46     Eosinophils Absolute 08/11/2022 0 14     Basophils Absolute 08/11/2022 0 05     Sodium 08/11/2022 137     Potassium 08/11/2022 3 9     Chloride 08/11/2022 102     CO2 08/11/2022 20 (A)  ANION GAP 08/11/2022 15 (A)    BUN 08/11/2022 15     Creatinine 08/11/2022 1 15     Glucose 08/11/2022 175 (A)    Calcium 08/11/2022 7 9 (A)    AST 08/11/2022 15     ALT 08/11/2022 40     Alkaline Phosphatase 08/11/2022 106     Total Protein 08/11/2022 7 5     Albumin 08/11/2022 3 5     Total Bilirubin 08/11/2022 0 26     eGFR 08/11/2022 57     Calcium, Ionized 08/11/2022 1 06 (A)   Admission on 07/31/2022, Discharged on 08/01/2022   Component Date Value    WBC 07/31/2022 7 58     RBC 07/31/2022 4 41     Hemoglobin 07/31/2022 12 9     Hematocrit 07/31/2022 39 0     MCV 07/31/2022 88     MCH 07/31/2022 29 3     MCHC 07/31/2022 33 1     RDW 07/31/2022 17 7 (A)    MPV 07/31/2022 10 7     Platelets 07/61/3820 162     nRBC 07/31/2022 0     Neutrophils Relative 07/31/2022 63     Immat GRANS % 07/31/2022 1     Lymphocytes Relative 07/31/2022 28     Monocytes Relative 07/31/2022 5     Eosinophils Relative 07/31/2022 2     Basophils Relative 07/31/2022 1     Neutrophils Absolute 07/31/2022 4 86     Immature Grans Absolute 07/31/2022 0 05     Lymphocytes Absolute 07/31/2022 2 12     Monocytes Absolute 07/31/2022 0 39     Eosinophils Absolute 07/31/2022 0 11     Basophils Absolute 07/31/2022 0 05     Sodium 07/31/2022 137     Potassium 07/31/2022 3 8     Chloride 07/31/2022 100     CO2 07/31/2022 22     ANION GAP 07/31/2022 15 (A)    BUN 07/31/2022 25     Creatinine 07/31/2022 1 08     Glucose 07/31/2022 120     Calcium 07/31/2022 8 5     eGFR 07/31/2022 61     Calcium, Ionized 07/31/2022 1 07 (A)    hs TnI 0hr 07/31/2022 2     Ventricular Rate 07/31/2022 92     Atrial Rate 07/31/2022 92     UT Interval 07/31/2022 166     QRSD Interval 07/31/2022 74     QT Interval 07/31/2022 362     QTC Interval 07/31/2022 447     P Axis 07/31/2022 46     QRS Axis 07/31/2022 25     T Wave [place] 07/31/2022 33    Admission on 07/23/2022, Discharged on 07/23/2022   Component Date Value  WBC 07/23/2022 6 77     RBC 07/23/2022 4 29     Hemoglobin 07/23/2022 12 2     Hematocrit 07/23/2022 38 0     MCV 07/23/2022 89     MCH 07/23/2022 28 4     MCHC 07/23/2022 32 1     RDW 07/23/2022 17 7 (A)    MPV 07/23/2022 10 0     Platelets 41/11/5562 170     nRBC 07/23/2022 0     Neutrophils Relative 07/23/2022 57     Immat GRANS % 07/23/2022 0     Lymphocytes Relative 07/23/2022 33     Monocytes Relative 07/23/2022 7     Eosinophils Relative 07/23/2022 2     Basophils Relative 07/23/2022 1     Neutrophils Absolute 07/23/2022 3 87     Immature Grans Absolute 07/23/2022 0 02     Lymphocytes Absolute 07/23/2022 2 25     Monocytes Absolute 07/23/2022 0 47     Eosinophils Absolute 07/23/2022 0 11     Basophils Absolute 07/23/2022 0 05     Sodium 07/23/2022 136     Potassium 07/23/2022 3 8     Chloride 07/23/2022 100     CO2 07/23/2022 20 (A)    ANION GAP 07/23/2022 16 (A)    BUN 07/23/2022 22     Creatinine 07/23/2022 1 20     Glucose 07/23/2022 136     Calcium 07/23/2022 8 3     eGFR 07/23/2022 54     Calcium, Ionized 07/23/2022 1 07 (A)   There may be more visits with results that are not included      - 6 moths labs patient was recently in the emergency room with hypocalcemia treated with intravenous calcium improved and on supplemental calcium vitamin D follow-up with endocrinologist

## 2022-10-08 NOTE — PROGRESS NOTES
Dr Roldan Miller Office Visit Note  10/08/22     Miladys Parkinson Ne 56 y o  female MRN: [id number]  : 1975    Assessment:     1  Sleep apnea, unspecified type  -     Ambulatory Referral to Pulmonology; Future  -     fenofibrate 160 MG tablet; Take 1 tablet (160 mg total) by mouth daily    2  Mixed hyperlipidemia  -     Ambulatory Referral to Pulmonology; Future  -     fenofibrate 160 MG tablet; Take 1 tablet (160 mg total) by mouth daily    3  Chest pain, unspecified type  -     Ambulatory Referral to Cardiology; Future          Discussion Summary and Plan: Today's care plan and medications were reviewed with patient in detail and all their questions answered to their satisfaction  No chief complaint on file  Subjective:  Patient and came in for follow-up for sleep apnea testing patient was told that she might have sleep apnea does have snoring also episode of a chest pain 2 days ago at present time as symptomatic referred to Cardiology and Pulmonary for chest pain and sleep apnea followed by pain management and endocrinologist      The following portions of the patient's history were reviewed and updated as appropriate: allergies, current medications, past family history, past medical history, past social history, past surgical history and problem list     Review of Systems   Constitutional: Positive for fatigue  Negative for activity change, appetite change, chills, diaphoresis, fever and unexpected weight change  HENT: Negative for congestion, dental problem, drooling, ear discharge, ear pain, facial swelling, hearing loss, mouth sores, nosebleeds, postnasal drip, rhinorrhea, sinus pressure, sneezing, sore throat, tinnitus, trouble swallowing and voice change  Eyes: Negative for photophobia, pain, discharge, redness, itching and visual disturbance  Respiratory: Positive for shortness of breath  Negative for apnea, cough, choking, chest tightness, wheezing and stridor      Cardiovascular: Negative for chest pain, palpitations and leg swelling  Gastrointestinal: Negative for abdominal distention, abdominal pain, anal bleeding, blood in stool, constipation, diarrhea, nausea, rectal pain and vomiting  Endocrine: Negative for cold intolerance, heat intolerance, polydipsia, polyphagia and polyuria  Genitourinary: Negative for decreased urine volume, difficulty urinating, dysuria, enuresis, flank pain, frequency, genital sores, hematuria and urgency  Musculoskeletal: Positive for arthralgias, back pain, gait problem and myalgias  Negative for joint swelling, neck pain and neck stiffness  Skin: Negative for color change, pallor, rash and wound  Allergic/Immunologic: Negative  Negative for environmental allergies, food allergies and immunocompromised state  Neurological: Negative for dizziness, tremors, seizures, syncope, facial asymmetry, speech difficulty, weakness, light-headedness, numbness and headaches  Psychiatric/Behavioral: Negative for agitation, behavioral problems, confusion, decreased concentration, dysphoric mood, hallucinations, self-injury, sleep disturbance and suicidal ideas  The patient is not nervous/anxious and is not hyperactive            Historical Information   Patient Active Problem List   Diagnosis    DDD (degenerative disc disease), lumbar    Postsurgical hypoparathyroidism (HCC)    Postoperative hypothyroidism    Elevated ALT measurement    Vitamin D deficiency    Hypocalcemia    Anxiety    [facility]AND [facility]) ([facility] [address] [facility]    Hypercalcemia    Panic attacks    Chronic intractable pain    Splenomegaly    Anemia    Chronic anxiety    Chronic fatigue syndrome    Hyperthyroidism    Hypoparathyroidism (HCC)    Nicotine dependence     Past Medical History:   Diagnosis Date    Abdominal pain     Acid reflux     Acute renal failure (HCC)     multiple episodes    Anemia     Anxiety     Anxiety 3/31/2021    Arthritis     Asthma     Back pain     Chronic pain     DDD (degenerative disc disease), lumbar     Depression     Disease of thyroid gland     had surgery and now hypo    DVT [facility]deep venous thrombosis) ([facility] [address] )     s/p ankle fracture    Headache     History of transfusion     Hypercalcemia     Hyperlipidemia     Hyperthyroidism     Hypocalcemia     post op 2016    Kidney stone     Lightheadedness     Migraine     Obesity     Palpitations     Psychiatric disorder     anxiety depression    Seizures (HCC)     petit mal x1  4 years ago- all tests were neg   SOB (shortness of breath)     Spondylolisthesis of lumbar region     Treatment     spinal pain injections  last was 2016    Wears glasses      Past Surgical History:   Procedure Laterality Date    BACK SURGERY      L4-5, S1-fusion-plate/screws     SECTION      x5    CYSTOCELE REPAIR  2017    CYSTOSCOPY      DISCOGRAM      HYSTERECTOMY      PARATHYROIDECTOMY      [place] ANTERIOR COLPORRAPHY RPR CYSTOCELE W/CYSTO N/A 2017    Procedure: CYSTOCELE REPAIR;  Surgeon: Thao Griffin MD;  Location: [address] [place] [address];  Service: Gynecology    [place] ARTHRODESIS POSTERIOR INTERBODY LUMBAR N/A 2016    Procedure: L4-S1 LUMBAR LAMINECTOMY/DECOMPRESSION;  INSTRUMENTED POSTEROLATERAL FUSION/ INTERBODY L5-S1; ALLOGRAFT AND AUTOGRAFT (IMPULSE) ;   Surgeon: Violet Peters MD;  [facility];  Service: Orthopedics    [place] CYSTOURETHROSCOPY,URETER CATHETER Bilateral 2018    Procedure: INSERTION URETERAL CATHETERS PREOP;  Surgeon: Timothy Edward MD;  Location: [address] [place] [address];  Service: Urology    [place] SLING OPER STRES INCONTINENCE N/A 2017    Procedure: MID URETHRAL SLING;  Surgeon: Joseph Askew MD;  Location: [address] [place] [address];  Service: Urology    [place] [address] ABD HYSTERECTOMY N/A 2018    Procedure: SUPRACERVICAL HYSTERECTOMY;  Surgeon: Thao Griffin MD;  Location: [address] [place] [address];  Service: Gynecology    THYROIDECTOMY      TONSILECTOMY AND ADNOIDECTOMY     Tori Banuelos TONSILLECTOMY      TUBAL LIGATION       Social History     Substance and Sexual Activity   Alcohol Use Not Currently     Social History     Substance and Sexual Activity   Drug Use No     Social History     Tobacco Use   Smoking Status Current Every Day Smoker    Packs/day: 1 00    Years: 25 00    Pack years: 25 00    Types: Cigarettes   Smokeless Tobacco Never Used     Family History   Problem Relation Age of Onset    Diabetes Mother     Hypertension Mother     Hyperlipidemia Father     Arrhythmia Father     Lung cancer Father     Diabetes Father      Health Maintenance Due   Topic    COVID-19 Vaccine (1)    Pneumococcal Vaccine: Pediatrics (0 to 5 Years) and At-Risk Patients (6 to 59 Years) (1 - PCV)    Depression Screening     HIV Screening     BMI: Followup Plan     Annual Physical     DTaP,Tdap,and Td Vaccines (1 - Tdap)    Cervical Cancer Screening     Breast Cancer Screening: Mammogram     Influenza Vaccine (1)      Meds/Allergies       Current Outpatient Medications:     ALPRAZolam ER (XANAX XR) 2 MG 24 hr tablet, Take 1 tablet (2 mg total) by mouth 2 (two) times a day, Disp: 60 tablet, Rfl: 3    baclofen 10 mg tablet, Take 10 mg by mouth 3 (three) times a day as needed, Disp: , Rfl:     calcitriol (ROCALTROL) 0 5 MCG capsule, Take 1 capsule (0 5 mcg total) by mouth daily (Patient taking differently: Take 0 5 mcg by mouth 2 (two) times a day), Disp: 90 capsule, Rfl: 1    Calcium Carb-Cholecalciferol (Calcium 500+D) 500-400 MG-UNIT TABS, Take 600 mg by mouth 3 times a day, Disp: 360 tablet, Rfl: 0    Calcium Carbonate-Vitamin D3 600-400 MG-UNIT TABS, TAKE 1 TABLET BY MOUTH 6 (SIX) TIMES A DAY, Disp: 510 tablet, Rfl: 1    doxepin (SINEquan) 25 mg capsule, Take 1 capsule (25 mg total) by mouth daily at bedtime, Disp: 30 capsule, Rfl: 3    fenofibrate 160 MG tablet, Take 1 tablet (160 mg total) by mouth daily, Disp: 90 tablet, Rfl: 3    ferrous sulfate 325 (65 Fe) mg tablet, Take 325 mg by mouth Once Monday, wed, Friday-Last dose 4/1/22 , Disp: , Rfl:     ibuprofen (MOTRIN) 600 mg tablet, Take 1 tablet (600 mg total) by mouth every 6 (six) hours as needed for mild pain or moderate pain, Disp: 16 tablet, Rfl: 0    levothyroxine 150 mcg tablet, TAKE 1 TABLET BY MOUTH EVERY DAY (Patient taking differently: 150 mcg Take 6 days a week), Disp: 90 tablet, Rfl: 1    Lidocaine-Menthol 4-1 % PTCH, if needed  , Disp: , Rfl:     magnesium Oxide (MAG-OX) 400 mg TABS, TAKE 1 TABLET (400 MG TOTAL) BY MOUTH THREE (THREE) TIMES A DAY 9PM, Disp: 90 tablet, Rfl: 3    omeprazole (PriLOSEC) 40 MG capsule, Take 1 capsule (40 mg total) by mouth daily, Disp: 30 capsule, Rfl: 2    oxyCODONE-acetaminophen (PERCOCET)  mg per tablet, Take 1 tablet by mouth 4 (four) times a day, Disp: , Rfl:     potassium chloride (K-DUR,KLOR-CON) 20 mEq tablet, Take 20 mEq by mouth 2 (two) times a day, Disp: , Rfl:     Potassium Chloride ER 20 MEQ TBCR, Take 1 tablet by mouth 2 (two) times a day, Disp: , Rfl:     pramipexole (MIRAPEX) 0 5 mg tablet, Take 0 5 mg by mouth daily at bedtime, Disp: , Rfl:     pregabalin (LYRICA) 100 mg capsule, TAKE 1 CAPSULE BY MOUTH TWICE A DAY AS DIRECTED FOR 30 DAYS, Disp: , Rfl:     sertraline (ZOLOFT) 100 mg tablet, TAKE 1 TABLET BY MOUTH EVERY DAY, Disp: 90 tablet, Rfl: 2    pantoprazole (PROTONIX) 40 mg tablet, Take 1 tablet (40 mg total) by mouth daily Take 1/2 hour prior to breakfast daily in a m  (Patient not taking: No sig reported), Disp: 30 tablet, Rfl: 3      Objective:    Vitals:   /80   Pulse 100   Temp 98 °F (36 7 °C)   Resp 14   Ht 5' 2" (1 575 m)   Wt 93 kg (205 lb)   LMP 12/06/2018 Comment: partial hysterectomy   SpO2 94%   BMI 37 49 kg/m²   Body mass index is 37 49 kg/m²  Vitals:    10/06/22 1154   Weight: 93 kg (205 lb)       Physical Exam  Constitutional:       General: She is not in acute distress  Appearance: She is well-developed   She is not ill-appearing, toxic-appearing or diaphoretic  HENT:      Head: Normocephalic and atraumatic  Right Ear: External ear normal       Left Ear: External ear normal       Nose: Nose normal       Mouth/Throat:      Pharynx: No oropharyngeal exudate  Eyes:      General: Lids are normal  Lids are everted, no foreign bodies appreciated  No scleral icterus  Right eye: No discharge  Left eye: No discharge  Conjunctiva/sclera: Conjunctivae normal       Pupils: Pupils are equal, round, and reactive to light  Neck:      Thyroid: No thyromegaly  Vascular: Normal carotid pulses  No carotid bruit, hepatojugular reflux or JVD  Trachea: No tracheal tenderness or tracheal deviation  Cardiovascular:      Rate and Rhythm: Normal rate and regular rhythm  Pulses: Normal pulses  Heart sounds: Normal heart sounds  No murmur heard  No friction rub  No gallop  Pulmonary:      Effort: Pulmonary effort is normal  No respiratory distress  Breath sounds: No stridor  Rhonchi present  No wheezing or rales  Chest:      Chest wall: No tenderness  Abdominal:      General: Bowel sounds are normal  There is no distension  Palpations: Abdomen is soft  There is no mass  Tenderness: There is no abdominal tenderness  There is no guarding or rebound  Musculoskeletal:         General: No tenderness or deformity  Normal range of motion  Cervical back: Normal range of motion and neck supple  No edema, erythema or rigidity  No spinous process tenderness or muscular tenderness  Normal range of motion  Lymphadenopathy:      Head:      Right side of head: No submental, submandibular, tonsillar, preauricular or posterior auricular adenopathy  Left side of head: No submental, submandibular, tonsillar, preauricular, posterior auricular or occipital adenopathy  Cervical: No cervical adenopathy  Right cervical: No superficial, deep or posterior cervical adenopathy  Left cervical: No superficial, deep or posterior cervical adenopathy  Upper Body:      Right upper body: No pectoral adenopathy  Left upper body: No pectoral adenopathy  Skin:     General: Skin is warm and dry  Coloration: Skin is not pale  Findings: No erythema or rash  Neurological:      Mental Status: She is alert and oriented to person, place, and time  Cranial Nerves: No cranial nerve deficit  Sensory: No sensory deficit  Motor: No tremor, abnormal muscle tone or seizure activity  Coordination: Coordination normal       Gait: Gait normal       Deep Tendon Reflexes: Reflexes are normal and symmetric  Reflexes normal    Psychiatric:         Behavior: Behavior normal          Thought Content:  Thought content normal          Judgment: Judgment normal          Lab Review   Admission on 10/02/2022, Discharged on 10/02/2022   Component Date Value Ref Range Status    WBC 10/02/2022 5 67  4 31 - 10 16 Thousand/uL Final    RBC 10/02/2022 4 57  3 81 - 5 12 Million/uL Final    Hemoglobin 10/02/2022 13 1  11 5 - 15 4 g/dL Final    Hematocrit 10/02/2022 40 1  34 8 - 46 1 % Final    MCV 10/02/2022 88  82 - 98 fL Final    MCH 10/02/2022 28 7  26 8 - 34 3 pg Final    MCHC 10/02/2022 32 7  31 4 - 37 4 g/dL Final    RDW 10/02/2022 16 6 (A) 11 6 - 15 1 % Final    MPV 10/02/2022 10 5  8 9 - 12 7 fL Final    Platelets 14/46/6192 178  149 - 390 Thousands/uL Final    nRBC 10/02/2022 0  /100 WBCs Final    Neutrophils Relative 10/02/2022 55  43 - 75 % Final    Immat GRANS % 10/02/2022 0  0 - 2 % Final    Lymphocytes Relative 10/02/2022 35  14 - 44 % Final    Monocytes Relative 10/02/2022 7  4 - 12 % Final    Eosinophils Relative 10/02/2022 2  0 - 6 % Final    Basophils Relative 10/02/2022 1  0 - 1 % Final    Neutrophils Absolute 10/02/2022 3 10  1 85 - 7 62 Thousands/µL Final    Immature Grans Absolute 10/02/2022 0 02  0 00 - 0 20 Thousand/uL Final    Lymphocytes Absolute 10/02/2022 1 99  0 60 - 4 47 Thousands/µL Final    Monocytes Absolute 10/02/2022 0 41  0 17 - 1 22 Thousand/µL Final    Eosinophils Absolute 10/02/2022 0 10  0 00 - 0 61 Thousand/µL Final    Basophils Absolute 10/02/2022 0 05  0 00 - 0 10 Thousands/µL Final    Sodium 10/02/2022 137  135 - 147 mmol/L Final    Potassium 10/02/2022 3 9  3 5 - 5 3 mmol/L Final    Chloride 10/02/2022 102  96 - 108 mmol/L Final    CO2 10/02/2022 23  21 - 32 mmol/L Final    ANION GAP 10/02/2022 12  4 - 13 mmol/L Final    BUN 10/02/2022 17  5 - 25 mg/dL Final    Creatinine 10/02/2022 1 09  0 60 - 1 30 mg/dL Final    Standardized to IDMS reference method    Glucose 10/02/2022 111  65 - 140 mg/dL Final    If the patient is fasting, the ADA then defines impaired fasting glucose as > 100 mg/dL and diabetes as > or equal to 123 mg/dL  Specimen collection should occur prior to Sulfasalazine administration due to the potential for falsely depressed results  Specimen collection should occur prior to Sulfapyridine administration due to the potential for falsely elevated results   Calcium 10/02/2022 8 3  8 3 - 10 1 mg/dL Final    AST 10/02/2022 23  5 - 45 U/L Final    Specimen collection should occur prior to Sulfasalazine administration due to the potential for falsely depressed results   ALT 10/02/2022 59  12 - 78 U/L Final    Specimen collection should occur prior to Sulfasalazine administration due to the potential for falsely depressed results   Alkaline Phosphatase 10/02/2022 95  46 - 116 U/L Final    Total Protein 10/02/2022 7 6  6 4 - 8 4 g/dL Final    Albumin 10/02/2022 3 8  3 5 - 5 0 g/dL Final    Total Bilirubin 10/02/2022 0 40  0 20 - 1 00 mg/dL Final    Use of this assay is not recommended for patients undergoing treatment with eltrombopag due to the potential for falsely elevated results      eGFR 10/02/2022 61  ml/min/1 73sq m Final    TSH 3RD GENERATON 10/02/2022 3 727  0 450 - 4 500 uIU/mL Final    The recommended reference ranges for TSH during pregnancy are as follows:   First trimester 0 1 to 2 5 uIU/mL   Second trimester  0 2 to 3 0 uIU/mL   Third trimester 0 3 to 3 0 uIU/m    Note: Normal ranges may not apply to patients who are transgender, non-binary, or whose legal sex, sex at birth, and gender identity differ  Adult TSH (3rd generation) reference range follows the recommended guidelines of the American Thyroid Association, January, 2020   Magnesium 10/02/2022 1 8  1 6 - 2 6 mg/dL Final    Phosphorus 10/02/2022 3 8  2 7 - 4 5 mg/dL Final    D-Dimer, Quant 10/02/2022 0 40  <0 50 ug/ml FEU Final    Reference and upper limits to exclude DVT and PE are the same  Do not use to exclude if clinical symptoms are present  Pregnant women:  1st trimester:  <0 22 - 1 06 ug/ml FEU  2nd trimester:  <0 22 - 1 88 ug/ml FEU  3rd trimester:   0 24 - 3 28 ug/ml FEU    Note: Normal ranges may not apply to patients who are transgender, non-binary, or whose legal sex, sex at birth, and gender identity differ   NT-proBNP 10/02/2022 50  <125 pg/mL Final    hs TnI 0hr 10/02/2022 <2  "Refer to ACS Flowchart"- see link ng/L Final    Comment:                                              Initial (time 0) result  If >=50 ng/L, Myocardial injury suggested ;  Type of myocardial injury and treatment strategy  to be determined  If 5-49 ng/L, a delta result at 2 hours and or 4 hours will be needed to further evaluate  If <4 ng/L, and chest pain has been >3 hours since onset, patient may qualify for discharge based on the HEART score in the ED  If <5 ng/L and <3hours since onset of chest pain, a delta result at 2 hours will be needed to further evaluate  HS Troponin 99th Percentile URL of a Health Population=12 ng/L with a 95% Confidence Interval of 8-18 ng/L      Second Troponin (time 2 hours)  If calculated delta >= 20 ng/L,  Myocardial injury suggested ; Type of myocardial injury and treatment strategy to be determined  If 5-49 ng/L and the calculated delta is 5-19 ng/L, consult medical service for evaluation  Continue evaluation for ischemia on ecg and other possible etiology and repeat hs troponin at 4 hours  If delta                            is <5 ng/L at 2 hours, consider discharge based on risk stratification via the HEART score (if in ED), or AB risk score in IP/Observation  HS Troponin 99th Percentile URL of a Health Population=12 ng/L with a 95% Confidence Interval of 8-18 ng/L   Calcium, Ionized 10/02/2022 1 04 (A) 1 12 - 1 32 mmol/L Final    Lipase 10/02/2022 460 (A) 73 - 393 u/L Final    Ventricular Rate 10/02/2022 89  BPM Final    Atrial Rate 10/02/2022 89  BPM Final    IN Interval 10/02/2022 158  ms Final    QRSD Interval 10/02/2022 74  ms Final    QT Interval 10/02/2022 364  ms Final    QTC Interval 10/02/2022 442  ms Final    P Axis 10/02/2022 42  degrees Final    QRS Axis 10/02/2022 35  degrees Final    T Wave [place] 10/02/2022 58  degrees Final   Appointment on 09/28/2022   Component Date Value Ref Range Status    EBV VCA IgG 09/28/2022 >600 0 (A) 0 0 - 17 9 U/mL Final                                     Negative        <18 0                                   Equivocal 18 0 - 21 9                                   Positive        >21 9    EBV VCA IgM 09/28/2022 53 7 (A) 0 0 - 35 9 U/mL Final                                     Negative        <36 0                                   Equivocal 36 0 - 43 9                                   Positive        >43 9    EBV Nuclear Ag Ab 09/28/2022 285 0 (A) 0 0 - 17 9 U/mL Final                                     Negative        <18 0                                   Equivocal 18 0 - 21 9                                   Positive        >21 9    INTERPRETATION 09/28/2022 Comment   Final                   EBV Interpretation Chart  Key:  Antibody Present +    Antibody Absent -  Interpretation             VCA-IgM   VCA-IgG  EBNA-IgG  No previous infection/        -         -         -  Susceptible  Primary infection (new        +         +         -  or recent)  Past Infection               +or-       +         +  See comment below*            +         -         -  *Results indicate infection with EBV at some time   however cannot predict the timing of the infection   since antibodies to EBNA usually develop after   primary infection or, alternatively, approximately   5-10% of patients with EBV never develop antibodies   to EBNA   Hepatitis C Ab 09/28/2022 Non-reactive  Non-reactive Final    Hepatitis B Surface Ag 09/28/2022 Non-reactive  Non-reactive, NonReactive - Confirmed Final    Hep B S Ab 09/28/2022 4 54  mIU/mL Final    Protective Immunity: Hep B Surface Antibody >= 10 mIu/ml (Traceable to [facility] Reference Preparation)    Hep A Total Ab 09/28/2022 Non-reactive  Non-reactive Final    Vit D, 25-Hydroxy 09/28/2022 25 6 (A) 30 0 - 100 0 ng/mL Final    Sodium 09/28/2022 140  135 - 147 mmol/L Final    Potassium 09/28/2022 4 1  3 5 - 5 3 mmol/L Final    Chloride 09/28/2022 102  96 - 108 mmol/L Final    CO2 09/28/2022 26  21 - 32 mmol/L Final    ANION GAP 09/28/2022 12  4 - 13 mmol/L Final    BUN 09/28/2022 23  5 - 25 mg/dL Final    Creatinine 09/28/2022 1 13  0 60 - 1 30 mg/dL Final    Standardized to IDMS reference method    Glucose, Fasting 09/28/2022 130 (A) 65 - 99 mg/dL Final    Specimen collection should occur prior to Sulfasalazine administration due to the potential for falsely depressed results  Specimen collection should occur prior to Sulfapyridine administration due to the potential for falsely elevated results   Calcium 09/28/2022 8 6  8 3 - 10 1 mg/dL Final    AST 09/28/2022 18  5 - 45 U/L Final    Specimen collection should occur prior to Sulfasalazine administration due to the potential for falsely depressed results       ALT 09/28/2022 43  12 - 78 U/L Final    Specimen collection should occur prior to Sulfasalazine administration due to the potential for falsely depressed results   Alkaline Phosphatase 09/28/2022 93  46 - 116 U/L Final    Total Protein 09/28/2022 7 8  6 4 - 8 4 g/dL Final    Albumin 09/28/2022 3 9  3 5 - 5 0 g/dL Final    Total Bilirubin 09/28/2022 0 37  0 20 - 1 00 mg/dL Final    Use of this assay is not recommended for patients undergoing treatment with eltrombopag due to the potential for falsely elevated results   eGFR 09/28/2022 58  ml/min/1 73sq m Final    Phosphorus 09/28/2022 5 7 (A) 2 7 - 4 5 mg/dL Final    TSH 3RD GENERATON 09/28/2022 5 431 (A) 0 450 - 4 500 uIU/mL Final    The recommended reference ranges for TSH during pregnancy are as follows:   First trimester 0 1 to 2 5 uIU/mL   Second trimester  0 2 to 3 0 uIU/mL   Third trimester 0 3 to 3 0 uIU/m    Note: Normal ranges may not apply to patients who are transgender, non-binary, or whose legal sex, sex at birth, and gender identity differ  Adult TSH (3rd generation) reference range follows the recommended guidelines of the American Thyroid Association, January, 2020   Free T4 09/28/2022 1 12  0 76 - 1 46 ng/dL Final    Specimen collection should occur prior to Sulfasalazine administration due to the potential for falsely elevated results   PTH 09/28/2022 <6 3 (A) 18 4 - 80 1 pg/mL Final   Admission on 09/11/2022, Discharged on 09/11/2022   Component Date Value Ref Range Status    Sodium 09/11/2022 139  135 - 147 mmol/L Final    Potassium 09/11/2022 4 6  3 5 - 5 3 mmol/L Final    Slightly Hemolyzed;  Results May be Affected    Chloride 09/11/2022 103  96 - 108 mmol/L Final    CO2 09/11/2022 22  21 - 32 mmol/L Final    ANION GAP 09/11/2022 14 (A) 4 - 13 mmol/L Final    BUN 09/11/2022 18  5 - 25 mg/dL Final    Creatinine 09/11/2022 1 22  0 60 - 1 30 mg/dL Final    Standardized to IDMS reference method    Glucose 09/11/2022 148 (A) 65 - 140 mg/dL Final    If the patient is fasting, the ADA then defines impaired fasting glucose as > 100 mg/dL and diabetes as > or equal to 123 mg/dL  Specimen collection should occur prior to Sulfasalazine administration due to the potential for falsely depressed results  Specimen collection should occur prior to Sulfapyridine administration due to the potential for falsely elevated results   Calcium 09/11/2022 7 7 (A) 8 3 - 10 1 mg/dL Final    eGFR 09/11/2022 53  ml/min/1 73sq m Final    Calcium, Ionized 09/11/2022 0 98 (A) 1 12 - 1 32 mmol/L Final   Admission on 08/30/2022, Discharged on 08/31/2022   Component Date Value Ref Range Status    Sodium 08/30/2022 141  135 - 147 mmol/L Final    Potassium 08/30/2022 4 0  3 5 - 5 3 mmol/L Final    Slightly Hemolyzed; Results May be Affected    Chloride 08/30/2022 104  96 - 108 mmol/L Final    CO2 08/30/2022 24  21 - 32 mmol/L Final    ANION GAP 08/30/2022 13  4 - 13 mmol/L Final    BUN 08/30/2022 16  5 - 25 mg/dL Final    Creatinine 08/30/2022 1 10  0 60 - 1 30 mg/dL Final    Standardized to IDMS reference method    Glucose 08/30/2022 136  65 - 140 mg/dL Final    If the patient is fasting, the ADA then defines impaired fasting glucose as > 100 mg/dL and diabetes as > or equal to 123 mg/dL  Specimen collection should occur prior to Sulfasalazine administration due to the potential for falsely depressed results  Specimen collection should occur prior to Sulfapyridine administration due to the potential for falsely elevated results      Calcium 08/30/2022 7 6 (A) 8 3 - 10 1 mg/dL Final    eGFR 08/30/2022 60  ml/min/1 73sq m Final    Calcium, Ionized 08/30/2022 1 04 (A) 1 12 - 1 32 mmol/L Final   Admission on 08/30/2022, Discharged on 08/30/2022   Component Date Value Ref Range Status    Sodium 08/30/2022 139  135 - 147 mmol/L Final    Potassium 08/30/2022 3 7  3 5 - 5 3 mmol/L Final    Chloride 08/30/2022 103  96 - 108 mmol/L Final    CO2 08/30/2022 22  21 - 32 mmol/L Final    ANION GAP 08/30/2022 14 (A) 4 - 13 mmol/L Final    BUN 08/30/2022 15  5 - 25 mg/dL Final    Creatinine 08/30/2022 1 11  0 60 - 1 30 mg/dL Final    Standardized to IDMS reference method    Glucose 08/30/2022 144 (A) 65 - 140 mg/dL Final    If the patient is fasting, the ADA then defines impaired fasting glucose as > 100 mg/dL and diabetes as > or equal to 123 mg/dL  Specimen collection should occur prior to Sulfasalazine administration due to the potential for falsely depressed results  Specimen collection should occur prior to Sulfapyridine administration due to the potential for falsely elevated results      Calcium 08/30/2022 7 7 (A) 8 3 - 10 1 mg/dL Final    eGFR 08/30/2022 59  ml/min/1 73sq m Final    Calcium, Ionized 08/30/2022 1 02 (A) 1 12 - 1 32 mmol/L Final    Magnesium 08/30/2022 1 9  1 6 - 2 6 mg/dL Final   Admission on 08/14/2022, Discharged on 08/14/2022   Component Date Value Ref Range Status    Calcium, Ionized 08/14/2022 1 03 (A) 1 12 - 1 32 mmol/L Final   Admission on 08/13/2022, Discharged on 08/13/2022   Component Date Value Ref Range Status    Ventricular Rate 08/13/2022 76  BPM Final    Atrial Rate 08/13/2022 76  BPM Final    IA Interval 08/13/2022 156  ms Final    QRSD Interval 08/13/2022 80  ms Final    QT Interval 08/13/2022 404  ms Final    QTC Interval 08/13/2022 454  ms Final    P Axis 08/13/2022 40  degrees Final    QRS Axis 08/13/2022 28  degrees Final    T Wave Axis 08/13/2022 49  degrees Final    Sodium 08/13/2022 136  135 - 147 mmol/L Final    Potassium 08/13/2022 4 1  3 5 - 5 3 mmol/L Final    Chloride 08/13/2022 99  96 - 108 mmol/L Final    CO2 08/13/2022 24  21 - 32 mmol/L Final    ANION GAP 08/13/2022 13  4 - 13 mmol/L Final    BUN 08/13/2022 25  5 - 25 mg/dL Final    Creatinine 08/13/2022 0 98  0 60 - 1 30 mg/dL Final    Standardized to IDMS reference method    Glucose 08/13/2022 112  65 - 140 mg/dL Final    If the patient is fasting, the ADA then defines impaired fasting glucose as > 100 mg/dL and diabetes as > or equal to 123 mg/dL  Specimen collection should occur prior to Sulfasalazine administration due to the potential for falsely depressed results  Specimen collection should occur prior to Sulfapyridine administration due to the potential for falsely elevated results      Calcium 08/13/2022 7 7 (A) 8 3 - 10 1 mg/dL Final    eGFR 08/13/2022 69  ml/min/1 73sq m Final    Calcium, Ionized 08/13/2022 0 97 (A) 1 12 - 1 32 mmol/L Final   Admission on 08/11/2022, Discharged on 08/11/2022   Component Date Value Ref Range Status    WBC 08/11/2022 7 30  4 31 - 10 16 Thousand/uL Final    RBC 08/11/2022 4 48  3 81 - 5 12 Million/uL Final    Hemoglobin 08/11/2022 13 0  11 5 - 15 4 g/dL Final    Hematocrit 08/11/2022 40 0  34 8 - 46 1 % Final    MCV 08/11/2022 89  82 - 98 fL Final    MCH 08/11/2022 29 0  26 8 - 34 3 pg Final    MCHC 08/11/2022 32 5  31 4 - 37 4 g/dL Final    RDW 08/11/2022 17 7 (A) 11 6 - 15 1 % Final    MPV 08/11/2022 10 3  8 9 - 12 7 fL Final    Platelets 54/31/8241 210  149 - 390 Thousands/uL Final    nRBC 08/11/2022 0  /100 WBCs Final    Neutrophils Relative 08/11/2022 62  43 - 75 % Final    Immat GRANS % 08/11/2022 0  0 - 2 % Final    Lymphocytes Relative 08/11/2022 29  14 - 44 % Final    Monocytes Relative 08/11/2022 6  4 - 12 % Final    Eosinophils Relative 08/11/2022 2  0 - 6 % Final    Basophils Relative 08/11/2022 1  0 - 1 % Final    Neutrophils Absolute 08/11/2022 4 49  1 85 - 7 62 Thousands/µL Final    Immature Grans Absolute 08/11/2022 0 02  0 00 - 0 20 Thousand/uL Final    Lymphocytes Absolute 08/11/2022 2 14  0 60 - 4 47 Thousands/µL Final    Monocytes Absolute 08/11/2022 0 46  0 17 - 1 22 Thousand/µL Final    Eosinophils Absolute 08/11/2022 0 14  0 00 - 0 61 Thousand/µL Final    Basophils Absolute 08/11/2022 0 05  0 00 - 0 10 Thousands/µL Final    Sodium 08/11/2022 137  135 - 147 mmol/L Final    Potassium 08/11/2022 3 9  3 5 - 5 3 mmol/L Final Slightly Hemolyzed; Results May be Affected    Chloride 08/11/2022 102  96 - 108 mmol/L Final    CO2 08/11/2022 20 (A) 21 - 32 mmol/L Final    ANION GAP 08/11/2022 15 (A) 4 - 13 mmol/L Final    BUN 08/11/2022 15  5 - 25 mg/dL Final    Creatinine 08/11/2022 1 15  0 60 - 1 30 mg/dL Final    Standardized to IDMS reference method    Glucose 08/11/2022 175 (A) 65 - 140 mg/dL Final    If the patient is fasting, the ADA then defines impaired fasting glucose as > 100 mg/dL and diabetes as > or equal to 123 mg/dL  Specimen collection should occur prior to Sulfasalazine administration due to the potential for falsely depressed results  Specimen collection should occur prior to Sulfapyridine administration due to the potential for falsely elevated results   Calcium 08/11/2022 7 9 (A) 8 3 - 10 1 mg/dL Final    AST 08/11/2022 15  5 - 45 U/L Final    Specimen collection should occur prior to Sulfasalazine administration due to the potential for falsely depressed results   ALT 08/11/2022 40  12 - 78 U/L Final    Specimen collection should occur prior to Sulfasalazine administration due to the potential for falsely depressed results   Alkaline Phosphatase 08/11/2022 106  46 - 116 U/L Final    Total Protein 08/11/2022 7 5  6 4 - 8 4 g/dL Final    Albumin 08/11/2022 3 5  3 5 - 5 0 g/dL Final    Total Bilirubin 08/11/2022 0 26  0 20 - 1 00 mg/dL Final    Use of this assay is not recommended for patients undergoing treatment with eltrombopag due to the potential for falsely elevated results   eGFR 08/11/2022 57  ml/min/1 73sq m Final    Calcium, Ionized 08/11/2022 1 06 (A) 1 12 - 1 32 mmol/L Final         Patient Instructions   Pt is symptom free for this problem  This diagnosis or problem is stable/well controlled  Patient is advised to continue same meds as outlined in medicine list            Mile Bruner        "This note has been constructed using a voice recognition system  Therefore there may be syntax, spelling, and/or grammatical errors   Please call if you have any questions  "

## 2022-10-10 ENCOUNTER — SOCIAL WORK (OUTPATIENT)
Dept: BEHAVIORAL/MENTAL HEALTH CLINIC | Facility: CLINIC | Age: 47
End: 2022-10-10
Payer: COMMERCIAL

## 2022-10-10 DIAGNOSIS — F41.1 GENERALIZED ANXIETY DISORDER WITH PANIC ATTACKS: ICD-10-CM

## 2022-10-10 DIAGNOSIS — F17.210 NICOTINE DEPENDENCE, CIGARETTES, UNCOMPLICATED: ICD-10-CM

## 2022-10-10 DIAGNOSIS — F41.0 GENERALIZED ANXIETY DISORDER WITH PANIC ATTACKS: ICD-10-CM

## 2022-10-10 DIAGNOSIS — F33.1 MODERATE EPISODE OF RECURRENT MAJOR DEPRESSIVE DISORDER (HCC): Primary | ICD-10-CM

## 2022-10-10 PROCEDURE — 90791 PSYCH DIAGNOSTIC EVALUATION: CPT | Performed by: SOCIAL WORKER

## 2022-10-13 ENCOUNTER — OFFICE VISIT (OUTPATIENT)
Dept: GASTROENTEROLOGY | Facility: CLINIC | Age: 47
End: 2022-10-13
Payer: COMMERCIAL

## 2022-10-13 VITALS
SYSTOLIC BLOOD PRESSURE: 98 MMHG | DIASTOLIC BLOOD PRESSURE: 82 MMHG | WEIGHT: 208.2 LBS | HEIGHT: 62 IN | BODY MASS INDEX: 38.31 KG/M2 | HEART RATE: 107 BPM | TEMPERATURE: 97.8 F

## 2022-10-13 DIAGNOSIS — R74.01 ELEVATED ALT MEASUREMENT: Primary | ICD-10-CM

## 2022-10-13 DIAGNOSIS — K76.0 FATTY LIVER DISEASE, NONALCOHOLIC: ICD-10-CM

## 2022-10-13 DIAGNOSIS — R16.1 SPLENOMEGALY: ICD-10-CM

## 2022-10-13 PROCEDURE — 99213 OFFICE O/P EST LOW 20 MIN: CPT | Performed by: INTERNAL MEDICINE

## 2022-10-13 NOTE — LETTER
October 14, 2022     Filomena Goodpasture, [address]  [address] [facility] [address]    Patient: Darwin Porter   YOB: 1975   Date of Visit: 10/13/2022       Dear Dr Darrick Goldberg: Thank you for referring Darwin Porter to me for evaluation  Below are my notes for this consultation  If you have questions, please do not hesitate to call me  I look forward to following your patient along with you  Sincerely,        Nicolás Zapien MD        CC: No Recipients  Nicolás Zapien MD  10/14/2022 11:31 AM  Sign when Signing Visit  [address] [facility]  [facility] [address] 56 y o  female MRN: [id number]            Assessment & Plan:  81-year-old female here for follow-up for hepatosplenomegaly    1  Hepatosplenomegaly:  Initially was concern for malignant process given the patient's constellation of symptoms  -was referred to Hematology she should still keep the referral  -most likely sweats for due to withdrawal from Xanax  -hepatosplenomegaly most likely secondary to fatty liver disease  -discussed importance of moderate weight loss, reducing carbohydrates  -will continue monitor closely and have the patient follow up in 3 to 6 months  -discussed with her risk of developing chronic liver disease, which we need to monitor for      Tania Gwen was seen today for follow-up  Diagnoses and all orders for this visit:    Elevated ALT measurement  -     Ambulatory Referral to Nutrition Services; Future    Splenomegaly  -     Ambulatory Referral to Nutrition Services; Future    Fatty liver disease, nonalcoholic  -     Ambulatory Referral to Nutrition Services; Future            _____________________________________________________________        CC: Follow-up of splenomegaly    HPI:  Darwin Porter is a 56 y  o female who is here for follow-up of splenomegaly    81-year-old female with severe anxiety, here for follow-up with splenomegaly seen on prior imaging studies  At that time patient was having fevers, chills, sweats, likely due to Xanax withdrawal   Those symptoms have since resolved  Imaging studies show stable splenomegaly, EBV titers were negative  Patient denies any continued alcohol  Overall she feels like she is doing better since she has been back on her Xanax  Still having occasional hot flashes  Occasional nausea  Still continues to have weight gain  ROS:  The remainder of the ROS was negative except for the pertinent positives mentioned in HPI        Allergies: Hydrocodone-acetaminophen, Vicodin [hydrocodone-acetaminophen], Morphine and related, and Adhesive [medical tape]    Medications:   Current Outpatient Medications:   •  ALPRAZolam ER (XANAX XR) 2 MG 24 hr tablet, Take 1 tablet (2 mg total) by mouth 2 (two) times a day, Disp: 60 tablet, Rfl: 3  •  baclofen 10 mg tablet, Take 10 mg by mouth 3 (three) times a day as needed, Disp: , Rfl:   •  calcitriol (ROCALTROL) 0 5 MCG capsule, Take 1 capsule (0 5 mcg total) by mouth daily (Patient taking differently: Take 0 5 mcg by mouth 2 (two) times a day), Disp: 90 capsule, Rfl: 1  •  Calcium Carb-Cholecalciferol (Calcium 500+D) 500-400 MG-UNIT TABS, Take 600 mg by mouth 3 times a day, Disp: 360 tablet, Rfl: 0  •  Calcium Carbonate-Vitamin D3 600-400 MG-UNIT TABS, TAKE 1 TABLET BY MOUTH 6 (SIX) TIMES A DAY, Disp: 510 tablet, Rfl: 1  •  doxepin (SINEquan) 25 mg capsule, Take 1 capsule (25 mg total) by mouth daily at bedtime, Disp: 30 capsule, Rfl: 3  •  fenofibrate 160 MG tablet, Take 1 tablet (160 mg total) by mouth daily, Disp: 90 tablet, Rfl: 3  •  ferrous sulfate 325 (65 Fe) mg tablet, Take 325 mg by mouth Once Monday, wed, Friday-Last dose 4/1/22 , Disp: , Rfl:   •  ibuprofen (MOTRIN) 600 mg tablet, Take 1 tablet (600 mg total) by mouth every 6 (six) hours as needed for mild pain or moderate pain, Disp: 16 tablet, Rfl: 0  •  levothyroxine 150 mcg tablet, TAKE 1 TABLET BY MOUTH EVERY DAY (Patient taking differently: 150 mcg Take 6 days a week), Disp: 90 tablet, Rfl: 1  •  Lidocaine-Menthol 4-1 % PTCH, if needed  , Disp: , Rfl:   •  magnesium Oxide (MAG-OX) 400 mg TABS, TAKE 1 TABLET (400 MG TOTAL) BY MOUTH THREE (THREE) TIMES A DAY 9PM, Disp: 90 tablet, Rfl: 3  •  omeprazole (PriLOSEC) 40 MG capsule, Take 1 capsule (40 mg total) by mouth daily, Disp: 30 capsule, Rfl: 2  •  oxyCODONE-acetaminophen (PERCOCET)  mg per tablet, Take 1 tablet by mouth 4 (four) times a day, Disp: , Rfl:   •  potassium chloride (K-DUR,KLOR-CON) 20 mEq tablet, Take 20 mEq by mouth 2 (two) times a day, Disp: , Rfl:   •  Potassium Chloride ER 20 MEQ TBCR, Take 1 tablet by mouth 2 (two) times a day, Disp: , Rfl:   •  pramipexole (MIRAPEX) 0 5 mg tablet, Take 0 5 mg by mouth daily at bedtime, Disp: , Rfl:   •  pregabalin (LYRICA) 100 mg capsule, TAKE 1 CAPSULE BY MOUTH TWICE A DAY AS DIRECTED FOR 30 DAYS, Disp: , Rfl:   •  sertraline (ZOLOFT) 100 mg tablet, TAKE 1 TABLET BY MOUTH EVERY DAY, Disp: 90 tablet, Rfl: 2  •  pantoprazole (PROTONIX) 40 mg tablet, Take 1 tablet (40 mg total) by mouth daily Take 1/2 hour prior to breakfast daily in a m  (Patient not taking: No sig reported), Disp: 30 tablet, Rfl: 3    Past Medical History:   Diagnosis Date   • Abdominal pain    • Acid reflux    • Acute renal failure (HCC)     multiple episodes   • Anemia    • Anxiety    • Anxiety 3/31/2021   • Arthritis    • Asthma    • Back pain    • Chronic pain    • DDD (degenerative disc disease), lumbar    • Depression    • Disease of thyroid gland     had surgery and now hypo   • DVT (deep venous thrombosis) ([facility] [address] ) 2009    s/p ankle fracture   • Headache    • History of transfusion    • Hypercalcemia    • Hyperlipidemia    • Hyperthyroidism    • Hypocalcemia     post op 2016   • Kidney stone    • Lightheadedness    • Migraine    • Obesity    • Palpitations    • Psychiatric disorder     anxiety depression   • Seizures (HCC)     petit mal x1  4 years ago- all tests were neg  • SOB (shortness of breath)    • Spondylolisthesis of lumbar region    • Treatment     spinal pain injections  last was 2016   • Wears glasses        Past Surgical History:   Procedure Laterality Date   • BACK SURGERY      L4-5, S1-fusion-plate/screws   •  SECTION      x5   • CYSTOCELE REPAIR  2017   • CYSTOSCOPY     • DISCOGRAM     • HYSTERECTOMY     • PARATHYROIDECTOMY     • MD ANTERIOR COLPORRAPHY RPR CYSTOCELE W/CYSTO N/A 2017    Procedure: CYSTOCELE REPAIR;  Surgeon: David Ramirez MD;  Location: [address] [place] [address];  Service: Gynecology   • MD ARTHRODESIS POSTERIOR INTERBODY LUMBAR N/A 2016    Procedure: L4-S1 LUMBAR LAMINECTOMY/DECOMPRESSION;  INSTRUMENTED POSTEROLATERAL FUSION/ INTERBODY L5-S1; ALLOGRAFT AND AUTOGRAFT (IMPULSE) ; Surgeon: Ruth Phillips MD;  Location: [facility];  Service: Orthopedics   • MD CYSTOURETHROSCOPY,URETER CATHETER Bilateral 2018    Procedure: INSERTION URETERAL CATHETERS PREOP;  Surgeon: Nicole Gabriel MD;  Location: [address] [place] [address];  Service: Urology   • MD SLING OPER STRES INCONTINENCE N/A 2017    Procedure: MID URETHRAL SLING;  Surgeon: Juan Patel MD;  Location: [address] [place] [address];  Service: Urology   • MD SUPRACERV ABD HYSTERECTOMY N/A 2018    Procedure: SUPRACERVICAL HYSTERECTOMY;  Surgeon: David Ramirez MD;  Location: [facility];  Service: Gynecology   • THYROIDECTOMY     • TONSILECTOMY AND ADNOIDECTOMY     • TONSILLECTOMY     • TUBAL LIGATION         Family History   Problem Relation Age of Onset   • Diabetes Mother    • Hypertension Mother    • Hyperlipidemia Father    • Arrhythmia Father    • Lung cancer Father    • Diabetes Father         reports that she has been smoking cigarettes  She has a 25 00 pack-year smoking history  She has never used smokeless tobacco  She reports previous alcohol use  She reports that she does not use drugs        Physical Exam:    BP 98/82 (BP Location: Left arm, Patient Position: Sitting, Cuff Size: Standard)   Pulse (!) 107   Temp 97 8 °F (36 6 °C) (Temporal)   Ht 5' 2" (1 575 m)   Wt 94 4 kg (208 lb 3 2 oz)   LMP 12/06/2018 Comment: partial hysterectomy   BMI 38 08 kg/m²     Gen: wn/wd, NAD  HEENT: anicteric, MMM, no cervical LAD  CVS: RRR, no m/r/g  CHEST: CTA b/l  ABD: +BS, soft, NT,ND, moderate hepatomegaly  EXT: no c/c/e  NEURO: aaox3  SKIN: NO rashes

## 2022-10-14 NOTE — PROGRESS NOTES
[address] [facility]  [address] [place] [address] 56 y o  female MRN: [id number]            Assessment & Plan:  28-year-old female here for follow-up for hepatosplenomegaly    1  Hepatosplenomegaly:  Initially was concern for malignant process given the patient's constellation of symptoms  -was referred to Hematology she should still keep the referral  -most likely sweats for due to withdrawal from Xanax  -hepatosplenomegaly most likely secondary to fatty liver disease  -discussed importance of moderate weight loss, reducing carbohydrates  -will continue monitor closely and have the patient follow up in 3 to 6 months  -discussed with her risk of developing chronic liver disease, which we need to monitor for      Byron Moya was seen today for follow-up  Diagnoses and all orders for this visit:    Elevated ALT measurement  -     Ambulatory Referral to Nutrition Services; Future    Splenomegaly  -     Ambulatory Referral to Nutrition Services; Future    Fatty liver disease, nonalcoholic  -     Ambulatory Referral to Nutrition Services; Future            _____________________________________________________________        CC: Follow-up of splenomegaly    HPI:  [address] [place] [address] is a 56 y  o female who is here for follow-up of splenomegaly  28-year-old female with severe anxiety, here for follow-up with splenomegaly seen on prior imaging studies  At that time patient was having fevers, chills, sweats, likely due to Xanax withdrawal   Those symptoms have since resolved  Imaging studies show stable splenomegaly, EBV titers were negative  Patient denies any continued alcohol  Overall she feels like she is doing better since she has been back on her Xanax  Still having occasional hot flashes  Occasional nausea  Still continues to have weight gain  ROS:  The remainder of the ROS was negative except for the pertinent positives mentioned in HPI        Allergies: Hydrocodone-acetaminophen, Vicodin [hydrocodone-acetaminophen], Morphine and related, and Adhesive [medical tape]    Medications:   Current Outpatient Medications:   •  ALPRAZolam ER (XANAX XR) 2 MG 24 hr tablet, Take 1 tablet (2 mg total) by mouth 2 (two) times a day, Disp: 60 tablet, Rfl: 3  •  baclofen 10 mg tablet, Take 10 mg by mouth 3 (three) times a day as needed, Disp: , Rfl:   •  calcitriol (ROCALTROL) 0 5 MCG capsule, Take 1 capsule (0 5 mcg total) by mouth daily (Patient taking differently: Take 0 5 mcg by mouth 2 (two) times a day), Disp: 90 capsule, Rfl: 1  •  Calcium Carb-Cholecalciferol (Calcium 500+D) 500-400 MG-UNIT TABS, Take 600 mg by mouth 3 times a day, Disp: 360 tablet, Rfl: 0  •  Calcium Carbonate-Vitamin D3 600-400 MG-UNIT TABS, TAKE 1 TABLET BY MOUTH 6 (SIX) TIMES A DAY, Disp: 510 tablet, Rfl: 1  •  doxepin (SINEquan) 25 mg capsule, Take 1 capsule (25 mg total) by mouth daily at bedtime, Disp: 30 capsule, Rfl: 3  •  fenofibrate 160 MG tablet, Take 1 tablet (160 mg total) by mouth daily, Disp: 90 tablet, Rfl: 3  •  ferrous sulfate 325 (65 Fe) mg tablet, Take 325 mg by mouth Once Monday, wed, Friday-Last dose 4/1/22 , Disp: , Rfl:   •  ibuprofen (MOTRIN) 600 mg tablet, Take 1 tablet (600 mg total) by mouth every 6 (six) hours as needed for mild pain or moderate pain, Disp: 16 tablet, Rfl: 0  •  levothyroxine 150 mcg tablet, TAKE 1 TABLET BY MOUTH EVERY DAY (Patient taking differently: 150 mcg Take 6 days a week), Disp: 90 tablet, Rfl: 1  •  Lidocaine-Menthol 4-1 % PTCH, if needed  , Disp: , Rfl:   •  magnesium Oxide (MAG-OX) 400 mg TABS, TAKE 1 TABLET (400 MG TOTAL) BY MOUTH THREE (THREE) TIMES A DAY 9PM, Disp: 90 tablet, Rfl: 3  •  omeprazole (PriLOSEC) 40 MG capsule, Take 1 capsule (40 mg total) by mouth daily, Disp: 30 capsule, Rfl: 2  •  oxyCODONE-acetaminophen (PERCOCET)  mg per tablet, Take 1 tablet by mouth 4 (four) times a day, Disp: , Rfl:   •  potassium chloride (K-DUR,KLOR-CON) 20 mEq tablet, Take 20 mEq by mouth 2 (two) times a day, Disp: , Rfl:   •  Potassium Chloride ER 20 MEQ TBCR, Take 1 tablet by mouth 2 (two) times a day, Disp: , Rfl:   •  pramipexole (MIRAPEX) 0 5 mg tablet, Take 0 5 mg by mouth daily at bedtime, Disp: , Rfl:   •  pregabalin (LYRICA) 100 mg capsule, TAKE 1 CAPSULE BY MOUTH TWICE A DAY AS DIRECTED FOR 30 DAYS, Disp: , Rfl:   •  sertraline (ZOLOFT) 100 mg tablet, TAKE 1 TABLET BY MOUTH EVERY DAY, Disp: 90 tablet, Rfl: 2  •  pantoprazole (PROTONIX) 40 mg tablet, Take 1 tablet (40 mg total) by mouth daily Take 1/2 hour prior to breakfast daily in a m  (Patient not taking: No sig reported), Disp: 30 tablet, Rfl: 3    Past Medical History:   Diagnosis Date   • Abdominal pain    • Acid reflux    • Acute renal failure (HCC)     multiple episodes   • Anemia    • Anxiety    • Anxiety 3/31/2021   • Arthritis    • Asthma    • Back pain    • Chronic pain    • DDD (degenerative disc disease), lumbar    • Depression    • Disease of thyroid gland     had surgery and now hypo   • DVT (deep venous thrombosis) ([facility] [address] )     s/p ankle fracture   • Headache    • History of transfusion    • Hypercalcemia    • Hyperlipidemia    • Hyperthyroidism    • Hypocalcemia     post op    • Kidney stone    • Lightheadedness    • Migraine    • Obesity    • Palpitations    • Psychiatric disorder     anxiety depression   • Seizures (HCC)     petit mal x1  4 years ago- all tests were neg     • SOB (shortness of breath)    • Spondylolisthesis of lumbar region    • Treatment     spinal pain injections  last was 2016   • Wears glasses        Past Surgical History:   Procedure Laterality Date   • BACK SURGERY      L4-5, S1-fusion-plate/screws   •  SECTION      x5   • CYSTOCELE REPAIR  2017   • CYSTOSCOPY     • DISCOGRAM     • HYSTERECTOMY     • PARATHYROIDECTOMY     • IN ANTERIOR COLPORRAPHY RPR CYSTOCELE W/CYSTO N/A 2017    Procedure: CYSTOCELE REPAIR;  Surgeon: Thao Griffin MD;  Location: [facility];  Service: Gynecology   • KS ARTHRODESIS POSTERIOR INTERBODY LUMBAR N/A 8/12/2016    Procedure: L4-S1 LUMBAR LAMINECTOMY/DECOMPRESSION;  INSTRUMENTED POSTEROLATERAL FUSION/ INTERBODY L5-S1; ALLOGRAFT AND AUTOGRAFT (IMPULSE) ; Surgeon: Jadon Hester MD;  Location:  MAIN OR;  Service: Orthopedics   • KS CYSTOURETHROSCOPY,URETER CATHETER Bilateral 12/7/2018    Procedure: INSERTION URETERAL CATHETERS PREOP;  Surgeon: Adrianne Bonilla MD;  Location: [address] [place] [address];  Service: Urology   • KS SLING OPER STRES INCONTINENCE N/A 5/4/2017    Procedure: MID URETHRAL SLING;  Surgeon: Aneudy Carrera MD;  Location: [address] [place] [address];  Service: Urology   • KS SUPRACERV ABD HYSTERECTOMY N/A 12/7/2018    Procedure: SUPRACERVICAL HYSTERECTOMY;  Surgeon: Raza Du MD;  Location: [facility];  Service: Gynecology   • THYROIDECTOMY     • TONSILECTOMY AND ADNOIDECTOMY     • TONSILLECTOMY     • TUBAL LIGATION         Family History   Problem Relation Age of Onset   • Diabetes Mother    • Hypertension Mother    • Hyperlipidemia Father    • Arrhythmia Father    • Lung cancer Father    • Diabetes Father         reports that she has been smoking cigarettes  She has a 25 00 pack-year smoking history  She has never used smokeless tobacco  She reports previous alcohol use  She reports that she does not use drugs        Physical Exam:    BP 98/82 (BP Location: Left arm, Patient Position: Sitting, Cuff Size: Standard)   Pulse (!) 107   Temp 97 8 °F (36 6 °C) (Temporal)   Ht 5' 2" (1 575 m)   Wt 94 4 kg (208 lb 3 2 oz)   LMP 12/06/2018 Comment: partial hysterectomy   BMI 38 08 kg/m²     Gen: wn/wd, NAD  HEENT: anicteric, MMM, no cervical LAD  CVS: RRR, no m/r/g  CHEST: CTA b/l  ABD: +BS, soft, NT,ND, moderate hepatomegaly  EXT: no c/c/e  NEURO: aaox3  SKIN: NO rashes

## 2022-10-15 ENCOUNTER — HOSPITAL ENCOUNTER (EMERGENCY)
Facility: HOSPITAL | Age: 47
Discharge: HOME | End: 2022-10-15
Attending: EMERGENCY MEDICINE
Payer: COMMERCIAL

## 2022-10-15 VITALS
HEART RATE: 106 BPM | RESPIRATION RATE: 22 BRPM | TEMPERATURE: 97.7 F | SYSTOLIC BLOOD PRESSURE: 128 MMHG | DIASTOLIC BLOOD PRESSURE: 81 MMHG | OXYGEN SATURATION: 96 %

## 2022-10-15 DIAGNOSIS — R20.2 PARESTHESIA: Primary | ICD-10-CM

## 2022-10-15 DIAGNOSIS — E83.51 HYPOCALCEMIA: ICD-10-CM

## 2022-10-15 LAB
ALBUMIN SERPL BCP-MCNC: 3.6 G/DL (ref 3.5–5)
ALP SERPL-CCNC: 102 U/L (ref 46–116)
ALT SERPL W P-5'-P-CCNC: 45 U/L (ref 12–78)
ANION GAP SERPL CALCULATED.3IONS-SCNC: 13 MMOL/L (ref 4–13)
AST SERPL W P-5'-P-CCNC: 22 U/L (ref 5–45)
BASOPHILS # BLD AUTO: 0.04 THOUSANDS/ΜL (ref 0–0.1)
BASOPHILS NFR BLD AUTO: 1 % (ref 0–1)
BILIRUB SERPL-MCNC: 0.22 MG/DL (ref 0.2–1)
BUN SERPL-MCNC: 16 MG/DL (ref 5–25)
CALCIUM SERPL-MCNC: 8 MG/DL (ref 8.3–10.1)
CARDIAC TROPONIN I PNL SERPL HS: 2 NG/L
CHLORIDE SERPL-SCNC: 101 MMOL/L (ref 96–108)
CO2 SERPL-SCNC: 24 MMOL/L (ref 21–32)
CREAT SERPL-MCNC: 1.12 MG/DL (ref 0.6–1.3)
EOSINOPHIL # BLD AUTO: 0.12 THOUSAND/ΜL (ref 0–0.61)
EOSINOPHIL NFR BLD AUTO: 2 % (ref 0–6)
ERYTHROCYTE [DISTWIDTH] IN BLOOD BY AUTOMATED COUNT: 16.5 % (ref 11.6–15.1)
GFR SERPL CREATININE-BSD FRML MDRD: 59 ML/MIN/1.73SQ M
GLUCOSE SERPL-MCNC: 169 MG/DL (ref 65–140)
HCT VFR BLD AUTO: 39.1 % (ref 34.8–46.1)
HGB BLD-MCNC: 12.8 G/DL (ref 11.5–15.4)
IMM GRANULOCYTES # BLD AUTO: 0.02 THOUSAND/UL (ref 0–0.2)
IMM GRANULOCYTES NFR BLD AUTO: 0 % (ref 0–2)
LYMPHOCYTES # BLD AUTO: 1.9 THOUSANDS/ΜL (ref 0.6–4.47)
LYMPHOCYTES NFR BLD AUTO: 27 % (ref 14–44)
MAGNESIUM SERPL-MCNC: 1.8 MG/DL (ref 1.6–2.6)
MCH RBC QN AUTO: 28.8 PG (ref 26.8–34.3)
MCHC RBC AUTO-ENTMCNC: 32.7 G/DL (ref 31.4–37.4)
MCV RBC AUTO: 88 FL (ref 82–98)
MONOCYTES # BLD AUTO: 0.39 THOUSAND/ΜL (ref 0.17–1.22)
MONOCYTES NFR BLD AUTO: 6 % (ref 4–12)
NEUTROPHILS # BLD AUTO: 4.47 THOUSANDS/ΜL (ref 1.85–7.62)
NEUTS SEG NFR BLD AUTO: 64 % (ref 43–75)
NRBC BLD AUTO-RTO: 0 /100 WBCS
PHOSPHATE SERPL-MCNC: 3.6 MG/DL (ref 2.7–4.5)
PLATELET # BLD AUTO: 199 THOUSANDS/UL (ref 149–390)
PMV BLD AUTO: 10.2 FL (ref 8.9–12.7)
POTASSIUM SERPL-SCNC: 3.6 MMOL/L (ref 3.5–5.3)
PROT SERPL-MCNC: 7.4 G/DL (ref 6.4–8.4)
RBC # BLD AUTO: 4.45 MILLION/UL (ref 3.81–5.12)
SODIUM SERPL-SCNC: 138 MMOL/L (ref 135–147)
WBC # BLD AUTO: 6.94 THOUSAND/UL (ref 4.31–10.16)

## 2022-10-15 PROCEDURE — 84484 ASSAY OF TROPONIN QUANT: CPT | Performed by: EMERGENCY MEDICINE

## 2022-10-15 PROCEDURE — 99284 EMERGENCY DEPT VISIT MOD MDM: CPT

## 2022-10-15 PROCEDURE — 93005 ELECTROCARDIOGRAM TRACING: CPT

## 2022-10-15 PROCEDURE — 99284 EMERGENCY DEPT VISIT MOD MDM: CPT | Performed by: EMERGENCY MEDICINE

## 2022-10-15 PROCEDURE — 85025 COMPLETE CBC W/AUTO DIFF WBC: CPT | Performed by: EMERGENCY MEDICINE

## 2022-10-15 PROCEDURE — 96365 THER/PROPH/DIAG IV INF INIT: CPT

## 2022-10-15 PROCEDURE — 36415 COLL VENOUS BLD VENIPUNCTURE: CPT | Performed by: EMERGENCY MEDICINE

## 2022-10-15 PROCEDURE — 84100 ASSAY OF PHOSPHORUS: CPT | Performed by: EMERGENCY MEDICINE

## 2022-10-15 PROCEDURE — 80053 COMPREHEN METABOLIC PANEL: CPT | Performed by: EMERGENCY MEDICINE

## 2022-10-15 PROCEDURE — 83735 ASSAY OF MAGNESIUM: CPT | Performed by: EMERGENCY MEDICINE

## 2022-10-15 RX ORDER — CALCIUM GLUCONATE 20 MG/ML
1 INJECTION, SOLUTION INTRAVENOUS ONCE
Status: COMPLETED | OUTPATIENT
Start: 2022-10-15 | End: 2022-10-15

## 2022-10-15 RX ADMIN — CALCIUM GLUCONATE 1 G: 20 INJECTION, SOLUTION INTRAVENOUS at 22:30

## 2022-10-16 NOTE — ED PROVIDER NOTES
Final Diagnosis:  1  Paresthesia    2  Hypocalcemia        Chief Complaint   Patient presents with   • Numbness     Co all over numbness and tingling with some chest discomfort  States this always happens when her calcium is too low     HPI  Patient presents with around 12 hours of chest discomfort and paresthesias in her extremities  She has a history of smoking she has a history of hyperlipidemia she has a history of hypertension  Nonexertional   No nausea vomiting  No shortness of breath  She is concerned about her calcium she has long standing history of hypocalcemia  She takes regular supplementation falls when endocrinologist       - No language barrier    - History obtained from patient**    - There are no limitations to the history obtained  - Previous charting underwent limited review with attention to last ED visits, labs, ekgs, and prior imaging  PMH:   has a past medical history of Abdominal pain, Acid reflux, Acute renal failure ([address] ), Anemia, Anxiety, Anxiety (3/31/2021), Arthritis, Asthma, Back pain, Chronic pain, DDD (degenerative disc disease), lumbar, Depression, Disease of thyroid gland, DVT (deep venous thrombosis) ([address] ) (), Headache, History of transfusion, Hypercalcemia, Hyperlipidemia, Hyperthyroidism, Hypocalcemia, Kidney stone, Lightheadedness, Migraine, Obesity, Palpitations, Psychiatric disorder, Seizures ([address] ), SOB (shortness of breath), Spondylolisthesis of lumbar region, Treatment, and Wears glasses  PSH:   has a past surgical history that includes  section; TONSILECTOMY AND ADNOIDECTOMY; Thyroidectomy; Parathyroidectomy; Discogram; pr arthrodesis posterior interbody lumbar (N/A, 2016); pr anterior colporraphy rpr cystocele w/cysto (N/A, 2017); pr sling oper stres incontinence (N/A, 2017); Tonsillectomy; Tubal ligation; pr cystourethroscopy,ureter catheter (Bilateral, 2018); pr supracerv abd hysterectomy (N/A, 2018);  Cystocele repair (05/04/2017); Cystoscopy; Hysterectomy; and Back surgery  Social History:    Tobacco Use: High Risk   • Smoking Tobacco Use: Current Every Day Smoker   • Smokeless Tobacco Use: Never Used     Alcohol Use: Not on file     Patient with no illicit use    ROS:    Pertinent positives/negatives:   Review of Systems   Cardiovascular: Positive for chest pain  CONSTITUTIONAL:  No dizziness  No weakness  No unexpected weight loss  EYES:  No pain, erythema, or discharge  No loss of vision  ENT:  No tinnitus, decreased hearing  No epistaxis/purulent drainage  No voice change, airway closing, trismus  CARDIOVASCULAR:  No chest pain  No palpitations  No new lower extremity edema  RESPIRATORY:  No purulent cough  No hemoptysis  No dyspnea  No paroxysmal nocturnal dyspnea  No stridor, audible wheezing bedside  GASTROINTESTINAL:  Normal appetite  No vomiting, diarrhea  No pain  No bloating  No melena  GENITOURINARY:  No frequency, urgency, nocturia  No hematuria or dysuria  No discharge  No sores/adenopathy  MUSCULOSKELETAL:  No arthralgias or myalgias that are new  INTEGUMENTARY:  No swelling  No unexpected contusions  No abrasions  No lymphangitis  NEUROLOGIC:  No meningismus  No numbness of the extremities  No new focal weakness  No postural instability  PSYCHIATRIC:  No SI HI AVH  HEMATOLOGICAL:  No bleeding  No petechiae  No bruising  ALLERGIES:  No urticaria  No sudden abd cramping  No stridor  PE:     Physical exam highlights:   Physical Exam       Vitals:    10/15/22 2050   BP: 128/81   BP Location: Right arm   Pulse: (!) 106   Resp: 22   Temp: 97 7 °F (36 5 °C)   TempSrc: Tympanic   SpO2: 96%     Vitals reviewed by me  Nursing note reviewed  Chaperone present for all sensitive exam   Const: No acute distress  Alert  Nontoxic  Not diaphoretic  HEENT: External ears normal  No protrusion drainage swelling  Nose normal  No drainage/traumatic deformity  MMM  Mouth with baseline/symmetric movement  No trismus  Eyes: No squinting  No icterus  Tracks through the room with normal EOM  No tearing/swelling/drainage  Neck: ROM normal  No rigidity  No meningismus  Cards: Rate as per vitals  Compared to monitor sinus unless documented above  Regular  Well perfused  Pulm: able to verbalize without additional effort  Effort and excursion normal  No disress  No audible wheezing/ stridor  Normal resp rate  Abd: No distension beyond baseline  No fluctuant wave  Patient without peritoneal pain with shifting/bumping the bed  MSK: ROM normal and baseline  No deformity  Skin: No new rashes visible  Well perfused  Neuro: Nonfocal  Baseline  CN grossly intact  Moving all four with coordination  Psych: Normal behavior and affect  A:  - Nursing note reviewed  Ddx and MDM  Atypical ACS? HEART Risk Score    Flowsheet Row Most Recent Value   Heart Score Risk Calculator    History 0 Filed at: 10/16/2022 0147   ECG 1 Filed at: 10/16/2022 0147   Age 0 Filed at: 10/16/2022 0147   Risk Factors 2 Filed at: 10/16/2022 0147   Troponin 0 Filed at: 10/16/2022 0147   HEART Score 3 Filed at: 10/16/2022 0147          electrolyte abnormality             ED Course as of 10/16/22 0149   Sat Oct 15, 2022   2136 Procedure Note: EKG  Date/Time: 10/15/22 9:36 PM   Interpreted by: Olivia Persaud  Indications / Diagnosis: CP  ECG reviewed by me, the ED Provider: yes   The EKG demonstrates:  Rhythm: sinus    Intervals: normal intervals  Axis: normal axis  QRS/Blocks: normal QRS  ST Changes: No acute ST Changes, no STD/KISHORE    T wave changes: none         No orders to display     Orders Placed This Encounter   Procedures   • CBC and differential   • Comprehensive metabolic panel   • Magnesium   • Phosphorus   • HS Troponin 0hr (reflex protocol)   • ECG 12 lead     Labs Reviewed   CBC AND DIFFERENTIAL - Abnormal       Result Value Ref Range Status    WBC 6 94  4 31 - 10 16 Thousand/uL Final    RBC 4 45  3 81 - 5 12 Million/uL Final Hemoglobin 12 8  11 5 - 15 4 g/dL Final    Hematocrit 39 1  34 8 - 46 1 % Final    MCV 88  82 - 98 fL Final    MCH 28 8  26 8 - 34 3 pg Final    MCHC 32 7  31 4 - 37 4 g/dL Final    RDW 16 5 (*) 11 6 - 15 1 % Final    MPV 10 2  8 9 - 12 7 fL Final    Platelets 563  056 - 390 Thousands/uL Final    nRBC 0  /100 WBCs Final    Neutrophils Relative 64  43 - 75 % Final    Immat GRANS % 0  0 - 2 % Final    Lymphocytes Relative 27  14 - 44 % Final    Monocytes Relative 6  4 - 12 % Final    Eosinophils Relative 2  0 - 6 % Final    Basophils Relative 1  0 - 1 % Final    Neutrophils Absolute 4 47  1 85 - 7 62 Thousands/µL Final    Immature Grans Absolute 0 02  0 00 - 0 20 Thousand/uL Final    Lymphocytes Absolute 1 90  0 60 - 4 47 Thousands/µL Final    Monocytes Absolute 0 39  0 17 - 1 22 Thousand/µL Final    Eosinophils Absolute 0 12  0 00 - 0 61 Thousand/µL Final    Basophils Absolute 0 04  0 00 - 0 10 Thousands/µL Final   COMPREHENSIVE METABOLIC PANEL - Abnormal    Sodium 138  135 - 147 mmol/L Final    Potassium 3 6  3 5 - 5 3 mmol/L Final    Chloride 101  96 - 108 mmol/L Final    CO2 24  21 - 32 mmol/L Final    ANION GAP 13  4 - 13 mmol/L Final    BUN 16  5 - 25 mg/dL Final    Creatinine 1 12  0 60 - 1 30 mg/dL Final    Comment: Standardized to IDMS reference method    Glucose 169 (*) 65 - 140 mg/dL Final    Comment: If the patient is fasting, the ADA then defines impaired fasting glucose as > 100 mg/dL and diabetes as > or equal to 123 mg/dL  Specimen collection should occur prior to Sulfasalazine administration due to the potential for falsely depressed results  Specimen collection should occur prior to Sulfapyridine administration due to the potential for falsely elevated results  Calcium 8 0 (*) 8 3 - 10 1 mg/dL Final    AST 22  5 - 45 U/L Final    Comment: Specimen collection should occur prior to Sulfasalazine administration due to the potential for falsely depressed results       ALT 45  12 - 78 U/L Final Comment: Specimen collection should occur prior to Sulfasalazine administration due to the potential for falsely depressed results  Alkaline Phosphatase 102  46 - 116 U/L Final    Total Protein 7 4  6 4 - 8 4 g/dL Final    Albumin 3 6  3 5 - 5 0 g/dL Final    Total Bilirubin 0 22  0 20 - 1 00 mg/dL Final    Comment: Use of this assay is not recommended for patients undergoing treatment with eltrombopag due to the potential for falsely elevated results  eGFR 59  ml/min/1 73sq m Final    Narrative:     [place] guidelines for Chronic Kidney Disease (CKD):   •  Stage 1 with normal or high GFR (GFR > 90 mL/min/1 73 square meters)  •  Stage 2 Mild CKD (GFR = 60-89 mL/min/1 73 square meters)  •  Stage 3A Moderate CKD (GFR = 45-59 mL/min/1 73 square meters)  •  Stage 3B Moderate CKD (GFR = 30-44 mL/min/1 73 square meters)  •  Stage 4 Severe CKD (GFR = 15-29 mL/min/1 73 square meters)  •  Stage 5 End Stage CKD (GFR <15 mL/min/1 73 square meters)  Note: GFR calculation is accurate only with a steady state creatinine   MAGNESIUM - Normal    Magnesium 1 8  1 6 - 2 6 mg/dL Final   PHOSPHORUS - Normal    Phosphorus 3 6  2 7 - 4 5 mg/dL Final   HS TROPONIN I 0HR - Normal    hs TnI 0hr 2  "Refer to ACS Flowchart"- see link ng/L Final    Comment:                                              Initial (time 0) result  If >=50 ng/L, Myocardial injury suggested ;  Type of myocardial injury and treatment strategy  to be determined  If 5-49 ng/L, a delta result at 2 hours and or 4 hours will be needed to further evaluate  If <4 ng/L, and chest pain has been >3 hours since onset, patient may qualify for discharge based on the HEART score in the ED  If <5 ng/L and <3hours since onset of chest pain, a delta result at 2 hours will be needed to further evaluate  HS Troponin 99th Percentile URL of a Health Population=12 ng/L with a 95% Confidence Interval of 8-18 ng/L      Second Troponin (time 2 hours)  If calculated delta >= 20 ng/L,  Myocardial injury suggested ; Type of myocardial injury and treatment strategy to be determined  If 5-49 ng/L and the calculated delta is 5-19 ng/L, consult medical service for evaluation  Continue evaluation for ischemia on ecg and other possible etiology and repeat hs troponin at 4 hours  If delta is <5 ng/L at 2 hours, consider discharge based on risk stratification via the HEART score (if in ED), or AB risk score in IP/Observation  HS Troponin 99th Percentile URL of a Health Population=12 ng/L with a 95% Confidence Interval of 8-18 ng/L  Final Diagnosis:  1  Paresthesia    2  Hypocalcemia        P:  - hospital tx includes   Medications   calcium gluconate 1 g in sodium chloride 0 9% 50 mL (premix) (0 g Intravenous Stopped 10/15/22 9777)         - dispositio  Time reflects when diagnosis was documented in both MDM as applicable and the Disposition within this note     Time User Action Codes Description Comment    10/15/2022 10:39 PM Dakota Gehrig Add [R20 0,  R20 2] Numbness and tingling     10/15/2022 10:39 PM Dakota Gehrig Remove [R20 0,  R20 2] Numbness and tingling     10/15/2022 10:39 PM Dakota Gehrig Add [R20 2] Paresthesia     10/15/2022 10:39 PM Dakota Gehrig Add [E83 51] Hypocalcemia       ED Disposition     ED Disposition   Discharge    Condition   Stable    Date/Time   Sat Oct 15, 2022 10:39 PM    Comment   [facility] discharge to home/self care  Follow-up Information     Follow up With Specialties Details Why Contact Info    Mile Bruner MD Internal Medicine   [address]  [phone]            - patient will call their PCP to let them know they were in the emergency department   We discuss return precautions       - additional tx intended, if consistent with primary provider:  - patient to follow with :      Discharge Medication List as of 10/15/2022 10:39 PM CONTINUE these medications which have NOT CHANGED    Details   ALPRAZolam ER (XANAX XR) 2 MG 24 hr tablet Take 1 tablet (2 mg total) by mouth 2 (two) times a day, Starting Fri 10/7/2022, Normal      baclofen 10 mg tablet Take 10 mg by mouth 3 (three) times a day as needed, Starting Fri 7/22/2022, Historical Med      calcitriol (ROCALTROL) 0 5 MCG capsule Take 1 capsule (0 5 mcg total) by mouth daily, Starting Thu 9/22/2022, Normal      Calcium Carb-Cholecalciferol (Calcium 500+D) 500-400 MG-UNIT TABS Take 600 mg by mouth 3 times a day, Normal      Calcium Carbonate-Vitamin D3 600-400 MG-UNIT TABS TAKE 1 TABLET BY MOUTH 6 (SIX) TIMES A DAY, Normal      doxepin (SINEquan) 25 mg capsule Take 1 capsule (25 mg total) by mouth daily at bedtime, Starting Thu 9/1/2022, Normal      fenofibrate 160 MG tablet Take 1 tablet (160 mg total) by mouth daily, Starting Thu 10/6/2022, Normal      ferrous sulfate 325 (65 Fe) mg tablet Take 325 mg by mouth Once Monday, wed, Friday-Last dose 4/1/22 , Historical Med      ibuprofen (MOTRIN) 600 mg tablet Take 1 tablet (600 mg total) by mouth every 6 (six) hours as needed for mild pain or moderate pain, Starting Tue 6/2/2020, Normal      levothyroxine 150 mcg tablet TAKE 1 TABLET BY MOUTH EVERY DAY, Normal      Lidocaine-Menthol 4-1 % PTCH if needed  , Historical Med      magnesium Oxide (MAG-OX) 400 mg TABS TAKE 1 TABLET (400 MG TOTAL) BY MOUTH THREE (THREE) TIMES A DAY 9PM, Normal      omeprazole (PriLOSEC) 40 MG capsule Take 1 capsule (40 mg total) by mouth daily, Starting Mon 9/26/2022, Normal      oxyCODONE-acetaminophen (PERCOCET)  mg per tablet Take 1 tablet by mouth 4 (four) times a day, Historical Med      pantoprazole (PROTONIX) 40 mg tablet Take 1 tablet (40 mg total) by mouth daily Take 1/2 hour prior to breakfast daily in a m   , Starting Wed 6/8/2022, Until Sat 7/30/2022, Normal      potassium chloride (K-DUR,KLOR-CON) 20 mEq tablet Take 20 mEq by mouth 2 (two) times a day, Historical Med      Potassium Chloride ER 20 MEQ TBCR Take 1 tablet by mouth 2 (two) times a day, Starting Mon 7/11/2022, Historical Med      pramipexole (MIRAPEX) 0 5 mg tablet Take 0 5 mg by mouth daily at bedtime, Historical Med      pregabalin (LYRICA) 100 mg capsule TAKE 1 CAPSULE BY MOUTH TWICE A DAY AS DIRECTED FOR 30 DAYS, Historical Med      sertraline (ZOLOFT) 100 mg tablet TAKE 1 TABLET BY MOUTH EVERY DAY, Normal           No discharge procedures on file  Prior to Admission Medications   Prescriptions Last Dose Informant Patient Reported? Taking?    ALPRAZolam ER (XANAX XR) 2 MG 24 hr tablet  Self No No   Sig: Take 1 tablet (2 mg total) by mouth 2 (two) times a day   Calcium Carb-Cholecalciferol (Calcium 500+D) 500-400 MG-UNIT TABS  Self No No   Sig: Take 600 mg by mouth 3 times a day   Calcium Carbonate-Vitamin D3 600-400 MG-UNIT TABS  Self No No   Sig: TAKE 1 TABLET BY MOUTH 6 (SIX) TIMES A DAY   Lidocaine-Menthol 4-1 % PTCH  Self Yes No   Sig: if needed     Potassium Chloride ER 20 MEQ TBCR  Self Yes No   Sig: Take 1 tablet by mouth 2 (two) times a day   baclofen 10 mg tablet  Self Yes No   Sig: Take 10 mg by mouth 3 (three) times a day as needed   calcitriol (ROCALTROL) 0 5 MCG capsule   No No   Sig: Take 1 capsule (0 5 mcg total) by mouth daily   Patient taking differently: Take 0 5 mcg by mouth 2 (two) times a day   doxepin (SINEquan) 25 mg capsule  Self No No   Sig: Take 1 capsule (25 mg total) by mouth daily at bedtime   fenofibrate 160 MG tablet  Self No No   Sig: Take 1 tablet (160 mg total) by mouth daily   ferrous sulfate 325 (65 Fe) mg tablet  Self Yes No   Sig: Take 325 mg by mouth Once Monday, wed, Friday-Last dose 4/1/22    ibuprofen (MOTRIN) 600 mg tablet  Self No No   Sig: Take 1 tablet (600 mg total) by mouth every 6 (six) hours as needed for mild pain or moderate pain   levothyroxine 150 mcg tablet   No No   Sig: TAKE 1 TABLET BY MOUTH EVERY DAY   Patient taking differently: 150 mcg Take 6 days a week   magnesium Oxide (MAG-OX) 400 mg TABS  Self No No   Sig: TAKE 1 TABLET (400 MG TOTAL) BY MOUTH THREE (THREE) TIMES A DAY 9PM   omeprazole (PriLOSEC) 40 MG capsule  Self No No   Sig: Take 1 capsule (40 mg total) by mouth daily   oxyCODONE-acetaminophen (PERCOCET)  mg per tablet  Self Yes No   Sig: Take 1 tablet by mouth 4 (four) times a day   pantoprazole (PROTONIX) 40 mg tablet   No No   Sig: Take 1 tablet (40 mg total) by mouth daily Take 1/2 hour prior to breakfast daily in a [facility] Patient not taking: No sig reported   potassium chloride (K-DUR,KLOR-CON) 20 mEq tablet  Self Yes No   Sig: Take 20 mEq by mouth 2 (two) times a day   pramipexole (MIRAPEX) 0 5 mg tablet  Self Yes No   Sig: Take 0 5 mg by mouth daily at bedtime   pregabalin (LYRICA) 100 mg capsule  Self Yes No   Sig: TAKE 1 CAPSULE BY MOUTH TWICE A DAY AS DIRECTED FOR 30 DAYS   sertraline (ZOLOFT) 100 mg tablet  Self No No   Sig: TAKE 1 TABLET BY MOUTH EVERY DAY      Facility-Administered Medications: None       Portions of the record may have been created with voice recognition software  Occasional wrong word or "sound a like" substitutions may have occurred due to the inherent limitations of voice recognition software  Read the chart carefully and recognize, using context, where substitutions have occurred      Electronically signed by:  Jennefer Rav, MD Vickie Bumpers, MD  10/16/22 8513

## 2022-10-17 LAB
ATRIAL RATE: 84 BPM
P AXIS: 46 DEGREES
PR INTERVAL: 170 MS
QRS AXIS: 27 DEGREES
QRSD INTERVAL: 78 MS
QT INTERVAL: 380 MS
QTC INTERVAL: 449 MS
T WAVE AXIS: 45 DEGREES
VENTRICULAR RATE: 84 BPM

## 2022-10-17 PROCEDURE — 93010 ELECTROCARDIOGRAM REPORT: CPT | Performed by: INTERNAL MEDICINE

## 2022-10-20 ENCOUNTER — SOCIAL WORK (OUTPATIENT)
Dept: BEHAVIORAL/MENTAL HEALTH CLINIC | Facility: CLINIC | Age: 47
End: 2022-10-20
Payer: COMMERCIAL

## 2022-10-20 DIAGNOSIS — F41.1 GENERALIZED ANXIETY DISORDER WITH PANIC ATTACKS: ICD-10-CM

## 2022-10-20 DIAGNOSIS — F17.210 NICOTINE DEPENDENCE, CIGARETTES, UNCOMPLICATED: ICD-10-CM

## 2022-10-20 DIAGNOSIS — F33.1 MODERATE EPISODE OF RECURRENT MAJOR DEPRESSIVE DISORDER (HCC): Primary | ICD-10-CM

## 2022-10-20 DIAGNOSIS — F41.0 GENERALIZED ANXIETY DISORDER WITH PANIC ATTACKS: ICD-10-CM

## 2022-10-20 PROCEDURE — 90834 PSYTX W PT 45 MINUTES: CPT | Performed by: SOCIAL WORKER
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
## 2022-10-22 ENCOUNTER — APPOINTMENT (EMERGENCY)
Dept: RADIOLOGY | Facility: HOSPITAL | Age: 47
End: 2022-10-22
Payer: COMMERCIAL

## 2022-10-22 ENCOUNTER — HOSPITAL ENCOUNTER (EMERGENCY)
Facility: HOSPITAL | Age: 47
Discharge: HOME | End: 2022-10-22
Attending: EMERGENCY MEDICINE
Payer: COMMERCIAL

## 2022-10-22 VITALS
BODY MASS INDEX: 38.59 KG/M2 | OXYGEN SATURATION: 95 % | DIASTOLIC BLOOD PRESSURE: 62 MMHG | SYSTOLIC BLOOD PRESSURE: 106 MMHG | HEART RATE: 77 BPM | TEMPERATURE: 98.5 F | WEIGHT: 211 LBS | RESPIRATION RATE: 18 BRPM

## 2022-10-22 DIAGNOSIS — R79.89 ELEVATED FERRITIN: ICD-10-CM

## 2022-10-22 DIAGNOSIS — E83.51 HYPOCALCEMIA: ICD-10-CM

## 2022-10-22 DIAGNOSIS — R07.9 CHEST PAIN: Primary | ICD-10-CM

## 2022-10-22 LAB
ALBUMIN SERPL BCP-MCNC: 3.5 G/DL (ref 3.5–5)
ALP SERPL-CCNC: 102 U/L (ref 46–116)
ALT SERPL W P-5'-P-CCNC: 50 U/L (ref 12–78)
ANION GAP SERPL CALCULATED.3IONS-SCNC: 8 MMOL/L (ref 4–13)
AST SERPL W P-5'-P-CCNC: 18 U/L (ref 5–45)
ATRIAL RATE: 84 BPM
BASOPHILS # BLD AUTO: 0.05 THOUSANDS/ÂΜL (ref 0–0.1)
BASOPHILS NFR BLD AUTO: 1 % (ref 0–1)
BILIRUB SERPL-MCNC: 0.23 MG/DL (ref 0.2–1)
BUN SERPL-MCNC: 15 MG/DL (ref 5–25)
CA-I BLD-SCNC: 1 MMOL/L (ref 1.12–1.32)
CALCIUM SERPL-MCNC: 8 MG/DL (ref 8.3–10.1)
CARDIAC TROPONIN I PNL SERPL HS: 2 NG/L
CHLORIDE SERPL-SCNC: 104 MMOL/L (ref 96–108)
CO2 SERPL-SCNC: 25 MMOL/L (ref 21–32)
CREAT SERPL-MCNC: 1.07 MG/DL (ref 0.6–1.3)
D DIMER PPP FEU-MCNC: 0.42 UG/ML FEU
EOSINOPHIL # BLD AUTO: 0.08 THOUSAND/ÂΜL (ref 0–0.61)
EOSINOPHIL NFR BLD AUTO: 1 % (ref 0–6)
ERYTHROCYTE [DISTWIDTH] IN BLOOD BY AUTOMATED COUNT: 16.6 % (ref 11.6–15.1)
GFR SERPL CREATININE-BSD FRML MDRD: 62 ML/MIN/1.73SQ M
GLUCOSE SERPL-MCNC: 118 MG/DL (ref 65–140)
HCT VFR BLD AUTO: 39.2 % (ref 34.8–46.1)
HGB BLD-MCNC: 12.6 G/DL (ref 11.5–15.4)
IMM GRANULOCYTES # BLD AUTO: 0.03 THOUSAND/UL (ref 0–0.2)
IMM GRANULOCYTES NFR BLD AUTO: 1 % (ref 0–2)
LYMPHOCYTES # BLD AUTO: 1.63 THOUSANDS/ÂΜL (ref 0.6–4.47)
LYMPHOCYTES NFR BLD AUTO: 25 % (ref 14–44)
MAGNESIUM SERPL-MCNC: 1.9 MG/DL (ref 1.6–2.6)
MCH RBC QN AUTO: 28.3 PG (ref 26.8–34.3)
MCHC RBC AUTO-ENTMCNC: 32.1 G/DL (ref 31.4–37.4)
MCV RBC AUTO: 88 FL (ref 82–98)
MONOCYTES # BLD AUTO: 0.38 THOUSAND/ÂΜL (ref 0.17–1.22)
MONOCYTES NFR BLD AUTO: 6 % (ref 4–12)
NEUTROPHILS # BLD AUTO: 4.29 THOUSANDS/ÂΜL (ref 1.85–7.62)
NEUTS SEG NFR BLD AUTO: 66 % (ref 43–75)
NRBC BLD AUTO-RTO: 0 /100 WBCS
P AXIS: 40 DEGREES
PLATELET # BLD AUTO: 180 THOUSANDS/UL (ref 149–390)
PMV BLD AUTO: 9.9 FL (ref 8.9–12.7)
POTASSIUM SERPL-SCNC: 3.9 MMOL/L (ref 3.5–5.3)
PR INTERVAL: 164 MS
PROT SERPL-MCNC: 7.3 G/DL (ref 6.4–8.4)
QRS AXIS: 19 DEGREES
QRSD INTERVAL: 76 MS
QT INTERVAL: 376 MS
QTC INTERVAL: 444 MS
RBC # BLD AUTO: 4.46 MILLION/UL (ref 3.81–5.12)
SODIUM SERPL-SCNC: 137 MMOL/L (ref 135–147)
T WAVE AXIS: 39 DEGREES
VENTRICULAR RATE: 84 BPM
WBC # BLD AUTO: 6.46 THOUSAND/UL (ref 4.31–10.16)

## 2022-10-22 PROCEDURE — 99285 EMERGENCY DEPT VISIT HI MDM: CPT | Performed by: EMERGENCY MEDICINE

## 2022-10-22 PROCEDURE — 71045 X-RAY EXAM CHEST 1 VIEW: CPT

## 2022-10-22 PROCEDURE — 83735 ASSAY OF MAGNESIUM: CPT | Performed by: EMERGENCY MEDICINE

## 2022-10-22 PROCEDURE — 36415 COLL VENOUS BLD VENIPUNCTURE: CPT | Performed by: EMERGENCY MEDICINE

## 2022-10-22 PROCEDURE — 82728 ASSAY OF FERRITIN: CPT

## 2022-10-22 PROCEDURE — 99285 EMERGENCY DEPT VISIT HI MDM: CPT

## 2022-10-22 PROCEDURE — 93005 ELECTROCARDIOGRAM TRACING: CPT

## 2022-10-22 PROCEDURE — 85025 COMPLETE CBC W/AUTO DIFF WBC: CPT | Performed by: EMERGENCY MEDICINE

## 2022-10-22 PROCEDURE — 82330 ASSAY OF CALCIUM: CPT | Performed by: EMERGENCY MEDICINE

## 2022-10-22 PROCEDURE — 85379 FIBRIN DEGRADATION QUANT: CPT | Performed by: EMERGENCY MEDICINE

## 2022-10-22 PROCEDURE — 96365 THER/PROPH/DIAG IV INF INIT: CPT

## 2022-10-22 PROCEDURE — 83550 IRON BINDING TEST: CPT

## 2022-10-22 PROCEDURE — 96361 HYDRATE IV INFUSION ADD-ON: CPT

## 2022-10-22 PROCEDURE — 83540 ASSAY OF IRON: CPT

## 2022-10-22 PROCEDURE — 84484 ASSAY OF TROPONIN QUANT: CPT | Performed by: EMERGENCY MEDICINE

## 2022-10-22 PROCEDURE — 80053 COMPREHEN METABOLIC PANEL: CPT | Performed by: EMERGENCY MEDICINE

## 2022-10-22 RX ORDER — CALCIUM GLUCONATE 20 MG/ML
1 INJECTION, SOLUTION INTRAVENOUS ONCE
Status: COMPLETED | OUTPATIENT
Start: 2022-10-22 | End: 2022-10-22

## 2022-10-22 RX ADMIN — SODIUM CHLORIDE 1000 ML: 0.9 INJECTION, SOLUTION INTRAVENOUS at 17:51

## 2022-10-22 RX ADMIN — CALCIUM GLUCONATE 1 G: 20 INJECTION, SOLUTION INTRAVENOUS at 19:02

## 2022-10-22 RX ADMIN — CALCIUM GLUCONATE 1 G: 20 INJECTION, SOLUTION INTRAVENOUS at 19:54

## 2022-10-22 NOTE — ED PROVIDER NOTES
History  Chief Complaint   Patient presents with   • Chest Pain     Chest pain for 30 minutes  Also feels numb and tingly  Felt like she was going to " pass out"  56-year-old female presents with chest pain numbness tingling in her fingers all over her body and some shortness of breath and lightheadedness prior to coming to the ED  History of hypocalcemia noted  Denies any cough congestion fevers chills the chest pain is more pressure-like  Nonsmoker no family history of CAD noted no leg pain calf pain leg swelling long travels recent surgeries or any other history  History provided by:  Patient   used: No        Prior to Admission Medications   Prescriptions Last Dose Informant Patient Reported? Taking?    ALPRAZolam ER (XANAX XR) 2 MG 24 hr tablet  Self No No   Sig: Take 1 tablet (2 mg total) by mouth 2 (two) times a day   Calcium Carb-Cholecalciferol (Calcium 500+D) 500-400 MG-UNIT TABS  Self No No   Sig: Take 600 mg by mouth 3 times a day   Calcium Carbonate-Vitamin D3 600-400 MG-UNIT TABS  Self No No   Sig: TAKE 1 TABLET BY MOUTH 6 (SIX) TIMES A DAY   Lidocaine-Menthol 4-1 % PTCH  Self Yes No   Sig: if needed     Potassium Chloride ER 20 MEQ TBCR  Self Yes No   Sig: Take 1 tablet by mouth 2 (two) times a day   baclofen 10 mg tablet  Self Yes No   Sig: Take 10 mg by mouth 3 (three) times a day as needed   calcitriol (ROCALTROL) 0 5 MCG capsule   No No   Sig: Take 1 capsule (0 5 mcg total) by mouth daily   Patient taking differently: Take 0 5 mcg by mouth 2 (two) times a day   doxepin (SINEquan) 25 mg capsule  Self No No   Sig: Take 1 capsule (25 mg total) by mouth daily at bedtime   fenofibrate 160 MG tablet  Self No No   Sig: Take 1 tablet (160 mg total) by mouth daily   ferrous sulfate 325 (65 Fe) mg tablet  Self Yes No   Sig: Take 325 mg by mouth Once Monday, wed, Friday-Last dose 4/1/22    ibuprofen (MOTRIN) 600 mg tablet  Self No No   Sig: Take 1 tablet (600 mg total) by mouth every 6 (six) hours as needed for mild pain or moderate pain   levothyroxine 150 mcg tablet   No No   Sig: TAKE 1 TABLET BY MOUTH EVERY DAY   Patient taking differently: 150 mcg Take 6 days a week   magnesium Oxide (MAG-OX) 400 mg TABS  Self No No   Sig: TAKE 1 TABLET (400 MG TOTAL) BY MOUTH THREE (THREE) TIMES A DAY 9PM   omeprazole (PriLOSEC) 40 MG capsule  Self No No   Sig: Take 1 capsule (40 mg total) by mouth daily   oxyCODONE-acetaminophen (PERCOCET)  mg per tablet  Self Yes No   Sig: Take 1 tablet by mouth 4 (four) times a day   pantoprazole (PROTONIX) 40 mg tablet   No No   Sig: Take 1 tablet (40 mg total) by mouth daily Take 1/2 hour prior to breakfast daily in a [facility]     Patient not taking: No sig reported   potassium chloride (K-DUR,KLOR-CON) 20 mEq tablet  Self Yes No   Sig: Take 20 mEq by mouth 2 (two) times a day   pramipexole (MIRAPEX) 0 5 mg tablet  Self Yes No   Sig: Take 0 5 mg by mouth daily at bedtime   pregabalin (LYRICA) 100 mg capsule  Self Yes No   Sig: TAKE 1 CAPSULE BY MOUTH TWICE A DAY AS DIRECTED FOR 30 DAYS   sertraline (ZOLOFT) 100 mg tablet  Self No No   Sig: TAKE 1 TABLET BY MOUTH EVERY DAY      Facility-Administered Medications: None       Past Medical History:   Diagnosis Date   • Abdominal pain    • Acid reflux    • Acute renal failure (HCC)     multiple episodes   • Anemia    • Anxiety    • Anxiety 3/31/2021   • Arthritis    • Asthma    • Back pain    • Chronic pain    • DDD (degenerative disc disease), lumbar    • Depression    • Disease of thyroid gland     had surgery and now hypo   • DVT (deep venous thrombosis) ([facility] [address] ) 2009    s/p ankle fracture   • Headache    • History of transfusion    • Hypercalcemia    • Hyperlipidemia    • Hyperthyroidism    • Hypocalcemia     post op 2016   • Kidney stone    • Lightheadedness    • Migraine    • Obesity    • Palpitations    • Psychiatric disorder     anxiety depression   • Seizures (HCC)     petit mal x1  4 years ago- all tests were neg  • SOB (shortness of breath)    • Spondylolisthesis of lumbar region    • Treatment     spinal pain injections  last was 2016   • Wears glasses        Past Surgical History:   Procedure Laterality Date   • BACK SURGERY      L4-5, S1-fusion-plate/screws   •  SECTION      x5   • CYSTOCELE REPAIR  2017   • CYSTOSCOPY     • DISCOGRAM     • HYSTERECTOMY     • PARATHYROIDECTOMY     • HI ANTERIOR COLPORRAPHY RPR CYSTOCELE W/CYSTO N/A 2017    Procedure: CYSTOCELE REPAIR;  Surgeon: Mackenzie Perez MD;  Location: [address] [place] [address];  Service: Gynecology   • HI ARTHRODESIS POSTERIOR INTERBODY LUMBAR N/A 2016    Procedure: L4-S1 LUMBAR LAMINECTOMY/DECOMPRESSION;  INSTRUMENTED POSTEROLATERAL FUSION/ INTERBODY L5-S1; ALLOGRAFT AND AUTOGRAFT (IMPULSE) ; Surgeon: Lexi Martinez MD;  Location: [facility];  Service: Orthopedics   • HI CYSTOURETHROSCOPY,URETER CATHETER Bilateral 2018    Procedure: INSERTION URETERAL CATHETERS PREOP;  Surgeon: Radha Poole MD;  Location: [address] [place] [address];  Service: Urology   • HI SLING OPER STRES INCONTINENCE N/A 2017    Procedure: MID URETHRAL SLING;  Surgeon: Hannah Pro MD;  Location: [address] [place] [address];  Service: Urology   • HI SUPRACERV ABD HYSTERECTOMY N/A 2018    Procedure: SUPRACERVICAL HYSTERECTOMY;  Surgeon: Mackenzie Perez MD;  Location: [facility];  Service: Gynecology   • THYROIDECTOMY     • TONSILECTOMY AND ADNOIDECTOMY     • TONSILLECTOMY     • TUBAL LIGATION         Family History   Problem Relation Age of Onset   • Diabetes Mother    • Hypertension Mother    • Hyperlipidemia Father    • Arrhythmia Father    • Lung cancer Father    • Diabetes Father      I have reviewed and agree with the history as documented      E-Cigarette/Vaping   • E-Cigarette Use Never User      E-Cigarette/Vaping Substances   • Nicotine No    • THC No    • CBD No    • Flavoring No    • Other No    • Unknown No      Social History     Tobacco Use   • Smoking status: Current Every Day Smoker     Packs/day: 1 00     Years: 25 00     Pack years: 25 00     Types: Cigarettes   • Smokeless tobacco: Never Used   Vaping Use   • Vaping Use: Never used   Substance Use Topics   • Alcohol use: Not Currently   • Drug use: No       Review of Systems   Constitutional: Negative  HENT: Negative  Eyes: Negative  Respiratory: Positive for shortness of breath  Cardiovascular: Positive for chest pain  Gastrointestinal: Negative  Endocrine: Negative  Genitourinary: Negative  Musculoskeletal: Negative  Skin: Negative  Allergic/Immunologic: Negative  Neurological: Positive for light-headedness and numbness  Hematological: Negative  Psychiatric/Behavioral: Negative  All other systems reviewed and are negative  Physical Exam  Physical Exam  Constitutional:       Appearance: Normal appearance  HENT:      Head: Normocephalic and atraumatic  Nose: Nose normal       Mouth/Throat:      Mouth: Mucous membranes are moist    Eyes:      Extraocular Movements: Extraocular movements intact  Pupils: Pupils are equal, round, and reactive to light  Cardiovascular:      Rate and Rhythm: Normal rate and regular rhythm  Pulmonary:      Effort: Pulmonary effort is normal       Breath sounds: Normal breath sounds  Abdominal:      General: Abdomen is flat  Bowel sounds are normal       Palpations: Abdomen is soft  Musculoskeletal:         General: Normal range of motion  Cervical back: Normal range of motion and neck supple  Skin:     General: Skin is warm  Capillary Refill: Capillary refill takes less than 2 seconds  Neurological:      General: No focal deficit present  Mental Status: She is alert and oriented to person, place, and time  Mental status is at baseline  Psychiatric:         Mood and Affect: Mood normal          Thought Content:  Thought content normal          Vital Signs  ED Triage Vitals   Temperature Pulse Respirations Blood Pressure SpO2   10/22/22 1524 10/22/22 1524 10/22/22 1524 10/22/22 1524 10/22/22 1524   98 °F (36 7 °C) (!) 115 22 135/77 98 %      Temp Source Heart Rate Source Patient Position - Orthostatic VS BP Location FiO2 (%)   10/22/22 2004 10/22/22 2004 10/22/22 1753 -- --   Oral Monitor Lying - Orthostatic VS        Pain Score       10/22/22 1524       4           Vitals:    10/22/22 1753 10/22/22 1754 10/22/22 1755 10/22/22 2004   BP: 125/65 114/61 117/69 106/62   Pulse: 80 88 93 77   Patient Position - Orthostatic VS: Lying - Orthostatic VS Sitting - Orthostatic VS Standing - Orthostatic VS          Visual Acuity      ED Medications  Medications   sodium chloride 0 9 % bolus 1,000 mL (0 mL Intravenous Stopped 10/22/22 1954)   calcium gluconate 1 g in sodium chloride 0 9% 50 mL (premix) (0 g Intravenous Stopped 10/22/22 1929)   calcium gluconate 1 g in sodium chloride 0 9% 50 mL (premix) (0 g Intravenous Stopped 10/22/22 2042)       Diagnostic Studies  Results Reviewed     Procedure Component Value Units Date/Time    HS Troponin 0hr (reflex protocol) [[id number]]  (Normal) Collected: 10/22/22 1751    Lab Status: Final result Specimen: Blood from Arm, Right Updated: 10/22/22 1821     hs TnI 0hr 2 ng/L     Calcium, ionized [[id number]]  (Abnormal) Collected: 10/22/22 1810    Lab Status: Final result Specimen: Blood from Arm, Right Updated: 10/22/22 1816     Calcium, Ionized 1 00 mmol/L     Comprehensive metabolic panel [[id number]]  (Abnormal) Collected: 10/22/22 1751    Lab Status: Final result Specimen: Blood from Arm, Right Updated: 10/22/22 1814     Sodium 137 mmol/L      Potassium 3 9 mmol/L      Chloride 104 mmol/L      CO2 25 mmol/L      ANION GAP 8 mmol/L      BUN 15 mg/dL      Creatinine 1 07 mg/dL      Glucose 118 mg/dL      Calcium 8 0 mg/dL      AST 18 U/L      ALT 50 U/L      Alkaline Phosphatase 102 U/L      Total Protein 7 3 g/dL      Albumin 3 5 g/dL      Total Bilirubin 0 23 mg/dL      eGFR 62 ml/min/1 73sq m     Narrative:      National Kidney Disease Foundation guidelines for Chronic Kidney Disease (CKD):   •  Stage 1 with normal or high GFR (GFR > 90 mL/min/1 73 square meters)  •  Stage 2 Mild CKD (GFR = 60-89 mL/min/1 73 square meters)  •  Stage 3A Moderate CKD (GFR = 45-59 mL/min/1 73 square meters)  •  Stage 3B Moderate CKD (GFR = 30-44 mL/min/1 73 square meters)  •  Stage 4 Severe CKD (GFR = 15-29 mL/min/1 73 square meters)  •  Stage 5 End Stage CKD (GFR <15 mL/min/1 73 square meters)  Note: GFR calculation is accurate only with a steady state creatinine    Magnesium [[id number]]  (Normal) Collected: 10/22/22 1751    Lab Status: Final result Specimen: Blood from Arm, Right Updated: 10/22/22 1814     Magnesium 1 9 mg/dL     D-Dimer [[id number]]  (Normal) Collected: 10/22/22 1751    Lab Status: Final result Specimen: Blood from Arm, Right Updated: 10/22/22 1811     D-Dimer, Quant 0 42 ug/ml FEU     CBC and differential [[id number]]  (Abnormal) Collected: 10/22/22 1751    Lab Status: Final result Specimen: Blood from Arm, Right Updated: 10/22/22 1759     WBC 6 46 Thousand/uL      RBC 4 46 Million/uL      Hemoglobin 12 6 g/dL      Hematocrit 39 2 %      MCV 88 fL      MCH 28 3 pg      MCHC 32 1 g/dL      RDW 16 6 %      MPV 9 9 fL      Platelets 380 Thousands/uL      nRBC 0 /100 WBCs      Neutrophils Relative 66 %      Immat GRANS % 1 %      Lymphocytes Relative 25 %      Monocytes Relative 6 %      Eosinophils Relative 1 %      Basophils Relative 1 %      Neutrophils Absolute 4 29 Thousands/µL      Immature Grans Absolute 0 03 Thousand/uL      Lymphocytes Absolute 1 63 Thousands/µL      Monocytes Absolute 0 38 Thousand/µL      Eosinophils Absolute 0 08 Thousand/µL      Basophils Absolute 0 05 Thousands/µL                  XR chest 1 view portable    (Results Pending)              Procedures  ECG 12 Lead Documentation Only  Performed by: Brtitney Marquez DO  Authorized by: Brittney Marquez DO     ECG reviewed by me, the ED Provider: yes    Patient location:  ED  Previous ECG:     Previous ECG:  Unavailable    Comparison to cardiac monitor: Yes    Interpretation:     Interpretation: non-specific    Rate:     ECG rate assessment: normal    Rhythm:     Rhythm: sinus rhythm    Ectopy:     Ectopy: none    QRS:     QRS axis:  Normal  Conduction:     Conduction: normal    ST segments:     ST segments:  Non-specific  T waves:     T waves: non-specific               ED Course             HEART Risk Score    Flowsheet Row Most Recent Value   Heart Score Risk Calculator    History 0 Filed at: 10/22/2022 2144   ECG 1 Filed at: 10/22/2022 2144   Age 1 Filed at: 10/22/2022 2144   Risk Factors 0 Filed at: 10/22/2022 2144   Troponin 0 Filed at: 10/22/2022 2144   HEART Score 2 Filed at: 10/22/2022 2144                        SBIRT 22yo+    Flowsheet Row Most Recent Value   SBIRT (23 yo +)    In order to provide better care to our patients, we are screening all of our patients for alcohol and drug use  Would it be okay to ask you these screening questions? No Filed at: 10/22/2022 1811                    MDM  Number of Diagnoses or Management Options     Amount and/or Complexity of Data Reviewed  Clinical lab tests: ordered and reviewed  Tests in the radiology section of CPT®: reviewed and ordered  Tests in the medicine section of CPT®: ordered and reviewed    Patient Progress  Patient progress: stable      Disposition  Final diagnoses:   Chest pain   Hypocalcemia     Time reflects when diagnosis was documented in both MDM as applicable and the Disposition within this note     Time User Action Codes Description Comment    10/22/2022  7:06 PM Nirali Escamilla [R07 9] Chest pain     10/22/2022  7:06 PM Nirali Escamilla Add [E83 [id number]] Hypocalcemia       ED Disposition     ED Disposition   Discharge    Condition   Stable    Date/Time   Sat Oct 22, 2022  7:06 PM    Comment   [facility] discharge to home/self care  Follow-up Information     Follow up With Specialties Details Why Contact Info Additional Information    Evan Roberts MD Internal Medicine Schedule an appointment as soon as possible for a visit   [address]       [address] [facility] Emergency Department Emergency Medicine  If symptoms worsen [address] [address] Emergency Department, [place], [place], [address]          Discharge Medication List as of 10/22/2022  7:06 PM      CONTINUE these medications which have NOT CHANGED    Details   ALPRAZolam ER (XANAX XR) 2 MG 24 hr tablet Take 1 tablet (2 mg total) by mouth 2 (two) times a day, Starting Fri 10/7/2022, Normal      baclofen 10 mg tablet Take 10 mg by mouth 3 (three) times a day as needed, Starting Fri 7/22/2022, Historical Med      calcitriol (ROCALTROL) 0 5 MCG capsule Take 1 capsule (0 5 mcg total) by mouth daily, Starting Thu 9/22/2022, Normal      Calcium Carb-Cholecalciferol (Calcium 500+D) 500-400 MG-UNIT TABS Take 600 mg by mouth 3 times a day, Normal      Calcium Carbonate-Vitamin D3 600-400 MG-UNIT TABS TAKE 1 TABLET BY MOUTH 6 (SIX) TIMES A DAY, Normal      doxepin (SINEquan) 25 mg capsule Take 1 capsule (25 mg total) by mouth daily at bedtime, Starting Thu 9/1/2022, Normal      fenofibrate 160 MG tablet Take 1 tablet (160 mg total) by mouth daily, Starting Thu 10/6/2022, Normal      ferrous sulfate 325 (65 Fe) mg tablet Take 325 mg by mouth Once Monday, wed, Friday-Last dose 4/1/22 , Historical Med      ibuprofen (MOTRIN) 600 mg tablet Take 1 tablet (600 mg total) by mouth every 6 (six) hours as needed for mild pain or moderate pain, Starting Tue 6/2/2020, Normal      levothyroxine 150 mcg tablet TAKE 1 TABLET BY MOUTH EVERY DAY, Normal      Lidocaine-Menthol 4-1 % PTCH if needed  , Historical Med      magnesium Oxide (MAG-OX) 400 mg TABS TAKE 1 TABLET (400 MG TOTAL) BY MOUTH THREE (THREE) TIMES A DAY 9PM, Normal      omeprazole (PriLOSEC) 40 MG capsule Take 1 capsule (40 mg total) by mouth daily, Starting Mon 9/26/2022, Normal      oxyCODONE-acetaminophen (PERCOCET)  mg per tablet Take 1 tablet by mouth 4 (four) times a day, Historical Med      pantoprazole (PROTONIX) 40 mg tablet Take 1 tablet (40 mg total) by mouth daily Take 1/2 hour prior to breakfast daily in a m   , Starting Wed 6/8/2022, Until Sat 7/30/2022, Normal      potassium chloride (K-DUR,KLOR-CON) 20 mEq tablet Take 20 mEq by mouth 2 (two) times a day, Historical Med      Potassium Chloride ER 20 MEQ TBCR Take 1 tablet by mouth 2 (two) times a day, Starting Mon 7/11/2022, Historical Med      pramipexole (MIRAPEX) 0 5 mg tablet Take 0 5 mg by mouth daily at bedtime, Historical Med      pregabalin (LYRICA) 100 mg capsule TAKE 1 CAPSULE BY MOUTH TWICE A DAY AS DIRECTED FOR 30 DAYS, Historical Med      sertraline (ZOLOFT) 100 mg tablet TAKE 1 TABLET BY MOUTH EVERY DAY, Normal             No discharge procedures on file      PDMP Review       Value Time User    PDMP Reviewed  Yes 10/6/2022 12:12 PM Evan Roberts MD          ED Provider  Electronically Signed by           Charlie Aguilar DO  10/22/22 1685

## 2022-10-24 ENCOUNTER — TELEPHONE (OUTPATIENT)
Dept: HEMATOLOGY ONCOLOGY | Facility: CLINIC | Age: 47
End: 2022-10-24

## 2022-10-24 ENCOUNTER — CONSULT (OUTPATIENT)
Dept: HEMATOLOGY ONCOLOGY | Facility: MEDICAL CENTER | Age: 47
End: 2022-10-24

## 2022-10-24 VITALS
BODY MASS INDEX: 38.9 KG/M2 | OXYGEN SATURATION: 92 % | SYSTOLIC BLOOD PRESSURE: 114 MMHG | HEART RATE: 95 BPM | HEIGHT: 62 IN | TEMPERATURE: 97.8 F | RESPIRATION RATE: 20 BRPM | WEIGHT: 211.4 LBS | DIASTOLIC BLOOD PRESSURE: 68 MMHG

## 2022-10-24 DIAGNOSIS — Z72.0 TOBACCO ABUSE: ICD-10-CM

## 2022-10-24 DIAGNOSIS — R05.8 OTHER COUGH: ICD-10-CM

## 2022-10-24 DIAGNOSIS — R22.31 AXILLARY LUMP, RIGHT: ICD-10-CM

## 2022-10-24 DIAGNOSIS — R16.1 SPLENOMEGALY: Primary | ICD-10-CM

## 2022-10-24 DIAGNOSIS — R61 NIGHT SWEATS: ICD-10-CM

## 2022-10-24 DIAGNOSIS — R79.89 ELEVATED FERRITIN: ICD-10-CM

## 2022-10-24 LAB
ATRIAL RATE: 84 BPM
FERRITIN SERPL-MCNC: 530 NG/ML (ref 8–388)
IRON SATN MFR SERPL: 18 % (ref 15–50)
IRON SERPL-MCNC: 56 UG/DL (ref 50–170)
P AXIS: 40 DEGREES
PR INTERVAL: 164 MS
QRS AXIS: 19 DEGREES
QRSD INTERVAL: 76 MS
QT INTERVAL: 376 MS
QTC INTERVAL: 444 MS
T WAVE AXIS: 39 DEGREES
TIBC SERPL-MCNC: 315 UG/DL (ref 250–450)
VENTRICULAR RATE: 84 BPM

## 2022-10-24 PROCEDURE — 93010 ELECTROCARDIOGRAM REPORT: CPT | Performed by: INTERNAL MEDICINE

## 2022-10-24 NOTE — TELEPHONE ENCOUNTER
Patient calling to confirm address for her appointment today with Ana Luisa Wilder  I confirmed with patient she is scheduled at [facility]    Patient verbalized understanding

## 2022-10-24 NOTE — PSYCH
This note was not shared with the patient due to this is a psychotherapy note     Visit Time    Visit Start Time: 2:30  Visit Stop Time: 3:15  Total Visit Duration: 39    Psychotherapy Provided: Individual Psychotherapy 45 minutes     Length of time in session: 45 minutes, follow up in 2 week    Encounter Diagnosis     ICD-10-CM    1  Moderate episode of recurrent major depressive disorder (HCC)  F33 1    2  Generalized anxiety disorder with panic attacks  F41 1     F41 0    3  Nicotine dependence, cigarettes, uncomplicated  [id number] [id number]        Goals addressed in session: Goal 1     Pain:      none    0    Current suicide risk : Low     DATA: Met with Deanna Bagley for scheduled individual session  She reported feeling consumed with her sister's problems  Writer pointed out this as an example of her self abandonment and together we outlined tentative limits for her involvement with her sister's problems  Writer coached her through a guided imagery for relaxation, which she reported enjoying  ASSESSMENT: Deanna Bagley presents with a anxious mood  Her affect is normal range and intensity, appropriate  Deanna Bagley exhibits good therapeutic rapport with this clinician  Deanna Bagley continues to exhibit willingness to work on treatment goals and objectives  PLAN: Deanna Bagley will return in 2 weeks for the next scheduled session  Between sessions, Deanna Bagley will adhere to the limits we set forth today and will work on establishing a meditation rutine and will report back during the next session re: successes and barriers  Behavioral Health Treatment Plan [facility]: Diagnosis and Treatment Plan explained to Paula Carson relates understanding diagnosis and is agreeable to Treatment Plan   Yes

## 2022-10-24 NOTE — PROGRESS NOTES
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 Troponin (time 2 hours)  If calculated delta >= 20 ng/L,  Myocardial injury suggested ; Type of myocardial injury and treatment strategy to be determined  If 5-49 ng/L and the calculated delta is 5-19 ng/L, consult medical service for evaluation  Continue evaluation for ischemia on ecg and other possible etiology and repeat hs troponin at 4 hours  If delta                            is <5 ng/L at 2 hours, consider discharge based on risk stratification via the HEART score (if in ED), or AB risk score in IP/Observation  HS Troponin 99th Percentile URL of a Health Population=12 ng/L with a 95% Confidence Interval of 8-18 ng/L     • Ventricular Rate 10/15/2022 84  BPM Final   • Atrial Rate 10/15/2022 84  BPM Final   • MD Interval 10/15/2022 170  ms Final   • QRSD Interval 10/15/2022 78  ms Final   • QT Interval 10/15/2022 380  ms Final   • QTC Interval 10/15/2022 449  ms Final   • P Axis 10/15/2022 46  degrees Final   • QRS Axis 10/15/2022 27  degrees Final   • T Wave Axis 10/15/2022 45  degrees Final     XR chest 1 view portable  Narrative: CHEST     INDICATION:   chest pain  COMPARISON:  10/2/2022    EXAM PERFORMED/VIEWS:  XR CHEST PORTABLE    FINDINGS:      Mild chronic elevation of the right hemidiaphragm  Cardiomediastinal silhouette appears unremarkable  The lungs are clear  No pneumothorax or pleural effusion  Osseous structures appear within normal limits for patient age  Impression: No acute cardiopulmonary disease  Workstation performed: [id number]    9/21/2022 U/S ABD  · Echogenic liver is consistent with fatty infiltration on the prior CT  · Persistent splenomegaly since May makes acute viral illness less likely  Subtle underlying hepatocellular pathology is possible-please see below  Neoplastic etiology is difficult to exclude  Oncological evaluation may be useful  · The portal vein is patent although hepatic arterial velocity somewhat elevated  This finding could be artifactual; however, it would be unusual if related to isolated hepatic fatty infiltration and other underlying hepatic pathology should be considered  The hepatic contour does appear unremarkable however  Elastography may be useful  · Gallstones without sonographic Lopez's sign  · Minimally complex cyst in the left kidney  This could be followed up in 6 months time  5/31/2022 CT AP  1  No acute findings in the abdomen or pelvis  2   New splenomegaly with unchanged severe hepatic steatosis         Please note: This report has been generated by a voice recognition software system  Therefore there may be syntax, spelling, and/or grammatical errors  Please call if you have any questions

## 2022-10-27 ENCOUNTER — SOCIAL WORK (OUTPATIENT)
Dept: BEHAVIORAL/MENTAL HEALTH CLINIC | Facility: CLINIC | Age: 47
End: 2022-10-27
Payer: COMMERCIAL

## 2022-10-27 DIAGNOSIS — F17.210 NICOTINE DEPENDENCE, CIGARETTES, UNCOMPLICATED: ICD-10-CM

## 2022-10-27 DIAGNOSIS — F41.1 GENERALIZED ANXIETY DISORDER WITH PANIC ATTACKS: ICD-10-CM

## 2022-10-27 DIAGNOSIS — F41.0 GENERALIZED ANXIETY DISORDER WITH PANIC ATTACKS: ICD-10-CM

## 2022-10-27 DIAGNOSIS — F33.1 MODERATE EPISODE OF RECURRENT MAJOR DEPRESSIVE DISORDER (HCC): Primary | ICD-10-CM

## 2022-10-27 PROCEDURE — 90834 PSYTX W PT 45 MINUTES: CPT | Performed by: SOCIAL WORKER

## 2022-10-27 NOTE — PSYCH
This note was not shared with the patient due to this is a psychotherapy note     Visit Time    Visit Start Time: 2:30  Visit Stop Time: 3:20  Total Visit Duration: 48    Psychotherapy Provided: Individual Psychotherapy 50 minutes     Length of time in session: 50 minutes, follow up in 2 week    Encounter Diagnosis     ICD-10-CM    1  Moderate episode of recurrent major depressive disorder (HCC)  F33 1    2  Generalized anxiety disorder with panic attacks  F41 1     F41 0    3  Nicotine dependence, cigarettes, uncomplicated  U15 332        Goals addressed in session: Goal 1     Pain:      none    0    Current suicide risk : Low     DATA: Met with Norm Watts for scheduled individual session  She reported feeling ok stating that she has been struggling to maintain boundaries with her sister in [place] although she has made efforts to speak to her less  She is also struggling with coping with chronic pain and feeling as though her worth has diminished because of her limitations  Writer provided insight as to how to manage chronic pain long term with regard to mindset and encouraged Norm Watts to redirect her attention to what she can do, which she then reported recently being 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  Writer expressed much enthusiasm as she discussed this, and she too noted feeling motivated and empowered  Writer asked her to check in with her body, and provided psychoeducation on how endorphins reduce pain  Will continue with body awareness work  ASSESSMENT: Norm Watts presents with a normal mood  Her affect is normal range and intensity, appropriate  Norm Watts exhibits good therapeutic rapport with this clinician  Norm Watts continues to exhibit willingness to work on treatment goals and objectives  PLAN: Norm Watts will return in 2 weeks for the next scheduled session   Between sessions, Normmary Watts will take her medication as prescribed and practice positive coping strategies such as: keeping healthy distance between she and her sister, investing in the relationships "that matter," and visualizing, working on, and talking about her invention which brings her excitement and confidence, and will report back during the next session re: successes and barriers  Behavioral Health Treatment Plan [facility]: Diagnosis and Treatment Plan explained to Linda Cee relates understanding diagnosis and is agreeable to Treatment Plan   Yes

## 2022-10-28 ENCOUNTER — OFFICE VISIT (OUTPATIENT)
Dept: PSYCHIATRY | Facility: CLINIC | Age: 47
End: 2022-10-28

## 2022-10-28 DIAGNOSIS — F41.1 GAD (GENERALIZED ANXIETY DISORDER): Primary | ICD-10-CM

## 2022-10-28 DIAGNOSIS — F43.10 PTSD (POST-TRAUMATIC STRESS DISORDER): ICD-10-CM

## 2022-10-28 RX ORDER — DESVENLAFAXINE 50 MG/1
50 TABLET, EXTENDED RELEASE ORAL DAILY
Qty: 30 TABLET | Refills: 3 | Status: SHIPPED | OUTPATIENT
Start: 2022-10-28

## 2022-10-28 NOTE — BH TREATMENT PLAN
Opal Rick  1975       Date of Initial Treatment Plan: 10/10/2022   Date of Current Treatment Plan: 10/10/2022    Treatment Plan Number 1     Strengths/Personal Resources for Self Care: Motivated for treatment, positive relationship with medication provider, supportive family    Diagnosis:   1  Moderate episode of recurrent major depressive disorder ([facility] [address] )     2  Generalized anxiety disorder with panic attacks     3  Nicotine dependence, cigarettes, uncomplicated         Area of Needs: Stress management, coping with chronic pain      Long Term Goal 1: "Return to feeling like me again"    Target Date: 3/10/2023  Completion Date: N/A         Short Term Objectives for Goal 1: Uncover strengths and interests, Identify and practice at least 2 grounding techniques daily, Be able to communicate needs, wants & boundaries to family effectively    GOAL 1: Modality: Individual 2x per month   Completion Date 3/10/2023 and The person(s) responsible for carrying out the plan is  the client and this writer  Behavioral Health Treatment Plan [facility]: Diagnosis and Treatment Plan explained to Lamberto Roblero relates understanding diagnosis and is agreeable to Treatment Plan  Client Comments : Please share your thoughts, feelings, need and/or experiences regarding your treatment plan: "Sounds good"

## 2022-10-28 NOTE — BH TREATMENT PLAN
TREATMENT PLAN (Medication Management Only)        [facility]    Name and Date of Birth:  Madhavi Frias 56 y o  1975  Date of Treatment Plan: October 28, 2022  Diagnosis/Diagnoses:    1  ADITYA (generalized anxiety disorder)    2  PTSD (post-traumatic stress disorder)      Strengths/Personal Resources for Self-Care: supportive family, supportive friends, taking medications as prescribed, ability to communicate well, ability to listen, ability to reason, average or above intelligence, general fund of knowledge, motivation for treatment  Area/Areas of need (in own words): anxiety symptoms, depressive symptoms  1  Long Term Goal: continue improvement in anxiety  Target Date:3 months - 1/28/2023  Person/Persons responsible for completion of goal: Lenka Stanley  2  Short Term Objective (s) - How will we reach this goal?:   A  Provider new recommended medication/dosage changes and/or continue medication(s): continue current medications as prescribed  B  N/A   C  N/A  Target Date:3 months - 1/28/2023  Person/Persons Responsible for Completion of Goal: Lenka Stanley  Progress Towards Goals: continuing treatment  Treatment Modality: medication management every 3 months  Review due 180 days from date of this plan: 3 months - 1/28/2023  Expected length of service: ongoing treatment  My Physician/PA/NP and I have developed this plan together and I agree to work on the goals and objectives  I understand the treatment goals that were developed for my treatment
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
## 2022-10-30 DIAGNOSIS — E87.6 HYPOKALEMIA: ICD-10-CM

## 2022-10-31 RX ORDER — POTASSIUM CHLORIDE 1500 MG/1
TABLET, FILM COATED, EXTENDED RELEASE ORAL
Qty: 60 TABLET | Refills: 2 | Status: SHIPPED | OUTPATIENT
Start: 2022-10-31

## 2022-11-03 ENCOUNTER — CONSULT (OUTPATIENT)
Dept: SURGERY | Facility: CLINIC | Age: 47
End: 2022-11-03

## 2022-11-03 ENCOUNTER — SOCIAL WORK (OUTPATIENT)
Dept: BEHAVIORAL/MENTAL HEALTH CLINIC | Facility: CLINIC | Age: 47
End: 2022-11-03

## 2022-11-03 ENCOUNTER — ANESTHESIA EVENT (OUTPATIENT)
Dept: RADIOLOGY | Facility: HOSPITAL | Age: 47
End: 2022-11-03

## 2022-11-03 VITALS
TEMPERATURE: 97.3 F | WEIGHT: 208.8 LBS | HEIGHT: 62 IN | SYSTOLIC BLOOD PRESSURE: 118 MMHG | OXYGEN SATURATION: 91 % | DIASTOLIC BLOOD PRESSURE: 74 MMHG | HEART RATE: 100 BPM | BODY MASS INDEX: 38.42 KG/M2

## 2022-11-03 DIAGNOSIS — F33.1 MODERATE EPISODE OF RECURRENT MAJOR DEPRESSIVE DISORDER (HCC): Primary | ICD-10-CM

## 2022-11-03 DIAGNOSIS — F17.210 NICOTINE DEPENDENCE, CIGARETTES, UNCOMPLICATED: ICD-10-CM

## 2022-11-03 DIAGNOSIS — F41.1 GENERALIZED ANXIETY DISORDER WITH PANIC ATTACKS: ICD-10-CM

## 2022-11-03 DIAGNOSIS — F41.0 GENERALIZED ANXIETY DISORDER WITH PANIC ATTACKS: ICD-10-CM

## 2022-11-03 DIAGNOSIS — R22.31 AXILLARY LUMP, RIGHT: ICD-10-CM

## 2022-11-03 NOTE — PROGRESS NOTES
[facility] History and Physical Note:    Assessment:  Prominent, asymmetrical right axillary fat roll  It is this area that she states hurts and she desires removal   Left axilla fairly normal in physical exam    Pertinent labs reviewed  I reviewed the CMP and CBC from 22 October  Pertinent images and available reads personally reviewed  Pertinent notes reviewed  I read the oncology note from 24 October 2022    Plan:  1  Continue with CT chest, bilateral diagnostic mammogram and ultrasound as ordered  2  Follow up with me after the studies for counseling regarding excision  Chief Complaint:  “I am here for the lump”    HPI  Patient is a 30-year-old woman with a long list of medical issues including obesity who was being followed by Hematology Oncology for a mild enlargement of her spleen  They palpated a small lesion near the right axilla which was felt to be benign in character  She was referred to my office for further evaluation  Of note, she has a CT chest, bilateral diagnostic mammogram and ultrasound of the palpable abnormality ordered but not yet performed  PMH:  Past Medical History:   Diagnosis Date   • Abdominal pain    • Acid reflux    • Acute renal failure (HCC)     multiple episodes   • Anemia    • Anxiety    • Anxiety 3/31/2021   • Arthritis    • Asthma    • Back pain    • Chronic pain    • DDD (degenerative disc disease), lumbar    • Depression    • Disease of thyroid gland     had surgery and now hypo   • DVT (deep venous thrombosis) ([facility] [address] ) 2009    s/p ankle fracture   • Headache    • History of transfusion    • Hypercalcemia    • Hyperlipidemia    • Hyperthyroidism    • Hypocalcemia     post op 2016   • Kidney stone    • Lightheadedness    • Migraine    • Obesity    • Palpitations    • Psychiatric disorder     anxiety depression   • PTSD (post-traumatic stress disorder) 10/28/2022   • Seizures ([address] )     petit mal x1  4 years ago- all tests were neg     • SOB (shortness of breath)    • Spondylolisthesis of lumbar region    • Treatment     spinal pain injections  last was 2016   • Wears glasses        PSH:  Past Surgical History:   Procedure Laterality Date   • BACK SURGERY      L4-5, S1-fusion-plate/screws   •  SECTION      x5   • CYSTOCELE REPAIR  2017   • CYSTOSCOPY     • DISCOGRAM     • HYSTERECTOMY     • PARATHYROIDECTOMY     • SD ANTERIOR COLPORRAPHY RPR CYSTOCELE W/CYSTO N/A 2017    Procedure: CYSTOCELE REPAIR;  Surgeon: Annabelle Cohen MD;  Location: [address] [place] [address];  Service: Gynecology   • SD ARTHRODESIS POSTERIOR INTERBODY LUMBAR N/A 2016    Procedure: L4-S1 LUMBAR LAMINECTOMY/DECOMPRESSION;  INSTRUMENTED POSTEROLATERAL FUSION/ INTERBODY L5-S1; ALLOGRAFT AND AUTOGRAFT (IMPULSE) ;   Surgeon: Bell Silvestre MD;  Location:  MAIN OR;  Service: Orthopedics   • SD CYSTOURETHROSCOPY,URETER CATHETER Bilateral 2018    Procedure: INSERTION URETERAL CATHETERS PREOP;  Surgeon: Haryd Jorgensen MD;  Location: [address] [place] [address];  Service: Urology   • SD SLING OPER STRES INCONTINENCE N/A 2017    Procedure: MID URETHRAL SLING;  Surgeon: Mercedez Bonilla MD;  Location: [address] [place] [address];  Service: Urology   • SD SUPRACERV ABD HYSTERECTOMY N/A 2018    Procedure: SUPRACERVICAL HYSTERECTOMY;  Surgeon: Annabelle Cohen MD;  Location: [address] [place] [address];  Service: Gynecology   • THYROIDECTOMY     • TONSILECTOMY AND ADNOIDECTOMY     • TONSILLECTOMY     • TUBAL LIGATION         Home Meds:  Current Outpatient Medications on File Prior to Visit   Medication Sig Dispense Refill   • ALPRAZolam ER (XANAX XR) 2 MG 24 hr tablet Take 1 tablet (2 mg total) by mouth 2 (two) times a day 60 tablet 3   • baclofen 10 mg tablet Take 10 mg by mouth 3 (three) times a day as needed     • calcitriol (ROCALTROL) 0 5 MCG capsule Take 1 capsule (0 5 mcg total) by mouth daily (Patient taking differently: Take 0 5 mcg by mouth 2 (two) times a day) 90 capsule 1   • Calcium Carb-Cholecalciferol (Calcium 500+D) 500-400 MG-UNIT TABS Take 600 mg by mouth 3 times a day 360 tablet 0   • Calcium Carbonate-Vitamin D3 600-400 MG-UNIT TABS TAKE 1 TABLET BY MOUTH 6 (SIX) TIMES A  tablet 1   • desvenlafaxine succinate (PRISTIQ) 50 mg 24 hr tablet Take 1 tablet (50 mg total) by mouth daily 30 tablet 3   • doxepin (SINEquan) 25 mg capsule Take 1 capsule (25 mg total) by mouth daily at bedtime 30 capsule 3   • fenofibrate 160 MG tablet Take 1 tablet (160 mg total) by mouth daily 90 tablet 3   • ferrous sulfate 325 (65 Fe) mg tablet Take 325 mg by mouth Once Monday, wed, Friday-Last dose 4/1/22      • ibuprofen (MOTRIN) 600 mg tablet Take 1 tablet (600 mg total) by mouth every 6 (six) hours as needed for mild pain or moderate pain 16 tablet 0   • levothyroxine 150 mcg tablet TAKE 1 TABLET BY MOUTH EVERY DAY (Patient taking differently: 150 mcg Take 6 days a week) 90 tablet 1   • Lidocaine-Menthol 4-1 % PTCH if needed       • magnesium Oxide (MAG-OX) 400 mg TABS TAKE 1 TABLET (400 MG TOTAL) BY MOUTH THREE (THREE) TIMES A DAY 9PM 90 tablet 3   • omeprazole (PriLOSEC) 40 MG capsule Take 1 capsule (40 mg total) by mouth daily 30 capsule 2   • oxyCODONE-acetaminophen (PERCOCET)  mg per tablet Take 1 tablet by mouth 4 (four) times a day     • pantoprazole (PROTONIX) 40 mg tablet Take 1 tablet (40 mg total) by mouth daily Take 1/2 hour prior to breakfast daily in a m  (Patient not taking: No sig reported) 30 tablet 3   • potassium chloride (K-DUR,KLOR-CON) 20 mEq tablet Take 20 mEq by mouth 2 (two) times a day     • Potassium Chloride ER 20 MEQ TBCR TAKE 1 TABLET BY MOUTH TWICE A DAY AFTER A MEAL 60 tablet 2   • pramipexole (MIRAPEX) 0 5 mg tablet Take 0 5 mg by mouth daily at bedtime     • pregabalin (LYRICA) 100 mg capsule TAKE 1 CAPSULE BY MOUTH TWICE A DAY AS DIRECTED FOR 30 DAYS     • [DISCONTINUED] hydrocortisone (ANUSOL-HC) 25 mg suppository Insert 1 suppository (25 mg total) into the rectum 2 (two) times a day (Patient taking differently: Insert 25 mg into the rectum 2 (two) times a day as needed) 12 suppository 0   • [DISCONTINUED] methocarbamol (ROBAXIN) 500 mg tablet Take 1 tablet (500 mg total) by mouth 2 (two) times a day 20 tablet 0     No current facility-administered medications on file prior to visit  Allergies: Allergies   Allergen Reactions   • Hydrocodone-Acetaminophen Rash   • Vicodin [Hydrocodone-Acetaminophen] Rash   • Morphine And Related GI Intolerance     Nausea/vomiting     • Adhesive [Medical Tape] Rash     Bandaids       Social Hx:  Social History     Socioeconomic History   • Marital status: /Civil Union     Spouse name: Not on file   • Number of children: Not on file   • Years of education: 12   • Highest education level: Not on file   Occupational History   • Not on file   Tobacco Use   • Smoking status: Current Every Day Smoker     Packs/day:  00     Years:  00     Pack years:      Types: Cigarettes   • Smokeless tobacco: Never Used   Vaping Use   • Vaping Use: Never used   Substance and Sexual Activity   • Alcohol use: Not Currently   • Drug use: No   • Sexual activity: Yes     Birth control/protection: Surgical     Comment: hysterectomy   Other Topics Concern   • Not on file   Social History Narrative    Most recent tobacco use screenin2018      General stress level:   Medium       Exercise level:   Occasional       Diet:   Regular      Caffeine intake:   Occasional     As per Romi Dorantes      Social Determinants of Health     Financial Resource Strain: Not on file   Food Insecurity: No Food Insecurity   • Worried About Running Out of Food in the Last Year: Never true   • Ran Out of Food in the Last Year: Never true   Transportation Needs: No Transportation Needs   • Lack of Transportation (Medical): No   • Lack of Transportation (Non-Medical):  No   Physical Activity: Not on file   Stress: Not on file   Social Connections: Not on file Intimate Partner Violence: Not on file   Housing Stability: Low Risk    • Unable to Pay for Housing in the Last Year: No   • Number of Places Lived in the Last Year: 1   • Unstable Housing in the Last Year: No        Family Hx:    Family History   Problem Relation Age of Onset   • Diabetes Mother    • Hypertension Mother    • Hyperlipidemia Father    • Arrhythmia Father    • Lung cancer Father    • Diabetes Father          Review of Systems   Constitutional: Negative for chills and fever  HENT:        History of thyroidectomy on medications   Respiratory: Negative  Cardiovascular: Negative  Gastrointestinal: Negative  Genitourinary: Negative  Musculoskeletal: Negative  Neurological: Negative  Hematological: Negative  All other systems reviewed and are negative  /74 (BP Location: Right arm, Patient Position: Sitting, Cuff Size: Large)   Pulse 100   Temp (!) 97 3 °F (36 3 °C) (Core)   Ht 5' 2" (1 575 m)   Wt 94 7 kg (208 lb 12 8 oz)   LMP 12/06/2018 Comment: partial hysterectomy   SpO2 91%   BMI 38 19 kg/m²       Physical Exam  Vitals reviewed  Constitutional:       General: She is not in acute distress  Appearance: She is obese  Eyes:      Pupils: Pupils are equal, round, and reactive to light  Cardiovascular:      Rate and Rhythm: Normal rate and regular rhythm  Pulmonary:      Effort: Pulmonary effort is normal       Breath sounds: Normal breath sounds  Abdominal:      General: There is no distension  Palpations: Abdomen is soft  Musculoskeletal:         General: Normal range of motion  Cervical back: Normal range of motion and neck supple  Lymphadenopathy:      Cervical: No cervical adenopathy  Skin:     General: Skin is warm and dry  Neurological:      General: No focal deficit present  Mental Status: She is alert  Patient points to a roll of adipose tissue in her right axilla  She states this is what hurts    On exam, this could represent a lipoma  She does have an asymmetrical fat pad deposition in this area that is not like the left axilla      Pertinent labs reviewed  I reviewed the CMP and CBC from 22 October  Pertinent images and available reads personally reviewed    Pertinent notes reviewed  I read the oncology note from 24 October 2022     Trisha Osman MD [id number] [facility]  [phone]

## 2022-11-07 ENCOUNTER — APPOINTMENT (OUTPATIENT)
Dept: LAB | Facility: HOSPITAL | Age: 47
End: 2022-11-07

## 2022-11-07 ENCOUNTER — HOSPITAL ENCOUNTER (OUTPATIENT)
Dept: RADIOLOGY | Facility: HOSPITAL | Age: 47
Discharge: HOME | End: 2022-11-07

## 2022-11-07 DIAGNOSIS — R89.9 ABNORMAL LABORATORY TEST: ICD-10-CM

## 2022-11-07 DIAGNOSIS — E89.2 POSTSURGICAL HYPOPARATHYROIDISM (HCC): ICD-10-CM

## 2022-11-07 DIAGNOSIS — R05.8 OTHER COUGH: ICD-10-CM

## 2022-11-07 DIAGNOSIS — E89.2 POSTSURGICAL HYPOPARATHYROIDISM (HCC): Primary | ICD-10-CM

## 2022-11-07 DIAGNOSIS — R22.31 AXILLARY LUMP, RIGHT: ICD-10-CM

## 2022-11-07 LAB
CA-I BLD-SCNC: 1.05 MMOL/L (ref 1.12–1.32)
CALCIUM SERPL-MCNC: 8.8 MG/DL (ref 8.3–10.1)
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
## 2022-11-08 ENCOUNTER — TELEPHONE (OUTPATIENT)
Dept: ENDOCRINOLOGY | Facility: CLINIC | Age: 47
End: 2022-11-08

## 2022-11-08 NOTE — TELEPHONE ENCOUNTER
Patient called [facility] office on 11/7 and spoke to  and requested lab slips printed from Dr Trixie Whatley and sent to her and wanted a lab slip for calcium  Message was forwarded to me  Called patient back and left message that Dr Trixie Whatley did not order lab for calcium with recent labs however, she was in ED on 10/24 and there was a calcium lab completed, if she did not receive the results she can call her PCP to have them reviewed

## 2022-11-09 ENCOUNTER — HOSPITAL ENCOUNTER (EMERGENCY)
Facility: HOSPITAL | Age: 47
Discharge: HOME | End: 2022-11-09
Attending: EMERGENCY MEDICINE

## 2022-11-09 VITALS
OXYGEN SATURATION: 98 % | BODY MASS INDEX: 37.9 KG/M2 | TEMPERATURE: 97.1 F | DIASTOLIC BLOOD PRESSURE: 77 MMHG | RESPIRATION RATE: 18 BRPM | HEART RATE: 65 BPM | SYSTOLIC BLOOD PRESSURE: 119 MMHG | WEIGHT: 207.23 LBS

## 2022-11-09 DIAGNOSIS — R20.2 PARESTHESIAS: Primary | ICD-10-CM

## 2022-11-09 DIAGNOSIS — E83.51 HYPOCALCEMIA: ICD-10-CM

## 2022-11-09 LAB
ANION GAP SERPL CALCULATED.3IONS-SCNC: 14 MMOL/L (ref 4–13)
BASOPHILS # BLD AUTO: 0.04 THOUSANDS/ÂΜL (ref 0–0.1)
BASOPHILS NFR BLD AUTO: 1 % (ref 0–1)
BUN SERPL-MCNC: 16 MG/DL (ref 5–25)
CA-I BLD-SCNC: 1.06 MMOL/L (ref 1.12–1.32)
CALCIUM SERPL-MCNC: 8.8 MG/DL (ref 8.3–10.1)
CHLORIDE SERPL-SCNC: 100 MMOL/L (ref 96–108)
CO2 SERPL-SCNC: 25 MMOL/L (ref 21–32)
CREAT SERPL-MCNC: 1.08 MG/DL (ref 0.6–1.3)
EOSINOPHIL # BLD AUTO: 0.09 THOUSAND/ÂΜL (ref 0–0.61)
EOSINOPHIL NFR BLD AUTO: 2 % (ref 0–6)
ERYTHROCYTE [DISTWIDTH] IN BLOOD BY AUTOMATED COUNT: 16.7 % (ref 11.6–15.1)
GFR SERPL CREATININE-BSD FRML MDRD: 61 ML/MIN/1.73SQ M
GLUCOSE SERPL-MCNC: 119 MG/DL (ref 65–140)
HCT VFR BLD AUTO: 41.2 % (ref 34.8–46.1)
HGB BLD-MCNC: 13.6 G/DL (ref 11.5–15.4)
IMM GRANULOCYTES # BLD AUTO: 0.03 THOUSAND/UL (ref 0–0.2)
IMM GRANULOCYTES NFR BLD AUTO: 1 % (ref 0–2)
LYMPHOCYTES # BLD AUTO: 1.77 THOUSANDS/ÂΜL (ref 0.6–4.47)
LYMPHOCYTES NFR BLD AUTO: 32 % (ref 14–44)
MCH RBC QN AUTO: 28.3 PG (ref 26.8–34.3)
MCHC RBC AUTO-ENTMCNC: 33 G/DL (ref 31.4–37.4)
MCV RBC AUTO: 86 FL (ref 82–98)
MONOCYTES # BLD AUTO: 0.38 THOUSAND/ÂΜL (ref 0.17–1.22)
MONOCYTES NFR BLD AUTO: 7 % (ref 4–12)
NEUTROPHILS # BLD AUTO: 3.25 THOUSANDS/ÂΜL (ref 1.85–7.62)
NEUTS SEG NFR BLD AUTO: 57 % (ref 43–75)
NRBC BLD AUTO-RTO: 0 /100 WBCS
PLATELET # BLD AUTO: 183 THOUSANDS/UL (ref 149–390)
PMV BLD AUTO: 10.5 FL (ref 8.9–12.7)
POTASSIUM SERPL-SCNC: 3.9 MMOL/L (ref 3.5–5.3)
RBC # BLD AUTO: 4.8 MILLION/UL (ref 3.81–5.12)
SODIUM SERPL-SCNC: 139 MMOL/L (ref 135–147)
WBC # BLD AUTO: 5.56 THOUSAND/UL (ref 4.31–10.16)

## 2022-11-09 RX ORDER — CALCIUM GLUCONATE 20 MG/ML
1 INJECTION, SOLUTION INTRAVENOUS ONCE
Status: COMPLETED | OUTPATIENT
Start: 2022-11-09 | End: 2022-11-09

## 2022-11-09 RX ADMIN — CALCIUM GLUCONATE 1 G: 20 INJECTION, SOLUTION INTRAVENOUS at 15:37

## 2022-11-09 NOTE — ED PROVIDER NOTES
History  Chief Complaint   Patient presents with   • Numbness     States yesterday started with her typical symptoms of low calcium , numbness and chest tightness  Had labs drawn yesterday for same     60-year-old female with past history of hypothyroidism after thyroidectomy, hypocalcemia, anxiety, depression, hyperlipidemia, anemia, degenerative disc disease, migraine, presents to the ED for evaluation of generalized tingling sensation throughout her body since early this morning  Patient has had recurrent episodes of hypocalcemia requiring calcium bolus in the emergency department  Patient is followed by her endocrinologist   Patient has an appointment scheduled in the future for follow-up of her hypocalcemia  Patient thinks she needs some IV repletion of calcium again today  History provided by:  Patient      Prior to Admission Medications   Prescriptions Last Dose Informant Patient Reported? Taking?    ALPRAZolam ER (XANAX XR) 2 MG 24 hr tablet 11/9/2022 at Unknown time Self No Yes   Sig: Take 1 tablet (2 mg total) by mouth 2 (two) times a day   Calcium Carb-Cholecalciferol (Calcium 500+D) 500-400 MG-UNIT TABS 11/9/2022 at Unknown time Self No Yes   Sig: Take 600 mg by mouth 3 times a day   Calcium Carbonate-Vitamin D3 600-400 MG-UNIT TABS 11/9/2022 at Unknown time Self No Yes   Sig: TAKE 1 TABLET BY MOUTH 6 (SIX) TIMES A DAY   Lidocaine-Menthol 4-1 % PTCH  Self Yes No   Sig: if needed     Potassium Chloride ER 20 MEQ TBCR 11/8/2022 at Unknown time  No Yes   Sig: TAKE 1 TABLET BY MOUTH TWICE A DAY AFTER A MEAL   baclofen 10 mg tablet 11/8/2022 at Unknown time Self Yes Yes   Sig: Take 10 mg by mouth 3 (three) times a day as needed   calcitriol (ROCALTROL) 0 5 MCG capsule   No No   Sig: Take 1 capsule (0 5 mcg total) by mouth daily   Patient taking differently: Take 0 5 mcg by mouth 2 (two) times a day   desvenlafaxine succinate (PRISTIQ) 50 mg 24 hr tablet 11/8/2022 at Unknown time  No Yes   Sig: Take 1 tablet (50 mg total) by mouth daily   doxepin (SINEquan) 25 mg capsule 11/8/2022 at Unknown time Self No Yes   Sig: Take 1 capsule (25 mg total) by mouth daily at bedtime   fenofibrate 160 MG tablet 11/8/2022 at Unknown time Self No Yes   Sig: Take 1 tablet (160 mg total) by mouth daily   ferrous sulfate 325 (65 Fe) mg tablet 11/8/2022 at Unknown time Self Yes Yes   Sig: Take 325 mg by mouth Once Monday, wed, Friday-Last dose 4/1/22    ibuprofen (MOTRIN) 600 mg tablet Unknown at Unknown time Self No No   Sig: Take 1 tablet (600 mg total) by mouth every 6 (six) hours as needed for mild pain or moderate pain   levothyroxine 150 mcg tablet   No No   Sig: TAKE 1 TABLET BY MOUTH EVERY DAY   Patient taking differently: 150 mcg Take 6 days a week   magnesium Oxide (MAG-OX) 400 mg TABS 11/8/2022 at Unknown time Self No Yes   Sig: TAKE 1 TABLET (400 MG TOTAL) BY MOUTH THREE (THREE) TIMES A DAY 9PM   omeprazole (PriLOSEC) 40 MG capsule 11/8/2022 at Unknown time Self No Yes   Sig: Take 1 capsule (40 mg total) by mouth daily   oxyCODONE-acetaminophen (PERCOCET)  mg per tablet 11/8/2022 at Unknown time Self Yes Yes   Sig: Take 1 tablet by mouth 4 (four) times a day   pantoprazole (PROTONIX) 40 mg tablet   No No   Sig: Take 1 tablet (40 mg total) by mouth daily Take 1/2 hour prior to [facility]    Patient not taking: No sig reported   potassium chloride (K-DUR,KLOR-CON) 20 mEq tablet 11/8/2022 at Unknown time  Yes Yes   Sig: Take 20 mEq by mouth 2 (two) times a day   pramipexole (MIRAPEX) 0 5 mg tablet 11/8/2022 at Unknown time Self Yes Yes   Sig: Take 0 5 mg by mouth daily at bedtime   pregabalin (LYRICA) 100 mg capsule 11/8/2022 at Unknown time Self Yes Yes   Sig: TAKE 1 CAPSULE BY MOUTH TWICE A DAY AS DIRECTED FOR 30 DAYS      Facility-Administered Medications: None       Past Medical History:   Diagnosis Date   • Abdominal pain    • Acid reflux    • Acute renal failure (HCC)     multiple episodes   • Anemia • Anxiety    • Anxiety 3/31/2021   • Arthritis    • Asthma    • Back pain    • Chronic pain    • DDD (degenerative disc disease), lumbar    • Depression    • Disease of thyroid gland     had surgery and now hypo   • DVT (deep venous thrombosis) ([facility] [address] )     s/p ankle fracture   • Headache    • History of transfusion    • Hypercalcemia    • Hyperlipidemia    • Hyperthyroidism    • Hypocalcemia     post op 2016   • Kidney stone    • Lightheadedness    • Migraine    • Obesity    • Palpitations    • Psychiatric disorder     anxiety depression   • PTSD (post-traumatic stress disorder) 10/28/2022   • Seizures ([facility] [address] )     petit mal x1  4 years ago- all tests were neg  • SOB (shortness of breath)    • Spondylolisthesis of lumbar region    • Treatment     spinal pain injections  last was 2016   • Wears glasses        Past Surgical History:   Procedure Laterality Date   • BACK SURGERY      L4-5, S1-fusion-plate/screws   •  SECTION      x5   • CYSTOCELE REPAIR  2017   • CYSTOSCOPY     • DISCOGRAM     • HYSTERECTOMY     • PARATHYROIDECTOMY     • VA ANTERIOR COLPORRAPHY RPR CYSTOCELE W/CYSTO N/A 2017    Procedure: CYSTOCELE REPAIR;  Surgeon: Samir Marrero MD;  Location: [address] [place] [address];  Service: Gynecology   • VA ARTHRODESIS POSTERIOR INTERBODY LUMBAR N/A 2016    Procedure: L4-S1 LUMBAR LAMINECTOMY/DECOMPRESSION;  INSTRUMENTED POSTEROLATERAL FUSION/ INTERBODY L5-S1; ALLOGRAFT AND AUTOGRAFT (IMPULSE) ;   Surgeon: Dai Post MD;  Location:  MAIN OR;  Service: Orthopedics   • VA CYSTOURETHROSCOPY,URETER CATHETER Bilateral 2018    Procedure: INSERTION URETERAL CATHETERS PREOP;  Surgeon: Rosa Chambers MD;  Location: [address] [place] [address];  Service: Urology   • VA SLING OPER STRES INCONTINENCE N/A 2017    Procedure: MID URETHRAL SLING;  Surgeon: Sunny Bearden MD;  Location: [address] [place] [address];  Service: Urology   • VA SUPRACERV ABD HYSTERECTOMY N/A 2018    Procedure: SUPRACERVICAL HYSTERECTOMY; Surgeon: Viola Nolasco MD;  Location: [facility];  Service: Gynecology   • THYROIDECTOMY     • TONSILECTOMY AND ADNOIDECTOMY     • TONSILLECTOMY     • TUBAL LIGATION         Family History   Problem Relation Age of Onset   • Diabetes Mother    • Hypertension Mother    • Hyperlipidemia Father    • Arrhythmia Father    • Lung cancer Father    • Diabetes Father      I have reviewed and agree with the history as documented  E-Cigarette/Vaping   • E-Cigarette Use Never User      E-Cigarette/Vaping Substances   • Nicotine No    • THC No    • CBD No    • Flavoring No    • Other No    • Unknown No      Social History     Tobacco Use   • Smoking status: Current Every Day Smoker     Packs/day: 1 00     Years: 25 00     Pack years: 25 00     Types: Cigarettes   • Smokeless tobacco: Never Used   Vaping Use   • Vaping Use: Never used   Substance Use Topics   • Alcohol use: Not Currently   • Drug use: No       Review of Systems   Constitutional: Negative for activity change, appetite change, chills and fever  HENT: Negative for congestion and ear pain  Eyes: Negative for pain and discharge  Respiratory: Negative for cough, chest tightness, shortness of breath, wheezing and stridor  Cardiovascular: Negative for chest pain and palpitations  Gastrointestinal: Negative for abdominal distention, abdominal pain, constipation, diarrhea and nausea  Endocrine: Negative for cold intolerance  Genitourinary: Negative for dysuria, frequency and urgency  Musculoskeletal: Negative for arthralgias and back pain  Skin: Negative for color change and rash  Allergic/Immunologic: Negative for environmental allergies and food allergies  Neurological: Negative for dizziness, weakness, numbness and headaches  Paresthesia   Hematological: Negative for adenopathy  Psychiatric/Behavioral: Negative for agitation, behavioral problems and confusion  The patient is not nervous/anxious      All other systems reviewed and are negative  Physical Exam  Physical Exam  Vitals and nursing note reviewed  Constitutional:       Appearance: She is well-developed  HENT:      Head: Normocephalic and atraumatic  Eyes:      Conjunctiva/sclera: Conjunctivae normal    Cardiovascular:      Rate and Rhythm: Normal rate and regular rhythm  Heart sounds: Normal heart sounds  Pulmonary:      Effort: Pulmonary effort is normal       Breath sounds: Normal breath sounds  Abdominal:      General: Bowel sounds are normal  There is no distension  Palpations: Abdomen is soft  Tenderness: There is no abdominal tenderness  Musculoskeletal:         General: Normal range of motion  Cervical back: Normal range of motion and neck supple  Skin:     General: Skin is warm and dry  Neurological:      Mental Status: She is alert and oriented to person, place, and time  Psychiatric:         Behavior: Behavior normal          Thought Content:  Thought content normal          Judgment: Judgment normal          Vital Signs  ED Triage Vitals [11/09/22 1319]   Temperature Pulse Respirations Blood Pressure SpO2   (!) 97 1 °F (36 2 °C) 86 22 135/85 97 %      Temp Source Heart Rate Source Patient Position - Orthostatic VS BP Location FiO2 (%)   Tympanic Monitor Sitting Right arm --      Pain Score       5           Vitals:    11/09/22 1501 11/09/22 1532 11/09/22 1624 11/09/22 1759   BP: 119/75 104/71 109/70 119/77   Pulse: 82 92 90 65   Patient Position - Orthostatic VS: Sitting            Visual Acuity      ED Medications  Medications   calcium gluconate 1 g in sodium chloride 0 9% 50 mL (premix) (0 g Intravenous Stopped 11/9/22 1624)       Diagnostic Studies  Results Reviewed     Procedure Component Value Units Date/Time    Basic metabolic panel [[id number]]  (Abnormal) Collected: 11/09/22 1340    Lab Status: Final result Specimen: Blood from Arm, Right Updated: 11/09/22 1545     Sodium 139 mmol/L      Potassium 3 9 mmol/L      Chloride 100 mmol/L      CO2 25 mmol/L      ANION GAP 14 mmol/L      BUN 16 mg/dL      Creatinine 1 08 mg/dL      Glucose 119 mg/dL      Calcium 8 8 mg/dL      eGFR 61 ml/min/1 73sq m     Narrative:      [place] guidelines for Chronic Kidney Disease (CKD):   •  Stage 1 with normal or high GFR (GFR > 90 mL/min/1 73 square meters)  •  Stage 2 Mild CKD (GFR = 60-89 mL/min/1 73 square meters)  •  Stage 3A Moderate CKD (GFR = 45-59 mL/min/1 73 square meters)  •  Stage 3B Moderate CKD (GFR = 30-44 mL/min/1 73 square meters)  •  Stage 4 Severe CKD (GFR = 15-29 mL/min/1 73 square meters)  •  Stage 5 End Stage CKD (GFR <15 mL/min/1 73 square meters)  Note: GFR calculation is accurate only with a steady state creatinine    CBC and differential [[id number]]  (Abnormal) Collected: 11/09/22 1340    Lab Status: Final result Specimen: Blood from Arm, Right Updated: 11/09/22 1352     WBC 5 56 Thousand/uL      RBC 4 80 Million/uL      Hemoglobin 13 6 g/dL      Hematocrit 41 2 %      MCV 86 fL      MCH 28 3 pg      MCHC 33 0 g/dL      RDW 16 7 %      MPV 10 5 fL      Platelets 434 Thousands/uL      nRBC 0 /100 WBCs      Neutrophils Relative 57 %      Immat GRANS % 1 %      Lymphocytes Relative 32 %      Monocytes Relative 7 %      Eosinophils Relative 2 %      Basophils Relative 1 %      Neutrophils Absolute 3 25 Thousands/µL      Immature Grans Absolute 0 03 Thousand/uL      Lymphocytes Absolute 1 77 Thousands/µL      Monocytes Absolute 0 38 Thousand/µL      Eosinophils Absolute 0 09 Thousand/µL      Basophils Absolute 0 04 Thousands/µL     Calcium, ionized [[id number]]  (Abnormal) Collected: 11/09/22 1340    Lab Status: Final result Specimen: Blood from Arm, Right Updated: 11/09/22 1351     Calcium, Ionized 1 06 mmol/L                  No orders to display              Procedures  ECG 12 Lead Documentation Only    Date/Time: 11/9/2022 2:17 PM  Performed by: Esequiel Kaplan DO  Authorized by: Esequiel Kaplan DO Indications / Diagnosis:  Paresthesia  ECG reviewed by me, the ED Provider: yes    Previous ECG:     Previous ECG:  Compared to current    Similarity:  No change    Comparison to cardiac monitor: Yes    Interpretation:     Interpretation: non-specific    Comments:      Sinus rhythm, rate 70, normal axis, normal intervals, no acute ST elevations noted, low amplitude to use it grow suggesting nonspecific T-wave abnormality that is unchanged from previous study  ED Course  ED Course as of 11/09/22 1800   Wed Nov 09, 2022   1741 Patient examined bedside  Patient is feeling better after IV calcium  Patient will be discharged  MDM  Number of Diagnoses or Management Options  Hypocalcemia: new and requires workup  Paresthesias: new and requires workup  Diagnosis management comments: Obtain CBC, BMP, ionized calcium   Replete calcium if low  Obtain EKG       Amount and/or Complexity of Data Reviewed  Clinical lab tests: reviewed and ordered  Tests in the medicine section of CPT®: ordered and reviewed  Review and summarize past medical records: yes  Independent visualization of images, tracings, or specimens: yes    Risk of Complications, Morbidity, and/or Mortality  General comments: EKG is at baseline  Patient's ionized calcium was low  Patient repleted with IV calcium bolus then she feels better  At this time patient discharged home with [facility] PCP as well as her endocrinologist  Close return instructions given to return to the ER for any worsening symptoms  Patient agrees with discharge plan  Patient well appearing at time of discharge  Please Note: Fluency Direct voice recognition software may have been used in the creation of this document  Wrong words or sound a like substitutions may have occurred due to the inherent limitations of the voice software         Patient Progress  Patient progress: improved      Disposition  Final diagnoses: Paresthesias   Hypocalcemia     Time reflects when diagnosis was documented in both MDM as applicable and the Disposition within this note     Time User Action Codes Description Comment    11/9/2022  5:51 PM Maldonado Mirza Add [R20 2] Paresthesias     11/9/2022  5:51 PM Maldonado Mirza Add [E83 51] Hypocalcemia       ED Disposition     ED Disposition   Discharge    Condition   Stable    Date/Time   Wed Nov 9, 2022  5:51 PM    [address] discharge to home/self care  Follow-up Information     Follow up With Specialties Details Why Contact Info    Sharron Briceno MD Internal Medicine In 2 days  [facility]  [address] [place] [address]  [phone]      Your endocrinologist  Schedule an appointment as soon as possible for a visit             Patient's Medications   Discharge Prescriptions    No medications on file       No discharge procedures on file      PDMP Review       Value Time User    PDMP Reviewed  Yes 10/6/2022 12:12 PM Sharron Briceno MD          ED Provider  Electronically Signed by           Rosalie Ramos DO  11/09/22 1800

## 2022-11-10 ENCOUNTER — CONSULT (OUTPATIENT)
Dept: CARDIOLOGY CLINIC | Facility: CLINIC | Age: 47
End: 2022-11-10

## 2022-11-10 ENCOUNTER — HOSPITAL ENCOUNTER (EMERGENCY)
Facility: HOSPITAL | Age: 47
Discharge: HOME | End: 2022-11-10
Attending: EMERGENCY MEDICINE

## 2022-11-10 ENCOUNTER — APPOINTMENT (EMERGENCY)
Dept: RADIOLOGY | Facility: HOSPITAL | Age: 47
End: 2022-11-10

## 2022-11-10 VITALS
HEART RATE: 79 BPM | HEIGHT: 62 IN | BODY MASS INDEX: 38.09 KG/M2 | WEIGHT: 207 LBS | TEMPERATURE: 97.6 F | OXYGEN SATURATION: 91 % | DIASTOLIC BLOOD PRESSURE: 76 MMHG | SYSTOLIC BLOOD PRESSURE: 110 MMHG

## 2022-11-10 VITALS
RESPIRATION RATE: 18 BRPM | HEART RATE: 89 BPM | DIASTOLIC BLOOD PRESSURE: 82 MMHG | SYSTOLIC BLOOD PRESSURE: 141 MMHG | TEMPERATURE: 97.1 F | OXYGEN SATURATION: 98 %

## 2022-11-10 DIAGNOSIS — S53.401A SPRAIN OF RIGHT ELBOW, INITIAL ENCOUNTER: Primary | ICD-10-CM

## 2022-11-10 DIAGNOSIS — R94.31 ABNORMAL ELECTROCARDIOGRAM (ECG) (EKG): ICD-10-CM

## 2022-11-10 DIAGNOSIS — R07.9 CHEST PAIN, UNSPECIFIED TYPE: Primary | ICD-10-CM

## 2022-11-10 RX ORDER — IBUPROFEN 600 MG/1
600 TABLET ORAL ONCE
Status: DISCONTINUED | OUTPATIENT
Start: 2022-11-10 | End: 2022-11-11 | Stop reason: HOSPADM

## 2022-11-10 RX ORDER — ACETAMINOPHEN 325 MG/1
650 TABLET ORAL ONCE
Status: COMPLETED | OUTPATIENT
Start: 2022-11-10 | End: 2022-11-10

## 2022-11-10 RX ADMIN — ACETAMINOPHEN 650 MG: 325 TABLET, FILM COATED ORAL at 23:11
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
## 2022-11-11 LAB
ATRIAL RATE: 70 BPM
P AXIS: 48 DEGREES
PR INTERVAL: 180 MS
QRS AXIS: 27 DEGREES
QRSD INTERVAL: 84 MS
QT INTERVAL: 424 MS
QTC INTERVAL: 457 MS
T WAVE AXIS: 47 DEGREES
VENTRICULAR RATE: 70 BPM

## 2022-11-11 NOTE — DISCHARGE INSTRUCTIONS
Keep affected extremity in sling while awake  Return to the ER for further concerns or worsening symptoms  Follow up with your primary care physician and orthopedics in 1-2 days  Continue tylenol as needed for pain

## 2022-11-11 NOTE — ED PROVIDER NOTES
History  Chief Complaint   Patient presents with   • Arm Pain     R arm pain  Pt fell yesterday and fell on r side onto carpet while standing  Denies hitting head and denies loc     Patient is a 67-year-old right-hand-dominant female that presents to the emergency department with complaint of right elbow pain after mechanical fall yesterday  Pain is worse with range of motion  Patient has not taken any medication for pain relief  History provided by:  Patient   used: No    Elbow Pain  Location:  Elbow  Elbow location:  R elbow  Injury: yes    Mechanism of injury: fall    Fall:     Fall occurred:  Walking    Impact surface:  Unable to specify    Entrapped after fall: no    Pain details:     Quality:  Aching    Radiates to:  Does not radiate    Severity:  Mild    Onset quality:  Sudden    Timing:  Constant    Progression:  Unchanged  Handedness:  Right-handed  Dislocation: no    Foreign body present:  No foreign bodies  Tetanus status:  Unknown  Relieved by:  Nothing  Worsened by:  Nothing  Ineffective treatments:  None tried  Associated symptoms: no back pain, no fever and no neck pain        Prior to Admission Medications   Prescriptions Last Dose Informant Patient Reported? Taking?    ALPRAZolam ER (XANAX XR) 2 MG 24 hr tablet  Self No No   Sig: Take 1 tablet (2 mg total) by mouth 2 (two) times a day   Calcium Carb-Cholecalciferol (Calcium 500+D) 500-400 MG-UNIT TABS  Self No No   Sig: Take 600 mg by mouth 3 times a day   Calcium Carbonate-Vitamin D3 600-400 MG-UNIT TABS  Self No No   Sig: TAKE 1 TABLET BY MOUTH 6 (SIX) TIMES A DAY   Lidocaine-Menthol 4-1 % PTCH  Self Yes No   Sig: if needed     Potassium Chloride ER 20 MEQ TBCR   No No   Sig: TAKE 1 TABLET BY MOUTH TWICE A DAY AFTER A MEAL   baclofen 10 mg tablet  Self Yes No   Sig: Take 10 mg by mouth 3 (three) times a day as needed   calcitriol (ROCALTROL) 0 5 MCG capsule   No No   Sig: Take 1 capsule (0 5 mcg total) by mouth daily Patient taking differently: Take 0 5 mcg by mouth 2 (two) times a day   desvenlafaxine succinate (PRISTIQ) 50 mg 24 hr tablet   No No   Sig: Take 1 tablet (50 mg total) by mouth daily   doxepin (SINEquan) 25 mg capsule  Self No No   Sig: Take 1 capsule (25 mg total) by mouth daily at bedtime   fenofibrate 160 MG tablet  Self No No   Sig: Take 1 tablet (160 mg total) by mouth daily   ferrous sulfate 325 (65 Fe) mg tablet  Self Yes No   Sig: Take 325 mg by mouth Once Monday, wed, Friday-Last dose 4/1/22    ibuprofen (MOTRIN) 600 mg tablet  Self No No   Sig: Take 1 tablet (600 mg total) by mouth every 6 (six) hours as needed for mild pain or moderate pain   levothyroxine 150 mcg tablet   No No   Sig: TAKE 1 TABLET BY MOUTH EVERY DAY   Patient taking differently: 150 mcg Take 6 days a week   magnesium Oxide (MAG-OX) 400 mg TABS  Self No No   Sig: TAKE 1 TABLET (400 MG TOTAL) BY MOUTH THREE (THREE) TIMES A DAY 9PM   omeprazole (PriLOSEC) 40 MG capsule  Self No No   Sig: Take 1 capsule (40 mg total) by mouth daily   oxyCODONE-acetaminophen (PERCOCET)  mg per tablet  Self Yes No   Sig: Take 1 tablet by mouth 4 (four) times a day   pantoprazole (PROTONIX) 40 mg tablet   No No   Sig: Take 1 tablet (40 mg total) by mouth daily Take 1/2 hour prior to breakfast daily in a haily [place]    Patient not taking: No sig reported   potassium chloride (K-DUR,KLOR-CON) 20 mEq tablet   Yes No   Sig: Take 20 mEq by mouth 2 (two) times a day   pramipexole (MIRAPEX) 0 5 mg tablet  Self Yes No   Sig: Take 0 5 mg by mouth daily at bedtime   pregabalin (LYRICA) 100 mg capsule  Self Yes No   Sig: TAKE 1 CAPSULE BY MOUTH TWICE A DAY AS DIRECTED FOR 30 DAYS      Facility-Administered Medications: None       Past Medical History:   Diagnosis Date   • Abdominal pain    • Acid reflux    • Acute renal failure (HCC)     multiple episodes   • Anemia    • Anxiety    • Anxiety 3/31/2021   • Arthritis    • Asthma    • Back pain    • Chronic pain    • DDD (degenerative disc disease), lumbar    • Depression    • Disease of thyroid gland     had surgery and now hypo   • DVT (deep venous thrombosis) ([facility] [address] )     s/p ankle fracture   • Headache    • History of transfusion    • Hypercalcemia    • Hyperlipidemia    • Hyperthyroidism    • Hypocalcemia     post op 2016   • Kidney stone    • Lightheadedness    • Migraine    • Obesity    • Palpitations    • Psychiatric disorder     anxiety depression   • PTSD (post-traumatic stress disorder) 10/28/2022   • Seizures ([facility] [address] )     petit mal x1  4 years ago- all tests were neg  • SOB (shortness of breath)    • Spondylolisthesis of lumbar region    • Treatment     spinal pain injections  last was 2016   • Wears glasses        Past Surgical History:   Procedure Laterality Date   • BACK SURGERY      L4-5, S1-fusion-plate/screws   •  SECTION      x5   • CYSTOCELE REPAIR  2017   • CYSTOSCOPY     • DISCOGRAM     • HYSTERECTOMY     • PARATHYROIDECTOMY     • LA ANTERIOR COLPORRAPHY RPR CYSTOCELE W/CYSTO N/A 2017    Procedure: CYSTOCELE REPAIR;  Surgeon: Sarahi Landaverde MD;  Location: [address] [place] [address];  Service: Gynecology   • LA ARTHRODESIS POSTERIOR INTERBODY LUMBAR N/A 2016    Procedure: L4-S1 LUMBAR LAMINECTOMY/DECOMPRESSION;  INSTRUMENTED POSTEROLATERAL FUSION/ INTERBODY L5-S1; ALLOGRAFT AND AUTOGRAFT (IMPULSE) ;   Surgeon: Angelia Barker MD;  Location:  MAIN OR;  Service: Orthopedics   • LA CYSTOURETHROSCOPY,URETER CATHETER Bilateral 2018    Procedure: INSERTION URETERAL CATHETERS PREOP;  Surgeon: Og Madrigal MD;  Location: [address] [place] [address];  Service: Urology   • LA SLING OPER STRES INCONTINENCE N/A 2017    Procedure: MID URETHRAL SLING;  Surgeon: Rosendo Rodas MD;  Location: [address] [place] [address];  Service: Urology   • LA SUPRACERV ABD HYSTERECTOMY N/A 2018    Procedure: SUPRACERVICAL HYSTERECTOMY;  Surgeon: Sarahi Landaverde MD;  Location: [address] [place] [address];  Service: Gynecology   • THYROIDECTOMY     • TONSILECTOMY AND ADNOIDECTOMY     • TONSILLECTOMY     • TUBAL LIGATION         Family History   Problem Relation Age of Onset   • Diabetes Mother    • Hypertension Mother    • Hyperlipidemia Father    • Arrhythmia Father    • Lung cancer Father    • Diabetes Father      I have reviewed and agree with the history as documented  E-Cigarette/Vaping   • E-Cigarette Use Never User      E-Cigarette/Vaping Substances   • Nicotine No    • THC No    • CBD No    • Flavoring No    • Other No    • Unknown No      Social History     Tobacco Use   • Smoking status: Current Every Day Smoker     Packs/day: 1 00     Years: 25 00     Pack years: 25 00     Types: Cigarettes   • Smokeless tobacco: Never Used   Vaping Use   • Vaping Use: Never used   Substance Use Topics   • Alcohol use: Not Currently   • Drug use: No       Review of Systems   Constitutional: Negative for chills and fever  Respiratory: Negative for cough, chest tightness and shortness of breath  Gastrointestinal: Negative for abdominal pain, diarrhea, nausea and vomiting  Genitourinary: Negative for dysuria, frequency, hematuria and urgency  Musculoskeletal: Negative for back pain, joint swelling, neck pain and neck stiffness  All other systems reviewed and are negative  Physical Exam  Physical Exam  Vitals and nursing note reviewed  Constitutional:       General: She is not in acute distress  Appearance: She is well-developed  She is not diaphoretic  HENT:      Head: Normocephalic and atraumatic  Eyes:      Conjunctiva/sclera: Conjunctivae normal       Pupils: Pupils are equal, round, and reactive to light  Pulmonary:      Effort: Pulmonary effort is normal  No respiratory distress  Abdominal:      General: Bowel sounds are normal  There is no distension  Palpations: Abdomen is soft  Tenderness: There is no abdominal tenderness  Musculoskeletal:         General: Tenderness present  No swelling, deformity or signs of injury  Right upper arm: Normal       Left upper arm: Normal       Right elbow: No swelling or deformity  Decreased range of motion  Tenderness present in olecranon process  Right forearm: Normal       Left forearm: Normal         Arms:       Cervical back: Normal range of motion and neck supple  Skin:     General: Skin is warm and dry  Capillary Refill: Capillary refill takes less than 2 seconds  Coloration: Skin is not pale  Findings: No rash  Neurological:      General: No focal deficit present  Mental Status: She is alert  Cranial Nerves: No cranial nerve deficit  Psychiatric:         Behavior: Behavior normal          Vital Signs  ED Triage Vitals [11/10/22 2111]   Temperature Pulse Respirations Blood Pressure SpO2   (!) 97 1 °F (36 2 °C) 89 18 141/82 98 %      Temp Source Heart Rate Source Patient Position - Orthostatic VS BP Location FiO2 (%)   Tympanic Monitor -- -- --      Pain Score       7           Vitals:    11/10/22 2111   BP: 141/82   Pulse: 89         Visual Acuity      ED Medications  Medications   ibuprofen (MOTRIN) tablet 600 mg (600 mg Oral Not Given 11/10/22 2304)   acetaminophen (TYLENOL) tablet 650 mg (650 mg Oral Given 11/10/22 2311)       Diagnostic Studies  Results Reviewed     None                 XR elbow 3+ vw RIGHT   ED Interpretation by Ike Wallis DO (11/10 2252)   nad                 Procedures  Orthopedic injury treatment    Date/Time: 11/10/2022 10:45 PM  Performed by: Ike Wallis DO  Authorized by: Ike Wallis DO     Patient Location:  ED  [place] Protocol:  Procedure performed by: (RN)  Consent: Verbal consent obtained  Risks and benefits: risks, benefits and alternatives were discussed  Consent given by: patient  Time out: Immediately prior to procedure a "time out" was called to verify the correct patient, procedure, equipment, support staff and site/side marked as required    Timeout called at: 11/10/2022 10:45 PM   Patient understanding: patient states understanding of the procedure being performed  Patient consent: the patient's understanding of the procedure matches consent given  Procedure consent: procedure consent matches procedure scheduled  Required items: required blood products, implants, devices, and special equipment available  Patient identity confirmed: verbally with patient      Injury location:  Elbow  Location details:  Right elbow  Injury type: Soft tissue  Neurovascular status: Neurovascularly intact    Distal perfusion: normal    Neurological function: normal    Range of motion: reduced    Local anesthesia used?: No    General anesthesia used?: No    Skeletal traction used?: No    Immobilization:  Sling  Supplies used:  Cotton padding  Neurovascular status: Neurovascularly intact    Distal perfusion: normal    Neurological function: normal    Range of motion: unchanged    Patient tolerance:  Patient tolerated the procedure well with no immediate complications             ED Course                                             MDM  Number of Diagnoses or Management Options  Sprain of right elbow, initial encounter: new and requires workup  Diagnosis management comments: Patient with complaint of right elbow pain after mechanical fall  X-ray negative for acute changes  Patient placed in a sling with outpatient follow-up with primary care physician and Orthopedics  Patient advised to take OTC meds for pain relief         Amount and/or Complexity of Data Reviewed  Tests in the radiology section of CPT®: ordered and reviewed    Risk of Complications, Morbidity, and/or Mortality  Presenting problems: high  Diagnostic procedures: high  Management options: high    Patient Progress  Patient progress: stable      Disposition  Final diagnoses:   Sprain of right elbow, initial encounter     Time reflects when diagnosis was documented in both MDM as applicable and the Disposition within this note     Time User Action Codes Description Comment    11/10/2022 10:53 PM Frankey Lobstein Add [S53 401A] Sprain of right elbow, initial encounter       ED Disposition     ED Disposition   Discharge    Condition   Stable    Date/Time   Thu Nov 10, 2022 10:53 PM    Comment   [facility] discharge to home/self care                 Follow-up Information     Follow up With Specialties Details Why Contact Info Additional Information    Trev Haddad MD Internal Medicine Schedule an appointment as soon as possible for a visit in 1 day for follow up [address]       [address] [facility] Orthopedic Surgery Schedule an appointment as soon as possible for a visit in 1 day for follow up [address], [place] [address] [facility], [address], [address] [place] [address], [id number] [phone]          Discharge Medication List as of 11/10/2022 11:06 PM      CONTINUE these medications which have NOT CHANGED    Details   ALPRAZolam ER (XANAX XR) 2 MG 24 hr tablet Take 1 tablet (2 mg total) by mouth 2 (two) times a day, Starting Fri 10/7/2022, Normal      baclofen 10 mg tablet Take 10 mg by mouth 3 (three) times a day as needed, Starting Fri 7/22/2022, Historical Med      calcitriol (ROCALTROL) 0 5 MCG capsule Take 1 capsule (0 5 mcg total) by mouth daily, Starting u 9/22/2022, Normal      Calcium Carb-Cholecalciferol (Calcium 500+D) 500-400 MG-UNIT TABS Take 600 mg by mouth 3 times a day, Normal      Calcium Carbonate-Vitamin D3 600-400 MG-UNIT TABS TAKE 1 TABLET BY MOUTH 6 (SIX) TIMES A DAY, Normal      desvenlafaxine succinate (PRISTIQ) 50 mg 24 hr tablet Take 1 tablet (50 mg total) by mouth daily, Starting Fri 10/28/2022, Normal      doxepin (SINEquan) 25 mg capsule Take 1 capsule (25 mg total) by mouth daily at bedtime, Starting Thu 9/1/2022, Normal      fenofibrate 160 MG tablet Take 1 tablet (160 mg total) by mouth daily, Starting Thu 10/6/2022, Normal      ferrous sulfate 325 (65 Fe) mg tablet Take 325 mg by mouth Once Monday, wed, Friday-Last dose 4/1/22 , Historical Med      ibuprofen (MOTRIN) 600 mg tablet Take 1 tablet (600 mg total) by mouth every 6 (six) hours as needed for mild pain or moderate pain, Starting Tue 6/2/2020, Normal      levothyroxine 150 mcg tablet TAKE 1 TABLET BY MOUTH EVERY DAY, Normal      Lidocaine-Menthol 4-1 % PTCH if needed  , Historical Med      magnesium Oxide (MAG-OX) 400 mg TABS TAKE 1 TABLET (400 MG TOTAL) BY MOUTH THREE (THREE) TIMES A DAY 9PM, Normal      omeprazole (PriLOSEC) 40 MG capsule Take 1 capsule (40 mg total) by mouth daily, Starting Mon 9/26/2022, Normal      oxyCODONE-acetaminophen (PERCOCET)  mg per tablet Take 1 tablet by mouth 4 (four) times a day, Historical Med      pantoprazole (PROTONIX) 40 mg tablet Take 1 tablet (40 mg total) by mouth daily Take 1/2 hour prior to breakfast daily in a m   , Starting Wed 6/8/2022, Until Sat 7/30/2022, Normal      potassium chloride (K-DUR,KLOR-CON) 20 mEq tablet Take 20 mEq by mouth 2 (two) times a day, Historical Med      Potassium Chloride ER 20 MEQ TBCR TAKE 1 TABLET BY MOUTH TWICE A DAY AFTER A MEAL, Normal      pramipexole (MIRAPEX) 0 5 mg tablet Take 0 5 mg by mouth daily at bedtime, Historical Med      pregabalin (LYRICA) 100 mg capsule TAKE 1 CAPSULE BY MOUTH TWICE A DAY AS DIRECTED FOR 30 DAYS, Historical Med             No discharge procedures on file      PDMP Review       Value Time User    PDMP Reviewed  Yes 10/6/2022 12:12 PM Rashmi Jolly MD          ED Provider  Electronically Signed by           Shade Mello DO  11/11/22 0111

## 2022-11-19 ENCOUNTER — APPOINTMENT (EMERGENCY)
Dept: RADIOLOGY | Facility: HOSPITAL | Age: 47
End: 2022-11-19

## 2022-11-19 ENCOUNTER — HOSPITAL ENCOUNTER (EMERGENCY)
Facility: HOSPITAL | Age: 47
Discharge: HOME | End: 2022-11-19
Attending: EMERGENCY MEDICINE

## 2022-11-19 VITALS
TEMPERATURE: 98.7 F | DIASTOLIC BLOOD PRESSURE: 69 MMHG | WEIGHT: 209.4 LBS | HEART RATE: 78 BPM | BODY MASS INDEX: 38.3 KG/M2 | SYSTOLIC BLOOD PRESSURE: 118 MMHG | OXYGEN SATURATION: 94 % | RESPIRATION RATE: 18 BRPM

## 2022-11-19 DIAGNOSIS — E83.51 HYPOCALCEMIA: Primary | ICD-10-CM

## 2022-11-19 LAB
ALBUMIN SERPL BCP-MCNC: 3.6 G/DL (ref 3.5–5)
ALP SERPL-CCNC: 117 U/L (ref 46–116)
ALT SERPL W P-5'-P-CCNC: 56 U/L (ref 12–78)
ANION GAP SERPL CALCULATED.3IONS-SCNC: 15 MMOL/L (ref 4–13)
APTT PPP: 28 SECONDS (ref 23–37)
BASOPHILS # BLD AUTO: 0.05 THOUSANDS/ÂΜL (ref 0–0.1)
BASOPHILS NFR BLD AUTO: 1 % (ref 0–1)
BILIRUB SERPL-MCNC: 0.42 MG/DL (ref 0.2–1)
BUN SERPL-MCNC: 16 MG/DL (ref 5–25)
CA-I BLD-SCNC: 1.05 MMOL/L (ref 1.12–1.32)
CALCIUM SERPL-MCNC: 8.4 MG/DL (ref 8.3–10.1)
CARDIAC TROPONIN I PNL SERPL HS: 2 NG/L
CHLORIDE SERPL-SCNC: 99 MMOL/L (ref 96–108)
CO2 SERPL-SCNC: 24 MMOL/L (ref 21–32)
CREAT SERPL-MCNC: 1.05 MG/DL (ref 0.6–1.3)
EOSINOPHIL # BLD AUTO: 0.07 THOUSAND/ÂΜL (ref 0–0.61)
EOSINOPHIL NFR BLD AUTO: 1 % (ref 0–6)
ERYTHROCYTE [DISTWIDTH] IN BLOOD BY AUTOMATED COUNT: 16.3 % (ref 11.6–15.1)
GFR SERPL CREATININE-BSD FRML MDRD: 63 ML/MIN/1.73SQ M
GLUCOSE SERPL-MCNC: 124 MG/DL (ref 65–140)
HCT VFR BLD AUTO: 41.2 % (ref 34.8–46.1)
HGB BLD-MCNC: 13.5 G/DL (ref 11.5–15.4)
IMM GRANULOCYTES # BLD AUTO: 0.03 THOUSAND/UL (ref 0–0.2)
IMM GRANULOCYTES NFR BLD AUTO: 1 % (ref 0–2)
INR PPP: 0.92 (ref 0.84–1.19)
LYMPHOCYTES # BLD AUTO: 1.56 THOUSANDS/ÂΜL (ref 0.6–4.47)
LYMPHOCYTES NFR BLD AUTO: 32 % (ref 14–44)
MAGNESIUM SERPL-MCNC: 2 MG/DL (ref 1.6–2.6)
MCH RBC QN AUTO: 27.9 PG (ref 26.8–34.3)
MCHC RBC AUTO-ENTMCNC: 32.8 G/DL (ref 31.4–37.4)
MCV RBC AUTO: 85 FL (ref 82–98)
MONOCYTES # BLD AUTO: 0.41 THOUSAND/ÂΜL (ref 0.17–1.22)
MONOCYTES NFR BLD AUTO: 8 % (ref 4–12)
NEUTROPHILS # BLD AUTO: 2.84 THOUSANDS/ÂΜL (ref 1.85–7.62)
NEUTS SEG NFR BLD AUTO: 57 % (ref 43–75)
NRBC BLD AUTO-RTO: 0 /100 WBCS
PLATELET # BLD AUTO: 175 THOUSANDS/UL (ref 149–390)
PMV BLD AUTO: 9.8 FL (ref 8.9–12.7)
POTASSIUM SERPL-SCNC: 4.1 MMOL/L (ref 3.5–5.3)
PROT SERPL-MCNC: 7.7 G/DL (ref 6.4–8.4)
PROTHROMBIN TIME: 12.5 SECONDS (ref 11.6–14.5)
RBC # BLD AUTO: 4.84 MILLION/UL (ref 3.81–5.12)
SODIUM SERPL-SCNC: 138 MMOL/L (ref 135–147)
WBC # BLD AUTO: 4.96 THOUSAND/UL (ref 4.31–10.16)

## 2022-11-19 RX ORDER — CALCIUM GLUCONATE 20 MG/ML
1 INJECTION, SOLUTION INTRAVENOUS ONCE
Status: COMPLETED | OUTPATIENT
Start: 2022-11-19 | End: 2022-11-19

## 2022-11-19 RX ADMIN — CALCIUM GLUCONATE 1 G: 20 INJECTION, SOLUTION INTRAVENOUS at 15:59

## 2022-11-19 RX ADMIN — CALCIUM GLUCONATE 1 G: 20 INJECTION, SOLUTION INTRAVENOUS at 15:10

## 2022-11-19 NOTE — ED PROVIDER NOTES
History  Chief Complaint   Patient presents with   • Chest Pain     States her calcium is low and she is having tingling all over and chest pain that started this morning  EKG in progress     50-year-old female presents the ED with complaints of some chest discomfort and tingling all over she states that she normally gets this when a calcium level ionized calcium gets low  She has been in multiple times for IV infusion of calcium  No fevers chills nausea vomiting diarrhea no other complaints  History provided by:  Patient   used: No        Prior to Admission Medications   Prescriptions Last Dose Informant Patient Reported? Taking?    ALPRAZolam ER (XANAX XR) 2 MG 24 hr tablet   No No   Sig: Take 1 tablet (2 mg total) by mouth 2 (two) times a day   Calcium Carb-Cholecalciferol (Calcium 500+D) 500-400 MG-UNIT TABS   No No   Sig: Take 600 mg by mouth 3 times a day   Calcium Carbonate-Vitamin D3 600-400 MG-UNIT TABS   No No   Sig: TAKE 1 TABLET BY MOUTH 6 (SIX) TIMES A DAY   Lidocaine-Menthol 4-1 % PTCH   Yes No   Sig: if needed     Potassium Chloride ER 20 MEQ TBCR   No No   Sig: TAKE 1 TABLET BY MOUTH TWICE A DAY AFTER A MEAL   baclofen 10 mg tablet   Yes No   Sig: Take 10 mg by mouth 3 (three) times a day as needed   calcitriol (ROCALTROL) 0 5 MCG capsule   No No   Sig: Take 1 capsule (0 5 mcg total) by mouth daily   Patient taking differently: Take 0 5 mcg by mouth 2 (two) times a day   desvenlafaxine succinate (PRISTIQ) 50 mg 24 hr tablet   No No   Sig: Take 1 tablet (50 mg total) by mouth daily   doxepin (SINEquan) 25 mg capsule   No No   Sig: Take 1 capsule (25 mg total) by mouth daily at bedtime   fenofibrate 160 MG tablet   No No   Sig: Take 1 tablet (160 mg total) by mouth daily   ferrous sulfate 325 (65 Fe) mg tablet   Yes No   Sig: Take 325 mg by mouth Once Monday, wed, Friday-Last dose 4/1/22    ibuprofen (MOTRIN) 600 mg tablet   No No   Sig: Take 1 tablet (600 mg total) by mouth every 6 (six) hours as needed for mild pain or moderate pain   levothyroxine 150 mcg tablet   No No   Sig: TAKE 1 TABLET BY MOUTH EVERY DAY   Patient taking differently: 150 mcg Take 6 days a week   magnesium Oxide (MAG-OX) 400 mg TABS   No No   Sig: TAKE 1 TABLET (400 MG TOTAL) BY MOUTH THREE (THREE) TIMES A DAY 9PM   omeprazole (PriLOSEC) 40 MG capsule   No No   Sig: Take 1 capsule (40 mg total) by mouth daily   oxyCODONE-acetaminophen (PERCOCET)  mg per tablet   Yes No   Sig: Take 1 tablet by mouth 4 (four) times a day   pantoprazole (PROTONIX) 40 mg tablet   No No   Sig: Take 1 tablet (40 mg total) by mouth daily Take 1/2 hour prior to breakfast daily in a [facility] Patient not taking: No sig reported   potassium chloride (K-DUR,KLOR-CON) 20 mEq tablet   Yes No   Sig: Take 20 mEq by mouth 2 (two) times a day   pramipexole (MIRAPEX) 0 5 mg tablet   Yes No   Sig: Take 0 5 mg by mouth daily at bedtime   pregabalin (LYRICA) 100 mg capsule   Yes No   Sig: TAKE 1 CAPSULE BY MOUTH TWICE A DAY AS DIRECTED FOR 30 DAYS      Facility-Administered Medications: None       Past Medical History:   Diagnosis Date   • Abdominal pain    • Acid reflux    • Acute renal failure (HCC)     multiple episodes   • Anemia    • Anxiety    • Anxiety 3/31/2021   • Arthritis    • Asthma    • Back pain    • Chronic pain    • DDD (degenerative disc disease), lumbar    • Depression    • Disease of thyroid gland     had surgery and now hypo   • DVT (deep venous thrombosis) ([facility] ) 2009    s/p ankle fracture   • Headache    • History of transfusion    • Hypercalcemia    • Hyperlipidemia    • Hyperthyroidism    • Hypocalcemia     post op 2016   • Kidney stone    • Lightheadedness    • Migraine    • Obesity    • Palpitations    • Psychiatric disorder     anxiety depression   • PTSD (post-traumatic stress disorder) 10/28/2022   • Seizures ([facility] )     petit mal x1  4 years ago- all tests were neg     • SOB (shortness of breath)    • Spondylolisthesis of lumbar region    • Treatment     spinal pain injections  last was 2016   • Wears glasses        Past Surgical History:   Procedure Laterality Date   • BACK SURGERY      L4-5, S1-fusion-plate/screws   •  SECTION      x5   • CYSTOCELE REPAIR  2017   • CYSTOSCOPY     • DISCOGRAM     • HYSTERECTOMY     • PARATHYROIDECTOMY     • IA ANTERIOR COLPORRAPHY RPR CYSTOCELE W/CYSTO N/A 2017    Procedure: CYSTOCELE REPAIR;  Surgeon: Roselyn Chou MD;  Location: [address] [place] [address];  Service: Gynecology   • IA ARTHRODESIS POSTERIOR INTERBODY LUMBAR N/A 2016    Procedure: L4-S1 LUMBAR LAMINECTOMY/DECOMPRESSION;  INSTRUMENTED POSTEROLATERAL FUSION/ INTERBODY L5-S1; ALLOGRAFT AND AUTOGRAFT (IMPULSE) ; Surgeon: Aiyana Carmona MD;  Location: [facility];  Service: Orthopedics   • IA CYSTOURETHROSCOPY,URETER CATHETER Bilateral 2018    Procedure: INSERTION URETERAL CATHETERS PREOP;  Surgeon: Juan Ramon Varghese MD;  Location: [address] [place] [address];  Service: Urology   • IA SLING OPER STRES INCONTINENCE N/A 2017    Procedure: MID URETHRAL SLING;  Surgeon: Michelle Benitez MD;  Location: [address] [place] [address];  Service: Urology   • IA SUPRACERV ABD HYSTERECTOMY N/A 2018    Procedure: SUPRACERVICAL HYSTERECTOMY;  Surgeon: Roselyn Chou MD;  Location: [facility];  Service: Gynecology   • THYROIDECTOMY     • TONSILECTOMY AND ADNOIDECTOMY     • TONSILLECTOMY     • TUBAL LIGATION         Family History   Problem Relation Age of Onset   • Diabetes Mother    • Hypertension Mother    • Hyperlipidemia Father    • Arrhythmia Father    • Lung cancer Father    • Diabetes Father      I have reviewed and agree with the history as documented      E-Cigarette/Vaping   • E-Cigarette Use Never User      E-Cigarette/Vaping Substances   • Nicotine No    • THC No    • CBD No    • Flavoring No    • Other No    • Unknown No      Social History     Tobacco Use   • Smoking status: Every Day     Packs/day: 1 00     Years: 25      Pack years:  Types: Cigarettes   • Smokeless tobacco: Never   Vaping Use   • Vaping Use: Never used   Substance Use Topics   • Alcohol use: Not Currently   • Drug use: No       Review of Systems   Constitutional: Negative for activity change, chills, diaphoresis and fever  HENT: Negative for congestion, ear pain, nosebleeds, sore throat, trouble swallowing and voice change  Eyes: Negative for pain, discharge and redness  Respiratory: Negative for apnea, cough, choking, shortness of breath, wheezing and stridor  Cardiovascular: Negative for chest pain and palpitations  Gastrointestinal: Negative for abdominal distention, abdominal pain, constipation, diarrhea, nausea and vomiting  Endocrine: Negative for polydipsia  Genitourinary: Negative for difficulty urinating, dysuria, flank pain, frequency, hematuria and urgency  Musculoskeletal: Negative for back pain, gait problem, joint swelling, myalgias, neck pain and neck stiffness  Skin: Negative for pallor and rash  Neurological: Positive for weakness  Negative for dizziness, tremors, syncope, speech difficulty, numbness and headaches  Hematological: Negative for adenopathy  Psychiatric/Behavioral: Negative for confusion, hallucinations, self-injury and suicidal ideas  The patient is not nervous/anxious  Physical Exam  Physical Exam  Vitals and nursing note reviewed  Constitutional:       General: She is not in acute distress  Appearance: She is well-developed and well-nourished  She is not diaphoretic  HENT:      Head: Normocephalic and atraumatic  Right Ear: External ear normal       Left Ear: External ear normal       Nose: Nose normal       Mouth/Throat:      Mouth: Oropharynx is clear and moist    Eyes:      Conjunctiva/sclera: Conjunctivae normal       Pupils: Pupils are equal, round, and reactive to light  Cardiovascular:      Rate and Rhythm: Normal rate and regular rhythm  Pulses: Intact distal pulses        Heart sounds: Normal heart sounds  Pulmonary:      Effort: Pulmonary effort is normal       Breath sounds: Normal breath sounds  Abdominal:      General: Bowel sounds are normal       Palpations: Abdomen is soft  Musculoskeletal:         General: Normal range of motion  Cervical back: Normal range of motion and neck supple  Skin:     General: Skin is warm and dry  Neurological:      Mental Status: She is alert and oriented to person, place, and time  Deep Tendon Reflexes: Reflexes are normal and symmetric     Psychiatric:         Mood and Affect: Mood and affect normal          Vital Signs  ED Triage Vitals [11/19/22 1346]   Temperature Pulse Respirations Blood Pressure SpO2   98 7 °F (37 1 °C) (!) 110 20 113/80 98 %      Temp Source Heart Rate Source Patient Position - Orthostatic VS BP Location FiO2 (%)   Tympanic Monitor Sitting Left arm --      Pain Score       4           Vitals:    11/19/22 1346   BP: 113/80   Pulse: (!) 110   Patient Position - Orthostatic VS: Sitting         Visual Acuity      ED Medications  Medications   calcium gluconate 1 g in sodium chloride 0 9% 50 mL (premix) (has no administration in time range)   calcium gluconate 1 g in sodium chloride 0 9% 50 mL (premix) (1 g Intravenous New Bag 11/19/22 1510)       Diagnostic Studies  Results Reviewed     Procedure Component Value Units Date/Time    HS Troponin 0hr (reflex protocol) [[id number]]  (Normal) Collected: 11/19/22 1438    Lab Status: Final result Specimen: Blood from Arm, Right Updated: 11/19/22 1507     hs TnI 0hr 2 ng/L     HS Troponin I 2hr [[id number]]     Lab Status: No result Specimen: Blood     Comprehensive metabolic panel [[id number]]  (Abnormal) Collected: 11/19/22 1438    Lab Status: Final result Specimen: Blood from Arm, Right Updated: 11/19/22 1504     Sodium 138 mmol/L      Potassium 4 1 mmol/L      Chloride 99 mmol/L      CO2 24 mmol/L      ANION GAP 15 mmol/L      BUN 16 mg/dL      Creatinine 1 05 mg/dL Glucose 124 mg/dL      Calcium 8 4 mg/dL      AST --     ALT 56 U/L      Alkaline Phosphatase 117 U/L      Total Protein 7 7 g/dL      Albumin 3 6 g/dL      Total Bilirubin 0 42 mg/dL      eGFR 63 ml/min/1 73sq m     Narrative:      [place] guidelines for Chronic Kidney Disease (CKD):   •  Stage 1 with normal or high GFR (GFR > 90 mL/min/1 73 square meters)  •  Stage 2 Mild CKD (GFR = 60-89 mL/min/1 73 square meters)  •  Stage 3A Moderate CKD (GFR = 45-59 mL/min/1 73 square meters)  •  Stage 3B Moderate CKD (GFR = 30-44 mL/min/1 73 square meters)  •  Stage 4 Severe CKD (GFR = 15-29 mL/min/1 73 square meters)  •  Stage 5 End Stage CKD (GFR <15 mL/min/1 73 square meters)  Note: GFR calculation is accurate only with a steady state creatinine    Magnesium [[id number]]  (Normal) Collected: 11/19/22 1438    Lab Status: Final result Specimen: Blood from Arm, Right Updated: 11/19/22 1501     Magnesium 2 0 mg/dL     Protime-INR [[id number]]  (Normal) Collected: 11/19/22 1438    Lab Status: Final result Specimen: Blood from Arm, Right Updated: 11/19/22 1455     Protime 12 5 seconds      INR 0 92    APTT [[id number]]  (Normal) Collected: 11/19/22 1438    Lab Status: Final result Specimen: Blood from Arm, Right Updated: 11/19/22 1455     PTT 28 seconds     Calcium, ionized [[id number]]  (Abnormal) Collected: 11/19/22 1438    Lab Status: Final result Specimen: Blood from Arm, Right Updated: 11/19/22 1445     Calcium, Ionized 1 05 mmol/L     CBC and differential [[id number]]  (Abnormal) Collected: 11/19/22 1438    Lab Status: Final result Specimen: Blood from Arm, Right Updated: 11/19/22 1444     WBC 4 96 Thousand/uL      RBC 4 84 Million/uL      Hemoglobin 13 5 g/dL      Hematocrit 41 2 %      MCV 85 fL      MCH 27 9 pg      MCHC 32 8 g/dL      RDW 16 3 %      MPV 9 8 fL      Platelets 328 Thousands/uL      nRBC 0 /100 WBCs      Neutrophils Relative 57 %      Immat GRANS % 1 %      Lymphocytes Relative 32 % Monocytes Relative 8 %      Eosinophils Relative 1 %      Basophils Relative 1 %      Neutrophils Absolute 2 84 Thousands/µL      Immature Grans Absolute 0 03 Thousand/uL      Lymphocytes Absolute 1 56 Thousands/µL      Monocytes Absolute 0 41 Thousand/µL      Eosinophils Absolute 0 07 Thousand/µL      Basophils Absolute 0 05 Thousands/µL                  XR chest 1 view portable    (Results Pending)              Procedures  Procedures         ED Course                               SBIRT 22yo+    Flowsheet Row Most Recent Value   SBIRT (23 yo +)    In order to provide better care to our patients, we are screening all of our patients for alcohol and drug use  Would it be okay to ask you these screening questions? No Filed at: 11/19/2022 1414                    MDM    Disposition  Final diagnoses:   Hypocalcemia     Time reflects when diagnosis was documented in both MDM as applicable and the Disposition within this note     Time User Action Codes Description Comment    11/19/2022  3:50 PM Katie Lee Add [E83 [id number]] Hypocalcemia       ED Disposition     ED Disposition   Discharge    Condition   Stable    Date/Time   Sat Nov 19, 2022  3:50 PM    [address] discharge to home/self care  Follow-up Information     Follow up With Specialties Details Why Contact Info    Lorena Spencer MD Internal Medicine Schedule an appointment as soon as possible for a visit  As needed [id number] [place] [address]  [phone]            Patient's Medications   Discharge Prescriptions    No medications on file       No discharge procedures on file      PDMP Review       Value Time User    PDMP Reviewed  Yes 10/6/2022 12:12 PM Lorena Spencer MD          ED Provider  Electronically Signed by           Joshua Rodriguez DO  11/19/22 3343

## 2022-11-21 LAB
ATRIAL RATE: 102 BPM
P AXIS: 31 DEGREES
PR INTERVAL: 160 MS
QRS AXIS: 19 DEGREES
QRSD INTERVAL: 74 MS
QT INTERVAL: 306 MS
QTC INTERVAL: 398 MS
T WAVE AXIS: 57 DEGREES
VENTRICULAR RATE: 102 BPM

## 2022-11-23 ENCOUNTER — HOSPITAL ENCOUNTER (OUTPATIENT)
Dept: RADIOLOGY | Facility: HOSPITAL | Age: 47
Discharge: HOME | End: 2022-11-23

## 2022-11-23 ENCOUNTER — ANESTHESIA (OUTPATIENT)
Dept: RADIOLOGY | Facility: HOSPITAL | Age: 47
End: 2022-11-23

## 2022-11-23 VITALS
RESPIRATION RATE: 18 BRPM | DIASTOLIC BLOOD PRESSURE: 80 MMHG | HEART RATE: 96 BPM | OXYGEN SATURATION: 97 % | SYSTOLIC BLOOD PRESSURE: 107 MMHG | HEIGHT: 62 IN | BODY MASS INDEX: 38.09 KG/M2 | WEIGHT: 207 LBS | TEMPERATURE: 97.2 F

## 2022-11-23 DIAGNOSIS — M54.16 LUMBAR RADICULOPATHY: ICD-10-CM

## 2022-11-23 DIAGNOSIS — M54.16 RADICULOPATHY, LUMBAR REGION: ICD-10-CM

## 2022-11-23 RX ORDER — LIDOCAINE HYDROCHLORIDE 10 MG/ML
INJECTION, SOLUTION EPIDURAL; INFILTRATION; INTRACAUDAL; PERINEURAL AS NEEDED
Status: DISCONTINUED | OUTPATIENT
Start: 2022-11-23 | End: 2022-11-23

## 2022-11-23 RX ORDER — ONDANSETRON 2 MG/ML
INJECTION INTRAMUSCULAR; INTRAVENOUS AS NEEDED
Status: DISCONTINUED | OUTPATIENT
Start: 2022-11-23 | End: 2022-11-23

## 2022-11-23 RX ORDER — SODIUM CHLORIDE, SODIUM LACTATE, POTASSIUM CHLORIDE, CALCIUM CHLORIDE 600; 310; 30; 20 MG/100ML; MG/100ML; MG/100ML; MG/100ML
125 INJECTION, SOLUTION INTRAVENOUS CONTINUOUS
Status: DISCONTINUED | OUTPATIENT
Start: 2022-11-23 | End: 2022-11-24 | Stop reason: HOSPADM

## 2022-11-23 RX ORDER — GLYCOPYRROLATE 0.2 MG/ML
INJECTION INTRAMUSCULAR; INTRAVENOUS AS NEEDED
Status: DISCONTINUED | OUTPATIENT
Start: 2022-11-23 | End: 2022-11-23

## 2022-11-23 RX ORDER — LORAZEPAM 2 MG/ML
0.5 INJECTION INTRAMUSCULAR ONCE
Status: COMPLETED | OUTPATIENT
Start: 2022-11-23 | End: 2022-11-23

## 2022-11-23 RX ORDER — MIDAZOLAM HYDROCHLORIDE 2 MG/2ML
INJECTION, SOLUTION INTRAMUSCULAR; INTRAVENOUS AS NEEDED
Status: DISCONTINUED | OUTPATIENT
Start: 2022-11-23 | End: 2022-11-23

## 2022-11-23 RX ORDER — EPHEDRINE SULFATE 50 MG/ML
INJECTION INTRAVENOUS AS NEEDED
Status: DISCONTINUED | OUTPATIENT
Start: 2022-11-23 | End: 2022-11-23

## 2022-11-23 RX ORDER — PROPOFOL 10 MG/ML
INJECTION, EMULSION INTRAVENOUS AS NEEDED
Status: DISCONTINUED | OUTPATIENT
Start: 2022-11-23 | End: 2022-11-23

## 2022-11-23 RX ORDER — SODIUM CHLORIDE, SODIUM LACTATE, POTASSIUM CHLORIDE, CALCIUM CHLORIDE 600; 310; 30; 20 MG/100ML; MG/100ML; MG/100ML; MG/100ML
INJECTION, SOLUTION INTRAVENOUS CONTINUOUS PRN
Status: DISCONTINUED | OUTPATIENT
Start: 2022-11-23 | End: 2022-11-23

## 2022-11-23 RX ADMIN — MIDAZOLAM 2 MG: 1 INJECTION INTRAMUSCULAR; INTRAVENOUS at 10:33

## 2022-11-23 RX ADMIN — PROPOFOL 200 MG: 10 INJECTION, EMULSION INTRAVENOUS at 10:35

## 2022-11-23 RX ADMIN — LORAZEPAM 0.5 MG: 2 INJECTION INTRAMUSCULAR; INTRAVENOUS at 10:18

## 2022-11-23 RX ADMIN — GLYCOPYRROLATE 0.2 MG: 0.2 INJECTION, SOLUTION INTRAMUSCULAR; INTRAVENOUS at 10:35

## 2022-11-23 RX ADMIN — SODIUM CHLORIDE, POTASSIUM CHLORIDE, SODIUM LACTATE AND CALCIUM CHLORIDE 125 ML/HR: 600; 310; 30; 20 INJECTION, SOLUTION INTRAVENOUS at 09:25

## 2022-11-23 RX ADMIN — GADOBUTROL 10 ML: 604.72 INJECTION INTRAVENOUS at 11:09

## 2022-11-23 RX ADMIN — ONDANSETRON 4 MG: 2 INJECTION INTRAMUSCULAR; INTRAVENOUS at 10:35

## 2022-11-23 RX ADMIN — EPHEDRINE SULFATE 10 MG: 50 INJECTION INTRAVENOUS at 11:22

## 2022-11-23 RX ADMIN — LIDOCAINE HYDROCHLORIDE 50 MG: 10 INJECTION, SOLUTION EPIDURAL; INFILTRATION; INTRACAUDAL; PERINEURAL at 10:35

## 2022-11-23 RX ADMIN — SODIUM CHLORIDE, SODIUM LACTATE, POTASSIUM CHLORIDE, AND CALCIUM CHLORIDE: .6; .31; .03; .02 INJECTION, SOLUTION INTRAVENOUS at 10:30

## 2022-11-23 NOTE — ANESTHESIA POSTPROCEDURE EVALUATION
Post-Op Assessment Note    CV Status:  Stable  Pain Score: 0    Pain management: adequate     Mental Status:  Alert and awake   Hydration Status:  Euvolemic   PONV Controlled:  Controlled   Airway Patency:  Patent      Post Op Vitals Reviewed: Yes      Staff: Anesthesiologist, CRNA         No notable events documented      BP   144/75   Temp  98 0   Pulse  100   Resp   16   SpO2   94

## 2022-11-23 NOTE — NURSING NOTE
Pt given ativan   5mg and versed 2mg for anxiety pre procedure  MRI lumbar spine w/without contrast complete  Pt tolerated well  Pt required 3 L blow-by o2 post procedure  VSS post procedure  Pt alert and oriented x4 on room air  No complaints or visible signs of distress  Report given to Florentino yung

## 2022-11-23 NOTE — ANESTHESIA PREPROCEDURE EVALUATION
Procedure:  MRI LUMBAR SPINE W WO CONTRAST    Relevant Problems   ENDO   (+) Hyperthyroidism   (+) Hypoparathyroidism (HCC)   (+) Postoperative hypothyroidism   (+) Postsurgical hypoparathyroidism (HCC)      GI/HEPATIC   (+) Fatty liver disease, nonalcoholic      /RENAL   (+) LELAND (acute kidney injury) (HCC)      HEMATOLOGY   (+) Anemia      MUSCULOSKELETAL   (+) DDD (degenerative disc disease), lumbar      NEURO/PSYCH   (+) Anxiety   (+) Chronic anxiety   (+) Chronic intractable pain   (+) PTSD (post-traumatic stress disorder)   (+) Panic attacks      Other   (+) Elevated ALT measurement   (+) Hypercalcemia   (+) Hypocalcemia   (+) Nicotine dependence   (+) Splenomegaly        Physical Exam    Airway    Mallampati score: II  TM Distance: >3 FB  Neck ROM: full     Dental       Cardiovascular      Pulmonary      Other Findings        Anesthesia Plan  ASA Score- 2     Anesthesia Type- general with ASA Monitors  Additional Monitors:   Airway Plan: LMA  Plan Factors-    Chart reviewed  Patient summary reviewed  Patient is a current smoker  Patient instructed to abstain from smoking on day of procedure  Patient did not smoke on day of surgery  Induction- intravenous  Postoperative Plan-   Planned trial extubation    Informed Consent- Anesthetic plan and risks discussed with patient  I personally reviewed this patient with the CRNA  Discussed and agreed on the Anesthesia Plan with the CRNA  [facility]

## 2022-11-25 ENCOUNTER — HOSPITAL ENCOUNTER (OUTPATIENT)
Dept: RADIOLOGY | Facility: HOSPITAL | Age: 47
Discharge: HOME | End: 2022-11-25

## 2022-11-25 ENCOUNTER — TELEPHONE (OUTPATIENT)
Dept: HEMATOLOGY ONCOLOGY | Facility: MEDICAL CENTER | Age: 47
End: 2022-11-25

## 2022-11-25 ENCOUNTER — SOCIAL WORK (OUTPATIENT)
Dept: BEHAVIORAL/MENTAL HEALTH CLINIC | Facility: CLINIC | Age: 47
End: 2022-11-25

## 2022-11-25 VITALS — BODY MASS INDEX: 38.09 KG/M2 | HEIGHT: 62 IN | WEIGHT: 207 LBS

## 2022-11-25 DIAGNOSIS — F41.0 GENERALIZED ANXIETY DISORDER WITH PANIC ATTACKS: ICD-10-CM

## 2022-11-25 DIAGNOSIS — R22.31 AXILLARY LUMP, RIGHT: ICD-10-CM

## 2022-11-25 DIAGNOSIS — F17.210 NICOTINE DEPENDENCE, CIGARETTES, UNCOMPLICATED: ICD-10-CM

## 2022-11-25 DIAGNOSIS — Z12.39 BREAST CANCER SCREENING, HIGH RISK PATIENT: Primary | ICD-10-CM

## 2022-11-25 DIAGNOSIS — F41.1 GENERALIZED ANXIETY DISORDER WITH PANIC ATTACKS: ICD-10-CM

## 2022-11-25 DIAGNOSIS — F33.1 MODERATE EPISODE OF RECURRENT MAJOR DEPRESSIVE DISORDER (HCC): Primary | ICD-10-CM

## 2022-11-25 NOTE — TELEPHONE ENCOUNTER
Received TEAMs message:  Good afternoon  My name is Georgina Shane  I'm one of the nurses calling from the Breast care Center at [facility] in [address]  I have a mutual patient that Asuncion Gonzales ordered a mammogram for this year  Patient 's name is Aydee Velazquez  Last name is hyphenated  It's Kym Shahida Mcgill D E M A [facility] O [facility] and her [birth year] [age]  She was here Friday, November twenty fifth for diagnostic mammo and ultrasound  We are recommending her for a breast biopsy  Mammo guided  I did schedule her for Monday, December nineteenth at one [de-identified]  I just wanted to let the office know what we were recommending for her and I do have some help  I would need to put in a preprocedural COVID nineteen test order for her  The patient has not been vaccinated in [address] [place] [address] does require breast biopsy patients that have not had a COVID vaccine to complete a pre procedural PAT test  So I just need someone from the office that would be able to assist me in entering that order  And I was also curious if you guys know which offices are collecting those labs at this time  Prior it was [address], but my understanding is they're no longer doing pre procedural testing there at the [facility]  So if you could please give me a call back at your earliest convenience  My phone number here is [phone] and again this is in reference to mutual patient Олег Butler [facility] date of birth July 20th, 1975 and her appointment is for Monday, December 19th at 1:30  Thank you        PAT covid test entered as above

## 2022-11-25 NOTE — PROGRESS NOTES
Met with patient and Dr Roberta Lackey regarding recommendation for:     _____ Right ___x___ Left      _____ Ultrasound guided breast biopsy    __x__ Stereotactic (Mammography Guided) Breast biopsy  __x___Verbalized understanding  Blood thinners:  _____ yes ___x___ no    Date stopped: (not applicable)    Biopsy teaching sheet given:  __x___ yes _____ no    Tentative biopsy appointment: Monday 12/19/2022 @ 1:30pm [facility]  (Patient is aware this is the earliest appointment opening at [facility] at this time  If I have any cancellations prior to that date and can offer a sooner appointment, I will be contacting Marni Partida back to move to an earlier date/ time if able)  Patient is aware she does not need to fast for a breast biopsy procedure, no special soap/ surgical scrubs need to be used the night before or morning of her procedure, and no changes need to be made to her normal medication schedule  Wear a comfortable two piece outfit to your appointment, and wear/ or bring a bra with you to your appointment as this will help hold the ice pack in place that we would like you to use post-procedure  The [facility] requires [facility] facilities to have all outpatient breast biopsy patients screened for COVID-19 within 6 days prior to their breast biopsy date  This step may be bypassed for patients that have completed vaccination status against COVID-19  Patient has not had any prior vaccinations against COVID-19, so she is aware she will need to complete a pre-procedural PAT COVID-19 nasopharyngeal screening swab  Nery Pace RN of [address] [place] [address]'s Hematology Office (Office of Dr Clint Millan PA-C) has already assisted me today 11/25/2022 with lab   I instructed patient best days to test would be Tuesday 12/13/202 or Wednesday 12/14/2022 to have test results back prior to Monday 12/19/2022 procedural appointment      [facility] [address] was the [employer] [facility] at [facility] in [place] [address]  [facility] hours of operation: Monday- Friday from 8am to 8pm, and Saturday and Sunday from 8am to 4pm, Phone #[phone]  I do have to clarify if PAT pre-procedural screening swabs are still being collected at this location, or if patient needs to test at her PCP office

## 2022-12-01 ENCOUNTER — HOSPITAL ENCOUNTER (OUTPATIENT)
Dept: NON INVASIVE DIAGNOSTICS | Facility: HOSPITAL | Age: 47
Discharge: HOME | End: 2022-12-01
Attending: INTERNAL MEDICINE

## 2022-12-01 ENCOUNTER — TELEPHONE (OUTPATIENT)
Dept: PSYCHIATRY | Facility: CLINIC | Age: 47
End: 2022-12-01

## 2022-12-01 ENCOUNTER — APPOINTMENT (OUTPATIENT)
Dept: RADIOLOGY | Facility: HOSPITAL | Age: 47
End: 2022-12-01
Attending: INTERNAL MEDICINE

## 2022-12-01 ENCOUNTER — HOSPITAL ENCOUNTER (OUTPATIENT)
Dept: RADIOLOGY | Facility: HOSPITAL | Age: 47
End: 2022-12-01
Attending: INTERNAL MEDICINE

## 2022-12-01 ENCOUNTER — HOSPITAL ENCOUNTER (OUTPATIENT)
Dept: NON INVASIVE DIAGNOSTICS | Facility: HOSPITAL | Age: 47
End: 2022-12-01
Attending: INTERNAL MEDICINE

## 2022-12-01 VITALS
WEIGHT: 207 LBS | HEART RATE: 91 BPM | SYSTOLIC BLOOD PRESSURE: 127 MMHG | HEIGHT: 62 IN | DIASTOLIC BLOOD PRESSURE: 83 MMHG | BODY MASS INDEX: 38.09 KG/M2

## 2022-12-01 DIAGNOSIS — R94.31 ABNORMAL ELECTROCARDIOGRAM (ECG) (EKG): ICD-10-CM

## 2022-12-01 DIAGNOSIS — R07.9 CHEST PAIN, UNSPECIFIED TYPE: ICD-10-CM

## 2022-12-01 NOTE — TELEPHONE ENCOUNTER
Patient is calling regarding cancelling an appointment  Date/Time: 12/1/2022 @ 2:30 pm    Reason: Patient called at the appointment time and patient forgot to cancel appointment she had to put dog down yesterday      Patient was rescheduled: YES [] NO [x]  If yes, when was Patient reschedule for:     Patient requesting call back to reschedule: YES [] NO [x]

## 2022-12-02 ENCOUNTER — APPOINTMENT (OUTPATIENT)
Dept: LAB | Facility: HOSPITAL | Age: 47
End: 2022-12-02

## 2022-12-02 ENCOUNTER — HOSPITAL ENCOUNTER (EMERGENCY)
Facility: HOSPITAL | Age: 47
Discharge: HOME | End: 2022-12-02
Attending: EMERGENCY MEDICINE

## 2022-12-02 VITALS
BODY MASS INDEX: 38.09 KG/M2 | HEIGHT: 62 IN | TEMPERATURE: 97.6 F | OXYGEN SATURATION: 98 % | DIASTOLIC BLOOD PRESSURE: 76 MMHG | RESPIRATION RATE: 18 BRPM | WEIGHT: 207 LBS | HEART RATE: 109 BPM | SYSTOLIC BLOOD PRESSURE: 129 MMHG

## 2022-12-02 DIAGNOSIS — E89.2 POSTSURGICAL HYPOPARATHYROIDISM (HCC): Primary | Chronic | ICD-10-CM

## 2022-12-02 DIAGNOSIS — R61 NIGHT SWEATS: ICD-10-CM

## 2022-12-02 DIAGNOSIS — E55.9 VITAMIN D DEFICIENCY: ICD-10-CM

## 2022-12-02 DIAGNOSIS — N17.9 AKI (ACUTE KIDNEY INJURY) (HCC): ICD-10-CM

## 2022-12-02 DIAGNOSIS — R16.1 SPLENOMEGALY: ICD-10-CM

## 2022-12-02 DIAGNOSIS — E83.52 HYPERCALCEMIA: ICD-10-CM

## 2022-12-02 DIAGNOSIS — E83.51 HYPOCALCEMIA: Primary | ICD-10-CM

## 2022-12-02 DIAGNOSIS — R89.9 ABNORMAL LABORATORY TEST: ICD-10-CM

## 2022-12-02 DIAGNOSIS — R79.89 ELEVATED FERRITIN: ICD-10-CM

## 2022-12-02 DIAGNOSIS — E83.51 HYPOCALCEMIA: ICD-10-CM

## 2022-12-02 DIAGNOSIS — E89.2 POSTSURGICAL HYPOPARATHYROIDISM (HCC): Chronic | ICD-10-CM

## 2022-12-02 LAB
25(OH)D3 SERPL-MCNC: 18.3 NG/ML (ref 30–100)
ALBUMIN SERPL BCP-MCNC: 3.5 G/DL (ref 3.5–5)
ALP SERPL-CCNC: 129 U/L (ref 46–116)
ALT SERPL W P-5'-P-CCNC: 56 U/L (ref 12–78)
ANION GAP SERPL CALCULATED.3IONS-SCNC: 6 MMOL/L (ref 4–13)
AORTIC ROOT: 3.5 CM
APICAL FOUR CHAMBER EJECTION FRACTION: 49 %
AST SERPL W P-5'-P-CCNC: 29 U/L (ref 5–45)
AV LVOT PEAK GRADIENT: 4 MMHG
AV PEAK GRADIENT: 9 MMHG
BASOPHILS # BLD AUTO: 0.04 THOUSANDS/ÂΜL (ref 0–0.1)
BASOPHILS NFR BLD AUTO: 1 % (ref 0–1)
BILIRUB SERPL-MCNC: 0.28 MG/DL (ref 0.2–1)
BUN SERPL-MCNC: 12 MG/DL (ref 5–25)
CA-I BLD-SCNC: 1.02 MMOL/L (ref 1.12–1.32)
CALCIUM SERPL-MCNC: 8.5 MG/DL (ref 8.3–10.1)
CHLORIDE SERPL-SCNC: 107 MMOL/L (ref 96–108)
CO2 SERPL-SCNC: 25 MMOL/L (ref 21–32)
CREAT SERPL-MCNC: 0.81 MG/DL (ref 0.6–1.3)
DOP CALC LVOT AREA: 3.14 CM2
DOP CALC LVOT DIAMETER: 2 CM
E WAVE DECELERATION TIME: 191 MS
EOSINOPHIL # BLD AUTO: 0.08 THOUSAND/ÂΜL (ref 0–0.61)
EOSINOPHIL NFR BLD AUTO: 2 % (ref 0–6)
ERYTHROCYTE [DISTWIDTH] IN BLOOD BY AUTOMATED COUNT: 16.3 % (ref 11.6–15.1)
FERRITIN SERPL-MCNC: 488 NG/ML (ref 8–388)
FRACTIONAL SHORTENING: 27 % (ref 28–44)
GFR SERPL CREATININE-BSD FRML MDRD: 87 ML/MIN/1.73SQ M
GLUCOSE P FAST SERPL-MCNC: 143 MG/DL (ref 65–99)
HCT VFR BLD AUTO: 40.3 % (ref 34.8–46.1)
HGB BLD-MCNC: 13 G/DL (ref 11.5–15.4)
IMM GRANULOCYTES # BLD AUTO: 0.02 THOUSAND/UL (ref 0–0.2)
IMM GRANULOCYTES NFR BLD AUTO: 0 % (ref 0–2)
INTERVENTRICULAR SEPTUM IN DIASTOLE (PARASTERNAL SHORT AXIS VIEW): 1.3 CM
INTERVENTRICULAR SEPTUM: 1.3 CM (ref 0.6–1.1)
IRON SATN MFR SERPL: 20 % (ref 15–50)
IRON SERPL-MCNC: 62 UG/DL (ref 50–170)
LAAS-AP2: 13.6 CM2
LAAS-AP4: 9.1 CM2
LEFT ATRIUM AREA SYSTOLE SINGLE PLANE A4C: 13.1 CM2
LEFT ATRIUM SIZE: 2.9 CM
LEFT INTERNAL DIMENSION IN SYSTOLE: 2.7 CM (ref 2.1–4)
LEFT VENTRICULAR INTERNAL DIMENSION IN DIASTOLE: 3.7 CM (ref 3.5–6)
LEFT VENTRICULAR POSTERIOR WALL IN END DIASTOLE: 1.3 CM
LEFT VENTRICULAR STROKE VOLUME: 32 ML
LVSV (TEICH): 32 ML
LYMPHOCYTES # BLD AUTO: 1.54 THOUSANDS/ÂΜL (ref 0.6–4.47)
LYMPHOCYTES NFR BLD AUTO: 31 % (ref 14–44)
MAGNESIUM SERPL-MCNC: 1.9 MG/DL (ref 1.6–2.6)
MCH RBC QN AUTO: 28 PG (ref 26.8–34.3)
MCHC RBC AUTO-ENTMCNC: 32.3 G/DL (ref 31.4–37.4)
MCV RBC AUTO: 87 FL (ref 82–98)
MONOCYTES # BLD AUTO: 0.35 THOUSAND/ÂΜL (ref 0.17–1.22)
MONOCYTES NFR BLD AUTO: 7 % (ref 4–12)
MV E'TISSUE VEL-LAT: 11 CM/S
MV E'TISSUE VEL-SEP: 8 CM/S
MV PEAK A VEL: 0.9 M/S
MV PEAK E VEL: 86 CM/S
MV STENOSIS PRESSURE HALF TIME: 55 MS
MV VALVE AREA P 1/2 METHOD: 4 CM2
NEUTROPHILS # BLD AUTO: 2.93 THOUSANDS/ÂΜL (ref 1.85–7.62)
NEUTS SEG NFR BLD AUTO: 59 % (ref 43–75)
NRBC BLD AUTO-RTO: 0 /100 WBCS
PHOSPHATE SERPL-MCNC: 4.2 MG/DL (ref 2.7–4.5)
PLATELET # BLD AUTO: 177 THOUSANDS/UL (ref 149–390)
PMV BLD AUTO: 10.3 FL (ref 8.9–12.7)
POTASSIUM SERPL-SCNC: 4.2 MMOL/L (ref 3.5–5.3)
PROT SERPL-MCNC: 7.1 G/DL (ref 6.4–8.4)
PTH-INTACT SERPL-MCNC: <6.3 PG/ML (ref 18.4–80.1)
PV PEAK GRADIENT: 2 MMHG
RBC # BLD AUTO: 4.64 MILLION/UL (ref 3.81–5.12)
RIGHT ATRIUM AREA SYSTOLE A4C: 11.5 CM2
RIGHT VENTRICLE ID DIMENSION: 2.3 CM
SL CV LEFT ATRIUM LENGTH A2C: 4.2 CM
SL CV LV EF: 55
SL CV PED ECHO LEFT VENTRICLE DIASTOLIC VOLUME (MOD BIPLANE) 2D: 59 ML
SL CV PED ECHO LEFT VENTRICLE SYSTOLIC VOLUME (MOD BIPLANE) 2D: 26 ML
SODIUM SERPL-SCNC: 138 MMOL/L (ref 135–147)
T4 FREE SERPL-MCNC: 1.38 NG/DL (ref 0.76–1.46)
TIBC SERPL-MCNC: 309 UG/DL (ref 250–450)
TSH SERPL DL<=0.05 MIU/L-ACNC: 0.56 UIU/ML (ref 0.45–4.5)
WBC # BLD AUTO: 4.96 THOUSAND/UL (ref 4.31–10.16)

## 2022-12-02 RX ORDER — CALCIUM GLUCONATE 20 MG/ML
2 INJECTION, SOLUTION INTRAVENOUS ONCE
Status: COMPLETED | OUTPATIENT
Start: 2022-12-02 | End: 2022-12-02

## 2022-12-02 RX ORDER — MAGNESIUM SULFATE 1 G/100ML
1 INJECTION INTRAVENOUS ONCE
Status: DISCONTINUED | OUTPATIENT
Start: 2022-12-02 | End: 2022-12-02

## 2022-12-02 RX ADMIN — CALCIUM GLUCONATE 2 G: 20 INJECTION, SOLUTION INTRAVENOUS at 18:39

## 2022-12-02 NOTE — ED PROVIDER NOTES
History  Chief Complaint   Patient presents with   • Abnormal Lab     Blood work done this morning and showed low calcium 302     69-year-old female with history of chronic hypocalcemia presents with abnormal labs were eye as calcium level is 1 02  Denies any numbness tingling chest pain shortness of breath lightheadedness syncope or any other symptoms  Blood work done this morning  History provided by:  Patient   used: No        Prior to Admission Medications   Prescriptions Last Dose Informant Patient Reported? Taking?    ALPRAZolam ER (XANAX XR) 2 MG 24 hr tablet   No No   Sig: Take 1 tablet (2 mg total) by mouth 2 (two) times a day   Calcium Carb-Cholecalciferol (Calcium 500+D) 500-400 MG-UNIT TABS   No No   Sig: Take 600 mg by mouth 3 times a day   Calcium Carbonate-Vitamin D3 600-400 MG-UNIT TABS   No No   Sig: TAKE 1 TABLET BY MOUTH 6 (SIX) TIMES A DAY   Lidocaine-Menthol 4-1 % PTCH   Yes No   Sig: if needed     Potassium Chloride ER 20 MEQ TBCR   No No   Sig: TAKE 1 TABLET BY MOUTH TWICE A DAY AFTER A MEAL   baclofen 10 mg tablet   Yes No   Sig: Take 10 mg by mouth 3 (three) times a day as needed   calcitriol (ROCALTROL) 0 5 MCG capsule   No No   Sig: Take 1 capsule (0 5 mcg total) by mouth daily   Patient taking differently: Take 0 5 mcg by mouth 2 (two) times a day   desvenlafaxine succinate (PRISTIQ) 50 mg 24 hr tablet   No No   Sig: Take 1 tablet (50 mg total) by mouth daily   doxepin (SINEquan) 25 mg capsule   No No   Sig: Take 1 capsule (25 mg total) by mouth daily at bedtime   fenofibrate 160 MG tablet   No No   Sig: Take 1 tablet (160 mg total) by mouth daily   ferrous sulfate 325 (65 Fe) mg tablet   Yes No   Sig: Take 325 mg by mouth Once Monday, wed, Friday-Last dose 4/1/22    ibuprofen (MOTRIN) 600 mg tablet   No No   Sig: Take 1 tablet (600 mg total) by mouth every 6 (six) hours as needed for mild pain or moderate pain   levothyroxine 150 mcg tablet   No No   Sig: TAKE 1 TABLET BY MOUTH EVERY DAY   Patient taking differently: 150 mcg Take 6 days a week   magnesium Oxide (MAG-OX) 400 mg TABS   No No   Sig: TAKE 1 TABLET (400 MG TOTAL) BY MOUTH THREE (THREE) TIMES A DAY 9PM   omeprazole (PriLOSEC) 40 MG capsule   No No   Sig: Take 1 capsule (40 mg total) by mouth daily   oxyCODONE-acetaminophen (PERCOCET)  mg per tablet   Yes No   Sig: Take 1 tablet by mouth 4 (four) times a day   pantoprazole (PROTONIX) 40 mg tablet   No No   Sig: Take 1 tablet (40 mg total) by mouth daily Take 1/2 hour prior to breakfast daily in a m  [place] Patient not taking: No sig reported   potassium chloride (K-DUR,KLOR-CON) 20 mEq tablet   Yes No   Sig: Take 20 mEq by mouth 2 (two) times a day   pramipexole (MIRAPEX) 0 5 mg tablet   Yes No   Sig: Take 0 5 mg by mouth daily at bedtime   pregabalin (LYRICA) 100 mg capsule   Yes No   Sig: TAKE 1 CAPSULE BY MOUTH TWICE A DAY AS DIRECTED FOR 30 DAYS      Facility-Administered Medications: None       Past Medical History:   Diagnosis Date   • Abdominal pain    • Acid reflux    • Acute renal failure (HCC)     multiple episodes   • Anemia    • Anxiety    • Anxiety 3/31/2021   • Arthritis    • Asthma    • Back pain    • Chronic pain    • DDD (degenerative disc disease), lumbar    • Depression    • Disease of thyroid gland     had surgery and now hypo   • DVT (deep venous thrombosis) ([facility] [address] ) 2009    s/p ankle fracture   • Headache    • History of transfusion    • Hypercalcemia    • Hyperlipidemia    • Hyperthyroidism    • Hypocalcemia     post op 2016   • Kidney stone    • Lightheadedness    • Migraine    • Obesity    • Palpitations    • Psychiatric disorder     anxiety depression   • PTSD (post-traumatic stress disorder) 10/28/2022   • Seizures ([facility] [address] )     petit mal x1  4 years ago- all tests were neg     • SOB (shortness of breath)    • Spondylolisthesis of lumbar region    • Treatment     spinal pain injections  last was 7-7-2016   • Wears glasses        Past Surgical History:   Procedure Laterality Date   • BACK SURGERY      L4-5, S1-fusion-plate/screws   •  SECTION      x5   • CYSTOCELE REPAIR  2017   • CYSTOSCOPY     • DISCOGRAM     • HYSTERECTOMY     • PARATHYROIDECTOMY     • ND ANTERIOR COLPORRAPHY RPR CYSTOCELE W/CYSTO N/A 2017    Procedure: CYSTOCELE REPAIR;  Surgeon: Dilan Jack MD;  Location: [address] [place] [address];  Service: Gynecology   • ND ARTHRODESIS POSTERIOR INTERBODY LUMBAR N/A 2016    Procedure: L4-S1 LUMBAR LAMINECTOMY/DECOMPRESSION;  INSTRUMENTED POSTEROLATERAL FUSION/ INTERBODY L5-S1; ALLOGRAFT AND AUTOGRAFT (IMPULSE) ; Surgeon: Dennie Gunner, MD;  [facility];  Service: Orthopedics   • ND CYSTOURETHROSCOPY,URETER CATHETER Bilateral 2018    Procedure: INSERTION URETERAL CATHETERS PREOP;  Surgeon: Alessandro Lemus MD;  Location: [address] [place] [address];  Service: Urology   • ND SLING OPER STRES INCONTINENCE N/A 2017    Procedure: MID URETHRAL SLING;  Surgeon: Sherrill Ojeda MD;  Location: [address] [place] [address];  Service: Urology   • ND SUPRACERV ABD HYSTERECTOMY N/A 2018    Procedure: SUPRACERVICAL HYSTERECTOMY;  Surgeon: Dilan Jack MD;  Location: [address] [place] [address];  Service: Gynecology   • THYROIDECTOMY     • TONSILECTOMY AND ADNOIDECTOMY     • TONSILLECTOMY     • TUBAL LIGATION         Family History   Problem Relation Age of Onset   • Diabetes Mother    • Hypertension Mother    • Hyperlipidemia Father    • Arrhythmia Father    • Lung cancer Father    • Diabetes Father    • Colon cancer Maternal Grandfather    • Stomach cancer Paternal Grandmother    • Heart disease Paternal Aunt      I have reviewed and agree with the history as documented      E-Cigarette/Vaping   • E-Cigarette Use Never User      E-Cigarette/Vaping Substances   • Nicotine No    • THC No    • CBD No    • Flavoring No    • Other No    • Unknown No      Social History     Tobacco Use   • Smoking status: Every Day     Packs/day: 0 25     Years: 25      Pack years: 6 25     Types: Cigarettes   • Smokeless tobacco: Never   Vaping Use   • Vaping Use: Never used   Substance Use Topics   • Alcohol use: Not Currently   • Drug use: No       Review of Systems   Constitutional: Negative  HENT: Negative  Eyes: Negative  Respiratory: Negative  Cardiovascular: Negative  Gastrointestinal: Negative  Endocrine: Negative  Genitourinary: Negative  Musculoskeletal: Negative  Skin: Negative  Allergic/Immunologic: Negative  Neurological: Negative  Hematological: Negative  Psychiatric/Behavioral: Negative  All other systems reviewed and are negative  Physical Exam  Physical Exam  Constitutional:       Appearance: Normal appearance  HENT:      Head: Normocephalic and atraumatic  Nose: Nose normal       Mouth/Throat:      Mouth: Mucous membranes are moist    Eyes:      Extraocular Movements: Extraocular movements intact  Pupils: Pupils are equal, round, and reactive to light  Cardiovascular:      Rate and Rhythm: Normal rate and regular rhythm  Pulmonary:      Effort: Pulmonary effort is normal       Breath sounds: Normal breath sounds  Abdominal:      General: Abdomen is flat  Bowel sounds are normal       Palpations: Abdomen is soft  Musculoskeletal:         General: Normal range of motion  Cervical back: Normal range of motion and neck supple  Skin:     General: Skin is warm  Capillary Refill: Capillary refill takes less than 2 seconds  Neurological:      General: No focal deficit present  Mental Status: She is alert and oriented to person, place, and time  Mental status is at baseline  Psychiatric:         Mood and Affect: Mood normal          Thought Content:  Thought content normal          Vital Signs  ED Triage Vitals   Temperature Pulse Respirations Blood Pressure SpO2   12/02/22 1658 12/02/22 1658 12/02/22 1658 12/02/22 1658 12/02/22 1658   97 6 °F (36 4 °C) (!) 109 18 129/76 98 %      Temp src Heart Rate Source Patient Position - Orthostatic VS BP Location FiO2 (%)   -- -- -- -- --             Pain Score       12/02/22 1657       4           Vitals:    12/02/22 1658   BP: 129/76   Pulse: (!) 109         Visual Acuity      ED Medications  Medications   calcium gluconate 2 g in sodium chloride 0 9% 100 mL (premix) (0 g Intravenous Stopped 12/2/22 2012)       Diagnostic Studies  Results Reviewed     None                 No orders to display              Procedures  Procedures         ED Course                               SBIRT 20yo+    Flowsheet Row Most Recent Value   SBIRT (25 yo +)    In order to provide better care to our patients, we are screening all of our patients for alcohol and drug use  Would it be okay to ask you these screening questions? No Filed at: 12/02/2022 2012                    [facility]  Number of Diagnoses or Management Options     Amount and/or Complexity of Data Reviewed  Clinical lab tests: reviewed  Tests in the medicine section of CPT®: reviewed    Patient Progress  Patient progress: stable      Disposition  Final diagnoses:   Hypocalcemia     Time reflects when diagnosis was documented in both MDM as applicable and the Disposition within this note     Time User Action Codes Description Comment    12/2/2022  8:37 PM Anepu, Mattie Apley Add [E83 [id number]] Hypocalcemia       ED Disposition     ED Disposition   Discharge    Condition   Stable    Date/Time   Fri Dec 2, 2022  8:36 PM    Comment   [facility] discharge to home/self care                 Follow-up Information     Follow up With Specialties Details Why Contact Info Additional Information    Emmanuel Ortez MD Internal Medicine Schedule an appointment as soon as possible for a visit   [address]       [address] [facility] Emergency Department Emergency Medicine  If symptoms worsen [address] [facility] [address]  [address] [facility] Emergency Department, [place], [place], [address]          Discharge Medication List as of 12/2/2022  8:37 PM      CONTINUE these medications which have NOT CHANGED    Details   ALPRAZolam ER (XANAX XR) 2 MG 24 hr tablet Take 1 tablet (2 mg total) by mouth 2 (two) times a day, Starting Fri 10/7/2022, Normal      baclofen 10 mg tablet Take 10 mg by mouth 3 (three) times a day as needed, Starting Fri 7/22/2022, Historical Med      calcitriol (ROCALTROL) 0 5 MCG capsule Take 1 capsule (0 5 mcg total) by mouth daily, Starting Thu 9/22/2022, Normal      Calcium Carb-Cholecalciferol (Calcium 500+D) 500-400 MG-UNIT TABS Take 600 mg by mouth 3 times a day, Normal      Calcium Carbonate-Vitamin D3 600-400 MG-UNIT TABS TAKE 1 TABLET BY MOUTH 6 (SIX) TIMES A DAY, Normal      desvenlafaxine succinate (PRISTIQ) 50 mg 24 hr tablet Take 1 tablet (50 mg total) by mouth daily, Starting Fri 10/28/2022, Normal      doxepin (SINEquan) 25 mg capsule Take 1 capsule (25 mg total) by mouth daily at bedtime, Starting Thu 9/1/2022, Normal      fenofibrate 160 MG tablet Take 1 tablet (160 mg total) by mouth daily, Starting Thu 10/6/2022, Normal      ferrous sulfate 325 (65 Fe) mg tablet Take 325 mg by mouth Once Monday, wed, Friday-Last dose 4/1/22 , Historical Med      ibuprofen (MOTRIN) 600 mg tablet Take 1 tablet (600 mg total) by mouth every 6 (six) hours as needed for mild pain or moderate pain, Starting Tue 6/2/2020, Normal      levothyroxine 150 mcg tablet TAKE 1 TABLET BY MOUTH EVERY DAY, Normal      Lidocaine-Menthol 4-1 % PTCH if needed  , Historical Med      magnesium Oxide (MAG-OX) 400 mg TABS TAKE 1 TABLET (400 MG TOTAL) BY MOUTH THREE (THREE) TIMES A DAY 9PM, Normal      omeprazole (PriLOSEC) 40 MG capsule Take 1 capsule (40 mg total) by mouth daily, Starting Mon 9/26/2022, Normal      oxyCODONE-acetaminophen (PERCOCET)  mg per tablet Take 1 tablet by mouth 4 (four) times a day, Historical Med pantoprazole (PROTONIX) 40 mg tablet Take 1 tablet (40 mg total) by mouth daily Take 1/2 hour prior to breakfast daily in a m   , Starting Wed 6/8/2022, Until Sat 7/30/2022, Normal      potassium chloride (K-DUR,KLOR-CON) 20 mEq tablet Take 20 mEq by mouth 2 (two) times a day, Historical Med      Potassium Chloride ER 20 MEQ TBCR TAKE 1 TABLET BY MOUTH TWICE A DAY AFTER A MEAL, Normal      pramipexole (MIRAPEX) 0 5 mg tablet Take 0 5 mg by mouth daily at bedtime, Historical Med      pregabalin (LYRICA) 100 mg capsule TAKE 1 CAPSULE BY MOUTH TWICE A DAY AS DIRECTED FOR 30 DAYS, Historical Med             No discharge procedures on file      PDMP Review       Value Time User    PDMP Reviewed  Yes 10/6/2022 12:12 PM Yeny Dia MD          ED Provider  Electronically Signed by           Shital Magallanes DO  12/04/22 2059

## 2022-12-05 ENCOUNTER — TELEPHONE (OUTPATIENT)
Dept: CARDIOLOGY CLINIC | Facility: CLINIC | Age: 47
End: 2022-12-05

## 2022-12-05 LAB — SCAN RESULT: NORMAL

## 2022-12-05 NOTE — TELEPHONE ENCOUNTER
----- Message from Mishel Webster MD sent at 12/3/2022  9:32 AM EST -----  Please call and inform the patient that the Echocardiogram showed normal pumping function of the heart  No significant valve abnormality was seen

## 2022-12-07 DIAGNOSIS — E55.9 VITAMIN D DEFICIENCY: Primary | ICD-10-CM

## 2022-12-07 RX ORDER — MELATONIN
1000 DAILY
Qty: 30 TABLET | Refills: 3 | Status: SHIPPED | OUTPATIENT
Start: 2022-12-07

## 2022-12-08 ENCOUNTER — SOCIAL WORK (OUTPATIENT)
Dept: BEHAVIORAL/MENTAL HEALTH CLINIC | Facility: CLINIC | Age: 47
End: 2022-12-08

## 2022-12-08 DIAGNOSIS — F41.0 GENERALIZED ANXIETY DISORDER WITH PANIC ATTACKS: ICD-10-CM

## 2022-12-08 DIAGNOSIS — F41.1 GENERALIZED ANXIETY DISORDER WITH PANIC ATTACKS: ICD-10-CM

## 2022-12-08 DIAGNOSIS — F33.1 MODERATE EPISODE OF RECURRENT MAJOR DEPRESSIVE DISORDER (HCC): Primary | ICD-10-CM

## 2022-12-08 DIAGNOSIS — F17.210 NICOTINE DEPENDENCE, CIGARETTES, UNCOMPLICATED: ICD-10-CM

## 2022-12-08 NOTE — PSYCH
This note was not shared with the patient due to this is a psychotherapy note    Psychotherapy Provided: Individual Psychotherapy 50 minutes     Length of time in session: 50 minutes, follow up in 1-2 week    Encounter Diagnosis     ICD-10-CM    1  Moderate episode of recurrent major depressive disorder (HCC)  F33 1       2  Generalized anxiety disorder with panic attacks  F41 1     F41 0       3  Nicotine dependence, cigarettes, uncomplicated  U20 [id number]           Goals addressed in session: Goal 1     Pain:      none    0    Current suicide risk : Low     DATA: Met with Senait Mcgowan for scheduled individual session  Having difficulties managing pain as well as outlook for the future  Acknowledged the role that her sister's deteriorating mental health is having on her and writer challenged her to visualize safe boundaries for herself  ASSESSMENT: Senait Mcgowan presents with a normal mood  Her affect is normal range and intensity, appropriate  Senait Mcgowan exhibits good therapeutic rapport with this clinician  Senait Mcgowan continues to exhibit willingness to work on treatment goals and objectives  No safety risks reported or observed  PLAN: Senait Mcgowan will return in 1-2 weeks for the next scheduled session  Between sessions, Senait Mcgowan will take her medication as prescribed and practice positive coping strategies as needed, and will report back during the next session re: successes and barriers  Behavioral Health Treatment Plan [facility]: Diagnosis and Treatment Plan explained to Richar Contreras relates understanding diagnosis and is agreeable to Treatment Plan   Yes     Visit start and stop times:    12/08/22  Start Time: 1430  Stop Time: 1520  Total Visit Time: 50 minutes

## 2022-12-09 ENCOUNTER — HOSPITAL ENCOUNTER (EMERGENCY)
Facility: HOSPITAL | Age: 47
Discharge: HOME | End: 2022-12-09
Attending: EMERGENCY MEDICINE

## 2022-12-09 ENCOUNTER — HOSPITAL ENCOUNTER (OUTPATIENT)
Dept: RADIOLOGY | Facility: HOSPITAL | Age: 47
End: 2022-12-09

## 2022-12-09 ENCOUNTER — APPOINTMENT (EMERGENCY)
Dept: RADIOLOGY | Facility: HOSPITAL | Age: 47
End: 2022-12-09

## 2022-12-09 ENCOUNTER — APPOINTMENT (OUTPATIENT)
Dept: RADIOLOGY | Facility: HOSPITAL | Age: 47
End: 2022-12-09

## 2022-12-09 ENCOUNTER — HOSPITAL ENCOUNTER (OUTPATIENT)
Dept: NON INVASIVE DIAGNOSTICS | Facility: HOSPITAL | Age: 47
Discharge: HOME | End: 2022-12-09

## 2022-12-09 ENCOUNTER — APPOINTMENT (OUTPATIENT)
Dept: LAB | Facility: HOSPITAL | Age: 47
End: 2022-12-09

## 2022-12-09 VITALS
OXYGEN SATURATION: 100 % | HEART RATE: 80 BPM | SYSTOLIC BLOOD PRESSURE: 117 MMHG | DIASTOLIC BLOOD PRESSURE: 71 MMHG | WEIGHT: 210.2 LBS | BODY MASS INDEX: 38.45 KG/M2 | RESPIRATION RATE: 16 BRPM | TEMPERATURE: 98.2 F

## 2022-12-09 DIAGNOSIS — E89.0 POSTOPERATIVE HYPOTHYROIDISM: ICD-10-CM

## 2022-12-09 DIAGNOSIS — R94.31 PROLONGED Q-T INTERVAL ON ECG: ICD-10-CM

## 2022-12-09 DIAGNOSIS — R07.9 CHEST PAIN, UNSPECIFIED: ICD-10-CM

## 2022-12-09 DIAGNOSIS — E83.51 HYPOCALCEMIA: Primary | ICD-10-CM

## 2022-12-09 DIAGNOSIS — E89.0 POSTOPERATIVE HYPOTHYROIDISM: Primary | ICD-10-CM

## 2022-12-09 LAB
ALBUMIN SERPL BCP-MCNC: 3.5 G/DL (ref 3.5–5)
ALP SERPL-CCNC: 137 U/L (ref 46–116)
ALT SERPL W P-5'-P-CCNC: 64 U/L (ref 12–78)
ANION GAP SERPL CALCULATED.3IONS-SCNC: 13 MMOL/L (ref 4–13)
ANION GAP SERPL CALCULATED.3IONS-SCNC: 6 MMOL/L (ref 4–13)
AST SERPL W P-5'-P-CCNC: 33 U/L (ref 5–45)
ATRIAL RATE: 89 BPM
BASOPHILS # BLD AUTO: 0.04 THOUSANDS/ÂΜL (ref 0–0.1)
BASOPHILS NFR BLD AUTO: 1 % (ref 0–1)
BILIRUB SERPL-MCNC: 0.28 MG/DL (ref 0.2–1)
BUN SERPL-MCNC: 24 MG/DL (ref 5–25)
BUN SERPL-MCNC: 24 MG/DL (ref 5–25)
CA-I BLD-SCNC: 1.06 MMOL/L (ref 1.12–1.32)
CA-I BLD-SCNC: 1.1 MMOL/L (ref 1.12–1.32)
CALCIUM SERPL-MCNC: 7.9 MG/DL (ref 8.3–10.1)
CALCIUM SERPL-MCNC: 8.4 MG/DL (ref 8.3–10.1)
CARDIAC TROPONIN I PNL SERPL HS: 3 NG/L
CHLORIDE SERPL-SCNC: 101 MMOL/L (ref 96–108)
CHLORIDE SERPL-SCNC: 104 MMOL/L (ref 96–108)
CO2 SERPL-SCNC: 19 MMOL/L (ref 21–32)
CO2 SERPL-SCNC: 25 MMOL/L (ref 21–32)
CREAT SERPL-MCNC: 1.13 MG/DL (ref 0.6–1.3)
CREAT SERPL-MCNC: 1.21 MG/DL (ref 0.6–1.3)
EOSINOPHIL # BLD AUTO: 0.1 THOUSAND/ÂΜL (ref 0–0.61)
EOSINOPHIL NFR BLD AUTO: 2 % (ref 0–6)
ERYTHROCYTE [DISTWIDTH] IN BLOOD BY AUTOMATED COUNT: 16.6 % (ref 11.6–15.1)
GFR SERPL CREATININE-BSD FRML MDRD: 53 ML/MIN/1.73SQ M
GFR SERPL CREATININE-BSD FRML MDRD: 58 ML/MIN/1.73SQ M
GLUCOSE P FAST SERPL-MCNC: 183 MG/DL (ref 65–99)
GLUCOSE SERPL-MCNC: 134 MG/DL (ref 65–140)
HCT VFR BLD AUTO: 40.7 % (ref 34.8–46.1)
HGB BLD-MCNC: 13.2 G/DL (ref 11.5–15.4)
IMM GRANULOCYTES # BLD AUTO: 0.02 THOUSAND/UL (ref 0–0.2)
IMM GRANULOCYTES NFR BLD AUTO: 0 % (ref 0–2)
LYMPHOCYTES # BLD AUTO: 1.97 THOUSANDS/ÂΜL (ref 0.6–4.47)
LYMPHOCYTES NFR BLD AUTO: 33 % (ref 14–44)
MAGNESIUM SERPL-MCNC: 2 MG/DL (ref 1.6–2.6)
MCH RBC QN AUTO: 28.2 PG (ref 26.8–34.3)
MCHC RBC AUTO-ENTMCNC: 32.4 G/DL (ref 31.4–37.4)
MCV RBC AUTO: 87 FL (ref 82–98)
MONOCYTES # BLD AUTO: 0.36 THOUSAND/ÂΜL (ref 0.17–1.22)
MONOCYTES NFR BLD AUTO: 6 % (ref 4–12)
NEUTROPHILS # BLD AUTO: 3.46 THOUSANDS/ÂΜL (ref 1.85–7.62)
NEUTS SEG NFR BLD AUTO: 58 % (ref 43–75)
NRBC BLD AUTO-RTO: 0 /100 WBCS
P AXIS: 42 DEGREES
PLATELET # BLD AUTO: 172 THOUSANDS/UL (ref 149–390)
PMV BLD AUTO: 10.2 FL (ref 8.9–12.7)
POTASSIUM SERPL-SCNC: 4 MMOL/L (ref 3.5–5.3)
POTASSIUM SERPL-SCNC: 4.2 MMOL/L (ref 3.5–5.3)
PR INTERVAL: 158 MS
PROT SERPL-MCNC: 7.3 G/DL (ref 6.4–8.4)
QRS AXIS: 25 DEGREES
QRSD INTERVAL: 74 MS
QT INTERVAL: 430 MS
QTC INTERVAL: 523 MS
RBC # BLD AUTO: 4.68 MILLION/UL (ref 3.81–5.12)
SODIUM SERPL-SCNC: 132 MMOL/L (ref 135–147)
SODIUM SERPL-SCNC: 136 MMOL/L (ref 135–147)
T WAVE AXIS: 52 DEGREES
VENTRICULAR RATE: 89 BPM
WBC # BLD AUTO: 5.95 THOUSAND/UL (ref 4.31–10.16)

## 2022-12-09 RX ORDER — CALCIUM GLUCONATE 20 MG/ML
1 INJECTION, SOLUTION INTRAVENOUS ONCE
Status: COMPLETED | OUTPATIENT
Start: 2022-12-09 | End: 2022-12-09

## 2022-12-09 RX ADMIN — CALCIUM GLUCONATE 1 G: 20 INJECTION, SOLUTION INTRAVENOUS at 16:39

## 2022-12-09 NOTE — ED PROVIDER NOTES
History  Chief Complaint   Patient presents with   • Chest Pain     Patient started to have chest after she was done cooking last night, it was sharp and lasted for 10 minutes and she laid down and the pain was on and off  It continued after she used the bathroom     Patient is a 80-year-old female with a history of anxiety, chronic pain, hypocalcemia, hyperlipidemia, that presents emergency department with complaint of intermittent chest pain, which is typical of her episodes of hypocalcemia  She denies cough, fevers or chills  History provided by:  Patient   used: No    Chest Pain  Pain location:  Substernal area  Pain quality: aching    Pain radiates to:  Does not radiate  Pain radiates to the back: no    Pain severity:  Mild  Onset quality:  Sudden  Timing:  Intermittent  Progression:  Waxing and waning  Chronicity:  New  Relieved by:  Nothing  Worsened by:  Nothing tried  Ineffective treatments:  None tried  Associated symptoms: no abdominal pain, no back pain, no cough, no fever, no nausea, no shortness of breath and not vomiting        Prior to Admission Medications   Prescriptions Last Dose Informant Patient Reported? Taking?    ALPRAZolam ER (XANAX XR) 2 MG 24 hr tablet   No No   Sig: Take 1 tablet (2 mg total) by mouth 2 (two) times a day   Calcium Carb-Cholecalciferol (Calcium 500+D) 500-400 MG-UNIT TABS   No No   Sig: Take 600 mg by mouth 3 times a day   Calcium Carbonate-Vitamin D3 600-400 MG-UNIT TABS   No No   Sig: TAKE 1 TABLET BY MOUTH 6 (SIX) TIMES A DAY   Lidocaine-Menthol 4-1 % PTCH   Yes No   Sig: if needed     Potassium Chloride ER 20 MEQ TBCR   No No   Sig: TAKE 1 TABLET BY MOUTH TWICE A DAY AFTER A MEAL   baclofen 10 mg tablet   Yes No   Sig: Take 10 mg by mouth 3 (three) times a day as needed   calcitriol (ROCALTROL) 0 5 MCG capsule   No No   Sig: Take 1 capsule (0 5 mcg total) by mouth daily   Patient taking differently: Take 0 5 mcg by mouth 2 (two) times a day cholecalciferol (VITAMIN D3) 1,000 units tablet   No No   Sig: Take 1 tablet (1,000 Units total) by mouth daily   desvenlafaxine succinate (PRISTIQ) 50 mg 24 hr tablet   No No   Sig: Take 1 tablet (50 mg total) by mouth daily   doxepin (SINEquan) 25 mg capsule   No No   Sig: Take 1 capsule (25 mg total) by mouth daily at bedtime   fenofibrate 160 MG tablet   No No   Sig: Take 1 tablet (160 mg total) by mouth daily   ferrous sulfate 325 (65 Fe) mg tablet   Yes No   Sig: Take 325 mg by mouth Once Monday, wed, Friday-Last dose 4/1/22    ibuprofen (MOTRIN) 600 mg tablet   No No   Sig: Take 1 tablet (600 mg total) by mouth every 6 (six) hours as needed for mild pain or moderate pain   levothyroxine 150 mcg tablet   No No   Sig: TAKE 1 TABLET BY MOUTH EVERY DAY   Patient taking differently: 150 mcg Take 6 days a week   magnesium Oxide (MAG-OX) 400 mg TABS   No No   Sig: TAKE 1 TABLET (400 MG TOTAL) BY MOUTH THREE (THREE) TIMES A DAY 9PM   omeprazole (PriLOSEC) 40 MG capsule   No No   Sig: Take 1 capsule (40 mg total) by mouth daily   oxyCODONE-acetaminophen (PERCOCET)  mg per tablet   Yes No   Sig: Take 1 tablet by mouth 4 (four) times a day   pantoprazole (PROTONIX) 40 mg tablet   No No   Sig: Take 1 tablet (40 mg total) by mouth daily Take 1/2 hour prior to breakfast daily in a haily [place]    Patient not taking: No sig reported   potassium chloride (K-DUR,KLOR-CON) 20 mEq tablet   Yes No   Sig: Take 20 mEq by mouth 2 (two) times a day   pramipexole (MIRAPEX) 0 5 mg tablet   Yes No   Sig: Take 0 5 mg by mouth daily at bedtime   pregabalin (LYRICA) 100 mg capsule   Yes No   Sig: TAKE 1 CAPSULE BY MOUTH TWICE A DAY AS DIRECTED FOR 30 DAYS      Facility-Administered Medications: None       Past Medical History:   Diagnosis Date   • Abdominal pain    • Acid reflux    • Acute renal failure (HCC)     multiple episodes   • Anemia    • Anxiety    • Anxiety 3/31/2021   • Arthritis    • Asthma    • Back pain    • Chronic pain    • DDD (degenerative disc disease), lumbar    • Depression    • Disease of thyroid gland     had surgery and now hypo   • DVT (deep venous thrombosis) ([facility] [address] )     s/p ankle fracture   • Headache    • History of transfusion    • Hypercalcemia    • Hyperlipidemia    • Hyperthyroidism    • Hypocalcemia     post op 2016   • Kidney stone    • Lightheadedness    • Migraine    • Obesity    • Palpitations    • Psychiatric disorder     anxiety depression   • PTSD (post-traumatic stress disorder) 10/28/2022   • Seizures ([facility] [address] )     petit mal x1  4 years ago- all tests were neg  • SOB (shortness of breath)    • Spondylolisthesis of lumbar region    • Treatment     spinal pain injections  last was 2016   • Wears glasses        Past Surgical History:   Procedure Laterality Date   • BACK SURGERY      L4-5, S1-fusion-plate/screws   •  SECTION      x5   • CYSTOCELE REPAIR  2017   • CYSTOSCOPY     • DISCOGRAM     • HYSTERECTOMY     • PARATHYROIDECTOMY     • CT ANTERIOR COLPORRAPHY RPR CYSTOCELE W/CYSTO N/A 2017    Procedure: CYSTOCELE REPAIR;  Surgeon: Glen Syed MD;  Location: [address] [place] [address];  Service: Gynecology   • CT ARTHRODESIS POSTERIOR INTERBODY LUMBAR N/A 2016    Procedure: L4-S1 LUMBAR LAMINECTOMY/DECOMPRESSION;  INSTRUMENTED POSTEROLATERAL FUSION/ INTERBODY L5-S1; ALLOGRAFT AND AUTOGRAFT (IMPULSE) ;   Surgeon: Duane Olivera MD;  [facility]: [facility];  Service: Orthopedics   • CT CYSTOURETHROSCOPY,URETER CATHETER Bilateral 2018    Procedure: INSERTION URETERAL CATHETERS PREOP;  Surgeon: Kye Obrien MD;  Location: [address] [place] [address];  Service: Urology   • CT SLING OPER STRES INCONTINENCE N/A 2017    Procedure: MID URETHRAL SLING;  Surgeon: Siddhartha Ahmadi MD;  Location: [address] [place] [address];  Service: Urology   • CT SUPRACERV ABD HYSTERECTOMY N/A 2018    Procedure: SUPRACERVICAL HYSTERECTOMY;  Surgeon: Glen Syed MD;  Location: [address] [place] [address];  Service: Gynecology   • THYROIDECTOMY     • TONSILECTOMY AND ADNOIDECTOMY     • TONSILLECTOMY     • TUBAL LIGATION         Family History   Problem Relation Age of Onset   • Diabetes Mother    • Hypertension Mother    • Hyperlipidemia Father    • Arrhythmia Father    • Lung cancer Father    • Diabetes Father    • Colon cancer Maternal Grandfather    • Stomach cancer Paternal Grandmother    • Heart disease Paternal Aunt      I have reviewed and agree with the history as documented  E-Cigarette/Vaping   • E-Cigarette Use Never User      E-Cigarette/Vaping Substances   • Nicotine No    • THC No    • CBD No    • Flavoring No    • Other No    • Unknown No      Social History     Tobacco Use   • Smoking status: Every Day     Packs/day: 0 25     Years: 25 00     Pack years: 6 25     Types: Cigarettes   • Smokeless tobacco: Never   Vaping Use   • Vaping Use: Never used   Substance Use Topics   • Alcohol use: Not Currently   • Drug use: No       Review of Systems   Constitutional: Negative for chills and fever  Respiratory: Negative for cough, chest tightness and shortness of breath  Cardiovascular: Positive for chest pain  Gastrointestinal: Negative for abdominal pain, diarrhea, nausea and vomiting  Genitourinary: Negative for dysuria, frequency, hematuria and urgency  Musculoskeletal: Negative for back pain, neck pain and neck stiffness  Psychiatric/Behavioral: Negative for agitation and behavioral problems  The patient is nervous/anxious  All other systems reviewed and are negative  Physical Exam  Physical Exam  Vitals and nursing note reviewed  Constitutional:       General: She is not in acute distress  Appearance: She is well-developed  She is not diaphoretic  HENT:      Head: Normocephalic and atraumatic  Eyes:      Conjunctiva/sclera: Conjunctivae normal       Pupils: Pupils are equal, round, and reactive to light  Cardiovascular:      Rate and Rhythm: Normal rate and regular rhythm  Heart sounds: Normal heart sounds  No murmur heard  Pulmonary:      Effort: Pulmonary effort is normal  No respiratory distress  Breath sounds: Normal breath sounds  Abdominal:      General: Bowel sounds are normal  There is no distension  Palpations: Abdomen is soft  Tenderness: There is no abdominal tenderness  Musculoskeletal:         General: No deformity  Normal range of motion  Cervical back: Normal range of motion and neck supple  Right lower leg: No tenderness  No edema  Left lower leg: No tenderness  No edema  Skin:     General: Skin is warm and dry  Capillary Refill: Capillary refill takes less than 2 seconds  Coloration: Skin is not pale  Findings: No rash  Neurological:      General: No focal deficit present  Mental Status: She is alert and oriented to person, place, and time  Cranial Nerves: No cranial nerve deficit  Psychiatric:         Mood and Affect: Mood is anxious           Behavior: Behavior normal          Vital Signs  ED Triage Vitals [12/09/22 1354]   Temperature Pulse Respirations Blood Pressure SpO2   98 2 °F (36 8 °C) (!) 107 20 125/87 92 %      Temp Source Heart Rate Source Patient Position - Orthostatic VS BP Location FiO2 (%)   Oral Monitor Sitting Right arm --      Pain Score       5           Vitals:    12/09/22 1445 12/09/22 1615 12/09/22 1730 12/09/22 1745   BP: 113/68 131/64 104/64 117/71   Pulse: 84 80 80    Patient Position - Orthostatic VS:             Visual Acuity      ED Medications  Medications   calcium gluconate 1 g in sodium chloride 0 9% 50 mL (premix) (0 g Intravenous Stopped 12/9/22 1709)       Diagnostic Studies  Results Reviewed     Procedure Component Value Units Date/Time    Comprehensive metabolic panel [[id number]]  (Abnormal) Collected: 12/09/22 1534    Lab Status: Final result Specimen: Blood from Arm, Right Updated: 12/09/22 1627     Sodium 132 mmol/L      Potassium 4 0 mmol/L      Chloride 101 mmol/L      CO2 25 mmol/L      ANION GAP 6 mmol/L      BUN 24 mg/dL      Creatinine 1 13 mg/dL      Glucose 134 mg/dL      Calcium 8 4 mg/dL      AST 33 U/L      ALT 64 U/L      Alkaline Phosphatase 137 U/L      Total Protein 7 3 g/dL      Albumin 3 5 g/dL      Total Bilirubin 0 28 mg/dL      eGFR 58 ml/min/1 73sq m     Narrative:      [place] guidelines for Chronic Kidney Disease (CKD):   •  Stage 1 with normal or high GFR (GFR > 90 mL/min/1 73 square meters)  •  Stage 2 Mild CKD (GFR = 60-89 mL/min/1 73 square meters)  •  Stage 3A Moderate CKD (GFR = 45-59 mL/min/1 73 square meters)  •  Stage 3B Moderate CKD (GFR = 30-44 mL/min/1 73 square meters)  •  Stage 4 Severe CKD (GFR = 15-29 mL/min/1 73 square meters)  •  Stage 5 End Stage CKD (GFR <15 mL/min/1 73 square meters)  Note: GFR calculation is accurate only with a steady state creatinine    Magnesium [[id number]]  (Normal) Collected: 12/09/22 1534    Lab Status: Final result Specimen: Blood from Arm, Right Updated: 12/09/22 1627     Magnesium 2 0 mg/dL     HS Troponin 0hr (reflex protocol) [[id number]]  (Normal) Collected: 12/09/22 1534    Lab Status: Final result Specimen: Blood from Arm, Right Updated: 12/09/22 1609     hs TnI 0hr 3 ng/L     Calcium, ionized [[id number]]  (Abnormal) Collected: 12/09/22 1534    Lab Status: Final result Specimen: Blood from Arm, Right Updated: 12/09/22 1545     Calcium, Ionized 1 10 mmol/L     CBC and differential [[id number]]  (Abnormal) Collected: 12/09/22 1534    Lab Status: Final result Specimen: Blood from Arm, Right Updated: 12/09/22 1544     WBC 5 95 Thousand/uL      RBC 4 68 Million/uL      Hemoglobin 13 2 g/dL      Hematocrit 40 7 %      MCV 87 fL      MCH 28 2 pg      MCHC 32 4 g/dL      RDW 16 6 %      MPV 10 2 fL      Platelets 415 Thousands/uL      nRBC 0 /100 WBCs      Neutrophils Relative 58 %      Immat GRANS % 0 %      Lymphocytes Relative 33 %      Monocytes Relative 6 %      Eosinophils Relative 2 %      Basophils Relative 1 %      Neutrophils Absolute 3 46 Thousands/µL      Immature Grans Absolute 0 02 Thousand/uL      Lymphocytes Absolute 1 97 Thousands/µL      Monocytes Absolute 0 36 Thousand/µL      Eosinophils Absolute 0 10 Thousand/µL      Basophils Absolute 0 04 Thousands/µL                  XR chest 1 view portable   ED Interpretation by Demetrius Jasmine DO (12/09 1732)   nad      Final Result by Andrzej Little MD (12/10 1766)      No acute cardiopulmonary disease                    Workstation performed: [id number]                    Procedures  ECG 12 Lead Documentation Only    Date/Time: 12/9/2022 2:08 PM  Performed by: Demetrius Jasmine DO  Authorized by: Demetrius Jasmine DO     Indications / Diagnosis:  Cp  ECG reviewed by me, the ED Provider: yes    Patient location:  ED  Previous ECG:     Previous ECG:  Compared to current    Similarity:  No change    Comparison to cardiac monitor: Yes    Interpretation:     Interpretation: non-specific    Rate:     ECG rate:  89bpm    ECG rate assessment: normal    Rhythm:     Rhythm: sinus rhythm    Ectopy:     Ectopy: none    QRS:     QRS axis:  Normal  Conduction:     Conduction: normal    ST segments:     ST segments:  Normal  T waves:     T waves: flattening      Flattening:  V3 and V4  Other findings:     Other findings: prolonged qTc interval    ECG 12 Lead Documentation Only    Date/Time: 12/9/2022 5:40 PM  Performed by: Demetrius Jasmine DO  Authorized by: Demetrius Jasmine DO     Indications / Diagnosis:  Cp/prolonged qt  ECG reviewed by me, the ED Provider: yes    Patient location:  ED  Previous ECG:     Previous ECG:  Compared to current    Similarity:  Changes noted    Comparison to cardiac monitor: Yes    Interpretation:     Interpretation: normal    Rate:     ECG rate:  75bpm    ECG rate assessment: normal    Rhythm:     Rhythm: sinus rhythm    Ectopy:     Ectopy: none    QRS:     QRS axis:  Normal    QRS intervals:  Normal  Conduction: Conduction: normal    ST segments:     ST segments:  Normal  T waves:     T waves: normal               ED Course                                             MDM  Number of Diagnoses or Management Options  Chest pain, unspecified: new and requires workup  Hypocalcemia: new and requires workup  Prolonged Q-T interval on ECG: new and requires workup     Amount and/or Complexity of Data Reviewed  Clinical lab tests: ordered and reviewed  Tests in the radiology section of CPT®: ordered and reviewed    Risk of Complications, Morbidity, and/or Mortality  Presenting problems: high  Diagnostic procedures: high  Management options: high    Patient Progress  Patient progress: improved      Disposition  Final diagnoses:   Hypocalcemia   Chest pain, unspecified   Prolonged Q-T interval on ECG     Time reflects when diagnosis was documented in both MDM as applicable and the Disposition within this note     Time User Action Codes Description Comment    12/9/2022  5:29 PM Josee Windham Add [E83 51] Hypocalcemia     12/9/2022  5:29 PM Josee Windham O Add [R07 9] Chest pain, unspecified     12/9/2022  5:55 PM Josee Windham Add [R94 31] Prolonged Q-T interval on ECG       ED Disposition     ED Disposition   Discharge    Condition   Stable    Date/Time   Fri Dec 9, 2022  5:29 PM    Comment   Brittany Sood [facility] discharge to home/self care                 Follow-up Information     Follow up With Specialties Details Why Contact Info Additional Information    Hay Camacho MD Internal Medicine Schedule an appointment as soon as possible for a visit in 2 days for follow up [address] [facility]       [facility] Cardiology Schedule an appointment as soon as possible for a visit in 1 day for follow up [address] [facility], [facility] [address] [facility] [place], [place] [address], [address] [place] [address],[facility]          Discharge Medication List as of 12/9/2022  5:57 PM      CONTINUE these medications which have NOT CHANGED    Details   cholecalciferol (VITAMIN D3) 1,000 units tablet Take 1 tablet (1,000 Units total) by mouth daily, Starting Wed 12/7/2022, Normal      ALPRAZolam ER (XANAX XR) 2 MG 24 hr tablet Take 1 tablet (2 mg total) by mouth 2 (two) times a day, Starting Fri 10/7/2022, Normal      baclofen 10 mg tablet Take 10 mg by mouth 3 (three) times a day as needed, Starting Fri 7/22/2022, Historical Med      calcitriol (ROCALTROL) 0 5 MCG capsule Take 1 capsule (0 5 mcg total) by mouth daily, Starting Thu 9/22/2022, Normal      Calcium Carb-Cholecalciferol (Calcium 500+D) 500-400 MG-UNIT TABS Take 600 mg by mouth 3 times a day, Normal      Calcium Carbonate-Vitamin D3 600-400 MG-UNIT TABS TAKE 1 TABLET BY MOUTH 6 (SIX) TIMES A DAY, Normal      desvenlafaxine succinate (PRISTIQ) 50 mg 24 hr tablet Take 1 tablet (50 mg total) by mouth daily, Starting Fri 10/28/2022, Normal      doxepin (SINEquan) 25 mg capsule Take 1 capsule (25 mg total) by mouth daily at bedtime, Starting Thu 9/1/2022, Normal      fenofibrate 160 MG tablet Take 1 tablet (160 mg total) by mouth daily, Starting Thu 10/6/2022, Normal      ferrous sulfate 325 (65 Fe) mg tablet Take 325 mg by mouth Once Monday, wed, Friday-Last dose 4/1/22 , Historical Med      ibuprofen (MOTRIN) 600 mg tablet Take 1 tablet (600 mg total) by mouth every 6 (six) hours as needed for mild pain or moderate pain, Starting Tue 6/2/2020, Normal      levothyroxine 150 mcg tablet TAKE 1 TABLET BY MOUTH EVERY DAY, Normal      Lidocaine-Menthol 4-1 % PTCH if needed  , Historical Med      magnesium Oxide (MAG-OX) 400 mg TABS TAKE 1 TABLET (400 MG TOTAL) BY MOUTH THREE (THREE) TIMES A DAY 9PM, Normal      omeprazole (PriLOSEC) 40 MG capsule Take 1 capsule (40 mg total) by mouth daily, Starting Mon 9/26/2022, Normal      oxyCODONE-acetaminophen (PERCOCET)  mg per tablet Take 1 tablet by mouth 4 (four) times a day, Historical Med      pantoprazole (PROTONIX) 40 mg tablet Take 1 tablet (40 mg total) by mouth daily Take 1/2 hour prior to breakfast daily in a m   , Starting Wed 6/8/2022, Until Sat 7/30/2022, Normal      potassium chloride (K-DUR,KLOR-CON) 20 mEq tablet Take 20 mEq by mouth 2 (two) times a day, Historical Med      Potassium Chloride ER 20 MEQ TBCR TAKE 1 TABLET BY MOUTH TWICE A DAY AFTER A MEAL, Normal      pramipexole (MIRAPEX) 0 5 mg tablet Take 0 5 mg by mouth daily at bedtime, Historical Med      pregabalin (LYRICA) 100 mg capsule TAKE 1 CAPSULE BY MOUTH TWICE A DAY AS DIRECTED FOR 30 DAYS, Historical Med             No discharge procedures on file      PDMP Review       Value Time User    PDMP Reviewed  Yes 10/6/2022 12:12 PM Kathy Vaughn MD          ED Provider  Electronically Signed by           Rebecca Broderick DO  12/10/22 0971

## 2022-12-11 ENCOUNTER — HOSPITAL ENCOUNTER (EMERGENCY)
Facility: HOSPITAL | Age: 47
Discharge: HOME | End: 2022-12-11
Attending: EMERGENCY MEDICINE

## 2022-12-11 VITALS
HEART RATE: 72 BPM | OXYGEN SATURATION: 95 % | DIASTOLIC BLOOD PRESSURE: 82 MMHG | SYSTOLIC BLOOD PRESSURE: 130 MMHG | RESPIRATION RATE: 16 BRPM | TEMPERATURE: 97 F

## 2022-12-11 DIAGNOSIS — E83.51 HYPOCALCEMIA: Primary | ICD-10-CM

## 2022-12-11 LAB
ANION GAP SERPL CALCULATED.3IONS-SCNC: 12 MMOL/L (ref 4–13)
ATRIAL RATE: 75 BPM
BASOPHILS # BLD AUTO: 0.05 THOUSANDS/ÂΜL (ref 0–0.1)
BASOPHILS NFR BLD AUTO: 1 % (ref 0–1)
BUN SERPL-MCNC: 22 MG/DL (ref 5–25)
CA-I BLD-SCNC: 1.08 MMOL/L (ref 1.12–1.32)
CALCIUM SERPL-MCNC: 8.9 MG/DL (ref 8.3–10.1)
CHLORIDE SERPL-SCNC: 100 MMOL/L (ref 96–108)
CO2 SERPL-SCNC: 23 MMOL/L (ref 21–32)
CREAT SERPL-MCNC: 0.98 MG/DL (ref 0.6–1.3)
EOSINOPHIL # BLD AUTO: 0.1 THOUSAND/ÂΜL (ref 0–0.61)
EOSINOPHIL NFR BLD AUTO: 2 % (ref 0–6)
ERYTHROCYTE [DISTWIDTH] IN BLOOD BY AUTOMATED COUNT: 16.1 % (ref 11.6–15.1)
GFR SERPL CREATININE-BSD FRML MDRD: 69 ML/MIN/1.73SQ M
GLUCOSE SERPL-MCNC: 117 MG/DL (ref 65–140)
HCT VFR BLD AUTO: 40.7 % (ref 34.8–46.1)
HGB BLD-MCNC: 13.5 G/DL (ref 11.5–15.4)
IMM GRANULOCYTES # BLD AUTO: 0.02 THOUSAND/UL (ref 0–0.2)
IMM GRANULOCYTES NFR BLD AUTO: 0 % (ref 0–2)
LYMPHOCYTES # BLD AUTO: 2.18 THOUSANDS/ÂΜL (ref 0.6–4.47)
LYMPHOCYTES NFR BLD AUTO: 33 % (ref 14–44)
MCH RBC QN AUTO: 28.5 PG (ref 26.8–34.3)
MCHC RBC AUTO-ENTMCNC: 33.2 G/DL (ref 31.4–37.4)
MCV RBC AUTO: 86 FL (ref 82–98)
MONOCYTES # BLD AUTO: 0.43 THOUSAND/ÂΜL (ref 0.17–1.22)
MONOCYTES NFR BLD AUTO: 7 % (ref 4–12)
NEUTROPHILS # BLD AUTO: 3.81 THOUSANDS/ÂΜL (ref 1.85–7.62)
NEUTS SEG NFR BLD AUTO: 57 % (ref 43–75)
NRBC BLD AUTO-RTO: 0 /100 WBCS
P AXIS: 45 DEGREES
PLATELET # BLD AUTO: 180 THOUSANDS/UL (ref 149–390)
PMV BLD AUTO: 10.4 FL (ref 8.9–12.7)
POTASSIUM SERPL-SCNC: 4.3 MMOL/L (ref 3.5–5.3)
PR INTERVAL: 174 MS
QRS AXIS: 10 DEGREES
QRSD INTERVAL: 76 MS
QT INTERVAL: 404 MS
QTC INTERVAL: 451 MS
RBC # BLD AUTO: 4.74 MILLION/UL (ref 3.81–5.12)
SODIUM SERPL-SCNC: 135 MMOL/L (ref 135–147)
T WAVE AXIS: 40 DEGREES
VENTRICULAR RATE: 75 BPM
WBC # BLD AUTO: 6.59 THOUSAND/UL (ref 4.31–10.16)

## 2022-12-11 RX ORDER — CALCIUM GLUCONATE 20 MG/ML
1 INJECTION, SOLUTION INTRAVENOUS ONCE
Status: COMPLETED | OUTPATIENT
Start: 2022-12-11 | End: 2022-12-11

## 2022-12-11 RX ORDER — CALCIUM GLUCONATE 20 MG/ML
2 INJECTION, SOLUTION INTRAVENOUS ONCE
Status: COMPLETED | OUTPATIENT
Start: 2022-12-11 | End: 2022-12-11

## 2022-12-11 RX ADMIN — CALCIUM GLUCONATE 1 G: 20 INJECTION, SOLUTION INTRAVENOUS at 16:26

## 2022-12-11 RX ADMIN — CALCIUM GLUCONATE 2 G: 20 INJECTION, SOLUTION INTRAVENOUS at 15:31

## 2022-12-11 NOTE — ED PROVIDER NOTES
History  Chief Complaint   Patient presents with   • Evaluation of Abnormal Diagnostic Test     Arrived for abnormal calcium  Here a couple days ago for chest pain, states her problems were probably from her calcium levels  Feels tingly like when this happens      Patient is a 71-year-old female with past medical history significant for current everyday smoker, hypothyroidism following thyroid resection, recurrent hypocalcemia following parathyroidectomy, chronic back pain, anxiety, depression, GERD, and iron deficiency anemia  Who presents for evaluation of generalized tingling and muscle spasms  Pt states that her symptoms are consistent with how she feels when her calcium is low  Muscle Pain  Location:  Generalized  Quality:  Tingling, reduced sensation, spasm  Severity:  Moderate  Onset quality:  Gradual  Duration:  1 day  Timing:  Constant  Progression:  Unchanged  Chronicity:  New  Context:  Unable to specify  Relieved by:  Nothing  Worsened by:  Nothing  Ineffective treatments:  Oral calcium supplementation  Associated symptoms: myalgias    Associated symptoms: no abdominal pain, no chest pain, no congestion, no cough, no diarrhea, no ear pain, no fatigue, no fever, no headaches, no loss of consciousness, no nausea, no rash, no rhinorrhea, no shortness of breath, no sore throat, no vomiting and no wheezing    Myalgias:     Location:  Generalized    Quality: tingling  Severity:  Moderate    Onset quality:  Gradual    Duration:  1 day    Timing:  Constant    Progression:  Worsening      Prior to Admission Medications   Prescriptions Last Dose Informant Patient Reported? Taking?    ALPRAZolam ER (XANAX XR) 2 MG 24 hr tablet   No No   Sig: Take 1 tablet (2 mg total) by mouth 2 (two) times a day   Calcium Carb-Cholecalciferol (Calcium 500+D) 500-400 MG-UNIT TABS   No No   Sig: Take 600 mg by mouth 3 times a day   Calcium Carbonate-Vitamin D3 600-400 MG-UNIT TABS   No No   Sig: TAKE 1 TABLET BY MOUTH 6 (SIX) TIMES A DAY   Lidocaine-Menthol 4-1 % PTCH   Yes No   Sig: if needed     Potassium Chloride ER 20 MEQ TBCR   No No   Sig: TAKE 1 TABLET BY MOUTH TWICE A DAY AFTER A MEAL   baclofen 10 mg tablet   Yes No   Sig: Take 10 mg by mouth 3 (three) times a day as needed   calcitriol (ROCALTROL) 0 5 MCG capsule   No No   Sig: Take 1 capsule (0 5 mcg total) by mouth daily   Patient taking differently: Take 0 5 mcg by mouth 2 (two) times a day   cholecalciferol (VITAMIN D3) 1,000 units tablet   No No   Sig: Take 1 tablet (1,000 Units total) by mouth daily   desvenlafaxine succinate (PRISTIQ) 50 mg 24 hr tablet   No No   Sig: Take 1 tablet (50 mg total) by mouth daily   doxepin (SINEquan) 25 mg capsule   No No   Sig: Take 1 capsule (25 mg total) by mouth daily at bedtime   fenofibrate 160 MG tablet   No No   Sig: Take 1 tablet (160 mg total) by mouth daily   ferrous sulfate 325 (65 Fe) mg tablet   Yes No   Sig: Take 325 mg by mouth Once Monday, wed, Friday-Last dose 4/1/22    ibuprofen (MOTRIN) 600 mg tablet   No No   Sig: Take 1 tablet (600 mg total) by mouth every 6 (six) hours as needed for mild pain or moderate pain   levothyroxine 150 mcg tablet   No No   Sig: TAKE 1 TABLET BY MOUTH EVERY DAY   Patient taking differently: 150 mcg Take 6 days a week   magnesium Oxide (MAG-OX) 400 mg TABS   No No   Sig: TAKE 1 TABLET (400 MG TOTAL) BY MOUTH THREE (THREE) TIMES A DAY 9PM   omeprazole (PriLOSEC) 40 MG capsule   No No   Sig: Take 1 capsule (40 mg total) by mouth daily   oxyCODONE-acetaminophen (PERCOCET)  mg per tablet   Yes No   Sig: Take 1 tablet by mouth 4 (four) times a day   pantoprazole (PROTONIX) 40 mg tablet   No No   Sig: Take 1 tablet (40 mg total) by mouth daily Take 1/2 hour prior to breakfast daily in a [facility]    Patient not taking: No sig reported   potassium chloride (K-DUR,KLOR-CON) 20 mEq tablet   Yes No   Sig: Take 20 mEq by mouth 2 (two) times a day   pramipexole (MIRAPEX) 0 5 mg tablet   Yes No   Sig: Take 0 5 mg by mouth daily at bedtime   pregabalin (LYRICA) 100 mg capsule   Yes No   Sig: TAKE 1 CAPSULE BY MOUTH TWICE A DAY AS DIRECTED FOR 30 DAYS      Facility-Administered Medications: None       Past Medical History:   Diagnosis Date   • Abdominal pain    • Acid reflux    • Acute renal failure (HCC)     multiple episodes   • Anemia    • Anxiety    • Anxiety 3/31/2021   • Arthritis    • Asthma    • Back pain    • Chronic pain    • DDD (degenerative disc disease), lumbar    • Depression    • Disease of thyroid gland     had surgery and now hypo   • DVT (deep venous thrombosis) ([facility] [address] ) 2009    s/p ankle fracture   • Headache    • History of transfusion    • Hypercalcemia    • Hyperlipidemia    • Hyperthyroidism    • Hypocalcemia     post op    • Kidney stone    • Lightheadedness    • Migraine    • Obesity    • Palpitations    • Psychiatric disorder     anxiety depression   • PTSD (post-traumatic stress disorder) 10/28/2022   • Seizures ([facility] [address] )     petit mal x1  4 years ago- all tests were neg  • SOB (shortness of breath)    • Spondylolisthesis of lumbar region    • Treatment     spinal pain injections  last was 2016   • Wears glasses        Past Surgical History:   Procedure Laterality Date   • BACK SURGERY      L4-5, S1-fusion-plate/screws   •  SECTION      x5   • CYSTOCELE REPAIR  2017   • CYSTOSCOPY     • DISCOGRAM     • HYSTERECTOMY     • PARATHYROIDECTOMY     • AZ ANTERIOR COLPORRAPHY RPR CYSTOCELE W/CYSTO N/A 2017    Procedure: CYSTOCELE REPAIR;  Surgeon: Jessica Palacio MD;  Location: [address] [place] [address];  Service: Gynecology   • AZ ARTHRODESIS POSTERIOR INTERBODY [address] LUMBAR N/A 2016    Procedure: L4-S1 LUMBAR LAMINECTOMY/DECOMPRESSION;  INSTRUMENTED POSTEROLATERAL FUSION/ INTERBODY L5-S1; ALLOGRAFT AND AUTOGRAFT (IMPULSE) ;   Surgeon: Veena Anderson MD;  Location:  MAIN OR;  Service: Orthopedics   • AZ CYSTO BLADDER W/URETERAL CATHETERIZATION Bilateral 2018 Procedure: INSERTION URETERAL CATHETERS PREOP;  Surgeon: Justin Covarrubias MD;  Location: [address] [place] [address];  Service: Urology   • ND SLING OPERATION STRESS INCONTINENCE N/A 5/4/2017    Procedure: MID URETHRAL SLING;  Surgeon: Kameron Cedeno MD;  Location: [address] [place] [address];  Service: Urology   • ND SUPRACERVICAL ABDL HYSTER W/WO RMVL TUBE OVARY N/A 12/7/2018    Procedure: SUPRACERVICAL HYSTERECTOMY;  Surgeon: Hoda Dupree MD;  Location: [address] [place] [address];  Service: Gynecology   • THYROIDECTOMY     • TONSILECTOMY AND ADNOIDECTOMY     • TONSILLECTOMY     • TUBAL LIGATION         Family History   Problem Relation Age of Onset   • Diabetes Mother    • Hypertension Mother    • Hyperlipidemia Father    • Arrhythmia Father    • Lung cancer Father    • Diabetes Father    • Colon cancer Maternal Grandfather    • Stomach cancer Paternal Grandmother    • Heart disease Paternal Aunt      I have reviewed and agree with the history as documented  E-Cigarette/Vaping   • E-Cigarette Use Never User      E-Cigarette/Vaping Substances   • Nicotine No    • THC No    • CBD No    • Flavoring No    • Other No    • Unknown No      Social History     Tobacco Use   • Smoking status: Every Day     Packs/day: 0 25     Years: 25 00     Pack years: 6 25     Types: Cigarettes   • Smokeless tobacco: Never   Vaping Use   • Vaping Use: Never used   Substance Use Topics   • Alcohol use: Not Currently   • Drug use: No       Review of Systems   Constitutional: Negative for chills, fatigue and fever  HENT: Negative for congestion, ear pain, rhinorrhea and sore throat  Eyes: Negative for pain and visual disturbance  Respiratory: Negative for cough, shortness of breath and wheezing  Cardiovascular: Negative for chest pain and palpitations  Gastrointestinal: Negative for abdominal pain, diarrhea, nausea and vomiting  Endocrine: Negative  Genitourinary: Negative for dysuria and hematuria  Musculoskeletal: Positive for myalgias   Negative for arthralgias and back pain  Tingling in hands, face and feet   Skin: Negative for color change and rash  Allergic/Immunologic: Negative  Neurological: Negative for seizures, loss of consciousness, syncope and headaches  Hematological: Negative  Psychiatric/Behavioral: Negative  All other systems reviewed and are negative  Physical Exam  Physical Exam  Vitals and nursing note reviewed  Constitutional:       General: She is not in acute distress  Appearance: Normal appearance  She is well-developed  She is not ill-appearing  HENT:      Head: Normocephalic and atraumatic  Nose: Nose normal       Mouth/Throat:      Mouth: Mucous membranes are moist    Eyes:      General: No scleral icterus  Conjunctiva/sclera: Conjunctivae normal       Pupils: Pupils are equal, round, and reactive to light  Cardiovascular:      Rate and Rhythm: Normal rate and regular rhythm  Heart sounds: No murmur heard  Pulmonary:      Effort: Pulmonary effort is normal  No respiratory distress  Breath sounds: Normal breath sounds  Abdominal:      General: Abdomen is flat  Bowel sounds are normal       Palpations: Abdomen is soft  Tenderness: There is no abdominal tenderness  Musculoskeletal:         General: No swelling  Normal range of motion  Cervical back: Normal range of motion and neck supple  Right lower leg: No edema  Left lower leg: No edema  Skin:     General: Skin is warm and dry  Capillary Refill: Capillary refill takes less than 2 seconds  Neurological:      General: No focal deficit present  Mental Status: She is alert and oriented to person, place, and time  Cranial Nerves: No cranial nerve deficit        Deep Tendon Reflexes: Reflexes abnormal    Psychiatric:         Mood and Affect: Mood normal          Vital Signs  ED Triage Vitals [12/11/22 1326]   Temperature Pulse Respirations Blood Pressure SpO2   (!) 97 °F (36 1 °C) 104 20 130/82 100 %      Temp Source Heart Rate Source Patient Position - Orthostatic VS BP Location FiO2 (%)   Tympanic Monitor -- Right arm --      Pain Score       --           Vitals:    12/11/22 1326 12/11/22 1545 12/11/22 1615   BP: 130/82     Pulse: 104 78 72         Visual Acuity      ED Medications  Medications   calcium gluconate 2 g in sodium chloride 0 9% 100 mL (premix) (0 g Intravenous Stopped 12/11/22 1625)   calcium gluconate 1 g in sodium chloride 0 9% 50 mL (premix) (0 g Intravenous Stopped 12/11/22 1700)       Diagnostic Studies  Results Reviewed     Procedure Component Value Units Date/Time    Calcium, ionized [[id number]]  (Abnormal) Collected: 12/11/22 1411    Lab Status: Final result Specimen: Blood from Arm, Right Updated: 12/11/22 1450     Calcium, Ionized 1 08 mmol/L     Basic metabolic panel [[id number]] Collected: 12/11/22 1411    Lab Status: Final result Specimen: Blood from Arm, Right Updated: 12/11/22 1437     Sodium 135 mmol/L      Potassium 4 3 mmol/L      Chloride 100 mmol/L      CO2 23 mmol/L      ANION GAP 12 mmol/L      BUN 22 mg/dL      Creatinine 0 98 mg/dL      Glucose 117 mg/dL      Calcium 8 9 mg/dL      eGFR 69 ml/min/1 73sq m     Narrative:      [place] guidelines for Chronic Kidney Disease (CKD):   •  Stage 1 with normal or high GFR (GFR > 90 mL/min/1 73 square meters)  •  Stage 2 Mild CKD (GFR = 60-89 mL/min/1 73 square meters)  •  Stage 3A Moderate CKD (GFR = 45-59 mL/min/1 73 square meters)  •  Stage 3B Moderate CKD (GFR = 30-44 mL/min/1 73 square meters)  •  Stage 4 Severe CKD (GFR = 15-29 mL/min/1 73 square meters)  •  Stage 5 End Stage CKD (GFR <15 mL/min/1 73 square meters)  Note: GFR calculation is accurate only with a steady state creatinine    CBC and differential [[id number]]  (Abnormal) Collected: 12/11/22 1411    Lab Status: Final result Specimen: Blood from Arm, Right Updated: 12/11/22 1422     WBC 6 59 Thousand/uL      RBC 4 74 Million/uL Hemoglobin 13 5 g/dL      Hematocrit 40 7 %      MCV 86 fL      MCH 28 5 pg      MCHC 33 2 g/dL      RDW 16 1 %      MPV 10 4 fL      Platelets 454 Thousands/uL      nRBC 0 /100 WBCs      Neutrophils Relative 57 %      Immat GRANS % 0 %      Lymphocytes Relative 33 %      Monocytes Relative 7 %      Eosinophils Relative 2 %      Basophils Relative 1 %      Neutrophils Absolute 3 81 Thousands/µL      Immature Grans Absolute 0 02 Thousand/uL      Lymphocytes Absolute 2 18 Thousands/µL      Monocytes Absolute 0 43 Thousand/µL      Eosinophils Absolute 0 10 Thousand/µL      Basophils Absolute 0 05 Thousands/µL                  No orders to display              Procedures  Procedures         ED Course                                             MDM  Number of Diagnoses or Management Options  Hypocalcemia: new and requires workup  Diagnosis management comments: Hypocalcemia as noted by abnormal ionized calcium  replete with 3g Calcium gluconate  Pt with resolution of syumptoms  D/c home  Pt educated on red flag si/sx that would necessitate return to ed         Amount and/or Complexity of Data Reviewed  Clinical lab tests: ordered and reviewed  Tests in the radiology section of CPT®: reviewed  Tests in the medicine section of CPT®: ordered and reviewed  Decide to obtain previous medical records or to obtain history from someone other than the patient: yes  Review and summarize past medical records: yes  Independent visualization of images, tracings, or specimens: yes    Risk of Complications, Morbidity, and/or Mortality  Presenting problems: moderate  Diagnostic procedures: moderate  Management options: moderate    Patient Progress  Patient progress: stable      Disposition  Final diagnoses:   Hypocalcemia     Time reflects when diagnosis was documented in both MDM as applicable and the Disposition within this note     Time User Action Codes Description Comment    12/11/2022  5:04 PM Rm Erwin [M08 68] Hypocalcemia       ED Disposition     ED Disposition   Discharge    Condition   Stable    Date/Time   Sun Dec 11, 2022  5:04 PM    [address] discharge to home/self care                 Follow-up Information     Follow up With Specialties Details Why Contact Info Additional Information    Audra Burgess MD Internal Medicine Call  As needed [address] Emergency Department Emergency Medicine Go to  If symptoms worsen [address] [address] Emergency Department, [place], [address]          Discharge Medication List as of 12/11/2022  5:05 PM      CONTINUE these medications which have NOT CHANGED    Details   cholecalciferol (VITAMIN D3) 1,000 units tablet Take 1 tablet (1,000 Units total) by mouth daily, Starting Wed 12/7/2022, Normal      ALPRAZolam ER (XANAX XR) 2 MG 24 hr tablet Take 1 tablet (2 mg total) by mouth 2 (two) times a day, Starting Fri 10/7/2022, Normal      baclofen 10 mg tablet Take 10 mg by mouth 3 (three) times a day as needed, Starting Fri 7/22/2022, Historical Med      calcitriol (ROCALTROL) 0 5 MCG capsule Take 1 capsule (0 5 mcg total) by mouth daily, Starting Thu 9/22/2022, Normal      Calcium Carb-Cholecalciferol (Calcium 500+D) 500-400 MG-UNIT TABS Take 600 mg by mouth 3 times a day, Normal      Calcium Carbonate-Vitamin D3 600-400 MG-UNIT TABS TAKE 1 TABLET BY MOUTH 6 (SIX) TIMES A DAY, Normal      desvenlafaxine succinate (PRISTIQ) 50 mg 24 hr tablet Take 1 tablet (50 mg total) by mouth daily, Starting Fri 10/28/2022, Normal      doxepin (SINEquan) 25 mg capsule Take 1 capsule (25 mg total) by mouth daily at bedtime, Starting Thu 9/1/2022, Normal      fenofibrate 160 MG tablet Take 1 tablet (160 mg total) by mouth daily, Starting Thu 10/6/2022, Normal      ferrous sulfate 325 (65 Fe) mg tablet Take 325 mg by mouth Once Monday, wed, Friday-Last dose 4/1/22 , Historical Med      ibuprofen (MOTRIN) 600 mg tablet Take 1 tablet (600 mg total) by mouth every 6 (six) hours as needed for mild pain or moderate pain, Starting Tue 6/2/2020, Normal      levothyroxine 150 mcg tablet TAKE 1 TABLET BY MOUTH EVERY DAY, Normal      Lidocaine-Menthol 4-1 % PTCH if needed  , Historical Med      magnesium Oxide (MAG-OX) 400 mg TABS TAKE 1 TABLET (400 MG TOTAL) BY MOUTH THREE (THREE) TIMES A DAY 9PM, Normal      omeprazole (PriLOSEC) 40 MG capsule Take 1 capsule (40 mg total) by mouth daily, Starting Mon 9/26/2022, Normal      oxyCODONE-acetaminophen (PERCOCET)  mg per tablet Take 1 tablet by mouth 4 (four) times a day, Historical Med      pantoprazole (PROTONIX) 40 mg tablet Take 1 tablet (40 mg total) by mouth daily Take 1/2 hour prior to breakfast daily in a m   , Starting Wed 6/8/2022, Until Sat 7/30/2022, Normal      potassium chloride (K-DUR,KLOR-CON) 20 mEq tablet Take 20 mEq by mouth 2 (two) times a day, Historical Med      Potassium Chloride ER 20 MEQ TBCR TAKE 1 TABLET BY MOUTH TWICE A DAY AFTER A MEAL, Normal      pramipexole (MIRAPEX) 0 5 mg tablet Take 0 5 mg by mouth daily at bedtime, Historical Med      pregabalin (LYRICA) 100 mg capsule TAKE 1 CAPSULE BY MOUTH TWICE A DAY AS DIRECTED FOR 30 DAYS, Historical Med             No discharge procedures on file      PDMP Review       Value Time User    PDMP Reviewed  Yes 10/6/2022 12:12 PM Sandra Kenney MD          ED Provider  Electronically Signed by           Clif Alvarez PA-C  12/12/22 5809

## 2022-12-13 DIAGNOSIS — Z12.39 BREAST CANCER SCREENING, HIGH RISK PATIENT: ICD-10-CM

## 2022-12-14 LAB — SARS-COV-2 RNA RESP QL NAA+PROBE: NEGATIVE

## 2022-12-15 ENCOUNTER — SOCIAL WORK (OUTPATIENT)
Dept: BEHAVIORAL/MENTAL HEALTH CLINIC | Facility: CLINIC | Age: 47
End: 2022-12-15

## 2022-12-15 DIAGNOSIS — F41.1 GENERALIZED ANXIETY DISORDER WITH PANIC ATTACKS: ICD-10-CM

## 2022-12-15 DIAGNOSIS — F17.210 NICOTINE DEPENDENCE, CIGARETTES, UNCOMPLICATED: ICD-10-CM

## 2022-12-15 DIAGNOSIS — F33.1 MODERATE EPISODE OF RECURRENT MAJOR DEPRESSIVE DISORDER (HCC): Primary | ICD-10-CM

## 2022-12-15 DIAGNOSIS — F41.0 GENERALIZED ANXIETY DISORDER WITH PANIC ATTACKS: ICD-10-CM

## 2022-12-15 DIAGNOSIS — F51.01 PRIMARY INSOMNIA: ICD-10-CM

## 2022-12-15 RX ORDER — DOXEPIN HYDROCHLORIDE 25 MG/1
CAPSULE ORAL
Qty: 30 CAPSULE | Refills: 3 | Status: SHIPPED | OUTPATIENT
Start: 2022-12-15

## 2022-12-15 NOTE — PSYCH
This note was not shared with the patient due to this is a psychotherapy note    Psychotherapy Provided: Individual Psychotherapy 50 minutes     Length of time in session: 50 minutes, follow up in 1 week    Encounter Diagnosis     ICD-10-CM    1  Moderate episode of recurrent major depressive disorder (HCC)  F33 1       2  Generalized anxiety disorder with panic attacks  F41 1     F41 0       3  Nicotine dependence, cigarettes, uncomplicated  E80 771           Goals addressed in session: Goal 1     Pain:      none    0    Current suicide risk : Low     DATA: Met with Jackie Perkins for scheduled individual session  Discussed current stressors such as medical concerns and her sister's declining mental health  Continue to work through anxiety and sentiments of helplessness, thus overcompensated by poor boundaries with her sister and children  She brought in her patent for the [employer] and expressed feeling very proud of herself, which writer praised her for the same  Writer reinforced personal resources and strengths which promote resiliency  ASSESSMENT: Jackie Perkins presents with a anxious mood  Her affect is normal range and intensity, appropriate  Jackie Perkins exhibits good therapeutic rapport with this clinician  Jackie Perkins continues to exhibit willingness to work on treatment goals and objectives  PLAN: Jackie Perkins will return in 1 week for the next scheduled session  Between sessions, Jackie Perkins will take her medication as prescribed and practice positive coping strategies as needed, and will report back during the next session re: successes and barriers  Behavioral Health Treatment Plan [facility]: Diagnosis and Treatment Plan explained to Sherren Reams relates understanding diagnosis and is agreeable to Treatment Plan   Yes     Visit start and stop times:    12/15/22  Start Time: 1130  Stop Time: 1220  Total Visit Time: 50 minutes

## 2022-12-15 NOTE — TELEPHONE ENCOUNTER
Ton Harley called pt to r/s appt for 12/15/2022 due to [facility] leaving early

## 2022-12-16 DIAGNOSIS — E83.51 HYPOCALCEMIA: ICD-10-CM

## 2022-12-16 DIAGNOSIS — K21.9 GASTROESOPHAGEAL REFLUX DISEASE, UNSPECIFIED WHETHER ESOPHAGITIS PRESENT: ICD-10-CM

## 2022-12-16 RX ORDER — CALCITRIOL 0.25 UG/1
CAPSULE, LIQUID FILLED ORAL
Qty: 180 CAPSULE | Refills: 2 | Status: SHIPPED | OUTPATIENT
Start: 2022-12-16

## 2022-12-16 RX ORDER — OMEPRAZOLE 40 MG/1
40 CAPSULE, DELAYED RELEASE ORAL DAILY
Qty: 30 CAPSULE | Refills: 2 | Status: SHIPPED | OUTPATIENT
Start: 2022-12-16

## 2022-12-19 ENCOUNTER — HOSPITAL ENCOUNTER (OUTPATIENT)
Dept: RADIOLOGY | Facility: HOSPITAL | Age: 47
Discharge: HOME | End: 2022-12-19

## 2022-12-19 VITALS
WEIGHT: 210 LBS | DIASTOLIC BLOOD PRESSURE: 70 MMHG | BODY MASS INDEX: 38.64 KG/M2 | SYSTOLIC BLOOD PRESSURE: 116 MMHG | HEIGHT: 62 IN

## 2022-12-19 DIAGNOSIS — R92.8 ABNORMAL FINDINGS ON DIAGNOSTIC IMAGING OF BREAST: ICD-10-CM

## 2022-12-19 RX ORDER — LIDOCAINE HYDROCHLORIDE AND EPINEPHRINE 10; 10 MG/ML; UG/ML
10 INJECTION, SOLUTION INFILTRATION; PERINEURAL ONCE
Status: COMPLETED | OUTPATIENT
Start: 2022-12-19 | End: 2022-12-19

## 2022-12-19 RX ORDER — LIDOCAINE HYDROCHLORIDE 10 MG/ML
5 INJECTION, SOLUTION EPIDURAL; INFILTRATION; INTRACAUDAL; PERINEURAL ONCE
Status: COMPLETED | OUTPATIENT
Start: 2022-12-19 | End: 2022-12-19

## 2022-12-19 RX ADMIN — LIDOCAINE HYDROCHLORIDE AND EPINEPHRINE 10 ML: 10; 10 INJECTION, SOLUTION INFILTRATION; PERINEURAL at 16:00

## 2022-12-19 RX ADMIN — LIDOCAINE HYDROCHLORIDE 5 ML: 10 INJECTION, SOLUTION EPIDURAL; INFILTRATION; INTRACAUDAL; PERINEURAL at 15:58

## 2022-12-19 NOTE — PROGRESS NOTES
Ice pack given:    __x___ yes _____ no    Discharge instructions signed by patient:    __x___ yes _____ no    Discharge instructions given to patient:    ___x___ yes _____ no    Discharged via:    __x___ ambulatory    _____ wheelchair    _____ stretcher    Stable on discharge:    __x___ yes _____ no

## 2022-12-19 NOTE — PROGRESS NOTES
Procedure type:    _____ Ultrasound guided __x__ Stereotactic    Breast:    __x___ Left Breast Biopsy  _____ Right Breast Biopsy    Time-out called @ 15:52 and procedure confirmed with patient Daltonkayode Allenes, myself Danii Thomson RN, Lexi Toth MD, and Jeff Payne RT(R)(M)(DS)     Location: Left Breast 12 o'clock calcification cluster    Needle: 8G mammotome    # of specimen chambers filled: 5  (chambers 1-5 filled)    Calcifications present in chamber #: chamber 2 only    Clip: U mammotome marker    Performed by: Lexi Toth MD    Pressure held for 5 minutes by: Danii Thomson RN    Steri Strips:    __x___ yes _____ no    Band aid:    _____ yes__x___ no    *Gauze and tape in place      Tolerated procedure:    __x___ yes _____ no

## 2022-12-19 NOTE — PROGRESS NOTES
Patient arrived via:    __x___ ambulatory    _____ wheelchair    _____ stretcher      Domestic violence screen    ___x___ negative ______ positive      Breast Implants:    ______ yes ___x____ no

## 2022-12-19 NOTE — DISCHARGE INSTR - OTHER ORDERS
POST LARGE CORE BREAST BIOPSY PATIENT INFORMATION      Place an ice pack inside your bra over the top of the dressing every hour for 20 minutes (20 minutes on, 60 minutes off)  Do this until bedtime  Do not shower or bathe until the following morning  You may bathe your breast carefully with the steri-strips in place  Be careful    Not to loosen them  The steri-strips will fall off in 3-5 days  You may have mild discomfort, and you may have some bruising where the   Needle entered the skin  This should clear within 5-7 days  If you need medicine for discomfort, take acetaminophen products such as   Tylenol  You may also take Advil or Motrin products  Do not participate in strenuous activities such as-tennis, aerobics, skiing,  Weight lifting, etc  for 24 hours  Refrain from swimming/soaking for 72 hours  Wearing a bra for sleeping may be more comfortable for the first 24-48 hours  Watch for continued bleeding, pain or fever over 101  If any of these symptoms occur, please contact our    breast nurse navigator at the location where your biopsy was performed  During normal business hours (7:30 am-4:00 pm) please call the nurse navigator at the site where your   procedure was performed:    [address]: [phone] or [phone] [address]: [phone] or [phone]  [address]: [address] [place]: [address] [place] [address] Street: [phone]              After 4 PM - please call your physician or go to the nearest Emergency Department location  9          The final results of your biopsy are usually available within one week

## 2022-12-20 ENCOUNTER — TELEPHONE (OUTPATIENT)
Dept: RADIOLOGY | Facility: HOSPITAL | Age: 47
End: 2022-12-20

## 2022-12-20 NOTE — PROGRESS NOTES
Post-procedure call completed: Tuesday 12/20/2022    Bleeding: _____yes __x__ no    Pain: __x___yes ______ no    *Patient reports mild breast pain at rest, and moderate breast pain with movement (5 to 6/10)  She reports using the ice pack Monday evening 12/19/2022 to help with pain and risk reduction of post-procedural bruising/bleeding/hematoma  She is aware she may use additional medications as needed (tylenol, motrin, ibuprofen, advil all ok to use; avoid aspirin based products due to bleeding risk)  Gema Garcia does report she has chronic pain issues for which she is currently prescribed percocet for, which she uses for lumbar and cervical spine pain  This has been helping with breast pain as well, no additional needs at this time  Redness/Swelling: ______ yes ___x___ no    Band aid removed: _____ yes ___x__no    *Patient instructed, when she takes her shower later this evening 12/20/2022 or tomorrow morning 12/21/2022 to remove gauze and tape at that time, and allow warm water and soap to rinse over the breast in the shower  She is aware not to scrub near site, and gently pat dry once finished showering  She is aware to avoid soaking to the breast, swimming, or tub baths until biopsy incision is fully healed  Steri-Strips intact: ___x___ yes _____ no    *Patient instructed to leave steri-strips in place until at least Friday 12/23 or Saturday 12/24/2022  She was provided additional gauze & steri-strips prior to discharge home on Monday 12/19/2022 if steri-strips come loose prior to Friday so that she may replace/ recover biopsy site if needed  Patient with no additional questions at this time  I relayed to her that either myself or Dr Elida Coles will call when her breast biopsy pathology results are available  Patient does have my name & office phone number if she has any questions or concerns in the interim of time until her pathology results are finalized

## 2022-12-21 ENCOUNTER — TELEPHONE (OUTPATIENT)
Dept: RADIOLOGY | Facility: HOSPITAL | Age: 47
End: 2022-12-21

## 2022-12-22 ENCOUNTER — HOSPITAL ENCOUNTER (EMERGENCY)
Facility: HOSPITAL | Age: 47
Discharge: HOME | End: 2022-12-22
Attending: EMERGENCY MEDICINE

## 2022-12-22 ENCOUNTER — OFFICE VISIT (OUTPATIENT)
Dept: HEMATOLOGY ONCOLOGY | Facility: MEDICAL CENTER | Age: 47
End: 2022-12-22

## 2022-12-22 ENCOUNTER — SOCIAL WORK (OUTPATIENT)
Dept: BEHAVIORAL/MENTAL HEALTH CLINIC | Facility: CLINIC | Age: 47
End: 2022-12-22

## 2022-12-22 VITALS
WEIGHT: 216 LBS | BODY MASS INDEX: 39.75 KG/M2 | DIASTOLIC BLOOD PRESSURE: 70 MMHG | SYSTOLIC BLOOD PRESSURE: 125 MMHG | RESPIRATION RATE: 18 BRPM | HEIGHT: 62 IN

## 2022-12-22 VITALS
RESPIRATION RATE: 20 BRPM | WEIGHT: 216 LBS | OXYGEN SATURATION: 100 % | HEART RATE: 88 BPM | TEMPERATURE: 97.9 F | BODY MASS INDEX: 39.51 KG/M2 | DIASTOLIC BLOOD PRESSURE: 63 MMHG | SYSTOLIC BLOOD PRESSURE: 109 MMHG

## 2022-12-22 DIAGNOSIS — E83.51 HYPOCALCEMIA: ICD-10-CM

## 2022-12-22 DIAGNOSIS — R16.1 SPLENOMEGALY: ICD-10-CM

## 2022-12-22 DIAGNOSIS — F41.1 GENERALIZED ANXIETY DISORDER WITH PANIC ATTACKS: ICD-10-CM

## 2022-12-22 DIAGNOSIS — E66.9 OBESITY (BMI 30-39.9): ICD-10-CM

## 2022-12-22 DIAGNOSIS — R20.2 PARESTHESIAS: Primary | ICD-10-CM

## 2022-12-22 DIAGNOSIS — E87.6 HYPOKALEMIA: ICD-10-CM

## 2022-12-22 DIAGNOSIS — F17.210 NICOTINE DEPENDENCE, CIGARETTES, UNCOMPLICATED: ICD-10-CM

## 2022-12-22 DIAGNOSIS — R79.89 ELEVATED FERRITIN: ICD-10-CM

## 2022-12-22 DIAGNOSIS — R22.31 AXILLARY LUMP, RIGHT: ICD-10-CM

## 2022-12-22 DIAGNOSIS — Z72.0 TOBACCO ABUSE: ICD-10-CM

## 2022-12-22 DIAGNOSIS — F33.1 MODERATE EPISODE OF RECURRENT MAJOR DEPRESSIVE DISORDER (HCC): Primary | ICD-10-CM

## 2022-12-22 DIAGNOSIS — F41.0 GENERALIZED ANXIETY DISORDER WITH PANIC ATTACKS: ICD-10-CM

## 2022-12-22 DIAGNOSIS — K76.0 FATTY LIVER: Primary | ICD-10-CM

## 2022-12-22 LAB
ALBUMIN SERPL BCP-MCNC: 3.4 G/DL (ref 3.5–5)
ALP SERPL-CCNC: 124 U/L (ref 46–116)
ALT SERPL W P-5'-P-CCNC: 74 U/L (ref 12–78)
ANION GAP SERPL CALCULATED.3IONS-SCNC: 14 MMOL/L (ref 4–13)
AST SERPL W P-5'-P-CCNC: 36 U/L (ref 5–45)
BASOPHILS # BLD AUTO: 0.04 THOUSANDS/ÂΜL (ref 0–0.1)
BASOPHILS NFR BLD AUTO: 1 % (ref 0–1)
BILIRUB SERPL-MCNC: 0.33 MG/DL (ref 0.2–1)
BUN SERPL-MCNC: 27 MG/DL (ref 5–25)
CA-I BLD-SCNC: 1.07 MMOL/L (ref 1.12–1.32)
CALCIUM ALBUM COR SERPL-MCNC: 8.6 MG/DL (ref 8.3–10.1)
CALCIUM SERPL-MCNC: 8.1 MG/DL (ref 8.3–10.1)
CHLORIDE SERPL-SCNC: 103 MMOL/L (ref 96–108)
CO2 SERPL-SCNC: 22 MMOL/L (ref 21–32)
CREAT SERPL-MCNC: 0.97 MG/DL (ref 0.6–1.3)
EOSINOPHIL # BLD AUTO: 0.08 THOUSAND/ÂΜL (ref 0–0.61)
EOSINOPHIL NFR BLD AUTO: 1 % (ref 0–6)
ERYTHROCYTE [DISTWIDTH] IN BLOOD BY AUTOMATED COUNT: 16.5 % (ref 11.6–15.1)
GFR SERPL CREATININE-BSD FRML MDRD: 70 ML/MIN/1.73SQ M
GLUCOSE SERPL-MCNC: 148 MG/DL (ref 65–140)
HCT VFR BLD AUTO: 39.8 % (ref 34.8–46.1)
HGB BLD-MCNC: 13 G/DL (ref 11.5–15.4)
IMM GRANULOCYTES # BLD AUTO: 0.03 THOUSAND/UL (ref 0–0.2)
IMM GRANULOCYTES NFR BLD AUTO: 1 % (ref 0–2)
LYMPHOCYTES # BLD AUTO: 2.24 THOUSANDS/ÂΜL (ref 0.6–4.47)
LYMPHOCYTES NFR BLD AUTO: 38 % (ref 14–44)
MAGNESIUM SERPL-MCNC: 1.8 MG/DL (ref 1.6–2.6)
MCH RBC QN AUTO: 28.5 PG (ref 26.8–34.3)
MCHC RBC AUTO-ENTMCNC: 32.7 G/DL (ref 31.4–37.4)
MCV RBC AUTO: 87 FL (ref 82–98)
MONOCYTES # BLD AUTO: 0.39 THOUSAND/ÂΜL (ref 0.17–1.22)
MONOCYTES NFR BLD AUTO: 7 % (ref 4–12)
NEUTROPHILS # BLD AUTO: 3.13 THOUSANDS/ÂΜL (ref 1.85–7.62)
NEUTS SEG NFR BLD AUTO: 52 % (ref 43–75)
NRBC BLD AUTO-RTO: 0 /100 WBCS
PLATELET # BLD AUTO: 167 THOUSANDS/UL (ref 149–390)
PMV BLD AUTO: 10.3 FL (ref 8.9–12.7)
POTASSIUM SERPL-SCNC: 4 MMOL/L (ref 3.5–5.3)
PROT SERPL-MCNC: 7.5 G/DL (ref 6.4–8.4)
RBC # BLD AUTO: 4.56 MILLION/UL (ref 3.81–5.12)
SODIUM SERPL-SCNC: 139 MMOL/L (ref 135–147)
TSH SERPL DL<=0.05 MIU/L-ACNC: 4.92 UIU/ML (ref 0.45–4.5)
WBC # BLD AUTO: 5.91 THOUSAND/UL (ref 4.31–10.16)

## 2022-12-22 RX ORDER — LANOLIN ALCOHOL/MO/W.PET/CERES
400 CREAM (GRAM) TOPICAL 2 TIMES DAILY
Qty: 60 TABLET | Refills: 3 | Status: SHIPPED | OUTPATIENT
Start: 2022-12-22

## 2022-12-22 RX ORDER — POTASSIUM CHLORIDE 1500 MG/1
TABLET, FILM COATED, EXTENDED RELEASE ORAL
Qty: 180 TABLET | Refills: 1 | OUTPATIENT
Start: 2022-12-22

## 2022-12-22 RX ORDER — LANOLIN ALCOHOL/MO/W.PET/CERES
CREAM (GRAM) TOPICAL
Qty: 270 TABLET | Refills: 3 | OUTPATIENT
Start: 2022-12-22

## 2022-12-22 RX ORDER — CALCIUM GLUCONATE 20 MG/ML
2 INJECTION, SOLUTION INTRAVENOUS ONCE
Status: COMPLETED | OUTPATIENT
Start: 2022-12-22 | End: 2022-12-22

## 2022-12-22 RX ADMIN — CALCIUM GLUCONATE 2 G: 20 INJECTION, SOLUTION INTRAVENOUS at 20:39

## 2022-12-22 NOTE — PROGRESS NOTES
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 12/02/2022 87  ml/min/1 73sq m Final   • Scan Result 12/02/2022 SEE WRITTEN REPORT   Final   • WBC 12/02/2022 4 96  4 31 - 10 16 Thousand/uL Final   • RBC 12/02/2022 4 64  3 81 - 5 12 Million/uL Final   • Hemoglobin 12/02/2022 13 0  11 5 - 15 4 g/dL Final   • Hematocrit 12/02/2022 40 3  34 8 - 46 1 % Final   • MCV 12/02/2022 87  82 - 98 fL Final   • MCH 12/02/2022 28 0  26 8 - 34 3 pg Final   • MCHC 12/02/2022 32 3  31 4 - 37 4 g/dL Final   • RDW 12/02/2022 16 3 (H)  11 6 - 15 1 % Final   • MPV 12/02/2022 10 3  8 9 - 12 7 fL Final   • Platelets 70/10/3116 177  149 - 390 Thousands/uL Final   • nRBC 12/02/2022 0  /100 WBCs Final   • Neutrophils Relative 12/02/2022 59  43 - 75 % Final   • Immat GRANS % 12/02/2022 0  0 - 2 % Final   • Lymphocytes Relative 12/02/2022 31  14 - 44 % Final   • Monocytes Relative 12/02/2022 7  4 - 12 % Final   • Eosinophils Relative 12/02/2022 2  0 - 6 % Final   • Basophils Relative 12/02/2022 1  0 - 1 % Final   • Neutrophils Absolute 12/02/2022 2 93  1 85 - 7 62 Thousands/µL Final   • Immature Grans Absolute 12/02/2022 0  02  0 00 - 0 20 Thousand/uL Final   • Lymphocytes Absolute 12/02/2022 1 54  0 60 - 4 47 Thousands/µL Final   • Monocytes Absolute 12/02/2022 0 35  0 17 - 1 22 Thousand/µL Final   • Eosinophils Absolute 12/02/2022 0 08  0 00 - 0 61 Thousand/µL Final   • Basophils Absolute 12/02/2022 0 04  0 00 - 0 10 Thousands/µL Final   • Phosphorus 12/02/2022 4 2  2 7 - 4 5 mg/dL Final   • Magnesium 12/02/2022 1 9  1 6 - 2 6 mg/dL Final   • Calcium, Ionized 12/02/2022 1 02 (L)  1 12 - 1 32 mmol/L Final   • Iron Saturation 12/02/2022 20  15 - 50 % Final   • TIBC 12/02/2022 309  250 - 450 ug/dL Final   • Iron 12/02/2022 62  50 - 170 ug/dL Final    Patients treated with metal-binding drugs (ie  Deferoxamine) may have depressed iron values  • Ferritin 12/02/2022 488 (H)  8 - 388 ng/mL Final       The following historical data was reviewed:  Past Medical History:   Diagnosis Date   • Abdominal pain    • Acid reflux    • Acute renal failure (HCC)     multiple episodes   • Anemia    • Anxiety    • Anxiety 3/31/2021   • Arthritis    • Asthma    • Back pain    • Chronic pain    • DDD (degenerative disc disease), lumbar    • Depression    • Disease of thyroid gland     had surgery and now hypo   • DVT (deep venous thrombosis) ([facility] [address] ) 2009    s/p ankle fracture   • Headache    • History of transfusion    • Hypercalcemia    • Hyperlipidemia    • Hyperthyroidism    • Hypocalcemia     post op 2016   • Kidney stone    • Lightheadedness    • Migraine    • Obesity    • Palpitations    • Psychiatric disorder     anxiety depression   • PTSD (post-traumatic stress disorder) 10/28/2022   • Seizures ([address] )     petit mal x1  4 years ago- all tests were neg     • SOB (shortness of breath)    • Spondylolisthesis of lumbar region    • Treatment     spinal pain injections  last was 7-7-2016   • Wears glasses      Past Surgical History:   Procedure Laterality Date   • BACK SURGERY      L4-5, S1-fusion-plate/screws   • BREAST BIOPSY Left 12/19/2022    Stereo SLW - 12:00   •  SECTION      x5   • CYSTOCELE REPAIR  2017   • CYSTOSCOPY     • DISCOGRAM     • HYSTERECTOMY     • MAMMO STEREOTACTIC BREAST BIOPSY LEFT (ALL INC) Left 2022   • PARATHYROIDECTOMY     • GA ANTERIOR COLPORRAPHY RPR CYSTOCELE W/CYSTO N/A 2017    Procedure: CYSTOCELE REPAIR;  Surgeon: Teresita Ross MD;  Location: [address] [place] [address];  Service: Gynecology   • GA ARTHRODESIS POSTERIOR INTERBODY LUMBAR N/A 2016    Procedure: L4-S1 LUMBAR LAMINECTOMY/DECOMPRESSION;  INSTRUMENTED POSTEROLATERAL FUSION/ INTERBODY L5-S1; ALLOGRAFT AND AUTOGRAFT (IMPULSE) ;   Surgeon: Marita Lopez MD;  [facility];  Service: Orthopedics   • GA CYSTOURETHROSCOPY,URETER CATHETER Bilateral 2018    Procedure: INSERTION URETERAL CATHETERS PREOP;  Surgeon: Starlene Osler, MD;  Location: [address] [place] [address];  Service: Urology   • GA SLING OPER STRES INCONTINENCE N/A 2017    Procedure: MID URETHRAL SLING;  Surgeon: Maki Nguyen MD;  Location: [address] [place] [address];  Service: Urology   • GA SUPRACERV ABD HYSTERECTOMY N/A 2018    Procedure: SUPRACERVICAL HYSTERECTOMY;  Surgeon: Teresita Ross MD;  Location: [address] [place] [address];  Service: Gynecology   • THYROIDECTOMY     • TONSILECTOMY AND ADNOIDECTOMY     • TONSILLECTOMY     • TUBAL LIGATION       Social History     Socioeconomic History   • Marital status: /Civil Union     Spouse name: None   • Number of children: None   • Years of education: 12   • Highest education level: None   Occupational History   • None   Tobacco Use   • Smoking status: Every Day     Packs/day: 0 25     Years: 25 00     Pack years: 6 25     Types: Cigarettes   • Smokeless tobacco: Never   Vaping Use   • Vaping Use: Never used   Substance and Sexual Activity   • Alcohol use: Not Currently   • Drug use: No   • Sexual activity: Yes     Birth control/protection: Surgical     Comment: hysterectomy   Other Topics Concern   • None   Social History Narrative    Most recent tobacco use screenin2018      General stress level:   Medium       Exercise level:   Occasional       Diet:   Regular      Caffeine intake:   Occasional     As per [place]      Social Determinants of Health     Financial Resource Strain: Not on file   Food Insecurity: No Food Insecurity   • Worried About Running Out of Food in the Last Year: Never true   • Ran Out of Food in the Last Year: Never true   Transportation Needs: No Transportation Needs   • Lack of Transportation (Medical): No   • Lack of Transportation (Non-Medical): No   Physical Activity: Not on file   Stress: Not on file   Social Connections: Not on file   Intimate Partner Violence: Not on file   Housing Stability: Low Risk    • Unable to Pay for Housing in the Last Year: No   • Number of Places Lived in the Last Year: 1   • Unstable Housing in the Last Year: No     Family History   Problem Relation Age of Onset   • Diabetes Mother    • Hypertension Mother    • Hyperlipidemia Father    • Arrhythmia Father    • Lung cancer Father    • Diabetes Father    • Colon cancer Maternal Grandfather    • Stomach cancer Paternal Grandmother    • Heart disease Paternal Aunt        Please note: This report has been generated by a voice recognition software system  Therefore there may be syntax, spelling, and/or grammatical errors  Please call if you have any questions

## 2022-12-23 NOTE — ED PROVIDER NOTES
History  Chief Complaint   Patient presents with   • Numbness     Extremity numbness and tinglings, thinks her klaudia may be low  57 y/o female presents with numbness tingling in both extremities  No chest pain,dyspnea, no other symptoms  History of hypocalcemia, thinks her Ca might be low  Multiple admits to the ED for the same  History provided by:  Patient   used: No        Prior to Admission Medications   Prescriptions Last Dose Informant Patient Reported? Taking?    ALPRAZolam ER (XANAX XR) 2 MG 24 hr tablet   No No   Sig: Take 1 tablet (2 mg total) by mouth 2 (two) times a day   Calcium Carb-Cholecalciferol (Calcium 500+D) 500-400 MG-UNIT TABS   No No   Sig: Take 600 mg by mouth 3 times a day   Calcium Carbonate-Vitamin D3 600-400 MG-UNIT TABS   No No   Sig: TAKE 1 TABLET BY MOUTH 6 (SIX) TIMES A DAY   Lidocaine-Menthol 4-1 % PTCH   Yes No   Sig: if needed     Potassium Chloride ER 20 MEQ TBCR   No No   Sig: TAKE 1 TABLET BY MOUTH TWICE A DAY AFTER A MEAL   baclofen 10 mg tablet   Yes No   Sig: Take 10 mg by mouth 3 (three) times a day as needed   calcitriol (ROCALTROL) 0 25 mcg capsule   No No   Sig: TAKE 1 CAPSULE TWICE A DAY BY ORAL ROUTE AS DIRECTED    calcitriol (ROCALTROL) 0 5 MCG capsule   No No   Sig: Take 1 capsule (0 5 mcg total) by mouth daily   Patient taking differently: Take 0 5 mcg by mouth 2 (two) times a day   cholecalciferol (VITAMIN D3) 1,000 units tablet   No No   Sig: Take 1 tablet (1,000 Units total) by mouth daily   desvenlafaxine succinate (PRISTIQ) 50 mg 24 hr tablet   No No   Sig: Take 1 tablet (50 mg total) by mouth daily   doxepin (SINEquan) 25 mg capsule   No No   Sig: TAKE 1 CAPSULE BY MOUTH DAILY AT BEDTIME   fenofibrate 160 MG tablet   No No   Sig: Take 1 tablet (160 mg total) by mouth daily   ferrous sulfate 325 (65 Fe) mg tablet   Yes No   Sig: Take 325 mg by mouth Once Monday, wed, Friday-Last dose 4/1/22    ibuprofen (MOTRIN) 600 mg tablet   No No Sig: Take 1 tablet (600 mg total) by mouth every 6 (six) hours as needed for mild pain or moderate pain   levothyroxine 150 mcg tablet   No No   Sig: TAKE 1 TABLET BY MOUTH EVERY DAY   Patient taking differently: 150 mcg Take 6 days a week   magnesium Oxide (MAG-OX) 400 mg TABS   No No   Sig: Take 1 tablet (400 mg total) by mouth 2 (two) times a day   omeprazole (PriLOSEC) 40 MG capsule   No No   Sig: TAKE 1 CAPSULE (40 MG TOTAL) BY MOUTH DAILY  oxyCODONE-acetaminophen (PERCOCET)  mg per tablet   Yes No   Sig: Take 1 tablet by mouth 4 (four) times a day   pantoprazole (PROTONIX) 40 mg tablet   No No   Sig: Take 1 tablet (40 mg total) by mouth daily Take 1/2 hour prior to breakfast daily in a [facility] Patient not taking: No sig reported   potassium chloride (K-DUR,KLOR-CON) 20 mEq tablet   Yes No   Sig: Take 20 mEq by mouth 2 (two) times a day   pramipexole (MIRAPEX) 0 5 mg tablet   Yes No   Sig: Take 0 5 mg by mouth daily at bedtime   pregabalin (LYRICA) 100 mg capsule   Yes No   Sig: TAKE 1 CAPSULE BY MOUTH TWICE A DAY AS DIRECTED FOR 30 DAYS      Facility-Administered Medications: None       Past Medical History:   Diagnosis Date   • Abdominal pain    • Acid reflux    • Acute renal failure (HCC)     multiple episodes   • Anemia    • Anxiety    • Anxiety 3/31/2021   • Arthritis    • Asthma    • Back pain    • Chronic pain    • DDD (degenerative disc disease), lumbar    • Depression    • Disease of thyroid gland     had surgery and now hypo   • DVT (deep venous thrombosis) ([facility] ) 2009    s/p ankle fracture   • Headache    • History of transfusion    • Hypercalcemia    • Hyperlipidemia    • Hyperthyroidism    • Hypocalcemia     post op 2016   • Kidney stone    • Lightheadedness    • Migraine    • Obesity    • Palpitations    • Psychiatric disorder     anxiety depression   • PTSD (post-traumatic stress disorder) 10/28/2022   • Seizures ([facility] )     petit mal x1  4 years ago- all tests were neg     • SOB (shortness of breath)    • Spondylolisthesis of lumbar region    • Treatment     spinal pain injections  last was 2016   • Wears glasses        Past Surgical History:   Procedure Laterality Date   • BACK SURGERY      L4-5, S1-fusion-plate/screws   • BREAST BIOPSY Left 2022    Stereo SLW - 12:00   •  SECTION      x5   • CYSTOCELE REPAIR  2017   • CYSTOSCOPY     • DISCOGRAM     • HYSTERECTOMY     • MAMMO STEREOTACTIC BREAST BIOPSY LEFT (ALL INC) Left 2022   • PARATHYROIDECTOMY     • FL ANTERIOR COLPORRAPHY RPR CYSTOCELE W/CYSTO N/A 2017    Procedure: CYSTOCELE REPAIR;  Surgeon: Ishmael Helm MD;  Location: [address] [place] [address];  Service: Gynecology   • FL ARTHRODESIS POSTERIOR INTERBODY [address] LUMBAR N/A 2016    Procedure: L4-S1 LUMBAR LAMINECTOMY/DECOMPRESSION;  INSTRUMENTED POSTEROLATERAL FUSION/ INTERBODY L5-S1; ALLOGRAFT AND AUTOGRAFT (IMPULSE) ;   Surgeon: Lenka Edmonds MD;  [facility];  Service: Orthopedics   • FL CYSTO BLADDER W/URETERAL CATHETERIZATION Bilateral 2018    Procedure: INSERTION URETERAL CATHETERS PREOP;  Surgeon: Sumi Borja MD;  Location: [address] [place] [address];  Service: Urology   • FL SLING OPERATION STRESS INCONTINENCE N/A 2017    Procedure: MID URETHRAL SLING;  Surgeon: Nick Richey MD;  Location: [address] [place] [address];  Service: Urology   • FL SUPRACERVICAL ABDL HYSTER W/WO RMVL TUBE OVARY N/A 2018    Procedure: SUPRACERVICAL HYSTERECTOMY;  Surgeon: Ishmael Helm MD;  Location: [address] [place] [address];  Service: Gynecology   • THYROIDECTOMY     • TONSILECTOMY AND ADNOIDECTOMY     • TONSILLECTOMY     • TUBAL LIGATION         Family History   Problem Relation Age of Onset   • Diabetes Mother    • Hypertension Mother    • Hyperlipidemia Father    • Arrhythmia Father    • Lung cancer Father    • Diabetes Father    • Colon cancer Maternal Grandfather    • Stomach cancer Paternal Grandmother    • Heart disease Paternal Aunt      I have reviewed and agree with the history as documented  E-Cigarette/Vaping   • E-Cigarette Use Never User      E-Cigarette/Vaping Substances   • Nicotine No    • THC No    • CBD No    • Flavoring No    • Other No    • Unknown No      Social History     Tobacco Use   • Smoking status: Every Day     Packs/day: 0 25     Years: 25 00     Pack years: 6 25     Types: Cigarettes   • Smokeless tobacco: Never   Vaping Use   • Vaping Use: Never used   Substance Use Topics   • Alcohol use: Not Currently   • Drug use: No       Review of Systems   Constitutional: Negative  HENT: Negative  Eyes: Negative  Respiratory: Negative  Cardiovascular: Negative  Gastrointestinal: Negative  Endocrine: Negative  Genitourinary: Negative  Musculoskeletal: Negative  Skin: Negative  Allergic/Immunologic: Negative  Neurological: Positive for numbness  Hematological: Negative  Psychiatric/Behavioral: Negative  All other systems reviewed and are negative  Physical Exam  Physical Exam  Constitutional:       Appearance: Normal appearance  HENT:      Head: Normocephalic and atraumatic  Nose: Nose normal       Mouth/Throat:      Mouth: Mucous membranes are moist    Eyes:      Extraocular Movements: Extraocular movements intact  Pupils: Pupils are equal, round, and reactive to light  Cardiovascular:      Rate and Rhythm: Normal rate and regular rhythm  Pulmonary:      Effort: Pulmonary effort is normal       Breath sounds: Normal breath sounds  Abdominal:      General: Abdomen is flat  Bowel sounds are normal       Palpations: Abdomen is soft  Musculoskeletal:         General: Normal range of motion  Cervical back: Normal range of motion and neck supple  Skin:     General: Skin is warm  Capillary Refill: Capillary refill takes less than 2 seconds  Neurological:      General: No focal deficit present  Mental Status: She is alert and oriented to person, place, and time  Mental status is at baseline  Psychiatric:         Mood and Affect: Mood normal          Thought Content:  Thought content normal          Vital Signs  ED Triage Vitals   Temperature Pulse Respirations Blood Pressure SpO2   12/22/22 1906 12/22/22 1906 12/22/22 1906 12/22/22 1906 12/22/22 1906   97 9 °F (36 6 °C) 100 18 141/80 97 %      Temp Source Heart Rate Source Patient Position - Orthostatic VS BP Location FiO2 (%)   12/22/22 1906 12/22/22 1906 12/22/22 1906 12/22/22 1906 --   Oral Monitor Lying Right arm       Pain Score       12/22/22 1922       No Pain           Vitals:    12/22/22 1906 12/22/22 2115 12/22/22 2145   BP: 141/80 109/63 109/63   Pulse: 100 96 88   Patient Position - Orthostatic VS: Lying Lying Lying         Visual Acuity      ED Medications  Medications   calcium gluconate 2 g in sodium chloride 0 9% 100 mL (premix) (0 g Intravenous Stopped 12/22/22 2158)       Diagnostic Studies  Results Reviewed     Procedure Component Value Units Date/Time    Calcium, ionized [[id number]]  (Abnormal) Collected: 12/22/22 2016    Lab Status: Final result Specimen: Blood from Arm, Right Updated: 12/22/22 2020     Calcium, Ionized 1 07 mmol/L     Comprehensive metabolic panel [[id number]]  (Abnormal) Collected: 12/22/22 1918    Lab Status: Final result Specimen: Blood from Arm, Right Updated: 12/22/22 2006     Sodium 139 mmol/L      Potassium 4 0 mmol/L      Chloride 103 mmol/L      CO2 22 mmol/L      ANION GAP 14 mmol/L      BUN 27 mg/dL      Creatinine 0 97 mg/dL      Glucose 148 mg/dL      Calcium 8 1 mg/dL      Corrected Calcium 8 6 mg/dL      AST 36 U/L      ALT 74 U/L      Alkaline Phosphatase 124 U/L      Total Protein 7 5 g/dL      Albumin 3 4 g/dL      Total Bilirubin 0 33 mg/dL      eGFR 70 ml/min/1 73sq m     Narrative:      [place] guidelines for Chronic Kidney Disease (CKD):   •  Stage 1 with normal or high GFR (GFR > 90 mL/min/1 73 square meters)  •  Stage 2 Mild CKD (GFR = 60-89 mL/min/1 73 square meters)  •  Stage 3A Moderate CKD (GFR = 45-59 mL/min/1 73 square meters)  •  Stage 3B Moderate CKD (GFR = 30-44 mL/min/1 73 square meters)  •  Stage 4 Severe CKD (GFR = 15-29 mL/min/1 73 square meters)  •  Stage 5 End Stage CKD (GFR <15 mL/min/1 73 square meters)  Note: GFR calculation is accurate only with a steady state creatinine    TSH [[id number]]  (Abnormal) Collected: 12/22/22 1918    Lab Status: Final result Specimen: Blood from Arm, Right Updated: 12/22/22 1954     TSH 3RD GENERATON 4 923 uIU/mL     Narrative:      Patients undergoing fluorescein dye angiography may retain small amounts of fluorescein in the body for 48-72 hours post procedure  Samples containing fluorescein can produce falsely depressed TSH values  If the patient had this procedure,a specimen should be resubmitted post fluorescein clearance        Magnesium [[id number]]  (Normal) Collected: 12/22/22 1918    Lab Status: Final result Specimen: Blood from Arm, Right Updated: 12/22/22 1954     Magnesium 1 8 mg/dL     CBC and differential [[id number]]  (Abnormal) Collected: 12/22/22 1918    Lab Status: Final result Specimen: Blood from Arm, Right Updated: 12/22/22 1924     WBC 5 91 Thousand/uL      RBC 4 56 Million/uL      Hemoglobin 13 0 g/dL      Hematocrit 39 8 %      MCV 87 fL      MCH 28 5 pg      MCHC 32 7 g/dL      RDW 16 5 %      MPV 10 3 fL      Platelets 200 Thousands/uL      nRBC 0 /100 WBCs      Neutrophils Relative 52 %      Immat GRANS % 1 %      Lymphocytes Relative 38 %      Monocytes Relative 7 %      Eosinophils Relative 1 %      Basophils Relative 1 %      Neutrophils Absolute 3 13 Thousands/µL      Immature Grans Absolute 0 03 Thousand/uL      Lymphocytes Absolute 2 24 Thousands/µL      Monocytes Absolute 0 39 Thousand/µL      Eosinophils Absolute 0 08 Thousand/µL      Basophils Absolute 0 04 Thousands/µL                  No orders to display              Procedures  Procedures         ED Course MDM  Number of Diagnoses or Management Options     Amount and/or Complexity of Data Reviewed  Clinical lab tests: ordered and reviewed  Tests in the medicine section of CPT®: ordered and reviewed        Disposition  Final diagnoses:   Paresthesias   Hypocalcemia     Time reflects when diagnosis was documented in both MDM as applicable and the Disposition within this note     Time User Action Codes Description Comment    12/22/2022  9:13 PM Anepu Nirali Add [R20 2] Paresthesias     12/22/2022  9:13 PM Anepu Nirali Add [E83 51] Hypocalcemia       ED Disposition     ED Disposition   Discharge    Condition   Stable    Date/Time   Thu Dec 22, 2022  9:13 PM    Comment   Lonnie Marisa [facility] discharge to home/self care                 Follow-up Information     Follow up With Specialties Details Why Contact Info Additional Information    Ofelia Bowen MD Internal Medicine Schedule an appointment as soon as possible for a visit   [address]       [address] [facility] Emergency Department Emergency Medicine  If symptoms worsen [address] [address] Emergency Department, [facility], [facility]          Discharge Medication List as of 12/22/2022  9:14 PM      CONTINUE these medications which have NOT CHANGED    Details   ALPRAZolam ER (XANAX XR) 2 MG 24 hr tablet Take 1 tablet (2 mg total) by mouth 2 (two) times a day, Starting Fri 10/7/2022, Normal      baclofen 10 mg tablet Take 10 mg by mouth 3 (three) times a day as needed, Starting Fri 7/22/2022, Historical Med      !! calcitriol (ROCALTROL) 0 25 mcg capsule TAKE 1 CAPSULE TWICE A DAY BY ORAL ROUTE AS DIRECTED , Normal      !! calcitriol (ROCALTROL) 0 5 MCG capsule Take 1 capsule (0 5 mcg total) by mouth daily, Starting Thu 9/22/2022, Normal      Calcium Carb-Cholecalciferol (Calcium 500+D) 500-400 MG-UNIT TABS Take 600 mg by mouth 3 times a day, Normal      Calcium Carbonate-Vitamin D3 600-400 MG-UNIT TABS TAKE 1 TABLET BY MOUTH 6 (SIX) TIMES A DAY, Normal      cholecalciferol (VITAMIN D3) 1,000 units tablet Take 1 tablet (1,000 Units total) by mouth daily, Starting Wed 12/7/2022, Normal      desvenlafaxine succinate (PRISTIQ) 50 mg 24 hr tablet Take 1 tablet (50 mg total) by mouth daily, Starting Fri 10/28/2022, Normal      doxepin (SINEquan) 25 mg capsule TAKE 1 CAPSULE BY MOUTH DAILY AT BEDTIME, Normal      fenofibrate 160 MG tablet Take 1 tablet (160 mg total) by mouth daily, Starting Thu 10/6/2022, Normal      ferrous sulfate 325 (65 Fe) mg tablet Take 325 mg by mouth Once Monday, wed, Friday-Last dose 4/1/22 , Historical Med      ibuprofen (MOTRIN) 600 mg tablet Take 1 tablet (600 mg total) by mouth every 6 (six) hours as needed for mild pain or moderate pain, Starting Tue 6/2/2020, Normal      levothyroxine 150 mcg tablet TAKE 1 TABLET BY MOUTH EVERY DAY, Normal      Lidocaine-Menthol 4-1 % PTCH if needed  , Historical Med      magnesium Oxide (MAG-OX) 400 mg TABS Take 1 tablet (400 mg total) by mouth 2 (two) times a day, Starting Thu 12/22/2022, Normal      omeprazole (PriLOSEC) 40 MG capsule TAKE 1 CAPSULE (40 MG TOTAL) BY MOUTH DAILY  , Starting Fri 12/16/2022, Normal      oxyCODONE-acetaminophen (PERCOCET)  mg per tablet Take 1 tablet by mouth 4 (four) times a day, Historical Med      pantoprazole (PROTONIX) 40 mg tablet Take 1 tablet (40 mg total) by mouth daily Take 1/2 hour prior to breakfast daily in a m   , Starting Wed 6/8/2022, Until Sat 7/30/2022, Normal      potassium chloride (K-DUR,KLOR-CON) 20 mEq tablet Take 20 mEq by mouth 2 (two) times a day, Historical Med      Potassium Chloride ER 20 MEQ TBCR TAKE 1 TABLET BY MOUTH TWICE A DAY AFTER A MEAL, Normal      pramipexole (MIRAPEX) 0 5 mg tablet Take 0 5 mg by mouth daily at bedtime, Historical Med      pregabalin (LYRICA) 100 mg capsule TAKE 1 CAPSULE BY MOUTH TWICE A DAY AS DIRECTED FOR 30 DAYS, Historical Med       !! - Potential duplicate medications found  Please discuss with provider  No discharge procedures on file      PDMP Review       Value Time User    PDMP Reviewed  Yes 10/6/2022 12:12 PM Lyndsay Lazo MD          ED Provider  Electronically Signed by           Kristina Cross DO  12/25/22 2104

## 2022-12-27 ENCOUNTER — HOSPITAL ENCOUNTER (EMERGENCY)
Facility: HOSPITAL | Age: 47
Discharge: HOME | End: 2022-12-27
Attending: EMERGENCY MEDICINE

## 2022-12-27 ENCOUNTER — TELEPHONE (OUTPATIENT)
Dept: PSYCHIATRY | Facility: CLINIC | Age: 47
End: 2022-12-27

## 2022-12-27 ENCOUNTER — TELEPHONE (OUTPATIENT)
Dept: INTERNAL MEDICINE CLINIC | Facility: CLINIC | Age: 47
End: 2022-12-27

## 2022-12-27 VITALS
HEART RATE: 88 BPM | SYSTOLIC BLOOD PRESSURE: 132 MMHG | DIASTOLIC BLOOD PRESSURE: 86 MMHG | TEMPERATURE: 97.1 F | RESPIRATION RATE: 18 BRPM | OXYGEN SATURATION: 98 %

## 2022-12-27 DIAGNOSIS — R20.2 PARESTHESIAS: Primary | ICD-10-CM

## 2022-12-27 DIAGNOSIS — E83.51 HYPOCALCEMIA: ICD-10-CM

## 2022-12-27 DIAGNOSIS — Z01.818 PRE-OP EVALUATION: Primary | ICD-10-CM

## 2022-12-27 LAB
ALBUMIN SERPL BCP-MCNC: 3.8 G/DL (ref 3.5–5)
ALP SERPL-CCNC: 127 U/L (ref 46–116)
ALT SERPL W P-5'-P-CCNC: 61 U/L (ref 12–78)
ANION GAP SERPL CALCULATED.3IONS-SCNC: 9 MMOL/L (ref 4–13)
AST SERPL W P-5'-P-CCNC: 27 U/L (ref 5–45)
BASOPHILS # BLD AUTO: 0.06 THOUSANDS/ÂΜL (ref 0–0.1)
BASOPHILS NFR BLD AUTO: 1 % (ref 0–1)
BILIRUB SERPL-MCNC: 0.36 MG/DL (ref 0.2–1)
BUN SERPL-MCNC: 13 MG/DL (ref 5–25)
CA-I BLD-SCNC: 1 MMOL/L (ref 1.12–1.32)
CALCIUM SERPL-MCNC: 8.2 MG/DL (ref 8.3–10.1)
CHLORIDE SERPL-SCNC: 99 MMOL/L (ref 96–108)
CO2 SERPL-SCNC: 28 MMOL/L (ref 21–32)
CREAT SERPL-MCNC: 1.01 MG/DL (ref 0.6–1.3)
EOSINOPHIL # BLD AUTO: 0.1 THOUSAND/ÂΜL (ref 0–0.61)
EOSINOPHIL NFR BLD AUTO: 2 % (ref 0–6)
ERYTHROCYTE [DISTWIDTH] IN BLOOD BY AUTOMATED COUNT: 16.5 % (ref 11.6–15.1)
GFR SERPL CREATININE-BSD FRML MDRD: 66 ML/MIN/1.73SQ M
GLUCOSE SERPL-MCNC: 106 MG/DL (ref 65–140)
HCT VFR BLD AUTO: 42.1 % (ref 34.8–46.1)
HGB BLD-MCNC: 13.8 G/DL (ref 11.5–15.4)
IMM GRANULOCYTES # BLD AUTO: 0.04 THOUSAND/UL (ref 0–0.2)
IMM GRANULOCYTES NFR BLD AUTO: 1 % (ref 0–2)
LYMPHOCYTES # BLD AUTO: 1.96 THOUSANDS/ÂΜL (ref 0.6–4.47)
LYMPHOCYTES NFR BLD AUTO: 30 % (ref 14–44)
MAGNESIUM SERPL-MCNC: 1.8 MG/DL (ref 1.6–2.6)
MCH RBC QN AUTO: 28.7 PG (ref 26.8–34.3)
MCHC RBC AUTO-ENTMCNC: 32.8 G/DL (ref 31.4–37.4)
MCV RBC AUTO: 88 FL (ref 82–98)
MONOCYTES # BLD AUTO: 0.4 THOUSAND/ÂΜL (ref 0.17–1.22)
MONOCYTES NFR BLD AUTO: 6 % (ref 4–12)
NEUTROPHILS # BLD AUTO: 3.9 THOUSANDS/ÂΜL (ref 1.85–7.62)
NEUTS SEG NFR BLD AUTO: 60 % (ref 43–75)
NRBC BLD AUTO-RTO: 0 /100 WBCS
PLATELET # BLD AUTO: 208 THOUSANDS/UL (ref 149–390)
PMV BLD AUTO: 10.7 FL (ref 8.9–12.7)
POTASSIUM SERPL-SCNC: 4.4 MMOL/L (ref 3.5–5.3)
PROT SERPL-MCNC: 7.8 G/DL (ref 6.4–8.4)
RBC # BLD AUTO: 4.81 MILLION/UL (ref 3.81–5.12)
SODIUM SERPL-SCNC: 136 MMOL/L (ref 135–147)
WBC # BLD AUTO: 6.46 THOUSAND/UL (ref 4.31–10.16)

## 2022-12-27 RX ORDER — CALCIUM GLUCONATE 20 MG/ML
2 INJECTION, SOLUTION INTRAVENOUS ONCE
Status: COMPLETED | OUTPATIENT
Start: 2022-12-27 | End: 2022-12-27

## 2022-12-27 RX ADMIN — CALCIUM GLUCONATE 2 G: 20 INJECTION, SOLUTION INTRAVENOUS at 20:00

## 2022-12-27 NOTE — TELEPHONE ENCOUNTER
Requesting script for COVID test so that she can have test taken at [facility] prior to her upcoming procedure

## 2022-12-27 NOTE — TELEPHONE ENCOUNTER
Patient is calling regarding cancelling an appointment      Date/Time: 12/29/2022    Reason: having injections in her back    Patient was rescheduled: YES [] NO [x]  If yes, when was Patient reschedule for: 1/5/2023    Patient requesting call back to reschedule: YES [] NO [x]

## 2022-12-28 ENCOUNTER — HOSPITAL ENCOUNTER (EMERGENCY)
Facility: HOSPITAL | Age: 47
Discharge: HOME | End: 2022-12-28
Attending: EMERGENCY MEDICINE

## 2022-12-28 VITALS
HEART RATE: 76 BPM | SYSTOLIC BLOOD PRESSURE: 131 MMHG | TEMPERATURE: 97.2 F | RESPIRATION RATE: 20 BRPM | OXYGEN SATURATION: 99 % | DIASTOLIC BLOOD PRESSURE: 82 MMHG

## 2022-12-28 DIAGNOSIS — E83.51 HYPOCALCEMIA: Primary | ICD-10-CM

## 2022-12-28 LAB
ANION GAP SERPL CALCULATED.3IONS-SCNC: 9 MMOL/L (ref 4–13)
ATRIAL RATE: 72 BPM
BUN SERPL-MCNC: 15 MG/DL (ref 5–25)
CA-I BLD-SCNC: 1.08 MMOL/L (ref 1.12–1.32)
CALCIUM SERPL-MCNC: 8.4 MG/DL (ref 8.3–10.1)
CHLORIDE SERPL-SCNC: 99 MMOL/L (ref 96–108)
CO2 SERPL-SCNC: 28 MMOL/L (ref 21–32)
CREAT SERPL-MCNC: 1.03 MG/DL (ref 0.6–1.3)
GFR SERPL CREATININE-BSD FRML MDRD: 65 ML/MIN/1.73SQ M
GLUCOSE SERPL-MCNC: 127 MG/DL (ref 65–140)
P AXIS: 39 DEGREES
POTASSIUM SERPL-SCNC: 4 MMOL/L (ref 3.5–5.3)
PR INTERVAL: 174 MS
QRS AXIS: 16 DEGREES
QRSD INTERVAL: 78 MS
QT INTERVAL: 448 MS
QTC INTERVAL: 490 MS
SODIUM SERPL-SCNC: 136 MMOL/L (ref 135–147)
T WAVE AXIS: 43 DEGREES
VENTRICULAR RATE: 72 BPM

## 2022-12-28 RX ORDER — CALCIUM GLUCONATE 20 MG/ML
2 INJECTION, SOLUTION INTRAVENOUS ONCE
Status: COMPLETED | OUTPATIENT
Start: 2022-12-28 | End: 2022-12-28

## 2022-12-28 RX ADMIN — CALCIUM GLUCONATE 2 G: 20 INJECTION, SOLUTION INTRAVENOUS at 16:54

## 2022-12-28 NOTE — ED PROVIDER NOTES
History  Chief Complaint   Patient presents with   • Abnormal Lab     Pt is reporting numb and tingling all over, pt is here for low calcium last night     71-year-old female with past history of hypothyroidism after thyroidectomy, hypocalcemia, anxiety, depression, hyperlipidemia, anemia, degenerative disc disease, migraine, presents to the ED for evaluation of generalized tingling sensation throughout her body since early this morning  Patient has had recurrent episodes of hypocalcemia requiring calcium bolus in the emergency department  Patient is followed by her endocrinologist   Patient has an appointment scheduled in the future for follow-up of her hypocalcemia  Patient was here last night for hypocalcemia  Patient was given 2 g of calcium gluconate however patient thinks her calcium is still low  History provided by:  Patient      Prior to Admission Medications   Prescriptions Last Dose Informant Patient Reported? Taking?    ALPRAZolam ER (XANAX XR) 2 MG 24 hr tablet   No No   Sig: Take 1 tablet (2 mg total) by mouth 2 (two) times a day   Calcium Carb-Cholecalciferol (Calcium 500+D) 500-400 MG-UNIT TABS   No No   Sig: Take 600 mg by mouth 3 times a day   Calcium Carbonate-Vitamin D3 600-400 MG-UNIT TABS   No No   Sig: TAKE 1 TABLET BY MOUTH 6 (SIX) TIMES A DAY   Lidocaine-Menthol 4-1 % PTCH   Yes No   Sig: if needed     Potassium Chloride ER 20 MEQ TBCR   No No   Sig: TAKE 1 TABLET BY MOUTH TWICE A DAY AFTER A MEAL   baclofen 10 mg tablet   Yes No   Sig: Take 10 mg by mouth 3 (three) times a day as needed   calcitriol (ROCALTROL) 0 25 mcg capsule   No No   Sig: TAKE 1 CAPSULE TWICE A DAY BY ORAL ROUTE AS DIRECTED    calcitriol (ROCALTROL) 0 5 MCG capsule   No No   Sig: Take 1 capsule (0 5 mcg total) by mouth daily   Patient taking differently: Take 0 5 mcg by mouth 2 (two) times a day   cholecalciferol (VITAMIN D3) 1,000 units tablet   No No   Sig: Take 1 tablet (1,000 Units total) by mouth daily desvenlafaxine succinate (PRISTIQ) 50 mg 24 hr tablet   No No   Sig: Take 1 tablet (50 mg total) by mouth daily   doxepin (SINEquan) 25 mg capsule   No No   Sig: TAKE 1 CAPSULE BY MOUTH DAILY AT BEDTIME   fenofibrate 160 MG tablet   No No   Sig: Take 1 tablet (160 mg total) by mouth daily   ferrous sulfate 325 (65 Fe) mg tablet   Yes No   Sig: Take 325 mg by mouth Once Monday, wed, Friday-Last dose 4/1/22    ibuprofen (MOTRIN) 600 mg tablet   No No   Sig: Take 1 tablet (600 mg total) by mouth every 6 (six) hours as needed for mild pain or moderate pain   levothyroxine 150 mcg tablet   No No   Sig: TAKE 1 TABLET BY MOUTH EVERY DAY   Patient taking differently: 150 mcg Take 6 days a week   magnesium Oxide (MAG-OX) 400 mg TABS   No No   Sig: Take 1 tablet (400 mg total) by mouth 2 (two) times a day   omeprazole (PriLOSEC) 40 MG capsule   No No   Sig: TAKE 1 CAPSULE (40 MG TOTAL) BY MOUTH DAILY  oxyCODONE-acetaminophen (PERCOCET)  mg per tablet   Yes No   Sig: Take 1 tablet by mouth 4 (four) times a day   pantoprazole (PROTONIX) 40 mg tablet   No No   Sig: Take 1 tablet (40 mg total) by mouth daily Take 1/2 hour prior to breakfast daily in a haily Garcia    Patient not taking: No sig reported   potassium chloride (K-DUR,KLOR-CON) 20 mEq tablet   Yes No   Sig: Take 20 mEq by mouth 2 (two) times a day   pramipexole (MIRAPEX) 0 5 mg tablet   Yes No   Sig: Take 0 5 mg by mouth daily at bedtime   pregabalin (LYRICA) 100 mg capsule   Yes No   Sig: TAKE 1 CAPSULE BY MOUTH TWICE A DAY AS DIRECTED FOR 30 DAYS      Facility-Administered Medications: None       Past Medical History:   Diagnosis Date   • Abdominal pain    • Acid reflux    • Acute renal failure (HCC)     multiple episodes   • Anemia    • Anxiety    • Anxiety 3/31/2021   • Arthritis    • Asthma    • Back pain    • Chronic pain    • DDD (degenerative disc disease), lumbar    • Depression    • Disease of thyroid gland     had surgery and now hypo   • DVT (deep venous thrombosis) ([facility] [address] )     s/p ankle fracture   • Headache    • History of transfusion    • Hypercalcemia    • Hyperlipidemia    • Hyperthyroidism    • Hypocalcemia     post op    • Kidney stone    • Lightheadedness    • Migraine    • Obesity    • Palpitations    • Psychiatric disorder     anxiety depression   • PTSD (post-traumatic stress disorder) 10/28/2022   • Seizures ([facility] [address] )     petit mal x1  4 years ago- all tests were neg  • SOB (shortness of breath)    • Spondylolisthesis of lumbar region    • Treatment     spinal pain injections  last was 2016   • Wears glasses        Past Surgical History:   Procedure Laterality Date   • BACK SURGERY      L4-5, S1-fusion-plate/screws   • BREAST BIOPSY Left 2022    Stereo SLW - 12:00   •  SECTION      x5   • CYSTOCELE REPAIR  2017   • CYSTOSCOPY     • DISCOGRAM     • HYSTERECTOMY     • MAMMO STEREOTACTIC BREAST BIOPSY LEFT (ALL INC) Left 2022   • PARATHYROIDECTOMY     • [place] ANTERIOR COLPORRAPHY RPR CYSTOCELE W/CYSTO N/A 2017    Procedure: CYSTOCELE REPAIR;  Surgeon: eDsire Morales MD;  Location: [address] [place] [address];  Service: Gynecology   • [place] ARTHRODESIS POSTERIOR INTERBODY [address] LUMBAR N/A 2016    Procedure: L4-S1 LUMBAR LAMINECTOMY/DECOMPRESSION;  INSTRUMENTED POSTEROLATERAL FUSION/ INTERBODY L5-S1; ALLOGRAFT AND AUTOGRAFT (IMPULSE) ;   Surgeon: Judy Lopez MD;  [facility]: [facility];  Service: Orthopedics   • [place] CYSTO BLADDER W/URETERAL CATHETERIZATION Bilateral 2018    Procedure: INSERTION URETERAL CATHETERS PREOP;  Surgeon: Trisha Falcon MD;  Location: [address] [place] [address];  Service: Urology   • [place] SLING OPERATION STRESS INCONTINENCE N/A 2017    Procedure: MID URETHRAL SLING;  Surgeon: Aimee Pryor MD;  Location: [address] [place] [address];  Service: Urology   • [place] SUPRACERVICAL ABDL HYSTER W/WO RMVL TUBE OVARY N/A 2018    Procedure: SUPRACERVICAL HYSTERECTOMY;  Surgeon: Desire Morales MD;  Location: [address] [place] [address];  Service: Gynecology   • THYROIDECTOMY     • TONSILECTOMY AND ADNOIDECTOMY     • TONSILLECTOMY     • TUBAL LIGATION         Family History   Problem Relation Age of Onset   • Diabetes Mother    • Hypertension Mother    • Hyperlipidemia Father    • Arrhythmia Father    • Lung cancer Father    • Diabetes Father    • Colon cancer Maternal Grandfather    • Stomach cancer Paternal Grandmother    • Heart disease Paternal Aunt      I have reviewed and agree with the history as documented  E-Cigarette/Vaping   • E-Cigarette Use Never User      E-Cigarette/Vaping Substances   • Nicotine No    • THC No    • CBD No    • Flavoring No    • Other No    • Unknown No      Social History     Tobacco Use   • Smoking status: Every Day     Packs/day: 0 25     Years: 25 00     Pack years: 6 25     Types: Cigarettes   • Smokeless tobacco: Never   Vaping Use   • Vaping Use: Never used   Substance Use Topics   • Alcohol use: Not Currently   • Drug use: No       Review of Systems   Constitutional: Negative for activity change, appetite change, chills and fever  HENT: Negative for congestion and ear pain  Eyes: Negative for pain and discharge  Respiratory: Negative for cough, chest tightness, shortness of breath, wheezing and stridor  Cardiovascular: Negative for chest pain and palpitations  Gastrointestinal: Negative for abdominal distention, abdominal pain, constipation, diarrhea and nausea  Endocrine: Negative for cold intolerance  Genitourinary: Negative for dysuria, frequency and urgency  Musculoskeletal: Negative for arthralgias and back pain  Skin: Negative for color change and rash  Allergic/Immunologic: Negative for environmental allergies and food allergies  Neurological: Negative for dizziness, weakness, numbness and headaches  Hematological: Negative for adenopathy  Psychiatric/Behavioral: Negative for agitation, behavioral problems and confusion  The patient is not nervous/anxious      All other systems reviewed and are negative  Physical Exam  Physical Exam  Vitals and nursing note reviewed  Constitutional:       Appearance: She is well-developed  HENT:      Head: Normocephalic and atraumatic  Eyes:      Conjunctiva/sclera: Conjunctivae normal    Cardiovascular:      Rate and Rhythm: Normal rate and regular rhythm  Heart sounds: Normal heart sounds  Pulmonary:      Effort: Pulmonary effort is normal       Breath sounds: Normal breath sounds  Abdominal:      General: Bowel sounds are normal  There is no distension  Palpations: Abdomen is soft  Tenderness: There is no abdominal tenderness  Musculoskeletal:         General: Normal range of motion  Cervical back: Normal range of motion and neck supple  Skin:     General: Skin is warm and dry  Neurological:      Mental Status: She is alert and oriented to person, place, and time  Psychiatric:         Behavior: Behavior normal          Thought Content:  Thought content normal          Judgment: Judgment normal          Vital Signs  ED Triage Vitals [12/28/22 1522]   Temp Pulse Respirations Blood Pressure SpO2   -- 78 20 116/77 98 %      Temp src Heart Rate Source Patient Position - Orthostatic VS BP Location FiO2 (%)   -- Monitor Sitting Right arm --      Pain Score       --           Vitals:    12/28/22 1522   BP: 116/77   Pulse: 78   Patient Position - Orthostatic VS: Sitting         Visual Acuity      ED Medications  Medications   calcium gluconate 2 g in sodium chloride 0 9% 100 mL (premix) (2 g Intravenous New Bag 12/28/22 1654)       Diagnostic Studies  Results Reviewed     Procedure Component Value Units Date/Time    Basic metabolic panel [[id number]] Collected: 12/28/22 1625    Lab Status: Final result Specimen: Blood from Arm, Right Updated: 12/28/22 1646     Sodium 136 mmol/L      Potassium 4 0 mmol/L      Chloride 99 mmol/L      CO2 28 mmol/L      ANION GAP 9 mmol/L      BUN 15 mg/dL      Creatinine 1 03 mg/dL Glucose 127 mg/dL      Calcium 8 4 mg/dL      eGFR 65 ml/min/1 73sq m     Narrative:      [place] guidelines for Chronic Kidney Disease (CKD):   •  Stage 1 with normal or high GFR (GFR > 90 mL/min/1 73 square meters)  •  Stage 2 Mild CKD (GFR = 60-89 mL/min/1 73 square meters)  •  Stage 3A Moderate CKD (GFR = 45-59 mL/min/1 73 square meters)  •  Stage 3B Moderate CKD (GFR = 30-44 mL/min/1 73 square meters)  •  Stage 4 Severe CKD (GFR = 15-29 mL/min/1 73 square meters)  •  Stage 5 End Stage CKD (GFR <15 mL/min/1 73 square meters)  Note: GFR calculation is accurate only with a steady state creatinine    Calcium, ionized [[id number]]  (Abnormal) Collected: 12/28/22 1625    Lab Status: Final result Specimen: Blood from Arm, Right Updated: 12/28/22 1633     Calcium, Ionized 1 08 mmol/L                  No orders to display              Procedures  ECG 12 Lead Documentation Only    Date/Time: 12/28/2022 6:16 PM  Performed by: Yael Gill DO  Authorized by: Yael Gill DO     Indications / Diagnosis:  Paresthesia  ECG reviewed by me, the ED Provider: yes    Patient location:  ED  Previous ECG:     Previous ECG:  Compared to current    Similarity:  No change    Comparison to cardiac monitor: Yes    Interpretation:     Interpretation: non-specific    Comments:      Sinus rhythm, rate 72, normal axis, normal intervals, no acute ST elevations noted, low amplitude T waves noted throughout suggesting nonspecific T wave abnormalities that is unchanged from previous study                             ED Course                                             MDM  Number of Diagnoses or Management Options  Diagnosis management comments: Check BMP, ionized calcium  Pleat calcium if low       Amount and/or Complexity of Data Reviewed  Tests in the medicine section of CPT®: ordered and reviewed  Review and summarize past medical records: yes  Independent visualization of images, tracings, or specimens: yes    Risk of Complications, Morbidity, and/or Mortality  General comments: His ionized calcium is low  Calcium repletion given in the ED  Patient feels better  At this time patient is discharged home with follow-up to her PCP and her endocrinologist         Disposition  Final diagnoses:   Hypocalcemia     Time reflects when diagnosis was documented in both MDM as applicable and the Disposition within this note     Time User Action Codes Description Comment    12/28/2022  6:24 PM Collette Fare Add [E83 [id number]] Hypocalcemia       ED Disposition     ED Disposition   Discharge    Condition   Stable    Date/Time   Wed Dec 28, 2022  6:24 PM    Comment   [facility] discharge to home/self care  Follow-up Information     Follow up With Specialties Details Why Contact Info    Hoda Breaux MD Internal Medicine In 2 days  [facility]  [address] [place] [address]  [phone]      Your endocrinologist  Schedule an appointment as soon as possible for a visit             Patient's Medications   Discharge Prescriptions    No medications on file       No discharge procedures on file      PDMP Review       Value Time User    PDMP Reviewed  Yes 10/6/2022 12:12 PM Hoda Breaux MD          ED Provider  Electronically Signed by           Tristian Will DO  12/28/22 1826

## 2022-12-28 NOTE — ED PROVIDER NOTES
History  Chief Complaint   Patient presents with   • Tingling     Well known to ed for chronic numbness tingling from her calcium levels     Patient is a 52-year-old female with a history of recurrent hypocalcemia that presents emergency department with diffuse paresthesias to her typical of her episodes of hypocalcemia  She denies other somatic complaints at this time  History provided by:  Patient   used: No        Prior to Admission Medications   Prescriptions Last Dose Informant Patient Reported? Taking?    ALPRAZolam ER (XANAX XR) 2 MG 24 hr tablet   No No   Sig: Take 1 tablet (2 mg total) by mouth 2 (two) times a day   Calcium Carb-Cholecalciferol (Calcium 500+D) 500-400 MG-UNIT TABS   No No   Sig: Take 600 mg by mouth 3 times a day   Calcium Carbonate-Vitamin D3 600-400 MG-UNIT TABS   No No   Sig: TAKE 1 TABLET BY MOUTH 6 (SIX) TIMES A DAY   Lidocaine-Menthol 4-1 % PTCH   Yes No   Sig: if needed     Potassium Chloride ER 20 MEQ TBCR   No No   Sig: TAKE 1 TABLET BY MOUTH TWICE A DAY AFTER A MEAL   baclofen 10 mg tablet   Yes No   Sig: Take 10 mg by mouth 3 (three) times a day as needed   calcitriol (ROCALTROL) 0 25 mcg capsule   No No   Sig: TAKE 1 CAPSULE TWICE A DAY BY ORAL ROUTE AS DIRECTED    calcitriol (ROCALTROL) 0 5 MCG capsule   No No   Sig: Take 1 capsule (0 5 mcg total) by mouth daily   Patient taking differently: Take 0 5 mcg by mouth 2 (two) times a day   cholecalciferol (VITAMIN D3) 1,000 units tablet   No No   Sig: Take 1 tablet (1,000 Units total) by mouth daily   desvenlafaxine succinate (PRISTIQ) 50 mg 24 hr tablet   No No   Sig: Take 1 tablet (50 mg total) by mouth daily   doxepin (SINEquan) 25 mg capsule   No No   Sig: TAKE 1 CAPSULE BY MOUTH DAILY AT BEDTIME   fenofibrate 160 MG tablet   No No   Sig: Take 1 tablet (160 mg total) by mouth daily   ferrous sulfate 325 (65 Fe) mg tablet   Yes No   Sig: Take 325 mg by mouth Once Monday, wed, Friday-Last dose 4/1/22 ibuprofen (MOTRIN) 600 mg tablet   No No   Sig: Take 1 tablet (600 mg total) by mouth every 6 (six) hours as needed for mild pain or moderate pain   levothyroxine 150 mcg tablet   No No   Sig: TAKE 1 TABLET BY MOUTH EVERY DAY   Patient taking differently: 150 mcg Take 6 days a week   magnesium Oxide (MAG-OX) 400 mg TABS   No No   Sig: Take 1 tablet (400 mg total) by mouth 2 (two) times a day   omeprazole (PriLOSEC) 40 MG capsule   No No   Sig: TAKE 1 CAPSULE (40 MG TOTAL) BY MOUTH DAILY  oxyCODONE-acetaminophen (PERCOCET)  mg per tablet   Yes No   Sig: Take 1 tablet by mouth 4 (four) times a day   pantoprazole (PROTONIX) 40 mg tablet   No No   Sig: Take 1 tablet (40 mg total) by mouth daily Take 1/2 hour prior to breakfast daily in a m  Naoma Mood    Patient not taking: No sig reported   potassium chloride (K-DUR,KLOR-CON) 20 mEq tablet   Yes No   Sig: Take 20 mEq by mouth 2 (two) times a day   pramipexole (MIRAPEX) 0 5 mg tablet   Yes No   Sig: Take 0 5 mg by mouth daily at bedtime   pregabalin (LYRICA) 100 mg capsule   Yes No   Sig: TAKE 1 CAPSULE BY MOUTH TWICE A DAY AS DIRECTED FOR 30 DAYS      Facility-Administered Medications: None       Past Medical History:   Diagnosis Date   • Abdominal pain    • Acid reflux    • Acute renal failure (HCC)     multiple episodes   • Anemia    • Anxiety    • Anxiety 3/31/2021   • Arthritis    • Asthma    • Back pain    • Chronic pain    • DDD (degenerative disc disease), lumbar    • Depression    • Disease of thyroid gland     had surgery and now hypo   • DVT (deep venous thrombosis) ([facility] ) 2009    s/p ankle fracture   • Headache    • History of transfusion    • Hypercalcemia    • Hyperlipidemia    • Hyperthyroidism    • Hypocalcemia     post op 2016   • Kidney stone    • Lightheadedness    • Migraine    • Obesity    • Palpitations    • Psychiatric disorder     anxiety depression   • PTSD (post-traumatic stress disorder) 10/28/2022   • Seizures ([facility] )     petit mal x1  4 years ago- all tests were neg  • SOB (shortness of breath)    • Spondylolisthesis of lumbar region    • Treatment     spinal pain injections  last was 2016   • Wears glasses        Past Surgical History:   Procedure Laterality Date   • BACK SURGERY      L4-5, S1-fusion-plate/screws   • BREAST BIOPSY Left 2022    Stereo SLW - 12:00   •  SECTION      x5   • CYSTOCELE REPAIR  2017   • CYSTOSCOPY     • DISCOGRAM     • HYSTERECTOMY     • MAMMO STEREOTACTIC BREAST BIOPSY LEFT (ALL INC) Left 2022   • PARATHYROIDECTOMY     • FL ANTERIOR COLPORRAPHY RPR CYSTOCELE W/CYSTO N/A 2017    Procedure: CYSTOCELE REPAIR;  Surgeon: Halima Mccarty MD;  Location: [address] [place] [address];  Service: Gynecology   • FL ARTHRODESIS POSTERIOR INTERBODY 1 0 E  Main LUMBAR N/A 2016    Procedure: L4-S1 LUMBAR LAMINECTOMY/DECOMPRESSION;  INSTRUMENTED POSTEROLATERAL FUSION/ INTERBODY L5-S1; ALLOGRAFT AND AUTOGRAFT (IMPULSE) ;   Surgeon: Chasity Velazquez MD;  Location:  MAIN OR;  Service: Orthopedics   • FL CYSTO BLADDER W/URETERAL CATHETERIZATION Bilateral 2018    Procedure: INSERTION URETERAL CATHETERS PREOP;  Surgeon: Eva Little MD;  Location: [address] [place] [address];  Service: Urology   • FL SLING OPERATION STRESS INCONTINENCE N/A 2017    Procedure: MID URETHRAL SLING;  Surgeon: Yumiko Parrish MD;  Location: [address] [place] [address];  Service: Urology   • FL SUPRACERVICAL ABDL HYSTER W/WO RMVL TUBE OVARY N/A 2018    Procedure: SUPRACERVICAL HYSTERECTOMY;  Surgeon: Halima Mccarty MD;  Location: [facility];  Service: Gynecology   • THYROIDECTOMY     • TONSILECTOMY AND ADNOIDECTOMY     • TONSILLECTOMY     • TUBAL LIGATION         Family History   Problem Relation Age of Onset   • Diabetes Mother    • Hypertension Mother    • Hyperlipidemia Father    • Arrhythmia Father    • Lung cancer Father    • Diabetes Father    • Colon cancer Maternal Grandfather    • Stomach cancer Paternal Grandmother    • Heart disease Paternal Aunt      I have reviewed and agree with the history as documented  E-Cigarette/Vaping   • E-Cigarette Use Never User      E-Cigarette/Vaping Substances   • Nicotine No    • THC No    • CBD No    • Flavoring No    • Other No    • Unknown No      Social History     Tobacco Use   • Smoking status: Every Day     Packs/day: 0 25     Years: 25 00     Pack years: 6 25     Types: Cigarettes   • Smokeless tobacco: Never   Vaping Use   • Vaping Use: Never used   Substance Use Topics   • Alcohol use: Not Currently   • Drug use: No       Review of Systems   Constitutional: Negative for chills and fever  Respiratory: Negative for cough, chest tightness and shortness of breath  Gastrointestinal: Negative for abdominal pain, diarrhea, nausea and vomiting  Genitourinary: Negative for dysuria, frequency, hematuria and urgency  Musculoskeletal: Negative for back pain, neck pain and neck stiffness  All other systems reviewed and are negative  Physical Exam  Physical Exam  Vitals and nursing note reviewed  Constitutional:       General: She is not in acute distress  Appearance: She is well-developed  She is not diaphoretic  HENT:      Head: Normocephalic and atraumatic  Eyes:      Conjunctiva/sclera: Conjunctivae normal       Pupils: Pupils are equal, round, and reactive to light  Pulmonary:      Effort: Pulmonary effort is normal  No respiratory distress  Musculoskeletal:         General: No deformity  Normal range of motion  Cervical back: Normal range of motion and neck supple  Skin:     General: Skin is warm and dry  Capillary Refill: Capillary refill takes less than 2 seconds  Coloration: Skin is not pale  Findings: No rash  Neurological:      General: No focal deficit present  Mental Status: She is alert and oriented to person, place, and time  Cranial Nerves: No cranial nerve deficit     Psychiatric:         Behavior: Behavior normal          Vital Signs  ED Triage Vitals [12/27/22 1804]   Temperature Pulse Respirations Blood Pressure SpO2   (!) 97 1 °F (36 2 °C) 102 20 143/80 98 %      Temp Source Heart Rate Source Patient Position - Orthostatic VS BP Location FiO2 (%)   Tympanic Monitor Sitting Right arm --      Pain Score       4           Vitals:    12/27/22 1804 12/27/22 1958 12/27/22 2022 12/27/22 2143   BP: 143/80 123/86 122/89 132/86   Pulse: 102 89 90 88   Patient Position - Orthostatic VS: Sitting Sitting Sitting Sitting         Visual Acuity  Visual Acuity    Flowsheet Row Most Recent Value   L Pupil Size (mm) 3   R Pupil Size (mm) 3          ED Medications  Medications   calcium gluconate 2 g in sodium chloride 0 9% 100 mL (premix) (0 g Intravenous Stopped 12/27/22 2141)       Diagnostic Studies  Results Reviewed     Procedure Component Value Units Date/Time    Comprehensive metabolic panel [[id number]]  (Abnormal) Collected: 12/27/22 1907    Lab Status: Final result Specimen: Blood from Arm, Right Updated: 12/27/22 1931     Sodium 136 mmol/L      Potassium 4 4 mmol/L      Chloride 99 mmol/L      CO2 28 mmol/L      ANION GAP 9 mmol/L      BUN 13 mg/dL      Creatinine 1 01 mg/dL      Glucose 106 mg/dL      Calcium 8 2 mg/dL      AST 27 U/L      ALT 61 U/L      Alkaline Phosphatase 127 U/L      Total Protein 7 8 g/dL      Albumin 3 8 g/dL      Total Bilirubin 0 36 mg/dL      eGFR 66 ml/min/1 73sq m     Narrative:      [place] guidelines for Chronic Kidney Disease (CKD):   •  Stage 1 with normal or high GFR (GFR > 90 mL/min/1 73 square meters)  •  Stage 2 Mild CKD (GFR = 60-89 mL/min/1 73 square meters)  •  Stage 3A Moderate CKD (GFR = 45-59 mL/min/1 73 square meters)  •  Stage 3B Moderate CKD (GFR = 30-44 mL/min/1 73 square meters)  •  Stage 4 Severe CKD (GFR = 15-29 mL/min/1 73 square meters)  •  Stage 5 End Stage CKD (GFR <15 mL/min/1 73 square meters)  Note: GFR calculation is accurate only with a steady state creatinine Magnesium [[id number]]  (Normal) Collected: 12/27/22 1907    Lab Status: Final result Specimen: Blood from Arm, Right Updated: 12/27/22 1931     Magnesium 1 8 mg/dL     Calcium, ionized [[id number]]  (Abnormal) Collected: 12/27/22 1907    Lab Status: Final result Specimen: Blood from Arm, Right Updated: 12/27/22 1915     Calcium, Ionized 1 00 mmol/L     CBC and differential [[id number]]  (Abnormal) Collected: 12/27/22 1907    Lab Status: Final result Specimen: Blood from Arm, Right Updated: 12/27/22 1915     WBC 6 46 Thousand/uL      RBC 4 81 Million/uL      Hemoglobin 13 8 g/dL      Hematocrit 42 1 %      MCV 88 fL      MCH 28 7 pg      MCHC 32 8 g/dL      RDW 16 5 %      MPV 10 7 fL      Platelets 912 Thousands/uL      nRBC 0 /100 WBCs      Neutrophils Relative 60 %      Immat GRANS % 1 %      Lymphocytes Relative 30 %      Monocytes Relative 6 %      Eosinophils Relative 2 %      Basophils Relative 1 %      Neutrophils Absolute 3 90 Thousands/µL      Immature Grans Absolute 0 04 Thousand/uL      Lymphocytes Absolute 1 96 Thousands/µL      Monocytes Absolute 0 40 Thousand/µL      Eosinophils Absolute 0 10 Thousand/µL      Basophils Absolute 0 06 Thousands/µL                  No orders to display              Procedures  Procedures         ED Course                               SBIRT 22yo+    Flowsheet Row Most Recent Value   SBIRT (25 yo +)    In order to provide better care to our patients, we are screening all of our patients for alcohol and drug use  Would it be okay to ask you these screening questions?  No Filed at: 12/27/2022 195                    MDM  Number of Diagnoses or Management Options  Hypocalcemia: new and requires workup  Paresthesias: new and requires workup  Risk of Complications, Morbidity, and/or Mortality  Presenting problems: high  Diagnostic procedures: high  Management options: high        Disposition  Final diagnoses:   Paresthesias   Hypocalcemia     Time reflects when diagnosis was documented in both MDM as applicable and the Disposition within this note     Time User Action Codes Description Comment    12/27/2022  8:58 PM Clinton Cantu Add [R20 2] Paresthesias     12/27/2022  8:58 PM Clinton Cantu Add [E83 51] Hypocalcemia       ED Disposition     ED Disposition   Discharge    Condition   Stable    Date/Time   Tue Dec 27, 2022  8:58 PM    Comment   [facility] discharge to home/self care                 Follow-up Information    None         Discharge Medication List as of 12/27/2022  9:40 PM      CONTINUE these medications which have NOT CHANGED    Details   ALPRAZolam ER (XANAX XR) 2 MG 24 hr tablet Take 1 tablet (2 mg total) by mouth 2 (two) times a day, Starting Fri 10/7/2022, Normal      baclofen 10 mg tablet Take 10 mg by mouth 3 (three) times a day as needed, Starting Fri 7/22/2022, Historical Med      !! calcitriol (ROCALTROL) 0 25 mcg capsule TAKE 1 CAPSULE TWICE A DAY BY ORAL ROUTE AS DIRECTED , Normal      !! calcitriol (ROCALTROL) 0 5 MCG capsule Take 1 capsule (0 5 mcg total) by mouth daily, Starting Thu 9/22/2022, Normal      Calcium Carb-Cholecalciferol (Calcium 500+D) 500-400 MG-UNIT TABS Take 600 mg by mouth 3 times a day, Normal      Calcium Carbonate-Vitamin D3 600-400 MG-UNIT TABS TAKE 1 TABLET BY MOUTH 6 (SIX) TIMES A DAY, Normal      cholecalciferol (VITAMIN D3) 1,000 units tablet Take 1 tablet (1,000 Units total) by mouth daily, Starting Wed 12/7/2022, Normal      desvenlafaxine succinate (PRISTIQ) 50 mg 24 hr tablet Take 1 tablet (50 mg total) by mouth daily, Starting Fri 10/28/2022, Normal      doxepin (SINEquan) 25 mg capsule TAKE 1 CAPSULE BY MOUTH DAILY AT BEDTIME, Normal      fenofibrate 160 MG tablet Take 1 tablet (160 mg total) by mouth daily, Starting Thu 10/6/2022, Normal      ferrous sulfate 325 (65 Fe) mg tablet Take 325 mg by mouth Once Monday, wed, Friday-Last dose 4/1/22 , Historical Med      ibuprofen (MOTRIN) 600 mg tablet Take 1 tablet (600 mg total) by mouth every 6 (six) hours as needed for mild pain or moderate pain, Starting Tue 6/2/2020, Normal      levothyroxine 150 mcg tablet TAKE 1 TABLET BY MOUTH EVERY DAY, Normal      Lidocaine-Menthol 4-1 % PTCH if needed  , Historical Med      magnesium Oxide (MAG-OX) 400 mg TABS Take 1 tablet (400 mg total) by mouth 2 (two) times a day, Starting Thu 12/22/2022, Normal      omeprazole (PriLOSEC) 40 MG capsule TAKE 1 CAPSULE (40 MG TOTAL) BY MOUTH DAILY  , Starting Fri 12/16/2022, Normal      oxyCODONE-acetaminophen (PERCOCET)  mg per tablet Take 1 tablet by mouth 4 (four) times a day, Historical Med      pantoprazole (PROTONIX) 40 mg tablet Take 1 tablet (40 mg total) by mouth daily Take 1/2 hour prior to breakfast daily in a m   , Starting Wed 6/8/2022, Until Sat 7/30/2022, Normal      potassium chloride (K-DUR,KLOR-CON) 20 mEq tablet Take 20 mEq by mouth 2 (two) times a day, Historical Med      Potassium Chloride ER 20 MEQ TBCR TAKE 1 TABLET BY MOUTH TWICE A DAY AFTER A MEAL, Normal      pramipexole (MIRAPEX) 0 5 mg tablet Take 0 5 mg by mouth daily at bedtime, Historical Med      pregabalin (LYRICA) 100 mg capsule TAKE 1 CAPSULE BY MOUTH TWICE A DAY AS DIRECTED FOR 30 DAYS, Historical Med       !! - Potential duplicate medications found  Please discuss with provider  No discharge procedures on file      PDMP Review       Value Time User    PDMP Reviewed  Yes 10/6/2022 12:12 PM Herminio Degroot MD          ED Provider  Electronically Signed by           Teena Silva DO  12/27/22 3548

## 2022-12-28 NOTE — ED NOTES
Patient placed on continuous cardiac monitor     Deon Chamorro, [facility]  12/27/22 1958

## 2022-12-29 ENCOUNTER — PATIENT OUTREACH (OUTPATIENT)
Dept: INTERNAL MEDICINE CLINIC | Facility: CLINIC | Age: 47
End: 2022-12-29

## 2022-12-29 DIAGNOSIS — Z71.89 COMPLEX CARE COORDINATION: Primary | ICD-10-CM

## 2022-12-29 NOTE — PROGRESS NOTES
Referred 12/28/22 to [facility]  Patient will be reviewed for a care plan  Patient is referred to complex care management as a [employer]  In basket sent to embedded RN CM regarding the same

## 2022-12-29 NOTE — PROGRESS NOTES
[employer] referral for [facility]    Chart was reviewed  Patient is 5 years s/p total Thyroidectomy with subsequest Hypothyroidism and Hypoparathyroidism in which she suffers recurrent/chronic Hypocalcemia  Patient was last seen by Endocrinology on 9/22/22 by Dr Fabienne Isbell (Prior to this she was seen 3/2021)  Patient's Rocaltrol was increased to 0 5 mg that day  Patient was also on Calcium 600 mg and Vitamin D 400 IU    This OPCM RN called patient and introduced self and explained the role of the [facility] RN and complex case management  We discussed her multiple ER visits (9 total since 11/9/22) which almost all were due to Hypocalcemia  Every visit she requires IV Calcium Gluconate  She agrees it is a problem  Patient does not have a follow up Endocrinology appointment set up  I offered to call and arrange and she said that would be fine (Patient missed last appointment on 11/22)  Patient is now taking:   Calcium 500+D 600 mg po TID   Calcium Carbonate-Vitamin D3 600-400 mg po one six times a day Vitamin D3 1000 units po daily  Rocaltrol 0 5 mg po twice daily  Levothyroxine 150 mcg po daily six days a week    Patient states she has all medications and she is taking them all as directed  She states frustration with the situation  She states she takes so much Calcium it is ridiculous  This OPCM RN called [facility] [phone] and spoke with Cora  An appointment was made for patient on January 27, 2023 at 8:40 AM at the [place] office with Dr Hardy Betts  I then called patient and made her aware  She states that the date and time are good for her  Patient has no issues obtaining or affording medications  She or her daughter drive her to all appointments  Transportation is not an obstacle per patient  Patient agrees with a follow up call after her Endocrinology appointment  She was also provided with my contact information and encouraged to call with any questions or concerns

## 2022-12-30 ENCOUNTER — PATIENT OUTREACH (OUTPATIENT)
Dept: INTERNAL MEDICINE CLINIC | Facility: CLINIC | Age: 47
End: 2022-12-30

## 2022-12-30 ENCOUNTER — HOSPITAL ENCOUNTER (EMERGENCY)
Facility: HOSPITAL | Age: 47
Discharge: HOME | End: 2022-12-30
Attending: EMERGENCY MEDICINE

## 2022-12-30 ENCOUNTER — APPOINTMENT (EMERGENCY)
Dept: RADIOLOGY | Facility: HOSPITAL | Age: 47
End: 2022-12-30

## 2022-12-30 VITALS
DIASTOLIC BLOOD PRESSURE: 100 MMHG | SYSTOLIC BLOOD PRESSURE: 164 MMHG | WEIGHT: 215.8 LBS | BODY MASS INDEX: 39.47 KG/M2 | TEMPERATURE: 98.4 F | HEART RATE: 118 BPM | RESPIRATION RATE: 20 BRPM | OXYGEN SATURATION: 98 %

## 2022-12-30 DIAGNOSIS — K59.00 CONSTIPATION: ICD-10-CM

## 2022-12-30 DIAGNOSIS — T78.40XA ALLERGIC REACTION, INITIAL ENCOUNTER: Primary | ICD-10-CM

## 2022-12-30 RX ORDER — ONDANSETRON 4 MG/1
4 TABLET, ORALLY DISINTEGRATING ORAL ONCE
Status: COMPLETED | OUTPATIENT
Start: 2022-12-30 | End: 2022-12-30

## 2022-12-30 RX ORDER — FAMOTIDINE 20 MG/1
20 TABLET, FILM COATED ORAL 2 TIMES DAILY
Qty: 14 TABLET | Refills: 0 | Status: SHIPPED | OUTPATIENT
Start: 2022-12-30 | End: 2023-01-05

## 2022-12-30 RX ORDER — DOCUSATE SODIUM 100 MG/1
100 CAPSULE, LIQUID FILLED ORAL 2 TIMES DAILY
Qty: 14 CAPSULE | Refills: 0 | Status: SHIPPED | OUTPATIENT
Start: 2022-12-30 | End: 2023-01-06

## 2022-12-30 RX ORDER — FAMOTIDINE 20 MG/1
20 TABLET, FILM COATED ORAL ONCE
Status: COMPLETED | OUTPATIENT
Start: 2022-12-30 | End: 2022-12-30

## 2022-12-30 RX ORDER — LORAZEPAM 0.5 MG/1
0.5 TABLET ORAL ONCE
Status: COMPLETED | OUTPATIENT
Start: 2022-12-30 | End: 2022-12-30

## 2022-12-30 RX ORDER — DICYCLOMINE HCL 20 MG
20 TABLET ORAL 2 TIMES DAILY
Qty: 20 TABLET | Refills: 0 | Status: SHIPPED | OUTPATIENT
Start: 2022-12-30 | End: 2023-01-05

## 2022-12-30 RX ORDER — PREDNISONE 20 MG/1
40 TABLET ORAL DAILY
Qty: 10 TABLET | Refills: 0 | Status: SHIPPED | OUTPATIENT
Start: 2022-12-30 | End: 2023-01-10

## 2022-12-30 RX ORDER — DIPHENHYDRAMINE HCL 25 MG
25 TABLET ORAL EVERY 6 HOURS
Qty: 12 TABLET | Refills: 0 | Status: SHIPPED | OUTPATIENT
Start: 2022-12-30 | End: 2023-01-10

## 2022-12-30 RX ORDER — DIPHENHYDRAMINE HCL 25 MG
25 TABLET ORAL ONCE
Status: COMPLETED | OUTPATIENT
Start: 2022-12-30 | End: 2022-12-30

## 2022-12-30 RX ADMIN — LORAZEPAM 0.5 MG: 0.5 TABLET ORAL at 08:17

## 2022-12-30 RX ADMIN — FAMOTIDINE 20 MG: 20 TABLET ORAL at 08:17

## 2022-12-30 RX ADMIN — ONDANSETRON 4 MG: 4 TABLET, ORALLY DISINTEGRATING ORAL at 08:17

## 2022-12-30 RX ADMIN — DIPHENHYDRAMINE HYDROCHLORIDE 25 MG: 25 TABLET ORAL at 08:16

## 2022-12-30 RX ADMIN — PREDNISONE 50 MG: 20 TABLET ORAL at 08:16

## 2022-12-30 NOTE — ED PROVIDER NOTES
History  Chief Complaint   Patient presents with   • Medical Problem     Pt arrives with multiple complaints  She received steroid injections yesterday and Band-Aids that she was allergic to were placed on the sites  Since, she has been unable to sleep, is constipated, is burping and feels her breathing is not right  No redness/swelling to the sites  Airway is patent  Pt speaking in full sentences  60-year-old female presents with multiple complaints she said she had steroid injection in her back yesterday however they covered it up with the bandages which she is allergic to  Now she has nausea constipation cannot move her bowels passing a lot of gas reflux lightheadedness and she does not feel well since yesterday  History provided by:  Patient   used: No        Prior to Admission Medications   Prescriptions Last Dose Informant Patient Reported? Taking?    ALPRAZolam ER (XANAX XR) 2 MG 24 hr tablet   No No   Sig: Take 1 tablet (2 mg total) by mouth 2 (two) times a day   Calcium Carb-Cholecalciferol (Calcium 500+D) 500-400 MG-UNIT TABS   No No   Sig: Take 600 mg by mouth 3 times a day   Calcium Carbonate-Vitamin D3 600-400 MG-UNIT TABS   No No   Sig: TAKE 1 TABLET BY MOUTH 6 (SIX) TIMES A DAY   Lidocaine-Menthol 4-1 % PTCH   Yes No   Sig: if needed     Potassium Chloride ER 20 MEQ TBCR   No No   Sig: TAKE 1 TABLET BY MOUTH TWICE A DAY AFTER A MEAL   baclofen 10 mg tablet   Yes No   Sig: Take 10 mg by mouth 3 (three) times a day as needed   calcitriol (ROCALTROL) 0 25 mcg capsule   No No   Sig: TAKE 1 CAPSULE TWICE A DAY BY ORAL ROUTE AS DIRECTED    calcitriol (ROCALTROL) 0 5 MCG capsule   No No   Sig: Take 1 capsule (0 5 mcg total) by mouth daily   Patient taking differently: Take 0 5 mcg by mouth 2 (two) times a day   cholecalciferol (VITAMIN D3) 1,000 units tablet   No No   Sig: Take 1 tablet (1,000 Units total) by mouth daily   desvenlafaxine succinate (PRISTIQ) 50 mg 24 hr tablet No No   Sig: Take 1 tablet (50 mg total) by mouth daily   doxepin (SINEquan) 25 mg capsule   No No   Sig: TAKE 1 CAPSULE BY MOUTH DAILY AT BEDTIME   fenofibrate 160 MG tablet   No No   Sig: Take 1 tablet (160 mg total) by mouth daily   ferrous sulfate 325 (65 Fe) mg tablet   Yes No   Sig: Take 325 mg by mouth Once Monday, wed, Friday-Last dose 4/1/22    ibuprofen (MOTRIN) 600 mg tablet   No No   Sig: Take 1 tablet (600 mg total) by mouth every 6 (six) hours as needed for mild pain or moderate pain   levothyroxine 150 mcg tablet   No No   Sig: TAKE 1 TABLET BY MOUTH EVERY DAY   Patient taking differently: 150 mcg Take 6 days a week   magnesium Oxide (MAG-OX) 400 mg TABS   No No   Sig: Take 1 tablet (400 mg total) by mouth 2 (two) times a day   omeprazole (PriLOSEC) 40 MG capsule   No No   Sig: TAKE 1 CAPSULE (40 MG TOTAL) BY MOUTH DAILY  oxyCODONE-acetaminophen (PERCOCET)  mg per tablet   Yes No   Sig: Take 1 tablet by mouth 4 (four) times a day   pantoprazole (PROTONIX) 40 mg tablet   No No   Sig: Take 1 tablet (40 mg total) by mouth daily Take 1/2 hour prior to breakfast daily in a m  Libra Blase    Patient not taking: No sig reported   potassium chloride (K-DUR,KLOR-CON) 20 mEq tablet   Yes No   Sig: Take 20 mEq by mouth 2 (two) times a day   pramipexole (MIRAPEX) 0 5 mg tablet   Yes No   Sig: Take 0 5 mg by mouth daily at bedtime   pregabalin (LYRICA) 100 mg capsule   Yes No   Sig: TAKE 1 CAPSULE BY MOUTH TWICE A DAY AS DIRECTED FOR 30 DAYS      Facility-Administered Medications: None       Past Medical History:   Diagnosis Date   • Abdominal pain    • Acid reflux    • Acute renal failure (HCC)     multiple episodes   • Anemia    • Anxiety    • Anxiety 3/31/2021   • Arthritis    • Asthma    • Back pain    • Chronic pain    • DDD (degenerative disc disease), lumbar    • Depression    • Disease of thyroid gland     had surgery and now hypo   • [facility] 2009    s/p ankle fracture   • Headache • History of transfusion    • Hypercalcemia    • Hyperlipidemia    • Hyperthyroidism    • Hypocalcemia     post op 2016   • Kidney stone    • Lightheadedness    • Migraine    • Obesity    • Palpitations    • Psychiatric disorder     anxiety depression   • PTSD (post-traumatic stress disorder) 10/28/2022   • Seizures ([address] )     petit mal x1  4 years ago- all tests were neg  • SOB (shortness of breath)    • Spondylolisthesis of lumbar region    • Treatment     spinal pain injections  last was 2016   • Wears glasses        Past Surgical History:   Procedure Laterality Date   • BACK SURGERY      L4-5, S1-fusion-plate/screws   • BREAST BIOPSY Left 2022    Stereo SLW - 12:00   •  SECTION      x5   • CYSTOCELE REPAIR  2017   • CYSTOSCOPY     • DISCOGRAM     • HYSTERECTOMY     • MAMMO STEREOTACTIC BREAST BIOPSY LEFT (ALL INC) Left 2022   • PARATHYROIDECTOMY     • VT ANTERIOR COLPORRAPHY RPR CYSTOCELE W/CYSTO N/A 2017    Procedure: CYSTOCELE REPAIR;  Surgeon: Rafael Kamara MD;  Location: [address] [place] [address];  Service: Gynecology   • VT ARTHRODESIS POSTERIOR INTERBODY [address] LUMBAR N/A 2016    Procedure: L4-S1 LUMBAR LAMINECTOMY/DECOMPRESSION;  INSTRUMENTED POSTEROLATERAL FUSION/ INTERBODY L5-S1; ALLOGRAFT AND AUTOGRAFT (IMPULSE) ;   Surgeon: Carly Garcia MD;  Location: [facility];  Service: Orthopedics   • VT CYSTO BLADDER W/URETERAL CATHETERIZATION Bilateral 2018    Procedure: INSERTION URETERAL CATHETERS PREOP;  Surgeon: David Rendon MD;  Location: [address] [place] [address];  Service: Urology   • VT SLING OPERATION STRESS INCONTINENCE N/A 2017    Procedure: MID URETHRAL SLING;  Surgeon: Татьяна Garcia MD;  Location: [address] [place] [address];  Service: Urology   • VT SUPRACERVICAL ABDL HYSTER W/WO RMVL TUBE OVARY N/A 2018    Procedure: SUPRACERVICAL HYSTERECTOMY;  Surgeon: Rafael Kamara MD;  Location: [address] [place] [address];  Service: Gynecology   • THYROIDECTOMY     • TONSILECTOMY AND ADNOIDECTOMY     • TONSILLECTOMY     • TUBAL LIGATION         Family History   Problem Relation Age of Onset   • Diabetes Mother    • Hypertension Mother    • Hyperlipidemia Father    • Arrhythmia Father    • Lung cancer Father    • Diabetes Father    • Colon cancer Maternal Grandfather    • Stomach cancer Paternal Grandmother    • Heart disease Paternal Aunt      I have reviewed and agree with the history as documented  E-Cigarette/Vaping   • E-Cigarette Use Never User      E-Cigarette/Vaping Substances   • Nicotine No    • THC No    • CBD No    • Flavoring No    • Other No    • Unknown No      Social History     Tobacco Use   • Smoking status: Every Day     Packs/day: 0 25     Years: 25 00     Pack years: 6 25     Types: Cigarettes   • Smokeless tobacco: Never   Vaping Use   • Vaping Use: Never used   Substance Use Topics   • Alcohol use: Not Currently   • Drug use: No       Review of Systems   Constitutional: Negative  HENT: Negative  Eyes: Negative  Respiratory: Negative  Cardiovascular: Negative  Gastrointestinal: Positive for abdominal pain and constipation  Endocrine: Negative  Genitourinary: Negative  Musculoskeletal: Negative  Skin: Negative  Allergic/Immunologic: Negative  Neurological: Positive for light-headedness  Hematological: Negative  Psychiatric/Behavioral: Negative  All other systems reviewed and are negative  Physical Exam  Physical Exam  Constitutional:       Appearance: Normal appearance  HENT:      Head: Normocephalic and atraumatic  Nose: Nose normal       Mouth/Throat:      Mouth: Mucous membranes are moist    Eyes:      Extraocular Movements: Extraocular movements intact  Pupils: Pupils are equal, round, and reactive to light  Cardiovascular:      Rate and Rhythm: Normal rate and regular rhythm  Pulmonary:      Effort: Pulmonary effort is normal       Breath sounds: Normal breath sounds     Abdominal:      General: Abdomen is flat  Bowel sounds are normal       Palpations: Abdomen is soft  Comments: Abdomen soft nonperitoneal   Musculoskeletal:         General: Normal range of motion  Cervical back: Normal range of motion and neck supple  Skin:     General: Skin is warm  Capillary Refill: Capillary refill takes less than 2 seconds  Neurological:      General: No focal deficit present  Mental Status: She is alert and oriented to person, place, and time  Mental status is at baseline  Psychiatric:         Mood and Affect: Mood normal          Thought Content: Thought content normal          Vital Signs  ED Triage Vitals [12/30/22 0723]   Temperature Pulse Respirations Blood Pressure SpO2   98 4 °F (36 9 °C) (!) 118 20 164/100 98 %      Temp Source Heart Rate Source Patient Position - Orthostatic VS BP Location FiO2 (%)   Oral Monitor Sitting Left arm --      Pain Score       No Pain           Vitals:    12/30/22 0723   BP: 164/100   Pulse: (!) 118   Patient Position - Orthostatic VS: Sitting         Visual Acuity      ED Medications  Medications   predniSONE tablet 50 mg (50 mg Oral Given 12/30/22 0816)   famotidine (PEPCID) tablet 20 mg (20 mg Oral Given 12/30/22 0817)   LORazepam (ATIVAN) tablet 0 5 mg (0 5 mg Oral Given 12/30/22 0817)   diphenhydrAMINE (BENADRYL) tablet 25 mg (25 mg Oral Given 12/30/22 0816)   ondansetron (ZOFRAN-ODT) dispersible tablet 4 mg (4 mg Oral Given 12/30/22 0817)       Diagnostic Studies  Results Reviewed     None                 XR abdomen obstruction series   Final Result by Rudi Araiza MD (12/30 0945)      Nonobstructive bowel gas pattern  Workstation performed: [id number]                    Procedures  Procedures         ED Course                               SBIRT 20yo+    Flowsheet Row Most Recent Value   SBIRT (25 yo +)    In order to provide better care to our patients, we are screening all of our patients for alcohol and drug use   Would it be okay to ask you these screening questions? No Filed at: 12/30/2022 0810                    [facility]  Number of Diagnoses or Management Options     Amount and/or Complexity of Data Reviewed  Tests in the radiology section of CPT®: ordered and reviewed  Tests in the medicine section of CPT®: ordered and reviewed        Disposition  Final diagnoses: Allergic reaction, initial encounter   Constipation     Time reflects when diagnosis was documented in both MDM as applicable and the Disposition within this note     Time User Action Codes Description Comment    12/30/2022  9:08 AM Nirali Escamilla Add [T78 40XA] Allergic reaction, initial encounter     12/30/2022  9:18 AM AshvinuAudeliaa Add [K59 00] Constipation       ED Disposition     ED Disposition   Discharge    Condition   Stable    Date/Time   Fri Dec 30, 2022  9:08 AM    Comment   [facility] discharge to home/self care                 Follow-up Information     Follow up With Specialties Details Why Contact Info Additional Information    Sarath Nava MD Internal Medicine Schedule an appointment as soon as possible for a visit   [address] [phone]       [address] [facility] Emergency Department Emergency Medicine  If symptoms worsen [address] [address]  [address] [address] Emergency Department, [facility], [address]          Discharge Medication List as of 12/30/2022  9:11 AM      START taking these medications    Details   diphenhydrAMINE (BENADRYL) 25 mg tablet Take 1 tablet (25 mg total) by mouth every 6 (six) hours for 3 days, Starting Fri 12/30/2022, Until Mon 1/2/2023, Normal      famotidine (PEPCID) 20 mg tablet Take 1 tablet (20 mg total) by mouth 2 (two) times a day for 7 days, Starting Fri 12/30/2022, Until Fri 1/6/2023, Normal      predniSONE 20 mg tablet Take 2 tablets (40 mg total) by mouth daily for 5 days, Starting Fri 12/30/2022, Until Wed 1/4/2023, Normal         CONTINUE these medications which have NOT CHANGED    Details   ALPRAZolam ER (XANAX XR) 2 MG 24 hr tablet Take 1 tablet (2 mg total) by mouth 2 (two) times a day, Starting Fri 10/7/2022, Normal      baclofen 10 mg tablet Take 10 mg by mouth 3 (three) times a day as needed, Starting Fri 7/22/2022, Historical Med      !! calcitriol (ROCALTROL) 0 25 mcg capsule TAKE 1 CAPSULE TWICE A DAY BY ORAL ROUTE AS DIRECTED , Normal      !! calcitriol (ROCALTROL) 0 5 MCG capsule Take 1 capsule (0 5 mcg total) by mouth daily, Starting Thu 9/22/2022, Normal      Calcium Carb-Cholecalciferol (Calcium 500+D) 500-400 MG-UNIT TABS Take 600 mg by mouth 3 times a day, Normal      Calcium Carbonate-Vitamin D3 600-400 MG-UNIT TABS TAKE 1 TABLET BY MOUTH 6 (SIX) TIMES A DAY, Normal      cholecalciferol (VITAMIN D3) 1,000 units tablet Take 1 tablet (1,000 Units total) by mouth daily, Starting Wed 12/7/2022, Normal      desvenlafaxine succinate (PRISTIQ) 50 mg 24 hr tablet Take 1 tablet (50 mg total) by mouth daily, Starting Fri 10/28/2022, Normal      doxepin (SINEquan) 25 mg capsule TAKE 1 CAPSULE BY MOUTH DAILY AT BEDTIME, Normal      fenofibrate 160 MG tablet Take 1 tablet (160 mg total) by mouth daily, Starting Thu 10/6/2022, Normal      ferrous sulfate 325 (65 Fe) mg tablet Take 325 mg by mouth Once Monday, wed, Friday-Last dose 4/1/22 , Historical Med      ibuprofen (MOTRIN) 600 mg tablet Take 1 tablet (600 mg total) by mouth every 6 (six) hours as needed for mild pain or moderate pain, Starting Tue 6/2/2020, Normal      levothyroxine 150 mcg tablet TAKE 1 TABLET BY MOUTH EVERY DAY, Normal      Lidocaine-Menthol 4-1 % PTCH if needed  , Historical Med      magnesium Oxide (MAG-OX) 400 mg TABS Take 1 tablet (400 mg total) by mouth 2 (two) times a day, Starting Thu 12/22/2022, Normal      omeprazole (PriLOSEC) 40 MG capsule TAKE 1 CAPSULE (40 MG TOTAL) BY MOUTH DAILY  , Starting Fri 12/16/2022, Normal oxyCODONE-acetaminophen (PERCOCET)  mg per tablet Take 1 tablet by mouth 4 (four) times a day, Historical Med      pantoprazole (PROTONIX) 40 mg tablet Take 1 tablet (40 mg total) by mouth daily Take 1/2 hour prior to breakfast daily in a m   , Starting Wed 6/8/2022, Until Sat 7/30/2022, Normal      potassium chloride (K-DUR,KLOR-CON) 20 mEq tablet Take 20 mEq by mouth 2 (two) times a day, Historical Med      Potassium Chloride ER 20 MEQ TBCR TAKE 1 TABLET BY MOUTH TWICE A DAY AFTER A MEAL, Normal      pramipexole (MIRAPEX) 0 5 mg tablet Take 0 5 mg by mouth daily at bedtime, Historical Med      pregabalin (LYRICA) 100 mg capsule TAKE 1 CAPSULE BY MOUTH TWICE A DAY AS DIRECTED FOR 30 DAYS, Historical Med       !! - Potential duplicate medications found  Please discuss with provider  No discharge procedures on file      PDMP Review       Value Time User    PDMP Reviewed  Yes 10/6/2022 12:12 PM Hoda Breaux MD          ED Provider  Electronically Signed by           Miguel Yoon DO  12/30/22 3779

## 2022-12-30 NOTE — PROGRESS NOTES
ADT Alert received via IB  Patient presented to ER and discharged from ER today  She states she had Steroid Injection and said she was allergic to the Band-Aid they put on her  She presented to ER with c/o of a possible allergic reaction-unable to sleep, constipated and burping and feels her breathing is not right  Patient was found to be stable and discharged home same day

## 2022-12-31 ENCOUNTER — HOSPITAL ENCOUNTER (EMERGENCY)
Facility: HOSPITAL | Age: 47
Discharge: HOME | End: 2022-12-31
Attending: EMERGENCY MEDICINE

## 2022-12-31 VITALS
RESPIRATION RATE: 22 BRPM | DIASTOLIC BLOOD PRESSURE: 82 MMHG | OXYGEN SATURATION: 96 % | SYSTOLIC BLOOD PRESSURE: 131 MMHG | HEART RATE: 74 BPM | TEMPERATURE: 96.5 F

## 2022-12-31 DIAGNOSIS — E83.51 HYPOCALCEMIA: Primary | ICD-10-CM

## 2022-12-31 LAB
ALBUMIN SERPL BCP-MCNC: 3.7 G/DL (ref 3.5–5)
ALP SERPL-CCNC: 123 U/L (ref 46–116)
ALT SERPL W P-5'-P-CCNC: 54 U/L (ref 12–78)
ANION GAP SERPL CALCULATED.3IONS-SCNC: 10 MMOL/L (ref 4–13)
AST SERPL W P-5'-P-CCNC: 20 U/L (ref 5–45)
BASOPHILS # BLD AUTO: 0.03 THOUSANDS/ÂΜL (ref 0–0.1)
BASOPHILS NFR BLD AUTO: 0 % (ref 0–1)
BILIRUB SERPL-MCNC: 0.24 MG/DL (ref 0.2–1)
BUN SERPL-MCNC: 20 MG/DL (ref 5–25)
CA-I BLD-SCNC: 1.01 MMOL/L (ref 1.12–1.32)
CALCIUM SERPL-MCNC: 7.8 MG/DL (ref 8.3–10.1)
CHLORIDE SERPL-SCNC: 101 MMOL/L (ref 96–108)
CO2 SERPL-SCNC: 28 MMOL/L (ref 21–32)
CREAT SERPL-MCNC: 1.12 MG/DL (ref 0.6–1.3)
EOSINOPHIL # BLD AUTO: 0.04 THOUSAND/ÂΜL (ref 0–0.61)
EOSINOPHIL NFR BLD AUTO: 1 % (ref 0–6)
ERYTHROCYTE [DISTWIDTH] IN BLOOD BY AUTOMATED COUNT: 16.2 % (ref 11.6–15.1)
GFR SERPL CREATININE-BSD FRML MDRD: 59 ML/MIN/1.73SQ M
GLUCOSE SERPL-MCNC: 112 MG/DL (ref 65–140)
HCT VFR BLD AUTO: 39.3 % (ref 34.8–46.1)
HGB BLD-MCNC: 12.7 G/DL (ref 11.5–15.4)
IMM GRANULOCYTES # BLD AUTO: 0.09 THOUSAND/UL (ref 0–0.2)
IMM GRANULOCYTES NFR BLD AUTO: 1 % (ref 0–2)
LYMPHOCYTES # BLD AUTO: 2.44 THOUSANDS/ÂΜL (ref 0.6–4.47)
LYMPHOCYTES NFR BLD AUTO: 29 % (ref 14–44)
MCH RBC QN AUTO: 28.3 PG (ref 26.8–34.3)
MCHC RBC AUTO-ENTMCNC: 32.3 G/DL (ref 31.4–37.4)
MCV RBC AUTO: 88 FL (ref 82–98)
MONOCYTES # BLD AUTO: 0.56 THOUSAND/ÂΜL (ref 0.17–1.22)
MONOCYTES NFR BLD AUTO: 7 % (ref 4–12)
NEUTROPHILS # BLD AUTO: 5.23 THOUSANDS/ÂΜL (ref 1.85–7.62)
NEUTS SEG NFR BLD AUTO: 62 % (ref 43–75)
NRBC BLD AUTO-RTO: 0 /100 WBCS
PLATELET # BLD AUTO: 197 THOUSANDS/UL (ref 149–390)
PMV BLD AUTO: 9.8 FL (ref 8.9–12.7)
POTASSIUM SERPL-SCNC: 3.9 MMOL/L (ref 3.5–5.3)
PROT SERPL-MCNC: 7.2 G/DL (ref 6.4–8.4)
RBC # BLD AUTO: 4.48 MILLION/UL (ref 3.81–5.12)
SODIUM SERPL-SCNC: 139 MMOL/L (ref 135–147)
WBC # BLD AUTO: 8.39 THOUSAND/UL (ref 4.31–10.16)

## 2022-12-31 RX ORDER — CALCIUM GLUCONATE 20 MG/ML
1 INJECTION, SOLUTION INTRAVENOUS ONCE
Status: COMPLETED | OUTPATIENT
Start: 2022-12-31 | End: 2022-12-31

## 2022-12-31 RX ADMIN — CALCIUM GLUCONATE 1 G: 20 INJECTION, SOLUTION INTRAVENOUS at 17:57

## 2022-12-31 RX ADMIN — CALCIUM GLUCONATE 1 G: 20 INJECTION, SOLUTION INTRAVENOUS at 18:45

## 2022-12-31 NOTE — Clinical Note
Richard Estevez was seen and treated in our emergency department on 12/31/2022  Diagnosis:     Evan Muñoz  may return to work on return date  She may return on this date: 01/01/2023         If you have any questions or concerns, please don't hesitate to call        Jeffery Juan DO    ______________________________           _______________          _______________  Hospital Representative                              Date                                Time

## 2022-12-31 NOTE — ED PROVIDER NOTES
History  Chief Complaint   Patient presents with   • Tingling     Well known for low calcium levels  States recently got steroid shots that deplete your calcium  Much more tingling today than usual     13-year-old female well-known to the emergency department presents with tingling in her extremities she states little more than usual when she presents like this most generally she has low calcium levels  States she recently got some steroid shots but depleted her calcium levels patient denies any chest pain shortness of breath no other complaints  History provided by:  Patient   used: No        Prior to Admission Medications   Prescriptions Last Dose Informant Patient Reported? Taking?    ALPRAZolam ER (XANAX XR) 2 MG 24 hr tablet   No No   Sig: Take 1 tablet (2 mg total) by mouth 2 (two) times a day   Calcium Carb-Cholecalciferol (Calcium 500+D) 500-400 MG-UNIT TABS   No No   Sig: Take 600 mg by mouth 3 times a day   Calcium Carbonate-Vitamin D3 600-400 MG-UNIT TABS   No No   Sig: TAKE 1 TABLET BY MOUTH 6 (SIX) TIMES A DAY   Lidocaine-Menthol 4-1 % PTCH   Yes No   Sig: if needed     Potassium Chloride ER 20 MEQ TBCR   No No   Sig: TAKE 1 TABLET BY MOUTH TWICE A DAY AFTER A MEAL   baclofen 10 mg tablet   Yes No   Sig: Take 10 mg by mouth 3 (three) times a day as needed   calcitriol (ROCALTROL) 0 25 mcg capsule   No No   Sig: TAKE 1 CAPSULE TWICE A DAY BY ORAL ROUTE AS DIRECTED    calcitriol (ROCALTROL) 0 5 MCG capsule   No No   Sig: Take 1 capsule (0 5 mcg total) by mouth daily   Patient taking differently: Take 0 5 mcg by mouth 2 (two) times a day   cholecalciferol (VITAMIN D3) 1,000 units tablet   No No   Sig: Take 1 tablet (1,000 Units total) by mouth daily   desvenlafaxine succinate (PRISTIQ) 50 mg 24 hr tablet   No No   Sig: Take 1 tablet (50 mg total) by mouth daily   dicyclomine (BENTYL) 20 mg tablet   No No   Sig: Take 1 tablet (20 mg total) by mouth 2 (two) times a day diphenhydrAMINE (BENADRYL) 25 mg tablet   No No   Sig: Take 1 tablet (25 mg total) by mouth every 6 (six) hours for 3 days   docusate sodium (COLACE) 100 mg capsule   No No   Sig: Take 1 capsule (100 mg total) by mouth 2 (two) times a day for 7 days   doxepin (SINEquan) 25 mg capsule   No No   Sig: TAKE 1 CAPSULE BY MOUTH DAILY AT BEDTIME   famotidine (PEPCID) 20 mg tablet   No No   Sig: Take 1 tablet (20 mg total) by mouth 2 (two) times a day for 7 days   fenofibrate 160 MG tablet   No No   Sig: Take 1 tablet (160 mg total) by mouth daily   ferrous sulfate 325 (65 Fe) mg tablet   Yes No   Sig: Take 325 mg by mouth Once Monday, wed, Friday-Last dose 4/1/22    ibuprofen (MOTRIN) 600 mg tablet   No No   Sig: Take 1 tablet (600 mg total) by mouth every 6 (six) hours as needed for mild pain or moderate pain   levothyroxine 150 mcg tablet   No No   Sig: TAKE 1 TABLET BY MOUTH EVERY DAY   Patient taking differently: 150 mcg Take 6 days a week   magnesium Oxide (MAG-OX) 400 mg TABS   No No   Sig: Take 1 tablet (400 mg total) by mouth 2 (two) times a day   omeprazole (PriLOSEC) 40 MG capsule   No No   Sig: TAKE 1 CAPSULE (40 MG TOTAL) BY MOUTH DAILY  oxyCODONE-acetaminophen (PERCOCET)  mg per tablet   Yes No   Sig: Take 1 tablet by mouth 4 (four) times a day   pantoprazole (PROTONIX) 40 mg tablet   No No   Sig: Take 1 tablet (40 mg total) by mouth daily Take 1/2 hour prior to breakfast daily in a [facility]    Patient not taking: No sig reported   potassium chloride (K-DUR,KLOR-CON) 20 mEq tablet   Yes No   Sig: Take 20 mEq by mouth 2 (two) times a day   pramipexole (MIRAPEX) 0 5 mg tablet   Yes No   Sig: Take 0 5 mg by mouth daily at bedtime   predniSONE 20 mg tablet   No No   Sig: Take 2 tablets (40 mg total) by mouth daily for 5 days   pregabalin (LYRICA) 100 mg capsule   Yes No   Sig: TAKE 1 CAPSULE BY MOUTH TWICE A DAY AS DIRECTED FOR 30 DAYS      Facility-Administered Medications: None       Past Medical History: Diagnosis Date   • Abdominal pain    • Acid reflux    • Acute renal failure (HCC)     multiple episodes   • Anemia    • Anxiety    • Anxiety 3/31/2021   • Arthritis    • Asthma    • Back pain    • Chronic pain    • DDD (degenerative disc disease), lumbar    • Depression    • Disease of thyroid gland     had surgery and now hypo   • DVT (deep venous thrombosis) ([facility] [address] )     s/p ankle fracture   • Headache    • History of transfusion    • Hypercalcemia    • Hyperlipidemia    • Hyperthyroidism    • Hypocalcemia     post op    • Kidney stone    • Lightheadedness    • Migraine    • Obesity    • Palpitations    • Psychiatric disorder     anxiety depression   • PTSD (post-traumatic stress disorder) 10/28/2022   • Seizures ([facility] [address] )     petit mal x1  4 years ago- all tests were neg  • SOB (shortness of breath)    • Spondylolisthesis of lumbar region    • Treatment     spinal pain injections  last was 2016   • Wears glasses        Past Surgical History:   Procedure Laterality Date   • BACK SURGERY      L4-5, S1-fusion-plate/screws   • BREAST BIOPSY Left 2022    Stereo SLW - 12:00   •  SECTION      x5   • CYSTOCELE REPAIR  2017   • CYSTOSCOPY     • DISCOGRAM     • HYSTERECTOMY     • MAMMO STEREOTACTIC BREAST BIOPSY LEFT (ALL INC) Left 2022   • PARATHYROIDECTOMY     • TN ANTERIOR COLPORRAPHY RPR CYSTOCELE W/CYSTO N/A 2017    Procedure: CYSTOCELE REPAIR;  Surgeon: Joe Nettles MD;  Location: [address] [place] [address];  Service: Gynecology   • TN ARTHRODESIS POSTERIOR INTERBODY [address] LUMBAR N/A 2016    Procedure: L4-S1 LUMBAR LAMINECTOMY/DECOMPRESSION;  INSTRUMENTED POSTEROLATERAL FUSION/ INTERBODY L5-S1; ALLOGRAFT AND AUTOGRAFT (IMPULSE) ;   Surgeon: Savannah Wong MD;  Location:  MAIN OR;  Service: Orthopedics   • TN CYSTO BLADDER W/URETERAL CATHETERIZATION Bilateral 2018    Procedure: INSERTION URETERAL CATHETERS PREOP;  Surgeon: Amie Jimenez MD;  Location: [address] [place] [address]; Service: Urology   • KS SLING OPERATION STRESS INCONTINENCE N/A 05/04/2017    Procedure: MID URETHRAL SLING;  Surgeon: Joseph Askew MD;  Location: [address] [place] [address];  Service: Urology   • KS SUPRACERVICAL ABDL HYSTER W/WO RMVL TUBE OVARY N/A 12/07/2018    Procedure: SUPRACERVICAL HYSTERECTOMY;  Surgeon: Thao Griffin MD;  Location: [facility];  Service: Gynecology   • THYROIDECTOMY     • TONSILECTOMY AND ADNOIDECTOMY     • TONSILLECTOMY     • TUBAL LIGATION         Family History   Problem Relation Age of Onset   • Diabetes Mother    • Hypertension Mother    • Hyperlipidemia Father    • Arrhythmia Father    • Lung cancer Father    • Diabetes Father    • Colon cancer Maternal Grandfather    • Stomach cancer Paternal Grandmother    • Heart disease Paternal Aunt      I have reviewed and agree with the history as documented  E-Cigarette/Vaping   • E-Cigarette Use Never User      E-Cigarette/Vaping Substances   • Nicotine No    • THC No    • CBD No    • Flavoring No    • Other No    • Unknown No      Social History     Tobacco Use   • Smoking status: Every Day     Packs/day: 0 25     Years: 25 00     Pack years: 6 25     Types: Cigarettes   • Smokeless tobacco: Never   Vaping Use   • Vaping Use: Never used   Substance Use Topics   • Alcohol use: Not Currently   • Drug use: No       Review of Systems   Constitutional: Negative for activity change, chills, diaphoresis and fever  HENT: Negative for congestion, ear pain, nosebleeds, sore throat, trouble swallowing and voice change  Eyes: Negative for pain, discharge and redness  Respiratory: Negative for apnea, cough, choking, shortness of breath, wheezing and stridor  Cardiovascular: Negative for chest pain and palpitations  Gastrointestinal: Negative for abdominal distention, abdominal pain, constipation, diarrhea, nausea and vomiting  Endocrine: Negative for polydipsia     Genitourinary: Negative for difficulty urinating, dysuria, flank pain, frequency, hematuria and urgency  Musculoskeletal: Negative for back pain, gait problem, joint swelling, myalgias, neck pain and neck stiffness  Skin: Negative for pallor and rash  Neurological: Positive for numbness  Negative for dizziness, tremors, syncope, speech difficulty, weakness and headaches  Hematological: Negative for adenopathy  Psychiatric/Behavioral: Negative for confusion, hallucinations, self-injury and suicidal ideas  The patient is not nervous/anxious  Physical Exam  Physical Exam  Vitals and nursing note reviewed  Constitutional:       General: She is not in acute distress  Appearance: She is well-developed  She is not diaphoretic  HENT:      Head: Normocephalic and atraumatic  Right Ear: External ear normal       Left Ear: External ear normal       Nose: Nose normal    Eyes:      Conjunctiva/sclera: Conjunctivae normal       Pupils: Pupils are equal, round, and reactive to light  Cardiovascular:      Rate and Rhythm: Normal rate and regular rhythm  Heart sounds: Normal heart sounds  Pulmonary:      Effort: Pulmonary effort is normal       Breath sounds: Normal breath sounds  Abdominal:      General: Bowel sounds are normal       Palpations: Abdomen is soft  Musculoskeletal:         General: Normal range of motion  Cervical back: Normal range of motion and neck supple  Skin:     General: Skin is warm and dry  Neurological:      Mental Status: She is alert and oriented to person, place, and time  Deep Tendon Reflexes: Reflexes are normal and symmetric  Psychiatric:         Behavior: Behavior is cooperative           Vital Signs  ED Triage Vitals [12/31/22 1508]   Temperature Pulse Respirations Blood Pressure SpO2   (!) 96 5 °F (35 8 °C) 74 22 131/82 96 %      Temp Source Heart Rate Source Patient Position - Orthostatic VS BP Location FiO2 (%)   Tympanic Monitor Sitting Right arm --      Pain Score       4           Vitals:    12/31/22 1508   BP: 131/82 Pulse: 74   Patient Position - Orthostatic VS: Sitting         Visual Acuity      ED Medications  Medications   calcium gluconate 1 g in sodium chloride 0 9% 50 mL (premix) (1 g Intravenous New Bag 12/31/22 1845)   calcium gluconate 1 g in sodium chloride 0 9% 50 mL (premix) (0 g Intravenous Stopped 12/31/22 1824)       Diagnostic Studies  Results Reviewed     Procedure Component Value Units Date/Time    Comprehensive metabolic panel [[id number]]  (Abnormal) Collected: 12/31/22 1640    Lab Status: Final result Specimen: Blood from Arm, Right Updated: 12/31/22 1703     Sodium 139 mmol/L      Potassium 3 9 mmol/L      Chloride 101 mmol/L      CO2 28 mmol/L      ANION GAP 10 mmol/L      BUN 20 mg/dL      Creatinine 1 12 mg/dL      Glucose 112 mg/dL      Calcium 7 8 mg/dL      AST 20 U/L      ALT 54 U/L      Alkaline Phosphatase 123 U/L      Total Protein 7 2 g/dL      Albumin 3 7 g/dL      Total Bilirubin 0 24 mg/dL      eGFR 59 ml/min/1 73sq m     Narrative:      [place] guidelines for Chronic Kidney Disease (CKD):   •  Stage 1 with normal or high GFR (GFR > 90 mL/min/1 73 square meters)  •  Stage 2 Mild CKD (GFR = 60-89 mL/min/1 73 square meters)  •  Stage 3A Moderate CKD (GFR = 45-59 mL/min/1 73 square meters)  •  Stage 3B Moderate CKD (GFR = 30-44 mL/min/1 73 square meters)  •  Stage 4 Severe CKD (GFR = 15-29 mL/min/1 73 square meters)  •  Stage 5 End Stage CKD (GFR <15 mL/min/1 73 square meters)  Note: GFR calculation is accurate only with a steady state creatinine    Calcium, ionized [[id number]]  (Abnormal) Collected: 12/31/22 1640    Lab Status: Final result Specimen: Blood from Arm, Right Updated: 12/31/22 1650     Calcium, Ionized 1 01 mmol/L     CBC and differential [[id number]]  (Abnormal) Collected: 12/31/22 1640    Lab Status: Final result Specimen: Blood from Arm, Right Updated: 12/31/22 1646     WBC 8 39 Thousand/uL      RBC 4 48 Million/uL      Hemoglobin 12 7 g/dL Hematocrit 39 3 %      MCV 88 fL      MCH 28 3 pg      MCHC 32 3 g/dL      RDW 16 2 %      MPV 9 8 fL      Platelets 983 Thousands/uL      nRBC 0 /100 WBCs      Neutrophils Relative 62 %      Immat GRANS % 1 %      Lymphocytes Relative 29 %      Monocytes Relative 7 %      Eosinophils Relative 1 %      Basophils Relative 0 %      Neutrophils Absolute 5 23 Thousands/µL      Immature Grans Absolute 0 09 Thousand/uL      Lymphocytes Absolute 2 44 Thousands/µL      Monocytes Absolute 0 56 Thousand/µL      Eosinophils Absolute 0 04 Thousand/µL      Basophils Absolute 0 03 Thousands/µL                  No orders to display              Procedures  Procedures         ED Course                                             MDM    Disposition  Final diagnoses:   Hypocalcemia     Time reflects when diagnosis was documented in both MDM as applicable and the Disposition within this note     Time User Action Codes Description Comment    12/31/2022  7:04 PM Tony Abts Add [E83 [id number]] Hypocalcemia       ED Disposition     ED Disposition   Discharge    Condition   Stable    Date/Time   Sat Dec 31, 2022  7:03 PM    Comment   [facility] discharge to home/self care  Follow-up Information     Follow up With Specialties Details Why Contact Info    Hay Camacho MD Internal Medicine Schedule an appointment as soon as possible for a visit  As needed [id number] [place] [address]  [phone]            Patient's Medications   Discharge Prescriptions    No medications on file       No discharge procedures on file      PDMP Review       Value Time User    PDMP Reviewed  Yes 10/6/2022 12:12 PM Hay Camacho MD          ED Provider  Electronically Signed by           Shea Coto DO  12/31/22 7871

## 2023-01-01 ENCOUNTER — HOSPITAL ENCOUNTER (EMERGENCY)
Facility: HOSPITAL | Age: 48
Discharge: HOME | End: 2023-01-01
Attending: EMERGENCY MEDICINE

## 2023-01-01 ENCOUNTER — APPOINTMENT (EMERGENCY)
Dept: RADIOLOGY | Facility: HOSPITAL | Age: 48
End: 2023-01-01

## 2023-01-01 VITALS
SYSTOLIC BLOOD PRESSURE: 129 MMHG | TEMPERATURE: 97.9 F | DIASTOLIC BLOOD PRESSURE: 70 MMHG | RESPIRATION RATE: 16 BRPM | OXYGEN SATURATION: 96 % | HEART RATE: 74 BPM

## 2023-01-01 VITALS
TEMPERATURE: 97.7 F | SYSTOLIC BLOOD PRESSURE: 114 MMHG | RESPIRATION RATE: 20 BRPM | DIASTOLIC BLOOD PRESSURE: 65 MMHG | HEART RATE: 79 BPM | OXYGEN SATURATION: 93 %

## 2023-01-01 DIAGNOSIS — E83.51 HYPOCALCEMIA: Primary | ICD-10-CM

## 2023-01-01 DIAGNOSIS — F41.9 ANXIETY: Primary | ICD-10-CM

## 2023-01-01 DIAGNOSIS — R07.9 CHEST PAIN: ICD-10-CM

## 2023-01-01 LAB
2HR DELTA HS TROPONIN: -1 NG/L
ALBUMIN SERPL BCP-MCNC: 3.8 G/DL (ref 3.5–5)
ALP SERPL-CCNC: 114 U/L (ref 34–104)
ALT SERPL W P-5'-P-CCNC: 31 U/L (ref 7–52)
ANION GAP SERPL CALCULATED.3IONS-SCNC: 15 MMOL/L (ref 4–13)
AST SERPL W P-5'-P-CCNC: 15 U/L (ref 13–39)
ATRIAL RATE: 73 BPM
ATRIAL RATE: 77 BPM
BASOPHILS # BLD AUTO: 0.04 THOUSANDS/ÂΜL (ref 0–0.1)
BASOPHILS NFR BLD AUTO: 1 % (ref 0–1)
BILIRUB SERPL-MCNC: 0.29 MG/DL (ref 0.2–1)
BUN SERPL-MCNC: 23 MG/DL (ref 5–25)
CA-I BLD-SCNC: 0.96 MMOL/L (ref 1.12–1.32)
CALCIUM SERPL-MCNC: 7.5 MG/DL (ref 8.4–10.2)
CARDIAC TROPONIN I PNL SERPL HS: 6 NG/L
CARDIAC TROPONIN I PNL SERPL HS: 7 NG/L
CHLORIDE SERPL-SCNC: 99 MMOL/L (ref 96–108)
CO2 SERPL-SCNC: 21 MMOL/L (ref 21–32)
CREAT SERPL-MCNC: 1.08 MG/DL (ref 0.6–1.3)
EOSINOPHIL # BLD AUTO: 0.11 THOUSAND/ÂΜL (ref 0–0.61)
EOSINOPHIL NFR BLD AUTO: 1 % (ref 0–6)
ERYTHROCYTE [DISTWIDTH] IN BLOOD BY AUTOMATED COUNT: 16.4 % (ref 11.6–15.1)
GFR SERPL CREATININE-BSD FRML MDRD: 61 ML/MIN/1.73SQ M
GLUCOSE SERPL-MCNC: 193 MG/DL (ref 65–140)
HCT VFR BLD AUTO: 38 % (ref 34.8–46.1)
HGB BLD-MCNC: 13.1 G/DL (ref 11.5–15.4)
IMM GRANULOCYTES # BLD AUTO: 0.08 THOUSAND/UL (ref 0–0.2)
IMM GRANULOCYTES NFR BLD AUTO: 1 % (ref 0–2)
LYMPHOCYTES # BLD AUTO: 2.42 THOUSANDS/ÂΜL (ref 0.6–4.47)
LYMPHOCYTES NFR BLD AUTO: 30 % (ref 14–44)
MCH RBC QN AUTO: 29.5 PG (ref 26.8–34.3)
MCHC RBC AUTO-ENTMCNC: 34.5 G/DL (ref 31.4–37.4)
MCV RBC AUTO: 86 FL (ref 82–98)
MONOCYTES # BLD AUTO: 0.55 THOUSAND/ÂΜL (ref 0.17–1.22)
MONOCYTES NFR BLD AUTO: 7 % (ref 4–12)
NEUTROPHILS # BLD AUTO: 5 THOUSANDS/ÂΜL (ref 1.85–7.62)
NEUTS SEG NFR BLD AUTO: 60 % (ref 43–75)
NRBC BLD AUTO-RTO: 0 /100 WBCS
P AXIS: 53 DEGREES
P AXIS: 57 DEGREES
PLATELET # BLD AUTO: 222 THOUSANDS/UL (ref 149–390)
PMV BLD AUTO: 9.9 FL (ref 8.9–12.7)
POTASSIUM SERPL-SCNC: 3.8 MMOL/L (ref 3.5–5.3)
PR INTERVAL: 152 MS
PR INTERVAL: 162 MS
PROT SERPL-MCNC: 6.3 G/DL (ref 6.4–8.4)
QRS AXIS: 37 DEGREES
QRS AXIS: 42 DEGREES
QRSD INTERVAL: 76 MS
QRSD INTERVAL: 80 MS
QT INTERVAL: 402 MS
QT INTERVAL: 404 MS
QTC INTERVAL: 442 MS
QTC INTERVAL: 457 MS
RBC # BLD AUTO: 4.44 MILLION/UL (ref 3.81–5.12)
SODIUM SERPL-SCNC: 135 MMOL/L (ref 135–147)
T WAVE AXIS: 51 DEGREES
T WAVE AXIS: 59 DEGREES
VENTRICULAR RATE: 73 BPM
VENTRICULAR RATE: 77 BPM
WBC # BLD AUTO: 8.2 THOUSAND/UL (ref 4.31–10.16)

## 2023-01-01 RX ORDER — DIAZEPAM 5 MG/1
10 TABLET ORAL ONCE
Status: COMPLETED | OUTPATIENT
Start: 2023-01-01 | End: 2023-01-01

## 2023-01-01 RX ORDER — CALCIUM GLUCONATE 20 MG/ML
1 INJECTION, SOLUTION INTRAVENOUS ONCE
Status: COMPLETED | OUTPATIENT
Start: 2023-01-01 | End: 2023-01-01

## 2023-01-01 RX ADMIN — CALCIUM GLUCONATE 1 G: 20 INJECTION, SOLUTION INTRAVENOUS at 07:03

## 2023-01-01 RX ADMIN — CALCIUM GLUCONATE 1 G: 20 INJECTION, SOLUTION INTRAVENOUS at 06:08

## 2023-01-01 RX ADMIN — DIAZEPAM 10 MG: 5 TABLET ORAL at 03:13

## 2023-01-01 NOTE — ED PROVIDER NOTES
History  Chief Complaint   Patient presents with   • Numbness     Pt presents to the ED with c/o numbness and tingling all over  Pt states that her calcium is low     Is a 43-year-old female presents to the emergency department complaining of complete body numbness  Patient states the numbness started yesterday  She was seen in the emergency department multiple times yesterday at [place]  She states that she received 2 g of IV calcium but did not feel better  She then went back to the emergency department and they did not give her more calcium  The patient complains of ongoing symptoms  She complains of chest pain as well  The pain is located in the center of her chest   Does not radiate  Nothing makes it better or worse  The patient states the pain is sharp and severe  Prior to Admission Medications   Prescriptions Last Dose Informant Patient Reported? Taking?    ALPRAZolam ER (XANAX XR) 2 MG 24 hr tablet   No No   Sig: Take 1 tablet (2 mg total) by mouth 2 (two) times a day   Calcium Carb-Cholecalciferol (Calcium 500+D) 500-400 MG-UNIT TABS   No No   Sig: Take 600 mg by mouth 3 times a day   Calcium Carbonate-Vitamin D3 600-400 MG-UNIT TABS   No No   Sig: TAKE 1 TABLET BY MOUTH 6 (SIX) TIMES A DAY   Lidocaine-Menthol 4-1 % PTCH   Yes No   Sig: if needed     Potassium Chloride ER 20 MEQ TBCR   No No   Sig: TAKE 1 TABLET BY MOUTH TWICE A DAY AFTER A MEAL   baclofen 10 mg tablet   Yes No   Sig: Take 10 mg by mouth 3 (three) times a day as needed   calcitriol (ROCALTROL) 0 25 mcg capsule   No No   Sig: TAKE 1 CAPSULE TWICE A DAY BY ORAL ROUTE AS DIRECTED    calcitriol (ROCALTROL) 0 5 MCG capsule   No No   Sig: Take 1 capsule (0 5 mcg total) by mouth daily   Patient taking differently: Take 0 5 mcg by mouth 2 (two) times a day   cholecalciferol (VITAMIN D3) 1,000 units tablet   No No   Sig: Take 1 tablet (1,000 Units total) by mouth daily   desvenlafaxine succinate (PRISTIQ) 50 mg 24 hr tablet   No No Sig: Take 1 tablet (50 mg total) by mouth daily   dicyclomine (BENTYL) 20 mg tablet   No No   Sig: Take 1 tablet (20 mg total) by mouth 2 (two) times a day   diphenhydrAMINE (BENADRYL) 25 mg tablet   No No   Sig: Take 1 tablet (25 mg total) by mouth every 6 (six) hours for 3 days   docusate sodium (COLACE) 100 mg capsule   No No   Sig: Take 1 capsule (100 mg total) by mouth 2 (two) times a day for 7 days   doxepin (SINEquan) 25 mg capsule   No No   Sig: TAKE 1 CAPSULE BY MOUTH DAILY AT BEDTIME   famotidine (PEPCID) 20 mg tablet   No No   Sig: Take 1 tablet (20 mg total) by mouth 2 (two) times a day for 7 days   fenofibrate 160 MG tablet   No No   Sig: Take 1 tablet (160 mg total) by mouth daily   ferrous sulfate 325 (65 Fe) mg tablet   Yes No   Sig: Take 325 mg by mouth Once Monday, wed, Friday-Last dose 4/1/22    ibuprofen (MOTRIN) 600 mg tablet   No No   Sig: Take 1 tablet (600 mg total) by mouth every 6 (six) hours as needed for mild pain or moderate pain   levothyroxine 150 mcg tablet   No No   Sig: TAKE 1 TABLET BY MOUTH EVERY DAY   Patient taking differently: 150 mcg Take 6 days a week   magnesium Oxide (MAG-OX) 400 mg TABS   No No   Sig: Take 1 tablet (400 mg total) by mouth 2 (two) times a day   omeprazole (PriLOSEC) 40 MG capsule   No No   Sig: TAKE 1 CAPSULE (40 MG TOTAL) BY MOUTH DAILY  oxyCODONE-acetaminophen (PERCOCET)  mg per tablet   Yes No   Sig: Take 1 tablet by mouth 4 (four) times a day   pantoprazole (PROTONIX) 40 mg tablet   No No   Sig: Take 1 tablet (40 mg total) by mouth daily Take 1/2 hour prior to breakfast daily in a   Marcia Burow    Patient not taking: No sig reported   potassium chloride (K-DUR,KLOR-CON) 20 mEq tablet   Yes No   Sig: Take 20 mEq by mouth 2 (two) times a day   pramipexole (MIRAPEX) 0 5 mg tablet   Yes No   Sig: Take 0 5 mg by mouth daily at bedtime   predniSONE 20 mg tablet   No No   Sig: Take 2 tablets (40 mg total) by mouth daily for 5 days   pregabalin (LYRICA) 100 mg capsule   Yes No   Sig: TAKE 1 CAPSULE BY MOUTH TWICE A DAY AS DIRECTED FOR 30 DAYS      Facility-Administered Medications: None       Past Medical History:   Diagnosis Date   • Abdominal pain    • Acid reflux    • Acute renal failure (HCC)     multiple episodes   • Anemia    • Anxiety    • Anxiety 3/31/2021   • Arthritis    • Asthma    • Back pain    • Chronic pain    • DDD (degenerative disc disease), lumbar    • Depression    • Disease of thyroid gland     had surgery and now hypo   • DVT (deep venous thrombosis) ([facility] [address] )     s/p ankle fracture   • Headache    • History of transfusion    • Hypercalcemia    • Hyperlipidemia    • Hyperthyroidism    • Hypocalcemia     post op 2016   • Kidney stone    • Lightheadedness    • Migraine    • Obesity    • Palpitations    • Psychiatric disorder     anxiety depression   • PTSD (post-traumatic stress disorder) 10/28/2022   • Seizures ([facility] [address] )     petit mal x1  4 years ago- all tests were neg  • SOB (shortness of breath)    • Spondylolisthesis of lumbar region    • Treatment     spinal pain injections  last was 2016   • Wears glasses        Past Surgical History:   Procedure Laterality Date   • BACK SURGERY      L4-5, S1-fusion-plate/screws   • BREAST BIOPSY Left 2022    Stereo SLW - 12:00   •  SECTION      x5   • CYSTOCELE REPAIR  2017   • CYSTOSCOPY     • DISCOGRAM     • HYSTERECTOMY     • MAMMO STEREOTACTIC BREAST BIOPSY LEFT (ALL INC) Left 2022   • PARATHYROIDECTOMY     • GA ANTERIOR COLPORRAPHY RPR CYSTOCELE W/CYSTO N/A 2017    Procedure: CYSTOCELE REPAIR;  Surgeon: Karin Rider MD;  Location: [address] [place] [address];  Service: Gynecology   • GA ARTHRODESIS POSTERIOR INTERBODY [address] LUMBAR N/A 2016    Procedure: L4-S1 LUMBAR LAMINECTOMY/DECOMPRESSION;  INSTRUMENTED POSTEROLATERAL FUSION/ INTERBODY L5-S1; ALLOGRAFT AND AUTOGRAFT (IMPULSE) ;   Surgeon: Domenica Mendoza MD;  [facility]: [facility];  Service: Orthopedics   • GA CYSTO BLADDER W/URETERAL CATHETERIZATION Bilateral 12/07/2018    Procedure: INSERTION URETERAL CATHETERS PREOP;  Surgeon: Joanie Guadalupe MD;  Location: [address] [place] [address];  Service: Urology   • TX SLING OPERATION STRESS INCONTINENCE N/A 05/04/2017    Procedure: MID URETHRAL SLING;  Surgeon: Abhi Paula MD;  Location: [address] [place] [address];  Service: Urology   • TX SUPRACERVICAL ABDL HYSTER W/WO RMVL TUBE OVARY N/A 12/07/2018    Procedure: SUPRACERVICAL HYSTERECTOMY;  Surgeon: Alethea Elkins MD;  Location: [address] [place] [address];  Service: Gynecology   • THYROIDECTOMY     • TONSILECTOMY AND ADNOIDECTOMY     • TONSILLECTOMY     • TUBAL LIGATION         Family History   Problem Relation Age of Onset   • Diabetes Mother    • Hypertension Mother    • Hyperlipidemia Father    • Arrhythmia Father    • Lung cancer Father    • Diabetes Father    • Colon cancer Maternal Grandfather    • Stomach cancer Paternal Grandmother    • Heart disease Paternal Aunt      I have reviewed and agree with the history as documented  E-Cigarette/Vaping   • E-Cigarette Use Never User      E-Cigarette/Vaping Substances   • Nicotine No    • THC No    • CBD No    • Flavoring No    • Other No    • Unknown No      Social History     Tobacco Use   • Smoking status: Every Day     Packs/day: 0 25     Years: 25 00     Pack years: 6 25     Types: Cigarettes   • Smokeless tobacco: Never   Vaping Use   • Vaping Use: Never used   Substance Use Topics   • Alcohol use: Not Currently   • Drug use: No       Review of Systems   All other systems reviewed and are negative        Physical Exam  Physical Exam  Constitutional:  Vital signs reviewed, patient appears nontoxic, no acute distress  Eyes: Pupils equal round reactive to light and accommodation, extraocular muscles intact  HEENT: trachea midline, no JVD, moist mucous membranes  Respiratory: lung sounds clear throughout, no rhonchi, no rales  Cardiovascular: regular rate rhythm, no murmurs or rubs  Abdomen: soft, nontender, nondistended, no rebound or guarding  Back: no CVA tenderness, normal inspection  Extremities: no edema, pulses equal in all 4 extremities  Neuro: awake, alert, oriented, no focal weakness  Skin: warm, dry, intact, no rashes noted    Vital Signs  ED Triage Vitals   Temperature Pulse Respirations Blood Pressure SpO2   01/01/23 0424 01/01/23 0424 01/01/23 0424 01/01/23 0424 01/01/23 0424   97 9 °F (36 6 °C) 90 18 144/94 98 %      Temp Source Heart Rate Source Patient Position - Orthostatic VS BP Location FiO2 (%)   01/01/23 0424 01/01/23 0424 01/01/23 0424 01/01/23 0424 --   Oral Monitor Sitting Right arm       Pain Score       01/01/23 0701       No Pain           Vitals:    01/01/23 0424 01/01/23 0701 01/01/23 0739   BP: 144/94 118/69 129/70   Pulse: 90 82 74   Patient Position - Orthostatic VS: Sitting Lying Lying         Visual Acuity  Visual Acuity    Flowsheet Row Most Recent Value   L Pupil Size (mm) 4   R Pupil Size (mm) 4          ED Medications  Medications   calcium gluconate 1 g in sodium chloride 0 9% 50 mL (premix) (0 g Intravenous Stopped 1/1/23 0655)   calcium gluconate 1 g in sodium chloride 0 9% 50 mL (premix) (0 g Intravenous Stopped 1/1/23 0740)       Diagnostic Studies  Results Reviewed     Procedure Component Value Units Date/Time    HS Troponin I 2hr [[id number]]  (Normal) Collected: 01/01/23 0646    Lab Status: Final result Specimen: Blood from Arm, Right Updated: 01/01/23 0713     hs TnI 2hr 6 ng/L      Delta 2hr hsTnI -1 ng/L     HS Troponin I 4hr [[id number]]     Lab Status: No result Specimen: Blood     HS Troponin 0hr (reflex protocol) [[id number]]  (Normal) Collected: 01/01/23 0504    Lab Status: Final result Specimen: Blood from Arm, Right Updated: 01/01/23 0539     hs TnI 0hr 7 ng/L     Comprehensive metabolic panel [[id number]]  (Abnormal) Collected: 01/01/23 0504    Lab Status: Final result Specimen: Blood from Arm, Right Updated: 01/01/23 0533     Sodium 135 mmol/L      Potassium 3 8 mmol/L      Chloride 99 mmol/L      CO2 21 mmol/L      ANION GAP 15 mmol/L      BUN 23 mg/dL      Creatinine 1 08 mg/dL      Glucose 193 mg/dL      Calcium 7 5 mg/dL      AST 15 U/L      ALT 31 U/L      Alkaline Phosphatase 114 U/L      Total Protein 6 3 g/dL      Albumin 3 8 g/dL      Total Bilirubin 0 29 mg/dL      eGFR 61 ml/min/1 73sq m     Narrative:      National Kidney Disease Foundation guidelines for Chronic Kidney Disease (CKD):   •  Stage 1 with normal or high GFR (GFR > 90 mL/min/1 73 square meters)  •  Stage 2 Mild CKD (GFR = 60-89 mL/min/1 73 square meters)  •  Stage 3A Moderate CKD (GFR = 45-59 mL/min/1 73 square meters)  •  Stage 3B Moderate CKD (GFR = 30-44 mL/min/1 73 square meters)  •  Stage 4 Severe CKD (GFR = 15-29 mL/min/1 73 square meters)  •  Stage 5 End Stage CKD (GFR <15 mL/min/1 73 square meters)  Note: GFR calculation is accurate only with a steady state creatinine    Calcium, ionized [[id number]]  (Abnormal) Collected: 01/01/23 0504    Lab Status: Final result Specimen: Blood from Arm, Right Updated: 01/01/23 0513     Calcium, Ionized 0 96 mmol/L     CBC and differential [[id number]]  (Abnormal) Collected: 01/01/23 0504    Lab Status: Final result Specimen: Blood from Arm, Right Updated: 01/01/23 0512     WBC 8 20 Thousand/uL      RBC 4 44 Million/uL      Hemoglobin 13 1 g/dL      Hematocrit 38 0 %      MCV 86 fL      MCH 29 5 pg      MCHC 34 5 g/dL      RDW 16 4 %      MPV 9 9 fL      Platelets 223 Thousands/uL      nRBC 0 /100 WBCs      Neutrophils Relative 60 %      Immat GRANS % 1 %      Lymphocytes Relative 30 %      Monocytes Relative 7 %      Eosinophils Relative 1 %      Basophils Relative 1 %      Neutrophils Absolute 5 00 Thousands/µL      Immature Grans Absolute 0 08 Thousand/uL      Lymphocytes Absolute 2 42 Thousands/µL      Monocytes Absolute 0 55 Thousand/µL      Eosinophils Absolute 0 11 Thousand/µL      Basophils Absolute 0 04 Thousands/µL                  XR chest 1 view portable   ED Interpretation by Rayna Caal DO (01/01 4924)   No pneumonia or pneumothorax  Final Result by Christian Gibson MD (10/44 2039)      No acute cardiopulmonary disease        Findings are stable            Workstation performed: [id number]                    Procedures  CriticalCare Time  Performed by: Rayna Caal DO  Authorized by: Rayna Caal DO     Critical care provider statement:     Critical care time (minutes):  30    Critical care time was exclusive of:  Separately billable procedures and treating other patients and teaching time    Critical care was necessary to treat or prevent imminent or life-threatening deterioration of the following conditions:  Endocrine crisis    Critical care was time spent personally by me on the following activities:  Obtaining history from patient or surrogate, development of treatment plan with patient or surrogate, discussions with consultants, evaluation of patient's response to treatment, examination of patient, ordering and performing treatments and interventions, ordering and review of laboratory studies, ordering and review of radiographic studies and re-evaluation of patient's condition  Comments:      IV calcium gluconate for hypocalcemia  ECG 12 Lead Documentation Only    Date/Time: 1/1/2023 7:28 AM  Performed by: Rayna Caal DO  Authorized by: Rayna Caal DO     ECG reviewed by me, the ED Provider: yes    Patient location:  ED  Comments:      EKG #1 normal sinus rhythm, rate of 77, normal MD, normal QTC, no STEMI  ECG 12 Lead Documentation Only    Date/Time: 1/1/2023 7:29 AM  Performed by: Rayna Caal DO  Authorized by: Rayna Caal DO     ECG reviewed by me, the ED Provider: yes    Patient location:  ED  Comments:      EKG #2, normal sinus rhythm, rate of 73, normal MD, normal QTC, no STEMI             ED Course  ED Course as of 01/01/23 0758   Avis Dawson Jan 01, 2023   0554 Patient had a chest x-ray that was interpreted by me that shows no pneumonia or pneumothorax  0135 The patient had lab work that was significant for an ionized calcium of 0 96  This is less than her calcium that was done earlier yesterday  Patient will receive 2 g of IV calcium EKGs  She had 2 nonischemic EKGs with no arrhythmias and a troponin of 7 with a delta of -1  The patient is at low risk for ACS given heart score 3  She will be discharged with follow-up to her primary care physician  1087 Patient has complete relief of her symptoms  She received 2 g of IV calcium gluconate and will be discharged with follow-up to her primary care physician and endocrinologist              HEART Risk Score    Flowsheet Row Most Recent Value   Heart Score Risk Calculator    History 0 Filed at: 01/01/2023 0708   ECG 1 Filed at: 01/01/2023 0708   Age 1 Filed at: 01/01/2023 0708   Risk Factors 1 Filed at: 01/01/2023 0708   Troponin 0 Filed at: 01/01/2023 9006   HEART Score 3 Filed at: 01/01/2023 1303                                      Medical Decision Making  This is a 21-year-old female who presents to the emergency department with numbness  I considered hypocalcemia, anxiety, electrolyte abnormality  These and other diagnoses were considered  Chest pain: acute illness or injury       Details: Hypocalcemia note  Hypocalcemia: chronic illness or injury with exacerbation, progression, or side effects of treatment that poses a threat to life or bodily functions  Amount and/or Complexity of Data Reviewed  External Data Reviewed: labs  Labs: ordered  Decision-making details documented in ED Course  ECG/medicine tests: ordered and independent interpretation performed  Decision-making details documented in ED Course  Risk  Drug therapy requiring intensive monitoring for toxicity            Disposition  Final diagnoses:   Hypocalcemia   Chest pain     Time reflects when diagnosis was documented in both MDM as applicable and the Disposition within this note     Time User Action Codes Description Comment    1/1/2023  7:07 AM Tomi Fees Add [E83 51] Hypocalcemia     1/1/2023  7:14 AM Tomi Fees Add [R07 9] Chest pain       ED Disposition     ED Disposition   Discharge    Condition   Stable    Date/Time   Sun Jan 1, 2023  7:57 AM    Comment   [address] [facility] discharge to home/self care  Follow-up Information     Follow up With Specialties Details Why Contact Info Additional Information    Clay Greco MD Internal Medicine In 2 days  [address] [address]  [phone]        [facility] Department Emergency Medicine  If symptoms worsen [address] [facility],[address] [id number]  [facility] Emergency Department, [address], [address]          Patient's Medications   Discharge Prescriptions    No medications on file       No discharge procedures on file      PDMP Review       Value Time User    PDMP Reviewed  Yes 10/6/2022 12:12 PM Clay Greco MD          ED Provider  Electronically Signed by           Elizabeth Lu DO  01/01/23 9077

## 2023-01-01 NOTE — ED NOTES
Patient wanted her Calcium level checked prior to discharge  She felt that it was too low  She states that she knows her body  She was planning to go to [address] [place] [address] after leaving here for another evaluation       Gurpreet Jules RN  01/01/23 1461

## 2023-01-02 ENCOUNTER — HOSPITAL ENCOUNTER (EMERGENCY)
Facility: HOSPITAL | Age: 48
Discharge: HOME | End: 2023-01-02
Attending: EMERGENCY MEDICINE

## 2023-01-02 VITALS
SYSTOLIC BLOOD PRESSURE: 109 MMHG | HEART RATE: 71 BPM | RESPIRATION RATE: 20 BRPM | TEMPERATURE: 98 F | OXYGEN SATURATION: 98 % | DIASTOLIC BLOOD PRESSURE: 72 MMHG

## 2023-01-02 DIAGNOSIS — E83.51 HYPOCALCEMIA: Primary | ICD-10-CM

## 2023-01-02 DIAGNOSIS — R94.31 PROLONGED QT INTERVAL: ICD-10-CM

## 2023-01-02 LAB
ANION GAP SERPL CALCULATED.3IONS-SCNC: 11 MMOL/L (ref 4–13)
BUN SERPL-MCNC: 23 MG/DL (ref 5–25)
CA-I BLD-SCNC: 0.91 MMOL/L (ref 1.12–1.32)
CALCIUM SERPL-MCNC: 7.5 MG/DL (ref 8.3–10.1)
CARDIAC TROPONIN I PNL SERPL HS: 2 NG/L
CHLORIDE SERPL-SCNC: 97 MMOL/L (ref 96–108)
CO2 SERPL-SCNC: 26 MMOL/L (ref 21–32)
CREAT SERPL-MCNC: 1.08 MG/DL (ref 0.6–1.3)
GFR SERPL CREATININE-BSD FRML MDRD: 61 ML/MIN/1.73SQ M
GLUCOSE SERPL-MCNC: 211 MG/DL (ref 65–140)
POTASSIUM SERPL-SCNC: 4 MMOL/L (ref 3.5–5.3)
SODIUM SERPL-SCNC: 134 MMOL/L (ref 135–147)

## 2023-01-02 RX ORDER — CALCIUM GLUCONATE 20 MG/ML
2 INJECTION, SOLUTION INTRAVENOUS ONCE
Status: COMPLETED | OUTPATIENT
Start: 2023-01-02 | End: 2023-01-02

## 2023-01-02 RX ADMIN — CALCIUM GLUCONATE 2 G: 20 INJECTION, SOLUTION INTRAVENOUS at 15:08

## 2023-01-02 NOTE — ED NOTES
Pt denies pain at this time continues to feel numbness and tingling "all over" on cardiac monitor with SR noted       Pauline Hicks RN  01/02/23 6510

## 2023-01-02 NOTE — ED NOTES
Pt reports numbness/tingling continues but less intense than what it was  On cardiac monitor with SR noted  Per md no need for repeat trop, pt will be discharged once calcium infusion completed        Karma Marcus RN  01/02/23 8384

## 2023-01-02 NOTE — ED PROVIDER NOTES
History  Chief Complaint   Patient presents with   • Tingling     Here with tingling and numbness  Here frequently for low calcium  c/o chest discomfort with this each time  71-year-old female with past history of hypothyroidism after thyroidectomy, hypocalcemia, anxiety, depression, hyperlipidemia, anemia, degenerative disc disease, migraine, presents to the ED for evaluation of generalized tingling sensation throughout her body since early this morning  Patient has had recurrent episodes of hypocalcemia requiring calcium bolus in the emergency department  Patient is followed by her endocrinologist   Patient has an appointment scheduled in the future for follow-up of her hypocalcemia  Patient was at [facility] emergency department yesterday for hypocalcemia  Patient was given 2 g of calcium gluconate however patient thinks her calcium is still low  Patient is followed by endocrinology  Patient states that she has an endocrinology appointment coming up next week  History provided by:  Patient      Prior to Admission Medications   Prescriptions Last Dose Informant Patient Reported? Taking?    ALPRAZolam ER (XANAX XR) 2 MG 24 hr tablet   No No   Sig: Take 1 tablet (2 mg total) by mouth 2 (two) times a day   Calcium Carb-Cholecalciferol (Calcium 500+D) 500-400 MG-UNIT TABS   No No   Sig: Take 600 mg by mouth 3 times a day   Calcium Carbonate-Vitamin D3 600-400 MG-UNIT TABS   No No   Sig: TAKE 1 TABLET BY MOUTH 6 (SIX) TIMES A DAY   Lidocaine-Menthol 4-1 % PTCH   Yes No   Sig: if needed     Potassium Chloride ER 20 MEQ TBCR   No No   Sig: TAKE 1 TABLET BY MOUTH TWICE A DAY AFTER A MEAL   baclofen 10 mg tablet   Yes No   Sig: Take 10 mg by mouth 3 (three) times a day as needed   calcitriol (ROCALTROL) 0 25 mcg capsule   No No   Sig: TAKE 1 CAPSULE TWICE A DAY BY ORAL ROUTE AS DIRECTED    calcitriol (ROCALTROL) 0 5 MCG capsule   No No   Sig: Take 1 capsule (0 5 mcg total) by mouth daily   Patient taking differently: Take 0 5 mcg by mouth 2 (two) times a day   cholecalciferol (VITAMIN D3) 1,000 units tablet   No No   Sig: Take 1 tablet (1,000 Units total) by mouth daily   desvenlafaxine succinate (PRISTIQ) 50 mg 24 hr tablet   No No   Sig: Take 1 tablet (50 mg total) by mouth daily   dicyclomine (BENTYL) 20 mg tablet   No No   Sig: Take 1 tablet (20 mg total) by mouth 2 (two) times a day   diphenhydrAMINE (BENADRYL) 25 mg tablet   No No   Sig: Take 1 tablet (25 mg total) by mouth every 6 (six) hours for 3 days   docusate sodium (COLACE) 100 mg capsule   No No   Sig: Take 1 capsule (100 mg total) by mouth 2 (two) times a day for 7 days   doxepin (SINEquan) 25 mg capsule   No No   Sig: TAKE 1 CAPSULE BY MOUTH DAILY AT BEDTIME   famotidine (PEPCID) 20 mg tablet   No No   Sig: Take 1 tablet (20 mg total) by mouth 2 (two) times a day for 7 days   fenofibrate 160 MG tablet   No No   Sig: Take 1 tablet (160 mg total) by mouth daily   ferrous sulfate 325 (65 Fe) mg tablet   Yes No   Sig: Take 325 mg by mouth Once Monday, wed, Friday-Last dose 4/1/22    ibuprofen (MOTRIN) 600 mg tablet   No No   Sig: Take 1 tablet (600 mg total) by mouth every 6 (six) hours as needed for mild pain or moderate pain   levothyroxine 150 mcg tablet   No No   Sig: TAKE 1 TABLET BY MOUTH EVERY DAY   Patient taking differently: 150 mcg Take 6 days a week   magnesium Oxide (MAG-OX) 400 mg TABS   No No   Sig: Take 1 tablet (400 mg total) by mouth 2 (two) times a day   omeprazole (PriLOSEC) 40 MG capsule   No No   Sig: TAKE 1 CAPSULE (40 MG TOTAL) BY MOUTH DAILY  oxyCODONE-acetaminophen (PERCOCET)  mg per tablet   Yes No   Sig: Take 1 tablet by mouth 4 (four) times a day   pantoprazole (PROTONIX) 40 mg tablet   No No   Sig: Take 1 tablet (40 mg total) by mouth daily Take 1/2 hour prior to breakfast daily in a haily Mathews    Patient not taking: No sig reported   potassium chloride (K-DUR,KLOR-CON) 20 mEq tablet   Yes No   Sig: Take 20 mEq by mouth 2 (two) times a day   pramipexole (MIRAPEX) 0 5 mg tablet   Yes No   Sig: Take 0 5 mg by mouth daily at bedtime   predniSONE 20 mg tablet   No No   Sig: Take 2 tablets (40 mg total) by mouth daily for 5 days   pregabalin (LYRICA) 100 mg capsule   Yes No   Sig: TAKE 1 CAPSULE BY MOUTH TWICE A DAY AS DIRECTED FOR 30 DAYS      Facility-Administered Medications: None       Past Medical History:   Diagnosis Date   • Abdominal pain    • Acid reflux    • Acute renal failure (HCC)     multiple episodes   • Anemia    • Anxiety    • Anxiety 3/31/2021   • Arthritis    • Asthma    • Back pain    • Chronic pain    • DDD (degenerative disc disease), lumbar    • Depression    • Disease of thyroid gland     had surgery and now hypo   • DVT (deep venous thrombosis) ([facility] )     s/p ankle fracture   • Headache    • History of transfusion    • Hypercalcemia    • Hyperlipidemia    • Hyperthyroidism    • Hypocalcemia     post op    • Kidney stone    • Lightheadedness    • Migraine    • Obesity    • Palpitations    • Psychiatric disorder     anxiety depression   • PTSD (post-traumatic stress disorder) 10/28/2022   • Seizures ([facility] [address] )     petit mal x1  4 years ago- all tests were neg     • SOB (shortness of breath)    • Spondylolisthesis of lumbar region    • Treatment     spinal pain injections  last was 2016   • Wears glasses        Past Surgical History:   Procedure Laterality Date   • BACK SURGERY      L4-5, S1-fusion-plate/screws   • BREAST BIOPSY Left 2022    Stereo SLW - 12:00   •  SECTION      x5   • CYSTOCELE REPAIR  2017   • CYSTOSCOPY     • DISCOGRAM     • HYSTERECTOMY     • MAMMO STEREOTACTIC BREAST BIOPSY LEFT (ALL INC) Left 2022   • PARATHYROIDECTOMY     • SC ANTERIOR COLPORRAPHY RPR CYSTOCELE W/CYSTO N/A 2017    Procedure: CYSTOCELE REPAIR;  Surgeon: Isabel Fontanez MD;  Location: [address] [place] [address];  Service: Gynecology   • SC ARTHRODESIS POSTERIOR INTERBODY [address] LUMBAR N/A 2016 Procedure: L4-S1 LUMBAR LAMINECTOMY/DECOMPRESSION;  INSTRUMENTED POSTEROLATERAL FUSION/ INTERBODY L5-S1; ALLOGRAFT AND AUTOGRAFT (IMPULSE) ; Surgeon: Andrae Yung MD;  Location:  MAIN OR;  Service: Orthopedics   • FL CYSTO BLADDER W/URETERAL CATHETERIZATION Bilateral 12/07/2018    Procedure: INSERTION URETERAL CATHETERS PREOP;  Surgeon: Luz Nelson MD;  Location: [address] [place] [address];  Service: Urology   • FL SLING OPERATION STRESS INCONTINENCE N/A 05/04/2017    Procedure: MID URETHRAL SLING;  Surgeon: Flower Wallis MD;  Location: [address] [place] [address];  Service: Urology   • FL SUPRACERVICAL ABDL HYSTER W/WO RMVL TUBE OVARY N/A 12/07/2018    Procedure: SUPRACERVICAL HYSTERECTOMY;  Surgeon: Isabel Fontanez MD;  Location: [address] [place] [address];  Service: Gynecology   • THYROIDECTOMY     • TONSILECTOMY AND ADNOIDECTOMY     • TONSILLECTOMY     • TUBAL LIGATION         Family History   Problem Relation Age of Onset   • Diabetes Mother    • Hypertension Mother    • Hyperlipidemia Father    • Arrhythmia Father    • Lung cancer Father    • Diabetes Father    • Colon cancer Maternal Grandfather    • Stomach cancer Paternal Grandmother    • Heart disease Paternal Aunt      I have reviewed and agree with the history as documented  E-Cigarette/Vaping   • E-Cigarette Use Never User      E-Cigarette/Vaping Substances   • Nicotine No    • THC No    • CBD No    • Flavoring No    • Other No    • Unknown No      Social History     Tobacco Use   • Smoking status: Every Day     Packs/day: 0 25     Years: 25 00     Pack years: 6 25     Types: Cigarettes   • Smokeless tobacco: Never   Vaping Use   • Vaping Use: Never used   Substance Use Topics   • Alcohol use: Not Currently   • Drug use: No       Review of Systems   Constitutional: Negative for activity change, appetite change, chills and fever  HENT: Negative for congestion and ear pain  Eyes: Negative for pain and discharge     Respiratory: Negative for cough, chest tightness, shortness of breath, wheezing and stridor  Cardiovascular: Negative for chest pain and palpitations  Gastrointestinal: Negative for abdominal distention, abdominal pain, constipation, diarrhea and nausea  Endocrine: Negative for cold intolerance  Genitourinary: Negative for dysuria, frequency and urgency  Musculoskeletal: Negative for arthralgias and back pain  Skin: Negative for color change and rash  Allergic/Immunologic: Negative for environmental allergies and food allergies  Neurological: Negative for dizziness, weakness, numbness and headaches  Paresthesia   Hematological: Negative for adenopathy  Psychiatric/Behavioral: Negative for agitation, behavioral problems and confusion  The patient is not nervous/anxious  All other systems reviewed and are negative  Physical Exam  Physical Exam  Vitals and nursing note reviewed  Constitutional:       Appearance: She is well-developed  HENT:      Head: Normocephalic and atraumatic  Eyes:      Conjunctiva/sclera: Conjunctivae normal    Cardiovascular:      Rate and Rhythm: Normal rate and regular rhythm  Heart sounds: Normal heart sounds  Pulmonary:      Effort: Pulmonary effort is normal       Breath sounds: Normal breath sounds  Abdominal:      General: Bowel sounds are normal  There is no distension  Palpations: Abdomen is soft  Tenderness: There is no abdominal tenderness  Musculoskeletal:         General: Normal range of motion  Cervical back: Normal range of motion and neck supple  Skin:     General: Skin is warm and dry  Neurological:      Mental Status: She is alert and oriented to person, place, and time  Psychiatric:         Behavior: Behavior normal          Thought Content:  Thought content normal          Judgment: Judgment normal          Vital Signs  ED Triage Vitals [01/02/23 1232]   Temperature Pulse Respirations Blood Pressure SpO2   98 3 °F (36 8 °C) 97 22 126/84 97 %      Temp Source Heart Rate Source Patient Position - Orthostatic VS BP Location FiO2 (%)   Tympanic Monitor Sitting Right arm --      Pain Score       4           Vitals:    01/02/23 1232 01/02/23 1510   BP: 126/84 103/72   Pulse: 97 69   Patient Position - Orthostatic VS: Sitting Lying         Visual Acuity  Visual Acuity    Flowsheet Row Most Recent Value   L Pupil Size (mm) 4   R Pupil Size (mm) 4          ED Medications  Medications   calcium gluconate 2 g in sodium chloride 0 9% 100 mL (premix) (2 g Intravenous New Bag 1/2/23 1504)       Diagnostic Studies  Results Reviewed     Procedure Component Value Units Date/Time    HS Troponin 0hr (reflex protocol) [[id number]]  (Normal) Collected: 01/02/23 1417    Lab Status: Final result Specimen: Blood from Arm, Right Updated: 01/02/23 1512     hs TnI 0hr 2 ng/L     HS Troponin I 2hr [[id number]]     Lab Status: No result Specimen: Blood     Calcium, ionized [[id number]]  (Abnormal) Collected: 01/02/23 1417    Lab Status: Final result Specimen: Blood from Arm, Right Updated: 01/02/23 1424     Calcium, Ionized 0 91 mmol/L     Basic metabolic panel [[id number]]  (Abnormal) Collected: 01/02/23 1359    Lab Status: Final result Specimen: Blood from Arm, Right Updated: 01/02/23 1421     Sodium 134 mmol/L      Potassium 4 0 mmol/L      Chloride 97 mmol/L      CO2 26 mmol/L      ANION GAP 11 mmol/L      BUN 23 mg/dL      Creatinine 1 08 mg/dL      Glucose 211 mg/dL      Calcium 7 5 mg/dL      eGFR 61 ml/min/1 73sq m     Narrative:      [place] guidelines for Chronic Kidney Disease (CKD):   •  Stage 1 with normal or high GFR (GFR > 90 mL/min/1 73 square meters)  •  Stage 2 Mild CKD (GFR = 60-89 mL/min/1 73 square meters)  •  Stage 3A Moderate CKD (GFR = 45-59 mL/min/1 73 square meters)  •  Stage 3B Moderate CKD (GFR = 30-44 mL/min/1 73 square meters)  •  Stage 4 Severe CKD (GFR = 15-29 mL/min/1 73 square meters)  •  Stage 5 End Stage CKD (GFR <15 mL/min/1 73 square meters)  Note: GFR calculation is accurate only with a steady state creatinine                 No orders to display              Procedures  ECG 12 Lead Documentation Only    Date/Time: 1/2/2023 1:46 PM  Performed by: Mike Rocha DO  Authorized by: Mike Rocha DO     Indications / Diagnosis:  Paresthesia  ECG reviewed by me, the ED Provider: yes    Patient location:  ED  Previous ECG:     Previous ECG:  Compared to current    Similarity:  Changes noted    Comparison to cardiac monitor: Yes    Interpretation:     Interpretation: abnormal    Comments:      Sinus rhythm, rate 83, normal axis,  ms, no acute ST elevations noted, low amplitude T waves noted throughout, QT prolongation noted that is new compared to previous EKG  ED Course                                             Medical Decision Making  Presented with symptoms of paresthesia  Patient has history of hypocalcemia requiring recurrent IV calcium repletion  Patient was ionized calcium level was low again in the ED  Patient is EKG showed mild QT prolongation compared to baseline  Calcium repletion completed in the ED  Patient discharged home with follow-up to PCP, her endocrinologist as well as cardiology  Close return instructions given to return to the ER for any worsening symptoms  Patient agrees with discharge plan  Patient well appearing at time of discharge  Please Note: Fluency Direct voice recognition software may have been used in the creation of this document  Wrong words or sound a like substitutions may have occurred due to the inherent limitations of the voice software             Disposition  Final diagnoses:   Hypocalcemia   Prolonged QT interval     Time reflects when diagnosis was documented in both MDM as applicable and the Disposition within this note     Time User Action Codes Description Comment    1/2/2023  3:55 PM Teresia Otwell [E83 [id number]] Hypocalcemia     1/2/2023  3:56 PM Teresia Otwell [R94 [id number]] Prolonged QT interval       ED Disposition     ED Disposition   Discharge    Condition   Stable    Date/Time   Mon Jan 2, 2023  3:56 PM    Comment   [address] discharge to home/self care  Follow-up Information     Follow up With Specialties Details Why Raheem Preston MD Internal Medicine In 2 days  [address] [address]  [phone]      Josefa Parmar MD Cardiology Schedule an appointment as soon as possible for a visit   [address]  [phone]            Patient's Medications   Discharge Prescriptions    No medications on file       No discharge procedures on file      PDMP Review       Value Time User    PDMP Reviewed  Yes 10/6/2022 12:12 PM Debbie Kim MD          ED Provider  Electronically Signed by           Lay Ac DO  01/02/23 1607

## 2023-01-02 NOTE — ED PROVIDER NOTES
History  No chief complaint on file  Patient presents for evaluation of tingling all over as well as some anxiety and feeling like she has difficulty breathing  Patient is well-known to the ER with multiple visits  She was seen in the ER less than 5 hours ago and was infused 2 g of calcium gluconate  She feels that she likely needs more calcium at this time  History provided by:  Patient   used: No        Prior to Admission Medications   Prescriptions Last Dose Informant Patient Reported? Taking?    ALPRAZolam ER (XANAX XR) 2 MG 24 hr tablet   No No   Sig: Take 1 tablet (2 mg total) by mouth 2 (two) times a day   Calcium Carb-Cholecalciferol (Calcium 500+D) 500-400 MG-UNIT TABS   No No   Sig: Take 600 mg by mouth 3 times a day   Calcium Carbonate-Vitamin D3 600-400 MG-UNIT TABS   No No   Sig: TAKE 1 TABLET BY MOUTH 6 (SIX) TIMES A DAY   Lidocaine-Menthol 4-1 % PTCH   Yes No   Sig: if needed     Potassium Chloride ER 20 MEQ TBCR   No No   Sig: TAKE 1 TABLET BY MOUTH TWICE A DAY AFTER A MEAL   baclofen 10 mg tablet   Yes No   Sig: Take 10 mg by mouth 3 (three) times a day as needed   calcitriol (ROCALTROL) 0 25 mcg capsule   No No   Sig: TAKE 1 CAPSULE TWICE A DAY BY ORAL ROUTE AS DIRECTED    calcitriol (ROCALTROL) 0 5 MCG capsule   No No   Sig: Take 1 capsule (0 5 mcg total) by mouth daily   Patient taking differently: Take 0 5 mcg by mouth 2 (two) times a day   cholecalciferol (VITAMIN D3) 1,000 units tablet   No No   Sig: Take 1 tablet (1,000 Units total) by mouth daily   desvenlafaxine succinate (PRISTIQ) 50 mg 24 hr tablet   No No   Sig: Take 1 tablet (50 mg total) by mouth daily   dicyclomine (BENTYL) 20 mg tablet   No No   Sig: Take 1 tablet (20 mg total) by mouth 2 (two) times a day   diphenhydrAMINE (BENADRYL) 25 mg tablet   No No   Sig: Take 1 tablet (25 mg total) by mouth every 6 (six) hours for 3 days   docusate sodium (COLACE) 100 mg capsule   No No   Sig: Take 1 capsule (100 mg total) by mouth 2 (two) times a day for 7 days   doxepin (SINEquan) 25 mg capsule   No No   Sig: TAKE 1 CAPSULE BY MOUTH DAILY AT BEDTIME   famotidine (PEPCID) 20 mg tablet   No No   Sig: Take 1 tablet (20 mg total) by mouth 2 (two) times a day for 7 days   fenofibrate 160 MG tablet   No No   Sig: Take 1 tablet (160 mg total) by mouth daily   ferrous sulfate 325 (65 Fe) mg tablet   Yes No   Sig: Take 325 mg by mouth Once Monday, wed, Friday-Last dose 4/1/22    ibuprofen (MOTRIN) 600 mg tablet   No No   Sig: Take 1 tablet (600 mg total) by mouth every 6 (six) hours as needed for mild pain or moderate pain   levothyroxine 150 mcg tablet   No No   Sig: TAKE 1 TABLET BY MOUTH EVERY DAY   Patient taking differently: 150 mcg Take 6 days a week   magnesium Oxide (MAG-OX) 400 mg TABS   No No   Sig: Take 1 tablet (400 mg total) by mouth 2 (two) times a day   omeprazole (PriLOSEC) 40 MG capsule   No No   Sig: TAKE 1 CAPSULE (40 MG TOTAL) BY MOUTH DAILY  oxyCODONE-acetaminophen (PERCOCET)  mg per tablet   Yes No   Sig: Take 1 tablet by mouth 4 (four) times a day   pantoprazole (PROTONIX) 40 mg tablet   No No   Sig: Take 1 tablet (40 mg total) by mouth daily Take 1/2 hour prior to breakfast daily in a   Jamey Caputo    Patient not taking: No sig reported   potassium chloride (K-DUR,KLOR-CON) 20 mEq tablet   Yes No   Sig: Take 20 mEq by mouth 2 (two) times a day   pramipexole (MIRAPEX) 0 5 mg tablet   Yes No   Sig: Take 0 5 mg by mouth daily at bedtime   predniSONE 20 mg tablet   No No   Sig: Take 2 tablets (40 mg total) by mouth daily for 5 days   pregabalin (LYRICA) 100 mg capsule   Yes No   Sig: TAKE 1 CAPSULE BY MOUTH TWICE A DAY AS DIRECTED FOR 30 DAYS      Facility-Administered Medications: None       Past Medical History:   Diagnosis Date   • Abdominal pain    • Acid reflux    • Acute renal failure (HCC)     multiple episodes   • Anemia    • Anxiety    • Anxiety 3/31/2021   • Arthritis    • Asthma    • Back pain    • Chronic pain    • DDD (degenerative disc disease), lumbar    • Depression    • Disease of thyroid gland     had surgery and now hypo   • DVT (deep venous thrombosis) ([facility] [address] ) 2009    s/p ankle fracture   • Headache    • History of transfusion    • Hypercalcemia    • Hyperlipidemia    • Hyperthyroidism    • Hypocalcemia     post op 2016   • Kidney stone    • Lightheadedness    • Migraine    • Obesity    • Palpitations    • Psychiatric disorder     anxiety depression   • PTSD (post-traumatic stress disorder) 10/28/2022   • Seizures ([facility] [address] )     petit mal x1  4 years ago- all tests were neg  • SOB (shortness of breath)    • Spondylolisthesis of lumbar region    • Treatment     spinal pain injections  last was 2016   • Wears glasses        Past Surgical History:   Procedure Laterality Date   • BACK SURGERY      L4-5, S1-fusion-plate/screws   • BREAST BIOPSY Left 2022    Stereo SLW - 12:00   •  SECTION      x5   • CYSTOCELE REPAIR  2017   • CYSTOSCOPY     • DISCOGRAM     • HYSTERECTOMY     • MAMMO STEREOTACTIC BREAST BIOPSY LEFT (ALL INC) Left 2022   • PARATHYROIDECTOMY     • WA ANTERIOR COLPORRAPHY RPR CYSTOCELE W/CYSTO N/A 2017    Procedure: CYSTOCELE REPAIR;  Surgeon: Sujit Romano MD;  Location: [address] [place] [address];  Service: Gynecology   • WA ARTHRODESIS POSTERIOR INTERBODY [address] LUMBAR N/A 2016    Procedure: L4-S1 LUMBAR LAMINECTOMY/DECOMPRESSION;  INSTRUMENTED POSTEROLATERAL FUSION/ INTERBODY L5-S1; ALLOGRAFT AND AUTOGRAFT (IMPULSE) ;   Surgeon: Inez Kwan MD;  Location: [facility];  Service: Orthopedics   • WA CYSTO BLADDER W/URETERAL CATHETERIZATION Bilateral 2018    Procedure: INSERTION URETERAL CATHETERS PREOP;  Surgeon: Arelis Hayes MD;  Location: [address] [place] [address];  Service: Urology   • WA SLING OPERATION STRESS INCONTINENCE N/A 2017    Procedure: MID URETHRAL SLING;  Surgeon: Martita Gant MD;  Location: [address] [place] [address];  Service: Urology   • WA SUPRACERVICAL ABDL HYSTER W/WO RMVL TUBE OVARY N/A 12/07/2018    Procedure: SUPRACERVICAL HYSTERECTOMY;  Surgeon: Miguel Mayer MD;  Location: [address] [place] [address];  Service: Gynecology   • THYROIDECTOMY     • TONSILECTOMY AND ADNOIDECTOMY     • TONSILLECTOMY     • TUBAL LIGATION         Family History   Problem Relation Age of Onset   • Diabetes Mother    • Hypertension Mother    • Hyperlipidemia Father    • Arrhythmia Father    • Lung cancer Father    • Diabetes Father    • Colon cancer Maternal Grandfather    • Stomach cancer Paternal Grandmother    • Heart disease Paternal Aunt      I have reviewed and agree with the history as documented  E-Cigarette/Vaping   • E-Cigarette Use Never User      E-Cigarette/Vaping Substances   • Nicotine No    • THC No    • CBD No    • Flavoring No    • Other No    • Unknown No      Social History     Tobacco Use   • Smoking status: Every Day     Packs/day: 0 25     Years: 25 00     Pack years: 6 25     Types: Cigarettes   • Smokeless tobacco: Never   Vaping Use   • Vaping Use: Never used   Substance Use Topics   • Alcohol use: Not Currently   • Drug use: No       Review of Systems   Constitutional: Negative for chills and fever  HENT: Negative for ear pain and sore throat  Eyes: Negative for pain and visual disturbance  Respiratory: Negative for cough and shortness of breath  Cardiovascular: Negative for chest pain and palpitations  Gastrointestinal: Negative for abdominal pain and vomiting  Genitourinary: Negative for dysuria and hematuria  Musculoskeletal: Negative for arthralgias and back pain  Skin: Negative for color change and rash  Neurological: Positive for numbness  Negative for seizures, syncope, weakness and headaches  Psychiatric/Behavioral: The patient is nervous/anxious  All other systems reviewed and are negative  Physical Exam  Physical Exam  Vitals and nursing note reviewed  Constitutional:       General: She is not in acute distress    HENT: Head: Atraumatic  Right Ear: External ear normal       Left Ear: External ear normal       Nose: Nose normal       Mouth/Throat:      Mouth: Mucous membranes are moist       Pharynx: Oropharynx is clear  Eyes:      General: No scleral icterus  Conjunctiva/sclera: Conjunctivae normal    Cardiovascular:      Rate and Rhythm: Normal rate and regular rhythm  Pulses: Normal pulses  Pulmonary:      Effort: Pulmonary effort is normal  No respiratory distress  Breath sounds: Normal breath sounds  Musculoskeletal:         General: No deformity  Normal range of motion  Skin:     Capillary Refill: Capillary refill takes less than 2 seconds  Findings: No rash  Neurological:      General: No focal deficit present  Mental Status: She is alert and oriented to person, place, and time  Sensory: No sensory deficit  Motor: No weakness  Gait: Gait normal          Vital Signs  ED Triage Vitals   Temperature Pulse Respirations Blood Pressure SpO2   01/01/23 0233 01/01/23 0233 01/01/23 0233 01/01/23 0233 01/01/23 0233   (!) 97 4 °F (36 3 °C) 82 18 133/78 98 %      Temp Source Heart Rate Source Patient Position - Orthostatic VS BP Location FiO2 (%)   01/01/23 0233 01/01/23 0233 01/01/23 0233 01/01/23 0315 --   Oral Monitor Sitting Right arm       Pain Score       --                  Vitals:    01/01/23 0233 01/01/23 0315   BP: 133/78 114/65   Pulse: 82 79   Patient Position - Orthostatic VS: Sitting Lying         Visual Acuity      ED Medications  Medications   diazepam (VALIUM) tablet 10 mg (10 mg Oral Given 1/1/23 0313)       Diagnostic Studies  Results Reviewed     None                 No orders to display              Procedures  Procedures         ED Course                                             Medical Decision Making  Pulse ox 93% on room air indicating adequate oxygenation        Hypocalcemia is a chronic problem for this patient presenting with mild symptoms that can also overlap with anxiety  Patient was already evaluated in the ER for hypocalcemia and treated appropriately at this time we will consider other diagnoses such as anxiety and treat accordingly  Disposition  Final diagnoses:   Anxiety     Time reflects when diagnosis was documented in both MDM as applicable and the Disposition within this note     Time User Action Codes Description Comment    1/1/2023  2:56 AM Priyank Rod Add [F41 9] Anxiety       ED Disposition     ED Disposition   Discharge    Condition   Stable    Date/Time   Sun Jan 1, 2023  2:56 AM    Comment   [facility] discharge to home/self care                 Follow-up Information     Follow up With Specialties Details Why Contact Miladys Stewart MD Internal Medicine In 1 week  [facility]  [address]  [phone]            Discharge Medication List as of 1/1/2023  2:56 AM      CONTINUE these medications which have NOT CHANGED    Details   ALPRAZolam ER (XANAX XR) 2 MG 24 hr tablet Take 1 tablet (2 mg total) by mouth 2 (two) times a day, Starting Fri 10/7/2022, Normal      baclofen 10 mg tablet Take 10 mg by mouth 3 (three) times a day as needed, Starting Fri 7/22/2022, Historical Med      !! calcitriol (ROCALTROL) 0 25 mcg capsule TAKE 1 CAPSULE TWICE A DAY BY ORAL ROUTE AS DIRECTED , Normal      !! calcitriol (ROCALTROL) 0 5 MCG capsule Take 1 capsule (0 5 mcg total) by mouth daily, Starting Thu 9/22/2022, Normal      Calcium Carb-Cholecalciferol (Calcium 500+D) 500-400 MG-UNIT TABS Take 600 mg by mouth 3 times a day, Normal      Calcium Carbonate-Vitamin D3 600-400 MG-UNIT TABS TAKE 1 TABLET BY MOUTH 6 (SIX) TIMES A DAY, Normal      cholecalciferol (VITAMIN D3) 1,000 units tablet Take 1 tablet (1,000 Units total) by mouth daily, Starting Wed 12/7/2022, Normal      desvenlafaxine succinate (PRISTIQ) 50 mg 24 hr tablet Take 1 tablet (50 mg total) by mouth daily, Starting Fri 10/28/2022, Normal dicyclomine (BENTYL) 20 mg tablet Take 1 tablet (20 mg total) by mouth 2 (two) times a day, Starting Fri 12/30/2022, Normal      diphenhydrAMINE (BENADRYL) 25 mg tablet Take 1 tablet (25 mg total) by mouth every 6 (six) hours for 3 days, Starting Fri 12/30/2022, Until Mon 1/2/2023, Normal      docusate sodium (COLACE) 100 mg capsule Take 1 capsule (100 mg total) by mouth 2 (two) times a day for 7 days, Starting Fri 12/30/2022, Until Fri 1/6/2023, Normal      doxepin (SINEquan) 25 mg capsule TAKE 1 CAPSULE BY MOUTH DAILY AT BEDTIME, Normal      famotidine (PEPCID) 20 mg tablet Take 1 tablet (20 mg total) by mouth 2 (two) times a day for 7 days, Starting Fri 12/30/2022, Until Fri 1/6/2023, Normal      fenofibrate 160 MG tablet Take 1 tablet (160 mg total) by mouth daily, Starting Thu 10/6/2022, Normal      ferrous sulfate 325 (65 Fe) mg tablet Take 325 mg by mouth Once Monday, wed, Friday-Last dose 4/1/22 , Historical Med      ibuprofen (MOTRIN) 600 mg tablet Take 1 tablet (600 mg total) by mouth every 6 (six) hours as needed for mild pain or moderate pain, Starting Tue 6/2/2020, Normal      levothyroxine 150 mcg tablet TAKE 1 TABLET BY MOUTH EVERY DAY, Normal      Lidocaine-Menthol 4-1 % PTCH if needed  , Historical Med      magnesium Oxide (MAG-OX) 400 mg TABS Take 1 tablet (400 mg total) by mouth 2 (two) times a day, Starting Thu 12/22/2022, Normal      omeprazole (PriLOSEC) 40 MG capsule TAKE 1 CAPSULE (40 MG TOTAL) BY MOUTH DAILY  , Starting Fri 12/16/2022, Normal      oxyCODONE-acetaminophen (PERCOCET)  mg per tablet Take 1 tablet by mouth 4 (four) times a day, Historical Med      pantoprazole (PROTONIX) 40 mg tablet Take 1 tablet (40 mg total) by mouth daily Take 1/2 hour prior to breakfast daily in a m   , Starting Wed 6/8/2022, Until Sat 7/30/2022, Normal      potassium chloride (K-DUR,KLOR-CON) 20 mEq tablet Take 20 mEq by mouth 2 (two) times a day, Historical Med      Potassium Chloride ER 20 MEQ TBCR TAKE 1 TABLET BY MOUTH TWICE A DAY AFTER A MEAL, Normal      pramipexole (MIRAPEX) 0 5 mg tablet Take 0 5 mg by mouth daily at bedtime, Historical Med      predniSONE 20 mg tablet Take 2 tablets (40 mg total) by mouth daily for 5 days, Starting Fri 12/30/2022, Until Wed 1/4/2023, Normal      pregabalin (LYRICA) 100 mg capsule TAKE 1 CAPSULE BY MOUTH TWICE A DAY AS DIRECTED FOR 30 DAYS, Historical Med       !! - Potential duplicate medications found  Please discuss with provider  No discharge procedures on file      PDMP Review       Value Time User    PDMP Reviewed  Yes 10/6/2022 12:12 PM Cyd Goodpasture, MD          ED Provider  Electronically Signed by           Rayna Membreno DO  01/02/23 0124

## 2023-01-03 ENCOUNTER — HOSPITAL ENCOUNTER (EMERGENCY)
Facility: HOSPITAL | Age: 48
Discharge: HOME | End: 2023-01-03
Attending: EMERGENCY MEDICINE

## 2023-01-03 VITALS
BODY MASS INDEX: 39.73 KG/M2 | SYSTOLIC BLOOD PRESSURE: 102 MMHG | HEART RATE: 84 BPM | OXYGEN SATURATION: 94 % | RESPIRATION RATE: 20 BRPM | TEMPERATURE: 98.4 F | WEIGHT: 217.2 LBS | DIASTOLIC BLOOD PRESSURE: 59 MMHG

## 2023-01-03 DIAGNOSIS — R20.2 PARESTHESIAS: Primary | ICD-10-CM

## 2023-01-03 DIAGNOSIS — E83.51 HYPOCALCEMIA: ICD-10-CM

## 2023-01-03 LAB
ATRIAL RATE: 83 BPM
CA-I BLD-SCNC: 0.97 MMOL/L (ref 1.12–1.32)
P AXIS: 46 DEGREES
PR INTERVAL: 158 MS
QRS AXIS: 25 DEGREES
QRSD INTERVAL: 74 MS
QT INTERVAL: 410 MS
QTC INTERVAL: 481 MS
T WAVE AXIS: 45 DEGREES
VENTRICULAR RATE: 83 BPM

## 2023-01-03 RX ORDER — CALCIUM GLUCONATE 20 MG/ML
2 INJECTION, SOLUTION INTRAVENOUS ONCE
Status: COMPLETED | OUTPATIENT
Start: 2023-01-03 | End: 2023-01-03

## 2023-01-03 RX ADMIN — CALCIUM GLUCONATE 2 G: 20 INJECTION, SOLUTION INTRAVENOUS at 22:09

## 2023-01-04 ENCOUNTER — HOSPITAL ENCOUNTER (EMERGENCY)
Facility: HOSPITAL | Age: 48
Discharge: HOME | End: 2023-01-04
Attending: EMERGENCY MEDICINE

## 2023-01-04 VITALS
RESPIRATION RATE: 24 BRPM | OXYGEN SATURATION: 100 % | SYSTOLIC BLOOD PRESSURE: 115 MMHG | DIASTOLIC BLOOD PRESSURE: 74 MMHG | TEMPERATURE: 97.1 F | HEART RATE: 88 BPM

## 2023-01-04 DIAGNOSIS — E03.9 ACQUIRED HYPOTHYROIDISM: ICD-10-CM

## 2023-01-04 DIAGNOSIS — R07.89 ATYPICAL CHEST PAIN: ICD-10-CM

## 2023-01-04 DIAGNOSIS — E83.51 HYPOCALCEMIA: Primary | ICD-10-CM

## 2023-01-04 LAB
ALBUMIN SERPL BCP-MCNC: 3.3 G/DL (ref 3.5–5)
ALP SERPL-CCNC: 138 U/L (ref 46–116)
ALT SERPL W P-5'-P-CCNC: 52 U/L (ref 12–78)
ANION GAP SERPL CALCULATED.3IONS-SCNC: 14 MMOL/L (ref 4–13)
ANION GAP SERPL CALCULATED.3IONS-SCNC: 16 MMOL/L (ref 4–13)
AST SERPL W P-5'-P-CCNC: 31 U/L (ref 5–45)
ATRIAL RATE: 93 BPM
BASOPHILS # BLD AUTO: 0.05 THOUSANDS/ÂΜL (ref 0–0.1)
BASOPHILS NFR BLD AUTO: 1 % (ref 0–1)
BILIRUB DIRECT SERPL-MCNC: 0.05 MG/DL (ref 0–0.2)
BILIRUB SERPL-MCNC: 0.23 MG/DL (ref 0.2–1)
BUN SERPL-MCNC: 23 MG/DL (ref 5–25)
BUN SERPL-MCNC: 24 MG/DL (ref 5–25)
CA-I BLD-SCNC: 0.95 MMOL/L (ref 1.12–1.32)
CA-I BLD-SCNC: 1.29 MMOL/L (ref 1.12–1.32)
CALCIUM SERPL-MCNC: 7.4 MG/DL (ref 8.3–10.1)
CALCIUM SERPL-MCNC: 9.3 MG/DL (ref 8.3–10.1)
CHLORIDE SERPL-SCNC: 98 MMOL/L (ref 96–108)
CHLORIDE SERPL-SCNC: 98 MMOL/L (ref 96–108)
CO2 SERPL-SCNC: 20 MMOL/L (ref 21–32)
CO2 SERPL-SCNC: 21 MMOL/L (ref 21–32)
CREAT SERPL-MCNC: 0.91 MG/DL (ref 0.6–1.3)
CREAT SERPL-MCNC: 0.98 MG/DL (ref 0.6–1.3)
EOSINOPHIL # BLD AUTO: 0.11 THOUSAND/ÂΜL (ref 0–0.61)
EOSINOPHIL NFR BLD AUTO: 1 % (ref 0–6)
ERYTHROCYTE [DISTWIDTH] IN BLOOD BY AUTOMATED COUNT: 16.8 % (ref 11.6–15.1)
GFR SERPL CREATININE-BSD FRML MDRD: 69 ML/MIN/1.73SQ M
GFR SERPL CREATININE-BSD FRML MDRD: 75 ML/MIN/1.73SQ M
GLUCOSE SERPL-MCNC: 151 MG/DL (ref 65–140)
GLUCOSE SERPL-MCNC: 188 MG/DL (ref 65–140)
HCT VFR BLD AUTO: 40.4 % (ref 34.8–46.1)
HGB BLD-MCNC: 13.2 G/DL (ref 11.5–15.4)
IMM GRANULOCYTES # BLD AUTO: 0.07 THOUSAND/UL (ref 0–0.2)
IMM GRANULOCYTES NFR BLD AUTO: 1 % (ref 0–2)
LYMPHOCYTES # BLD AUTO: 2.12 THOUSANDS/ÂΜL (ref 0.6–4.47)
LYMPHOCYTES NFR BLD AUTO: 25 % (ref 14–44)
MAGNESIUM SERPL-MCNC: 1.9 MG/DL (ref 1.6–2.6)
MCH RBC QN AUTO: 28.3 PG (ref 26.8–34.3)
MCHC RBC AUTO-ENTMCNC: 32.7 G/DL (ref 31.4–37.4)
MCV RBC AUTO: 87 FL (ref 82–98)
MONOCYTES # BLD AUTO: 0.48 THOUSAND/ÂΜL (ref 0.17–1.22)
MONOCYTES NFR BLD AUTO: 6 % (ref 4–12)
NEUTROPHILS # BLD AUTO: 5.53 THOUSANDS/ÂΜL (ref 1.85–7.62)
NEUTS SEG NFR BLD AUTO: 66 % (ref 43–75)
NRBC BLD AUTO-RTO: 0 /100 WBCS
P AXIS: 29 DEGREES
PHOSPHATE SERPL-MCNC: 4.2 MG/DL (ref 2.7–4.5)
PLATELET # BLD AUTO: 193 THOUSANDS/UL (ref 149–390)
PMV BLD AUTO: 10 FL (ref 8.9–12.7)
POTASSIUM SERPL-SCNC: 3.9 MMOL/L (ref 3.5–5.3)
POTASSIUM SERPL-SCNC: 4 MMOL/L (ref 3.5–5.3)
PR INTERVAL: 156 MS
PROT SERPL-MCNC: 7 G/DL (ref 6.4–8.4)
QRS AXIS: 30 DEGREES
QRSD INTERVAL: 74 MS
QT INTERVAL: 368 MS
QTC INTERVAL: 457 MS
RBC # BLD AUTO: 4.67 MILLION/UL (ref 3.81–5.12)
SODIUM SERPL-SCNC: 132 MMOL/L (ref 135–147)
SODIUM SERPL-SCNC: 135 MMOL/L (ref 135–147)
T WAVE AXIS: 49 DEGREES
TSH SERPL DL<=0.05 MIU/L-ACNC: 10.33 UIU/ML (ref 0.45–4.5)
VENTRICULAR RATE: 93 BPM
WBC # BLD AUTO: 8.36 THOUSAND/UL (ref 4.31–10.16)

## 2023-01-04 RX ORDER — CALCIUM GLUCONATE 20 MG/ML
1 INJECTION, SOLUTION INTRAVENOUS ONCE
Status: COMPLETED | OUTPATIENT
Start: 2023-01-04 | End: 2023-01-04

## 2023-01-04 RX ORDER — MAGNESIUM SULFATE HEPTAHYDRATE 40 MG/ML
2 INJECTION, SOLUTION INTRAVENOUS ONCE
Status: COMPLETED | OUTPATIENT
Start: 2023-01-04 | End: 2023-01-04

## 2023-01-04 RX ADMIN — CALCIUM GLUCONATE 1 G: 20 INJECTION, SOLUTION INTRAVENOUS at 17:32

## 2023-01-04 RX ADMIN — CALCIUM GLUCONATE 1 G: 20 INJECTION, SOLUTION INTRAVENOUS at 17:01

## 2023-01-04 RX ADMIN — MAGNESIUM SULFATE HEPTAHYDRATE 2 G: 40 INJECTION, SOLUTION INTRAVENOUS at 18:04

## 2023-01-04 NOTE — DISCHARGE INSTRUCTIONS
Continue your oral calcium meds  Follow up with your doctor  Keep hydrated  You can try a Miralax or Dulcolax for constipation and stop the bentyl if its not helping

## 2023-01-04 NOTE — ED PROVIDER NOTES
History  Chief Complaint   Patient presents with   • Numbness     States she started today with numbness and tingling all over and her Calcium is low, states her daughter is her caretaker at home because patient cannot care for herself      Pt  Well known to ER, has history of hypocalcemia requiring IV infusions of calcium gluconate, presents c/o numbness and tingling typical of her usual hypocalcemia  Has been here three times in the last 2 days for same  No fever, cough, vomiting  Pt  C/o constipation  History provided by:  Patient   used: No        Prior to Admission Medications   Prescriptions Last Dose Informant Patient Reported? Taking?    ALPRAZolam ER (XANAX XR) 2 MG 24 hr tablet   No No   Sig: Take 1 tablet (2 mg total) by mouth 2 (two) times a day   Calcium Carb-Cholecalciferol (Calcium 500+D) 500-400 MG-UNIT TABS   No No   Sig: Take 600 mg by mouth 3 times a day   Calcium Carbonate-Vitamin D3 600-400 MG-UNIT TABS   No No   Sig: TAKE 1 TABLET BY MOUTH 6 (SIX) TIMES A DAY   Lidocaine-Menthol 4-1 % PTCH   Yes No   Sig: if needed     Potassium Chloride ER 20 MEQ TBCR   No No   Sig: TAKE 1 TABLET BY MOUTH TWICE A DAY AFTER A MEAL   baclofen 10 mg tablet   Yes No   Sig: Take 10 mg by mouth 3 (three) times a day as needed   calcitriol (ROCALTROL) 0 25 mcg capsule   No No   Sig: TAKE 1 CAPSULE TWICE A DAY BY ORAL ROUTE AS DIRECTED    calcitriol (ROCALTROL) 0 5 MCG capsule   No No   Sig: Take 1 capsule (0 5 mcg total) by mouth daily   Patient taking differently: Take 0 5 mcg by mouth 2 (two) times a day   cholecalciferol (VITAMIN D3) 1,000 units tablet   No No   Sig: Take 1 tablet (1,000 Units total) by mouth daily   desvenlafaxine succinate (PRISTIQ) 50 mg 24 hr tablet   No No   Sig: Take 1 tablet (50 mg total) by mouth daily   dicyclomine (BENTYL) 20 mg tablet   No No   Sig: Take 1 tablet (20 mg total) by mouth 2 (two) times a day   diphenhydrAMINE (BENADRYL) 25 mg tablet   No No Sig: Take 1 tablet (25 mg total) by mouth every 6 (six) hours for 3 days   docusate sodium (COLACE) 100 mg capsule   No No   Sig: Take 1 capsule (100 mg total) by mouth 2 (two) times a day for 7 days   doxepin (SINEquan) 25 mg capsule   No No   Sig: TAKE 1 CAPSULE BY MOUTH DAILY AT BEDTIME   famotidine (PEPCID) 20 mg tablet   No No   Sig: Take 1 tablet (20 mg total) by mouth 2 (two) times a day for 7 days   fenofibrate 160 MG tablet   No No   Sig: Take 1 tablet (160 mg total) by mouth daily   ferrous sulfate 325 (65 Fe) mg tablet   Yes No   Sig: Take 325 mg by mouth Once Monday, wed, Friday-Last dose 4/1/22    ibuprofen (MOTRIN) 600 mg tablet   No No   Sig: Take 1 tablet (600 mg total) by mouth every 6 (six) hours as needed for mild pain or moderate pain   levothyroxine 150 mcg tablet   No No   Sig: TAKE 1 TABLET BY MOUTH EVERY DAY   Patient taking differently: 150 mcg Take 6 days a week   magnesium Oxide (MAG-OX) 400 mg TABS   No No   Sig: Take 1 tablet (400 mg total) by mouth 2 (two) times a day   omeprazole (PriLOSEC) 40 MG capsule   No No   Sig: TAKE 1 CAPSULE (40 MG TOTAL) BY MOUTH DAILY  oxyCODONE-acetaminophen (PERCOCET)  mg per tablet   Yes No   Sig: Take 1 tablet by mouth 4 (four) times a day   pantoprazole (PROTONIX) 40 mg tablet   No No   Sig: Take 1 tablet (40 mg total) by mouth daily Take 1/2 hour prior to breakfast daily in a   Cindy Forbesch    Patient not taking: No sig reported   potassium chloride (K-DUR,KLOR-CON) 20 mEq tablet   Yes No   Sig: Take 20 mEq by mouth 2 (two) times a day   pramipexole (MIRAPEX) 0 5 mg tablet   Yes No   Sig: Take 0 5 mg by mouth daily at bedtime   predniSONE 20 mg tablet   No No   Sig: Take 2 tablets (40 mg total) by mouth daily for 5 days   pregabalin (LYRICA) 100 mg capsule   Yes No   Sig: TAKE 1 CAPSULE BY MOUTH TWICE A DAY AS DIRECTED FOR 30 DAYS      Facility-Administered Medications: None       Past Medical History:   Diagnosis Date   • Abdominal pain    • Acid reflux • Acute renal failure (HCC)     multiple episodes   • Anemia    • Anxiety    • Anxiety 3/31/2021   • Arthritis    • Asthma    • Back pain    • Chronic pain    • DDD (degenerative disc disease), lumbar    • Depression    • Disease of thyroid gland     had surgery and now hypo   • DVT (deep venous thrombosis) ([facility] [address] ) 2009    s/p ankle fracture   • Headache    • History of transfusion    • Hypercalcemia    • Hyperlipidemia    • Hyperthyroidism    • Hypocalcemia     post op 2016   • Kidney stone    • Lightheadedness    • Migraine    • Obesity    • Palpitations    • Psychiatric disorder     anxiety depression   • PTSD (post-traumatic stress disorder) 10/28/2022   • Seizures ([facility] [address] )     petit mal x1  4 years ago- all tests were neg  • SOB (shortness of breath)    • Spondylolisthesis of lumbar region    • Treatment     spinal pain injections  last was 2016   • Wears glasses        Past Surgical History:   Procedure Laterality Date   • BACK SURGERY      L4-5, S1-fusion-plate/screws   • BREAST BIOPSY Left 2022    Stereo SLW - 12:00   •  SECTION      x5   • CYSTOCELE REPAIR  2017   • CYSTOSCOPY     • DISCOGRAM     • HYSTERECTOMY     • MAMMO STEREOTACTIC BREAST BIOPSY LEFT (ALL INC) Left 2022   • PARATHYROIDECTOMY     • NJ ANTERIOR COLPORRAPHY RPR CYSTOCELE W/CYSTO N/A 2017    Procedure: CYSTOCELE REPAIR;  Surgeon: Sahil Roberts MD;  Location: [address] [place] [address];  Service: Gynecology   • NJ ARTHRODESIS POSTERIOR INTERBODY [address] LUMBAR N/A 2016    Procedure: L4-S1 LUMBAR LAMINECTOMY/DECOMPRESSION;  INSTRUMENTED POSTEROLATERAL FUSION/ INTERBODY L5-S1; ALLOGRAFT AND AUTOGRAFT (IMPULSE) ;   Surgeon: Richie Moreira MD;  Location:  MAIN OR;  Service: Orthopedics   • NJ CYSTO BLADDER W/URETERAL CATHETERIZATION Bilateral 2018    Procedure: INSERTION URETERAL CATHETERS PREOP;  Surgeon: Trina Kumar MD;  Location: [address] [place] [address];  Service: Urology   • NJ [address] INCONTINENCE N/A 05/04/2017    Procedure: MID URETHRAL SLING;  Surgeon: Spencer Mendiola MD;  Location: [facility];  Service: Urology   • MA SUPRACERVICAL ABDL HYSTER W/WO RMVL TUBE OVARY N/A 12/07/2018    Procedure: SUPRACERVICAL HYSTERECTOMY;  Surgeon: Alisha Roberts MD;  Location: [address] [place] [address];  Service: Gynecology   • THYROIDECTOMY     • TONSILECTOMY AND ADNOIDECTOMY     • TONSILLECTOMY     • TUBAL LIGATION         Family History   Problem Relation Age of Onset   • Diabetes Mother    • Hypertension Mother    • Hyperlipidemia Father    • Arrhythmia Father    • Lung cancer Father    • Diabetes Father    • Colon cancer Maternal Grandfather    • Stomach cancer Paternal Grandmother    • Heart disease Paternal Aunt      I have reviewed and agree with the history as documented  E-Cigarette/Vaping   • E-Cigarette Use Never User      E-Cigarette/Vaping Substances   • Nicotine No    • THC No    • CBD No    • Flavoring No    • Other No    • Unknown No      Social History     Tobacco Use   • Smoking status: Every Day     Packs/day: 0 25     Years: 25 00     Pack years: 6 25     Types: Cigarettes   • Smokeless tobacco: Never   Vaping Use   • Vaping Use: Never used   Substance Use Topics   • Alcohol use: Not Currently   • Drug use: No       Review of Systems   Constitutional: Negative for fever  Respiratory: Negative for cough  Gastrointestinal: Positive for constipation  Negative for diarrhea and vomiting  Skin: Negative for rash  Neurological: Positive for numbness  All other systems reviewed and are negative  Physical Exam  Physical Exam  Vitals and nursing note reviewed  Constitutional:       General: She is not in acute distress  Appearance: She is not ill-appearing  HENT:      Head: Normocephalic and atraumatic  Cardiovascular:      Rate and Rhythm: Normal rate and regular rhythm  Heart sounds: Normal heart sounds     Pulmonary:      Effort: Pulmonary effort is normal  No respiratory distress  Breath sounds: Normal breath sounds  Abdominal:      General: Bowel sounds are normal       Palpations: Abdomen is soft  Tenderness: There is no abdominal tenderness  Musculoskeletal:         General: Normal range of motion  Cervical back: Normal range of motion and neck supple  Right lower leg: No edema  Left lower leg: No edema  Skin:     General: Skin is warm and dry  Neurological:      General: No focal deficit present  Mental Status: She is alert and oriented to person, place, and time  Psychiatric:         Mood and Affect: Mood normal          Behavior: Behavior normal          Vital Signs  ED Triage Vitals [01/03/23 2049]   Temperature Pulse Respirations Blood Pressure SpO2   98 4 °F (36 9 °C) (!) 108 (!) 24 112/60 97 %      Temp Source Heart Rate Source Patient Position - Orthostatic VS BP Location FiO2 (%)   Oral Monitor Lying Right arm --      Pain Score       --           Vitals:    01/03/23 2049 01/03/23 2315   BP: 112/60 102/59   Pulse: (!) 108 84   Patient Position - Orthostatic VS: Lying Lying         Visual Acuity      ED Medications  Medications   calcium gluconate 2 g in sodium chloride 0 9% 100 mL (premix) (0 g Intravenous Stopped 1/3/23 2309)       Diagnostic Studies  Results Reviewed     Procedure Component Value Units Date/Time    Calcium, ionized [[id number]]  (Abnormal) Collected: 01/03/23 2126    Lab Status: Final result Specimen: Blood from Arm, Right Updated: 01/03/23 2135     Calcium, Ionized 0 97 mmol/L                  No orders to display              Procedures  Procedures         ED Course                               SBIRT 22yo+    Flowsheet Row Most Recent Value   SBIRT (23 yo +)    In order to provide better care to our patients, we are screening all of our patients for alcohol and drug use  Would it be okay to ask you these screening questions? No Filed at: 01/03/2023 2120                    Medical Decision Making  Pt   Given her usual 2 gram IV calcium gluconate  Advised follow up outpt  For her complaint of constipation advised can try over the counter miralax or dulcolax  Hypocalcemia: self-limited or minor problem  Paresthesias: complicated acute illness or injury  Amount and/or Complexity of Data Reviewed  External Data Reviewed: labs and notes  Labs: ordered  Risk  Prescription drug management  Disposition  Final diagnoses:   Paresthesias   Hypocalcemia     Time reflects when diagnosis was documented in both MDM as applicable and the Disposition within this note     Time User Action Codes Description Comment    1/1/0235 05:44 PM Micth Every Add [E33 2] Paresthesias     1/6/7692 02:85 PM Mitch Every Add [Y87 37] Hypocalcemia       ED Disposition     ED Disposition   Discharge    Condition   Stable    Date/Time   Tue Yadiel 3, 2023 11:21 PM    Comment   [facility] discharge to home/self care  Follow-up Information     Follow up With Specialties Details Why Contact Lorne Donovan MD Internal Medicine  As needed [id number] [address]  [phone]            Patient's Medications   Discharge Prescriptions    No medications on file       No discharge procedures on file      PDMP Review       Value Time User    PDMP Reviewed  Yes 10/6/2022 12:12 PM Nila Riggins MD          ED Provider  Electronically Signed by           Reinier Yost MD  40/66/09 9472

## 2023-01-04 NOTE — ED PROVIDER NOTES
History  Chief Complaint   Patient presents with   • Chest Pain     Mid chest pains over past 25 min assoc with headache  Still having her normal tingling from her calcium problem     Patient is a 60-year-old female past medical history significant for current everyday smoking, hypothyroidism status post thyroid resection, chronic hypocalcemia following parathyroidectomy, chronic back pain, anxiety, depression, GERD, iron deficiency anemia, who presents for evaluation of generalized tingling, muscle spasms and chest pain  She states that her symptoms are consistent for when her calcium was low  Patient has been evaluated in the ED and has had calcium replacement 7 times since December 27, 2022  Despite aggressive repletion, patient continues to be hypocalcemic  Patient states she has been taking all of her home medications  She also states that she tried to contact her endocrinologist to get an appointment sooner, but was declined by office staff  Medical Problem  Location:  Generalized tingling in hands, cramping and chest discomfort  Severity:  Mild  Onset quality:  Gradual  Duration:  1 day  Timing:  Constant  Progression:  Worsening  Chronicity:  Recurrent  Context:  "This happens when my calcium gets low"  Relieved by:  Nothing  Worsened by:  Nothing  Ineffective treatments:  Oral calcium repletion    Associated symptoms: chest pain    Associated symptoms: no abdominal pain, no congestion, no cough, no diarrhea, no ear pain, no fatigue, no fever, no headaches, no loss of consciousness, no myalgias, no nausea, no rash, no rhinorrhea, no shortness of breath, no sore throat, no vomiting and no wheezing    Associated symptoms comment:  Tingling of hands and face  Chest pain:     Quality: aching and sharp      Severity:  Moderate    Onset quality:  Gradual    Duration:  1 day    Timing:  Constant    Progression:  Unchanged    Chronicity:  Recurrent      Prior to Admission Medications   Prescriptions Last Dose Informant Patient Reported? Taking?    ALPRAZolam ER (XANAX XR) 2 MG 24 hr tablet   No No   Sig: Take 1 tablet (2 mg total) by mouth 2 (two) times a day   Calcium Carb-Cholecalciferol (Calcium 500+D) 500-400 MG-UNIT TABS   No No   Sig: Take 600 mg by mouth 3 times a day   Calcium Carbonate-Vitamin D3 600-400 MG-UNIT TABS   No No   Sig: TAKE 1 TABLET BY MOUTH 6 (SIX) TIMES A DAY   Lidocaine-Menthol 4-1 % PTCH   Yes No   Sig: if needed     Potassium Chloride ER 20 MEQ TBCR   No No   Sig: TAKE 1 TABLET BY MOUTH TWICE A DAY AFTER A MEAL   baclofen 10 mg tablet   Yes No   Sig: Take 10 mg by mouth 3 (three) times a day as needed   calcitriol (ROCALTROL) 0 25 mcg capsule   No No   Sig: TAKE 1 CAPSULE TWICE A DAY BY ORAL ROUTE AS DIRECTED    calcitriol (ROCALTROL) 0 5 MCG capsule   No No   Sig: Take 1 capsule (0 5 mcg total) by mouth daily   Patient taking differently: Take 0 5 mcg by mouth 2 (two) times a day   cholecalciferol (VITAMIN D3) 1,000 units tablet   No No   Sig: Take 1 tablet (1,000 Units total) by mouth daily   desvenlafaxine succinate (PRISTIQ) 50 mg 24 hr tablet   No No   Sig: Take 1 tablet (50 mg total) by mouth daily   dicyclomine (BENTYL) 20 mg tablet   No No   Sig: Take 1 tablet (20 mg total) by mouth 2 (two) times a day   diphenhydrAMINE (BENADRYL) 25 mg tablet   No No   Sig: Take 1 tablet (25 mg total) by mouth every 6 (six) hours for 3 days   docusate sodium (COLACE) 100 mg capsule   No No   Sig: Take 1 capsule (100 mg total) by mouth 2 (two) times a day for 7 days   doxepin (SINEquan) 25 mg capsule   No No   Sig: TAKE 1 CAPSULE BY MOUTH DAILY AT BEDTIME   famotidine (PEPCID) 20 mg tablet   No No   Sig: Take 1 tablet (20 mg total) by mouth 2 (two) times a day for 7 days   fenofibrate 160 MG tablet   No No   Sig: Take 1 tablet (160 mg total) by mouth daily   ferrous sulfate 325 (65 Fe) mg tablet   Yes No   Sig: Take 325 mg by mouth Once Monday, wed, Friday-Last dose 4/1/22    ibuprofen (MOTRIN) 600 mg tablet   No No   Sig: Take 1 tablet (600 mg total) by mouth every 6 (six) hours as needed for mild pain or moderate pain   levothyroxine 150 mcg tablet   No No   Sig: TAKE 1 TABLET BY MOUTH EVERY DAY   Patient taking differently: 150 mcg Take 6 days a week   magnesium Oxide (MAG-OX) 400 mg TABS   No No   Sig: Take 1 tablet (400 mg total) by mouth 2 (two) times a day   omeprazole (PriLOSEC) 40 MG capsule   No No   Sig: TAKE 1 CAPSULE (40 MG TOTAL) BY MOUTH DAILY  oxyCODONE-acetaminophen (PERCOCET)  mg per tablet   Yes No   Sig: Take 1 tablet by mouth 4 (four) times a day   pantoprazole (PROTONIX) 40 mg tablet   No No   Sig: Take 1 tablet (40 mg total) by mouth daily Take 1/2 hour prior to breakfast daily in a   [facility]    Patient not taking: No sig reported   potassium chloride (K-DUR,KLOR-CON) 20 mEq tablet   Yes No   Sig: Take 20 mEq by mouth 2 (two) times a day   pramipexole (MIRAPEX) 0 5 mg tablet   Yes No   Sig: Take 0 5 mg by mouth daily at bedtime   predniSONE 20 mg tablet   No No   Sig: Take 2 tablets (40 mg total) by mouth daily for 5 days   pregabalin (LYRICA) 100 mg capsule   Yes No   Sig: TAKE 1 CAPSULE BY MOUTH TWICE A DAY AS DIRECTED FOR 30 DAYS      Facility-Administered Medications: None       Past Medical History:   Diagnosis Date   • Abdominal pain    • Acid reflux    • Acute renal failure (HCC)     multiple episodes   • Anemia    • Anxiety    • Anxiety 3/31/2021   • Arthritis    • Asthma    • Back pain    • Chronic pain    • DDD (degenerative disc disease), lumbar    • Depression    • Disease of thyroid gland     had surgery and now hypo   • DVT (deep venous thrombosis) ([facility] [place] [address] ) 2009    s/p ankle fracture   • Headache    • History of transfusion    • Hypercalcemia    • Hyperlipidemia    • Hyperthyroidism    • Hypocalcemia     post op 2016   • Kidney stone    • Lightheadedness    • Migraine    • Obesity    • Palpitations    • Psychiatric disorder     anxiety depression   • PTSD (post-traumatic stress disorder) 10/28/2022   • Seizures ([address] )     petit mal x1  4 years ago- all tests were neg  • SOB (shortness of breath)    • Spondylolisthesis of lumbar region    • Treatment     spinal pain injections  last was 2016   • Wears glasses        Past Surgical History:   Procedure Laterality Date   • BACK SURGERY      L4-5, S1-fusion-plate/screws   • BREAST BIOPSY Left 2022    Stereo SLW - 12:00   •  SECTION      x5   • CYSTOCELE REPAIR  2017   • CYSTOSCOPY     • DISCOGRAM     • HYSTERECTOMY     • MAMMO STEREOTACTIC BREAST BIOPSY LEFT (ALL INC) Left 2022   • PARATHYROIDECTOMY     • ID ANTERIOR COLPORRAPHY RPR CYSTOCELE W/CYSTO N/A 2017    Procedure: CYSTOCELE REPAIR;  Surgeon: Jose Lenz MD;  Location: [address] [place] [address];  Service: Gynecology   • ID ARTHRODESIS POSTERIOR INTERBODY [address] LUMBAR N/A 2016    Procedure: L4-S1 LUMBAR LAMINECTOMY/DECOMPRESSION;  INSTRUMENTED POSTEROLATERAL FUSION/ INTERBODY L5-S1; ALLOGRAFT AND AUTOGRAFT (IMPULSE) ;   Surgeon: Walter Diaz MD;  Location:  MAIN OR;  Service: Orthopedics   • ID CYSTO BLADDER W/URETERAL CATHETERIZATION Bilateral 2018    Procedure: INSERTION URETERAL CATHETERS PREOP;  Surgeon: Lm Fair MD;  Location: [address] [place] [address];  Service: Urology   • ID SLING OPERATION STRESS INCONTINENCE N/A 2017    Procedure: MID URETHRAL SLING;  Surgeon: Anatoliy Zayas MD;  Location: [address] [place] [address];  Service: Urology   • ID SUPRACERVICAL ABDL HYSTER W/WO RMVL TUBE OVARY N/A 2018    Procedure: SUPRACERVICAL HYSTERECTOMY;  Surgeon: Jose Lenz MD;  Location: [facility];  Service: Gynecology   • THYROIDECTOMY     • TONSILECTOMY AND ADNOIDECTOMY     • TONSILLECTOMY     • TUBAL LIGATION         Family History   Problem Relation Age of Onset   • Diabetes Mother    • Hypertension Mother    • Hyperlipidemia Father    • Arrhythmia Father    • Lung cancer Father    • Diabetes Father    • Colon cancer Maternal Grandfather    • Stomach cancer Paternal Grandmother    • Heart disease Paternal Aunt      I have reviewed and agree with the history as documented  E-Cigarette/Vaping   • E-Cigarette Use Never User      E-Cigarette/Vaping Substances   • Nicotine No    • THC No    • CBD No    • Flavoring No    • Other No    • Unknown No      Social History     Tobacco Use   • Smoking status: Every Day     Packs/day: 0 25     Years: 25 00     Pack years: 6 25     Types: Cigarettes   • Smokeless tobacco: Never   Vaping Use   • Vaping Use: Never used   Substance Use Topics   • Alcohol use: Not Currently   • Drug use: No       Review of Systems   Constitutional: Negative for chills, fatigue and fever  HENT: Negative for congestion, ear pain, rhinorrhea and sore throat  Eyes: Negative for pain and visual disturbance  Respiratory: Negative for cough, shortness of breath and wheezing  Cardiovascular: Positive for chest pain  Negative for palpitations  Gastrointestinal: Negative for abdominal pain, diarrhea, nausea and vomiting  Endocrine: Negative  Genitourinary: Negative for dysuria and hematuria  Musculoskeletal: Negative for arthralgias, back pain and myalgias  Muscle spasm   Skin: Negative for color change and rash  Allergic/Immunologic: Negative  Neurological: Positive for numbness  Negative for seizures, loss of consciousness, syncope and headaches  Tingling of face and hands   Hematological: Negative  Psychiatric/Behavioral: Negative  All other systems reviewed and are negative  Physical Exam  Physical Exam  Vitals and nursing note reviewed  Constitutional:       General: She is not in acute distress  Appearance: She is well-developed  She is not ill-appearing, toxic-appearing or diaphoretic  HENT:      Head: Normocephalic and atraumatic        Nose: Nose normal       Mouth/Throat:      Mouth: Mucous membranes are moist    Eyes:      Conjunctiva/sclera: Conjunctivae normal  Neck:      Vascular: No JVD  Cardiovascular:      Rate and Rhythm: Normal rate and regular rhythm  Pulses:           Carotid pulses are 2+ on the right side and 2+ on the left side  Radial pulses are 2+ on the right side and 2+ on the left side  Dorsalis pedis pulses are 2+ on the right side and 2+ on the left side  Posterior tibial pulses are 2+ on the right side and 2+ on the left side  Heart sounds: Normal heart sounds  No murmur heard  Pulmonary:      Effort: Pulmonary effort is normal  No tachypnea, accessory muscle usage or respiratory distress  Breath sounds: Normal breath sounds  Abdominal:      Palpations: Abdomen is soft  Tenderness: There is no abdominal tenderness  Musculoskeletal:         General: No swelling  Cervical back: Normal range of motion and neck supple  Skin:     General: Skin is warm and dry  Capillary Refill: Capillary refill takes less than 2 seconds  Neurological:      General: No focal deficit present  Mental Status: She is alert and oriented to person, place, and time  Cranial Nerves: No cranial nerve deficit     Psychiatric:         Mood and Affect: Mood normal          Vital Signs  ED Triage Vitals [01/04/23 1403]   Temperature Pulse Respirations Blood Pressure SpO2   (!) 97 1 °F (36 2 °C) 88 (!) 24 115/74 100 %      Temp Source Heart Rate Source Patient Position - Orthostatic VS BP Location FiO2 (%)   Tympanic Monitor Sitting Right arm --      Pain Score       6           Vitals:    01/04/23 1403   BP: 115/74   Pulse: 88   Patient Position - Orthostatic VS: Sitting         Visual Acuity      ED Medications  Medications   magnesium sulfate 2 g/50 mL IVPB (premix) 2 g (2 g Intravenous New Bag 1/4/23 1804)   calcium gluconate 1 g in sodium chloride 0 9% 50 mL (premix) (0 g Intravenous Stopped 1/4/23 1731)     Followed by   calcium gluconate 1 g in sodium chloride 0 9% 50 mL (premix) (1 g Intravenous New Bag 1/4/23 1732)       Diagnostic Studies  Results Reviewed     Procedure Component Value Units Date/Time    Calcium, ionized [[id number]]  (Normal) Collected: 01/04/23 1805    Lab Status: Final result Specimen: Blood from Line, Venous Updated: 01/04/23 1811     Calcium, Ionized 1 29 mmol/L     Basic metabolic panel [[id number]] Collected: 01/04/23 1805    Lab Status: In process Specimen: Blood from Line, Venous Updated: 01/04/23 1809    Calcium, ionized [[id number]]  (Abnormal) Collected: 01/04/23 1659    Lab Status: Final result Specimen: Blood from Line, Venous Updated: 01/04/23 1707     Calcium, Ionized 0 95 mmol/L     Hepatic function panel [[id number]]  (Abnormal) Collected: 01/04/23 1509    Lab Status: Final result Specimen: Blood from Line, Venous Updated: 01/04/23 1607     Total Bilirubin 0 23 mg/dL      Bilirubin, Direct 0 05 mg/dL      Alkaline Phosphatase 138 U/L      AST 31 U/L      ALT 52 U/L      Total Protein 7 0 g/dL      Albumin 3 3 g/dL     TSH [[id number]]  (Abnormal) Collected: 01/04/23 1509    Lab Status: Final result Specimen: Blood from Line, Venous Updated: 01/04/23 1604     TSH 3RD GENERATON 10 334 uIU/mL     Narrative:      Patients undergoing fluorescein dye angiography may retain small amounts of fluorescein in the body for 48-72 hours post procedure  Samples containing fluorescein can produce falsely depressed TSH values  If the patient had this procedure,a specimen should be resubmitted post fluorescein clearance        Phosphorus [[id number]]  (Normal) Collected: 01/04/23 1509    Lab Status: Final result Specimen: Blood from Line, Venous Updated: 01/04/23 1604     Phosphorus 4 2 mg/dL     Magnesium [[id number]]  (Normal) Collected: 01/04/23 1509    Lab Status: Final result Specimen: Blood from Line, Venous Updated: 01/04/23 1604     Magnesium 1 9 mg/dL     Basic metabolic panel [[id number]]  (Abnormal) Collected: 01/04/23 1509    Lab Status: Final result Specimen: Blood from Line, Venous Updated: 01/04/23 1539     Sodium 135 mmol/L      Potassium 4 0 mmol/L      Chloride 98 mmol/L      CO2 21 mmol/L      ANION GAP 16 mmol/L      BUN 24 mg/dL      Creatinine 0 98 mg/dL      Glucose 188 mg/dL      Calcium 7 4 mg/dL      eGFR 69 ml/min/1 73sq m     Narrative:      [place] guidelines for Chronic Kidney Disease (CKD):   •  Stage 1 with normal or high GFR (GFR > 90 mL/min/1 73 square meters)  •  Stage 2 Mild CKD (GFR = 60-89 mL/min/1 73 square meters)  •  Stage 3A Moderate CKD (GFR = 45-59 mL/min/1 73 square meters)  •  Stage 3B Moderate CKD (GFR = 30-44 mL/min/1 73 square meters)  •  Stage 4 Severe CKD (GFR = 15-29 mL/min/1 73 square meters)  •  Stage 5 End Stage CKD (GFR <15 mL/min/1 73 square meters)  Note: GFR calculation is accurate only with a steady state creatinine    CBC and differential [[id number]]  (Abnormal) Collected: 01/04/23 1509    Lab Status: Final result Specimen: Blood from Line, Venous Updated: 01/04/23 1521     WBC 8 36 Thousand/uL      RBC 4 67 Million/uL      Hemoglobin 13 2 g/dL      Hematocrit 40 4 %      MCV 87 fL      MCH 28 3 pg      MCHC 32 7 g/dL      RDW 16 8 %      MPV 10 0 fL      Platelets 950 Thousands/uL      nRBC 0 /100 WBCs      Neutrophils Relative 66 %      Immat GRANS % 1 %      Lymphocytes Relative 25 %      Monocytes Relative 6 %      Eosinophils Relative 1 %      Basophils Relative 1 %      Neutrophils Absolute 5 53 Thousands/µL      Immature Grans Absolute 0 07 Thousand/uL      Lymphocytes Absolute 2 12 Thousands/µL      Monocytes Absolute 0 48 Thousand/µL      Eosinophils Absolute 0 11 Thousand/µL      Basophils Absolute 0 05 Thousands/µL     Vitamin D Panel [[id number]] Collected: 01/04/23 1509    Lab Status:  In process Specimen: Line, Venous Updated: 01/04/23 1518                 No orders to display              Procedures  ECG 12 Lead Documentation Only    Date/Time: 1/4/2023 5:36 PM  Performed by: Nicolette Henley PA-C  Authorized by: Annel Duncan PA-C     Patient location:  ED  Previous ECG:     Previous ECG:  Compared to current    Similarity:  No change  Interpretation:     Interpretation: normal    Rate:     ECG rate assessment: normal    Rhythm:     Rhythm: sinus rhythm               ED Course  ED Course as of 01/04/23 1818 Wed Jan 04, 2023   1546 Anion Gap(!): 16   1546 Glucose, Random(!): 188   1546 Calcium(!): 7 4   1707 TSH [address] [place] [address](!): 10 334                                             Medical Decision Making  45-year-old female with multiple episodes of hypocalcemia since 27 December requiring IV calcium infusion  Despite aggressive replacement continues to present with symptomatic hypocalcemia confirmed by laboratory evaluation ionized calcium  Patient given 2 g of IV calcium today-post infusion ionized calcium corrected appropriately  Patient recommended for admission to the hospital secondary to recurrent electrolyte derangements that are resistant to treatment  At this time patient refuses and will sign out AMA  She states that she will return later for admission and evaluation by endocrinology  Acquired hypothyroidism: chronic illness or injury with exacerbation, progression, or side effects of treatment     Details: Patient status post thyroidectomy  Previous TSH at 4, today at 10  Patient will need her thyroid medication adjusted  Recommend endocrine evaluation  Atypical chest pain: chronic illness or injury     Details: EKG without abnormalities  Pain resolved after calcium administration  Hypocalcemia: chronic illness or injury with exacerbation, progression, or side effects of treatment  Amount and/or Complexity of Data Reviewed  External Data Reviewed: labs and notes  Labs: ordered  Decision-making details documented in ED Course  ECG/medicine tests: ordered and independent interpretation performed  Decision-making details documented in ED Course        Risk  Prescription drug management  Decision regarding hospitalization  Disposition  Final diagnoses:   Hypocalcemia   Atypical chest pain   Acquired hypothyroidism     Time reflects when diagnosis was documented in both MDM as applicable and the Disposition within this note     Time User Action Codes Description Comment    1/4/2023  5:07 PM Shruti Fleming Add [E83 51] Hypocalcemia     1/4/2023  6:13 PM Shruti Fleming Add [R07 89] Atypical chest pain     1/4/2023  6:14 PM Chelly Higgins Add [E03 9] Acquired hypothyroidism       ED Disposition     None      Follow-up Information    None         Patient's Medications   Discharge Prescriptions    No medications on file       No discharge procedures on file      PDMP Review       Value Time User    PDMP Reviewed  Yes 10/6/2022 12:12 PM Juan Anna MD          ED Provider  Electronically Signed by           Rod Parmar PA-C  01/04/23 5415

## 2023-01-05 ENCOUNTER — TELEMEDICINE (OUTPATIENT)
Dept: BEHAVIORAL/MENTAL HEALTH CLINIC | Facility: CLINIC | Age: 48
End: 2023-01-05

## 2023-01-05 ENCOUNTER — HOSPITAL ENCOUNTER (INPATIENT)
Facility: HOSPITAL | Age: 48
LOS: 3 days | Discharge: HOME | End: 2023-01-10
Attending: EMERGENCY MEDICINE | Admitting: STUDENT IN AN ORGANIZED HEALTH CARE EDUCATION/TRAINING PROGRAM

## 2023-01-05 DIAGNOSIS — E55.9 VITAMIN D DEFICIENCY: ICD-10-CM

## 2023-01-05 DIAGNOSIS — E83.51 HYPOCALCEMIA: Primary | ICD-10-CM

## 2023-01-05 DIAGNOSIS — E20.8 OTHER HYPOPARATHYROIDISM (HCC): ICD-10-CM

## 2023-01-05 DIAGNOSIS — R73.9 HYPERGLYCEMIA: ICD-10-CM

## 2023-01-05 DIAGNOSIS — F33.1 MODERATE EPISODE OF RECURRENT MAJOR DEPRESSIVE DISORDER (HCC): Primary | ICD-10-CM

## 2023-01-05 DIAGNOSIS — F41.9 ANXIETY: ICD-10-CM

## 2023-01-05 DIAGNOSIS — F41.1 GENERALIZED ANXIETY DISORDER WITH PANIC ATTACKS: ICD-10-CM

## 2023-01-05 DIAGNOSIS — F17.210 NICOTINE DEPENDENCE, CIGARETTES, UNCOMPLICATED: ICD-10-CM

## 2023-01-05 DIAGNOSIS — F41.0 GENERALIZED ANXIETY DISORDER WITH PANIC ATTACKS: ICD-10-CM

## 2023-01-05 DIAGNOSIS — F45.9 SOMATOFORM DISORDER: ICD-10-CM

## 2023-01-05 LAB
25(OH)D3 SERPL-MCNC: 11.9 NG/ML (ref 30–100)
ALBUMIN SERPL BCP-MCNC: 3.6 G/DL (ref 3.5–5)
ANION GAP SERPL CALCULATED.3IONS-SCNC: 17 MMOL/L (ref 4–13)
BASOPHILS # BLD AUTO: 0.04 THOUSANDS/ÂΜL (ref 0–0.1)
BASOPHILS NFR BLD AUTO: 1 % (ref 0–1)
BETA-HYDROXYBUTYRATE: 0.1 MMOL/L
BUN SERPL-MCNC: 26 MG/DL (ref 5–25)
CA-I BLD-SCNC: 0.95 MMOL/L (ref 1.12–1.32)
CA-I BLD-SCNC: 0.99 MMOL/L (ref 1.12–1.32)
CA-I BLD-SCNC: 1.1 MMOL/L (ref 1.12–1.32)
CALCIUM PRE 500 MG CA PO UR-SCNC: >37.5 MG/DL
CALCIUM SERPL-MCNC: 7.4 MG/DL (ref 8.3–10.1)
CALCIUM SERPL-MCNC: 8.1 MG/DL (ref 8.3–10.1)
CHLORIDE SERPL-SCNC: 100 MMOL/L (ref 96–108)
CO2 SERPL-SCNC: 18 MMOL/L (ref 21–32)
CREAT SERPL-MCNC: 1.15 MG/DL (ref 0.6–1.3)
EOSINOPHIL # BLD AUTO: 0.08 THOUSAND/ÂΜL (ref 0–0.61)
EOSINOPHIL NFR BLD AUTO: 1 % (ref 0–6)
ERYTHROCYTE [DISTWIDTH] IN BLOOD BY AUTOMATED COUNT: 16.8 % (ref 11.6–15.1)
GFR SERPL CREATININE-BSD FRML MDRD: 57 ML/MIN/1.73SQ M
GLUCOSE P FAST SERPL-MCNC: 196 MG/DL (ref 65–99)
GLUCOSE SERPL-MCNC: 196 MG/DL (ref 65–140)
HCT VFR BLD AUTO: 37.8 % (ref 34.8–46.1)
HGB BLD-MCNC: 12.5 G/DL (ref 11.5–15.4)
IMM GRANULOCYTES # BLD AUTO: 0.07 THOUSAND/UL (ref 0–0.2)
IMM GRANULOCYTES NFR BLD AUTO: 1 % (ref 0–2)
LACTATE SERPL-SCNC: 0.8 MMOL/L (ref 0.5–2)
LYMPHOCYTES # BLD AUTO: 2.12 THOUSANDS/ÂΜL (ref 0.6–4.47)
LYMPHOCYTES NFR BLD AUTO: 28 % (ref 14–44)
MAGNESIUM SERPL-MCNC: 2.3 MG/DL (ref 1.6–2.6)
MCH RBC QN AUTO: 29.1 PG (ref 26.8–34.3)
MCHC RBC AUTO-ENTMCNC: 33.1 G/DL (ref 31.4–37.4)
MCV RBC AUTO: 88 FL (ref 82–98)
MONOCYTES # BLD AUTO: 0.49 THOUSAND/ÂΜL (ref 0.17–1.22)
MONOCYTES NFR BLD AUTO: 6 % (ref 4–12)
NEUTROPHILS # BLD AUTO: 4.91 THOUSANDS/ÂΜL (ref 1.85–7.62)
NEUTS SEG NFR BLD AUTO: 63 % (ref 43–75)
NRBC BLD AUTO-RTO: 0 /100 WBCS
PLATELET # BLD AUTO: 172 THOUSANDS/UL (ref 149–390)
PMV BLD AUTO: 9.8 FL (ref 8.9–12.7)
POTASSIUM SERPL-SCNC: 3.9 MMOL/L (ref 3.5–5.3)
RBC # BLD AUTO: 4.29 MILLION/UL (ref 3.81–5.12)
SODIUM SERPL-SCNC: 135 MMOL/L (ref 135–147)
T4 FREE SERPL-MCNC: 1.03 NG/DL (ref 0.76–1.46)
WBC # BLD AUTO: 7.71 THOUSAND/UL (ref 4.31–10.16)

## 2023-01-05 RX ORDER — PRAMIPEXOLE DIHYDROCHLORIDE 0.5 MG/1
0.5 TABLET ORAL
Status: DISCONTINUED | OUTPATIENT
Start: 2023-01-05 | End: 2023-01-10 | Stop reason: HOSPADM

## 2023-01-05 RX ORDER — CALCIUM GLUCONATE 20 MG/ML
1 INJECTION, SOLUTION INTRAVENOUS DAILY PRN
Status: DISCONTINUED | OUTPATIENT
Start: 2023-01-05 | End: 2023-01-06

## 2023-01-05 RX ORDER — CALCITRIOL 0.25 UG/1
0.5 CAPSULE, LIQUID FILLED ORAL 2 TIMES DAILY WITH MEALS
Status: DISCONTINUED | OUTPATIENT
Start: 2023-01-06 | End: 2023-01-10 | Stop reason: HOSPADM

## 2023-01-05 RX ORDER — PANTOPRAZOLE SODIUM 40 MG/1
40 TABLET, DELAYED RELEASE ORAL
Status: DISCONTINUED | OUTPATIENT
Start: 2023-01-05 | End: 2023-01-10

## 2023-01-05 RX ORDER — CALCIUM CARBONATE 1250 MG/5ML
1250 SUSPENSION ORAL
Status: DISCONTINUED | OUTPATIENT
Start: 2023-01-05 | End: 2023-01-06

## 2023-01-05 RX ORDER — CALCITRIOL 0.25 UG/1
0.5 CAPSULE, LIQUID FILLED ORAL DAILY
Status: DISCONTINUED | OUTPATIENT
Start: 2023-01-05 | End: 2023-01-05

## 2023-01-05 RX ORDER — CALCIUM GLUCONATE 20 MG/ML
2 INJECTION, SOLUTION INTRAVENOUS ONCE
Status: COMPLETED | OUTPATIENT
Start: 2023-01-05 | End: 2023-01-05

## 2023-01-05 RX ORDER — FERROUS SULFATE 325(65) MG
325 TABLET ORAL
Status: DISCONTINUED | OUTPATIENT
Start: 2023-01-05 | End: 2023-01-07

## 2023-01-05 RX ORDER — CALCITRIOL 0.25 UG/1
0.5 CAPSULE, LIQUID FILLED ORAL 2 TIMES DAILY WITH MEALS
Status: DISCONTINUED | OUTPATIENT
Start: 2023-01-05 | End: 2023-01-05

## 2023-01-05 RX ORDER — FENOFIBRATE 48 MG/1
160 TABLET, COATED ORAL DAILY
Status: DISCONTINUED | OUTPATIENT
Start: 2023-01-05 | End: 2023-01-10 | Stop reason: HOSPADM

## 2023-01-05 RX ORDER — POTASSIUM CHLORIDE 20 MEQ/1
20 TABLET, EXTENDED RELEASE ORAL 2 TIMES DAILY
Status: DISCONTINUED | OUTPATIENT
Start: 2023-01-05 | End: 2023-01-10 | Stop reason: HOSPADM

## 2023-01-05 RX ORDER — HEPARIN SODIUM 5000 [USP'U]/ML
5000 INJECTION, SOLUTION INTRAVENOUS; SUBCUTANEOUS EVERY 8 HOURS SCHEDULED
Status: DISCONTINUED | OUTPATIENT
Start: 2023-01-05 | End: 2023-01-10 | Stop reason: HOSPADM

## 2023-01-05 RX ORDER — BACLOFEN 10 MG/1
10 TABLET ORAL 3 TIMES DAILY PRN
Status: DISCONTINUED | OUTPATIENT
Start: 2023-01-05 | End: 2023-01-10 | Stop reason: HOSPADM

## 2023-01-05 RX ORDER — FENOFIBRATE 145 MG/1
145 TABLET, COATED ORAL DAILY
Status: CANCELLED | OUTPATIENT
Start: 2023-01-05

## 2023-01-05 RX ORDER — DESVENLAFAXINE 50 MG/1
50 TABLET, EXTENDED RELEASE ORAL DAILY
Status: DISCONTINUED | OUTPATIENT
Start: 2023-01-05 | End: 2023-01-10 | Stop reason: HOSPADM

## 2023-01-05 RX ORDER — ALPRAZOLAM 0.5 MG/1
2 TABLET ORAL 2 TIMES DAILY PRN
Status: DISCONTINUED | OUTPATIENT
Start: 2023-01-05 | End: 2023-01-08

## 2023-01-05 RX ORDER — LEVOTHYROXINE SODIUM 0.15 MG/1
150 TABLET ORAL
Status: DISCONTINUED | OUTPATIENT
Start: 2023-01-05 | End: 2023-01-05

## 2023-01-05 RX ORDER — ERGOCALCIFEROL 1.25 MG/1
50000 CAPSULE ORAL WEEKLY
Status: DISCONTINUED | OUTPATIENT
Start: 2023-01-05 | End: 2023-01-10 | Stop reason: HOSPADM

## 2023-01-05 RX ORDER — ALPRAZOLAM 0.5 MG/1
2 TABLET ORAL ONCE
Status: COMPLETED | OUTPATIENT
Start: 2023-01-05 | End: 2023-01-05

## 2023-01-05 RX ORDER — LANOLIN ALCOHOL/MO/W.PET/CERES
1 CREAM (GRAM) TOPICAL 2 TIMES DAILY WITH MEALS
Status: DISCONTINUED | OUTPATIENT
Start: 2023-01-05 | End: 2023-01-05

## 2023-01-05 RX ORDER — CALCIUM GLUCONATE 20 MG/ML
1 INJECTION, SOLUTION INTRAVENOUS ONCE
Status: COMPLETED | OUTPATIENT
Start: 2023-01-05 | End: 2023-01-05

## 2023-01-05 RX ORDER — LIDOCAINE 50 MG/G
1 PATCH TOPICAL DAILY
Status: DISCONTINUED | OUTPATIENT
Start: 2023-01-05 | End: 2023-01-10 | Stop reason: HOSPADM

## 2023-01-05 RX ORDER — ALPRAZOLAM 0.5 MG/1
1 TABLET ORAL ONCE
Status: DISCONTINUED | OUTPATIENT
Start: 2023-01-05 | End: 2023-01-05

## 2023-01-05 RX ORDER — DOCUSATE SODIUM 100 MG/1
100 CAPSULE, LIQUID FILLED ORAL 2 TIMES DAILY
Status: DISCONTINUED | OUTPATIENT
Start: 2023-01-05 | End: 2023-01-10 | Stop reason: HOSPADM

## 2023-01-05 RX ADMIN — ERGOCALCIFEROL 50000 UNITS: 1.25 CAPSULE ORAL at 09:21

## 2023-01-05 RX ADMIN — MAGNESIUM OXIDE TAB 400 MG (241.3 MG ELEMENTAL MG) 400 MG: 400 (241.3 MG) TAB at 11:03

## 2023-01-05 RX ADMIN — POTASSIUM CHLORIDE 20 MEQ: 1500 TABLET, EXTENDED RELEASE ORAL at 21:58

## 2023-01-05 RX ADMIN — ALPRAZOLAM 2 MG: 0.5 TABLET ORAL at 09:50

## 2023-01-05 RX ADMIN — MAGNESIUM OXIDE TAB 400 MG (241.3 MG ELEMENTAL MG) 400 MG: 400 (241.3 MG) TAB at 11:40

## 2023-01-05 RX ADMIN — DOCUSATE SODIUM 100 MG: 100 CAPSULE, LIQUID FILLED ORAL at 17:15

## 2023-01-05 RX ADMIN — ALPRAZOLAM 2 MG: 0.5 TABLET ORAL at 03:31

## 2023-01-05 RX ADMIN — CALCIUM GLUCONATE 1 G: 20 INJECTION, SOLUTION INTRAVENOUS at 23:34

## 2023-01-05 RX ADMIN — DOCUSATE SODIUM 100 MG: 100 CAPSULE, LIQUID FILLED ORAL at 09:20

## 2023-01-05 RX ADMIN — CALCITRIOL 0.5 MCG: 0.25 CAPSULE, LIQUID FILLED ORAL at 16:44

## 2023-01-05 RX ADMIN — CALCIUM GLUCONATE 2 G: 20 INJECTION, SOLUTION INTRAVENOUS at 01:37

## 2023-01-05 RX ADMIN — MAGNESIUM OXIDE TAB 400 MG (241.3 MG ELEMENTAL MG) 400 MG: 400 (241.3 MG) TAB at 21:59

## 2023-01-05 RX ADMIN — POTASSIUM CHLORIDE 20 MEQ: 1500 TABLET, EXTENDED RELEASE ORAL at 11:03

## 2023-01-05 RX ADMIN — PANTOPRAZOLE SODIUM 40 MG: 40 TABLET, DELAYED RELEASE ORAL at 06:10

## 2023-01-05 RX ADMIN — CALCIUM GLUCONATE 1 G: 20 INJECTION, SOLUTION INTRAVENOUS at 09:22

## 2023-01-05 RX ADMIN — LIDOCAINE 5% 1 PATCH: 700 PATCH TOPICAL at 13:30

## 2023-01-05 RX ADMIN — PRAMIPEXOLE DIHYDROCHLORIDE 0.5 MG: 0.5 TABLET ORAL at 21:59

## 2023-01-05 RX ADMIN — DESVENLAFAXINE 50 MG: 50 TABLET, FILM COATED, EXTENDED RELEASE ORAL at 22:00

## 2023-01-05 RX ADMIN — LEVOTHYROXINE SODIUM 150 MCG: 150 TABLET ORAL at 06:09

## 2023-01-05 RX ADMIN — CALCIUM CARBONATE 1250 MG: 1250 SUSPENSION ORAL at 17:13

## 2023-01-05 RX ADMIN — FENOFIBRATE 168 MG: 48 TABLET, FILM COATED ORAL at 09:20

## 2023-01-05 NOTE — ASSESSMENT & PLAN NOTE
History of thyroidectomy, parathyroidectomy  · TSH noted to be 10 334  · Currently on levothyroxine 150mcg  · Check free T4

## 2023-01-05 NOTE — H&P
Inge U  67   H&P- [address] [facility] 1975, 56 y o  female MRN: [id number]  Unit/Bed#: [address] [place] [address] Encounter: [id number]  Primary Care Provider: Flower Bell MD   Date and time admitted to hospital: 1/5/2023 12:29 AM    * Hypocalcemia  Assessment & Plan  Patient presents with numbness, tingling of extremities - has been seen in the ED 6 times in the last 5 days for the same symptoms, calcium has been repleted, and then returns with hypocalcemia again  · Possibly secondary to recent steroid injections? · Check urine calcium  · Ionized calcium 0 99  · Given 2g calcium  · Recheck labs in AM  · Continue calcitriol, calcium carbonate  · Endocrinology consult     Nicotine dependence  Assessment & Plan  Recommend cessation, declined nicotine patch    Postoperative hypothyroidism  Assessment & Plan  History of thyroidectomy, parathyroidectomy  · TSH noted to be 10 334  · Currently on levothyroxine 150mcg  · Check free T4    DDD (degenerative disc disease), lumbar  Assessment & Plan  DDD, receives steroid injections and baclofen prn    VTE Pharmacologic Prophylaxis:   Moderate Risk (Score 3-4) - Pharmacological DVT Prophylaxis Ordered: heparin  Code Status: Level 1 - Full Code   Discussion with family: Patient declined call to   Anticipated Length of Stay: Patient will be admitted on an observation basis with an anticipated length of stay of less than 2 midnights secondary to hypocalcemia  Total Time for Visit, including Counseling / Coordination of Care: 45 minutes Greater than 50% of this total time spent on direct patient counseling and coordination of care  Chief Complaint: numbness, tingling    History of Present Illness:  [address] [place] [address] is a 56 y o  female with a PMH of DDD, postop hypothyroidism, anxiety who presents with hypocalcemia  Patient seen 6 times in the past 5 days with numbness and tingling of her extremities   She gets calcium repleted in the ED and then feels better  She has been back with labs showing hypocalcemia every day this week  Notes the only change is she received steroid injection last Thursday  Follows with endocrine, has not been able to get in until the end of the month  Denies any fevers, chills, chest pain, SOB, cough, abdominal pain  Review of Systems:  Review of Systems   Constitutional: Negative for chills and fever  Respiratory: Negative for cough and shortness of breath  Cardiovascular: Negative for chest pain and palpitations  Gastrointestinal: Negative for abdominal pain and vomiting  Genitourinary: Negative for dysuria and hematuria  Neurological: Positive for numbness  Negative for dizziness and syncope  Past Medical and Surgical History:   Past Medical History:   Diagnosis Date   • Abdominal pain    • Acid reflux    • Acute renal failure (HCC)     multiple episodes   • Anemia    • Anxiety    • Anxiety 3/31/2021   • Arthritis    • Asthma    • Back pain    • Chronic pain    • DDD (degenerative disc disease), lumbar    • Depression    • Disease of thyroid gland     had surgery and now hypo   • DVT (deep venous thrombosis) ([facility] [address] )     s/p ankle fracture   • Headache    • History of transfusion    • Hypercalcemia    • Hyperlipidemia    • Hyperthyroidism    • Hypocalcemia     post op 2016   • Kidney stone    • Lightheadedness    • Migraine    • Obesity    • Palpitations    • Psychiatric disorder     anxiety depression   • PTSD (post-traumatic stress disorder) 10/28/2022   • Seizures ([address] )     petit mal x1  4 years ago- all tests were neg     • SOB (shortness of breath)    • Spondylolisthesis of lumbar region    • Treatment     spinal pain injections  last was 2016   • Wears glasses        Past Surgical History:   Procedure Laterality Date   • BACK SURGERY      L4-5, S1-fusion-plate/screws   • BREAST BIOPSY Left 2022    Stereo SLW - 12:00   •  SECTION      x5   • CYSTOCELE REPAIR 05/04/2017   • CYSTOSCOPY     • DISCOGRAM     • HYSTERECTOMY     • MAMMO STEREOTACTIC BREAST BIOPSY LEFT (ALL INC) Left 12/19/2022   • PARATHYROIDECTOMY     • CT ANTERIOR COLPORRAPHY RPR CYSTOCELE W/CYSTO N/A 05/04/2017    Procedure: CYSTOCELE REPAIR;  Surgeon: Grant Mendoza MD;  Location: [address] [place] [address];  Service: Gynecology   • CT ARTHRODESIS POSTERIOR INTERBODY [address] LUMBAR N/A 08/12/2016    Procedure: L4-S1 LUMBAR LAMINECTOMY/DECOMPRESSION;  INSTRUMENTED POSTEROLATERAL FUSION/ INTERBODY L5-S1; ALLOGRAFT AND AUTOGRAFT (IMPULSE) ; Surgeon: Bryan Rockwell MD;  Location:  MAIN OR;  Service: Orthopedics   • CT CYSTO BLADDER W/URETERAL CATHETERIZATION Bilateral 12/07/2018    Procedure: INSERTION URETERAL CATHETERS PREOP;  Surgeon: Kristine Covarrubias MD;  Location: [address] [place] [address];  Service: Urology   • CT SLING OPERATION STRESS INCONTINENCE N/A 05/04/2017    Procedure: MID URETHRAL SLING;  Surgeon: Ming Velazquez MD;  Location: [address] [place] [address];  Service: Urology   • CT SUPRACERVICAL ABDL HYSTER W/WO RMVL TUBE OVARY N/A 12/07/2018    Procedure: SUPRACERVICAL HYSTERECTOMY;  Surgeon: Grant Mendoza MD;  Location: [address] [place] [address];  Service: Gynecology   • THYROIDECTOMY     • TONSILECTOMY AND ADNOIDECTOMY     • TONSILLECTOMY     • TUBAL LIGATION         Meds/Allergies:  Prior to Admission medications    Medication Sig Start Date End Date Taking? Authorizing Provider   calcitriol (ROCALTROL) 0 5 MCG capsule Take 1 capsule (0 5 mcg total) by mouth daily 9/22/22  Yes Shonda Brady MD   ALPRAZolam ER (XANAX XR) 2 MG 24 hr tablet Take 1 tablet (2 mg total) by mouth 2 (two) times a day 10/7/22   Clinton Alston   baclofen 10 mg tablet Take 10 mg by mouth 3 (three) times a day as needed 7/22/22   Historical Provider, MD   calcitriol (ROCALTROL) 0 25 mcg capsule TAKE 1 CAPSULE TWICE A DAY BY ORAL ROUTE AS DIRECTED    Patient not taking: Reported on 1/5/2023 12/16/22   Raza Walker MD   Calcium Carbonate-Vitamin D3 600-400 MG-UNIT TABS TAKE 1 TABLET BY MOUTH 6 (SIX) TIMES A DAY 8/19/22   Yuliana Banerjee MD   cholecalciferol (VITAMIN D3) 1,000 units tablet Take 1 tablet (1,000 Units total) by mouth daily 12/7/22   Jacqueline Edmondson MD   desvenlafaxine succinate (PRISTIQ) 50 mg 24 hr tablet Take 1 tablet (50 mg total) by mouth daily 10/28/22   Clinton Odell   diphenhydrAMINE (BENADRYL) 25 mg tablet Take 1 tablet (25 mg total) by mouth every 6 (six) hours for 3 days 12/30/22 1/2/23  Nirali Escamilla DO   docusate sodium (COLACE) 100 mg capsule Take 1 capsule (100 mg total) by mouth 2 (two) times a day for 7 days 12/30/22 1/6/23  Nirali Escamilla DO   fenofibrate 160 MG tablet Take 1 tablet (160 mg total) by mouth daily 10/6/22   Yuliana Banerjee MD   ferrous sulfate 325 (65 Fe) mg tablet Take 325 mg by mouth Once Monday, wed, Friday-Last dose 4/1/22     Historical Provider, MD   ibuprofen (MOTRIN) 600 mg tablet Take 1 tablet (600 mg total) by mouth every 6 (six) hours as needed for mild pain or moderate pain 6/2/20   Natalie Baker DO   levothyroxine 150 mcg tablet TAKE 1 TABLET BY MOUTH EVERY DAY  Patient taking differently: 150 mcg Take 6 days a week 1/20/22   Marion Connell MD   Lidocaine-Menthol 4-1 % PTCH if needed      Historical Provider, MD   magnesium Oxide (MAG-OX) 400 mg TABS Take 1 tablet (400 mg total) by mouth 2 (two) times a day 12/22/22   Yuliana Banerjee MD   omeprazole (PriLOSEC) 40 MG capsule TAKE 1 CAPSULE (40 MG TOTAL) BY MOUTH DAILY  12/16/22   Donn Curtis PA-C   oxyCODONE-acetaminophen (PERCOCET)  mg per tablet Take 1 tablet by mouth 4 (four) times a day    Historical Provider, MD   pantoprazole (PROTONIX) 40 mg tablet Take 1 tablet (40 mg total) by mouth daily Take 1/2 hour prior to breakfast daily in [facility]   Patient not taking: No sig reported 6/8/22 7/30/22  ELIF Valdes   potassium chloride (K-DUR,KLOR-CON) 20 mEq tablet Take 20 mEq by mouth 2 (two) times a day Historical Provider, MD   Potassium Chloride ER 20 MEQ TBCR TAKE 1 TABLET BY MOUTH TWICE A DAY AFTER A MEAL 10/31/22   Matthew Fernandez MD   pramipexole (MIRAPEX) 0 5 mg tablet Take 0 5 mg by mouth daily at bedtime    Historical Provider, MD   predniSONE 20 mg tablet Take 2 tablets (40 mg total) by mouth daily for 5 days 12/30/22 1/4/23  Nirali Escamilla DO   Calcium Carb-Cholecalciferol (Calcium 500+D) 500-400 MG-UNIT TABS Take 600 mg by mouth 3 times a day 6/1/22 1/5/23  Elliot Jewell PA-C   dicyclomine (BENTYL) 20 mg tablet Take 1 tablet (20 mg total) by mouth 2 (two) times a day  Patient not taking: Reported on 1/5/2023 12/30/22 1/5/23  Nirali Escamilla DO   doxepin (SINEquan) 25 mg capsule TAKE 1 CAPSULE BY MOUTH DAILY AT BEDTIME  Patient not taking: Reported on 1/5/2023 12/15/22 1/5/23  Clinton Almanzar   famotidine (PEPCID) 20 mg tablet Take 1 tablet (20 mg total) by mouth 2 (two) times a day for 7 days  Patient not taking: Reported on 1/5/2023 12/30/22 1/5/23  Nirali Escamilla DO   hydrocortisone (ANUSOL-HC) 25 mg suppository Insert 1 suppository (25 mg total) into the rectum 2 (two) times a day  Patient taking differently: Insert 25 mg into the rectum 2 (two) times a day as needed 2/25/22 5/30/22  Linnette Phillips MD   methocarbamol (ROBAXIN) 500 mg tablet Take 1 tablet (500 mg total) by mouth 2 (two) times a day 3/16/22 5/30/22  Rosemarie Gonzalez MD   pregabalin (LYRICA) 100 mg capsule TAKE 1 CAPSULE BY MOUTH TWICE A DAY AS DIRECTED FOR 30 DAYS  Patient not taking: Reported on 1/5/2023 9/20/22 1/5/23  Historical Provider, MD     I have reviewed home medications with patient personally  Allergies:    Allergies   Allergen Reactions   • Hydrocodone-Acetaminophen Rash   • Vicodin [Hydrocodone-Acetaminophen] Rash   • Morphine And Related GI Intolerance     Nausea/vomiting     • Adhesive [Medical Tape] Rash     Bandaids       Social History:  Marital Status: /Civil Union   Occupation:   Patient Pre-hospital Living Situation: Home  Patient Pre-hospital Level of Mobility: walks  Patient Pre-hospital Diet Restrictions:   Substance Use History:   Social History     Substance and Sexual Activity   Alcohol Use Not Currently     Social History     Tobacco Use   Smoking Status Every Day   • Packs/day: 0 25   • Years: 25 00   • Pack years: 6 25   • Types: Cigarettes   Smokeless Tobacco Never     Social History     Substance and Sexual Activity   Drug Use No       Family History:  Family History   Problem Relation Age of Onset   • Diabetes Mother    • Hypertension Mother    • Hyperlipidemia Father    • Arrhythmia Father    • Lung cancer Father    • Diabetes Father    • Colon cancer Maternal Grandfather    • Stomach cancer Paternal Grandmother    • Heart disease Paternal Aunt        Physical Exam:     Vitals:   Blood Pressure: 119/78 (01/05/23 0300)  Pulse: 93 (01/05/23 0300)  Temperature: 98 1 °F (36 7 °C) (01/05/23 0300)  Temp Source: Oral (01/05/23 0300)  Respirations: 18 (01/05/23 0300)  Height: 5' 2" (157 5 cm) (01/05/23 0300)  Weight - Scale: 97 5 kg (215 lb) (01/05/23 0300)  SpO2: 93 % (01/05/23 0300)    Physical Exam  Vitals reviewed  Constitutional:       General: She is not in acute distress  Appearance: She is well-developed  HENT:      Head: Normocephalic and atraumatic  Eyes:      Conjunctiva/sclera: Conjunctivae normal    Cardiovascular:      Rate and Rhythm: Normal rate and regular rhythm  Heart sounds: No murmur heard  Pulmonary:      Effort: Pulmonary effort is normal  No respiratory distress  Breath sounds: Normal breath sounds  Abdominal:      Palpations: Abdomen is soft  Tenderness: There is no abdominal tenderness  Musculoskeletal:         General: No swelling  Skin:     General: Skin is warm and dry  Capillary Refill: Capillary refill takes less than 2 seconds  Neurological:      Mental Status: She is alert     Psychiatric:         Mood and Affect: Mood normal           Additional Data:     Lab Results:  Results from last 7 days   Lab Units 01/04/23  1509   WBC Thousand/uL 8 36   HEMOGLOBIN g/dL 13 2   HEMATOCRIT % 40 4   PLATELETS Thousands/uL 193   NEUTROS PCT % 66   LYMPHS PCT % 25   MONOS PCT % 6   EOS PCT % 1     Results from last 7 days   Lab Units 01/04/23  1805 01/04/23  1509   SODIUM mmol/L 132* 135   POTASSIUM mmol/L 3 9 4 0   CHLORIDE mmol/L 98 98   CO2 mmol/L 20* 21   BUN mg/dL 23 24   CREATININE mg/dL 0 91 0 98   ANION GAP mmol/L 14* 16*   CALCIUM mg/dL 9 3 7 4*   ALBUMIN g/dL  --  3 3*   TOTAL BILIRUBIN mg/dL  --  0 23   ALK PHOS U/L  --  138*   ALT U/L  --  52   AST U/L  --  31   GLUCOSE RANDOM mg/dL 151* 188*                       Lines/Drains:  Invasive Devices     Peripheral Intravenous Line  Duration           Peripheral IV 01/05/23 Right Antecubital <1 day                    Imaging: No pertinent imaging reviewed  No orders to display       EKG and Other Studies Reviewed on Admission:   · EKG: No EKG obtained  ** Please Note: This note has been constructed using a voice recognition system   **

## 2023-01-05 NOTE — PLAN OF CARE
Problem: PAIN - ADULT  Goal: Verbalizes/displays adequate comfort level or baseline comfort level  Description: Interventions:  - Encourage patient to monitor pain and request assistance  - Assess pain using appropriate pain scale  - Administer analgesics based on type and severity of pain and evaluate response  - Implement non-pharmacological measures as appropriate and evaluate response  - Consider cultural and social influences on pain and pain management  - Notify physician/advanced practitioner if interventions unsuccessful or patient reports new pain  1/5/2023 1053 by Marcelina Salas RN  Outcome: Progressing  1/5/2023 1051 by Marcelina Salas RN  Outcome: Progressing     Problem: INFECTION - ADULT  Goal: Absence or prevention of progression during hospitalization  Description: INTERVENTIONS:  - Assess and monitor for signs and symptoms of infection  - Monitor lab/diagnostic results  - Monitor all insertion sites, i e  indwelling lines,  - Monitor endotracheal if appropriate and nasal secretions for changes in amount and color  - [place] appropriate cooling/warming therapies per order  - Administer medications as ordered  - Instruct and encourage patient and family to use good hand hygiene technique  - Identify and instruct in appropriate isolation precautions for identified infection/condition  1/5/2023 1053 by Marcelina Salas RN  Outcome: Progressing  1/5/2023 1051 by Marcelina Salas RN  Outcome: Progressing     Problem: SAFETY ADULT  Goal: Patient will remain free of falls  Description: INTERVENTIONS:  - Educate patient/family on patient safety including physical limitations  - Instruct patient to call for assistance with activity   - Consult OT/PT to assist with strengthening/mobility   - Keep Call bell within reach  - Keep bed low and locked with side rails adjusted as appropriate  - Keep care items and personal belongings within reach  - Initiate and maintain comfort rounds  - Make Fall Risk Sign visible to staff  - Offer Toileting every 2 Hours, in advance of need  - Obtain necessary fall risk management equipment:   - Apply yellow socks and bracelet for high fall risk patients  - Consider moving patient to room near nurses station  1/5/2023 1053 by Fan Vail RN  Outcome: Progressing  1/5/2023 1051 by Fan Vail RN  Outcome: Progressing  Goal: Maintain or return to baseline ADL function  Description: INTERVENTIONS:  -  Assess patient's ability to carry out ADLs; assess patient's baseline for ADL function and identify physical deficits which impact ability to perform ADLs (bathing, care of mouth/teeth, toileting, grooming, dressing, etc )  - Assess/evaluate cause of self-care deficits   - Assess range of motion  - Assess patient's mobility; develop plan if impaired  - Assess patient's need for assistive devices and provide as appropriate  - Encourage maximum independence but intervene and supervise when necessary  - Involve family in performance of ADLs  - Assess for home care needs following discharge   - Consider OT consult to assist with ADL evaluation and planning for discharge  - Provide patient education as appropriate  1/5/2023 1053 by Fan Vail RN  Outcome: Progressing  1/5/2023 1051 by Fan Vail RN  Outcome: Progressing  Goal: Maintains/Returns to pre admission functional level  Description: INTERVENTIONS:  - Perform BMAT or MOVE assessment daily    - Set and communicate daily mobility goal to care team and patient/family/caregiver  - Collaborate with rehabilitation services on mobility goals if consulted  - Perform Range of Motion 2 times a day    - Reposition patient every 2 hours/self  - Dangle patient 3 times a day  - Stand patient 3 times a day  - Ambulate patient 3 times a day  - Out of bed to chair 3 times a day   - Out of bed for meals 3 times a day  - Out of bed for toileting  - Record patient progress and toleration of activity level   1/5/2023 1053 by Francisco Waters Quinten Boles RN  Outcome: Progressing  1/5/2023 1051 by Nicole Herndon RN  Outcome: Progressing     Problem: DISCHARGE PLANNING  Goal: Discharge to home or other facility with appropriate resources  Description: INTERVENTIONS:  - Identify barriers to discharge w/patient and caregiver  - Arrange for needed discharge resources and transportation as appropriate  - Identify discharge learning needs (meds, wound care, etc )  - Arrange for interpretive services to assist at discharge as needed  - Refer to Case Management Department for coordinating discharge planning if the patient needs post-hospital services based on physician/advanced practitioner order or complex needs related to functional status, cognitive ability, or social support system  1/5/2023 1053 by Nicole Herndon RN  Outcome: Progressing  1/5/2023 1051 by Nicole Herndon RN  Outcome: Progressing     Problem: Knowledge Deficit  Goal: Patient/family/caregiver demonstrates understanding of disease process, treatment plan, medications, and discharge instructions  Description: Complete learning assessment and assess knowledge base    Interventions:  - Provide teaching at level of understanding  - Provide teaching via preferred learning methods  1/5/2023 1053 by Nicole Herndon RN  Outcome: Progressing  1/5/2023 1051 by Nicole Herndon RN  Outcome: Progressing

## 2023-01-05 NOTE — ED PROVIDER NOTES
History  Chief Complaint   Patient presents with   • Abnormal Lab     Patient was in ER earlier today, and reports she was told by ER provider to return tonight for inpatient hospitalization  44-year-old female presents to the ED for visit #6 in the last 4 days  Patient has drops in her calcium level due to thyroid missing  Patient states her calcium keeps dropping and gives her paresthesias and presents to the ED for IV infusion  We asked the patient to be admitted to the hospital to get this under control last time she was and she signed out [address]  History provided by:  Patient   used: No        Prior to Admission Medications   Prescriptions Last Dose Informant Patient Reported? Taking?    ALPRAZolam ER (XANAX XR) 2 MG 24 hr tablet   No No   Sig: Take 1 tablet (2 mg total) by mouth 2 (two) times a day   Calcium Carb-Cholecalciferol (Calcium 500+D) 500-400 MG-UNIT TABS   No No   Sig: Take 600 mg by mouth 3 times a day   Calcium Carbonate-Vitamin D3 600-400 MG-UNIT TABS   No No   Sig: TAKE 1 TABLET BY MOUTH 6 (SIX) TIMES A DAY   Lidocaine-Menthol 4-1 % PTCH   Yes No   Sig: if needed     Potassium Chloride ER 20 MEQ TBCR   No No   Sig: TAKE 1 TABLET BY MOUTH TWICE A DAY AFTER A MEAL   baclofen 10 mg tablet   Yes No   Sig: Take 10 mg by mouth 3 (three) times a day as needed   calcitriol (ROCALTROL) 0 25 mcg capsule   No No   Sig: TAKE 1 CAPSULE TWICE A DAY BY ORAL ROUTE AS DIRECTED    calcitriol (ROCALTROL) 0 5 MCG capsule   No No   Sig: Take 1 capsule (0 5 mcg total) by mouth daily   Patient taking differently: Take 0 5 mcg by mouth 2 (two) times a day   cholecalciferol (VITAMIN D3) 1,000 units tablet   No No   Sig: Take 1 tablet (1,000 Units total) by mouth daily   desvenlafaxine succinate (PRISTIQ) 50 mg 24 hr tablet   No No   Sig: Take 1 tablet (50 mg total) by mouth daily   dicyclomine (BENTYL) 20 mg tablet   No No   Sig: Take 1 tablet (20 mg total) by mouth 2 (two) times a day   diphenhydrAMINE (BENADRYL) 25 mg tablet   No No   Sig: Take 1 tablet (25 mg total) by mouth every 6 (six) hours for 3 days   docusate sodium (COLACE) 100 mg capsule   No No   Sig: Take 1 capsule (100 mg total) by mouth 2 (two) times a day for 7 days   doxepin (SINEquan) 25 mg capsule   No No   Sig: TAKE 1 CAPSULE BY MOUTH DAILY AT BEDTIME   famotidine (PEPCID) 20 mg tablet   No No   Sig: Take 1 tablet (20 mg total) by mouth 2 (two) times a day for 7 days   fenofibrate 160 MG tablet   No No   Sig: Take 1 tablet (160 mg total) by mouth daily   ferrous sulfate 325 (65 Fe) mg tablet   Yes No   Sig: Take 325 mg by mouth Once Monday, wed, Friday-Last dose 4/1/22    ibuprofen (MOTRIN) 600 mg tablet   No No   Sig: Take 1 tablet (600 mg total) by mouth every 6 (six) hours as needed for mild pain or moderate pain   levothyroxine 150 mcg tablet   No No   Sig: TAKE 1 TABLET BY MOUTH EVERY DAY   Patient taking differently: 150 mcg Take 6 days a week   magnesium Oxide (MAG-OX) 400 mg TABS   No No   Sig: Take 1 tablet (400 mg total) by mouth 2 (two) times a day   omeprazole (PriLOSEC) 40 MG capsule   No No   Sig: TAKE 1 CAPSULE (40 MG TOTAL) BY MOUTH DAILY  oxyCODONE-acetaminophen (PERCOCET)  mg per tablet   Yes No   Sig: Take 1 tablet by mouth 4 (four) times a day   pantoprazole (PROTONIX) 40 mg tablet   No No   Sig: Take 1 tablet (40 mg total) by mouth daily Take 1/2 hour prior to breakfast daily in a [facility]    Patient not taking: No sig reported   potassium chloride (K-DUR,KLOR-CON) 20 mEq tablet   Yes No   Sig: Take 20 mEq by mouth 2 (two) times a day   pramipexole (MIRAPEX) 0 5 mg tablet   Yes No   Sig: Take 0 5 mg by mouth daily at bedtime   predniSONE 20 mg tablet   No No   Sig: Take 2 tablets (40 mg total) by mouth daily for 5 days   pregabalin (LYRICA) 100 mg capsule   Yes No   Sig: TAKE 1 CAPSULE BY MOUTH TWICE A DAY AS DIRECTED FOR 30 DAYS      Facility-Administered Medications: None       Past Medical History:   Diagnosis Date   • Abdominal pain    • Acid reflux    • Acute renal failure (HCC)     multiple episodes   • Anemia    • Anxiety    • Anxiety 3/31/2021   • Arthritis    • Asthma    • Back pain    • Chronic pain    • DDD (degenerative disc disease), lumbar    • Depression    • Disease of thyroid gland     had surgery and now hypo   • DVT (deep venous thrombosis) ([address] )     s/p ankle fracture   • Headache    • History of transfusion    • Hypercalcemia    • Hyperlipidemia    • Hyperthyroidism    • Hypocalcemia     post op    • Kidney stone    • Lightheadedness    • Migraine    • Obesity    • Palpitations    • Psychiatric disorder     anxiety depression   • PTSD (post-traumatic stress disorder) 10/28/2022   • Seizures ([address] )     petit mal x1  4 years ago- all tests were neg  • SOB (shortness of breath)    • Spondylolisthesis of lumbar region    • Treatment     spinal pain injections  last was 2016   • Wears glasses        Past Surgical History:   Procedure Laterality Date   • BACK SURGERY      L4-5, S1-fusion-plate/screws   • BREAST BIOPSY Left 2022    Stereo SLW - 12:00   •  SECTION      x5   • CYSTOCELE REPAIR  2017   • CYSTOSCOPY     • DISCOGRAM     • HYSTERECTOMY     • MAMMO STEREOTACTIC BREAST BIOPSY LEFT (ALL INC) Left 2022   • PARATHYROIDECTOMY     • MT ANTERIOR COLPORRAPHY RPR CYSTOCELE W/CYSTO N/A 2017    Procedure: CYSTOCELE REPAIR;  Surgeon: Alvina Theodore MD;  Location: [address] [place] [address];  Service: Gynecology   • MT ARTHRODESIS POSTERIOR INTERBODY [address] LUMBAR N/A 2016    Procedure: L4-S1 LUMBAR LAMINECTOMY/DECOMPRESSION;  INSTRUMENTED POSTEROLATERAL FUSION/ INTERBODY L5-S1; ALLOGRAFT AND AUTOGRAFT (IMPULSE) ;   Surgeon: Anastacio Bauman MD;  Location[facility];  Service: Orthopedics   • MT CYSTO BLADDER W/URETERAL CATHETERIZATION Bilateral 2018    Procedure: INSERTION URETERAL CATHETERS PREOP;  Surgeon: Ken Clarke MD; Location: [facility];  Service: Urology   • MT SLING OPERATION STRESS INCONTINENCE N/A 05/04/2017    Procedure: MID URETHRAL SLING;  Surgeon: Faviola Dodson MD;  Location: [facility];  Service: Urology   • MT SUPRACERVICAL ABDL HYSTER W/WO RMVL TUBE OVARY N/A 12/07/2018    Procedure: SUPRACERVICAL HYSTERECTOMY;  Surgeon: Paolo Nair MD;  Location: [facility];  Service: Gynecology   • THYROIDECTOMY     • TONSILECTOMY AND ADNOIDECTOMY     • TONSILLECTOMY     • TUBAL LIGATION         Family History   Problem Relation Age of Onset   • Diabetes Mother    • Hypertension Mother    • Hyperlipidemia Father    • Arrhythmia Father    • Lung cancer Father    • Diabetes Father    • Colon cancer Maternal Grandfather    • Stomach cancer Paternal Grandmother    • Heart disease Paternal Aunt      I have reviewed and agree with the history as documented  E-Cigarette/Vaping   • E-Cigarette Use Never User      E-Cigarette/Vaping Substances   • Nicotine No    • THC No    • CBD No    • Flavoring No    • Other No    • Unknown No      Social History     Tobacco Use   • Smoking status: Every Day     Packs/day: 0 25     Years: 25 00     Pack years: 6 25     Types: Cigarettes   • Smokeless tobacco: Never   Vaping Use   • Vaping Use: Never used   Substance Use Topics   • Alcohol use: Not Currently   • Drug use: No       Review of Systems   Constitutional: Negative for activity change, chills, diaphoresis and fever  HENT: Negative for congestion, ear pain, nosebleeds, sore throat, trouble swallowing and voice change  Eyes: Negative for pain, discharge and redness  Respiratory: Negative for apnea, cough, choking, shortness of breath, wheezing and stridor  Cardiovascular: Negative for chest pain and palpitations  Gastrointestinal: Negative for abdominal distention, abdominal pain, constipation, diarrhea, nausea and vomiting  Endocrine: Negative for polydipsia     Genitourinary: Negative for difficulty urinating, dysuria, flank pain, frequency, hematuria and urgency  Musculoskeletal: Negative for back pain, gait problem, joint swelling, myalgias, neck pain and neck stiffness  Skin: Negative for pallor and rash  Neurological: Negative for dizziness, tremors, syncope, speech difficulty, weakness, numbness and headaches  Hematological: Negative for adenopathy  Psychiatric/Behavioral: Negative for confusion, hallucinations, self-injury and suicidal ideas  The patient is not nervous/anxious  Physical Exam  Physical Exam  Vitals and nursing note reviewed  Constitutional:       General: She is not in acute distress  Appearance: She is well-developed  She is not diaphoretic  HENT:      Head: Normocephalic and atraumatic  Right Ear: External ear normal       Left Ear: External ear normal       Nose: Nose normal    Eyes:      Conjunctiva/sclera: Conjunctivae normal       Pupils: Pupils are equal, round, and reactive to light  Cardiovascular:      Rate and Rhythm: Normal rate and regular rhythm  Heart sounds: Normal heart sounds  Pulmonary:      Effort: Pulmonary effort is normal       Breath sounds: Normal breath sounds  Abdominal:      General: Bowel sounds are normal       Palpations: Abdomen is soft  Musculoskeletal:         General: Normal range of motion  Cervical back: Normal range of motion and neck supple  Skin:     General: Skin is warm and dry  Neurological:      Mental Status: She is alert and oriented to person, place, and time  Deep Tendon Reflexes: Reflexes are normal and symmetric  Psychiatric:         Behavior: Behavior is cooperative           Vital Signs  ED Triage Vitals [01/05/23 0034]   Temperature Pulse Respirations Blood Pressure SpO2   98 1 °F (36 7 °C) 95 18 122/77 98 %      Temp Source Heart Rate Source Patient Position - Orthostatic VS BP Location FiO2 (%)   Oral Monitor -- -- --      Pain Score       4           Vitals:    01/05/23 0034   BP: 122/77   Pulse: 95         Visual Acuity      ED Medications  Medications   calcium gluconate 2 g in sodium chloride 0 9% 100 mL (premix) (has no administration in time range)       Diagnostic Studies  Results Reviewed     Procedure Component Value Units Date/Time    Calcium, ionized [[id number]]  (Abnormal) Collected: 01/05/23 0053    Lab Status: Final result Specimen: Blood from Line, Venous Updated: 01/05/23 0100     Calcium, Ionized 0 99 mmol/L     Calcium, urine, random [[id number]]     Lab Status: No result Specimen: Urine                  No orders to display              Procedures  Procedures         ED Course                                             MDM    Disposition  Final diagnoses:   Hypocalcemia     Time reflects when diagnosis was documented in both MDM as applicable and the Disposition within this note     Time User Action Codes Description Comment    1/5/2023  1:04 AM Denver Clos Add [E83 [id number]] Hypocalcemia       ED Disposition     ED Disposition   Admit    Condition   Stable    Date/Time   Thu Jan 5, 2023  1:03 AM    Comment   Case was discussed with  Rosa  and the patient's admission status was agreed to be Admission Status: observation status to the service of Dr Neville Schmidt   Follow-up Information    None         Patient's Medications   Discharge Prescriptions    No medications on file       No discharge procedures on file      PDMP Review       Value Time User    PDMP Reviewed  Yes 10/6/2022 12:12 PM Db Larkin MD          ED Provider  Electronically Signed by           Tru Garza DO  01/05/23 0104

## 2023-01-05 NOTE — ASSESSMENT & PLAN NOTE
Patient presents with numbness, tingling of extremities - has been seen in the ED 6 times in the last 5 days for the same symptoms, calcium has been repleted, and then returns with hypocalcemia again  · Possibly secondary to recent steroid injections?    · Check urine calcium  · Ionized calcium 0 99  · Given 2g calcium  · Recheck labs in AM  · Continue calcitriol, calcium carbonate  · Endocrinology consult

## 2023-01-05 NOTE — CONSULTS
Consultation - Jorge Bullard 56 y o  female MRN: [id number]    Unit/Bed#: [address] [place] [address] Encounter: [id number]      Assessment/Plan     Assessment: This is a 56y o -year-old female with hypocalcemia, secondary to hypoparathyroidism, uncontrolled hypothyroidism, hypoparathyroidism, vitamin D deficiency  On admission her calcium was 7 4, and was symptomatic, was given IV calcium gluconate and repeat calcium was 9 0  Since then her symptoms have improved  Plan:    1  Hypocalcemia-patient has been having symptoms of hypocalcemia for the last 2 to 3 months, requiring admission  She admits to taking Rocaltrol 0 5 mg daily along with calcium close to 5000 mg daily,  -We will increase Rocaltrol to 0 5 mg twice a day,   -As patient did not receive Rocaltrol in the morning ,give 1 dose now  Patient did not receive Rocaltrol today  -Start calcium carbonate, 1250 mg three times a day   -Obtain ionized Calcium in the morning  -Obtain serum magnesium as well  -Patient will need monitoring of calcium every 12 hours, if she has symptoms  -Put the order for calcium gluconate for as needed, if she has symptoms of hypocalcemia calcium less than 8 0    2  Severe vitamin D deficiency-continue vitamin D, 50,000 IU once a week for 8 weeks, switch to vitamin D3 5000 IU daily, after  she finish booster doses    3  Hypothyroidism-TSH is elevated, free T4 is 1 03  Increase levothyroxine to 175 mcg daily dose  She will need repeat TSH, free T4 as outpatient  Patient has follow-up appointment with endocrinology for follow-up,    CC: Hypocalcemia, hypoparathyroidism, hypothyroidism uncontrolled  History of Present Illness     HPI: Jorge Bullard is a 56y o  year old female with medical history of hypothyroidism, postsurgical hypoparathyroidism and hypocalcemia, was admitted for persistent low calcium, and symptoms of hypocalcemia  Patient was last seen by Dr Elizabeth Guevara in September 2022    Cartrol was increased to 0 5 mg daily    Since October 2022 patient had multiple admissions for hypocalcemia, questionable noncompliance to calcium supplementation as well as Rocaltrol, as per the record  Patient had multiple ER visits close to 10 in last 3 months  Her recent regimen is calcium 600/vitamin D 400 IU, currently taking 8 tablets daily (close to 5000 mg of calcium)  Calcitriol 0 5 mg daily    For postsurgical hypothyroidism she takes Synthroid 150 mcg daily, empty stomach 1 hour before breakfast   Her last TSH is 10 3  At present patient denies tingling and numbness of her extremities, muscle spasms, perioral tingling numbness      Component Ref Range & Units 1/5/23 0425 1/4/23 1805 1/4/23 1509 1/2/23 1359 1/1/23 0504 12/31/22 1640 12/28/22 1625   Sodium 135 - 147 mmol/L 135  132 Low   135  134 Low   135  139  136    Potassium 3 5 - 5 3 mmol/L 3 9  3 9  4 0  4 0 CM  3 8  3 9  4 0    Chloride 96 - 108 mmol/L 100  98  98  97  99  101  99    CO2 21 - 32 mmol/L 18 Low   20 Low   21  26  21  28  28    ANION GAP 4 - 13 mmol/L 17 High   14 High   16 High   11  15 High   10  9    BUN 5 - 25 mg/dL 26 High   23  24  23  23  20  15    Creatinine 0 60 - 1 30 mg/dL 1 15  0 91 CM  0 98 CM  1 08 CM  1 08 CM  1 12 CM  1 03 CM    Comment: Standardized to IDMS reference method   Glucose 65 - 140 mg/dL 196 High   151 High  CM  188 High  CM  211 High  CM  193 High  CM  112 CM  127 CM    Comment: If the patient is fasting, the ADA then defines impaired fasting glucose as > 100 mg/dL and diabetes as > or equal to 123 mg/dL  Specimen collection should occur prior to Sulfasalazine administration due to the potential for falsely depressed results  Specimen collection should occur prior to Sulfapyridine administration due to the potential for falsely elevated results     Glucose, Fasting 65 - 99 mg/dL 196 High           Comment: Specimen collection should occur prior to Sulfasalazine administration due to the potential for falsely depressed results  Specimen collection should occur prior to Sulfapyridine administration due to the potential for falsely elevated results  Calcium 8 3 - 10 1 mg/dL 8 1 Low   9 3  7 4 Low   7 5 Low   7 5 Low  R  7 8 Low   8 4    eGFR ml/min/1 73sq m 57  75  69  61  61  59  65        Lab Results   Component Value Date    [id number] 10 334 (H) 01/04/2023                                    Inpatient consult to Endocrinology  Consult performed by: Salinas Laguna MD  Consult ordered by: Radha Fajardo MD          Review of Systems   Constitutional: Positive for activity change and fatigue  Negative for diaphoresis, fever and unexpected weight change  HENT: Negative  Eyes: Negative for visual disturbance  Respiratory: Negative for cough, chest tightness and shortness of breath  Cardiovascular: Negative for chest pain, palpitations and leg swelling  Gastrointestinal: Positive for constipation  Negative for abdominal pain, blood in stool, diarrhea, nausea and vomiting  Endocrine: Negative for cold intolerance, heat intolerance, polydipsia, polyphagia and polyuria  Genitourinary: Negative for dysuria, enuresis, frequency and urgency  Musculoskeletal: Negative for arthralgias and myalgias  Skin: Negative for pallor, rash and wound  Allergic/Immunologic: Negative  Neurological: Positive for numbness (off and on in extremities )  Negative for dizziness, tremors and weakness  Hematological: Negative  Psychiatric/Behavioral: Negative          Historical Information   Past Medical History:   Diagnosis Date   • Abdominal pain    • Acid reflux    • Acute renal failure (HCC)     multiple episodes   • Anemia    • Anxiety    • Anxiety 3/31/2021   • Arthritis    • Asthma    • Back pain    • Chronic pain    • DDD (degenerative disc disease), lumbar    • Depression    • Disease of thyroid gland     had surgery and now hypo   • DVT (deep venous thrombosis) ([facility] ) 2009    s/p ankle fracture   • Headache    • History of transfusion    • Hypercalcemia    • Hyperlipidemia    • Hyperthyroidism    • Hypocalcemia     post op 2016   • Kidney stone    • Lightheadedness    • Migraine    • Obesity    • Palpitations    • Psychiatric disorder     anxiety depression   • PTSD (post-traumatic stress disorder) 10/28/2022   • Seizures ([address] )     petit mal x1  4 years ago- all tests were neg  • SOB (shortness of breath)    • Spondylolisthesis of lumbar region    • Treatment     spinal pain injections  last was 2016   • Wears glasses      Past Surgical History:   Procedure Laterality Date   • BACK SURGERY      L4-5, S1-fusion-plate/screws   • BREAST BIOPSY Left 2022    Stereo SLW - 12:00   •  SECTION      x5   • CYSTOCELE REPAIR  2017   • CYSTOSCOPY     • DISCOGRAM     • HYSTERECTOMY     • MAMMO STEREOTACTIC BREAST BIOPSY LEFT (ALL INC) Left 2022   • PARATHYROIDECTOMY     • OK ANTERIOR COLPORRAPHY RPR CYSTOCELE W/CYSTO N/A 2017    Procedure: CYSTOCELE REPAIR;  Surgeon: Karin Rider MD;  Location: [address] [place] [address];  Service: Gynecology   • OK ARTHRODESIS POSTERIOR INTERBODY [address] LUMBAR N/A 2016    Procedure: L4-S1 LUMBAR LAMINECTOMY/DECOMPRESSION;  INSTRUMENTED POSTEROLATERAL FUSION/ INTERBODY L5-S1; ALLOGRAFT AND AUTOGRAFT (IMPULSE) ;   Surgeon: Domenica Mendoza MD;  Location: [facility];  Service: Orthopedics   • OK CYSTO BLADDER W/URETERAL CATHETERIZATION Bilateral 2018    Procedure: INSERTION URETERAL CATHETERS PREOP;  Surgeon: Wesley Farias MD;  Location: [address] [place] [address];  Service: Urology   • OK SLING OPERATION STRESS INCONTINENCE N/A 2017    Procedure: MID URETHRAL SLING;  Surgeon: Prabhjot Sosa MD;  Location: [address] [place] [address];  Service: Urology   • OK SUPRACERVICAL ABDL HYSTER W/WO RMVL TUBE OVARY N/A 2018    Procedure: SUPRACERVICAL HYSTERECTOMY;  Surgeon: Karin Rider MD;  Location: [address] [place] [address];  Service: Gynecology   • THYROIDECTOMY     • TONSILECTOMY AND ADNOIDECTOMY • TONSILLECTOMY     • TUBAL LIGATION       Social History   Social History     Substance and Sexual Activity   Alcohol Use Not Currently     Social History     Substance and Sexual Activity   Drug Use No     Social History     Tobacco Use   Smoking Status Every Day   • Packs/day: 0 25   • Years: 25 00   • Pack years: 6 25   • Types: Cigarettes   Smokeless Tobacco Never     Family History:   Family History   Problem Relation Age of Onset   • Diabetes Mother    • Hypertension Mother    • Hyperlipidemia Father    • Arrhythmia Father    • Lung cancer Father    • Diabetes Father    • Colon cancer Maternal Grandfather    • Stomach cancer Paternal Grandmother    • Heart disease Paternal Aunt        Meds/Allergies   Current Facility-Administered Medications   Medication Dose Route Frequency Provider Last Rate Last Admin   • ALPRAZolam Harlon Needle) tablet 2 mg  2 mg Oral BID PRN Destinee Bauer MD   2 mg at 01/05/23 1197   • baclofen tablet 10 mg  10 mg Oral TID PRN Inés Ventura PA-C       • desvenlafaxine succinate (PRISTIQ) 24 hr tablet 50 mg  50 mg Oral Daily Sandrita Rush PA-C       • docusate sodium (COLACE) capsule 100 mg  100 mg Oral BID Sandrita Rush PA-C   100 mg at 01/05/23 3722   • ergocalciferol (VITAMIN D2) capsule 50,000 Units  50,000 Units Oral Weekly Destinee Bauer MD   50,000 Units at 01/05/23 3103   • fenofibrate (TRICOR) tablet 168 mg  168 mg Oral Daily Sandrita Rush PA-C   168 mg at 01/05/23 0920   • ferrous sulfate tablet 325 mg  325 mg Oral Daily With Breakfast Sandrita Rush PA-C       • heparin (porcine) subcutaneous injection 5,000 Units  5,000 Units Subcutaneous Q8H [address] Sandrita Rush PA-C       • levothyroxine tablet 150 mcg  150 mcg Oral Early Morning Sandrita Rush PA-C   150 mcg at 01/05/23 0715   • lidocaine (LIDODERM) 5 % patch 1 patch  1 patch Topical Daily Destinee Bauer MD   1 patch at 01/05/23 1330   • magnesium oxide (MAG-OX) tablet 400 mg  400 mg Oral HS Sonali Kelsey Garcia MD       • [START ON 1/6/2023] magnesium oxide (MAG-OX) tablet 800 mg  800 mg Oral Daily Saad Burton MD       • pantoprazole (PROTONIX) EC tablet 40 mg  40 mg Oral Early Morning Nighat Brady PA-C   40 mg at 01/05/23 6733   • potassium chloride (K-DUR,KLOR-CON) CR tablet 20 mEq  20 mEq Oral BID Nighat Brady PA-C   20 mEq at 01/05/23 1103   • pramipexole (MIRAPEX) tablet 0 5 mg  0 5 mg Oral HS Sandrita Rush PA-C         Allergies   Allergen Reactions   • Hydrocodone-Acetaminophen Rash   • Vicodin [Hydrocodone-Acetaminophen] Rash   • Morphine And Related GI Intolerance     Nausea/vomiting     • Adhesive [Medical Tape] Rash     Bandaids       Objective   Vitals: Blood pressure 100/65, pulse 94, temperature 97 9 °F (36 6 °C), resp  rate 18, height 5' 2" (1 575 m), weight 97 5 kg (215 lb), last menstrual period 12/06/2018, SpO2 90 %, not currently breastfeeding  Intake/Output Summary (Last 24 hours) at 1/5/2023 1548  Last data filed at 1/5/2023 1001  Gross per 24 hour   Intake 308 33 ml   Output --   Net 308 33 ml     Invasive Devices       Peripheral Intravenous Line  Duration             Peripheral IV 01/05/23 Right Antecubital <1 day                    Physical Exam  Vitals reviewed  Constitutional:       General: She is not in acute distress  Appearance: Normal appearance  She is well-developed  She is obese  She is not diaphoretic  Comments: No Chvostek's sign  No trousseau's sign    HENT:      Head: Normocephalic and atraumatic  Eyes:      General:         Right eye: No discharge  Left eye: No discharge  Conjunctiva/sclera: Conjunctivae normal    Neck:      Thyroid: No thyromegaly  Cardiovascular:      Rate and Rhythm: Normal rate and regular rhythm  Heart sounds: Normal heart sounds  No murmur heard  Pulmonary:      Effort: Pulmonary effort is normal  No respiratory distress  Breath sounds: Normal breath sounds  No wheezing     Abdominal: General: Bowel sounds are normal  There is no distension  Palpations: Abdomen is soft  Tenderness: There is no abdominal tenderness  Musculoskeletal:         General: No tenderness or deformity  Normal range of motion  Cervical back: Normal range of motion and neck supple  Skin:     General: Skin is warm and dry  Findings: No erythema or rash  Neurological:      Mental Status: She is alert and oriented to person, place, and time  Cranial Nerves: No cranial nerve deficit  Motor: No abnormal muscle tone  Deep Tendon Reflexes: Reflexes are normal and symmetric  Reflexes normal    Psychiatric:         Behavior: Behavior normal          The history was obtained from the review of the chart, patient  Lab Results:       Lab Results   Component Value Date    WBC 7 71 01/05/2023    HGB 12 5 01/05/2023    HCT 37 8 01/05/2023    MCV 88 01/05/2023     01/05/2023     Lab Results   Component Value Date/Time    BUN 26 (H) 01/05/2023 04:25 AM    K 3 9 01/05/2023 04:25 AM     01/05/2023 04:25 AM    CO2 18 (L) 01/05/2023 04:25 AM    CREATININE 1 15 01/05/2023 04:25 AM    AST 31 01/04/2023 03:09 PM    ALT 52 01/04/2023 03:09 PM    ALB 3 3 (L) 01/04/2023 03:09 PM     No results for input(s): CHOL, HDL, LDL, TRIG, VLDL in the last 72 hours  No results found for: Jovita Flores  POC Glucose (mg/dl)   Date Value   12/07/2018 87   12/03/2018 108       Imaging Studies: I have personally reviewed pertinent reports  Portions of the record may have been created with voice recognition software

## 2023-01-05 NOTE — PLAN OF CARE
Problem: PAIN - ADULT  Goal: Verbalizes/displays adequate comfort level or baseline comfort level  Description: Interventions:  - Encourage patient to monitor pain and request assistance  - Assess pain using appropriate pain scale  - Administer analgesics based on type and severity of pain and evaluate response  - Implement non-pharmacological measures as appropriate and evaluate response  - Consider cultural and social influences on pain and pain management  - Notify physician/advanced practitioner if interventions unsuccessful or patient reports new pain  Outcome: Progressing     Problem: INFECTION - ADULT  Goal: Absence or prevention of progression during hospitalization  Description: INTERVENTIONS:  - Assess and monitor for signs and symptoms of infection  - Monitor lab/diagnostic results  - Monitor all insertion sites, i e  indwelling lines, tubes, and drains  - Monitor endotracheal if appropriate and nasal secretions for changes in amount and color  - [place] appropriate cooling/warming therapies per order  - Administer medications as ordered  - Instruct and encourage patient and family to use good hand hygiene technique  - Identify and instruct in appropriate isolation precautions for identified infection/condition  Outcome: Progressing     Problem: SAFETY ADULT  Goal: Patient will remain free of falls  Description: INTERVENTIONS:  - Educate patient/family on patient safety including physical limitations  - Instruct patient to call for assistance with activity   - Consult OT/PT to assist with strengthening/mobility   - Keep Call bell within reach  - Keep bed low and locked with side rails adjusted as appropriate  - Keep care items and personal belongings within reach  - Initiate and maintain comfort rounds  - Make Fall Risk Sign visible to staff  - Offer Toileting every 2 Hours, in advance of need  - Initiate/Maintain bed alarm  - Obtain necessary fall risk management equipment: yellow socks  - Apply yellow socks and bracelet for high fall risk patients  - Consider moving patient to room near nurses station  Outcome: Progressing  Goal: Maintain or return to baseline ADL function  Description: INTERVENTIONS:  -  Assess patient's ability to carry out ADLs; assess patient's baseline for ADL function and identify physical deficits which impact ability to perform ADLs (bathing, care of mouth/teeth, toileting, grooming, dressing, etc )  - Assess/evaluate cause of self-care deficits   - Assess range of motion  - Assess patient's mobility; develop plan if impaired  - Assess patient's need for assistive devices and provide as appropriate  - Encourage maximum independence but intervene and supervise when necessary  - Involve family in performance of ADLs  - Assess for home care needs following discharge   - Consider OT consult to assist with ADL evaluation and planning for discharge  - Provide patient education as appropriate  Outcome: Progressing  Goal: Maintains/Returns to pre admission functional level  Description: INTERVENTIONS:  - Perform BMAT or MOVE assessment daily    - Set and communicate daily mobility goal to care team and patient/family/caregiver  - Collaborate with rehabilitation services on mobility goals if consulted  - Perform Range of Motion 4 times a day  - Reposition patient every 2 hours    - Dangle patient 3 times a day  - Stand patient 3 times a day  - Ambulate patient 3 times a day  - Out of bed to chair 3 times a day   - Out of bed for meals 3 times a day  - Out of bed for toileting  - Record patient progress and toleration of activity level   Outcome: Progressing     Problem: DISCHARGE PLANNING  Goal: Discharge to home or other facility with appropriate resources  Description: INTERVENTIONS:  - Identify barriers to discharge w/patient and caregiver  - Arrange for needed discharge resources and transportation as appropriate  - Identify discharge learning needs (meds, wound care, etc )  - Arrange for interpretive services to assist at discharge as needed  - Refer to Case Management Department for coordinating discharge planning if the patient needs post-hospital services based on physician/advanced practitioner order or complex needs related to functional status, cognitive ability, or social support system  Outcome: Progressing     Problem: Knowledge Deficit  Goal: Patient/family/caregiver demonstrates understanding of disease process, treatment plan, medications, and discharge instructions  Description: Complete learning assessment and assess knowledge base    Interventions:  - Provide teaching at level of understanding  - Provide teaching via preferred learning methods  Outcome: Progressing

## 2023-01-05 NOTE — PROGRESS NOTES
Spiritual Care Progress Note    2023  Patient: Yolande Gross : 1975  Admission Date & Time: 2023 0029  MRN: [id number] [facility]: [id number]     visited patient per RN referral  Sanam Joiner introduced self & role  Patient thanked  for offering, but declined spiritual support at this time   will be available if requested               Chaplaincy Interventions Utilized:   Empowerment: Provided chaplaincy education      Chaplaincy Outcomes Achieved:  Declined  support     23 1500   Clinical Encounter Type   Visited With Patient   Routine Visit Introduction   Referral From Nurse

## 2023-01-05 NOTE — PSYCH
This note was not shared with the patient due to this is a psychotherapy note    Psychotherapy Provided: Individual Psychotherapy 45 minutes     Length of time in session: 45 minutes, follow up in 1 week    Encounter Diagnosis     ICD-10-CM    1  Moderate episode of recurrent major depressive disorder (HCC)  F33 1       2  Generalized anxiety disorder with panic attacks  F41 1     F41 0       3  Nicotine dependence, cigarettes, uncomplicated  [id number]           Goals addressed in session: Goal 1     Pain:      none    0    Current suicide risk : Low     DATA: Met with Anshul Carl for scheduled individual session  Reported being in the hospital after having gone to the ER everyday for the past week due to low calcium  She said that she may have diabetes and is prepared to make positive life changes  Shared cutting off her sister who continues to worsen psychologically  Acknowledged and processed how this is difficult for her but the ultimate gift of self love  Will work on maintaining this boundary as well as welcoming more support from others  ASSESSMENT: Anshul Carl presents with a sad mood  Her affect is mood-congruent  Anshul Carl exhibits good therapeutic rapport with this clinician  Anshul Carl continues to exhibit willingness to work on treatment goals and objectives  Reports feeling lighter since cutting off her sister  No new depressive symptoms  Can acknowledge some physical signs of distress to current family problems  No safety risks reported or observed  PLAN: Anshul Carl will return in 1 week for the next scheduled session  Between sessions, Anshul Carl will maintain the boundary set with her sister and will adhere to discharge instructions regarding lifestyle, which she anticipates, and will report back during the next session re: successes and barriers       Behavioral Health Treatment Plan [facility]: Diagnosis and Treatment Plan explained to Mckenna Sommer relates understanding diagnosis and is agreeable to Treatment Plan   Yes     Visit start and stop times:    01/05/23  Start Time: 1435  Stop Time: 1520  Total Visit Time: 45 minutes

## 2023-01-06 ENCOUNTER — HOSPITAL ENCOUNTER (OUTPATIENT)
Dept: NON INVASIVE DIAGNOSTICS | Facility: HOSPITAL | Age: 48
Discharge: HOME | End: 2023-01-06

## 2023-01-06 PROBLEM — E83.52 HYPERCALCEMIA: Status: RESOLVED | Noted: 2022-05-31 | Resolved: 2023-01-06

## 2023-01-06 LAB
ANA HOMOGEN SER QL IF: NORMAL
ANA HOMOGEN TITR SER: NORMAL {TITER}
ANA SER QL IA: POSITIVE
ANION GAP SERPL CALCULATED.3IONS-SCNC: 12 MMOL/L (ref 4–13)
BUN SERPL-MCNC: 25 MG/DL (ref 5–25)
CA-I BLD-SCNC: 1.02 MMOL/L (ref 1.12–1.32)
CA-I BLD-SCNC: 1.05 MMOL/L (ref 1.12–1.32)
CALCIUM SERPL-MCNC: 7.6 MG/DL (ref 8.3–10.1)
CALCIUM SERPL-MCNC: 8.2 MG/DL (ref 8.3–10.1)
CHLORIDE SERPL-SCNC: 102 MMOL/L (ref 96–108)
CHROMATIN AB SERPL-ACNC: NEGATIVE
CO2 SERPL-SCNC: 24 MMOL/L (ref 21–32)
CREAT SERPL-MCNC: 1 MG/DL (ref 0.6–1.3)
DSDNA AB SER-ACNC: <4 IU/ML
ENA SS-A AB SER IA-ACNC: NEGATIVE
ENA SS-B AB SER IA-ACNC: NEGATIVE
FOLATE SERPL-MCNC: 7.1 NG/ML (ref 3.1–17.5)
GFR SERPL CREATININE-BSD FRML MDRD: 67 ML/MIN/1.73SQ M
GLUCOSE P FAST SERPL-MCNC: 155 MG/DL (ref 65–99)
GLUCOSE SERPL-MCNC: 155 MG/DL (ref 65–140)
MAGNESIUM SERPL-MCNC: 1.9 MG/DL (ref 1.6–2.6)
PHOSPHATE SERPL-MCNC: 4.3 MG/DL (ref 2.7–4.5)
POTASSIUM SERPL-SCNC: 4.1 MMOL/L (ref 3.5–5.3)
PTH-INTACT SERPL-MCNC: <6.3 PG/ML (ref 18.4–80.1)
SODIUM SERPL-SCNC: 138 MMOL/L (ref 135–147)
VIT B12 SERPL-MCNC: 316 PG/ML (ref 100–900)

## 2023-01-06 RX ORDER — CALCIUM GLUCONATE 20 MG/ML
2 INJECTION, SOLUTION INTRAVENOUS DAILY PRN
Status: DISCONTINUED | OUTPATIENT
Start: 2023-01-06 | End: 2023-01-07

## 2023-01-06 RX ORDER — CALCIUM CARBONATE 200(500)MG
500 TABLET,CHEWABLE ORAL DAILY PRN
Status: DISCONTINUED | OUTPATIENT
Start: 2023-01-06 | End: 2023-01-06

## 2023-01-06 RX ORDER — CALCIUM CARBONATE 200(500)MG
500 TABLET,CHEWABLE ORAL DAILY
Status: DISCONTINUED | OUTPATIENT
Start: 2023-01-06 | End: 2023-01-06

## 2023-01-06 RX ORDER — CALCIUM CARBONATE 200(500)MG
1000 TABLET,CHEWABLE ORAL
Status: DISCONTINUED | OUTPATIENT
Start: 2023-01-06 | End: 2023-01-09

## 2023-01-06 RX ADMIN — PANTOPRAZOLE SODIUM 40 MG: 40 TABLET, DELAYED RELEASE ORAL at 06:20

## 2023-01-06 RX ADMIN — DOCUSATE SODIUM 100 MG: 100 CAPSULE, LIQUID FILLED ORAL at 18:17

## 2023-01-06 RX ADMIN — FENOFIBRATE 168 MG: 48 TABLET, FILM COATED ORAL at 09:24

## 2023-01-06 RX ADMIN — PRAMIPEXOLE DIHYDROCHLORIDE 0.5 MG: 0.5 TABLET ORAL at 23:41

## 2023-01-06 RX ADMIN — CALCITRIOL 0.5 MCG: 0.25 CAPSULE, LIQUID FILLED ORAL at 09:25

## 2023-01-06 RX ADMIN — LEVOTHYROXINE SODIUM 175 MCG: 150 TABLET ORAL at 06:20

## 2023-01-06 RX ADMIN — CALCIUM GLUCONATE 2 G: 20 INJECTION, SOLUTION INTRAVENOUS at 19:21

## 2023-01-06 RX ADMIN — ANTACID TABLETS 500 MG: 500 TABLET, CHEWABLE ORAL at 11:39

## 2023-01-06 RX ADMIN — MAGNESIUM OXIDE TAB 400 MG (241.3 MG ELEMENTAL MG) 400 MG: 400 (241.3 MG) TAB at 23:41

## 2023-01-06 RX ADMIN — MAGNESIUM OXIDE TAB 400 MG (241.3 MG ELEMENTAL MG) 800 MG: 400 (241.3 MG) TAB at 09:25

## 2023-01-06 RX ADMIN — POTASSIUM CHLORIDE 20 MEQ: 1500 TABLET, EXTENDED RELEASE ORAL at 11:45

## 2023-01-06 RX ADMIN — HEPARIN SODIUM 5000 UNITS: 5000 INJECTION INTRAVENOUS; SUBCUTANEOUS at 15:13

## 2023-01-06 RX ADMIN — CALCITRIOL 0.5 MCG: 0.25 CAPSULE, LIQUID FILLED ORAL at 18:09

## 2023-01-06 RX ADMIN — ALPRAZOLAM 2 MG: 0.5 TABLET ORAL at 18:17

## 2023-01-06 RX ADMIN — DOCUSATE SODIUM 100 MG: 100 CAPSULE, LIQUID FILLED ORAL at 09:25

## 2023-01-06 RX ADMIN — POTASSIUM CHLORIDE 20 MEQ: 1500 TABLET, EXTENDED RELEASE ORAL at 23:41

## 2023-01-06 RX ADMIN — FERROUS SULFATE TAB 325 MG (65 MG ELEMENTAL FE) 325 MG: 325 (65 FE) TAB at 09:25

## 2023-01-06 RX ADMIN — CALCIUM CARBONATE 1250 MG: 1250 SUSPENSION ORAL at 09:26

## 2023-01-06 RX ADMIN — DESVENLAFAXINE 50 MG: 50 TABLET, FILM COATED, EXTENDED RELEASE ORAL at 23:40

## 2023-01-06 RX ADMIN — ANTACID TABLETS 1000 MG: 500 TABLET, CHEWABLE ORAL at 18:09

## 2023-01-06 NOTE — UTILIZATION REVIEW
Initial Clinical Review    Admission: Date/Time/Statement:   Admission Orders (From admission, onward)     Ordered        01/05/23 0104  Place in Observation  Once                      Orders Placed This Encounter   Procedures   • Place in Observation     Standing Status:   Standing     Number of Occurrences:   1     Order Specific Question:   Level of Care     Answer:   Med Surg [16]     ED Arrival Information     Expected   -    Arrival   1/5/2023 00:05    Acuity   Less Urgent            Means of arrival   Walk-In    Escorted by   Self    Service   Hospitalist    Admission type   Emergency            Arrival complaint   calcium           Chief Complaint   Patient presents with   • Abnormal Lab     Patient was in ER earlier today, and reports she was told by ER provider to return tonight for inpatient hospitalization  Initial Presentation: 56 y o  female presents to ed from home for evaluation and treatment of low calcium  She was previously recommended to be hospitalized for the same but she left ama  PMHX: thyroidectomy  Clinical assessment significant for calcium 7 6 ionized calcium 0 99  Initially treated with iv calcium gluconate  Admit to observation for hypocalcemia  Date: 1-6-23   Day 2: observation   goal calcium 8 2 to 8 5  Start po Rocalitrol, calcium carbonate, thyroxine  Monitor calcium twice a day and adjust dosage      ED Triage Vitals [01/05/23 0034]   98 1 °F (36 7 °C) 95 18 122/77 98 %      Oral Monitor         Pain Score       4          01/05/23 97 5 kg (215 lb)     Additional Vital Signs:       Date/Time Temp Pulse Resp BP MAP (mmHg) SpO2 O2 Device   01/06/23 14:47:11 97 3 °F (36 3 °C)   Abnormal  86 -- 134/88 103 98 % --   01/06/23 09:22:37 98 °F (36 7 °C) 87 20 130/84 99 97 % --   01/06/23 03:36:39 -- 84 -- -- -- 92 % --   01/06/23 01:40:09 -- 84 -- -- -- 97 % None (Room air)   01/05/23 23:13:16 97 9 °F (36 6 °C) 99 18 106/63 77 86 % Abnormal  --   01/05/23 19:38:46 98 2 °F (36 8 °C) 98 -- 106/62 77 90 % None (Room air)   01/05/23 19:38:28 98 2 °F (36 8 °C) 92 -- 106/62 77 88 % Abnormal  --   01/05/23 15:26:10 97 9 °F (36 6 °C) 94 18 100/65 77 90 % --   01/05/23 15:25:43 97 9 °F (36 6 °C) 90 18 100/65 77 91 % --   01/05/23 14:46:47 97 7 °F (36 5 °C) 88 16 97/69 78 87 % Abnormal  --   01/05/23 07:52:40 97 9 °F (36 6 °C) 86 18 103/70 81 94 % None (Room air)   01/05/23 0300 98 1 °F (36 7 °C) 93 18 119/78 92 93 % None (Room air)   01/05/23 0034 98 1 °F (36 7 °C) 95 18 122/77 -- 98 % None (Room air         Pertinent Labs/Diagnostic Test Results:     Results from last 7 days   Lab Units 01/05/23  0425 01/04/23  1509 01/01/23  0504 12/31/22  1640   WBC Thousand/uL 7 71 8 36 8 20 8 39   HEMOGLOBIN g/dL 12 5 13 2 13 1 12 7   HEMATOCRIT % 37 8 40 4 38 0 39 3   PLATELETS Thousands/uL 172 193 222 197   NEUTROS ABS Thousands/µL 4 91 5 53 5 00 5 23         Results from last 7 days   Lab Units 01/06/23  1607 01/06/23  0612 01/05/23  2043 01/05/23  0947 01/05/23  0425 01/05/23  0053 01/04/23  1805 01/04/23  1659 01/04/23  1509 01/02/23  1417 01/02/23  1359   SODIUM mmol/L  --  138  --   --  135  --  132*  --  135  --  134*   POTASSIUM mmol/L  --  4 1  --   --  3 9  --  3 9  --  4 0  --  4 0   CHLORIDE mmol/L  --  102  --   --  100  --  98  --  98  --  97   CO2 mmol/L  --  24  --   --  18*  --  20*  --  21  --  26   ANION GAP mmol/L  --  12  --   --  17*  --  14*  --  16*  --  11   BUN mg/dL  --  25  --   --  26*  --  23  --  24  --  23   CREATININE mg/dL  --  1 00  --   --  1 15  --  0 91  --  0 98  --  1 08   EGFR ml/min/1 73sq m  --  67  --   --  57  --  75  --  69  --  61   CALCIUM mg/dL 8 2* 7 6* 7 4*  --  8 1*  --  9 3  --  7 4*  --  7 5*   CALCIUM, IONIZED mmol/L 1 05* 1 02* 0 95*  --  1 10* 0 99* 1 29   < >  --    < >  --    MAGNESIUM mg/dL  --  1 9  --  2 3  --   --   --   --  1 9  --   --    PHOSPHORUS mg/dL  --  4 3  --   --   --   --   --   --  4 2  --   --     < > = values in this interval not displayed       Results from last 7 days   Lab Units 01/05/23  0947 01/04/23  1509 01/01/23  0504 12/31/22  1640   AST U/L  --  31 15 20   ALT U/L  --  52 31 54   ALK PHOS U/L  --  138* 114* 123*   TOTAL PROTEIN g/dL  --  7 0 6 3* 7 2   ALBUMIN g/dL 3 6 3 3* 3 8 3 7   TOTAL BILIRUBIN mg/dL  --  0 23 0 29 0 24   BILIRUBIN DIRECT mg/dL  --  0 05  --   --          Results from last 7 days   Lab Units 01/06/23  0612 01/05/23  0425 01/04/23  1805 01/04/23  1509 01/02/23  1359 01/01/23  0504 12/31/22  1640   GLUCOSE RANDOM mg/dL 155* 196* 151* 188* 211* 193* 112             BETA-HYDROXYBUTYRATE   Date Value Ref Range Status   01/05/2023 0 1 <0 6 mmol/L Final        Results from last 7 days   Lab Units 01/02/23  1417 01/01/23  0646 01/01/23  0504   HS TNI 0HR ng/L 2  --  7   HS TNI 2HR ng/L  --  6  --    HSTNI D2 ng/L  --  -1  --              Results from last 7 days   Lab Units 01/04/23  1509   TSH 3RD GENERATON uIU/mL 10 334*         Results from last 7 days   Lab Units 01/05/23  1349   LACTIC ACID mmol/L 0 8       ED Treatment:   Medication Administration from 01/05/2023 0005 to 01/05/2023 0252       Date/Time Order Dose Route Action     01/05/2023 0137 EST calcium gluconate 2 g in sodium chloride 0 9% 100 mL (premix) 2 g Intravenous New Bag        Past Medical History:   Diagnosis Date   • Abdominal pain    • Acid reflux    • Acute renal failure (HCC)     multiple episodes   • Anemia    • Anxiety    • Anxiety 3/31/2021   • Arthritis    • Asthma    • Back pain    • Chronic pain    • DDD (degenerative disc disease), lumbar    • Depression    • Disease of thyroid gland     had surgery and now hypo   • DVT (deep venous thrombosis) ([facility] [address] ) 2009    s/p ankle fracture   • Headache    • History of transfusion    • Hypercalcemia    • Hyperlipidemia    • Hyperthyroidism    • Hypocalcemia     post op 2016   • Kidney stone    • Lightheadedness    • Migraine    • Obesity    • Palpitations    • Psychiatric disorder     anxiety depression   • PTSD (post-traumatic stress disorder) 10/28/2022   • Seizures ([address] )     petit mal x1  4 years ago- all tests were neg  • SOB (shortness of breath)    • Spondylolisthesis of lumbar region    • Treatment     spinal pain injections  last was 7-7-2016   • Wears glasses      Present on Admission:  • Hypocalcemia  • DDD (degenerative disc disease), lumbar  • Nicotine dependence  • Postoperative hypothyroidism      Admitting Diagnosis: Hypocalcemia [E83 51]     Age/Sex: 56 y o  female    Scheduled Medications:    calcitriol, 0 5 mcg, Oral, BID With Meals  calcium carbonate, 1,000 mg, Oral, TID With Meals  desvenlafaxine succinate, 50 mg, Oral, Daily  docusate sodium, 100 mg, Oral, BID  ergocalciferol, 50,000 Units, Oral, Weekly  fenofibrate, 168 mg, Oral, Daily  ferrous sulfate, 325 mg, Oral, Daily With Breakfast  heparin (porcine), 5,000 Units, Subcutaneous, Q8H QUINN  levothyroxine, 175 mcg, Oral, Early Morning  lidocaine, 1 patch, Topical, Daily  magnesium oxide, 400 mg, Oral, HS  magnesium oxide, 800 mg, Oral, Daily  pantoprazole, 40 mg, Oral, Early Morning  potassium chloride, 20 mEq, Oral, BID  pramipexole, 0 5 mg, Oral, HS      Continuous IV Infusions:     PRN Meds:  ALPRAZolam, 2 mg, Oral, BID PRN  baclofen, 10 mg, Oral, TID PRN  calcium gluconate, 2 g, Intravenous, Daily PRN        IP CONSULT TO ENDOCRINOLOGY    Network Utilization Review Department  ATTENTION: Please call with any questions or concerns to [phone] and carefully listen to the prompts so that you are directed to the right person  All voicemails are confidential   [facility] all requests for admission clinical reviews, approved or denied determinations and any other requests to dedicated fax number below belonging to the campus where the patient is receiving treatment   List of dedicated fax numbers for the Facilities:  [address] [place] [address] DENIALS (Administrative/Medical Necessity) [phone]   [address] [place] [address] (Maternity/NICU/Pediatrics) [address] [place]  [phone]   [address] [place] [address] [facility] 310 [address] 134 [address] [place] [phone]

## 2023-01-06 NOTE — NURSING NOTE
Discussed the Calcium and ionized calcium results and the PRN calcium gluconate order with Sandrita MARINO PA-C;pt says she has some numbness,tingling but it is not bad and not worse  Also discussed the 2 different doses of calcium gluconate she had already;advised to give the PRN dose if pt c/o any numbness/tingling  Will continue to monitor

## 2023-01-06 NOTE — PROGRESS NOTES
Charis Smith is 23-year-old woman with medical history of postsurgical hypoparathyroidism, hypocalcemia, multiple admission to the ED for recurrent hypocalcemia, vitamin D deficiency  Patient received Rocaltrol 0 5 mcg, x2  Morning, 6 AM calcium 7 6 and ionized calcium was 1 02 (ranges 1 1-1 3 )    Recommendations  -Goal for calcium is 8 2-8 5 range, for discharge with no symptoms of tingling and numbness  -Please monitor calcium every 12 hours, in AM and PM   -Recommend hospitalization, till calcium is stabilized x2  -She has received only 2 doses of Rocaltrol, we will have to monitor her calcium twice a day, and adjust Rocaltrol accordingly   -For now continue Rocaltrol 0 5 MCG twice a day (should not miss doses)  -Usually takes 48 hours to see the effect of Rocaltrol adjustment   -Continue calcium carbonate 1000 mg 3 times daily  -Thyroxine was increased to 175 mcg daily yesterday, repeat TSH and free T4 in 6 weeks on outpatient  Please call endocrinology if you have any questions    Patient has follow-up with endocrinology    Plan discussed with primary team , dr Jose Cruz Garcia as well as ED physician,dr Dr Roosevelt Staley

## 2023-01-06 NOTE — PROGRESS NOTES
[facility] [address]  Progress Note - Deondre March 1975, 56 y o  female MRN: [id number]  Unit/Bed#: [address] [place] [address] Encounter: [id number]  Primary Care Provider: Kye Albert MD   Date and time admitted to hospital: 1/5/2023 12:29 AM    Nicotine dependence  Assessment & Plan  Recommend cessation, declined nicotine patch    Postoperative hypothyroidism  Assessment & Plan  History of thyroidectomy, parathyroidectomy    · TSH noted to be 10 334  · Currently on levothyroxine 150mcg  · Check free T4    DDD (degenerative disc disease), lumbar  Assessment & Plan  DDD, receives steroid injections and baclofen prn    * Hypocalcemia  Assessment & Plan  Patient presents with numbness, tingling of extremities - has been seen in the ED 6 times in the last 5 days for the same symptoms, calcium has been repleted, and then returns with hypocalcemia again  · Possibly secondary to recent steroid injections? · Check urine calcium  · Ionized calcium 0 99  · Given 2g calcium  · Recheck labs in AM  · Continue calcitriol, calcium carbonate  · Endocrinology recs: -We will increase Rocaltrol to 0 5 mg twice a day, -As patient did not receive Rocaltrol in the morning ,give 1 dose now  Patient did not receive Rocaltrol today Start calcium carbonate, 1250 mg three times a day  -Obtain ionized Calcium in the morning  Obtain serum magnesium as well  Patient will need monitoring of calcium every 12 hours, if she has symptoms  Put the order for calcium gluconate for as needed, if she has symptoms of hypocalcemia calcium less than 8 0        VTE Pharmacologic Prophylaxis:   Moderate Risk (Score 3-4) - Pharmacological DVT Prophylaxis Ordered: heparin  Patient Centered Rounds: I performed bedside rounds with nursing staff today  Discussions with Specialists or Other Care Team Provider: Endo    Education and Discussions with Family / Patient: Patient declined call to        Time Spent for Care: 45 minutes  More than 50% of total time spent on counseling and coordination of care as described above  Current Length of Stay: 0 day(s)  Current Patient Status: Observation   Certification Statement: The patient will continue to require additional inpatient hospital stay due to clincal course  Discharge Plan: Anticipate discharge in 24-48 hrs to home  Code Status: Level 1 - Full Code    Subjective:   Patient seen and examined at bedside, discussed current calcium levels, plan of care, endocrinology Josh and need for recurrent calcium infusion plus oral supplements and vitamin D     Objective:     Vitals:   Temp (24hrs), Av 9 °F (36 6 °C), Min:97 3 °F (36 3 °C), Max:98 2 °F (36 8 °C)    Temp:  [97 3 °F (36 3 °C)-98 2 °F (36 8 °C)] 97 3 °F (36 3 °C)  HR:  [84-99] 86  Resp:  [18-20] 20  BP: (106-134)/(62-88) 134/88  SpO2:  [86 %-98 %] 98 %  Body mass index is 39 32 kg/m²  Input and Output Summary (last 24 hours): Intake/Output Summary (Last 24 hours) at 2023 1832  Last data filed at 2023 2334  Gross per 24 hour   Intake 260 ml   Output --   Net 260 ml       Physical Exam:   Physical Exam  Vitals and nursing note reviewed  Constitutional:       General: She is not in acute distress  Appearance: She is well-developed  She is obese  HENT:      Head: Normocephalic and atraumatic  Eyes:      Conjunctiva/sclera: Conjunctivae normal    Cardiovascular:      Rate and Rhythm: Normal rate and regular rhythm  Heart sounds: No murmur heard  No friction rub  No gallop  Pulmonary:      Effort: Pulmonary effort is normal  No respiratory distress  Breath sounds: Normal breath sounds  No stridor  No wheezing, rhonchi or rales  Abdominal:      Palpations: Abdomen is soft  Tenderness: There is no abdominal tenderness  There is no guarding or rebound  Musculoskeletal:         General: No swelling or tenderness  Cervical back: Neck supple  Right lower leg: No edema  Left lower leg: No edema  Skin:     General: Skin is warm and dry  Capillary Refill: Capillary refill takes less than 2 seconds  Findings: No bruising  Neurological:      Mental Status: She is alert and oriented to person, place, and time  Motor: No weakness  Psychiatric:         Mood and Affect: Mood normal          Behavior: Behavior normal           Additional Data:     Labs:  Results from last 7 days   Lab Units 01/05/23  0425   WBC Thousand/uL 7 71   HEMOGLOBIN g/dL 12 5   HEMATOCRIT % 37 8   PLATELETS Thousands/uL 172   NEUTROS PCT % 63   LYMPHS PCT % 28   MONOS PCT % 6   EOS PCT % 1     Results from last 7 days   Lab Units 01/06/23  1607 01/06/23  0612 01/05/23  2043 01/05/23  0947 01/04/23  1805 01/04/23  1509   SODIUM mmol/L  --  138  --   --    < > 135   POTASSIUM mmol/L  --  4 1  --   --    < > 4 0   CHLORIDE mmol/L  --  102  --   --    < > 98   CO2 mmol/L  --  24  --   --    < > 21   BUN mg/dL  --  25  --   --    < > 24   CREATININE mg/dL  --  1 00  --   --    < > 0 98   ANION GAP mmol/L  --  12  --   --    < > 16*   CALCIUM mg/dL 8 2* 7 6*   < >  --    < > 7 4*   ALBUMIN g/dL  --   --   --  3 6  --  3 3*   TOTAL BILIRUBIN mg/dL  --   --   --   --   --  0 23   ALK PHOS U/L  --   --   --   --   --  138*   ALT U/L  --   --   --   --   --  52   AST U/L  --   --   --   --   --  31   GLUCOSE RANDOM mg/dL  --  155*  --   --    < > 188*    < > = values in this interval not displayed                   Results from last 7 days   Lab Units 01/05/23  1349   LACTIC ACID mmol/L 0 8       Lines/Drains:  Invasive Devices     Peripheral Intravenous Line  Duration           Peripheral IV Distal;Right;Ventral (anterior) Forearm -- days    Peripheral IV 01/06/23 Right Wrist <1 day                      Imaging: Reviewed radiology reports from this admission including: chest xray    Recent Cultures (last 7 days):         Last 24 Hours Medication List:   Current Facility-Administered Medications Medication Dose Route Frequency Provider Last Rate   • ALPRAZolam  2 mg Oral BID PRN Caro Abreu MD     • baclofen  10 mg Oral TID PRN Shayne Forbes PA-C     • calcitriol  0 5 mcg Oral BID With Meals Caro Abreu MD     • calcium carbonate  1,000 mg Oral TID With Meals Flower Alarcon MD     • calcium gluconate  2 g Intravenous Daily PRN Caro Abreu MD     • desvenlafaxine succinate  50 mg Oral Daily Sandrita Rush PA-C     • docusate sodium  100 mg Oral BID Shayne Forbes PA-C     • ergocalciferol  50,000 Units Oral Weekly Caro Abreu MD     • fenofibrate  168 mg Oral Daily Sandrita Rush PA-C     • ferrous sulfate  325 mg Oral Daily With Breakfast Sandrita Rush PA-C     • heparin (porcine)  5,000 Units Subcutaneous Q8H [facility] Sandrita Rush PA-C     • levothyroxine  175 mcg Oral Early Morning Flower Alarcon MD     • lidocaine  1 patch Topical Daily Caro Abreu MD     • magnesium oxide  400 mg Oral HS Caro Abreu MD     • magnesium oxide  800 mg Oral Daily Caro Abreu MD     • pantoprazole  40 mg Oral Early Morning Sandrita Rush PA-C     • potassium chloride  20 mEq Oral BID Shayne Forbes PA-C     • pramipexole  0 5 mg Oral HS Shayne Forbes PA-C          Today, Patient Was Seen By: Caro Abreu MD    **Please Note: This note may have been constructed using a voice recognition system  **

## 2023-01-06 NOTE — ASSESSMENT & PLAN NOTE
Patient presents with numbness, tingling of extremities - has been seen in the ED 6 times in the last 5 days for the same symptoms, calcium has been repleted, and then returns with hypocalcemia again  · Possibly secondary to recent steroid injections? · Check urine calcium  · Ionized calcium 0 99  · Given 2g calcium  · Recheck labs in AM  · Continue calcitriol, calcium carbonate  · Endocrinology recs: -We will increase Rocaltrol to 0 5 mg twice a day, -As patient did not receive Rocaltrol in the morning ,give 1 dose now  Patient did not receive Rocaltrol today Start calcium carbonate, 1250 mg three times a day  -Obtain ionized Calcium in the morning  Obtain serum magnesium as well  Patient will need monitoring of calcium every 12 hours, if she has symptoms   Put the order for calcium gluconate for as needed, if she has symptoms of hypocalcemia calcium less than 8 0

## 2023-01-06 NOTE — CASE MANAGEMENT
Case Management Assessment & Discharge Planning Note    Patient name Lauren Mckay  Location [address]-* MRN [id number]  : 1975 Date 2023       Current Admission Date: 2023  Current Admission Diagnosis:Hypocalcemia   Patient Active Problem List    Diagnosis Date Noted   • PTSD (post-traumatic stress disorder) 10/28/2022   • Fatty liver disease, nonalcoholic    • Splenomegaly 2022   • Panic attacks 2022   • Chronic intractable pain 2022   • LELAND (acute kidney injury) ([facility] [address] ) 2022   • Chronic anxiety 2022   • Chronic fatigue syndrome 2022   • Hypoparathyroidism ([address] ) 2022   • Vitamin D deficiency 2021   • Hypocalcemia 2021   • Anxiety 2021   • Elevated ALT measurement 2019   • Nicotine dependence 2017   • Postsurgical hypoparathyroidism ([facility] [address] ) 2016   • Postoperative hypothyroidism 2016   • DDD (degenerative disc disease), lumbar    • Anemia 2014   • Hyperthyroidism 09/10/2013      LOS (days): 0  Geometric Mean LOS (GMLOS) (days):   Days to GMLOS:     OBJECTIVE:              Current admission status: Observation       Preferred Pharmacy:   [id number] [address], [address]  Phone: [phone] Fax: [phone]    Primary Care Provider: Nila Riggins MD    Primary Insurance: BLUE CROSS  Secondary Insurance: [address] [place], [place] [address]e [facility]    ASSESSMENT:  [address] [facility], [address] [facility] Representative - Daughter   Primary Phone: [phone] (Mobile)            Patient Information  Admitted from[de-identified] Home  Mental Status: Alert  During Assessment patient was accompanied by: Not accompanied during assessment  Assessment information provided by[de-identified] Patient  Primary Caregiver: Self  Support Systems: Spouse/significant other, Terrell [address] of Residence: [address] [place] [address] do you live in?: [place]  In the last 12 months, was there a time when you were not able to pay the mortgage or rent on time?: No  In the last 12 months, how many places have you lived?: 1  In the last 12 months, was there a time when you did not have a steady place to sleep or slept in a shelter (including now)?: No  Homeless/housing insecurity resource given?: No  Living Arrangements: Lives w/ Spouse/significant other, Lives w/ Extended Family  Is patient a ?: No    Activities of Daily Living Prior to Admission  Functional Status: Independent  Completes ADLs independently?: Yes  Ambulates independently?: Yes  Does patient use assisted devices?: No  Does patient currently own DME?: No  Does patient have a history of Outpatient Therapy (PT/OT)?: No  Does the patient have a history of Short-Term Rehab?: No  Does patient have a history of HHC?: No  Does patient currently have [facility]?: No    Patient Information Continued  Income Source: SSI/SSD  Does patient have prescription coverage?: Yes  Within the past 12 months, you worried that your food would run out before you got the money to buy more : Never true  Within the past 12 months, the food you bought just didn't last and you didn't have money to get more : Never true  Food insecurity resource given?: No  Does patient receive dialysis treatments?: No  Does patient have a history of substance abuse?: No  Does patient have a history of Mental Health Diagnosis?: Yes (Anxiety, PTSD)  Is patient receiving treatment for mental health?: Yes  Has patient received inpatient treatment related to mental health in the last 2 years?: No    PHQ 2/9 Screening   Reviewed PHQ 2/9 Depression Screening Score?: No    Means of Transportation  Means of Transport to Appts[de-identified] Family transport  In the past 12 months, has lack of transportation kept you from medical appointments or from getting medications?: No  In the past 12 months, has lack of transportation kept you from meetings, work, or from getting things needed for daily living?: No  Was application for public transport provided?: No    DISCHARGE DETAILS:    Discharge planning discussed with[de-identified] Patient  Freedom of Choice: Yes     CM contacted family/caregiver?: No- see comments (Declined call at this time )  Were Treatment Team discharge recommendations reviewed with patient/caregiver?: Yes  Did patient/caregiver verbalize understanding of patient care needs?: Yes  Were patient/caregiver advised of the risks associated with not following Treatment Team discharge recommendations?: Yes    Requested [address]         Is the patient interested in [place] [address] at discharge?: No    DME Referral Provided  Referral made for DME?: No    Other Referral/Resources/Interventions Provided:  Interventions: None Indicated    Would you like to participate in our [address] service program?  : No - Declined    Treatment Team Recommendation: Home  Discharge Destination Plan[de-identified] Home  Transport at Discharge : Family

## 2023-01-07 PROBLEM — R73.9 HYPERGLYCEMIA: Status: ACTIVE | Noted: 2023-01-07

## 2023-01-07 LAB
ALBUMIN SERPL BCP-MCNC: 3.4 G/DL (ref 3.5–5)
ALP SERPL-CCNC: 157 U/L (ref 46–116)
ALT SERPL W P-5'-P-CCNC: 77 U/L (ref 12–78)
ANION GAP SERPL CALCULATED.3IONS-SCNC: 13 MMOL/L (ref 4–13)
ANION GAP SERPL CALCULATED.3IONS-SCNC: 17 MMOL/L (ref 4–13)
AST SERPL W P-5'-P-CCNC: 42 U/L (ref 5–45)
BILIRUB SERPL-MCNC: 0.32 MG/DL (ref 0.2–1)
BUN SERPL-MCNC: 21 MG/DL (ref 5–25)
BUN SERPL-MCNC: 22 MG/DL (ref 5–25)
CA-I BLD-SCNC: 1.07 MMOL/L (ref 1.12–1.32)
CA-I BLD-SCNC: 1.14 MMOL/L (ref 1.12–1.32)
CALCIUM ALBUM COR SERPL-MCNC: 8.5 MG/DL (ref 8.3–10.1)
CALCIUM SERPL-MCNC: 8 MG/DL (ref 8.3–10.1)
CALCIUM SERPL-MCNC: 9 MG/DL (ref 8.3–10.1)
CHLORIDE SERPL-SCNC: 102 MMOL/L (ref 96–108)
CHLORIDE SERPL-SCNC: 96 MMOL/L (ref 96–108)
CO2 SERPL-SCNC: 20 MMOL/L (ref 21–32)
CO2 SERPL-SCNC: 22 MMOL/L (ref 21–32)
CREAT SERPL-MCNC: 1.09 MG/DL (ref 0.6–1.3)
CREAT SERPL-MCNC: 1.17 MG/DL (ref 0.6–1.3)
GFR SERPL CREATININE-BSD FRML MDRD: 56 ML/MIN/1.73SQ M
GFR SERPL CREATININE-BSD FRML MDRD: 61 ML/MIN/1.73SQ M
GLUCOSE SERPL-MCNC: 172 MG/DL (ref 65–140)
GLUCOSE SERPL-MCNC: 206 MG/DL (ref 65–140)
MAGNESIUM SERPL-MCNC: 1.9 MG/DL (ref 1.6–2.6)
MAGNESIUM SERPL-MCNC: 2 MG/DL (ref 1.6–2.6)
POTASSIUM SERPL-SCNC: 4.2 MMOL/L (ref 3.5–5.3)
POTASSIUM SERPL-SCNC: 4.5 MMOL/L (ref 3.5–5.3)
PROT SERPL-MCNC: 7 G/DL (ref 6.4–8.4)
SODIUM SERPL-SCNC: 133 MMOL/L (ref 135–147)
SODIUM SERPL-SCNC: 137 MMOL/L (ref 135–147)

## 2023-01-07 RX ORDER — CALCIUM GLUCONATE 20 MG/ML
2 INJECTION, SOLUTION INTRAVENOUS DAILY PRN
Status: COMPLETED | OUTPATIENT
Start: 2023-01-07 | End: 2023-01-07

## 2023-01-07 RX ORDER — FERROUS SULFATE 325(65) MG
325 TABLET ORAL
Status: DISCONTINUED | OUTPATIENT
Start: 2023-01-09 | End: 2023-01-10 | Stop reason: HOSPADM

## 2023-01-07 RX ORDER — ALPRAZOLAM 0.5 MG/1
0.5 TABLET ORAL 2 TIMES DAILY
Status: DISCONTINUED | OUTPATIENT
Start: 2023-01-07 | End: 2023-01-08

## 2023-01-07 RX ADMIN — HEPARIN SODIUM 5000 UNITS: 5000 INJECTION INTRAVENOUS; SUBCUTANEOUS at 21:33

## 2023-01-07 RX ADMIN — CALCITRIOL 0.5 MCG: 0.25 CAPSULE, LIQUID FILLED ORAL at 17:49

## 2023-01-07 RX ADMIN — DOCUSATE SODIUM 100 MG: 100 CAPSULE, LIQUID FILLED ORAL at 09:40

## 2023-01-07 RX ADMIN — ALPRAZOLAM 0.5 MG: 0.5 TABLET ORAL at 13:09

## 2023-01-07 RX ADMIN — ANTACID TABLETS 1000 MG: 500 TABLET, CHEWABLE ORAL at 09:40

## 2023-01-07 RX ADMIN — ANTACID TABLETS 1000 MG: 500 TABLET, CHEWABLE ORAL at 12:29

## 2023-01-07 RX ADMIN — FERROUS SULFATE TAB 325 MG (65 MG ELEMENTAL FE) 325 MG: 325 (65 FE) TAB at 09:41

## 2023-01-07 RX ADMIN — ANTACID TABLETS 1000 MG: 500 TABLET, CHEWABLE ORAL at 17:49

## 2023-01-07 RX ADMIN — HEPARIN SODIUM 5000 UNITS: 5000 INJECTION INTRAVENOUS; SUBCUTANEOUS at 14:22

## 2023-01-07 RX ADMIN — MAGNESIUM OXIDE TAB 400 MG (241.3 MG ELEMENTAL MG) 800 MG: 400 (241.3 MG) TAB at 09:41

## 2023-01-07 RX ADMIN — PANTOPRAZOLE SODIUM 40 MG: 40 TABLET, DELAYED RELEASE ORAL at 06:03

## 2023-01-07 RX ADMIN — MAGNESIUM OXIDE TAB 400 MG (241.3 MG ELEMENTAL MG) 400 MG: 400 (241.3 MG) TAB at 21:33

## 2023-01-07 RX ADMIN — LEVOTHYROXINE SODIUM 175 MCG: 150 TABLET ORAL at 06:02

## 2023-01-07 RX ADMIN — ALPRAZOLAM 0.5 MG: 0.5 TABLET ORAL at 20:43

## 2023-01-07 RX ADMIN — PRAMIPEXOLE DIHYDROCHLORIDE 0.5 MG: 0.5 TABLET ORAL at 21:34

## 2023-01-07 RX ADMIN — POTASSIUM CHLORIDE 20 MEQ: 1500 TABLET, EXTENDED RELEASE ORAL at 21:33

## 2023-01-07 RX ADMIN — DESVENLAFAXINE 50 MG: 50 TABLET, FILM COATED, EXTENDED RELEASE ORAL at 20:43

## 2023-01-07 RX ADMIN — CALCITRIOL 0.5 MCG: 0.25 CAPSULE, LIQUID FILLED ORAL at 09:40

## 2023-01-07 RX ADMIN — CALCIUM GLUCONATE 2 G: 20 INJECTION, SOLUTION INTRAVENOUS at 12:30

## 2023-01-07 RX ADMIN — FENOFIBRATE 168 MG: 48 TABLET, FILM COATED ORAL at 09:41

## 2023-01-07 RX ADMIN — POTASSIUM CHLORIDE 20 MEQ: 1500 TABLET, EXTENDED RELEASE ORAL at 09:41

## 2023-01-07 NOTE — ASSESSMENT & PLAN NOTE
Fluctuant, improving today    Patient presents with numbness, tingling of extremities - has been seen in the ED 6 times in the last 5 days for the same symptoms, calcium has been repleted, and then returns with hypocalcemia again    · Possibly secondary to recent steroid chronically? · urine calcium-WNL  · Ionized calcium 0 99 on admission  · Given 2g calcium  · Trending labs in AM  · Continue calcitriol, calcium carbonate  · Endocrinology recs:    -Goal for calcium is 8 2-8 5 range, discharge with no symptoms of tingling and numbness   -Please monitor calcium every 12 hours, in AM and PM    -Recommend hospitalization, till calcium is stabilized x2   -She has received only 2 doses of Rocaltrol, we will have to monitor her calcium twice a  day, and adjust Rocaltrol accordingly    -For now continue Rocaltrol 0 5 MCG twice a day (should not miss doses)   -Usually takes 48 hours to see the effect of Rocaltrol adjustment    -Continue calcium carbonate 1000 mg 3 times daily   -Thyroxine was increased to 175 mcg daily yesterday, repeat TSH and free T4 in 6 weeks  on outpatient

## 2023-01-07 NOTE — PROGRESS NOTES
[facility]  Progress Note - Joyce Agee 1975, 56 y o  female MRN: [id number]  Unit/Bed#: [address] [place] [address] Encounter: [id number]  Primary Care Provider: Dixon Lazo MD   Date and time admitted to hospital: 1/5/2023 12:29 AM    * Hypocalcemia  Assessment & Plan  Fluctuant, improving today    Patient presents with numbness, tingling of extremities - has been seen in the ED 6 times in the last 5 days for the same symptoms, calcium has been repleted, and then returns with hypocalcemia again    · Possibly secondary to recent steroid chronically? · urine calcium-WNL  · Ionized calcium 0 99 on admission  · Given 2g calcium  · Trending labs in AM  · Continue calcitriol, calcium carbonate  · Endocrinology recs:    -Goal for calcium is 8 2-8 5 range, discharge with no symptoms of tingling and numbness   -Please monitor calcium every 12 hours, in AM and PM    -Recommend hospitalization, till calcium is stabilized x2   -She has received only 2 doses of Rocaltrol, we will have to monitor her calcium twice a  day, and adjust Rocaltrol accordingly    -For now continue Rocaltrol 0 5 MCG twice a day (should not miss doses)   -Usually takes 48 hours to see the effect of Rocaltrol adjustment    -Continue calcium carbonate 1000 mg 3 times daily   -Thyroxine was increased to 175 mcg daily yesterday, repeat TSH and free T4 in 6 weeks  on outpatient      Vitamin D deficiency  Assessment & Plan  Severe,    · Consistently trending down over the last 3 months: 25 6 > 18 3 > 11 9  · Replenishing with 50,000 units for 8 weeks, repeat labs with PCP  · Endocrinology Rx: vitamin D, 50,000 IU once a week for 8 weeks, switch to vitamin D3 5000 IU daily, after  she finish booster doses    Hyperglycemia  Assessment & Plan  Noted on labs    · A1c 5 4 in 2018, repeat-pending  · Follow-up with PCP to initiate medication if diabetic or prediabetic  · Counseled on diet and exercise and carb control    Nicotine dependence  Assessment & Plan  Recommend cessation, declined nicotine patch    Anxiety  Assessment & Plan  May be contributing to patient's current presentation per patient's report    · Will give patient home Xanax 2 mg twice daily for anxiety  · Psych consult  · Encouraged outpatient with psych and therapist    Postoperative hypothyroidism  Assessment & Plan  History of thyroidectomy, parathyroidectomy    · TSH noted to be 10 334  · Currently on levothyroxine increased to 175 mcg  · free T4-WNL    Postsurgical hypoparathyroidism (HCC)  Assessment & Plan  PTH-6 3    Likely sequela to parathyroidectomy, continue inpatient high-dose vitamin supplements on home supplements of vitamin D and calcium    DDD (degenerative disc disease), lumbar  Assessment & Plan  DDD, receives steroid injections and baclofen prn    · Counseled on negative complications of steroid injections over the last 5 years, reevaluate on discharge with pain management benefits versus risk  · Encourage weight loss  · Continue home meds baclofen, patient refuses gabapentin/Lyrica        Anxiety  Assessment & Plan  May be contributing to patient's current presentation per patient's report      · R/o Possible underlying somatoform disorder  · Will give patient home Xanax 2 mg twice daily for anxiety  · Psych consult  · Encouraged outpatient with psych and therapist        VTE Pharmacologic Prophylaxis:   Moderate Risk (Score 3-4) - Pharmacological DVT Prophylaxis Ordered: heparin  Patient Centered Rounds: I performed bedside rounds with nursing staff today  Discussions with Specialists or Other Care Team Provider: Endocrinology    Education and Discussions with Family / Patient: Patient declined call to   Time Spent for Care: 45 minutes  More than 50% of total time spent on counseling and coordination of care as described above      Current Length of Stay: 0 day(s)  Current Patient Status: Inpatient Certification Statement: The patient will continue to require additional inpatient hospital stay due to Clinical course and electrolyte replacement  Discharge Plan: Anticipate discharge tomorrow to home  Code Status: Level 1 - Full Code    Subjective:   Patient seen and examined at bedside with nursing and  present  Nursing reported patient agitation and outbursts stating that she was not getting that she want to be transferred to a different facility upset with specialist   Staff MD spent extensive amount of time with patient and  explaining and demonstrating lab results, endocrinology recs and reinforcing the current admission diagnosis of hypocalcemia, and severe vitamin D deficiency  Patient and  given evidence-based medicine recommendations regarding management for admission diagnosis  Patient was labile emotionally and tearful affect and reported that " may be its me, maybe is my anxiety since have been doing this for 8 years constantly watching my calcium and vitamin D that causes me to act like this  Patient reported that may be because she is worried about her lab results that now her body is starting to feel tingling sensations "  Staff informed patient that she would give Xanax and get a psych consult, patient agreed and stated she really needed  Objective:     Vitals:   Temp (24hrs), Av 8 °F (36 6 °C), Min:97 3 °F (36 3 °C), Max:98 2 °F (36 8 °C)    Temp:  [97 3 °F (36 3 °C)-98 2 °F (36 8 °C)] 98 2 °F (36 8 °C)  HR:  [77-96] 96  Resp:  [18-20] 18  BP: (100-134)/(60-88) 121/70  SpO2:  [94 %-98 %] 96 %  Body mass index is 39 32 kg/m²  Input and Output Summary (last 24 hours):   No intake or output data in the 24 hours ending 23 0908    Physical Exam:   Physical Exam  Vitals and nursing note reviewed  Constitutional:       General: She is not in acute distress  Appearance: She is well-developed  She is obese     HENT:      Head: Normocephalic and atraumatic  Eyes:      Conjunctiva/sclera: Conjunctivae normal    Cardiovascular:      Rate and Rhythm: Normal rate and regular rhythm  Heart sounds: No murmur heard  No friction rub  No gallop  Pulmonary:      Effort: Pulmonary effort is normal  No respiratory distress  Breath sounds: Normal breath sounds  No stridor  No wheezing, rhonchi or rales  Abdominal:      Palpations: Abdomen is soft  Tenderness: There is no abdominal tenderness  There is no guarding or rebound  Musculoskeletal:         General: No swelling or tenderness  Cervical back: Neck supple  Right lower leg: No edema  Left lower leg: No edema  Skin:     General: Skin is warm and dry  Capillary Refill: Capillary refill takes less than 2 seconds  Findings: No bruising  Neurological:      Mental Status: She is alert and oriented to person, place, and time  Motor: Weakness (Bilateral lower extremities) present  Psychiatric:         Mood and Affect: Mood is anxious  Affect is labile and tearful  Speech: Speech normal          Behavior: Behavior is agitated and combative  Thought Content: Thought content does not include suicidal ideation  Thought content does not include suicidal plan            Additional Data:     Labs:  Results from last 7 days   Lab Units 01/05/23  0425   WBC Thousand/uL 7 71   HEMOGLOBIN g/dL 12 5   HEMATOCRIT % 37 8   PLATELETS Thousands/uL 172   NEUTROS PCT % 63   LYMPHS PCT % 28   MONOS PCT % 6   EOS PCT % 1     Results from last 7 days   Lab Units 01/06/23  1607 01/06/23  0612 01/05/23  2043 01/05/23  0947 01/04/23  1805 01/04/23  1509   SODIUM mmol/L  --  138  --   --    < > 135   POTASSIUM mmol/L  --  4 1  --   --    < > 4 0   CHLORIDE mmol/L  --  102  --   --    < > 98   CO2 mmol/L  --  24  --   --    < > 21   BUN mg/dL  --  25  --   --    < > 24   CREATININE mg/dL  --  1 00  --   --    < > 0 98   ANION GAP mmol/L  --  12  --   --    < > 16* CALCIUM mg/dL 8 2* 7 6*   < >  --    < > 7 4*   ALBUMIN g/dL  --   --   --  3 6  --  3 3*   TOTAL BILIRUBIN mg/dL  --   --   --   --   --  0 23   ALK PHOS U/L  --   --   --   --   --  138*   ALT U/L  --   --   --   --   --  52   AST U/L  --   --   --   --   --  31   GLUCOSE RANDOM mg/dL  --  155*  --   --    < > 188*    < > = values in this interval not displayed                   Results from last 7 days   Lab Units 01/05/23  1349   LACTIC ACID mmol/L 0 8       Lines/Drains:  Invasive Devices     Peripheral Intravenous Line  Duration           Peripheral IV  Distal;Right;Ventral (anterior) Forearm -- days    Peripheral IV 01/06/23 Right Wrist <1 day                       Imaging: Reviewed radiology reports from this admission including: chest xray    Recent Cultures (last 7 days):         Last 24 Hours Medication List:   Current Facility-Administered Medications   Medication Dose Route Frequency Provider Last Rate   • ALPRAZolam  2 mg Oral BID PRN Ab Castaneda MD     • baclofen  10 mg Oral TID PRN Cameron Willson PA-C     • calcitriol  0 5 mcg Oral BID With Meals Ab Castaneda MD     • calcium carbonate  1,000 mg Oral TID With Meals Анна Calix MD     • calcium gluconate  2 g Intravenous Daily PRN Ab Castaneda MD 2 g (01/06/23 1921)   • desvenlafaxine succinate  50 mg Oral Daily Sandrita Rush PA-C     • docusate sodium  100 mg Oral BID Cameron Willson PA-C     • ergocalciferol  50,000 Units Oral Weekly Ab Castaneda MD     • fenofibrate  168 mg Oral Daily Sandrita Rush PA-C     • ferrous sulfate  325 mg Oral Daily With Breakfast Sandrita Rush PA-C     • heparin (porcine)  5,000 Units Subcutaneous Q8H [facility] Sandrita Rush PA-C     • levothyroxine  175 mcg Oral Early Morning Анна Calix MD     • lidocaine  1 patch Topical Daily Ab Castaneda MD     • magnesium oxide  400 mg Oral HS Ab Castaneda MD     • magnesium oxide  800 mg Oral Daily Ab Castaneda MD     • pantoprazole  40 mg Oral Early Morning Sandrita Rush PA-C     • potassium chloride  20 mEq Oral BID Sandrita Rush PA-C     • pramipexole  0 5 mg Oral HS Clinton Rendon PA-C          Today, Patient Was Seen By: Ellen Pond MD    **Please Note: This note may have been constructed using a voice recognition system  **

## 2023-01-07 NOTE — ASSESSMENT & PLAN NOTE
History of thyroidectomy, parathyroidectomy    · TSH noted to be 10 334  · Currently on levothyroxine increased to 175 mcg  · free T4-WNL

## 2023-01-07 NOTE — ASSESSMENT & PLAN NOTE
Noted on labs    · A1c 5 4 in 2018, repeat-pending  · Follow-up with PCP to initiate medication if diabetic or prediabetic  · Counseled on diet and exercise and carb control

## 2023-01-07 NOTE — ASSESSMENT & PLAN NOTE
PTH-6 3    Likely sequela to parathyroidectomy, continue inpatient high-dose vitamin supplements on home supplements of vitamin D and calcium

## 2023-01-07 NOTE — ASSESSMENT & PLAN NOTE
DDD, receives steroid injections and baclofen prn    · Counseled on negative complications of steroid injections over the last 5 years, reevaluate on discharge with pain management benefits versus risk  · Encourage weight loss  · Continue home meds baclofen, patient refuses gabapentin/Lyrica

## 2023-01-07 NOTE — ASSESSMENT & PLAN NOTE
May be contributing to patient's current presentation per patient's report    · Will give patient home Xanax 2 mg twice daily for anxiety  · Psych consult  · Encouraged outpatient with psych and therapist

## 2023-01-07 NOTE — UTILIZATION REVIEW
Continued Stay Review    Date: 1-7-23                        OBSERVATION 1-5-23  CHANGED TO INPATIENT 1-7-23 FOR CONTINUED MONITORING AND TREATMENT OF HYPOCALCEMIA      01/07/23 0911  Inpatient Admission  Once        Transfer Service: Hospitalist       Question Answer   Level of Care Med Surg   Estimated length of stay More than 2 Midnights   Certification I certify that inpatient services are medically necessary for this patient for a duration of greater than two midnights  See H&P and MD Progress Notes for additional information about the patient's course of treatment  01/05/23 0105  Place in Observation  Once        Transfer Service: Hospitalist       Question: Level of Care Answer: Med Surg            HPI:47 y o  female initially admitted on 1-5-23     Assessment/Plan:     1-6-23  CALCIUM 8 2, IONIZED CALCIUM 1 05 less than normal ( 1 12 to 1 32)   Rocalcitrol, tums  Last iv  calcium gluconate given  1-6 1921 1-7-23  Today ionized calcium 1 07            Vital Signs:     Date/Time Temp Pulse Resp BP MAP (mmHg) SpO2 O2 Device   01/07/23 07:07:50 98 2 °F (36 8 °C) 96 18 121/70 87 96 % --   01/06/23 23:44:44 97 8 °F (36 6 °C) 82 -- 100/60 73 94 % --   01/06/23 19:50:41 97 7 °F (36 5 °C) 77 18 132/86 101 97 % --   01/06/23 14:47:11 97 3 °F (36 3 °C)   Abnormal  86 -- 134/88 103 98 % --   01/06/23 09:22:37 98 °F (36 7 °C) 87 20 130/84 99 97 % --   01/06/23 03:36:39 -- 84 -- -- -- 92 % --   01/06/23 01:40:09 -- 84 -- -- -- 97 % None (Room air)             Pertinent Labs/Diagnostic Results:       Results from last 7 days   Lab Units 01/05/23  0425 01/04/23  1509 01/01/23  0504 12/31/22  1640   WBC Thousand/uL 7 71 8 36 8 20 8 39   HEMOGLOBIN g/dL 12 5 13 2 13 1 12 7   HEMATOCRIT % 37 8 40 4 38 0 39 3   PLATELETS Thousands/uL 172 193 222 197   NEUTROS ABS Thousands/µL 4 91 5 53 5 00 5 23         Results from last 7 days   Lab Units 01/06/23  1607 01/06/23  0612 01/05/23  2043 01/05/23  6671 01/05/23  0425 01/05/23  0053 01/04/23  1805 01/04/23  1659 01/04/23  1509 01/02/23  1417 01/02/23  1359   SODIUM mmol/L  --  138  --   --  135  --  132*  --  135  --  134*   POTASSIUM mmol/L  --  4 1  --   --  3 9  --  3 9  --  4 0  --  4 0   CHLORIDE mmol/L  --  102  --   --  100  --  98  --  98  --  97   CO2 mmol/L  --  24  --   --  18*  --  20*  --  21  --  26   ANION GAP mmol/L  --  12  --   --  17*  --  14*  --  16*  --  11   BUN mg/dL  --  25  --   --  26*  --  23  --  24  --  23   CREATININE mg/dL  --  1 00  --   --  1 15  --  0 91  --  0 98  --  1 08   EGFR ml/min/1 73sq m  --  67  --   --  57  --  75  --  69  --  61   CALCIUM mg/dL 8 2* 7 6* 7 4*  --  8 1*  --  9 3  --  7 4*  --  7 5*   CALCIUM, IONIZED mmol/L 1 05* 1 02* 0 95*  --  1 10* 0 99* 1 29   < >  --    < >  --    MAGNESIUM mg/dL  --  1 9  --  2 3  --   --   --   --  1 9  --   --    PHOSPHORUS mg/dL  --  4 3  --   --   --   --   --   --  4 2  --   --     < > = values in this interval not displayed       Results from last 7 days   Lab Units 01/05/23  0947 01/04/23  1509 01/01/23  0504 12/31/22  1640   AST U/L  --  31 15 20   ALT U/L  --  52 31 54   ALK PHOS U/L  --  138* 114* 123*   TOTAL PROTEIN g/dL  --  7 0 6 3* 7 2   ALBUMIN g/dL 3 6 3 3* 3 8 3 7   TOTAL BILIRUBIN mg/dL  --  0 23 0 29 0 24   BILIRUBIN DIRECT mg/dL  --  0 05  --   --          Results from last 7 days   Lab Units 01/06/23  0612 01/05/23  0425 01/04/23  1805 01/04/23  1509 01/02/23  1359 01/01/23  0504 12/31/22  1640   GLUCOSE RANDOM mg/dL 155* 196* 151* 188* 211* 193* 112             BETA-HYDROXYBUTYRATE   Date Value Ref Range Status   01/05/2023 0 1 <0 6 mmol/L Final        Results from last 7 days   Lab Units 01/02/23  1417 01/01/23  0646 01/01/23  0504   HS TNI 0HR ng/L 2  --  7   HS TNI 2HR ng/L  --  6  --    HSTNI D2 ng/L  --  -1  --              Results from last 7 days   Lab Units 01/04/23  1509   TSH 3RD GENERATON uIU/mL 10 334*         Results from last 7 days   Lab Units 01/05/23  1349   LACTIC ACID mmol/L 0 8       Scheduled Medications:    calcitriol, 0 5 mcg, Oral, BID With Meals  calcium carbonate, 1,000 mg, Oral, TID With Meals  desvenlafaxine succinate, 50 mg, Oral, Daily  docusate sodium, 100 mg, Oral, BID  ergocalciferol, 50,000 Units, Oral, Weekly  fenofibrate, 168 mg, Oral, Daily  ferrous sulfate, 325 mg, Oral, Daily With Breakfast  heparin (porcine), 5,000 Units, Subcutaneous, Q8H QUINN  levothyroxine, 175 mcg, Oral, Early Morning  lidocaine, 1 patch, Topical, Daily  magnesium oxide, 400 mg, Oral, HS  magnesium oxide, 800 mg, Oral, Daily  pantoprazole, 40 mg, Oral, Early Morning  potassium chloride, 20 mEq, Oral, BID  pramipexole, 0 5 mg, Oral, HS      Continuous IV Infusions:     PRN Meds:  ALPRAZolam, 2 mg, Oral, BID PRN  baclofen, 10 mg, Oral, TID PRN  calcium gluconate, 2 g, Intravenous, Daily PRN        Discharge Plan: to be determined     Network Utilization Review Department  ATTENTION: Please call with any questions or concerns to [phone] and carefully listen to the prompts so that you are directed to the right person  All voicemails are confidential   Syd Cardenas all requests for admission clinical reviews, approved or denied determinations and any other requests to dedicated fax number below belonging to the campus where the patient is receiving treatment   List of dedicated fax numbers for the Facilities:  [address] DENIALS (Administrative/Medical Necessity) [phone]   [address] [place] [address] (Maternity/NICU/Pediatrics) [phone]   [address] [place]  [phone]   [address] [place] [address] [address] [facility] 310 [phone]   [address] [phone]

## 2023-01-07 NOTE — ASSESSMENT & PLAN NOTE
May be contributing to patient's current presentation per patient's report      · R/o Possible underlying somatoform disorder  · Will give patient home Xanax 2 mg twice daily for anxiety  · Psych consult  · Encouraged outpatient with psych and therapist

## 2023-01-07 NOTE — ASSESSMENT & PLAN NOTE
Severe,    · Consistently trending down over the last 3 months: 25 6 > 18 3 > 11 9  · Replenishing with 50,000 units for 8 weeks, repeat labs with PCP  · Endocrinology Rx: vitamin D, 50,000 IU once a week for 8 weeks, switch to vitamin D3 5000 IU daily, after  she finish booster doses

## 2023-01-08 LAB
ALBUMIN SERPL BCP-MCNC: 3.7 G/DL (ref 3.5–5)
ALP SERPL-CCNC: 133 U/L (ref 46–116)
ALT SERPL W P-5'-P-CCNC: 75 U/L (ref 12–78)
ANION GAP SERPL CALCULATED.3IONS-SCNC: 11 MMOL/L (ref 4–13)
ANION GAP SERPL CALCULATED.3IONS-SCNC: 13 MMOL/L (ref 4–13)
AST SERPL W P-5'-P-CCNC: 37 U/L (ref 5–45)
BILIRUB SERPL-MCNC: 0.4 MG/DL (ref 0.2–1)
BUN SERPL-MCNC: 20 MG/DL (ref 5–25)
BUN SERPL-MCNC: 25 MG/DL (ref 5–25)
CALCIUM SERPL-MCNC: 8.5 MG/DL (ref 8.3–10.1)
CALCIUM SERPL-MCNC: 9.1 MG/DL (ref 8.3–10.1)
CARDIAC TROPONIN I PNL SERPL HS: 4 NG/L (ref 8–18)
CARDIAC TROPONIN I PNL SERPL HS: 5 NG/L (ref 8–18)
CHLORIDE SERPL-SCNC: 97 MMOL/L (ref 96–108)
CHLORIDE SERPL-SCNC: 97 MMOL/L (ref 96–108)
CO2 SERPL-SCNC: 25 MMOL/L (ref 21–32)
CO2 SERPL-SCNC: 27 MMOL/L (ref 21–32)
CREAT SERPL-MCNC: 1.16 MG/DL (ref 0.6–1.3)
CREAT SERPL-MCNC: 1.19 MG/DL (ref 0.6–1.3)
EST. AVERAGE GLUCOSE BLD GHB EST-MCNC: 131 MG/DL
GFR SERPL CREATININE-BSD FRML MDRD: 54 ML/MIN/1.73SQ M
GFR SERPL CREATININE-BSD FRML MDRD: 56 ML/MIN/1.73SQ M
GLUCOSE SERPL-MCNC: 159 MG/DL (ref 65–140)
GLUCOSE SERPL-MCNC: 220 MG/DL (ref 65–140)
HBA1C MFR BLD: 6.2 %
POTASSIUM SERPL-SCNC: 4 MMOL/L (ref 3.5–5.3)
POTASSIUM SERPL-SCNC: 4.4 MMOL/L (ref 3.5–5.3)
PROT SERPL-MCNC: 7.5 G/DL (ref 6.4–8.4)
SODIUM SERPL-SCNC: 135 MMOL/L (ref 135–147)
SODIUM SERPL-SCNC: 135 MMOL/L (ref 135–147)

## 2023-01-08 RX ORDER — ALPRAZOLAM 0.5 MG/1
2 TABLET ORAL 2 TIMES DAILY
Status: DISCONTINUED | OUTPATIENT
Start: 2023-01-08 | End: 2023-01-08

## 2023-01-08 RX ORDER — ALPRAZOLAM 0.5 MG/1
2 TABLET ORAL 2 TIMES DAILY
Status: DISCONTINUED | OUTPATIENT
Start: 2023-01-08 | End: 2023-01-10 | Stop reason: HOSPADM

## 2023-01-08 RX ADMIN — PRAMIPEXOLE DIHYDROCHLORIDE 0.5 MG: 0.5 TABLET ORAL at 21:21

## 2023-01-08 RX ADMIN — POTASSIUM CHLORIDE 20 MEQ: 1500 TABLET, EXTENDED RELEASE ORAL at 12:01

## 2023-01-08 RX ADMIN — POTASSIUM CHLORIDE 20 MEQ: 1500 TABLET, EXTENDED RELEASE ORAL at 22:23

## 2023-01-08 RX ADMIN — ALPRAZOLAM 0.5 MG: 0.5 TABLET ORAL at 08:29

## 2023-01-08 RX ADMIN — ALPRAZOLAM 2 MG: 0.5 TABLET ORAL at 14:08

## 2023-01-08 RX ADMIN — HEPARIN SODIUM 5000 UNITS: 5000 INJECTION INTRAVENOUS; SUBCUTANEOUS at 21:21

## 2023-01-08 RX ADMIN — LEVOTHYROXINE SODIUM 175 MCG: 150 TABLET ORAL at 05:20

## 2023-01-08 RX ADMIN — ANTACID TABLETS 1000 MG: 500 TABLET, CHEWABLE ORAL at 12:01

## 2023-01-08 RX ADMIN — DESVENLAFAXINE 50 MG: 50 TABLET, FILM COATED, EXTENDED RELEASE ORAL at 21:22

## 2023-01-08 RX ADMIN — CALCITRIOL 0.5 MCG: 0.25 CAPSULE, LIQUID FILLED ORAL at 08:29

## 2023-01-08 RX ADMIN — HEPARIN SODIUM 5000 UNITS: 5000 INJECTION INTRAVENOUS; SUBCUTANEOUS at 14:10

## 2023-01-08 RX ADMIN — ANTACID TABLETS 1000 MG: 500 TABLET, CHEWABLE ORAL at 16:14

## 2023-01-08 RX ADMIN — ALPRAZOLAM 1 MG: 0.5 TABLET ORAL at 00:32

## 2023-01-08 RX ADMIN — MAGNESIUM OXIDE TAB 400 MG (241.3 MG ELEMENTAL MG) 800 MG: 400 (241.3 MG) TAB at 08:29

## 2023-01-08 RX ADMIN — ALPRAZOLAM 2 MG: 0.5 TABLET ORAL at 21:21

## 2023-01-08 RX ADMIN — FENOFIBRATE 168 MG: 48 TABLET, FILM COATED ORAL at 08:28

## 2023-01-08 RX ADMIN — HEPARIN SODIUM 5000 UNITS: 5000 INJECTION INTRAVENOUS; SUBCUTANEOUS at 05:20

## 2023-01-08 RX ADMIN — MAGNESIUM OXIDE TAB 400 MG (241.3 MG ELEMENTAL MG) 400 MG: 400 (241.3 MG) TAB at 21:21

## 2023-01-08 RX ADMIN — CALCITRIOL 0.5 MCG: 0.25 CAPSULE, LIQUID FILLED ORAL at 16:14

## 2023-01-08 RX ADMIN — PANTOPRAZOLE SODIUM 40 MG: 40 TABLET, DELAYED RELEASE ORAL at 05:20

## 2023-01-08 RX ADMIN — ANTACID TABLETS 1000 MG: 500 TABLET, CHEWABLE ORAL at 08:30

## 2023-01-08 NOTE — ASSESSMENT & PLAN NOTE
Fluctuant, improving today    Patient presents with numbness, tingling of extremities - has been seen in the ED 6 times in the last 5 days for the same symptoms, calcium has been repleted, and then returns with hypocalcemia again    Possibly secondary to chronic steroid use, versus hungry bone versus osteoporosis    · Ca-9 1  · urine calcium-WNL  · Ionized calcium 0 99 on admission  · Given 2g calcium  · Trending labs in AM  · Continue calcitriol, calcium carbonate  · DEXA scan on discharge  · Endocrinology recs:    -Goal for calcium is 8 2-8 5 range, discharge with no symptoms of tingling and numbness   -Please monitor calcium every 12 hours, in AM and PM    -Recommend hospitalization, till calcium is stabilized x2   -She has received only 2 doses of Rocaltrol, we will have to monitor her calcium twice a  day, and adjust Rocaltrol  accordingly    -For now continue Rocaltrol 0 5 MCG twice a day (should not miss doses)   -Usually takes 48 hours to see the effect of Rocaltrol adjustment    -Continue calcium carbonate 1000 mg 3 times daily   -Thyroxine was increased to 175 mcg daily yesterday, repeat TSH and free T4 in 6 weeks on outpatient
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
## 2023-01-08 NOTE — PLAN OF CARE
Problem: PAIN - ADULT  Goal: Verbalizes/displays adequate comfort level or baseline comfort level  Description: Interventions:  - Encourage patient to monitor pain and request assistance  - Assess pain using appropriate pain scale  - Administer analgesics based on type and severity of pain and evaluate response  - Implement non-pharmacological measures as appropriate and evaluate response  - Consider cultural and social influences on pain and pain management  - Notify physician/advanced practitioner if interventions unsuccessful or patient reports new pain  Outcome: Progressing     Problem: INFECTION - ADULT  Goal: Absence or prevention of progression during hospitalization  Description: INTERVENTIONS:  - Assess and monitor for signs and symptoms of infection  - Monitor lab/diagnostic results  - Monitor all insertion sites, i e  indwelling lines,  - Monitor endotracheal if appropriate and nasal secretions for changes in amount and color  - [place] appropriate cooling/warming therapies per order  - Administer medications as ordered  - Instruct and encourage patient and family to use good hand hygiene technique  - Identify and instruct in appropriate isolation precautions for identified infection/condition  Outcome: Progressing     Problem: SAFETY ADULT  Goal: Patient will remain free of falls  Description: INTERVENTIONS:  - Educate patient/family on patient safety including physical limitations  - Instruct patient to call for assistance with activity   - Consult OT/PT to assist with strengthening/mobility   - Keep Call bell within reach  - Keep bed low and locked with side rails adjusted as appropriate  - Keep care items and personal belongings within reach  - Initiate and maintain comfort rounds  - Make Fall Risk Sign visible to staff  - Offer Toileting every 2 Hours, in advance of need  - Obtain necessary fall risk management equipment:   - Apply yellow socks and bracelet for high fall risk patients  - Consider moving patient to room near nurses station  Outcome: Progressing  Goal: Maintain or return to baseline ADL function  Description: INTERVENTIONS:  -  Assess patient's ability to carry out ADLs; assess patient's baseline for ADL function and identify physical deficits which impact ability to perform ADLs (bathing, care of mouth/teeth, toileting, grooming, dressing, etc )  - Assess/evaluate cause of self-care deficits   - Assess range of motion  - Assess patient's mobility; develop plan if impaired  - Assess patient's need for assistive devices and provide as appropriate  - Encourage maximum independence but intervene and supervise when necessary  - Involve family in performance of ADLs  - Assess for home care needs following discharge   - Consider OT consult to assist with ADL evaluation and planning for discharge  - Provide patient education as appropriate  Outcome: Progressing  Goal: Maintains/Returns to pre admission functional level  Description: INTERVENTIONS:  - Perform BMAT or MOVE assessment daily    - Set and communicate daily mobility goal to care team and patient/family/caregiver  - Collaborate with rehabilitation services on mobility goals if consulted  - Perform Range of Motion 2 times a day    - Reposition patient every 2 hours/self  - Dangle patient 3 times a day  - Stand patient 3 times a day  - Ambulate patient 3 times a day  - Out of bed to chair 3 times a day   - Out of bed for meals 3 times a day  - Out of bed for toileting  - Record patient progress and toleration of activity level   Outcome: Progressing     Problem: DISCHARGE PLANNING  Goal: Discharge to home or other facility with appropriate resources  Description: INTERVENTIONS:  - Identify barriers to discharge w/patient and caregiver  - Arrange for needed discharge resources and transportation as appropriate  - Identify discharge learning needs (meds, wound care, etc )  - Arrange for interpretive services to assist at discharge as needed  - Refer to Case Management Department for coordinating discharge planning if the patient needs post-hospital services based on physician/advanced practitioner order or complex needs related to functional status, cognitive ability, or social support system  Outcome: Progressing     Problem: Knowledge Deficit  Goal: Patient/family/caregiver demonstrates understanding of disease process, treatment plan, medications, and discharge instructions  Description: Complete learning assessment and assess knowledge base    Interventions:  - Provide teaching at level of understanding  - Provide teaching via preferred learning methods  Outcome: Progressing

## 2023-01-08 NOTE — UTILIZATION REVIEW
Continued Stay Review    Date: 1-8-23                        Current Patient Class:  Inpatient  Current Level of Care:  Med surg    HPI:47 y o  female initially admitted on 1-5-23     Assessment/Plan:     Psychiatric consult completed  Diagnosis includes major depression, chronic pain syndrome, panic disorder  Discontinue doxepin  Change xanax to 2 mg po bid  Pristiq 50 mg daily  Recommend psychotherapy  Calcium normalized            Vital Signs:     Date/Time Temp Pulse Resp BP MAP (mmHg) SpO2   01/07/23 23:49:06 97 4 °F (36 3 °C)   Abnormal  82 18 135/92 106 94 %   01/07/23 19:42:38 97 2 °F (36 2 °C)   Abnormal  96 18 140/92 108 96 %   01/07/23 16:37:23 98 3 °F (36 8 °C) 84 18 138/84 102 94 %   01/07/23 07:07:50 98 2 °F (36 8 °C) 96 18 121/70 87 96 %   01/06/23 23:44:44 97 8 °F (36 6 °C) 82 -- 100/60 73 94 %   01/06/23 19:50:41 97 7 °F (36 5 °C) 77 18 132/86 101 97 %   01/06/23 14:47:11 97 3 °F (36 3 °C)   Abnormal  86 -- 134/88 103 98 %   01/06/23 09:22:37 98 °F (36 7 °C) 87 20 130/84 99 97 %   01/06/23 03:36:39 -- 84 -- -- -- 92 %   01/06/23 01:40:09 -- 84 -- -- -- 97 %           Pertinent Labs/Diagnostic Results:       Results from last 7 days   Lab Units 01/05/23  0425 01/04/23  1509   WBC Thousand/uL 7 71 8 36   HEMOGLOBIN g/dL 12 5 13 2   HEMATOCRIT % 37 8 40 4   PLATELETS Thousands/uL 172 193   NEUTROS ABS Thousands/µL 4 91 5 53         Results from last 7 days   Lab Units 01/08/23  0526 01/07/23 2027 01/07/23  1009 01/06/23  1607 01/06/23  0612 01/05/23  2043 01/05/23  0947 01/05/23  0425 01/04/23  1659 01/04/23  1509   SODIUM mmol/L 135 133* 137  --  138  --   --  135   < > 135   POTASSIUM mmol/L 4 4 4 2 4 5  --  4 1  --   --  3 9   < > 4 0   CHLORIDE mmol/L 97 96 102  --  102  --   --  100   < > 98   CO2 mmol/L 25 20* 22  --  24  --   --  18*   < > 21   ANION GAP mmol/L 13 17* 13  --  12  --   --  17*   < > 16*   BUN mg/dL 20 21 22  --  25  --   --  26*   < > 24   CREATININE mg/dL 1 16 1 09 1 17 --  1 00  --   --  1 15   < > 0 98   EGFR ml/min/1 73sq m 56 61 56  --  67  --   --  57   < > 69   CALCIUM mg/dL 9 1 9 0 8 0* 8 2* 7 6* 7 4*  --  8 1*   < > 7 4*   CALCIUM, IONIZED mmol/L  --  1 14 1 07* 1 05* 1 02* 0 95*  --  1 10*   < >  --    MAGNESIUM mg/dL  --  2 0 1 9  --  1 9  --  2 3  --   --  1 9   PHOSPHORUS mg/dL  --   --   --   --  4 3  --   --   --   --  4 2    < > = values in this interval not displayed       Results from last 7 days   Lab Units 01/08/23  0526 01/07/23  1009 01/05/23  0947 01/04/23  1509   AST U/L 37 42  --  31   ALT U/L 75 77  --  52   ALK PHOS U/L 133* 157*  --  138*   TOTAL PROTEIN g/dL 7 5 7 0  --  7 0   ALBUMIN g/dL 3 7 3 4* 3 6 3 3*   TOTAL BILIRUBIN mg/dL 0 40 0 32  --  0 23   BILIRUBIN DIRECT mg/dL  --   --   --  0 05         Results from last 7 days   Lab Units 01/08/23  0526 01/07/23 2027 01/07/23  1009 01/06/23  0612 01/05/23  0425 01/04/23  1805 01/04/23  1509 01/02/23  1359   GLUCOSE RANDOM mg/dL 159* 206* 172* 155* 196* 151* 188* 211*       BETA-HYDROXYBUTYRATE   Date Value Ref Range Status   01/05/2023 0 1 <0 6 mmol/L Final        Results from last 7 days   Lab Units 01/02/23  1417   HS TNI 0HR ng/L 2       Results from last 7 days   Lab Units 01/04/23  1509   TSH 3RD GENERATON uIU/mL 10 334*         Results from last 7 days   Lab Units 01/05/23  1349   LACTIC ACID mmol/L 0 8       Scheduled Medications:                ALPRAZolam, 2 mg, Oral, BID  calcitriol, 0 5 mcg, Oral, BID With Meals  calcium carbonate, 1,000 mg, Oral, TID With Meals  desvenlafaxine succinate, 50 mg, Oral, Daily  docusate sodium, 100 mg, Oral, BID  ergocalciferol, 50,000 Units, Oral, Weekly  fenofibrate, 168 mg, Oral, Daily  [START ON 1/9/2023] ferrous sulfate, 325 mg, Oral, Once per day on Mon Wed Fri Sat  [START ON 1/9/2023] ferrous sulfate, 325 mg, Oral, Once per day on Mon Wed Fri Sat  heparin (porcine), 5,000 Units, Subcutaneous, Q8H [address]  levothyroxine, 175 mcg, Oral, Early Morning  lidocaine, 1 patch, Topical, Daily  magnesium oxide, 400 mg, Oral, HS  magnesium oxide, 800 mg, Oral, Daily  pantoprazole, 40 mg, Oral, Early Morning  potassium chloride, 20 mEq, Oral, BID  pramipexole, 0 5 mg, Oral, HS      Continuous IV Infusions:     PRN Meds:  baclofen, 10 mg, Oral, TID PRN        Discharge Plan: to be determined     Network Utilization Review Department  ATTENTION: Please call with any questions or concerns to [phone] and carefully listen to the prompts so that you are directed to the right person  All voicemails are confidential   Rachelle Ray all requests for admission clinical reviews, approved or denied determinations and any other requests to dedicated fax number below belonging to the campus where the patient is receiving treatment   List of dedicated fax numbers for the Facilities:  [address] DENIALS (Administrative/Medical Necessity) [phone]   [address] (Maternity/NICU/Pediatrics) [phone]   9 Priya Lee [phone]   [facility] [place]  [phone]   [address] [address] Medical [address] [place] [address] Marva Forman 28 [phone]   [address] Mykel Chand [place] [address] [facility] [phone]

## 2023-01-08 NOTE — PROGRESS NOTES
[facility] 46  Progress Note - Jeff Polo 1975, 56 y o  female MRN: [id number]  Unit/Bed#: [address] [place] [address] Encounter: [id number]  Primary Care Provider: Jared Diego MD   Date and time admitted to hospital: 1/5/2023 12:29 AM    * Hypocalcemia  Assessment & Plan  Fluctuant, improving today    Patient presents with numbness, tingling of extremities - has been seen in the ED 6 times in the last 5 days for the same symptoms, calcium has been repleted, and then returns with hypocalcemia again    Possibly secondary to chronic steroid use, versus hungry bone versus osteoporosis    · Ca-9 1  · urine calcium-WNL  · Ionized calcium 0 99 on admission  · Given 2g calcium  · Trending labs in AM  · Continue calcitriol, calcium carbonate  · DEXA scan on discharge  · Endocrinology recs:    -Goal for calcium is 8 2-8 5 range, discharge with no symptoms of tingling and numbness   -Please monitor calcium every 12 hours, in AM and PM    -Recommend hospitalization, till calcium is stabilized x2   -She has received only 2 doses of Rocaltrol, we will have to monitor her calcium twice a  day, and adjust Rocaltrol  accordingly    -For now continue Rocaltrol 0 5 MCG twice a day (should not miss doses)   -Usually takes 48 hours to see the effect of Rocaltrol adjustment    -Continue calcium carbonate 1000 mg 3 times daily   -Thyroxine was increased to 175 mcg daily yesterday, repeat TSH and free T4 in 6 weeks on outpatient      Vitamin D deficiency  Assessment & Plan  Severe,    · Consistently trending down over the last 3 months: 25 6 > 18 3 > 11 9  · Replenishing with 50,000 units for 8 weeks, repeat labs with PCP  · Endocrinology Rx: vitamin D, 50,000 IU once a week for 8 weeks, switch to vitamin D3 5000 IU daily, after  she finish booster doses    Hyperglycemia  Assessment & Plan  Noted on labs    · A1c 5 4 in 2018, repeat-pending  · Follow-up with PCP to initiate medication if diabetic or prediabetic  · Counseled on diet and exercise and carb control    Nicotine dependence  Assessment & Plan  Recommend cessation, declined nicotine patch    Anxiety  Assessment & Plan  May be contributing to patient's current presentation per patient's report      · R/o Possible underlying somatoform disorder  · Will give patient home Xanax 2 mg twice daily for anxiety  · Psych consult  · Encouraged outpatient with psych and therapist    Postoperative hypothyroidism  Assessment & Plan  History of thyroidectomy, parathyroidectomy    · TSH noted to be 10 334  · Currently on levothyroxine increased to 175 mcg  · free T4-WNL    Postsurgical hypoparathyroidism ([address] )  Assessment & Plan  PTH-6 3    Likely sequela to parathyroidectomy, continue inpatient high-dose vitamin supplements on home supplements of vitamin D and calcium    DDD (degenerative disc disease), lumbar  Assessment & Plan  DDD, receives steroid injections and baclofen prn    · Counseled on negative complications of steroid injections over the last 5 years, reevaluate on discharge with pain management benefits versus risk  · Encourage weight loss  · Continue home meds baclofen, patient refuses gabapentin/Lyrica                VTE Pharmacologic Prophylaxis: VTE Score: 3 Moderate Risk (Score 3-4) - Pharmacological DVT Prophylaxis Ordered: heparin  Patient Centered Rounds: I performed bedside rounds with nursing staff today  Discussions with Specialists or Other Care Team Provider: Endocrinology    Education and Discussions with Family / Patient: Patient declined call to   Time Spent for Care: 45 minutes  More than 50% of total time spent on counseling and coordination of care as described above      Current Length of Stay: 1 day(s)  Current Patient Status: Inpatient   Certification Statement: The patient will continue to require additional inpatient hospital stay due to Clinical course and electrolyte replacement  Discharge Plan: Anticipate discharge tomorrow to home  Code Status: Level 1 - Full Code    Subjective:   Patient seen and examined at bedside with Psychiatrist at bedside and was going through her exam   Patient reported that she had intermittent chest pain that also started with numbness and tingling in her face, and was uncertain if it was an anxiety or hypocalcemia  Patient was given her Xanax and all symptoms resolved and she has been asymptomatic since  Patient counseled on being able to identify triggers and start journaling all of her symptoms, duration and frequency so that when she is discharged she can follow-up with her PCP and endocrinologist to identify what is going on with her  Patient reported that she spoke to her sister today and she started to notice her heart rate was getting up into the 130s and also her symptoms started, patient encouraged to identify familial triggers and create boundaries  Objective:     Vitals:   Temp (24hrs), Av 6 °F (36 4 °C), Min:97 2 °F (36 2 °C), Max:98 3 °F (36 8 °C)    Temp:  [97 2 °F (36 2 °C)-98 3 °F (36 8 °C)] 97 4 °F (36 3 °C)  HR:  [82-96] 82  Resp:  [18] 18  BP: (135-140)/(84-92) 135/92  SpO2:  [94 %-96 %] 94 %  Body mass index is 39 32 kg/m²  Input and Output Summary (last 24 hours): Intake/Output Summary (Last 24 hours) at 2023 8063  Last data filed at 2023 204  Gross per 24 hour   Intake 490 ml   Output --   Net 490 ml       Physical Exam:   Physical Exam  Vitals and nursing note reviewed  Constitutional:       General: She is not in acute distress  Appearance: She is well-developed  She is obese  HENT:      Head: Normocephalic and atraumatic  Eyes:      Conjunctiva/sclera: Conjunctivae normal    Cardiovascular:      Rate and Rhythm: Normal rate and regular rhythm  Heart sounds: No murmur heard  No friction rub  No gallop  Pulmonary:      Effort: Pulmonary effort is normal  No respiratory distress        Breath sounds: Normal breath sounds  No stridor  No wheezing, rhonchi or rales  Abdominal:      Palpations: Abdomen is soft  Tenderness: There is no abdominal tenderness  There is no guarding or rebound  Musculoskeletal:         General: No swelling or tenderness  Cervical back: Neck supple  Right lower leg: No edema  Left lower leg: No edema  Comments: Bilateral lower extremity weakness   Skin:     General: Skin is warm and dry  Capillary Refill: Capillary refill takes less than 2 seconds  Findings: No bruising  Neurological:      Mental Status: She is alert and oriented to person, place, and time  Motor: Weakness present  Psychiatric:         Mood and Affect: Mood normal          Behavior: Behavior normal          Thought Content: Thought content normal           Additional Data:     Labs:  Results from last 7 days   Lab Units 01/05/23  0425   WBC Thousand/uL 7 71   HEMOGLOBIN g/dL 12 5   HEMATOCRIT % 37 8   PLATELETS Thousands/uL 172   NEUTROS PCT % 63   LYMPHS PCT % 28   MONOS PCT % 6   EOS PCT % 1     Results from last 7 days   Lab Units 01/08/23  0526   SODIUM mmol/L 135   POTASSIUM mmol/L 4 4   CHLORIDE mmol/L 97   CO2 mmol/L 25   BUN mg/dL 20   CREATININE mg/dL 1 16   ANION GAP mmol/L 13   CALCIUM mg/dL 9 1   ALBUMIN g/dL 3 7   TOTAL BILIRUBIN mg/dL 0 40   ALK PHOS U/L 133*   ALT U/L 75   AST U/L 37   GLUCOSE RANDOM mg/dL 159*                 Results from last 7 days   Lab Units 01/05/23  1349   LACTIC ACID mmol/L 0 8       Lines/Drains:  Invasive Devices     Peripheral Intravenous Line  Duration           Peripheral IV 01/07/23 Dorsal (posterior); Right Forearm 1 day                       Imaging: Reviewed radiology reports from this admission including: chest xray    Recent Cultures (last 7 days):         Last 24 Hours Medication List:   Current Facility-Administered Medications   Medication Dose Route Frequency Provider Last Rate   • ALPRAZolam  0 5 mg Oral BID Armand Fernandez MD     • ALPRAZolam  2 mg Oral BID PRN Caro Abreu MD     • baclofen  10 mg Oral TID PRN Shayne Forbes PA-C     • calcitriol  0 5 mcg Oral BID With Meals Caro Abreu MD     • calcium carbonate  1,000 mg Oral TID With Meals Flower Alarcon MD     • desvenlafaxine succinate  50 mg Oral Daily Sandrita Rush PA-C     • docusate sodium  100 mg Oral BID Shayne Forbes PA-C     • ergocalciferol  50,000 Units Oral Weekly Caro Abreu MD     • fenofibrate  168 mg Oral Daily Shayne Forbes PA-C     • [START ON 1/9/2023] ferrous sulfate  325 mg Oral Once per day on Mon Wed Fri Sat ELIF Cueto     • [START ON 1/9/2023] ferrous sulfate  325 mg Oral Once per day on Mon Wed Fri Sat ELIF Cueto     • heparin (porcine)  5,000 Units Subcutaneous Q8H [facility] Sandrita Rush PA-C     • levothyroxine  175 mcg Oral Early Morning Flower Alarcon MD     • lidocaine  1 patch Topical Daily Caro Abreu MD     • magnesium oxide  400 mg Oral HS Caro Abreu MD     • magnesium oxide  800 mg Oral Daily Caro Abreu MD     • pantoprazole  40 mg Oral Early Morning Sandrita Rush PA-C     • potassium chloride  20 mEq Oral BID Shayne Forbes PA-C     • pramipexole  0 5 mg Oral HS Shayne Forbes PA-C          Today, Patient Was Seen By: Caro Abreu MD    **Please Note: This note may have been constructed using a voice recognition system  **

## 2023-01-09 PROBLEM — Z92.241 HISTORY OF RECENT STEROID USE: Status: ACTIVE | Noted: 2023-01-09

## 2023-01-09 LAB
ALBUMIN SERPL BCP-MCNC: 3.3 G/DL (ref 3.5–5)
ALP SERPL-CCNC: 134 U/L (ref 46–116)
ALT SERPL W P-5'-P-CCNC: 70 U/L (ref 12–78)
ANION GAP SERPL CALCULATED.3IONS-SCNC: 14 MMOL/L (ref 4–13)
AST SERPL W P-5'-P-CCNC: 36 U/L (ref 5–45)
ATRIAL RATE: 110 BPM
ATRIAL RATE: 115 BPM
BILIRUB SERPL-MCNC: 0.43 MG/DL (ref 0.2–1)
BUN SERPL-MCNC: 24 MG/DL (ref 5–25)
CA-I BLD-SCNC: 1.05 MMOL/L (ref 1.12–1.32)
CALCIUM ALBUM COR SERPL-MCNC: 8.9 MG/DL (ref 8.3–10.1)
CALCIUM SERPL-MCNC: 8.3 MG/DL (ref 8.3–10.1)
CALCIUM SERPL-MCNC: 8.3 MG/DL (ref 8.3–10.1)
CARDIAC TROPONIN I PNL SERPL HS: 4 NG/L (ref 8–18)
CHLORIDE SERPL-SCNC: 97 MMOL/L (ref 96–108)
CO2 SERPL-SCNC: 23 MMOL/L (ref 21–32)
CREAT SERPL-MCNC: 1.11 MG/DL (ref 0.6–1.3)
GFR SERPL CREATININE-BSD FRML MDRD: 59 ML/MIN/1.73SQ M
GLUCOSE SERPL-MCNC: 198 MG/DL (ref 65–140)
GLUCOSE SERPL-MCNC: 203 MG/DL (ref 65–140)
GLUCOSE SERPL-MCNC: 227 MG/DL (ref 65–140)
GLUCOSE SERPL-MCNC: 313 MG/DL (ref 65–140)
MAGNESIUM SERPL-MCNC: 2.2 MG/DL (ref 1.6–2.6)
P AXIS: 48 DEGREES
POTASSIUM SERPL-SCNC: 4 MMOL/L (ref 3.5–5.3)
PR INTERVAL: 170 MS
PROT SERPL-MCNC: 6.9 G/DL (ref 6.4–8.4)
QRS AXIS: 37 DEGREES
QRS AXIS: 41 DEGREES
QRSD INTERVAL: 74 MS
QRSD INTERVAL: 74 MS
QT INTERVAL: 354 MS
QT INTERVAL: 460 MS
QTC INTERVAL: 479 MS
QTC INTERVAL: 636 MS
SODIUM SERPL-SCNC: 134 MMOL/L (ref 135–147)
T WAVE AXIS: 41 DEGREES
T WAVE AXIS: 45 DEGREES
VENTRICULAR RATE: 110 BPM
VENTRICULAR RATE: 115 BPM

## 2023-01-09 RX ORDER — INSULIN LISPRO 100 [IU]/ML
1-6 INJECTION, SOLUTION INTRAVENOUS; SUBCUTANEOUS
Status: DISCONTINUED | OUTPATIENT
Start: 2023-01-09 | End: 2023-01-10 | Stop reason: HOSPADM

## 2023-01-09 RX ORDER — LEVOTHYROXINE SODIUM 0.15 MG/1
150 TABLET ORAL
Status: DISCONTINUED | OUTPATIENT
Start: 2023-01-10 | End: 2023-01-10 | Stop reason: HOSPADM

## 2023-01-09 RX ORDER — CALCIUM CARBONATE 500(1250)
1 TABLET ORAL EVERY 6 HOURS PRN
Status: DISCONTINUED | OUTPATIENT
Start: 2023-01-09 | End: 2023-01-10 | Stop reason: HOSPADM

## 2023-01-09 RX ORDER — CALCIUM CARBONATE 500(1250)
1 TABLET ORAL
Status: DISCONTINUED | OUTPATIENT
Start: 2023-01-09 | End: 2023-01-10 | Stop reason: HOSPADM

## 2023-01-09 RX ORDER — CALCIUM GLUCONATE 20 MG/ML
1 INJECTION, SOLUTION INTRAVENOUS ONCE
Status: COMPLETED | OUTPATIENT
Start: 2023-01-09 | End: 2023-01-10

## 2023-01-09 RX ADMIN — POTASSIUM CHLORIDE 20 MEQ: 1500 TABLET, EXTENDED RELEASE ORAL at 11:06

## 2023-01-09 RX ADMIN — HEPARIN SODIUM 5000 UNITS: 5000 INJECTION INTRAVENOUS; SUBCUTANEOUS at 13:05

## 2023-01-09 RX ADMIN — PRAMIPEXOLE DIHYDROCHLORIDE 0.5 MG: 0.5 TABLET ORAL at 21:14

## 2023-01-09 RX ADMIN — CALCITRIOL 0.5 MCG: 0.25 CAPSULE, LIQUID FILLED ORAL at 16:06

## 2023-01-09 RX ADMIN — CALCITRIOL 0.5 MCG: 0.25 CAPSULE, LIQUID FILLED ORAL at 07:54

## 2023-01-09 RX ADMIN — ANTACID TABLETS 1000 MG: 500 TABLET, CHEWABLE ORAL at 07:54

## 2023-01-09 RX ADMIN — LEVOTHYROXINE SODIUM 175 MCG: 150 TABLET ORAL at 05:03

## 2023-01-09 RX ADMIN — DESVENLAFAXINE 50 MG: 50 TABLET, FILM COATED, EXTENDED RELEASE ORAL at 21:15

## 2023-01-09 RX ADMIN — FERROUS SULFATE TAB 325 MG (65 MG ELEMENTAL FE) 325 MG: 325 (65 FE) TAB at 16:07

## 2023-01-09 RX ADMIN — INSULIN LISPRO 2 UNITS: 100 INJECTION, SOLUTION INTRAVENOUS; SUBCUTANEOUS at 16:08

## 2023-01-09 RX ADMIN — FERROUS SULFATE TAB 325 MG (65 MG ELEMENTAL FE) 325 MG: 325 (65 FE) TAB at 08:43

## 2023-01-09 RX ADMIN — MAGNESIUM OXIDE TAB 400 MG (241.3 MG ELEMENTAL MG) 800 MG: 400 (241.3 MG) TAB at 08:42

## 2023-01-09 RX ADMIN — FENOFIBRATE 168 MG: 48 TABLET, FILM COATED ORAL at 08:42

## 2023-01-09 RX ADMIN — CALCIUM 1 TABLET: 500 TABLET ORAL at 21:14

## 2023-01-09 RX ADMIN — CALCIUM 1 TABLET: 500 TABLET ORAL at 18:00

## 2023-01-09 RX ADMIN — ALPRAZOLAM 2 MG: 0.5 TABLET ORAL at 08:42

## 2023-01-09 RX ADMIN — ALPRAZOLAM 2 MG: 0.5 TABLET ORAL at 21:14

## 2023-01-09 RX ADMIN — MAGNESIUM OXIDE TAB 400 MG (241.3 MG ELEMENTAL MG) 400 MG: 400 (241.3 MG) TAB at 21:16

## 2023-01-09 RX ADMIN — INSULIN LISPRO 5 UNITS: 100 INJECTION, SOLUTION INTRAVENOUS; SUBCUTANEOUS at 11:45

## 2023-01-09 RX ADMIN — ANTACID TABLETS 1000 MG: 500 TABLET, CHEWABLE ORAL at 16:07

## 2023-01-09 RX ADMIN — HEPARIN SODIUM 5000 UNITS: 5000 INJECTION INTRAVENOUS; SUBCUTANEOUS at 05:03

## 2023-01-09 RX ADMIN — ANTACID TABLETS 1000 MG: 500 TABLET, CHEWABLE ORAL at 11:05

## 2023-01-09 RX ADMIN — POTASSIUM CHLORIDE 20 MEQ: 1500 TABLET, EXTENDED RELEASE ORAL at 21:30

## 2023-01-09 RX ADMIN — PANTOPRAZOLE SODIUM 40 MG: 40 TABLET, DELAYED RELEASE ORAL at 05:03

## 2023-01-09 RX ADMIN — HEPARIN SODIUM 5000 UNITS: 5000 INJECTION INTRAVENOUS; SUBCUTANEOUS at 21:14

## 2023-01-09 RX ADMIN — CALCIUM 1 TABLET: 500 TABLET ORAL at 21:09

## 2023-01-09 NOTE — UTILIZATION REVIEW
Continued Stay Review    Date: 1-7-23                        OBSERVATION 1-5-23  CHANGED TO INPATIENT 1-7-23 FOR CONTINUED MONITORING AND TREATMENT OF HYPOCALCEMIA      01/07/23 0911  Inpatient Admission  Once        Transfer Service: Hospitalist       Question Answer   Level of Care Med Surg   Estimated length of stay More than 2 Midnights   Certification I certify that inpatient services are medically necessary for this patient for a duration of greater than two midnights  See H&P and MD Progress Notes for additional information about the patient's course of treatment  01/05/23 0105  Place in Observation  Once        Transfer Service: Hospitalist       Question: Level of Care Answer: Med Surg            HPI:47 y o  female initially admitted on 1-5-23     Assessment/Plan:     1-6-23  CALCIUM 8 2, IONIZED CALCIUM 1 05 less than normal ( 1 12 to 1 32)   Rocalcitrol, tums  Last iv  calcium gluconate given  1-6 1921 1-7-23  Today ionized calcium 1 07            Vital Signs:     Date/Time Temp Pulse Resp BP MAP (mmHg) SpO2 O2 Device   01/07/23 07:07:50 98 2 °F (36 8 °C) 96 18 121/70 87 96 % --   01/06/23 23:44:44 97 8 °F (36 6 °C) 82 -- 100/60 73 94 % --   01/06/23 19:50:41 97 7 °F (36 5 °C) 77 18 132/86 101 97 % --   01/06/23 14:47:11 97 3 °F (36 3 °C)   Abnormal  86 -- 134/88 103 98 % --   01/06/23 09:22:37 98 °F (36 7 °C) 87 20 130/84 99 97 % --   01/06/23 03:36:39 -- 84 -- -- -- 92 % --   01/06/23 01:40:09 -- 84 -- -- -- 97 % None (Room air)             Pertinent Labs/Diagnostic Results:       Results from last 7 days   Lab Units 01/05/23  0425 01/04/23  1509   WBC Thousand/uL 7 71 8 36   HEMOGLOBIN g/dL 12 5 13 2   HEMATOCRIT % 37 8 40 4   PLATELETS Thousands/uL 172 193   NEUTROS ABS Thousands/µL 4 91 5 53         Results from last 7 days   Lab Units 01/09/23  0510 01/08/23 2025 01/08/23  0526 01/07/23  2027 01/07/23  1009 01/06/23  1607 01/06/23  0612 01/05/23 2043 01/05/23  4572 01/04/23  1659 01/04/23  1509   SODIUM mmol/L 134* 135 135 133* 137  --  138  --   --    < > 135   POTASSIUM mmol/L 4 0 4 0 4 4 4 2 4 5  --  4 1  --   --    < > 4 0   CHLORIDE mmol/L 97 97 97 96 102  --  102  --   --    < > 98   CO2 mmol/L 23 27 25 20* 22  --  24  --   --    < > 21   ANION GAP mmol/L 14* 11 13 17* 13  --  12  --   --    < > 16*   BUN mg/dL 24 25 20 21 22  --  25  --   --    < > 24   CREATININE mg/dL 1 11 1 19 1 16 1 09 1 17  --  1 00  --   --    < > 0 98   EGFR ml/min/1 73sq m 59 54 56 61 56  --  67  --   --    < > 69   CALCIUM mg/dL 8 3 8 5 9 1 9 0 8 0* 8 2* 7 6* 7 4*  --    < > 7 4*   CALCIUM, IONIZED mmol/L  --   --   --  1 14 1 07* 1 05* 1 02* 0 95*  --    < >  --    MAGNESIUM mg/dL  --   --   --  2 0 1 9  --  1 9  --  2 3  --  1 9   PHOSPHORUS mg/dL  --   --   --   --   --   --  4 3  --   --   --  4 2    < > = values in this interval not displayed       Results from last 7 days   Lab Units 01/09/23 0510 01/08/23 0526 01/07/23 1009 01/05/23  0947 01/04/23  1509   AST U/L 36 37 42  --  31   ALT U/L 70 75 77  --  52   ALK PHOS U/L 134* 133* 157*  --  138*   TOTAL PROTEIN g/dL 6 9 7 5 7 0  --  7 0   ALBUMIN g/dL 3 3* 3 7 3 4* 3 6 3 3*   TOTAL BILIRUBIN mg/dL 0 43 0 40 0 32  --  0 23   BILIRUBIN DIRECT mg/dL  --   --   --   --  0 05         Results from last 7 days   Lab Units 01/09/23 0510 01/08/23 2025 01/08/23 0526 01/07/23 2027 01/07/23  1009 01/06/23  0612 01/05/23  0425 01/04/23  1805 01/04/23  1509 01/02/23  1359   GLUCOSE RANDOM mg/dL 203* 220* 159* 206* 172* 155* 196* 151* 188* 211*         Results from last 7 days   Lab Units 01/05/23  0425   HEMOGLOBIN A1C % 6 2*   EAG mg/dl 131     BETA-HYDROXYBUTYRATE   Date Value Ref Range Status   01/05/2023 0 1 <0 6 mmol/L Final        Results from last 7 days   Lab Units 01/02/23  1417   HS TNI 0HR ng/L 2             Results from last 7 days   Lab Units 01/04/23  1509   TSH 3RD GENERATON uIU/mL 10 334*         Results from last 7 days   Lab Units 01/05/23  1349   LACTIC ACID mmol/L 0 8       Scheduled Medications:    ALPRAZolam, 2 mg, Oral, BID  calcitriol, 0 5 mcg, Oral, BID With Meals  calcium carbonate, 1,000 mg, Oral, TID With Meals  desvenlafaxine succinate, 50 mg, Oral, Daily  docusate sodium, 100 mg, Oral, BID  ergocalciferol, 50,000 Units, Oral, Weekly  fenofibrate, 168 mg, Oral, Daily  ferrous sulfate, 325 mg, Oral, Once per day on Mon Wed Fri Sat  ferrous sulfate, 325 mg, Oral, Once per day on Mon Wed Fri Sat  heparin (porcine), 5,000 Units, Subcutaneous, Q8H [facility]  levothyroxine, 175 mcg, Oral, Early Morning  lidocaine, 1 patch, Topical, Daily  magnesium oxide, 400 mg, Oral, HS  magnesium oxide, 800 mg, Oral, Daily  pantoprazole, 40 mg, Oral, Early Morning  potassium chloride, 20 mEq, Oral, BID  pramipexole, 0 5 mg, Oral, HS      Continuous IV Infusions:     PRN Meds:  baclofen, 10 mg, Oral, TID PRN        Discharge Plan: to be determined     Network Utilization Review Department  ATTENTION: Please call with any questions or concerns to [phone] and carefully listen to the prompts so that you are directed to the right person  All voicemails are confidential   Zoltan Huitron all requests for admission clinical reviews, approved or denied determinations and any other requests to dedicated fax number below belonging to the campus where the patient is receiving treatment   List of dedicated fax numbers for the Facilities:  [address] [place] [address] DENIALS (Administrative/Medical Necessity) [phone]   [address] [place] [address] (Maternity/NICU/Pediatrics) [phone]   [address] [facility] [address] [place] [address] [place] [address] [id number] [place] [address] [address] [facility]  [address] 134 005 [facility] [phone]

## 2023-01-09 NOTE — PROGRESS NOTES
Progress Note - Ivonne Tothms 56 y o  female MRN: [id number]    Unit/Bed#: [address] [place] [address] Encounter: [id number]      Assessment and plan:    1  Hypocalcemia 2/2 hypoparathyroidism (iatrogenic) - calcium improving on labs, however patient maintains symptoms  Currently on calcium carb 400 mg (tums 1g = 400 mg elemental calcium), calcitriol 0 5 mcg bid, and ergo 50k weekly  Will increase calcium carb 500 mg tid plus add PRN order  Continue calcitriol and ergo at present dosing  If symptoms persist, can increase dose of calcium carb 500 mg QID  I would not suggest a change in her dosing of calcitriol at present  After establishing a more consistent calcium replacement plan, 24h urine calcium monitoring for therapy complications should be pursued and the use of thiazide therapies be considered as appropriate  Patient reports loss of QOL due to hypoparathyroidism  Discussed calcium goals with patient, chiefly goal of low normal serum calcium, avoidance of symptoms and avoidance of complications of therapy, such as nephrolithiasis and kidney disease  Our discussion today included topics such as use of PTH analogs  I advised Arturo Hughes that an FDA approved hypoparathyroid PTH analog is not commercially available, due to recall  However, there are PTH analogs available under for other indications, specifically osteoporosis  Patient voluntarily reports history of osteoporosis  I would like to learn more about that diagnosis and its evaluation  She would like to learn more about PTH analogs, tere if it means it may lead to a reduction in calcium supplementation  This can be discussed further in outpatient setting  We also discussed various calcium compounds, specifically carbonate and citrate  A change to citrate may show improved bioavailability, but at higher doses and cost  She states interest in stopping PPI, which perhaps may help some absorption       Please feel free to reach me tomorrow with any concerns  I believe she may be suitable for discharge tomorrow  2  GERD - patient states no active GERD symptoms  She states no other indications for chronic PPI use  She states interest in discontinuing PPI therapy, which I supported  A stop in PPI use may help improved calcium supplementation bioavailability     3  Vitamin D def - continue ergo 50k weekly x12 weeks  Would suggest D3 4k units daily maintenance thereafter  Expect outpatient monitoring of Vit D levels    4  Hypothyroidism - discussed a switch to nighttime levothyroxine dosing  Patient understands this is okay so long as she still waits 3-4 hours from last meal and/or calcium tablet before taking levothyroxine  Will resume on prior home dosing of 150 mcg nightly as patient has that script available at home  TSH only fairly minimally elevated, hopefully will correct with more appropriate medication administration      CC: hypocalcemia f/u    Subjective:   Lawyer Melgar seen in follow up for hypocalcemia  Patient specifically requested endocrinology follow up at bedside  She maintains symptoms of perioral numbness  Symptoms only partially resolved by xanax  She is frustrated by symptoms, wish for change in calcium schedule  She reports perceived lack of efficacy of tums  At home she takes calcium-D3 600-400 8 tablets daily plus calcitriol 0 5 mcg daily  Currently on calcitriol 0 5 mcg bid, TUMS 1g tid, D2 50K weekly  Serum calcium levels have improved, but patient notes concern for their decreasing values (9 1 - > 8 3)  She reports active symptoms of perioral numbness  Currently on levothyroxine 175 mcg daily  Her dose of levothyroxine was increased from 150 mcg daily during this hospitalization due to increased TSH  Patient report taking home dose of levothyroxine 1 hour prior to first calcium pill  Also history reflux which has been very stable   She is on pantoprazole 40 mg daily at home, but is interested in discontinuing  Objective:     Vitals: Blood pressure 113/76, pulse (!) 113, temperature 97 5 °F (36 4 °C), resp  rate 18, height 5' 2" (1 575 m), weight 97 5 kg (215 lb), last menstrual period 12/06/2018, SpO2 96 %, not currently breastfeeding  ,Body mass index is 39 32 kg/m²  No intake or output data in the 24 hours ending 01/09/23 2022    Physical Exam:  General Appearance: awake, appears stated age and cooperative  Head: Normocephalic, without obvious abnormality, atraumatic  Extremities: moves all extremities  Skin: Skin color and temperature normal    Pulm: no labored breathing    Lab, Imaging and other studies: I have personally reviewed pertinent reports         Component      Latest Ref Rng & Units 1/5/2023 1/5/2023 1/5/2023 1/6/2023           4:25 AM  9:47 AM  8:43 PM  6:12 AM   Sodium      135 - 147 mmol/L 135   138   Potassium      3 5 - 5 3 mmol/L 3 9   4 1   Chloride      96 - 108 mmol/L 100   102   CO2      21 - 32 mmol/L 18 (L)   24   Anion Gap      4 - 13 mmol/L 17 (H)   12   BUN      5 - 25 mg/dL 26 (H)   25   Creatinine      0 60 - 1 30 mg/dL 1 15   1 00   Glucose, Random      65 - 140 mg/dL 196 (H)   155 (H)   Calcium      8 3 - 10 1 mg/dL 8 1 (L)  7 4 (L) 7 6 (L)   CORRECTED CALCIUM      8 3 - 10 1 mg/dL       AST      5 - 45 U/L       ALT      12 - 78 U/L       Alkaline Phosphatase      46 - 116 U/L       Total Protein      6 4 - 8 4 g/dL       Albumin      3 5 - 5 0 g/dL  3 6     TOTAL BILIRUBIN      0 20 - 1 00 mg/dL       eGFR      ml/min/1 73sq m 57   67   GLUCOSE FASTING      65 - 99 mg/dL 196 (H)   155 (H)   Calcium, Ionized      1 12 - 1 32 mmol/L 1 10 (L)  0 95 (L) 1 02 (L)   Vit D, 25-Hydroxy      30 0 - 100 0 ng/mL  11 9 (L)     Magnesium      1 6 - 2 6 mg/dL  2 3  1 9   PARATHYROID HORMONE      18 4 - 80 1 pg/mL   <6 3 (L)    Phosphorus      2 7 - 4 5 mg/dL    4 3     Component      Latest Ref Rng & Units 1/6/2023 1/7/2023 1/7/2023 1/8/2023           4:07 PM 10:09 AM  8:27 PM 5:26 AM   Sodium      135 - 147 mmol/L  137 133 (L) 135   Potassium      3 5 - 5 3 mmol/L  4 5 4 2 4 4   Chloride      96 - 108 mmol/L  102 96 97   CO2      21 - 32 mmol/L  22 20 (L) 25   Anion Gap      4 - 13 mmol/L  13 17 (H) 13   BUN      5 - 25 mg/dL  22 21 20   Creatinine      0 60 - 1 30 mg/dL  1 17 1 09 1 16   Glucose, Random      65 - 140 mg/dL  172 (H) 206 (H) 159 (H)   Calcium      8 3 - 10 1 mg/dL 8 2 (L) 8 0 (L) 9 0 9 1   CORRECTED CALCIUM      8 3 - 10 1 mg/dL  8 5     AST      5 - 45 U/L  42  37   ALT      12 - 78 U/L  77  75   Alkaline Phosphatase      46 - 116 U/L  157 (H)  133 (H)   Total Protein      6 4 - 8 4 g/dL  7 0  7 5   Albumin      3 5 - 5 0 g/dL  3 4 (L)  3 7   TOTAL BILIRUBIN      0 20 - 1 00 mg/dL  0 32  0 40   eGFR      ml/min/1 73sq m  56 61 56   GLUCOSE FASTING      65 - 99 mg/dL       Calcium, Ionized      1 12 - 1 32 mmol/L 1 05 (L) 1 07 (L) 1 14    Vit D, 25-Hydroxy      30 0 - 100 0 ng/mL       Magnesium      1 6 - 2 6 mg/dL  1 9 2 0    PARATHYROID HORMONE      18 4 - 80 1 pg/mL       Phosphorus      2 7 - 4 5 mg/dL         Component      Latest Ref Rng & Units 1/8/2023 1/9/2023 1/9/2023           8:25 PM  5:10 AM  7:49 PM   Sodium      135 - 147 mmol/L 135 134 (L)    Potassium      3 5 - 5 3 mmol/L 4 0 4 0    Chloride      96 - 108 mmol/L 97 97    CO2      21 - 32 mmol/L 27 23    Anion Gap      4 - 13 mmol/L 11 14 (H)    BUN      5 - 25 mg/dL 25 24    Creatinine      0 60 - 1 30 mg/dL 1 19 1 11    Glucose, Random      65 - 140 mg/dL 220 (H) 203 (H)    Calcium      8 3 - 10 1 mg/dL 8 5 8 3 8 3   CORRECTED CALCIUM      8 3 - 10 1 mg/dL  8 9    AST      5 - 45 U/L  36    ALT      12 - 78 U/L  70    Alkaline Phosphatase      46 - 116 U/L  134 (H)    Total Protein      6 4 - 8 4 g/dL  6 9    Albumin      3 5 - 5 0 g/dL  3 3 (L)    TOTAL BILIRUBIN      0 20 - 1 00 mg/dL  0 43    eGFR      ml/min/1 73sq m 54 59    GLUCOSE FASTING      65 - 99 mg/dL      Calcium, Ionized      1 12 - 1 32 mmol/L   1 05 (L)   Vit D, 25-Hydroxy      30 0 - 100 0 ng/mL      Magnesium      1 6 - 2 6 mg/dL  2 2    PARATHYROID HORMONE      18 4 - 80 1 pg/mL      Phosphorus      2 7 - 4 5 mg/dL          POC Glucose (mg/dl)   Date Value   01/09/2023 227 (H)   01/09/2023 313 (H)   12/07/2018 87   12/03/2018 108           Portions of the record may have been created with voice recognition software

## 2023-01-09 NOTE — PLAN OF CARE
Problem: PAIN - ADULT  Goal: Verbalizes/displays adequate comfort level or baseline comfort level  Description: Interventions:  - Encourage patient to monitor pain and request assistance  - Assess pain using appropriate pain scale  - Administer analgesics based on type and severity of pain and evaluate response  - Implement non-pharmacological measures as appropriate and evaluate response  - Consider cultural and social influences on pain and pain management  - Notify physician/advanced practitioner if interventions unsuccessful or patient reports new pain  Outcome: Progressing     Problem: INFECTION - ADULT  Goal: Absence or prevention of progression during hospitalization  Description: INTERVENTIONS:  - Assess and monitor for signs and symptoms of infection  - Monitor lab/diagnostic results  - Monitor all insertion sites, i e  indwelling lines,  - Monitor endotracheal if appropriate and nasal secretions for changes in amount and color  - [place] appropriate cooling/warming therapies per order  - Administer medications as ordered  - Instruct and encourage patient and family to use good hand hygiene technique  - Identify and instruct in appropriate isolation precautions for identified infection/condition  Outcome: Progressing     Problem: SAFETY ADULT  Goal: Patient will remain free of falls  Description: INTERVENTIONS:  - Educate patient/family on patient safety including physical limitations  - Instruct patient to call for assistance with activity   - Consult OT/PT to assist with strengthening/mobility   - Keep Call bell within reach  - Keep bed low and locked with side rails adjusted as appropriate  - Keep care items and personal belongings within reach  - Initiate and maintain comfort rounds  - Make Fall Risk Sign visible to staff  - Offer Toileting every 2 Hours, in advance of need  - Obtain necessary fall risk management equipment:   - Apply yellow socks and bracelet for high fall risk patients  - Consider moving patient to room near nurses station  Outcome: Progressing  Goal: Maintain or return to baseline ADL function  Description: INTERVENTIONS:  -  Assess patient's ability to carry out ADLs; assess patient's baseline for ADL function and identify physical deficits which impact ability to perform ADLs (bathing, care of mouth/teeth, toileting, grooming, dressing, etc )  - Assess/evaluate cause of self-care deficits   - Assess range of motion  - Assess patient's mobility; develop plan if impaired  - Assess patient's need for assistive devices and provide as appropriate  - Encourage maximum independence but intervene and supervise when necessary  - Involve family in performance of ADLs  - Assess for home care needs following discharge   - Consider OT consult to assist with ADL evaluation and planning for discharge  - Provide patient education as appropriate  Outcome: Progressing  Goal: Maintains/Returns to pre admission functional level  Description: INTERVENTIONS:  - Perform BMAT or MOVE assessment daily    - Set and communicate daily mobility goal to care team and patient/family/caregiver  - Collaborate with rehabilitation services on mobility goals if consulted  - Perform Range of Motion 2 times a day    - Reposition patient every 2 hours/self  - Dangle patient 3 times a day  - Stand patient 3 times a day  - Ambulate patient 3 times a day  - Out of bed to chair 3 times a day   - Out of bed for meals 3 times a day  - Out of bed for toileting  - Record patient progress and toleration of activity level   Outcome: Progressing     Problem: DISCHARGE PLANNING  Goal: Discharge to home or other facility with appropriate resources  Description: INTERVENTIONS:  - Identify barriers to discharge w/patient and caregiver  - Arrange for needed discharge resources and transportation as appropriate  - Identify discharge learning needs (meds, wound care, etc )  - Arrange for interpretive services to assist at discharge as needed  - Refer to Case Management Department for coordinating discharge planning if the patient needs post-hospital services based on physician/advanced practitioner order or complex needs related to functional status, cognitive ability, or social support system  Outcome: Progressing     Problem: Knowledge Deficit  Goal: Patient/family/caregiver demonstrates understanding of disease process, treatment plan, medications, and discharge instructions  Description: Complete learning assessment and assess knowledge base    Interventions:  - Provide teaching at level of understanding  - Provide teaching via preferred learning methods  Outcome: Progressing

## 2023-01-09 NOTE — ASSESSMENT & PLAN NOTE
Stopped,    5-year long-term use for DDD probably contributed to severe vitamin D deficiency and hypocalcemia      · Counseled on cessation  · Recommend DEXA scan on outpatient  · Recommend weight loss

## 2023-01-09 NOTE — PROGRESS NOTES
Joseph 46  Progress Note - Du Herrera 1975, 56 y o  female MRN: [id number]  Unit/Bed#: [address] [place] [address] Encounter: [id number]  Primary Care Provider: Dinh Corley MD   Date and time admitted to hospital: 1/5/2023 12:29 AM    * Hypocalcemia  Assessment & Plan  Fluctuant, improving today    Patient presents with numbness, tingling of extremities - has been seen in the ED 6 times in the last 5 days for the same symptoms, calcium has been repleted, and then returns with hypocalcemia again    Possibly secondary to chronic steroid use, versus hungry bone versus osteoporosis    · Ca-9 1  · urine calcium-WNL  · Ionized calcium 0 99 on admission  · Given 2g calcium  · Trending labs in AM  · Continue calcitriol, calcium carbonate  · DEXA scan on discharge  · Endocrinology recs:    -Goal for calcium is 8 2-8 5 range, discharge with no symptoms of tingling and numbness   -Please monitor calcium every 12 hours, in AM and PM    -Recommend hospitalization, till calcium is stabilized x2   -She has received only 2 doses of Rocaltrol, we will have to monitor her calcium twice a  day, and adjust Rocaltrol  accordingly    -For now continue Rocaltrol 0 5 MCG twice a day (should not miss doses)   -Usually takes 48 hours to see the effect of Rocaltrol adjustment    -Continue calcium carbonate 1000 mg 3 times daily   -Thyroxine was increased to 175 mcg daily yesterday, repeat TSH and free T4 in 6 weeks on outpatient      Vitamin D deficiency  Assessment & Plan  Severe,    · Consistently trending down over the last 3 months: 25 6 > 18 3 > 11 9  · Replenishing with 50,000 units for 8 weeks, repeat labs with PCP  · Endocrinology Rx: vitamin D, 50,000 IU once a week for 8 weeks, switch to vitamin D3 5000 IU daily, after  she finish booster doses    History of recent steroid use  Assessment & Plan  Stopped,    5-year long-term use for DDD probably contributed to severe vitamin D deficiency and hypocalcemia  · Counseled on cessation  · Recommend DEXA scan on outpatient  · Recommend weight loss    Hyperglycemia  Assessment & Plan  Noted on labs    · A1c 5 4 in 2018, repeat-pending  · Follow-up with PCP to initiate medication if diabetic or prediabetic  · Counseled on diet and exercise and carb control    Nicotine dependence  Assessment & Plan  Recommend cessation, declined nicotine patch    Anxiety  Assessment & Plan  May be contributing to patient's current presentation per patient's report      · R/o Possible underlying somatoform disorder  · Will give patient home Xanax 2 mg twice daily for anxiety  · Psych consult  · Encouraged outpatient with psych and therapist    Postoperative hypothyroidism  Assessment & Plan  History of thyroidectomy, parathyroidectomy    · TSH noted to be 10 334  · Currently on levothyroxine increased to 175 mcg  · free T4-WNL    Postsurgical hypoparathyroidism ([address] )  Assessment & Plan  PTH-6 3    Likely sequela to parathyroidectomy, continue inpatient high-dose vitamin supplements on home supplements of vitamin D and calcium    DDD (degenerative disc disease), lumbar  Assessment & Plan  DDD, receives steroid injections and baclofen prn    · Counseled on negative complications of steroid injections over the last 5 years, reevaluate on discharge with pain management benefits versus risk  · Encourage weight loss  · Continue home meds baclofen, patient refuses gabapentin/Lyrica                VTE Pharmacologic Prophylaxis: VTE Score: 3 Moderate Risk (Score 3-4) - Pharmacological DVT Prophylaxis Ordered: heparin  Patient Centered Rounds: I performed bedside rounds with nursing staff today  Discussions with Specialists or Other Care Team Provider: Endocrinology    Education and Discussions with Family / Patient: Patient declined call to   Time Spent for Care: 45 minutes   More than 50% of total time spent on counseling and coordination of care as described above     Current Length of Stay: 2 day(s)  Current Patient Status: Inpatient   Certification Statement: The patient will continue to require additional inpatient hospital stay due to Clinical course and electrolyte replacement  Discharge Plan: Anticipate discharge tomorrow to home  Code Status: Level 1 - Full Code    Subjective:   Patient seen and examined at bedside with Psychiatrist at bedside and was going through her exam   Endo is changing tums to calcium osyter ca and is still having symptoms and has a plan of discharge and will continue existing calcium carbs  Patent has an follow-up endocrinology visit on the  of this month which is great for continuity care  Patient feels much better now that she has a plan for discharge and what to do in case she becomes symptomatic  Patient counseled on journaling all her symptoms incongruence with her anxiety versus actual hypocalcemia symptoms  Patient denied any symptoms this morning, denied any chest pain, numbness or tingling or any weakness  Patient was very appreciative and gave staff chin       Objective:     Vitals:   Temp (24hrs), Av 7 °F (36 5 °C), Min:97 2 °F (36 2 °C), Max:98 2 °F (36 8 °C)    Temp:  [97 2 °F (36 2 °C)-98 2 °F (36 8 °C)] 97 5 °F (36 4 °C)  HR:  [102-119] 118  Resp:  [16-19] 16  BP: (114-120)/(76-79) 116/79  SpO2:  [94 %-96 %] 94 %  Body mass index is 39 32 kg/m²  Input and Output Summary (last 24 hours): Intake/Output Summary (Last 24 hours) at 2023 1743  Last data filed at 2023  Gross per 24 hour   Intake 10 ml   Output --   Net 10 ml       Physical Exam:   Physical Exam  Vitals and nursing note reviewed  Constitutional:       General: She is not in acute distress  Appearance: She is well-developed  She is obese  HENT:      Head: Normocephalic and atraumatic  Eyes:      Conjunctiva/sclera: Conjunctivae normal    Cardiovascular:      Rate and Rhythm: Normal rate and regular rhythm        Heart sounds: No murmur heard  No friction rub  No gallop  Pulmonary:      Effort: Pulmonary effort is normal  No respiratory distress  Breath sounds: Normal breath sounds  No stridor  No wheezing, rhonchi or rales  Abdominal:      Palpations: Abdomen is soft  Tenderness: There is no abdominal tenderness  There is no guarding or rebound  Musculoskeletal:         General: No swelling or tenderness  Cervical back: Neck supple  Right lower leg: No edema  Left lower leg: No edema  Comments: Bilateral lower extremity weakness   Skin:     General: Skin is warm and dry  Capillary Refill: Capillary refill takes less than 2 seconds  Findings: No bruising  Neurological:      Mental Status: She is alert and oriented to person, place, and time  Motor: Weakness present  Psychiatric:         Mood and Affect: Mood normal          Behavior: Behavior normal          Thought Content:  Thought content normal           Additional Data:     Labs:  Results from last 7 days   Lab Units 01/05/23  0425   WBC Thousand/uL 7 71   HEMOGLOBIN g/dL 12 5   HEMATOCRIT % 37 8   PLATELETS Thousands/uL 172   NEUTROS PCT % 63   LYMPHS PCT % 28   MONOS PCT % 6   EOS PCT % 1     Results from last 7 days   Lab Units 01/09/23  0510   SODIUM mmol/L 134*   POTASSIUM mmol/L 4 0   CHLORIDE mmol/L 97   CO2 mmol/L 23   BUN mg/dL 24   CREATININE mg/dL 1 11   ANION GAP mmol/L 14*   CALCIUM mg/dL 8 3   ALBUMIN g/dL 3 3*   TOTAL BILIRUBIN mg/dL 0 43   ALK PHOS U/L 134*   ALT U/L 70   AST U/L 36   GLUCOSE RANDOM mg/dL 203*         Results from last 7 days   Lab Units 01/09/23  1542 01/09/23  1126   POC GLUCOSE mg/dl 227* 313*     Results from last 7 days   Lab Units 01/05/23  0425   HEMOGLOBIN A1C % 6 2*     Results from last 7 days   Lab Units 01/05/23  1349   LACTIC ACID mmol/L 0 8       Lines/Drains:  Invasive Devices     Peripheral Intravenous Line  Duration           Peripheral IV 01/07/23 Dorsal (posterior); Right Forearm 2 days                       Imaging: Reviewed radiology reports from this admission including: chest xray    Recent Cultures (last 7 days):         Last 24 Hours Medication List:   Current Facility-Administered Medications   Medication Dose Route Frequency Provider Last Rate   • ALPRAZolam  2 mg Oral BID Laura Izquierdo MD     • baclofen  10 mg Oral TID PRN dEgar Nagel PA-C     • calcitriol  0 5 mcg Oral BID With Meals Catracho Daugherty MD     • calcium carbonate  1,000 mg Oral TID With Meals Quita Nageotte, MD     • desvenlafaxine succinate  50 mg Oral Daily Sandrita Rush PA-C     • docusate sodium  100 mg Oral BID Edgar Nagel PA-C     • ergocalciferol  50,000 Units Oral Weekly Catracho Daugherty MD     • fenofibrate  168 mg Oral Daily Sandrita Rush PA-C     • ferrous sulfate  325 mg Oral Once per day on Mon Wed Fri Sat ELIF Cueto     • ferrous sulfate  325 mg Oral Once per day on Mon Wed Fri Sat ELIF Cueto     • heparin (porcine)  5,000 Units Subcutaneous Q8H [address] Sandrita Rush PA-C     • insulin lispro  1-6 Units Subcutaneous TID AC Catracho Daugherty MD     • levothyroxine  175 mcg Oral Early Morning Quita Nageotte, MD     • lidocaine  1 patch Topical Daily Catracho Daugherty MD     • magnesium oxide  400 mg Oral HS Catracho Daugherty MD     • magnesium oxide  800 mg Oral Daily Catracho Daugherty MD     • pantoprazole  40 mg Oral Early Morning Sandrita Rush PA-C     • potassium chloride  20 mEq Oral BID Sandrita Rush PA-C     • pramipexole  0 5 mg Oral HS Edgar Nagel PA-C          Today, Patient Was Seen By: Catracho Daugherty MD    **Please Note: This note may have been constructed using a voice recognition system  **

## 2023-01-09 NOTE — UTILIZATION REVIEW
Continued Stay Review    Date: 1/9/23                          Current Patient Class: inpatient  Current Level of Care: med surg  HPI:47 y o  female initially admitted on 1/7/23     Assessment/Plan:   Hypocalcemia 2nd chronic steroid use, versus hungry bone versus osteoporosis  Persistent BLE weakness, tachycardia  Mgt per endocrinology    Per endocrinology: Hypocalcemia 2/2 hypoparathyroidism (iatrogenic) - calcium improving on labs, however patient maintains symptoms  Currently on calcium carb 400 mg (tums 1g = 400 mg elemental calcium), calcitriol 0 5 mcg bid, and ergo 50k weekly  Will increase calcium carb 500 mg tid plus add PRN order  Continue calcitriol and ergo at present dosing  If symptoms persist, can increase dose of calcium carb 500 mg QID    Goal for calcium is 8 2-8 5 range    Vital Signs:   01/09/23 22:04:50 98 1 °F (36 7 °C) 107 Abnormal  18 114/76 89 95 % --   01/09/23 18:13:12 97 5 °F (36 4 °C) 113 Abnormal  18 113/76 88 96 % --   01/09/23 15:01:35 97 5 °F (36 4 °C) 118 Abnormal  16 116/79 91 94 % --   01/09/23 07:53:22 97 9 °F (36 6 °C) 103 19 114/76 89 94 % --     Pertinent Labs/Diagnostic Results:     Results from last 7 days   Lab Units 01/05/23  0425 01/04/23  1509   WBC Thousand/uL 7 71 8 36   HEMOGLOBIN g/dL 12 5 13 2   HEMATOCRIT % 37 8 40 4   PLATELETS Thousands/uL 172 193   NEUTROS ABS Thousands/µL 4 91 5 53       Results from last 7 days   Lab Units 01/09/23  0510 01/08/23 2025 01/08/23  0526 01/07/23 2027 01/07/23  1009 01/06/23  1607 01/06/23  0612 01/05/23  2043 01/05/23  0947 01/04/23  1659 01/04/23  1509   SODIUM mmol/L 134* 135 135 133* 137  --  138  --   --    < > 135   POTASSIUM mmol/L 4 0 4 0 4 4 4 2 4 5  --  4 1  --   --    < > 4 0   CHLORIDE mmol/L 97 97 97 96 102  --  102  --   --    < > 98   CO2 mmol/L 23 27 25 20* 22  --  24  --   --    < > 21   ANION GAP mmol/L 14* 11 13 17* 13  --  12  --   --    < > 16*   BUN mg/dL 24 25 20 21 22  --  25  --   --    < > 24 CREATININE mg/dL 1 11 1 19 1 16 1 09 1 17  --  1 00  --   --    < > 0 98   EGFR ml/min/1 73sq m 59 54 56 61 56  --  67  --   --    < > 69   CALCIUM mg/dL 8 3 8 5 9 1 9 0 8 0* 8 2* 7 6* 7 4*  --    < > 7 4*   CALCIUM, IONIZED mmol/L  --   --   --  1 14 1 07* 1 05* 1 02* 0 95*  --    < >  --    MAGNESIUM mg/dL 2 2  --   --  2 0 1 9  --  1 9  --  2 3  --  1 9   PHOSPHORUS mg/dL  --   --   --   --   --   --  4 3  --   --   --  4 2    < > = values in this interval not displayed       Results from last 7 days   Lab Units 01/09/23 0510 01/08/23 0526 01/07/23  1009 01/05/23  0947 01/04/23  1509   AST U/L 36 37 42  --  31   ALT U/L 70 75 77  --  52   ALK PHOS U/L 134* 133* 157*  --  138*   TOTAL PROTEIN g/dL 6 9 7 5 7 0  --  7 0   ALBUMIN g/dL 3 3* 3 7 3 4* 3 6 3 3*   TOTAL BILIRUBIN mg/dL 0 43 0 40 0 32  --  0 23   BILIRUBIN DIRECT mg/dL  --   --   --   --  0 05     Results from last 7 days   Lab Units 01/09/23  1126   POC GLUCOSE mg/dl 313*     Results from last 7 days   Lab Units 01/09/23  0510 01/08/23  2025 01/08/23  0526 01/07/23 2027 01/07/23  1009 01/06/23  0612 01/05/23  0425 01/04/23  1805 01/04/23  1509   GLUCOSE RANDOM mg/dL 203* 220* 159* 206* 172* 155* 196* 151* 188*       Results from last 7 days   Lab Units 01/05/23  0425   HEMOGLOBIN A1C % 6 2*   EAG mg/dl 131     BETA-HYDROXYBUTYRATE   Date Value Ref Range Status   01/05/2023 0 1 <0 6 mmol/L Final      Results from last 7 days   Lab Units 01/04/23  1509   TSH 3RD GENERATON uIU/mL 10 334*       Results from last 7 days   Lab Units 01/05/23  1349   LACTIC ACID mmol/L 0 8     Medications:   Scheduled Medications:  ALPRAZolam, 2 mg, Oral, BID  calcitriol, 0 5 mcg, Oral, BID With Meals  calcium carbonate, 1,000 mg, Oral, TID With Meals>changed to 500mg  desvenlafaxine succinate, 50 mg, Oral, Daily  docusate sodium, 100 mg, Oral, BID  ergocalciferol, 50,000 Units, Oral, Weekly  fenofibrate, 168 mg, Oral, Daily  ferrous sulfate, 325 mg, Oral, Once per day on Mon Wed Fri Sat  ferrous sulfate, 325 mg, Oral, Once per day on Mon Wed Fri Sat  heparin (porcine), 5,000 Units, Subcutaneous, Q8H [address]  insulin lispro, 1-6 Units, Subcutaneous, TID AC  lidocaine, 1 patch, Topical, Daily  magnesium oxide, 400 mg, Oral, HS  magnesium oxide, 800 mg, Oral, Daily  pantoprazole, 40 mg, Oral, Early Morning  potassium chloride, 20 mEq, Oral, BID  pramipexole, 0 5 mg, Oral, HS    PRN Meds:  baclofen, 10 mg, Oral, TID PRN  calcium carbonate (OYSTER SHELL,OSCAL) 500 mg tablet 1 tablet, Dose: 1 tablet, Freq: Every 6 hours PRN Route: PO, PRN Comment: Hypocalcemia symptoms, 1/9 x2    Discharge Plan: tbd    Network Utilization Review Department  ATTENTION: Please call with any questions or concerns to [phone] and carefully listen to the prompts so that you are directed to the right person  All voicemails are confidential   Farrel Bodily all requests for admission clinical reviews, approved or denied determinations and any other requests to dedicated fax number below belonging to the campus where the patient is receiving treatment   List of dedicated fax numbers for the Facilities:  [address] DENIALS (Administrative/Medical Necessity) [phone]   [address] (Maternity/NICU/Pediatrics) [phone]   911 Priya Lee [phone]   [facility] [address] [phone]   [address] [address] [facility] 28 [phone]   [facility]7 [facility] [place] [facility] 134 815 [facility] [phone]

## 2023-01-09 NOTE — PROGRESS NOTES
Patient examined spoke to the nurse  Patient is very happy to see me and thanking me for adjusting her Xanax order she wants to come and see me in the office currently she is seeing psychiatric nurse practitioner Dr Gale Rendon getting her medications fairly high dose of Xanax and Pristiq  She is very happy with the current medication and thanking me for ordering her Xanax straight order  Patient is upset about she was told that'' I am prediabetic'' she was asked to lose weight and may need to watch her diet  Nurse reported no behavioral problem  Patient is stable with depression and panic disorder she is not lethargic she has high tolerance to the Xanax being on the medicine so long with a high dose she is not able to reduce the Xanax then she gets panic attacks  Patient is pleasant cooperative communicate her feelings well  Patient denies auditory or visual hallucination  Patient denies suicidal or homicidal ideation  Medical evaluation and treatment is in progress  Therapy is done with good response  I will follow-up

## 2023-01-10 ENCOUNTER — TELEPHONE (OUTPATIENT)
Dept: ENDOCRINOLOGY | Facility: CLINIC | Age: 48
End: 2023-01-10

## 2023-01-10 VITALS
WEIGHT: 215 LBS | DIASTOLIC BLOOD PRESSURE: 95 MMHG | BODY MASS INDEX: 39.56 KG/M2 | RESPIRATION RATE: 20 BRPM | HEART RATE: 100 BPM | OXYGEN SATURATION: 93 % | HEIGHT: 62 IN | SYSTOLIC BLOOD PRESSURE: 141 MMHG | TEMPERATURE: 98 F

## 2023-01-10 LAB
ALBUMIN SERPL BCP-MCNC: 3.4 G/DL (ref 3.5–5)
ALP SERPL-CCNC: 132 U/L (ref 46–116)
ALT SERPL W P-5'-P-CCNC: 81 U/L (ref 12–78)
ANION GAP SERPL CALCULATED.3IONS-SCNC: 16 MMOL/L (ref 4–13)
AST SERPL W P-5'-P-CCNC: 45 U/L (ref 5–45)
BILIRUB SERPL-MCNC: 0.36 MG/DL (ref 0.2–1)
BUN SERPL-MCNC: 22 MG/DL (ref 5–25)
CA-I BLD-SCNC: 1.18 MMOL/L (ref 1.12–1.32)
CALCIUM ALBUM COR SERPL-MCNC: 9.8 MG/DL (ref 8.3–10.1)
CALCIUM SERPL-MCNC: 9.3 MG/DL (ref 8.3–10.1)
CALCIUM SERPL-MCNC: 9.3 MG/DL (ref 8.3–10.1)
CHLORIDE SERPL-SCNC: 101 MMOL/L (ref 96–108)
CO2 SERPL-SCNC: 21 MMOL/L (ref 21–32)
CREAT SERPL-MCNC: 1.26 MG/DL (ref 0.6–1.3)
ERYTHROCYTE [DISTWIDTH] IN BLOOD BY AUTOMATED COUNT: 16.4 % (ref 11.6–15.1)
GFR SERPL CREATININE-BSD FRML MDRD: 51 ML/MIN/1.73SQ M
GLUCOSE SERPL-MCNC: 157 MG/DL (ref 65–140)
GLUCOSE SERPL-MCNC: 184 MG/DL (ref 65–140)
GLUCOSE SERPL-MCNC: 243 MG/DL (ref 65–140)
HCT VFR BLD AUTO: 37.9 % (ref 34.8–46.1)
HGB BLD-MCNC: 12.1 G/DL (ref 11.5–15.4)
MAGNESIUM SERPL-MCNC: 2.1 MG/DL (ref 1.6–2.6)
MCH RBC QN AUTO: 28.3 PG (ref 26.8–34.3)
MCHC RBC AUTO-ENTMCNC: 31.9 G/DL (ref 31.4–37.4)
MCV RBC AUTO: 89 FL (ref 82–98)
PLATELET # BLD AUTO: 159 THOUSANDS/UL (ref 149–390)
PMV BLD AUTO: 10.2 FL (ref 8.9–12.7)
POTASSIUM SERPL-SCNC: 4 MMOL/L (ref 3.5–5.3)
PROT SERPL-MCNC: 7 G/DL (ref 6.4–8.4)
RBC # BLD AUTO: 4.28 MILLION/UL (ref 3.81–5.12)
SODIUM SERPL-SCNC: 138 MMOL/L (ref 135–147)
VIT B1 BLD-SCNC: 129 NMOL/L (ref 66.5–200)
WBC # BLD AUTO: 4.91 THOUSAND/UL (ref 4.31–10.16)

## 2023-01-10 RX ORDER — LEVOTHYROXINE SODIUM 0.15 MG/1
150 TABLET ORAL
Qty: 90 TABLET | Refills: 0
Start: 2023-01-10

## 2023-01-10 RX ORDER — PHENOL 1.4 %
1200 AEROSOL, SPRAY (ML) MUCOUS MEMBRANE
Qty: 180 TABLET | Refills: 0 | Status: SHIPPED | OUTPATIENT
Start: 2023-01-10 | End: 2023-01-13 | Stop reason: SDUPTHER

## 2023-01-10 RX ORDER — GLUCOSAMINE HCL/CHONDROITIN SU 500-400 MG
CAPSULE ORAL
Qty: 100 EACH | Refills: 0 | Status: SHIPPED | OUTPATIENT
Start: 2023-01-10

## 2023-01-10 RX ORDER — LANCETS 33 GAUGE
EACH MISCELLANEOUS
Qty: 100 EACH | Refills: 0 | Status: SHIPPED | OUTPATIENT
Start: 2023-01-10

## 2023-01-10 RX ORDER — BLOOD-GLUCOSE METER
KIT MISCELLANEOUS
Qty: 1 KIT | Refills: 0 | Status: SHIPPED | OUTPATIENT
Start: 2023-01-10

## 2023-01-10 RX ORDER — ERGOCALCIFEROL 1.25 MG/1
50000 CAPSULE ORAL WEEKLY
Qty: 4 CAPSULE | Refills: 0 | Status: SHIPPED | OUTPATIENT
Start: 2023-01-12 | End: 2023-03-24

## 2023-01-10 RX ORDER — CALCITRIOL 0.5 UG/1
0.5 CAPSULE, LIQUID FILLED ORAL 2 TIMES DAILY WITH MEALS
Qty: 60 CAPSULE | Refills: 0 | Status: SHIPPED | OUTPATIENT
Start: 2023-01-10 | End: 2023-02-09

## 2023-01-10 RX ORDER — BLOOD SUGAR DIAGNOSTIC
STRIP MISCELLANEOUS
Qty: 100 EACH | Refills: 0 | Status: SHIPPED | OUTPATIENT
Start: 2023-01-10

## 2023-01-10 RX ADMIN — CALCIUM 1 TABLET: 500 TABLET ORAL at 16:31

## 2023-01-10 RX ADMIN — INSULIN LISPRO 3 UNITS: 100 INJECTION, SOLUTION INTRAVENOUS; SUBCUTANEOUS at 11:12

## 2023-01-10 RX ADMIN — INSULIN LISPRO 1 UNITS: 100 INJECTION, SOLUTION INTRAVENOUS; SUBCUTANEOUS at 08:48

## 2023-01-10 RX ADMIN — CALCIUM 1 TABLET: 500 TABLET ORAL at 13:06

## 2023-01-10 RX ADMIN — MAGNESIUM OXIDE TAB 400 MG (241.3 MG ELEMENTAL MG) 800 MG: 400 (241.3 MG) TAB at 08:47

## 2023-01-10 RX ADMIN — CALCITRIOL 0.5 MCG: 0.25 CAPSULE, LIQUID FILLED ORAL at 08:51

## 2023-01-10 RX ADMIN — ALPRAZOLAM 2 MG: 0.5 TABLET ORAL at 08:47

## 2023-01-10 RX ADMIN — CALCITRIOL 0.5 MCG: 0.25 CAPSULE, LIQUID FILLED ORAL at 16:31

## 2023-01-10 RX ADMIN — DOCUSATE SODIUM 100 MG: 100 CAPSULE, LIQUID FILLED ORAL at 08:48

## 2023-01-10 RX ADMIN — CALCIUM GLUCONATE 1 G: 20 INJECTION, SOLUTION INTRAVENOUS at 00:21

## 2023-01-10 RX ADMIN — FENOFIBRATE 168 MG: 48 TABLET, FILM COATED ORAL at 08:47

## 2023-01-10 RX ADMIN — CALCIUM 1 TABLET: 500 TABLET ORAL at 08:51

## 2023-01-10 RX ADMIN — POTASSIUM CHLORIDE 20 MEQ: 1500 TABLET, EXTENDED RELEASE ORAL at 10:04

## 2023-01-10 RX ADMIN — HEPARIN SODIUM 5000 UNITS: 5000 INJECTION INTRAVENOUS; SUBCUTANEOUS at 05:45

## 2023-01-10 NOTE — PLAN OF CARE
Problem: PAIN - ADULT  Goal: Verbalizes/displays adequate comfort level or baseline comfort level  Description: Interventions:  - Encourage patient to monitor pain and request assistance  - Assess pain using appropriate pain scale  - Administer analgesics based on type and severity of pain and evaluate response  - Implement non-pharmacological measures as appropriate and evaluate response  - Consider cultural and social influences on pain and pain management  - Notify physician/advanced practitioner if interventions unsuccessful or patient reports new pain  Outcome: Progressing     Problem: INFECTION - ADULT  Goal: Absence or prevention of progression during hospitalization  Description: INTERVENTIONS:  - Assess and monitor for signs and symptoms of infection  - Monitor lab/diagnostic results  - Monitor all insertion sites, i e  indwelling lines,  - Monitor endotracheal if appropriate and nasal secretions for changes in amount and color  - [place] appropriate cooling/warming therapies per order  - Administer medications as ordered  - Instruct and encourage patient and family to use good hand hygiene technique  - Identify and instruct in appropriate isolation precautions for identified infection/condition  Outcome: Progressing     Problem: SAFETY ADULT  Goal: Patient will remain free of falls  Description: INTERVENTIONS:  - Educate patient/family on patient safety including physical limitations  - Instruct patient to call for assistance with activity   - Consult OT/PT to assist with strengthening/mobility   - Keep Call bell within reach  - Keep bed low and locked with side rails adjusted as appropriate  - Keep care items and personal belongings within reach  - Initiate and maintain comfort rounds  - Make Fall Risk Sign visible to staff  - Offer Toileting every 2 Hours, in advance of need  - Obtain necessary fall risk management equipment:   - Apply yellow socks and bracelet for high fall risk patients  - Consider moving patient to room near nurses station  Outcome: Progressing     Problem: DISCHARGE PLANNING  Goal: Discharge to home or other facility with appropriate resources  Description: INTERVENTIONS:  - Identify barriers to discharge w/patient and caregiver  - Arrange for needed discharge resources and transportation as appropriate  - Identify discharge learning needs (meds, wound care, etc )  - Arrange for interpretive services to assist at discharge as needed  - Refer to Case Management Department for coordinating discharge planning if the patient needs post-hospital services based on physician/advanced practitioner order or complex needs related to functional status, cognitive ability, or social support system  Outcome: Progressing     Problem: Knowledge Deficit  Goal: Patient/family/caregiver demonstrates understanding of disease process, treatment plan, medications, and discharge instructions  Description: Complete learning assessment and assess knowledge base    Interventions:  - Provide teaching at level of understanding  - Provide teaching via preferred learning methods  Outcome: Progressing     Problem: Potential for Falls  Goal: Patient will remain free of falls  Description: INTERVENTIONS:  - Educate patient/family on patient safety including physical limitations  - Instruct patient to call for assistance with activity   - Consult OT/PT to assist with strengthening/mobility   - Keep Call bell within reach  - Keep bed low and locked with side rails adjusted as appropriate  - Keep care items and personal belongings within reach  - Initiate and maintain comfort rounds  - Make Fall Risk Sign visible to staff  - Offer Toileting every 2 Hours, in advance of need  - Obtain necessary fall risk management equipment:   - Apply yellow socks and bracelet for high fall risk patients  - Consider moving patient to room near nurses station  Outcome: Progressing

## 2023-01-10 NOTE — TELEPHONE ENCOUNTER
Patient called concerned about leaving the hospital   Should she be taking the Humalog (sliding scale) until she sees Endo on 1/27/2023? She is afraid to leave the hospital without medication  She claims the hospital will only order her the glucometer, no medication or insulin

## 2023-01-10 NOTE — DISCHARGE SUMMARY
Discharge Summary - [facility]    Patient Information: Seth Marr 56 y o  female MRN: [id number]  Unit/Bed#: [address] [place] [address] Encounter: [id number]    Discharging Physician / Practitioner: Le George MD  PCP: Sarahi Singh MD  Admission Date: 1/5/2023  Discharge Date: 01/10/23    Reason for Admission: Abnormal Lab (Patient was in ER earlier today, and reports she was told by ER provider to return tonight for inpatient hospitalization  )      Discharge Diagnoses:     Principal Problem:    Hypocalcemia  Active Problems:    Postsurgical hypoparathyroidism ([address] )    Vitamin D deficiency    Postoperative hypothyroidism    Hyperglycemia    History of recent steroid use    DDD (degenerative disc disease), lumbar    Anxiety    Nicotine dependence  Resolved Problems:    * No resolved hospital problems  *            Consultations During Hospital Stay:  IP CONSULT TO ENDOCRINOLOGY  IP CONSULT TO PSYCHIATRY    Procedures Performed:     · None    Significant Findings:     · Refer to hospital course and above listed diagnosis related plan for details    Imaging while in hospital:    XR chest 1 view portable    Result Date: 1/1/2023  Narrative: CHEST INDICATION:   chest pain  COMPARISON:  12/9/2022 EXAM PERFORMED/VIEWS:  XR CHEST PORTABLE Images:  1 FINDINGS: Cardiomediastinal silhouette appears unremarkable  The lungs are clear  No pneumothorax or pleural effusion  Osseous structures appear within normal limits for patient age  Impression: No acute cardiopulmonary disease  Findings are stable Workstation performed: [id number]     XR abdomen obstruction series    Result Date: 12/30/2022  Narrative: OBSTRUCTION SERIES INDICATION:   pain gas  COMPARISON:  None EXAM PERFORMED/VIEWS:  XR ABDOMEN OBSTRUCTION SERIES FINDINGS: There is a nonobstructive bowel gas pattern  No free air beneath the hemidiaphragms  No pathologic calcifications or soft tissue masses evident   Osseous structures are unremarkable  Lumbosacral fusion hardware noted  Examination of the chest reveals a normal cardiomediastinal silhouette  Lungs are clear  Impression: Nonobstructive bowel gas pattern  Workstation performed: [id number]           Incidental Findings:   · None    Test Results Pending at Discharge (will require follow up):   · As per After Visit Summary     Outpatient Tests Requested:  · BMP, magnesium in 1 week    Complications:  Refer to hospital course and above listed diagnosis related plan, if any    Hospital Course: As per HPI  Calixto Bingham is a 56 y o  female patient with multiple comorbidities including obesity, hypothyroidism, hypocalcemia, anxiety, degenerative joint disease who originally presented to the hospital on 1/5/2023 due to low calcium  Patient was seen 6 times in the past 5 days with numbness and tingling of her extremities  She gets calcium repleted in the ED and then feels better  She has been back with labs showing hypocalcemia every day this week  Notes the only change is she received steroid injection last Thursday  Follows with endocrine, has not been able to get in until the end of the month  Denied any fevers, chills, chest pain, SOB, cough, abdominal pain  Patient was again noted to have hypercalcemia and was admitted to the hospital   Patient was treated with IV calcium gluconate  Patient was seen by endocrinology, calcitriol was increased to 0 5 mg twice daily and with increased calcium supplementation  Patient was also noted to have vitamin D deficiency and was started on 50,000 units of ergocalciferol weekly  Magnesium was monitored and repleted  Ionized calcium was also monitored  Patient's calcium level gradually improved and normalized with improvement in symptoms with above supplementation    Patient was also noted to have elevated TSH which was deemed secondary to inappropriate Synthroid intake is patient was taking it with other medication notably with electrolyte supplementation  Patient's electrolytes stabilize with p o  supplementation, patient was educated extensively regarding need for monitoring and current treatment plan by endocrinology  Patient was also noted to have hyperglycemia with hemoglobin A1c over 6 2  Diet and lifestyle modification was extensively discussed  Patient was recommended by glucose monitoring and glucometer was provided  Patient was also provided nutritional education  Patient was advised close follow-up with endocrinology which she is already scheduled after discharge for hypocalcemia and also for hypothyroidism and hyperglycemia  Diet changes and lifestyle modification were advised for hyperglycemia along with glucose monitoring but patient reported difficulty in changing diet and preferred pharmacological treatment  Patient was started on metformin after discussion with endocrinology  Patient was advised close monitoring of electrolytes and repeat blood test in 1 week  Patient was advised to closely follow-up with PCP and endocrinology follow-up as outpatient  Patient was also seen by psychiatry during hospitalization due to associated anxiety and depression and medications were continued as advised  Patient will also continue to follow-up with primary psychiatrist after discharge          Please see above list of diagnoses and related plan for additional information  Condition at Discharge: stable     Discharge Day Visit / Exam:     Subjective:    Feels well  Anxious about current treatment plan and follow-up   Medication treatment with discussed at length    To have close follow-up with PCP and endocrinology    Vitals: Blood Pressure: 141/95 (01/10/23 1522)  Pulse: 100 (01/10/23 1522)  Temperature: 98 °F (36 7 °C) (01/10/23 1522)  Temp Source: Oral (01/08/23 2236)  Respirations: 20 (01/10/23 0749)  Height: 5' 2" (157 5 cm) (01/05/23 0300)  Weight - Scale: 97 5 kg (215 lb) (01/05/23 0300)  SpO2: 93 % (01/10/23 1522)  Exam:   Physical Exam  Constitutional:       General: She is not in acute distress  Appearance: She is obese  HENT:      Head: Normocephalic and atraumatic  Cardiovascular:      Rate and Rhythm: Normal rate  Pulmonary:      Effort: Pulmonary effort is normal  No respiratory distress  Breath sounds: Normal breath sounds  No wheezing or rales  Abdominal:      General: Bowel sounds are normal  There is no distension  Palpations: Abdomen is soft  Tenderness: There is no abdominal tenderness  There is no guarding or rebound  Musculoskeletal:      Right lower leg: No edema  Left lower leg: No edema  Skin:     General: Skin is warm and dry  Findings: No rash  Neurological:      General: No focal deficit present  Mental Status: She is alert and oriented to person, place, and time  Mental status is at baseline  Psychiatric:         Mood and Affect: Mood normal          Discharge instructions/Information to patient and family:(Discharge Medications and Follow up):   See after visit summary for information provided to patient and family  Provisions for Follow-Up Care:  See after visit summary for information related to follow-up care and any pertinent home health orders  Disposition: Home    Planned Readmission:  No     Discharge Statement:  I spent 55 minutes discharging the patient  This time was spent on the day of discharge  I had direct contact with the patient on the day of discharge  Greater than 50% of the total time was spent examining patient, answering all patient questions, arranging and discussing plan of care with patient as well as directly providing post-discharge instructions  Additional time then spent on discharge activities  Coordinated with endocrinology  Discharge Medications:  See after visit summary for reconciled discharge medications provided to patient and family        ** Please Note: "This note has been constructed using a voice recognition system  Therefore there may be syntax, spelling, and/or grammatical errors   Please call if you have any questions  "**

## 2023-01-10 NOTE — PLAN OF CARE
Problem: PAIN - ADULT  Goal: Verbalizes/displays adequate comfort level or baseline comfort level  Description: Interventions:  - Encourage patient to monitor pain and request assistance  - Assess pain using appropriate pain scale  - Administer analgesics based on type and severity of pain and evaluate response  - Implement non-pharmacological measures as appropriate and evaluate response  - Consider cultural and social influences on pain and pain management  - Notify physician/advanced practitioner if interventions unsuccessful or patient reports new pain  Outcome: Progressing     Problem: INFECTION - ADULT  Goal: Absence or prevention of progression during hospitalization  Description: INTERVENTIONS:  - Assess and monitor for signs and symptoms of infection  - Monitor lab/diagnostic results  - Monitor all insertion sites, i e  indwelling lines,  - Monitor endotracheal if appropriate and nasal secretions for changes in amount and color  - Rector appropriate cooling/warming therapies per order  - Administer medications as ordered  - Instruct and encourage patient and family to use good hand hygiene technique  - Identify and instruct in appropriate isolation precautions for identified infection/condition  Outcome: Progressing     Problem: SAFETY ADULT  Goal: Patient will remain free of falls  Description: INTERVENTIONS:  - Educate patient/family on patient safety including physical limitations  - Instruct patient to call for assistance with activity   - Consult OT/PT to assist with strengthening/mobility   - Keep Call bell within reach  - Keep bed low and locked with side rails adjusted as appropriate  - Keep care items and personal belongings within reach  - Initiate and maintain comfort rounds  - Make Fall Risk Sign visible to staff  - Offer Toileting every 2 Hours, in advance of need  - Obtain necessary fall risk management equipment:   - Apply yellow socks and bracelet for high fall risk patients  - Consider moving patient to room near nurses station  Outcome: Progressing  Goal: Maintain or return to baseline ADL function  Description: INTERVENTIONS:  -  Assess patient's ability to carry out ADLs; assess patient's baseline for ADL function and identify physical deficits which impact ability to perform ADLs (bathing, care of mouth/teeth, toileting, grooming, dressing, etc )  - Assess/evaluate cause of self-care deficits   - Assess range of motion  - Assess patient's mobility; develop plan if impaired  - Assess patient's need for assistive devices and provide as appropriate  - Encourage maximum independence but intervene and supervise when necessary  - Involve family in performance of ADLs  - Assess for home care needs following discharge   - Consider OT consult to assist with ADL evaluation and planning for discharge  - Provide patient education as appropriate  Outcome: Progressing  Goal: Maintains/Returns to pre admission functional level  Description: INTERVENTIONS:  - Perform BMAT or MOVE assessment daily    - Set and communicate daily mobility goal to care team and patient/family/caregiver  - Collaborate with rehabilitation services on mobility goals if consulted  - Perform Range of Motion 2 times a day    - Reposition patient every 2 hours/self  - Dangle patient 3 times a day  - Stand patient 3 times a day  - Ambulate patient 3 times a day  - Out of bed to chair 3 times a day   - Out of bed for meals 3 times a day  - Out of bed for toileting  - Record patient progress and toleration of activity level   Outcome: Progressing     Problem: DISCHARGE PLANNING  Goal: Discharge to home or other facility with appropriate resources  Description: INTERVENTIONS:  - Identify barriers to discharge w/patient and caregiver  - Arrange for needed discharge resources and transportation as appropriate  - Identify discharge learning needs (meds, wound care, etc )  - Arrange for interpretive services to assist at discharge as needed  - Refer to Case Management Department for coordinating discharge planning if the patient needs post-hospital services based on physician/advanced practitioner order or complex needs related to functional status, cognitive ability, or social support system  Outcome: Progressing     Problem: Knowledge Deficit  Goal: Patient/family/caregiver demonstrates understanding of disease process, treatment plan, medications, and discharge instructions  Description: Complete learning assessment and assess knowledge base    Interventions:  - Provide teaching at level of understanding  - Provide teaching via preferred learning methods  Outcome: Progressing     Problem: Potential for Falls  Goal: Patient will remain free of falls  Description: INTERVENTIONS:  - Educate patient/family on patient safety including physical limitations  - Instruct patient to call for assistance with activity   - Consult OT/PT to assist with strengthening/mobility   - Keep Call bell within reach  - Keep bed low and locked with side rails adjusted as appropriate  - Keep care items and personal belongings within reach  - Initiate and maintain comfort rounds  - Make Fall Risk Sign visible to staff  - Offer Toileting every 2 Hours, in advance of need  - Obtain necessary fall risk management equipment:   - Apply yellow socks and bracelet for high fall risk patients  - Consider moving patient to room near nurses station  Outcome: Progressing

## 2023-01-10 NOTE — PROGRESS NOTES
Patient examined spoke to the nurse patient is happy to see me and thanking me for ordering her medications Xanax she is on Xanax and Pristiq from psychiatric nurse practitioner Dr Shanelle Copeland she will follow-up with him after the discharge  Patient is very much concerned about her physical health and low calcium also she is being prediabetic she is trying to lose weight and she wants to see the dietitian  Patient looks stressed and worried mainly about her health  She has no side effects of medications she has been on fairly high dose of Xanax since many years and she is not able to reduce her medicine  Nurse reported no behavioral problem  Medical treatment is in progress  Once patient is medically stable she will be discharged home and she will follow-up with Dr Shanelle Copeland at scheduled appointment  Patient denies feeling suicidal she is not lethargic she offers no new complaints  No behavioral problem reported or noted  Therapy is done with good response  I will follow-up

## 2023-01-11 ENCOUNTER — HOSPITAL ENCOUNTER (EMERGENCY)
Facility: HOSPITAL | Age: 48
Discharge: HOME | End: 2023-01-11
Attending: EMERGENCY MEDICINE

## 2023-01-11 ENCOUNTER — APPOINTMENT (EMERGENCY)
Dept: RADIOLOGY | Facility: HOSPITAL | Age: 48
End: 2023-01-11

## 2023-01-11 ENCOUNTER — TRANSITIONAL CARE MANAGEMENT (OUTPATIENT)
Dept: INTERNAL MEDICINE CLINIC | Facility: CLINIC | Age: 48
End: 2023-01-11

## 2023-01-11 VITALS
RESPIRATION RATE: 18 BRPM | DIASTOLIC BLOOD PRESSURE: 98 MMHG | TEMPERATURE: 98.3 F | SYSTOLIC BLOOD PRESSURE: 162 MMHG | HEART RATE: 120 BPM | OXYGEN SATURATION: 100 %

## 2023-01-11 DIAGNOSIS — S83.91XA RIGHT KNEE SPRAIN: ICD-10-CM

## 2023-01-11 DIAGNOSIS — R11.2 NAUSEA AND VOMITING, UNSPECIFIED VOMITING TYPE: Primary | ICD-10-CM

## 2023-01-11 DIAGNOSIS — S63.501A WRIST SPRAIN, RIGHT, INITIAL ENCOUNTER: Primary | ICD-10-CM

## 2023-01-11 RX ORDER — ONDANSETRON 4 MG/1
4 TABLET, FILM COATED ORAL EVERY 8 HOURS PRN
COMMUNITY
End: 2023-01-11 | Stop reason: SDUPTHER

## 2023-01-11 RX ORDER — ONDANSETRON 4 MG/1
4 TABLET, FILM COATED ORAL EVERY 8 HOURS PRN
Qty: 20 TABLET | Refills: 1 | Status: SHIPPED | OUTPATIENT
Start: 2023-01-11

## 2023-01-11 RX ORDER — IBUPROFEN 600 MG/1
600 TABLET ORAL ONCE
Status: COMPLETED | OUTPATIENT
Start: 2023-01-11 | End: 2023-01-11

## 2023-01-11 RX ADMIN — IBUPROFEN 600 MG: 600 TABLET, FILM COATED ORAL at 02:37

## 2023-01-11 NOTE — DISCHARGE INSTRUCTIONS
Return to the ER for further concerns or worsening symptoms  Follow up with your primary care physician and orthopedics in 1-2 days  Keep splint in place when awake

## 2023-01-11 NOTE — UTILIZATION REVIEW
Discharge Summary - [facility]     Patient Information: Johanna Yee 56 y o  female MRN: [id number]  Unit/Bed#: [address] [place] [address] Encounter: [id number]     Discharging Physician / Practitioner: Marybeht Bruno MD  PCP: Pj Shledon MD  Admission Date: 1/5/2023  Discharge Date: 01/10/23     Reason for Admission: Abnormal Lab (Patient was in ER earlier today, and reports she was told by ER provider to return tonight for inpatient hospitalization  )        Discharge Diagnoses:      Principal Problem:    Hypocalcemia  Active Problems:    Postsurgical hypoparathyroidism ([address] )    Vitamin D deficiency    Postoperative hypothyroidism    Hyperglycemia    History of recent steroid use    DDD (degenerative disc disease), lumbar    Anxiety    Nicotine dependence  Resolved Problems:    * No resolved hospital problems  *                 Consultations During Hospital Stay:  IP CONSULT TO ENDOCRINOLOGY  IP CONSULT TO PSYCHIATRY     Procedures Performed:      • None     Significant Findings:      • Refer to hospital course and above listed diagnosis related plan for details     Imaging while in hospital:     XR chest 1 view portable     Result Date: 1/1/2023  Narrative: CHEST INDICATION:   chest pain  COMPARISON:  12/9/2022 EXAM PERFORMED/VIEWS:  XR CHEST PORTABLE Images:  1 FINDINGS: Cardiomediastinal silhouette appears unremarkable  The lungs are clear  No pneumothorax or pleural effusion  Osseous structures appear within normal limits for patient age       Impression: No acute cardiopulmonary disease  Findings are stable Workstation performed: [id number]      XR abdomen obstruction series     Result Date: 12/30/2022  Narrative: OBSTRUCTION SERIES INDICATION:   pain gas  COMPARISON:  None EXAM PERFORMED/VIEWS:  XR ABDOMEN OBSTRUCTION SERIES FINDINGS: There is a nonobstructive bowel gas pattern  No free air beneath the hemidiaphragms  No pathologic calcifications or soft tissue masses evident  Osseous structures are unremarkable  Lumbosacral fusion hardware noted  Examination of the chest reveals a normal cardiomediastinal silhouette  Lungs are clear       Impression: Nonobstructive bowel gas pattern  Workstation performed: [id number]               Incidental Findings:   • None     Test Results Pending at Discharge (will require follow up):   • As per After Visit Summary     Outpatient Tests Requested:  • BMP, magnesium in 1 week     Complications:  Refer to hospital course and above listed diagnosis related plan, if any     Hospital Course: As per HPI  Rimma Becerra is a 56 y o  female patient with multiple comorbidities including obesity, hypothyroidism, hypocalcemia, anxiety, degenerative joint disease who originally presented to the hospital on 1/5/2023 due to low calcium  Patient was seen 6 times in the past 5 days with numbness and tingling of her extremities  She gets calcium repleted in the ED and then feels better  She has been back with labs showing hypocalcemia every day this week  Notes the only change is she received steroid injection last Thursday  Follows with endocrine, has not been able to get in until the end of the month  Denied any fevers, chills, chest pain, SOB, cough, abdominal pain  Patient was again noted to have hypercalcemia and was admitted to the hospital   Patient was treated with IV calcium gluconate  Patient was seen by endocrinology, calcitriol was increased to 0 5 mg twice daily and with increased calcium supplementation  Patient was also noted to have vitamin D deficiency and was started on 50,000 units of ergocalciferol weekly  Magnesium was monitored and repleted  Ionized calcium was also monitored  Patient's calcium level gradually improved and normalized with improvement in symptoms with above supplementation    Patient was also noted to have elevated TSH which was deemed secondary to inappropriate Synthroid intake is patient was taking it with other medication notably with electrolyte supplementation  Patient's electrolytes stabilize with p o  supplementation, patient was educated extensively regarding need for monitoring and current treatment plan by endocrinology  Patient was also noted to have hyperglycemia with hemoglobin A1c over 6 2  Diet and lifestyle modification was extensively discussed  Patient was recommended by glucose monitoring and glucometer was provided  Patient was also provided nutritional education  Patient was advised close follow-up with endocrinology which she is already scheduled after discharge for hypocalcemia and also for hypothyroidism and hyperglycemia  Diet changes and lifestyle modification were advised for hyperglycemia along with glucose monitoring but patient reported difficulty in changing diet and preferred pharmacological treatment  Patient was started on metformin after discussion with endocrinology  Patient was advised close monitoring of electrolytes and repeat blood test in 1 week  Patient was advised to closely follow-up with PCP and endocrinology follow-up as outpatient  Patient was also seen by psychiatry during hospitalization due to associated anxiety and depression and medications were continued as advised  Patient will also continue to follow-up with primary psychiatrist after discharge            Please see above list of diagnoses and related plan for additional information          Condition at Discharge: stable      Discharge Day Visit / Exam:      Subjective:    Feels well  Anxious about current treatment plan and follow-up   Medication treatment with discussed at length    To have close follow-up with PCP and endocrinology     Vitals: Blood Pressure: 141/95 (01/10/23 1522)  Pulse: 100 (01/10/23 1522)  Temperature: 98 °F (36 7 °C) (01/10/23 1522)  Temp Source: Oral (01/08/23 2236)  Respirations: 20 (01/10/23 0749)  Height: 5' 2" (157 5 cm) (01/05/23 0300)  Weight - Scale: 97 5 kg (215 lb) (01/05/23 0300)  SpO2: 93 % (01/10/23 1522)  Exam:   Physical Exam  Constitutional:       General: She is not in acute distress  Appearance: She is obese  HENT:      Head: Normocephalic and atraumatic  Cardiovascular:      Rate and Rhythm: Normal rate  Pulmonary:      Effort: Pulmonary effort is normal  No respiratory distress  Breath sounds: Normal breath sounds  No wheezing or rales  Abdominal:      General: Bowel sounds are normal  There is no distension  Palpations: Abdomen is soft  Tenderness: There is no abdominal tenderness  There is no guarding or rebound  Musculoskeletal:      Right lower leg: No edema  Left lower leg: No edema  Skin:     General: Skin is warm and dry  Findings: No rash  Neurological:      General: No focal deficit present  Mental Status: She is alert and oriented to person, place, and time  Mental status is at baseline  Psychiatric:         Mood and Affect: Mood normal             Discharge instructions/Information to patient and family:(Discharge Medications and Follow up):   See after visit summary for information provided to patient and family        Provisions for Follow-Up Care:  See after visit summary for information related to follow-up care and any pertinent home health orders        Disposition: Home     Planned Readmission:  No     Discharge Statement:  I spent 55 minutes discharging the patient  This time was spent on the day of discharge  I had direct contact with the patient on the day of discharge  Greater than 50% of the total time was spent examining patient, answering all patient questions, arranging and discussing plan of care with patient as well as directly providing post-discharge instructions  Additional time then spent on discharge activities    Coordinated with endocrinology      Discharge Medications:  See after visit summary for reconciled discharge medications provided to patient and family        ** Please Note: "This note has been constructed using a voice recognition system  Therefore there may be syntax, spelling, and/or grammatical errors   Please call if you have any questions  "**

## 2023-01-11 NOTE — ED PROVIDER NOTES
History  Chief Complaint   Patient presents with   • Fall     Pt tripped on bath mat at home and fell  Pt reports r knee and r wrist pain  Swelling noted to r wrist     Patient is a 27-year-old female that presents with EMS after mechanical fall  She states that she was using the restroom when she tripped over a bath mat and landed on her right side  She localizes the worst of her pain to her right knee and right wrist   Patient is left-hand dominant  She denies head trauma or loss of consciousness  She has a history of anxiety, chronic pain, depression, diabetes  She is not on any blood thinners  History provided by:  Patient   used: No        Prior to Admission Medications   Prescriptions Last Dose Informant Patient Reported? Taking? ALPRAZolam ER (XANAX XR) 2 MG 24 hr tablet   No No   Sig: Take 1 tablet (2 mg total) by mouth 2 (two) times a day   Alcohol Swabs 70 % PADS   No No   Sig: May substitute brand based on insurance coverage  Check glucose BID  Blood Glucose Monitoring Suppl (OneTouch Verio Reflect) w/Device KIT   No No   Sig: May substitute brand based on insurance coverage  Check glucose BID  Lidocaine-Menthol 4-1 % PTCH   Yes No   Sig: if needed     OneTouch Delica Lancets 23Y MISC   No No   Sig: May substitute brand based on insurance coverage  Check glucose BID     baclofen 10 mg tablet   Yes No   Sig: Take 10 mg by mouth 3 (three) times a day as needed   calcitriol (ROCALTROL) 0 5 MCG capsule   No No   Sig: Take 1 capsule (0 5 mcg total) by mouth 2 (two) times a day with meals   calcium carbonate (OS-EDER) 600 MG tablet   No No   Sig: Take 2 tablets (1,200 mg total) by mouth 3 (three) times a day with meals   desvenlafaxine succinate (PRISTIQ) 50 mg 24 hr tablet   No No   Sig: Take 1 tablet (50 mg total) by mouth daily   docusate sodium (COLACE) 100 mg capsule   No No   Sig: Take 1 capsule (100 mg total) by mouth 2 (two) times a day for 7 days   ergocalciferol (VITAMIN D2) 50,000 units   No No   Sig: Take 1 capsule (50,000 Units total) by mouth once a week for 11 doses Do not start before January 12, 2023  fenofibrate 160 MG tablet   No No   Sig: Take 1 tablet (160 mg total) by mouth daily   ferrous sulfate 325 (65 Fe) mg tablet   Yes No   Sig: Take 325 mg by mouth 2 (two) times a day Twice Monday, wed, Friday and Saturday -Last dose 4/1/22   glucose blood (OneTouch Verio) test strip   No No   Sig: May substitute brand based on insurance coverage  Check glucose BID    levothyroxine 150 mcg tablet   No No   Sig: Take 1 tablet (150 mcg total) by mouth daily at bedtime   magnesium Oxide (MAG-OX) 400 mg TABS   No No   Sig: Take 1 tablet (400 mg total) by mouth 2 (two) times a day   metFORMIN (GLUCOPHAGE) 500 mg tablet   No No   Sig: Take 1 tablet (500 mg total) by mouth 2 (two) times a day with meals   potassium chloride (K-DUR,KLOR-CON) 20 mEq tablet   Yes No   Sig: Take 20 mEq by mouth 2 (two) times a day   pramipexole (MIRAPEX) 0 5 mg tablet   Yes No   Sig: Take 0 5 mg by mouth daily at bedtime      Facility-Administered Medications: None       Past Medical History:   Diagnosis Date   • Abdominal pain    • Acid reflux    • Acute renal failure (HCC)     multiple episodes   • Anemia    • Anxiety    • Anxiety 3/31/2021   • Arthritis    • Asthma    • Back pain    • Chronic pain    • DDD (degenerative disc disease), lumbar    • Depression    • Disease of thyroid gland     had surgery and now hypo   • DVT (deep venous thrombosis) ([facility] ) 2009    s/p ankle fracture   • Headache    • History of transfusion    • Hypercalcemia    • Hyperlipidemia    • Hyperthyroidism    • Hypocalcemia     post op 2016   • Kidney stone    • Lightheadedness    • Migraine    • Obesity    • Palpitations    • Psychiatric disorder     anxiety depression   • PTSD (post-traumatic stress disorder) 10/28/2022   • Seizures ([facility] )     petit mal x1  4 years ago- all tests were neg     • SOB (shortness of breath) • Spondylolisthesis of lumbar region    • Treatment     spinal pain injections  last was 2016   • Wears glasses        Past Surgical History:   Procedure Laterality Date   • BACK SURGERY      L4-5, S1-fusion-plate/screws   • BREAST BIOPSY Left 2022    Stereo SLW - 12:00   •  SECTION      x5   • CYSTOCELE REPAIR  2017   • CYSTOSCOPY     • DISCOGRAM     • HYSTERECTOMY     • MAMMO STEREOTACTIC BREAST BIOPSY LEFT (ALL INC) Left 2022   • PARATHYROIDECTOMY     • MA ANTERIOR COLPORRAPHY RPR CYSTOCELE W/CYSTO N/A 2017    Procedure: CYSTOCELE REPAIR;  Surgeon: Annabel Otoole MD;  Location: [address] [place] [address];  Service: Gynecology   • MA ARTHRODESIS POSTERIOR INTERBODY [address] LUMBAR N/A 2016    Procedure: L4-S1 LUMBAR LAMINECTOMY/DECOMPRESSION;  INSTRUMENTED POSTEROLATERAL FUSION/ INTERBODY L5-S1; ALLOGRAFT AND AUTOGRAFT (IMPULSE) ;   Surgeon: Kilo Acosta MD;  [facility]: [facility];  Service: Orthopedics   • MA CYSTO BLADDER W/URETERAL CATHETERIZATION Bilateral 2018    Procedure: INSERTION URETERAL CATHETERS PREOP;  Surgeon: Evelin Canada MD;  Location: [address] [place] [address];  Service: Urology   • MA SLING OPERATION STRESS INCONTINENCE N/A 2017    Procedure: MID URETHRAL SLING;  Surgeon: Lorne Sheehan MD;  Location: [address] [place] [address];  Service: Urology   • MA SUPRACERVICAL ABDL HYSTER W/WO RMVL TUBE OVARY N/A 2018    Procedure: SUPRACERVICAL HYSTERECTOMY;  Surgeon: Annabel Otoole MD;  Location: [address] [place] [address];  Service: Gynecology   • THYROIDECTOMY     • TONSILECTOMY AND ADNOIDECTOMY     • TONSILLECTOMY     • TUBAL LIGATION         Family History   Problem Relation Age of Onset   • Diabetes Mother    • Hypertension Mother    • Hyperlipidemia Father    • Arrhythmia Father    • Lung cancer Father    • Diabetes Father    • Colon cancer Maternal Grandfather    • Stomach cancer Paternal Grandmother    • Heart disease Paternal Aunt      I have reviewed and agree with the history as documented  E-Cigarette/Vaping   • E-Cigarette Use Never User      E-Cigarette/Vaping Substances   • Nicotine No    • THC No    • CBD No    • Flavoring No    • Other No    • Unknown No      Social History     Tobacco Use   • Smoking status: Every Day     Packs/day: 0 25     Years: 25 00     Pack years: 6 25     Types: Cigarettes   • Smokeless tobacco: Never   Vaping Use   • Vaping Use: Never used   Substance Use Topics   • Alcohol use: Not Currently   • Drug use: No       Review of Systems   Constitutional: Negative for chills and fever  Respiratory: Negative for cough, chest tightness and shortness of breath  Gastrointestinal: Negative for abdominal pain, diarrhea, nausea and vomiting  Genitourinary: Negative for dysuria, frequency, hematuria and urgency  Musculoskeletal: Positive for gait problem and joint swelling  Negative for back pain, neck pain and neck stiffness  Skin: Negative for color change, pallor, rash and wound  All other systems reviewed and are negative  Physical Exam  Physical Exam  Vitals and nursing note reviewed  Constitutional:       General: She is not in acute distress  Appearance: She is well-developed  She is not diaphoretic  HENT:      Head: Normocephalic and atraumatic  Eyes:      Conjunctiva/sclera: Conjunctivae normal       Pupils: Pupils are equal, round, and reactive to light  Cardiovascular:      Rate and Rhythm: Normal rate and regular rhythm  Heart sounds: Normal heart sounds  No murmur heard  Pulmonary:      Effort: Pulmonary effort is normal  No respiratory distress  Breath sounds: Normal breath sounds  Abdominal:      General: Bowel sounds are normal  There is no distension  Palpations: Abdomen is soft  Tenderness: There is no abdominal tenderness  Musculoskeletal:         General: Swelling, tenderness and signs of injury present  No deformity  Right wrist: Swelling and tenderness present   No snuff box tenderness or crepitus  Decreased range of motion  Left wrist: Normal       Right hand: Tenderness and bony tenderness present  No swelling or deformity  Normal range of motion  Hands:       Cervical back: Normal range of motion and neck supple  Right lower leg: No edema  Left lower leg: No edema  Skin:     General: Skin is warm and dry  Coloration: Skin is not pale  Findings: No rash  Neurological:      Mental Status: She is alert  Cranial Nerves: No cranial nerve deficit  Psychiatric:         Behavior: Behavior normal          Vital Signs  ED Triage Vitals   Temperature Pulse Respirations Blood Pressure SpO2   01/11/23 0155 01/11/23 0151 01/11/23 0151 01/11/23 0151 01/11/23 0151   98 3 °F (36 8 °C) (!) 120 18 162/98 100 %      Temp Source Heart Rate Source Patient Position - Orthostatic VS BP Location FiO2 (%)   01/11/23 0155 01/11/23 0151 01/11/23 0151 01/11/23 0151 --   Oral Monitor Lying Right arm       Pain Score       01/11/23 0237       5           Vitals:    01/11/23 0151   BP: 162/98   Pulse: (!) 120   Patient Position - Orthostatic VS: Lying         Visual Acuity      ED Medications  Medications   ibuprofen (MOTRIN) tablet 600 mg (600 mg Oral Given 1/11/23 0237)       Diagnostic Studies  Results Reviewed     None                 XR wrist 3+ views RIGHT   ED Interpretation by Mehreen Tello DO (01/11 0221)   nad      XR hand 3+ views RIGHT   ED Interpretation by Mehreen Tello DO (01/11 0221)   nad      XR knee 4+ vw right injury   ED Interpretation by Mehreen Tello DO (01/11 0221)   nad                 Procedures  Orthopedic injury treatment    Date/Time: 1/11/2023 2:15 AM  Performed by: Mehreen Tello DO  Authorized by: Mehreen Tello DO     Patient Location:  ED  [place] Protocol:  Procedure performed by: (RN)  Consent: Verbal consent obtained  Written consent not obtained    Risks and benefits: risks, benefits and alternatives were discussed  Consent given by: patient  Time out: Immediately prior to procedure a "time out" was called to verify the correct patient, procedure, equipment, support staff and site/side marked as required  Timeout called at: 1/11/2023 2:15 AM   Patient understanding: patient states understanding of the procedure being performed  Patient consent: the patient's understanding of the procedure matches consent given  Required items: required blood products, implants, devices, and special equipment available  Patient identity confirmed: verbally with patient      Injury location:  Wrist  Injury type: Soft tissue  Neurovascular status: Neurovascularly intact    Distal perfusion: normal    Neurological function: normal    Range of motion: reduced    Local anesthesia used?: No    General anesthesia used?: No    Skeletal traction used?: No    Immobilization:  Splint  Splint type:  Volar short arm  Supplies used:  Aluminum splint  Neurovascular status: Neurovascularly intact    Distal perfusion: normal    Neurological function: normal    Range of motion: unchanged    Patient tolerance:  Patient tolerated the procedure well with no immediate complications  Orthopedic injury treatment    Date/Time: 1/11/2023 2:25 AM  Performed by: Augustine Garcia DO  Authorized by: Augustine Garcia DO     Patient Location:  ED  [place] Protocol:  Procedure performed by: (RN)  Consent: Verbal consent obtained  Written consent not obtained  Risks and benefits: risks, benefits and alternatives were discussed  Consent given by: patient  Time out: Immediately prior to procedure a "time out" was called to verify the correct patient, procedure, equipment, support staff and site/side marked as required    Timeout called at: 1/11/2023 2:25 AM   Patient understanding: patient states understanding of the procedure being performed  Patient consent: the patient's understanding of the procedure matches consent given  Site marked: the operative site was marked  Required items: required blood products, implants, devices, and special equipment available  Patient identity confirmed: verbally with patient      Injury location:  Knee  Location details:  Right knee  Injury type: Soft tissue  Neurovascular status: Neurovascularly intact    Distal perfusion: normal    Neurological function: normal    Range of motion: reduced    Local anesthesia used?: No    General anesthesia used?: No    Skeletal traction used?: No    Immobilization:  Ace wrap  Supplies used:  Elastic bandage  Neurovascular status: Neurovascularly intact    Distal perfusion: normal    Neurological function: normal    Range of motion: unchanged    Patient tolerance:  Patient tolerated the procedure well with no immediate complications             ED Course                                             Medical Decision Making  52-year-old female presents with complaint of right wrist and right knee pain after mechanical fall  X-rays reviewed and negative for acute pathology  Patient placed in a splint and ibuprofen administered for pain relief  Patient will be discharged to home to follow-up with primary care physician and orthopedics  She agrees with plan  Right knee sprain: acute illness or injury  Wrist sprain, right, initial encounter: acute illness or injury  Amount and/or Complexity of Data Reviewed  Radiology: ordered and independent interpretation performed  Risk  Prescription drug management            Disposition  Final diagnoses:   Wrist sprain, right, initial encounter   Right knee sprain     Time reflects when diagnosis was documented in both MDM as applicable and the Disposition within this note     Time User Action Codes Description Comment    1/11/2023  2:28 AM Jos Friday Add [K89 [id number]] Wrist sprain, right, initial encounter     1/11/2023  2:29 AM Olivia, [address] [place] [address] Right knee sprain       ED Disposition     ED Disposition   Discharge Condition   Stable    Date/Time   Wed Jan 11, 2023  2:28 AM    Comment   [facility] discharge to home/self care  Follow-up Information     Follow up With Specialties Details Why Contact Info Additional Information    Db Larkin MD Internal Medicine Schedule an appointment as soon as possible for a visit in 1 day for follow up [address]       [address] [facility] Orthopedic Surgery Schedule an appointment as soon as possible for a visit in 1 day for follow up [address] [facility],  [address], [place], [place], [address] [phone]          Discharge Medication List as of 1/11/2023  2:31 AM      CONTINUE these medications which have NOT CHANGED    Details   Alcohol Swabs 70 % PADS May substitute brand based on insurance coverage  Check glucose BID , Normal      ALPRAZolam ER (XANAX XR) 2 MG 24 hr tablet Take 1 tablet (2 mg total) by mouth 2 (two) times a day, Starting Fri 10/7/2022, Normal      baclofen 10 mg tablet Take 10 mg by mouth 3 (three) times a day as needed, Starting Fri 7/22/2022, Historical Med      Blood Glucose Monitoring Suppl (OneTouch Verio Reflect) w/Device KIT May substitute brand based on insurance coverage   Check glucose BID , Normal      calcitriol (ROCALTROL) 0 5 MCG capsule Take 1 capsule (0 5 mcg total) by mouth 2 (two) times a day with meals, Starting Tue 1/10/2023, Until Thu 2/9/2023, Normal      calcium carbonate (OS-EDER) 600 MG tablet Take 2 tablets (1,200 mg total) by mouth 3 (three) times a day with meals, Starting Tue 1/10/2023, Normal      desvenlafaxine succinate (PRISTIQ) 50 mg 24 hr tablet Take 1 tablet (50 mg total) by mouth daily, Starting Fri 10/28/2022, Normal      docusate sodium (COLACE) 100 mg capsule Take 1 capsule (100 mg total) by mouth 2 (two) times a day for 7 days, Starting Fri 12/30/2022, Until Fri 1/6/2023, Normal      ergocalciferol (VITAMIN D2) 50,000 units Take 1 capsule (50,000 Units total) by mouth once a week for 11 doses Do not start before January 12, 2023 , Starting Thu 1/12/2023, Until Fri 3/24/2023, Normal      fenofibrate 160 MG tablet Take 1 tablet (160 mg total) by mouth daily, Starting Thu 10/6/2022, Normal      ferrous sulfate 325 (65 Fe) mg tablet Take 325 mg by mouth 2 (two) times a day Twice Monday, wed, Friday and Saturday -Last dose 4/1/22, Historical Med      glucose blood (OneTouch Verio) test strip May substitute brand based on insurance coverage  Check glucose BID , Normal      levothyroxine 150 mcg tablet Take 1 tablet (150 mcg total) by mouth daily at bedtime, Starting Tue 1/10/2023, No Print      Lidocaine-Menthol 4-1 % PTCH if needed  , Historical Med      magnesium Oxide (MAG-OX) 400 mg TABS Take 1 tablet (400 mg total) by mouth 2 (two) times a day, Starting Thu 12/22/2022, Normal      metFORMIN (GLUCOPHAGE) 500 mg tablet Take 1 tablet (500 mg total) by mouth 2 (two) times a day with meals, Starting Tue 1/10/2023, Normal      OneTouch Delica Lancets 43I MISC May substitute brand based on insurance coverage  Check glucose BID , Normal      potassium chloride (K-DUR,KLOR-CON) 20 mEq tablet Take 20 mEq by mouth 2 (two) times a day, Historical Med      pramipexole (MIRAPEX) 0 5 mg tablet Take 0 5 mg by mouth daily at bedtime, Historical Med             No discharge procedures on file      PDMP Review       Value Time User    PDMP Reviewed  Yes 1/5/2023  1:32 AM Alva Garcia PA-C          ED Provider  Electronically Signed by           Dinora Jack DO  01/11/23 2028

## 2023-01-12 ENCOUNTER — SOCIAL WORK (OUTPATIENT)
Dept: BEHAVIORAL/MENTAL HEALTH CLINIC | Facility: CLINIC | Age: 48
End: 2023-01-12

## 2023-01-12 DIAGNOSIS — F41.1 GENERALIZED ANXIETY DISORDER WITH PANIC ATTACKS: ICD-10-CM

## 2023-01-12 DIAGNOSIS — F17.210 NICOTINE DEPENDENCE, CIGARETTES, UNCOMPLICATED: ICD-10-CM

## 2023-01-12 DIAGNOSIS — F41.0 GENERALIZED ANXIETY DISORDER WITH PANIC ATTACKS: ICD-10-CM

## 2023-01-12 DIAGNOSIS — F33.1 MODERATE EPISODE OF RECURRENT MAJOR DEPRESSIVE DISORDER (HCC): Primary | ICD-10-CM

## 2023-01-12 LAB
25(OH)D2 SERPL-MCNC: <1 NG/ML
25(OH)D3 SERPL-MCNC: 33 NG/ML
25(OH)D3+25(OH)D2 SERPL-MCNC: 33 NG/ML

## 2023-01-12 NOTE — PSYCH
This note was not shared with the patient due to this is a psychotherapy note    Psychotherapy Provided: Individual Psychotherapy 50 minutes     Length of time in session: 50 minutes, follow up in 1 week    Encounter Diagnosis     ICD-10-CM    1  Moderate episode of recurrent major depressive disorder (HCC)  F33 1       2  Generalized anxiety disorder with panic attacks  F41 1     F41 0       3  Nicotine dependence, cigarettes, uncomplicated  B86 346           Goals addressed in session: Goal 1     Pain:      none    0    Current suicide risk : Low     DATA: Met with Willi Nova for scheduled individual session  Reported and discussed frustration with her multiple medical problems, and worked through anxieties related to her sister and brother in law, who she understands triggers much of her excessive worry and rumination  Writer challenged client to consider who she is helping vs  The actual harm that she is causing to herself  Reviewed distress tolerance techniques such as TIPP and encouraged client not to confuse love with dependency  ASSESSMENT: Willi Nova presents with a anxious mood  Her affect is normal range and intensity, appropriate  Willi Nova exhibits good therapeutic rapport with this clinician  Willi Nova continues to exhibit willingness to work on treatment goals and objectives  No safety risks reported or observed  PLAN: Willi Nova will return in 1 week for the next scheduled session  Between sessions, Willi Nova will take her medication as prescribed and practice positive coping strategies as needed, and will report back during the next session re: successes and barriers  Behavioral Health Treatment Plan [facility]: Diagnosis and Treatment Plan explained to Deepak Montanez relates understanding diagnosis and is agreeable to Treatment Plan   Yes     Visit start and stop times:    01/12/23  Start Time: 1430  Stop Time: 1520  Total Visit Time: 50 minutes

## 2023-01-13 ENCOUNTER — OFFICE VISIT (OUTPATIENT)
Dept: INTERNAL MEDICINE CLINIC | Facility: CLINIC | Age: 48
End: 2023-01-13

## 2023-01-13 VITALS
HEIGHT: 62 IN | RESPIRATION RATE: 17 BRPM | TEMPERATURE: 97.8 F | BODY MASS INDEX: 39.56 KG/M2 | SYSTOLIC BLOOD PRESSURE: 130 MMHG | WEIGHT: 215 LBS | DIASTOLIC BLOOD PRESSURE: 70 MMHG | OXYGEN SATURATION: 97 % | HEART RATE: 85 BPM

## 2023-01-13 DIAGNOSIS — R73.9 HYPERGLYCEMIA: ICD-10-CM

## 2023-01-13 DIAGNOSIS — E05.90 HYPERTHYROIDISM: ICD-10-CM

## 2023-01-13 DIAGNOSIS — E11.9 TYPE 2 DIABETES MELLITUS WITHOUT COMPLICATION, WITHOUT LONG-TERM CURRENT USE OF INSULIN (HCC): ICD-10-CM

## 2023-01-13 DIAGNOSIS — E20.8 OTHER HYPOPARATHYROIDISM (HCC): ICD-10-CM

## 2023-01-13 DIAGNOSIS — E87.6 HYPOKALEMIA: ICD-10-CM

## 2023-01-13 DIAGNOSIS — E83.51 HYPOCALCEMIA: Primary | ICD-10-CM

## 2023-01-13 DIAGNOSIS — E89.2 POSTSURGICAL HYPOPARATHYROIDISM (HCC): ICD-10-CM

## 2023-01-13 DIAGNOSIS — G93.32 CHRONIC FATIGUE SYNDROME: ICD-10-CM

## 2023-01-13 DIAGNOSIS — G89.29 CHRONIC INTRACTABLE PAIN: ICD-10-CM

## 2023-01-13 DIAGNOSIS — M51.36 DDD (DEGENERATIVE DISC DISEASE), LUMBAR: Chronic | ICD-10-CM

## 2023-01-13 DIAGNOSIS — Z98.1 S/P LUMBAR FUSION: ICD-10-CM

## 2023-01-13 DIAGNOSIS — F41.9 CHRONIC ANXIETY: ICD-10-CM

## 2023-01-13 DIAGNOSIS — K76.0 FATTY LIVER DISEASE, NONALCOHOLIC: ICD-10-CM

## 2023-01-13 PROBLEM — E78.1 HYPERTRIGLYCERIDEMIA: Status: ACTIVE | Noted: 2017-08-03

## 2023-01-13 RX ORDER — PHENOL 1.4 %
AEROSOL, SPRAY (ML) MUCOUS MEMBRANE
Qty: 180 TABLET | Refills: 10 | Status: SHIPPED | OUTPATIENT
Start: 2023-01-13

## 2023-01-13 RX ORDER — LANOLIN ALCOHOL/MO/W.PET/CERES
400 CREAM (GRAM) TOPICAL 3 TIMES DAILY
Qty: 90 TABLET | Refills: 10 | Status: SHIPPED | OUTPATIENT
Start: 2023-01-13

## 2023-01-13 RX ORDER — POTASSIUM CHLORIDE 20 MEQ/1
20 TABLET, EXTENDED RELEASE ORAL 2 TIMES DAILY
Qty: 60 TABLET | Refills: 10 | Status: SHIPPED | OUTPATIENT
Start: 2023-01-13

## 2023-01-13 NOTE — PATIENT INSTRUCTIONS
Diabetes and Exercise   WHAT YOU NEED TO KNOW:   Physical activity, such as exercise, can help keep your blood sugar level steady or improve insulin resistance  Activity can help decrease your risk for heart disease, and help you lose weight  Exercise can also help lower your A1c  Your diabetes care team will help you create an exercise plan  The plan will be based on the type of diabetes you have and your starting fitness level  DISCHARGE INSTRUCTIONS:   Call your local emergency number (911 in the [facility] [facility]) if:   You have chest pain or shortness of breath  Return to the emergency department if:   You have a low blood sugar level and it does not improve with treatment  Symptoms are trouble thinking, a pounding heartbeat, and sweating  Your blood sugar level is above 240 mg/dL and does not come down within 15 minutes of treatment  You have blurred or double vision  Your breath has a fruity, sweet smell, or your breathing is shallow  Call your doctor or diabetes care team if:   You have ketones in your blood or urine  You have a fever  Your blood sugar levels are higher than your target goals  You often have low blood sugar levels  Your skin is red, dry, warm, or swollen  You have a wound that does not heal     You have trouble coping with diabetes, or you feel anxious or depressed  You have questions or concerns about your condition or care  Tips to help you create and meet your exercise goals:   Set a goal for 150 minutes (2 5 hours) of moderate to vigorous aerobic activity each week  Aerobic activity helps your heart stay strong  Aerobic activity includes walking, bicycling, dancing, swimming, and raking leaves  Spread aerobic activity over 3 to 5 days  Do not take more than 2 days off in a row  It is best to do at least 10 minutes at a time and 30 minutes each day  You can work up to these goals  Remember that any activity is better than no activity   Over time, you can make exercise more intense or last longer  You can also add more days of exercise as your fitness level improves  Your diabetes care team can help you make a step-by-step plan to achieve your goals  Set a strength training goal of 2 to 3 times a week  Take at least 1 day off in between strength training sessions  Strength training helps you keep the muscles you have and build new muscles  Strength training includes lifting weights, climbing stairs, yoga, and ludwig chi          Older adults should include balance training 2 to 3 times each week  These include walking backwards, standing on one foot, and walking heel to toe in a straight line  Other healthy exercise tips:   Stretch before and after you exercise to prevent injury  Drink water or liquids that do not contain sugar before, during, and after exercise  Ask your dietitian or healthcare provider which liquids you should drink when you exercise  Do not sit for longer than 30 minutes at a time during your day  If you cannot walk around, at least stand up  This will help you stay active and keep your blood circulating  Exercise and blood sugar levels:  Check your blood sugar level before and after exercise, if you use insulin  Healthcare providers may tell you to change the amount of insulin you take or food you eat  If your blood sugar level is high, check your blood or urine for ketones before you exercise  Do not exercise if your blood sugar level is high and you have ketones  If your blood sugar level is less than 100 mg/dL, have a carbohydrate snack before you exercise  Examples are 4 to 6 crackers, ½ banana, 8 ounces (1 cup) of milk, or 4 ounces (½ cup) of juice  Follow up with your doctor or diabetes care team as directed:  Write down your questions so you remember to ask them during your visits    © Copyright [employer] 2022 Information is for End User's use only and may not be sold, redistributed or otherwise used for commercial purposes  All illustrations and images included in CareNotes® are the copyrighted property of A D A M , Inc  or Marely Cooper  The above information is an  only  It is not intended as medical advice for individual conditions or treatments  Talk to your doctor, nurse or pharmacist before following any medical regimen to see if it is safe and effective for you

## 2023-01-13 NOTE — LETTER
January 17, 2023     Patient: Daniel Hernandez  YOB: 1975  Date of Visit: 1/13/2023      To Whom it May Concern:    Daniel Hernandez is under my professional care  Mario Alberto Olivier was seen in my office on 1/13/2023  Mario Alberto Olivier is under my care for more than 15 years and she has multiple medical problems and complex medical diagnosis as follows for which patient needs help at home more than he is receiving at present time for the following diagnosis  Recurrent hypocalcemia  Hyper parathyroidism  Intractable lower back pain  Status post lumbar fusion  Type 2 diabetes  Chronic intractable pain  Anxiety depression with panic attacks  Posttraumatic stress disorder    If you have any questions or concerns, please don't hesitate to call           Sincerely,          Sarath Nava MD        CC: No Recipients

## 2023-01-14 PROBLEM — E11.9 TYPE 2 DIABETES MELLITUS WITHOUT COMPLICATION, WITHOUT LONG-TERM CURRENT USE OF INSULIN (HCC): Status: ACTIVE | Noted: 2023-01-14

## 2023-01-14 NOTE — ASSESSMENT & PLAN NOTE
• Patient hospitalized with  tingling numbness profound hypocalcemia treated and was discharged calcium was normal on discharge the plan follow-up work-up findings as followson  discharge to endocrinologist follow-up per consult note reviewed treatment plan reviewed  • urine calcium-WNL  • Ionized calcium 0 99 on admission  • Given 2g calcium  • Trending labs in AM  • Continue calcitriol, calcium carbonate  • DEXA scan on discharge  • Endocrinology recs:            -Goal for calcium is 8 2-8 5 range, discharge with no symptoms of tingling and numbness           -Please monitor calcium every 12 hours, in AM and PM            -Recommend hospitalization, till calcium is stabilized x2           -She has received only 2 doses of Rocaltrol, we will have to monitor her calcium twice a     day, and adjust Rocaltrol   accordingly            -For now continue Rocaltrol 0 5 MCG twice a day (should not miss doses)           -Usually takes 48 hours to see the effect of Rocaltrol adjustment            -Continue calcium carbonate 1000 mg 3 times daily           -Thyroxine was increased to 175 mcg daily yesterday, repeat TSH and free T4 in 6 weeks on outpatient

## 2023-01-14 NOTE — PROGRESS NOTES
Dr Todd Shelley Office Visit Note  23     [address] [facility] 56 y o  female MRN: [id number]  : 1975    Assessment:     1  Type 2 diabetes mellitus without complication, without long-term current use of insulin ([facility] [address] )  -     Ambulatory Referral to Diabetic Education; Future    2  Hyperglycemia  -     Ambulatory Referral to Diabetic Education; Future  -     metFORMIN (GLUCOPHAGE) 500 mg tablet; Take half a pill twice a day    3  Postsurgical hypoparathyroidism ([address] )    4  Hypocalcemia  -     magnesium Oxide (MAG-OX) 400 mg TABS; Take 1 tablet (400 mg total) by mouth 3 (three) times a day  -     calcium carbonate (OS-EDER) 600 MG tablet; Take 2 pills 3 times a day    5  Other hypoparathyroidism ([address] )  -     calcium carbonate (OS-EDER) 600 MG tablet; Take 2 pills 3 times a day    6  Hypokalemia  -     potassium chloride (K-DUR,KLOR-CON) 20 mEq tablet; Take 1 tablet (20 mEq total) by mouth 2 (two) times a day          Discussion Summary and Plan: Today's care plan and medications were reviewed with patient in detail and all their questions answered to their satisfaction  Chief Complaint   Patient presents with   • Transition of Care Management      Subjective:  TCM Call     Date and time call was made  2023  2:08 PM    Patient was hospitialized at  [address] [place] [address]    Date of Admission  22    Date of discharge  01/10/22    Diagnosis  abnormal labs    Disposition  Home    Were the patients medications reviewed and updated  Yes    Current Symptoms  None      TCM Call     Post hospital issues  None    Should patient be enrolled in anticoag monitoring? No    Scheduled for follow up? Yes    Patients specialists  Other (comment);  Endocrinologist    Other specialists names  Truong Snyder    Did you obtain your prescribed medications  Yes    Do you need help managing your prescriptions or medications  No    Is transportation to your appointment needed  No    I have advised the patient to call PCP with any new or worsening symptoms    [address]  Family members; Spouse or Significiant other    Support System  Family; Partner    The type of support provided  Emotional; Physical    Do you have social support  Yes, as much as I need    Are you recieving any outpatient services  No    Are you recieving home care services  No    Are you using any community resources  No    Current waiver services  No    Have you fallen in the last 12 months  No    Interperter language line needed  No    Counseling  Patient    Counseling topics  Importance of RX compliance    Patient Hospital course all the psychiatry consult endocrinology consult history physical follow-up notes labs reviewed the treatment reviewed as follows Hospital course reviewed as follows   this week  Notes the only change is she received steroid injection last Thursday  Follows with endocrine, has not been able to get in until the end of the month  Denied any fevers, chills, chest pain, SOB, cough, abdominal pain  Patient was again noted to have hypercalcemia and was admitted to the hospital   Patient was treated with IV calcium gluconate  Patient was seen by endocrinology, calcitriol was increased to 0 5 mg twice daily and with increased calcium supplementation  Patient was also noted to have vitamin D deficiency and was started on 50,000 units of ergocalciferol weekly  Magnesium was monitored and repleted  Ionized calcium was also monitored  Patient's calcium level gradually improved and normalized with improvement in symptoms with above supplementation  Patient was also noted to have elevated TSH which was deemed secondary to inappropriate Synthroid intake is patient was taking it with other medication notably with electrolyte supplementation    Patient's electrolytes stabilize with p o  supplementation, patient was educated extensively regarding need for monitoring and current treatment plan by endocrinology  Patient was also noted to have hyperglycemia with hemoglobin A1c over 6 2  Diet and lifestyle modification was extensively discussed  Patient was recommended by glucose monitoring and glucometer was provided  Patient was also provided nutritional education  Patient was advised close follow-up with endocrinology which she is already scheduled after discharge for hypocalcemia and also for hypothyroidism and hyperglycemia  Diet changes and lifestyle modification were advised for hyperglycemia along with glucose monitoring but patient reported difficulty in changing diet and preferred pharmacological treatment  Patient was started on metformin after discussion with endocrinology  Patient was advised close monitoring of electrolytes and repeat blood test in 1 week  Patient was advised to closely follow-up with PCP and endocrinology follow-up as outpatient  Patient was also seen by psychiatry during hospitalization due to associated anxiety and depression and medications were continued as advised      The following portions of the patient's history were reviewed and updated as appropriate: allergies, current medications, past family history, past medical history, past social history, past surgical history and problem list     Review of Systems   Constitutional: Positive for fatigue  Negative for activity change, appetite change, chills, diaphoresis, fever and unexpected weight change  HENT: Negative for congestion, dental problem, drooling, ear discharge, ear pain, facial swelling, hearing loss, mouth sores, nosebleeds, postnasal drip, rhinorrhea, sinus pressure, sneezing, sore throat, tinnitus, trouble swallowing and voice change  Eyes: Negative for photophobia, pain, discharge, redness, itching and visual disturbance  Respiratory: Negative for apnea, cough, choking, chest tightness, shortness of breath, wheezing and stridor      Cardiovascular: Negative for chest pain, palpitations and leg swelling  Gastrointestinal: Negative for abdominal distention, abdominal pain, anal bleeding, blood in stool, constipation, diarrhea, nausea, rectal pain and vomiting  Endocrine: Negative for cold intolerance, heat intolerance, polydipsia, polyphagia and polyuria  Genitourinary: Negative for decreased urine volume, difficulty urinating, dysuria, enuresis, flank pain, frequency, genital sores, hematuria and urgency  Musculoskeletal: Positive for arthralgias, back pain, gait problem, joint swelling, myalgias, neck pain and neck stiffness  Skin: Negative for color change, pallor, rash and wound  Allergic/Immunologic: Negative  Negative for environmental allergies, food allergies and immunocompromised state  Neurological: Positive for dizziness, tremors, weakness, numbness and headaches  Negative for seizures, syncope, facial asymmetry, speech difficulty and light-headedness  Psychiatric/Behavioral: Positive for decreased concentration  Negative for agitation, behavioral problems, confusion, dysphoric mood, hallucinations, self-injury, sleep disturbance and suicidal ideas  The patient is nervous/anxious  The patient is not hyperactive            Historical Information   Patient Active Problem List   Diagnosis   • DDD (degenerative disc disease), lumbar   • Postsurgical hypoparathyroidism ([facility] )   • Postoperative hypothyroidism   • Elevated ALT measurement   • Vitamin D deficiency   • Hypocalcemia   • Anxiety   • LELAND (acute kidney injury) ([facility] )   • Panic attacks   • Chronic intractable pain   • Splenomegaly   • Anemia   • Chronic anxiety   • Chronic fatigue syndrome   • Hyperthyroidism   • Hypoparathyroidism ([facility] )   • Nicotine dependence   • Fatty liver disease, nonalcoholic   • PTSD (post-traumatic stress disorder)   • Hyperglycemia   • History of recent steroid use   • Hypertriglyceridemia   • S/P lumbar fusion     Past Medical History:   Diagnosis Date   • Abdominal pain    • Acid reflux    • Acute renal failure (HCC)     multiple episodes   • Anemia    • Anxiety    • Anxiety 3/31/2021   • Arthritis    • Asthma    • Back pain    • Chronic pain    • DDD (degenerative disc disease), lumbar    • Depression    • Disease of thyroid gland     had surgery and now hypo   • DVT (deep venous thrombosis) ([facility] [address] )     s/p ankle fracture   • Headache    • History of transfusion    • Hypercalcemia    • Hyperlipidemia    • Hyperthyroidism    • Hypocalcemia     post op 2016   • Kidney stone    • Lightheadedness    • Migraine    • Obesity    • Palpitations    • Psychiatric disorder     anxiety depression   • PTSD (post-traumatic stress disorder) 10/28/2022   • Seizures ([facility] [address] )     petit mal x1  4 years ago- all tests were neg  • SOB (shortness of breath)    • Spondylolisthesis of lumbar region    • Treatment     spinal pain injections  last was 2016   • Wears glasses      Past Surgical History:   Procedure Laterality Date   • BACK SURGERY      L4-5, S1-fusion-plate/screws   • BREAST BIOPSY Left 2022    Stereo SLW - 12:00   •  SECTION      x5   • CYSTOCELE REPAIR  2017   • CYSTOSCOPY     • DISCOGRAM     • HYSTERECTOMY     • MAMMO STEREOTACTIC BREAST BIOPSY LEFT (ALL INC) Left 2022   • PARATHYROIDECTOMY     • ME ANTERIOR COLPORRAPHY RPR CYSTOCELE W/CYSTO N/A 2017    Procedure: CYSTOCELE REPAIR;  Surgeon: Glen Syed MD;  Location: [address] [place] [address];  Service: Gynecology   • ME ARTHRODESIS POSTERIOR INTERBODY [address] LUMBAR N/A 2016    Procedure: L4-S1 LUMBAR LAMINECTOMY/DECOMPRESSION;  INSTRUMENTED POSTEROLATERAL FUSION/ INTERBODY L5-S1; ALLOGRAFT AND AUTOGRAFT (IMPULSE) ;   Surgeon: Duane Olivera MD;  Location:  MAIN OR;  Service: Orthopedics   • ME CYSTO BLADDER W/URETERAL CATHETERIZATION Bilateral 2018    Procedure: INSERTION URETERAL CATHETERS PREOP;  Surgeon: Kye Obrien MD;  Location: [facility];  Service: Urology   • ME SLING OPERATION STRESS INCONTINENCE N/A 05/04/2017    Procedure: MID URETHRAL SLING;  Surgeon: Andi Little MD;  Location: [facility];  Service: Urology   • PA SUPRACERVICAL ABDL HYSTER W/WO RMVL TUBE OVARY N/A 12/07/2018    Procedure: SUPRACERVICAL HYSTERECTOMY;  Surgeon: Michelle Sesay MD;  Location: [facility];  Service: Gynecology   • THYROIDECTOMY     • TONSILECTOMY AND ADNOIDECTOMY     • TONSILLECTOMY     • TUBAL LIGATION       Social History     Substance and Sexual Activity   Alcohol Use Not Currently     Social History     Substance and Sexual Activity   Drug Use No     Social History     Tobacco Use   Smoking Status Every Day   • Packs/day: 0 25   • Years: 25 00   • Pack years: 6 25   • Types: Cigarettes   Smokeless Tobacco Never     Family History   Problem Relation Age of Onset   • Diabetes Mother    • Hypertension Mother    • Hyperlipidemia Father    • Arrhythmia Father    • Lung cancer Father    • Diabetes Father    • Colon cancer Maternal Grandfather    • Stomach cancer Paternal Grandmother    • Heart disease Paternal Aunt      Health Maintenance Due   Topic   • COVID-19 Vaccine (1)   • Pneumococcal Vaccine: Pediatrics (0 to 5 Years) and At-Risk Patients (6 to 59 Years) (1 - PCV)   • HIV Screening    • BMI: Followup Plan    • Annual Physical    • DTaP,Tdap,and Td Vaccines (1 - Tdap)   • Cervical Cancer Screening    • Influenza Vaccine (1)      Meds/Allergies       Current Outpatient Medications:   •  Alcohol Swabs 70 % PADS, May substitute brand based on insurance coverage  Check glucose BID , Disp: 100 each, Rfl: 0  •  ALPRAZolam ER (XANAX XR) 2 MG 24 hr tablet, Take 1 tablet (2 mg total) by mouth 2 (two) times a day, Disp: 60 tablet, Rfl: 3  •  baclofen 10 mg tablet, Take 10 mg by mouth 3 (three) times a day as needed, Disp: , Rfl:   •  Blood Glucose Monitoring Suppl (OneTouch Verio Reflect) w/Device KIT, May substitute brand based on insurance coverage   Check glucose BID , Disp: 1 kit, Rfl: 0  •  calcitriol (ROCALTROL) 0 5 MCG capsule, Take 1 capsule (0 5 mcg total) by mouth 2 (two) times a day with meals, Disp: 60 capsule, Rfl: 0  •  calcium carbonate (OS-EDER) 600 MG tablet, Take 2 pills 3 times a day, Disp: 180 tablet, Rfl: 10  •  desvenlafaxine succinate (PRISTIQ) 50 mg 24 hr tablet, Take 1 tablet (50 mg total) by mouth daily, Disp: 30 tablet, Rfl: 3  •  ergocalciferol (VITAMIN D2) 50,000 units, Take 1 capsule (50,000 Units total) by mouth once a week for 11 doses Do not start before January 12, 2023 , Disp: 4 capsule, Rfl: 0  •  fenofibrate 160 MG tablet, Take 1 tablet (160 mg total) by mouth daily, Disp: 90 tablet, Rfl: 3  •  ferrous sulfate 325 (65 Fe) mg tablet, Take 325 mg by mouth 2 (two) times a day Twice Monday, wed, Friday and Saturday -Last dose 4/1/22, Disp: , Rfl:   •  glucose blood (OneTouch Verio) test strip, May substitute brand based on insurance coverage  Check glucose BID , Disp: 100 each, Rfl: 0  •  levothyroxine 150 mcg tablet, Take 1 tablet (150 mcg total) by mouth daily at bedtime, Disp: 90 tablet, Rfl: 0  •  Lidocaine-Menthol 4-1 % PTCH, if needed  , Disp: , Rfl:   •  magnesium Oxide (MAG-OX) 400 mg TABS, Take 1 tablet (400 mg total) by mouth 3 (three) times a day, Disp: 90 tablet, Rfl: 10  •  metFORMIN (GLUCOPHAGE) 500 mg tablet, Take half a pill twice a day, Disp: 60 tablet, Rfl: 0  •  ondansetron (ZOFRAN) 4 mg tablet, Take 1 tablet (4 mg total) by mouth every 8 (eight) hours as needed for nausea, Disp: 20 tablet, Rfl: 1  •  OneTouch Delica Lancets 24A MISC, May substitute brand based on insurance coverage   Check glucose BID , Disp: 100 each, Rfl: 0  •  potassium chloride (K-DUR,KLOR-CON) 20 mEq tablet, Take 1 tablet (20 mEq total) by mouth 2 (two) times a day, Disp: 60 tablet, Rfl: 10  •  pramipexole (MIRAPEX) 0 5 mg tablet, Take 0 5 mg by mouth daily at bedtime, Disp: , Rfl:   •  docusate sodium (COLACE) 100 mg capsule, Take 1 capsule (100 mg total) by mouth 2 (two) times a day for 7 days, Disp: 14 capsule, Rfl: 0      Objective:    Vitals:   /70   Pulse 85   Temp 97 8 °F (36 6 °C)   Resp 17   Ht 5' 2" (1 575 m)   Wt 97 5 kg (215 lb)   LMP 12/06/2018 Comment: partial hysterectomy   SpO2 97%   BMI 39 32 kg/m²   Body mass index is 39 32 kg/m²  Vitals:    01/13/23 0928   Weight: 97 5 kg (215 lb)       Physical Exam  Vitals and nursing note reviewed  Constitutional:       General: She is not in acute distress  Appearance: She is well-developed  She is obese  She is not ill-appearing, toxic-appearing or diaphoretic  HENT:      Head: Normocephalic and atraumatic  Right Ear: External ear normal       Left Ear: External ear normal       Nose: Nose normal       Mouth/Throat:      Pharynx: No oropharyngeal exudate  Eyes:      General: Lids are normal  Lids are everted, no foreign bodies appreciated  No scleral icterus  Right eye: No discharge  Left eye: No discharge  Conjunctiva/sclera: Conjunctivae normal       Pupils: Pupils are equal, round, and reactive to light  Neck:      Thyroid: No thyromegaly  Vascular: Normal carotid pulses  No carotid bruit, hepatojugular reflux or JVD  Trachea: No tracheal tenderness or tracheal deviation  Cardiovascular:      Rate and Rhythm: Normal rate and regular rhythm  Pulses: Normal pulses  Heart sounds: Normal heart sounds  No murmur heard  No friction rub  No gallop  Pulmonary:      Effort: Pulmonary effort is normal  No respiratory distress  Breath sounds: No stridor  No wheezing or rales  Chest:      Chest wall: No tenderness  Abdominal:      General: Bowel sounds are normal  There is no distension  Palpations: Abdomen is soft  There is no mass  Tenderness: There is no abdominal tenderness  There is no guarding or rebound  Musculoskeletal:         General: Swelling and deformity present  No tenderness  Normal range of motion  Cervical back: Normal range of motion and neck supple   No edema, erythema or rigidity  No spinous process tenderness or muscular tenderness  Normal range of motion  Right lower leg: Edema present  Left lower leg: Edema present  Lymphadenopathy:      Head:      Right side of head: No submental, submandibular, tonsillar, preauricular or posterior auricular adenopathy  Left side of head: No submental, submandibular, tonsillar, preauricular, posterior auricular or occipital adenopathy  Cervical: No cervical adenopathy  Right cervical: No superficial, deep or posterior cervical adenopathy  Left cervical: No superficial, deep or posterior cervical adenopathy  Upper Body:      Right upper body: No pectoral adenopathy  Left upper body: No pectoral adenopathy  Skin:     General: Skin is warm and dry  Coloration: Skin is not pale  Findings: No erythema or rash  Neurological:      Mental Status: She is alert and oriented to person, place, and time  Cranial Nerves: No cranial nerve deficit  Sensory: No sensory deficit  Motor: Weakness present  No tremor, abnormal muscle tone or seizure activity  Coordination: Coordination normal       Gait: Gait abnormal       Deep Tendon Reflexes: Reflexes are normal and symmetric  Reflexes normal    Psychiatric:         Behavior: Behavior normal          Thought Content: Thought content normal          Judgment: Judgment normal          Lab Review   No results displayed because visit has over 200 results        Admission on 01/04/2023, Discharged on 01/04/2023   Component Date Value Ref Range Status   • Ventricular Rate 01/04/2023 93  BPM Final   • Atrial Rate 01/04/2023 93  BPM Final   • OK Interval 01/04/2023 156  ms Final   • QRSD Interval 01/04/2023 74  ms Final   • QT Interval 01/04/2023 368  ms Final   • QTC Interval 01/04/2023 457  ms Final   • P Axis 01/04/2023 29  degrees Final   • QRS Axis 01/04/2023 30  degrees Final   • T Wave [place] 01/04/2023 49  degrees Final   • WBC 01/04/2023 8 36  4 31 - 10 16 Thousand/uL Final   • RBC 01/04/2023 4 67  3 81 - 5 12 Million/uL Final   • Hemoglobin 01/04/2023 13 2  11 5 - 15 4 g/dL Final   • Hematocrit 01/04/2023 40 4  34 8 - 46 1 % Final   • MCV 01/04/2023 87  82 - 98 fL Final   • MCH 01/04/2023 28 3  26 8 - 34 3 pg Final   • MCHC 01/04/2023 32 7  31 4 - 37 4 g/dL Final   • RDW 01/04/2023 16 8 (H)  11 6 - 15 1 % Final   • MPV 01/04/2023 10 0  8 9 - 12 7 fL Final   • Platelets 99/95/8778 193  149 - 390 Thousands/uL Final   • nRBC 01/04/2023 0  /100 WBCs Final   • Neutrophils Relative 01/04/2023 66  43 - 75 % Final   • Immat GRANS % 01/04/2023 1  0 - 2 % Final   • Lymphocytes Relative 01/04/2023 25  14 - 44 % Final   • Monocytes Relative 01/04/2023 6  4 - 12 % Final   • Eosinophils Relative 01/04/2023 1  0 - 6 % Final   • Basophils Relative 01/04/2023 1  0 - 1 % Final   • Neutrophils Absolute 01/04/2023 5 53  1 85 - 7 62 Thousands/µL Final   • Immature Grans Absolute 01/04/2023 0 07  0 00 - 0 20 Thousand/uL Final   • Lymphocytes Absolute 01/04/2023 2 12  0 60 - 4 47 Thousands/µL Final   • Monocytes Absolute 01/04/2023 0 48  0 17 - 1 22 Thousand/µL Final   • Eosinophils Absolute 01/04/2023 0 11  0 00 - 0 61 Thousand/µL Final   • Basophils Absolute 01/04/2023 0 05  0 00 - 0 10 Thousands/µL Final   • Sodium 01/04/2023 135  135 - 147 mmol/L Final   • Potassium 01/04/2023 4 0  3 5 - 5 3 mmol/L Final   • Chloride 01/04/2023 98  96 - 108 mmol/L Final   • CO2 01/04/2023 21  21 - 32 mmol/L Final   • ANION GAP 01/04/2023 16 (H)  4 - 13 mmol/L Final   • BUN 01/04/2023 24  5 - 25 mg/dL Final   • Creatinine 01/04/2023 0 98  0 60 - 1 30 mg/dL Final    Standardized to IDMS reference method   • Glucose 01/04/2023 188 (H)  65 - 140 mg/dL Final    If the patient is fasting, the ADA then defines impaired fasting glucose as > 100 mg/dL and diabetes as > or equal to 123 mg/dL    Specimen collection should occur prior to Sulfasalazine administration due to the potential for falsely depressed results  Specimen collection should occur prior to Sulfapyridine administration due to the potential for falsely elevated results  • Calcium 01/04/2023 7 4 (L)  8 3 - 10 1 mg/dL Final   • eGFR 01/04/2023 69  ml/min/1 73sq m Final   • Total Bilirubin 01/04/2023 0 23  0 20 - 1 00 mg/dL Final    Use of this assay is not recommended for patients undergoing treatment with eltrombopag due to the potential for falsely elevated results  • Bilirubin, Direct 01/04/2023 0 05  0 00 - 0 20 mg/dL Final    Slightly Hemolyzed; Results May be Affected Verified by repeat analysis   • Alkaline Phosphatase 01/04/2023 138 (H)  46 - 116 U/L Final   • AST 01/04/2023 31  5 - 45 U/L Final    Specimen collection should occur prior to Sulfasalazine administration due to the potential for falsely depressed results  • ALT 01/04/2023 52  12 - 78 U/L Final    Specimen collection should occur prior to Sulfasalazine administration due to the potential for falsely depressed results  • Total Protein 01/04/2023 7 0  6 4 - 8 4 g/dL Final   • Albumin 01/04/2023 3 3 (L)  3 5 - 5 0 g/dL Final   • TSH 3RD GENERATON 01/04/2023 10 334 (H)  0 450 - 4 500 uIU/mL Final    The recommended reference ranges for TSH during pregnancy are as follows:   First trimester 0 1 to 2 5 uIU/mL   Second trimester  0 2 to 3 0 uIU/mL   Third trimester 0 3 to 3 0 uIU/m    Note: Normal ranges may not apply to patients who are transgender, non-binary, or whose legal sex, sex at birth, and gender identity differ  Adult TSH (3rd generation) reference range follows the recommended guidelines of the American Thyroid Association, January, 2020  • Phosphorus 01/04/2023 4 2  2 7 - 4 5 mg/dL Final   • Magnesium 01/04/2023 1 9  1 6 - 2 6 mg/dL Final   • 25-HYDROXY VIT D 01/04/2023 33  ng/mL Final    Reference Range:   All Ages: Target levels 30 - 100   • 25-Hydroxy D2 01/04/2023 <1 0  ng/mL Final    This test was developed and its performance characteristics  determined by [facility]  It has not been cleared or approved  by the Food and Drug Administration  • 25-HYDROXY VIT D3 01/04/2023 33  ng/mL Final    This test was developed and its performance characteristics  determined by [facility]  It has not been cleared or approved  by the Food and Drug Administration  • Calcium, Ionized 01/04/2023 0 95 (L)  1 12 - 1 32 mmol/L Final   • Calcium, Ionized 01/04/2023 1 29  1 12 - 1 32 mmol/L Final   • Sodium 01/04/2023 132 (L)  135 - 147 mmol/L Final   • Potassium 01/04/2023 3 9  3 5 - 5 3 mmol/L Final   • Chloride 01/04/2023 98  96 - 108 mmol/L Final   • CO2 01/04/2023 20 (L)  21 - 32 mmol/L Final   • ANION GAP 01/04/2023 14 (H)  4 - 13 mmol/L Final   • BUN 01/04/2023 23  5 - 25 mg/dL Final   • Creatinine 01/04/2023 0 91  0 60 - 1 30 mg/dL Final    Standardized to IDMS reference method   • Glucose 01/04/2023 151 (H)  65 - 140 mg/dL Final    If the patient is fasting, the ADA then defines impaired fasting glucose as > 100 mg/dL and diabetes as > or equal to 123 mg/dL  Specimen collection should occur prior to Sulfasalazine administration due to the potential for falsely depressed results  Specimen collection should occur prior to Sulfapyridine administration due to the potential for falsely elevated results  • Calcium 01/04/2023 9 3  8 3 - 10 1 mg/dL Final   • eGFR 01/04/2023 75  ml/min/1 73sq m Final   Admission on 01/03/2023, Discharged on 01/03/2023   Component Date Value Ref Range Status   • Calcium, Ionized 01/03/2023 0 97 (L)  1 12 - 1 32 mmol/L Final   Admission on 01/02/2023, Discharged on 01/02/2023   Component Date Value Ref Range Status   • Sodium 01/02/2023 134 (L)  135 - 147 mmol/L Final   • Potassium 01/02/2023 4 0  3 5 - 5 3 mmol/L Final    Slightly Hemolyzed;  Results May be Affected   • Chloride 01/02/2023 97  96 - 108 mmol/L Final   • CO2 01/02/2023 26  21 - 32 mmol/L Final   • ANION GAP 01/02/2023 11  4 - 13 mmol/L Final   • BUN 01/02/2023 23 5 - 25 mg/dL Final   • Creatinine 01/02/2023 1 08  0 60 - 1 30 mg/dL Final    Standardized to IDMS reference method   • Glucose 01/02/2023 211 (H)  65 - 140 mg/dL Final    If the patient is fasting, the ADA then defines impaired fasting glucose as > 100 mg/dL and diabetes as > or equal to 123 mg/dL  Specimen collection should occur prior to Sulfasalazine administration due to the potential for falsely depressed results  Specimen collection should occur prior to Sulfapyridine administration due to the potential for falsely elevated results  • Calcium 01/02/2023 7 5 (L)  8 3 - 10 1 mg/dL Final   • eGFR 01/02/2023 61  ml/min/1 73sq m Final   • Calcium, Ionized 01/02/2023 0 91 (L)  1 12 - 1 32 mmol/L Final   • hs TnI 0hr 01/02/2023 2  "Refer to ACS Flowchart"- see link ng/L Final    Comment:                                              Initial (time 0) result  If >=50 ng/L, Myocardial injury suggested ;  Type of myocardial injury and treatment strategy  to be determined  If 5-49 ng/L, a delta result at 2 hours and or 4 hours will be needed to further evaluate  If <4 ng/L, and chest pain has been >3 hours since onset, patient may qualify for discharge based on the HEART score in the ED  If <5 ng/L and <3hours since onset of chest pain, a delta result at 2 hours will be needed to further evaluate  HS Troponin 99th Percentile URL of a Health Population=12 ng/L with a 95% Confidence Interval of 8-18 ng/L  Second Troponin (time 2 hours)  If calculated delta >= 20 ng/L,  Myocardial injury suggested ; Type of myocardial injury and treatment strategy to be determined  If 5-49 ng/L and the calculated delta is 5-19 ng/L, consult medical service for evaluation  Continue evaluation for ischemia on ecg and other possible etiology and repeat hs troponin at 4 hours    If delta                            is <5 ng/L at 2 hours, consider discharge based on risk stratification via the HEART score (if in ED), or AB risk score in IP/Observation  HS Troponin 99th Percentile URL of a Health Population=12 ng/L with a 95% Confidence Interval of 8-18 ng/L  • Ventricular Rate 01/02/2023 83  BPM Final   • Atrial Rate 01/02/2023 83  BPM Final   • MD Interval 01/02/2023 158  ms Final   • QRSD Interval 01/02/2023 74  ms Final   • QT Interval 01/02/2023 410  ms Final   • QTC Interval 01/02/2023 481  ms Final   • P Axis 01/02/2023 46  degrees Final   • QRS Axis 01/02/2023 25  degrees Final   • T Wave Axis 01/02/2023 45  degrees Final   Admission on 01/01/2023, Discharged on 01/01/2023   Component Date Value Ref Range Status   • WBC 01/01/2023 8 20  4  31 - 10 16 Thousand/uL Final   • RBC 01/01/2023 4 44  3 81 - 5 12 Million/uL Final   • Hemoglobin 01/01/2023 13 1  11 5 - 15 4 g/dL Final   • Hematocrit 01/01/2023 38 0  34 8 - 46 1 % Final   • MCV 01/01/2023 86  82 - 98 fL Final   • MCH 01/01/2023 29 5  26 8 - 34 3 pg Final   • MCHC 01/01/2023 34 5  31 4 - 37 4 g/dL Final   • RDW 01/01/2023 16 4 (H)  11 6 - 15 1 % Final   • MPV 01/01/2023 9 9  8 9 - 12 7 fL Final   • Platelets 61/10/1526 222  149 - 390 Thousands/uL Final   • nRBC 01/01/2023 0  /100 WBCs Final   • Neutrophils Relative 01/01/2023 60  43 - 75 % Final   • Immat GRANS % 01/01/2023 1  0 - 2 % Final   • Lymphocytes Relative 01/01/2023 30  14 - 44 % Final   • Monocytes Relative 01/01/2023 7  4 - 12 % Final   • Eosinophils Relative 01/01/2023 1  0 - 6 % Final   • Basophils Relative 01/01/2023 1  0 - 1 % Final   • Neutrophils Absolute 01/01/2023 5 00  1 85 - 7 62 Thousands/µL Final   • Immature Grans Absolute 01/01/2023 0 08  0 00 - 0 20 Thousand/uL Final   • Lymphocytes Absolute 01/01/2023 2 42  0 60 - 4 47 Thousands/µL Final   • Monocytes Absolute 01/01/2023 0 55  0 17 - 1 22 Thousand/µL Final   • Eosinophils Absolute 01/01/2023 0 11  0 00 - 0 61 Thousand/µL Final   • Basophils Absolute 01/01/2023 0 04  0 00 - 0 10 Thousands/µL Final   • Sodium 01/01/2023 135  135 - 147 mmol/L Final • Potassium 01/01/2023 3 8  3 5 - 5 3 mmol/L Final   • Chloride 01/01/2023 99  96 - 108 mmol/L Final   • CO2 01/01/2023 21  21 - 32 mmol/L Final   • ANION GAP 01/01/2023 15 (H)  4 - 13 mmol/L Final   • BUN 01/01/2023 23  5 - 25 mg/dL Final   • Creatinine 01/01/2023 1 08  0 60 - 1 30 mg/dL Final    Standardized to IDMS reference method   • Glucose 01/01/2023 193 (H)  65 - 140 mg/dL Final    If the patient is fasting, the ADA then defines impaired fasting glucose as > 100 mg/dL and diabetes as > or equal to 123 mg/dL  Specimen collection should occur prior to Sulfasalazine administration due to the potential for falsely depressed results  Specimen collection should occur prior to Sulfapyridine administration due to the potential for falsely elevated results  • Calcium 01/01/2023 7 5 (L)  8 4 - 10 2 mg/dL Final   • AST 01/01/2023 15  13 - 39 U/L Final    Slightly Hemolyzed:Results may be affected  Specimen collection should occur prior to Sulfasalazine administration due to the potential for falsely depressed results  • ALT 01/01/2023 31  7 - 52 U/L Final    Specimen collection should occur prior to Sulfasalazine administration due to the potential for falsely depressed results  • Alkaline Phosphatase 01/01/2023 114 (H)  34 - 104 U/L Final   • Total Protein 01/01/2023 6 3 (L)  6 4 - 8 4 g/dL Final   • Albumin 01/01/2023 3 8  3 5 - 5 0 g/dL Final   • Total Bilirubin 01/01/2023 0 29  0 20 - 1 00 mg/dL Final   • eGFR 01/01/2023 61  ml/min/1 73sq m Final   • hs TnI 0hr 01/01/2023 7  "Refer to ACS Flowchart"- see link ng/L Final    Comment:                                              Initial (time 0) result  If >=50 ng/L, Myocardial injury suggested ;  Type of myocardial injury and treatment strategy  to be determined  If 5-49 ng/L, a delta result at 2 hours and or 4 hours will be needed to further evaluate    If <4 ng/L, and chest pain has been >3 hours since onset, patient may qualify for discharge based on the HEART score in the ED  If <5 ng/L and <3hours since onset of chest pain, a delta result at 2 hours will be needed to further evaluate  HS Troponin 99th Percentile URL of a Health Population=12 ng/L with a 95% Confidence Interval of 8-18 ng/L  Second Troponin (time 2 hours)  If calculated delta >= 20 ng/L,  Myocardial injury suggested ; Type of myocardial injury and treatment strategy to be determined  If 5-49 ng/L and the calculated delta is 5-19 ng/L, consult medical service for evaluation  Continue evaluation for ischemia on ecg and other possible etiology and repeat hs troponin at 4 hours  If delta                            is <5 ng/L at 2 hours, consider discharge based on risk stratification via the HEART score (if in ED), or AB risk score in IP/Observation  HS Troponin 99th Percentile URL of a Health Population=12 ng/L with a 95% Confidence Interval of 8-18 ng/L  • Calcium, Ionized 01/01/2023 0 96 (L)  1 12 - 1 32 mmol/L Final   • hs TnI 2hr 01/01/2023 6  "Refer to ACS Flowchart"- see link ng/L Final    Comment:                                              Initial (time 0) result  If >=50 ng/L, Myocardial injury suggested ;  Type of myocardial injury and treatment strategy  to be determined  If 5-49 ng/L, a delta result at 2 hours and or 4 hours will be needed to further evaluate  If <4 ng/L, and chest pain has been >3 hours since onset, patient may qualify for discharge based on the HEART score in the ED  If <5 ng/L and <3hours since onset of chest pain, a delta result at 2 hours will be needed to further evaluate  HS Troponin 99th Percentile URL of a Health Population=12 ng/L with a 95% Confidence Interval of 8-18 ng/L  Second Troponin (time 2 hours)  If calculated delta >= 20 ng/L,  Myocardial injury suggested ; Type of myocardial injury and treatment strategy to be determined  If 5-49 ng/L and the calculated delta is 5-19 ng/L, consult medical service for evaluation  Continue evaluation for ischemia on ecg and other possible etiology and repeat hs troponin at 4 hours  If delta                            is <5 ng/L at 2 hours, consider discharge based on risk stratification via the HEART score (if in ED), or AB risk score in IP/Observation  HS Troponin 99th Percentile URL of a Health Population=12 ng/L with a 95% Confidence Interval of 8-18 ng/L     • Delta 2hr hsTnI 01/01/2023 -1  <20 ng/L Final   • Ventricular Rate 01/01/2023 73  BPM Final   • Atrial Rate 01/01/2023 73  BPM Final   • TN Interval 01/01/2023 162  ms Final   • QRSD Interval 01/01/2023 80  ms Final   • QT Interval 01/01/2023 402  ms Final   • QTC Interval 01/01/2023 442  ms Final   • P Axis 01/01/2023 57  degrees Final   • QRS Axis 01/01/2023 37  degrees Final   • T Wave Whitney 01/01/2023 51  degrees Final   • Ventricular Rate 01/01/2023 77  BPM Final   • Atrial Rate 01/01/2023 77  BPM Final   • TN Interval 01/01/2023 152  ms Final   • QRSD Interval 01/01/2023 76  ms Final   • QT Interval 01/01/2023 404  ms Final   • QTC Interval 01/01/2023 457  ms Final   • P Axis 01/01/2023 53  degrees Final   • QRS Axis 01/01/2023 42  degrees Final   • T Wave Whitney 01/01/2023 59  degrees Final   Admission on 12/31/2022, Discharged on 12/31/2022   Component Date Value Ref Range Status   • WBC 12/31/2022 8 39  4 31 - 10 16 Thousand/uL Final   • RBC 12/31/2022 4 48  3 81 - 5 12 Million/uL Final   • Hemoglobin 12/31/2022 12 7  11 5 - 15 4 g/dL Final   • Hematocrit 12/31/2022 39 3  34 8 - 46 1 % Final   • MCV 12/31/2022 88  82 - 98 fL Final   • MCH 12/31/2022 28 3  26 8 - 34 3 pg Final   • MCHC 12/31/2022 32 3  31 4 - 37 4 g/dL Final   • RDW 12/31/2022 16 2 (H)  11 6 - 15 1 % Final   • MPV 12/31/2022 9 8  8 9 - 12 7 fL Final   • Platelets 04/33/8585 197  149 - 390 Thousands/uL Final   • nRBC 12/31/2022 0  /100 WBCs Final   • Neutrophils Relative 12/31/2022 62  43 - 75 % Final   • Immat GRANS % 12/31/2022 1  0 - 2 % Final   • Lymphocytes Relative 12/31/2022 29  14 - 44 % Final   • Monocytes Relative 12/31/2022 7  4 - 12 % Final   • Eosinophils Relative 12/31/2022 1  0 - 6 % Final   • Basophils Relative 12/31/2022 0  0 - 1 % Final   • Neutrophils Absolute 12/31/2022 5 23  1 85 - 7 62 Thousands/µL Final   • Immature Grans Absolute 12/31/2022 0 09  0 00 - 0 20 Thousand/uL Final   • Lymphocytes Absolute 12/31/2022 2 44  0 60 - 4 47 Thousands/µL Final   • Monocytes Absolute 12/31/2022 0 56  0 17 - 1 22 Thousand/µL Final   • Eosinophils Absolute 12/31/2022 0 04  0 00 - 0 61 Thousand/µL Final   • Basophils Absolute 12/31/2022 0 03  0 00 - 0 10 Thousands/µL Final   • Sodium 12/31/2022 139  135 - 147 mmol/L Final   • Potassium 12/31/2022 3 9  3 5 - 5 3 mmol/L Final   • Chloride 12/31/2022 101  96 - 108 mmol/L Final   • CO2 12/31/2022 28  21 - 32 mmol/L Final   • ANION GAP 12/31/2022 10  4 - 13 mmol/L Final   • BUN 12/31/2022 20  5 - 25 mg/dL Final   • Creatinine 12/31/2022 1 12  0 60 - 1 30 mg/dL Final    Standardized to IDMS reference method   • Glucose 12/31/2022 112  65 - 140 mg/dL Final    If the patient is fasting, the ADA then defines impaired fasting glucose as > 100 mg/dL and diabetes as > or equal to 123 mg/dL  Specimen collection should occur prior to Sulfasalazine administration due to the potential for falsely depressed results  Specimen collection should occur prior to Sulfapyridine administration due to the potential for falsely elevated results  • Calcium 12/31/2022 7 8 (L)  8 3 - 10 1 mg/dL Final   • AST 12/31/2022 20  5 - 45 U/L Final    Specimen collection should occur prior to Sulfasalazine administration due to the potential for falsely depressed results  • ALT 12/31/2022 54  12 - 78 U/L Final    Specimen collection should occur prior to Sulfasalazine administration due to the potential for falsely depressed results      • Alkaline Phosphatase 12/31/2022 123 (H)  46 - 116 U/L Final   • Total Protein 12/31/2022 7 2  6 4 - 8 4 g/dL Final   • Albumin 12/31/2022 3 7  3 5 - 5 0 g/dL Final   • Total Bilirubin 12/31/2022 0 24  0 20 - 1 00 mg/dL Final    Use of this assay is not recommended for patients undergoing treatment with eltrombopag due to the potential for falsely elevated results  • eGFR 12/31/2022 59  ml/min/1 73sq m Final   • Calcium, Ionized 12/31/2022 1 01 (L)  1 12 - 1 32 mmol/L Final   Admission on 12/28/2022, Discharged on 12/28/2022   Component Date Value Ref Range Status   • Sodium 12/28/2022 136  135 - 147 mmol/L Final   • Potassium 12/28/2022 4 0  3 5 - 5 3 mmol/L Final   • Chloride 12/28/2022 99  96 - 108 mmol/L Final   • CO2 12/28/2022 28  21 - 32 mmol/L Final   • ANION GAP 12/28/2022 9  4 - 13 mmol/L Final   • BUN 12/28/2022 15  5 - 25 mg/dL Final   • Creatinine 12/28/2022 1 03  0 60 - 1 30 mg/dL Final    Standardized to IDMS reference method   • Glucose 12/28/2022 127  65 - 140 mg/dL Final    If the patient is fasting, the ADA then defines impaired fasting glucose as > 100 mg/dL and diabetes as > or equal to 123 mg/dL  Specimen collection should occur prior to Sulfasalazine administration due to the potential for falsely depressed results  Specimen collection should occur prior to Sulfapyridine administration due to the potential for falsely elevated results     • Calcium 12/28/2022 8 4  8 3 - 10 1 mg/dL Final   • eGFR 12/28/2022 65  ml/min/1 73sq m Final   • Calcium, Ionized 12/28/2022 1 08 (L)  1 12 - 1 32 mmol/L Final   • Ventricular Rate 12/28/2022 72  BPM Final   • Atrial Rate 12/28/2022 72  BPM Final   • NV Interval 12/28/2022 174  ms Final   • QRSD Interval 12/28/2022 78  ms Final   • QT Interval 12/28/2022 448  ms Final   • QTC Interval 12/28/2022 490  ms Final   • P Axis 12/28/2022 39  degrees Final   • QRS Axis 12/28/2022 16  degrees Final   • T Wave Axis 12/28/2022 43  degrees Final   Admission on 12/27/2022, Discharged on 12/27/2022   Component Date Value Ref Range Status   • WBC 12/27/2022 6 46  4 31 - 10 16 Thousand/uL Final   • RBC 12/27/2022 4 81  3 81 - 5 12 Million/uL Final   • Hemoglobin 12/27/2022 13 8  11 5 - 15 4 g/dL Final   • Hematocrit 12/27/2022 42 1  34 8 - 46 1 % Final   • MCV 12/27/2022 88  82 - 98 fL Final   • MCH 12/27/2022 28 7  26 8 - 34 3 pg Final   • MCHC 12/27/2022 32 8  31 4 - 37 4 g/dL Final   • RDW 12/27/2022 16 5 (H)  11 6 - 15 1 % Final   • MPV 12/27/2022 10 7  8 9 - 12 7 fL Final   • Platelets 30/75/0982 208  149 - 390 Thousands/uL Final   • nRBC 12/27/2022 0  /100 WBCs Final   • Neutrophils Relative 12/27/2022 60  43 - 75 % Final   • Immat GRANS % 12/27/2022 1  0 - 2 % Final   • Lymphocytes Relative 12/27/2022 30  14 - 44 % Final   • Monocytes Relative 12/27/2022 6  4 - 12 % Final   • Eosinophils Relative 12/27/2022 2  0 - 6 % Final   • Basophils Relative 12/27/2022 1  0 - 1 % Final   • Neutrophils Absolute 12/27/2022 3 90  1 85 - 7 62 Thousands/µL Final   • Immature Grans Absolute 12/27/2022 0 04  0 00 - 0 20 Thousand/uL Final   • Lymphocytes Absolute 12/27/2022 1 96  0 60 - 4 47 Thousands/µL Final   • Monocytes Absolute 12/27/2022 0 40  0 17 - 1 22 Thousand/µL Final   • Eosinophils Absolute 12/27/2022 0 10  0 00 - 0 61 Thousand/µL Final   • Basophils Absolute 12/27/2022 0 06  0 00 - 0 10 Thousands/µL Final   • Sodium 12/27/2022 136  135 - 147 mmol/L Final   • Potassium 12/27/2022 4 4  3 5 - 5 3 mmol/L Final   • Chloride 12/27/2022 99  96 - 108 mmol/L Final   • CO2 12/27/2022 28  21 - 32 mmol/L Final   • ANION GAP 12/27/2022 9  4 - 13 mmol/L Final   • BUN 12/27/2022 13  5 - 25 mg/dL Final   • Creatinine 12/27/2022 1 01  0 60 - 1 30 mg/dL Final    Standardized to IDMS reference method   • Glucose 12/27/2022 106  65 - 140 mg/dL Final    If the patient is fasting, the ADA then defines impaired fasting glucose as > 100 mg/dL and diabetes as > or equal to 123 mg/dL    Specimen collection should occur prior to Sulfasalazine administration due to the potential for falsely depressed results  Specimen collection should occur prior to Sulfapyridine administration due to the potential for falsely elevated results  • Calcium 12/27/2022 8 2 (L)  8 3 - 10 1 mg/dL Final   • AST 12/27/2022 27  5 - 45 U/L Final    Specimen collection should occur prior to Sulfasalazine administration due to the potential for falsely depressed results  • ALT 12/27/2022 61  12 - 78 U/L Final    Specimen collection should occur prior to Sulfasalazine administration due to the potential for falsely depressed results  • Alkaline Phosphatase 12/27/2022 127 (H)  46 - 116 U/L Final   • Total Protein 12/27/2022 7 8  6 4 - 8 4 g/dL Final   • Albumin 12/27/2022 3 8  3 5 - 5 0 g/dL Final   • Total Bilirubin 12/27/2022 0 36  0 20 - 1 00 mg/dL Final    Use of this assay is not recommended for patients undergoing treatment with eltrombopag due to the potential for falsely elevated results     • eGFR 12/27/2022 66  ml/min/1 73sq m Final   • Calcium, Ionized 12/27/2022 1 00 (L)  1 12 - 1 32 mmol/L Final   • Magnesium 12/27/2022 1 8  1 6 - 2 6 mg/dL Final   Admission on 12/22/2022, Discharged on 12/22/2022   Component Date Value Ref Range Status   • WBC 12/22/2022 5 91  4 31 - 10 16 Thousand/uL Final   • RBC 12/22/2022 4 56  3 81 - 5 12 Million/uL Final   • Hemoglobin 12/22/2022 13 0  11 5 - 15 4 g/dL Final   • Hematocrit 12/22/2022 39 8  34 8 - 46 1 % Final   • MCV 12/22/2022 87  82 - 98 fL Final   • MCH 12/22/2022 28 5  26 8 - 34 3 pg Final   • MCHC 12/22/2022 32 7  31 4 - 37 4 g/dL Final   • RDW 12/22/2022 16 5 (H)  11 6 - 15 1 % Final   • MPV 12/22/2022 10 3  8 9 - 12 7 fL Final   • Platelets 05/79/0337 167  149 - 390 Thousands/uL Final   • nRBC 12/22/2022 0  /100 WBCs Final   • Neutrophils Relative 12/22/2022 52  43 - 75 % Final   • Immat GRANS % 12/22/2022 1  0 - 2 % Final   • Lymphocytes Relative 12/22/2022 38  14 - 44 % Final   • Monocytes Relative 12/22/2022 7  4 - 12 % Final   • Eosinophils Relative 12/22/2022 1  0 - 6 % Final   • Basophils Relative 12/22/2022 1  0 - 1 % Final   • Neutrophils Absolute 12/22/2022 3 13  1 85 - 7 62 Thousands/µL Final   • Immature Grans Absolute 12/22/2022 0 03  0 00 - 0 20 Thousand/uL Final   • Lymphocytes Absolute 12/22/2022 2 24  0 60 - 4 47 Thousands/µL Final   • Monocytes Absolute 12/22/2022 0 39  0 17 - 1 22 Thousand/µL Final   • Eosinophils Absolute 12/22/2022 0 08  0 00 - 0 61 Thousand/µL Final   • Basophils Absolute 12/22/2022 0 04  0 00 - 0 10 Thousands/µL Final   • Sodium 12/22/2022 139  135 - 147 mmol/L Final   • Potassium 12/22/2022 4 0  3 5 - 5 3 mmol/L Final   • Chloride 12/22/2022 103  96 - 108 mmol/L Final   • CO2 12/22/2022 22  21 - 32 mmol/L Final   • ANION GAP 12/22/2022 14 (H)  4 - 13 mmol/L Final   • BUN 12/22/2022 27 (H)  5 - 25 mg/dL Final   • Creatinine 12/22/2022 0 97  0 60 - 1 30 mg/dL Final    Standardized to IDMS reference method   • Glucose 12/22/2022 148 (H)  65 - 140 mg/dL Final    If the patient is fasting, the ADA then defines impaired fasting glucose as > 100 mg/dL and diabetes as > or equal to 123 mg/dL  Specimen collection should occur prior to Sulfasalazine administration due to the potential for falsely depressed results  Specimen collection should occur prior to Sulfapyridine administration due to the potential for falsely elevated results  • Calcium 12/22/2022 8 1 (L)  8 3 - 10 1 mg/dL Final   • Corrected Calcium 12/22/2022 8 6  8 3 - 10 1 mg/dL Final   • AST 12/22/2022 36  5 - 45 U/L Final    Specimen collection should occur prior to Sulfasalazine administration due to the potential for falsely depressed results  • ALT 12/22/2022 74  12 - 78 U/L Final    Specimen collection should occur prior to Sulfasalazine administration due to the potential for falsely depressed results      • Alkaline Phosphatase 12/22/2022 124 (H)  46 - 116 U/L Final   • Total Protein 12/22/2022 7 5  6 4 - 8 4 g/dL Final   • Albumin 12/22/2022 3 4 (L)  3 5 - 5 0 g/dL Final   • Total Bilirubin 12/22/2022 0 33  0 20 - 1 00 mg/dL Final    Use of this assay is not recommended for patients undergoing treatment with eltrombopag due to the potential for falsely elevated results  • eGFR 12/22/2022 70  ml/min/1 73sq m Final   • TSH 3RD GENERATON 12/22/2022 4 923 (H)  0 450 - 4 500 uIU/mL Final    The recommended reference ranges for TSH during pregnancy are as follows:   First trimester 0 1 to 2 5 uIU/mL   Second trimester  0 2 to 3 0 uIU/mL   Third trimester 0 3 to 3 0 uIU/m    Note: Normal ranges may not apply to patients who are transgender, non-binary, or whose legal sex, sex at birth, and gender identity differ  Adult TSH (3rd generation) reference range follows the recommended guidelines of the [employer], January, 2020  • Magnesium 12/22/2022 1 8  1 6 - 2 6 mg/dL Final   • Calcium, Ionized 12/22/2022 1 07 (L)  1 12 - 1 32 mmol/L Final   Hospital Outpatient Visit on 12/19/2022   Component Date Value Ref Range Status   • Case Report 12/19/2022    Final                    Value:Surgical Pathology Report                         Case: [id number]                                   Authorizing Provider:  [place] PA-C     Collected:           12/19/2022 1610              Ordering Location:     [address] [place] [address] Received:            12/19/2022 1950                                     Mammography                                                                  Pathologist:           Florentino Almanza MD                                                         Specimens:   A) - Breast, Left, Left Breast 12 o'clock calcifications                                            B) - Breast, Left, Left Breast 12 o'clock adjacent tissue                                 • Final Diagnosis 12/19/2022    Final                    Value: This result contains rich text formatting which cannot be displayed here  • Additional Information 12/19/2022    Final                    Value: This result contains rich text formatting which cannot be displayed here  • Gross Description 12/19/2022    Final                    Value: This result contains rich text formatting which cannot be displayed here  • Clinical Information 12/19/2022    Final                    Value:56year-old female   FINDINGS: Mammogram   LEFT  B) CALCIFICATIONS  Mammo diagnostic bilateral w 3d & cad: There are coarse heterogeneous and fine pleomorphic calcifications in a grouped distribution seen in the left breast at 12 o'clock in the posterior depth  Stereotactic core biopsy recommended      RIGHT  A) MASS  Mammo diagnostic bilateral w 3d & cad: There is an 11 mm oval mass with circumscribed margins seen in the upper central region of the right breast at 12 o'clock in the anterior depth  US breast right limited (diagnostic): There is a 10 mm x 5 mm x 9 mm oval, parallel, isoechoic mass with circumscribed margins with no posterior features seen in the right breast at 12 o'clock, 4 cm from the nipple  The mass correlates with the prior mammogram finding  Fibroadenoma suspected and surveillance recommended to confirm stability      Within the axilla at the area of palpable concern, benign morphology isoechoic fat lobules are identified with no no suspicious solid or cystic                           mass lesions, areas of architectural distortion, or abnormal acoustic shadowing to suggest malignancy      The above findings were discussed with the patient and the nurse navigator at the time of the study      IMPRESSION:  Stereotactic core biopsy recommended for indeterminate grouped calcifications 12:00 o'clock left breast      Benign morphology solid nodule 12:00 o'clock right breast, probable fibroadenoma  Surveillance recommended  There may be more visits with results that are not included           Patient Instructions     Diabetes and Exercise   WHAT YOU NEED TO KNOW:   Physical activity, such as exercise, can help keep your blood sugar level steady or improve insulin resistance  Activity can help decrease your risk for heart disease, and help you lose weight  Exercise can also help lower your A1c  Your diabetes care team will help you create an exercise plan  The plan will be based on the type of diabetes you have and your starting fitness level  DISCHARGE INSTRUCTIONS:   Call your local emergency number (911 in the [address]) if:   · You have chest pain or shortness of breath  Return to the emergency department if:   · You have a low blood sugar level and it does not improve with treatment  Symptoms are trouble thinking, a pounding heartbeat, and sweating  · Your blood sugar level is above 240 mg/dL and does not come down within 15 minutes of treatment  · You have blurred or double vision  · Your breath has a fruity, sweet smell, or your breathing is shallow  Call your doctor or diabetes care team if:   · You have ketones in your blood or urine  · You have a fever  · Your blood sugar levels are higher than your target goals  · You often have low blood sugar levels  · Your skin is red, dry, warm, or swollen  · You have a wound that does not heal     · You have trouble coping with diabetes, or you feel anxious or depressed  · You have questions or concerns about your condition or care  Tips to help you create and meet your exercise goals:   · Set a goal for 150 minutes (2 5 hours) of moderate to vigorous aerobic activity each week  Aerobic activity helps your heart stay strong  Aerobic activity includes walking, bicycling, dancing, swimming, and raking leaves  Spread aerobic activity over 3 to 5 days  Do not take more than 2 days off in a row  It is best to do at least 10 minutes at a time and 30 minutes each day  You can work up to these goals  Remember that any activity is better than no activity  Over time, you can make exercise more intense or last longer   You can also add more days of exercise as your fitness level improves  Your diabetes care team can help you make a step-by-step plan to achieve your goals  · Set a strength training goal of 2 to 3 times a week  Take at least 1 day off in between strength training sessions  Strength training helps you keep the muscles you have and build new muscles  Strength training includes lifting weights, climbing stairs, yoga, and ludwig chi          · Older adults should include balance training 2 to 3 times each week  These include walking backwards, standing on one foot, and walking heel to toe in a straight line  Other healthy exercise tips:   · Stretch before and after you exercise to prevent injury  · Drink water or liquids that do not contain sugar before, during, and after exercise  Ask your dietitian or healthcare provider which liquids you should drink when you exercise  · Do not sit for longer than 30 minutes at a time during your day  If you cannot walk around, at least stand up  This will help you stay active and keep your blood circulating  Exercise and blood sugar levels:  Check your blood sugar level before and after exercise, if you use insulin  Healthcare providers may tell you to change the amount of insulin you take or food you eat  · If your blood sugar level is high, check your blood or urine for ketones before you exercise  Do not exercise if your blood sugar level is high and you have ketones  · If your blood sugar level is less than 100 mg/dL, have a carbohydrate snack before you exercise  Examples are 4 to 6 crackers, ½ banana, 8 ounces (1 cup) of milk, or 4 ounces (½ cup) of juice  Follow up with your doctor or diabetes care team as directed:  Write down your questions so you remember to ask them during your visits  © Copyright [employer] 2022 Information is for End User's use only and may not be sold, redistributed or otherwise used for commercial purposes   All illustrations and images included in CareNotes® are the copyrighted property of [facility] [employer] [facility]  or [facility] Karley Hbubard   The above information is an  only  It is not intended as medical advice for individual conditions or treatments  Talk to your doctor, nurse or pharmacist before following any medical regimen to see if it is safe and effective for you  Db Larkin MD        "This note has been constructed using a voice recognition system  Therefore there may be syntax, spelling, and/or grammatical errors   Please call if you have any questions  " strong  Aerobic activity includes walking, bicycling, dancing, swimming, and raking leaves  Spread aerobic activity over 3 to 5 days  Do not take more than 2 days off in a row  It is best to do at least 10 minutes at a time and 30 minutes each day  You can work up to these goals  Remember that any activity is better than no activity  Over time, you can make exercise more intense or last longer  You can also add more days of exercise as your fitness level improves  Your diabetes care team can help you make a step-by-step plan to achieve your goals  · Set a strength training goal of 2 to 3 times a week  Take at least 1 day off in between strength training sessions  Strength training helps you keep the muscles you have and build new muscles  Strength training includes lifting weights, climbing stairs, yoga, and ludwig chi          · Older adults should include balance training 2 to 3 times each week  These include walking backwards, standing on one foot, and walking heel to toe in a straight line  Other healthy exercise tips:   · Stretch before and after you exercise to prevent injury  · Drink water or liquids that do not contain sugar before, during, and after exercise  Ask your dietitian or healthcare provider which liquids you should drink when you exercise  · Do not sit for longer than 30 minutes at a time during your day  If you cannot walk around, at least stand up  This will help you stay active and keep your blood circulating  Exercise and blood sugar levels:  Check your blood sugar level before and after exercise, if you use insulin  Healthcare providers may tell you to change the amount of insulin you take or food you eat  · If your blood sugar level is high, check your blood or urine for ketones before you exercise  Do not exercise if your blood sugar level is high and you have ketones       · If your blood sugar level is less than 100 mg/dL, have a carbohydrate snack before you exercise  Examples are 4 to 6 crackers, ½ banana, 8 ounces (1 cup) of milk, or 4 ounces (½ cup) of juice  Follow up with your doctor or diabetes care team as directed:  Write down your questions so you remember to ask them during your visits  © [employer] 2022 Information is for End User's use only and may not be sold, redistributed or otherwise used for commercial purposes  All illustrations and images included in CareNotes® are the copyrighted property of A D A M , Inc  or [address] [place], [place] [address]   The above information is an  only  It is not intended as medical advice for individual conditions or treatments  Talk to your doctor, nurse or pharmacist before following any medical regimen to see if it is safe and effective for you  Yair Solitario MD        "This note has been constructed using a voice recognition system  Therefore there may be syntax, spelling, and/or grammatical errors   Please call if you have any questions  "

## 2023-01-14 NOTE — ASSESSMENT & PLAN NOTE
Patient is treated and followed by psychiatrist on Xanax advised follow-up with the psychiatry and continue counseling

## 2023-01-14 NOTE — ASSESSMENT & PLAN NOTE
Patient history of for thyroidectomy and postsurgical developed hyperparathyroidism with recurrent hypocalcemia being treated multiple emergency room although records reviewed continue supplementation with Rocaltrol 0 5 mg daily calcium carbonate 600 mg 2 pills 3 times a day magnesium oxide 2 times a day and follow-up with endocrinologist

## 2023-01-14 NOTE — ASSESSMENT & PLAN NOTE
History of hyper thyroidism status post thyroidectomy now hypothyroid on Synthroid last TSH was low Synthroid dose was increased in the hospital by endocrinologist to 175 mcg continue current treatment follow-up with a TSH

## 2023-01-14 NOTE — ASSESSMENT & PLAN NOTE
Post thyroidectomy developed hypoparathyroidism causing recurrent episode of hypocalcemia for the last more than 7 years since surgery advised supplemental vitamin D Rocaltrol 0 5 daily with calcium Carbonet follow-up with endocrinologist    Hypocalcemia-patient has been having symptoms of hypocalcemia for the last 2 to 3 months, requiring admission  She admits to taking Rocaltrol 0 5 mg daily along with calcium close to 5000 mg daily,  -We will increase Rocaltrol to 0 5 mg twice a day,   -As patient did not receive Rocaltrol in the morning ,give 1 dose now    Patient did not receive Rocaltrol today  -Start calcium carbonate, 1250 mg three times a day   -Obtain ionized Calcium in the morning  -Obtain serum magnesium as well  -Patient will need monitoring of calcium every 12 hours, if she has symptoms  -Put the order for calcium gluconate for as needed, if she has symptoms of hypocalcemia calcium less than 8 0

## 2023-01-14 NOTE — ASSESSMENT & PLAN NOTE
Patient followed by pain management and been prescribed Percocet 10/325 4 times a day for pain control Soma so that able to control the pain to function and carry out day-to-day activities with some help

## 2023-01-14 NOTE — ASSESSMENT & PLAN NOTE
Chronic intractable pain with multiple lower back surgeries being followed by pain management intractable pain radiating lower extremity with weakness lower extremity difficulty walking being managed with Percocet 10/325 1 every 6 hours by pain management

## 2023-01-14 NOTE — ASSESSMENT & PLAN NOTE
Lab Results   Component Value Date    HGBA1C 6 2 (H) 01/05/2023   Patient was on metformin 500 twice a day now due to the side effects will cut down to 250 mg once a day and diet hypoglycemia dilated eye exam foot exam normal and follow-up with endocrinologist

## 2023-01-16 ENCOUNTER — CLINICAL SUPPORT (OUTPATIENT)
Dept: NUTRITION | Facility: HOSPITAL | Age: 48
End: 2023-01-16

## 2023-01-16 ENCOUNTER — APPOINTMENT (OUTPATIENT)
Dept: LAB | Facility: HOSPITAL | Age: 48
End: 2023-01-16

## 2023-01-16 VITALS — BODY MASS INDEX: 39.4 KG/M2 | WEIGHT: 215.4 LBS

## 2023-01-16 DIAGNOSIS — E83.51 HYPOCALCEMIA: ICD-10-CM

## 2023-01-16 DIAGNOSIS — R79.89 ELEVATED FERRITIN: ICD-10-CM

## 2023-01-16 DIAGNOSIS — E11.9 TYPE 2 DIABETES MELLITUS WITHOUT COMPLICATION, WITHOUT LONG-TERM CURRENT USE OF INSULIN (HCC): ICD-10-CM

## 2023-01-16 LAB
ANION GAP SERPL CALCULATED.3IONS-SCNC: 13 MMOL/L (ref 4–13)
BUN SERPL-MCNC: 22 MG/DL (ref 5–25)
CALCIUM SERPL-MCNC: 8.6 MG/DL (ref 8.3–10.1)
CHLORIDE SERPL-SCNC: 105 MMOL/L (ref 96–108)
CO2 SERPL-SCNC: 20 MMOL/L (ref 21–32)
CREAT SERPL-MCNC: 1.11 MG/DL (ref 0.6–1.3)
GFR SERPL CREATININE-BSD FRML MDRD: 59 ML/MIN/1.73SQ M
GLUCOSE P FAST SERPL-MCNC: 113 MG/DL (ref 65–99)
MAGNESIUM SERPL-MCNC: 1.8 MG/DL (ref 1.6–2.6)
POTASSIUM SERPL-SCNC: 4 MMOL/L (ref 3.5–5.3)
SODIUM SERPL-SCNC: 138 MMOL/L (ref 135–147)

## 2023-01-16 NOTE — PROGRESS NOTES
Nutrition Assessment Form    Patient Name: Gus Arellano    YOB: 1975    Sex: Female     Assessment Date: 1/16/2023  Start Time: 12:48 Stop Time: 1:48 Total Minutes: 60     Data:  Present at session: self   Parent/Patient Concerns/reason for visit: Newly diagnosed diabetes and inability to lose weight   Medical Dx/Reason for Referral: X08 5 nonalcoholic fatty liver disease, R16 1 Splenomegaly, R74 01 elevated ALT measurement   Past Medical History:   Diagnosis Date   • Abdominal pain    • Acid reflux    • Acute renal failure ([facility] )     multiple episodes   • Anemia    • Anxiety    • Anxiety 3/31/2021   • Arthritis    • Asthma    • Back pain    • Chronic pain    • DDD (degenerative disc disease), lumbar    • Depression    • Disease of thyroid gland     had surgery and now hypo   • DVT (deep venous thrombosis) ([place] [address] ) 2009    s/p ankle fracture   • Headache    • History of transfusion    • Hypercalcemia    • Hyperlipidemia    • Hyperthyroidism    • Hypocalcemia     post op 2016   • Kidney stone    • Lightheadedness    • Migraine    • Obesity    • Palpitations    • Psychiatric disorder     anxiety depression   • PTSD (post-traumatic stress disorder) 10/28/2022   • Seizures ([place] [address] )     petit mal x1  4 years ago- all tests were neg  • SOB (shortness of breath)    • Spondylolisthesis of lumbar region    • Treatment     spinal pain injections  last was 7-7-2016   • Wears glasses        Current Outpatient Medications   Medication Sig Dispense Refill   • Alcohol Swabs 70 % PADS May substitute brand based on insurance coverage  Check glucose BID  100 each 0   • ALPRAZolam ER (XANAX XR) 2 MG 24 hr tablet Take 1 tablet (2 mg total) by mouth 2 (two) times a day 60 tablet 3   • baclofen 10 mg tablet Take 10 mg by mouth 3 (three) times a day as needed     • Blood Glucose Monitoring Suppl (OneTouch Verio Reflect) w/Device KIT May substitute brand based on insurance coverage   Check glucose BID  1 kit 0   • calcitriol (ROCALTROL) 0 5 MCG capsule Take 1 capsule (0 5 mcg total) by mouth 2 (two) times a day with meals 60 capsule 0   • calcium carbonate (OS-EDER) 600 MG tablet Take 2 pills 3 times a day 180 tablet 10   • desvenlafaxine succinate (PRISTIQ) 50 mg 24 hr tablet Take 1 tablet (50 mg total) by mouth daily 30 tablet 3   • docusate sodium (COLACE) 100 mg capsule Take 1 capsule (100 mg total) by mouth 2 (two) times a day for 7 days 14 capsule 0   • ergocalciferol (VITAMIN D2) 50,000 units Take 1 capsule (50,000 Units total) by mouth once a week for 11 doses Do not start before January 12, 2023  4 capsule 0   • fenofibrate 160 MG tablet Take 1 tablet (160 mg total) by mouth daily 90 tablet 3   • ferrous sulfate 325 (65 Fe) mg tablet Take 325 mg by mouth 2 (two) times a day Twice Monday, wed, Friday and Saturday -Last dose 4/1/22     • glucose blood (OneTouch Verio) test strip May substitute brand based on insurance coverage  Check glucose BID  100 each 0   • levothyroxine 150 mcg tablet Take 1 tablet (150 mcg total) by mouth daily at bedtime 90 tablet 0   • Lidocaine-Menthol 4-1 % PTCH if needed       • magnesium Oxide (MAG-OX) 400 mg TABS Take 1 tablet (400 mg total) by mouth 3 (three) times a day 90 tablet 10   • metFORMIN (GLUCOPHAGE) 500 mg tablet Take half a pill twice a day 60 tablet 0   • ondansetron (ZOFRAN) 4 mg tablet Take 1 tablet (4 mg total) by mouth every 8 (eight) hours as needed for nausea 20 tablet 1   • OneTouch Delica Lancets 68G MISC May substitute brand based on insurance coverage  Check glucose BID  100 each 0   • potassium chloride (K-DUR,KLOR-CON) 20 mEq tablet Take 1 tablet (20 mEq total) by mouth 2 (two) times a day 60 tablet 10   • pramipexole (MIRAPEX) 0 5 mg tablet Take 0 5 mg by mouth daily at bedtime       No current facility-administered medications for this visit          Additional Meds/Supplements:    Special Learning Needs/barriers to learning/any new barriers    Height: HC Readings from Last 5 Encounters:   No data found for Octaviano      Weight: Wt Readings from Last 10 Encounters:   01/16/23 97 7 kg (215 lb 6 4 oz)   01/13/23 97 5 kg (215 lb)   01/05/23 97 5 kg (215 lb)   01/03/23 98 5 kg (217 lb 3 2 oz)   12/30/22 97 9 kg (215 lb 12 8 oz)   12/22/22 98 kg (216 lb)   12/22/22 98 kg (216 lb)   12/19/22 95 3 kg (210 lb)   12/09/22 95 3 kg (210 lb 3 2 oz)   12/02/22 93 9 kg (207 lb)     Estimated body mass index is 39 4 kg/m² as calculated from the following:    Height as of 1/13/23: 5' 2" (1 575 m)  Weight as of this encounter: 97 7 kg (215 lb 6 4 oz)  Recent Weight Change: []Yes     [x]No  Amount:       Energy Needs: No calculation needed   Allergies   Allergen Reactions   • Hydrocodone-Acetaminophen Rash   • Vicodin [Hydrocodone-Acetaminophen] Rash   • Morphine And Related GI Intolerance     Nausea/vomiting     • Adhesive [Medical Tape] Rash     Bandaids    or intolerances    Social History     Substance and Sexual Activity   Alcohol Use Not Currently    _______x/wk or month  1 or 2 or 3 or 4 or____ drinks/session   Mixed drinks/ wine/ beer   Social History     Tobacco Use   Smoking Status Every Day   • Packs/day: 0 25   • Years: 25 00   • Pack years: 6 25   • Types: Cigarettes   Smokeless Tobacco Never       Who shops? daughter   Who cooks/cooking methods   daughter  Cooking methods: bake/doty/air doty/grill/boil/other________  Take out: ___ x/wk or month Dining out ____ x/wk or month   Exercise: None Walk/ run/ bike/ gym/other _________  ____ x/wk how many minutes:______  strength training  ______x/wk how many minutes:______   Prior Nutritional Counseling?  []Yes     [x]No  When:      Why:         Diet Hx:  Breakfast: Diet:  2 Glucerna   Lunch:   1/2 of a Peanut butter and SF jelly sandwich  2 Glucerna       Dinner:   Chicken Fajita or small portion of noodles with peppers and garlic       Snacks: Before or after dinner eats Plain Chobani yogurt with fresh blueberries or strawberries or a couple of grapes  Drinks 1 cup of coffee with sugar-free creamer, diet pepsi or Root beer Zero with dinner, water or unsweetened iced tea        Nutrition Diagnosis:   Food and nutrition related knowledge deficit  related to Lack of prior exposure to accurate nutrition related information as evidenced by New medical diagnosis or change in existing diagnosis or condition       Any change or new dx since previous visit:     Nutrition Diagnosis:         Medical Nutrition Therapy Intervention:  [x]Individualized Meal Plan: 1500 kcal meal plan []Understanding Lab Values   []Basic Pathophysiology of Disease []Food/Medication Interactions   [x]Food Diary: Journal intake daily for about a month []Exercise   [x]Lifestyle/Behavior Modification Techniques: 2-3 servings of carbohydrates per meal []Medication, Mechanism of Action   [x]Label Reading: CHO/Fat []Self Blood Glucose Monitoring   [x]Weight/BMI Goals: lose []Other -    Other Notes/Assessment:  PT states that she was recently dx'd with Diabetes and she has fatty liver and enlarged spleen  She is on Glucophage  She is trying to watch her intake of CHO per day  She states that she normally eats only one meal a day with a snack at night as she is not hungry  She states that her current weight is the heaviest she has ever been and she has been trying to be careful as well  PT was asking about Ozempic  Discussed that it curbs the appetite and if good eating patterns are not established and medications were ever changed, she will gain the weight back  PT states that she understands this but is willing to try  Discussed a healthy eating pattern of 1250 - 1500 kcals (based on IBW x 25 - 30 kcals/kg)  Provided with a sample diet  Also discussed watching carbohydrates at meals and limiting it to 2-3 servings  Provided handout on carbohydrate counting and label reading  Suggested that she keep track of her intake through a journal or alex on the phone for about a month  Also discussed watching fat intake due to h/o fatty liver and provided handout on low fat food choices  Comprehension: []Excellent  []Very Good  [x]Good  []Fair   []Poor    Receptivity: []Excellent  []Very Good  [x]Good  []Fair   []Poor    Expected Compliance: []Excellent  []Very Good  [x]Good  []Fair   []Poor        Goals:  1  Follow 1500 kcal diet daily   2  Journal intake daily for 1 month   3  Choose 2-3 CHO servings per meal       No follow-ups on file    Labs:  CMP  Lab Results   Component Value Date    K 4 0 01/16/2023     01/16/2023    CO2 20 (L) 01/16/2023    BUN 22 01/16/2023    CREATININE 1 11 01/16/2023    GLUF 113 (H) 01/16/2023    CALCIUM 8 6 01/16/2023    CORRECTEDCA 9 8 01/10/2023    AST 45 01/10/2023    ALT 81 (H) 01/10/2023    ALKPHOS 132 (H) 01/10/2023    EGFR 59 01/16/2023       BMP  Lab Results   Component Value Date    CALCIUM 8 6 01/16/2023    K 4 0 01/16/2023    CO2 20 (L) 01/16/2023     01/16/2023    BUN 22 01/16/2023    CREATININE 1 11 01/16/2023       Lipids  No results found for: CHOL  Lab Results   Component Value Date    HDL 39 (L) 02/01/2016     Lab Results   Component Value Date    [facility]  02/01/2016      Comment:      Calculated LDL invalid, triglycerides >400 mg/dl     Lab Results   Component Value Date    TRIG 444 (H) 02/01/2016     No results found for: CHOLHDL    Hemoglobin A1C  Lab Results   Component Value Date    HGBA1C 6 2 (H) 01/05/2023       Fasting Glucose  Lab Results   Component Value Date    GLUF 113 (H) 01/16/2023       Insulin     Thyroid  No results found for: TSH, T6GEUAG, X5EJARZ, THYROIDAB    Hepatic Function Panel  Lab Results   Component Value Date    ALT 81 (H) 01/10/2023    AST 45 01/10/2023    ALKPHOS 132 (H) 01/10/2023       Celiac Disease Antibody Panel  Endomysial IgA   Date Value Ref Range Status   06/06/2022 Negative Negative Final     Gliadin IgA   Date Value Ref Range Status   06/06/2022 9 0 - 19 units Final     Comment: Negative                   0 - 19                     Weak Positive             20 - 30                     Moderate to Strong Positive   >30     Gliadin IgG   Date Value Ref Range Status   06/06/2022 17 0 - 19 units Final     Comment:                        Negative                   0 - 19                     Weak Positive             20 - 30                     Moderate to Strong Positive   >30     IgA   Date Value Ref Range Status   06/06/2022 245 87 - 352 mg/dL Final     TISSUE TRANSGLUTAMINASE IGA   Date Value Ref Range Status   06/06/2022 4 (H) 0 - 3 U/mL Final     Comment:                                   Negative        0 -  3                                Weak Positive   4 - 10                                Positive           >10   Tissue Transglutaminase (tTG) has been identified   as the endomysial antigen  Studies have demonstr-   ated that endomysial IgA antibodies have over 99%   specificity for gluten sensitive enteropathy        Iron  Lab Results   Component Value Date    IRON 62 12/02/2022    TIBC 309 12/02/2022    FERRITIN 488 (H) 12/02/2022            Casie Reynolds, [address] [facility] [id number]

## 2023-01-18 ENCOUNTER — TELEPHONE (OUTPATIENT)
Dept: ENDOCRINOLOGY | Facility: CLINIC | Age: 48
End: 2023-01-18

## 2023-01-18 NOTE — TELEPHONE ENCOUNTER
Patient called- she is not feeling well  Would like to check calcium and calcium ionized  Is is ok to order?

## 2023-01-18 NOTE — TELEPHONE ENCOUNTER
She needs to talk to PCP or make appt with them,  if she is not feeling well   Her recent calcium is normal from 1/16/2022 ( 8 6) no need to repeat now   She should continue current dose of Rocaltrol, calcium carbonate, and magnesium supplementation as suggested ( on discharge)     Norma Banuelos

## 2023-01-19 ENCOUNTER — TELEMEDICINE (OUTPATIENT)
Dept: BEHAVIORAL/MENTAL HEALTH CLINIC | Facility: CLINIC | Age: 48
End: 2023-01-19

## 2023-01-19 DIAGNOSIS — F41.1 GENERALIZED ANXIETY DISORDER WITH PANIC ATTACKS: ICD-10-CM

## 2023-01-19 DIAGNOSIS — F17.210 NICOTINE DEPENDENCE, CIGARETTES, UNCOMPLICATED: ICD-10-CM

## 2023-01-19 DIAGNOSIS — F33.1 MODERATE EPISODE OF RECURRENT MAJOR DEPRESSIVE DISORDER (HCC): Primary | ICD-10-CM

## 2023-01-19 DIAGNOSIS — F41.0 GENERALIZED ANXIETY DISORDER WITH PANIC ATTACKS: ICD-10-CM
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
## 2023-01-24 ENCOUNTER — TELEPHONE (OUTPATIENT)
Dept: PSYCHIATRY | Facility: CLINIC | Age: 48
End: 2023-01-24

## 2023-01-25 ENCOUNTER — PATIENT OUTREACH (OUTPATIENT)
Dept: INTERNAL MEDICINE CLINIC | Facility: CLINIC | Age: 48
End: 2023-01-25

## 2023-01-25 NOTE — PROGRESS NOTES
ADT Alert received via IB  Patient has been in and out of the ER 8 times and admitted to IP once since last outreach for hypocalcemia  Patient has a follow up appointment with her Endocrinologist on 1/27/23  RNCM will follow up with patient after this appointment

## 2023-01-27 ENCOUNTER — OFFICE VISIT (OUTPATIENT)
Dept: PSYCHIATRY | Facility: CLINIC | Age: 48
End: 2023-01-27

## 2023-01-27 ENCOUNTER — OFFICE VISIT (OUTPATIENT)
Dept: ENDOCRINOLOGY | Facility: CLINIC | Age: 48
End: 2023-01-27

## 2023-01-27 VITALS
HEIGHT: 62 IN | WEIGHT: 212 LBS | HEART RATE: 90 BPM | SYSTOLIC BLOOD PRESSURE: 100 MMHG | DIASTOLIC BLOOD PRESSURE: 80 MMHG | BODY MASS INDEX: 39.01 KG/M2

## 2023-01-27 DIAGNOSIS — R73.03 PREDIABETES: ICD-10-CM

## 2023-01-27 DIAGNOSIS — E55.9 VITAMIN D DEFICIENCY: ICD-10-CM

## 2023-01-27 DIAGNOSIS — E89.0 POSTOPERATIVE HYPOTHYROIDISM: ICD-10-CM

## 2023-01-27 DIAGNOSIS — F32.A ANXIETY AND DEPRESSION: Primary | ICD-10-CM

## 2023-01-27 DIAGNOSIS — F41.9 ANXIETY AND DEPRESSION: Primary | ICD-10-CM

## 2023-01-27 DIAGNOSIS — E89.2 POST-SURGICAL HYPOPARATHYROIDISM (HCC): Primary | ICD-10-CM

## 2023-01-27 DIAGNOSIS — F41.9 ANXIETY: ICD-10-CM

## 2023-01-27 DIAGNOSIS — E83.51 HYPOCALCEMIA: ICD-10-CM

## 2023-01-27 RX ORDER — ALBUTEROL SULFATE 90 UG/1
1 AEROSOL, METERED RESPIRATORY (INHALATION)
COMMUNITY

## 2023-01-27 RX ORDER — CALCIUM CARBONATE 600 MG
TABLET ORAL
COMMUNITY
Start: 2023-01-16

## 2023-01-27 RX ORDER — CELECOXIB 200 MG/1
CAPSULE ORAL
COMMUNITY
Start: 2023-01-11

## 2023-01-27 RX ORDER — PYRAZINAMIDE 500 MG/1
TABLET ORAL
COMMUNITY
Start: 2022-11-01

## 2023-01-27 RX ORDER — OXYCODONE AND ACETAMINOPHEN 10; 325 MG/1; MG/1
TABLET ORAL
COMMUNITY
Start: 2023-01-16
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
## 2023-01-27 NOTE — TELEPHONE ENCOUNTER
Patient wants this medication in addition to her new medication changes you made at her visit today  Medication Refill Request     Name of Medication Alprazolam ER 2 mg  Dose/Frequency 1bid  Quantity 60  Verified pharmacy   [x]  Verified ordering Provider   [x]  Does patient have enough for the next 3 days? Yes [] No [x]  Does patient have a follow-up appointment scheduled?  Yes [x] No []   If so when is appointment: 2/13/2023

## 2023-01-27 NOTE — PATIENT INSTRUCTIONS
Once you finish your Ergocalciferol (Vitamin D) 50,000 units weekly, please start supplementing your vitamin D with NatureMade Vitamin D3 5000 Units daily  Please repeat labs on Monday, 1/30/23  An order has been placed for you  Follow up with endocrinology in 3 months  Prior to your visit, please repeat labs (Basic Metabolic Panel, Albumin, TSH with free T4, Vitamin D levels, HbA1C)

## 2023-01-29 DIAGNOSIS — F90.2 ATTENTION DEFICIT HYPERACTIVITY DISORDER (ADHD), COMBINED TYPE: Primary | ICD-10-CM

## 2023-01-29 PROBLEM — F32.A ANXIETY AND DEPRESSION: Status: ACTIVE | Noted: 2022-04-22

## 2023-01-29 RX ORDER — DEXTROAMPHETAMINE SACCHARATE, AMPHETAMINE ASPARTATE MONOHYDRATE, DEXTROAMPHETAMINE SULFATE AND AMPHETAMINE SULFATE 2.5; 2.5; 2.5; 2.5 MG/1; MG/1; MG/1; MG/1
10 CAPSULE, EXTENDED RELEASE ORAL EVERY MORNING
Qty: 7 CAPSULE | Refills: 0 | Status: SHIPPED | OUTPATIENT
Start: 2023-01-29 | End: 2023-03-08

## 2023-01-29 RX ORDER — ALPRAZOLAM 2 MG/1
2 TABLET, EXTENDED RELEASE ORAL 2 TIMES DAILY
Qty: 60 TABLET | Refills: 3 | Status: SHIPPED | OUTPATIENT
Start: 2023-01-29 | End: 2023-01-31 | Stop reason: SDUPTHER

## 2023-01-29 NOTE — PSYCH
This note was not shared with the patient due to reasonable likelihood of causing patient harm    PROGRESS NOTE        [address] [place] [address]      Name and Date of Birth:  Christiano Castle 56 y o  1975    Date of Visit: 01/29/23    SUBJECTIVE:    Lilly Castaneda was seen today for follow up and medication management  She was casually drressed, AO3X and maintained good eye contact  Speech was clear and of normal r/r/v  Thought processes were clear and goal directed  Today she presented with no sxs of anxiety or depression, and we spent time discussing my shelter and helping her find another provider  She denies suicidal ideation, intent or plan at present, has no suicidal ideation, intent or plan at present  She denies any auditory hallucinations and visual hallucinations, denies any other delusional thinking, denies any delusional thinking  She denies any side effects from medications    HPI ROS Appetite Changes and Sleep: normal appetite, normal sleep    Review Of Systems:      Constitutional As noted in HPI   ENT As noted in HPI   Cardiovascular As noted in HPI   Respiratory As noted in HPI   Gastrointestinal As noted in HPI   Genitourinary As noted in HPI   Musculoskeletal As noted in HPI   Integumentary As noted in HPI   Neurological As noted in HPI   Endocrine As noted in HPI   Other Symptoms Negative and None       Laboratory Results: No results found for this or any previous visit      Substance Abuse History:    Social History     Substance and Sexual Activity   Drug Use No       Family Psychiatric History:     Family History   Problem Relation Age of Onset   • Diabetes Mother    • Hypertension Mother    • Hyperlipidemia Father    • Arrhythmia Father    • Lung cancer Father    • Diabetes Father    • Colon cancer Maternal Grandfather    • Stomach cancer Paternal Grandmother    • Heart disease Paternal Aunt        The following portions of the patient's history were reviewed and updated as appropriate: past family history, past medical history, past social history, past surgical history and problem list     Social History     Socioeconomic History   • Marital status: /Civil Union     Spouse name: Not on file   • Number of children: Not on file   • Years of education: 15   • Highest education level: Not on file   Occupational History   • Not on file   Tobacco Use   • Smoking status: Every Day     Packs/day: 0 25     Years: 25 00     Pack years: 6 25     Types: Cigarettes   • Smokeless tobacco: Never   Vaping Use   • Vaping Use: Never used   Substance and Sexual Activity   • Alcohol use: Not Currently   • Drug use: No   • Sexual activity: Yes     Birth control/protection: Surgical     Comment: hysterectomy   Other Topics Concern   • Not on file   Social History Narrative    Most recent tobacco use screenin2018      General stress level:   Medium       Exercise level:   Occasional       Diet:   Regular      Caffeine intake:   Occasional     As per [place]      Social Determinants of Health     Financial Resource Strain: Not on file   Food Insecurity: No Food Insecurity   • Worried About Running Out of Food in the Last Year: Never true   • Ran Out of Food in the Last Year: Never true   Transportation Needs: No Transportation Needs   • Lack of Transportation (Medical): No   • Lack of Transportation (Non-Medical):  No   Physical Activity: Not on file   Stress: Not on file   Social Connections: Not on file   Intimate Partner Violence: Not on file   Housing Stability: Low Risk    • Unable to Pay for Housing in the Last Year: No   • Number of Places Lived in the Last Year: 1   • Unstable Housing in the Last Year: No     Social History     Social History Narrative    Most recent tobacco use screenin2018      General stress level:   Medium       Exercise level:   Occasional       Diet:   Regular      Caffeine intake:   Occasional     As per [place] Social History     Tobacco History     Smoking Status  Every Day Smoking Frequency  0 25 packs/day for 25 00 years (6 25 pk-yrs) Smoking Tobacco Type  Cigarettes    Smokeless Tobacco Use  Never          Alcohol History     Alcohol Use Status  Not Currently          Drug Use     Drug Use Status  No          Sexual Activity     Sexually Active  Yes Birth Control/Protection  Surgical Comment  hysterectomy          Activities of Daily Living    Not Asked                     OBJECTIVE:     Mental Status Evaluation:    Appearance age appropriate, casually dressed   Behavior pleasant, cooperative   Speech normal volume, normal pitch   Mood appropriate   Affect F&A   Thought Processes logical   Associations intact associations   Thought Content normal   Perceptual Disturbances: none   Abnormal Thoughts  Risk Potential Suicidal ideation - None  Homicidal ideation - None  Potential for aggression - No   Orientation oriented to person, place, time/date and situation   Memory recent and remote memory grossly intact   Cosciousness alert and awake   Attention Span attention span and concentration are age appropriate   Intellect Appears to be of Average Intelligence   Insight age appropriate    Judgement good    Muscle Strength and  Gait muscle strength and tone were normal   Language Intact   Fund of Knowledge Intact   Pain none   Pain Scale 0       Assessment/Plan:       Diagnoses and all orders for this visit:    Anxiety and depression          Treatment Recommendations/Precautions:    Continue current medications:    Risks/Benefits      Risks, Benefits And Possible Side Effects Of Medications:    Risks, benefits, and possible side effects of medications explained to patient and patient verbalizes understanding and agreement for treatment      Controlled Medication Discussion:         Psychotherapy Provided:     Individual psychotherapy provided: I spent 20 min counseoing with Patricia Husbands today

## 2023-01-29 NOTE — BH TREATMENT PLAN
TREATMENT PLAN (Medication Management Only)        [facility]    Name and Date of Birth:  Guillermo Campos 56 y o  1975  Date of Treatment Plan: January 29, 2023  Diagnosis/Diagnoses:    1  Anxiety and depression      Strengths/Personal Resources for Self-Care: supportive family, supportive friends, ability to communicate well, ability to listen, ability to reason, average or above intelligence, motivation for treatment  Area/Areas of need (in own words): anxiety symptoms, depressive symptoms  1  Long Term Goal: continue improvement in anxiety  Target Date:6 months - 7/29/2023  Person/Persons responsible for completion of goal: Otto Montiel  2  Short Term Objective (s) - How will we reach this goal?:   A  Provider new recommended medication/dosage changes and/or continue medication(s): continue current medications as prescribed  B  N/A   C  N/A  Target Date:6 months - 7/29/2023  Person/Persons Responsible for Completion of Goal: Otto Montiel  Progress Towards Goals: continuing treatment  Treatment Modality: medication management every 6 months  Review due 180 days from date of this plan: 6 months - 7/29/2023  Expected length of service: ongoing treatment  My Physician/PA/NP and I have developed this plan together and I agree to work on the goals and objectives  I understand the treatment goals that were developed for my treatment

## 2023-01-30 ENCOUNTER — PATIENT OUTREACH (OUTPATIENT)
Dept: INTERNAL MEDICINE CLINIC | Facility: CLINIC | Age: 48
End: 2023-01-30

## 2023-01-30 NOTE — PROGRESS NOTES
Chart was reviewed and this RNCM called patient for follow up  Patient states she is feeling better since her last visit to the hospital but is under a lot of emotional stress with her health and family issues with her sister  Patient was admitted to IP at [facility] 1/5/23-1/10/23 with hypocalcemia  Patient had an ER visit was on 1/11/23 for same  Patient is now taking Roaltrol 0 5 mcg BID and Calcium Carbonate 1200 mg TID  She was also told to take Tums 100 mg as needed if she starts with the s/s of hypocalcemia  Patient states this has been working which she is very happy with  She also states the doctor at [facility] told her to go to the ER if she is nauseated and unable to take these medications and get treated with IV Calcium as she has been doing  We discussed the importance of being compliant with all of her medication and she states understanding  Patient was found to be Vitamin D deficient and started on Ergocalciferol (Vitamin D2) 50,000 units weekly for 11 doses  She states the doctor in the hospital told her that her Vit D level can effect her Calcium  Patient was also diagnosed with Diabetes while in the hospital and was started on Metformin which she says she cannot take because it makes her sick and gives her a bad head ache  Patient states she is checking and logging her BS 3 x a day before meals  It has been ranging between 114-201  Normally 114-134  We discussed education and follow up with diabetes and she states she would like this very much  I told her I would mail diabetic education packet for her to review and we can discuss at next outreach  Patient had a visit with a Nutritionist on 1/16/23  Patient is following a diabetic low carb diet as directed  Nutritionist also told her to maintain a [id number] calorie diet  Patient states they discussed taking Ozempic for weight loss which she is very interested in   The Nutritionist told her to speak with her PCP to check if insurance would cover it and if they would order it  She states she spoke with both her PCP and Endocrinologist and neither would look into it  I told her I would call the insurance company and check before my next out reach  Patient was seen on 1/13/23 with her PCP Dr Jas Sebastian and on 1/27/23 with her Endocrinologist Dr Leigha Smith  Patient had a follow up appointment with her PCP on 1/26/23 but she did not go because she was sick  She has no follow up scheduled  I requested she call and schedule and appointment  She has an appointment with weight management on 3/10/23  Patient states she was having some issues with chest pain that goes into her jaw  She states it happened twice in the hospital and once since she was discharged  She states it goes away if she takes her prn Xanax 0 5 mg  Patients TRNI were negative while in the hospital  She denies any CP at this time  Patient has a cardiologist but does not have any upcoming appointments scheduled  I requested she call and make an appointment with them for follow up regarding this  I also instructed her if she has CP with pain/numbness into her arm or her jaw she should be seen in the ER  She states understanding  Patient states that her daughter is her paid care giver  She lives with her and cares for her around the clock    She gets paid through the [facility] program through her insurance company [employer]  She currently gets 51 hours a month  She says a  came out to evaluate her last week and they are going to be requesting more hours  Patient states she had a walker in the past but gave it away  She states she could really use one at this time  I told her I would send her PCP a message but she should also discuss with him at her next visit she is going to schedule  Patient states she has a BSC she uses but still needs to wear pull ups because she cannot move fast enough to get to it   She states she does walk around the house sometimes with assistance with her daughter and sometimes without  IB message was sent to Dr Tere Gr regarding need for a walker  Patient states she is going for blood work today  Patient was in need of emotional support this morning and this RNCM spoke with patient for a little over 1 hour  Patient does follow with psychiatry and has a therapist     Patient was provided with my contact information and encouraged to call with any questions or concerns  She was very appreciative of the call  Diabetic Education was mailed

## 2023-01-31 ENCOUNTER — TELEPHONE (OUTPATIENT)
Dept: MULTI SPECIALTY CLINIC | Facility: CLINIC | Age: 48
End: 2023-01-31

## 2023-01-31 ENCOUNTER — APPOINTMENT (OUTPATIENT)
Dept: LAB | Facility: HOSPITAL | Age: 48
End: 2023-01-31

## 2023-01-31 DIAGNOSIS — E89.2 POST-SURGICAL HYPOPARATHYROIDISM (HCC): ICD-10-CM

## 2023-01-31 DIAGNOSIS — F41.1 GAD (GENERALIZED ANXIETY DISORDER): ICD-10-CM

## 2023-01-31 DIAGNOSIS — F41.9 ANXIETY: ICD-10-CM

## 2023-01-31 DIAGNOSIS — E83.51 HYPOCALCEMIA: ICD-10-CM

## 2023-01-31 DIAGNOSIS — E89.0 POSTOPERATIVE HYPOTHYROIDISM: ICD-10-CM

## 2023-01-31 DIAGNOSIS — E83.51 HYPOCALCEMIA: Primary | ICD-10-CM

## 2023-01-31 LAB
ALBUMIN SERPL BCP-MCNC: 4.1 G/DL (ref 3.5–5)
ANION GAP SERPL CALCULATED.3IONS-SCNC: 11 MMOL/L (ref 4–13)
BUN SERPL-MCNC: 24 MG/DL (ref 5–25)
CALCIUM SERPL-MCNC: 10.4 MG/DL (ref 8.3–10.1)
CHLORIDE SERPL-SCNC: 103 MMOL/L (ref 96–108)
CO2 SERPL-SCNC: 24 MMOL/L (ref 21–32)
CREAT SERPL-MCNC: 1.45 MG/DL (ref 0.6–1.3)
GFR SERPL CREATININE-BSD FRML MDRD: 43 ML/MIN/1.73SQ M
GLUCOSE P FAST SERPL-MCNC: 128 MG/DL (ref 65–99)
POTASSIUM SERPL-SCNC: 4.1 MMOL/L (ref 3.5–5.3)
SODIUM SERPL-SCNC: 138 MMOL/L (ref 135–147)
T4 FREE SERPL-MCNC: 1.48 NG/DL (ref 0.76–1.46)
TSH SERPL DL<=0.05 MIU/L-ACNC: 3.42 UIU/ML (ref 0.45–4.5)

## 2023-01-31 RX ORDER — ALPRAZOLAM 2 MG/1
2 TABLET, EXTENDED RELEASE ORAL 2 TIMES DAILY
Qty: 60 TABLET | Refills: 3 | Status: SHIPPED | OUTPATIENT
Start: 2023-01-31

## 2023-01-31 RX ORDER — DESVENLAFAXINE 50 MG/1
50 TABLET, EXTENDED RELEASE ORAL DAILY
Qty: 30 TABLET | Refills: 3 | Status: SHIPPED | OUTPATIENT
Start: 2023-01-31 | End: 2023-02-07

## 2023-01-31 NOTE — TELEPHONE ENCOUNTER
Please inform patient that her calcium is 10 4, creatinine is slightly elevated  She needs to keep herself hydrated    Also we need to reduce calcium carbonate, to 600 mg, 1 tablet 3 times daily(currently patient is taking 2 tablets 3 times daily)  Should continue current dose of Rocaltrol 0 5 MCG twice a day,  SHe should go for blood work, basic metabolic profile, in 1 week'  Please Order blood work    Thanks     Yeahka

## 2023-01-31 NOTE — TELEPHONE ENCOUNTER
Medication Refill Request     Name of Medication ALPRAZolam ER (XANAX XR) 2 MG 24 hr tablet  Dose/Frequency Take 1 tablet (2 mg total) by mouth 2 (two) times a day  Quantity 60  Verified pharmacy   [x]  Verified ordering Provider   [x]  Does patient have enough for the next 3 days? Yes [x] No []  Does patient have a follow-up appointment scheduled? Yes [x] No []   If so when is appointment: 02/13/23 @ 2:30 PM      Medication Refill Request     Name of Medication desvenlafaxine succinate (PRISTIQ) 50 mg 24 hr tablet  Dose/Frequency Take 1 tablet (50 mg total) by mouth daily  Quantity 30  Verified pharmacy   [x]  Verified ordering Provider   [x]  Does patient have enough for the next 3 days? Yes [x] No []  Does patient have a follow-up appointment scheduled? Yes [x] No []    If so when is appointment:02/13/23 @ 2:30 Pm    Pt State that Adderall was sent to her pharmacy and she cannot take that medication

## 2023-02-02 ENCOUNTER — TELEMEDICINE (OUTPATIENT)
Dept: BEHAVIORAL/MENTAL HEALTH CLINIC | Facility: CLINIC | Age: 48
End: 2023-02-02

## 2023-02-02 DIAGNOSIS — F33.1 MODERATE EPISODE OF RECURRENT MAJOR DEPRESSIVE DISORDER (HCC): Primary | ICD-10-CM

## 2023-02-02 DIAGNOSIS — F41.0 GENERALIZED ANXIETY DISORDER WITH PANIC ATTACKS: ICD-10-CM

## 2023-02-02 DIAGNOSIS — F41.1 GENERALIZED ANXIETY DISORDER WITH PANIC ATTACKS: ICD-10-CM

## 2023-02-02 DIAGNOSIS — F17.210 NICOTINE DEPENDENCE, CIGARETTES, UNCOMPLICATED: ICD-10-CM

## 2023-02-02 NOTE — PSYCH
This note was not shared with the patient due to this is a psychotherapy note    Virtual Regular Visit    Verification of patient location:    Patient is located in the following state in which I hold an active license [place]      Assessment/Plan:    Problem List Items Addressed This Visit    None      Goals addressed in session: Goal 1          Reason for visit is   Chief Complaint   Patient presents with   • Virtual Regular Visit        Encounter provider Juan Bass LCSW    Provider located at [address]  [place] [address]  [phone]      Recent Visits  No visits were found meeting these conditions  Showing recent visits within past 7 days and meeting all other requirements  Future Appointments  No visits were found meeting these conditions  Showing future appointments within next 150 days and meeting all other requirements       The patient was identified by name and date of birth  Dario Headley was informed that this is a telemedicine visit and [facility]  She agrees to proceed     My office door was closed  No one else was in the room  She acknowledged consent and understanding of privacy and security of the video platform  The patient has agreed to participate and understands they can discontinue the visit at any time  Patient is aware this is a billable service  Subjective  Dario Headley is a 56 y o  female          HPI     Past Medical History:   Diagnosis Date   • Abdominal pain    • Acid reflux    • Acute renal failure (HCC)     multiple episodes   • Anemia    • Anxiety    • Anxiety 03/31/2021   • Arthritis    • Asthma    • Back pain    • Chronic pain    • DDD (degenerative disc disease), lumbar    • Depression    • Disease of thyroid gland     had surgery and now hypo   • DVT (deep venous thrombosis) ([facility] [address] )     s/p ankle fracture   • Headache    • History of transfusion    • Hypercalcemia    • Hyperlipidemia    • Hyperthyroidism    • Hypocalcemia     post op    • Kidney stone    • Lightheadedness    • Migraine    • Obesity    • Palpitations    • Pre-diabetes    • Psychiatric disorder     anxiety depression   • PTSD (post-traumatic stress disorder) 10/28/2022   • Seizures ([facility] [address] )     petit mal x1  4 years ago- all tests were neg  • SOB (shortness of breath)    • Spondylolisthesis of lumbar region    • Treatment     spinal pain injections  last was 2016   • Wears glasses        Past Surgical History:   Procedure Laterality Date   • BACK SURGERY      L4-5, S1-fusion-plate/screws   • BREAST BIOPSY Left 2022    Stereo SLW - 12:00   •  SECTION      x5   • CYSTOCELE REPAIR  2017   • CYSTOSCOPY     • DISCOGRAM     • HYSTERECTOMY     • MAMMO STEREOTACTIC BREAST BIOPSY LEFT (ALL INC) Left 2022   • PARATHYROIDECTOMY     • DE ANTERIOR COLPORRAPHY RPR CYSTOCELE W/CYSTO N/A 2017    Procedure: CYSTOCELE REPAIR;  Surgeon: Dannielle Schwab, MD;  Location: [address] [place] [address];  Service: Gynecology   • DE ARTHRODESIS POSTERIOR INTERBODY [address] LUMBAR N/A 2016    Procedure: L4-S1 LUMBAR LAMINECTOMY/DECOMPRESSION;  INSTRUMENTED POSTEROLATERAL FUSION/ INTERBODY L5-S1; ALLOGRAFT AND AUTOGRAFT (IMPULSE) ;   Surgeon: Ghulam Shabazz MD;  Location:  MAIN OR;  Service: Orthopedics   • DE CYSTO BLADDER W/URETERAL CATHETERIZATION Bilateral 2018    Procedure: INSERTION URETERAL CATHETERS PREOP;  Surgeon: Simon Mcclain MD;  Location: [address] [place] [address];  Service: Urology   • DE SLING OPERATION STRESS INCONTINENCE N/A 2017    Procedure: MID URETHRAL SLING;  Surgeon: Melvin Perkins MD;  Location: [address] [place] [address];  Service: Urology   • DE SUPRACERVICAL ABDL HYSTER W/WO RMVL TUBE OVARY N/A 2018    Procedure: SUPRACERVICAL HYSTERECTOMY;  Surgeon: Dannielle Schwab, MD;  Location: [facility] MAIN OR;  Service: Gynecology   • THYROIDECTOMY     • TONSILECTOMY AND ADNOIDECTOMY     • TONSILLECTOMY     • TUBAL LIGATION         Current Outpatient Medications   Medication Sig Dispense Refill   • acetaminophen-codeine (TYLENOL with CODEINE #3) 300-30 MG per tablet TAKE 1 TABLET BY MOUTH EVERY 4 TO 6 HOURS AS NEEDED FOR PAIN --NEED PRIMARY INS  --     • albuterol (PROVENTIL HFA,VENTOLIN HFA) 90 mcg/act inhaler Inhale 1 puff     • Alcohol Swabs 70 % PADS May substitute brand based on insurance coverage  Check glucose BID  100 each 0   • ALPRAZolam ER (XANAX XR) 2 MG 24 hr tablet Take 1 tablet (2 mg total) by mouth 2 (two) times a day 60 tablet 3   • amphetamine-dextroamphetamine (ADDERALL XR, 10MG,) 10 MG 24 hr capsule Take 1 capsule (10 mg total) by mouth every morning Max Daily Amount: 10 mg 7 capsule 0   • baclofen 10 mg tablet Take 10 mg by mouth 3 (three) times a day as needed     • Blood Glucose Monitoring Suppl (OneTouch Verio Reflect) w/Device KIT May substitute brand based on insurance coverage  Check glucose BID  1 kit 0   • calcitriol (ROCALTROL) 0 5 MCG capsule Take 1 capsule (0 5 mcg total) by mouth 2 (two) times a day with meals 60 capsule 0   • calcium carbonate (OS-EDER) 600 MG tablet Take 2 pills 3 times a day 180 tablet 10   • celecoxib (CeleBREX) 200 mg capsule TAKE 1 CAPSULE BY MOUTH EVERY DAY WITH MEALS FOR 30 DAYS (Patient not taking: Reported on 1/27/2023)     • [facility] Calcium 600 MG tablet TAKE 2 PILLS 3 TIMES A DAY     • desvenlafaxine succinate (PRISTIQ) 50 mg 24 hr tablet Take 1 tablet (50 mg total) by mouth daily 30 tablet 3   • docusate sodium (COLACE) 100 mg capsule Take 1 capsule (100 mg total) by mouth 2 (two) times a day for 7 days 14 capsule 0   • ergocalciferol (VITAMIN D2) 50,000 units Take 1 capsule (50,000 Units total) by mouth once a week for 11 doses Do not start before January 12, 2023   4 capsule 0   • fenofibrate 160 MG tablet Take 1 tablet (160 mg total) by mouth daily 90 tablet 3   • ferrous sulfate 325 (65 Fe) mg tablet Take 325 mg by mouth 2 (two) times a day Twice Monday, wed, Friday and Saturday -Last dose 4/1/22     • glucose blood (OneTouch Verio) test strip May substitute brand based on insurance coverage  Check glucose BID  100 each 0   • levothyroxine 150 mcg tablet Take 1 tablet (150 mcg total) by mouth daily at bedtime 90 tablet 0   • Lidocaine-Menthol 4-1 % PTCH if needed       • magnesium Oxide (MAG-OX) 400 mg TABS Take 1 tablet (400 mg total) by mouth 3 (three) times a day 90 tablet 10   • metFORMIN (GLUCOPHAGE) 500 mg tablet Take half a pill twice a day (Patient not taking: Reported on 1/27/2023) 60 tablet 0   • ondansetron (ZOFRAN) 4 mg tablet Take 1 tablet (4 mg total) by mouth every 8 (eight) hours as needed for nausea 20 tablet 1   • OneTouch Delica Lancets 34T MISC May substitute brand based on insurance coverage  Check glucose BID  100 each 0   • oxyCODONE-acetaminophen (PERCOCET)  mg per tablet TAKE 1 TABLET BY MOUTH FOUR TIMES A DAY AS NEEDED     • potassium chloride (K-DUR,KLOR-CON) 20 mEq tablet Take 1 tablet (20 mEq total) by mouth 2 (two) times a day 60 tablet 10   • pramipexole (MIRAPEX) 0 5 mg tablet Take 0 5 mg by mouth daily at bedtime       No current facility-administered medications for this visit  Allergies   Allergen Reactions   • Hydrocodone-Acetaminophen Rash   • Vicodin [Hydrocodone-Acetaminophen] Rash   • Morphine And Related GI Intolerance     Nausea/vomiting     • Adhesive [Medical Tape] Rash     Bandaids       Review of Systems    Video Exam    There were no vitals filed for this visit  Physical Exam     Visit Time    Visit Start Time: 14:30   Visit Stop Time: 15:20  Total Visit Duration: 50 minutes         Behavioral Health Psychotherapy Progress Note    Psychotherapy Provided: Individual Psychotherapy     1  Moderate episode of recurrent major depressive disorder ([facility] [address] )        2   Generalized anxiety disorder with panic attacks        3  Nicotine dependence, cigarettes, uncomplicated            Goals addressed in session: Goal 1     DATA: Utilized session time to learn about and practice stress management techniques such as: deep breathing, sleep hygiene, and gradual exposure to triggers such as driving more than 30 minutes away from her home  She was receptive to feedback and agreed to try at [facility] 1 skills taught  During this session, this clinician used the following therapeutic modalities: Cognitive Behavioral Therapy, Cognitive Processing Therapy, Mindfulness-based Strategies and Supportive Psychotherapy    Substance Abuse was not addressed during this session  If the client is diagnosed with a co-occurring substance use disorder, please indicate any changes in the frequency or amount of use: N/A  Stage of change for addressing substance use diagnoses: No substance use/Not applicable    ASSESSMENT:  Pauline Payne presents with a Euthymic/ normal mood  her affect is Normal range and intensity, which is congruent, with her mood and the content of the session  The client has made progress on their goals  Pauline Payne presents with a none risk of suicide, none risk of self-harm, and none risk of harm to others  For any risk assessment that surpasses a "low" rating, a safety plan must be developed  A safety plan was indicated: no  If yes, describe in detail N/A    PLAN: Between sessions, Pauline Payne will take he medication as prescribed and practice coping skills as needed  At the next session, the therapist will use Cognitive Behavioral Therapy, Cognitive Processing Therapy, Mindfulness-based Strategies and Supportive Psychotherapy to address anxiety, depression, and chronic trauma  Behavioral Health Treatment Plan and Discharge Planning: Pauline Payne is aware of and agrees to continue to work on their treatment plan   They have identified and are working toward their discharge goals   yes    Visit start and stop times:    02/27/23  Start Time: 1435  Stop Time: 1520  Total Visit Time: 45 minutes

## 2023-02-06 ENCOUNTER — TELEPHONE (OUTPATIENT)
Dept: ENDOCRINOLOGY | Facility: CLINIC | Age: 48
End: 2023-02-06

## 2023-02-06 DIAGNOSIS — E55.9 VITAMIN D DEFICIENCY: ICD-10-CM

## 2023-02-06 DIAGNOSIS — E83.51 HYPOCALCEMIA: ICD-10-CM

## 2023-02-06 DIAGNOSIS — D50.8 IRON DEFICIENCY ANEMIA SECONDARY TO INADEQUATE DIETARY IRON INTAKE: Primary | ICD-10-CM

## 2023-02-06 RX ORDER — CALCITRIOL 0.5 UG/1
0.5 CAPSULE, LIQUID FILLED ORAL 2 TIMES DAILY WITH MEALS
Qty: 60 CAPSULE | Refills: 5 | Status: SHIPPED | OUTPATIENT
Start: 2023-02-06 | End: 2023-03-08

## 2023-02-06 RX ORDER — FERROUS SULFATE 325(65) MG
325 TABLET ORAL 2 TIMES DAILY
Qty: 60 TABLET | Refills: 2 | Status: SHIPPED | OUTPATIENT
Start: 2023-02-06 | End: 2023-05-07

## 2023-02-06 NOTE — TELEPHONE ENCOUNTER
My notes says she should take Rocaltrol 0 5mg BID ( twice a day )  with meals  She should continue the same and she should take calcium carbonate 600 mg , 1 tablet three times daily     Spoke with pt and discussed with her about the doses, sent RX to [facility]

## 2023-02-06 NOTE — TELEPHONE ENCOUNTER
Patient is on 0 5 mcg rocatrol Twice a day  What should she be taking? Your note says 2 5 mcg daily  She should be taking 5 capsules daily, correct?

## 2023-02-07 ENCOUNTER — APPOINTMENT (OUTPATIENT)
Dept: LAB | Facility: HOSPITAL | Age: 48
End: 2023-02-07

## 2023-02-07 ENCOUNTER — TELEPHONE (OUTPATIENT)
Dept: PSYCHIATRY | Facility: CLINIC | Age: 48
End: 2023-02-07

## 2023-02-07 DIAGNOSIS — E83.51 HYPOCALCEMIA: ICD-10-CM

## 2023-02-07 DIAGNOSIS — F32.89 OTHER DEPRESSION: Primary | ICD-10-CM

## 2023-02-07 LAB
ANION GAP SERPL CALCULATED.3IONS-SCNC: 14 MMOL/L (ref 4–13)
BUN SERPL-MCNC: 25 MG/DL (ref 5–25)
CALCIUM SERPL-MCNC: 9.2 MG/DL (ref 8.3–10.1)
CHLORIDE SERPL-SCNC: 102 MMOL/L (ref 96–108)
CO2 SERPL-SCNC: 21 MMOL/L (ref 21–32)
CREAT SERPL-MCNC: 1.06 MG/DL (ref 0.6–1.3)
GFR SERPL CREATININE-BSD FRML MDRD: 63 ML/MIN/1.73SQ M
GLUCOSE P FAST SERPL-MCNC: 138 MG/DL (ref 65–99)
POTASSIUM SERPL-SCNC: 4.3 MMOL/L (ref 3.5–5.3)
SODIUM SERPL-SCNC: 137 MMOL/L (ref 135–147)

## 2023-02-07 RX ORDER — DESVENLAFAXINE 100 MG/1
100 TABLET, EXTENDED RELEASE ORAL DAILY
Qty: 90 TABLET | Refills: 0 | Status: SHIPPED | OUTPATIENT
Start: 2023-02-07 | End: 2023-03-14

## 2023-02-07 NOTE — TELEPHONE ENCOUNTER
Patient states at the last appointment with Dr Orlando Saenz the medication Pristiq was increased to 100 mg daily  Patient went to pharmacy and the script was for only 50 mg daily  Patient is almost out of medication and needs to have this clarified because she had been taken 100 mg daily so therefore is almost out of medication  Pharmacy is [facility]

## 2023-02-08 ENCOUNTER — TELEPHONE (OUTPATIENT)
Dept: ENDOCRINOLOGY | Facility: CLINIC | Age: 48
End: 2023-02-08

## 2023-02-08 DIAGNOSIS — E83.51 HYPOCALCEMIA: Primary | ICD-10-CM

## 2023-02-08 NOTE — TELEPHONE ENCOUNTER
----- Message from Heavenly Bullard MD sent at 2/8/2023  8:16 AM EST -----  Please call the patient regarding her results, calcium is normal, kidney function is normal, she should continue current dose of rocaltrol and calcium carbonate   Follow up as scheduled

## 2023-02-09 ENCOUNTER — TELEMEDICINE (OUTPATIENT)
Dept: BEHAVIORAL/MENTAL HEALTH CLINIC | Facility: CLINIC | Age: 48
End: 2023-02-09

## 2023-02-09 DIAGNOSIS — F41.0 GENERALIZED ANXIETY DISORDER WITH PANIC ATTACKS: ICD-10-CM

## 2023-02-09 DIAGNOSIS — F17.210 NICOTINE DEPENDENCE, CIGARETTES, UNCOMPLICATED: ICD-10-CM

## 2023-02-09 DIAGNOSIS — F33.1 MODERATE EPISODE OF RECURRENT MAJOR DEPRESSIVE DISORDER (HCC): Primary | ICD-10-CM

## 2023-02-09 DIAGNOSIS — F41.1 GENERALIZED ANXIETY DISORDER WITH PANIC ATTACKS: ICD-10-CM

## 2023-02-09 NOTE — TELEPHONE ENCOUNTER
Please inform pt that there is no need, as her calcium is stable now  we can check in 1 month,  I have put the order in     Nathalia Broderick
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
## 2023-02-16 ENCOUNTER — PATIENT OUTREACH (OUTPATIENT)
Dept: INTERNAL MEDICINE CLINIC | Facility: CLINIC | Age: 48
End: 2023-02-16

## 2023-02-16 ENCOUNTER — TELEMEDICINE (OUTPATIENT)
Dept: BEHAVIORAL/MENTAL HEALTH CLINIC | Facility: CLINIC | Age: 48
End: 2023-02-16

## 2023-02-16 DIAGNOSIS — F41.0 GENERALIZED ANXIETY DISORDER WITH PANIC ATTACKS: ICD-10-CM

## 2023-02-16 DIAGNOSIS — F41.1 GENERALIZED ANXIETY DISORDER WITH PANIC ATTACKS: ICD-10-CM

## 2023-02-16 DIAGNOSIS — F33.1 MODERATE EPISODE OF RECURRENT MAJOR DEPRESSIVE DISORDER (HCC): Primary | ICD-10-CM

## 2023-02-16 DIAGNOSIS — F17.210 NICOTINE DEPENDENCE, CIGARETTES, UNCOMPLICATED: ICD-10-CM

## 2023-02-16 NOTE — PSYCH
This note was not shared with the patient due to this is a psychotherapy note    Virtual Regular Visit    Verification of patient location:    Patient is located in the following state in which I hold an active license [place]      Assessment/Plan:    Problem List Items Addressed This Visit    None      Goals addressed in session: Goal 1          Reason for visit is   Chief Complaint   Patient presents with   • Virtual Regular Visit        Encounter provider Shikha Rivers LCSW    Provider located at [address] [place] [address]  [address] [place] [address]  [phone]      Recent Visits  Date Type Provider Dept   02/09/23 [address] [facility] Therapist [place]   Showing recent visits within past 7 days and meeting all other requirements  Future Appointments  No visits were found meeting these conditions  Showing future appointments within next 150 days and meeting all other requirements       The patient was identified by name and date of birth  Tera Castro was informed that this is a telemedicine visit and that [facility]  She agrees to proceed     My office door was closed  No one else was in the room  She acknowledged consent and understanding of privacy and security of the video platform  The patient has agreed to participate and understands they can discontinue the visit at any time  Patient is aware this is a billable service  Subjective  Tera Castro is a 56 y o  female          HPI     Past Medical History:   Diagnosis Date   • Abdominal pain    • Acid reflux    • Acute renal failure (HCC)     multiple episodes   • Anemia    • Anxiety    • Anxiety 03/31/2021   • Arthritis    • Asthma    • Back pain    • Chronic pain    • DDD (degenerative disc disease), lumbar    • Depression    • Disease of thyroid gland     had surgery and now hypo   • DVT (deep venous thrombosis) ([facility] [address] )     s/p ankle fracture   • Headache    • History of transfusion    • Hypercalcemia    • Hyperlipidemia    • Hyperthyroidism    • Hypocalcemia     post op 2016   • Kidney stone    • Lightheadedness    • Migraine    • Obesity    • Palpitations    • Pre-diabetes    • Psychiatric disorder     anxiety depression   • PTSD (post-traumatic stress disorder) 10/28/2022   • Seizures ([facility] [address] )     petit mal x1  4 years ago- all tests were neg  • SOB (shortness of breath)    • Spondylolisthesis of lumbar region    • Treatment     spinal pain injections  last was 2016   • Wears glasses        Past Surgical History:   Procedure Laterality Date   • BACK SURGERY      L4-5, S1-fusion-plate/screws   • BREAST BIOPSY Left 2022    Stereo SLW - 12:00   •  SECTION      x5   • CYSTOCELE REPAIR  2017   • CYSTOSCOPY     • DISCOGRAM     • HYSTERECTOMY     • MAMMO STEREOTACTIC BREAST BIOPSY LEFT (ALL INC) Left 2022   • PARATHYROIDECTOMY     • MA ANTERIOR COLPORRAPHY RPR CYSTOCELE W/CYSTO N/A 2017    Procedure: CYSTOCELE REPAIR;  Surgeon: Ephraim Campbell MD;  Location: [address] [place] [address];  Service: Gynecology   • MA ARTHRODESIS POSTERIOR INTERBODY [address] LUMBAR N/A 2016    Procedure: L4-S1 LUMBAR LAMINECTOMY/DECOMPRESSION;  INSTRUMENTED POSTEROLATERAL FUSION/ INTERBODY L5-S1; ALLOGRAFT AND AUTOGRAFT (IMPULSE) ;   Surgeon: Lottie Rudolph MD;  Location: [facility];  Service: Orthopedics   • MA CYSTO BLADDER W/URETERAL CATHETERIZATION Bilateral 2018    Procedure: INSERTION URETERAL CATHETERS PREOP;  Surgeon: Gil Lindsay MD;  Location: [address] [place] [address];  Service: Urology   • MA SLING OPERATION STRESS INCONTINENCE N/A 2017    Procedure: MID URETHRAL SLING;  Surgeon: Jenn Mora MD;  Location: [address] [place] [address];  Service: Urology   • MA SUPRACERVICAL ABDL HYSTER W/WO RMVL TUBE OVARY N/A 2018    Procedure: SUPRACERVICAL HYSTERECTOMY;  Surgeon: Mranie Bautista MD;  Location: [address] [place] [address];  Service: Gynecology   • THYROIDECTOMY     • TONSILECTOMY AND ADNOIDECTOMY     • TONSILLECTOMY     • TUBAL LIGATION         Current Outpatient Medications   Medication Sig Dispense Refill   • acetaminophen-codeine (TYLENOL with CODEINE #3) 300-30 MG per tablet TAKE 1 TABLET BY MOUTH EVERY 4 TO 6 HOURS AS NEEDED FOR PAIN --NEED PRIMARY INS  --     • albuterol (PROVENTIL HFA,VENTOLIN HFA) 90 mcg/act inhaler Inhale 1 puff     • Alcohol Swabs 70 % PADS May substitute brand based on insurance coverage  Check glucose BID  100 each 0   • ALPRAZolam ER (XANAX XR) 2 MG 24 hr tablet Take 1 tablet (2 mg total) by mouth 2 (two) times a day 60 tablet 3   • amphetamine-dextroamphetamine (ADDERALL XR, 10MG,) 10 MG 24 hr capsule Take 1 capsule (10 mg total) by mouth every morning Max Daily Amount: 10 mg 7 capsule 0   • baclofen 10 mg tablet Take 10 mg by mouth 3 (three) times a day as needed     • Blood Glucose Monitoring Suppl (OneTouch Verio Reflect) w/Device KIT May substitute brand based on insurance coverage   Check glucose BID  1 kit 0   • calcitriol (ROCALTROL) 0 5 MCG capsule Take 1 capsule (0 5 mcg total) by mouth 2 (two) times a day with meals 60 capsule 5   • calcium carbonate (OS-EDER) 600 MG tablet Take 2 pills 3 times a day 180 tablet 10   • celecoxib (CeleBREX) 200 mg capsule TAKE 1 CAPSULE BY MOUTH EVERY DAY WITH MEALS FOR 30 DAYS (Patient not taking: Reported on 1/27/2023)     • Cholecalciferol (Vitamin D3) 125 MCG (5000 UT) CAPS Take 5000 IU daily     • [facility] Calcium 600 MG tablet TAKE 2 PILLS 3 TIMES A DAY     • desvenlafaxine (PRISTIQ) 100 mg 24 hr tablet Take 1 tablet (100 mg total) by mouth daily 90 tablet 0   • docusate sodium (COLACE) 100 mg capsule Take 1 capsule (100 mg total) by mouth 2 (two) times a day for 7 days 14 capsule 0   • fenofibrate 160 MG tablet Take 1 tablet (160 mg total) by mouth daily 90 tablet 3   • ferrous sulfate 325 (65 Fe) mg tablet Take 1 tablet (325 mg total) by mouth 2 (two) times a day Twice Monday, wed, Friday and Saturday -Last dose 4/1/22 60 tablet 2   • glucose blood (OneTouch Verio) test strip May substitute brand based on insurance coverage  Check glucose BID  100 each 0   • levothyroxine 150 mcg tablet Take 1 tablet (150 mcg total) by mouth daily at bedtime 90 tablet 0   • Lidocaine-Menthol 4-1 % PTCH if needed       • magnesium Oxide (MAG-OX) 400 mg TABS Take 1 tablet (400 mg total) by mouth 3 (three) times a day 90 tablet 10   • metFORMIN (GLUCOPHAGE) 500 mg tablet Take half a pill twice a day (Patient not taking: Reported on 1/27/2023) 60 tablet 0   • ondansetron (ZOFRAN) 4 mg tablet Take 1 tablet (4 mg total) by mouth every 8 (eight) hours as needed for nausea 20 tablet 1   • OneTouch Delica Lancets 35O MISC May substitute brand based on insurance coverage  Check glucose BID  100 each 0   • oxyCODONE-acetaminophen (PERCOCET)  mg per tablet TAKE 1 TABLET BY MOUTH FOUR TIMES A DAY AS NEEDED     • potassium chloride (K-DUR,KLOR-CON) 20 mEq tablet Take 1 tablet (20 mEq total) by mouth 2 (two) times a day 60 tablet 10   • pramipexole (MIRAPEX) 0 5 mg tablet Take 0 5 mg by mouth daily at bedtime       No current facility-administered medications for this visit  Allergies   Allergen Reactions   • Hydrocodone-Acetaminophen Rash   • Vicodin [Hydrocodone-Acetaminophen] Rash   • Morphine And Related GI Intolerance     Nausea/vomiting     • Adhesive [Medical Tape] Rash     Bandaids       Review of Systems    Video Exam    There were no vitals filed for this visit  Physical Exam     Visit Time    Visit Start Time: 2:30  Visit Stop Time: 3:20  Total Visit Duration: 50 minutes         This note was not shared with the patient due to this is a psychotherapy note     Behavioral Health Psychotherapy Progress Note    Psychotherapy Provided: Individual Psychotherapy     1   Moderate episode of recurrent major depressive disorder ([facility] [address] )        2  Generalized anxiety disorder with panic attacks        3  Nicotine dependence, cigarettes, uncomplicated            Goals addressed in session: Goal 1     DATA: Julian Gannon reports feeling well overall and denied significant shifts in mood  She reported having increased back pain due to the weather and shared taking it easy  We discussed her ongoing stress with her sister Trevor Castro and her pending divorce in [place]  Writer helped identify warning signs of increased signs and advised her to listen to her body, knowing that when she feels exhausted, she's been carrying her mental load for too long  She was receptive to feedback and agreed to take a break from her sister  During this session, this clinician used the following therapeutic modalities: Cognitive Processing Therapy, Mindfulness-based Strategies and Supportive Psychotherapy    Substance Abuse was not addressed during this session  If the client is diagnosed with a co-occurring substance use disorder, please indicate any changes in the frequency or amount of use: N/A  Stage of change for addressing substance use diagnoses: No substance use/Not applicable    ASSESSMENT:  Chalino Mojica presents with a Euthymic/ normal mood  her affect is Normal range and intensity, which is congruent, with her mood and the content of the session  The client has made progress on their goals  Chalino Mojica presents with a none risk of suicide, none risk of self-harm, and none risk of harm to others  For any risk assessment that surpasses a "low" rating, a safety plan must be developed  A safety plan was indicated: no  If yes, describe in detail N/A    PLAN: Between sessions, Chalino Mojica will take her medication as prescribed and practice positive coping strategies as needed to help manage stress   At the next session, the therapist will use Cognitive Processing Therapy, Mindfulness-based Strategies and Supportive Psychotherapy to address anxiety  Behavioral Health Treatment Plan and Discharge Planning: Angie Jerome is aware of and agrees to continue to work on their treatment plan  They have identified and are working toward their discharge goals   yes    Visit start and stop times:    02/16/23  Start Time: 1530  Stop Time: 1620  Total Visit Time: 50 minutes
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
## 2023-02-17 ENCOUNTER — PATIENT OUTREACH (OUTPATIENT)
Dept: INTERNAL MEDICINE CLINIC | Facility: CLINIC | Age: 48
End: 2023-02-17

## 2023-02-17 DIAGNOSIS — Z59.9 INADEQUATE COMMUNITY RESOURCES: Primary | ICD-10-CM

## 2023-02-17 SDOH — ECONOMIC STABILITY - INCOME SECURITY: PROBLEM RELATED TO HOUSING AND ECONOMIC CIRCUMSTANCES, UNSPECIFIED: Z59.9

## 2023-02-17 NOTE — PROGRESS NOTES
This RNCM spoke with [facility] and discussed the need for Disability  She states that this can be sent directly to our [facility] for A referral was sent for [facility] and note routed to [facility]

## 2023-02-20 ENCOUNTER — APPOINTMENT (OUTPATIENT)
Dept: LAB | Facility: HOSPITAL | Age: 48
End: 2023-02-20

## 2023-02-20 ENCOUNTER — PATIENT OUTREACH (OUTPATIENT)
Dept: CASE MANAGEMENT | Facility: HOSPITAL | Age: 48
End: 2023-02-20

## 2023-02-20 DIAGNOSIS — E89.2 POST-SURGICAL HYPOPARATHYROIDISM (HCC): ICD-10-CM

## 2023-02-20 DIAGNOSIS — E83.51 HYPOCALCEMIA: ICD-10-CM

## 2023-02-20 DIAGNOSIS — R73.03 PREDIABETES: ICD-10-CM

## 2023-02-20 DIAGNOSIS — E89.0 POSTOPERATIVE HYPOTHYROIDISM: ICD-10-CM

## 2023-02-20 LAB — CALCIUM SERPL-MCNC: 11.1 MG/DL (ref 8.3–10.1)

## 2023-02-20 NOTE — PROGRESS NOTES
This [address] [place] [address] called patient to follow up on the referral, there was no answer  Left a voicemail requesting a call back  Phone number and office hours provided  Will try again

## 2023-02-21 ENCOUNTER — TELEPHONE (OUTPATIENT)
Dept: ENDOCRINOLOGY | Facility: CLINIC | Age: 48
End: 2023-02-21

## 2023-02-21 DIAGNOSIS — E83.51 HYPOCALCEMIA: ICD-10-CM

## 2023-02-21 DIAGNOSIS — E20.8 OTHER HYPOPARATHYROIDISM (HCC): ICD-10-CM

## 2023-02-21 RX ORDER — PHENOL 1.4 %
AEROSOL, SPRAY (ML) MUCOUS MEMBRANE
Qty: 180 TABLET | Refills: 10
Start: 2023-02-21

## 2023-02-21 NOTE — TELEPHONE ENCOUNTER
Spoke with patient and informed her of her lab result  She understood  Today- she took 1 rocaltrol  5 mg  She is taking   5 Rocaltrol twice a day and Calciium Carb 600 mg 3 times daily

## 2023-02-21 NOTE — TELEPHONE ENCOUNTER
Please inform patient to continue Rocaltrol 0 5 mcg, 2 times daily, she should reduce calcium carbonate 600 mg daily, only with dinner  She will need repeat basic metabolic profile in 1 week    Please order BMP  Thanks

## 2023-02-21 NOTE — TELEPHONE ENCOUNTER
Is inform patient that her calcium is elevated 11 1    Required dose of Rocaltrol as well as the calcium carbonate with patient again? She is taking at home    During last office encounter from February 6 she was advised to take 600 mg of calcium carbonate 3 times daily,

## 2023-02-23 ENCOUNTER — TELEMEDICINE (OUTPATIENT)
Dept: BEHAVIORAL/MENTAL HEALTH CLINIC | Facility: CLINIC | Age: 48
End: 2023-02-23

## 2023-02-23 ENCOUNTER — PATIENT OUTREACH (OUTPATIENT)
Dept: CASE MANAGEMENT | Facility: HOSPITAL | Age: 48
End: 2023-02-23

## 2023-02-23 DIAGNOSIS — F41.1 GENERALIZED ANXIETY DISORDER WITH PANIC ATTACKS: ICD-10-CM

## 2023-02-23 DIAGNOSIS — F33.1 MODERATE EPISODE OF RECURRENT MAJOR DEPRESSIVE DISORDER (HCC): Primary | ICD-10-CM

## 2023-02-23 DIAGNOSIS — F41.0 GENERALIZED ANXIETY DISORDER WITH PANIC ATTACKS: ICD-10-CM

## 2023-02-23 DIAGNOSIS — F17.210 NICOTINE DEPENDENCE, CIGARETTES, UNCOMPLICATED: ICD-10-CM

## 2023-02-23 NOTE — PSYCH
This note was not shared with the patient due to this is a psychotherapy note    Behavioral Health Psychotherapy Progress Note    Psychotherapy Provided: Individual Psychotherapy     1  Moderate episode of recurrent major depressive disorder ([address] )        2  Generalized anxiety disorder with panic attacks        3  Nicotine dependence, cigarettes, uncomplicated            Goals addressed in session: Goal 1     DATA: Quinten Crs reported feeling ok stating that she has been keeping mostly to the house other than attending doctor's appointments  Shopping is becoming more difficult due to increased pain  Her calcium levels are a bit off and she has an appointment to address this  We discussed ongoing stress with her sister and writer used motivational interviewing to help move her from contemplation to preparation regarding healthy limits  She was given education on long term trauma and distress tolerance  She was advised to continue with breathing techniques and gradual exposure to help work through anxiety rather than disconnect from it  Cognitive restructuring was also discussed with regard to avoidance of sleep because she is afraid that she will die  Repaired cognition of "I'm afraid that I will die if I sleep" to "My body needs rest to live"  During this session, this clinician used the following therapeutic modalities: Cognitive Behavioral Therapy, Cognitive Processing Therapy, Dialectical Behavior Therapy, Mindfulness-based Strategies, Motivational Interviewing, Solution-Focused Therapy and Supportive Psychotherapy    Substance Abuse was not addressed during this session  If the client is diagnosed with a co-occurring substance use disorder, please indicate any changes in the frequency or amount of use: N/A  Stage of change for addressing substance use diagnoses: No substance use/Not applicable    ASSESSMENT:  Darwin Porter presents with a Anxious and Depressed mood       her affect is Normal range and intensity, which is congruent, with her mood and the content of the session  The client has made progress on their goals  Carol Last presents with a none risk of suicide, none risk of self-harm, and none risk of harm to others  For any risk assessment that surpasses a "low" rating, a safety plan must be developed  A safety plan was indicated: no  If yes, describe in detail N/A    PLAN: Between sessions, Carol Last will take her medication as prescribed and practice distress tolerance and CBT skills  At the next session, the therapist will use Cognitive Behavioral Therapy, Cognitive Processing Therapy, Dialectical Behavior Therapy, Mindfulness-based Strategies, Motivational Interviewing, Solution-Focused Therapy and Supportive Psychotherapy to address chronic depression and anxiety secondary to lifelong trauma  Behavioral Health Treatment Plan and Discharge Planning: Carol Last is aware of and agrees to continue to work on their treatment plan  They have identified and are working toward their discharge goals   yes    Visit start and stop times:    02/23/23  Start Time: 1530  Stop Time: 1620  Total Visit Time: 50 minutes

## 2023-02-23 NOTE — PROGRESS NOTES
This [address] [place] [address] called patient to follow up on the referral, there was no answer  Left a voicemail requesting a call back  Phone number and office hours provided  Will try again

## 2023-02-24 ENCOUNTER — APPOINTMENT (OUTPATIENT)
Dept: LAB | Facility: HOSPITAL | Age: 48
End: 2023-02-24

## 2023-02-24 ENCOUNTER — OFFICE VISIT (OUTPATIENT)
Dept: PSYCHIATRY | Facility: CLINIC | Age: 48
End: 2023-02-24

## 2023-02-24 DIAGNOSIS — F43.10 PTSD (POST-TRAUMATIC STRESS DISORDER): ICD-10-CM

## 2023-02-24 DIAGNOSIS — E83.51 HYPOCALCEMIA: ICD-10-CM

## 2023-02-24 DIAGNOSIS — F32.A ANXIETY AND DEPRESSION: ICD-10-CM

## 2023-02-24 DIAGNOSIS — E20.8 OTHER HYPOPARATHYROIDISM (HCC): ICD-10-CM

## 2023-02-24 DIAGNOSIS — F41.9 ANXIETY: Primary | ICD-10-CM

## 2023-02-24 DIAGNOSIS — F41.9 ANXIETY AND DEPRESSION: ICD-10-CM

## 2023-02-24 RX ORDER — ALPRAZOLAM 2 MG/1
2 TABLET ORAL 2 TIMES DAILY PRN
Qty: 60 TABLET | Refills: 1 | Status: SHIPPED | OUTPATIENT
Start: 2023-02-24

## 2023-02-24 NOTE — PSYCH
This note was not shared with the patient due to reasonable likelihood of causing patient harm    PROGRESS NOTE        [address] [place] [address]      Name and Date of Birth:  Angie Jerome 56 y o  1975    Date of Visit: 02/24/23    SUBJECTIVE:    Idalia Yancey was seen today for follow-up and medication management  She was casually dressed, alert and oriented reacts and maintained good eye contact  Her speech was clear and of normal rate, rhythm and volume  Thought processes were clear and goal directed  She continues to present with insomnia, although she is able to sleep from 4 in the morning to about 8:00 or so in the morning  I am recommending a referral to the sleep medicine group for more detailed assessment  She has been taking 2 mg of alprazolam extended release and she requested a change to the immediate release as it is better to initiate sleep  We tentatively agreed to a plan where she would take her Xanax just before she goes to bed and then gradually begin to move her bedtime closer to her normal 1  No other new symptoms or side effects were reported    She denies suicidal ideation, intent or plan at present, has no suicidal ideation, intent or plan at present  She denies any auditory hallucinations and visual hallucinations, denies any other delusional thinking, denies any delusional thinking  She denies any side effects from medications      HPI ROS Appetite Changes and Sleep: normal appetite, normal sleep    Review Of Systems:      Constitutional As noted in HPI   ENT As noted in HPI   Cardiovascular As noted in HPI   Respiratory As noted in HPI   Gastrointestinal As noted in HPI   Genitourinary As noted in HPI   Musculoskeletal As noted in HPI   Integumentary As noted in HPI   Neurological As noted in HPI   Endocrine As noted in HPI   Other Symptoms Negative and None       Laboratory Results: No results found for this or any previous visit  Substance Abuse History:    Social History     Substance and Sexual Activity   Drug Use No       Family Psychiatric History:     Family History   Problem Relation Age of Onset   • Diabetes Mother    • Hypertension Mother    • Hyperlipidemia Father    • Arrhythmia Father    • Lung cancer Father    • Diabetes Father    • Colon cancer Maternal Grandfather    • Stomach cancer Paternal Grandmother    • Heart disease Paternal Aunt        The following portions of the patient's history were reviewed and updated as appropriate: past family history, past medical history, past social history, past surgical history and problem list     Social History     Socioeconomic History   • Marital status: /Civil Union     Spouse name: Not on file   • Number of children: Not on file   • Years of education: 15   • Highest education level: Not on file   Occupational History   • Not on file   Tobacco Use   • Smoking status: Every Day     Packs/day: 0 25     Years: 25 00     Pack years: 6 25     Types: Cigarettes   • Smokeless tobacco: Never   Vaping Use   • Vaping Use: Never used   Substance and Sexual Activity   • Alcohol use: Not Currently   • Drug use: No   • Sexual activity: Yes     Birth control/protection: Surgical     Comment: hysterectomy   Other Topics Concern   • Not on file   Social History Narrative    Most recent tobacco use screenin2018      General stress level:   Medium       Exercise level:   Occasional       Diet:   Regular      Caffeine intake:   Occasional     As per [place]      Social Determinants of Health     Financial Resource Strain: Not on file   Food Insecurity: No Food Insecurity   • Worried About Running Out of Food in the Last Year: Never true   • Ran Out of Food in the Last Year: Never true   Transportation Needs: No Transportation Needs   • Lack of Transportation (Medical): No   • Lack of Transportation (Non-Medical):  No   Physical Activity: Not on file   Stress: Not on file Social Connections: Not on file   Intimate Partner Violence: Not on file   Housing Stability: Low Risk    • Unable to Pay for Housing in the Last Year: No   • Number of [place] in the Last Year: 1   • Unstable Housing in the Last Year: No     Social History     Social History Narrative    Most recent tobacco use screenin2018      General stress level:   Medium       Exercise level:   Occasional       Diet:   Regular      Caffeine intake:   Occasional     As per Claudetta Pean         Social History     Tobacco History     Smoking Status  Every Day Smoking Frequency  0 25 packs/day for 25 00 years (6 25 pk-yrs) Smoking Tobacco Type  Cigarettes    Smokeless Tobacco Use  Never          Alcohol History     Alcohol Use Status  Not Currently          Drug Use     Drug Use Status  No          Sexual Activity     Sexually Active  Yes Birth Control/Protection  Surgical Comment  hysterectomy          Activities of Daily Living    Not Asked                     OBJECTIVE:     Mental Status Evaluation:    Appearance age appropriate, casually dressed   Behavior pleasant, cooperative   Speech normal volume, normal pitch   Mood  appropriate   Affect  full and appropriate   Thought Processes logical   Associations intact associations   Thought Content normal   Perceptual Disturbances: none   Abnormal Thoughts  Risk Potential Suicidal ideation - None  Homicidal ideation - None  Potential for aggression - No   Orientation oriented to person, place, time/date and situation   Memory recent and remote memory grossly intact   Cosciousness alert and awake   Attention Span attention span and concentration are age appropriate   Intellect Appears to be of Average Intelligence   Insight age appropriate    Judgement good    Muscle Strength and  Gait muscle strength and tone were normal   Language Intact   Fund of Knowledge Intact   Pain none   Pain Scale 0       Assessment/Plan:       Diagnoses and all orders for this visit:    Anxiety  -     ALPRAZolam (XANAX) 2 MG tablet; Take 1 tablet (2 mg total) by mouth 2 (two) times a day as needed for anxiety    PTSD (post-traumatic stress disorder)    Anxiety and depression          Treatment Recommendations/Precautions:    Continue current medications:    Risks/Benefits      Risks, Benefits And Possible Side Effects Of Medications:    Risks, benefits, and possible side effects of medications explained to patient and patient verbalizes understanding and agreement for treatment  Controlled Medication Discussion:   Discussed with patient the risks of sedation, respiratory depression, impairment of ability to drive and potential for abuse and addiction related to treatment with benzodiazepine medications  The patient understands risk of treatment with benzodiazepine medications, agrees to not drive if feels impaired and agrees to take medications as prescribed        Psychotherapy Provided:     Individual psychotherapy provided: I spent 20 minutes counseling with Ron morel

## 2023-02-24 NOTE — BH TREATMENT PLAN
TREATMENT PLAN (Medication Management Only)        [facility]    Name and Date of Birth:  López Quarles 56 y o  1975  Date of Treatment Plan: February 24, 2023  Diagnosis/Diagnoses:    1  Anxiety    2  PTSD (post-traumatic stress disorder)    3  Anxiety and depression      Strengths/Personal Resources for Self-Care: supportive family, supportive friends, taking medications as prescribed, ability to communicate well, ability to listen, ability to reason, average or above intelligence, general fund of knowledge, motivation for treatment  Area/Areas of need (in own words): anxiety symptoms, depressive symptoms  1  Long Term Goal: continue improvement in depression  Target Date:3 months - 5/24/2023  Person/Persons responsible for completion of goal: Nate Diaz  2  Short Term Objective (s) - How will we reach this goal?:   A  Provider new recommended medication/dosage changes and/or continue medication(s): continue current medications as prescribed  B  N/A   C  N/A  Target Date:3 months - 5/24/2023  Person/Persons Responsible for Completion of Goal: Nate Diaz  Progress Towards Goals: continuing treatment  Treatment Modality: medication management every 3 months  Review due 180 days from date of this plan: 3 months - 5/24/2023  Expected length of service: ongoing treatment  My Physician/PA/NP and I have developed this plan together and I agree to work on the goals and objectives  I understand the treatment goals that were developed for my treatment

## 2023-02-25 LAB
ANION GAP SERPL CALCULATED.3IONS-SCNC: 10 MMOL/L (ref 4–13)
BUN SERPL-MCNC: 14 MG/DL (ref 5–25)
CALCIUM SERPL-MCNC: 9.5 MG/DL (ref 8.3–10.1)
CHLORIDE SERPL-SCNC: 103 MMOL/L (ref 96–108)
CO2 SERPL-SCNC: 26 MMOL/L (ref 21–32)
CREAT SERPL-MCNC: 1.16 MG/DL (ref 0.6–1.3)
GFR SERPL CREATININE-BSD FRML MDRD: 56 ML/MIN/1.73SQ M
GLUCOSE SERPL-MCNC: 116 MG/DL (ref 65–140)
POTASSIUM SERPL-SCNC: 4 MMOL/L (ref 3.5–5.3)
SODIUM SERPL-SCNC: 139 MMOL/L (ref 135–147)

## 2023-02-28 ENCOUNTER — PATIENT OUTREACH (OUTPATIENT)
Dept: CASE MANAGEMENT | Facility: HOSPITAL | Age: 48
End: 2023-02-28

## 2023-02-28 NOTE — PROGRESS NOTES
[facility] did call patient to follow up  Patient was available at the time of outreach  Patient agreered to services   Phone number and office hours have been provided  Referral states that patient is in need of disability  [facility] did let the patient know that we can do a 3 way call on Thursday at 9am to apply for disability telephonically  Patient agreed and mentioned she would be available  CMOC will follow up with patient 3/2

## 2023-03-02 ENCOUNTER — PATIENT OUTREACH (OUTPATIENT)
Dept: CASE MANAGEMENT | Facility: HOSPITAL | Age: 48
End: 2023-03-02

## 2023-03-02 NOTE — PROGRESS NOTES
This [address] [place] [address] called patient to follow up on the referral, there was no answer  Left a voicemail requesting a call back  Phone number and office hours provided  Will try again

## 2023-03-03 ENCOUNTER — APPOINTMENT (OUTPATIENT)
Dept: LAB | Facility: HOSPITAL | Age: 48
End: 2023-03-03

## 2023-03-03 ENCOUNTER — PATIENT OUTREACH (OUTPATIENT)
Dept: INTERNAL MEDICINE CLINIC | Facility: CLINIC | Age: 48
End: 2023-03-03

## 2023-03-03 DIAGNOSIS — R73.9 HYPERGLYCEMIA: ICD-10-CM

## 2023-03-03 DIAGNOSIS — E83.51 HYPOCALCEMIA: ICD-10-CM

## 2023-03-03 LAB
ANION GAP SERPL CALCULATED.3IONS-SCNC: 11 MMOL/L (ref 4–13)
BUN SERPL-MCNC: 19 MG/DL (ref 5–25)
CALCIUM SERPL-MCNC: 9.9 MG/DL (ref 8.4–10.2)
CHLORIDE SERPL-SCNC: 103 MMOL/L (ref 96–108)
CO2 SERPL-SCNC: 25 MMOL/L (ref 21–32)
CREAT SERPL-MCNC: 1.12 MG/DL (ref 0.6–1.3)
GFR SERPL CREATININE-BSD FRML MDRD: 59 ML/MIN/1.73SQ M
GLUCOSE SERPL-MCNC: 113 MG/DL (ref 65–140)
POTASSIUM SERPL-SCNC: 4.3 MMOL/L (ref 3.5–5.3)
SODIUM SERPL-SCNC: 139 MMOL/L (ref 135–147)

## 2023-03-03 NOTE — PROGRESS NOTES
Chart reviewed  This RNCM attempted to call patient for follow up on her diabetes and BS levels  Dulce Maria Hatfield is working with patient on getting disability  There was no answer and a detailed message was left with a request for a call back  RNCM to follow

## 2023-03-06 ENCOUNTER — PATIENT OUTREACH (OUTPATIENT)
Dept: CASE MANAGEMENT | Facility: HOSPITAL | Age: 48
End: 2023-03-06

## 2023-03-06 ENCOUNTER — OFFICE VISIT (OUTPATIENT)
Dept: SURGERY | Facility: CLINIC | Age: 48
End: 2023-03-06

## 2023-03-06 VITALS
WEIGHT: 210.4 LBS | TEMPERATURE: 97 F | HEIGHT: 62 IN | DIASTOLIC BLOOD PRESSURE: 82 MMHG | BODY MASS INDEX: 38.72 KG/M2 | HEART RATE: 110 BPM | SYSTOLIC BLOOD PRESSURE: 113 MMHG

## 2023-03-06 DIAGNOSIS — Z01.818 PREOPERATIVE EXAMINATION: ICD-10-CM

## 2023-03-06 DIAGNOSIS — R22.9 SUBCUTANEOUS MASS: Primary | ICD-10-CM

## 2023-03-06 RX ORDER — CEFAZOLIN SODIUM 2 G/50ML
2000 SOLUTION INTRAVENOUS ONCE
Status: CANCELLED | OUTPATIENT
Start: 2023-03-15 | End: 2023-03-06

## 2023-03-06 NOTE — PROGRESS NOTES
Called patient to follow up and patient was available at the time of outreach  CMOC did ask the patient if they had some time to complete a 3 way call with social security  Patient said yes they did  Adan Sierra [address] Karimi called [phone] to speak with a representative  After waiting an hour patient and [facility] was able to speak to a representative who confirmed patients identity and asked a series of questions  Representative has scheduled patient an appointment for March 23rd at 1pm  Representative did inform the patient that they would need to provide documentation for proof of income as well as PCP name and phone number that can verify that patient is disabled  Patient also was told that their would be paperwork sent to the home that patient needs to complete and mailed to BELTRAN BALTAZAR [facility] office  E-mail and text message has been sent to patient to confirm their appointment as well as forms that need to be completed prior to their appointment  Sarah Adele Karimi will outreach patient on 3/22 to remind patient of their appointment

## 2023-03-06 NOTE — H&P (VIEW-ONLY)
[facility] History and Physical Note:    Assessment:  Right axillary subcutaneous mass, with her multiple imaging studies, this most likely represents a lipoma  Pertinent labs reviewed  BMP 3 March 2023 unremarkable  Hemoglobin A1c and CBC from January 3, 2023 unremarkable  Pertinent images and available reads personally reviewed  Reviewed the CAT scan images as well as CAT scan report from November 2022  Pertinent notes reviewed  I read the last PCP note by Dr Chase Estrada dated 13 January 2023     Plan:  1   CBC, BMP, A1c all done in last 2 months and largely unremarkable  2  We will schedule excision of right axillary subcutaneous mass    Chief Complaint:  "I am here for the lump under my arm"    HPI  Patient is a 69-year-old woman with medical history notable for obesity, PTSD, diabetes, chronic fatigue as well as pain syndromes  She was referred to my office for a "lump under the arm"  I saw her in 2022 and at that time she was being evaluated with mammograms  She now follows up to schedule excision of their right axillary subcutaneous tender mass which is most likely adipose tissue      Patient is up-to-date on her mammogram and has had a recent benign core biopsy left breast    PMH:  Past Medical History:   Diagnosis Date   • Abdominal pain    • Acid reflux    • Acute renal failure (HCC)     multiple episodes   • Anemia    • Anxiety    • Anxiety 03/31/2021   • Anxiety and depression 4/22/2022   • Arthritis    • Asthma    • Back pain    • Chronic pain    • DDD (degenerative disc disease), lumbar    • Depression    • Disease of thyroid gland     had surgery and now hypo   • DVT (deep venous thrombosis) ([facility] [address] ) 2009    s/p ankle fracture   • Headache    • History of transfusion    • Hypercalcemia    • Hyperlipidemia    • Hyperthyroidism    • Hypocalcemia     post op 2016   • Kidney stone    • Lightheadedness    • Migraine    • Obesity    • Palpitations    • Pre-diabetes    • Psychiatric disorder     anxiety depression   • PTSD (post-traumatic stress disorder) 10/28/2022   • Seizures ([address] )     petit mal x1  4 years ago- all tests were neg  • SOB (shortness of breath)    • Spondylolisthesis of lumbar region    • Treatment     spinal pain injections  last was 2016   • Wears glasses        PSH:  Past Surgical History:   Procedure Laterality Date   • BACK SURGERY      L4-5, S1-fusion-plate/screws   • BREAST BIOPSY Left 2022    Stereo SLW - 12:00   •  SECTION      x5   • CYSTOCELE REPAIR  2017   • CYSTOSCOPY     • DISCOGRAM     • HYSTERECTOMY     • MAMMO STEREOTACTIC BREAST BIOPSY LEFT (ALL INC) Left 2022   • PARATHYROIDECTOMY     • MO ANTERIOR COLPORRAPHY RPR CYSTOCELE W/CYSTO N/A 2017    Procedure: CYSTOCELE REPAIR;  Surgeon: Funmilayo Caceres MD;  Location: [address] [place] [address];  Service: Gynecology   • MO ARTHRODESIS POSTERIOR INTERBODY [address] LUMBAR N/A 2016    Procedure: L4-S1 LUMBAR LAMINECTOMY/DECOMPRESSION;  INSTRUMENTED POSTEROLATERAL FUSION/ INTERBODY L5-S1; ALLOGRAFT AND AUTOGRAFT (IMPULSE) ;   Surgeon: Yinka Sanders MD;  Location: [facility];  Service: Orthopedics   • MO CYSTO BLADDER W/URETERAL CATHETERIZATION Bilateral 2018    Procedure: INSERTION URETERAL CATHETERS PREOP;  Surgeon: Kenneth Greco MD;  Location: [address] [place] [address];  Service: Urology   • MO SLING OPERATION STRESS INCONTINENCE N/A 2017    Procedure: MID URETHRAL SLING;  Surgeon: Carrie Claudio MD;  Location: [address] [place] [address];  Service: Urology   • MO SUPRACERVICAL ABDL HYSTER W/WO RMVL TUBE OVARY N/A 2018    Procedure: SUPRACERVICAL HYSTERECTOMY;  Surgeon: Funmilayo Caceres MD;  Location: [address] [place] [address];  Service: Gynecology   • THYROIDECTOMY     • TONSILECTOMY AND ADNOIDECTOMY     • TONSILLECTOMY     • TUBAL LIGATION         Home Meds:  Current Outpatient Medications on File Prior to Visit   Medication Sig Dispense Refill   • acetaminophen-codeine (TYLENOL with CODEINE #3) 300-30 MG per tablet TAKE 1 TABLET BY MOUTH EVERY 4 TO 6 HOURS AS NEEDED FOR PAIN --NEED PRIMARY INS  --     • albuterol (PROVENTIL HFA,VENTOLIN HFA) 90 mcg/act inhaler Inhale 1 puff     • Alcohol Swabs 70 % PADS May substitute brand based on insurance coverage  Check glucose BID  100 each 0   • ALPRAZolam (XANAX) 2 MG tablet Take 1 tablet (2 mg total) by mouth 2 (two) times a day as needed for anxiety 60 tablet 1   • amphetamine-dextroamphetamine (ADDERALL XR, 10MG,) 10 MG 24 hr capsule Take 1 capsule (10 mg total) by mouth every morning Max Daily Amount: 10 mg 7 capsule 0   • baclofen 10 mg tablet Take 10 mg by mouth 3 (three) times a day as needed     • Blood Glucose Monitoring Suppl (OneTouch Verio Reflect) w/Device KIT May substitute brand based on insurance coverage  Check glucose BID  1 kit 0   • calcitriol (ROCALTROL) 0 5 MCG capsule Take 1 capsule (0 5 mcg total) by mouth 2 (two) times a day with meals 60 capsule 5   • calcium carbonate (OS-EDER) 600 MG tablet Take 1 pill daily with dinner 180 tablet 10   • celecoxib (CeleBREX) 200 mg capsule TAKE 1 CAPSULE BY MOUTH EVERY DAY WITH MEALS FOR 30 DAYS (Patient not taking: Reported on 1/27/2023)     • Cholecalciferol (Vitamin D3) 125 MCG (5000 UT) CAPS Take 5000 IU daily     • [facility] Calcium 600 MG tablet TAKE 2 PILLS 3 TIMES A DAY     • desvenlafaxine (PRISTIQ) 100 mg 24 hr tablet Take 1 tablet (100 mg total) by mouth daily 90 tablet 0   • docusate sodium (COLACE) 100 mg capsule Take 1 capsule (100 mg total) by mouth 2 (two) times a day for 7 days 14 capsule 0   • fenofibrate 160 MG tablet Take 1 tablet (160 mg total) by mouth daily 90 tablet 3   • ferrous sulfate 325 (65 Fe) mg tablet Take 1 tablet (325 mg total) by mouth 2 (two) times a day Twice Monday, wed, Friday and Saturday -Last dose 4/1/22 60 tablet 2   • glucose blood (OneTouch Verio) test strip May substitute brand based on insurance coverage   Check glucose BID  100 each 0   • levothyroxine 150 mcg tablet Take 1 tablet (150 mcg total) by mouth daily at bedtime 90 tablet 0   • Lidocaine-Menthol 4-1 % PTCH if needed       • magnesium Oxide (MAG-OX) 400 mg TABS Take 1 tablet (400 mg total) by mouth 3 (three) times a day 90 tablet 10   • metFORMIN (GLUCOPHAGE) 500 mg tablet TAKE 1/2 TABLET BY MOUTH TWICE A DAY 90 tablet 1   • ondansetron (ZOFRAN) 4 mg tablet Take 1 tablet (4 mg total) by mouth every 8 (eight) hours as needed for nausea 20 tablet 1   • OneTouch Delica Lancets 82T MISC May substitute brand based on insurance coverage  Check glucose BID  100 each 0   • oxyCODONE-acetaminophen (PERCOCET)  mg per tablet TAKE 1 TABLET BY MOUTH FOUR TIMES A DAY AS NEEDED     • potassium chloride (K-DUR,KLOR-CON) 20 mEq tablet Take 1 tablet (20 mEq total) by mouth 2 (two) times a day 60 tablet 10   • pramipexole (MIRAPEX) 0 5 mg tablet Take 0 5 mg by mouth daily at bedtime     • [DISCONTINUED] hydrocortisone (ANUSOL-HC) 25 mg suppository Insert 1 suppository (25 mg total) into the rectum 2 (two) times a day (Patient taking differently: Insert 25 mg into the rectum 2 (two) times a day as needed) 12 suppository 0   • [DISCONTINUED] methocarbamol (ROBAXIN) 500 mg tablet Take 1 tablet (500 mg total) by mouth 2 (two) times a day 20 tablet 0     No current facility-administered medications on file prior to visit  Allergies:   Allergies   Allergen Reactions   • Hydrocodone-Acetaminophen Rash   • Vicodin [Hydrocodone-Acetaminophen] Rash   • Morphine And Related GI Intolerance     Nausea/vomiting     • Adhesive [Medical Tape] Rash     Bandaids       Social Hx:  Social History     Socioeconomic History   • Marital status: /Civil Union     Spouse name: Not on file   • Number of children: Not on file   • Years of education: 12   • Highest education level: Not on file   Occupational History   • Not on file   Tobacco Use   • Smoking status: Every Day     Packs/day: 0 25     Years: 25 00     Pack years: 6 25     Types: Cigarettes • Smokeless tobacco: Never   Vaping Use   • Vaping Use: Never used   Substance and Sexual Activity   • Alcohol use: Not Currently   • Drug use: No   • Sexual activity: Yes     Birth control/protection: Surgical     Comment: hysterectomy   Other Topics Concern   • Not on file   Social History Narrative    Most recent tobacco use screenin2018      General stress level:   Medium       Exercise level:   Occasional       Diet:   Regular      Caffeine intake:   Occasional     As per Contreras Millan      Social Determinants of Health     Financial Resource Strain: Not on file   Food Insecurity: No Food Insecurity   • Worried About Running Out of Food in the Last Year: Never true   • Ran Out of Food in the Last Year: Never true   Transportation Needs: No Transportation Needs   • Lack of Transportation (Medical): No   • Lack of Transportation (Non-Medical): No   Physical Activity: Not on file   Stress: Not on file   Social Connections: Not on file   Intimate Partner Violence: Not on file   Housing Stability: Low Risk    • Unable to Pay for Housing in the Last Year: No   • Number of Places Lived in the Last Year: 1   • Unstable Housing in the Last Year: No        Family Hx:    Family History   Problem Relation Age of Onset   • Diabetes Mother    • Hypertension Mother    • Hyperlipidemia Father    • Arrhythmia Father    • Lung cancer Father    • Diabetes Father    • Colon cancer Maternal Grandfather    • Stomach cancer Paternal Grandmother    • Heart disease Paternal Aunt          Review of Systems   Constitutional: Negative for chills and fever  HENT: Negative  Respiratory: Negative  Cardiovascular: Negative  Gastrointestinal: Negative  Endocrine:        Diabetes   Genitourinary:        See HPI past medical history   Musculoskeletal: Negative  Neurological: Negative  Hematological: Negative      Psychiatric/Behavioral:        Anxiety and PTSD       /82 (BP Location: Left arm, Patient Position: Sitting, Cuff Size: Large)   Pulse (!) 110   Temp (!) 97 °F (36 1 °C) (Core)   Ht 5' 2" (1 575 m)   Wt 95 4 kg (210 lb 6 4 oz)   LMP 12/06/2018 Comment: partial hysterectomy   BMI 38 48 kg/m²       Physical Exam  Vitals reviewed  Constitutional:       General: She is not in acute distress  Appearance: She is obese  HENT:      Head: Normocephalic and atraumatic  Eyes:      Pupils: Pupils are equal, round, and reactive to light  Cardiovascular:      Rate and Rhythm: Normal rate and regular rhythm  Pulmonary:      Effort: Pulmonary effort is normal       Breath sounds: Normal breath sounds  Abdominal:      General: There is no distension  Palpations: Abdomen is soft  Musculoskeletal:         General: Normal range of motion  Cervical back: Normal range of motion and neck supple  Lymphadenopathy:      Cervical: No cervical adenopathy  Skin:     General: Skin is warm and dry  Neurological:      Mental Status: She is alert  Asymmetrical subcutaneous adipose tissue right axilla compared to left    Right axilla subcutaneous tissue is also tender to palpation and most likely represents a lipoma    Pertinent labs reviewed  BMP 3 March 2023 unremarkable  Hemoglobin A1c and CBC from January 3, 2023 unremarkable  Pertinent images and available reads personally reviewed  Reviewed the CAT scan images as well as CAT scan report from November 2022  Pertinent notes reviewed  I read the last PCP note by Dr Olvin Thomas dated 13 January 2023     Jaylyn Kemp MD [id number] [facility]  [phone]

## 2023-03-06 NOTE — H&P
[facility] History and Physical Note:    Assessment:  Right axillary subcutaneous mass, with her multiple imaging studies, this most likely represents a lipoma  Pertinent labs reviewed  BMP 3 March 2023 unremarkable  Hemoglobin A1c and CBC from January 3, 2023 unremarkable  Pertinent images and available reads personally reviewed  Reviewed the CAT scan images as well as CAT scan report from November 2022  Pertinent notes reviewed  I read the last PCP note by Dr Geovani Lemons dated 13 January 2023     Plan:  1   CBC, BMP, A1c all done in last 2 months and largely unremarkable  2  We will schedule excision of right axillary subcutaneous mass    Chief Complaint:  "I am here for the lump under my arm"    HPI  Patient is a 45-year-old woman with medical history notable for obesity, PTSD, diabetes, chronic fatigue as well as pain syndromes  She was referred to my office for a "lump under the arm"  I saw her in 2022 and at that time she was being evaluated with mammograms  She now follows up to schedule excision of their right axillary subcutaneous tender mass which is most likely adipose tissue      Patient is up-to-date on her mammogram and has had a recent benign core biopsy left breast    PMH:  Past Medical History:   Diagnosis Date   • Abdominal pain    • Acid reflux    • Acute renal failure (HCC)     multiple episodes   • Anemia    • Anxiety    • Anxiety 03/31/2021   • Anxiety and depression 4/22/2022   • Arthritis    • Asthma    • Back pain    • Chronic pain    • DDD (degenerative disc disease), lumbar    • Depression    • Disease of thyroid gland     had surgery and now hypo   • DVT (deep venous thrombosis) ([facility] [place] [address] ) 2009    s/p ankle fracture   • Headache    • History of transfusion    • Hypercalcemia    • Hyperlipidemia    • Hyperthyroidism    • Hypocalcemia     post op 2016   • Kidney stone    • Lightheadedness    • Migraine    • Obesity    • Palpitations    • Pre-diabetes    • Psychiatric disorder     anxiety depression   • PTSD (post-traumatic stress disorder) 10/28/2022   • Seizures ([address] )     petit mal x1  4 years ago- all tests were neg  • SOB (shortness of breath)    • Spondylolisthesis of lumbar region    • Treatment     spinal pain injections  last was 2016   • Wears glasses        PSH:  Past Surgical History:   Procedure Laterality Date   • BACK SURGERY      L4-5, S1-fusion-plate/screws   • BREAST BIOPSY Left 2022    Stereo SLW - 12:00   •  SECTION      x5   • CYSTOCELE REPAIR  2017   • CYSTOSCOPY     • DISCOGRAM     • HYSTERECTOMY     • MAMMO STEREOTACTIC BREAST BIOPSY LEFT (ALL INC) Left 2022   • PARATHYROIDECTOMY     • [place] ANTERIOR COLPORRAPHY RPR CYSTOCELE W/CYSTO N/A 2017    Procedure: CYSTOCELE REPAIR;  Surgeon: Erika Oro MD;  Location: [address] [place] [address];  Service: Gynecology   • [place] ARTHRODESIS POSTERIOR INTERBODY [address] LUMBAR N/A 2016    Procedure: L4-S1 LUMBAR LAMINECTOMY/DECOMPRESSION;  INSTRUMENTED POSTEROLATERAL FUSION/ INTERBODY L5-S1; ALLOGRAFT AND AUTOGRAFT (IMPULSE) ;   Surgeon: Roseline Kingsley MD;  [facility];  Service: Orthopedics   • OK CYSTO BLADDER W/URETERAL CATHETERIZATION Bilateral 2018    Procedure: INSERTION URETERAL CATHETERS PREOP;  Surgeon: Ailyn Sweeney MD;  Location: [address] [place] [address];  Service: Urology   • OK SLING OPERATION STRESS INCONTINENCE N/A 2017    Procedure: MID URETHRAL SLING;  Surgeon: Ari Erwin MD;  Location: [address] [place] [address];  Service: Urology   • OK SUPRACERVICAL ABDL HYSTER W/WO RMVL TUBE OVARY N/A 2018    Procedure: SUPRACERVICAL HYSTERECTOMY;  Surgeon: Erika Oro MD;  Location: [address] [place] [address];  Service: Gynecology   • THYROIDECTOMY     • TONSILECTOMY AND ADNOIDECTOMY     • TONSILLECTOMY     • TUBAL LIGATION         Home Meds:  Current Outpatient Medications on File Prior to Visit   Medication Sig Dispense Refill   • acetaminophen-codeine (TYLENOL with CODEINE #3) 300-30 MG per tablet TAKE 1 TABLET BY MOUTH EVERY 4 TO 6 HOURS AS NEEDED FOR PAIN --NEED PRIMARY INS  --     • albuterol (PROVENTIL HFA,VENTOLIN HFA) 90 mcg/act inhaler Inhale 1 puff     • Alcohol Swabs 70 % PADS May substitute brand based on insurance coverage  Check glucose BID  100 each 0   • ALPRAZolam (XANAX) 2 MG tablet Take 1 tablet (2 mg total) by mouth 2 (two) times a day as needed for anxiety 60 tablet 1   • amphetamine-dextroamphetamine (ADDERALL XR, 10MG,) 10 MG 24 hr capsule Take 1 capsule (10 mg total) by mouth every morning Max Daily Amount: 10 mg 7 capsule 0   • baclofen 10 mg tablet Take 10 mg by mouth 3 (three) times a day as needed     • Blood Glucose Monitoring Suppl (OneTouch Verio Reflect) w/Device KIT May substitute brand based on insurance coverage  Check glucose BID  1 kit 0   • calcitriol (ROCALTROL) 0 5 MCG capsule Take 1 capsule (0 5 mcg total) by mouth 2 (two) times a day with meals 60 capsule 5   • calcium carbonate (OS-EDER) 600 MG tablet Take 1 pill daily with dinner 180 tablet 10   • celecoxib (CeleBREX) 200 mg capsule TAKE 1 CAPSULE BY MOUTH EVERY DAY WITH MEALS FOR 30 DAYS (Patient not taking: Reported on 1/27/2023)     • Cholecalciferol (Vitamin D3) 125 MCG (5000 UT) CAPS Take 5000 IU daily     • [facility]S Calcium 600 MG tablet TAKE 2 PILLS 3 TIMES A DAY     • desvenlafaxine (PRISTIQ) 100 mg 24 hr tablet Take 1 tablet (100 mg total) by mouth daily 90 tablet 0   • docusate sodium (COLACE) 100 mg capsule Take 1 capsule (100 mg total) by mouth 2 (two) times a day for 7 days 14 capsule 0   • fenofibrate 160 MG tablet Take 1 tablet (160 mg total) by mouth daily 90 tablet 3   • ferrous sulfate 325 (65 Fe) mg tablet Take 1 tablet (325 mg total) by mouth 2 (two) times a day Twice Monday, wed, Friday and Saturday -Last dose 4/1/22 60 tablet 2   • glucose blood (OneTouch Verio) test strip May substitute brand based on insurance coverage   Check glucose BID  100 each 0   • levothyroxine 150 mcg tablet Take 1 tablet (150 mcg total) by mouth daily at bedtime 90 tablet 0   • Lidocaine-Menthol 4-1 % PTCH if needed       • magnesium Oxide (MAG-OX) 400 mg TABS Take 1 tablet (400 mg total) by mouth 3 (three) times a day 90 tablet 10   • metFORMIN (GLUCOPHAGE) 500 mg tablet TAKE 1/2 TABLET BY MOUTH TWICE A DAY 90 tablet 1   • ondansetron (ZOFRAN) 4 mg tablet Take 1 tablet (4 mg total) by mouth every 8 (eight) hours as needed for nausea 20 tablet 1   • OneTouch Delica Lancets 59E MISC May substitute brand based on insurance coverage  Check glucose BID  100 each 0   • oxyCODONE-acetaminophen (PERCOCET)  mg per tablet TAKE 1 TABLET BY MOUTH FOUR TIMES A DAY AS NEEDED     • potassium chloride (K-DUR,KLOR-CON) 20 mEq tablet Take 1 tablet (20 mEq total) by mouth 2 (two) times a day 60 tablet 10   • pramipexole (MIRAPEX) 0 5 mg tablet Take 0 5 mg by mouth daily at bedtime     • [DISCONTINUED] hydrocortisone (ANUSOL-HC) 25 mg suppository Insert 1 suppository (25 mg total) into the rectum 2 (two) times a day (Patient taking differently: Insert 25 mg into the rectum 2 (two) times a day as needed) 12 suppository 0   • [DISCONTINUED] methocarbamol (ROBAXIN) 500 mg tablet Take 1 tablet (500 mg total) by mouth 2 (two) times a day 20 tablet 0     No current facility-administered medications on file prior to visit  Allergies:   Allergies   Allergen Reactions   • Hydrocodone-Acetaminophen Rash   • Vicodin [Hydrocodone-Acetaminophen] Rash   • Morphine And Related GI Intolerance     Nausea/vomiting     • Adhesive [Medical Tape] Rash     Bandaids       Social Hx:  Social History     Socioeconomic History   • Marital status: /Civil Union     Spouse name: Not on file   • Number of children: Not on file   • Years of education: 12   • Highest education level: Not on file   Occupational History   • Not on file   Tobacco Use   • Smoking status: Every Day     Packs/day: 0 25     Years: 25 00     Pack years: 6 25     Types: Cigarettes • Smokeless tobacco: Never   Vaping Use   • Vaping Use: Never used   Substance and Sexual Activity   • Alcohol use: Not Currently   • Drug use: No   • Sexual activity: Yes     Birth control/protection: Surgical     Comment: hysterectomy   Other Topics Concern   • Not on file   Social History Narrative    Most recent tobacco use screenin2018      General stress level:   Medium       Exercise level:   Occasional       Diet:   Regular      Caffeine intake:   Occasional     As per [place]      Social Determinants of Health     Financial Resource Strain: Not on file   Food Insecurity: No Food Insecurity   • Worried About Running Out of Food in the Last Year: Never true   • Ran Out of Food in the Last Year: Never true   Transportation Needs: No Transportation Needs   • Lack of Transportation (Medical): No   • Lack of Transportation (Non-Medical): No   Physical Activity: Not on file   Stress: Not on file   Social Connections: Not on file   Intimate Partner Violence: Not on file   Housing Stability: Low Risk    • Unable to Pay for Housing in the Last Year: No   • Number of Places Lived in the Last Year: 1   • Unstable Housing in the Last Year: No        Family Hx:    Family History   Problem Relation Age of Onset   • Diabetes Mother    • Hypertension Mother    • Hyperlipidemia Father    • Arrhythmia Father    • Lung cancer Father    • Diabetes Father    • Colon cancer Maternal Grandfather    • Stomach cancer Paternal Grandmother    • Heart disease Paternal Aunt          Review of Systems   Constitutional: Negative for chills and fever  HENT: Negative  Respiratory: Negative  Cardiovascular: Negative  Gastrointestinal: Negative  Endocrine:        Diabetes   Genitourinary:        See HPI past medical history   Musculoskeletal: Negative  Neurological: Negative  Hematological: Negative      Psychiatric/Behavioral:        Anxiety and PTSD       /82 (BP Location: Left arm, Patient Position: Sitting, Cuff Size: Large)   Pulse (!) 110   Temp (!) 97 °F (36 1 °C) (Core)   Ht 5' 2" (1 575 m)   Wt 95 4 kg (210 lb 6 4 oz)   LMP 12/06/2018 Comment: partial hysterectomy   BMI 38 48 kg/m²       Physical Exam  Vitals reviewed  Constitutional:       General: She is not in acute distress  Appearance: She is obese  HENT:      Head: Normocephalic and atraumatic  Eyes:      Pupils: Pupils are equal, round, and reactive to light  Cardiovascular:      Rate and Rhythm: Normal rate and regular rhythm  Pulmonary:      Effort: Pulmonary effort is normal       Breath sounds: Normal breath sounds  Abdominal:      General: There is no distension  Palpations: Abdomen is soft  Musculoskeletal:         General: Normal range of motion  Cervical back: Normal range of motion and neck supple  Lymphadenopathy:      Cervical: No cervical adenopathy  Skin:     General: Skin is warm and dry  Neurological:      Mental Status: She is alert  Asymmetrical subcutaneous adipose tissue right axilla compared to left    Right axilla subcutaneous tissue is also tender to palpation and most likely represents a lipoma    Pertinent labs reviewed  BMP 3 March 2023 unremarkable  Hemoglobin A1c and CBC from January 3, 2023 unremarkable  Pertinent images and available reads personally reviewed  Reviewed the CAT scan images as well as CAT scan report from November 2022  Pertinent notes reviewed  I read the last PCP note by Dr Jose Portillo dated 13 January 2023     Sade Morrell MD [id number] [facility]  [phone]

## 2023-03-06 NOTE — PROGRESS NOTES
[facility] History and Physical Note:    Assessment:  Right axillary subcutaneous mass, with her multiple imaging studies, this most likely represents a lipoma  Pertinent labs reviewed  BMP 3 March 2023 unremarkable  Hemoglobin A1c and CBC from January 3, 2023 unremarkable  Pertinent images and available reads personally reviewed  Reviewed the CAT scan images as well as CAT scan report from November 2022  Pertinent notes reviewed  I read the last PCP note by Dr Trina Lefort dated 13 January 2023     Plan:  1   CBC, BMP, A1c all done in last 2 months and largely unremarkable  2  We will schedule excision of right axillary subcutaneous mass    Chief Complaint:  "I am here for the lump under my arm"    HPI  Patient is a 64-year-old woman with medical history notable for obesity, PTSD, diabetes, chronic fatigue as well as pain syndromes  She was referred to my office for a "lump under the arm"  I saw her in 2022 and at that time she was being evaluated with mammograms  She now follows up to schedule excision of their right axillary subcutaneous tender mass which is most likely adipose tissue      Patient is up-to-date on her mammogram and has had a recent benign core biopsy left breast    PMH:  Past Medical History:   Diagnosis Date   • Abdominal pain    • Acid reflux    • Acute renal failure (HCC)     multiple episodes   • Anemia    • Anxiety    • Anxiety 03/31/2021   • Anxiety and depression 4/22/2022   • Arthritis    • Asthma    • Back pain    • Chronic pain    • DDD (degenerative disc disease), lumbar    • Depression    • Disease of thyroid gland     had surgery and now hypo   • DVT (deep venous thrombosis) ([facility] [address] ) 2009    s/p ankle fracture   • Headache    • History of transfusion    • Hypercalcemia    • Hyperlipidemia    • Hyperthyroidism    • Hypocalcemia     post op 2016   • Kidney stone    • Lightheadedness    • Migraine    • Obesity    • Palpitations    • Pre-diabetes    • Psychiatric disorder     anxiety depression   • PTSD (post-traumatic stress disorder) 10/28/2022   • Seizures ([address] )     petit mal x1  4 years ago- all tests were neg  • SOB (shortness of breath)    • Spondylolisthesis of lumbar region    • Treatment     spinal pain injections  last was 2016   • Wears glasses        PSH:  Past Surgical History:   Procedure Laterality Date   • BACK SURGERY      L4-5, S1-fusion-plate/screws   • BREAST BIOPSY Left 2022    Stereo SLW - 12:00   •  SECTION      x5   • CYSTOCELE REPAIR  2017   • CYSTOSCOPY     • DISCOGRAM     • HYSTERECTOMY     • MAMMO STEREOTACTIC BREAST BIOPSY LEFT (ALL INC) Left 2022   • PARATHYROIDECTOMY     • [place] ANTERIOR COLPORRAPHY RPR CYSTOCELE W/CYSTO N/A 2017    Procedure: CYSTOCELE REPAIR;  Surgeon: Mazin Denton MD;  Location: [address] [place] [address];  Service: Gynecology   • [place] ARTHRODESIS POSTERIOR INTERBODY [address] LUMBAR N/A 2016    Procedure: L4-S1 LUMBAR LAMINECTOMY/DECOMPRESSION;  INSTRUMENTED POSTEROLATERAL FUSION/ INTERBODY L5-S1; ALLOGRAFT AND AUTOGRAFT (IMPULSE) ;   Surgeon: James Babcock MD;  [facility];  Service: Orthopedics   • [place] CYSTO BLADDER W/URETERAL CATHETERIZATION Bilateral 2018    Procedure: INSERTION URETERAL CATHETERS PREOP;  Surgeon: Sae Pacheco MD;  Location: [address] [place] [address];  Service: Urology   • OH SLING OPERATION STRESS INCONTINENCE N/A 2017    Procedure: MID URETHRAL SLING;  Surgeon: Amanda King MD;  Location: [address] [place] [address];  Service: Urology   • [place] SUPRACERVICAL ABDL HYSTER W/WO RMVL TUBE OVARY N/A 2018    Procedure: SUPRACERVICAL HYSTERECTOMY;  Surgeon: Mazin Denton MD;  Location: [address] [place] [address];  Service: Gynecology   • THYROIDECTOMY     • TONSILECTOMY AND ADNOIDECTOMY     • TONSILLECTOMY     • TUBAL LIGATION         Home Meds:  Current Outpatient Medications on File Prior to Visit   Medication Sig Dispense Refill   • acetaminophen-codeine (TYLENOL with CODEINE #3) 300-30 MG per tablet TAKE 1 TABLET BY MOUTH EVERY 4 TO 6 HOURS AS NEEDED FOR PAIN --NEED PRIMARY INS  --     • albuterol (PROVENTIL HFA,VENTOLIN HFA) 90 mcg/act inhaler Inhale 1 puff     • Alcohol Swabs 70 % PADS May substitute brand based on insurance coverage  Check glucose BID  100 each 0   • ALPRAZolam (XANAX) 2 MG tablet Take 1 tablet (2 mg total) by mouth 2 (two) times a day as needed for anxiety 60 tablet 1   • amphetamine-dextroamphetamine (ADDERALL XR, 10MG,) 10 MG 24 hr capsule Take 1 capsule (10 mg total) by mouth every morning Max Daily Amount: 10 mg 7 capsule 0   • baclofen 10 mg tablet Take 10 mg by mouth 3 (three) times a day as needed     • Blood Glucose Monitoring Suppl (OneTouch Verio Reflect) w/Device KIT May substitute brand based on insurance coverage  Check glucose BID  1 kit 0   • calcitriol (ROCALTROL) 0 5 MCG capsule Take 1 capsule (0 5 mcg total) by mouth 2 (two) times a day with meals 60 capsule 5   • calcium carbonate (OS-EDER) 600 MG tablet Take 1 pill daily with dinner 180 tablet 10   • celecoxib (CeleBREX) 200 mg capsule TAKE 1 CAPSULE BY MOUTH EVERY DAY WITH MEALS FOR 30 DAYS (Patient not taking: Reported on 1/27/2023)     • Cholecalciferol (Vitamin D3) 125 MCG (5000 UT) CAPS Take 5000 IU daily     • CVS Calcium 600 MG tablet TAKE 2 PILLS 3 TIMES A DAY     • desvenlafaxine (PRISTIQ) 100 mg 24 hr tablet Take 1 tablet (100 mg total) by mouth daily 90 tablet 0   • docusate sodium (COLACE) 100 mg capsule Take 1 capsule (100 mg total) by mouth 2 (two) times a day for 7 days 14 capsule 0   • fenofibrate 160 MG tablet Take 1 tablet (160 mg total) by mouth daily 90 tablet 3   • ferrous sulfate 325 (65 Fe) mg tablet Take 1 tablet (325 mg total) by mouth 2 (two) times a day Twice Monday, wed, Friday and Saturday -Last dose 4/1/22 60 tablet 2   • glucose blood (OneTouch Verio) test strip May substitute brand based on insurance coverage   Check glucose BID  100 each 0   • levothyroxine 150 mcg tablet Take 1 tablet (150 mcg total) by mouth daily at bedtime 90 tablet 0   • Lidocaine-Menthol 4-1 % PTCH if needed       • magnesium Oxide (MAG-OX) 400 mg TABS Take 1 tablet (400 mg total) by mouth 3 (three) times a day 90 tablet 10   • metFORMIN (GLUCOPHAGE) 500 mg tablet TAKE 1/2 TABLET BY MOUTH TWICE A DAY 90 tablet 1   • ondansetron (ZOFRAN) 4 mg tablet Take 1 tablet (4 mg total) by mouth every 8 (eight) hours as needed for nausea 20 tablet 1   • OneTouch Delica Lancets 42L MISC May substitute brand based on insurance coverage  Check glucose BID  100 each 0   • oxyCODONE-acetaminophen (PERCOCET)  mg per tablet TAKE 1 TABLET BY MOUTH FOUR TIMES A DAY AS NEEDED     • potassium chloride (K-DUR,KLOR-CON) 20 mEq tablet Take 1 tablet (20 mEq total) by mouth 2 (two) times a day 60 tablet 10   • pramipexole (MIRAPEX) 0 5 mg tablet Take 0 5 mg by mouth daily at bedtime     • [DISCONTINUED] hydrocortisone (ANUSOL-HC) 25 mg suppository Insert 1 suppository (25 mg total) into the rectum 2 (two) times a day (Patient taking differently: Insert 25 mg into the rectum 2 (two) times a day as needed) 12 suppository 0   • [DISCONTINUED] methocarbamol (ROBAXIN) 500 mg tablet Take 1 tablet (500 mg total) by mouth 2 (two) times a day 20 tablet 0     No current facility-administered medications on file prior to visit  Allergies:   Allergies   Allergen Reactions   • Hydrocodone-Acetaminophen Rash   • Vicodin [Hydrocodone-Acetaminophen] Rash   • Morphine And Related GI Intolerance     Nausea/vomiting     • Adhesive [Medical Tape] Rash     Bandaids       Social Hx:  Social History     Socioeconomic History   • Marital status: /Civil Union     Spouse name: Not on file   • Number of children: Not on file   • Years of education: 12   • Highest education level: Not on file   Occupational History   • Not on file   Tobacco Use   • Smoking status: Every Day     Packs/day: 0 25     Years: 25 00     Pack years: 6 25     Types: Cigarettes • Smokeless tobacco: Never   Vaping Use   • Vaping Use: Never used   Substance and Sexual Activity   • Alcohol use: Not Currently   • Drug use: No   • Sexual activity: Yes     Birth control/protection: Surgical     Comment: hysterectomy   Other Topics Concern   • Not on file   Social History Narrative    Most recent tobacco use screenin2018      General stress level:   Medium       Exercise level:   Occasional       Diet:   Regular      Caffeine intake:   Occasional     As per [place]      Social Determinants of Health     Financial Resource Strain: Not on file   Food Insecurity: No Food Insecurity   • Worried About Running Out of Food in the Last Year: Never true   • Ran Out of Food in the Last Year: Never true   Transportation Needs: No Transportation Needs   • Lack of Transportation (Medical): No   • Lack of Transportation (Non-Medical): No   Physical Activity: Not on file   Stress: Not on file   Social Connections: Not on file   Intimate Partner Violence: Not on file   Housing Stability: Low Risk    • Unable to Pay for Housing in the Last Year: No   • Number of Places Lived in the Last Year: 1   • Unstable Housing in the Last Year: No        Family Hx:    Family History   Problem Relation Age of Onset   • Diabetes Mother    • Hypertension Mother    • Hyperlipidemia Father    • Arrhythmia Father    • Lung cancer Father    • Diabetes Father    • Colon cancer Maternal Grandfather    • Stomach cancer Paternal Grandmother    • Heart disease Paternal Aunt          Review of Systems   Constitutional: Negative for chills and fever  HENT: Negative  Respiratory: Negative  Cardiovascular: Negative  Gastrointestinal: Negative  Endocrine:        Diabetes   Genitourinary:        See HPI past medical history   Musculoskeletal: Negative  Neurological: Negative  Hematological: Negative      Psychiatric/Behavioral:        Anxiety and PTSD       /82 (BP Location: Left arm, Patient Position: Sitting, Cuff Size: Large)   Pulse (!) 110   Temp (!) 97 °F (36 1 °C) (Core)   Ht 5' 2" (1 575 m)   Wt 95 4 kg (210 lb 6 4 oz)   LMP 12/06/2018 Comment: partial hysterectomy   BMI 38 48 kg/m²       Physical Exam  Vitals reviewed  Constitutional:       General: She is not in acute distress  Appearance: She is obese  HENT:      Head: Normocephalic and atraumatic  Eyes:      Pupils: Pupils are equal, round, and reactive to light  Cardiovascular:      Rate and Rhythm: Normal rate and regular rhythm  Pulmonary:      Effort: Pulmonary effort is normal       Breath sounds: Normal breath sounds  Abdominal:      General: There is no distension  Palpations: Abdomen is soft  Musculoskeletal:         General: Normal range of motion  Cervical back: Normal range of motion and neck supple  Lymphadenopathy:      Cervical: No cervical adenopathy  Skin:     General: Skin is warm and dry  Neurological:      Mental Status: She is alert  Asymmetrical subcutaneous adipose tissue right axilla compared to left    Right axilla subcutaneous tissue is also tender to palpation and most likely represents a lipoma    Pertinent labs reviewed  BMP 3 March 2023 unremarkable  Hemoglobin A1c and CBC from January 3, 2023 unremarkable  Pertinent images and available reads personally reviewed  Reviewed the CAT scan images as well as CAT scan report from November 2022  Pertinent notes reviewed  I read the last PCP note by Dr Jauregui Samples dated 13 January 2023     Kamini Sloan MD [address] [facility]  [phone]

## 2023-03-08 NOTE — PRE-PROCEDURE INSTRUCTIONS
Pre-Surgery Instructions:   Medication Instructions   • albuterol (PROVENTIL HFA,VENTOLIN HFA) 90 mcg/act inhaler Uses PRN- OK to take day of surgery   • Alcohol Swabs 70 % PADS Take day of surgery  • ALPRAZolam (XANAX) 2 MG tablet Take day of surgery  • baclofen 10 mg tablet Hold day of surgery  • Blood Glucose Monitoring Suppl (OneTouch Verio Reflect) w/Device KIT Take day of surgery  • calcitriol (ROCALTROL) 0 5 MCG capsule Hold day of surgery  • calcium carbonate (OS-EDER) 600 MG tablet Hold day of surgery  • Cholecalciferol (Vitamin D3) 125 MCG (5000 UT) CAPS Hold day of surgery  • desvenlafaxine (PRISTIQ) 100 mg 24 hr tablet Hold day of surgery  • fenofibrate 160 MG tablet Hold day of surgery  • ferrous sulfate 325 (65 Fe) mg tablet Hold day of surgery  • glucose blood (OneTouch Verio) test strip Take day of surgery  • levothyroxine 150 mcg tablet Take night before surgery   • Lidocaine-Menthol 4-1 % PTCH Take day of surgery  • magnesium Oxide (MAG-OX) 400 mg TABS Hold day of surgery  • ondansetron (ZOFRAN) 4 mg tablet Hold day of surgery  • OneTouch Delica Lancets 11Z MISC Take day of surgery  • oxyCODONE-acetaminophen (PERCOCET)  mg per tablet Uses PRN- OK to take day of surgery   • potassium chloride (K-DUR,KLOR-CON) 20 mEq tablet Hold day of surgery  • pramipexole (MIRAPEX) 0 5 mg tablet Uses PRN- DO NOT take day of surgery    Pre op instructions given  Pt to follow Dr Kelle Rivers instructions  Pt to use hibiclens as directed  Pt to arrive to the [facility] at the given time, wear a mask and proceed to the SDS unit 2nd floor    Mick Seo [phone] Surgical Experience    The following information was developed to assist you to prepare for your operation  What do I need to do before coming to the hospital?  • Arrange for a responsible person to drive you to and from the hospital   • Arrange care for your children at home   Children are not allowed in the recovery areas of the hospital  • Plan to wear clothing that is easy to put on and take off  If you are having shoulder surgery, wear a shirt that buttons or zippers in the front  Bathing  o Shower the evening before and the morning of your surgery with an antibacterial soap  Please refer to the Pre Op Showering Instructions for Surgery Patients Sheet   o Remove nail polish and all body piercing jewelry  o Do not shave any body part for at least 24 hours before surgery-this includes face, arms, legs and upper body  Food  o Nothing to eat or drink after midnight the night before your surgery  This includes candy and chewing gum  o Exception: If your surgery is after 12:00pm (noon), you may have clear liquids such as 7-Up®, ginger ale, apple or cranberry juice, Jell-O®, water, or clear broth until 8:00 am  o Do not drink milk or juice with pulp on the morning before surgery  o Do not drink alcohol 24 hours before surgery  Medicine  o Follow instructions you received from your surgeon about which medicines you may take on the day of surgery  o If instructed to take medicine on the morning of surgery, take pills with just a small sip of water  Call your prescribing doctor for specific infroamtion on what to do if you take insulin    What should I bring to the hospital?    Bring:  • Crutches or a walker, if you have them, for foot or knee surgery  • A list of the daily medicines, vitamins, minerals, herbals and nutritional supplements you take   Include the dosages of medicines and the time you take them each day  • Glasses, dentures or hearing aids  • Minimal clothing; you will be wearing hospital sleepwear  • Photo ID; required to verify your identity  • If you have a Living Will or Power of , bring a copy of the documents  • If you have an ostomy, bring an extra pouch and any supplies you use    Do not bring  • Medicines or inhalers  • Money, valuables or jewelry    What other information should I know about the day of surgery? • Notify your surgeons if you develop a cold, sore throat, cough, fever, rash or any other illness  • Report to the Ambulatory Surgical/Same Day Surgery Unit  • You will be instructed to stop at Registration only if you have not been pre-registered  • Inform your  fi they do not stay that they will be asked by the staff to leave a phone number where they can be reached  • Be available to be reached before surgery  In the event the operating room schedule changes, you may be asked to come in earlier or later than expected    *It is important to tell your doctor and others involved in your health care if you are taking or have been taking any non-prescription drugs, vitamins, minerals, herbals or other nutritional supplements   Any of these may interact with some food or medicines and cause a reaction

## 2023-03-09 ENCOUNTER — TELEMEDICINE (OUTPATIENT)
Dept: BEHAVIORAL/MENTAL HEALTH CLINIC | Facility: CLINIC | Age: 48
End: 2023-03-09

## 2023-03-09 ENCOUNTER — PATIENT OUTREACH (OUTPATIENT)
Dept: INTERNAL MEDICINE CLINIC | Facility: CLINIC | Age: 48
End: 2023-03-09

## 2023-03-09 DIAGNOSIS — F41.1 GENERALIZED ANXIETY DISORDER WITH PANIC ATTACKS: ICD-10-CM

## 2023-03-09 DIAGNOSIS — F41.0 GENERALIZED ANXIETY DISORDER WITH PANIC ATTACKS: ICD-10-CM

## 2023-03-09 DIAGNOSIS — F17.210 NICOTINE DEPENDENCE, CIGARETTES, UNCOMPLICATED: ICD-10-CM

## 2023-03-09 DIAGNOSIS — F33.1 MODERATE EPISODE OF RECURRENT MAJOR DEPRESSIVE DISORDER (HCC): Primary | ICD-10-CM

## 2023-03-09 NOTE — PROGRESS NOTES
Chart was reviewed and this RNCM called patient for follow up  Patient states she has her diabetes under control and she does not want any follow up from [facility] at this time  She states she has my number and will call if she needs anything  This RNCM left self on care team for follow up and assistance for [facility]  Note routed to [facility]
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
## 2023-03-14 ENCOUNTER — ANESTHESIA EVENT (OUTPATIENT)
Dept: PERIOP | Facility: HOSPITAL | Age: 48
End: 2023-03-14

## 2023-03-14 ENCOUNTER — TELEPHONE (OUTPATIENT)
Dept: PSYCHIATRY | Facility: CLINIC | Age: 48
End: 2023-03-14

## 2023-03-14 DIAGNOSIS — F41.9 ANXIETY: ICD-10-CM

## 2023-03-14 DIAGNOSIS — F32.89 OTHER DEPRESSION: ICD-10-CM

## 2023-03-14 PROBLEM — IMO0001 SMOKING: Status: ACTIVE | Noted: 2017-09-13

## 2023-03-14 RX ORDER — ALPRAZOLAM 2 MG/1
2 TABLET ORAL 2 TIMES DAILY PRN
Qty: 60 TABLET | Refills: 1 | Status: SHIPPED | OUTPATIENT
Start: 2023-03-14 | End: 2023-03-16

## 2023-03-14 RX ORDER — DESVENLAFAXINE 100 MG/1
TABLET, EXTENDED RELEASE ORAL
Qty: 90 TABLET | Refills: 0 | Status: SHIPPED | OUTPATIENT
Start: 2023-03-14

## 2023-03-14 NOTE — TELEPHONE ENCOUNTER
While confirming an upcoming appointment scheduled for [facility]  Yury Reyna, patient requested refills on her Xanax 1 mg by Dr Angelica Liu  Patient states that at last appointment is was increased xanax to 1 mg from the 0 5 mg in the evening to help with sleep  Also she states she takes Xanax 2 mg ER tablets 1 twice daily but I see a script for xanax 2 mg not the ER  Patient uses [address], [place]  She is out of medication  Please clarify and advise for further instructions

## 2023-03-14 NOTE — ANESTHESIA PREPROCEDURE EVALUATION
Procedure:  EXCISION BIOPSY TISSUE LESION/MASS UPPER EXTREMITY (Right: Axilla)    Relevant Problems   CARDIO   (+) Hypertriglyceridemia      ENDO   (+) Hyperthyroidism (Resolved)   (+) Hypoparathyroidism (HCC)   (+) Postoperative hypothyroidism   (+) Postsurgical hypoparathyroidism (HCC)   (+) Type 2 diabetes mellitus without complication, without long-term current use of insulin (HCC)      GI/HEPATIC   (+) Fatty liver disease, nonalcoholic      /RENAL   (+) LELAND (acute kidney injury) ([facility] [address] ) (Resolved)      GYN   (+) History of hysterectomy      HEMATOLOGY   (+) Anemia      MUSCULOSKELETAL  lumbar hardware   (+) DDD (degenerative disc disease), lumbar      NEURO/PSYCH   (+) Anxiety   (+) Anxiety and depression   (+) Chronic intractable pain   (+) PTSD (post-traumatic stress disorder)   (+) Panic attacks      PULMONARY   (+) Smoking      Other   (+) S/P lumbar fusion   (+) Splenomegaly        Physical Exam    Airway    Mallampati score: III  TM Distance: >3 FB  Neck ROM: full     Dental       Cardiovascular  Rhythm: regular, Rate: normal,     Pulmonary  Breath sounds clear to auscultation,     Other Findings        Anesthesia Plan  ASA Score- 2     Anesthesia Type- general with ASA Monitors  Additional Monitors:   Airway Plan: LMA  Plan Factors-    Chart reviewed  Patient is a current smoker  Patient instructed to abstain from smoking on day of procedure  Patient did not smoke on day of surgery  Induction- intravenous  Postoperative Plan- Plan for postoperative opioid use  Informed Consent- Anesthetic plan and risks discussed with patient  I personally reviewed this patient with the CRNA  Discussed and agreed on the Anesthesia Plan with the CRNA  Datisael Dotson

## 2023-03-15 ENCOUNTER — TELEPHONE (OUTPATIENT)
Dept: PSYCHIATRY | Facility: CLINIC | Age: 48
End: 2023-03-15

## 2023-03-15 ENCOUNTER — HOSPITAL ENCOUNTER (OUTPATIENT)
Facility: HOSPITAL | Age: 48
Setting detail: OUTPATIENT SURGERY
Discharge: HOME | End: 2023-03-15
Attending: SURGERY | Admitting: SURGERY

## 2023-03-15 ENCOUNTER — ANESTHESIA (OUTPATIENT)
Dept: PERIOP | Facility: HOSPITAL | Age: 48
End: 2023-03-15

## 2023-03-15 VITALS
WEIGHT: 207.4 LBS | HEIGHT: 62 IN | RESPIRATION RATE: 18 BRPM | TEMPERATURE: 98.7 F | SYSTOLIC BLOOD PRESSURE: 163 MMHG | OXYGEN SATURATION: 96 % | DIASTOLIC BLOOD PRESSURE: 89 MMHG | HEART RATE: 62 BPM | BODY MASS INDEX: 38.16 KG/M2

## 2023-03-15 DIAGNOSIS — R22.9 SUBCUTANEOUS MASS: ICD-10-CM

## 2023-03-15 PROBLEM — N17.9 AKI (ACUTE KIDNEY INJURY) (HCC): Status: RESOLVED | Noted: 2022-05-30 | Resolved: 2023-03-15

## 2023-03-15 PROBLEM — Z90.710 HISTORY OF HYSTERECTOMY: Status: ACTIVE | Noted: 2023-03-15

## 2023-03-15 LAB — GLUCOSE SERPL-MCNC: 125 MG/DL (ref 65–140)

## 2023-03-15 RX ORDER — CEFAZOLIN SODIUM 2 G/50ML
2000 SOLUTION INTRAVENOUS ONCE
Status: DISCONTINUED | OUTPATIENT
Start: 2023-03-15 | End: 2023-03-15 | Stop reason: HOSPADM

## 2023-03-15 RX ORDER — LIDOCAINE HYDROCHLORIDE 10 MG/ML
INJECTION, SOLUTION EPIDURAL; INFILTRATION; INTRACAUDAL; PERINEURAL AS NEEDED
Status: DISCONTINUED | OUTPATIENT
Start: 2023-03-15 | End: 2023-03-15

## 2023-03-15 RX ORDER — CEFAZOLIN SODIUM 2 G/50ML
SOLUTION INTRAVENOUS AS NEEDED
Status: DISCONTINUED | OUTPATIENT
Start: 2023-03-15 | End: 2023-03-15

## 2023-03-15 RX ORDER — ONDANSETRON 2 MG/ML
INJECTION INTRAMUSCULAR; INTRAVENOUS AS NEEDED
Status: DISCONTINUED | OUTPATIENT
Start: 2023-03-15 | End: 2023-03-15

## 2023-03-15 RX ORDER — FENTANYL CITRATE 50 UG/ML
INJECTION, SOLUTION INTRAMUSCULAR; INTRAVENOUS AS NEEDED
Status: DISCONTINUED | OUTPATIENT
Start: 2023-03-15 | End: 2023-03-15

## 2023-03-15 RX ORDER — PROPOFOL 10 MG/ML
INJECTION, EMULSION INTRAVENOUS AS NEEDED
Status: DISCONTINUED | OUTPATIENT
Start: 2023-03-15 | End: 2023-03-15

## 2023-03-15 RX ORDER — MAGNESIUM HYDROXIDE 1200 MG/15ML
LIQUID ORAL AS NEEDED
Status: DISCONTINUED | OUTPATIENT
Start: 2023-03-15 | End: 2023-03-15 | Stop reason: HOSPADM

## 2023-03-15 RX ORDER — SODIUM CHLORIDE, SODIUM LACTATE, POTASSIUM CHLORIDE, CALCIUM CHLORIDE 600; 310; 30; 20 MG/100ML; MG/100ML; MG/100ML; MG/100ML
INJECTION, SOLUTION INTRAVENOUS CONTINUOUS PRN
Status: DISCONTINUED | OUTPATIENT
Start: 2023-03-15 | End: 2023-03-15

## 2023-03-15 RX ORDER — DEXAMETHASONE SODIUM PHOSPHATE 4 MG/ML
INJECTION, SOLUTION INTRA-ARTICULAR; INTRALESIONAL; INTRAMUSCULAR; INTRAVENOUS; SOFT TISSUE AS NEEDED
Status: DISCONTINUED | OUTPATIENT
Start: 2023-03-15 | End: 2023-03-15

## 2023-03-15 RX ORDER — ONDANSETRON 2 MG/ML
4 INJECTION INTRAMUSCULAR; INTRAVENOUS ONCE AS NEEDED
Status: DISCONTINUED | OUTPATIENT
Start: 2023-03-15 | End: 2023-03-15 | Stop reason: HOSPADM

## 2023-03-15 RX ORDER — SODIUM CHLORIDE, SODIUM LACTATE, POTASSIUM CHLORIDE, CALCIUM CHLORIDE 600; 310; 30; 20 MG/100ML; MG/100ML; MG/100ML; MG/100ML
75 INJECTION, SOLUTION INTRAVENOUS CONTINUOUS
Status: DISCONTINUED | OUTPATIENT
Start: 2023-03-15 | End: 2023-03-15 | Stop reason: HOSPADM

## 2023-03-15 RX ORDER — FENTANYL CITRATE/PF 50 MCG/ML
50 SYRINGE (ML) INJECTION
Status: DISCONTINUED | OUTPATIENT
Start: 2023-03-15 | End: 2023-03-15 | Stop reason: HOSPADM

## 2023-03-15 RX ORDER — MIDAZOLAM HYDROCHLORIDE 2 MG/2ML
INJECTION, SOLUTION INTRAMUSCULAR; INTRAVENOUS AS NEEDED
Status: DISCONTINUED | OUTPATIENT
Start: 2023-03-15 | End: 2023-03-15

## 2023-03-15 RX ADMIN — FENTANYL CITRATE 50 MCG: 50 INJECTION, SOLUTION INTRAMUSCULAR; INTRAVENOUS at 14:15

## 2023-03-15 RX ADMIN — FENTANYL CITRATE 50 MCG: 50 INJECTION, SOLUTION INTRAMUSCULAR; INTRAVENOUS at 14:23

## 2023-03-15 RX ADMIN — LIDOCAINE HYDROCHLORIDE 50 MG: 10 INJECTION, SOLUTION EPIDURAL; INFILTRATION; INTRACAUDAL at 14:15

## 2023-03-15 RX ADMIN — CEFAZOLIN SODIUM 2000 MG: 2 SOLUTION INTRAVENOUS at 14:08

## 2023-03-15 RX ADMIN — SODIUM CHLORIDE, SODIUM LACTATE, POTASSIUM CHLORIDE, AND CALCIUM CHLORIDE: .6; .31; .03; .02 INJECTION, SOLUTION INTRAVENOUS at 14:07

## 2023-03-15 RX ADMIN — PROPOFOL 200 MG: 10 INJECTION, EMULSION INTRAVENOUS at 14:15

## 2023-03-15 RX ADMIN — MIDAZOLAM HYDROCHLORIDE 2 MG: 1 INJECTION, SOLUTION INTRAMUSCULAR; INTRAVENOUS at 14:08

## 2023-03-15 RX ADMIN — DEXAMETHASONE SODIUM PHOSPHATE 8 MG: 4 INJECTION, SOLUTION INTRA-ARTICULAR; INTRALESIONAL; INTRAMUSCULAR; INTRAVENOUS; SOFT TISSUE at 14:30

## 2023-03-15 RX ADMIN — ONDANSETRON 4 MG: 2 INJECTION INTRAMUSCULAR; INTRAVENOUS at 14:30

## 2023-03-15 NOTE — TELEPHONE ENCOUNTER
I spoke with patient regarding her script for xanax.  She said that the regular does not work for her, she needs the 2mg extended release for the day time, and the 1mg regular at bedtime.  She states that this was discussed at the previous visit.    Patient is requesting a script for 2mg XR at 2 times a day.    Patient stated that the 2mg she picked up yesterday will be cut in half and will take 1/2 tablet (1mg) at bedtime.

## 2023-03-15 NOTE — DISCHARGE INSTR - AVS FIRST PAGE
1   Keep incision clean and dry for 2 days  After 2 days, it may get wet in the shower  No dressing is required  2   Take ibuprofen, 600 mg, 3 times a day regularly  3   Take the Percocet that you already have at home, in addition to ibuprofen, if you need further pain control  4   If you do not already have an appointment, call my office at [phone] for an appointment to be seen in about 10 to 14 days

## 2023-03-15 NOTE — TELEPHONE ENCOUNTER
Pt called insisting that Dr Villasenor was giving her Xanax 1 mg for bedtime, pt is on the Xanax 2 mg, and has never been on the 1 mg  Xanax.

## 2023-03-15 NOTE — OP NOTE
OPERATIVE REPORT  PATIENT NAME: Kristi Avendaño    :  1975  MRN: [id number]  Pt Location: [facility] OR ROOM 01    SURGERY DATE: 3/15/2023    Surgeon(s) and Role:     * Richi Garcia MD - Primary     * Diana Rodrigues MD - Assisting     * Joelle Cha PA-C - Assisting - A certified first assistant was required for retraction, exposure and closure of case    Preop Diagnosis:  Subcutaneous mass [R22 9]    Post-Op Diagnosis Codes:     * Subcutaneous mass [R22 9]    Procedure(s):  Right - EXCISION BIOPSY TISSUE LESION/MASS UPPER EXTREMITY    Specimen(s):  ID Type Source Tests Collected by Time Destination   1 : Right upper Axillary Roll Adipose Tissue Mass TISSUE Fabián Ghosh MD 3/15/2023 1423        Estimated Blood Loss:   5 mL    Drains:  * No LDAs found *    Anesthesia Type:   General    Operative Indications:  Subcutaneous mass [R22 9]      Operative Findings:  8 x 9 cm subcutaneous lipomatous mass    Complications:   None    Procedure and Technique:  The patient was placed supine   The right axilla was prepped and draped in standard fashion  Local anesthesia was used to anesthetize the skin surrounding a unknown pre-operative size lesion  A oblique elliptical incision was made over the lesion  Sharp and blunt dissection,Using scissors, knife, and cautery, were used to mobilize the mass which was in a subcutaneous location  5 mm margins were taken  Skin, soft tissue, and the mass, and surrounding fat were taken  Hemostasis was achieved with cautery  The wound was irrigated  The wound was closed in multiple layers using 3-0 Vicryl suture and 4-0 Monocryl subcuticular stitch  Exofin sking glue was applied  The wound was dressed  The specimen size: 8 x 9 cm    At the end of the operation, all sponge, instrument, and needle counts were correct       Dr Radha Lawton was present for the pertinent aspects of the case    Patient Disposition:  PACU         SIGNATURE: Diana Rodrigues MD  DATE: March 15, 2023  TIME: 3:39 PM

## 2023-03-15 NOTE — TELEPHONE ENCOUNTER
As per Dr. Woods last note she was to take a 2 mg closer to bed time. No where in the note does it say he gave her one mg at bedtime

## 2023-03-15 NOTE — ANESTHESIA POSTPROCEDURE EVALUATION
Post-Op Assessment Note    CV Status:  Stable  Pain Score: 0    Pain management: adequate     Mental Status:  Sleepy and arousable   Hydration Status:  Euvolemic   PONV Controlled:  Controlled   Airway Patency:  Patent      Post Op Vitals Reviewed: Yes      Staff: Anesthesiologist, CRNA         No notable events documented      /73 (03/15/23 1525)    Temp 98 7 °F (37 1 °C) (03/15/23 1525)    Pulse 89 (03/15/23 1525)   Resp 16 (03/15/23 1525)    SpO2 94 % (03/15/23 1525)

## 2023-03-16 ENCOUNTER — TELEPHONE (OUTPATIENT)
Dept: PSYCHIATRY | Facility: CLINIC | Age: 48
End: 2023-03-16

## 2023-03-16 ENCOUNTER — TELEMEDICINE (OUTPATIENT)
Dept: BEHAVIORAL/MENTAL HEALTH CLINIC | Facility: CLINIC | Age: 48
End: 2023-03-16

## 2023-03-16 DIAGNOSIS — F41.1 GENERALIZED ANXIETY DISORDER WITH PANIC ATTACKS: ICD-10-CM

## 2023-03-16 DIAGNOSIS — F41.9 ANXIETY: Primary | ICD-10-CM

## 2023-03-16 DIAGNOSIS — F17.210 NICOTINE DEPENDENCE, CIGARETTES, UNCOMPLICATED: ICD-10-CM

## 2023-03-16 DIAGNOSIS — F51.01 PRIMARY INSOMNIA: Primary | ICD-10-CM

## 2023-03-16 DIAGNOSIS — F33.1 MODERATE EPISODE OF RECURRENT MAJOR DEPRESSIVE DISORDER (HCC): Primary | ICD-10-CM

## 2023-03-16 DIAGNOSIS — F41.0 GENERALIZED ANXIETY DISORDER WITH PANIC ATTACKS: ICD-10-CM

## 2023-03-16 RX ORDER — ALPRAZOLAM 2 MG/1
2 TABLET, EXTENDED RELEASE ORAL
Qty: 60 TABLET | Refills: 0 | Status: SHIPPED | OUTPATIENT
Start: 2023-03-16 | End: 2023-04-21 | Stop reason: SDUPTHER

## 2023-03-16 NOTE — TELEPHONE ENCOUNTER
Can you change her meds to extended release that's what she used to be on. The only med that was to be added was the one at night. She is not sleeping.  Nothing elde. I am trying to get her in with Dr. Harrison but not till the end of the month

## 2023-03-16 NOTE — TELEPHONE ENCOUNTER
spoke with patient this morning. She explained the situation a little better. She was on 2 mg xr I tab twice a day. She was still having a hard time sleeping at night. So, She said in the visit in 2/2023 they spoke about a sleep study. The plan she said the two of you came up with, was she was to continue un the 2 mg XR bid and that a 1mg immediate release at bedtime. If that dint work than she was to go for a sleep study. I did check the referrals and there was not one in there for a sleep study.     Please advise

## 2023-03-17 ENCOUNTER — HOSPITAL ENCOUNTER (EMERGENCY)
Facility: HOSPITAL | Age: 48
Discharge: HOME | End: 2023-03-17
Attending: EMERGENCY MEDICINE

## 2023-03-17 VITALS
SYSTOLIC BLOOD PRESSURE: 121 MMHG | HEART RATE: 80 BPM | TEMPERATURE: 97.5 F | OXYGEN SATURATION: 96 % | DIASTOLIC BLOOD PRESSURE: 75 MMHG | RESPIRATION RATE: 18 BRPM

## 2023-03-17 DIAGNOSIS — E83.42 HYPOMAGNESEMIA: ICD-10-CM

## 2023-03-17 DIAGNOSIS — Z48.89 ENCOUNTER FOR POST SURGICAL WOUND CHECK: ICD-10-CM

## 2023-03-17 DIAGNOSIS — E83.51 HYPOCALCEMIA: Primary | ICD-10-CM

## 2023-03-17 LAB
ANION GAP SERPL CALCULATED.3IONS-SCNC: 10 MMOL/L (ref 4–13)
BUN SERPL-MCNC: 14 MG/DL (ref 5–25)
CA-I BLD-SCNC: 1.05 MMOL/L (ref 1.12–1.32)
CALCIUM SERPL-MCNC: 8.1 MG/DL (ref 8.4–10.2)
CHLORIDE SERPL-SCNC: 105 MMOL/L (ref 96–108)
CO2 SERPL-SCNC: 24 MMOL/L (ref 21–32)
CREAT SERPL-MCNC: 1.01 MG/DL (ref 0.6–1.3)
GFR SERPL CREATININE-BSD FRML MDRD: 66 ML/MIN/1.73SQ M
GLUCOSE SERPL-MCNC: 177 MG/DL (ref 65–140)
MAGNESIUM SERPL-MCNC: 1.7 MG/DL (ref 1.9–2.7)
POTASSIUM SERPL-SCNC: 3.5 MMOL/L (ref 3.5–5.3)
SODIUM SERPL-SCNC: 139 MMOL/L (ref 135–147)

## 2023-03-17 RX ORDER — MAGNESIUM SULFATE 1 G/100ML
1 INJECTION INTRAVENOUS ONCE
Status: COMPLETED | OUTPATIENT
Start: 2023-03-17 | End: 2023-03-17

## 2023-03-17 RX ORDER — CALCIUM GLUCONATE 20 MG/ML
1 INJECTION, SOLUTION INTRAVENOUS ONCE
Status: COMPLETED | OUTPATIENT
Start: 2023-03-17 | End: 2023-03-17

## 2023-03-17 RX ADMIN — MAGNESIUM SULFATE HEPTAHYDRATE 1 G: 1 INJECTION, SOLUTION INTRAVENOUS at 05:14

## 2023-03-17 RX ADMIN — CALCIUM GLUCONATE 1 G: 20 INJECTION, SOLUTION INTRAVENOUS at 04:32
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
## 2023-03-19 NOTE — PSYCH
This note was not shared with the patient due to this is a psychotherapy note    Behavioral Health Psychotherapy Progress Note    Psychotherapy Provided: Individual Psychotherapy     1  Moderate episode of recurrent major depressive disorder ([address] )        2  Generalized anxiety disorder with panic attacks        3  Nicotine dependence, cigarettes, uncomplicated            Goals addressed in session: Goal 1     DATA: Oneida Nguyen reports feeling ok but did share increased anxiety due to problems with the way that her medication is written  She has been going back and forth with this office to have her Xanax prescriptions changed the way that she and Dr Chacha Posadas discussed, although there are discrepancies in the chart   to help get Oneida Nguyen on the schedule immediately to see a psych provider and have this situation rectified  Reviewed coping strategies to deal with anxiety such as grounding, deep breathing, and gradual exposure  During this session, this clinician used the following therapeutic modalities: Cognitive Behavioral Therapy, Cognitive Processing Therapy, Mindfulness-based Strategies and Supportive Psychotherapy    Substance Abuse was not addressed during this session  If the client is diagnosed with a co-occurring substance use disorder, please indicate any changes in the frequency or amount of use: N/A  Stage of change for addressing substance use diagnoses: No substance use/Not applicable    ASSESSMENT:  Priya James presents with a Euthymic/ normal mood  her affect is Normal range and intensity, which is congruent, with her mood and the content of the session  The client has made progress on their goals  Priya James presents with a none risk of suicide, none risk of self-harm, and none risk of harm to others  For any risk assessment that surpasses a "low" rating, a safety plan must be developed      A safety plan was indicated: no  If yes, describe in detail N/A    PLAN: Between sessions, Lauren Mckay will take her medication as prescribed and practice positive coping strategies to help cope with anxiety  At the next session, the therapist will use Cognitive Behavioral Therapy, Cognitive Processing Therapy, Mindfulness-based Strategies and Supportive Psychotherapy to address anxiety  Behavioral Health Treatment Plan and Discharge Planning: Lauren Mckay is aware of and agrees to continue to work on their treatment plan  They have identified and are working toward their discharge goals   yes    Visit start and stop times:    03/19/23  Start Time: 1430  Stop Time: 1520  Total Visit Time: 50 minutes

## 2023-03-20 ENCOUNTER — OFFICE VISIT (OUTPATIENT)
Dept: PSYCHIATRY | Facility: CLINIC | Age: 48
End: 2023-03-20

## 2023-03-20 DIAGNOSIS — F41.1 GAD (GENERALIZED ANXIETY DISORDER): ICD-10-CM

## 2023-03-20 DIAGNOSIS — F43.10 PTSD (POST-TRAUMATIC STRESS DISORDER): Primary | ICD-10-CM

## 2023-03-20 DIAGNOSIS — F32.89 OTHER DEPRESSION: ICD-10-CM

## 2023-03-20 RX ORDER — TRAZODONE HYDROCHLORIDE 50 MG/1
50 TABLET ORAL
Qty: 30 TABLET | Refills: 1 | Status: SHIPPED | OUTPATIENT
Start: 2023-03-20 | End: 2023-05-19

## 2023-03-22 ENCOUNTER — PATIENT OUTREACH (OUTPATIENT)
Dept: CASE MANAGEMENT | Facility: HOSPITAL | Age: 48
End: 2023-03-22

## 2023-03-22 NOTE — PROGRESS NOTES
Called patient to follow up  Patient was available at the time of outreach  [facility] reminded patient of their appointment tomorrow for disability at 1pm via phone  Patient thanked this [facility] for the reminder  [facility] asked the patient to call them once the appointment is complete  Patient verbalized that she would keep this [facility] posted  [facility] will continue to follow up

## 2023-03-23 ENCOUNTER — TELEPHONE (OUTPATIENT)
Dept: OBGYN CLINIC | Facility: HOSPITAL | Age: 48
End: 2023-03-23

## 2023-03-23 NOTE — TELEPHONE ENCOUNTER
Caller: Patient    Doctor: Jhon Del Real    Reason for call: She called in for her surgery date with Dr Manasa Stahl  Advised patient of date  Understanding verbalized       Call back#: n/a

## 2023-03-24 ENCOUNTER — PATIENT OUTREACH (OUTPATIENT)
Dept: CASE MANAGEMENT | Facility: HOSPITAL | Age: 48
End: 2023-03-24

## 2023-03-24 NOTE — PROGRESS NOTES
This [address] [place] [address] called patient to follow up on the referral, there was no answer  Left a voicemail requesting a call back  Phone number and office hours provided  Will try again

## 2023-03-27 ENCOUNTER — PATIENT OUTREACH (OUTPATIENT)
Dept: CASE MANAGEMENT | Facility: HOSPITAL | Age: 48
End: 2023-03-27

## 2023-03-27 ENCOUNTER — OFFICE VISIT (OUTPATIENT)
Dept: SURGERY | Facility: CLINIC | Age: 48
End: 2023-03-27

## 2023-03-27 VITALS — HEIGHT: 62 IN | TEMPERATURE: 96.8 F | BODY MASS INDEX: 39.12 KG/M2 | WEIGHT: 212.6 LBS

## 2023-03-27 DIAGNOSIS — Z09 POSTOPERATIVE EXAMINATION: Primary | ICD-10-CM
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
## 2023-03-28 DIAGNOSIS — E83.51 HYPOCALCEMIA: Primary | ICD-10-CM

## 2023-03-29 ENCOUNTER — APPOINTMENT (OUTPATIENT)
Dept: LAB | Facility: HOSPITAL | Age: 48
End: 2023-03-29
Attending: INTERNAL MEDICINE

## 2023-03-29 DIAGNOSIS — E83.51 HYPOCALCEMIA: ICD-10-CM

## 2023-03-29 LAB
ANION GAP SERPL CALCULATED.3IONS-SCNC: 15 MMOL/L (ref 4–13)
BUN SERPL-MCNC: 22 MG/DL (ref 5–25)
CALCIUM SERPL-MCNC: 10.1 MG/DL (ref 8.4–10.2)
CHLORIDE SERPL-SCNC: 98 MMOL/L (ref 96–108)
CO2 SERPL-SCNC: 21 MMOL/L (ref 21–32)
CREAT SERPL-MCNC: 1.31 MG/DL (ref 0.6–1.3)
GFR SERPL CREATININE-BSD FRML MDRD: 48 ML/MIN/1.73SQ M
GLUCOSE P FAST SERPL-MCNC: 150 MG/DL (ref 65–99)
POTASSIUM SERPL-SCNC: 3.9 MMOL/L (ref 3.5–5.3)
SODIUM SERPL-SCNC: 134 MMOL/L (ref 135–147)

## 2023-03-30 ENCOUNTER — PATIENT OUTREACH (OUTPATIENT)
Dept: CASE MANAGEMENT | Facility: HOSPITAL | Age: 48
End: 2023-03-30

## 2023-03-30 ENCOUNTER — TELEMEDICINE (OUTPATIENT)
Dept: BEHAVIORAL/MENTAL HEALTH CLINIC | Facility: CLINIC | Age: 48
End: 2023-03-30

## 2023-03-30 DIAGNOSIS — F33.1 MODERATE EPISODE OF RECURRENT MAJOR DEPRESSIVE DISORDER (HCC): Primary | ICD-10-CM

## 2023-03-30 NOTE — PROGRESS NOTES
This [facility] called patient to follow up on the referral, there was no answer  Left a voicemail requesting a call back  Phone number and office hours provided  Will try again
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
## 2023-04-01 ENCOUNTER — HOSPITAL ENCOUNTER (EMERGENCY)
Facility: HOSPITAL | Age: 48
Discharge: HOME | End: 2023-04-01
Attending: EMERGENCY MEDICINE

## 2023-04-01 VITALS
WEIGHT: 217.15 LBS | HEART RATE: 86 BPM | DIASTOLIC BLOOD PRESSURE: 67 MMHG | OXYGEN SATURATION: 96 % | TEMPERATURE: 97.9 F | SYSTOLIC BLOOD PRESSURE: 136 MMHG | BODY MASS INDEX: 39.72 KG/M2 | RESPIRATION RATE: 18 BRPM

## 2023-04-01 DIAGNOSIS — R20.2 PARESTHESIAS: Primary | ICD-10-CM

## 2023-04-01 DIAGNOSIS — E83.51 HYPOCALCEMIA: ICD-10-CM

## 2023-04-01 LAB
ANION GAP SERPL CALCULATED.3IONS-SCNC: 11 MMOL/L (ref 4–13)
BUN SERPL-MCNC: 32 MG/DL (ref 5–25)
CA-I BLD-SCNC: 1.11 MMOL/L (ref 1.12–1.32)
CALCIUM SERPL-MCNC: 8.9 MG/DL (ref 8.4–10.2)
CHLORIDE SERPL-SCNC: 102 MMOL/L (ref 96–108)
CO2 SERPL-SCNC: 24 MMOL/L (ref 21–32)
CREAT SERPL-MCNC: 1.2 MG/DL (ref 0.6–1.3)
GFR SERPL CREATININE-BSD FRML MDRD: 54 ML/MIN/1.73SQ M
GLUCOSE SERPL-MCNC: 150 MG/DL (ref 65–140)
POTASSIUM SERPL-SCNC: 4 MMOL/L (ref 3.5–5.3)
SODIUM SERPL-SCNC: 137 MMOL/L (ref 135–147)

## 2023-04-01 RX ORDER — CALCIUM GLUCONATE 20 MG/ML
1 INJECTION, SOLUTION INTRAVENOUS ONCE
Status: COMPLETED | OUTPATIENT
Start: 2023-04-01 | End: 2023-04-01

## 2023-04-01 RX ADMIN — CALCIUM GLUCONATE 1 G: 20 INJECTION, SOLUTION INTRAVENOUS at 04:02
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
## 2023-04-02 LAB
ATRIAL RATE: 76 BPM
ATRIAL RATE: 81 BPM
P AXIS: 77 DEGREES
P AXIS: 79 DEGREES
PR INTERVAL: 164 MS
PR INTERVAL: 164 MS
QRS AXIS: 60 DEGREES
QRS AXIS: 63 DEGREES
QRSD INTERVAL: 80 MS
QRSD INTERVAL: 84 MS
QT INTERVAL: 390 MS
QT INTERVAL: 390 MS
QTC INTERVAL: 438 MS
QTC INTERVAL: 453 MS
T WAVE AXIS: 68 DEGREES
T WAVE AXIS: 71 DEGREES
VENTRICULAR RATE: 76 BPM
VENTRICULAR RATE: 81 BPM

## 2023-04-03 ENCOUNTER — HOSPITAL ENCOUNTER (EMERGENCY)
Facility: HOSPITAL | Age: 48
Discharge: HOME | End: 2023-04-03
Attending: EMERGENCY MEDICINE

## 2023-04-03 ENCOUNTER — TELEPHONE (OUTPATIENT)
Dept: ENDOCRINOLOGY | Facility: CLINIC | Age: 48
End: 2023-04-03

## 2023-04-03 ENCOUNTER — APPOINTMENT (OUTPATIENT)
Dept: LAB | Facility: HOSPITAL | Age: 48
End: 2023-04-03

## 2023-04-03 VITALS
DIASTOLIC BLOOD PRESSURE: 81 MMHG | TEMPERATURE: 97.4 F | RESPIRATION RATE: 18 BRPM | SYSTOLIC BLOOD PRESSURE: 131 MMHG | HEIGHT: 62 IN | OXYGEN SATURATION: 96 % | HEART RATE: 106 BPM | BODY MASS INDEX: 39.93 KG/M2 | WEIGHT: 217 LBS

## 2023-04-03 DIAGNOSIS — E83.51 HYPOCALCEMIA: ICD-10-CM

## 2023-04-03 DIAGNOSIS — R21 RASH: ICD-10-CM

## 2023-04-03 DIAGNOSIS — E83.51 HYPOCALCEMIA: Primary | ICD-10-CM

## 2023-04-03 DIAGNOSIS — N18.31 STAGE 3A CHRONIC KIDNEY DISEASE (HCC): Primary | ICD-10-CM

## 2023-04-03 LAB
ANION GAP SERPL CALCULATED.3IONS-SCNC: 13 MMOL/L (ref 4–13)
BUN SERPL-MCNC: 24 MG/DL (ref 5–25)
CA-I BLD-SCNC: 1.06 MMOL/L (ref 1.12–1.32)
CALCIUM SERPL-MCNC: 8.5 MG/DL (ref 8.4–10.2)
CHLORIDE SERPL-SCNC: 101 MMOL/L (ref 96–108)
CO2 SERPL-SCNC: 23 MMOL/L (ref 21–32)
CREAT SERPL-MCNC: 1.05 MG/DL (ref 0.6–1.3)
GFR SERPL CREATININE-BSD FRML MDRD: 63 ML/MIN/1.73SQ M
GLUCOSE P FAST SERPL-MCNC: 196 MG/DL (ref 65–99)
POTASSIUM SERPL-SCNC: 3.8 MMOL/L (ref 3.5–5.3)
SODIUM SERPL-SCNC: 137 MMOL/L (ref 135–147)

## 2023-04-03 RX ORDER — CALCIUM GLUCONATE 20 MG/ML
2 INJECTION, SOLUTION INTRAVENOUS ONCE
Status: COMPLETED | OUTPATIENT
Start: 2023-04-03 | End: 2023-04-03

## 2023-04-03 RX ORDER — NAPROXEN 500 MG/1
500 TABLET ORAL 2 TIMES DAILY WITH MEALS
Qty: 30 TABLET | Refills: 0 | Status: SHIPPED | OUTPATIENT
Start: 2023-04-03

## 2023-04-03 RX ORDER — CEPHALEXIN 500 MG/1
500 CAPSULE ORAL EVERY 12 HOURS SCHEDULED
Qty: 14 CAPSULE | Refills: 0 | Status: SHIPPED | OUTPATIENT
Start: 2023-04-03 | End: 2023-04-10

## 2023-04-03 RX ADMIN — CALCIUM GLUCONATE 2 G: 20 INJECTION, SOLUTION INTRAVENOUS at 17:42

## 2023-04-03 NOTE — TELEPHONE ENCOUNTER
Patient usually feels symptoms of hypocalcemia when her blood calcium is 8 5 or less       please inform patient that her calcium is 8 5,  Calcium carbonate was stopped because calcium was 10 1  Please inform patient that she should continue Rocaltrol at current dose, Take 1 capsule (0 5 mcg total) by mouth 2 (two) times a day with meals, start calcium carbonate 500 mg with dinner,    Joesph Tineo

## 2023-04-03 NOTE — DISCHARGE INSTRUCTIONS
Please take the antibiotics and the medications, apply ice on the arm pit and if the pain gets worse and spreading rash then follow up with the surgeon
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
## 2023-04-04 NOTE — TELEPHONE ENCOUNTER
Jose E Dacosta, She should continue Rocaltrol and increase calcium carbonate 500 mg twice a day , take extra calcium 500 mg if she has symptoms of tingling and numbness or spasms   Also I have put the referral for nephrology as her creatinine was elevated recently     Follow up for her appt as scheduled     Ruth Durant

## 2023-04-05 ENCOUNTER — PATIENT OUTREACH (OUTPATIENT)
Dept: INTERNAL MEDICINE CLINIC | Facility: CLINIC | Age: 48
End: 2023-04-05

## 2023-04-05 NOTE — PROGRESS NOTES
Jacinta Cardenas will be taking the CHW referral for this patient starting today  CHW referral was originally sent to [facility] completed a chart review prior to calling patient  [facility] call patient, introduced myself and explained my role  We discussed the referral that was received and patient accepts [facility] services  Patient reports that did file for SSD and was denied because she did not have enough work credits  Patient also denied SSI benefits because she is still legally  although patient reports they have been  since 2010  Patient is working with her  to appeal the SSI decision  Patient and her  never filed for a divorce so that patient can maintain her husbands health insurance  Patient reports that she has Section 8 housing and pays $52 a month, and receives $554 a month in food stamps  Patient resides in the house with 3 of her adult children  Patient reports that her oldest son 25years old  Is disabled,however he does not receive SSD income  Her Daughter is her Paid Care Giver for which she is allowed 51 hours a month  Patient had a new ADL evaluation  about 2 weeks ago and thinks she will get more hours  Her youngest son who is 23 is a student and works part time  Patient has a strong support system in place and depends on her  to pay the household bills and take her to out of state medial appointments  Patient  lives one block away with his brother  After reviewing patient's chart, and discussing referral needs, it was determined that patient's needs for CHW referral have been met  Patient is aware that CHW referral will be closed as of today  Patient has my contact information and will call [facility] if needed  [facility] will forward my note to [facility] and ask to be removed from the care team     No further outreach is scheduled at this time

## 2023-04-06 ENCOUNTER — TELEPHONE (OUTPATIENT)
Dept: PSYCHIATRY | Facility: CLINIC | Age: 48
End: 2023-04-06

## 2023-04-06 ENCOUNTER — PATIENT OUTREACH (OUTPATIENT)
Dept: INTERNAL MEDICINE CLINIC | Facility: CLINIC | Age: 48
End: 2023-04-06

## 2023-04-06 NOTE — TELEPHONE ENCOUNTER
Patient is calling regarding cancelling an appointment  Date/Time: 4/6/2023 @ 2:30  Reason: Patient has another commitment for a DPS call from [place]      Patient was rescheduled: YES [] NO [x]  If yes, when was Patient reschedule for:     Patient requesting call back to reschedule: YES [] NO [x]

## 2023-04-06 NOTE — PROGRESS NOTES
Chart was reviewed  This RNCM received IB message from [facility] and patient's social issues were all addressed and no other assistance needed  Patient declined further follow up with RNCM  This RNCM removed self and Cherri Waite from care team and episode was closed  Note routed to [facility]

## 2023-04-21 ENCOUNTER — APPOINTMENT (OUTPATIENT)
Dept: LAB | Facility: HOSPITAL | Age: 48
End: 2023-04-21
Attending: INTERNAL MEDICINE

## 2023-04-21 DIAGNOSIS — E83.51 HYPOCALCEMIA: Primary | ICD-10-CM

## 2023-04-21 DIAGNOSIS — E83.51 HYPOCALCEMIA: ICD-10-CM

## 2023-04-21 LAB
CA-I BLD-SCNC: 1.08 MMOL/L (ref 1.12–1.32)
T4 FREE SERPL-MCNC: 1.27 NG/DL (ref 0.76–1.46)
TSH SERPL DL<=0.05 MIU/L-ACNC: 1.1 UIU/ML (ref 0.45–4.5)

## 2023-04-22 ENCOUNTER — HOSPITAL ENCOUNTER (EMERGENCY)
Facility: HOSPITAL | Age: 48
Discharge: HOME | End: 2023-04-22
Attending: EMERGENCY MEDICINE

## 2023-04-22 VITALS
RESPIRATION RATE: 16 BRPM | DIASTOLIC BLOOD PRESSURE: 63 MMHG | OXYGEN SATURATION: 95 % | BODY MASS INDEX: 39.18 KG/M2 | TEMPERATURE: 98.3 F | SYSTOLIC BLOOD PRESSURE: 129 MMHG | HEART RATE: 96 BPM | WEIGHT: 214.2 LBS

## 2023-04-22 DIAGNOSIS — E83.51 HYPOCALCEMIA: Primary | ICD-10-CM

## 2023-04-22 LAB — CA-I BLD-SCNC: 1.07 MMOL/L (ref 1.12–1.32)

## 2023-04-22 RX ORDER — CALCIUM GLUCONATE 20 MG/ML
2 INJECTION, SOLUTION INTRAVENOUS ONCE
Status: COMPLETED | OUTPATIENT
Start: 2023-04-22 | End: 2023-04-22

## 2023-04-22 RX ADMIN — CALCIUM GLUCONATE 2 G: 20 INJECTION, SOLUTION INTRAVENOUS at 02:09
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
## 2023-04-25 ENCOUNTER — OFFICE VISIT (OUTPATIENT)
Dept: ENDOCRINOLOGY | Facility: CLINIC | Age: 48
End: 2023-04-25

## 2023-04-25 ENCOUNTER — APPOINTMENT (OUTPATIENT)
Dept: LAB | Facility: HOSPITAL | Age: 48
End: 2023-04-25
Attending: INTERNAL MEDICINE

## 2023-04-25 VITALS
WEIGHT: 204.8 LBS | HEART RATE: 82 BPM | BODY MASS INDEX: 37.69 KG/M2 | SYSTOLIC BLOOD PRESSURE: 110 MMHG | DIASTOLIC BLOOD PRESSURE: 60 MMHG | HEIGHT: 62 IN

## 2023-04-25 DIAGNOSIS — R53.83 OTHER FATIGUE: ICD-10-CM

## 2023-04-25 DIAGNOSIS — E89.2 POST-SURGICAL HYPOPARATHYROIDISM (HCC): ICD-10-CM

## 2023-04-25 DIAGNOSIS — E83.51 HYPOCALCEMIA: ICD-10-CM

## 2023-04-25 DIAGNOSIS — E55.9 VITAMIN D DEFICIENCY: ICD-10-CM

## 2023-04-25 DIAGNOSIS — E89.0 POSTOPERATIVE HYPOTHYROIDISM: ICD-10-CM

## 2023-04-25 DIAGNOSIS — E89.2 POST-SURGICAL HYPOPARATHYROIDISM (HCC): Primary | ICD-10-CM

## 2023-04-25 LAB
ALBUMIN SERPL BCP-MCNC: 4.2 G/DL (ref 3.5–5)
ALP SERPL-CCNC: 65 U/L (ref 34–104)
ALT SERPL W P-5'-P-CCNC: 40 U/L (ref 7–52)
ANION GAP SERPL CALCULATED.3IONS-SCNC: 12 MMOL/L (ref 4–13)
AST SERPL W P-5'-P-CCNC: 20 U/L (ref 13–39)
BILIRUB SERPL-MCNC: 0.42 MG/DL (ref 0.2–1)
BUN SERPL-MCNC: 27 MG/DL (ref 5–25)
CA-I BLD-SCNC: 1.3 MMOL/L (ref 1.12–1.32)
CALCIUM SERPL-MCNC: 11.2 MG/DL (ref 8.4–10.2)
CHLORIDE SERPL-SCNC: 103 MMOL/L (ref 96–108)
CO2 SERPL-SCNC: 22 MMOL/L (ref 21–32)
CREAT SERPL-MCNC: 1.48 MG/DL (ref 0.6–1.3)
GFR SERPL CREATININE-BSD FRML MDRD: 42 ML/MIN/1.73SQ M
GLUCOSE SERPL-MCNC: 102 MG/DL (ref 65–140)
PHOSPHATE SERPL-MCNC: 3 MG/DL (ref 2.7–4.5)
POTASSIUM SERPL-SCNC: 4.1 MMOL/L (ref 3.5–5.3)
PROT SERPL-MCNC: 7.6 G/DL (ref 6.4–8.4)
SODIUM SERPL-SCNC: 137 MMOL/L (ref 135–147)

## 2023-04-25 RX ORDER — CELECOXIB 200 MG/1
CAPSULE ORAL
COMMUNITY
Start: 2023-03-15

## 2023-04-25 RX ORDER — ERGOCALCIFEROL 1.25 MG/1
50000 CAPSULE ORAL WEEKLY
Qty: 12 CAPSULE | Refills: 0 | Status: SHIPPED | OUTPATIENT
Start: 2023-04-25
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
## 2023-04-25 NOTE — PATIENT INSTRUCTIONS
Labs now   As long as you do not have high calcium levels, will plan to start weekly high dose vitamin D2  Keep well hydrated

## 2023-04-26 LAB
ATRIAL RATE: 87 BPM
P AXIS: 50 DEGREES
PR INTERVAL: 170 MS
QRS AXIS: 28 DEGREES
QRSD INTERVAL: 80 MS
QT INTERVAL: 378 MS
QTC INTERVAL: 454 MS
T WAVE AXIS: 61 DEGREES
VENTRICULAR RATE: 87 BPM

## 2023-04-27 ENCOUNTER — TELEMEDICINE (OUTPATIENT)
Dept: BEHAVIORAL/MENTAL HEALTH CLINIC | Facility: CLINIC | Age: 48
End: 2023-04-27

## 2023-04-27 DIAGNOSIS — F33.1 MODERATE EPISODE OF RECURRENT MAJOR DEPRESSIVE DISORDER (HCC): Primary | ICD-10-CM

## 2023-04-30 LAB
25(OH)D2 SERPL-MCNC: 3 NG/ML
25(OH)D3 SERPL-MCNC: 34 NG/ML
25(OH)D3+25(OH)D2 SERPL-MCNC: 37 NG/ML

## 2023-05-01 ENCOUNTER — APPOINTMENT (OUTPATIENT)
Dept: LAB | Facility: HOSPITAL | Age: 48
End: 2023-05-01

## 2023-05-01 DIAGNOSIS — E89.0 POSTOPERATIVE HYPOTHYROIDISM: ICD-10-CM

## 2023-05-01 DIAGNOSIS — E83.51 HYPOCALCEMIA: ICD-10-CM

## 2023-05-01 DIAGNOSIS — E55.9 VITAMIN D DEFICIENCY: ICD-10-CM

## 2023-05-01 DIAGNOSIS — E89.2 POST-SURGICAL HYPOPARATHYROIDISM (HCC): ICD-10-CM

## 2023-05-01 LAB
ANION GAP SERPL CALCULATED.3IONS-SCNC: 14 MMOL/L (ref 4–13)
BUN SERPL-MCNC: 20 MG/DL (ref 5–25)
CALCIUM SERPL-MCNC: 9.9 MG/DL (ref 8.4–10.2)
CHLORIDE SERPL-SCNC: 103 MMOL/L (ref 96–108)
CO2 SERPL-SCNC: 22 MMOL/L (ref 21–32)
CREAT SERPL-MCNC: 1.19 MG/DL (ref 0.6–1.3)
GFR SERPL CREATININE-BSD FRML MDRD: 54 ML/MIN/1.73SQ M
GLUCOSE SERPL-MCNC: 145 MG/DL (ref 65–140)
POTASSIUM SERPL-SCNC: 3.8 MMOL/L (ref 3.5–5.3)
SODIUM SERPL-SCNC: 139 MMOL/L (ref 135–147)

## 2023-05-01 NOTE — PSYCH
"Behavioral Health Psychotherapy Progress Note    Psychotherapy Provided: Individual Psychotherapy     1  Moderate episode of recurrent major depressive disorder ([address] )            Goals addressed in session: Goal 1     DATA: Reports feeling well  Taking some much needed time away from her sister again because she sees its's affects on her stress and health  Discussed ways to maintain limits and affirmed value without taking care of someone  During this session, this clinician used the following therapeutic modalities: Cognitive Behavioral Therapy, Cognitive Processing Therapy, Mindfulness-based Strategies and Supportive Psychotherapy    Substance Abuse was not addressed during this session  If the client is diagnosed with a co-occurring substance use disorder, please indicate any changes in the frequency or amount of use: N/A  Stage of change for addressing substance use diagnoses: No substance use/Not applicable    ASSESSMENT:  Manuel Mary presents with a Euthymic/ normal mood  her affect is Normal range and intensity, which is congruent, with her mood and the content of the session  The client has made progress on their goals  Manuel Mary presents with a none risk of suicide, none risk of self-harm, and none risk of harm to others  For any risk assessment that surpasses a \"low\" rating, a safety plan must be developed  A safety plan was indicated: no  If yes, describe in detail N/A    PLAN: Between sessions, Manuel Mary will take medication as prescribed and practice positive coping strategies as needed  At the next session, the therapist will use Cognitive Behavioral Therapy, Cognitive Processing Therapy, Mindfulness-based Strategies and Supportive Psychotherapy to address stress  Behavioral Health Treatment Plan and Discharge Planning: Manuel Mary is aware of and agrees to continue to work on their treatment plan   They have identified and are " working toward their discharge goals   yes    Visit start and stop times:    04/30/23  Start Time: 1430  Stop Time: 1520  Total Visit Time: 50 minutes

## 2023-05-04 ENCOUNTER — TELEMEDICINE (OUTPATIENT)
Dept: BEHAVIORAL/MENTAL HEALTH CLINIC | Facility: CLINIC | Age: 48
End: 2023-05-04

## 2023-05-04 DIAGNOSIS — F33.1 MODERATE EPISODE OF RECURRENT MAJOR DEPRESSIVE DISORDER (HCC): Primary | ICD-10-CM
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
## 2023-05-06 ENCOUNTER — HOSPITAL ENCOUNTER (EMERGENCY)
Facility: HOSPITAL | Age: 48
Discharge: HOME | End: 2023-05-07
Attending: EMERGENCY MEDICINE

## 2023-05-06 DIAGNOSIS — R20.2 PARESTHESIAS: ICD-10-CM

## 2023-05-06 DIAGNOSIS — E83.51 HYPOCALCEMIA: Primary | ICD-10-CM

## 2023-05-06 LAB
ALBUMIN SERPL BCP-MCNC: 4.1 G/DL (ref 3.5–5)
ALP SERPL-CCNC: 79 U/L (ref 34–104)
ALT SERPL W P-5'-P-CCNC: 46 U/L (ref 7–52)
ANION GAP SERPL CALCULATED.3IONS-SCNC: 10 MMOL/L (ref 4–13)
AST SERPL W P-5'-P-CCNC: 22 U/L (ref 13–39)
BILIRUB SERPL-MCNC: 0.27 MG/DL (ref 0.2–1)
BUN SERPL-MCNC: 16 MG/DL (ref 5–25)
CA-I BLD-SCNC: 1.11 MMOL/L (ref 1.12–1.32)
CALCIUM SERPL-MCNC: 9.2 MG/DL (ref 8.4–10.2)
CHLORIDE SERPL-SCNC: 103 MMOL/L (ref 96–108)
CO2 SERPL-SCNC: 25 MMOL/L (ref 21–32)
CREAT SERPL-MCNC: 1.05 MG/DL (ref 0.6–1.3)
GFR SERPL CREATININE-BSD FRML MDRD: 63 ML/MIN/1.73SQ M
GLUCOSE SERPL-MCNC: 112 MG/DL (ref 65–140)
POTASSIUM SERPL-SCNC: 3.7 MMOL/L (ref 3.5–5.3)
PROT SERPL-MCNC: 7.1 G/DL (ref 6.4–8.4)
SODIUM SERPL-SCNC: 138 MMOL/L (ref 135–147)

## 2023-05-06 RX ORDER — CALCIUM GLUCONATE 20 MG/ML
2 INJECTION, SOLUTION INTRAVENOUS ONCE
Status: COMPLETED | OUTPATIENT
Start: 2023-05-06 | End: 2023-05-07

## 2023-05-06 RX ADMIN — CALCIUM GLUCONATE 2 G: 20 INJECTION, SOLUTION INTRAVENOUS at 23:13

## 2023-05-07 VITALS
DIASTOLIC BLOOD PRESSURE: 88 MMHG | HEART RATE: 78 BPM | TEMPERATURE: 98.4 F | RESPIRATION RATE: 15 BRPM | OXYGEN SATURATION: 97 % | SYSTOLIC BLOOD PRESSURE: 155 MMHG | WEIGHT: 212.6 LBS | BODY MASS INDEX: 38.89 KG/M2

## 2023-05-07 LAB
ATRIAL RATE: 84 BPM
P AXIS: 48 DEGREES
PR INTERVAL: 176 MS
QRS AXIS: 24 DEGREES
QRSD INTERVAL: 78 MS
QT INTERVAL: 370 MS
QTC INTERVAL: 437 MS
T WAVE AXIS: 56 DEGREES
VENTRICULAR RATE: 84 BPM

## 2023-05-07 NOTE — ED NOTES
Placed on continuous cardiac monitor      Arnold Pro, [address] [facility]  05/06/23 [id number]
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
## 2023-05-08 ENCOUNTER — TELEPHONE (OUTPATIENT)
Dept: ENDOCRINOLOGY | Facility: CLINIC | Age: 48
End: 2023-05-08

## 2023-05-08 ENCOUNTER — APPOINTMENT (OUTPATIENT)
Dept: LAB | Facility: HOSPITAL | Age: 48
End: 2023-05-08

## 2023-05-08 DIAGNOSIS — E83.51 HYPOCALCEMIA: ICD-10-CM

## 2023-05-08 DIAGNOSIS — E27.40 ADRENAL INSUFFICIENCY (HCC): Primary | ICD-10-CM

## 2023-05-08 DIAGNOSIS — E05.00 GRAVES DISEASE: Primary | ICD-10-CM

## 2023-05-08 LAB — CA-I BLD-SCNC: 1.13 MMOL/L (ref 1.12–1.32)
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
## 2023-05-09 ENCOUNTER — TELEMEDICINE (OUTPATIENT)
Dept: PSYCHIATRY | Facility: CLINIC | Age: 48
End: 2023-05-09

## 2023-05-09 DIAGNOSIS — F41.9 ANXIETY: ICD-10-CM

## 2023-05-09 DIAGNOSIS — F32.89 OTHER DEPRESSION: ICD-10-CM

## 2023-05-09 DIAGNOSIS — F41.1 GAD (GENERALIZED ANXIETY DISORDER): ICD-10-CM

## 2023-05-09 DIAGNOSIS — F43.10 PTSD (POST-TRAUMATIC STRESS DISORDER): Primary | ICD-10-CM

## 2023-05-09 RX ORDER — DESVENLAFAXINE 100 MG/1
100 TABLET, EXTENDED RELEASE ORAL DAILY
Qty: 90 TABLET | Refills: 0 | Status: SHIPPED | OUTPATIENT
Start: 2023-05-09

## 2023-05-09 RX ORDER — TRAZODONE HYDROCHLORIDE 50 MG/1
50 TABLET ORAL
Qty: 90 TABLET | Refills: 0 | Status: SHIPPED | OUTPATIENT
Start: 2023-05-09

## 2023-05-09 RX ORDER — ALPRAZOLAM 2 MG/1
2 TABLET, EXTENDED RELEASE ORAL
Qty: 60 TABLET | Refills: 0 | Status: SHIPPED | OUTPATIENT
Start: 2023-05-16

## 2023-05-10 ENCOUNTER — HOSPITAL ENCOUNTER (OUTPATIENT)
Facility: HOSPITAL | Age: 48
Setting detail: OBSERVATION
Discharge: LEFT BEFORE DISCHARGE | End: 2023-05-10
Attending: FAMILY MEDICINE | Admitting: INTERNAL MEDICINE

## 2023-05-10 ENCOUNTER — APPOINTMENT (OUTPATIENT)
Dept: LAB | Facility: HOSPITAL | Age: 48
End: 2023-05-10
Attending: INTERNAL MEDICINE

## 2023-05-10 ENCOUNTER — TELEPHONE (OUTPATIENT)
Dept: ENDOCRINOLOGY | Facility: CLINIC | Age: 48
End: 2023-05-10

## 2023-05-10 VITALS
TEMPERATURE: 97.5 F | SYSTOLIC BLOOD PRESSURE: 128 MMHG | RESPIRATION RATE: 20 BRPM | DIASTOLIC BLOOD PRESSURE: 95 MMHG | OXYGEN SATURATION: 95 % | HEART RATE: 118 BPM

## 2023-05-10 DIAGNOSIS — E55.9 VITAMIN D DEFICIENCY: ICD-10-CM

## 2023-05-10 DIAGNOSIS — E89.0 POSTOPERATIVE HYPOTHYROIDISM: ICD-10-CM

## 2023-05-10 DIAGNOSIS — E89.2 POST-SURGICAL HYPOPARATHYROIDISM (HCC): ICD-10-CM

## 2023-05-10 DIAGNOSIS — E83.52 HYPERCALCEMIA: ICD-10-CM

## 2023-05-10 DIAGNOSIS — N17.9 AKI (ACUTE KIDNEY INJURY) (HCC): Primary | ICD-10-CM

## 2023-05-10 DIAGNOSIS — E83.51 HYPOCALCEMIA: Primary | ICD-10-CM

## 2023-05-10 DIAGNOSIS — E83.51 HYPOCALCEMIA: ICD-10-CM

## 2023-05-10 LAB
ALBUMIN SERPL BCP-MCNC: 4.3 G/DL (ref 3.5–5)
ALP SERPL-CCNC: 77 U/L (ref 34–104)
ALT SERPL W P-5'-P-CCNC: 42 U/L (ref 7–52)
ANION GAP SERPL CALCULATED.3IONS-SCNC: 10 MMOL/L (ref 4–13)
AST SERPL W P-5'-P-CCNC: 19 U/L (ref 13–39)
BASOPHILS # BLD AUTO: 0.04 THOUSANDS/ÂΜL (ref 0–0.1)
BASOPHILS NFR BLD AUTO: 1 % (ref 0–1)
BILIRUB SERPL-MCNC: 0.42 MG/DL (ref 0.2–1)
BUN SERPL-MCNC: 26 MG/DL (ref 5–25)
CA-I BLD-SCNC: 1.52 MMOL/L (ref 1.12–1.32)
CALCIUM SERPL-MCNC: 13.1 MG/DL (ref 8.4–10.2)
CALCIUM SERPL-MCNC: 13.4 MG/DL (ref 8.4–10.2)
CHLORIDE SERPL-SCNC: 104 MMOL/L (ref 96–108)
CO2 SERPL-SCNC: 23 MMOL/L (ref 21–32)
CREAT SERPL-MCNC: 1.6 MG/DL (ref 0.6–1.3)
EOSINOPHIL # BLD AUTO: 0.11 THOUSAND/ÂΜL (ref 0–0.61)
EOSINOPHIL NFR BLD AUTO: 1 % (ref 0–6)
ERYTHROCYTE [DISTWIDTH] IN BLOOD BY AUTOMATED COUNT: 15 % (ref 11.6–15.1)
GFR SERPL CREATININE-BSD FRML MDRD: 38 ML/MIN/1.73SQ M
GLUCOSE SERPL-MCNC: 121 MG/DL (ref 65–140)
HCT VFR BLD AUTO: 42.1 % (ref 34.8–46.1)
HGB BLD-MCNC: 14 G/DL (ref 11.5–15.4)
IMM GRANULOCYTES # BLD AUTO: 0.02 THOUSAND/UL (ref 0–0.2)
IMM GRANULOCYTES NFR BLD AUTO: 0 % (ref 0–2)
LYMPHOCYTES # BLD AUTO: 2.03 THOUSANDS/ÂΜL (ref 0.6–4.47)
LYMPHOCYTES NFR BLD AUTO: 25 % (ref 14–44)
MAGNESIUM SERPL-MCNC: 2.2 MG/DL (ref 1.9–2.7)
MCH RBC QN AUTO: 30.2 PG (ref 26.8–34.3)
MCHC RBC AUTO-ENTMCNC: 33.3 G/DL (ref 31.4–37.4)
MCV RBC AUTO: 91 FL (ref 82–98)
MONOCYTES # BLD AUTO: 0.51 THOUSAND/ÂΜL (ref 0.17–1.22)
MONOCYTES NFR BLD AUTO: 6 % (ref 4–12)
NEUTROPHILS # BLD AUTO: 5.4 THOUSANDS/ÂΜL (ref 1.85–7.62)
NEUTS SEG NFR BLD AUTO: 67 % (ref 43–75)
NRBC BLD AUTO-RTO: 0 /100 WBCS
PHOSPHATE SERPL-MCNC: 3.8 MG/DL (ref 2.7–4.5)
PLATELET # BLD AUTO: 204 THOUSANDS/UL (ref 149–390)
PMV BLD AUTO: 10.3 FL (ref 8.9–12.7)
POTASSIUM SERPL-SCNC: 4.1 MMOL/L (ref 3.5–5.3)
PROT SERPL-MCNC: 7.8 G/DL (ref 6.4–8.4)
RBC # BLD AUTO: 4.64 MILLION/UL (ref 3.81–5.12)
SODIUM SERPL-SCNC: 137 MMOL/L (ref 135–147)
WBC # BLD AUTO: 8.11 THOUSAND/UL (ref 4.31–10.16)

## 2023-05-10 RX ORDER — SODIUM CHLORIDE 9 MG/ML
75 INJECTION, SOLUTION INTRAVENOUS CONTINUOUS
Status: CANCELLED | OUTPATIENT
Start: 2023-05-10

## 2023-05-10 RX ORDER — KETOROLAC TROMETHAMINE 30 MG/ML
15 INJECTION, SOLUTION INTRAMUSCULAR; INTRAVENOUS ONCE
Status: COMPLETED | OUTPATIENT
Start: 2023-05-10 | End: 2023-05-10

## 2023-05-10 RX ORDER — ONDANSETRON 2 MG/ML
4 INJECTION INTRAMUSCULAR; INTRAVENOUS EVERY 6 HOURS PRN
Status: DISCONTINUED | OUTPATIENT
Start: 2023-05-10 | End: 2023-05-10 | Stop reason: HOSPADM

## 2023-05-10 RX ADMIN — ONDANSETRON 4 MG: 2 INJECTION INTRAMUSCULAR; INTRAVENOUS at 14:17

## 2023-05-10 RX ADMIN — SODIUM CHLORIDE 1000 ML: 0.9 INJECTION, SOLUTION INTRAVENOUS at 14:12

## 2023-05-10 RX ADMIN — KETOROLAC TROMETHAMINE 15 MG: 30 INJECTION, SOLUTION INTRAMUSCULAR at 14:16

## 2023-05-10 NOTE — DISCHARGE INSTRUCTIONS
Please hold all calcium containing medications until calcium level normalized    Present to outpatient lab tomorrow for repeat labs
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
## 2023-05-10 NOTE — Clinical Note
Date: 5/10/2023  Patient: Viri Cano  Admitted: 5/10/2023  1:45 PM  Attending Provider: Genevieve Valdivia or her authorized caregiver has made the decision for the patient to leave the emergency department ag ainst the advice of her attending physician  She or her authorized caregiver has been informed and understands the inherent risks, including death  She or her authorized caregiver has decided to accept the responsibility for this decision  Viri Cano and all necessary parties have been advised that she may return for further evaluation or treatment  Her condition at time of discharge was Stable    Viri Cano had current vital signs as follows:  /95 (BP Location: R ight arm)   Pulse (!) 118   Temp 97 5 °F (36 4 °C) (Tympanic)   Resp 20   LMP 12/06/2018

## 2023-05-10 NOTE — TELEPHONE ENCOUNTER
Patient already in ER for the high calcium/ abnormal labs   Follow up after    Recommend keeping well hydrated and taking consistent doses of medications

## 2023-05-10 NOTE — TELEPHONE ENCOUNTER
Please put in a standing order for the patients calcium and calcium ionized labs  Aperture Size (Optional): C Post-Care Instructions: I reviewed with the patient in detail post-care instructions. Patient is to wear sunprotection, and avoid picking at any of the treated lesions. Pt may apply Vaseline to crusted or scabbing areas. Consent: The patient's consent was obtained including but not limited to risks of crusting, scabbing, blistering, scarring, darker or lighter pigmentary change, recurrence, incomplete removal and infection. Detail Level: Zone Total Number Of Aks Treated: 15 Duration Of Freeze Thaw-Cycle (Seconds): 30 Render Post-Care Instructions In Note?: no Number Of Freeze-Thaw Cycles: 1 freeze-thaw cycle Render In Bullet Format When Appropriate: Yes

## 2023-05-11 ENCOUNTER — TELEMEDICINE (OUTPATIENT)
Dept: BEHAVIORAL/MENTAL HEALTH CLINIC | Facility: CLINIC | Age: 48
End: 2023-05-11

## 2023-05-11 ENCOUNTER — APPOINTMENT (OUTPATIENT)
Dept: LAB | Facility: HOSPITAL | Age: 48
End: 2023-05-11
Attending: INTERNAL MEDICINE

## 2023-05-11 DIAGNOSIS — E83.51 HYPOCALCEMIA: ICD-10-CM

## 2023-05-11 DIAGNOSIS — E83.51 HYPOCALCEMIA: Primary | ICD-10-CM

## 2023-05-11 DIAGNOSIS — N17.9 AKI (ACUTE KIDNEY INJURY) (HCC): ICD-10-CM

## 2023-05-11 DIAGNOSIS — E83.52 HYPERCALCEMIA: ICD-10-CM

## 2023-05-11 DIAGNOSIS — F33.1 MODERATE EPISODE OF RECURRENT MAJOR DEPRESSIVE DISORDER (HCC): Primary | ICD-10-CM

## 2023-05-11 DIAGNOSIS — F17.210 NICOTINE DEPENDENCE, CIGARETTES, UNCOMPLICATED: ICD-10-CM

## 2023-05-11 LAB
ANION GAP SERPL CALCULATED.3IONS-SCNC: 11 MMOL/L (ref 4–13)
ATRIAL RATE: 101 BPM
BUN SERPL-MCNC: 28 MG/DL (ref 5–25)
CA-I BLD-SCNC: 1.32 MMOL/L (ref 1.12–1.32)
CALCIUM SERPL-MCNC: 10.8 MG/DL (ref 8.4–10.2)
CHLORIDE SERPL-SCNC: 105 MMOL/L (ref 96–108)
CO2 SERPL-SCNC: 21 MMOL/L (ref 21–32)
CREAT SERPL-MCNC: 1.86 MG/DL (ref 0.6–1.3)
GFR SERPL CREATININE-BSD FRML MDRD: 31 ML/MIN/1.73SQ M
GLUCOSE P FAST SERPL-MCNC: 97 MG/DL (ref 65–99)
P AXIS: 38 DEGREES
POTASSIUM SERPL-SCNC: 4.2 MMOL/L (ref 3.5–5.3)
PR INTERVAL: 170 MS
QRS AXIS: 13 DEGREES
QRSD INTERVAL: 76 MS
QT INTERVAL: 308 MS
QTC INTERVAL: 399 MS
SODIUM SERPL-SCNC: 137 MMOL/L (ref 135–147)
T WAVE AXIS: 20 DEGREES
VENTRICULAR RATE: 101 BPM

## 2023-05-11 NOTE — TELEPHONE ENCOUNTER
Repeat calcium improved 10 8  Recommend taking calcium supplementation however she should not take too many extra doses   There needs to be consistency along with keeping well hydrated as patient has both extremes of hypo and hypercalcemia

## 2023-05-11 NOTE — PSYCH
"Behavioral Health Psychotherapy Progress Note    Psychotherapy Provided: Individual Psychotherapy     1  Moderate episode of recurrent major depressive disorder ([address] )        2  Nicotine dependence, cigarettes, uncomplicated            Goals addressed in session: Goal 1     DATA: Miriam Medina reports feeling ok stating that she went to the ER yesterday due to high calcicum  She has been following up with discharge instructions  She talked about distancing herself from her sister [employer] this past week and that it made a difference with regard to overall stress  We discussed her being constantly exposed to high stress subject matters such as watching horror movies at night while she sleeps and how wants background noise on because she finds it comforting  We talked about introducing music instead as it calms the brain waves but she was resistant to this  We talked about her father and how she rejects feeling sad and missing him because she wants to believe that he is actually still alive, just that they do not talk  During this session, this clinician used the following therapeutic modalities: Cognitive Processing Therapy, Mindfulness-based Strategies, Motivational Interviewing and Supportive Psychotherapy    Substance Abuse was not addressed during this session  If the client is diagnosed with a co-occurring substance use disorder, please indicate any changes in the frequency or amount of use: N/A  Stage of change for addressing substance use diagnoses: No substance use/Not applicable    ASSESSMENT:  Fransisco Kelsey presents with a Euthymic/ normal mood  her affect is Normal range and intensity, which is congruent, with her mood and the content of the session  The client has made progress on their goals  Fransisco Kelsey presents with a none risk of suicide, none risk of self-harm, and none risk of harm to others      For any risk assessment that surpasses a \"low\" rating, a safety plan must be " developed  A safety plan was indicated: no  If yes, describe in detail N/A    PLAN: Between sessions, Karime Mckay will take medication as prescribed and practice positive coping strategies as needed  At the next session, the therapist will use Cognitive Processing Therapy, Mindfulness-based Strategies and Supportive Psychotherapy to address stress and anxiety  Behavioral Health Treatment Plan and Discharge Planning: Karime Mckay is aware of and agrees to continue to work on their treatment plan  They have identified and are working toward their discharge goals   yes    Visit start and stop times:    05/11/23  Start Time: 1430  Stop Time: 1520  Total Visit Time: 50 minutes

## 2023-05-11 NOTE — TELEPHONE ENCOUNTER
Spoke with patient   She questioned if her ionized calcium is so high, should she be taking additional calcium?

## 2023-05-12 NOTE — TELEPHONE ENCOUNTER
Spoke with patient and reviewed recommendations around the amount of calcium she should be taking    She understood

## 2023-05-15 ENCOUNTER — TELEPHONE (OUTPATIENT)
Dept: NEPHROLOGY | Facility: CLINIC | Age: 48
End: 2023-05-15

## 2023-05-17 ENCOUNTER — APPOINTMENT (OUTPATIENT)
Dept: LAB | Facility: HOSPITAL | Age: 48
End: 2023-05-17
Attending: INTERNAL MEDICINE

## 2023-05-25 ENCOUNTER — TELEMEDICINE (OUTPATIENT)
Dept: BEHAVIORAL/MENTAL HEALTH CLINIC | Facility: CLINIC | Age: 48
End: 2023-05-25

## 2023-05-25 DIAGNOSIS — F33.1 MODERATE EPISODE OF RECURRENT MAJOR DEPRESSIVE DISORDER (HCC): Primary | ICD-10-CM

## 2023-05-25 DIAGNOSIS — F17.210 NICOTINE DEPENDENCE, CIGARETTES, UNCOMPLICATED: ICD-10-CM

## 2023-05-25 NOTE — PSYCH
"Behavioral Health Psychotherapy Progress Note    Psychotherapy Provided: Individual Psychotherapy     1  Moderate episode of recurrent major depressive disorder ([address] )        2  Nicotine dependence, cigarettes, uncomplicated            Goals addressed in session: Goal 1     DATA: Susan Caruso reports feeling well overall stating that she has been trying to walk more and diet  We discussed healthy eating habits, which she said that she would try and keep up with  She remains supportive of her sister but is losing patience  She is mindful of how her body reacts to these situations and is spoken to her only 3 times in the last two weeks, which is an improvement  During this session, this clinician used the following therapeutic modalities: Cognitive Processing Therapy, Mindfulness-based Strategies, Solution-Focused Therapy and Supportive Psychotherapy    Substance Abuse was not addressed during this session  If the client is diagnosed with a co-occurring substance use disorder, please indicate any changes in the frequency or amount of use: N/A  Stage of change for addressing substance use diagnoses: No substance use/Not applicable    ASSESSMENT:  Stacy Crooks presents with a Euthymic/ normal mood  her affect is Normal range and intensity, which is congruent, with her mood and the content of the session  The client has made progress on their goals  Stacy Crooks presents with a none risk of suicide, none risk of self-harm, and none risk of harm to others  For any risk assessment that surpasses a \"low\" rating, a safety plan must be developed  A safety plan was indicated: no  If yes, describe in detail N/A    PLAN: Between sessions, Stacy Crooks will take medication as prescribed and practice positive coping strategies as needed   At the next session, the therapist will use Cognitive Processing Therapy, Mindfulness-based Strategies, Solution-Focused Therapy and Supportive Psychotherapy " to address dysregulation  Behavioral Health Treatment Plan and Discharge Planning: Lexi Javi is aware of and agrees to continue to work on their treatment plan  They have identified and are working toward their discharge goals   yes    Visit start and stop times:    05/25/23  Start Time: 1430  Stop Time: 1520  Total Visit Time: 50 minutes

## 2023-05-26 ENCOUNTER — TELEPHONE (OUTPATIENT)
Dept: ENDOCRINOLOGY | Facility: CLINIC | Age: 48
End: 2023-05-26

## 2023-05-26 ENCOUNTER — APPOINTMENT (OUTPATIENT)
Dept: LAB | Facility: HOSPITAL | Age: 48
End: 2023-05-26
Attending: INTERNAL MEDICINE

## 2023-05-26 LAB
ANION GAP SERPL CALCULATED.3IONS-SCNC: 13 MMOL/L (ref 4–13)
BUN SERPL-MCNC: 25 MG/DL (ref 5–25)
CALCIUM SERPL-MCNC: 12.4 MG/DL (ref 8.4–10.2)
CHLORIDE SERPL-SCNC: 100 MMOL/L (ref 96–108)
CO2 SERPL-SCNC: 23 MMOL/L (ref 21–32)
CREAT SERPL-MCNC: 1.81 MG/DL (ref 0.6–1.3)
GFR SERPL CREATININE-BSD FRML MDRD: 33 ML/MIN/1.73SQ M
GLUCOSE P FAST SERPL-MCNC: 177 MG/DL (ref 65–99)
POTASSIUM SERPL-SCNC: 4 MMOL/L (ref 3.5–5.3)
SODIUM SERPL-SCNC: 136 MMOL/L (ref 135–147)

## 2023-05-26 NOTE — TELEPHONE ENCOUNTER
Spoke with patient and she confirmed taking fluids and going to ER  She also confirms taking 2 calcium tablets daily

## 2023-05-26 NOTE — TELEPHONE ENCOUNTER
Please advise patient to keep well-hydrated, will need IV fluids, recommend going to the ER   Decrease calcium supplementation to twice a day

## 2023-05-27 ENCOUNTER — HOSPITAL ENCOUNTER (EMERGENCY)
Facility: HOSPITAL | Age: 48
Discharge: HOME | End: 2023-05-27
Attending: EMERGENCY MEDICINE | Admitting: EMERGENCY MEDICINE

## 2023-05-27 VITALS
HEART RATE: 93 BPM | DIASTOLIC BLOOD PRESSURE: 91 MMHG | RESPIRATION RATE: 16 BRPM | OXYGEN SATURATION: 99 % | SYSTOLIC BLOOD PRESSURE: 141 MMHG | TEMPERATURE: 97.6 F

## 2023-05-27 DIAGNOSIS — E83.52 HYPERCALCEMIA: ICD-10-CM

## 2023-05-27 DIAGNOSIS — E86.0 DEHYDRATION: Primary | ICD-10-CM

## 2023-05-27 LAB
ALBUMIN SERPL BCP-MCNC: 4.1 G/DL (ref 3.5–5)
ALP SERPL-CCNC: 68 U/L (ref 34–104)
ALT SERPL W P-5'-P-CCNC: 34 U/L (ref 7–52)
ANION GAP SERPL CALCULATED.3IONS-SCNC: 11 MMOL/L (ref 4–13)
AST SERPL W P-5'-P-CCNC: 29 U/L (ref 13–39)
BILIRUB SERPL-MCNC: 0.41 MG/DL (ref 0.2–1)
BUN SERPL-MCNC: 32 MG/DL (ref 5–25)
CALCIUM SERPL-MCNC: 12.6 MG/DL (ref 8.4–10.2)
CALCIUM SERPL-MCNC: 13.1 MG/DL (ref 8.4–10.2)
CHLORIDE SERPL-SCNC: 101 MMOL/L (ref 96–108)
CO2 SERPL-SCNC: 26 MMOL/L (ref 21–32)
CREAT SERPL-MCNC: 2.02 MG/DL (ref 0.6–1.3)
GFR SERPL CREATININE-BSD FRML MDRD: 28 ML/MIN/1.73SQ M
GLUCOSE SERPL-MCNC: 92 MG/DL (ref 65–140)
POTASSIUM SERPL-SCNC: 4 MMOL/L (ref 3.5–5.3)
PROT SERPL-MCNC: 7.4 G/DL (ref 6.4–8.4)
SODIUM SERPL-SCNC: 138 MMOL/L (ref 135–147)

## 2023-05-27 RX ADMIN — SODIUM CHLORIDE 1000 ML: 0.9 INJECTION, SOLUTION INTRAVENOUS at 17:36

## 2023-05-27 RX ADMIN — SODIUM CHLORIDE 1000 ML: 0.9 INJECTION, SOLUTION INTRAVENOUS at 18:29
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
## 2023-05-27 NOTE — DISCHARGE INSTRUCTIONS
We recommend that you refrain from additional calcium supplementation for the next few days  Drink lots of fluids  Use previously prescribed Zofran as needed for nausea  If you have decreased urination, worsening nausea and vomiting, severe fatigue, return to the emergency department  Follow-up with your primary care provider later in the week for reassessment  Additionally recommend that you are evaluated by your nephrologist in the next few weeks given your gradual worsening of kidney function

## 2023-05-30 ENCOUNTER — APPOINTMENT (OUTPATIENT)
Dept: LAB | Facility: HOSPITAL | Age: 48
End: 2023-05-30
Attending: INTERNAL MEDICINE

## 2023-05-30 DIAGNOSIS — E83.51 HYPOCALCEMIA: Primary | ICD-10-CM

## 2023-05-30 DIAGNOSIS — E83.51 HYPOCALCEMIA: ICD-10-CM

## 2023-05-30 LAB
ALBUMIN SERPL BCP-MCNC: 4.1 G/DL (ref 3.5–5)
ALP SERPL-CCNC: 62 U/L (ref 34–104)
ALT SERPL W P-5'-P-CCNC: 33 U/L (ref 7–52)
ANION GAP SERPL CALCULATED.3IONS-SCNC: 9 MMOL/L (ref 4–13)
AST SERPL W P-5'-P-CCNC: 18 U/L (ref 13–39)
BILIRUB SERPL-MCNC: 0.35 MG/DL (ref 0.2–1)
BUN SERPL-MCNC: 26 MG/DL (ref 5–25)
CALCIUM SERPL-MCNC: 11.4 MG/DL (ref 8.4–10.2)
CHLORIDE SERPL-SCNC: 103 MMOL/L (ref 96–108)
CO2 SERPL-SCNC: 24 MMOL/L (ref 21–32)
CREAT SERPL-MCNC: 1.9 MG/DL (ref 0.6–1.3)
GFR SERPL CREATININE-BSD FRML MDRD: 31 ML/MIN/1.73SQ M
GLUCOSE P FAST SERPL-MCNC: 98 MG/DL (ref 65–99)
POTASSIUM SERPL-SCNC: 3.7 MMOL/L (ref 3.5–5.3)
PROT SERPL-MCNC: 7 G/DL (ref 6.4–8.4)
SODIUM SERPL-SCNC: 136 MMOL/L (ref 135–147)

## 2023-05-30 PROCEDURE — 36415 COLL VENOUS BLD VENIPUNCTURE: CPT

## 2023-05-30 PROCEDURE — 80053 COMPREHEN METABOLIC PANEL: CPT

## 2023-05-30 NOTE — TELEPHONE ENCOUNTER
Reviewed labs from 5/30/2023  Calcium 11 4  Recommend keeping well-hydrated, decrease calcium supplementation to once a day   repeat CMP in 2-3 days

## 2023-05-31 DIAGNOSIS — E83.51 HYPOCALCEMIA: Primary | ICD-10-CM

## 2023-06-01 ENCOUNTER — TELEMEDICINE (OUTPATIENT)
Dept: BEHAVIORAL/MENTAL HEALTH CLINIC | Facility: CLINIC | Age: 48
End: 2023-06-01

## 2023-06-01 DIAGNOSIS — Z91.199 NO-SHOW FOR APPOINTMENT: Primary | ICD-10-CM

## 2023-06-01 NOTE — PSYCH
No Call  No Show  Charge    Mary Ellen Mercado no showed 06/01/23 appointment , staff called and left message to reschedule appointment     Treatment Plan not due at this session

## 2023-06-02 ENCOUNTER — APPOINTMENT (OUTPATIENT)
Dept: LAB | Facility: HOSPITAL | Age: 48
End: 2023-06-02
Attending: INTERNAL MEDICINE
Payer: COMMERCIAL

## 2023-06-02 DIAGNOSIS — E83.51 HYPOCALCEMIA: ICD-10-CM

## 2023-06-02 LAB
ALBUMIN SERPL BCP-MCNC: 4.3 G/DL (ref 3.5–5)
ALP SERPL-CCNC: 64 U/L (ref 34–104)
ALT SERPL W P-5'-P-CCNC: 28 U/L (ref 7–52)
ANION GAP SERPL CALCULATED.3IONS-SCNC: 8 MMOL/L (ref 4–13)
AST SERPL W P-5'-P-CCNC: 13 U/L (ref 13–39)
BASOPHILS # BLD AUTO: 0.02 THOUSANDS/ÂΜL (ref 0–0.1)
BASOPHILS NFR BLD AUTO: 0 % (ref 0–1)
BILIRUB SERPL-MCNC: 0.35 MG/DL (ref 0.2–1)
BUN SERPL-MCNC: 31 MG/DL (ref 5–25)
CALCIUM SERPL-MCNC: 10.9 MG/DL (ref 8.4–10.2)
CHLORIDE SERPL-SCNC: 108 MMOL/L (ref 96–108)
CO2 SERPL-SCNC: 21 MMOL/L (ref 21–32)
CREAT SERPL-MCNC: 1.67 MG/DL (ref 0.6–1.3)
EOSINOPHIL # BLD AUTO: 0.01 THOUSAND/ÂΜL (ref 0–0.61)
EOSINOPHIL NFR BLD AUTO: 0 % (ref 0–6)
ERYTHROCYTE [DISTWIDTH] IN BLOOD BY AUTOMATED COUNT: 14.6 % (ref 11.6–15.1)
FERRITIN SERPL-MCNC: 333 NG/ML (ref 11–307)
GFR SERPL CREATININE-BSD FRML MDRD: 36 ML/MIN/1.73SQ M
GLUCOSE P FAST SERPL-MCNC: 113 MG/DL (ref 65–99)
HCT VFR BLD AUTO: 36.6 % (ref 34.8–46.1)
HGB BLD-MCNC: 12 G/DL (ref 11.5–15.4)
IMM GRANULOCYTES # BLD AUTO: 0.13 THOUSAND/UL (ref 0–0.2)
IMM GRANULOCYTES NFR BLD AUTO: 1 % (ref 0–2)
IRON SATN MFR SERPL: 22 % (ref 15–50)
IRON SERPL-MCNC: 81 UG/DL (ref 50–170)
LYMPHOCYTES # BLD AUTO: 1.87 THOUSANDS/ÂΜL (ref 0.6–4.47)
LYMPHOCYTES NFR BLD AUTO: 12 % (ref 14–44)
MCH RBC QN AUTO: 30.2 PG (ref 26.8–34.3)
MCHC RBC AUTO-ENTMCNC: 32.8 G/DL (ref 31.4–37.4)
MCV RBC AUTO: 92 FL (ref 82–98)
MONOCYTES # BLD AUTO: 0.83 THOUSAND/ÂΜL (ref 0.17–1.22)
MONOCYTES NFR BLD AUTO: 5 % (ref 4–12)
NEUTROPHILS # BLD AUTO: 12.41 THOUSANDS/ÂΜL (ref 1.85–7.62)
NEUTS SEG NFR BLD AUTO: 82 % (ref 43–75)
NRBC BLD AUTO-RTO: 0 /100 WBCS
PLATELET # BLD AUTO: 229 THOUSANDS/UL (ref 149–390)
PMV BLD AUTO: 11.1 FL (ref 8.9–12.7)
POTASSIUM SERPL-SCNC: 4.2 MMOL/L (ref 3.5–5.3)
PROT SERPL-MCNC: 7.2 G/DL (ref 6.4–8.4)
RBC # BLD AUTO: 3.97 MILLION/UL (ref 3.81–5.12)
SODIUM SERPL-SCNC: 137 MMOL/L (ref 135–147)
TIBC SERPL-MCNC: 367 UG/DL (ref 250–450)
WBC # BLD AUTO: 15.27 THOUSAND/UL (ref 4.31–10.16)

## 2023-06-02 PROCEDURE — 80053 COMPREHEN METABOLIC PANEL: CPT

## 2023-06-05 ENCOUNTER — APPOINTMENT (OUTPATIENT)
Dept: LAB | Facility: HOSPITAL | Age: 48
End: 2023-06-05
Attending: INTERNAL MEDICINE
Payer: COMMERCIAL

## 2023-06-05 DIAGNOSIS — E83.51 HYPOCALCEMIA: ICD-10-CM

## 2023-06-05 LAB — CALCIUM SERPL-MCNC: 10.8 MG/DL (ref 8.4–10.2)

## 2023-06-05 PROCEDURE — 36415 COLL VENOUS BLD VENIPUNCTURE: CPT

## 2023-06-05 PROCEDURE — 82310 ASSAY OF CALCIUM: CPT

## 2023-06-08 ENCOUNTER — TELEMEDICINE (OUTPATIENT)
Dept: BEHAVIORAL/MENTAL HEALTH CLINIC | Facility: CLINIC | Age: 48
End: 2023-06-08
Payer: COMMERCIAL

## 2023-06-08 ENCOUNTER — TELEPHONE (OUTPATIENT)
Dept: HEMATOLOGY ONCOLOGY | Facility: CLINIC | Age: 48
End: 2023-06-08

## 2023-06-08 DIAGNOSIS — F41.1 GENERALIZED ANXIETY DISORDER WITH PANIC ATTACKS: ICD-10-CM

## 2023-06-08 DIAGNOSIS — F17.210 NICOTINE DEPENDENCE, CIGARETTES, UNCOMPLICATED: ICD-10-CM

## 2023-06-08 DIAGNOSIS — F41.0 GENERALIZED ANXIETY DISORDER WITH PANIC ATTACKS: ICD-10-CM

## 2023-06-08 DIAGNOSIS — F33.1 MODERATE EPISODE OF RECURRENT MAJOR DEPRESSIVE DISORDER (HCC): Primary | ICD-10-CM

## 2023-06-08 PROCEDURE — 90832 PSYTX W PT 30 MINUTES: CPT | Performed by: SOCIAL WORKER

## 2023-06-08 NOTE — TELEPHONE ENCOUNTER
----- Message from Sarah Pena PA-C sent at 6/7/2023  9:27 AM EDT -----  Repeat CBCD prior to follow up in 2 weeks- wBC elevated  If sick or ill please document, but still have pt follow up with cbc  Phoned patient with Camille Willingham PA-C recommendations  Left voice message with direct call back number

## 2023-06-09 ENCOUNTER — APPOINTMENT (OUTPATIENT)
Dept: LAB | Facility: HOSPITAL | Age: 48
End: 2023-06-09
Attending: INTERNAL MEDICINE
Payer: COMMERCIAL

## 2023-06-09 DIAGNOSIS — K76.0 FATTY LIVER: Primary | ICD-10-CM

## 2023-06-09 DIAGNOSIS — E83.51 HYPOCALCEMIA: ICD-10-CM

## 2023-06-09 LAB — CALCIUM SERPL-MCNC: 9.7 MG/DL (ref 8.4–10.2)

## 2023-06-09 PROCEDURE — 82310 ASSAY OF CALCIUM: CPT

## 2023-06-09 PROCEDURE — 36415 COLL VENOUS BLD VENIPUNCTURE: CPT

## 2023-06-09 NOTE — TELEPHONE ENCOUNTER
Returned call to patient  Patient stating she had back injections prior to the previous labs  Post injections patient did have some leg edema and hot flashes  Patient states the edema is resolving now  No other changes noted in patient condition  Patient will have  CBC completed prior to next appt    CBC order placed

## 2023-06-13 ENCOUNTER — APPOINTMENT (OUTPATIENT)
Dept: LAB | Facility: HOSPITAL | Age: 48
End: 2023-06-13
Payer: COMMERCIAL

## 2023-06-13 ENCOUNTER — OFFICE VISIT (OUTPATIENT)
Dept: ENDOCRINOLOGY | Facility: CLINIC | Age: 48
End: 2023-06-13
Payer: COMMERCIAL

## 2023-06-13 VITALS
HEART RATE: 90 BPM | WEIGHT: 207 LBS | DIASTOLIC BLOOD PRESSURE: 84 MMHG | SYSTOLIC BLOOD PRESSURE: 120 MMHG | HEIGHT: 62 IN | BODY MASS INDEX: 38.09 KG/M2

## 2023-06-13 DIAGNOSIS — R73.03 PREDIABETES: ICD-10-CM

## 2023-06-13 DIAGNOSIS — E89.0 POSTOPERATIVE HYPOTHYROIDISM: ICD-10-CM

## 2023-06-13 DIAGNOSIS — E66.01 CLASS 2 SEVERE OBESITY WITH SERIOUS COMORBIDITY AND BODY MASS INDEX (BMI) OF 37.0 TO 37.9 IN ADULT, UNSPECIFIED OBESITY TYPE (HCC): ICD-10-CM

## 2023-06-13 DIAGNOSIS — E55.9 VITAMIN D DEFICIENCY: ICD-10-CM

## 2023-06-13 DIAGNOSIS — E83.51 HYPOCALCEMIA: ICD-10-CM

## 2023-06-13 DIAGNOSIS — K76.0 FATTY LIVER: ICD-10-CM

## 2023-06-13 DIAGNOSIS — E89.2 POST-SURGICAL HYPOPARATHYROIDISM (HCC): Primary | ICD-10-CM

## 2023-06-13 LAB
BASOPHILS # BLD AUTO: 0.05 THOUSANDS/ÂΜL (ref 0–0.1)
BASOPHILS NFR BLD AUTO: 1 % (ref 0–1)
CALCIUM SERPL-MCNC: 10.8 MG/DL (ref 8.4–10.2)
EOSINOPHIL # BLD AUTO: 0.03 THOUSAND/ÂΜL (ref 0–0.61)
EOSINOPHIL NFR BLD AUTO: 0 % (ref 0–6)
ERYTHROCYTE [DISTWIDTH] IN BLOOD BY AUTOMATED COUNT: 15.4 % (ref 11.6–15.1)
HCT VFR BLD AUTO: 35.6 % (ref 34.8–46.1)
HGB BLD-MCNC: 11.5 G/DL (ref 11.5–15.4)
IMM GRANULOCYTES # BLD AUTO: 0.02 THOUSAND/UL (ref 0–0.2)
IMM GRANULOCYTES NFR BLD AUTO: 0 % (ref 0–2)
LYMPHOCYTES # BLD AUTO: 1.71 THOUSANDS/ÂΜL (ref 0.6–4.47)
LYMPHOCYTES NFR BLD AUTO: 25 % (ref 14–44)
MCH RBC QN AUTO: 29.9 PG (ref 26.8–34.3)
MCHC RBC AUTO-ENTMCNC: 32.3 G/DL (ref 31.4–37.4)
MCV RBC AUTO: 93 FL (ref 82–98)
MONOCYTES # BLD AUTO: 0.47 THOUSAND/ÂΜL (ref 0.17–1.22)
MONOCYTES NFR BLD AUTO: 7 % (ref 4–12)
NEUTROPHILS # BLD AUTO: 4.62 THOUSANDS/ÂΜL (ref 1.85–7.62)
NEUTS SEG NFR BLD AUTO: 67 % (ref 43–75)
NRBC BLD AUTO-RTO: 0 /100 WBCS
PLATELET # BLD AUTO: 155 THOUSANDS/UL (ref 149–390)
PMV BLD AUTO: 10.8 FL (ref 8.9–12.7)
RBC # BLD AUTO: 3.85 MILLION/UL (ref 3.81–5.12)
WBC # BLD AUTO: 6.9 THOUSAND/UL (ref 4.31–10.16)

## 2023-06-13 PROCEDURE — 36415 COLL VENOUS BLD VENIPUNCTURE: CPT

## 2023-06-13 PROCEDURE — 85025 COMPLETE CBC W/AUTO DIFF WBC: CPT

## 2023-06-13 PROCEDURE — 99215 OFFICE O/P EST HI 40 MIN: CPT | Performed by: INTERNAL MEDICINE

## 2023-06-13 PROCEDURE — 82310 ASSAY OF CALCIUM: CPT

## 2023-06-13 RX ORDER — DICYCLOMINE HCL 20 MG
TABLET ORAL
COMMUNITY
End: 2023-06-19

## 2023-06-13 RX ORDER — LEVOTHYROXINE SODIUM 0.15 MG/1
150 TABLET ORAL
Qty: 90 TABLET | Refills: 0 | Status: SHIPPED | OUTPATIENT
Start: 2023-06-13

## 2023-06-13 RX ORDER — SERTRALINE HYDROCHLORIDE 100 MG/1
TABLET, FILM COATED ORAL
COMMUNITY
End: 2023-06-19

## 2023-06-13 RX ORDER — FAMOTIDINE 20 MG/1
TABLET, FILM COATED ORAL
COMMUNITY
End: 2023-06-19

## 2023-06-13 RX ORDER — CEPHALEXIN 500 MG/1
CAPSULE ORAL
COMMUNITY

## 2023-06-13 RX ORDER — SERTRALINE HYDROCHLORIDE 25 MG/1
TABLET, FILM COATED ORAL
COMMUNITY
End: 2023-06-19

## 2023-06-13 RX ORDER — DOXEPIN HYDROCHLORIDE 25 MG/1
CAPSULE ORAL
COMMUNITY
End: 2023-06-19

## 2023-06-13 RX ORDER — CALCITRIOL 0.5 UG/1
0.5 CAPSULE, LIQUID FILLED ORAL 2 TIMES DAILY WITH MEALS
Qty: 60 CAPSULE | Refills: 5 | Status: SHIPPED | OUTPATIENT
Start: 2023-06-13 | End: 2023-06-23
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
## 2023-06-15 ENCOUNTER — TELEMEDICINE (OUTPATIENT)
Dept: BEHAVIORAL/MENTAL HEALTH CLINIC | Facility: CLINIC | Age: 48
End: 2023-06-15

## 2023-06-15 DIAGNOSIS — F41.1 GENERALIZED ANXIETY DISORDER WITH PANIC ATTACKS: ICD-10-CM

## 2023-06-15 DIAGNOSIS — F17.210 NICOTINE DEPENDENCE, CIGARETTES, UNCOMPLICATED: ICD-10-CM

## 2023-06-15 DIAGNOSIS — F33.1 MODERATE EPISODE OF RECURRENT MAJOR DEPRESSIVE DISORDER (HCC): Primary | ICD-10-CM

## 2023-06-15 DIAGNOSIS — F41.0 GENERALIZED ANXIETY DISORDER WITH PANIC ATTACKS: ICD-10-CM
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
## 2023-06-15 NOTE — PSYCH
"Behavioral Health Psychotherapy Progress Note    Psychotherapy Provided: Individual Psychotherapy     1  Moderate episode of recurrent major depressive disorder ([address] )        2  Generalized anxiety disorder with panic attacks        3  Nicotine dependence, cigarettes, uncomplicated            Goals addressed in session: Goal 1      DATA: Has surgery and is feeling tired  Ended appointment early as a result  During this session, this clinician used the following therapeutic modalities: Supportive Psychotherapy    Substance Abuse was not addressed during this session  If the client is diagnosed with a co-occurring substance use disorder, please indicate any changes in the frequency or amount of use: N/A  Stage of change for addressing substance use diagnoses: No substance use/Not applicable    ASSESSMENT:  Monty Chinchilla presents with a Euthymic/ normal mood  her affect is Normal range and intensity, which is congruent, with her mood and the content of the session  The client has made progress on their goals  Monty Chinchilla presents with a none risk of suicide, none risk of self-harm, and none risk of harm to others  For any risk assessment that surpasses a \"low\" rating, a safety plan must be developed  A safety plan was indicated: no  If yes, describe in detail N/A    PLAN: Between sessions, Monty Chinchilla will take medication as prescribed and practice positive coping strategies as needed  At the next session, the therapist will use Supportive Psychotherapy to address stress  Behavioral Health Treatment Plan and Discharge Planning: Monty Chinchilla is aware of and agrees to continue to work on their treatment plan  They have identified and are working toward their discharge goals   yes    Visit start and stop times:    06/15/23  Start Time: 1430  Stop Time: 1446  Total Visit Time: 16 minutes  "

## 2023-06-16 ENCOUNTER — APPOINTMENT (OUTPATIENT)
Dept: LAB | Facility: HOSPITAL | Age: 48
End: 2023-06-16
Attending: INTERNAL MEDICINE
Payer: COMMERCIAL

## 2023-06-16 DIAGNOSIS — E55.9 VITAMIN D DEFICIENCY: ICD-10-CM

## 2023-06-16 DIAGNOSIS — E89.0 POSTOPERATIVE HYPOTHYROIDISM: ICD-10-CM

## 2023-06-16 DIAGNOSIS — E83.51 HYPOCALCEMIA: ICD-10-CM

## 2023-06-16 DIAGNOSIS — E89.2 POST-SURGICAL HYPOPARATHYROIDISM (HCC): ICD-10-CM

## 2023-06-16 LAB
ALBUMIN SERPL BCP-MCNC: 4.1 G/DL (ref 3.5–5)
ALP SERPL-CCNC: 70 U/L (ref 34–104)
ALT SERPL W P-5'-P-CCNC: 33 U/L (ref 7–52)
ANION GAP SERPL CALCULATED.3IONS-SCNC: 10 MMOL/L (ref 4–13)
AST SERPL W P-5'-P-CCNC: 19 U/L (ref 13–39)
BILIRUB SERPL-MCNC: 0.37 MG/DL (ref 0.2–1)
BUN SERPL-MCNC: 22 MG/DL (ref 5–25)
CALCIUM SERPL-MCNC: 9.7 MG/DL (ref 8.4–10.2)
CHLORIDE SERPL-SCNC: 104 MMOL/L (ref 96–108)
CO2 SERPL-SCNC: 22 MMOL/L (ref 21–32)
CREAT SERPL-MCNC: 1.37 MG/DL (ref 0.6–1.3)
GFR SERPL CREATININE-BSD FRML MDRD: 46 ML/MIN/1.73SQ M
GLUCOSE P FAST SERPL-MCNC: 143 MG/DL (ref 65–99)
POTASSIUM SERPL-SCNC: 4.1 MMOL/L (ref 3.5–5.3)
PROT SERPL-MCNC: 7.2 G/DL (ref 6.4–8.4)
SODIUM SERPL-SCNC: 136 MMOL/L (ref 135–147)

## 2023-06-16 PROCEDURE — 36415 COLL VENOUS BLD VENIPUNCTURE: CPT

## 2023-06-16 PROCEDURE — 80053 COMPREHEN METABOLIC PANEL: CPT

## 2023-06-19 ENCOUNTER — APPOINTMENT (OUTPATIENT)
Dept: LAB | Facility: HOSPITAL | Age: 48
End: 2023-06-19
Attending: INTERNAL MEDICINE
Payer: COMMERCIAL

## 2023-06-19 ENCOUNTER — OFFICE VISIT (OUTPATIENT)
Dept: INTERNAL MEDICINE CLINIC | Facility: CLINIC | Age: 48
End: 2023-06-19
Payer: COMMERCIAL

## 2023-06-19 VITALS
HEART RATE: 60 BPM | OXYGEN SATURATION: 99 % | DIASTOLIC BLOOD PRESSURE: 88 MMHG | RESPIRATION RATE: 15 BRPM | SYSTOLIC BLOOD PRESSURE: 130 MMHG | HEIGHT: 62 IN | WEIGHT: 207.2 LBS | TEMPERATURE: 97.8 F | BODY MASS INDEX: 38.13 KG/M2

## 2023-06-19 DIAGNOSIS — M54.6 CHRONIC BILATERAL THORACIC BACK PAIN: ICD-10-CM

## 2023-06-19 DIAGNOSIS — Z00.00 ANNUAL PHYSICAL EXAM: ICD-10-CM

## 2023-06-19 DIAGNOSIS — N64.89 BILATERAL PENDULOUS BREASTS: ICD-10-CM

## 2023-06-19 DIAGNOSIS — E55.9 VITAMIN D DEFICIENCY: ICD-10-CM

## 2023-06-19 DIAGNOSIS — E89.0 POSTOPERATIVE HYPOTHYROIDISM: ICD-10-CM

## 2023-06-19 DIAGNOSIS — E11.9 TYPE 2 DIABETES MELLITUS WITHOUT COMPLICATION, WITHOUT LONG-TERM CURRENT USE OF INSULIN (HCC): ICD-10-CM

## 2023-06-19 DIAGNOSIS — E83.51 HYPOCALCEMIA: ICD-10-CM

## 2023-06-19 DIAGNOSIS — E89.2 POST-SURGICAL HYPOPARATHYROIDISM (HCC): ICD-10-CM

## 2023-06-19 DIAGNOSIS — E87.6 HYPOKALEMIA: ICD-10-CM

## 2023-06-19 DIAGNOSIS — Z12.11 SCREENING FOR COLON CANCER: ICD-10-CM

## 2023-06-19 DIAGNOSIS — E20.8 OTHER HYPOPARATHYROIDISM (HCC): ICD-10-CM

## 2023-06-19 DIAGNOSIS — D68.9 BLOOD COAGULATION DISORDER (HCC): ICD-10-CM

## 2023-06-19 DIAGNOSIS — G89.29 CHRONIC BILATERAL THORACIC BACK PAIN: ICD-10-CM

## 2023-06-19 DIAGNOSIS — N18.32 STAGE 3B CHRONIC KIDNEY DISEASE (HCC): Primary | ICD-10-CM

## 2023-06-19 DIAGNOSIS — D50.8 IRON DEFICIENCY ANEMIA SECONDARY TO INADEQUATE DIETARY IRON INTAKE: ICD-10-CM

## 2023-06-19 DIAGNOSIS — F11.20 CONTINUOUS OPIOID DEPENDENCE (HCC): ICD-10-CM

## 2023-06-19 DIAGNOSIS — F41.9 ANXIETY: ICD-10-CM

## 2023-06-19 DIAGNOSIS — R05.3 CHRONIC COUGH: ICD-10-CM

## 2023-06-19 DIAGNOSIS — K76.0 FATTY LIVER DISEASE, NONALCOHOLIC: ICD-10-CM

## 2023-06-19 LAB — CALCIUM SERPL-MCNC: 11.2 MG/DL (ref 8.4–10.2)

## 2023-06-19 PROCEDURE — 82310 ASSAY OF CALCIUM: CPT

## 2023-06-19 PROCEDURE — 99396 PREV VISIT EST AGE 40-64: CPT | Performed by: INTERNAL MEDICINE

## 2023-06-19 PROCEDURE — 99214 OFFICE O/P EST MOD 30 MIN: CPT | Performed by: INTERNAL MEDICINE

## 2023-06-19 PROCEDURE — 36415 COLL VENOUS BLD VENIPUNCTURE: CPT

## 2023-06-19 RX ORDER — POTASSIUM CHLORIDE 20 MEQ/1
20 TABLET, EXTENDED RELEASE ORAL 2 TIMES DAILY
Qty: 60 TABLET | Refills: 10 | Status: SHIPPED | OUTPATIENT
Start: 2023-06-19

## 2023-06-19 RX ORDER — FERROUS SULFATE 325(65) MG
325 TABLET ORAL 2 TIMES DAILY
Qty: 60 TABLET | Refills: 2 | Status: SHIPPED | OUTPATIENT
Start: 2023-06-19 | End: 2023-09-17

## 2023-06-19 RX ORDER — DEXTROMETHORPHAN HYDROBROMIDE AND PROMETHAZINE HYDROCHLORIDE 15; 6.25 MG/5ML; MG/5ML
5 SOLUTION ORAL 4 TIMES DAILY PRN
Qty: 118 ML | Refills: 0 | Status: SHIPPED | OUTPATIENT
Start: 2023-06-19

## 2023-06-19 RX ORDER — LANOLIN ALCOHOL/MO/W.PET/CERES
400 CREAM (GRAM) TOPICAL 3 TIMES DAILY
Qty: 90 TABLET | Refills: 10 | Status: SHIPPED | OUTPATIENT
Start: 2023-06-19

## 2023-06-19 NOTE — PROGRESS NOTES
BMI Counseling: Body mass index is 37 9 kg/m²  The BMI is above normal  Nutrition recommendations include decreasing portion sizes, encouraging healthy choices of fruits and vegetables, decreasing fast food intake, consuming healthier snacks, limiting drinks that contain sugar, moderation in carbohydrate intake, increasing intake of lean protein, reducing intake of saturated and trans fat and reducing intake of cholesterol  Exercise recommendations include moderate physical activity 150 minutes/week and exercising 3-5 times per week  No pharmacotherapy was ordered  Rationale for BMI follow-up plan is due to patient being overweight or obese
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
## 2023-06-19 NOTE — PATIENT INSTRUCTIONS
Wellness Visit for Adults   AMBULATORY CARE:   A wellness visit  is when you see your healthcare provider to get screened for health problems  Your healthcare provider will also give you advice on how to stay healthy  Write down your questions so you remember to ask them  Ask your healthcare provider how often you should have a wellness visit  What happens at a wellness visit:  Your healthcare provider will ask about your health, and your family history of health problems  This includes high blood pressure, heart disease, and cancer  He or she will ask if you have symptoms that concern you, if you smoke, and about your mood  You may also be asked about your intake of medicines, supplements, food, and alcohol  Any of the following may be done:  • Your weight  will be checked  Your height may also be checked so your body mass index (BMI) can be calculated  Your BMI shows if you are at a healthy weight  • Your blood pressure  and heart rate will be checked  Your temperature may also be checked  • Blood and urine tests  may be done  Blood tests may be done to check your cholesterol levels  Abnormal cholesterol levels increase your risk for heart disease and stroke  You may also need a blood or urine test to check for diabetes if you are at increased risk  Urine tests may be done to look for signs of an infection or kidney disease  • A physical exam  includes checking your heartbeat and lungs with a stethoscope  Your healthcare provider may also check your skin to look for sun damage  • Screening tests  may be recommended  A screening test is done to check for diseases that may not cause symptoms  The screening tests you may need depend on your age, gender, family history, and lifestyle habits  For example, colorectal screening may be recommended if you are 48years old or older  Screening tests you need if you are a woman:   • A Pap smear  is used to screen for cervical cancer   Pap smears are usually done every 3 to 5 years depending on your age  You may need them more often if you have had abnormal Pap smear test results in the past  Ask your healthcare provider how often you should have a Pap smear  • A mammogram  is an x-ray of your breasts to screen for breast cancer  Experts recommend mammograms every 2 years starting at age 48 years  You may need a mammogram at age 52 years or younger if you have an increased risk for breast cancer  Talk to your healthcare provider about when you should start having mammograms and how often you need them  Vaccines you may need:   • Get an influenza vaccine  every year  The influenza vaccine protects you from the flu  Several types of viruses cause the flu  The viruses change over time, so new vaccines are made each year  • Get a tetanus-diphtheria (Td) booster vaccine  every 10 years  This vaccine protects you against tetanus and diphtheria  Tetanus is a severe infection that may cause painful muscle spasms and lockjaw  Diphtheria is a severe bacterial infection that causes a thick covering in the back of your mouth and throat  • Get a human papillomavirus (HPV) vaccine  if you are female and aged 23 to 32 or male 23 to 24 and never received it  This vaccine protects you from HPV infection  HPV is the most common infection spread by sexual contact  HPV may also cause vaginal, penile, and anal cancers  • Get a pneumococcal vaccine  if you are aged 72 years or older  The pneumococcal vaccine is an injection given to protect you from pneumococcal disease  Pneumococcal disease is an infection caused by pneumococcal bacteria  The infection may cause pneumonia, meningitis, or an ear infection  • Get a shingles vaccine  if you are 60 or older, even if you have had shingles before  The shingles vaccine is an injection to protect you from the varicella-zoster virus  This is the same virus that causes chickenpox   Shingles is a painful rash that develops in people who had chickenpox or have been exposed to the virus  How to eat healthy:  My Plate is a model for planning healthy meals  It shows the types and amounts of foods that should go on your plate  Fruits and vegetables make up about half of your plate, and grains and protein make up the other half  A serving of dairy is included on the side of your plate  The amount of calories and serving sizes you need depends on your age, gender, weight, and height  Examples of healthy foods are listed below:  • Eat a variety of vegetables  such as dark green, red, and orange vegetables  You can also include canned vegetables low in sodium (salt) and frozen vegetables without added butter or sauces  • Eat a variety of fresh fruits , canned fruit in 100% juice, frozen fruit, and dried fruit  • Include whole grains  At least half of the grains you eat should be whole grains  Examples include whole-wheat bread, wheat pasta, brown rice, and whole-grain cereals such as oatmeal     • Eat a variety of protein foods such as seafood (fish and shellfish), lean meat, and poultry without skin (turkey and chicken)  Examples of lean meats include pork leg, shoulder, or tenderloin, and beef round, sirloin, tenderloin, and extra lean ground beef  Other protein foods include eggs and egg substitutes, beans, peas, soy products, nuts, and seeds  • Choose low-fat dairy products such as skim or 1% milk or low-fat yogurt, cheese, and cottage cheese  • Limit unhealthy fats  such as butter, hard margarine, and shortening  Exercise:  Exercise at least 30 minutes per day on most days of the week  Some examples of exercise include walking, biking, dancing, and swimming  You can also fit in more physical activity by taking the stairs instead of the elevator or parking farther away from stores  Include muscle strengthening activities 2 days each week  Regular exercise provides many health benefits   It helps you manage your weight, and decreases your risk for type 2 diabetes, heart disease, stroke, and high blood pressure  Exercise can also help improve your mood  Ask your healthcare provider about the best exercise plan for you  General health and safety guidelines:   • Do not smoke  Nicotine and other chemicals in cigarettes and cigars can cause lung damage  Ask your healthcare provider for information if you currently smoke and need help to quit  E-cigarettes or smokeless tobacco still contain nicotine  Talk to your healthcare provider before you use these products  • Limit alcohol  A drink of alcohol is 12 ounces of beer, 5 ounces of wine, or 1½ ounces of liquor  • Lose weight, if needed  Being overweight increases your risk of certain health conditions  These include heart disease, high blood pressure, type 2 diabetes, and certain types of cancer  • Protect your skin  Do not sunbathe or use tanning beds  Use sunscreen with a SPF 15 or higher  Apply sunscreen at least 15 minutes before you go outside  Reapply sunscreen every 2 hours  Wear protective clothing, hats, and sunglasses when you are outside  • Drive safely  Always wear your seatbelt  Make sure everyone in your car wears a seatbelt  A seatbelt can save your life if you are in an accident  Do not use your cell phone when you are driving  This could distract you and cause an accident  Pull over if you need to make a call or send a text message  • Practice safe sex  Use latex condoms if are sexually active and have more than one partner  Your healthcare provider may recommend screening tests for sexually transmitted infections (STIs)  • Wear helmets, lifejackets, and protective gear  Always wear a helmet when you ride a bike or motorcycle, go skiing, or play sports that could cause a head injury  Wear protective equipment when you play sports  Wear a lifejacket when you are on a boat or doing water sports      © Copyright [employer] 2022 Information is for End User's use only and may not be sold, redistributed or otherwise used for commercial purposes  The above information is an  only  It is not intended as medical advice for individual conditions or treatments  Talk to your doctor, nurse or pharmacist before following any medical regimen to see if it is safe and effective for you  Wellness Visit for Adults   AMBULATORY CARE:   A wellness visit  is when you see your healthcare provider to get screened for health problems  Your healthcare provider will also give you advice on how to stay healthy  Write down your questions so you remember to ask them  Ask your healthcare provider how often you should have a wellness visit  What happens at a wellness visit:  Your healthcare provider will ask about your health, and your family history of health problems  This includes high blood pressure, heart disease, and cancer  He or she will ask if you have symptoms that concern you, if you smoke, and about your mood  You may also be asked about your intake of medicines, supplements, food, and alcohol  Any of the following may be done:  • Your weight  will be checked  Your height may also be checked so your body mass index (BMI) can be calculated  Your BMI shows if you are at a healthy weight  • Your blood pressure  and heart rate will be checked  Your temperature may also be checked  • Blood and urine tests  may be done  Blood tests may be done to check your cholesterol levels  Abnormal cholesterol levels increase your risk for heart disease and stroke  You may also need a blood or urine test to check for diabetes if you are at increased risk  Urine tests may be done to look for signs of an infection or kidney disease  • A physical exam  includes checking your heartbeat and lungs with a stethoscope  Your healthcare provider may also check your skin to look for sun damage  • Screening tests  may be recommended   A screening test is done to check for diseases that may not cause symptoms  The screening tests you may need depend on your age, gender, family history, and lifestyle habits  For example, colorectal screening may be recommended if you are 48years old or older  Screening tests you need if you are a woman:   • A Pap smear  is used to screen for cervical cancer  Pap smears are usually done every 3 to 5 years depending on your age  You may need them more often if you have had abnormal Pap smear test results in the past  Ask your healthcare provider how often you should have a Pap smear  • A mammogram  is an x-ray of your breasts to screen for breast cancer  Experts recommend mammograms every 2 years starting at age 48 years  You may need a mammogram at age 52 years or younger if you have an increased risk for breast cancer  Talk to your healthcare provider about when you should start having mammograms and how often you need them  Vaccines you may need:   • Get an influenza vaccine  every year  The influenza vaccine protects you from the flu  Several types of viruses cause the flu  The viruses change over time, so new vaccines are made each year  • Get a tetanus-diphtheria (Td) booster vaccine  every 10 years  This vaccine protects you against tetanus and diphtheria  Tetanus is a severe infection that may cause painful muscle spasms and lockjaw  Diphtheria is a severe bacterial infection that causes a thick covering in the back of your mouth and throat  • Get a human papillomavirus (HPV) vaccine  if you are female and aged 23 to 32 or male 23 to 24 and never received it  This vaccine protects you from HPV infection  HPV is the most common infection spread by sexual contact  HPV may also cause vaginal, penile, and anal cancers  • Get a pneumococcal vaccine  if you are aged 72 years or older  The pneumococcal vaccine is an injection given to protect you from pneumococcal disease   Pneumococcal disease is an infection caused by pneumococcal bacteria  The infection may cause pneumonia, meningitis, or an ear infection  • Get a shingles vaccine  if you are 60 or older, even if you have had shingles before  The shingles vaccine is an injection to protect you from the varicella-zoster virus  This is the same virus that causes chickenpox  Shingles is a painful rash that develops in people who had chickenpox or have been exposed to the virus  How to eat healthy:  My Plate is a model for planning healthy meals  It shows the types and amounts of foods that should go on your plate  Fruits and vegetables make up about half of your plate, and grains and protein make up the other half  A serving of dairy is included on the side of your plate  The amount of calories and serving sizes you need depends on your age, gender, weight, and height  Examples of healthy foods are listed below:  • Eat a variety of vegetables  such as dark green, red, and orange vegetables  You can also include canned vegetables low in sodium (salt) and frozen vegetables without added butter or sauces  • Eat a variety of fresh fruits , canned fruit in 100% juice, frozen fruit, and dried fruit  • Include whole grains  At least half of the grains you eat should be whole grains  Examples include whole-wheat bread, wheat pasta, brown rice, and whole-grain cereals such as oatmeal     • Eat a variety of protein foods such as seafood (fish and shellfish), lean meat, and poultry without skin (turkey and chicken)  Examples of lean meats include pork leg, shoulder, or tenderloin, and beef round, sirloin, tenderloin, and extra lean ground beef  Other protein foods include eggs and egg substitutes, beans, peas, soy products, nuts, and seeds  • Choose low-fat dairy products such as skim or 1% milk or low-fat yogurt, cheese, and cottage cheese  • Limit unhealthy fats  such as butter, hard margarine, and shortening         Exercise:  Exercise at least 30 minutes per day on most days of the week  Some examples of exercise include walking, biking, dancing, and swimming  You can also fit in more physical activity by taking the stairs instead of the elevator or parking farther away from stores  Include muscle strengthening activities 2 days each week  Regular exercise provides many health benefits  It helps you manage your weight, and decreases your risk for type 2 diabetes, heart disease, stroke, and high blood pressure  Exercise can also help improve your mood  Ask your healthcare provider about the best exercise plan for you  General health and safety guidelines:   • Do not smoke  Nicotine and other chemicals in cigarettes and cigars can cause lung damage  Ask your healthcare provider for information if you currently smoke and need help to quit  E-cigarettes or smokeless tobacco still contain nicotine  Talk to your healthcare provider before you use these products  • Limit alcohol  A drink of alcohol is 12 ounces of beer, 5 ounces of wine, or 1½ ounces of liquor  • Lose weight, if needed  Being overweight increases your risk of certain health conditions  These include heart disease, high blood pressure, type 2 diabetes, and certain types of cancer  • Protect your skin  Do not sunbathe or use tanning beds  Use sunscreen with a SPF 15 or higher  Apply sunscreen at least 15 minutes before you go outside  Reapply sunscreen every 2 hours  Wear protective clothing, hats, and sunglasses when you are outside  • Drive safely  Always wear your seatbelt  Make sure everyone in your car wears a seatbelt  A seatbelt can save your life if you are in an accident  Do not use your cell phone when you are driving  This could distract you and cause an accident  Pull over if you need to make a call or send a text message  • Practice safe sex  Use latex condoms if are sexually active and have more than one partner   Your healthcare provider may recommend screening tests for sexually transmitted infections (STIs)  • Wear helmets, lifejackets, and protective gear  Always wear a helmet when you ride a bike or motorcycle, go skiing, or play sports that could cause a head injury  Wear protective equipment when you play sports  Wear a lifejacket when you are on a boat or doing water sports  © Copyright [employer] 2022 Information is for End User's use only and may not be sold, redistributed or otherwise used for commercial purposes  The above information is an  only  It is not intended as medical advice for individual conditions or treatments  Talk to your doctor, nurse or pharmacist before following any medical regimen to see if it is safe and effective for you

## 2023-06-19 NOTE — TELEPHONE ENCOUNTER
Medication Refill Request     Name of Medication Xanax XR 2 mg  Dose/Frequency 1bid before breakfast & lunch  Quantity 60  Verified pharmacy   [x]  Verified ordering Provider   [x]  Does patient have enough for the next 3 days? Yes [] No [x]  Does patient have a follow-up appointment scheduled?  Yes [x] No []   If so when is appointment: 8/16/2023

## 2023-06-20 DIAGNOSIS — E55.9 VITAMIN D DEFICIENCY: ICD-10-CM

## 2023-06-20 DIAGNOSIS — E83.51 HYPOCALCEMIA: Primary | ICD-10-CM

## 2023-06-20 RX ORDER — ALPRAZOLAM 2 MG/1
2 TABLET, EXTENDED RELEASE ORAL
Qty: 60 TABLET | Refills: 0 | Status: SHIPPED | OUTPATIENT
Start: 2023-06-20

## 2023-06-21 NOTE — TELEPHONE ENCOUNTER
Patient has a prescription for calcitriol 0 5 mg twice a day, she can take half a pill in the evening instead of the full pill  If she prefers 0 25, I will sign the prescription

## 2023-06-22 ENCOUNTER — TELEMEDICINE (OUTPATIENT)
Dept: BEHAVIORAL/MENTAL HEALTH CLINIC | Facility: CLINIC | Age: 48
End: 2023-06-22
Payer: COMMERCIAL

## 2023-06-22 DIAGNOSIS — F33.1 MODERATE EPISODE OF RECURRENT MAJOR DEPRESSIVE DISORDER (HCC): Primary | ICD-10-CM

## 2023-06-22 PROCEDURE — 90834 PSYTX W PT 45 MINUTES: CPT | Performed by: SOCIAL WORKER
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
## 2023-06-23 ENCOUNTER — APPOINTMENT (OUTPATIENT)
Dept: LAB | Facility: HOSPITAL | Age: 48
End: 2023-06-23
Payer: COMMERCIAL

## 2023-06-23 DIAGNOSIS — E83.51 HYPOCALCEMIA: ICD-10-CM

## 2023-06-23 RX ORDER — CALCITRIOL 0.25 UG/1
0.25 CAPSULE, LIQUID FILLED ORAL DAILY
Qty: 30 CAPSULE | Refills: 1 | Status: SHIPPED | OUTPATIENT
Start: 2023-06-23

## 2023-06-23 RX ORDER — CALCITRIOL 0.5 UG/1
0.5 CAPSULE, LIQUID FILLED ORAL DAILY
Qty: 90 CAPSULE | Refills: 3 | Status: SHIPPED | OUTPATIENT
Start: 2023-06-23 | End: 2023-07-23

## 2023-06-23 NOTE — TELEPHONE ENCOUNTER
Spoke to patient the medication is not scored so it can not be cut in half  Patient would prefer a new order of the 0 25 Calcitirol  Can you please place new order

## 2023-06-24 PROBLEM — N18.32 STAGE 3B CHRONIC KIDNEY DISEASE (HCC): Status: ACTIVE | Noted: 2023-06-24

## 2023-06-24 NOTE — ASSESSMENT & PLAN NOTE
Lab Results   Component Value Date    EGFR 46 06/16/2023    EGFR 36 06/02/2023    EGFR 31 05/30/2023    CREATININE 1 37 (H) 06/16/2023    CREATININE 1 67 (H) 06/02/2023    CREATININE 1 90 (H) 05/30/2023   Last GFR 36 creatinine 1 6 stable at baseline avoid nephrotoxic monitor GFR creatinine BMP  GFR is baseline  Creat is baseline  Recommend periodic blood test for monitoring of BUN and Creat  Avoid Non Steroidal Antiinflammatory such as ibuprofen, aleve etc   Potassium, HCO3 and calcium are wnl and will require periodic blood test   Bone and Mineral disease monitoring as appropriate    All patient with GFR less than 30( CKD 4 or 5 or 6) advised to follow with nephrologist

## 2023-06-24 NOTE — ASSESSMENT & PLAN NOTE
Patient followed by pain management on Percocet 10-3 25 4 times a day as needed counseling done no evidence of any overuse abuse or addiction

## 2023-06-24 NOTE — ASSESSMENT & PLAN NOTE
Post thyroidectomy developed hypoparathyroidism causing recurrent episode of hypocalcemia for the last more than 7 years since surgery advised supplemental vitamin D Rocaltrol 0 5 daily with calcium Carbonet close monitoring of the calcium phosphorus main remaining follow-up plan finding as follows from a previous progress note discussed at length    Hypocalcemia-patient has been having symptoms of hypocalcemia for the last 2 to 3 months, requiring admission  She admits to taking Rocaltrol 0 5 mg daily along with calcium close to 5000 mg daily,  -We will increase Rocaltrol to 0 5 mg twice a day,   -As patient did not receive Rocaltrol in the morning ,give 1 dose now    Patient did not receive Rocaltrol today  -Start calcium carbonate, 1250 mg three times a day   -Obtain ionized Calcium in the morning  -Obtain serum magnesium as well  -Patient will need monitoring of calcium every 12 hours, if she has symptoms  -Put the order for calcium gluconate for as needed, if she has symptoms of hypocalcemia calcium less than 8 0

## 2023-06-24 NOTE — ASSESSMENT & PLAN NOTE
Lab Results   Component Value Date    HGBA1C 5 7 (H) 04/21/2023   On the basis of A1c blood sugar control on her diet seen by endocrinologist    Foot exam normal yearly dilated eye exam hypoglycemia protocol in place continue monitoring

## 2023-06-24 NOTE — ASSESSMENT & PLAN NOTE
No evidence of any bleeding no clinical evidence of any hypercoagulable state for now or thrombosis will monitor platelet count PT PTT

## 2023-06-24 NOTE — ASSESSMENT & PLAN NOTE
Hypocalcemia now resolved last calcium 10 8 followed by endocrinologist remaining previous hospitalization record and previous office visit reviewed at length as follows from a previous progress note    • Patient hospitalized with  tingling numbness profound hypocalcemia treated and was discharged calcium was normal on discharge the plan follow-up work-up findings as followson  discharge to endocrinologist follow-up per consult note reviewed treatment plan reviewed  • urine calcium-WNL  • Ionized calcium 0 99 on admission  • Given 2g calcium  • Trending labs in AM  • Continue calcitriol, calcium carbonate  • DEXA scan on discharge  • Endocrinology recs:            -Goal for calcium is 8 2-8 5 range, discharge with no symptoms of tingling and numbness           -Please monitor calcium every 12 hours, in AM and PM            -Recommend hospitalization, till calcium is stabilized x2           -She has received only 2 doses of Rocaltrol, we will have to monitor her calcium twice a     day, and adjust Rocaltrol   accordingly            -For now continue Rocaltrol 0 5 MCG twice a day (should not miss doses)           -Usually takes 48 hours to see the effect of Rocaltrol adjustment            -Continue calcium carbonate 1000 mg 3 times daily           -Thyroxine was increased to 175 mcg daily  outpatient

## 2023-06-26 ENCOUNTER — APPOINTMENT (OUTPATIENT)
Dept: LAB | Facility: HOSPITAL | Age: 48
End: 2023-06-26
Attending: INTERNAL MEDICINE
Payer: COMMERCIAL

## 2023-06-26 DIAGNOSIS — E55.9 VITAMIN D DEFICIENCY: ICD-10-CM

## 2023-06-26 DIAGNOSIS — E89.2 POST-SURGICAL HYPOPARATHYROIDISM (HCC): ICD-10-CM

## 2023-06-26 DIAGNOSIS — E89.0 POSTOPERATIVE HYPOTHYROIDISM: ICD-10-CM

## 2023-06-26 DIAGNOSIS — E83.51 HYPOCALCEMIA: ICD-10-CM

## 2023-06-26 LAB — CALCIUM SERPL-MCNC: 11.8 MG/DL (ref 8.4–10.2)

## 2023-06-26 PROCEDURE — 82310 ASSAY OF CALCIUM: CPT

## 2023-06-26 PROCEDURE — 36415 COLL VENOUS BLD VENIPUNCTURE: CPT

## 2023-06-29 ENCOUNTER — TELEMEDICINE (OUTPATIENT)
Dept: BEHAVIORAL/MENTAL HEALTH CLINIC | Facility: CLINIC | Age: 48
End: 2023-06-29
Payer: COMMERCIAL

## 2023-06-29 DIAGNOSIS — F41.0 GENERALIZED ANXIETY DISORDER WITH PANIC ATTACKS: ICD-10-CM

## 2023-06-29 DIAGNOSIS — F33.1 MODERATE EPISODE OF RECURRENT MAJOR DEPRESSIVE DISORDER (HCC): Primary | ICD-10-CM

## 2023-06-29 DIAGNOSIS — F41.1 GENERALIZED ANXIETY DISORDER WITH PANIC ATTACKS: ICD-10-CM

## 2023-06-29 DIAGNOSIS — F17.210 NICOTINE DEPENDENCE, CIGARETTES, UNCOMPLICATED: ICD-10-CM

## 2023-06-29 NOTE — BH CRISIS PLAN
"Client Name: Aidan Sparrow       Client YOB: 1975  : 1975    Treatment Team (include name and contact information):     Psychotherapist: Selene Osler, LCSW    Psychiatrist: Kaden Mayer PA-C   Release of information completed: no    Healthcare Provider   [facility], [place]  [place]  [address] [place] [address]  [phone]    Type of Plan   * Child plans (children 15 yo and younger) must be completed and signed by the child's legal guardian   * Plans for all individuals 15 yo and above must be signed by the client  Plan Type: adolescent/adult (14 and over) Initial      My Personal Strengths are (in the client's own words): \"My kids, my \"    The stressors and triggers that may put me at risk are:  anniversary of mom and dad death, chronic anxiety, chronic pain, death of family member (mom and dad), difficulty with maintaining healthy weight, everyday stressors, family conflict, family issues, family problems, financial problems, health issues, limited support and medical problems    Coping skills I can use to keep myself calm and safe: Other (describe) Talk to daughter    Coping skills/supports I can use to maintain abstinence from substance use:   Not Applicable    The people that provide me with help and support: (Include name, contact, and how they can help)   Support person #1: Beckie Longoria, daughter    * Phone number: in cell phone    * How can they help me? \"Talks to me, helps me get around, tries to get me to go outside\"  Support person #2: Deanna Moncada,      * Phone number: in cell phone    * How can they help me? Financial help   Support person #3: Jai Mueller    * Phone number: in cell phone    * How can they help me? \"Helps me get things, rubs my legs\"       In the past, the following has helped me in times of crisis:    Being in a quiet space, Taking medications, Going into the hospital, Calling a family member and Watching television or a " movie    If it is an emergency and you need immediate help, call 9-1-1    If there is a possibility of danger to yourself or others, call the following crisis hotline resources:     Adult Crisis Numbers  Suicide Prevention Hotline - Dial 9-8-8  [facility]: [address]: [address]: [address]: [phone]  [facility] [address] ([facility]): [phone]  [facility]: [address] [place] [address] Avenue: [address] [place] [address] St: [phone] (daytime)  [phone] (after hours, weekends, holidays)     Child/Adolescent Crisis Numbers   [facility]: [address]: [phone]   Mary Shabazz: [phone]   [address] ([facility]): [phone]    Please note: Some [facility] do not have a separate number for Child/Adolescent specific crisis  If your county is not listed under Child/Adolescent, please call the adult number for your county     National Talk to Text Line   All Ages - [phone]    In the event your feelings become unmanageable, and you cannot reach your support system, you will call 911 immediately or go to the nearest hospital emergency room

## 2023-06-29 NOTE — PSYCH
"Behavioral Health Psychotherapy Progress Note    Psychotherapy Provided: Individual Psychotherapy     1  Moderate episode of recurrent major depressive disorder ([address] )        2  Generalized anxiety disorder with panic attacks        3  Nicotine dependence, cigarettes, uncomplicated            Goals addressed in session: Goal 1     DATA: Emily Lomax reported feeling discouraged stating that she has been struggling with chronic pain  She has coped with this with isolation and trying to \"fake it\"  We acknowledged positive coping strategies that she knows works for her in the past to include: socializing and getting out of the house  We talked about avoiding stressors such as her sister  We reviewed her treatment plan and completed her crisis plan  During this session, this clinician used the following therapeutic modalities: Cognitive Behavioral Therapy, Cognitive Processing Therapy, Mindfulness-based Strategies and Supportive Psychotherapy    Substance Abuse was not addressed during this session  If the client is diagnosed with a co-occurring substance use disorder, please indicate any changes in the frequency or amount of use: N/A  Stage of change for addressing substance use diagnoses: No substance use/Not applicable    ASSESSMENT:  Asmita Diggs presents with a Depressed mood  her affect is Normal range and intensity, which is congruent, with her mood and the content of the session  The client has made progress on their goals  Asmita Diggs presents with a none risk of suicide, none risk of self-harm, and none risk of harm to others  For any risk assessment that surpasses a \"low\" rating, a safety plan must be developed  A safety plan was indicated: no  If yes, describe in detail N/A    PLAN: Between sessions, Asmita Diggs will take medication as prescribed and practice positive coping strategies as needed   At the next session, the therapist will use Cognitive Behavioral " Therapy, Cognitive Processing Therapy, Mindfulness-based Strategies and Supportive Psychotherapy to address anxiety  Behavioral Health Treatment Plan and Discharge Planning: Sharon Saul is aware of and agrees to continue to work on their treatment plan  They have identified and are working toward their discharge goals   yes    Visit start and stop times:    06/29/23  Start Time: 1430  Stop Time: 1520  Total Visit Time: 50 minutes
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
## 2023-06-30 ENCOUNTER — APPOINTMENT (OUTPATIENT)
Dept: LAB | Facility: HOSPITAL | Age: 48
End: 2023-06-30
Attending: INTERNAL MEDICINE
Payer: COMMERCIAL

## 2023-06-30 DIAGNOSIS — E83.51 HYPOCALCEMIA: ICD-10-CM

## 2023-07-03 ENCOUNTER — APPOINTMENT (OUTPATIENT)
Dept: LAB | Facility: HOSPITAL | Age: 48
End: 2023-07-03
Attending: INTERNAL MEDICINE
Payer: COMMERCIAL

## 2023-07-03 DIAGNOSIS — E83.51 HYPOCALCEMIA: ICD-10-CM

## 2023-07-03 DIAGNOSIS — E89.0 POSTOPERATIVE HYPOTHYROIDISM: ICD-10-CM

## 2023-07-03 DIAGNOSIS — E89.2 POST-SURGICAL HYPOPARATHYROIDISM (HCC): ICD-10-CM

## 2023-07-03 DIAGNOSIS — E55.9 VITAMIN D DEFICIENCY: ICD-10-CM

## 2023-07-03 LAB
25(OH)D3 SERPL-MCNC: 29 NG/ML (ref 30–100)
CALCIUM SERPL-MCNC: 10.5 MG/DL (ref 8.4–10.2)

## 2023-07-03 PROCEDURE — 82310 ASSAY OF CALCIUM: CPT

## 2023-07-06 ENCOUNTER — TELEPHONE (OUTPATIENT)
Dept: ENDOCRINOLOGY | Facility: CLINIC | Age: 48
End: 2023-07-06

## 2023-07-06 ENCOUNTER — TELEPHONE (OUTPATIENT)
Dept: INTERNAL MEDICINE CLINIC | Facility: CLINIC | Age: 48
End: 2023-07-06

## 2023-07-06 ENCOUNTER — APPOINTMENT (OUTPATIENT)
Dept: LAB | Facility: HOSPITAL | Age: 48
End: 2023-07-06
Attending: INTERNAL MEDICINE
Payer: COMMERCIAL

## 2023-07-06 DIAGNOSIS — E89.0 POSTOPERATIVE HYPOTHYROIDISM: ICD-10-CM

## 2023-07-06 DIAGNOSIS — E83.51 HYPOCALCEMIA: ICD-10-CM

## 2023-07-06 DIAGNOSIS — E55.9 VITAMIN D DEFICIENCY: ICD-10-CM

## 2023-07-06 DIAGNOSIS — E89.2 POST-SURGICAL HYPOPARATHYROIDISM (HCC): ICD-10-CM

## 2023-07-06 LAB — CALCIUM SERPL-MCNC: 11.5 MG/DL (ref 8.4–10.2)

## 2023-07-06 PROCEDURE — 82310 ASSAY OF CALCIUM: CPT

## 2023-07-06 PROCEDURE — 36415 COLL VENOUS BLD VENIPUNCTURE: CPT

## 2023-07-06 NOTE — TELEPHONE ENCOUNTER
Patient called regarding her calcium blood test results from today. Calcium is 11.5 .  Patient request a phonecall

## 2023-07-09 ENCOUNTER — HOSPITAL ENCOUNTER (EMERGENCY)
Facility: HOSPITAL | Age: 48
Discharge: HOME | End: 2023-07-10
Attending: EMERGENCY MEDICINE
Payer: COMMERCIAL

## 2023-07-09 DIAGNOSIS — E83.51 HYPOCALCEMIA: Primary | ICD-10-CM

## 2023-07-09 LAB
BASOPHILS # BLD AUTO: 0.04 THOUSANDS/ÂΜL (ref 0–0.1)
BASOPHILS NFR BLD AUTO: 1 % (ref 0–1)
CA-I BLD-SCNC: 1.02 MMOL/L (ref 1.12–1.32)
EOSINOPHIL # BLD AUTO: 0.05 THOUSAND/ÂΜL (ref 0–0.61)
EOSINOPHIL NFR BLD AUTO: 1 % (ref 0–6)
ERYTHROCYTE [DISTWIDTH] IN BLOOD BY AUTOMATED COUNT: 15.1 % (ref 11.6–15.1)
HCT VFR BLD AUTO: 32.1 % (ref 34.8–46.1)
HGB BLD-MCNC: 10.7 G/DL (ref 11.5–15.4)
IMM GRANULOCYTES # BLD AUTO: 0.01 THOUSAND/UL (ref 0–0.2)
IMM GRANULOCYTES NFR BLD AUTO: 0 % (ref 0–2)
LYMPHOCYTES # BLD AUTO: 2.24 THOUSANDS/ÂΜL (ref 0.6–4.47)
LYMPHOCYTES NFR BLD AUTO: 37 % (ref 14–44)
MCH RBC QN AUTO: 30.1 PG (ref 26.8–34.3)
MCHC RBC AUTO-ENTMCNC: 33.3 G/DL (ref 31.4–37.4)
MCV RBC AUTO: 90 FL (ref 82–98)
MONOCYTES # BLD AUTO: 0.42 THOUSAND/ÂΜL (ref 0.17–1.22)
MONOCYTES NFR BLD AUTO: 7 % (ref 4–12)
NEUTROPHILS # BLD AUTO: 3.32 THOUSANDS/ÂΜL (ref 1.85–7.62)
NEUTS SEG NFR BLD AUTO: 54 % (ref 43–75)
NRBC BLD AUTO-RTO: 0 /100 WBCS
PLATELET # BLD AUTO: 166 THOUSANDS/UL (ref 149–390)
PMV BLD AUTO: 11 FL (ref 8.9–12.7)
RBC # BLD AUTO: 3.56 MILLION/UL (ref 3.81–5.12)
WBC # BLD AUTO: 6.08 THOUSAND/UL (ref 4.31–10.16)

## 2023-07-09 PROCEDURE — 96361 HYDRATE IV INFUSION ADD-ON: CPT

## 2023-07-09 PROCEDURE — 99284 EMERGENCY DEPT VISIT MOD MDM: CPT

## 2023-07-09 PROCEDURE — 85025 COMPLETE CBC W/AUTO DIFF WBC: CPT | Performed by: EMERGENCY MEDICINE

## 2023-07-09 PROCEDURE — 82330 ASSAY OF CALCIUM: CPT | Performed by: EMERGENCY MEDICINE

## 2023-07-09 PROCEDURE — 93005 ELECTROCARDIOGRAM TRACING: CPT

## 2023-07-09 PROCEDURE — 36415 COLL VENOUS BLD VENIPUNCTURE: CPT | Performed by: EMERGENCY MEDICINE

## 2023-07-09 PROCEDURE — 80053 COMPREHEN METABOLIC PANEL: CPT | Performed by: EMERGENCY MEDICINE

## 2023-07-09 RX ADMIN — SODIUM CHLORIDE 1000 ML: 0.9 INJECTION, SOLUTION INTRAVENOUS at 23:25

## 2023-07-10 ENCOUNTER — APPOINTMENT (OUTPATIENT)
Dept: LAB | Facility: HOSPITAL | Age: 48
End: 2023-07-10
Payer: COMMERCIAL

## 2023-07-10 VITALS
SYSTOLIC BLOOD PRESSURE: 137 MMHG | TEMPERATURE: 97.8 F | RESPIRATION RATE: 18 BRPM | OXYGEN SATURATION: 97 % | DIASTOLIC BLOOD PRESSURE: 81 MMHG | HEART RATE: 97 BPM

## 2023-07-10 DIAGNOSIS — E83.51 HYPOCALCEMIA: ICD-10-CM

## 2023-07-10 DIAGNOSIS — E89.0 POSTOPERATIVE HYPOTHYROIDISM: ICD-10-CM

## 2023-07-10 DIAGNOSIS — E89.2 POST-SURGICAL HYPOPARATHYROIDISM (HCC): ICD-10-CM

## 2023-07-10 DIAGNOSIS — E55.9 VITAMIN D DEFICIENCY: ICD-10-CM

## 2023-07-10 LAB
ALBUMIN SERPL BCP-MCNC: 3.8 G/DL (ref 3.5–5)
ALP SERPL-CCNC: 54 U/L (ref 34–104)
ALT SERPL W P-5'-P-CCNC: 25 U/L (ref 7–52)
ANION GAP SERPL CALCULATED.3IONS-SCNC: 9 MMOL/L
AST SERPL W P-5'-P-CCNC: 17 U/L (ref 13–39)
BILIRUB SERPL-MCNC: 0.35 MG/DL (ref 0.2–1)
BUN SERPL-MCNC: 24 MG/DL (ref 5–25)
CALCIUM SERPL-MCNC: 9.1 MG/DL (ref 8.4–10.2)
CALCIUM SERPL-MCNC: 9.4 MG/DL (ref 8.4–10.2)
CHLORIDE SERPL-SCNC: 106 MMOL/L (ref 96–108)
CO2 SERPL-SCNC: 25 MMOL/L (ref 21–32)
CREAT SERPL-MCNC: 1.65 MG/DL (ref 0.6–1.3)
GFR SERPL CREATININE-BSD FRML MDRD: 36 ML/MIN/1.73SQ M
GLUCOSE SERPL-MCNC: 114 MG/DL (ref 65–140)
POTASSIUM SERPL-SCNC: 3.9 MMOL/L (ref 3.5–5.3)
PROT SERPL-MCNC: 6.6 G/DL (ref 6.4–8.4)
SODIUM SERPL-SCNC: 140 MMOL/L (ref 135–147)

## 2023-07-10 PROCEDURE — 96365 THER/PROPH/DIAG IV INF INIT: CPT

## 2023-07-10 PROCEDURE — 36415 COLL VENOUS BLD VENIPUNCTURE: CPT

## 2023-07-10 PROCEDURE — 82310 ASSAY OF CALCIUM: CPT

## 2023-07-10 RX ORDER — CALCIUM GLUCONATE 20 MG/ML
1 INJECTION, SOLUTION INTRAVENOUS ONCE
Status: COMPLETED | OUTPATIENT
Start: 2023-07-10 | End: 2023-07-10

## 2023-07-10 RX ADMIN — CALCIUM GLUCONATE 1 G: 20 INJECTION, SOLUTION INTRAVENOUS at 00:30
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
## 2023-07-12 ENCOUNTER — APPOINTMENT (OUTPATIENT)
Dept: LAB | Facility: HOSPITAL | Age: 48
End: 2023-07-12
Attending: INTERNAL MEDICINE
Payer: COMMERCIAL

## 2023-07-12 ENCOUNTER — HOSPITAL ENCOUNTER (EMERGENCY)
Facility: HOSPITAL | Age: 48
Discharge: HOME | End: 2023-07-12
Attending: EMERGENCY MEDICINE
Payer: COMMERCIAL

## 2023-07-12 VITALS
OXYGEN SATURATION: 95 % | RESPIRATION RATE: 22 BRPM | SYSTOLIC BLOOD PRESSURE: 134 MMHG | HEART RATE: 97 BPM | DIASTOLIC BLOOD PRESSURE: 79 MMHG | TEMPERATURE: 97.1 F

## 2023-07-12 DIAGNOSIS — R20.2 PARESTHESIAS: Primary | ICD-10-CM

## 2023-07-12 DIAGNOSIS — E83.51 HYPOCALCEMIA: ICD-10-CM

## 2023-07-12 LAB
ALBUMIN SERPL BCP-MCNC: 3.3 G/DL (ref 3.5–5)
ALP SERPL-CCNC: 55 U/L (ref 34–104)
ALT SERPL W P-5'-P-CCNC: 21 U/L (ref 7–52)
ANION GAP SERPL CALCULATED.3IONS-SCNC: 6 MMOL/L
AST SERPL W P-5'-P-CCNC: 12 U/L (ref 13–39)
ATRIAL RATE: 86 BPM
BASOPHILS # BLD AUTO: 0.05 THOUSANDS/ÂΜL (ref 0–0.1)
BASOPHILS NFR BLD AUTO: 1 % (ref 0–1)
BILIRUB SERPL-MCNC: 0.33 MG/DL (ref 0.2–1)
BUN SERPL-MCNC: 27 MG/DL (ref 5–25)
CALCIUM ALBUM COR SERPL-MCNC: 10.5 MG/DL (ref 8.3–10.1)
CALCIUM SERPL-MCNC: 8.6 MG/DL (ref 8.4–10.2)
CALCIUM SERPL-MCNC: 9.9 MG/DL (ref 8.4–10.2)
CHLORIDE SERPL-SCNC: 106 MMOL/L (ref 96–108)
CO2 SERPL-SCNC: 27 MMOL/L (ref 21–32)
CREAT SERPL-MCNC: 1.31 MG/DL (ref 0.6–1.3)
EOSINOPHIL # BLD AUTO: 0.05 THOUSAND/ÂΜL (ref 0–0.61)
EOSINOPHIL NFR BLD AUTO: 1 % (ref 0–6)
ERYTHROCYTE [DISTWIDTH] IN BLOOD BY AUTOMATED COUNT: 15.1 % (ref 11.6–15.1)
GFR SERPL CREATININE-BSD FRML MDRD: 48 ML/MIN/1.73SQ M
GLUCOSE SERPL-MCNC: 110 MG/DL (ref 65–140)
HCT VFR BLD AUTO: 32.6 % (ref 34.8–46.1)
HGB BLD-MCNC: 10.8 G/DL (ref 11.5–15.4)
IMM GRANULOCYTES # BLD AUTO: 0.03 THOUSAND/UL (ref 0–0.2)
IMM GRANULOCYTES NFR BLD AUTO: 1 % (ref 0–2)
LYMPHOCYTES # BLD AUTO: 1.76 THOUSANDS/ÂΜL (ref 0.6–4.47)
LYMPHOCYTES NFR BLD AUTO: 30 % (ref 14–44)
MAGNESIUM SERPL-MCNC: 2 MG/DL (ref 1.9–2.7)
MCH RBC QN AUTO: 30.3 PG (ref 26.8–34.3)
MCHC RBC AUTO-ENTMCNC: 33.1 G/DL (ref 31.4–37.4)
MCV RBC AUTO: 91 FL (ref 82–98)
MONOCYTES # BLD AUTO: 0.38 THOUSAND/ÂΜL (ref 0.17–1.22)
MONOCYTES NFR BLD AUTO: 7 % (ref 4–12)
NEUTROPHILS # BLD AUTO: 3.57 THOUSANDS/ÂΜL (ref 1.85–7.62)
NEUTS SEG NFR BLD AUTO: 60 % (ref 43–75)
NRBC BLD AUTO-RTO: 0 /100 WBCS
P AXIS: 53 DEGREES
PLATELET # BLD AUTO: 145 THOUSANDS/UL (ref 149–390)
PMV BLD AUTO: 10.4 FL (ref 8.9–12.7)
POTASSIUM SERPL-SCNC: 4.4 MMOL/L (ref 3.5–5.3)
PR INTERVAL: 168 MS
PROT SERPL-MCNC: 6.5 G/DL (ref 6.4–8.4)
QRS AXIS: 25 DEGREES
QRSD INTERVAL: 82 MS
QT INTERVAL: 360 MS
QTC INTERVAL: 430 MS
RBC # BLD AUTO: 3.57 MILLION/UL (ref 3.81–5.12)
SODIUM SERPL-SCNC: 139 MMOL/L (ref 135–147)
T WAVE AXIS: 52 DEGREES
VENTRICULAR RATE: 86 BPM
WBC # BLD AUTO: 5.84 THOUSAND/UL (ref 4.31–10.16)

## 2023-07-12 PROCEDURE — 93005 ELECTROCARDIOGRAM TRACING: CPT

## 2023-07-12 PROCEDURE — 82310 ASSAY OF CALCIUM: CPT

## 2023-07-12 PROCEDURE — 99284 EMERGENCY DEPT VISIT MOD MDM: CPT | Performed by: EMERGENCY MEDICINE

## 2023-07-12 PROCEDURE — 93010 ELECTROCARDIOGRAM REPORT: CPT | Performed by: INTERNAL MEDICINE

## 2023-07-12 PROCEDURE — 85025 COMPLETE CBC W/AUTO DIFF WBC: CPT | Performed by: EMERGENCY MEDICINE

## 2023-07-12 PROCEDURE — 80053 COMPREHEN METABOLIC PANEL: CPT | Performed by: EMERGENCY MEDICINE

## 2023-07-12 PROCEDURE — 99284 EMERGENCY DEPT VISIT MOD MDM: CPT

## 2023-07-12 PROCEDURE — 83735 ASSAY OF MAGNESIUM: CPT | Performed by: EMERGENCY MEDICINE

## 2023-07-12 PROCEDURE — 96365 THER/PROPH/DIAG IV INF INIT: CPT

## 2023-07-12 PROCEDURE — 36415 COLL VENOUS BLD VENIPUNCTURE: CPT

## 2023-07-12 RX ORDER — CALCIUM GLUCONATE 20 MG/ML
1 INJECTION, SOLUTION INTRAVENOUS ONCE
Status: COMPLETED | OUTPATIENT
Start: 2023-07-12 | End: 2023-07-12

## 2023-07-12 RX ADMIN — CALCIUM GLUCONATE 1 G: 20 INJECTION, SOLUTION INTRAVENOUS at 20:07
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
## 2023-07-13 ENCOUNTER — TELEMEDICINE (OUTPATIENT)
Dept: BEHAVIORAL/MENTAL HEALTH CLINIC | Facility: CLINIC | Age: 48
End: 2023-07-13

## 2023-07-13 DIAGNOSIS — Z91.199 NO-SHOW FOR APPOINTMENT: Primary | ICD-10-CM

## 2023-07-13 LAB
ATRIAL RATE: 84 BPM
P AXIS: 35 DEGREES
PR INTERVAL: 170 MS
QRS AXIS: 19 DEGREES
QRSD INTERVAL: 76 MS
QT INTERVAL: 376 MS
QTC INTERVAL: 444 MS
T WAVE AXIS: 35 DEGREES
VENTRICULAR RATE: 84 BPM

## 2023-07-13 PROCEDURE — 93010 ELECTROCARDIOGRAM REPORT: CPT | Performed by: INTERNAL MEDICINE

## 2023-07-14 ENCOUNTER — APPOINTMENT (OUTPATIENT)
Dept: LAB | Facility: HOSPITAL | Age: 48
End: 2023-07-14
Attending: INTERNAL MEDICINE
Payer: COMMERCIAL

## 2023-07-14 NOTE — PSYCH
No Call. No Show. Charge    Douglas Latham no showed 07/14/23 appointment , staff called and left message to reschedule appointment     Treatment Plan not due at this session.

## 2023-07-17 ENCOUNTER — APPOINTMENT (EMERGENCY)
Dept: RADIOLOGY | Facility: HOSPITAL | Age: 48
End: 2023-07-17
Payer: COMMERCIAL

## 2023-07-17 ENCOUNTER — HOSPITAL ENCOUNTER (EMERGENCY)
Facility: HOSPITAL | Age: 48
Discharge: HOME | End: 2023-07-17
Attending: EMERGENCY MEDICINE
Payer: COMMERCIAL

## 2023-07-17 VITALS
TEMPERATURE: 98 F | OXYGEN SATURATION: 100 % | RESPIRATION RATE: 24 BRPM | HEART RATE: 82 BPM | SYSTOLIC BLOOD PRESSURE: 121 MMHG | DIASTOLIC BLOOD PRESSURE: 73 MMHG

## 2023-07-17 DIAGNOSIS — E83.51 HYPOCALCEMIA: Primary | ICD-10-CM

## 2023-07-17 DIAGNOSIS — R20.2 PARESTHESIAS: ICD-10-CM

## 2023-07-17 LAB
ALBUMIN SERPL BCP-MCNC: 4.1 G/DL (ref 3.5–5)
ALP SERPL-CCNC: 65 U/L (ref 34–104)
ALT SERPL W P-5'-P-CCNC: 33 U/L (ref 7–52)
ANION GAP SERPL CALCULATED.3IONS-SCNC: 11 MMOL/L
AST SERPL W P-5'-P-CCNC: 20 U/L (ref 13–39)
BASOPHILS # BLD AUTO: 0.06 THOUSANDS/ÂΜL (ref 0–0.1)
BASOPHILS NFR BLD AUTO: 1 % (ref 0–1)
BILIRUB SERPL-MCNC: 0.36 MG/DL (ref 0.2–1)
BUN SERPL-MCNC: 15 MG/DL (ref 5–25)
CA-I BLD-SCNC: 1.09 MMOL/L (ref 1.12–1.32)
CALCIUM SERPL-MCNC: 8.4 MG/DL (ref 8.4–10.2)
CARDIAC TROPONIN I PNL SERPL HS: 3 NG/L
CHLORIDE SERPL-SCNC: 105 MMOL/L (ref 96–108)
CO2 SERPL-SCNC: 21 MMOL/L (ref 21–32)
CREAT SERPL-MCNC: 1.17 MG/DL (ref 0.6–1.3)
EOSINOPHIL # BLD AUTO: 0.09 THOUSAND/ÂΜL (ref 0–0.61)
EOSINOPHIL NFR BLD AUTO: 1 % (ref 0–6)
ERYTHROCYTE [DISTWIDTH] IN BLOOD BY AUTOMATED COUNT: 15.8 % (ref 11.6–15.1)
GFR SERPL CREATININE-BSD FRML MDRD: 56 ML/MIN/1.73SQ M
GLUCOSE SERPL-MCNC: 81 MG/DL (ref 65–140)
HCT VFR BLD AUTO: 36.7 % (ref 34.8–46.1)
HGB BLD-MCNC: 12.1 G/DL (ref 11.5–15.4)
IMM GRANULOCYTES # BLD AUTO: 0.05 THOUSAND/UL (ref 0–0.2)
IMM GRANULOCYTES NFR BLD AUTO: 1 % (ref 0–2)
LYMPHOCYTES # BLD AUTO: 1.87 THOUSANDS/ÂΜL (ref 0.6–4.47)
LYMPHOCYTES NFR BLD AUTO: 22 % (ref 14–44)
MCH RBC QN AUTO: 30 PG (ref 26.8–34.3)
MCHC RBC AUTO-ENTMCNC: 33 G/DL (ref 31.4–37.4)
MCV RBC AUTO: 91 FL (ref 82–98)
MONOCYTES # BLD AUTO: 0.51 THOUSAND/ÂΜL (ref 0.17–1.22)
MONOCYTES NFR BLD AUTO: 6 % (ref 4–12)
NEUTROPHILS # BLD AUTO: 6.11 THOUSANDS/ÂΜL (ref 1.85–7.62)
NEUTS SEG NFR BLD AUTO: 69 % (ref 43–75)
NRBC BLD AUTO-RTO: 0 /100 WBCS
PLATELET # BLD AUTO: 200 THOUSANDS/UL (ref 149–390)
PMV BLD AUTO: 10.3 FL (ref 8.9–12.7)
POTASSIUM SERPL-SCNC: 4.1 MMOL/L (ref 3.5–5.3)
PROT SERPL-MCNC: 7.4 G/DL (ref 6.4–8.4)
RBC # BLD AUTO: 4.03 MILLION/UL (ref 3.81–5.12)
SODIUM SERPL-SCNC: 137 MMOL/L (ref 135–147)
WBC # BLD AUTO: 8.69 THOUSAND/UL (ref 4.31–10.16)

## 2023-07-17 PROCEDURE — 96366 THER/PROPH/DIAG IV INF ADDON: CPT

## 2023-07-17 PROCEDURE — 80053 COMPREHEN METABOLIC PANEL: CPT | Performed by: EMERGENCY MEDICINE

## 2023-07-17 PROCEDURE — 84484 ASSAY OF TROPONIN QUANT: CPT | Performed by: EMERGENCY MEDICINE

## 2023-07-17 PROCEDURE — 85025 COMPLETE CBC W/AUTO DIFF WBC: CPT | Performed by: EMERGENCY MEDICINE

## 2023-07-17 PROCEDURE — 93005 ELECTROCARDIOGRAM TRACING: CPT

## 2023-07-17 PROCEDURE — 99284 EMERGENCY DEPT VISIT MOD MDM: CPT

## 2023-07-17 PROCEDURE — 36415 COLL VENOUS BLD VENIPUNCTURE: CPT | Performed by: EMERGENCY MEDICINE

## 2023-07-17 PROCEDURE — 82330 ASSAY OF CALCIUM: CPT | Performed by: EMERGENCY MEDICINE

## 2023-07-17 PROCEDURE — 71045 X-RAY EXAM CHEST 1 VIEW: CPT

## 2023-07-17 PROCEDURE — 96365 THER/PROPH/DIAG IV INF INIT: CPT

## 2023-07-17 RX ORDER — CALCIUM GLUCONATE 20 MG/ML
1 INJECTION, SOLUTION INTRAVENOUS ONCE
Status: COMPLETED | OUTPATIENT
Start: 2023-07-17 | End: 2023-07-17

## 2023-07-17 RX ADMIN — CALCIUM GLUCONATE 1 G: 20 INJECTION, SOLUTION INTRAVENOUS at 15:57

## 2023-07-17 RX ADMIN — CALCIUM GLUCONATE 1 G: 20 INJECTION, SOLUTION INTRAVENOUS at 15:30
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
## 2023-07-18 DIAGNOSIS — E78.1 HYPERTRIGLYCERIDEMIA: Primary | ICD-10-CM

## 2023-07-18 RX ORDER — FENOFIBRATE 160 MG/1
160 TABLET ORAL DAILY
COMMUNITY
End: 2023-07-18 | Stop reason: SDUPTHER

## 2023-07-18 RX ORDER — FENOFIBRATE 160 MG/1
160 TABLET ORAL DAILY
Qty: 30 TABLET | Refills: 5 | Status: SHIPPED | OUTPATIENT
Start: 2023-07-18

## 2023-07-19 ENCOUNTER — APPOINTMENT (OUTPATIENT)
Dept: LAB | Facility: HOSPITAL | Age: 48
End: 2023-07-19
Attending: INTERNAL MEDICINE
Payer: COMMERCIAL

## 2023-07-19 DIAGNOSIS — E83.51 HYPOCALCEMIA: ICD-10-CM

## 2023-07-21 ENCOUNTER — APPOINTMENT (OUTPATIENT)
Dept: LAB | Facility: HOSPITAL | Age: 48
End: 2023-07-21
Attending: INTERNAL MEDICINE
Payer: COMMERCIAL

## 2023-07-21 LAB
ATRIAL RATE: 84 BPM
CALCIUM SERPL-MCNC: 11.5 MG/DL (ref 8.4–10.2)
P AXIS: 20 DEGREES
PR INTERVAL: 160 MS
QRS AXIS: 13 DEGREES
QRSD INTERVAL: 72 MS
QT INTERVAL: 386 MS
QTC INTERVAL: 456 MS
T WAVE AXIS: 35 DEGREES
VENTRICULAR RATE: 84 BPM

## 2023-07-21 PROCEDURE — 36415 COLL VENOUS BLD VENIPUNCTURE: CPT

## 2023-07-21 PROCEDURE — 82310 ASSAY OF CALCIUM: CPT

## 2023-07-21 PROCEDURE — 93010 ELECTROCARDIOGRAM REPORT: CPT | Performed by: INTERNAL MEDICINE

## 2023-07-24 ENCOUNTER — TELEPHONE (OUTPATIENT)
Dept: ENDOCRINOLOGY | Facility: CLINIC | Age: 48
End: 2023-07-24

## 2023-07-24 ENCOUNTER — APPOINTMENT (OUTPATIENT)
Dept: LAB | Facility: HOSPITAL | Age: 48
End: 2023-07-24
Attending: INTERNAL MEDICINE
Payer: COMMERCIAL

## 2023-07-24 DIAGNOSIS — E89.2 POST-SURGICAL HYPOPARATHYROIDISM (HCC): ICD-10-CM

## 2023-07-24 DIAGNOSIS — E55.9 VITAMIN D DEFICIENCY: ICD-10-CM

## 2023-07-24 DIAGNOSIS — E83.51 HYPOCALCEMIA: ICD-10-CM

## 2023-07-24 DIAGNOSIS — E89.0 POSTOPERATIVE HYPOTHYROIDISM: ICD-10-CM

## 2023-07-24 LAB — CALCIUM SERPL-MCNC: 13.7 MG/DL (ref 8.4–10.2)

## 2023-07-24 PROCEDURE — 36415 COLL VENOUS BLD VENIPUNCTURE: CPT

## 2023-07-24 PROCEDURE — 82310 ASSAY OF CALCIUM: CPT

## 2023-07-25 ENCOUNTER — APPOINTMENT (OUTPATIENT)
Dept: LAB | Facility: HOSPITAL | Age: 48
End: 2023-07-25
Attending: INTERNAL MEDICINE
Payer: COMMERCIAL

## 2023-07-25 ENCOUNTER — HOSPITAL ENCOUNTER (EMERGENCY)
Facility: HOSPITAL | Age: 48
Discharge: HOME | End: 2023-07-25
Attending: EMERGENCY MEDICINE
Payer: COMMERCIAL

## 2023-07-25 VITALS
HEIGHT: 62 IN | RESPIRATION RATE: 16 BRPM | BODY MASS INDEX: 37.73 KG/M2 | HEART RATE: 87 BPM | TEMPERATURE: 98.6 F | WEIGHT: 205 LBS | OXYGEN SATURATION: 96 % | SYSTOLIC BLOOD PRESSURE: 156 MMHG | DIASTOLIC BLOOD PRESSURE: 90 MMHG

## 2023-07-25 DIAGNOSIS — E83.52 HYPERCALCEMIA: Primary | ICD-10-CM

## 2023-07-25 DIAGNOSIS — E86.0 DEHYDRATION: ICD-10-CM

## 2023-07-25 DIAGNOSIS — E83.51 HYPOCALCEMIA: ICD-10-CM

## 2023-07-25 DIAGNOSIS — N17.9 AKI (ACUTE KIDNEY INJURY) (HCC): ICD-10-CM

## 2023-07-25 LAB
ALBUMIN SERPL BCP-MCNC: 4 G/DL (ref 3.5–5)
ALP SERPL-CCNC: 59 U/L (ref 34–104)
ALT SERPL W P-5'-P-CCNC: 29 U/L (ref 7–52)
ANION GAP SERPL CALCULATED.3IONS-SCNC: 10 MMOL/L
ANION GAP SERPL CALCULATED.3IONS-SCNC: 7 MMOL/L
ANION GAP SERPL CALCULATED.3IONS-SCNC: 9 MMOL/L
AST SERPL W P-5'-P-CCNC: 17 U/L (ref 13–39)
ATRIAL RATE: 92 BPM
BASOPHILS # BLD AUTO: 0.06 THOUSANDS/ÂΜL (ref 0–0.1)
BASOPHILS NFR BLD AUTO: 1 % (ref 0–1)
BILIRUB SERPL-MCNC: 0.47 MG/DL (ref 0.2–1)
BUN SERPL-MCNC: 25 MG/DL (ref 5–25)
BUN SERPL-MCNC: 28 MG/DL (ref 5–25)
BUN SERPL-MCNC: 30 MG/DL (ref 5–25)
CA-I BLD-SCNC: 1.53 MMOL/L (ref 1.12–1.32)
CALCIUM SERPL-MCNC: 11.5 MG/DL (ref 8.4–10.2)
CALCIUM SERPL-MCNC: 11.9 MG/DL (ref 8.4–10.2)
CALCIUM SERPL-MCNC: 13.2 MG/DL (ref 8.4–10.2)
CHLORIDE SERPL-SCNC: 101 MMOL/L (ref 96–108)
CHLORIDE SERPL-SCNC: 104 MMOL/L (ref 96–108)
CHLORIDE SERPL-SCNC: 106 MMOL/L (ref 96–108)
CO2 SERPL-SCNC: 22 MMOL/L (ref 21–32)
CO2 SERPL-SCNC: 24 MMOL/L (ref 21–32)
CO2 SERPL-SCNC: 25 MMOL/L (ref 21–32)
CREAT SERPL-MCNC: 2.08 MG/DL (ref 0.6–1.3)
CREAT SERPL-MCNC: 2.1 MG/DL (ref 0.6–1.3)
CREAT SERPL-MCNC: 2.23 MG/DL (ref 0.6–1.3)
EOSINOPHIL # BLD AUTO: 0.13 THOUSAND/ÂΜL (ref 0–0.61)
EOSINOPHIL NFR BLD AUTO: 2 % (ref 0–6)
ERYTHROCYTE [DISTWIDTH] IN BLOOD BY AUTOMATED COUNT: 15.8 % (ref 11.6–15.1)
GFR SERPL CREATININE-BSD FRML MDRD: 25 ML/MIN/1.73SQ M
GFR SERPL CREATININE-BSD FRML MDRD: 27 ML/MIN/1.73SQ M
GFR SERPL CREATININE-BSD FRML MDRD: 27 ML/MIN/1.73SQ M
GLUCOSE P FAST SERPL-MCNC: 104 MG/DL (ref 65–99)
GLUCOSE SERPL-MCNC: 109 MG/DL (ref 65–140)
GLUCOSE SERPL-MCNC: 125 MG/DL (ref 65–140)
HCT VFR BLD AUTO: 37 % (ref 34.8–46.1)
HGB BLD-MCNC: 12.5 G/DL (ref 11.5–15.4)
IMM GRANULOCYTES # BLD AUTO: 0.02 THOUSAND/UL (ref 0–0.2)
IMM GRANULOCYTES NFR BLD AUTO: 0 % (ref 0–2)
LYMPHOCYTES # BLD AUTO: 2.08 THOUSANDS/ÂΜL (ref 0.6–4.47)
LYMPHOCYTES NFR BLD AUTO: 37 % (ref 14–44)
MCH RBC QN AUTO: 30.3 PG (ref 26.8–34.3)
MCHC RBC AUTO-ENTMCNC: 33.8 G/DL (ref 31.4–37.4)
MCV RBC AUTO: 90 FL (ref 82–98)
MONOCYTES # BLD AUTO: 0.42 THOUSAND/ÂΜL (ref 0.17–1.22)
MONOCYTES NFR BLD AUTO: 8 % (ref 4–12)
NEUTROPHILS # BLD AUTO: 2.87 THOUSANDS/ÂΜL (ref 1.85–7.62)
NEUTS SEG NFR BLD AUTO: 52 % (ref 43–75)
NRBC BLD AUTO-RTO: 0 /100 WBCS
P AXIS: 49 DEGREES
PLATELET # BLD AUTO: 192 THOUSANDS/UL (ref 149–390)
PMV BLD AUTO: 10.3 FL (ref 8.9–12.7)
POTASSIUM SERPL-SCNC: 3.9 MMOL/L (ref 3.5–5.3)
POTASSIUM SERPL-SCNC: 4 MMOL/L (ref 3.5–5.3)
POTASSIUM SERPL-SCNC: 4.2 MMOL/L (ref 3.5–5.3)
PR INTERVAL: 170 MS
PROT SERPL-MCNC: 7.4 G/DL (ref 6.4–8.4)
QRS AXIS: 16 DEGREES
QRSD INTERVAL: 82 MS
QT INTERVAL: 370 MS
QTC INTERVAL: 457 MS
RBC # BLD AUTO: 4.13 MILLION/UL (ref 3.81–5.12)
SODIUM SERPL-SCNC: 135 MMOL/L (ref 135–147)
SODIUM SERPL-SCNC: 135 MMOL/L (ref 135–147)
SODIUM SERPL-SCNC: 138 MMOL/L (ref 135–147)
T WAVE AXIS: 25 DEGREES
TSH SERPL DL<=0.05 MIU/L-ACNC: 1.91 UIU/ML (ref 0.45–4.5)
VENTRICULAR RATE: 92 BPM
WBC # BLD AUTO: 5.58 THOUSAND/UL (ref 4.31–10.16)

## 2023-07-25 PROCEDURE — 80048 BASIC METABOLIC PNL TOTAL CA: CPT | Performed by: EMERGENCY MEDICINE

## 2023-07-25 PROCEDURE — 85025 COMPLETE CBC W/AUTO DIFF WBC: CPT | Performed by: EMERGENCY MEDICINE

## 2023-07-25 PROCEDURE — 36415 COLL VENOUS BLD VENIPUNCTURE: CPT | Performed by: EMERGENCY MEDICINE

## 2023-07-25 PROCEDURE — 93010 ELECTROCARDIOGRAM REPORT: CPT | Performed by: INTERNAL MEDICINE

## 2023-07-25 PROCEDURE — 84443 ASSAY THYROID STIM HORMONE: CPT | Performed by: EMERGENCY MEDICINE

## 2023-07-25 PROCEDURE — 93005 ELECTROCARDIOGRAM TRACING: CPT

## 2023-07-25 PROCEDURE — 80048 BASIC METABOLIC PNL TOTAL CA: CPT

## 2023-07-25 PROCEDURE — 80053 COMPREHEN METABOLIC PANEL: CPT | Performed by: EMERGENCY MEDICINE

## 2023-07-25 PROCEDURE — 82330 ASSAY OF CALCIUM: CPT | Performed by: EMERGENCY MEDICINE

## 2023-07-25 RX ADMIN — SODIUM CHLORIDE 1000 ML: 0.9 INJECTION, SOLUTION INTRAVENOUS at 01:44

## 2023-07-25 RX ADMIN — SODIUM CHLORIDE 1000 ML: 0.9 INJECTION, SOLUTION INTRAVENOUS at 02:52

## 2023-07-25 NOTE — ED CARE HANDOFF
Emergency Department Sign Out Note        Sign out and transfer of care from [address] [place] [address]. See Separate Emergency Department note. The patient, Lorena Ramirez, was evaluated by the previous provider for fatigue.     Workup Completed:  Results Reviewed     Procedure Component Value Units Date/Time    Basic metabolic panel [[id number]]  (Abnormal) Collected: 07/25/23 0412    Lab Status: Final result Specimen: Blood from Arm, Right Updated: 07/25/23 0439     Sodium 138 mmol/L      Potassium 4.2 mmol/L      Chloride 106 mmol/L      CO2 25 mmol/L      ANION GAP 7 mmol/L      BUN 28 mg/dL      Creatinine 2.08 mg/dL      Glucose 109 mg/dL      Calcium 11.9 mg/dL      eGFR 27 ml/min/1.73sq m     Narrative:      [employer] guidelines for Chronic Kidney Disease (CKD):   •  Stage 1 with normal or high GFR (GFR > 90 mL/min/1.73 square meters)  •  Stage 2 Mild CKD (GFR = 60-89 mL/min/1.73 square meters)  •  Stage 3A Moderate CKD (GFR = 45-59 mL/min/1.73 square meters)  •  Stage 3B Moderate CKD (GFR = 30-44 mL/min/1.73 square meters)  •  Stage 4 Severe CKD (GFR = 15-29 mL/min/1.73 square meters)  •  Stage 5 End Stage CKD (GFR <15 mL/min/1.73 square meters)  Note: GFR calculation is accurate only with a steady state creatinine    Comprehensive metabolic panel [[id number]]  (Abnormal) Collected: 07/25/23 0143    Lab Status: Final result Specimen: Blood from Arm, Right Updated: 07/25/23 0248     Sodium 135 mmol/L      Potassium 3.9 mmol/L      Chloride 101 mmol/L      CO2 24 mmol/L      ANION GAP 10 mmol/L      BUN 30 mg/dL      Creatinine 2.23 mg/dL      Glucose 125 mg/dL      Calcium 13.2 mg/dL      AST 17 U/L      ALT 29 U/L      Alkaline Phosphatase 59 U/L      Total Protein 7.4 g/dL      Albumin 4.0 g/dL      Total Bilirubin 0.47 mg/dL      eGFR 25 ml/min/1.73sq m     Narrative:      [facility] guidelines for Chronic Kidney Disease (CKD):   •  Stage 1 with normal or high GFR (GFR > 90 mL/min/1.73 square meters)  •  Stage 2 Mild CKD (GFR = 60-89 mL/min/1.73 square meters)  •  Stage 3A Moderate CKD (GFR = 45-59 mL/min/1.73 square meters)  •  Stage 3B Moderate CKD (GFR = 30-44 mL/min/1.73 square meters)  •  Stage 4 Severe CKD (GFR = 15-29 mL/min/1.73 square meters)  •  Stage 5 End Stage CKD (GFR <15 mL/min/1.73 square meters)  Note: GFR calculation is accurate only with a steady state creatinine    TSH, 3rd generation with Free T4 reflex [[id number]]  (Normal) Collected: 07/25/23 0143    Lab Status: Final result Specimen: Blood from Arm, Right Updated: 07/25/23 0226     TSH 3RD GENERATON 1.905 uIU/mL     Calcium, ionized [[id number]]  (Abnormal) Collected: 07/25/23 0143    Lab Status: Final result Specimen: Blood from Arm, Right Updated: 07/25/23 0211     Calcium, Ionized 1.53 mmol/L     CBC and differential [[id number]]  (Abnormal) Collected: 07/25/23 0143    Lab Status: Final result Specimen: Blood from Arm, Right Updated: 07/25/23 0150     WBC 5.58 Thousand/uL      RBC 4.13 Million/uL      Hemoglobin 12.5 g/dL      Hematocrit 37.0 %      MCV 90 fL      MCH 30.3 pg      MCHC 33.8 g/dL      RDW 15.8 %      MPV 10.3 fL      Platelets 469 Thousands/uL      nRBC 0 /100 WBCs      Neutrophils Relative 52 %      Immat GRANS % 0 %      Lymphocytes Relative 37 %      Monocytes Relative 8 %      Eosinophils Relative 2 %      Basophils Relative 1 %      Neutrophils Absolute 2.87 Thousands/µL      Immature Grans Absolute 0.02 Thousand/uL      Lymphocytes Absolute 2.08 Thousands/µL      Monocytes Absolute 0.42 Thousand/µL      Eosinophils Absolute 0.13 Thousand/µL      Basophils Absolute 0.06 Thousands/µL         No orders to display         ED Course / Workup Pending (followup):  Procedure Note: EKG  Date/Time: 07/25/23 5:28 AM   Interpreted by: Octavio Santo  Indications / Diagnosis: lytes  ECG reviewed by me, the ED Provider: yes   The EKG demonstrates:  Rhythm: sinus    Intervals: normal intervals  Axis: normal axis  QRS/Blocks: normal QRS  ST Changes:No acute ST Changes, no STD/KISHORE. T wave changes: none      Patient w/ LELAND on labs  Given 2L fluid  Calcium and Cr trending in correct direction, stil elevated  Offer obs admission, patient prefers to self hydrate and recheck bmp tomorrow    Return if increase in Ca or Cr                                       Procedures  MDM        Disposition  Final diagnoses:   Hypercalcemia   Dehydration   LELAND (acute kidney injury) ([address])     Time reflects when diagnosis was documented in both MDM as applicable and the Disposition within this note     Time User Action Codes Description Comment    7/25/2023  4:56 AM Almetta Paresh Add [E83.52] Hypercalcemia     7/25/2023  4:56 AM Almetta Paresh Add [E86.0] Dehydration     7/25/2023  4:56 AM Almetta Paresh Add [N17.9] LELAND (acute kidney injury) [facility])       ED Disposition     ED Disposition   Discharge    Condition   Stable    Date/Time   Tue Jul 25, 2023  4:56 AM    Comment   [facility] discharge to home/self care. Follow-up Information     Follow up With Specialties Details Why [address] Johnathon Cooper MD Internal Medicine   [address]  [phone]          Discharge Medication List as of 7/25/2023  4:57 AM      CONTINUE these medications which have NOT CHANGED    Details   albuterol (PROVENTIL HFA,VENTOLIN HFA) 90 mcg/act inhaler Inhale 1 puff every 6 (six) hours as needed, Historical Med      Alcohol Swabs 70 % PADS May substitute brand based on insurance coverage.  Check glucose BID., Normal      ALPRAZolam ER (XANAX XR) 2 MG 24 hr tablet Take 1 tablet (2 mg total) by mouth 2 (two) times a day before breakfast and lunch, Starting Tue 6/20/2023, Normal      bacitracin topical ointment 500 units/g topical ointment Apply 1 large application topically 2 (two) times a day, Starting Mon 4/17/2023, Normal      baclofen 10 mg tablet Take 10 mg by mouth 3 (three) times a day as needed, Starting Fri 7/22/2022, Historical Med      Blood Glucose Monitoring Suppl (OneTouch Verio Reflect) w/Device KIT May substitute brand based on insurance coverage. Check glucose BID., Normal      calcitriol (ROCALTROL) 0.25 mcg capsule Take 1 capsule (0.25 mcg total) by mouth daily Take I capsule by mouth in the evening with meals. , Starting Fri 6/23/2023, Normal      calcium carbonate (OS-EDER) 600 MG tablet Take 1 pill daily with dinner, No Print      cephalexin (KEFLEX) 500 mg capsule cephalexin 500 mg capsule   TAKE 1 CAPSULE BY MOUTH EVERY 12 HOURS FOR 7 DAYS., Historical Med      Cholecalciferol (Vitamin D3) 125 MCG (5000 UT) CAPS Take 5000 IU daily, No Print      desvenlafaxine (PRISTIQ) 100 mg 24 hr tablet Take 1 tablet (100 mg total) by mouth daily, Starting Tue 5/9/2023, Normal      fenofibrate 160 MG tablet Take 1 tablet (160 mg total) by mouth daily, Starting Tue 7/18/2023, Normal      ferrous sulfate 325 (65 Fe) mg tablet Take 1 tablet (325 mg total) by mouth 2 (two) times a day Twice Monday, wed, Friday and Saturday -Last dose 4/1/22, Starting Mon 6/19/2023, Until Sun 9/17/2023, Normal      glucose blood (OneTouch Verio) test strip May substitute brand based on insurance coverage. Check glucose BID., Normal      levothyroxine 150 mcg tablet Take 1 tablet (150 mcg total) by mouth daily at bedtime, Starting Tue 6/13/2023, Normal      Lidocaine-Menthol 4-1 % PTCH if needed  , Historical Med      magnesium Oxide (MAG-OX) 400 mg TABS Take 1 tablet (400 mg total) by mouth 3 (three) times a day, Starting Mon 6/19/2023, Normal      metFORMIN (GLUCOPHAGE) 500 mg tablet metformin 500 mg tablet   TAKE 1/2 TABLET BY MOUTH TWICE A DAY, Historical Med      ondansetron (ZOFRAN) 4 mg tablet TAKE 1 TABLET BY MOUTH EVERY 8 HOURS AS NEEDED FOR NAUSEA, Normal      OneTouch Delica Lancets 30R MISC May substitute brand based on insurance coverage.  Check glucose BID., Normal oxyCODONE-acetaminophen (PERCOCET)  mg per tablet 1 tablet 4 (four) times a day, Starting Mon 1/16/2023, Historical Med      potassium chloride (K-DUR,KLOR-CON) 20 mEq tablet Take 1 tablet (20 mEq total) by mouth 2 (two) times a day, Starting Mon 6/19/2023, Normal      Promethazine-DM (PHENERGAN-DM) 6.25-15 mg/5 mL oral syrup Take 5 mL by mouth 4 (four) times a day as needed for cough, Starting Mon 6/19/2023, Normal      traZODone (DESYREL) 50 mg tablet Take 1 tablet (50 mg total) by mouth daily at bedtime, Starting Tue 5/9/2023, Normal           Outpatient Discharge Orders   Basic metabolic panel   Standing Status: Future Standing Exp.  Date: 07/25/24          ED Provider  Electronically Signed by     Daquan Arboleda MD  07/25/23 7056
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
## 2023-07-26 ENCOUNTER — APPOINTMENT (OUTPATIENT)
Dept: LAB | Facility: HOSPITAL | Age: 48
End: 2023-07-26
Attending: INTERNAL MEDICINE
Payer: COMMERCIAL

## 2023-07-26 DIAGNOSIS — E89.2 POST-SURGICAL HYPOPARATHYROIDISM (HCC): ICD-10-CM

## 2023-07-26 DIAGNOSIS — E89.0 POSTOPERATIVE HYPOTHYROIDISM: ICD-10-CM

## 2023-07-26 DIAGNOSIS — E83.51 HYPOCALCEMIA: ICD-10-CM

## 2023-07-26 DIAGNOSIS — F32.89 OTHER DEPRESSION: ICD-10-CM

## 2023-07-26 DIAGNOSIS — E55.9 VITAMIN D DEFICIENCY: ICD-10-CM

## 2023-07-26 LAB — CALCIUM SERPL-MCNC: 10.1 MG/DL (ref 8.4–10.2)

## 2023-07-26 PROCEDURE — 36415 COLL VENOUS BLD VENIPUNCTURE: CPT

## 2023-07-26 PROCEDURE — 82310 ASSAY OF CALCIUM: CPT

## 2023-07-26 RX ORDER — TRAZODONE HYDROCHLORIDE 50 MG/1
50 TABLET ORAL
Qty: 90 TABLET | Refills: 0 | Status: SHIPPED | OUTPATIENT
Start: 2023-07-26

## 2023-07-27 ENCOUNTER — APPOINTMENT (OUTPATIENT)
Dept: LAB | Facility: HOSPITAL | Age: 48
End: 2023-07-27
Attending: INTERNAL MEDICINE
Payer: COMMERCIAL

## 2023-07-27 ENCOUNTER — TELEMEDICINE (OUTPATIENT)
Dept: BEHAVIORAL/MENTAL HEALTH CLINIC | Facility: CLINIC | Age: 48
End: 2023-07-27

## 2023-07-27 ENCOUNTER — SOCIAL WORK (OUTPATIENT)
Dept: BEHAVIORAL/MENTAL HEALTH CLINIC | Facility: CLINIC | Age: 48
End: 2023-07-27

## 2023-07-27 DIAGNOSIS — Z91.199 NO-SHOW FOR APPOINTMENT: Primary | ICD-10-CM

## 2023-07-27 DIAGNOSIS — E83.51 HYPOCALCEMIA: ICD-10-CM

## 2023-07-27 DIAGNOSIS — F41.9 ANXIETY: ICD-10-CM

## 2023-07-27 LAB — CALCIUM SERPL-MCNC: 9.9 MG/DL (ref 8.4–10.2)

## 2023-07-27 PROCEDURE — 82310 ASSAY OF CALCIUM: CPT

## 2023-07-27 PROCEDURE — NOSHOW: Performed by: SOCIAL WORKER

## 2023-07-27 PROCEDURE — 36415 COLL VENOUS BLD VENIPUNCTURE: CPT

## 2023-07-27 RX ORDER — ALPRAZOLAM 2 MG/1
2 TABLET, EXTENDED RELEASE ORAL
Qty: 60 TABLET | Refills: 0 | Status: SHIPPED | OUTPATIENT
Start: 2023-07-27

## 2023-07-27 NOTE — PSYCH
No Call. No Show. Charge    Tiffany Oneill no showed 07/27/23 appointment , staff called and left message to reschedule appointment     Treatment Plan not due at this session.

## 2023-07-28 ENCOUNTER — APPOINTMENT (OUTPATIENT)
Dept: LAB | Facility: HOSPITAL | Age: 48
End: 2023-07-28
Attending: INTERNAL MEDICINE
Payer: COMMERCIAL

## 2023-07-28 DIAGNOSIS — E89.2 POST-SURGICAL HYPOPARATHYROIDISM (HCC): ICD-10-CM

## 2023-07-28 DIAGNOSIS — F32.89 OTHER DEPRESSION: ICD-10-CM

## 2023-07-28 DIAGNOSIS — E83.51 HYPOCALCEMIA: ICD-10-CM

## 2023-07-28 DIAGNOSIS — E55.9 VITAMIN D DEFICIENCY: ICD-10-CM

## 2023-07-28 DIAGNOSIS — E89.0 POSTOPERATIVE HYPOTHYROIDISM: ICD-10-CM

## 2023-07-28 LAB — CALCIUM SERPL-MCNC: 10.2 MG/DL (ref 8.4–10.2)

## 2023-07-28 PROCEDURE — 82310 ASSAY OF CALCIUM: CPT

## 2023-07-28 PROCEDURE — 36415 COLL VENOUS BLD VENIPUNCTURE: CPT

## 2023-07-28 RX ORDER — DESVENLAFAXINE 100 MG/1
100 TABLET, EXTENDED RELEASE ORAL DAILY
Qty: 90 TABLET | Refills: 0 | Status: SHIPPED | OUTPATIENT
Start: 2023-07-28

## 2023-07-31 ENCOUNTER — APPOINTMENT (OUTPATIENT)
Dept: LAB | Facility: HOSPITAL | Age: 48
End: 2023-07-31
Attending: INTERNAL MEDICINE
Payer: COMMERCIAL

## 2023-07-31 DIAGNOSIS — E55.9 VITAMIN D DEFICIENCY: ICD-10-CM

## 2023-07-31 DIAGNOSIS — E83.51 HYPOCALCEMIA: ICD-10-CM

## 2023-07-31 DIAGNOSIS — E89.2 POST-SURGICAL HYPOPARATHYROIDISM (HCC): ICD-10-CM

## 2023-07-31 DIAGNOSIS — E89.0 POSTOPERATIVE HYPOTHYROIDISM: ICD-10-CM

## 2023-07-31 DIAGNOSIS — B37.9 CANDIDA INFECTION: ICD-10-CM

## 2023-07-31 DIAGNOSIS — L98.491 ULCER OF CHEST WALL, LIMITED TO BREAKDOWN OF SKIN (HCC): ICD-10-CM

## 2023-07-31 DIAGNOSIS — L03.313 CELLULITIS OF CHEST WALL: Primary | ICD-10-CM

## 2023-07-31 LAB — CALCIUM SERPL-MCNC: 8.8 MG/DL (ref 8.4–10.2)

## 2023-07-31 PROCEDURE — 36415 COLL VENOUS BLD VENIPUNCTURE: CPT

## 2023-07-31 PROCEDURE — 82310 ASSAY OF CALCIUM: CPT

## 2023-07-31 RX ORDER — CEPHALEXIN 500 MG/1
500 CAPSULE ORAL EVERY 8 HOURS SCHEDULED
Qty: 21 CAPSULE | Refills: 0 | Status: SHIPPED | OUTPATIENT
Start: 2023-07-31 | End: 2023-08-07

## 2023-07-31 RX ORDER — NYSTATIN 100000 [USP'U]/G
POWDER TOPICAL
Qty: 60 G | Refills: 1 | Status: SHIPPED | OUTPATIENT
Start: 2023-07-31

## 2023-08-01 ENCOUNTER — APPOINTMENT (OUTPATIENT)
Dept: LAB | Facility: HOSPITAL | Age: 48
End: 2023-08-01
Attending: INTERNAL MEDICINE
Payer: COMMERCIAL

## 2023-08-01 DIAGNOSIS — E83.51 HYPOCALCEMIA: ICD-10-CM

## 2023-08-01 DIAGNOSIS — E89.2 POST-SURGICAL HYPOPARATHYROIDISM (HCC): ICD-10-CM

## 2023-08-01 DIAGNOSIS — E89.0 POSTOPERATIVE HYPOTHYROIDISM: ICD-10-CM

## 2023-08-01 DIAGNOSIS — E55.9 VITAMIN D DEFICIENCY: ICD-10-CM

## 2023-08-01 DIAGNOSIS — Z12.31 ENCOUNTER FOR SCREENING MAMMOGRAM FOR MALIGNANT NEOPLASM OF BREAST: Primary | ICD-10-CM

## 2023-08-01 LAB — CALCIUM SERPL-MCNC: 11.1 MG/DL (ref 8.4–10.2)

## 2023-08-01 PROCEDURE — 36415 COLL VENOUS BLD VENIPUNCTURE: CPT

## 2023-08-01 PROCEDURE — 82310 ASSAY OF CALCIUM: CPT

## 2023-08-03 ENCOUNTER — APPOINTMENT (OUTPATIENT)
Dept: LAB | Facility: HOSPITAL | Age: 48
End: 2023-08-03
Attending: INTERNAL MEDICINE
Payer: COMMERCIAL

## 2023-08-03 DIAGNOSIS — E89.0 POSTOPERATIVE HYPOTHYROIDISM: ICD-10-CM

## 2023-08-03 DIAGNOSIS — E89.2 POST-SURGICAL HYPOPARATHYROIDISM (HCC): ICD-10-CM

## 2023-08-03 DIAGNOSIS — E83.51 HYPOCALCEMIA: ICD-10-CM

## 2023-08-03 DIAGNOSIS — E55.9 VITAMIN D DEFICIENCY: ICD-10-CM

## 2023-08-03 LAB — CALCIUM SERPL-MCNC: 11.1 MG/DL (ref 8.4–10.2)

## 2023-08-03 PROCEDURE — 82310 ASSAY OF CALCIUM: CPT

## 2023-08-03 PROCEDURE — 36415 COLL VENOUS BLD VENIPUNCTURE: CPT

## 2023-08-04 ENCOUNTER — APPOINTMENT (OUTPATIENT)
Dept: LAB | Facility: HOSPITAL | Age: 48
End: 2023-08-04
Attending: INTERNAL MEDICINE
Payer: COMMERCIAL

## 2023-08-04 ENCOUNTER — TELEPHONE (OUTPATIENT)
Dept: OTHER | Facility: OTHER | Age: 48
End: 2023-08-04

## 2023-08-04 DIAGNOSIS — E89.2 POST-SURGICAL HYPOPARATHYROIDISM (HCC): ICD-10-CM

## 2023-08-04 DIAGNOSIS — E83.51 HYPOCALCEMIA: ICD-10-CM

## 2023-08-04 DIAGNOSIS — E55.9 VITAMIN D DEFICIENCY: ICD-10-CM

## 2023-08-04 DIAGNOSIS — E89.0 POSTOPERATIVE HYPOTHYROIDISM: ICD-10-CM

## 2023-08-04 DIAGNOSIS — E83.52 HYPERCALCEMIA: Primary | ICD-10-CM

## 2023-08-04 LAB — CALCIUM SERPL-MCNC: 12.6 MG/DL (ref 8.4–10.2)

## 2023-08-04 PROCEDURE — 82310 ASSAY OF CALCIUM: CPT

## 2023-08-04 PROCEDURE — 36415 COLL VENOUS BLD VENIPUNCTURE: CPT

## 2023-08-04 NOTE — TELEPHONE ENCOUNTER
Lab Result: calcium 12.6   Date/Time Drawn: 8/4/2023 @14:00   Ordering Provider: Dr Usha Bull Name: Christina Llamas       The following critical/stat result was read back to the lab as stated above and [employer] to the on-call provider. The provider confirmed receipt of the message.

## 2023-08-04 NOTE — TELEPHONE ENCOUNTER
Spoke to Celia hackett from [facility] and confirmed accuracy of lab results. Called patient to discuss labs. She is currently endorsing some fatigue but otherwise denies other acute complaints. She will decrease her calcitriol to 0.25 mcg twice daily for now and hold calcium carbonate supplements, remain well-hydrated, and repeat lab work on Monday. If she becomes symptomatic, she will go to the ED. Patient agreeable.

## 2023-08-07 ENCOUNTER — TELEPHONE (OUTPATIENT)
Dept: ENDOCRINOLOGY | Facility: CLINIC | Age: 48
End: 2023-08-07

## 2023-08-07 ENCOUNTER — APPOINTMENT (OUTPATIENT)
Dept: LAB | Facility: HOSPITAL | Age: 48
End: 2023-08-07
Attending: INTERNAL MEDICINE
Payer: COMMERCIAL

## 2023-08-07 DIAGNOSIS — E83.51 HYPOCALCEMIA: ICD-10-CM

## 2023-08-07 LAB — CALCIUM SERPL-MCNC: 10.8 MG/DL (ref 8.4–10.2)

## 2023-08-07 PROCEDURE — 36415 COLL VENOUS BLD VENIPUNCTURE: CPT

## 2023-08-07 PROCEDURE — 82310 ASSAY OF CALCIUM: CPT

## 2023-08-07 NOTE — TELEPHONE ENCOUNTER
The lab called and l/m on Friday to advise that her Calcium was critically high. She did have it drawn again today. Results are in her chart.

## 2023-08-08 NOTE — TELEPHONE ENCOUNTER
Calcium level yesterday 8/7 has improved to 10.6  Recommend continuing lower dose of calcitriol, keeping well-hydrated

## 2023-08-09 ENCOUNTER — APPOINTMENT (OUTPATIENT)
Dept: LAB | Facility: HOSPITAL | Age: 48
End: 2023-08-09
Payer: COMMERCIAL

## 2023-08-09 DIAGNOSIS — E89.0 POSTOPERATIVE HYPOTHYROIDISM: ICD-10-CM

## 2023-08-09 DIAGNOSIS — E89.2 POST-SURGICAL HYPOPARATHYROIDISM (HCC): ICD-10-CM

## 2023-08-09 DIAGNOSIS — E55.9 VITAMIN D DEFICIENCY: ICD-10-CM

## 2023-08-09 DIAGNOSIS — E83.51 HYPOCALCEMIA: ICD-10-CM

## 2023-08-09 LAB — CALCIUM SERPL-MCNC: 11.5 MG/DL (ref 8.4–10.2)

## 2023-08-09 PROCEDURE — 82310 ASSAY OF CALCIUM: CPT

## 2023-08-09 PROCEDURE — 36415 COLL VENOUS BLD VENIPUNCTURE: CPT

## 2023-08-11 ENCOUNTER — TELEPHONE (OUTPATIENT)
Age: 48
End: 2023-08-11

## 2023-08-11 ENCOUNTER — APPOINTMENT (OUTPATIENT)
Dept: LAB | Facility: HOSPITAL | Age: 48
End: 2023-08-11
Payer: COMMERCIAL

## 2023-08-11 ENCOUNTER — PREP FOR PROCEDURE (OUTPATIENT)
Age: 48
End: 2023-08-11

## 2023-08-11 DIAGNOSIS — Z12.11 SCREENING FOR COLON CANCER: Primary | ICD-10-CM

## 2023-08-11 DIAGNOSIS — E89.2 POST-SURGICAL HYPOPARATHYROIDISM (HCC): ICD-10-CM

## 2023-08-11 DIAGNOSIS — E89.0 POSTOPERATIVE HYPOTHYROIDISM: ICD-10-CM

## 2023-08-11 DIAGNOSIS — E83.51 HYPOCALCEMIA: ICD-10-CM

## 2023-08-11 DIAGNOSIS — E55.9 VITAMIN D DEFICIENCY: ICD-10-CM

## 2023-08-11 LAB — CALCIUM SERPL-MCNC: 9.4 MG/DL (ref 8.4–10.2)

## 2023-08-11 PROCEDURE — 36415 COLL VENOUS BLD VENIPUNCTURE: CPT

## 2023-08-11 PROCEDURE — 82310 ASSAY OF CALCIUM: CPT

## 2023-08-11 NOTE — TELEPHONE ENCOUNTER
Scheduled date of colonoscopy (as of today): 9/11/23  Physician performing colonoscopy: Shanika Pathak  Location of colonoscopy: [facility]  Bowel prep instructions sent to [facility] and email by: DEVANTE      Note: Pt. is DIABETIC

## 2023-08-14 ENCOUNTER — CONSULT (OUTPATIENT)
Dept: BARIATRICS | Facility: CLINIC | Age: 48
End: 2023-08-14
Payer: COMMERCIAL

## 2023-08-14 ENCOUNTER — APPOINTMENT (OUTPATIENT)
Dept: LAB | Facility: HOSPITAL | Age: 48
End: 2023-08-14
Attending: INTERNAL MEDICINE
Payer: COMMERCIAL

## 2023-08-14 VITALS
HEART RATE: 97 BPM | DIASTOLIC BLOOD PRESSURE: 90 MMHG | BODY MASS INDEX: 36.86 KG/M2 | HEIGHT: 63 IN | WEIGHT: 208 LBS | SYSTOLIC BLOOD PRESSURE: 130 MMHG

## 2023-08-14 DIAGNOSIS — K76.0 FATTY LIVER DISEASE, NONALCOHOLIC: ICD-10-CM

## 2023-08-14 DIAGNOSIS — N18.32 STAGE 3B CHRONIC KIDNEY DISEASE (HCC): ICD-10-CM

## 2023-08-14 DIAGNOSIS — E89.2 POST-SURGICAL HYPOPARATHYROIDISM (HCC): ICD-10-CM

## 2023-08-14 DIAGNOSIS — E11.9 TYPE 2 DIABETES MELLITUS WITHOUT COMPLICATION, WITHOUT LONG-TERM CURRENT USE OF INSULIN (HCC): ICD-10-CM

## 2023-08-14 DIAGNOSIS — E89.0 POSTOPERATIVE HYPOTHYROIDISM: ICD-10-CM

## 2023-08-14 DIAGNOSIS — K76.0 FATTY LIVER: ICD-10-CM

## 2023-08-14 DIAGNOSIS — E83.51 HYPOCALCEMIA: ICD-10-CM

## 2023-08-14 DIAGNOSIS — E66.9 OBESITY (BMI 30-39.9): ICD-10-CM

## 2023-08-14 DIAGNOSIS — E55.9 VITAMIN D DEFICIENCY: ICD-10-CM

## 2023-08-14 DIAGNOSIS — E66.01 CLASS 2 SEVERE OBESITY DUE TO EXCESS CALORIES WITH SERIOUS COMORBIDITY AND BODY MASS INDEX (BMI) OF 37.0 TO 37.9 IN ADULT (HCC): Primary | ICD-10-CM

## 2023-08-14 PROBLEM — E66.812 CLASS 2 SEVERE OBESITY DUE TO EXCESS CALORIES WITH SERIOUS COMORBIDITY AND BODY MASS INDEX (BMI) OF 37.0 TO 37.9 IN ADULT (HCC): Status: ACTIVE | Noted: 2023-08-14

## 2023-08-14 LAB
ANION GAP SERPL CALCULATED.3IONS-SCNC: 9 MMOL/L
BUN SERPL-MCNC: 22 MG/DL (ref 5–25)
CALCIUM SERPL-MCNC: 9.9 MG/DL (ref 8.4–10.2)
CHLORIDE SERPL-SCNC: 105 MMOL/L (ref 96–108)
CO2 SERPL-SCNC: 24 MMOL/L (ref 21–32)
CREAT SERPL-MCNC: 1.52 MG/DL (ref 0.6–1.3)
GFR SERPL CREATININE-BSD FRML MDRD: 40 ML/MIN/1.73SQ M
GLUCOSE P FAST SERPL-MCNC: 100 MG/DL (ref 65–99)
POTASSIUM SERPL-SCNC: 3.9 MMOL/L (ref 3.5–5.3)
SODIUM SERPL-SCNC: 138 MMOL/L (ref 135–147)

## 2023-08-14 PROCEDURE — 36415 COLL VENOUS BLD VENIPUNCTURE: CPT

## 2023-08-14 PROCEDURE — 80048 BASIC METABOLIC PNL TOTAL CA: CPT

## 2023-08-14 PROCEDURE — 99204 OFFICE O/P NEW MOD 45 MIN: CPT | Performed by: NURSE PRACTITIONER

## 2023-08-14 RX ORDER — CALCIUM CARBONATE/VITAMIN D3 600 MG-10
TABLET ORAL
COMMUNITY

## 2023-08-14 RX ORDER — FENOFIBRATE 160 MG/1
1 TABLET ORAL DAILY
COMMUNITY

## 2023-08-14 NOTE — ASSESSMENT & PLAN NOTE
- Discussed options of HealthyCORE-Intensive Lifestyle Intervention Program, Very Low Calorie Diet-VLCD and Conservative Program and the role of weight loss medications. - Explained the importance of making lifestyle changes first before starting anti-obesity medications. - Patient should demonstrate lifestyle changes first before anti-obesity medication initiated. - Patient is interested in pursuing Conservative Program and follow up visits with medical weight management provider. Patient is unable to meet with dietician due to financial constraints. - Initial weight loss goal of 5-10% weight loss for improved health  - Weight loss can improve patient's co-morbid conditions and/or prevent weight-related complications. - Labs reviewed: from 7/2023 and 8/2023   - Patient lifestyle habits were reviewed. She will be getting REE test to determine basal metabolic rate. For now she will track her foods and keep her calories between 1184-2051 calories per day. She was advised that she should not go under 1000 calories per day at this time as this could be slowing her metabolism. She was given a meal plan to use as a guide to see how distributing those calories throughout the day in balanced meals will be beneficial to her metabolism. She was provided a snack list with options for low calorie and low carb snack ideas. She was encouraged to continue with her water intake. Tools for weight loss were reviewed at length. Patient was informed that nutrition is the most valuable tool and finding the proper balance of nutrition for her weight loss journey will be the focus of our appointments. Medications were reviewed and may discuss Wegovy as an option but BMP to be rechecked prior to starting. Phentermine contraindicated as patient is on alprazolam  Contrave not an option as patient is on Percocet for chronic pain        Goals:  Do not skip meals.   Calorie goal of 7162-8107 calories per day  Food log via alex - options include  www. myfitnesspal.com, Fundation. com, loseit.com, WishGenieking. com, baritastic  No sugary beverages. At least 64oz of water daily. Increase physical activity by 10 minutes daily.  Gradually increase physical activity to a goal of 5 days per week for 30 minutes of MODERATE intensity PLUS 2 days per week of FULL BODY resistance training
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
## 2023-08-16 ENCOUNTER — APPOINTMENT (OUTPATIENT)
Dept: LAB | Facility: HOSPITAL | Age: 48
End: 2023-08-16
Attending: INTERNAL MEDICINE
Payer: COMMERCIAL

## 2023-08-16 ENCOUNTER — TELEMEDICINE (OUTPATIENT)
Dept: PSYCHIATRY | Facility: CLINIC | Age: 48
End: 2023-08-16
Payer: COMMERCIAL

## 2023-08-16 DIAGNOSIS — F51.01 PRIMARY INSOMNIA: Primary | ICD-10-CM

## 2023-08-16 DIAGNOSIS — F43.10 PTSD (POST-TRAUMATIC STRESS DISORDER): ICD-10-CM

## 2023-08-16 DIAGNOSIS — F41.0 PANIC DISORDER: ICD-10-CM

## 2023-08-16 DIAGNOSIS — F41.1 GAD (GENERALIZED ANXIETY DISORDER): ICD-10-CM

## 2023-08-16 DIAGNOSIS — E83.51 HYPOCALCEMIA: ICD-10-CM

## 2023-08-16 LAB — CALCIUM SERPL-MCNC: 9.7 MG/DL (ref 8.4–10.2)

## 2023-08-16 PROCEDURE — 82310 ASSAY OF CALCIUM: CPT

## 2023-08-16 PROCEDURE — 99214 OFFICE O/P EST MOD 30 MIN: CPT | Performed by: PHYSICIAN ASSISTANT

## 2023-08-16 PROCEDURE — 36415 COLL VENOUS BLD VENIPUNCTURE: CPT

## 2023-08-16 RX ORDER — TRAZODONE HYDROCHLORIDE 100 MG/1
100 TABLET ORAL
Qty: 90 TABLET | Refills: 0 | Status: SHIPPED | OUTPATIENT
Start: 2023-08-16 | End: 2023-11-14

## 2023-08-16 RX ORDER — ALPRAZOLAM 2 MG/1
2 TABLET, EXTENDED RELEASE ORAL
Qty: 60 TABLET | Refills: 0 | Status: SHIPPED | OUTPATIENT
Start: 2023-08-16

## 2023-08-16 NOTE — ASSESSMENT & PLAN NOTE
Lab Results   Component Value Date    EGFR 40 08/14/2023    EGFR 27 07/25/2023    EGFR 27 07/25/2023    CREATININE 1.52 (H) 08/14/2023    CREATININE 2.10 (H) 07/25/2023    CREATININE 2.08 (H) 07/25/2023   Patient currently is under the care of a nephrologist  BMP to be rechecked prior to starting any new medication

## 2023-08-16 NOTE — ASSESSMENT & PLAN NOTE
Currently managed by endocrinologist  Treatment includes metformin 500 mg -patient reports she does not take regularly  Lab Results   Component Value Date    HGBA1C 5.7 (H) 04/21/2023

## 2023-08-17 ENCOUNTER — APPOINTMENT (OUTPATIENT)
Dept: LAB | Facility: HOSPITAL | Age: 48
End: 2023-08-17
Attending: INTERNAL MEDICINE
Payer: COMMERCIAL

## 2023-08-17 DIAGNOSIS — E55.9 VITAMIN D DEFICIENCY: ICD-10-CM

## 2023-08-17 DIAGNOSIS — E89.2 POST-SURGICAL HYPOPARATHYROIDISM (HCC): ICD-10-CM

## 2023-08-17 DIAGNOSIS — E83.51 HYPOCALCEMIA: ICD-10-CM

## 2023-08-17 DIAGNOSIS — E89.0 POSTOPERATIVE HYPOTHYROIDISM: ICD-10-CM

## 2023-08-17 LAB — CALCIUM SERPL-MCNC: 9.6 MG/DL (ref 8.4–10.2)

## 2023-08-17 PROCEDURE — 82310 ASSAY OF CALCIUM: CPT

## 2023-08-17 PROCEDURE — 36415 COLL VENOUS BLD VENIPUNCTURE: CPT

## 2023-08-17 NOTE — PROGRESS NOTES
BMI Counseling: Body mass index is 37.14 kg/m². The BMI is above normal. Nutrition recommendations include decreasing portion sizes, encouraging healthy choices of fruits and vegetables, decreasing fast food intake, consuming healthier snacks, limiting drinks that contain sugar, moderation in carbohydrate intake, increasing intake of lean protein, reducing intake of saturated and trans fat and reducing intake of cholesterol. Exercise recommendations include moderate physical activity 150 minutes/week and exercising 3-5 times per week. No pharmacotherapy was ordered. Rationale for BMI follow-up plan is due to patient being overweight or obese. Tobacco Cessation Counseling: Tobacco cessation counseling was provided.  The patient is sincerely urged to quit consumption of tobacco. She is not ready to quit tobacco.     DM foot/eye

## 2023-08-18 ENCOUNTER — APPOINTMENT (OUTPATIENT)
Dept: LAB | Facility: HOSPITAL | Age: 48
End: 2023-08-18
Attending: INTERNAL MEDICINE
Payer: COMMERCIAL

## 2023-08-18 DIAGNOSIS — E55.9 VITAMIN D DEFICIENCY: ICD-10-CM

## 2023-08-18 DIAGNOSIS — E89.0 POSTOPERATIVE HYPOTHYROIDISM: ICD-10-CM

## 2023-08-18 DIAGNOSIS — E89.2 POST-SURGICAL HYPOPARATHYROIDISM (HCC): ICD-10-CM

## 2023-08-18 DIAGNOSIS — E83.51 HYPOCALCEMIA: ICD-10-CM

## 2023-08-18 LAB — CALCIUM SERPL-MCNC: 10.7 MG/DL (ref 8.4–10.2)

## 2023-08-18 PROCEDURE — 82310 ASSAY OF CALCIUM: CPT

## 2023-08-18 PROCEDURE — 36415 COLL VENOUS BLD VENIPUNCTURE: CPT

## 2023-08-21 ENCOUNTER — APPOINTMENT (OUTPATIENT)
Dept: LAB | Facility: HOSPITAL | Age: 48
End: 2023-08-21
Attending: INTERNAL MEDICINE
Payer: COMMERCIAL

## 2023-08-21 ENCOUNTER — OFFICE VISIT (OUTPATIENT)
Dept: INTERNAL MEDICINE CLINIC | Facility: CLINIC | Age: 48
End: 2023-08-21
Payer: COMMERCIAL

## 2023-08-21 VITALS
HEIGHT: 63 IN | HEART RATE: 85 BPM | RESPIRATION RATE: 15 BRPM | TEMPERATURE: 98 F | WEIGHT: 208 LBS | SYSTOLIC BLOOD PRESSURE: 122 MMHG | OXYGEN SATURATION: 96 % | DIASTOLIC BLOOD PRESSURE: 80 MMHG | BODY MASS INDEX: 36.86 KG/M2

## 2023-08-21 DIAGNOSIS — S91.309A NONHEALING WOUND OF HEEL: ICD-10-CM

## 2023-08-21 DIAGNOSIS — E87.6 HYPOKALEMIA: ICD-10-CM

## 2023-08-21 DIAGNOSIS — E89.2 POST-SURGICAL HYPOPARATHYROIDISM (HCC): ICD-10-CM

## 2023-08-21 DIAGNOSIS — Z98.1 S/P LUMBAR FUSION: ICD-10-CM

## 2023-08-21 DIAGNOSIS — E89.0 POSTOPERATIVE HYPOTHYROIDISM: Chronic | ICD-10-CM

## 2023-08-21 DIAGNOSIS — R79.89 ABNORMAL LIVER FUNCTION TESTS: ICD-10-CM

## 2023-08-21 DIAGNOSIS — E83.52 HYPERCALCEMIA: ICD-10-CM

## 2023-08-21 DIAGNOSIS — E11.9 TYPE 2 DIABETES MELLITUS WITHOUT COMPLICATION, WITHOUT LONG-TERM CURRENT USE OF INSULIN (HCC): ICD-10-CM

## 2023-08-21 DIAGNOSIS — K76.0 FATTY LIVER DISEASE, NONALCOHOLIC: ICD-10-CM

## 2023-08-21 DIAGNOSIS — D68.9 BLOOD COAGULATION DISORDER (HCC): ICD-10-CM

## 2023-08-21 DIAGNOSIS — E83.42 HYPOMAGNESEMIA: ICD-10-CM

## 2023-08-21 DIAGNOSIS — E83.51 HYPOCALCEMIA: ICD-10-CM

## 2023-08-21 DIAGNOSIS — D50.8 IRON DEFICIENCY ANEMIA SECONDARY TO INADEQUATE DIETARY IRON INTAKE: ICD-10-CM

## 2023-08-21 DIAGNOSIS — E55.9 VITAMIN D DEFICIENCY: ICD-10-CM

## 2023-08-21 DIAGNOSIS — E89.0 POSTOPERATIVE HYPOTHYROIDISM: ICD-10-CM

## 2023-08-21 DIAGNOSIS — N18.32 STAGE 3B CHRONIC KIDNEY DISEASE (HCC): Primary | ICD-10-CM

## 2023-08-21 LAB — CALCIUM SERPL-MCNC: 10.2 MG/DL (ref 8.4–10.2)

## 2023-08-21 PROCEDURE — 82310 ASSAY OF CALCIUM: CPT

## 2023-08-21 PROCEDURE — 99214 OFFICE O/P EST MOD 30 MIN: CPT | Performed by: INTERNAL MEDICINE

## 2023-08-21 PROCEDURE — 36415 COLL VENOUS BLD VENIPUNCTURE: CPT

## 2023-08-21 RX ORDER — FERROUS SULFATE 325(65) MG
325 TABLET ORAL 2 TIMES DAILY
Qty: 60 TABLET | Refills: 10 | Status: SHIPPED | OUTPATIENT
Start: 2023-08-21 | End: 2023-11-19

## 2023-08-21 RX ORDER — POTASSIUM CHLORIDE 20 MEQ/1
20 TABLET, EXTENDED RELEASE ORAL 2 TIMES DAILY
Qty: 60 TABLET | Refills: 10 | Status: SHIPPED | OUTPATIENT
Start: 2023-08-21

## 2023-08-21 RX ORDER — LANOLIN ALCOHOL/MO/W.PET/CERES
400 CREAM (GRAM) TOPICAL 3 TIMES DAILY
Qty: 90 TABLET | Refills: 10 | Status: SHIPPED | OUTPATIENT
Start: 2023-08-21

## 2023-08-21 NOTE — PROGRESS NOTES
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 find solutions, including working with specialists. Ask for help creating a plan to break large goals into smaller steps. Your plan may include any of the following:  • Manage other health conditions. Your healthcare provider will work with you to make a care plan that meets your needs. You will be checked regularly for heart disease or other conditions that can make CKD worse, such as diabetes. Your blood pressure will be closely monitored. You will also get a target blood pressure and help making a plan to reach your target. This may include taking your blood pressure at home. • Maintain a healthy weight. Your weight and body mass index (BMI) will be checked regularly. BMI helps find if your weight is healthy for your height. Your healthcare provider will use other tests to check your muscle and protein levels. Extra weight can strain your kidneys. A low weight or low muscle mass can make you feel more tired.  You may have trouble doing your daily activities. Ask your provider what a healthy weight is for you. He or she can help you create a plan to lose or gain weight safely, if needed. The plan may include keeping a food diary. This is a list of foods and liquids you have each day. Your provider will use the diary to help you make changes, if needed. Changes are based on your health and any other conditions you have, such as diabetes. • Create an exercise plan. Regular exercise can help you manage CKD, high blood pressure, and diabetes. Exercise also helps control weight. Your provider can help you create exercise goals and a plan to reach those goals. For example, your goal may be to exercise for 30 minutes in a day. Your plan can include breaking exercise into 10 minute sessions, 3 times during the day. • Create a healthy eating plan. Your provider may tell you to eat food low in potassium, phosphorus, or protein. Your provider may also recommend vitamin or mineral supplements. Do not take any supplements without talking to your provider. A dietitian can help you plan meals if needed. Ask how much liquid to drink each day and which liquids are best for you. • Limit sodium (salt) as directed. You may need to limit sodium to less than 2,300 milligrams (mg) each day. Ask your dietitian or healthcare provider how much sodium you can have each day. The amount depends on your stage of kidney disease. Table salt, canned foods, soups, salted snacks, and processed meats, like deli meats and sausage, are high in sodium. Your provider or a dietitian can show you how to read food labels for sodium. • Limit alcohol as directed. Alcohol can cause fluid retention and can affect your kidneys. Ask how much alcohol is safe for you. A drink of alcohol is 12 ounces of beer, 5 ounces of wine, or 1½ ounces of liquor. • Do not smoke. Nicotine and other chemicals in cigarettes and cigars can cause kidney damage.  Ask your provider for information if you currently smoke and need help to quit. E-cigarettes or smokeless tobacco still contain nicotine. Talk to your provider before you use these products. • Ask about over-the-counter medicines. Medicines such as NSAIDs and laxatives may harm your kidneys. Some cough and cold medicines can raise your blood pressure. Always ask if a medicine is safe before you take it. • Ask about vaccines you may need. CKD can increase your risk for infections such as pneumonia, influenza, and hepatitis. Vaccines lower your risk for infection. Your healthcare provider will tell you which vaccines you need and when to get them. Follow up with your doctor or nephrologist as directed: You will need to return for tests to monitor your kidney and nerve function, and your parathyroid hormone level. Your medicines may be changed, based on certain test results. Write down your questions so you remember to ask them during your visits. © Copyright [employer] 2022 Information is for End User's use only and may not be sold, redistributed or otherwise used for commercial purposes. The above information is an  only. It is not intended as medical advice for individual conditions or treatments. Talk to your doctor, nurse or pharmacist before following any medical regimen to see if it is safe and effective for you. Marisol Hernandez MD        "This note has been constructed using a voice recognition system. Therefore there may be syntax, spelling, and/or grammatical errors.  Please call if you have any questions. "

## 2023-08-21 NOTE — ASSESSMENT & PLAN NOTE
No evidence of any bleeding no clinical evidence of any hypercoagulable state no evidence of any thrombosis will monitor platelet PT and PTT

## 2023-08-21 NOTE — ASSESSMENT & PLAN NOTE
Patient been advised magnesium oxide 400 mg supplemented by endocrinologist continue current treatment as recommended by endocrinologist

## 2023-08-21 NOTE — PATIENT INSTRUCTIONS
Chronic Kidney Disease   WHAT YOU NEED TO KNOW:   Chronic kidney disease (CKD) is the gradual and permanent loss of kidney function. It is also called chronic kidney failure, or chronic renal insufficiency. Normally, the kidneys remove fluid, chemicals, and waste from your blood. These wastes are turned into urine by your kidneys. CKD may worsen over time and lead to kidney failure. Your CKD team will help you and your family plan for your care at home. The team will help you create goals and find ways to meet your goals. Your care plan may change over time as your needs change. DISCHARGE INSTRUCTIONS:   Call your local emergency number (911 in the [address]) if:   You have a seizure. You have shortness of breath. Return to the emergency department if:   You are confused and very drowsy. Call your doctor or nephrologist if:   You suddenly gain or lose more weight than your healthcare provider has told you is okay. You have itchy skin or a rash. You urinate more or less than you normally do. You have blood in your urine. You have nausea and are vomiting. You have fatigue or muscle weakness. You have hiccups that will not stop. You have questions or concerns about your condition or care. Medicines:   Medicines  may be given to decrease blood pressure and get rid of extra fluid. You may also receive medicine to manage health conditions that may occur with CKD, such as anemia, diabetes, and heart disease. Take your medicine as directed. Contact your healthcare provider if you think your medicine is not helping or if you have side effects. Tell your provider if you are allergic to any medicine. Keep a list of the medicines, vitamins, and herbs you take. Include the amounts, and when and why you take them. Bring the list or the pill bottles to follow-up visits. Carry your medicine list with you in case of an emergency. What you can do to manage CKD:   Management may include making some lifestyle changes. Tell your healthcare provider if you have any concerns about being able to make changes. He or she can help you find solutions, including working with specialists. Ask for help creating a plan to break large goals into smaller steps. Your plan may include any of the following:  Manage other health conditions. Your healthcare provider will work with you to make a care plan that meets your needs. You will be checked regularly for heart disease or other conditions that can make CKD worse, such as diabetes. Your blood pressure will be closely monitored. You will also get a target blood pressure and help making a plan to reach your target. This may include taking your blood pressure at home. Maintain a healthy weight. Your weight and body mass index (BMI) will be checked regularly. BMI helps find if your weight is healthy for your height. Your healthcare provider will use other tests to check your muscle and protein levels. Extra weight can strain your kidneys. A low weight or low muscle mass can make you feel more tired. You may have trouble doing your daily activities. Ask your provider what a healthy weight is for you. He or she can help you create a plan to lose or gain weight safely, if needed. The plan may include keeping a food diary. This is a list of foods and liquids you have each day. Your provider will use the diary to help you make changes, if needed. Changes are based on your health and any other conditions you have, such as diabetes. Create an exercise plan. Regular exercise can help you manage CKD, high blood pressure, and diabetes. Exercise also helps control weight. Your provider can help you create exercise goals and a plan to reach those goals. For example, your goal may be to exercise for 30 minutes in a day. Your plan can include breaking exercise into 10 minute sessions, 3 times during the day. Create a healthy eating plan.   Your provider may tell you to eat food low in potassium, phosphorus, or protein. Your provider may also recommend vitamin or mineral supplements. Do not take any supplements without talking to your provider. A dietitian can help you plan meals if needed. Ask how much liquid to drink each day and which liquids are best for you. Limit sodium (salt) as directed. You may need to limit sodium to less than 2,300 milligrams (mg) each day. Ask your dietitian or healthcare provider how much sodium you can have each day. The amount depends on your stage of kidney disease. Table salt, canned foods, soups, salted snacks, and processed meats, like deli meats and sausage, are high in sodium. Your provider or a dietitian can show you how to read food labels for sodium. Limit alcohol as directed. Alcohol can cause fluid retention and can affect your kidneys. Ask how much alcohol is safe for you. A drink of alcohol is 12 ounces of beer, 5 ounces of wine, or 1½ ounces of liquor. Do not smoke. Nicotine and other chemicals in cigarettes and cigars can cause kidney damage. Ask your provider for information if you currently smoke and need help to quit. E-cigarettes or smokeless tobacco still contain nicotine. Talk to your provider before you use these products. Ask about over-the-counter medicines. Medicines such as NSAIDs and laxatives may harm your kidneys. Some cough and cold medicines can raise your blood pressure. Always ask if a medicine is safe before you take it. Ask about vaccines you may need. CKD can increase your risk for infections such as pneumonia, influenza, and hepatitis. Vaccines lower your risk for infection. Your healthcare provider will tell you which vaccines you need and when to get them. Follow up with your doctor or nephrologist as directed: You will need to return for tests to monitor your kidney and nerve function, and your parathyroid hormone level.  Your medicines may be changed, based on certain test results. Write down your questions so you remember to ask them during your visits. © Copyright [employer] 2022 Information is for End User's use only and may not be sold, redistributed or otherwise used for commercial purposes. The above information is an  only. It is not intended as medical advice for individual conditions or treatments. Talk to your doctor, nurse or pharmacist before following any medical regimen to see if it is safe and effective for you.

## 2023-08-21 NOTE — ASSESSMENT & PLAN NOTE
Patient patient was in the emergency room for calcium 12.5 symptomatic with patient blood work done was treated discharged home the emergency room record reviewed followed by endocrinologist for now patient's calcium Carbonet cut down to 503 times a day along with Rocaltrol 0.25 mg daily and last calcium level 10.2 continue monitoring calcium magnesium potassium emergency room record reviewed as follows                Weakness - Generalized        Pt brought by squad from home c/o feeling tired and weak, slept for 12 hours. Nausea and vomiting yesterday. Had her calcium checked yesterday and it was 13. 7.denies other complaints      54 yo female well known to ER arrives by EMS c/o generalized weakness and feeling tired today. She has multiple visits to ER for numbness and tingling and low calcium level and gets IV calcium gluconate. Last here one week ago. Had outpt. Calcium level done yesterday which was high at 13.7. She saw result on her chart but no one from office called her. No fever, new cough, diarrhea. She did have an episode of vomiting yesterday and she noted a red spot on right breast today.

## 2023-08-21 NOTE — ASSESSMENT & PLAN NOTE
Recommended weight loss patient has joined a weight loss center reviewed continue current treatment we will check LFT

## 2023-08-21 NOTE — ASSESSMENT & PLAN NOTE
Lab Results   Component Value Date    HGBA1C 5.7 (H) 04/21/2023   Diabetes very well controlled base of A1c 5.7 metformin discontinued diabetic diet hypoglycemia protocol in place advised dilated eye exam foot exam normal

## 2023-08-21 NOTE — ASSESSMENT & PLAN NOTE
Patient history of for thyroidectomy and postsurgical developed hyperparathyroidism with recurrent hypocalcemia being treated multiple emergency room although records reviewed continue supplementation with Rocaltrol 0.5 mg daily calcium carbonate 500 mg 3 times a day  Discussed as above seen by endocrinologist reviewed no change continue monitoring calcium phosphorus magnesium and PTH level intact

## 2023-08-21 NOTE — ASSESSMENT & PLAN NOTE
Lab Results   Component Value Date    EGFR 40 08/14/2023    EGFR 27 07/25/2023    EGFR 27 07/25/2023    CREATININE 1.52 (H) 08/14/2023    CREATININE 2.10 (H) 07/25/2023    CREATININE 2.08 (H) 07/25/2023   Patient's creatinine down from 57 on July 17 2 GFR 27 and creatinine jumped to 2.1 avoid nephrotoxic awaiting to be nephrologist check calcium magnesium phosphorus and PTH PTH level

## 2023-08-21 NOTE — ASSESSMENT & PLAN NOTE
History of thyroidectomy TSH is last was 10.3 seen by endocrinologist on levothyroxine 175 mcg Free T4 normal continue follow-up with endocrinology continue current dosage

## 2023-08-23 ENCOUNTER — APPOINTMENT (OUTPATIENT)
Dept: LAB | Facility: HOSPITAL | Age: 48
End: 2023-08-23
Payer: COMMERCIAL

## 2023-08-23 DIAGNOSIS — R79.89 ABNORMAL LIVER FUNCTION TESTS: ICD-10-CM

## 2023-08-23 DIAGNOSIS — K76.0 FATTY LIVER DISEASE, NONALCOHOLIC: ICD-10-CM

## 2023-08-23 DIAGNOSIS — E55.9 VITAMIN D DEFICIENCY: ICD-10-CM

## 2023-08-23 DIAGNOSIS — E83.52 HYPERCALCEMIA: ICD-10-CM

## 2023-08-23 DIAGNOSIS — E83.51 HYPOCALCEMIA: ICD-10-CM

## 2023-08-23 DIAGNOSIS — E89.2 POST-SURGICAL HYPOPARATHYROIDISM (HCC): ICD-10-CM

## 2023-08-23 DIAGNOSIS — E89.0 POSTOPERATIVE HYPOTHYROIDISM: ICD-10-CM

## 2023-08-23 DIAGNOSIS — N18.32 STAGE 3B CHRONIC KIDNEY DISEASE (HCC): ICD-10-CM

## 2023-08-23 LAB
ALBUMIN SERPL BCP-MCNC: 4.1 G/DL (ref 3.5–5)
ALP SERPL-CCNC: 55 U/L (ref 34–104)
ALT SERPL W P-5'-P-CCNC: 26 U/L (ref 7–52)
ANION GAP SERPL CALCULATED.3IONS-SCNC: 8 MMOL/L
AST SERPL W P-5'-P-CCNC: 15 U/L (ref 13–39)
BILIRUB SERPL-MCNC: 0.35 MG/DL (ref 0.2–1)
BUN SERPL-MCNC: 23 MG/DL (ref 5–25)
CALCIUM SERPL-MCNC: 10 MG/DL (ref 8.4–10.2)
CHLORIDE SERPL-SCNC: 104 MMOL/L (ref 96–108)
CO2 SERPL-SCNC: 26 MMOL/L (ref 21–32)
CREAT SERPL-MCNC: 1.37 MG/DL (ref 0.6–1.3)
GFR SERPL CREATININE-BSD FRML MDRD: 45 ML/MIN/1.73SQ M
GLUCOSE SERPL-MCNC: 102 MG/DL (ref 65–140)
POTASSIUM SERPL-SCNC: 4.5 MMOL/L (ref 3.5–5.3)
PROT SERPL-MCNC: 7.1 G/DL (ref 6.4–8.4)
SODIUM SERPL-SCNC: 138 MMOL/L (ref 135–147)

## 2023-08-23 PROCEDURE — 36415 COLL VENOUS BLD VENIPUNCTURE: CPT

## 2023-08-23 PROCEDURE — 80053 COMPREHEN METABOLIC PANEL: CPT

## 2023-08-24 ENCOUNTER — TELEPHONE (OUTPATIENT)
Dept: HEMATOLOGY ONCOLOGY | Facility: CLINIC | Age: 48
End: 2023-08-24

## 2023-08-24 NOTE — TELEPHONE ENCOUNTER
Patient was scheduled to see Scarlet Durant at the [facility] on 8/24/23; however patient only wants to be seen in [place]. Appt. Was then cancelled.

## 2023-08-25 ENCOUNTER — TELEPHONE (OUTPATIENT)
Dept: PSYCHIATRY | Facility: CLINIC | Age: 48
End: 2023-08-25

## 2023-08-25 ENCOUNTER — APPOINTMENT (OUTPATIENT)
Dept: LAB | Facility: HOSPITAL | Age: 48
End: 2023-08-25
Attending: INTERNAL MEDICINE
Payer: COMMERCIAL

## 2023-08-25 DIAGNOSIS — E55.9 VITAMIN D DEFICIENCY: ICD-10-CM

## 2023-08-25 DIAGNOSIS — E89.0 POSTOPERATIVE HYPOTHYROIDISM: ICD-10-CM

## 2023-08-25 DIAGNOSIS — E89.2 POST-SURGICAL HYPOPARATHYROIDISM (HCC): ICD-10-CM

## 2023-08-25 DIAGNOSIS — E83.51 HYPOCALCEMIA: ICD-10-CM

## 2023-08-28 ENCOUNTER — APPOINTMENT (OUTPATIENT)
Dept: LAB | Facility: HOSPITAL | Age: 48
End: 2023-08-28
Attending: INTERNAL MEDICINE
Payer: COMMERCIAL

## 2023-08-28 ENCOUNTER — TELEPHONE (OUTPATIENT)
Dept: ENDOCRINOLOGY | Facility: CLINIC | Age: 48
End: 2023-08-28

## 2023-08-28 ENCOUNTER — APPOINTMENT (OUTPATIENT)
Dept: WOUND CARE | Facility: HOSPITAL | Age: 48
End: 2023-08-28
Payer: COMMERCIAL

## 2023-08-28 DIAGNOSIS — E89.2 POST-SURGICAL HYPOPARATHYROIDISM (HCC): ICD-10-CM

## 2023-08-28 DIAGNOSIS — E55.9 VITAMIN D DEFICIENCY: ICD-10-CM

## 2023-08-28 DIAGNOSIS — E83.51 HYPOCALCEMIA: ICD-10-CM

## 2023-08-28 DIAGNOSIS — E89.0 POSTOPERATIVE HYPOTHYROIDISM: ICD-10-CM

## 2023-08-28 LAB — CALCIUM SERPL-MCNC: 12.4 MG/DL (ref 8.4–10.2)

## 2023-08-28 PROCEDURE — 36415 COLL VENOUS BLD VENIPUNCTURE: CPT

## 2023-08-28 PROCEDURE — 82310 ASSAY OF CALCIUM: CPT

## 2023-08-28 NOTE — TELEPHONE ENCOUNTER
Critical  Labs/ Calcium
Done .
Please see result note and call patient   Thank you
(4) no limitation

## 2023-08-29 ENCOUNTER — APPOINTMENT (OUTPATIENT)
Dept: LAB | Facility: HOSPITAL | Age: 48
End: 2023-08-29
Payer: COMMERCIAL

## 2023-08-29 DIAGNOSIS — E89.0 POSTOPERATIVE HYPOTHYROIDISM: ICD-10-CM

## 2023-08-29 DIAGNOSIS — E55.9 VITAMIN D DEFICIENCY: ICD-10-CM

## 2023-08-29 DIAGNOSIS — E89.2 POST-SURGICAL HYPOPARATHYROIDISM (HCC): ICD-10-CM

## 2023-08-29 DIAGNOSIS — E83.51 HYPOCALCEMIA: ICD-10-CM

## 2023-08-29 LAB — CALCIUM SERPL-MCNC: 11.7 MG/DL (ref 8.4–10.2)

## 2023-08-29 PROCEDURE — 36415 COLL VENOUS BLD VENIPUNCTURE: CPT

## 2023-08-29 PROCEDURE — 82310 ASSAY OF CALCIUM: CPT

## 2023-08-30 ENCOUNTER — TELEPHONE (OUTPATIENT)
Dept: NEPHROLOGY | Facility: CLINIC | Age: 48
End: 2023-08-30

## 2023-08-31 ENCOUNTER — APPOINTMENT (OUTPATIENT)
Dept: LAB | Facility: HOSPITAL | Age: 48
End: 2023-08-31
Payer: COMMERCIAL

## 2023-08-31 DIAGNOSIS — E83.51 HYPOCALCEMIA: ICD-10-CM

## 2023-08-31 LAB — CALCIUM SERPL-MCNC: 11.1 MG/DL (ref 8.4–10.2)

## 2023-08-31 PROCEDURE — 36415 COLL VENOUS BLD VENIPUNCTURE: CPT

## 2023-08-31 PROCEDURE — 82310 ASSAY OF CALCIUM: CPT

## 2023-09-01 ENCOUNTER — APPOINTMENT (OUTPATIENT)
Dept: LAB | Facility: HOSPITAL | Age: 48
End: 2023-09-01
Attending: INTERNAL MEDICINE
Payer: COMMERCIAL

## 2023-09-01 DIAGNOSIS — E89.0 POSTOPERATIVE HYPOTHYROIDISM: ICD-10-CM

## 2023-09-01 DIAGNOSIS — E83.51 HYPOCALCEMIA: ICD-10-CM

## 2023-09-01 DIAGNOSIS — E89.2 POST-SURGICAL HYPOPARATHYROIDISM (HCC): ICD-10-CM

## 2023-09-01 DIAGNOSIS — E55.9 VITAMIN D DEFICIENCY: ICD-10-CM

## 2023-09-01 LAB — CALCIUM SERPL-MCNC: 10.2 MG/DL (ref 8.4–10.2)

## 2023-09-01 PROCEDURE — 82310 ASSAY OF CALCIUM: CPT

## 2023-09-01 PROCEDURE — 36415 COLL VENOUS BLD VENIPUNCTURE: CPT

## 2023-09-04 ENCOUNTER — HOSPITAL ENCOUNTER (EMERGENCY)
Facility: HOSPITAL | Age: 48
Discharge: HOME | End: 2023-09-04
Attending: EMERGENCY MEDICINE | Admitting: EMERGENCY MEDICINE
Payer: COMMERCIAL

## 2023-09-04 VITALS
RESPIRATION RATE: 18 BRPM | TEMPERATURE: 98.8 F | DIASTOLIC BLOOD PRESSURE: 78 MMHG | SYSTOLIC BLOOD PRESSURE: 124 MMHG | WEIGHT: 210 LBS | OXYGEN SATURATION: 94 % | BODY MASS INDEX: 37.5 KG/M2 | HEART RATE: 85 BPM

## 2023-09-04 DIAGNOSIS — N28.9 RENAL INSUFFICIENCY: ICD-10-CM

## 2023-09-04 DIAGNOSIS — R20.2 PARESTHESIAS: Primary | ICD-10-CM

## 2023-09-04 LAB
ANION GAP SERPL CALCULATED.3IONS-SCNC: 8 MMOL/L
BUN SERPL-MCNC: 21 MG/DL (ref 5–25)
CA-I BLD-SCNC: 1.23 MMOL/L (ref 1.12–1.32)
CALCIUM SERPL-MCNC: 10.4 MG/DL (ref 8.4–10.2)
CHLORIDE SERPL-SCNC: 104 MMOL/L (ref 96–108)
CO2 SERPL-SCNC: 26 MMOL/L (ref 21–32)
CREAT SERPL-MCNC: 1.44 MG/DL (ref 0.6–1.3)
GFR SERPL CREATININE-BSD FRML MDRD: 43 ML/MIN/1.73SQ M
GLUCOSE SERPL-MCNC: 117 MG/DL (ref 65–140)
MAGNESIUM SERPL-MCNC: 2 MG/DL (ref 1.9–2.7)
POTASSIUM SERPL-SCNC: 4 MMOL/L (ref 3.5–5.3)
SODIUM SERPL-SCNC: 138 MMOL/L (ref 135–147)

## 2023-09-04 PROCEDURE — 93005 ELECTROCARDIOGRAM TRACING: CPT

## 2023-09-04 PROCEDURE — 99284 EMERGENCY DEPT VISIT MOD MDM: CPT

## 2023-09-04 PROCEDURE — 96360 HYDRATION IV INFUSION INIT: CPT

## 2023-09-04 PROCEDURE — 36415 COLL VENOUS BLD VENIPUNCTURE: CPT | Performed by: EMERGENCY MEDICINE

## 2023-09-04 PROCEDURE — 82330 ASSAY OF CALCIUM: CPT | Performed by: EMERGENCY MEDICINE

## 2023-09-04 PROCEDURE — 83735 ASSAY OF MAGNESIUM: CPT | Performed by: EMERGENCY MEDICINE

## 2023-09-04 PROCEDURE — 80048 BASIC METABOLIC PNL TOTAL CA: CPT | Performed by: EMERGENCY MEDICINE

## 2023-09-04 PROCEDURE — 99285 EMERGENCY DEPT VISIT HI MDM: CPT | Performed by: EMERGENCY MEDICINE

## 2023-09-04 RX ORDER — MAGNESIUM SULFATE HEPTAHYDRATE 40 MG/ML
2 INJECTION, SOLUTION INTRAVENOUS ONCE
Status: DISCONTINUED | OUTPATIENT
Start: 2023-09-04 | End: 2023-09-04

## 2023-09-04 RX ADMIN — SODIUM CHLORIDE 1000 ML: 0.9 INJECTION, SOLUTION INTRAVENOUS at 15:53
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
## 2023-09-05 ENCOUNTER — TELEPHONE (OUTPATIENT)
Dept: GASTROENTEROLOGY | Facility: CLINIC | Age: 48
End: 2023-09-05

## 2023-09-05 ENCOUNTER — APPOINTMENT (OUTPATIENT)
Dept: LAB | Facility: HOSPITAL | Age: 48
End: 2023-09-05
Attending: INTERNAL MEDICINE
Payer: COMMERCIAL

## 2023-09-05 ENCOUNTER — TELEPHONE (OUTPATIENT)
Dept: NEPHROLOGY | Facility: CLINIC | Age: 48
End: 2023-09-05

## 2023-09-05 DIAGNOSIS — E83.51 HYPOCALCEMIA: ICD-10-CM

## 2023-09-05 LAB — CALCIUM SERPL-MCNC: 10.1 MG/DL (ref 8.4–10.2)

## 2023-09-05 PROCEDURE — 82310 ASSAY OF CALCIUM: CPT

## 2023-09-05 PROCEDURE — 36415 COLL VENOUS BLD VENIPUNCTURE: CPT

## 2023-09-06 ENCOUNTER — NURSE TRIAGE (OUTPATIENT)
Age: 48
End: 2023-09-06

## 2023-09-06 ENCOUNTER — TELEPHONE (OUTPATIENT)
Dept: GASTROENTEROLOGY | Facility: CLINIC | Age: 48
End: 2023-09-06

## 2023-09-06 ENCOUNTER — APPOINTMENT (OUTPATIENT)
Dept: LAB | Facility: HOSPITAL | Age: 48
End: 2023-09-06
Attending: INTERNAL MEDICINE
Payer: COMMERCIAL

## 2023-09-06 DIAGNOSIS — E89.2 POST-SURGICAL HYPOPARATHYROIDISM (HCC): ICD-10-CM

## 2023-09-06 DIAGNOSIS — E55.9 VITAMIN D DEFICIENCY: ICD-10-CM

## 2023-09-06 DIAGNOSIS — E83.51 HYPOCALCEMIA: ICD-10-CM

## 2023-09-06 DIAGNOSIS — E89.0 POSTOPERATIVE HYPOTHYROIDISM: ICD-10-CM

## 2023-09-06 LAB
ATRIAL RATE: 89 BPM
CALCIUM SERPL-MCNC: 10.5 MG/DL (ref 8.4–10.2)
P AXIS: 47 DEGREES
PR INTERVAL: 170 MS
QRS AXIS: 24 DEGREES
QRSD INTERVAL: 76 MS
QT INTERVAL: 366 MS
QTC INTERVAL: 445 MS
T WAVE AXIS: 56 DEGREES
VENTRICULAR RATE: 89 BPM

## 2023-09-06 PROCEDURE — 36415 COLL VENOUS BLD VENIPUNCTURE: CPT

## 2023-09-06 PROCEDURE — 82310 ASSAY OF CALCIUM: CPT

## 2023-09-06 PROCEDURE — 93010 ELECTROCARDIOGRAM REPORT: CPT | Performed by: INTERNAL MEDICINE

## 2023-09-06 NOTE — TELEPHONE ENCOUNTER
Patient calling in, she is asking if she can use clenpiq again instead of OTC miralax/ dulcolax. Patient has Stage 3 CKD and recent creatinine was 1.44 on 9/4/23. She also wants to make sure the bowel prep will not affect her calcium level.      Please advise

## 2023-09-06 NOTE — TELEPHONE ENCOUNTER
With her CKD stage 3 she should not do clenpiq GI recommends MiraLAX and Dulcolax bowel prep. Please call and  inform patient.   Thank you

## 2023-09-06 NOTE — TELEPHONE ENCOUNTER
Bowel prep will not cause calcium level to increase. She can proceed with bowel prep as ordered.  Thank you

## 2023-09-06 NOTE — TELEPHONE ENCOUNTER
Tried to reach pt regarding the prep instructions. No response left V/m to call back please follow up with Pt and explain that we should do the Duclax miralax.

## 2023-09-06 NOTE — TELEPHONE ENCOUNTER
Pt called back- alvaro/ dul prep was sent via [facility]. Pt inquiring about it affecting her calcium levels?

## 2023-09-08 ENCOUNTER — TELEPHONE (OUTPATIENT)
Dept: GASTROENTEROLOGY | Facility: AMBULARY SURGERY CENTER | Age: 48
End: 2023-09-08

## 2023-09-08 ENCOUNTER — APPOINTMENT (OUTPATIENT)
Dept: LAB | Facility: HOSPITAL | Age: 48
End: 2023-09-08
Payer: COMMERCIAL

## 2023-09-08 DIAGNOSIS — E55.9 VITAMIN D DEFICIENCY: ICD-10-CM

## 2023-09-08 DIAGNOSIS — E89.0 POSTOPERATIVE HYPOTHYROIDISM: ICD-10-CM

## 2023-09-08 DIAGNOSIS — E83.51 HYPOCALCEMIA: ICD-10-CM

## 2023-09-08 DIAGNOSIS — E89.2 POST-SURGICAL HYPOPARATHYROIDISM (HCC): ICD-10-CM

## 2023-09-08 LAB — CALCIUM SERPL-MCNC: 11.3 MG/DL (ref 8.4–10.2)

## 2023-09-08 PROCEDURE — 36415 COLL VENOUS BLD VENIPUNCTURE: CPT

## 2023-09-08 PROCEDURE — 82310 ASSAY OF CALCIUM: CPT

## 2023-09-11 ENCOUNTER — HOSPITAL ENCOUNTER (OUTPATIENT)
Dept: GASTROENTEROLOGY | Facility: AMBULARY SURGERY CENTER | Age: 48
Setting detail: OUTPATIENT SURGERY
Discharge: HOME | End: 2023-09-11
Attending: INTERNAL MEDICINE
Payer: COMMERCIAL

## 2023-09-11 ENCOUNTER — ANESTHESIA EVENT (OUTPATIENT)
Dept: GASTROENTEROLOGY | Facility: AMBULARY SURGERY CENTER | Age: 48
End: 2023-09-11

## 2023-09-11 ENCOUNTER — ANESTHESIA (OUTPATIENT)
Dept: GASTROENTEROLOGY | Facility: AMBULARY SURGERY CENTER | Age: 48
End: 2023-09-11

## 2023-09-11 ENCOUNTER — APPOINTMENT (OUTPATIENT)
Dept: LAB | Facility: HOSPITAL | Age: 48
End: 2023-09-11
Payer: COMMERCIAL

## 2023-09-11 VITALS
RESPIRATION RATE: 18 BRPM | OXYGEN SATURATION: 95 % | DIASTOLIC BLOOD PRESSURE: 79 MMHG | SYSTOLIC BLOOD PRESSURE: 125 MMHG | HEART RATE: 75 BPM | TEMPERATURE: 97.2 F

## 2023-09-11 DIAGNOSIS — E83.51 HYPOCALCEMIA: ICD-10-CM

## 2023-09-11 DIAGNOSIS — Z12.11 SCREENING FOR COLON CANCER: ICD-10-CM

## 2023-09-11 LAB — CALCIUM SERPL-MCNC: 10.5 MG/DL (ref 8.4–10.2)

## 2023-09-11 PROCEDURE — 88305 TISSUE EXAM BY PATHOLOGIST: CPT | Performed by: PATHOLOGY

## 2023-09-11 PROCEDURE — 36415 COLL VENOUS BLD VENIPUNCTURE: CPT

## 2023-09-11 PROCEDURE — 82310 ASSAY OF CALCIUM: CPT

## 2023-09-11 RX ORDER — SODIUM CHLORIDE, SODIUM LACTATE, POTASSIUM CHLORIDE, CALCIUM CHLORIDE 600; 310; 30; 20 MG/100ML; MG/100ML; MG/100ML; MG/100ML
125 INJECTION, SOLUTION INTRAVENOUS CONTINUOUS
Status: DISCONTINUED | OUTPATIENT
Start: 2023-09-11 | End: 2023-09-15 | Stop reason: HOSPADM

## 2023-09-11 RX ORDER — LIDOCAINE HYDROCHLORIDE 10 MG/ML
INJECTION, SOLUTION EPIDURAL; INFILTRATION; INTRACAUDAL; PERINEURAL AS NEEDED
Status: DISCONTINUED | OUTPATIENT
Start: 2023-09-11 | End: 2023-09-11

## 2023-09-11 RX ORDER — PROPOFOL 10 MG/ML
INJECTION, EMULSION INTRAVENOUS AS NEEDED
Status: DISCONTINUED | OUTPATIENT
Start: 2023-09-11 | End: 2023-09-11

## 2023-09-11 RX ORDER — SODIUM CHLORIDE, SODIUM LACTATE, POTASSIUM CHLORIDE, CALCIUM CHLORIDE 600; 310; 30; 20 MG/100ML; MG/100ML; MG/100ML; MG/100ML
125 INJECTION, SOLUTION INTRAVENOUS CONTINUOUS
Status: CANCELLED | OUTPATIENT
Start: 2023-09-11

## 2023-09-11 RX ORDER — SODIUM CHLORIDE, SODIUM LACTATE, POTASSIUM CHLORIDE, CALCIUM CHLORIDE 600; 310; 30; 20 MG/100ML; MG/100ML; MG/100ML; MG/100ML
INJECTION, SOLUTION INTRAVENOUS CONTINUOUS PRN
Status: DISCONTINUED | OUTPATIENT
Start: 2023-09-11 | End: 2023-09-11

## 2023-09-11 RX ORDER — PROPOFOL 10 MG/ML
INJECTION, EMULSION INTRAVENOUS CONTINUOUS PRN
Status: DISCONTINUED | OUTPATIENT
Start: 2023-09-11 | End: 2023-09-11

## 2023-09-11 RX ADMIN — PROPOFOL 50 MG: 10 INJECTION, EMULSION INTRAVENOUS at 09:42

## 2023-09-11 RX ADMIN — SODIUM CHLORIDE, SODIUM LACTATE, POTASSIUM CHLORIDE, AND CALCIUM CHLORIDE 125 ML/HR: .6; .31; .03; .02 INJECTION, SOLUTION INTRAVENOUS at 09:29

## 2023-09-11 RX ADMIN — PROPOFOL 100 MCG/KG/MIN: 10 INJECTION, EMULSION INTRAVENOUS at 09:40

## 2023-09-11 RX ADMIN — PROPOFOL 100 MG: 10 INJECTION, EMULSION INTRAVENOUS at 09:39

## 2023-09-11 RX ADMIN — PROPOFOL 50 MG: 10 INJECTION, EMULSION INTRAVENOUS at 09:40

## 2023-09-11 RX ADMIN — LIDOCAINE HYDROCHLORIDE 50 MG: 10 INJECTION, SOLUTION EPIDURAL; INFILTRATION; INTRACAUDAL at 09:39

## 2023-09-11 RX ADMIN — SODIUM CHLORIDE, SODIUM LACTATE, POTASSIUM CHLORIDE, AND CALCIUM CHLORIDE: .6; .31; .03; .02 INJECTION, SOLUTION INTRAVENOUS at 09:35

## 2023-09-11 RX ADMIN — PROPOFOL 50 MG: 10 INJECTION, EMULSION INTRAVENOUS at 09:41
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
## 2023-09-11 NOTE — ANESTHESIA PREPROCEDURE EVALUATION
Procedure:  COLONOSCOPY    Relevant Problems   CARDIO   (+) Hypertriglyceridemia      ENDO   (+) Hypoparathyroidism (HCC)   (+) Post-surgical hypoparathyroidism (HCC)   (+) Postoperative hypothyroidism   (+) Type 2 diabetes mellitus without complication, without long-term current use of insulin (HCC)      GI/HEPATIC   (+) Fatty liver disease, nonalcoholic      /RENAL   (+) Stage 3b chronic kidney disease (HCC)      GYN   (+) History of hysterectomy      HEMATOLOGY   (+) Blood coagulation disorder (HCC)      NEURO/PSYCH   (+) Anxiety   (+) Anxiety and depression   (+) Chronic intractable pain   (+) Continuous opioid dependence (HCC)   (+) PTSD (post-traumatic stress disorder)   (+) Panic attacks      PULMONARY   (+) Smoking      Other   (+) Class 2 severe obesity due to excess calories with serious comorbidity and body mass index (BMI) of 37.0 to 37.9 in adult [facility])        Physical Exam    Airway    Mallampati score: III  TM Distance: >3 FB  Neck ROM: full     Dental   Comment: Denies loose teeth,     Cardiovascular  Cardiovascular exam normal    Pulmonary  Pulmonary exam normal     Other Findings  Portions of exam deferred due to low yield and/or unknown COVID status      Anesthesia Plan  ASA Score- 2     Anesthesia Type- IV sedation with anesthesia with ASA Monitors. Additional Monitors:   Airway Plan:           Plan Factors-Exercise tolerance (METS): >4 METS. Chart reviewed. Existing labs reviewed. Patient summary reviewed. Patient is a current smoker. Induction- intravenous. Postoperative Plan-     Informed Consent- Anesthetic plan and risks discussed with patient. I personally reviewed this patient with the CRNA. Discussed and agreed on the Anesthesia Plan with the CRNA. Mayank Salcido

## 2023-09-11 NOTE — ANESTHESIA POSTPROCEDURE EVALUATION
Post-Op Assessment Note    CV Status:  Stable  Pain Score: 0    Pain management: adequate     Mental Status:  Sleepy   Hydration Status:  Stable   PONV Controlled:  None   Airway Patency:  Patent   Two or more mitigation strategies used for obstructive sleep apnea   Post Op Vitals Reviewed: Yes      Staff: CRNA         No notable events documented.     BP   124/78   Temp 97   Pulse 85   Resp 16   SpO2 99

## 2023-09-13 ENCOUNTER — APPOINTMENT (OUTPATIENT)
Dept: LAB | Facility: HOSPITAL | Age: 48
End: 2023-09-13
Payer: COMMERCIAL

## 2023-09-13 ENCOUNTER — APPOINTMENT (OUTPATIENT)
Dept: LAB | Facility: HOSPITAL | Age: 48
End: 2023-09-13
Attending: INTERNAL MEDICINE
Payer: COMMERCIAL

## 2023-09-13 DIAGNOSIS — E83.51 HYPOCALCEMIA: ICD-10-CM

## 2023-09-13 DIAGNOSIS — E89.0 POSTOPERATIVE HYPOTHYROIDISM: ICD-10-CM

## 2023-09-13 DIAGNOSIS — E55.9 VITAMIN D DEFICIENCY: ICD-10-CM

## 2023-09-13 DIAGNOSIS — E89.2 POST-SURGICAL HYPOPARATHYROIDISM (HCC): ICD-10-CM

## 2023-09-13 LAB — CALCIUM SERPL-MCNC: 9.4 MG/DL (ref 8.4–10.2)

## 2023-09-13 PROCEDURE — 82310 ASSAY OF CALCIUM: CPT

## 2023-09-13 PROCEDURE — 36415 COLL VENOUS BLD VENIPUNCTURE: CPT

## 2023-09-13 RX ORDER — LEVOTHYROXINE SODIUM 0.15 MG/1
150 TABLET ORAL
Qty: 90 TABLET | Refills: 0 | Status: SHIPPED | OUTPATIENT
Start: 2023-09-13 | End: 2023-09-14 | Stop reason: SDUPTHER

## 2023-09-14 ENCOUNTER — OFFICE VISIT (OUTPATIENT)
Dept: ENDOCRINOLOGY | Facility: CLINIC | Age: 48
End: 2023-09-14
Payer: COMMERCIAL

## 2023-09-14 ENCOUNTER — TELEMEDICINE (OUTPATIENT)
Dept: BEHAVIORAL/MENTAL HEALTH CLINIC | Facility: CLINIC | Age: 48
End: 2023-09-14

## 2023-09-14 VITALS
OXYGEN SATURATION: 99 % | BODY MASS INDEX: 38.16 KG/M2 | WEIGHT: 207.4 LBS | SYSTOLIC BLOOD PRESSURE: 130 MMHG | HEIGHT: 62 IN | DIASTOLIC BLOOD PRESSURE: 76 MMHG | HEART RATE: 87 BPM

## 2023-09-14 DIAGNOSIS — E83.51 HYPOCALCEMIA: ICD-10-CM

## 2023-09-14 DIAGNOSIS — R73.03 PREDIABETES: ICD-10-CM

## 2023-09-14 DIAGNOSIS — N18.31 STAGE 3A CHRONIC KIDNEY DISEASE (HCC): ICD-10-CM

## 2023-09-14 DIAGNOSIS — E66.01 CLASS 2 SEVERE OBESITY WITH SERIOUS COMORBIDITY AND BODY MASS INDEX (BMI) OF 37.0 TO 37.9 IN ADULT, UNSPECIFIED OBESITY TYPE: ICD-10-CM

## 2023-09-14 DIAGNOSIS — E89.2 POST-SURGICAL HYPOPARATHYROIDISM (HCC): Primary | ICD-10-CM

## 2023-09-14 DIAGNOSIS — E89.0 POSTOPERATIVE HYPOTHYROIDISM: ICD-10-CM

## 2023-09-14 DIAGNOSIS — E55.9 VITAMIN D DEFICIENCY: ICD-10-CM

## 2023-09-14 DIAGNOSIS — Z91.199 NO-SHOW FOR APPOINTMENT: Primary | ICD-10-CM

## 2023-09-14 LAB — SL AMB POCT HEMOGLOBIN AIC: 5.5 (ref ?–6.5)

## 2023-09-14 PROCEDURE — 99215 OFFICE O/P EST HI 40 MIN: CPT | Performed by: INTERNAL MEDICINE

## 2023-09-14 PROCEDURE — 83036 HEMOGLOBIN GLYCOSYLATED A1C: CPT | Performed by: INTERNAL MEDICINE

## 2023-09-14 RX ORDER — LEVOTHYROXINE SODIUM 0.15 MG/1
150 TABLET ORAL
Qty: 90 TABLET | Refills: 3 | Status: SHIPPED | OUTPATIENT
Start: 2023-09-14

## 2023-09-14 RX ORDER — PHENOL 1.4 %
AEROSOL, SPRAY (ML) MUCOUS MEMBRANE
Qty: 180 TABLET | Refills: 10
Start: 2023-09-14

## 2023-09-14 RX ORDER — CALCITRIOL 0.5 UG/1
CAPSULE, LIQUID FILLED ORAL
Qty: 90 CAPSULE | Refills: 3 | Status: SHIPPED | OUTPATIENT
Start: 2023-09-14 | End: 2023-09-21

## 2023-09-14 RX ORDER — CALCITRIOL 0.25 UG/1
0.25 CAPSULE, LIQUID FILLED ORAL DAILY
Qty: 90 CAPSULE | Refills: 3 | Status: SHIPPED | OUTPATIENT
Start: 2023-09-14 | End: 2023-09-21
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
## 2023-09-14 NOTE — PATIENT INSTRUCTIONS
Change calcitriol to 0.5 mg in the morning, 0.2 5 in the evening  Increase calcium supplementation to 3 times a day you notice numbness, tingling  Keep well-hydrated  Please have labs done as ordered  Continue rest of current medications

## 2023-09-15 ENCOUNTER — APPOINTMENT (OUTPATIENT)
Dept: LAB | Facility: HOSPITAL | Age: 48
End: 2023-09-15
Attending: INTERNAL MEDICINE
Payer: COMMERCIAL

## 2023-09-15 DIAGNOSIS — N18.31 STAGE 3A CHRONIC KIDNEY DISEASE (HCC): ICD-10-CM

## 2023-09-15 DIAGNOSIS — E83.51 HYPOCALCEMIA: ICD-10-CM

## 2023-09-15 DIAGNOSIS — E55.9 VITAMIN D DEFICIENCY: ICD-10-CM

## 2023-09-15 DIAGNOSIS — E89.0 POSTOPERATIVE HYPOTHYROIDISM: ICD-10-CM

## 2023-09-15 DIAGNOSIS — E89.2 POST-SURGICAL HYPOPARATHYROIDISM (HCC): ICD-10-CM

## 2023-09-15 LAB
ALBUMIN SERPL BCP-MCNC: 4.2 G/DL (ref 3.5–5)
ALP SERPL-CCNC: 67 U/L (ref 34–104)
ALT SERPL W P-5'-P-CCNC: 28 U/L (ref 7–52)
ANION GAP SERPL CALCULATED.3IONS-SCNC: 9 MMOL/L
AST SERPL W P-5'-P-CCNC: 17 U/L (ref 13–39)
BILIRUB SERPL-MCNC: 0.3 MG/DL (ref 0.2–1)
BUN SERPL-MCNC: 26 MG/DL (ref 5–25)
CALCIUM SERPL-MCNC: 10.9 MG/DL (ref 8.4–10.2)
CHLORIDE SERPL-SCNC: 101 MMOL/L (ref 96–108)
CHOLEST SERPL-MCNC: 449 MG/DL
CO2 SERPL-SCNC: 26 MMOL/L (ref 21–32)
CREAT SERPL-MCNC: 1.5 MG/DL (ref 0.6–1.3)
GFR SERPL CREATININE-BSD FRML MDRD: 41 ML/MIN/1.73SQ M
GLUCOSE SERPL-MCNC: 160 MG/DL (ref 65–140)
HDLC SERPL-MCNC: 31 MG/DL
NONHDLC SERPL-MCNC: 418 MG/DL
PHOSPHATE SERPL-MCNC: 3.2 MG/DL (ref 2.7–4.5)
POTASSIUM SERPL-SCNC: 4.5 MMOL/L (ref 3.5–5.3)
PROT SERPL-MCNC: 7.2 G/DL (ref 6.4–8.4)
SODIUM SERPL-SCNC: 136 MMOL/L (ref 135–147)
T4 FREE SERPL-MCNC: 1.17 NG/DL (ref 0.61–1.12)
TRIGL SERPL-MCNC: 745 MG/DL
TSH SERPL DL<=0.05 MIU/L-ACNC: 1.27 UIU/ML (ref 0.45–4.5)

## 2023-09-15 PROCEDURE — 84439 ASSAY OF FREE THYROXINE: CPT

## 2023-09-15 PROCEDURE — 88305 TISSUE EXAM BY PATHOLOGIST: CPT | Performed by: PATHOLOGY

## 2023-09-15 PROCEDURE — 84100 ASSAY OF PHOSPHORUS: CPT

## 2023-09-15 PROCEDURE — 80061 LIPID PANEL: CPT

## 2023-09-15 PROCEDURE — 84443 ASSAY THYROID STIM HORMONE: CPT

## 2023-09-15 PROCEDURE — 80053 COMPREHEN METABOLIC PANEL: CPT

## 2023-09-15 PROCEDURE — 36415 COLL VENOUS BLD VENIPUNCTURE: CPT

## 2023-09-15 NOTE — PSYCH
No Call. No Show. Charge    Verlie Carrel no showed 09/15/23 appointment , staff called and left message to reschedule appointment     Treatment Plan not due at this session.

## 2023-09-18 ENCOUNTER — APPOINTMENT (OUTPATIENT)
Dept: LAB | Facility: HOSPITAL | Age: 48
End: 2023-09-18
Attending: INTERNAL MEDICINE
Payer: COMMERCIAL

## 2023-09-18 DIAGNOSIS — E83.51 HYPOCALCEMIA: ICD-10-CM

## 2023-09-18 LAB — CALCIUM SERPL-MCNC: 10.7 MG/DL (ref 8.4–10.2)

## 2023-09-18 PROCEDURE — 82310 ASSAY OF CALCIUM: CPT

## 2023-09-18 PROCEDURE — 36415 COLL VENOUS BLD VENIPUNCTURE: CPT

## 2023-09-20 ENCOUNTER — APPOINTMENT (OUTPATIENT)
Dept: LAB | Facility: HOSPITAL | Age: 48
End: 2023-09-20
Attending: INTERNAL MEDICINE
Payer: COMMERCIAL

## 2023-09-20 DIAGNOSIS — E55.9 VITAMIN D DEFICIENCY: ICD-10-CM

## 2023-09-20 DIAGNOSIS — E89.2 POST-SURGICAL HYPOPARATHYROIDISM (HCC): ICD-10-CM

## 2023-09-20 DIAGNOSIS — E89.0 POSTOPERATIVE HYPOTHYROIDISM: ICD-10-CM

## 2023-09-20 DIAGNOSIS — E83.51 HYPOCALCEMIA: ICD-10-CM

## 2023-09-20 LAB — CALCIUM SERPL-MCNC: 11 MG/DL (ref 8.4–10.2)

## 2023-09-20 PROCEDURE — 36415 COLL VENOUS BLD VENIPUNCTURE: CPT

## 2023-09-20 PROCEDURE — 82310 ASSAY OF CALCIUM: CPT

## 2023-09-21 DIAGNOSIS — E89.2 POST-SURGICAL HYPOPARATHYROIDISM (HCC): ICD-10-CM

## 2023-09-21 DIAGNOSIS — E83.51 HYPOCALCEMIA: ICD-10-CM

## 2023-09-21 DIAGNOSIS — E89.0 POSTOPERATIVE HYPOTHYROIDISM: ICD-10-CM

## 2023-09-21 DIAGNOSIS — E55.9 VITAMIN D DEFICIENCY: ICD-10-CM

## 2023-09-21 DIAGNOSIS — N18.31 STAGE 3A CHRONIC KIDNEY DISEASE (HCC): ICD-10-CM

## 2023-09-21 RX ORDER — CALCITRIOL 0.25 UG/1
0.25 CAPSULE, LIQUID FILLED ORAL 2 TIMES DAILY
Qty: 180 CAPSULE | Refills: 3 | Status: SHIPPED | OUTPATIENT
Start: 2023-09-21

## 2023-09-22 ENCOUNTER — NURSE TRIAGE (OUTPATIENT)
Dept: OTHER | Facility: OTHER | Age: 48
End: 2023-09-22

## 2023-09-22 ENCOUNTER — APPOINTMENT (OUTPATIENT)
Dept: LAB | Facility: HOSPITAL | Age: 48
End: 2023-09-22
Attending: INTERNAL MEDICINE
Payer: COMMERCIAL

## 2023-09-22 DIAGNOSIS — E55.9 VITAMIN D DEFICIENCY: ICD-10-CM

## 2023-09-22 DIAGNOSIS — E83.51 HYPOCALCEMIA: ICD-10-CM

## 2023-09-22 DIAGNOSIS — E89.2 POST-SURGICAL HYPOPARATHYROIDISM (HCC): ICD-10-CM

## 2023-09-22 DIAGNOSIS — E89.0 POSTOPERATIVE HYPOTHYROIDISM: ICD-10-CM

## 2023-09-22 LAB — CALCIUM SERPL-MCNC: 10 MG/DL (ref 8.4–10.2)

## 2023-09-22 PROCEDURE — 36415 COLL VENOUS BLD VENIPUNCTURE: CPT

## 2023-09-22 PROCEDURE — 82310 ASSAY OF CALCIUM: CPT

## 2023-09-23 ENCOUNTER — HOSPITAL ENCOUNTER (EMERGENCY)
Facility: HOSPITAL | Age: 48
Discharge: HOME | End: 2023-09-23
Attending: EMERGENCY MEDICINE | Admitting: EMERGENCY MEDICINE
Payer: COMMERCIAL

## 2023-09-23 ENCOUNTER — APPOINTMENT (OUTPATIENT)
Dept: LAB | Facility: HOSPITAL | Age: 48
End: 2023-09-23
Payer: COMMERCIAL

## 2023-09-23 VITALS
SYSTOLIC BLOOD PRESSURE: 148 MMHG | HEART RATE: 91 BPM | OXYGEN SATURATION: 97 % | TEMPERATURE: 98 F | DIASTOLIC BLOOD PRESSURE: 95 MMHG | RESPIRATION RATE: 16 BRPM

## 2023-09-23 DIAGNOSIS — E89.0 POSTOPERATIVE HYPOTHYROIDISM: ICD-10-CM

## 2023-09-23 DIAGNOSIS — E89.2 POST-SURGICAL HYPOPARATHYROIDISM (HCC): ICD-10-CM

## 2023-09-23 DIAGNOSIS — E55.9 VITAMIN D DEFICIENCY: ICD-10-CM

## 2023-09-23 DIAGNOSIS — R73.9 HYPERGLYCEMIA: Primary | ICD-10-CM

## 2023-09-23 DIAGNOSIS — E83.51 HYPOCALCEMIA: ICD-10-CM

## 2023-09-23 LAB
ALBUMIN SERPL BCP-MCNC: 4.1 G/DL (ref 3.5–5)
ALP SERPL-CCNC: 61 U/L (ref 34–104)
ALT SERPL W P-5'-P-CCNC: 26 U/L (ref 7–52)
ANION GAP SERPL CALCULATED.3IONS-SCNC: 13 MMOL/L
AST SERPL W P-5'-P-CCNC: 11 U/L (ref 13–39)
BASOPHILS # BLD AUTO: 0.03 THOUSANDS/ÂΜL (ref 0–0.1)
BASOPHILS NFR BLD AUTO: 0 % (ref 0–1)
BILIRUB SERPL-MCNC: 0.26 MG/DL (ref 0.2–1)
BUN SERPL-MCNC: 26 MG/DL (ref 5–25)
CALCIUM SERPL-MCNC: 10.1 MG/DL (ref 8.4–10.2)
CALCIUM SERPL-MCNC: 9.3 MG/DL (ref 8.4–10.2)
CHLORIDE SERPL-SCNC: 102 MMOL/L (ref 96–108)
CO2 SERPL-SCNC: 23 MMOL/L (ref 21–32)
CREAT SERPL-MCNC: 1.79 MG/DL (ref 0.6–1.3)
EOSINOPHIL # BLD AUTO: 0.03 THOUSAND/ÂΜL (ref 0–0.61)
EOSINOPHIL NFR BLD AUTO: 0 % (ref 0–6)
ERYTHROCYTE [DISTWIDTH] IN BLOOD BY AUTOMATED COUNT: 15.4 % (ref 11.6–15.1)
GFR SERPL CREATININE-BSD FRML MDRD: 33 ML/MIN/1.73SQ M
GLUCOSE SERPL-MCNC: 218 MG/DL (ref 65–140)
GLUCOSE SERPL-MCNC: 248 MG/DL (ref 65–140)
HCT VFR BLD AUTO: 34.9 % (ref 34.8–46.1)
HGB BLD-MCNC: 11.7 G/DL (ref 11.5–15.4)
IMM GRANULOCYTES # BLD AUTO: 0.12 THOUSAND/UL (ref 0–0.2)
IMM GRANULOCYTES NFR BLD AUTO: 1 % (ref 0–2)
LYMPHOCYTES # BLD AUTO: 1.97 THOUSANDS/ÂΜL (ref 0.6–4.47)
LYMPHOCYTES NFR BLD AUTO: 18 % (ref 14–44)
MCH RBC QN AUTO: 31.6 PG (ref 26.8–34.3)
MCHC RBC AUTO-ENTMCNC: 33.5 G/DL (ref 31.4–37.4)
MCV RBC AUTO: 94 FL (ref 82–98)
MONOCYTES # BLD AUTO: 0.75 THOUSAND/ÂΜL (ref 0.17–1.22)
MONOCYTES NFR BLD AUTO: 7 % (ref 4–12)
NEUTROPHILS # BLD AUTO: 8.33 THOUSANDS/ÂΜL (ref 1.85–7.62)
NEUTS SEG NFR BLD AUTO: 74 % (ref 43–75)
NRBC BLD AUTO-RTO: 0 /100 WBCS
PLATELET # BLD AUTO: 199 THOUSANDS/UL (ref 149–390)
PMV BLD AUTO: 10.8 FL (ref 8.9–12.7)
POTASSIUM SERPL-SCNC: 3.9 MMOL/L (ref 3.5–5.3)
PROT SERPL-MCNC: 7.1 G/DL (ref 6.4–8.4)
RBC # BLD AUTO: 3.7 MILLION/UL (ref 3.81–5.12)
SODIUM SERPL-SCNC: 138 MMOL/L (ref 135–147)
WBC # BLD AUTO: 11.23 THOUSAND/UL (ref 4.31–10.16)

## 2023-09-23 PROCEDURE — 82948 REAGENT STRIP/BLOOD GLUCOSE: CPT

## 2023-09-23 PROCEDURE — 99284 EMERGENCY DEPT VISIT MOD MDM: CPT

## 2023-09-23 PROCEDURE — 99284 EMERGENCY DEPT VISIT MOD MDM: CPT | Performed by: EMERGENCY MEDICINE

## 2023-09-23 PROCEDURE — 36415 COLL VENOUS BLD VENIPUNCTURE: CPT | Performed by: EMERGENCY MEDICINE

## 2023-09-23 PROCEDURE — 80053 COMPREHEN METABOLIC PANEL: CPT | Performed by: EMERGENCY MEDICINE

## 2023-09-23 PROCEDURE — 96360 HYDRATION IV INFUSION INIT: CPT

## 2023-09-23 PROCEDURE — 82310 ASSAY OF CALCIUM: CPT

## 2023-09-23 PROCEDURE — 85025 COMPLETE CBC W/AUTO DIFF WBC: CPT | Performed by: EMERGENCY MEDICINE

## 2023-09-23 RX ORDER — ONDANSETRON 4 MG/1
4 TABLET, FILM COATED ORAL EVERY 6 HOURS
Qty: 20 TABLET | Refills: 0 | Status: SHIPPED | OUTPATIENT
Start: 2023-09-23

## 2023-09-23 RX ADMIN — SODIUM CHLORIDE 1000 ML: 0.9 INJECTION, SOLUTION INTRAVENOUS at 00:47
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
## 2023-09-23 NOTE — TELEPHONE ENCOUNTER
Spoke to patient regarding above page from [address]. Patient reports noticing elevated blood glucose today as high as 300 with polydipsia and some paresthesias. Patient does not wish to take Metformin as it causes her to have nausea and vomiting. Suspect her hyperglycemia is related to her steroid injection yesterday. At this time, advised drinking at least 1L of water and engaging in physical activity for 15 minutes as tolerated and checking blood glucose afterwards. If no improvement in her blood glucose or symptoms, recommend ED evaluation.

## 2023-09-23 NOTE — TELEPHONE ENCOUNTER
Reason for Disposition  • Patient sounds very sick or weak to the triager    Answer Assessment - Initial Assessment Questions  1. BLOOD GLUCOSE: "What is your blood glucose level?"       282     2. ONSET: "When did you check the blood glucose?"      5 minutes ago    3. USUAL RANGE: "What is your glucose level usually?" (e.g., usual fasting morning value, usual evening value)      Patient reports that she just had blood work done and sugar was normal    4. KETONES: "Do you check for ketones (urine or blood test strips)?" If yes, ask: "What does the test show now?"       Denies    5. TYPE 1 or 2:  "Do you know what type of diabetes you have?"  (e.g., Type 1, Type 2, Gestational; doesn't know)       Type II DM    6. INSULIN: "Do you take insulin?" "What type of insulin(s) do you use? What is the mode of delivery? (syringe, pen; injection or pump)?"       No    7. DIABETES PILLS: "Do you take any pills for your diabetes?" If yes, ask: "Have you missed taking any pills recently?"      Metformin, can't take. 8. OTHER SYMPTOMS: "Do you have any symptoms?" (e.g., fever, frequent urination, difficulty breathing, dizziness, weakness, vomiting)      Dry mouth; feeling numb and tingly; mild dizziness;  Urinating frequently, but not a lot    9.  PREGNANCY: "Is there any chance you are pregnant?" "When was your last menstrual period?"     Partial hysterectomy    Protocols used: DIABETES - HIGH BLOOD SUGAR-ADULT-

## 2023-09-23 NOTE — TELEPHONE ENCOUNTER
Regarding: Elevated blood sugar  ----- Message from Chey Palafox sent at 9/22/2023  9:40 PM EDT -----  " I checked my sugar 1 hour ago it was 190 and I just checked it again it's 282. I feel tingly and I don't have any insulin to take and the metformin makes me throw up.  What should I do? "

## 2023-09-25 ENCOUNTER — APPOINTMENT (OUTPATIENT)
Dept: LAB | Facility: HOSPITAL | Age: 48
End: 2023-09-25
Attending: INTERNAL MEDICINE
Payer: COMMERCIAL

## 2023-09-25 DIAGNOSIS — E89.0 POSTOPERATIVE HYPOTHYROIDISM: ICD-10-CM

## 2023-09-25 DIAGNOSIS — E83.51 HYPOCALCEMIA: ICD-10-CM

## 2023-09-25 DIAGNOSIS — E55.9 VITAMIN D DEFICIENCY: ICD-10-CM

## 2023-09-25 DIAGNOSIS — E89.2 POST-SURGICAL HYPOPARATHYROIDISM (HCC): ICD-10-CM

## 2023-09-25 LAB — CALCIUM SERPL-MCNC: 9.3 MG/DL (ref 8.4–10.2)

## 2023-09-25 PROCEDURE — 36415 COLL VENOUS BLD VENIPUNCTURE: CPT

## 2023-09-25 PROCEDURE — 82310 ASSAY OF CALCIUM: CPT

## 2023-09-25 NOTE — TELEPHONE ENCOUNTER
Patient has a history of prediabetes, chronic medications, last A1c 5.5%. Please call the patient and ask how her blood sugars have been over the weekend and how she is feeling. If she is still hyperglycemic, she will need insulin for her steroid-induced hyperglycemia.

## 2023-09-26 ENCOUNTER — APPOINTMENT (OUTPATIENT)
Dept: LAB | Facility: HOSPITAL | Age: 48
End: 2023-09-26
Attending: INTERNAL MEDICINE
Payer: COMMERCIAL

## 2023-09-26 DIAGNOSIS — E55.9 VITAMIN D DEFICIENCY: ICD-10-CM

## 2023-09-26 DIAGNOSIS — E89.2 POST-SURGICAL HYPOPARATHYROIDISM (HCC): ICD-10-CM

## 2023-09-26 DIAGNOSIS — E83.51 HYPOCALCEMIA: ICD-10-CM

## 2023-09-26 DIAGNOSIS — E89.0 POSTOPERATIVE HYPOTHYROIDISM: ICD-10-CM

## 2023-09-26 LAB — CALCIUM SERPL-MCNC: 10.7 MG/DL (ref 8.4–10.2)

## 2023-09-26 PROCEDURE — 36415 COLL VENOUS BLD VENIPUNCTURE: CPT

## 2023-09-26 PROCEDURE — 82310 ASSAY OF CALCIUM: CPT

## 2023-09-29 ENCOUNTER — APPOINTMENT (OUTPATIENT)
Dept: LAB | Facility: HOSPITAL | Age: 48
End: 2023-09-29
Attending: INTERNAL MEDICINE
Payer: COMMERCIAL

## 2023-09-29 DIAGNOSIS — E55.9 VITAMIN D DEFICIENCY: ICD-10-CM

## 2023-09-29 DIAGNOSIS — E89.2 POST-SURGICAL HYPOPARATHYROIDISM (HCC): ICD-10-CM

## 2023-09-29 DIAGNOSIS — E83.51 HYPOCALCEMIA: ICD-10-CM

## 2023-09-29 DIAGNOSIS — E89.0 POSTOPERATIVE HYPOTHYROIDISM: ICD-10-CM

## 2023-10-02 ENCOUNTER — APPOINTMENT (OUTPATIENT)
Dept: LAB | Facility: HOSPITAL | Age: 48
End: 2023-10-02
Payer: COMMERCIAL

## 2023-10-02 DIAGNOSIS — E83.51 HYPOCALCEMIA: ICD-10-CM

## 2023-10-02 LAB — CALCIUM SERPL-MCNC: 10.5 MG/DL (ref 8.4–10.2)

## 2023-10-02 PROCEDURE — 36415 COLL VENOUS BLD VENIPUNCTURE: CPT

## 2023-10-02 PROCEDURE — 82310 ASSAY OF CALCIUM: CPT

## 2023-10-04 ENCOUNTER — TELEPHONE (OUTPATIENT)
Dept: BEHAVIORAL/MENTAL HEALTH CLINIC | Facility: CLINIC | Age: 48
End: 2023-10-04

## 2023-10-04 ENCOUNTER — APPOINTMENT (OUTPATIENT)
Dept: LAB | Facility: HOSPITAL | Age: 48
End: 2023-10-04
Attending: INTERNAL MEDICINE
Payer: COMMERCIAL

## 2023-10-04 ENCOUNTER — TELEPHONE (OUTPATIENT)
Dept: GASTROENTEROLOGY | Facility: CLINIC | Age: 48
End: 2023-10-04

## 2023-10-04 ENCOUNTER — TELEPHONE (OUTPATIENT)
Dept: INTERNAL MEDICINE CLINIC | Facility: CLINIC | Age: 48
End: 2023-10-04

## 2023-10-04 DIAGNOSIS — F51.01 PRIMARY INSOMNIA: ICD-10-CM

## 2023-10-04 DIAGNOSIS — F41.0 PANIC DISORDER: ICD-10-CM

## 2023-10-04 DIAGNOSIS — E83.51 HYPOCALCEMIA: ICD-10-CM

## 2023-10-04 LAB — CALCIUM SERPL-MCNC: 10.8 MG/DL (ref 8.4–10.2)

## 2023-10-04 PROCEDURE — 36415 COLL VENOUS BLD VENIPUNCTURE: CPT

## 2023-10-04 PROCEDURE — 82310 ASSAY OF CALCIUM: CPT

## 2023-10-04 RX ORDER — TRAZODONE HYDROCHLORIDE 100 MG/1
100 TABLET ORAL
Qty: 90 TABLET | Refills: 0 | Status: SHIPPED | OUTPATIENT
Start: 2023-10-04 | End: 2024-01-02

## 2023-10-04 RX ORDER — ALPRAZOLAM 2 MG/1
2 TABLET, EXTENDED RELEASE ORAL
Qty: 60 TABLET | Refills: 0 | Status: SHIPPED | OUTPATIENT
Start: 2023-10-04

## 2023-10-04 NOTE — LETTER
October 9, 2023     [address] [facility] [address]      Dear Ms. Vale:          Our office has attempted to call you in regard to your results, however, we have been unable to get a hold of you. Please give our office a call so we can review your non-urgent results and answer any questions you may have. Please reach out to our office at [phone] or at [phone].                   Sincerely,               [facility]

## 2023-10-04 NOTE — TELEPHONE ENCOUNTER
Prior auth for ALPRAZolam ER (XANAX XR) 2 MG 24 hr tablet was sent through [facility] meds in [facility]

## 2023-10-04 NOTE — TELEPHONE ENCOUNTER
----- Message from Dariana Babcock sent at 9/26/2023  1:21 PM EDT -----    ----- Message -----  From: Narendra Peralta MD  Sent: 9/15/2023   3:59 PM EDT  To: [facility]    1 polyp removed was a sessile serrated adenoma. This is a precancerous polyp, but has been completely removed. There is no high-grade dysplasia and no cancer. The other polyps were hyperplastic polyps, these have no cancer potential.    As we discussed recommend repeat colonoscopy 5 years.     Please call with any questions or concerns

## 2023-10-05 ENCOUNTER — APPOINTMENT (EMERGENCY)
Dept: RADIOLOGY | Facility: HOSPITAL | Age: 48
End: 2023-10-05
Payer: COMMERCIAL

## 2023-10-05 ENCOUNTER — HOSPITAL ENCOUNTER (EMERGENCY)
Facility: HOSPITAL | Age: 48
Discharge: HOME | End: 2023-10-05
Attending: EMERGENCY MEDICINE
Payer: COMMERCIAL

## 2023-10-05 VITALS
DIASTOLIC BLOOD PRESSURE: 72 MMHG | SYSTOLIC BLOOD PRESSURE: 112 MMHG | TEMPERATURE: 98.2 F | RESPIRATION RATE: 18 BRPM | OXYGEN SATURATION: 99 % | HEART RATE: 92 BPM

## 2023-10-05 DIAGNOSIS — R07.9 CHEST PAIN: Primary | ICD-10-CM

## 2023-10-05 LAB
2HR DELTA HS TROPONIN: -1 NG/L
ALBUMIN SERPL BCP-MCNC: 4.3 G/DL (ref 3.5–5)
ALP SERPL-CCNC: 74 U/L (ref 34–104)
ALT SERPL W P-5'-P-CCNC: 36 U/L (ref 7–52)
ANION GAP SERPL CALCULATED.3IONS-SCNC: 19 MMOL/L
AST SERPL W P-5'-P-CCNC: 15 U/L (ref 13–39)
BASOPHILS # BLD AUTO: 0.05 THOUSANDS/ÂΜL (ref 0–0.1)
BASOPHILS NFR BLD AUTO: 1 % (ref 0–1)
BILIRUB SERPL-MCNC: 0.44 MG/DL (ref 0.2–1)
BUN SERPL-MCNC: 28 MG/DL (ref 5–25)
CALCIUM SERPL-MCNC: 10.5 MG/DL (ref 8.4–10.2)
CALCIUM SERPL-MCNC: 10.5 MG/DL (ref 8.4–10.2)
CARDIAC TROPONIN I PNL SERPL HS: 3 NG/L
CARDIAC TROPONIN I PNL SERPL HS: 4 NG/L
CHLORIDE SERPL-SCNC: 100 MMOL/L (ref 96–108)
CO2 SERPL-SCNC: 17 MMOL/L (ref 21–32)
CREAT SERPL-MCNC: 1.68 MG/DL (ref 0.6–1.3)
EOSINOPHIL # BLD AUTO: 0.06 THOUSAND/ÂΜL (ref 0–0.61)
EOSINOPHIL NFR BLD AUTO: 1 % (ref 0–6)
ERYTHROCYTE [DISTWIDTH] IN BLOOD BY AUTOMATED COUNT: 15.7 % (ref 11.6–15.1)
GFR SERPL CREATININE-BSD FRML MDRD: 35 ML/MIN/1.73SQ M
GLUCOSE SERPL-MCNC: 138 MG/DL (ref 65–140)
HCT VFR BLD AUTO: 38.9 % (ref 34.8–46.1)
HGB BLD-MCNC: 13.3 G/DL (ref 11.5–15.4)
IMM GRANULOCYTES # BLD AUTO: 0.02 THOUSAND/UL (ref 0–0.2)
IMM GRANULOCYTES NFR BLD AUTO: 0 % (ref 0–2)
LYMPHOCYTES # BLD AUTO: 2.51 THOUSANDS/ÂΜL (ref 0.6–4.47)
LYMPHOCYTES NFR BLD AUTO: 33 % (ref 14–44)
MCH RBC QN AUTO: 31.6 PG (ref 26.8–34.3)
MCHC RBC AUTO-ENTMCNC: 34.2 G/DL (ref 31.4–37.4)
MCV RBC AUTO: 92 FL (ref 82–98)
MONOCYTES # BLD AUTO: 0.48 THOUSAND/ÂΜL (ref 0.17–1.22)
MONOCYTES NFR BLD AUTO: 6 % (ref 4–12)
NEUTROPHILS # BLD AUTO: 4.46 THOUSANDS/ÂΜL (ref 1.85–7.62)
NEUTS SEG NFR BLD AUTO: 59 % (ref 43–75)
NRBC BLD AUTO-RTO: 0 /100 WBCS
PLATELET # BLD AUTO: 206 THOUSANDS/UL (ref 149–390)
PMV BLD AUTO: 10.4 FL (ref 8.9–12.7)
POTASSIUM SERPL-SCNC: 3.4 MMOL/L (ref 3.5–5.3)
PROT SERPL-MCNC: 7.5 G/DL (ref 6.4–8.4)
RBC # BLD AUTO: 4.21 MILLION/UL (ref 3.81–5.12)
SODIUM SERPL-SCNC: 136 MMOL/L (ref 135–147)
WBC # BLD AUTO: 7.58 THOUSAND/UL (ref 4.31–10.16)

## 2023-10-05 PROCEDURE — 85025 COMPLETE CBC W/AUTO DIFF WBC: CPT | Performed by: EMERGENCY MEDICINE

## 2023-10-05 PROCEDURE — 84484 ASSAY OF TROPONIN QUANT: CPT | Performed by: EMERGENCY MEDICINE

## 2023-10-05 PROCEDURE — 93005 ELECTROCARDIOGRAM TRACING: CPT

## 2023-10-05 PROCEDURE — 99285 EMERGENCY DEPT VISIT HI MDM: CPT | Performed by: EMERGENCY MEDICINE

## 2023-10-05 PROCEDURE — 96361 HYDRATE IV INFUSION ADD-ON: CPT

## 2023-10-05 PROCEDURE — 96360 HYDRATION IV INFUSION INIT: CPT

## 2023-10-05 PROCEDURE — 99285 EMERGENCY DEPT VISIT HI MDM: CPT

## 2023-10-05 PROCEDURE — 36415 COLL VENOUS BLD VENIPUNCTURE: CPT | Performed by: EMERGENCY MEDICINE

## 2023-10-05 PROCEDURE — 80053 COMPREHEN METABOLIC PANEL: CPT | Performed by: EMERGENCY MEDICINE

## 2023-10-05 PROCEDURE — 71045 X-RAY EXAM CHEST 1 VIEW: CPT

## 2023-10-05 PROCEDURE — 82310 ASSAY OF CALCIUM: CPT | Performed by: EMERGENCY MEDICINE

## 2023-10-05 RX ADMIN — SODIUM CHLORIDE 1000 ML: 0.9 INJECTION, SOLUTION INTRAVENOUS at 19:49

## 2023-10-05 NOTE — TELEPHONE ENCOUNTER
New ins will only pay for one tablet of the Alprazolam 2 mg ER. Pt is going to pay $44.37 for this script.  I will appeal the ins decision
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
## 2023-10-06 NOTE — DISCHARGE INSTRUCTIONS
We have performed an evaluation of your chest pain in the emergency department and determined that you can be safely discharged home. We have provided you with general information about chest pain in adults. We recommend that you follow up with your primary care provider in the next 5-7 days for further evaluation. If your chest pain changes, worsens, if you have significant associated shortness of breath, palpitations, diaphoresis, persistent nausea and vomiting, or if you pass out, return to the emergency department immediately.

## 2023-10-08 LAB
ATRIAL RATE: 112 BPM
P AXIS: 42 DEGREES
PR INTERVAL: 164 MS
QRS AXIS: 28 DEGREES
QRSD INTERVAL: 78 MS
QT INTERVAL: 326 MS
QTC INTERVAL: 444 MS
T WAVE AXIS: 46 DEGREES
VENTRICULAR RATE: 112 BPM

## 2023-10-08 PROCEDURE — 93010 ELECTROCARDIOGRAM REPORT: CPT | Performed by: INTERNAL MEDICINE

## 2023-10-09 ENCOUNTER — APPOINTMENT (OUTPATIENT)
Dept: LAB | Facility: HOSPITAL | Age: 48
End: 2023-10-09
Attending: INTERNAL MEDICINE
Payer: COMMERCIAL

## 2023-10-09 DIAGNOSIS — E83.51 HYPOCALCEMIA: ICD-10-CM

## 2023-10-09 LAB — CALCIUM SERPL-MCNC: 9.8 MG/DL (ref 8.4–10.2)

## 2023-10-09 PROCEDURE — 36415 COLL VENOUS BLD VENIPUNCTURE: CPT

## 2023-10-09 PROCEDURE — 82310 ASSAY OF CALCIUM: CPT

## 2023-10-09 NOTE — TELEPHONE ENCOUNTER
Pt called in stating that she had a missed call to discuss results but she spoke to someone already. She would like a call back to make sure it is the same results.

## 2023-10-09 NOTE — TELEPHONE ENCOUNTER
lmom asking patient to contact the office for results and recommendations  Colon recall placed  Letter sent

## 2023-10-11 ENCOUNTER — APPOINTMENT (OUTPATIENT)
Dept: LAB | Facility: HOSPITAL | Age: 48
End: 2023-10-11
Attending: INTERNAL MEDICINE
Payer: COMMERCIAL

## 2023-10-11 DIAGNOSIS — E83.51 HYPOCALCEMIA: ICD-10-CM

## 2023-10-11 LAB — CALCIUM SERPL-MCNC: 9.4 MG/DL (ref 8.4–10.2)

## 2023-10-11 PROCEDURE — 36415 COLL VENOUS BLD VENIPUNCTURE: CPT

## 2023-10-11 PROCEDURE — 82310 ASSAY OF CALCIUM: CPT

## 2023-10-13 ENCOUNTER — APPOINTMENT (OUTPATIENT)
Dept: LAB | Facility: HOSPITAL | Age: 48
End: 2023-10-13
Attending: INTERNAL MEDICINE
Payer: COMMERCIAL

## 2023-10-13 DIAGNOSIS — E83.51 HYPOCALCEMIA: ICD-10-CM

## 2023-10-13 LAB — CALCIUM SERPL-MCNC: 11.1 MG/DL (ref 8.4–10.2)

## 2023-10-13 PROCEDURE — 82310 ASSAY OF CALCIUM: CPT

## 2023-10-13 PROCEDURE — 36415 COLL VENOUS BLD VENIPUNCTURE: CPT

## 2023-10-16 ENCOUNTER — APPOINTMENT (OUTPATIENT)
Dept: LAB | Facility: HOSPITAL | Age: 48
End: 2023-10-16
Attending: INTERNAL MEDICINE
Payer: COMMERCIAL

## 2023-10-16 DIAGNOSIS — E83.51 HYPOCALCEMIA: ICD-10-CM

## 2023-10-16 LAB — CALCIUM SERPL-MCNC: 11.2 MG/DL (ref 8.4–10.2)

## 2023-10-16 PROCEDURE — 82310 ASSAY OF CALCIUM: CPT

## 2023-10-16 PROCEDURE — 36415 COLL VENOUS BLD VENIPUNCTURE: CPT

## 2023-10-19 ENCOUNTER — APPOINTMENT (OUTPATIENT)
Dept: LAB | Facility: HOSPITAL | Age: 48
End: 2023-10-19
Payer: COMMERCIAL

## 2023-10-19 DIAGNOSIS — E83.51 HYPOCALCEMIA: ICD-10-CM

## 2023-10-19 LAB — CALCIUM SERPL-MCNC: 9.7 MG/DL (ref 8.4–10.2)

## 2023-10-19 PROCEDURE — 36415 COLL VENOUS BLD VENIPUNCTURE: CPT

## 2023-10-19 PROCEDURE — 82310 ASSAY OF CALCIUM: CPT

## 2023-10-23 ENCOUNTER — APPOINTMENT (OUTPATIENT)
Dept: LAB | Facility: HOSPITAL | Age: 48
End: 2023-10-23
Payer: COMMERCIAL

## 2023-10-23 DIAGNOSIS — E83.51 HYPOCALCEMIA: ICD-10-CM

## 2023-10-23 LAB — CALCIUM SERPL-MCNC: 9.7 MG/DL (ref 8.4–10.2)

## 2023-10-23 PROCEDURE — 82310 ASSAY OF CALCIUM: CPT

## 2023-10-23 PROCEDURE — 36415 COLL VENOUS BLD VENIPUNCTURE: CPT

## 2023-10-26 ENCOUNTER — APPOINTMENT (OUTPATIENT)
Dept: LAB | Facility: HOSPITAL | Age: 48
End: 2023-10-26
Payer: COMMERCIAL

## 2023-10-26 DIAGNOSIS — E83.51 HYPOCALCEMIA: ICD-10-CM

## 2023-10-26 LAB — CALCIUM SERPL-MCNC: 10.2 MG/DL (ref 8.4–10.2)

## 2023-10-26 PROCEDURE — 36415 COLL VENOUS BLD VENIPUNCTURE: CPT

## 2023-10-26 PROCEDURE — 82310 ASSAY OF CALCIUM: CPT

## 2023-10-27 DIAGNOSIS — F32.89 OTHER DEPRESSION: ICD-10-CM

## 2023-10-27 RX ORDER — DESVENLAFAXINE 100 MG/1
100 TABLET, EXTENDED RELEASE ORAL DAILY
Qty: 90 TABLET | Refills: 0 | Status: SHIPPED | OUTPATIENT
Start: 2023-10-27

## 2023-10-30 ENCOUNTER — APPOINTMENT (OUTPATIENT)
Dept: LAB | Facility: HOSPITAL | Age: 48
End: 2023-10-30
Payer: COMMERCIAL

## 2023-10-30 DIAGNOSIS — E83.51 HYPOCALCEMIA: ICD-10-CM

## 2023-10-30 LAB — CALCIUM SERPL-MCNC: 10.9 MG/DL (ref 8.4–10.2)

## 2023-10-30 PROCEDURE — 82310 ASSAY OF CALCIUM: CPT

## 2023-10-30 PROCEDURE — 36415 COLL VENOUS BLD VENIPUNCTURE: CPT

## 2023-11-02 ENCOUNTER — APPOINTMENT (OUTPATIENT)
Dept: LAB | Facility: HOSPITAL | Age: 48
End: 2023-11-02
Payer: COMMERCIAL

## 2023-11-02 DIAGNOSIS — E83.51 HYPOCALCEMIA: ICD-10-CM

## 2023-11-02 LAB — CALCIUM SERPL-MCNC: 11.1 MG/DL (ref 8.4–10.2)

## 2023-11-02 PROCEDURE — 82310 ASSAY OF CALCIUM: CPT

## 2023-11-02 PROCEDURE — 36415 COLL VENOUS BLD VENIPUNCTURE: CPT

## 2023-11-06 ENCOUNTER — APPOINTMENT (OUTPATIENT)
Dept: LAB | Facility: HOSPITAL | Age: 48
End: 2023-11-06
Attending: INTERNAL MEDICINE
Payer: COMMERCIAL

## 2023-11-06 DIAGNOSIS — E83.51 HYPOCALCEMIA: ICD-10-CM

## 2023-11-06 LAB — CALCIUM SERPL-MCNC: 10.7 MG/DL (ref 8.4–10.2)

## 2023-11-06 PROCEDURE — 82310 ASSAY OF CALCIUM: CPT

## 2023-11-06 PROCEDURE — 36415 COLL VENOUS BLD VENIPUNCTURE: CPT

## 2023-11-08 ENCOUNTER — APPOINTMENT (OUTPATIENT)
Dept: LAB | Facility: HOSPITAL | Age: 48
End: 2023-11-08
Attending: INTERNAL MEDICINE
Payer: COMMERCIAL

## 2023-11-08 DIAGNOSIS — E83.51 HYPOCALCEMIA: ICD-10-CM

## 2023-11-08 LAB — CALCIUM SERPL-MCNC: 11.6 MG/DL (ref 8.4–10.2)

## 2023-11-08 PROCEDURE — 82310 ASSAY OF CALCIUM: CPT

## 2023-11-08 PROCEDURE — 36415 COLL VENOUS BLD VENIPUNCTURE: CPT

## 2023-11-14 ENCOUNTER — TELEMEDICINE (OUTPATIENT)
Dept: PSYCHIATRY | Facility: CLINIC | Age: 48
End: 2023-11-14
Payer: COMMERCIAL

## 2023-11-14 ENCOUNTER — APPOINTMENT (OUTPATIENT)
Dept: LAB | Facility: HOSPITAL | Age: 48
End: 2023-11-14
Attending: INTERNAL MEDICINE
Payer: COMMERCIAL

## 2023-11-14 ENCOUNTER — TELEPHONE (OUTPATIENT)
Dept: ENDOCRINOLOGY | Facility: CLINIC | Age: 48
End: 2023-11-14

## 2023-11-14 ENCOUNTER — TELEPHONE (OUTPATIENT)
Dept: PSYCHIATRY | Facility: CLINIC | Age: 48
End: 2023-11-14

## 2023-11-14 DIAGNOSIS — F51.01 PRIMARY INSOMNIA: ICD-10-CM

## 2023-11-14 DIAGNOSIS — F43.10 PTSD (POST-TRAUMATIC STRESS DISORDER): Primary | ICD-10-CM

## 2023-11-14 DIAGNOSIS — R73.9 HYPERGLYCEMIA: ICD-10-CM

## 2023-11-14 DIAGNOSIS — F41.1 GAD (GENERALIZED ANXIETY DISORDER): ICD-10-CM

## 2023-11-14 DIAGNOSIS — F41.0 PANIC DISORDER: ICD-10-CM

## 2023-11-14 DIAGNOSIS — E83.51 HYPOCALCEMIA: ICD-10-CM

## 2023-11-14 DIAGNOSIS — F32.89 OTHER DEPRESSION: ICD-10-CM

## 2023-11-14 LAB — CALCIUM SERPL-MCNC: 9.3 MG/DL (ref 8.4–10.2)

## 2023-11-14 PROCEDURE — 82310 ASSAY OF CALCIUM: CPT

## 2023-11-14 PROCEDURE — 99214 OFFICE O/P EST MOD 30 MIN: CPT | Performed by: PHYSICIAN ASSISTANT

## 2023-11-14 PROCEDURE — 36415 COLL VENOUS BLD VENIPUNCTURE: CPT

## 2023-11-14 RX ORDER — ASPIRIN 81 MG
TABLET, DELAYED RELEASE (ENTERIC COATED) ORAL
COMMUNITY
Start: 2023-10-16

## 2023-11-14 RX ORDER — NALOXEGOL OXALATE 25 MG/1
TABLET, FILM COATED ORAL
COMMUNITY
Start: 2023-10-16

## 2023-11-14 NOTE — TELEPHONE ENCOUNTER
Patient needs metformin filled asap. Just took her last one hospital gave her. Disp Refills Start End    metFORMIN (GLUCOPHAGE) 500 mg tablet 30 tablet 0 9/23/2023 10/8/2023    Sig - Route: Take 1 tablet (500 mg total) by mouth 2 (two) times a day with meals for 15 days - Oral    Sent to pharmacy as: metFORMIN HCl 500 MG Oral Tablet (GLUCOPHAGE)    E-Prescribing Status: Receipt confirmed by pharmacy (9/23/2023  2:51 AM EDT)        Lesa Baxter to ER her sugar was 345 and we advised to go to hospital to get inslin shot and they only gave her fluids on 9-22 and they gave her the 30 pills. This is the last of them. She needs to be reschedule with Shavonne Rocha on 1-10-24 her appointment on the schedule. Pharmacy is [facility] [address]  [place]

## 2023-11-16 ENCOUNTER — TELEMEDICINE (OUTPATIENT)
Dept: BEHAVIORAL/MENTAL HEALTH CLINIC | Facility: CLINIC | Age: 48
End: 2023-11-16
Payer: COMMERCIAL

## 2023-11-16 ENCOUNTER — APPOINTMENT (OUTPATIENT)
Dept: LAB | Facility: HOSPITAL | Age: 48
End: 2023-11-16
Attending: INTERNAL MEDICINE
Payer: COMMERCIAL

## 2023-11-16 DIAGNOSIS — R73.9 HYPERGLYCEMIA: ICD-10-CM

## 2023-11-16 DIAGNOSIS — E87.6 HYPOKALEMIA: Primary | ICD-10-CM

## 2023-11-16 DIAGNOSIS — F41.1 GENERALIZED ANXIETY DISORDER WITH PANIC ATTACKS: ICD-10-CM

## 2023-11-16 DIAGNOSIS — F17.210 NICOTINE DEPENDENCE, CIGARETTES, UNCOMPLICATED: ICD-10-CM

## 2023-11-16 DIAGNOSIS — E83.51 HYPOCALCEMIA: ICD-10-CM

## 2023-11-16 DIAGNOSIS — F41.0 GENERALIZED ANXIETY DISORDER WITH PANIC ATTACKS: ICD-10-CM

## 2023-11-16 DIAGNOSIS — F33.1 MODERATE EPISODE OF RECURRENT MAJOR DEPRESSIVE DISORDER (HCC): Primary | ICD-10-CM

## 2023-11-16 DIAGNOSIS — E87.6 HYPOKALEMIA: ICD-10-CM

## 2023-11-16 LAB — CALCIUM SERPL-MCNC: 10.7 MG/DL (ref 8.4–10.2)

## 2023-11-16 PROCEDURE — 36415 COLL VENOUS BLD VENIPUNCTURE: CPT

## 2023-11-16 PROCEDURE — 90834 PSYTX W PT 45 MINUTES: CPT | Performed by: SOCIAL WORKER

## 2023-11-16 PROCEDURE — 82310 ASSAY OF CALCIUM: CPT
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
## 2023-11-20 ENCOUNTER — APPOINTMENT (OUTPATIENT)
Dept: LAB | Facility: HOSPITAL | Age: 48
End: 2023-11-20
Attending: INTERNAL MEDICINE
Payer: COMMERCIAL

## 2023-11-20 DIAGNOSIS — E87.6 HYPOPOTASSEMIA: ICD-10-CM

## 2023-11-20 DIAGNOSIS — E83.51 HYPOCALCEMIA: ICD-10-CM

## 2023-11-20 LAB — CALCIUM SERPL-MCNC: 11.2 MG/DL (ref 8.4–10.2)

## 2023-11-20 PROCEDURE — 36415 COLL VENOUS BLD VENIPUNCTURE: CPT

## 2023-11-20 PROCEDURE — 82310 ASSAY OF CALCIUM: CPT

## 2023-11-22 ENCOUNTER — TELEMEDICINE (OUTPATIENT)
Dept: BEHAVIORAL/MENTAL HEALTH CLINIC | Facility: CLINIC | Age: 48
End: 2023-11-22
Payer: COMMERCIAL

## 2023-11-22 ENCOUNTER — APPOINTMENT (OUTPATIENT)
Dept: LAB | Facility: HOSPITAL | Age: 48
End: 2023-11-22
Attending: INTERNAL MEDICINE
Payer: COMMERCIAL

## 2023-11-22 DIAGNOSIS — E87.6 HYPOPOTASSEMIA: ICD-10-CM

## 2023-11-22 DIAGNOSIS — E83.51 HYPOCALCEMIA: ICD-10-CM

## 2023-11-22 DIAGNOSIS — F41.1 GENERALIZED ANXIETY DISORDER WITH PANIC ATTACKS: ICD-10-CM

## 2023-11-22 DIAGNOSIS — F17.210 NICOTINE DEPENDENCE, CIGARETTES, UNCOMPLICATED: ICD-10-CM

## 2023-11-22 DIAGNOSIS — F33.1 MODERATE EPISODE OF RECURRENT MAJOR DEPRESSIVE DISORDER (HCC): Primary | ICD-10-CM

## 2023-11-22 DIAGNOSIS — F41.0 GENERALIZED ANXIETY DISORDER WITH PANIC ATTACKS: ICD-10-CM

## 2023-11-22 LAB — CALCIUM SERPL-MCNC: 10.2 MG/DL (ref 8.4–10.2)

## 2023-11-22 PROCEDURE — 90834 PSYTX W PT 45 MINUTES: CPT | Performed by: SOCIAL WORKER

## 2023-11-22 PROCEDURE — 82310 ASSAY OF CALCIUM: CPT

## 2023-11-22 PROCEDURE — 36415 COLL VENOUS BLD VENIPUNCTURE: CPT
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
## 2023-11-24 ENCOUNTER — APPOINTMENT (EMERGENCY)
Dept: RADIOLOGY | Facility: HOSPITAL | Age: 48
End: 2023-11-24
Payer: COMMERCIAL

## 2023-11-24 ENCOUNTER — HOSPITAL ENCOUNTER (EMERGENCY)
Facility: HOSPITAL | Age: 48
Discharge: HOME | End: 2023-11-24
Attending: EMERGENCY MEDICINE
Payer: COMMERCIAL

## 2023-11-24 VITALS
DIASTOLIC BLOOD PRESSURE: 78 MMHG | HEART RATE: 90 BPM | RESPIRATION RATE: 18 BRPM | BODY MASS INDEX: 38.14 KG/M2 | TEMPERATURE: 98.1 F | SYSTOLIC BLOOD PRESSURE: 129 MMHG | WEIGHT: 207.23 LBS | HEIGHT: 62 IN | OXYGEN SATURATION: 98 %

## 2023-11-24 DIAGNOSIS — R07.9 CHEST PAIN, UNSPECIFIED: Primary | ICD-10-CM

## 2023-11-24 LAB
2HR DELTA HS TROPONIN: 1 NG/L
ALBUMIN SERPL BCP-MCNC: 4.1 G/DL (ref 3.5–5)
ALP SERPL-CCNC: 66 U/L (ref 34–104)
ALT SERPL W P-5'-P-CCNC: 34 U/L (ref 7–52)
AMPHETAMINES SERPL QL SCN: NEGATIVE
ANION GAP SERPL CALCULATED.3IONS-SCNC: 16 MMOL/L
AST SERPL W P-5'-P-CCNC: 18 U/L (ref 13–39)
BARBITURATES UR QL: NEGATIVE
BASOPHILS # BLD AUTO: 0.05 THOUSANDS/ÂΜL (ref 0–0.1)
BASOPHILS NFR BLD AUTO: 1 % (ref 0–1)
BENZODIAZ UR QL: POSITIVE
BILIRUB SERPL-MCNC: 0.38 MG/DL (ref 0.2–1)
BILIRUB UR QL STRIP: NEGATIVE
BUN SERPL-MCNC: 21 MG/DL (ref 5–25)
CA-I BLD-SCNC: 1.25 MMOL/L (ref 1.12–1.32)
CALCIUM SERPL-MCNC: 11.1 MG/DL (ref 8.4–10.2)
CARDIAC TROPONIN I PNL SERPL HS: 4 NG/L
CARDIAC TROPONIN I PNL SERPL HS: 5 NG/L
CHLORIDE SERPL-SCNC: 99 MMOL/L (ref 96–108)
CLARITY UR: CLEAR
CO2 SERPL-SCNC: 20 MMOL/L (ref 21–32)
COCAINE UR QL: NEGATIVE
COLOR UR: NORMAL
CREAT SERPL-MCNC: 1.55 MG/DL (ref 0.6–1.3)
EOSINOPHIL # BLD AUTO: 0.06 THOUSAND/ÂΜL (ref 0–0.61)
EOSINOPHIL NFR BLD AUTO: 1 % (ref 0–6)
ERYTHROCYTE [DISTWIDTH] IN BLOOD BY AUTOMATED COUNT: 15.9 % (ref 11.6–15.1)
EXT PREGNANCY TEST URINE: NEGATIVE
EXT. CONTROL: NORMAL
GFR SERPL CREATININE-BSD FRML MDRD: 39 ML/MIN/1.73SQ M
GLUCOSE SERPL-MCNC: 101 MG/DL (ref 65–140)
GLUCOSE UR STRIP-MCNC: NEGATIVE MG/DL
HCT VFR BLD AUTO: 35.7 % (ref 34.8–46.1)
HGB BLD-MCNC: 12.2 G/DL (ref 11.5–15.4)
HGB UR QL STRIP.AUTO: NEGATIVE
IMM GRANULOCYTES # BLD AUTO: 0.01 THOUSAND/UL (ref 0–0.2)
IMM GRANULOCYTES NFR BLD AUTO: 0 % (ref 0–2)
KETONES UR STRIP-MCNC: NEGATIVE MG/DL
LEUKOCYTE ESTERASE UR QL STRIP: NEGATIVE
LYMPHOCYTES # BLD AUTO: 1.63 THOUSANDS/ÂΜL (ref 0.6–4.47)
LYMPHOCYTES NFR BLD AUTO: 36 % (ref 14–44)
MAGNESIUM SERPL-MCNC: 2.1 MG/DL (ref 1.9–2.7)
MCH RBC QN AUTO: 31.2 PG (ref 26.8–34.3)
MCHC RBC AUTO-ENTMCNC: 34.2 G/DL (ref 31.4–37.4)
MCV RBC AUTO: 91 FL (ref 82–98)
METHADONE UR QL: NEGATIVE
MONOCYTES # BLD AUTO: 0.42 THOUSAND/ÂΜL (ref 0.17–1.22)
MONOCYTES NFR BLD AUTO: 9 % (ref 4–12)
NEUTROPHILS # BLD AUTO: 2.31 THOUSANDS/ÂΜL (ref 1.85–7.62)
NEUTS SEG NFR BLD AUTO: 53 % (ref 43–75)
NITRITE UR QL STRIP: NEGATIVE
NRBC BLD AUTO-RTO: 0 /100 WBCS
OPIATES UR QL SCN: NEGATIVE
OXYCODONE+OXYMORPHONE UR QL SCN: NEGATIVE
PCP UR QL: NEGATIVE
PH UR STRIP.AUTO: 7 [PH]
PLATELET # BLD AUTO: 174 THOUSANDS/UL (ref 149–390)
PMV BLD AUTO: 10.5 FL (ref 8.9–12.7)
POTASSIUM SERPL-SCNC: 3.9 MMOL/L (ref 3.5–5.3)
PROT SERPL-MCNC: 7.2 G/DL (ref 6.4–8.4)
PROT UR STRIP-MCNC: NEGATIVE MG/DL
RBC # BLD AUTO: 3.91 MILLION/UL (ref 3.81–5.12)
SODIUM SERPL-SCNC: 135 MMOL/L (ref 135–147)
SP GR UR STRIP.AUTO: 1.01 (ref 1–1.03)
THC UR QL: NEGATIVE
UROBILINOGEN UR QL STRIP.AUTO: 0.2 E.U./DL
WBC # BLD AUTO: 4.48 THOUSAND/UL (ref 4.31–10.16)

## 2023-11-24 PROCEDURE — 93005 ELECTROCARDIOGRAM TRACING: CPT

## 2023-11-24 PROCEDURE — 81025 URINE PREGNANCY TEST: CPT | Performed by: EMERGENCY MEDICINE

## 2023-11-24 PROCEDURE — 83735 ASSAY OF MAGNESIUM: CPT | Performed by: EMERGENCY MEDICINE

## 2023-11-24 PROCEDURE — 36415 COLL VENOUS BLD VENIPUNCTURE: CPT | Performed by: EMERGENCY MEDICINE

## 2023-11-24 PROCEDURE — 99285 EMERGENCY DEPT VISIT HI MDM: CPT

## 2023-11-24 PROCEDURE — 99285 EMERGENCY DEPT VISIT HI MDM: CPT | Performed by: EMERGENCY MEDICINE

## 2023-11-24 PROCEDURE — 82330 ASSAY OF CALCIUM: CPT | Performed by: EMERGENCY MEDICINE

## 2023-11-24 PROCEDURE — 81003 URINALYSIS AUTO W/O SCOPE: CPT | Performed by: EMERGENCY MEDICINE

## 2023-11-24 PROCEDURE — 96374 THER/PROPH/DIAG INJ IV PUSH: CPT

## 2023-11-24 PROCEDURE — 96361 HYDRATE IV INFUSION ADD-ON: CPT

## 2023-11-24 PROCEDURE — 71045 X-RAY EXAM CHEST 1 VIEW: CPT

## 2023-11-24 PROCEDURE — 96375 TX/PRO/DX INJ NEW DRUG ADDON: CPT

## 2023-11-24 PROCEDURE — 84484 ASSAY OF TROPONIN QUANT: CPT | Performed by: EMERGENCY MEDICINE

## 2023-11-24 PROCEDURE — 80307 DRUG TEST PRSMV CHEM ANLYZR: CPT | Performed by: EMERGENCY MEDICINE

## 2023-11-24 PROCEDURE — 80053 COMPREHEN METABOLIC PANEL: CPT | Performed by: EMERGENCY MEDICINE

## 2023-11-24 PROCEDURE — 85025 COMPLETE CBC W/AUTO DIFF WBC: CPT | Performed by: EMERGENCY MEDICINE

## 2023-11-24 RX ORDER — ACETAMINOPHEN 10 MG/ML
1000 INJECTION, SOLUTION INTRAVENOUS ONCE
Status: COMPLETED | OUTPATIENT
Start: 2023-11-24 | End: 2023-11-24

## 2023-11-24 RX ORDER — METOCLOPRAMIDE HYDROCHLORIDE 5 MG/ML
10 INJECTION INTRAMUSCULAR; INTRAVENOUS ONCE
Status: COMPLETED | OUTPATIENT
Start: 2023-11-24 | End: 2023-11-24

## 2023-11-24 RX ORDER — MAGNESIUM SULFATE HEPTAHYDRATE 40 MG/ML
2 INJECTION, SOLUTION INTRAVENOUS ONCE
Status: DISCONTINUED | OUTPATIENT
Start: 2023-11-24 | End: 2023-11-24 | Stop reason: HOSPADM

## 2023-11-24 RX ADMIN — METOCLOPRAMIDE 10 MG: 5 INJECTION, SOLUTION INTRAMUSCULAR; INTRAVENOUS at 18:13

## 2023-11-24 RX ADMIN — SODIUM CHLORIDE 1000 ML: 0.9 INJECTION, SOLUTION INTRAVENOUS at 16:19

## 2023-11-24 RX ADMIN — ACETAMINOPHEN 1000 MG: 10 INJECTION INTRAVENOUS at 16:51
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
## 2023-11-26 LAB
ATRIAL RATE: 101 BPM
P AXIS: 36 DEGREES
PR INTERVAL: 166 MS
QRS AXIS: 21 DEGREES
QRSD INTERVAL: 76 MS
QT INTERVAL: 338 MS
QTC INTERVAL: 438 MS
T WAVE AXIS: 57 DEGREES
VENTRICULAR RATE: 101 BPM

## 2023-11-29 ENCOUNTER — APPOINTMENT (OUTPATIENT)
Dept: LAB | Facility: HOSPITAL | Age: 48
End: 2023-11-29
Attending: INTERNAL MEDICINE
Payer: COMMERCIAL

## 2023-11-29 DIAGNOSIS — E87.6 HYPOPOTASSEMIA: ICD-10-CM

## 2023-11-29 DIAGNOSIS — E83.51 HYPOCALCEMIA: ICD-10-CM

## 2023-11-29 LAB — CALCIUM SERPL-MCNC: 12.1 MG/DL (ref 8.4–10.2)

## 2023-11-29 PROCEDURE — 82310 ASSAY OF CALCIUM: CPT

## 2023-11-29 PROCEDURE — 36415 COLL VENOUS BLD VENIPUNCTURE: CPT

## 2023-11-30 ENCOUNTER — TELEMEDICINE (OUTPATIENT)
Dept: BEHAVIORAL/MENTAL HEALTH CLINIC | Facility: CLINIC | Age: 48
End: 2023-11-30
Payer: COMMERCIAL

## 2023-11-30 DIAGNOSIS — F17.210 NICOTINE DEPENDENCE, CIGARETTES, UNCOMPLICATED: ICD-10-CM

## 2023-11-30 DIAGNOSIS — F41.0 GENERALIZED ANXIETY DISORDER WITH PANIC ATTACKS: ICD-10-CM

## 2023-11-30 DIAGNOSIS — F41.1 GENERALIZED ANXIETY DISORDER WITH PANIC ATTACKS: ICD-10-CM

## 2023-11-30 DIAGNOSIS — F33.1 MODERATE EPISODE OF RECURRENT MAJOR DEPRESSIVE DISORDER (HCC): Primary | ICD-10-CM

## 2023-11-30 PROCEDURE — 90834 PSYTX W PT 45 MINUTES: CPT | Performed by: SOCIAL WORKER

## 2023-12-01 ENCOUNTER — APPOINTMENT (OUTPATIENT)
Dept: LAB | Facility: HOSPITAL | Age: 48
End: 2023-12-01
Payer: COMMERCIAL

## 2023-12-01 DIAGNOSIS — E83.51 HYPOCALCEMIA: ICD-10-CM

## 2023-12-01 DIAGNOSIS — E87.6 HYPOPOTASSEMIA: ICD-10-CM

## 2023-12-01 LAB — CALCIUM SERPL-MCNC: 11.8 MG/DL (ref 8.4–10.2)

## 2023-12-01 PROCEDURE — 82310 ASSAY OF CALCIUM: CPT

## 2023-12-01 PROCEDURE — 36415 COLL VENOUS BLD VENIPUNCTURE: CPT
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
## 2023-12-04 ENCOUNTER — APPOINTMENT (OUTPATIENT)
Dept: LAB | Facility: HOSPITAL | Age: 48
End: 2023-12-04
Attending: INTERNAL MEDICINE
Payer: COMMERCIAL

## 2023-12-04 DIAGNOSIS — E87.6 HYPOPOTASSEMIA: ICD-10-CM

## 2023-12-04 DIAGNOSIS — E83.51 HYPOCALCEMIA: ICD-10-CM

## 2023-12-04 LAB — CALCIUM SERPL-MCNC: 10 MG/DL (ref 8.4–10.2)

## 2023-12-04 PROCEDURE — 36415 COLL VENOUS BLD VENIPUNCTURE: CPT

## 2023-12-04 PROCEDURE — 82310 ASSAY OF CALCIUM: CPT

## 2023-12-06 ENCOUNTER — HOSPITAL ENCOUNTER (OUTPATIENT)
Dept: RADIOLOGY | Facility: HOSPITAL | Age: 48
Discharge: HOME | End: 2023-12-06
Payer: COMMERCIAL

## 2023-12-06 ENCOUNTER — APPOINTMENT (OUTPATIENT)
Dept: LAB | Facility: HOSPITAL | Age: 48
End: 2023-12-06
Attending: INTERNAL MEDICINE
Payer: COMMERCIAL

## 2023-12-06 DIAGNOSIS — R92.8 ABNORMAL FINDINGS ON DIAGNOSTIC IMAGING OF BREAST: ICD-10-CM

## 2023-12-06 DIAGNOSIS — Z12.31 ENCOUNTER FOR SCREENING MAMMOGRAM FOR MALIGNANT NEOPLASM OF BREAST: ICD-10-CM

## 2023-12-06 DIAGNOSIS — E87.6 HYPOPOTASSEMIA: ICD-10-CM

## 2023-12-06 DIAGNOSIS — E83.51 HYPOCALCEMIA: ICD-10-CM

## 2023-12-06 DIAGNOSIS — R92.8 ABNORMAL MAMMOGRAM: ICD-10-CM

## 2023-12-06 LAB — CALCIUM SERPL-MCNC: 9.3 MG/DL (ref 8.4–10.2)

## 2023-12-06 PROCEDURE — G0279 TOMOSYNTHESIS, MAMMO: HCPCS

## 2023-12-06 PROCEDURE — 82310 ASSAY OF CALCIUM: CPT

## 2023-12-06 PROCEDURE — 36415 COLL VENOUS BLD VENIPUNCTURE: CPT

## 2023-12-06 PROCEDURE — 77066 DX MAMMO INCL CAD BI: CPT

## 2023-12-06 PROCEDURE — 76642 ULTRASOUND BREAST LIMITED: CPT

## 2023-12-07 ENCOUNTER — TELEPHONE (OUTPATIENT)
Dept: HEMATOLOGY ONCOLOGY | Facility: CLINIC | Age: 48
End: 2023-12-07

## 2023-12-07 DIAGNOSIS — Z12.39 BREAST CANCER SCREENING, HIGH RISK PATIENT: Primary | ICD-10-CM

## 2023-12-07 NOTE — TELEPHONE ENCOUNTER
Spoke with patient regarding her mammogram and US of right breast. I informed her that Maria G Branch  is re-ordering these tests to be completed in one year from now. Patient would like me to schedule these for her, preferably in [address] [place] [address] in the afternoon. I also saw she was not seen for follow-up. Patient prefers to only be seen in [address] [place] [address]. I told her someone will call with a follow-up visit.

## 2023-12-07 NOTE — TELEPHONE ENCOUNTER
I contacted the patient regarding scheduling a follow up with Jamie Stiles. I advised the patient Riccardo Andre only goes to [facility] office. Patient advise that she will call back to schedule a folow up appointment at a later time.

## 2023-12-08 ENCOUNTER — TELEMEDICINE (OUTPATIENT)
Dept: BEHAVIORAL/MENTAL HEALTH CLINIC | Facility: CLINIC | Age: 48
End: 2023-12-08
Payer: COMMERCIAL

## 2023-12-08 ENCOUNTER — APPOINTMENT (OUTPATIENT)
Dept: LAB | Facility: HOSPITAL | Age: 48
End: 2023-12-08
Payer: COMMERCIAL

## 2023-12-08 DIAGNOSIS — F41.0 PANIC DISORDER: ICD-10-CM

## 2023-12-08 DIAGNOSIS — E87.6 HYPOPOTASSEMIA: ICD-10-CM

## 2023-12-08 DIAGNOSIS — F51.01 PRIMARY INSOMNIA: ICD-10-CM

## 2023-12-08 DIAGNOSIS — F33.1 MODERATE EPISODE OF RECURRENT MAJOR DEPRESSIVE DISORDER (HCC): Primary | ICD-10-CM

## 2023-12-08 DIAGNOSIS — F17.210 NICOTINE DEPENDENCE, CIGARETTES, UNCOMPLICATED: ICD-10-CM

## 2023-12-08 DIAGNOSIS — F41.1 GENERALIZED ANXIETY DISORDER WITH PANIC ATTACKS: ICD-10-CM

## 2023-12-08 DIAGNOSIS — E83.51 HYPOCALCEMIA: ICD-10-CM

## 2023-12-08 DIAGNOSIS — F41.0 GENERALIZED ANXIETY DISORDER WITH PANIC ATTACKS: ICD-10-CM

## 2023-12-08 LAB — CALCIUM SERPL-MCNC: 10 MG/DL (ref 8.4–10.2)

## 2023-12-08 PROCEDURE — 82310 ASSAY OF CALCIUM: CPT

## 2023-12-08 PROCEDURE — 90834 PSYTX W PT 45 MINUTES: CPT | Performed by: SOCIAL WORKER

## 2023-12-08 PROCEDURE — 36415 COLL VENOUS BLD VENIPUNCTURE: CPT

## 2023-12-08 RX ORDER — ALPRAZOLAM 2 MG/1
2 TABLET, EXTENDED RELEASE ORAL
Qty: 60 TABLET | Refills: 0 | Status: SHIPPED | OUTPATIENT
Start: 2023-12-08

## 2023-12-08 RX ORDER — TRAZODONE HYDROCHLORIDE 100 MG/1
100 TABLET ORAL
Qty: 90 TABLET | Refills: 0 | Status: SHIPPED | OUTPATIENT
Start: 2023-12-08

## 2023-12-09 ENCOUNTER — APPOINTMENT (OUTPATIENT)
Dept: LAB | Facility: HOSPITAL | Age: 48
End: 2023-12-09
Attending: INTERNAL MEDICINE
Payer: COMMERCIAL

## 2023-12-09 DIAGNOSIS — E87.6 HYPOPOTASSEMIA: ICD-10-CM

## 2023-12-09 DIAGNOSIS — E83.51 HYPOCALCEMIA: ICD-10-CM

## 2023-12-09 LAB — CALCIUM SERPL-MCNC: 10.1 MG/DL (ref 8.4–10.2)

## 2023-12-09 PROCEDURE — 36415 COLL VENOUS BLD VENIPUNCTURE: CPT

## 2023-12-09 PROCEDURE — 82310 ASSAY OF CALCIUM: CPT

## 2023-12-12 ENCOUNTER — APPOINTMENT (OUTPATIENT)
Dept: LAB | Facility: HOSPITAL | Age: 48
End: 2023-12-12
Payer: COMMERCIAL

## 2023-12-12 DIAGNOSIS — E87.6 HYPOPOTASSEMIA: ICD-10-CM

## 2023-12-12 DIAGNOSIS — E83.51 HYPOCALCEMIA: ICD-10-CM

## 2023-12-12 LAB — CALCIUM SERPL-MCNC: 9.6 MG/DL (ref 8.4–10.2)

## 2023-12-12 PROCEDURE — 36415 COLL VENOUS BLD VENIPUNCTURE: CPT

## 2023-12-12 PROCEDURE — 82310 ASSAY OF CALCIUM: CPT

## 2023-12-12 NOTE — PSYCH
Behavioral Health Psychotherapy Progress Note    Psychotherapy Provided: Individual Psychotherapy     1. Moderate episode of recurrent major depressive disorder ([address])        2. Generalized anxiety disorder with panic attacks        3. Nicotine dependence, cigarettes, uncomplicated            Goals addressed in session: Goal 1     DATA: Yasmani Zarco shared having a lot of pain recently which undoubtedly exacerbates depressive and hopeless mood. We discussed her grief over her father and lingering resentments she holds towards her mother. She discussed wanting to look into her mother's medical records because then she would have a "better understanding of who she was and why she did the things that she did". Writer questioned what she would really get out of it and if she is seeking rationale to let her mom off the hook. We acknowledged sitting with the pain and allowing it to move through her rather than define her. She was receptive to therapeutic feedback and was engaged in the session. During this session, this clinician used the following therapeutic modalities: Cognitive Behavioral Therapy, Cognitive Processing Therapy, and Supportive Psychotherapy    Substance Abuse was not addressed during this session. If the client is diagnosed with a co-occurring substance use disorder, please indicate any changes in the frequency or amount of use: NA. Stage of change for addressing substance use diagnoses: No substance use/Not applicable    ASSESSMENT:  Maite Dang presents with a Euthymic/ normal mood. her affect is Normal range and intensity, which is congruent, with her mood and the content of the session. The client has made progress on their goals. During this session the client was oriented to person, place, and time. The client was engaged in the session. The client was able to sustain direct eye contact and was without psychomotor agitation. The client's thought processes were linear and clear. Rodri Napier presents with a none risk of suicide, none risk of self-harm, and none risk of harm to others. For any risk assessment that surpasses a "low" rating, a safety plan must be developed. A safety plan was indicated: no  If yes, describe in detail NA    PLAN: Between sessions, Rodri Napier will take medication as prescribed and practice positive coping strategies as needed . At the next session, the therapist will use Cognitive Processing Therapy and Supportive Psychotherapy to address depression. Behavioral Health Treatment Plan and Discharge Planning: Rodri Napier is aware of and agrees to continue to work on their treatment plan. They have identified and are working toward their discharge goals.  yes    Visit start and stop times:    12/12/23  Start Time: 1430  Stop Time: 1520  Total Visit Time: 50 minutes

## 2023-12-15 ENCOUNTER — APPOINTMENT (OUTPATIENT)
Dept: LAB | Facility: HOSPITAL | Age: 48
End: 2023-12-15
Attending: INTERNAL MEDICINE
Payer: COMMERCIAL

## 2023-12-15 DIAGNOSIS — E87.6 HYPOPOTASSEMIA: ICD-10-CM

## 2023-12-15 DIAGNOSIS — E83.51 HYPOCALCEMIA: ICD-10-CM

## 2023-12-15 LAB — CALCIUM SERPL-MCNC: 9.5 MG/DL (ref 8.4–10.2)

## 2023-12-15 PROCEDURE — 36415 COLL VENOUS BLD VENIPUNCTURE: CPT

## 2023-12-15 PROCEDURE — 82310 ASSAY OF CALCIUM: CPT

## 2023-12-18 ENCOUNTER — APPOINTMENT (OUTPATIENT)
Dept: LAB | Facility: HOSPITAL | Age: 48
End: 2023-12-18
Attending: INTERNAL MEDICINE
Payer: COMMERCIAL

## 2023-12-18 DIAGNOSIS — E83.51 HYPOCALCEMIA: ICD-10-CM

## 2023-12-18 DIAGNOSIS — E87.6 HYPOPOTASSEMIA: ICD-10-CM

## 2023-12-18 LAB — CALCIUM SERPL-MCNC: 10 MG/DL (ref 8.4–10.2)

## 2023-12-18 PROCEDURE — 36415 COLL VENOUS BLD VENIPUNCTURE: CPT

## 2023-12-18 PROCEDURE — 82310 ASSAY OF CALCIUM: CPT

## 2023-12-19 ENCOUNTER — TELEPHONE (OUTPATIENT)
Dept: PSYCHIATRY | Facility: CLINIC | Age: 48
End: 2023-12-19

## 2023-12-19 ENCOUNTER — NURSE TRIAGE (OUTPATIENT)
Dept: OTHER | Facility: OTHER | Age: 48
End: 2023-12-19

## 2023-12-20 ENCOUNTER — HOSPITAL ENCOUNTER (EMERGENCY)
Facility: HOSPITAL | Age: 48
Discharge: HOME | End: 2023-12-20
Attending: EMERGENCY MEDICINE
Payer: COMMERCIAL

## 2023-12-20 VITALS
DIASTOLIC BLOOD PRESSURE: 91 MMHG | RESPIRATION RATE: 16 BRPM | BODY MASS INDEX: 38.91 KG/M2 | TEMPERATURE: 98.4 F | WEIGHT: 212.74 LBS | SYSTOLIC BLOOD PRESSURE: 161 MMHG | OXYGEN SATURATION: 98 % | HEART RATE: 86 BPM

## 2023-12-20 DIAGNOSIS — R73.9 HYPERGLYCEMIA: Primary | ICD-10-CM

## 2023-12-20 LAB
GLUCOSE SERPL-MCNC: 101 MG/DL (ref 65–140)
GLUCOSE SERPL-MCNC: 98 MG/DL (ref 65–140)

## 2023-12-20 PROCEDURE — 99284 EMERGENCY DEPT VISIT MOD MDM: CPT | Performed by: EMERGENCY MEDICINE

## 2023-12-20 PROCEDURE — 99283 EMERGENCY DEPT VISIT LOW MDM: CPT

## 2023-12-20 PROCEDURE — 82948 REAGENT STRIP/BLOOD GLUCOSE: CPT

## 2023-12-20 NOTE — TELEPHONE ENCOUNTER
"Reason for Disposition  • Patient sounds very sick or weak to the triager    Answer Assessment - Initial Assessment Questions  1. BLOOD GLUCOSE: \"What is your blood glucose level?\"       381 and 151    2. ONSET: \"When did you check the blood glucose?\"      Just now     3. USUAL RANGE: \"What is your glucose level usually?\" (e.g., usual fasting morning value, usual evening value)      Less than 200    4. KETONES: \"Do you check for ketones (urine or blood test strips)?\" If yes, ask: \"What does the test show now?\"       Denies    5. TYPE 1 or 2:  \"Do you know what type of diabetes you have?\"  (e.g., Type 1, Type 2, Gestational; doesn't know)       Type 2     6. INSULIN: \"Do you take insulin?\" \"What type of insulin(s) do you use? What is the mode of delivery? (syringe, pen; injection or pump)?\"       Denies    7. DIABETES PILLS: \"Do you take any pills for your diabetes?\" If yes, ask: \"Have you missed taking any pills recently?\"      Yes, metformin 500 mg at 2300    8. OTHER SYMPTOMS: \"Do you have any symptoms?\" (e.g., fever, frequent urination, difficulty breathing, dizziness, weakness, vomiting)     mild Chest pain, center of chest, nausea     9. PREGNANCY: \"Is there any chance you are pregnant?\" \"When was your last menstrual period?\"  denies    Protocols used: Diabetes - High Blood Sugar-ADULT-AH    "

## 2023-12-20 NOTE — TELEPHONE ENCOUNTER
Regarding: Blood sugar 381  ----- Message from Eliecer Jefferson sent at 12/19/2023 11:53 PM EST -----  '' My blood sugar is 381. I took a metformin at 11 pm.''

## 2023-12-20 NOTE — TELEPHONE ENCOUNTER
Called patient after receiving [facility] as above.     Patient took Metformin 500 mg at 11 pm last night. At 11:45 pm, her blood glucose was 381 and repeat glucose 15 minutes later was 151. She retested twice afterwards at different sites with values of 180 and 130. She reports associated midsternal chest pain with nausea during this episode. She denies recent illnesses or steroid use. She reports her glucometer is relatively new.     Recommend going to ED for further evaluation of chest pain and fluctuating blood glucose. Patient may need to recalibrate her glucometer or obtain a new one.     Patient agreeable to above. She states she has an upcoming appointment with Dr. Pabon on 12/21.

## 2023-12-21 ENCOUNTER — TELEPHONE (OUTPATIENT)
Dept: NEPHROLOGY | Facility: CLINIC | Age: 48
End: 2023-12-21
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
## 2023-12-22 ENCOUNTER — APPOINTMENT (OUTPATIENT)
Dept: LAB | Facility: HOSPITAL | Age: 48
End: 2023-12-22
Attending: INTERNAL MEDICINE
Payer: COMMERCIAL

## 2023-12-22 DIAGNOSIS — E83.51 HYPOCALCEMIA: ICD-10-CM

## 2023-12-22 DIAGNOSIS — E87.6 HYPOPOTASSEMIA: ICD-10-CM

## 2023-12-22 LAB — CALCIUM SERPL-MCNC: 8.9 MG/DL (ref 8.4–10.2)

## 2023-12-22 PROCEDURE — 82310 ASSAY OF CALCIUM: CPT

## 2023-12-22 PROCEDURE — 36415 COLL VENOUS BLD VENIPUNCTURE: CPT

## 2023-12-23 DIAGNOSIS — R73.9 HYPERGLYCEMIA: ICD-10-CM

## 2023-12-26 ENCOUNTER — APPOINTMENT (OUTPATIENT)
Dept: LAB | Facility: HOSPITAL | Age: 48
End: 2023-12-26
Attending: INTERNAL MEDICINE
Payer: COMMERCIAL

## 2023-12-26 DIAGNOSIS — E87.6 HYPOPOTASSEMIA: ICD-10-CM

## 2023-12-26 DIAGNOSIS — E83.51 HYPOCALCEMIA: ICD-10-CM

## 2023-12-26 LAB — CALCIUM SERPL-MCNC: 9.3 MG/DL (ref 8.4–10.2)

## 2023-12-26 PROCEDURE — 82310 ASSAY OF CALCIUM: CPT

## 2023-12-26 PROCEDURE — 36415 COLL VENOUS BLD VENIPUNCTURE: CPT

## 2023-12-29 ENCOUNTER — APPOINTMENT (OUTPATIENT)
Dept: LAB | Facility: HOSPITAL | Age: 48
End: 2023-12-29
Payer: COMMERCIAL

## 2023-12-29 DIAGNOSIS — E83.51 HYPOCALCEMIA: ICD-10-CM

## 2023-12-29 DIAGNOSIS — E87.6 HYPOPOTASSEMIA: ICD-10-CM

## 2023-12-29 LAB — CALCIUM SERPL-MCNC: 9.9 MG/DL (ref 8.4–10.2)

## 2023-12-29 PROCEDURE — 36415 COLL VENOUS BLD VENIPUNCTURE: CPT

## 2023-12-29 PROCEDURE — 82310 ASSAY OF CALCIUM: CPT

## 2024-01-02 ENCOUNTER — TELEPHONE (OUTPATIENT)
Dept: PSYCHIATRY | Facility: CLINIC | Age: 49
End: 2024-01-02
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
## 2024-01-03 ENCOUNTER — APPOINTMENT (OUTPATIENT)
Dept: LAB | Facility: HOSPITAL | Age: 49
End: 2024-01-03
Attending: INTERNAL MEDICINE
Payer: COMMERCIAL

## 2024-01-03 ENCOUNTER — OFFICE VISIT (OUTPATIENT)
Dept: CARDIOLOGY CLINIC | Facility: CLINIC | Age: 49
End: 2024-01-03
Payer: COMMERCIAL

## 2024-01-03 ENCOUNTER — HOSPITAL ENCOUNTER (EMERGENCY)
Facility: HOSPITAL | Age: 49
Discharge: HOME | End: 2024-01-03
Attending: EMERGENCY MEDICINE
Payer: COMMERCIAL

## 2024-01-03 VITALS
RESPIRATION RATE: 18 BRPM | BODY MASS INDEX: 39.56 KG/M2 | TEMPERATURE: 97.9 F | HEIGHT: 62 IN | SYSTOLIC BLOOD PRESSURE: 120 MMHG | WEIGHT: 214.95 LBS | DIASTOLIC BLOOD PRESSURE: 73 MMHG | HEART RATE: 86 BPM | OXYGEN SATURATION: 97 %

## 2024-01-03 VITALS
DIASTOLIC BLOOD PRESSURE: 82 MMHG | BODY MASS INDEX: 38.64 KG/M2 | WEIGHT: 210 LBS | SYSTOLIC BLOOD PRESSURE: 130 MMHG | HEART RATE: 92 BPM | HEIGHT: 62 IN | OXYGEN SATURATION: 97 %

## 2024-01-03 DIAGNOSIS — E78.00 PURE HYPERCHOLESTEROLEMIA: ICD-10-CM

## 2024-01-03 DIAGNOSIS — E83.51 HYPOCALCEMIA: ICD-10-CM

## 2024-01-03 DIAGNOSIS — E87.6 HYPOPOTASSEMIA: ICD-10-CM

## 2024-01-03 DIAGNOSIS — R07.9 CHEST PAIN, UNSPECIFIED: Primary | ICD-10-CM

## 2024-01-03 DIAGNOSIS — R42 DIZZINESS: Primary | ICD-10-CM

## 2024-01-03 DIAGNOSIS — E78.2 MIXED HYPERLIPIDEMIA: Primary | ICD-10-CM

## 2024-01-03 LAB
ALBUMIN SERPL BCP-MCNC: 4.1 G/DL (ref 3.5–5)
ALBUMIN SERPL BCP-MCNC: 4.2 G/DL (ref 3.5–5)
ALP SERPL-CCNC: 67 U/L (ref 34–104)
ALP SERPL-CCNC: 67 U/L (ref 34–104)
ALT SERPL W P-5'-P-CCNC: 27 U/L (ref 7–52)
ALT SERPL W P-5'-P-CCNC: 28 U/L (ref 7–52)
ANION GAP SERPL CALCULATED.3IONS-SCNC: 10 MMOL/L
AST SERPL W P-5'-P-CCNC: 16 U/L (ref 13–39)
AST SERPL W P-5'-P-CCNC: 16 U/L (ref 13–39)
ATRIAL RATE: 96 BPM
BASOPHILS # BLD AUTO: 0.04 THOUSANDS/ÂΜL (ref 0–0.1)
BASOPHILS NFR BLD AUTO: 1 % (ref 0–1)
BILIRUB DIRECT SERPL-MCNC: 0.02 MG/DL (ref 0–0.2)
BILIRUB SERPL-MCNC: 0.43 MG/DL (ref 0.2–1)
BILIRUB SERPL-MCNC: 0.44 MG/DL (ref 0.2–1)
BUN SERPL-MCNC: 23 MG/DL (ref 5–25)
CA-I BLD-SCNC: 1.03 MMOL/L (ref 1.12–1.32)
CALCIUM SERPL-MCNC: 8.5 MG/DL (ref 8.4–10.2)
CALCIUM SERPL-MCNC: 8.8 MG/DL (ref 8.4–10.2)
CARDIAC TROPONIN I PNL SERPL HS: 3 NG/L
CHLORIDE SERPL-SCNC: 102 MMOL/L (ref 96–108)
CHOLEST SERPL-MCNC: 454 MG/DL
CO2 SERPL-SCNC: 26 MMOL/L (ref 21–32)
CREAT SERPL-MCNC: 1.11 MG/DL (ref 0.6–1.3)
EOSINOPHIL # BLD AUTO: 0.04 THOUSAND/ÂΜL (ref 0–0.61)
EOSINOPHIL NFR BLD AUTO: 1 % (ref 0–6)
ERYTHROCYTE [DISTWIDTH] IN BLOOD BY AUTOMATED COUNT: 17.3 % (ref 11.6–15.1)
GFR SERPL CREATININE-BSD FRML MDRD: 59 ML/MIN/1.73SQ M
GLUCOSE SERPL-MCNC: 100 MG/DL (ref 65–140)
HCT VFR BLD AUTO: 41.5 % (ref 34.8–46.1)
HDLC SERPL-MCNC: 40 MG/DL
HGB BLD-MCNC: 13.7 G/DL (ref 11.5–15.4)
IMM GRANULOCYTES # BLD AUTO: 0.04 THOUSAND/UL (ref 0–0.2)
IMM GRANULOCYTES NFR BLD AUTO: 1 % (ref 0–2)
LDLC SERPL DIRECT ASSAY-MCNC: 116 MG/DL (ref 0–100)
LYMPHOCYTES # BLD AUTO: 2.46 THOUSANDS/ÂΜL (ref 0.6–4.47)
LYMPHOCYTES NFR BLD AUTO: 42 % (ref 14–44)
MAGNESIUM SERPL-MCNC: 2 MG/DL (ref 1.9–2.7)
MCH RBC QN AUTO: 30.6 PG (ref 26.8–34.3)
MCHC RBC AUTO-ENTMCNC: 33 G/DL (ref 31.4–37.4)
MCV RBC AUTO: 93 FL (ref 82–98)
MONOCYTES # BLD AUTO: 0.41 THOUSAND/ÂΜL (ref 0.17–1.22)
MONOCYTES NFR BLD AUTO: 7 % (ref 4–12)
NEUTROPHILS # BLD AUTO: 2.84 THOUSANDS/ÂΜL (ref 1.85–7.62)
NEUTS SEG NFR BLD AUTO: 48 % (ref 43–75)
NRBC BLD AUTO-RTO: 0 /100 WBCS
P AXIS: 53 DEGREES
PLATELET # BLD AUTO: 171 THOUSANDS/UL (ref 149–390)
PMV BLD AUTO: 9.9 FL (ref 8.9–12.7)
POTASSIUM SERPL-SCNC: 4 MMOL/L (ref 3.5–5.3)
PR INTERVAL: 160 MS
PROT SERPL-MCNC: 7.2 G/DL (ref 6.4–8.4)
PROT SERPL-MCNC: 7.5 G/DL (ref 6.4–8.4)
QRS AXIS: 44 DEGREES
QRSD INTERVAL: 72 MS
QT INTERVAL: 368 MS
QTC INTERVAL: 464 MS
RBC # BLD AUTO: 4.47 MILLION/UL (ref 3.81–5.12)
SODIUM SERPL-SCNC: 138 MMOL/L (ref 135–147)
T WAVE AXIS: 77 DEGREES
TRIGL SERPL-MCNC: 469 MG/DL
VENTRICULAR RATE: 96 BPM
WBC # BLD AUTO: 5.83 THOUSAND/UL (ref 4.31–10.16)

## 2024-01-03 PROCEDURE — 93005 ELECTROCARDIOGRAM TRACING: CPT

## 2024-01-03 PROCEDURE — 99284 EMERGENCY DEPT VISIT MOD MDM: CPT

## 2024-01-03 PROCEDURE — 99285 EMERGENCY DEPT VISIT HI MDM: CPT | Performed by: EMERGENCY MEDICINE

## 2024-01-03 PROCEDURE — 85025 COMPLETE CBC W/AUTO DIFF WBC: CPT | Performed by: EMERGENCY MEDICINE

## 2024-01-03 PROCEDURE — 80061 LIPID PANEL: CPT

## 2024-01-03 PROCEDURE — 83721 ASSAY OF BLOOD LIPOPROTEIN: CPT

## 2024-01-03 PROCEDURE — 80053 COMPREHEN METABOLIC PANEL: CPT | Performed by: EMERGENCY MEDICINE

## 2024-01-03 PROCEDURE — 83735 ASSAY OF MAGNESIUM: CPT | Performed by: EMERGENCY MEDICINE

## 2024-01-03 PROCEDURE — 80076 HEPATIC FUNCTION PANEL: CPT

## 2024-01-03 PROCEDURE — 99214 OFFICE O/P EST MOD 30 MIN: CPT | Performed by: INTERNAL MEDICINE

## 2024-01-03 PROCEDURE — 82310 ASSAY OF CALCIUM: CPT

## 2024-01-03 PROCEDURE — 96366 THER/PROPH/DIAG IV INF ADDON: CPT

## 2024-01-03 PROCEDURE — 93000 ELECTROCARDIOGRAM COMPLETE: CPT | Performed by: INTERNAL MEDICINE

## 2024-01-03 PROCEDURE — 82330 ASSAY OF CALCIUM: CPT | Performed by: EMERGENCY MEDICINE

## 2024-01-03 PROCEDURE — 84484 ASSAY OF TROPONIN QUANT: CPT | Performed by: EMERGENCY MEDICINE

## 2024-01-03 PROCEDURE — 36415 COLL VENOUS BLD VENIPUNCTURE: CPT | Performed by: EMERGENCY MEDICINE

## 2024-01-03 PROCEDURE — 96365 THER/PROPH/DIAG IV INF INIT: CPT

## 2024-01-03 RX ORDER — CALCIUM GLUCONATE 20 MG/ML
1 INJECTION, SOLUTION INTRAVENOUS ONCE
Status: COMPLETED | OUTPATIENT
Start: 2024-01-03 | End: 2024-01-03

## 2024-01-03 RX ORDER — ICOSAPENT ETHYL 1000 MG/1
2 CAPSULE ORAL 2 TIMES DAILY
Qty: 180 CAPSULE | Refills: 4 | Status: SHIPPED | OUTPATIENT
Start: 2024-01-03

## 2024-01-03 RX ORDER — TIZANIDINE 4 MG/1
4 TABLET ORAL 3 TIMES DAILY PRN
COMMUNITY
Start: 2023-11-17

## 2024-01-03 RX ADMIN — CALCIUM GLUCONATE 1 G: 20 INJECTION, SOLUTION INTRAVENOUS at 01:50

## 2024-01-03 RX ADMIN — CALCIUM GLUCONATE 1 G: 20 INJECTION, SOLUTION INTRAVENOUS at 02:25
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
## 2024-01-04 ENCOUNTER — TELEPHONE (OUTPATIENT)
Dept: CARDIOLOGY CLINIC | Facility: CLINIC | Age: 49
End: 2024-01-04

## 2024-01-04 NOTE — TELEPHONE ENCOUNTER
----- Message from Vanessa Beckford MD sent at 1/3/2024  3:35 PM EST -----  Please call and inform patient that the blood test showed that her triglycerides are still very high.  Liver enzymes are normal  I am going to add fish oil to her current regime of medications  This medication will probably require prior authorization

## 2024-01-05 ENCOUNTER — HOSPITAL ENCOUNTER (EMERGENCY)
Facility: HOSPITAL | Age: 49
Discharge: HOME | End: 2024-01-06
Attending: EMERGENCY MEDICINE
Payer: COMMERCIAL

## 2024-01-05 VITALS
RESPIRATION RATE: 18 BRPM | OXYGEN SATURATION: 98 % | TEMPERATURE: 98.8 F | HEART RATE: 90 BPM | HEIGHT: 62 IN | DIASTOLIC BLOOD PRESSURE: 71 MMHG | WEIGHT: 214 LBS | BODY MASS INDEX: 39.38 KG/M2 | SYSTOLIC BLOOD PRESSURE: 122 MMHG

## 2024-01-05 DIAGNOSIS — E83.51 HYPOCALCEMIA: Primary | ICD-10-CM

## 2024-01-05 DIAGNOSIS — R20.2 PARESTHESIAS: ICD-10-CM

## 2024-01-05 LAB
ALBUMIN SERPL BCP-MCNC: 4.1 G/DL (ref 3.5–5)
ALP SERPL-CCNC: 58 U/L (ref 34–104)
ALT SERPL W P-5'-P-CCNC: 27 U/L (ref 7–52)
ANION GAP SERPL CALCULATED.3IONS-SCNC: 12 MMOL/L
AST SERPL W P-5'-P-CCNC: 17 U/L (ref 13–39)
BASOPHILS # BLD AUTO: 0.04 THOUSANDS/ÂΜL (ref 0–0.1)
BASOPHILS NFR BLD AUTO: 1 % (ref 0–1)
BILIRUB SERPL-MCNC: 0.45 MG/DL (ref 0.2–1)
BUN SERPL-MCNC: 25 MG/DL (ref 5–25)
CA-I BLD-SCNC: 0.98 MMOL/L (ref 1.12–1.32)
CALCIUM SERPL-MCNC: 7.8 MG/DL (ref 8.4–10.2)
CHLORIDE SERPL-SCNC: 105 MMOL/L (ref 96–108)
CO2 SERPL-SCNC: 23 MMOL/L (ref 21–32)
CREAT SERPL-MCNC: 1.17 MG/DL (ref 0.6–1.3)
EOSINOPHIL # BLD AUTO: 0.03 THOUSAND/ÂΜL (ref 0–0.61)
EOSINOPHIL NFR BLD AUTO: 1 % (ref 0–6)
ERYTHROCYTE [DISTWIDTH] IN BLOOD BY AUTOMATED COUNT: 17.4 % (ref 11.6–15.1)
GFR SERPL CREATININE-BSD FRML MDRD: 55 ML/MIN/1.73SQ M
GLUCOSE SERPL-MCNC: 128 MG/DL (ref 65–140)
HCT VFR BLD AUTO: 39.9 % (ref 34.8–46.1)
HGB BLD-MCNC: 12.7 G/DL (ref 11.5–15.4)
IMM GRANULOCYTES # BLD AUTO: 0.04 THOUSAND/UL (ref 0–0.2)
IMM GRANULOCYTES NFR BLD AUTO: 1 % (ref 0–2)
LYMPHOCYTES # BLD AUTO: 1.53 THOUSANDS/ÂΜL (ref 0.6–4.47)
LYMPHOCYTES NFR BLD AUTO: 26 % (ref 14–44)
MAGNESIUM SERPL-MCNC: 1.9 MG/DL (ref 1.9–2.7)
MCH RBC QN AUTO: 29.9 PG (ref 26.8–34.3)
MCHC RBC AUTO-ENTMCNC: 31.8 G/DL (ref 31.4–37.4)
MCV RBC AUTO: 94 FL (ref 82–98)
MONOCYTES # BLD AUTO: 0.41 THOUSAND/ÂΜL (ref 0.17–1.22)
MONOCYTES NFR BLD AUTO: 7 % (ref 4–12)
NEUTROPHILS # BLD AUTO: 3.96 THOUSANDS/ÂΜL (ref 1.85–7.62)
NEUTS SEG NFR BLD AUTO: 64 % (ref 43–75)
NRBC BLD AUTO-RTO: 0 /100 WBCS
PHOSPHATE SERPL-MCNC: 2.8 MG/DL (ref 2.7–4.5)
PLATELET # BLD AUTO: 162 THOUSANDS/UL (ref 149–390)
PMV BLD AUTO: 10 FL (ref 8.9–12.7)
POTASSIUM SERPL-SCNC: 4.2 MMOL/L (ref 3.5–5.3)
PROT SERPL-MCNC: 7 G/DL (ref 6.4–8.4)
RBC # BLD AUTO: 4.25 MILLION/UL (ref 3.81–5.12)
SODIUM SERPL-SCNC: 140 MMOL/L (ref 135–147)
WBC # BLD AUTO: 6.01 THOUSAND/UL (ref 4.31–10.16)

## 2024-01-05 PROCEDURE — 36415 COLL VENOUS BLD VENIPUNCTURE: CPT | Performed by: EMERGENCY MEDICINE

## 2024-01-05 PROCEDURE — 84100 ASSAY OF PHOSPHORUS: CPT | Performed by: EMERGENCY MEDICINE

## 2024-01-05 PROCEDURE — 99285 EMERGENCY DEPT VISIT HI MDM: CPT | Performed by: EMERGENCY MEDICINE

## 2024-01-05 PROCEDURE — 93005 ELECTROCARDIOGRAM TRACING: CPT

## 2024-01-05 PROCEDURE — 80053 COMPREHEN METABOLIC PANEL: CPT | Performed by: EMERGENCY MEDICINE

## 2024-01-05 PROCEDURE — 82330 ASSAY OF CALCIUM: CPT | Performed by: EMERGENCY MEDICINE

## 2024-01-05 PROCEDURE — 83735 ASSAY OF MAGNESIUM: CPT | Performed by: EMERGENCY MEDICINE

## 2024-01-05 PROCEDURE — 85025 COMPLETE CBC W/AUTO DIFF WBC: CPT | Performed by: EMERGENCY MEDICINE

## 2024-01-05 PROCEDURE — 84443 ASSAY THYROID STIM HORMONE: CPT | Performed by: EMERGENCY MEDICINE

## 2024-01-05 PROCEDURE — 99284 EMERGENCY DEPT VISIT MOD MDM: CPT

## 2024-01-05 RX ORDER — CALCIUM GLUCONATE 20 MG/ML
2 INJECTION, SOLUTION INTRAVENOUS ONCE
Status: COMPLETED | OUTPATIENT
Start: 2024-01-05 | End: 2024-01-06

## 2024-01-06 ENCOUNTER — APPOINTMENT (OUTPATIENT)
Dept: LAB | Facility: HOSPITAL | Age: 49
End: 2024-01-06
Payer: COMMERCIAL

## 2024-01-06 DIAGNOSIS — E83.51 HYPOCALCEMIA: ICD-10-CM

## 2024-01-06 DIAGNOSIS — E87.6 HYPOPOTASSEMIA: ICD-10-CM

## 2024-01-06 LAB
ATRIAL RATE: 90 BPM
CALCIUM SERPL-MCNC: 9.2 MG/DL (ref 8.4–10.2)
P AXIS: 29 DEGREES
PR INTERVAL: 158 MS
QRS AXIS: 25 DEGREES
QRSD INTERVAL: 72 MS
QT INTERVAL: 394 MS
QTC INTERVAL: 481 MS
T WAVE AXIS: 71 DEGREES
TSH SERPL DL<=0.05 MIU/L-ACNC: 0.9 UIU/ML (ref 0.45–4.5)
VENTRICULAR RATE: 90 BPM

## 2024-01-06 PROCEDURE — 36415 COLL VENOUS BLD VENIPUNCTURE: CPT

## 2024-01-06 PROCEDURE — 96365 THER/PROPH/DIAG IV INF INIT: CPT

## 2024-01-06 PROCEDURE — 82310 ASSAY OF CALCIUM: CPT

## 2024-01-06 RX ADMIN — CALCIUM GLUCONATE 2 G: 20 INJECTION, SOLUTION INTRAVENOUS at 00:07

## 2024-01-06 NOTE — DISCHARGE INSTRUCTIONS
Follow-up with endocrinology for further care. Contact info provided below if needed.  Continue home medications as prescribed.   Return to the ED with new or worsening symptoms.
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
## 2024-01-07 ENCOUNTER — HOSPITAL ENCOUNTER (EMERGENCY)
Facility: HOSPITAL | Age: 49
Discharge: HOME | End: 2024-01-07
Attending: EMERGENCY MEDICINE | Admitting: EMERGENCY MEDICINE
Payer: COMMERCIAL

## 2024-01-07 VITALS
SYSTOLIC BLOOD PRESSURE: 131 MMHG | OXYGEN SATURATION: 99 % | DIASTOLIC BLOOD PRESSURE: 93 MMHG | TEMPERATURE: 98 F | RESPIRATION RATE: 20 BRPM | HEART RATE: 105 BPM

## 2024-01-07 DIAGNOSIS — R20.2 PARESTHESIAS: Primary | ICD-10-CM

## 2024-01-07 DIAGNOSIS — E83.51 HYPOCALCEMIA: ICD-10-CM

## 2024-01-07 LAB
ANION GAP SERPL CALCULATED.3IONS-SCNC: 10 MMOL/L
ATRIAL RATE: 79 BPM
BUN SERPL-MCNC: 15 MG/DL (ref 5–25)
CA-I BLD-SCNC: 1.02 MMOL/L (ref 1.12–1.32)
CALCIUM SERPL-MCNC: 8.5 MG/DL (ref 8.4–10.2)
CHLORIDE SERPL-SCNC: 103 MMOL/L (ref 96–108)
CO2 SERPL-SCNC: 23 MMOL/L (ref 21–32)
CREAT SERPL-MCNC: 0.95 MG/DL (ref 0.6–1.3)
GFR SERPL CREATININE-BSD FRML MDRD: 71 ML/MIN/1.73SQ M
GLUCOSE SERPL-MCNC: 103 MG/DL (ref 65–140)
MAGNESIUM SERPL-MCNC: 1.8 MG/DL (ref 1.9–2.7)
P AXIS: 48 DEGREES
POTASSIUM SERPL-SCNC: 3.8 MMOL/L (ref 3.5–5.3)
PR INTERVAL: 166 MS
QRS AXIS: 16 DEGREES
QRSD INTERVAL: 78 MS
QT INTERVAL: 404 MS
QTC INTERVAL: 463 MS
SODIUM SERPL-SCNC: 136 MMOL/L (ref 135–147)
T WAVE AXIS: 63 DEGREES
VENTRICULAR RATE: 79 BPM

## 2024-01-07 PROCEDURE — 80048 BASIC METABOLIC PNL TOTAL CA: CPT | Performed by: EMERGENCY MEDICINE

## 2024-01-07 PROCEDURE — 96365 THER/PROPH/DIAG IV INF INIT: CPT

## 2024-01-07 PROCEDURE — 82330 ASSAY OF CALCIUM: CPT | Performed by: EMERGENCY MEDICINE

## 2024-01-07 PROCEDURE — 83735 ASSAY OF MAGNESIUM: CPT | Performed by: EMERGENCY MEDICINE

## 2024-01-07 PROCEDURE — 93005 ELECTROCARDIOGRAM TRACING: CPT

## 2024-01-07 PROCEDURE — 36415 COLL VENOUS BLD VENIPUNCTURE: CPT | Performed by: EMERGENCY MEDICINE

## 2024-01-07 PROCEDURE — 99284 EMERGENCY DEPT VISIT MOD MDM: CPT | Performed by: EMERGENCY MEDICINE

## 2024-01-07 PROCEDURE — 99284 EMERGENCY DEPT VISIT MOD MDM: CPT

## 2024-01-07 RX ORDER — CALCIUM GLUCONATE 20 MG/ML
2 INJECTION, SOLUTION INTRAVENOUS ONCE
Status: COMPLETED | OUTPATIENT
Start: 2024-01-07 | End: 2024-01-07

## 2024-01-07 RX ADMIN — CALCIUM GLUCONATE 2 G: 20 INJECTION, SOLUTION INTRAVENOUS at 15:04
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
## 2024-01-08 ENCOUNTER — HOSPITAL ENCOUNTER (EMERGENCY)
Facility: HOSPITAL | Age: 49
Discharge: HOME | End: 2024-01-08
Attending: STUDENT IN AN ORGANIZED HEALTH CARE EDUCATION/TRAINING PROGRAM
Payer: COMMERCIAL

## 2024-01-08 VITALS
TEMPERATURE: 97.6 F | DIASTOLIC BLOOD PRESSURE: 85 MMHG | SYSTOLIC BLOOD PRESSURE: 134 MMHG | HEART RATE: 100 BPM | OXYGEN SATURATION: 97 % | RESPIRATION RATE: 18 BRPM

## 2024-01-08 DIAGNOSIS — E83.51 HYPOCALCEMIA: Primary | ICD-10-CM

## 2024-01-08 LAB
ALBUMIN SERPL BCP-MCNC: 4.1 G/DL (ref 3.5–5)
ALP SERPL-CCNC: 57 U/L (ref 34–104)
ALT SERPL W P-5'-P-CCNC: 34 U/L (ref 7–52)
ANION GAP SERPL CALCULATED.3IONS-SCNC: 9 MMOL/L
AST SERPL W P-5'-P-CCNC: 17 U/L (ref 13–39)
BASOPHILS # BLD AUTO: 0.05 THOUSANDS/ÂΜL (ref 0–0.1)
BASOPHILS NFR BLD AUTO: 1 % (ref 0–1)
BILIRUB SERPL-MCNC: 0.52 MG/DL (ref 0.2–1)
BUN SERPL-MCNC: 19 MG/DL (ref 5–25)
CA-I BLD-SCNC: 1.03 MMOL/L (ref 1.12–1.32)
CALCIUM SERPL-MCNC: 8.5 MG/DL (ref 8.4–10.2)
CHLORIDE SERPL-SCNC: 105 MMOL/L (ref 96–108)
CO2 SERPL-SCNC: 25 MMOL/L (ref 21–32)
CREAT SERPL-MCNC: 1.1 MG/DL (ref 0.6–1.3)
EOSINOPHIL # BLD AUTO: 0.03 THOUSAND/ÂΜL (ref 0–0.61)
EOSINOPHIL NFR BLD AUTO: 1 % (ref 0–6)
ERYTHROCYTE [DISTWIDTH] IN BLOOD BY AUTOMATED COUNT: 17.7 % (ref 11.6–15.1)
FLUAV RNA RESP QL NAA+PROBE: NEGATIVE
FLUBV RNA RESP QL NAA+PROBE: NEGATIVE
GFR SERPL CREATININE-BSD FRML MDRD: 59 ML/MIN/1.73SQ M
GLUCOSE SERPL-MCNC: 98 MG/DL (ref 65–140)
HCT VFR BLD AUTO: 39.3 % (ref 34.8–46.1)
HGB BLD-MCNC: 13.1 G/DL (ref 11.5–15.4)
IMM GRANULOCYTES # BLD AUTO: 0.06 THOUSAND/UL (ref 0–0.2)
IMM GRANULOCYTES NFR BLD AUTO: 1 % (ref 0–2)
LYMPHOCYTES # BLD AUTO: 1.53 THOUSANDS/ÂΜL (ref 0.6–4.47)
LYMPHOCYTES NFR BLD AUTO: 28 % (ref 14–44)
MAGNESIUM SERPL-MCNC: 1.9 MG/DL (ref 1.9–2.7)
MCH RBC QN AUTO: 31 PG (ref 26.8–34.3)
MCHC RBC AUTO-ENTMCNC: 33.3 G/DL (ref 31.4–37.4)
MCV RBC AUTO: 93 FL (ref 82–98)
MONOCYTES # BLD AUTO: 0.42 THOUSAND/ÂΜL (ref 0.17–1.22)
MONOCYTES NFR BLD AUTO: 8 % (ref 4–12)
NEUTROPHILS # BLD AUTO: 3.37 THOUSANDS/ÂΜL (ref 1.85–7.62)
NEUTS SEG NFR BLD AUTO: 61 % (ref 43–75)
NRBC BLD AUTO-RTO: 0 /100 WBCS
PLATELET # BLD AUTO: 155 THOUSANDS/UL (ref 149–390)
PMV BLD AUTO: 9.8 FL (ref 8.9–12.7)
POTASSIUM SERPL-SCNC: 4.6 MMOL/L (ref 3.5–5.3)
PROT SERPL-MCNC: 7.1 G/DL (ref 6.4–8.4)
RBC # BLD AUTO: 4.23 MILLION/UL (ref 3.81–5.12)
RSV RNA RESP QL NAA+PROBE: NEGATIVE
SARS-COV-2 RNA RESP QL NAA+PROBE: NEGATIVE
SODIUM SERPL-SCNC: 139 MMOL/L (ref 135–147)
WBC # BLD AUTO: 5.46 THOUSAND/UL (ref 4.31–10.16)

## 2024-01-08 PROCEDURE — 99284 EMERGENCY DEPT VISIT MOD MDM: CPT | Performed by: STUDENT IN AN ORGANIZED HEALTH CARE EDUCATION/TRAINING PROGRAM

## 2024-01-08 PROCEDURE — 99284 EMERGENCY DEPT VISIT MOD MDM: CPT

## 2024-01-08 PROCEDURE — 93005 ELECTROCARDIOGRAM TRACING: CPT

## 2024-01-08 PROCEDURE — 85025 COMPLETE CBC W/AUTO DIFF WBC: CPT | Performed by: STUDENT IN AN ORGANIZED HEALTH CARE EDUCATION/TRAINING PROGRAM

## 2024-01-08 PROCEDURE — 0241U HB NFCT DS VIR RESP RNA 4 TRGT: CPT

## 2024-01-08 PROCEDURE — 96365 THER/PROPH/DIAG IV INF INIT: CPT

## 2024-01-08 PROCEDURE — 80053 COMPREHEN METABOLIC PANEL: CPT | Performed by: STUDENT IN AN ORGANIZED HEALTH CARE EDUCATION/TRAINING PROGRAM

## 2024-01-08 PROCEDURE — 36415 COLL VENOUS BLD VENIPUNCTURE: CPT | Performed by: STUDENT IN AN ORGANIZED HEALTH CARE EDUCATION/TRAINING PROGRAM

## 2024-01-08 PROCEDURE — 82330 ASSAY OF CALCIUM: CPT | Performed by: STUDENT IN AN ORGANIZED HEALTH CARE EDUCATION/TRAINING PROGRAM

## 2024-01-08 PROCEDURE — 83735 ASSAY OF MAGNESIUM: CPT | Performed by: STUDENT IN AN ORGANIZED HEALTH CARE EDUCATION/TRAINING PROGRAM

## 2024-01-08 RX ORDER — CALCIUM GLUCONATE 20 MG/ML
2 INJECTION, SOLUTION INTRAVENOUS ONCE
Status: COMPLETED | OUTPATIENT
Start: 2024-01-08 | End: 2024-01-08

## 2024-01-08 RX ADMIN — CALCIUM GLUCONATE 2 G: 20 INJECTION, SOLUTION INTRAVENOUS at 16:57

## 2024-01-08 NOTE — DISCHARGE INSTRUCTIONS
You were seen in the emergency department for tingling.  You are found to have hypocalcemia.  This was repleted.  As discussed please follow-up with endocrinology.  Return to the emergency department for any new or concerning symptoms.

## 2024-01-09 ENCOUNTER — APPOINTMENT (OUTPATIENT)
Dept: LAB | Facility: HOSPITAL | Age: 49
End: 2024-01-09
Attending: INTERNAL MEDICINE
Payer: COMMERCIAL

## 2024-01-09 ENCOUNTER — HOSPITAL ENCOUNTER (EMERGENCY)
Facility: HOSPITAL | Age: 49
Discharge: HOME | End: 2024-01-09
Attending: EMERGENCY MEDICINE
Payer: COMMERCIAL

## 2024-01-09 VITALS
RESPIRATION RATE: 18 BRPM | OXYGEN SATURATION: 98 % | WEIGHT: 214 LBS | DIASTOLIC BLOOD PRESSURE: 78 MMHG | BODY MASS INDEX: 39.14 KG/M2 | TEMPERATURE: 97.8 F | SYSTOLIC BLOOD PRESSURE: 144 MMHG | HEART RATE: 104 BPM

## 2024-01-09 DIAGNOSIS — E87.6 HYPOPOTASSEMIA: ICD-10-CM

## 2024-01-09 DIAGNOSIS — E83.51 HYPOCALCEMIA: Primary | ICD-10-CM

## 2024-01-09 DIAGNOSIS — E83.51 HYPOCALCEMIA: ICD-10-CM

## 2024-01-09 LAB
ALBUMIN SERPL BCP-MCNC: 4.2 G/DL (ref 3.5–5)
ALP SERPL-CCNC: 60 U/L (ref 34–104)
ALT SERPL W P-5'-P-CCNC: 31 U/L (ref 7–52)
ANION GAP SERPL CALCULATED.3IONS-SCNC: 11 MMOL/L
AST SERPL W P-5'-P-CCNC: 28 U/L (ref 13–39)
ATRIAL RATE: 84 BPM
BASOPHILS # BLD AUTO: 0.03 THOUSANDS/ÂΜL (ref 0–0.1)
BASOPHILS NFR BLD AUTO: 0 % (ref 0–1)
BILIRUB SERPL-MCNC: 0.57 MG/DL (ref 0.2–1)
BUN SERPL-MCNC: 22 MG/DL (ref 5–25)
CA-I BLD-SCNC: 1.09 MMOL/L (ref 1.12–1.32)
CALCIUM SERPL-MCNC: 8.3 MG/DL (ref 8.4–10.2)
CALCIUM SERPL-MCNC: 9.6 MG/DL (ref 8.4–10.2)
CHLORIDE SERPL-SCNC: 102 MMOL/L (ref 96–108)
CO2 SERPL-SCNC: 23 MMOL/L (ref 21–32)
CREAT SERPL-MCNC: 1.22 MG/DL (ref 0.6–1.3)
EOSINOPHIL # BLD AUTO: 0.04 THOUSAND/ÂΜL (ref 0–0.61)
EOSINOPHIL NFR BLD AUTO: 1 % (ref 0–6)
ERYTHROCYTE [DISTWIDTH] IN BLOOD BY AUTOMATED COUNT: 17.5 % (ref 11.6–15.1)
GFR SERPL CREATININE-BSD FRML MDRD: 52 ML/MIN/1.73SQ M
GLUCOSE SERPL-MCNC: 130 MG/DL (ref 65–140)
HCT VFR BLD AUTO: 35.2 % (ref 34.8–46.1)
HGB BLD-MCNC: 12.4 G/DL (ref 11.5–15.4)
IMM GRANULOCYTES # BLD AUTO: 0.07 THOUSAND/UL (ref 0–0.2)
IMM GRANULOCYTES NFR BLD AUTO: 1 % (ref 0–2)
LYMPHOCYTES # BLD AUTO: 1.78 THOUSANDS/ÂΜL (ref 0.6–4.47)
LYMPHOCYTES NFR BLD AUTO: 22 % (ref 14–44)
MAGNESIUM SERPL-MCNC: 2 MG/DL (ref 1.9–2.7)
MCH RBC QN AUTO: 31.6 PG (ref 26.8–34.3)
MCHC RBC AUTO-ENTMCNC: 35.2 G/DL (ref 31.4–37.4)
MCV RBC AUTO: 90 FL (ref 82–98)
MONOCYTES # BLD AUTO: 0.52 THOUSAND/ÂΜL (ref 0.17–1.22)
MONOCYTES NFR BLD AUTO: 7 % (ref 4–12)
NEUTROPHILS # BLD AUTO: 5.5 THOUSANDS/ÂΜL (ref 1.85–7.62)
NEUTS SEG NFR BLD AUTO: 69 % (ref 43–75)
NRBC BLD AUTO-RTO: 0 /100 WBCS
P AXIS: 43 DEGREES
PLATELET # BLD AUTO: 169 THOUSANDS/UL (ref 149–390)
PMV BLD AUTO: 9.8 FL (ref 8.9–12.7)
POTASSIUM SERPL-SCNC: 4.1 MMOL/L (ref 3.5–5.3)
PR INTERVAL: 162 MS
PROT SERPL-MCNC: 7.1 G/DL (ref 6.4–8.4)
QRS AXIS: 14 DEGREES
QRSD INTERVAL: 74 MS
QT INTERVAL: 430 MS
QTC INTERVAL: 508 MS
RBC # BLD AUTO: 3.93 MILLION/UL (ref 3.81–5.12)
SODIUM SERPL-SCNC: 136 MMOL/L (ref 135–147)
T WAVE AXIS: 57 DEGREES
VENTRICULAR RATE: 84 BPM
WBC # BLD AUTO: 7.94 THOUSAND/UL (ref 4.31–10.16)

## 2024-01-09 PROCEDURE — 93005 ELECTROCARDIOGRAM TRACING: CPT

## 2024-01-09 PROCEDURE — 96366 THER/PROPH/DIAG IV INF ADDON: CPT

## 2024-01-09 PROCEDURE — 85025 COMPLETE CBC W/AUTO DIFF WBC: CPT | Performed by: EMERGENCY MEDICINE

## 2024-01-09 PROCEDURE — 96365 THER/PROPH/DIAG IV INF INIT: CPT

## 2024-01-09 PROCEDURE — 99284 EMERGENCY DEPT VISIT MOD MDM: CPT

## 2024-01-09 PROCEDURE — 80053 COMPREHEN METABOLIC PANEL: CPT | Performed by: EMERGENCY MEDICINE

## 2024-01-09 PROCEDURE — 99284 EMERGENCY DEPT VISIT MOD MDM: CPT | Performed by: EMERGENCY MEDICINE

## 2024-01-09 PROCEDURE — 36415 COLL VENOUS BLD VENIPUNCTURE: CPT

## 2024-01-09 PROCEDURE — 83735 ASSAY OF MAGNESIUM: CPT | Performed by: EMERGENCY MEDICINE

## 2024-01-09 PROCEDURE — 82330 ASSAY OF CALCIUM: CPT | Performed by: EMERGENCY MEDICINE

## 2024-01-09 PROCEDURE — 82310 ASSAY OF CALCIUM: CPT

## 2024-01-09 RX ORDER — CALCIUM GLUCONATE 20 MG/ML
1 INJECTION, SOLUTION INTRAVENOUS ONCE
Status: COMPLETED | OUTPATIENT
Start: 2024-01-09 | End: 2024-01-09

## 2024-01-09 RX ADMIN — CALCIUM GLUCONATE 1 G: 20 INJECTION, SOLUTION INTRAVENOUS at 19:55

## 2024-01-09 RX ADMIN — SODIUM CHLORIDE 1000 ML: 0.9 INJECTION, SOLUTION INTRAVENOUS at 19:53

## 2024-01-09 RX ADMIN — CALCIUM GLUCONATE 1 G: 20 INJECTION, SOLUTION INTRAVENOUS at 19:19
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
## 2024-01-10 ENCOUNTER — NURSE TRIAGE (OUTPATIENT)
Dept: OTHER | Facility: OTHER | Age: 49
End: 2024-01-10

## 2024-01-10 ENCOUNTER — APPOINTMENT (OUTPATIENT)
Dept: LAB | Facility: HOSPITAL | Age: 49
End: 2024-01-10
Payer: COMMERCIAL

## 2024-01-10 ENCOUNTER — HOSPITAL ENCOUNTER (EMERGENCY)
Facility: HOSPITAL | Age: 49
Discharge: HOME | End: 2024-01-10
Attending: EMERGENCY MEDICINE
Payer: COMMERCIAL

## 2024-01-10 VITALS
RESPIRATION RATE: 20 BRPM | HEART RATE: 104 BPM | OXYGEN SATURATION: 99 % | DIASTOLIC BLOOD PRESSURE: 82 MMHG | SYSTOLIC BLOOD PRESSURE: 112 MMHG | TEMPERATURE: 97.2 F

## 2024-01-10 DIAGNOSIS — E87.6 HYPOPOTASSEMIA: ICD-10-CM

## 2024-01-10 DIAGNOSIS — E83.51 HYPOCALCEMIA: Primary | ICD-10-CM

## 2024-01-10 DIAGNOSIS — E83.51 HYPOCALCEMIA: ICD-10-CM

## 2024-01-10 LAB
CALCIUM SERPL-MCNC: 8.1 MG/DL (ref 8.4–10.2)
QRS AXIS: 26 DEGREES
QRSD INTERVAL: 76 MS
QT INTERVAL: 452 MS
QTC INTERVAL: 628 MS
T WAVE AXIS: 56 DEGREES
VENTRICULAR RATE: 116 BPM

## 2024-01-10 PROCEDURE — 99283 EMERGENCY DEPT VISIT LOW MDM: CPT

## 2024-01-10 PROCEDURE — 96365 THER/PROPH/DIAG IV INF INIT: CPT

## 2024-01-10 PROCEDURE — 99284 EMERGENCY DEPT VISIT MOD MDM: CPT | Performed by: EMERGENCY MEDICINE

## 2024-01-10 PROCEDURE — 36415 COLL VENOUS BLD VENIPUNCTURE: CPT

## 2024-01-10 PROCEDURE — 82310 ASSAY OF CALCIUM: CPT

## 2024-01-10 RX ORDER — FLUTICASONE PROPIONATE 50 MCG
1 SPRAY, SUSPENSION (ML) NASAL DAILY
Status: DISCONTINUED | OUTPATIENT
Start: 2024-01-11 | End: 2024-01-11 | Stop reason: HOSPADM

## 2024-01-10 RX ORDER — CALCIUM GLUCONATE 20 MG/ML
2 INJECTION, SOLUTION INTRAVENOUS ONCE
Status: COMPLETED | OUTPATIENT
Start: 2024-01-10 | End: 2024-01-10

## 2024-01-10 RX ORDER — FLUTICASONE PROPIONATE 50 MCG
1 SPRAY, SUSPENSION (ML) NASAL DAILY
Qty: 16 G | Refills: 0 | Status: SHIPPED | OUTPATIENT
Start: 2024-01-10

## 2024-01-10 RX ADMIN — CALCIUM GLUCONATE 2 G: 20 INJECTION, SOLUTION INTRAVENOUS at 21:13
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
## 2024-01-11 ENCOUNTER — HOSPITAL ENCOUNTER (OUTPATIENT)
Dept: NON INVASIVE DIAGNOSTICS | Facility: HOSPITAL | Age: 49
Discharge: HOME | End: 2024-01-11
Attending: INTERNAL MEDICINE

## 2024-01-11 ENCOUNTER — APPOINTMENT (OUTPATIENT)
Dept: LAB | Facility: HOSPITAL | Age: 49
End: 2024-01-11
Attending: INTERNAL MEDICINE
Payer: COMMERCIAL

## 2024-01-11 DIAGNOSIS — F41.0 PANIC DISORDER: ICD-10-CM

## 2024-01-11 DIAGNOSIS — E83.51 HYPOCALCEMIA: ICD-10-CM

## 2024-01-11 DIAGNOSIS — E87.6 HYPOPOTASSEMIA: ICD-10-CM

## 2024-01-11 LAB — CALCIUM SERPL-MCNC: 9.6 MG/DL (ref 8.4–10.2)

## 2024-01-11 PROCEDURE — 36415 COLL VENOUS BLD VENIPUNCTURE: CPT

## 2024-01-11 PROCEDURE — 82310 ASSAY OF CALCIUM: CPT

## 2024-01-11 RX ORDER — ALPRAZOLAM 2 MG/1
2 TABLET, EXTENDED RELEASE ORAL
Qty: 60 TABLET | Refills: 0 | Status: CANCELLED | OUTPATIENT
Start: 2024-01-11

## 2024-01-11 NOTE — TELEPHONE ENCOUNTER
Please call the patient and ask her how she is feeling now  Based on chart review, it appears that her low calcium levels started occurring since she fell ill/URI.  The situation where she will need to increase her calcium intake.  Recommend increasing calcium to 5 times daily orally  Please confirm what dose of calcitriol she is currently taking

## 2024-01-11 NOTE — TELEPHONE ENCOUNTER
Pt.took 6 calcium pill took 2 tums which will 2000 mg and 2 cups of milk then she went to the hospital last night  they gave her 2 grams of calcium again . She is feeling so so not that great also Start taking 2 Calcitriol every time like tid with her normal regimen then she went for lab work now her number  is 9.6 . Thanks

## 2024-01-11 NOTE — TELEPHONE ENCOUNTER
Medication Refill Request     Name of Medication Xanax XR 2 mg  Dose/Frequency 1bid before breakfast and lunch  Quantity 60  Verified pharmacy   [x]  Verified ordering Provider   [x]  Does patient have enough for the next 3 days? Yes [] No [x]  Does patient have a follow-up appointment scheduled? Yes [x] No []   If so when is appointment: 2/6/2024
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
## 2024-01-11 NOTE — TELEPHONE ENCOUNTER
" called in about lab results. CA is 8.1 resulted today at 2pm. Ordering provider is Dr. Pabon. pt has chest pain, numbness and tingling.  ongoing concern. Went to  ED  for calcium the last 4 days.       TC advised to be seen at ED  for symptoms. Pt can also call tomorrow to follow up with home management and for now take calcium carbonate 4 times a day.      Called pt states she is already taking calcium 4 times a day and drinking milk. Advised ED due to symptoms and advised someone to drive her . If no one is available to contact ambulance. Pt verbalized understanding     Pt would also like to know if infusion center or other options would be available to calcium management than going to ED everyday. Advised I will make office aware and they will contact tomorrow.   Answer Assessment - Initial Assessment Questions  1. REASON FOR CALL or QUESTION: \"What is your reason for calling today?\" or \"How can I best help you?\" or \"What question do you have that I can help answer?\"      Ca level low    Protocols used: Information Only Call-ADULT-    "

## 2024-01-11 NOTE — TELEPHONE ENCOUNTER
"Regarding: Lab results  ----- Message from Jen Rosenberg sent at 1/10/2024  6:03 PM EST -----  \" I had blood work done today for my calcium. My calcium is at 8.1 and I need to speak to the Dr tonight\"    "

## 2024-01-12 ENCOUNTER — OFFICE VISIT (OUTPATIENT)
Dept: INTERNAL MEDICINE CLINIC | Facility: CLINIC | Age: 49
End: 2024-01-12
Payer: COMMERCIAL

## 2024-01-12 VITALS
DIASTOLIC BLOOD PRESSURE: 82 MMHG | HEIGHT: 62 IN | HEART RATE: 106 BPM | BODY MASS INDEX: 38.64 KG/M2 | OXYGEN SATURATION: 98 % | WEIGHT: 210 LBS | SYSTOLIC BLOOD PRESSURE: 118 MMHG | TEMPERATURE: 97.8 F

## 2024-01-12 DIAGNOSIS — E89.0 POSTOPERATIVE HYPOTHYROIDISM: Chronic | ICD-10-CM

## 2024-01-12 DIAGNOSIS — E83.51 HYPOCALCEMIA: ICD-10-CM

## 2024-01-12 DIAGNOSIS — J45.40 MODERATE PERSISTENT ASTHMA, UNSPECIFIED WHETHER COMPLICATED: ICD-10-CM

## 2024-01-12 DIAGNOSIS — E78.1 HYPERTRIGLYCERIDEMIA: ICD-10-CM

## 2024-01-12 DIAGNOSIS — R05.1 ACUTE COUGH: ICD-10-CM

## 2024-01-12 DIAGNOSIS — E11.9 TYPE 2 DIABETES MELLITUS WITHOUT COMPLICATION, WITHOUT LONG-TERM CURRENT USE OF INSULIN (HCC): ICD-10-CM

## 2024-01-12 DIAGNOSIS — N18.31 STAGE 3A CHRONIC KIDNEY DISEASE (HCC): ICD-10-CM

## 2024-01-12 DIAGNOSIS — Z77.120 EXPOSURE TO MOLD: ICD-10-CM

## 2024-01-12 DIAGNOSIS — J45.909 ACUTE ASTHMATIC BRONCHITIS: Primary | ICD-10-CM

## 2024-01-12 DIAGNOSIS — U07.1 COVID-19: ICD-10-CM

## 2024-01-12 DIAGNOSIS — K76.0 FATTY LIVER DISEASE, NONALCOHOLIC: ICD-10-CM

## 2024-01-12 LAB
SARS-COV-2 AG UPPER RESP QL IA: NEGATIVE
VALID CONTROL: NORMAL

## 2024-01-12 PROCEDURE — 87811 SARS-COV-2 COVID19 W/OPTIC: CPT | Performed by: INTERNAL MEDICINE

## 2024-01-12 PROCEDURE — 99214 OFFICE O/P EST MOD 30 MIN: CPT | Performed by: INTERNAL MEDICINE

## 2024-01-12 RX ORDER — FENOFIBRATE 160 MG/1
160 TABLET ORAL DAILY
Qty: 30 TABLET | Refills: 1 | Status: SHIPPED | OUTPATIENT
Start: 2024-01-12

## 2024-01-12 RX ORDER — AZITHROMYCIN 250 MG/1
TABLET, FILM COATED ORAL DAILY
Qty: 6 TABLET | Refills: 0 | Status: SHIPPED | OUTPATIENT
Start: 2024-01-12 | End: 2024-01-17

## 2024-01-12 RX ORDER — ALBUTEROL SULFATE 90 UG/1
1 AEROSOL, METERED RESPIRATORY (INHALATION) EVERY 6 HOURS PRN
Qty: 18 G | Refills: 1 | Status: SHIPPED | OUTPATIENT
Start: 2024-01-12

## 2024-01-12 NOTE — ASSESSMENT & PLAN NOTE
Lab Results   Component Value Date    CHOLESTEROL 454 (H) 01/03/2024    CHOLESTEROL 449 (H) 09/15/2023    CHOLESTEROL 279 (H) 02/01/2016     Lab Results   Component Value Date    HDL 40 (L) 01/03/2024    HDL 31 (L) 09/15/2023    HDL 39 (L) 02/01/2016     Lab Results   Component Value Date    TRIG 469 (H) 01/03/2024    TRIG 745 (H) 09/15/2023    TRIG 444 (H) 02/01/2016     Lab Results   Component Value Date    NONHDLC 418 09/15/2023     Lab Results   Component Value Date    ALT 31 01/09/2024     Lab Results   Component Value Date    LDLCALC  01/03/2024      Comment:      Calculated LDL invalid, triglycerides >400 mg/dl     Continue fenofibric acid as well as Vascepa as well as low-cholesterol diet.  Appropriate prescription is being sent to the pharmacy.

## 2024-01-12 NOTE — ASSESSMENT & PLAN NOTE
Recent extensive ER visit for the hypocalcemia were reviewed.    Calcium is finally back to normal.

## 2024-01-12 NOTE — ASSESSMENT & PLAN NOTE
Lab Results   Component Value Date    HGBA1C 5.5 09/14/2023   Globin A1c excellent.  Continue metformin per endocrinologist

## 2024-01-12 NOTE — ASSESSMENT & PLAN NOTE
Lab Results   Component Value Date    EGFR 52 01/09/2024    EGFR 59 01/08/2024    EGFR 71 01/07/2024    CREATININE 1.22 01/09/2024    CREATININE 1.10 01/08/2024    CREATININE 0.95 01/07/2024   Baseline renal functions.

## 2024-01-12 NOTE — ASSESSMENT & PLAN NOTE
Differential diagnosis discussed.    No clinical evidence of pneumonia.    History of mold exposure,.    Recommend Zithromax 250 mg 2 tablet today and 1 a day for 4 more days.    Saline nasal spray 2-3 times a day.    Flonase nasal spray at bedtime.    And albuterol 2 puff 4 times a day.    Chest x-ray today or tomorrow.    Follow-up with PCP Dr. Gupta on Monday or Tuesday.    If you get any worse with increased shortness of breath wheezing worsening of the cough fever chills headaches constitutional symptoms or getting worse chest pain go to the emergency room.    You should consider seeing a pulmonologist for asthma evaluation as well as your mold exposure to evaluation.    I do not believe you have invasive mold infection at this time is on the clinical examination

## 2024-01-12 NOTE — ASSESSMENT & PLAN NOTE
Lab Results   Component Value Date    ALT 31 01/09/2024    AST 28 01/09/2024    ALKPHOS 60 01/09/2024
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
## 2024-01-12 NOTE — PATIENT INSTRUCTIONS
Recommend Zithromax 250 mg 2 tablet today and 1 a day for 4 more days.    Saline nasal spray 2-3 times a day.    Flonase nasal spray at bedtime.    Mucinex over-the-counter 1 tablet twice a day as needed for chest congestion/cough    And albuterol 2 puff 4 times a day.    Chest x-ray today or tomorrow.    Follow-up with PCP Dr. Gupta on Monday or Tuesday.    If you get any worse with increased shortness of breath wheezing worsening of the cough fever chills headaches constitutional symptoms or getting worse chest pain go to the emergency room.    You should consider seeing a pulmonologist for asthma evaluation as well as your mold exposure to evaluation.    Minimize mold exposure.  Notify your landlord.  Use the mask.    Set up an appointment with pulmonologist.    Follow with Consultants as per their and our suggestion    Follow up in approximately three days or as needed earlier    Follow all instructions as advised and discussed.    Take your medications as prescribed.    Call the office immediately if you experience any side effects.    Ask questions if you do not understand.    Keep your scheduled appointment as advised or come sooner if necessary or in doubt.    Best time to call for non-urgent matter or questions on weekdays is between 9am and 12 noon.    See physician for any new symptoms or worsening of current symptoms.    Urgent or emergent situations call 911 and report to nearest emergency room.    I spent  time taking care of this patient including clinical care, conseling, collaboration, chart, lab and consultant's follow up note,images report, documentation, pre visit  review as appropriate.

## 2024-01-12 NOTE — PROGRESS NOTES
Tobacco Cessation Counseling: Tobacco cessation counseling was provided. The patient is sincerely urged to quit consumption of tobacco. She is not ready to quit tobacco.

## 2024-01-12 NOTE — ASSESSMENT & PLAN NOTE
Exposure to mold for at least for 6 months as outlined in HPI as well as previous history of exposure.    No invasive lung infection based upon clinical examination.    Recommend  If you get any worse with increased shortness of breath wheezing worsening of the cough fever chills headaches constitutional symptoms or getting worse chest pain go to the emergency room.    You should consider seeing a pulmonologist for asthma evaluation as well as your mold exposure to evaluation.    Minimize mold exposure.  Notify your landlord.  Use the mask.    Set up an appointment with pulmonologist.    Recommend chest xray

## 2024-01-12 NOTE — ASSESSMENT & PLAN NOTE
Lab Results   Component Value Date    [id number] 0.902 01/05/2024     clinically and chemically stable.  Continue levothyroxine patient is being managed by endocrinologist

## 2024-01-12 NOTE — TELEPHONE ENCOUNTER
Please advise patient that she should not take additional 3 pills (total of 5 pills of calcitriol) as she will be at a risk of developing hypercalcemia

## 2024-01-13 ENCOUNTER — HOSPITAL ENCOUNTER (EMERGENCY)
Facility: HOSPITAL | Age: 49
Discharge: HOME | End: 2024-01-13
Attending: EMERGENCY MEDICINE
Payer: COMMERCIAL

## 2024-01-13 ENCOUNTER — APPOINTMENT (OUTPATIENT)
Dept: LAB | Facility: HOSPITAL | Age: 49
End: 2024-01-13
Payer: COMMERCIAL

## 2024-01-13 VITALS
OXYGEN SATURATION: 97 % | HEART RATE: 75 BPM | RESPIRATION RATE: 16 BRPM | SYSTOLIC BLOOD PRESSURE: 143 MMHG | TEMPERATURE: 98 F | DIASTOLIC BLOOD PRESSURE: 72 MMHG

## 2024-01-13 DIAGNOSIS — E87.6 HYPOPOTASSEMIA: ICD-10-CM

## 2024-01-13 DIAGNOSIS — E83.51 HYPOCALCEMIA: Primary | ICD-10-CM

## 2024-01-13 DIAGNOSIS — E83.51 HYPOCALCEMIA: ICD-10-CM

## 2024-01-13 LAB
ALBUMIN SERPL BCP-MCNC: 4.1 G/DL (ref 3.5–5)
ALP SERPL-CCNC: 59 U/L (ref 34–104)
ALT SERPL W P-5'-P-CCNC: 27 U/L (ref 7–52)
ANION GAP SERPL CALCULATED.3IONS-SCNC: 10 MMOL/L
AST SERPL W P-5'-P-CCNC: 19 U/L (ref 13–39)
BILIRUB SERPL-MCNC: 0.36 MG/DL (ref 0.2–1)
BUN SERPL-MCNC: 27 MG/DL (ref 5–25)
CA-I BLD-SCNC: 0.83 MMOL/L (ref 1.12–1.32)
CALCIUM SERPL-MCNC: 10.7 MG/DL (ref 8.4–10.2)
CALCIUM SERPL-MCNC: 9.2 MG/DL (ref 8.4–10.2)
CHLORIDE SERPL-SCNC: 105 MMOL/L (ref 96–108)
CO2 SERPL-SCNC: 24 MMOL/L (ref 21–32)
CREAT SERPL-MCNC: 1.11 MG/DL (ref 0.6–1.3)
GFR SERPL CREATININE-BSD FRML MDRD: 59 ML/MIN/1.73SQ M
GLUCOSE SERPL-MCNC: 137 MG/DL (ref 65–140)
MAGNESIUM SERPL-MCNC: 2 MG/DL (ref 1.9–2.7)
PHOSPHATE SERPL-MCNC: 2.8 MG/DL (ref 2.7–4.5)
POTASSIUM SERPL-SCNC: 4.6 MMOL/L (ref 3.5–5.3)
PROT SERPL-MCNC: 7.1 G/DL (ref 6.4–8.4)
SODIUM SERPL-SCNC: 139 MMOL/L (ref 135–147)

## 2024-01-13 PROCEDURE — 96365 THER/PROPH/DIAG IV INF INIT: CPT

## 2024-01-13 PROCEDURE — 83735 ASSAY OF MAGNESIUM: CPT | Performed by: EMERGENCY MEDICINE

## 2024-01-13 PROCEDURE — 99284 EMERGENCY DEPT VISIT MOD MDM: CPT | Performed by: EMERGENCY MEDICINE

## 2024-01-13 PROCEDURE — 96366 THER/PROPH/DIAG IV INF ADDON: CPT

## 2024-01-13 PROCEDURE — 82310 ASSAY OF CALCIUM: CPT

## 2024-01-13 PROCEDURE — 80053 COMPREHEN METABOLIC PANEL: CPT | Performed by: EMERGENCY MEDICINE

## 2024-01-13 PROCEDURE — 99283 EMERGENCY DEPT VISIT LOW MDM: CPT

## 2024-01-13 PROCEDURE — 84100 ASSAY OF PHOSPHORUS: CPT | Performed by: EMERGENCY MEDICINE

## 2024-01-13 PROCEDURE — 82330 ASSAY OF CALCIUM: CPT | Performed by: EMERGENCY MEDICINE

## 2024-01-13 PROCEDURE — 36415 COLL VENOUS BLD VENIPUNCTURE: CPT | Performed by: EMERGENCY MEDICINE

## 2024-01-13 RX ORDER — CALCIUM GLUCONATE 20 MG/ML
2 INJECTION, SOLUTION INTRAVENOUS ONCE
Status: COMPLETED | OUTPATIENT
Start: 2024-01-13 | End: 2024-01-13

## 2024-01-13 RX ADMIN — CALCIUM GLUCONATE 2 G: 20 INJECTION, SOLUTION INTRAVENOUS at 06:33
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
## 2024-01-15 ENCOUNTER — APPOINTMENT (OUTPATIENT)
Dept: LAB | Facility: HOSPITAL | Age: 49
End: 2024-01-15
Attending: INTERNAL MEDICINE
Payer: COMMERCIAL

## 2024-01-15 ENCOUNTER — TELEPHONE (OUTPATIENT)
Dept: ENDOCRINOLOGY | Facility: CLINIC | Age: 49
End: 2024-01-15

## 2024-01-15 DIAGNOSIS — E83.51 HYPOCALCEMIA: ICD-10-CM

## 2024-01-15 DIAGNOSIS — E83.51 HYPOCALCEMIA: Primary | ICD-10-CM

## 2024-01-15 LAB
CA-I BLD-SCNC: 1.04 MMOL/L (ref 1.12–1.32)
CALCIUM SERPL-MCNC: 8.4 MG/DL (ref 8.4–10.2)

## 2024-01-15 PROCEDURE — 82310 ASSAY OF CALCIUM: CPT

## 2024-01-15 PROCEDURE — 36415 COLL VENOUS BLD VENIPUNCTURE: CPT

## 2024-01-15 PROCEDURE — 82330 ASSAY OF CALCIUM: CPT

## 2024-01-16 ENCOUNTER — HOSPITAL ENCOUNTER (EMERGENCY)
Facility: HOSPITAL | Age: 49
Discharge: HOME | End: 2024-01-16
Attending: EMERGENCY MEDICINE | Admitting: EMERGENCY MEDICINE
Payer: COMMERCIAL

## 2024-01-16 VITALS
SYSTOLIC BLOOD PRESSURE: 175 MMHG | HEART RATE: 112 BPM | DIASTOLIC BLOOD PRESSURE: 98 MMHG | WEIGHT: 210 LBS | BODY MASS INDEX: 38.64 KG/M2 | TEMPERATURE: 97.8 F | RESPIRATION RATE: 18 BRPM | HEIGHT: 62 IN | OXYGEN SATURATION: 98 %

## 2024-01-16 DIAGNOSIS — R20.2 PARESTHESIAS: Primary | ICD-10-CM

## 2024-01-16 LAB
ALBUMIN SERPL BCP-MCNC: 4.2 G/DL (ref 3.5–5)
ALP SERPL-CCNC: 59 U/L (ref 34–104)
ALT SERPL W P-5'-P-CCNC: 25 U/L (ref 7–52)
ANION GAP SERPL CALCULATED.3IONS-SCNC: 10 MMOL/L
AST SERPL W P-5'-P-CCNC: 12 U/L (ref 13–39)
BILIRUB SERPL-MCNC: 0.43 MG/DL (ref 0.2–1)
BUN SERPL-MCNC: 17 MG/DL (ref 5–25)
CA-I BLD-SCNC: 1.32 MMOL/L (ref 1.12–1.32)
CALCIUM SERPL-MCNC: 11.5 MG/DL (ref 8.4–10.2)
CHLORIDE SERPL-SCNC: 102 MMOL/L (ref 96–108)
CO2 SERPL-SCNC: 26 MMOL/L (ref 21–32)
CREAT SERPL-MCNC: 1.16 MG/DL (ref 0.6–1.3)
GFR SERPL CREATININE-BSD FRML MDRD: 56 ML/MIN/1.73SQ M
GLUCOSE SERPL-MCNC: 110 MG/DL (ref 65–140)
MAGNESIUM SERPL-MCNC: 2 MG/DL (ref 1.9–2.7)
PHOSPHATE SERPL-MCNC: 4.2 MG/DL (ref 2.7–4.5)
POTASSIUM SERPL-SCNC: 4.1 MMOL/L (ref 3.5–5.3)
PROT SERPL-MCNC: 7.1 G/DL (ref 6.4–8.4)
SODIUM SERPL-SCNC: 138 MMOL/L (ref 135–147)

## 2024-01-16 PROCEDURE — 99284 EMERGENCY DEPT VISIT MOD MDM: CPT | Performed by: EMERGENCY MEDICINE

## 2024-01-16 PROCEDURE — 80053 COMPREHEN METABOLIC PANEL: CPT | Performed by: EMERGENCY MEDICINE

## 2024-01-16 PROCEDURE — 99284 EMERGENCY DEPT VISIT MOD MDM: CPT

## 2024-01-16 PROCEDURE — 83735 ASSAY OF MAGNESIUM: CPT | Performed by: EMERGENCY MEDICINE

## 2024-01-16 PROCEDURE — 84100 ASSAY OF PHOSPHORUS: CPT | Performed by: EMERGENCY MEDICINE

## 2024-01-16 PROCEDURE — 36415 COLL VENOUS BLD VENIPUNCTURE: CPT | Performed by: EMERGENCY MEDICINE

## 2024-01-16 PROCEDURE — 96374 THER/PROPH/DIAG INJ IV PUSH: CPT

## 2024-01-16 PROCEDURE — 82330 ASSAY OF CALCIUM: CPT | Performed by: EMERGENCY MEDICINE

## 2024-01-16 RX ORDER — METOCLOPRAMIDE HYDROCHLORIDE 5 MG/ML
10 INJECTION INTRAMUSCULAR; INTRAVENOUS ONCE
Status: COMPLETED | OUTPATIENT
Start: 2024-01-16 | End: 2024-01-16

## 2024-01-16 RX ADMIN — SODIUM CHLORIDE 1000 ML: 0.9 INJECTION, SOLUTION INTRAVENOUS at 02:01

## 2024-01-16 RX ADMIN — METOCLOPRAMIDE 10 MG: 5 INJECTION, SOLUTION INTRAMUSCULAR; INTRAVENOUS at 02:01
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
## 2024-01-16 NOTE — DISCHARGE INSTRUCTIONS
Follow-up with your primary care doctor as well as your endocrinologist.    Return to ER if you develop any worsening symptoms.

## 2024-01-17 ENCOUNTER — APPOINTMENT (OUTPATIENT)
Dept: LAB | Facility: HOSPITAL | Age: 49
End: 2024-01-17
Payer: COMMERCIAL

## 2024-01-17 DIAGNOSIS — E83.51 HYPOCALCEMIA: ICD-10-CM

## 2024-01-17 LAB
CA-I BLD-SCNC: 1.18 MMOL/L (ref 1.12–1.32)
CALCIUM SERPL-MCNC: 9.7 MG/DL (ref 8.4–10.2)

## 2024-01-17 PROCEDURE — 82330 ASSAY OF CALCIUM: CPT

## 2024-01-17 PROCEDURE — 36415 COLL VENOUS BLD VENIPUNCTURE: CPT

## 2024-01-19 ENCOUNTER — APPOINTMENT (OUTPATIENT)
Dept: LAB | Facility: HOSPITAL | Age: 49
End: 2024-01-19
Attending: INTERNAL MEDICINE
Payer: COMMERCIAL

## 2024-01-19 DIAGNOSIS — E83.51 HYPOCALCEMIA: ICD-10-CM

## 2024-01-19 LAB
CA-I BLD-SCNC: 1.14 MMOL/L (ref 1.12–1.32)
CALCIUM SERPL-MCNC: 9.6 MG/DL (ref 8.4–10.2)

## 2024-01-19 PROCEDURE — 36415 COLL VENOUS BLD VENIPUNCTURE: CPT

## 2024-01-19 PROCEDURE — 82330 ASSAY OF CALCIUM: CPT

## 2024-01-20 ENCOUNTER — HOSPITAL ENCOUNTER (EMERGENCY)
Facility: HOSPITAL | Age: 49
Discharge: HOME | End: 2024-01-20
Attending: EMERGENCY MEDICINE
Payer: COMMERCIAL

## 2024-01-20 ENCOUNTER — APPOINTMENT (EMERGENCY)
Dept: RADIOLOGY | Facility: HOSPITAL | Age: 49
End: 2024-01-20
Payer: COMMERCIAL

## 2024-01-20 VITALS
OXYGEN SATURATION: 99 % | TEMPERATURE: 97.8 F | HEART RATE: 99 BPM | RESPIRATION RATE: 18 BRPM | SYSTOLIC BLOOD PRESSURE: 156 MMHG | DIASTOLIC BLOOD PRESSURE: 78 MMHG

## 2024-01-20 DIAGNOSIS — M25.521 RIGHT ELBOW PAIN: Primary | ICD-10-CM

## 2024-01-20 PROCEDURE — 99284 EMERGENCY DEPT VISIT MOD MDM: CPT | Performed by: EMERGENCY MEDICINE

## 2024-01-20 PROCEDURE — 73080 X-RAY EXAM OF ELBOW: CPT

## 2024-01-20 PROCEDURE — 99283 EMERGENCY DEPT VISIT LOW MDM: CPT
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
## 2024-01-20 NOTE — DISCHARGE INSTRUCTIONS
Your x-ray here was unremarkable.    Please continue Tylenol for your symptoms.    Follow-up with your primary care doctor.

## 2024-01-23 ENCOUNTER — APPOINTMENT (OUTPATIENT)
Dept: LAB | Facility: HOSPITAL | Age: 49
End: 2024-01-23
Attending: INTERNAL MEDICINE
Payer: COMMERCIAL

## 2024-01-23 DIAGNOSIS — E83.51 HYPOCALCEMIA: ICD-10-CM

## 2024-01-23 LAB
CA-I BLD-SCNC: 1.16 MMOL/L (ref 1.12–1.32)
CALCIUM SERPL-MCNC: 9.3 MG/DL (ref 8.4–10.2)

## 2024-01-23 PROCEDURE — 36415 COLL VENOUS BLD VENIPUNCTURE: CPT

## 2024-01-23 PROCEDURE — 82330 ASSAY OF CALCIUM: CPT

## 2024-01-24 ENCOUNTER — TELEMEDICINE (OUTPATIENT)
Dept: BEHAVIORAL/MENTAL HEALTH CLINIC | Facility: CLINIC | Age: 49
End: 2024-01-24
Payer: COMMERCIAL

## 2024-01-24 DIAGNOSIS — F33.1 MODERATE EPISODE OF RECURRENT MAJOR DEPRESSIVE DISORDER (HCC): Primary | ICD-10-CM

## 2024-01-24 DIAGNOSIS — F41.1 GENERALIZED ANXIETY DISORDER WITH PANIC ATTACKS: ICD-10-CM

## 2024-01-24 DIAGNOSIS — F17.210 NICOTINE DEPENDENCE, CIGARETTES, UNCOMPLICATED: ICD-10-CM

## 2024-01-24 DIAGNOSIS — F41.0 GENERALIZED ANXIETY DISORDER WITH PANIC ATTACKS: ICD-10-CM

## 2024-01-24 PROCEDURE — 90834 PSYTX W PT 45 MINUTES: CPT | Performed by: SOCIAL WORKER

## 2024-01-25 ENCOUNTER — APPOINTMENT (OUTPATIENT)
Dept: LAB | Facility: HOSPITAL | Age: 49
End: 2024-01-25
Payer: COMMERCIAL

## 2024-01-25 DIAGNOSIS — E83.51 HYPOCALCEMIA: ICD-10-CM

## 2024-01-25 LAB
CA-I BLD-SCNC: 1.14 MMOL/L (ref 1.12–1.32)
CALCIUM SERPL-MCNC: 10.2 MG/DL (ref 8.4–10.2)

## 2024-01-25 PROCEDURE — 36415 COLL VENOUS BLD VENIPUNCTURE: CPT

## 2024-01-25 PROCEDURE — 82330 ASSAY OF CALCIUM: CPT
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
## 2024-01-27 ENCOUNTER — HOSPITAL ENCOUNTER (EMERGENCY)
Facility: HOSPITAL | Age: 49
Discharge: HOME | End: 2024-01-27
Attending: EMERGENCY MEDICINE | Admitting: EMERGENCY MEDICINE
Payer: COMMERCIAL

## 2024-01-27 VITALS
HEIGHT: 62 IN | SYSTOLIC BLOOD PRESSURE: 130 MMHG | WEIGHT: 214 LBS | OXYGEN SATURATION: 95 % | TEMPERATURE: 97.9 F | DIASTOLIC BLOOD PRESSURE: 70 MMHG | RESPIRATION RATE: 20 BRPM | BODY MASS INDEX: 39.38 KG/M2 | HEART RATE: 93 BPM

## 2024-01-27 DIAGNOSIS — R20.2 NUMBNESS AND TINGLING: Primary | ICD-10-CM

## 2024-01-27 DIAGNOSIS — R20.0 NUMBNESS AND TINGLING: Primary | ICD-10-CM

## 2024-01-27 DIAGNOSIS — M25.512 ACUTE PAIN OF LEFT SHOULDER: ICD-10-CM

## 2024-01-27 DIAGNOSIS — E83.51 HYPOCALCEMIA: ICD-10-CM

## 2024-01-27 LAB
ANION GAP SERPL CALCULATED.3IONS-SCNC: 9 MMOL/L
BASOPHILS # BLD AUTO: 0.06 THOUSANDS/ÂΜL (ref 0–0.1)
BASOPHILS NFR BLD AUTO: 1 % (ref 0–1)
BUN SERPL-MCNC: 22 MG/DL (ref 5–25)
CA-I BLD-SCNC: 1.12 MMOL/L (ref 1.12–1.32)
CALCIUM SERPL-MCNC: 9.4 MG/DL (ref 8.4–10.2)
CHLORIDE SERPL-SCNC: 104 MMOL/L (ref 96–108)
CO2 SERPL-SCNC: 24 MMOL/L (ref 21–32)
CREAT SERPL-MCNC: 1.17 MG/DL (ref 0.6–1.3)
EOSINOPHIL # BLD AUTO: 0.12 THOUSAND/ÂΜL (ref 0–0.61)
EOSINOPHIL NFR BLD AUTO: 2 % (ref 0–6)
ERYTHROCYTE [DISTWIDTH] IN BLOOD BY AUTOMATED COUNT: 17.3 % (ref 11.6–15.1)
GFR SERPL CREATININE-BSD FRML MDRD: 55 ML/MIN/1.73SQ M
GLUCOSE SERPL-MCNC: 105 MG/DL (ref 65–140)
HCT VFR BLD AUTO: 37.8 % (ref 34.8–46.1)
HGB BLD-MCNC: 12.5 G/DL (ref 11.5–15.4)
IMM GRANULOCYTES # BLD AUTO: 0.05 THOUSAND/UL (ref 0–0.2)
IMM GRANULOCYTES NFR BLD AUTO: 1 % (ref 0–2)
LYMPHOCYTES # BLD AUTO: 2.37 THOUSANDS/ÂΜL (ref 0.6–4.47)
LYMPHOCYTES NFR BLD AUTO: 37 % (ref 14–44)
MAGNESIUM SERPL-MCNC: 1.9 MG/DL (ref 1.9–2.7)
MCH RBC QN AUTO: 30.5 PG (ref 26.8–34.3)
MCHC RBC AUTO-ENTMCNC: 33.1 G/DL (ref 31.4–37.4)
MCV RBC AUTO: 92 FL (ref 82–98)
MONOCYTES # BLD AUTO: 0.47 THOUSAND/ÂΜL (ref 0.17–1.22)
MONOCYTES NFR BLD AUTO: 7 % (ref 4–12)
NEUTROPHILS # BLD AUTO: 3.33 THOUSANDS/ÂΜL (ref 1.85–7.62)
NEUTS SEG NFR BLD AUTO: 52 % (ref 43–75)
NRBC BLD AUTO-RTO: 0 /100 WBCS
PLATELET # BLD AUTO: 196 THOUSANDS/UL (ref 149–390)
PMV BLD AUTO: 9.9 FL (ref 8.9–12.7)
POTASSIUM SERPL-SCNC: 4.2 MMOL/L (ref 3.5–5.3)
RBC # BLD AUTO: 4.1 MILLION/UL (ref 3.81–5.12)
SODIUM SERPL-SCNC: 137 MMOL/L (ref 135–147)
WBC # BLD AUTO: 6.4 THOUSAND/UL (ref 4.31–10.16)

## 2024-01-27 PROCEDURE — 36415 COLL VENOUS BLD VENIPUNCTURE: CPT | Performed by: PHYSICIAN ASSISTANT

## 2024-01-27 PROCEDURE — 80048 BASIC METABOLIC PNL TOTAL CA: CPT | Performed by: PHYSICIAN ASSISTANT

## 2024-01-27 PROCEDURE — 83735 ASSAY OF MAGNESIUM: CPT | Performed by: PHYSICIAN ASSISTANT

## 2024-01-27 PROCEDURE — 99284 EMERGENCY DEPT VISIT MOD MDM: CPT | Performed by: PHYSICIAN ASSISTANT

## 2024-01-27 PROCEDURE — 96375 TX/PRO/DX INJ NEW DRUG ADDON: CPT

## 2024-01-27 PROCEDURE — 99283 EMERGENCY DEPT VISIT LOW MDM: CPT

## 2024-01-27 PROCEDURE — 96365 THER/PROPH/DIAG IV INF INIT: CPT

## 2024-01-27 PROCEDURE — 85025 COMPLETE CBC W/AUTO DIFF WBC: CPT | Performed by: PHYSICIAN ASSISTANT

## 2024-01-27 PROCEDURE — 82330 ASSAY OF CALCIUM: CPT | Performed by: PHYSICIAN ASSISTANT

## 2024-01-27 RX ORDER — LANOLIN ALCOHOL/MO/W.PET/CERES
400 CREAM (GRAM) TOPICAL ONCE
Status: COMPLETED | OUTPATIENT
Start: 2024-01-27 | End: 2024-01-27

## 2024-01-27 RX ORDER — KETOROLAC TROMETHAMINE 30 MG/ML
15 INJECTION, SOLUTION INTRAMUSCULAR; INTRAVENOUS ONCE
Status: COMPLETED | OUTPATIENT
Start: 2024-01-27 | End: 2024-01-27

## 2024-01-27 RX ORDER — METHYLPREDNISOLONE 4 MG/1
TABLET ORAL
Qty: 21 TABLET | Refills: 0 | Status: SHIPPED | OUTPATIENT
Start: 2024-01-27

## 2024-01-27 RX ORDER — CALCIUM GLUCONATE 20 MG/ML
2 INJECTION, SOLUTION INTRAVENOUS ONCE
Status: COMPLETED | OUTPATIENT
Start: 2024-01-27 | End: 2024-01-27

## 2024-01-27 RX ADMIN — CALCIUM GLUCONATE 2 G: 20 INJECTION, SOLUTION INTRAVENOUS at 11:09

## 2024-01-27 RX ADMIN — Medication 400 MG: at 11:08

## 2024-01-27 RX ADMIN — KETOROLAC TROMETHAMINE 15 MG: 30 INJECTION, SOLUTION INTRAMUSCULAR; INTRAVENOUS at 10:18
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
## 2024-01-27 NOTE — ED NOTES
Pt seen, assessed and d/c by provider. Pt appeared to be in no acute distress upon discharge. Pt able to ambulate well without assistance upon exiting.      Karuna Baird RN  01/27/24 1444

## 2024-01-29 ENCOUNTER — APPOINTMENT (OUTPATIENT)
Dept: LAB | Facility: HOSPITAL | Age: 49
End: 2024-01-29
Attending: INTERNAL MEDICINE
Payer: COMMERCIAL

## 2024-01-29 DIAGNOSIS — E83.51 HYPOCALCEMIA: ICD-10-CM

## 2024-01-29 LAB
CA-I BLD-SCNC: 1.16 MMOL/L (ref 1.12–1.32)
CALCIUM SERPL-MCNC: 10 MG/DL (ref 8.4–10.2)

## 2024-01-29 PROCEDURE — 36415 COLL VENOUS BLD VENIPUNCTURE: CPT

## 2024-01-29 PROCEDURE — 82330 ASSAY OF CALCIUM: CPT

## 2024-01-30 ENCOUNTER — APPOINTMENT (OUTPATIENT)
Dept: LAB | Facility: HOSPITAL | Age: 49
End: 2024-01-30
Payer: COMMERCIAL

## 2024-01-30 DIAGNOSIS — E87.6 HYPOPOTASSEMIA: ICD-10-CM

## 2024-01-30 DIAGNOSIS — E83.51 HYPOCALCEMIA: ICD-10-CM

## 2024-01-30 LAB
CA-I BLD-SCNC: 1.21 MMOL/L (ref 1.12–1.32)
CALCIUM SERPL-MCNC: 10.4 MG/DL (ref 8.4–10.2)

## 2024-01-30 PROCEDURE — 36415 COLL VENOUS BLD VENIPUNCTURE: CPT

## 2024-01-30 PROCEDURE — 82330 ASSAY OF CALCIUM: CPT

## 2024-02-01 ENCOUNTER — OFFICE VISIT (OUTPATIENT)
Dept: ENDOCRINOLOGY | Facility: CLINIC | Age: 49
End: 2024-02-01
Payer: COMMERCIAL

## 2024-02-01 VITALS
WEIGHT: 213 LBS | SYSTOLIC BLOOD PRESSURE: 120 MMHG | HEIGHT: 62 IN | HEART RATE: 112 BPM | DIASTOLIC BLOOD PRESSURE: 70 MMHG | OXYGEN SATURATION: 99 % | BODY MASS INDEX: 39.2 KG/M2

## 2024-02-01 DIAGNOSIS — E89.0 POSTOPERATIVE HYPOTHYROIDISM: Chronic | ICD-10-CM

## 2024-02-01 DIAGNOSIS — R73.03 PREDIABETES: Primary | ICD-10-CM

## 2024-02-01 DIAGNOSIS — Z87.898 HISTORY OF PREDIABETES: ICD-10-CM

## 2024-02-01 DIAGNOSIS — E20.89 OTHER HYPOPARATHYROIDISM: ICD-10-CM

## 2024-02-01 DIAGNOSIS — E55.9 VITAMIN D DEFICIENCY: ICD-10-CM

## 2024-02-01 LAB — SL AMB POCT HEMOGLOBIN AIC: 5.3 (ref ?–6.5)

## 2024-02-01 PROCEDURE — 83036 HEMOGLOBIN GLYCOSYLATED A1C: CPT | Performed by: NURSE PRACTITIONER

## 2024-02-01 PROCEDURE — 99214 OFFICE O/P EST MOD 30 MIN: CPT | Performed by: NURSE PRACTITIONER
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
## 2024-02-02 ENCOUNTER — APPOINTMENT (OUTPATIENT)
Dept: LAB | Facility: HOSPITAL | Age: 49
End: 2024-02-02
Attending: INTERNAL MEDICINE
Payer: COMMERCIAL

## 2024-02-02 DIAGNOSIS — E83.51 HYPOCALCEMIA: ICD-10-CM

## 2024-02-02 LAB
CA-I BLD-SCNC: 1.31 MMOL/L (ref 1.12–1.32)
CALCIUM SERPL-MCNC: 11.5 MG/DL (ref 8.4–10.2)

## 2024-02-02 PROCEDURE — 36415 COLL VENOUS BLD VENIPUNCTURE: CPT

## 2024-02-02 PROCEDURE — 82330 ASSAY OF CALCIUM: CPT

## 2024-02-05 PROBLEM — Z87.898 HISTORY OF PREDIABETES: Status: ACTIVE | Noted: 2023-01-14

## 2024-02-05 NOTE — ASSESSMENT & PLAN NOTE
Has been symptomatically hypocalcemic and self adjusts her dietary, supplemental calcium and calcitriol doses.  Reviewed trying to avoid hypercalcemia which can occur when self adjusting doses that may increase risk of acute kidney injury leading to renal function decline.   Discussed that this is not recommended to self-adjust these doses, particularly of her calcitriol. Recommend calcitriol 0.5 mcg in the morning and 0.25 in the evening.   Calcium supplementation TID and vitamin D supplementation.   Recommend repeat calcium levels.

## 2024-02-05 NOTE — ASSESSMENT & PLAN NOTE
Lab Results   Component Value Date    HGBA1C 5.3 02/01/2024     HGA1C close to goal tightly controlled with some recent elevations in glucose levels.     Treatment regimen: Metformin 500 mg twice daily    Advised lifestyle modifications including attention to diet including the amount and types of carbohydrates consumed and regular activity.     Discussed use of a trial CGM to collect additional blood glucose data to reveal patterns that can be used to improve glucose levels and adjust insulin regimen.    Recommendation for medical identification either malcolm, jannette.    Routine follow up for diabetic eye and foot exams.     Ordered blood work to complete prior to next visit.    Send glucose logs/CGM download in 1-2 weeks for review    Follow up in 3 months.

## 2024-02-06 ENCOUNTER — DOCUMENTATION (OUTPATIENT)
Dept: NEPHROLOGY | Facility: CLINIC | Age: 49
End: 2024-02-06

## 2024-02-06 ENCOUNTER — TELEPHONE (OUTPATIENT)
Dept: NEPHROLOGY | Facility: CLINIC | Age: 49
End: 2024-02-06

## 2024-02-06 ENCOUNTER — APPOINTMENT (OUTPATIENT)
Dept: LAB | Facility: HOSPITAL | Age: 49
End: 2024-02-06
Payer: COMMERCIAL

## 2024-02-06 DIAGNOSIS — R73.9 HYPERGLYCEMIA: ICD-10-CM

## 2024-02-06 DIAGNOSIS — E83.51 HYPOCALCEMIA: ICD-10-CM

## 2024-02-06 DIAGNOSIS — E83.51 HYPOCALCEMIA: Primary | ICD-10-CM

## 2024-02-06 LAB
CA-I BLD-SCNC: 1.36 MMOL/L (ref 1.12–1.32)
CALCIUM SERPL-MCNC: 11.6 MG/DL (ref 8.4–10.2)

## 2024-02-06 PROCEDURE — 82330 ASSAY OF CALCIUM: CPT

## 2024-02-06 PROCEDURE — 36415 COLL VENOUS BLD VENIPUNCTURE: CPT

## 2024-02-06 NOTE — PROGRESS NOTES
Lab orders mailed to patient to have done prior to March appt. Per Sonja.  (PTH, CMP, UA, UACR, CBC)

## 2024-02-06 NOTE — TELEPHONE ENCOUNTER
----- Message from ELIF Duron sent at 2/2/2024  4:07 PM EST -----  Regarding: RE: FELIPE  Obtain PTH, CMP, Urinalysis with microscopic exam, UACR, cbc  ----- Message -----  From: Radha Sanchez  Sent: 2/2/2024   3:15 PM EST  To: ELIF Duron  Subject: FW: FELIPE                                          Shlomo Casillas,  Patient will see you in [place] on 3- @ 10:20. You may want to order additional labs. If so I will mail them and contact patient.  Have a nice weekend,  Radha [facility]    ----- Message -----  From: Felicity Jarrett  Sent: 2/2/2024   9:28 AM EST  To: Radha Sanchez  Subject: RE: FELIPE                                          That's fine. She does live in [place]. She also needs to be seen in [place] due to insurance.        ----- Message -----  From: Radha Sanchez  Sent: 2/1/2024   2:16 PM EST  To: Felicity Jarrett  Subject: FELIPE                                              Hello,  Patient asked to transfer her Nephrology care to [place] [facility]. She saw Dr. Zambrano once in 2022. Need approval to change location and provider.   Thank You,  Radha

## 2024-02-07 ENCOUNTER — TELEPHONE (OUTPATIENT)
Dept: PSYCHIATRY | Facility: CLINIC | Age: 49
End: 2024-02-07

## 2024-02-07 NOTE — TELEPHONE ENCOUNTER
Received call from patient to reschedule no-show appointment from Dali Izquierdo PA-C and cancelled appointment with Elizabeth Ball.  Patient wanted providers to know that this was due to high calcium levels and all she is doing is sleeping.  Patient was reschedule for 2/13//2024 and 2/14/2024.

## 2024-02-08 ENCOUNTER — APPOINTMENT (OUTPATIENT)
Dept: LAB | Facility: HOSPITAL | Age: 49
End: 2024-02-08
Attending: INTERNAL MEDICINE
Payer: COMMERCIAL

## 2024-02-08 DIAGNOSIS — E83.51 HYPOCALCEMIA: ICD-10-CM

## 2024-02-08 DIAGNOSIS — E87.6 HYPOPOTASSEMIA: ICD-10-CM

## 2024-02-08 DIAGNOSIS — F32.89 OTHER DEPRESSION: ICD-10-CM

## 2024-02-08 LAB
CA-I BLD-SCNC: 1.15 MMOL/L (ref 1.12–1.32)
CALCIUM SERPL-MCNC: 9.5 MG/DL (ref 8.4–10.2)

## 2024-02-08 PROCEDURE — 82330 ASSAY OF CALCIUM: CPT

## 2024-02-08 PROCEDURE — 36415 COLL VENOUS BLD VENIPUNCTURE: CPT

## 2024-02-08 RX ORDER — DESVENLAFAXINE 100 MG/1
100 TABLET, EXTENDED RELEASE ORAL DAILY
Qty: 90 TABLET | Refills: 0 | Status: SHIPPED | OUTPATIENT
Start: 2024-02-08 | End: 2024-02-13

## 2024-02-09 ENCOUNTER — APPOINTMENT (OUTPATIENT)
Dept: LAB | Facility: HOSPITAL | Age: 49
End: 2024-02-09
Payer: COMMERCIAL

## 2024-02-09 DIAGNOSIS — E83.51 HYPOCALCEMIA: ICD-10-CM

## 2024-02-09 LAB
CA-I BLD-SCNC: 1.24 MMOL/L (ref 1.12–1.32)
CALCIUM SERPL-MCNC: 10.6 MG/DL (ref 8.4–10.2)

## 2024-02-09 PROCEDURE — 82330 ASSAY OF CALCIUM: CPT

## 2024-02-09 PROCEDURE — 36415 COLL VENOUS BLD VENIPUNCTURE: CPT

## 2024-02-10 ENCOUNTER — APPOINTMENT (OUTPATIENT)
Dept: LAB | Facility: HOSPITAL | Age: 49
End: 2024-02-10
Attending: INTERNAL MEDICINE
Payer: COMMERCIAL

## 2024-02-10 DIAGNOSIS — E83.51 HYPOCALCEMIA: ICD-10-CM

## 2024-02-10 LAB
CA-I BLD-SCNC: 1.19 MMOL/L (ref 1.12–1.32)
CALCIUM SERPL-MCNC: 9.4 MG/DL (ref 8.4–10.2)

## 2024-02-10 PROCEDURE — 36415 COLL VENOUS BLD VENIPUNCTURE: CPT

## 2024-02-10 PROCEDURE — 82330 ASSAY OF CALCIUM: CPT

## 2024-02-12 ENCOUNTER — APPOINTMENT (OUTPATIENT)
Dept: LAB | Facility: HOSPITAL | Age: 49
End: 2024-02-12
Attending: INTERNAL MEDICINE
Payer: COMMERCIAL

## 2024-02-12 DIAGNOSIS — E83.51 HYPOCALCEMIA: ICD-10-CM

## 2024-02-12 LAB
CA-I BLD-SCNC: 1.16 MMOL/L (ref 1.12–1.32)
CALCIUM SERPL-MCNC: 9 MG/DL (ref 8.4–10.2)

## 2024-02-12 PROCEDURE — 82330 ASSAY OF CALCIUM: CPT

## 2024-02-12 PROCEDURE — 36415 COLL VENOUS BLD VENIPUNCTURE: CPT

## 2024-02-13 ENCOUNTER — TELEMEDICINE (OUTPATIENT)
Dept: PSYCHIATRY | Facility: CLINIC | Age: 49
End: 2024-02-13

## 2024-02-13 ENCOUNTER — HOSPITAL ENCOUNTER (EMERGENCY)
Facility: HOSPITAL | Age: 49
Discharge: HOME | End: 2024-02-13
Attending: EMERGENCY MEDICINE
Payer: COMMERCIAL

## 2024-02-13 ENCOUNTER — APPOINTMENT (OUTPATIENT)
Dept: LAB | Facility: HOSPITAL | Age: 49
End: 2024-02-13
Payer: COMMERCIAL

## 2024-02-13 VITALS
RESPIRATION RATE: 20 BRPM | DIASTOLIC BLOOD PRESSURE: 94 MMHG | TEMPERATURE: 97.7 F | SYSTOLIC BLOOD PRESSURE: 150 MMHG | OXYGEN SATURATION: 96 % | HEART RATE: 68 BPM

## 2024-02-13 DIAGNOSIS — F41.0 PANIC DISORDER: ICD-10-CM

## 2024-02-13 DIAGNOSIS — F41.1 GAD (GENERALIZED ANXIETY DISORDER): Primary | ICD-10-CM

## 2024-02-13 DIAGNOSIS — E83.52 HYPERCALCEMIA: Primary | ICD-10-CM

## 2024-02-13 DIAGNOSIS — E89.0 POSTSURGICAL HYPOTHYROIDISM: ICD-10-CM

## 2024-02-13 DIAGNOSIS — Z79.899 HIGH RISK MEDICATION USE: ICD-10-CM

## 2024-02-13 DIAGNOSIS — F51.01 PRIMARY INSOMNIA: ICD-10-CM

## 2024-02-13 DIAGNOSIS — E83.51 HYPOCALCEMIA: ICD-10-CM

## 2024-02-13 LAB
ALBUMIN SERPL BCP-MCNC: 4.1 G/DL (ref 3.5–5)
ALP SERPL-CCNC: 72 U/L (ref 34–104)
ALT SERPL W P-5'-P-CCNC: 29 U/L (ref 7–52)
ANION GAP SERPL CALCULATED.3IONS-SCNC: 12 MMOL/L
AST SERPL W P-5'-P-CCNC: 16 U/L (ref 13–39)
BILIRUB SERPL-MCNC: 0.35 MG/DL (ref 0.2–1)
BUN SERPL-MCNC: 27 MG/DL (ref 5–25)
CA-I BLD-SCNC: 1.37 MMOL/L (ref 1.12–1.32)
CA-I BLD-SCNC: 1.41 MMOL/L (ref 1.12–1.32)
CALCIUM SERPL-MCNC: 10.9 MG/DL (ref 8.4–10.2)
CALCIUM SERPL-MCNC: 11.2 MG/DL (ref 8.4–10.2)
CHLORIDE SERPL-SCNC: 102 MMOL/L (ref 96–108)
CO2 SERPL-SCNC: 21 MMOL/L (ref 21–32)
CREAT SERPL-MCNC: 1.23 MG/DL (ref 0.6–1.3)
GFR SERPL CREATININE-BSD FRML MDRD: 52 ML/MIN/1.73SQ M
GLUCOSE SERPL-MCNC: 110 MG/DL (ref 65–140)
MAGNESIUM SERPL-MCNC: 2.1 MG/DL (ref 1.9–2.7)
PHOSPHATE SERPL-MCNC: 3.6 MG/DL (ref 2.7–4.5)
POTASSIUM SERPL-SCNC: 4.1 MMOL/L (ref 3.5–5.3)
PROT SERPL-MCNC: 7.4 G/DL (ref 6.4–8.4)
SODIUM SERPL-SCNC: 135 MMOL/L (ref 135–147)
T4 FREE SERPL-MCNC: 1.07 NG/DL (ref 0.61–1.12)
TSH SERPL DL<=0.05 MIU/L-ACNC: 0.61 UIU/ML (ref 0.45–4.5)

## 2024-02-13 PROCEDURE — 82330 ASSAY OF CALCIUM: CPT

## 2024-02-13 PROCEDURE — 84443 ASSAY THYROID STIM HORMONE: CPT

## 2024-02-13 PROCEDURE — 82330 ASSAY OF CALCIUM: CPT | Performed by: PHYSICIAN ASSISTANT

## 2024-02-13 PROCEDURE — 80053 COMPREHEN METABOLIC PANEL: CPT | Performed by: PHYSICIAN ASSISTANT

## 2024-02-13 PROCEDURE — 84439 ASSAY OF FREE THYROXINE: CPT

## 2024-02-13 PROCEDURE — 99283 EMERGENCY DEPT VISIT LOW MDM: CPT

## 2024-02-13 PROCEDURE — 84100 ASSAY OF PHOSPHORUS: CPT | Performed by: PHYSICIAN ASSISTANT

## 2024-02-13 PROCEDURE — 36415 COLL VENOUS BLD VENIPUNCTURE: CPT | Performed by: PHYSICIAN ASSISTANT

## 2024-02-13 PROCEDURE — 96374 THER/PROPH/DIAG INJ IV PUSH: CPT

## 2024-02-13 PROCEDURE — 96361 HYDRATE IV INFUSION ADD-ON: CPT

## 2024-02-13 PROCEDURE — 83735 ASSAY OF MAGNESIUM: CPT | Performed by: PHYSICIAN ASSISTANT

## 2024-02-13 PROCEDURE — 99284 EMERGENCY DEPT VISIT MOD MDM: CPT | Performed by: PHYSICIAN ASSISTANT

## 2024-02-13 RX ORDER — DULOXETIN HYDROCHLORIDE 20 MG/1
20 CAPSULE, DELAYED RELEASE ORAL DAILY
Qty: 30 CAPSULE | Refills: 1 | Status: SHIPPED | OUTPATIENT
Start: 2024-02-13 | End: 2024-04-13

## 2024-02-13 RX ORDER — ALPRAZOLAM 2 MG/1
2 TABLET, EXTENDED RELEASE ORAL
Qty: 60 TABLET | Refills: 0 | Status: SHIPPED | OUTPATIENT
Start: 2024-02-13

## 2024-02-13 RX ORDER — DESVENLAFAXINE SUCCINATE 50 MG/1
50 TABLET, EXTENDED RELEASE ORAL DAILY
Qty: 7 TABLET | Refills: 0 | Status: SHIPPED | OUTPATIENT
Start: 2024-02-13 | End: 2024-02-20

## 2024-02-13 RX ORDER — ONDANSETRON 2 MG/ML
4 INJECTION INTRAMUSCULAR; INTRAVENOUS ONCE
Status: COMPLETED | OUTPATIENT
Start: 2024-02-13 | End: 2024-02-13

## 2024-02-13 RX ORDER — TRAZODONE HYDROCHLORIDE 100 MG/1
100 TABLET ORAL
Qty: 90 TABLET | Refills: 0 | Status: SHIPPED | OUTPATIENT
Start: 2024-02-13

## 2024-02-13 RX ORDER — NALOXONE HYDROCHLORIDE 4 MG/.1ML
SPRAY NASAL
Qty: 1 EACH | Refills: 1 | Status: SHIPPED | OUTPATIENT
Start: 2024-02-13 | End: 2025-02-12

## 2024-02-13 RX ADMIN — SODIUM CHLORIDE 1000 ML: 0.9 INJECTION, SOLUTION INTRAVENOUS at 09:02

## 2024-02-13 RX ADMIN — ONDANSETRON 4 MG: 2 INJECTION INTRAMUSCULAR; INTRAVENOUS at 08:25
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
## 2024-02-13 NOTE — DISCHARGE INSTRUCTIONS
Follow up with your primary care provider / endocrinolgist for further evaluation     Return to ED for new or worsening symptoms

## 2024-02-15 ENCOUNTER — TELEMEDICINE (OUTPATIENT)
Dept: BEHAVIORAL/MENTAL HEALTH CLINIC | Facility: CLINIC | Age: 49
End: 2024-02-15

## 2024-02-15 DIAGNOSIS — F33.1 MODERATE EPISODE OF RECURRENT MAJOR DEPRESSIVE DISORDER (HCC): Primary | ICD-10-CM

## 2024-02-15 DIAGNOSIS — F41.0 GENERALIZED ANXIETY DISORDER WITH PANIC ATTACKS: ICD-10-CM

## 2024-02-15 DIAGNOSIS — F17.210 NICOTINE DEPENDENCE, CIGARETTES, UNCOMPLICATED: ICD-10-CM

## 2024-02-15 DIAGNOSIS — F41.1 GENERALIZED ANXIETY DISORDER WITH PANIC ATTACKS: ICD-10-CM

## 2024-02-15 NOTE — PSYCH
No Call. No Show. Charge    Yamileth Vale no showed 02/15/24 appointment , staff called and left message to reschedule appointment     Treatment Plan not due at this session.

## 2024-02-16 ENCOUNTER — APPOINTMENT (OUTPATIENT)
Dept: LAB | Facility: HOSPITAL | Age: 49
End: 2024-02-16
Attending: INTERNAL MEDICINE
Payer: COMMERCIAL

## 2024-02-16 ENCOUNTER — HOSPITAL ENCOUNTER (EMERGENCY)
Facility: HOSPITAL | Age: 49
Discharge: HOME | End: 2024-02-16
Attending: EMERGENCY MEDICINE
Payer: COMMERCIAL

## 2024-02-16 VITALS
WEIGHT: 214.2 LBS | TEMPERATURE: 97.9 F | OXYGEN SATURATION: 98 % | BODY MASS INDEX: 39.18 KG/M2 | HEART RATE: 92 BPM | RESPIRATION RATE: 20 BRPM | DIASTOLIC BLOOD PRESSURE: 76 MMHG | SYSTOLIC BLOOD PRESSURE: 126 MMHG

## 2024-02-16 DIAGNOSIS — E83.51 HYPOCALCEMIA: ICD-10-CM

## 2024-02-16 DIAGNOSIS — R20.2 PARESTHESIA: Primary | ICD-10-CM

## 2024-02-16 LAB
ALBUMIN SERPL BCP-MCNC: 4 G/DL (ref 3.5–5)
ALP SERPL-CCNC: 62 U/L (ref 34–104)
ALT SERPL W P-5'-P-CCNC: 36 U/L (ref 7–52)
ANION GAP SERPL CALCULATED.3IONS-SCNC: 15 MMOL/L
AST SERPL W P-5'-P-CCNC: 10 U/L (ref 13–39)
BASOPHILS # BLD AUTO: 0.05 THOUSANDS/ÂΜL (ref 0–0.1)
BASOPHILS NFR BLD AUTO: 1 % (ref 0–1)
BILIRUB SERPL-MCNC: 0.35 MG/DL (ref 0.2–1)
BUN SERPL-MCNC: 24 MG/DL (ref 5–25)
CA-I BLD-SCNC: 1.1 MMOL/L (ref 1.12–1.32)
CA-I BLD-SCNC: 1.13 MMOL/L (ref 1.12–1.32)
CALCIUM SERPL-MCNC: 8.5 MG/DL (ref 8.4–10.2)
CALCIUM SERPL-MCNC: 9.2 MG/DL (ref 8.4–10.2)
CHLORIDE SERPL-SCNC: 101 MMOL/L (ref 96–108)
CO2 SERPL-SCNC: 21 MMOL/L (ref 21–32)
CREAT SERPL-MCNC: 1.21 MG/DL (ref 0.6–1.3)
EOSINOPHIL # BLD AUTO: 0.06 THOUSAND/ÂΜL (ref 0–0.61)
EOSINOPHIL NFR BLD AUTO: 1 % (ref 0–6)
ERYTHROCYTE [DISTWIDTH] IN BLOOD BY AUTOMATED COUNT: 17.2 % (ref 11.6–15.1)
GFR SERPL CREATININE-BSD FRML MDRD: 53 ML/MIN/1.73SQ M
GLUCOSE SERPL-MCNC: 93 MG/DL (ref 65–140)
HCT VFR BLD AUTO: 37.3 % (ref 34.8–46.1)
HGB BLD-MCNC: 12.1 G/DL (ref 11.5–15.4)
IMM GRANULOCYTES # BLD AUTO: 0.05 THOUSAND/UL (ref 0–0.2)
IMM GRANULOCYTES NFR BLD AUTO: 1 % (ref 0–2)
LYMPHOCYTES # BLD AUTO: 2.27 THOUSANDS/ÂΜL (ref 0.6–4.47)
LYMPHOCYTES NFR BLD AUTO: 32 % (ref 14–44)
MCH RBC QN AUTO: 29.8 PG (ref 26.8–34.3)
MCHC RBC AUTO-ENTMCNC: 32.4 G/DL (ref 31.4–37.4)
MCV RBC AUTO: 92 FL (ref 82–98)
MONOCYTES # BLD AUTO: 0.48 THOUSAND/ÂΜL (ref 0.17–1.22)
MONOCYTES NFR BLD AUTO: 7 % (ref 4–12)
NEUTROPHILS # BLD AUTO: 4.14 THOUSANDS/ÂΜL (ref 1.85–7.62)
NEUTS SEG NFR BLD AUTO: 58 % (ref 43–75)
NRBC BLD AUTO-RTO: 0 /100 WBCS
PLATELET # BLD AUTO: 193 THOUSANDS/UL (ref 149–390)
PMV BLD AUTO: 9.7 FL (ref 8.9–12.7)
POTASSIUM SERPL-SCNC: 3.5 MMOL/L (ref 3.5–5.3)
PROT SERPL-MCNC: 7.1 G/DL (ref 6.4–8.4)
RBC # BLD AUTO: 4.06 MILLION/UL (ref 3.81–5.12)
SODIUM SERPL-SCNC: 137 MMOL/L (ref 135–147)
WBC # BLD AUTO: 7.05 THOUSAND/UL (ref 4.31–10.16)

## 2024-02-16 PROCEDURE — 85025 COMPLETE CBC W/AUTO DIFF WBC: CPT | Performed by: EMERGENCY MEDICINE

## 2024-02-16 PROCEDURE — 99284 EMERGENCY DEPT VISIT MOD MDM: CPT | Performed by: EMERGENCY MEDICINE

## 2024-02-16 PROCEDURE — 99284 EMERGENCY DEPT VISIT MOD MDM: CPT

## 2024-02-16 PROCEDURE — 82330 ASSAY OF CALCIUM: CPT | Performed by: EMERGENCY MEDICINE

## 2024-02-16 PROCEDURE — 80053 COMPREHEN METABOLIC PANEL: CPT | Performed by: EMERGENCY MEDICINE

## 2024-02-16 PROCEDURE — 36415 COLL VENOUS BLD VENIPUNCTURE: CPT

## 2024-02-16 PROCEDURE — 82330 ASSAY OF CALCIUM: CPT

## 2024-02-16 PROCEDURE — 96365 THER/PROPH/DIAG IV INF INIT: CPT

## 2024-02-16 RX ORDER — CALCIUM GLUCONATE 20 MG/ML
1 INJECTION, SOLUTION INTRAVENOUS ONCE
Status: COMPLETED | OUTPATIENT
Start: 2024-02-16 | End: 2024-02-16

## 2024-02-16 RX ADMIN — CALCIUM GLUCONATE 1 G: 20 INJECTION, SOLUTION INTRAVENOUS at 21:38

## 2024-02-17 ENCOUNTER — HOSPITAL ENCOUNTER (EMERGENCY)
Facility: HOSPITAL | Age: 49
Discharge: HOME | End: 2024-02-17
Attending: EMERGENCY MEDICINE
Payer: COMMERCIAL

## 2024-02-17 ENCOUNTER — APPOINTMENT (OUTPATIENT)
Dept: LAB | Facility: HOSPITAL | Age: 49
End: 2024-02-17
Payer: COMMERCIAL

## 2024-02-17 VITALS
RESPIRATION RATE: 22 BRPM | DIASTOLIC BLOOD PRESSURE: 87 MMHG | HEART RATE: 116 BPM | TEMPERATURE: 97.5 F | OXYGEN SATURATION: 98 % | SYSTOLIC BLOOD PRESSURE: 121 MMHG

## 2024-02-17 DIAGNOSIS — E83.51 HYPOCALCEMIA: ICD-10-CM

## 2024-02-17 DIAGNOSIS — R20.2 PARESTHESIA: Primary | ICD-10-CM

## 2024-02-17 LAB
ANION GAP SERPL CALCULATED.3IONS-SCNC: 14 MMOL/L
BUN SERPL-MCNC: 21 MG/DL (ref 5–25)
CA-I BLD-SCNC: 1.11 MMOL/L (ref 1.12–1.32)
CA-I BLD-SCNC: 1.13 MMOL/L (ref 1.12–1.32)
CALCIUM SERPL-MCNC: 8.4 MG/DL (ref 8.4–10.2)
CALCIUM SERPL-MCNC: 9.3 MG/DL (ref 8.4–10.2)
CHLORIDE SERPL-SCNC: 100 MMOL/L (ref 96–108)
CO2 SERPL-SCNC: 19 MMOL/L (ref 21–32)
CREAT SERPL-MCNC: 0.99 MG/DL (ref 0.6–1.3)
GFR SERPL CREATININE-BSD FRML MDRD: 68 ML/MIN/1.73SQ M
GLUCOSE SERPL-MCNC: 70 MG/DL (ref 65–140)
MAGNESIUM SERPL-MCNC: 1.8 MG/DL (ref 1.9–2.7)
POTASSIUM SERPL-SCNC: 4 MMOL/L (ref 3.5–5.3)
SODIUM SERPL-SCNC: 133 MMOL/L (ref 135–147)

## 2024-02-17 PROCEDURE — 99284 EMERGENCY DEPT VISIT MOD MDM: CPT

## 2024-02-17 PROCEDURE — 99285 EMERGENCY DEPT VISIT HI MDM: CPT | Performed by: EMERGENCY MEDICINE

## 2024-02-17 PROCEDURE — 83735 ASSAY OF MAGNESIUM: CPT | Performed by: EMERGENCY MEDICINE

## 2024-02-17 PROCEDURE — 82330 ASSAY OF CALCIUM: CPT | Performed by: EMERGENCY MEDICINE

## 2024-02-17 PROCEDURE — 96365 THER/PROPH/DIAG IV INF INIT: CPT

## 2024-02-17 PROCEDURE — 80048 BASIC METABOLIC PNL TOTAL CA: CPT | Performed by: EMERGENCY MEDICINE

## 2024-02-17 PROCEDURE — 82330 ASSAY OF CALCIUM: CPT

## 2024-02-17 PROCEDURE — 36415 COLL VENOUS BLD VENIPUNCTURE: CPT

## 2024-02-17 PROCEDURE — 93005 ELECTROCARDIOGRAM TRACING: CPT

## 2024-02-17 RX ORDER — LANOLIN ALCOHOL/MO/W.PET/CERES
400 CREAM (GRAM) TOPICAL ONCE
Status: COMPLETED | OUTPATIENT
Start: 2024-02-17 | End: 2024-02-17

## 2024-02-17 RX ORDER — CALCIUM GLUCONATE 20 MG/ML
1 INJECTION, SOLUTION INTRAVENOUS ONCE
Status: COMPLETED | OUTPATIENT
Start: 2024-02-17 | End: 2024-02-17

## 2024-02-17 RX ADMIN — CALCIUM GLUCONATE 1 G: 20 INJECTION, SOLUTION INTRAVENOUS at 14:59

## 2024-02-17 RX ADMIN — MAGNESIUM OXIDE TAB 400 MG (241.3 MG ELEMENTAL MG) 400 MG: 400 (241.3 MG) TAB at 15:45
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
## 2024-02-19 ENCOUNTER — PATIENT OUTREACH (OUTPATIENT)
Dept: INTERNAL MEDICINE CLINIC | Facility: CLINIC | Age: 49
End: 2024-02-19

## 2024-02-19 ENCOUNTER — APPOINTMENT (OUTPATIENT)
Dept: LAB | Facility: HOSPITAL | Age: 49
End: 2024-02-19
Attending: INTERNAL MEDICINE
Payer: COMMERCIAL

## 2024-02-19 DIAGNOSIS — Z71.89 COMPLEX CARE COORDINATION: Primary | ICD-10-CM

## 2024-02-19 DIAGNOSIS — E83.51 HYPOCALCEMIA: ICD-10-CM

## 2024-02-19 LAB
CA-I BLD-SCNC: 1.28 MMOL/L (ref 1.12–1.32)
CALCIUM SERPL-MCNC: 10.6 MG/DL (ref 8.4–10.2)

## 2024-02-19 PROCEDURE — 36415 COLL VENOUS BLD VENIPUNCTURE: CPT

## 2024-02-19 PROCEDURE — 82330 ASSAY OF CALCIUM: CPT

## 2024-02-19 NOTE — PROGRESS NOTES
[employer] referral received via IB. Chart reviewed. Patient has a h/o post surgical hypoparathyroidism with frequent ER visits due to hypocalcemia. Patient was seen in the ER 3 times so far this month, 2/13, 2/16 and 2/17 all with c//o paresthesia due to hypocalcemia.    This RNCM attempted to call patient and spoke with her daughter Eun who answered her phone. Patient is having blood work done at this time and requests a call back later today or tomorrow.    RNCM to follow up.

## 2024-02-19 NOTE — PROGRESS NOTES
Patient referred for a high utilizer care plan & will be reviewed by the committee.    Patient re-referred for complex care management.

## 2024-02-20 ENCOUNTER — CONSULT (OUTPATIENT)
Dept: PULMONOLOGY | Facility: MEDICAL CENTER | Age: 49
End: 2024-02-20
Payer: COMMERCIAL

## 2024-02-20 ENCOUNTER — APPOINTMENT (OUTPATIENT)
Dept: LAB | Facility: HOSPITAL | Age: 49
End: 2024-02-20
Payer: COMMERCIAL

## 2024-02-20 VITALS
SYSTOLIC BLOOD PRESSURE: 138 MMHG | DIASTOLIC BLOOD PRESSURE: 90 MMHG | RESPIRATION RATE: 20 BRPM | OXYGEN SATURATION: 98 % | HEART RATE: 117 BPM | TEMPERATURE: 98.7 F | BODY MASS INDEX: 39.2 KG/M2 | HEIGHT: 62 IN | WEIGHT: 213 LBS

## 2024-02-20 DIAGNOSIS — Z77.120 MOLD EXPOSURE: ICD-10-CM

## 2024-02-20 DIAGNOSIS — J31.0 CHRONIC RHINITIS: ICD-10-CM

## 2024-02-20 DIAGNOSIS — E83.51 HYPOCALCEMIA: ICD-10-CM

## 2024-02-20 DIAGNOSIS — J45.20 MILD INTERMITTENT REACTIVE AIRWAY DISEASE WITHOUT COMPLICATION: Primary | ICD-10-CM

## 2024-02-20 DIAGNOSIS — F17.210 NICOTINE DEPENDENCE, CIGARETTES, UNCOMPLICATED: ICD-10-CM

## 2024-02-20 DIAGNOSIS — E66.09 CLASS 2 OBESITY DUE TO EXCESS CALORIES WITHOUT SERIOUS COMORBIDITY WITH BODY MASS INDEX (BMI) OF 38.0 TO 38.9 IN ADULT: ICD-10-CM

## 2024-02-20 PROBLEM — E66.812 CLASS 2 OBESITY DUE TO EXCESS CALORIES WITHOUT SERIOUS COMORBIDITY WITH BODY MASS INDEX (BMI) OF 38.0 TO 38.9 IN ADULT: Status: ACTIVE | Noted: 2024-02-20

## 2024-02-20 LAB
ATRIAL RATE: 97 BPM
CA-I BLD-SCNC: 1.35 MMOL/L (ref 1.12–1.32)
CALCIUM SERPL-MCNC: 10.7 MG/DL (ref 8.4–10.2)
P AXIS: 29 DEGREES
PR INTERVAL: 146 MS
QRS AXIS: 23 DEGREES
QRSD INTERVAL: 74 MS
QT INTERVAL: 348 MS
QTC INTERVAL: 441 MS
T WAVE AXIS: 53 DEGREES
VENTRICULAR RATE: 97 BPM

## 2024-02-20 PROCEDURE — 99244 OFF/OP CNSLTJ NEW/EST MOD 40: CPT | Performed by: INTERNAL MEDICINE

## 2024-02-20 PROCEDURE — 82785 ASSAY OF IGE: CPT

## 2024-02-20 PROCEDURE — 86003 ALLG SPEC IGE CRUDE XTRC EA: CPT

## 2024-02-20 PROCEDURE — 36415 COLL VENOUS BLD VENIPUNCTURE: CPT

## 2024-02-20 PROCEDURE — 93010 ELECTROCARDIOGRAM REPORT: CPT | Performed by: INTERNAL MEDICINE

## 2024-02-20 PROCEDURE — 82330 ASSAY OF CALCIUM: CPT

## 2024-02-20 RX ORDER — VARENICLINE TARTRATE 0.5 (11)-1
KIT ORAL
Qty: 53 EACH | Refills: 0 | Status: SHIPPED | OUTPATIENT
Start: 2024-02-20

## 2024-02-20 RX ORDER — FLUTICASONE FUROATE, UMECLIDINIUM BROMIDE AND VILANTEROL TRIFENATATE 100; 62.5; 25 UG/1; UG/1; UG/1
1 POWDER RESPIRATORY (INHALATION) DAILY
Qty: 60 BLISTER | Refills: 0 | Status: SHIPPED | COMMUNITY
Start: 2024-02-20 | End: 2024-03-21

## 2024-02-20 NOTE — ASSESSMENT & PLAN NOTE
She has been smoking many years.  Generally smokes about half pack per day.  She tried Chantix before some success in stopping smoking temporarily.  She wanted to try this again and I did write starter pack.  If that works well for her she can call my office and I can then add regular Chantix monthly pack that she can use for couple months or more

## 2024-02-20 NOTE — ASSESSMENT & PLAN NOTE
She does have frequent nasal congestion and does get some postnasal drainage.  This could possibly be from smoking but she may have allergic rhinitis as well.  She does have 3 dogs and 6 cats.  I did order serum [place] allergy panel.  I did tell her she can use saline nasal spray also to use Flonase as needed    Not sure if some of the symptoms could be also related to mold exposure at the house she is lives in.  She is states his rental home.  She does have house and assisting program and sees that right now she would not build to move elsewhere.    I also ordered mold panel

## 2024-02-20 NOTE — ASSESSMENT & PLAN NOTE
Spirometry today shows mild restriction.  Which I suspect is related to her obesity.  She denies any symptoms of obstructive sleep apnea

## 2024-02-20 NOTE — ASSESSMENT & PLAN NOTE
She states that rental house she lives and has mold in multiple rooms.  States an order has not been willing to remediate the situation.    I did order allergy mold panel

## 2024-02-20 NOTE — ASSESSMENT & PLAN NOTE
Spirometry today shows mild restriction.  Which is suspect is related to her obesity.  Cannot exclude air trapping.  She does have some intermittent wheezing and cough.  Not sure if this is due to some chronic bronchitis from smoking or possibly she could have mild intermittent asthma.  Think some of this could be due to mold but is difficult to determine this for sure as she does have significant smoking history and did have a very mild asthma when she was 6 years old    I gave her sample of Trelegy 100 mcg 1 puff daily to use.  If this works well for her I can give her prescription for it.  She can contact my office.      She does have a rescue Ventolin inhaler she can use 2 puffs 4 times a day as needed
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
## 2024-02-20 NOTE — PATIENT INSTRUCTIONS
I prescribed Chantix starter pack.  If this works well for you I will prescribe maintenance pack    Start Trelegy 100 mcg 1 puff daily rinse mouth after using.  If this helps you contact my office and I will send prescription to your pharmacy to see if covered    I ordered [facility] allergy panel and mold panel.  These are not fasting

## 2024-02-21 ENCOUNTER — TELEPHONE (OUTPATIENT)
Dept: ENDOCRINOLOGY | Facility: CLINIC | Age: 49
End: 2024-02-21

## 2024-02-21 NOTE — TELEPHONE ENCOUNTER
Patient needs call back in reference to placing an order for calcium. Dr. Bailey needs a letter from Dr. Pabon so the patient can go to ED for calcium thru IV when she is symptomatic. They will no longer treat her without a letter patient states. Please Advise.

## 2024-02-22 LAB
A ALTERNATA IGE QN: <0.1 KU/L
A FUMIGATUS IGE QN: <0.1 KU/L
A PULLULANS IGE QN: <0.1 KU/L
C ALBICANS IGE QN: <0.1 KU/L
C HERBARUM IGE QN: <0.1 KU/L
E PURPURASCENS IGE QN: <0.1 KU/L
FUSARIUM PROLIFERATUM: <0.1 KU/L
Lab: NORMAL
M RACEMOSUS IGE QN: <0.1 KU/L
PENICILLIUM CHRYSOGENUM: <0.1 KU/L
PHOMA BETAE: <0.1 KU/L
SETOMELANOMMA ROSTRATA: <0.1 KU/L
STEMPHYLIUM HERBARUM: <0.1 KU/L

## 2024-02-23 ENCOUNTER — APPOINTMENT (OUTPATIENT)
Dept: LAB | Facility: HOSPITAL | Age: 49
End: 2024-02-23
Attending: INTERNAL MEDICINE
Payer: COMMERCIAL

## 2024-02-23 DIAGNOSIS — D50.8 IRON DEFICIENCY ANEMIA SECONDARY TO INADEQUATE DIETARY IRON INTAKE: ICD-10-CM

## 2024-02-23 DIAGNOSIS — E83.51 HYPOCALCEMIA: ICD-10-CM

## 2024-02-23 LAB
A ALTERNATA IGE QN: <0.1 KUA/I
A FUMIGATUS IGE QN: <0.1 KUA/I
BERMUDA GRASS IGE QN: <0.1 KUA/I
BOXELDER IGE QN: <0.1 KUA/I
C HERBARUM IGE QN: <0.1 KUA/I
CA-I BLD-SCNC: 1.19 MMOL/L (ref 1.12–1.32)
CALCIUM SERPL-MCNC: 9.8 MG/DL (ref 8.4–10.2)
CAT DANDER IGE QN: <0.1 KUA/I
CMN PIGWEED IGE QN: <0.1 KUA/I
COMMON RAGWEED IGE QN: <0.1 KUA/I
COTTONWOOD IGE QN: <0.1 KUA/I
D FARINAE IGE QN: <0.1 KUA/I
D PTERONYSS IGE QN: <0.1 KUA/I
DOG DANDER IGE QN: <0.1 KUA/I
LONDON PLANE IGE QN: <0.1 KUA/I
MOUSE URINE PROT IGE QN: <0.1 KUA/I
MT JUNIPER IGE QN: <0.1 KUA/I
MUGWORT IGE QN: <0.1 KUA/I
P NOTATUM IGE QN: <0.1 KUA/I
ROACH IGE QN: <0.1 KUA/I
SHEEP SORREL IGE QN: <0.1 KUA/I
SILVER BIRCH IGE QN: <0.1 KUA/I
TIMOTHY IGE QN: <0.1 KUA/I
TOTAL IGE SMQN RAST: 14.8 KU/L (ref 0–113)
WALNUT IGE QN: <0.1 KUA/I
WHITE ASH IGE QN: <0.1 KUA/I
WHITE ELM IGE QN: <0.1 KUA/I
WHITE MULBERRY IGE QN: <0.1 KUA/I
WHITE OAK IGE QN: <0.1 KUA/I

## 2024-02-23 PROCEDURE — 82330 ASSAY OF CALCIUM: CPT

## 2024-02-23 PROCEDURE — 36415 COLL VENOUS BLD VENIPUNCTURE: CPT

## 2024-02-23 RX ORDER — FERROUS SULFATE 325(65) MG
325 TABLET ORAL 2 TIMES DAILY WITH MEALS
Qty: 60 TABLET | Refills: 0 | Status: SHIPPED | OUTPATIENT
Start: 2024-02-23 | End: 2024-05-23

## 2024-02-27 ENCOUNTER — APPOINTMENT (OUTPATIENT)
Dept: LAB | Facility: HOSPITAL | Age: 49
End: 2024-02-27
Payer: COMMERCIAL

## 2024-02-27 DIAGNOSIS — E83.51 HYPOCALCEMIA: ICD-10-CM

## 2024-02-27 LAB
CA-I BLD-SCNC: 1.17 MMOL/L (ref 1.12–1.32)
CALCIUM SERPL-MCNC: 9.4 MG/DL (ref 8.4–10.2)

## 2024-02-27 PROCEDURE — 36415 COLL VENOUS BLD VENIPUNCTURE: CPT

## 2024-02-27 PROCEDURE — 82330 ASSAY OF CALCIUM: CPT

## 2024-02-29 ENCOUNTER — APPOINTMENT (OUTPATIENT)
Dept: LAB | Facility: HOSPITAL | Age: 49
End: 2024-02-29
Payer: COMMERCIAL

## 2024-02-29 DIAGNOSIS — E83.51 HYPOCALCEMIA: ICD-10-CM

## 2024-02-29 DIAGNOSIS — R73.9 HYPERGLYCEMIA: ICD-10-CM

## 2024-02-29 LAB
BACTERIA UR QL AUTO: ABNORMAL /HPF
BILIRUB UR QL STRIP: NEGATIVE
CA-I BLD-SCNC: 1.18 MMOL/L (ref 1.12–1.32)
CLARITY UR: ABNORMAL
COLOR UR: ABNORMAL
CREAT UR-MCNC: 92.6 MG/DL
ERYTHROCYTE [DISTWIDTH] IN BLOOD BY AUTOMATED COUNT: 17.2 % (ref 11.6–15.1)
GLUCOSE UR STRIP-MCNC: NEGATIVE MG/DL
HCT VFR BLD AUTO: 40.2 % (ref 34.8–46.1)
HGB BLD-MCNC: 13.2 G/DL (ref 11.5–15.4)
HGB UR QL STRIP.AUTO: NEGATIVE
KETONES UR STRIP-MCNC: NEGATIVE MG/DL
LEUKOCYTE ESTERASE UR QL STRIP: NEGATIVE
MCH RBC QN AUTO: 30.8 PG (ref 26.8–34.3)
MCHC RBC AUTO-ENTMCNC: 32.8 G/DL (ref 31.4–37.4)
MCV RBC AUTO: 94 FL (ref 82–98)
MICROALBUMIN UR-MCNC: 9.7 MG/L
MICROALBUMIN/CREAT 24H UR: 10 MG/G CREATININE (ref 0–30)
NITRITE UR QL STRIP: NEGATIVE
NON-SQ EPI CELLS URNS QL MICRO: ABNORMAL /HPF
PH UR STRIP.AUTO: 5.5 [PH]
PLATELET # BLD AUTO: 208 THOUSANDS/UL (ref 149–390)
PMV BLD AUTO: 10.7 FL (ref 8.9–12.7)
PROT UR STRIP-MCNC: NEGATIVE MG/DL
PTH-INTACT SERPL-MCNC: <1 PG/ML (ref 12–88)
RBC # BLD AUTO: 4.28 MILLION/UL (ref 3.81–5.12)
RBC #/AREA URNS AUTO: ABNORMAL /HPF
SP GR UR STRIP.AUTO: 1.02 (ref 1–1.03)
UROBILINOGEN UR QL STRIP.AUTO: 0.2 E.U./DL
WBC # BLD AUTO: 5.87 THOUSAND/UL (ref 4.31–10.16)
WBC #/AREA URNS AUTO: ABNORMAL /HPF

## 2024-02-29 PROCEDURE — 81001 URINALYSIS AUTO W/SCOPE: CPT

## 2024-02-29 PROCEDURE — 85027 COMPLETE CBC AUTOMATED: CPT

## 2024-02-29 PROCEDURE — 82043 UR ALBUMIN QUANTITATIVE: CPT

## 2024-02-29 PROCEDURE — 83970 ASSAY OF PARATHORMONE: CPT

## 2024-02-29 PROCEDURE — 36415 COLL VENOUS BLD VENIPUNCTURE: CPT

## 2024-02-29 PROCEDURE — 82570 ASSAY OF URINE CREATININE: CPT

## 2024-02-29 PROCEDURE — 82330 ASSAY OF CALCIUM: CPT

## 2024-03-01 ENCOUNTER — DOCUMENTATION (OUTPATIENT)
Dept: INTERNAL MEDICINE CLINIC | Facility: CLINIC | Age: 49
End: 2024-03-01

## 2024-03-01 ENCOUNTER — TELEPHONE (OUTPATIENT)
Age: 49
End: 2024-03-01

## 2024-03-01 ENCOUNTER — APPOINTMENT (OUTPATIENT)
Dept: LAB | Facility: HOSPITAL | Age: 49
End: 2024-03-01
Payer: COMMERCIAL

## 2024-03-01 DIAGNOSIS — K08.89 TOOTHACHE: Primary | ICD-10-CM

## 2024-03-01 DIAGNOSIS — E83.51 HYPOCALCEMIA: ICD-10-CM

## 2024-03-01 LAB
CA-I BLD-SCNC: 1.29 MMOL/L (ref 1.12–1.32)
CALCIUM SERPL-MCNC: 10.2 MG/DL (ref 8.4–10.2)

## 2024-03-01 PROCEDURE — 36415 COLL VENOUS BLD VENIPUNCTURE: CPT

## 2024-03-01 PROCEDURE — 82330 ASSAY OF CALCIUM: CPT

## 2024-03-01 RX ORDER — AMOXICILLIN 500 MG/1
500 CAPSULE ORAL EVERY 8 HOURS SCHEDULED
Qty: 21 CAPSULE | Refills: 0 | Status: SHIPPED | OUTPATIENT
Start: 2024-03-01 | End: 2024-03-08

## 2024-03-01 NOTE — PROGRESS NOTES
Received a call from the patient at our office Friday, 3/1/2024 at around 2:30 PM.  I am my  discussed with the patient in my presence.  Patient complained of the right tooth ache.  Unable to reach to her usual dentist.  She is a known case of cracked tooth as well as the cavity.  She has tried to reach out to the dentist without success.  Patient took 2 leftover amoxicillin yesterday.  Patient is requesting amoxicillin.  Patient does not want to go to the emergency room.  Patient refuses to go to the urgent care due to lack of transportation.    Patient is not allergic to any medication.  Except Vicodin.  Xanax calcium Pristiq Cymbalta fenofibric acid for risk self-paced trilogy Vascepa metformin lidocaine Zofran KCl oxycodone senna Zanaflex nasal    Extensive medical history reviewed include history of anemia anxiety arthritis asthma low back pain hypocalcemia hypoparathyroidism history of transfusion hyperlipidemia history of hypothyroidism migraine kidney stone seizure PTSD spondylosis and others.      SH:history of back surgery breast biopsy  cystoscopy parathyroidectomy hysterectomy multiple other surgeries that outlined including thyroidectomy tonsillectomy and tubal ligation    Allergies include hydrocodone, morphine Vicodin and adhesive tape.    Denies any facial swelling any bleeding symptoms suggestive of abscess or cellulitis of the face or mouth    Was advised to go to the urgent care she does not know to go patient does not want to go to the emergency room.  Patient is requesting amoxicillin.  Patient does not have transportation.    Is around 4 PM.  We will call in amoxicillin 5 mg 1 tablet 3 times a day for 7 days.    Patient advised to go to the emergency room for any worsening.  Recommend to stay on the soft diet.  May take Tylenol as needed pain.  Needs to see dentist as soon as possible.  Advised to see PCP on Monday I have been.  Side effect known to the patient

## 2024-03-01 NOTE — TELEPHONE ENCOUNTER
Pt called again, complaining of tooth pain.  Her dentist was closed today and she would like a script sent in for Amoxicillin, which Dr. Ramos has prescribed for her before.

## 2024-03-01 NOTE — TELEPHONE ENCOUNTER
Pt is calling in stating that her mouth is hurting and would like if Dr. Ramos to call in Amoxicillin for her. Pt stated she called her dentist office but they are closed and mentioned that Dr. Ramos has called in this prescription for her once. Please advise with the pt if needed thank you.

## 2024-03-01 NOTE — TELEPHONE ENCOUNTER
Dr. Ramos and his nurse are gone for the day.  Perhaps patient could consider OTC Anbesol until she is able to be seen by her dentist.

## 2024-03-04 ENCOUNTER — APPOINTMENT (OUTPATIENT)
Dept: LAB | Facility: HOSPITAL | Age: 49
End: 2024-03-04
Payer: COMMERCIAL

## 2024-03-04 DIAGNOSIS — E87.6 HYPOPOTASSEMIA: ICD-10-CM

## 2024-03-04 DIAGNOSIS — E83.51 HYPOCALCEMIA: ICD-10-CM

## 2024-03-04 LAB
CA-I BLD-SCNC: 1.16 MMOL/L (ref 1.12–1.32)
CALCIUM SERPL-MCNC: 9 MG/DL (ref 8.4–10.2)

## 2024-03-04 PROCEDURE — 36415 COLL VENOUS BLD VENIPUNCTURE: CPT

## 2024-03-04 PROCEDURE — 82330 ASSAY OF CALCIUM: CPT

## 2024-03-06 ENCOUNTER — PATIENT OUTREACH (OUTPATIENT)
Dept: INTERNAL MEDICINE CLINIC | Facility: CLINIC | Age: 49
End: 2024-03-06

## 2024-03-06 ENCOUNTER — APPOINTMENT (OUTPATIENT)
Dept: LAB | Facility: HOSPITAL | Age: 49
End: 2024-03-06
Payer: COMMERCIAL

## 2024-03-06 DIAGNOSIS — E83.51 HYPOCALCEMIA: ICD-10-CM

## 2024-03-06 LAB
CA-I BLD-SCNC: 1.27 MMOL/L (ref 1.12–1.32)
CALCIUM SERPL-MCNC: 10.7 MG/DL (ref 8.4–10.2)

## 2024-03-06 PROCEDURE — 82330 ASSAY OF CALCIUM: CPT

## 2024-03-06 PROCEDURE — 36415 COLL VENOUS BLD VENIPUNCTURE: CPT

## 2024-03-06 NOTE — PROGRESS NOTES
Chart reviewed and this RNCM called patient and introduced self and explained the role of the RNCM and CCM services. Patient states she is doing well. She states she has all of her medications and takes them as directed. She has no barriers to obtaining. Patient states she has no barriers to doctors apt's. Patient declined CCM services.    This RNCM removed self from care team.    Patient is a Rising Utilizer and was referred to High Utilizer committee for review.     Note routed to Danie Morrell RNCM.

## 2024-03-08 ENCOUNTER — APPOINTMENT (OUTPATIENT)
Dept: LAB | Facility: HOSPITAL | Age: 49
End: 2024-03-08
Attending: INTERNAL MEDICINE
Payer: COMMERCIAL

## 2024-03-08 DIAGNOSIS — E87.6 HYPOPOTASSEMIA: ICD-10-CM

## 2024-03-08 DIAGNOSIS — E83.51 HYPOCALCEMIA: ICD-10-CM

## 2024-03-08 DIAGNOSIS — E20.89 OTHER HYPOPARATHYROIDISM: ICD-10-CM

## 2024-03-08 DIAGNOSIS — D50.8 IRON DEFICIENCY ANEMIA SECONDARY TO INADEQUATE DIETARY IRON INTAKE: ICD-10-CM

## 2024-03-08 DIAGNOSIS — J45.40 MODERATE PERSISTENT ASTHMA, UNSPECIFIED WHETHER COMPLICATED: ICD-10-CM

## 2024-03-08 LAB
CA-I BLD-SCNC: 1.23 MMOL/L (ref 1.12–1.32)
CALCIUM SERPL-MCNC: 10 MG/DL (ref 8.4–10.2)

## 2024-03-08 PROCEDURE — 36415 COLL VENOUS BLD VENIPUNCTURE: CPT

## 2024-03-08 PROCEDURE — 82330 ASSAY OF CALCIUM: CPT

## 2024-03-08 RX ORDER — ALBUTEROL SULFATE 90 UG/1
AEROSOL, METERED RESPIRATORY (INHALATION)
Qty: 18 G | Refills: 1 | Status: SHIPPED | OUTPATIENT
Start: 2024-03-08

## 2024-03-08 RX ORDER — FERROUS SULFATE 325(65) MG
TABLET ORAL
Qty: 60 TABLET | Refills: 5 | Status: SHIPPED | OUTPATIENT
Start: 2024-03-08

## 2024-03-11 ENCOUNTER — APPOINTMENT (OUTPATIENT)
Dept: LAB | Facility: HOSPITAL | Age: 49
End: 2024-03-11
Payer: COMMERCIAL

## 2024-03-11 DIAGNOSIS — E83.51 HYPOCALCEMIA: ICD-10-CM

## 2024-03-11 LAB
CA-I BLD-SCNC: 1.27 MMOL/L (ref 1.12–1.32)
CALCIUM SERPL-MCNC: 10.7 MG/DL (ref 8.4–10.2)

## 2024-03-11 PROCEDURE — 36415 COLL VENOUS BLD VENIPUNCTURE: CPT

## 2024-03-11 PROCEDURE — 82330 ASSAY OF CALCIUM: CPT

## 2024-03-12 ENCOUNTER — TELEMEDICINE (OUTPATIENT)
Dept: BEHAVIORAL/MENTAL HEALTH CLINIC | Facility: CLINIC | Age: 49
End: 2024-03-12
Payer: COMMERCIAL

## 2024-03-12 DIAGNOSIS — F41.0 GENERALIZED ANXIETY DISORDER WITH PANIC ATTACKS: ICD-10-CM

## 2024-03-12 DIAGNOSIS — F33.1 MODERATE EPISODE OF RECURRENT MAJOR DEPRESSIVE DISORDER (HCC): Primary | ICD-10-CM

## 2024-03-12 DIAGNOSIS — F41.1 GENERALIZED ANXIETY DISORDER WITH PANIC ATTACKS: ICD-10-CM

## 2024-03-12 DIAGNOSIS — F17.210 NICOTINE DEPENDENCE, CIGARETTES, UNCOMPLICATED: ICD-10-CM

## 2024-03-12 PROCEDURE — 90834 PSYTX W PT 45 MINUTES: CPT | Performed by: SOCIAL WORKER

## 2024-03-12 NOTE — DISCHARGE INSTRUCTIONS
ESTABLISHED PRIMARY CARE VISIT    3/12/24  Name: Marleen Walker   : 1958   Age: 66 y.o.  Sex: female        Assessment & Plan:     Problem List Items Addressed This Visit       Type 2 diabetes mellitus with diabetic polyneuropathy, without long-term current use of insulin (HCC) - Primary     Chronic, controlled with A1c 5.9  Feels symptomatic with low normal blood sugars  Trial holding metformin, monitoring blood sugars  Diabetic wellness when able, declines today         Relevant Orders    POCT glycosylated hemoglobin (Hb A1C) (Completed)    Microalbumin, Ur (Completed)    Lipid, Fasting (Completed)    B12 deficiency     Not currently treated  Recheck         Relevant Orders    Vitamin B12 & Folate (Completed)    Mixed hyperlipidemia     History of CAD  Unable to tolerate statins or ezetimibe due to side effects  Consider [id number], patient declines for now         Bilateral adrenal adenomas     Benign MRI 2023, no further workup         Moderate episode of recurrent major depressive disorder (HCC)     Chronic, improved  Not currently treated         Relevant Orders    CBC with Auto Differential (Completed)    TSH (Completed)    Comprehensive Metabolic Panel, Fasting (Completed)     Counseled patient regarding above diagnosis, including possible risks and complications, especially if left uncontrolled.  Counseled patient as appropriate and relevant regarding any possible side effects, risks, and alternatives to treatment; the patient verbalizes understanding, and is in agreement with the plan as detailed above.   All educational materials and instructions were discussed and included on the After Visit Summary.  All questions answered to the patient's satisfaction.  The patient was advised to call or send [employer] message for any concerns prior to next appointment.    Return in about 4 months (around 2024).    Subjective:     Chief Complaint   Patient presents with    3 Month Follow-Up     Would  Your ionized calcium was 1.02. We treated you with IV calcium in the emergency department. If you have any significant worsening of symptoms, return to the emergency department.
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
## 2024-03-13 ENCOUNTER — APPOINTMENT (OUTPATIENT)
Dept: LAB | Facility: HOSPITAL | Age: 49
End: 2024-03-13
Attending: INTERNAL MEDICINE
Payer: COMMERCIAL

## 2024-03-13 DIAGNOSIS — E83.51 HYPOCALCEMIA: ICD-10-CM

## 2024-03-13 LAB
CA-I BLD-SCNC: 1.28 MMOL/L (ref 1.12–1.32)
CALCIUM SERPL-MCNC: 11.3 MG/DL (ref 8.4–10.2)

## 2024-03-13 PROCEDURE — 36415 COLL VENOUS BLD VENIPUNCTURE: CPT

## 2024-03-13 PROCEDURE — 82330 ASSAY OF CALCIUM: CPT

## 2024-03-14 ENCOUNTER — APPOINTMENT (OUTPATIENT)
Dept: LAB | Facility: HOSPITAL | Age: 49
End: 2024-03-14
Payer: COMMERCIAL

## 2024-03-14 DIAGNOSIS — E83.51 HYPOCALCEMIA: ICD-10-CM

## 2024-03-14 LAB
CA-I BLD-SCNC: 1.32 MMOL/L (ref 1.12–1.32)
CALCIUM SERPL-MCNC: 11.1 MG/DL (ref 8.4–10.2)

## 2024-03-14 PROCEDURE — 82330 ASSAY OF CALCIUM: CPT

## 2024-03-14 PROCEDURE — 36415 COLL VENOUS BLD VENIPUNCTURE: CPT

## 2024-03-15 ENCOUNTER — APPOINTMENT (OUTPATIENT)
Dept: LAB | Facility: HOSPITAL | Age: 49
End: 2024-03-15
Payer: COMMERCIAL

## 2024-03-15 DIAGNOSIS — E83.51 HYPOCALCEMIA: ICD-10-CM

## 2024-03-15 LAB
CA-I BLD-SCNC: 1.25 MMOL/L (ref 1.12–1.32)
CALCIUM SERPL-MCNC: 10.5 MG/DL (ref 8.4–10.2)

## 2024-03-15 PROCEDURE — 36415 COLL VENOUS BLD VENIPUNCTURE: CPT

## 2024-03-15 PROCEDURE — 82330 ASSAY OF CALCIUM: CPT

## 2024-03-17 ENCOUNTER — HOSPITAL ENCOUNTER (EMERGENCY)
Facility: HOSPITAL | Age: 49
Discharge: HOME | End: 2024-03-17
Payer: COMMERCIAL

## 2024-03-17 ENCOUNTER — APPOINTMENT (EMERGENCY)
Dept: RADIOLOGY | Facility: HOSPITAL | Age: 49
End: 2024-03-17
Payer: COMMERCIAL

## 2024-03-17 VITALS
TEMPERATURE: 98.8 F | DIASTOLIC BLOOD PRESSURE: 67 MMHG | HEART RATE: 110 BPM | WEIGHT: 213 LBS | OXYGEN SATURATION: 99 % | BODY MASS INDEX: 38.96 KG/M2 | SYSTOLIC BLOOD PRESSURE: 160 MMHG | RESPIRATION RATE: 20 BRPM

## 2024-03-17 DIAGNOSIS — M79.672 FOOT PAIN, LEFT: Primary | ICD-10-CM

## 2024-03-17 DIAGNOSIS — M25.572 ANKLE PAIN, LEFT: ICD-10-CM

## 2024-03-17 PROCEDURE — 99284 EMERGENCY DEPT VISIT MOD MDM: CPT | Performed by: PHYSICIAN ASSISTANT

## 2024-03-17 PROCEDURE — 73630 X-RAY EXAM OF FOOT: CPT

## 2024-03-17 PROCEDURE — 99283 EMERGENCY DEPT VISIT LOW MDM: CPT

## 2024-03-17 RX ORDER — ACETAMINOPHEN 500 MG
500 TABLET ORAL EVERY 6 HOURS PRN
Qty: 30 TABLET | Refills: 0 | Status: SHIPPED | OUTPATIENT
Start: 2024-03-17

## 2024-03-17 RX ORDER — IBUPROFEN 400 MG/1
400 TABLET ORAL EVERY 6 HOURS PRN
Qty: 30 TABLET | Refills: 0 | Status: SHIPPED | OUTPATIENT
Start: 2024-03-17

## 2024-03-17 RX ORDER — ACETAMINOPHEN 325 MG/1
975 TABLET ORAL ONCE
Status: COMPLETED | OUTPATIENT
Start: 2024-03-17 | End: 2024-03-17

## 2024-03-17 RX ADMIN — ACETAMINOPHEN 975 MG: 325 TABLET ORAL at 17:13
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
## 2024-03-18 ENCOUNTER — APPOINTMENT (OUTPATIENT)
Dept: LAB | Facility: HOSPITAL | Age: 49
End: 2024-03-18
Attending: INTERNAL MEDICINE
Payer: COMMERCIAL

## 2024-03-18 DIAGNOSIS — E83.51 HYPOCALCEMIA: ICD-10-CM

## 2024-03-18 LAB
CA-I BLD-SCNC: 1.24 MMOL/L (ref 1.12–1.32)
CALCIUM SERPL-MCNC: 10.3 MG/DL (ref 8.4–10.2)

## 2024-03-18 PROCEDURE — 82330 ASSAY OF CALCIUM: CPT

## 2024-03-18 PROCEDURE — 36415 COLL VENOUS BLD VENIPUNCTURE: CPT

## 2024-03-20 ENCOUNTER — HOSPITAL ENCOUNTER (EMERGENCY)
Facility: HOSPITAL | Age: 49
Discharge: HOME | End: 2024-03-20
Attending: EMERGENCY MEDICINE
Payer: COMMERCIAL

## 2024-03-20 ENCOUNTER — TELEPHONE (OUTPATIENT)
Dept: OTHER | Facility: HOSPITAL | Age: 49
End: 2024-03-20

## 2024-03-20 ENCOUNTER — APPOINTMENT (OUTPATIENT)
Dept: LAB | Facility: HOSPITAL | Age: 49
End: 2024-03-20
Attending: INTERNAL MEDICINE
Payer: COMMERCIAL

## 2024-03-20 ENCOUNTER — OFFICE VISIT (OUTPATIENT)
Dept: PULMONOLOGY | Facility: MEDICAL CENTER | Age: 49
End: 2024-03-20
Payer: COMMERCIAL

## 2024-03-20 ENCOUNTER — TELEPHONE (OUTPATIENT)
Age: 49
End: 2024-03-20

## 2024-03-20 VITALS
OXYGEN SATURATION: 97 % | WEIGHT: 205.6 LBS | SYSTOLIC BLOOD PRESSURE: 122 MMHG | HEIGHT: 62 IN | HEART RATE: 109 BPM | TEMPERATURE: 98 F | BODY MASS INDEX: 37.84 KG/M2 | DIASTOLIC BLOOD PRESSURE: 84 MMHG | RESPIRATION RATE: 12 BRPM

## 2024-03-20 VITALS
HEART RATE: 94 BPM | TEMPERATURE: 98.2 F | OXYGEN SATURATION: 95 % | SYSTOLIC BLOOD PRESSURE: 134 MMHG | DIASTOLIC BLOOD PRESSURE: 84 MMHG | RESPIRATION RATE: 18 BRPM

## 2024-03-20 DIAGNOSIS — R73.9 HYPERGLYCEMIA: ICD-10-CM

## 2024-03-20 DIAGNOSIS — J45.20 MILD INTERMITTENT REACTIVE AIRWAY DISEASE WITHOUT COMPLICATION: Primary | ICD-10-CM

## 2024-03-20 DIAGNOSIS — E83.51 HYPOCALCEMIA: ICD-10-CM

## 2024-03-20 DIAGNOSIS — E55.9 VITAMIN D DEFICIENCY: ICD-10-CM

## 2024-03-20 DIAGNOSIS — E78.00 PURE HYPERCHOLESTEROLEMIA: Primary | ICD-10-CM

## 2024-03-20 DIAGNOSIS — F17.210 NICOTINE DEPENDENCE, CIGARETTES, UNCOMPLICATED: ICD-10-CM

## 2024-03-20 DIAGNOSIS — E89.2 POST-SURGICAL HYPOPARATHYROIDISM (HCC): ICD-10-CM

## 2024-03-20 DIAGNOSIS — R20.2 PARESTHESIA: Primary | ICD-10-CM

## 2024-03-20 DIAGNOSIS — E89.0 POSTOPERATIVE HYPOTHYROIDISM: ICD-10-CM

## 2024-03-20 DIAGNOSIS — N18.31 STAGE 3A CHRONIC KIDNEY DISEASE (HCC): ICD-10-CM

## 2024-03-20 LAB
ALBUMIN SERPL BCP-MCNC: 4 G/DL (ref 3.5–5)
ALP SERPL-CCNC: 56 U/L (ref 34–104)
ALT SERPL W P-5'-P-CCNC: 37 U/L (ref 7–52)
AST SERPL W P-5'-P-CCNC: 18 U/L (ref 13–39)
BILIRUB DIRECT SERPL-MCNC: 0.06 MG/DL (ref 0–0.2)
BILIRUB SERPL-MCNC: 0.37 MG/DL (ref 0.2–1)
CA-I BLD-SCNC: 1.2 MMOL/L (ref 1.12–1.32)
CA-I BLD-SCNC: 1.23 MMOL/L (ref 1.12–1.32)
CALCIUM SERPL-MCNC: 10.1 MG/DL (ref 8.4–10.2)
CALCIUM SERPL-MCNC: 9.7 MG/DL (ref 8.4–10.2)
PROT SERPL-MCNC: 6.7 G/DL (ref 6.4–8.4)

## 2024-03-20 PROCEDURE — 99284 EMERGENCY DEPT VISIT MOD MDM: CPT

## 2024-03-20 PROCEDURE — 80076 HEPATIC FUNCTION PANEL: CPT

## 2024-03-20 PROCEDURE — 93005 ELECTROCARDIOGRAM TRACING: CPT

## 2024-03-20 PROCEDURE — 99285 EMERGENCY DEPT VISIT HI MDM: CPT | Performed by: EMERGENCY MEDICINE

## 2024-03-20 PROCEDURE — 99213 OFFICE O/P EST LOW 20 MIN: CPT | Performed by: INTERNAL MEDICINE

## 2024-03-20 PROCEDURE — 36415 COLL VENOUS BLD VENIPUNCTURE: CPT

## 2024-03-20 PROCEDURE — 82330 ASSAY OF CALCIUM: CPT | Performed by: EMERGENCY MEDICINE

## 2024-03-20 PROCEDURE — 82330 ASSAY OF CALCIUM: CPT

## 2024-03-20 RX ORDER — CALCITRIOL 0.25 UG/1
0.25 CAPSULE, LIQUID FILLED ORAL 2 TIMES DAILY
Qty: 180 CAPSULE | Refills: 3 | Status: SHIPPED | OUTPATIENT
Start: 2024-03-20 | End: 2024-03-22 | Stop reason: SDUPTHER

## 2024-03-20 RX ORDER — VARENICLINE TARTRATE 1 MG/1
1 TABLET, FILM COATED ORAL 2 TIMES DAILY
Qty: 60 TABLET | Refills: 2 | Status: SHIPPED | OUTPATIENT
Start: 2024-03-20

## 2024-03-20 RX ORDER — FLUTICASONE FUROATE, UMECLIDINIUM BROMIDE AND VILANTEROL TRIFENATATE 100; 62.5; 25 UG/1; UG/1; UG/1
1 POWDER RESPIRATORY (INHALATION) DAILY
Qty: 60 BLISTER | Refills: 7 | Status: SHIPPED | OUTPATIENT
Start: 2024-03-20 | End: 2024-04-19
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
## 2024-03-20 NOTE — PROGRESS NOTES
NEPHROLOGY OFFICE VISIT   Yamileth Vale 48 y.o. female MRN: [id number]  3/22/2024    Reason for Visit: Follow up    INTERVAL HISTORY and SUBJECTIVE:    I had the pleasure of seeing Yamileth today in the renal clinic. She is a 48-year-old female who follows with Dr. Zambrano.  She has a past medical history of tobacco dependence, hypothyroidism status post thyroid resection, chronic hypocalcemia following parathyroidectomy, chronic back pain, anxiety, depression and GERD, iron deficiency anemia.  She was seen last by Dr. Zambrano June 2022 due to acute kidney injury with creatinine elevated to 2.4 during hospitalization with severe nausea and vomiting.  Follow-up creatinine was 1.3.  She was also seen for evaluation of alterations in calcium.  She had episode of hypercalcemia during hospitalization and then was hypocalcemic therefore Dr. Zambrano investigated further.  Subsequently she is now followed by Dr. Pabon for management of postoperative hypoparathyroidism.    Frequent ER visits due to perceived signs and symptoms of low calcium.  Generally calcium levels have been above or at normal.  She has been receiving repeated doses of IV calcium.  Patient reports difficulty managing calcium supplements and dosages.  She reports sudden changes in calcium levels which cause numbness and tingling.  She reports that heat causes her calcium to drop also.    ASSESSMENT AND PLAN:    Renal:     CKD, stage IIIa:  In September/October/November 2023 creatinine near 1.5-1.7  Albumin creatinine ratio within normal limits  Baseline creatinine near 1 to 1.2 mg/dL  Creatinine 1.14 as of 2/29/2024  Concern for renal effects of hypercalcemia  Reports urinary incontinience and difficulty passing urine.  Plan:  Follow up in 6 months  Renal US with PVR urinary complaints.  Difficulty emptying bladder.  Prior history of bladder prolapse.    Postoperative hypoparathyroidism:  Recent PTH level less than 1 in the setting of  "hypercalcemia.  Management: Following with Dr. Pabon.    Medical management: On calcitriol 0.5 mg in the morning and 0.25 mg in the evening also on calcium supplement 3 times a day.  D3 5000 units daily    Hypomagnesemia: on supplement 3 times a day.  Magnesium level normal    Hypercalcemia:  Calcium levels elevated earlier this month: Peak 11.3 3/14/2024 and down to 9.7 on 3/20.  Patient was seen in the emergency room on 3/20 due to numbness and tingling and concerns for low calcium.  Calcium level was noted to be within normal limits and normal ionized calcium levels also  Ionized calcium within normal limits  Last labs on 3/21/2024 the day prior to office visit.  Calcium ionized 1.31.  Serum calcium 11.9.  PTH levels generally less than 6.3 but most recently less than 1.  Feels less symptomatic when calcium level is near 11  Following with Dr. Pabon from endocrinology.  24 hr urine for calcium ordered but will not be able to be completed due to incontinence- discussed with Dr Pabon  On high utilizer tract at this point with plan to only administer IV calcium if calcium level is less than 7.5.  \"Vomiting because the calcium is too high\" right now. Low calcium causes paresthesias and face dropping.   Concern for hungry bone  She is going to follow-up with her homeopathic provider.    Tobacco dependence:   Follows with pulmonary services for management of shortness of breath and cough.    Hypertriglyceridemia: On fenofibrate    Hypothyroidism on levothyroxine    Other problems:    Fatty liver  Elevated BMI  Prediabetes: Follows with Dr. Pabon    PATIENT INSTRUCTIONS:    Patient Instructions   Thank you for the visit.  You were seen today for continued monitoring of renal function/kidney function.  At this time creatinine is at or slightly below baseline.  Kidney function is essentially stable although there  is concern that elevated calcium level may be affecting renal function.    Recommendations:  To preserve " "kidney function avoid all NSAIDs such as Motrin Aleve ibuprofen and naproxen.  If you are unsure about the safety of medication call our office or asked the pharmacist.  If you have pain you can take Tylenol  No change in medication  Blood pressure acceptable.  Go for renal ultrasound to make sure you are not retaining urine and also to assess kidney size and other aspects of kidney tissue.      Orders Placed This Encounter   Procedures    US kidney and bladder with pvr     Standing Status:   Future     Standing Expiration Date:   3/22/2028     Scheduling Instructions:      13 years and Up - 1)  Full bladder required.  2)  Please drink 24-32 oz of water 1 hour prior to appointment time.3)  No voiding 1 hour prior to appointment time.            \"Prep required if being scheduled in conjunction withother studies, refer to those examination's Preps first before scheduling.            Patient must drink 24 oz of water 60 minutes before your scheduled appointment time. This test requires you to have a FULL bladder. Please donot urinate 1 hour before your appointment time. Patient is not to urinate until their Ultrasound is completed. Bladder filling is a key part of this study       and needs to be full for accurate imaging during this exam.            Please bring your insurance cards, a form of photo ID and a list of your medications with you. Arrive 15 minutes prior to your appointment time in order to register.            If you need to have lab work or aurinalysis, please do this AFTER your ultrasound. \"     Order Specific Question:   Is a Renal Artery Doppler also being requested in addition to the Kidney/Renal ultrasound ?     Answer:   No    Albumin / creatinine urine ratio     Standing Status:   Future     Number of Occurrences:   1     Standing Expiration Date:   3/22/2025    CBC     Standing Status:   Future     Number of Occurrences:   1     Standing Expiration Date:   3/22/2025    Renal function panel     " "Standing Status:   Future     Number of Occurrences:   1     Standing Expiration Date:   3/22/2025    PTH, intact     Standing Status:   Future     Number of Occurrences:   1     Standing Expiration Date:   3/22/2025    Urinalysis with microscopic     Standing Status:   Future     Number of Occurrences:   1     Standing Expiration Date:   3/22/2025    Magnesium     Standing Status:   Future     Number of Occurrences:   1     Standing Expiration Date:   3/22/2025       OBJECTIVE:  Current Weight: Weight - Scale: 97.1 kg (214 lb)  Vitals:    03/22/24 1046   BP: 118/84   BP Location: Left arm   Patient Position: Sitting   Cuff Size: Large   Pulse: (!) 110   SpO2: 97%   Weight: 97.1 kg (214 lb)   Height: 5' 2\" (1.575 m)    Body mass index is 39.14 kg/m².      REVIEW OF SYSTEMS:    Review of Systems   Constitutional:  Negative for appetite change, chills, fever and unexpected weight change.   HENT:  Negative for congestion and sore throat.    Eyes:  Negative for visual disturbance.   Respiratory:  Negative for cough and shortness of breath.    Cardiovascular:  Negative for chest pain, palpitations and leg swelling.   Gastrointestinal:  Positive for vomiting (Due to \"high calcium\"). Negative for abdominal pain and diarrhea.   Endocrine: Positive for heat intolerance.   Genitourinary:  Positive for dysuria and frequency. Negative for hematuria.   Musculoskeletal:  Positive for arthralgias, back pain, gait problem and neck pain.   Skin:  Negative for color change and rash.   Neurological:  Positive for numbness. Negative for dizziness, seizures and syncope.   Hematological:  Does not bruise/bleed easily.   Psychiatric/Behavioral:  The patient is nervous/anxious.    All other systems reviewed and are negative.      PHYSICAL EXAM:      Physical Exam  Constitutional:       General: She is not in acute distress.     Appearance: She is well-developed. She is not ill-appearing, toxic-appearing or diaphoretic.   HENT:      Head: " Normocephalic and atraumatic.      Nose: Nose normal. No congestion.      Mouth/Throat:      Mouth: Mucous membranes are moist.      Pharynx: No oropharyngeal exudate.   Eyes:      General: No scleral icterus.        Right eye: No discharge.         Left eye: No discharge.      Extraocular Movements: Extraocular movements intact.      Conjunctiva/sclera: Conjunctivae normal.      Pupils: Pupils are equal, round, and reactive to light.   Neck:      Thyroid: No thyromegaly.      Vascular: No carotid bruit or JVD.      Trachea: No tracheal deviation.   Cardiovascular:      Rate and Rhythm: Regular rhythm. Tachycardia present.      Heart sounds: No murmur heard.     No friction rub. No gallop.   Pulmonary:      Effort: Pulmonary effort is normal.      Breath sounds: Normal breath sounds.   Abdominal:      General: Bowel sounds are normal. There is no distension.      Palpations: Abdomen is soft. There is no mass.      Tenderness: There is no abdominal tenderness. There is no guarding or rebound.   Musculoskeletal:         General: Signs of injury (Left foot in boot due to hairline fx) present. No tenderness. Normal range of motion.      Cervical back: Normal range of motion and neck supple. No rigidity or tenderness.      Right lower leg: No edema.      Left lower leg: No edema.   Skin:     General: Skin is warm and dry.      Capillary Refill: Capillary refill takes less than 2 seconds.      Coloration: Skin is not jaundiced or pale.      Findings: No erythema or rash.   Neurological:      Mental Status: She is alert and oriented to person, place, and time.   Psychiatric:         Mood and Affect: Mood normal.         Behavior: Behavior normal.         Thought Content: Thought content normal.         Judgment: Judgment normal.         Medications:    Current Outpatient Medications:     albuterol (PROVENTIL HFA,VENTOLIN HFA) 90 mcg/act inhaler, INHALE 1 PUFF BY MOUTH EVERY 6 HOURS AS NEEDED FOR WHEEZE, Disp: 18 g, Rfl:  1    ALPRAZolam ER (XANAX XR) 2 MG 24 hr tablet, Take 1 tablet (2 mg total) by mouth 2 (two) times a day before breakfast and lunch, Disp: 60 tablet, Rfl: 0    calcitriol (ROCALTROL) 0.25 mcg capsule, Take 1 capsule (0.25 mcg total) by mouth 3 (three) times a day, Disp: 270 capsule, Rfl: 2    calcium carbonate (OS-EDER) 600 MG tablet, Take 1 pill daily twice a day, Disp: 180 tablet, Rfl: 10    Cholecalciferol (Vitamin D3) 125 MCG (5000 UT) CAPS, Take 5000 IU daily, Disp: , Rfl:     desvenlafaxine succinate (PRISTIQ) 50 mg 24 hr tablet, Take 1 tablet (50 mg total) by mouth daily for 7 days And then STOP TAKING, Disp: 7 tablet, Rfl: 0    fenofibrate 160 MG tablet, Take 1 tablet (160 mg total) by mouth daily, Disp: 30 tablet, Rfl: 1    ferrous sulfate 325 (65 Fe) mg tablet, TAKE 1 TABLET BY MOUTH 2 TIMES A DAY WITH MEALS., Disp: 60 tablet, Rfl: 5    fluticasone (FLONASE) 50 mcg/act nasal spray, 1 spray into each nostril daily, Disp: 16 g, Rfl: 0    fluticasone-umeclidinium-vilanterol (Trelegy Ellipta) 100-62.5-25 mcg/actuation inhaler, Inhale 1 puff daily Rinse mouth after use., Disp: 60 blister, Rfl: 7    ibuprofen (MOTRIN) 400 mg tablet, Take 1 tablet (400 mg total) by mouth every 6 (six) hours as needed for mild pain or moderate pain, Disp: 30 tablet, Rfl: 0    levothyroxine 150 mcg tablet, Take 1 tablet (150 mcg total) by mouth daily at bedtime, Disp: 90 tablet, Rfl: 3    magnesium Oxide (MAG-OX) 400 mg TABS, Take 1 tablet (400 mg total) by mouth 3 (three) times a day, Disp: 90 tablet, Rfl: 10    metFORMIN (GLUCOPHAGE) 500 mg tablet, Take 1 tablet (500 mg total) by mouth 2 (two) times a day with meals (Patient taking differently: Take by mouth 3 (three) times a day), Disp: 180 tablet, Rfl: 0    Movantik 25 MG tablet, 25 mg in the morning, Disp: , Rfl:     ondansetron (ZOFRAN) 4 mg tablet, TAKE 1 TABLET BY MOUTH EVERY 8 HOURS AS NEEDED FOR NAUSEA, Disp: 18 tablet, Rfl: 2    oxyCODONE-acetaminophen (PERCOCET)   mg per tablet, 1 tablet 4 (four) times a day, Disp: , Rfl:     potassium chloride (K-DUR,KLOR-CON) 20 mEq tablet, Take 1 tablet (20 mEq total) by mouth 2 (two) times a day, Disp: 60 tablet, Rfl: 10    tiZANidine (ZANAFLEX) 4 mg tablet, Take 4 mg by mouth Three times daily as needed, Disp: , Rfl:     traZODone (DESYREL) 100 mg tablet, Take 1 tablet (100 mg total) by mouth daily at bedtime, Disp: 90 tablet, Rfl: 0    varenicline (CHANTIX) 1 mg tablet, Take 1 tablet (1 mg total) by mouth 2 (two) times a day, Disp: 60 tablet, Rfl: 2    Varenicline Tartrate, Starter, 0.5 MG X 11 & 1 MG X 42 TBPK, Start 0.5 mg daily for 3 days then increase to 0.5 mg twice daily for 4 days.  After that take 1 mg twice daily, Disp: 53 each, Rfl: 0    acetaminophen (TYLENOL) 500 mg tablet, Take 1 tablet (500 mg total) by mouth every 6 (six) hours as needed for mild pain or moderate pain, Disp: 30 tablet, Rfl: 0    Alcohol Swabs 70 % PADS, May substitute brand based on insurance coverage. Check glucose BID., Disp: 100 each, Rfl: 0    bacitracin topical ointment 500 units/g topical ointment, Apply 1 large application topically 2 (two) times a day, Disp: 28 g, Rfl: 0    baclofen 10 mg tablet, Take 10 mg by mouth 3 (three) times a day as needed (Patient not taking: Reported on 3/20/2024), Disp: , Rfl:     Blood Glucose Monitoring Suppl (OneTouch Verio Reflect) w/Device KIT, May substitute brand based on insurance coverage. Check glucose BID., Disp: 1 kit, Rfl: 0    DULoxetine (CYMBALTA) 20 mg capsule, Take 1 capsule (20 mg total) by mouth daily (Start in 7 days after DISCONTINUING Pristiq) (Patient not taking: Reported on 2/20/2024), Disp: 30 capsule, Rfl: 1    glucose blood (OneTouch Verio) test strip, May substitute brand based on insurance coverage. Check glucose BID., Disp: 100 each, Rfl: 0    hydrocortisone 2.5 % cream, Apply 1 application. topically 2 (two) times a day, Disp: , Rfl:     Icosapent Ethyl (Vascepa) 1 g CAPS, Take 2 capsules  (2 g total) by mouth 2 (two) times a day (Patient not taking: Reported on 1/12/2024), Disp: 180 capsule, Rfl: 4    Lidocaine-Menthol 4-1 % PTCH, if needed  , Disp: , Rfl:     methylPREDNISolone 4 MG tablet therapy pack, Use as directed on package (Patient not taking: Reported on 2/1/2024), Disp: 21 tablet, Rfl: 0    mupirocin (BACTROBAN) 2 % ointment, Daily dressing once a day affected area, Disp: 22 g, Rfl: 0    naloxone (NARCAN) 4 mg/0.1 mL nasal spray, Administer 1 spray into a nostril. If no response after 2-3 minutes, give another dose in the other nostril using a new spray. (Patient not taking: Reported on 2/20/2024), Disp: 1 each, Rfl: 1    nystatin (MYCOSTATIN) powder, Apply under the breast twice a day for 1 week, Disp: 60 g, Rfl: 1    ondansetron (ZOFRAN) 4 mg tablet, Take 1 tablet (4 mg total) by mouth every 6 (six) hours (Patient not taking: Reported on 2/20/2024), Disp: 20 tablet, Rfl: 0    OneTouch Delica Lancets 33G MISC, May substitute brand based on insurance coverage. Check glucose BID., Disp: 100 each, Rfl: 0    Senna Plus 8.6-50 MG per tablet, , Disp: , Rfl:     Laboratory Results:  Results from last 7 days   Lab Units 03/21/24  1405 03/20/24  1321 03/20/24  0110 03/18/24  1318 03/15/24  1450   CALCIUM mg/dL 11.9* 9.7 10.1 10.3* 10.5*       Results for orders placed or performed in visit on 03/21/24   Calcium, ionized   Result Value Ref Range    Calcium, Ionized 1.31 1.12 - 1.32 mmol/L   Calcium   Result Value Ref Range    Calcium 11.9 (H) 8.4 - 10.2 mg/dL     *Note: Due to a large number of results and/or encounters for the requested time period, some results have not been displayed. A complete set of results can be found in Results Review.
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
## 2024-03-20 NOTE — TELEPHONE ENCOUNTER
Called patient to introduce myself and discuss care plan.     Reviewed recommendations from Endocrinology regarding treatment of calcium in the ED. She was displeased with recommendations that came directly from her Endocrinologist and stated that she was going to contact her Endocrinologist in person or over the phone.     I discussed this fact with her Endocrinologist and made her aware of the impending encounter.     Rhett Salas Jr., PA-C

## 2024-03-20 NOTE — TELEPHONE ENCOUNTER
Refills have been sent.   Patient's concerns noted.  As discussed with patient previously, it is possible to have symptoms of hypocalcemia even when calcium levels are 8-8.5 however at these levels, it is advised to take additional oral calcium rather than IV calcium as the latter can increase the risk of developing hypercalcemia and kidney disease/kidney stones.  If patient has symptoms that are concerning, she can absolutely go to urgent care/ER so she can have calcium levels checked and if they are lower than 7.5 and it is appropriate, she can be given IV calcium.

## 2024-03-20 NOTE — DISCHARGE INSTRUCTIONS
Your ionized calcium was 1.23  Your Calcium 10.1    Your EKG was unremarkable.    Please continue with your outpatient calcium management.

## 2024-03-20 NOTE — TELEPHONE ENCOUNTER
Patient calling states that she just received a call form someone from [facility] about how much she has been going to the hospital in the last 10 years.  Patient said that she is concerned because she was told that she cannot get the calcium through the IV unless her level is below 7.5.  Patient said she was upset to hear this because she usually experiences symptoms when her calcium levels are 8.  She said she feels like she is self managing her calcium with pills and does not want to end up admitted to hospital.  Informed her will relay her concerns to Dr Pabon.    Patient also needs refills and is not taking as per med list.      Needs the metformin 500 mg tabs.  Says she is taking 1 tab TID.    Needs the calcitriol.  Taking 0.25 mg 2 tabs in AM and 1 tab in PM.     Also needs onetouch lancets and test strips.    Please send to [facility] in [place].    Please advise and call patient.

## 2024-03-21 ENCOUNTER — TELEPHONE (OUTPATIENT)
Dept: CARDIOLOGY CLINIC | Facility: CLINIC | Age: 49
End: 2024-03-21

## 2024-03-21 ENCOUNTER — PATIENT OUTREACH (OUTPATIENT)
Dept: CASE MANAGEMENT | Facility: OTHER | Age: 49
End: 2024-03-21

## 2024-03-21 ENCOUNTER — APPOINTMENT (OUTPATIENT)
Dept: RADIOLOGY | Facility: CLINIC | Age: 49
End: 2024-03-21
Payer: COMMERCIAL

## 2024-03-21 ENCOUNTER — APPOINTMENT (OUTPATIENT)
Dept: LAB | Facility: HOSPITAL | Age: 49
End: 2024-03-21
Payer: COMMERCIAL

## 2024-03-21 ENCOUNTER — OFFICE VISIT (OUTPATIENT)
Dept: OBGYN CLINIC | Facility: CLINIC | Age: 49
End: 2024-03-21
Payer: COMMERCIAL

## 2024-03-21 ENCOUNTER — DOCUMENTATION (OUTPATIENT)
Dept: CASE MANAGEMENT | Facility: OTHER | Age: 49
End: 2024-03-21

## 2024-03-21 VITALS
BODY MASS INDEX: 37.73 KG/M2 | DIASTOLIC BLOOD PRESSURE: 74 MMHG | SYSTOLIC BLOOD PRESSURE: 103 MMHG | HEART RATE: 128 BPM | WEIGHT: 205 LBS | HEIGHT: 62 IN

## 2024-03-21 DIAGNOSIS — Z71.89 COMPLEX CARE COORDINATION: Primary | ICD-10-CM

## 2024-03-21 DIAGNOSIS — M79.672 PAIN IN LEFT FOOT: ICD-10-CM

## 2024-03-21 DIAGNOSIS — E83.51 HYPOCALCEMIA: ICD-10-CM

## 2024-03-21 DIAGNOSIS — S93.602A FOOT SPRAIN, LEFT, INITIAL ENCOUNTER: Primary | ICD-10-CM

## 2024-03-21 LAB
CA-I BLD-SCNC: 1.31 MMOL/L (ref 1.12–1.32)
CALCIUM SERPL-MCNC: 11.9 MG/DL (ref 8.4–10.2)

## 2024-03-21 PROCEDURE — 99214 OFFICE O/P EST MOD 30 MIN: CPT | Performed by: ORTHOPAEDIC SURGERY

## 2024-03-21 PROCEDURE — 36415 COLL VENOUS BLD VENIPUNCTURE: CPT

## 2024-03-21 PROCEDURE — 82330 ASSAY OF CALCIUM: CPT

## 2024-03-21 PROCEDURE — 73620 X-RAY EXAM OF FOOT: CPT

## 2024-03-21 NOTE — TELEPHONE ENCOUNTER
Called and spoke with patient.  Informed her of normal enzyme levels.  She was pleased to hear the good news.

## 2024-03-21 NOTE — MEDICAL HIGH UTILIZER
High Utilizer Care Plan for: Yamileth Vale  Patient Name: Yamileth Vale MRN: [id number]       : 1975    Age: 48   Sex: F     Utilization Background: In the 6 months prior to the care plan being written the patient has had 18 ED visits secondary to calcium related complaints. At this time, she has not had concerning radiation exposures. There were 5 occurrences in  where the patient received IV calcium gluconate for normal calcium/ionized calcium levels.     In the last 90 Days:    Most Recent Work Up:    Noncontributory to utilization at this time. Treatment Recommendations:  ED Recommendations: Patient will typically present secondary to numbness or tingling that she will attribute to her calcium levels being off. There has been times where she will self-adjust medications and take large doses of exogenous calcium.     Recommend checking serum calcium and ionized calcium levels. Would only offer IV calcium gluconate if calcium levels are 7.5.     If the patient has hypocalcemic symptoms and her levels are > 7.5 recommend oral calcium intake. This does not need to be ordered in the ED.     Since patient at times over adjusts her medications which results in high serum calcium levels and potentially LELAND, low threshold to supply IV fluids if there is a rise in creatinine or hypercalcemia present.     At every visit patient should be encouraged to reach out to her Endocrinology team regarding her calcium levels and appropriate course of action prior to presenting to the ED.      Inpatient Recommendations: Patient is low risk for readmission at this time. Please see above recommendations regarding calcium supplementation.       Outpatient recommendations: Patient should maintain close follow up with Endocrinology and be encouraged to call Endocrinology office with symptoms or questions regarding labs and medication adjustments. Consideration for outpatient infusions of calcium if needed.        Situation: Patient is a 48 year old female with history of hypoparathyroidism who presents frequently with symptoms perceived to be from hypocalcemia. She has been hypercalcemic at times due to self adjusting medications and has received doses of IV calcium gluconate with normal calcium and ionized calcium levels.      PMH/PSH: Hypoparathyroidism, Prediabetes, CKD 3, Obesity       Assessment                              Drivers of repeated utilization:                 Numbness/tingling thought to be due to low calcium levels     Community Resources in place:    N/A    Patient Care Team:   Matthew Ramos MD - PCP ([facility])  ELIF Roblero, Yisel Pabon MD - Endocrinology   Danie Morrell - Outpatient RN Care Manager              Care plan date and owner: Rhett Salas Jr., PA-C 2/26/2024       Reviewed with patient before discharge?   3/20/2024
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
## 2024-03-21 NOTE — PROGRESS NOTES
Patient was reviewed for a High Utilizer Care Plan & a care plan was approved. She was notified of the plan & it was reviewed with her.    Patient is referred to complex care management as part of the High Utilizer Care Plan program.

## 2024-03-21 NOTE — TELEPHONE ENCOUNTER
----- Message from Vanessa Beckford MD sent at 3/21/2024 11:31 AM EDT -----  Please call and inform patient that the blood test showed that her liver enzymes are normal.

## 2024-03-22 ENCOUNTER — OFFICE VISIT (OUTPATIENT)
Dept: NEPHROLOGY | Facility: CLINIC | Age: 49
End: 2024-03-22
Payer: COMMERCIAL

## 2024-03-22 ENCOUNTER — APPOINTMENT (OUTPATIENT)
Dept: LAB | Facility: HOSPITAL | Age: 49
End: 2024-03-22
Payer: COMMERCIAL

## 2024-03-22 VITALS
DIASTOLIC BLOOD PRESSURE: 84 MMHG | BODY MASS INDEX: 39.38 KG/M2 | OXYGEN SATURATION: 97 % | SYSTOLIC BLOOD PRESSURE: 118 MMHG | WEIGHT: 214 LBS | HEIGHT: 62 IN | HEART RATE: 110 BPM

## 2024-03-22 DIAGNOSIS — E83.51 HYPOCALCEMIA: ICD-10-CM

## 2024-03-22 DIAGNOSIS — D64.9 ANEMIA, UNSPECIFIED TYPE: ICD-10-CM

## 2024-03-22 DIAGNOSIS — N39.8 VOIDING DYSFUNCTION: ICD-10-CM

## 2024-03-22 DIAGNOSIS — F17.200 SMOKING: ICD-10-CM

## 2024-03-22 DIAGNOSIS — E83.52 HYPERCALCEMIA: ICD-10-CM

## 2024-03-22 DIAGNOSIS — N18.31 STAGE 3A CHRONIC KIDNEY DISEASE (HCC): ICD-10-CM

## 2024-03-22 DIAGNOSIS — E89.0 POSTOPERATIVE HYPOTHYROIDISM: ICD-10-CM

## 2024-03-22 DIAGNOSIS — E89.2 POST-SURGICAL HYPOPARATHYROIDISM (HCC): Primary | ICD-10-CM

## 2024-03-22 DIAGNOSIS — K76.0 FATTY LIVER DISEASE, NONALCOHOLIC: ICD-10-CM

## 2024-03-22 DIAGNOSIS — E78.1 HYPERTRIGLYCERIDEMIA: ICD-10-CM

## 2024-03-22 DIAGNOSIS — E87.6 HYPOPOTASSEMIA: ICD-10-CM

## 2024-03-22 DIAGNOSIS — E55.9 VITAMIN D DEFICIENCY: ICD-10-CM

## 2024-03-22 LAB
CA-I BLD-SCNC: 1.35 MMOL/L (ref 1.12–1.32)
CALCIUM SERPL-MCNC: 11.9 MG/DL (ref 8.4–10.2)

## 2024-03-22 PROCEDURE — 36415 COLL VENOUS BLD VENIPUNCTURE: CPT

## 2024-03-22 PROCEDURE — 99214 OFFICE O/P EST MOD 30 MIN: CPT | Performed by: NURSE PRACTITIONER

## 2024-03-22 PROCEDURE — 82330 ASSAY OF CALCIUM: CPT

## 2024-03-22 RX ORDER — CALCITRIOL 0.25 UG/1
0.25 CAPSULE, LIQUID FILLED ORAL 3 TIMES DAILY
Qty: 270 CAPSULE | Refills: 2 | Status: SHIPPED | OUTPATIENT
Start: 2024-03-22

## 2024-03-22 NOTE — ASSESSMENT & PLAN NOTE
Still smoking 1 few cigarettes per day.  She has used Chantix before and this has helped her needed for prescription for maintenance as she is still struggling with quitting smoking.  I did order Chantix for her

## 2024-03-22 NOTE — PATIENT INSTRUCTIONS
Thank you for the visit.  You were seen today for continued monitoring of renal function/kidney function.  At this time creatinine is at or slightly below baseline.  Kidney function is essentially stable although there  is concern that elevated calcium level may be affecting renal function.    Recommendations:  To preserve kidney function avoid all NSAIDs such as Motrin Aleve ibuprofen and naproxen.  If you are unsure about the safety of medication call our office or asked the pharmacist.  If you have pain you can take Tylenol  No change in medication  Blood pressure acceptable.  Go for renal ultrasound to make sure you are not retaining urine and also to assess kidney size and other aspects of kidney tissue.

## 2024-03-22 NOTE — ASSESSMENT & PLAN NOTE
Yamileth is doing better with Trelegy 100 mcg 1 puff daily.  Lung sounds were clear today.  I did renew prescription for Trelegy 100 mcg 1 puff daily.

## 2024-03-23 ENCOUNTER — APPOINTMENT (OUTPATIENT)
Dept: LAB | Facility: HOSPITAL | Age: 49
End: 2024-03-23
Payer: COMMERCIAL

## 2024-03-23 DIAGNOSIS — E83.51 HYPOCALCEMIA: ICD-10-CM

## 2024-03-23 LAB
CA-I BLD-SCNC: 1.22 MMOL/L (ref 1.12–1.32)
CALCIUM SERPL-MCNC: 9.9 MG/DL (ref 8.4–10.2)

## 2024-03-23 PROCEDURE — 36415 COLL VENOUS BLD VENIPUNCTURE: CPT

## 2024-03-23 PROCEDURE — 82330 ASSAY OF CALCIUM: CPT

## 2024-03-24 LAB
ATRIAL RATE: 89 BPM
P AXIS: 44 DEGREES
PR INTERVAL: 168 MS
QRS AXIS: 21 DEGREES
QRSD INTERVAL: 78 MS
QT INTERVAL: 352 MS
QTC INTERVAL: 428 MS
T WAVE AXIS: 60 DEGREES
VENTRICULAR RATE: 89 BPM

## 2024-03-24 PROCEDURE — 93010 ELECTROCARDIOGRAM REPORT: CPT | Performed by: INTERNAL MEDICINE

## 2024-03-27 ENCOUNTER — TELEMEDICINE (OUTPATIENT)
Dept: BEHAVIORAL/MENTAL HEALTH CLINIC | Facility: CLINIC | Age: 49
End: 2024-03-27
Payer: COMMERCIAL

## 2024-03-27 ENCOUNTER — APPOINTMENT (OUTPATIENT)
Dept: LAB | Facility: HOSPITAL | Age: 49
End: 2024-03-27
Payer: COMMERCIAL

## 2024-03-27 DIAGNOSIS — E83.51 HYPOCALCEMIA: ICD-10-CM

## 2024-03-27 DIAGNOSIS — F33.1 MODERATE EPISODE OF RECURRENT MAJOR DEPRESSIVE DISORDER (HCC): Primary | ICD-10-CM

## 2024-03-27 DIAGNOSIS — F41.0 GENERALIZED ANXIETY DISORDER WITH PANIC ATTACKS: ICD-10-CM

## 2024-03-27 DIAGNOSIS — F41.1 GENERALIZED ANXIETY DISORDER WITH PANIC ATTACKS: ICD-10-CM

## 2024-03-27 DIAGNOSIS — F17.210 NICOTINE DEPENDENCE, CIGARETTES, UNCOMPLICATED: ICD-10-CM

## 2024-03-27 LAB
CA-I BLD-SCNC: 1.11 MMOL/L (ref 1.12–1.32)
CALCIUM SERPL-MCNC: 9.2 MG/DL (ref 8.4–10.2)

## 2024-03-27 PROCEDURE — 36415 COLL VENOUS BLD VENIPUNCTURE: CPT

## 2024-03-27 PROCEDURE — 82330 ASSAY OF CALCIUM: CPT

## 2024-03-27 PROCEDURE — 90834 PSYTX W PT 45 MINUTES: CPT | Performed by: SOCIAL WORKER
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
## 2024-03-28 NOTE — BH CRISIS PLAN
Client Name: Yamileth Vale       Client YOB: 1975    CodyIsmael Safety Plan      Creation Date: 3/27/24 Update Date: 3/27/25   Created By: Elizabeth Ball LCSW       Step 1: Warning Signs:   Warning Signs   Don't want to do anything   Don't care about anything            Step 2: Internal Coping Strategies:   Internal Coping Strategies   Watch TV   Cook            Step 3: People and social settings that provide distraction:   Name Contact Information   Children In cell phone    In cell phone    Places   Grocery store           Step 4: People whom I can ask for help during a crisis:      Name Contact Information     In cell phone      Step 5: Professionals or agencies I can contact during a crisis:      Clinican/Agency Name Phone Emergency Contact    [facility] [phone]       [facility] Emergency Department Emergency Department Phone Emergency Department Address    [facility]          Crisis Phone Numbers:   Suicide Prevention Lifeline: Call or Text  814 Crisis Text Line: Text HOME to 505-233   Please note: Some [facility] do not have a separate number for Child/Adolescent specific crisis. If your county is not listed under Child/Adolescent, please call the adult number for your county      Adult Crisis Numbers: Child/Adolescent Crisis Numbers   [facility]: [phone] [facility]: [phone]   [facility]: [phone] [facility]: [phone]   [facility]: [phone] [place]: [phone]   [facility]: [phone] [facility]: [phone]   [facility]: [phone]   [facility]: [phone]   [facility]: [phone]   [facility]: [phone] (daytime) [phone] (after hours, weekends, holidays)      Step 6: Making the environment safer (plan for lethal means safety):   Patient did not identify any lethal methods: Yes     Optional: What is most  important to me and worth living for?   My kids, nieces and nephew, I want to live for me     Rekha Safety Plan. Joanne Ross and Yogi Guevara. Used with permission of the authors.

## 2024-03-29 ENCOUNTER — APPOINTMENT (OUTPATIENT)
Dept: LAB | Facility: HOSPITAL | Age: 49
End: 2024-03-29
Attending: INTERNAL MEDICINE
Payer: COMMERCIAL

## 2024-03-29 ENCOUNTER — PATIENT OUTREACH (OUTPATIENT)
Dept: CASE MANAGEMENT | Facility: OTHER | Age: 49
End: 2024-03-29

## 2024-03-29 DIAGNOSIS — E55.9 VITAMIN D DEFICIENCY: ICD-10-CM

## 2024-03-29 DIAGNOSIS — N39.8 VOIDING DYSFUNCTION: ICD-10-CM

## 2024-03-29 DIAGNOSIS — D64.9 ANEMIA, UNSPECIFIED TYPE: ICD-10-CM

## 2024-03-29 DIAGNOSIS — F17.200 SMOKING: ICD-10-CM

## 2024-03-29 DIAGNOSIS — E83.52 HYPERCALCEMIA: ICD-10-CM

## 2024-03-29 DIAGNOSIS — E89.2 POST-SURGICAL HYPOPARATHYROIDISM (HCC): Primary | ICD-10-CM

## 2024-03-29 DIAGNOSIS — E83.51 HYPOCALCEMIA: ICD-10-CM

## 2024-03-29 DIAGNOSIS — K76.0 FATTY LIVER DISEASE, NONALCOHOLIC: ICD-10-CM

## 2024-03-29 DIAGNOSIS — E78.1 HYPERTRIGLYCERIDEMIA: ICD-10-CM

## 2024-03-29 LAB
BACTERIA UR QL AUTO: ABNORMAL /HPF
BILIRUB UR QL STRIP: NEGATIVE
CA-I BLD-SCNC: 1.21 MMOL/L (ref 1.12–1.32)
CALCIUM SERPL-MCNC: 9.8 MG/DL (ref 8.4–10.2)
CLARITY UR: CLEAR
COLOR UR: ABNORMAL
GLUCOSE UR STRIP-MCNC: NEGATIVE MG/DL
HGB UR QL STRIP.AUTO: NEGATIVE
KETONES UR STRIP-MCNC: NEGATIVE MG/DL
LEUKOCYTE ESTERASE UR QL STRIP: NEGATIVE
MUCOUS THREADS UR QL AUTO: ABNORMAL
NITRITE UR QL STRIP: POSITIVE
NON-SQ EPI CELLS URNS QL MICRO: ABNORMAL /HPF
PH UR STRIP.AUTO: 6 [PH]
PROT UR STRIP-MCNC: NEGATIVE MG/DL
RBC #/AREA URNS AUTO: ABNORMAL /HPF
SP GR UR STRIP.AUTO: 1.02 (ref 1–1.03)
UROBILINOGEN UR QL STRIP.AUTO: 0.2 E.U./DL
WBC #/AREA URNS AUTO: ABNORMAL /HPF

## 2024-03-29 PROCEDURE — 82043 UR ALBUMIN QUANTITATIVE: CPT

## 2024-03-29 PROCEDURE — 82570 ASSAY OF URINE CREATININE: CPT

## 2024-03-29 PROCEDURE — 81001 URINALYSIS AUTO W/SCOPE: CPT

## 2024-03-29 PROCEDURE — 36415 COLL VENOUS BLD VENIPUNCTURE: CPT

## 2024-03-29 PROCEDURE — 82330 ASSAY OF CALCIUM: CPT

## 2024-03-29 NOTE — PROGRESS NOTES
In basket notification received for care plan upload to chart.  Review of chart and care plan completed.     PMH includes pre-diabetes, thyroidectomy, DDD, non-alcoholic fatty liver, CKD3b, PTSD, panic attacks, depression, opioid dependence and obesity. Please see Problem List for full list of medical diagnoses.     Patient noted to decline outpatient care management 3/6/24.      Patient under the care of:  PCP: Dr. Matthew Ramos   Nephrologist:  ELIF Sosa  Orthopedic: Dr. Morgan Stanley  Pulmonary:  Dr. Tad Coles  Endocrinology: ELIF Roblero      Future appointments:  4/1/2024 1:30 PM Appointment with SHIAR Ascencio at [facility] ([phone])    4/9/2024 12:30 PM Appointment with Morgan Stanley DO at [facility] [place] ([phone])   4/9/2024 1:30 PM Video Appointment with Elizabeth Ball LCSW at [facility] [place] ([phone])   4/25/2024 11:30 AM Appointment with ELIF Roblero at [facility] [place] ([phone])     Hospital Risk Assessment completed.

## 2024-03-29 NOTE — PROGRESS NOTES
Care Management call placed to patient this afternoon to introduce self, explain the role of the OP CM, her personalized care plan and purpose of this phone call is to assess patient's general wellbeing or for any assistance needed with follow-up care, medication costs or financial difficulty.      Yamileth declines care management at this time stating she is 'fine and doesn't need anything.'  She denies any financial or transportation barriers.  She states again 'I'm fine.'    Informed Yamileth of care management team consisting of a SW, Respiratory therapist and  that are available to assist with her clinical or psychosocial needs.  Yamileth wrote down and repeated back this RN CM's call back number and was encouraged to call.     Patient agreeable to this RN CM sending contact info via i[employer] and checking in periodically, including after ED or hospital admission.    Will place in surveillance at this time and review chart again 30 days from today or sooner if utilization present.     Contact information sent via [employer] as per above.

## 2024-03-30 LAB
CREAT UR-MCNC: 84.5 MG/DL
MICROALBUMIN UR-MCNC: 9 MG/L
MICROALBUMIN/CREAT 24H UR: 11 MG/G CREATININE (ref 0–30)

## 2024-04-01 ENCOUNTER — TELEPHONE (OUTPATIENT)
Age: 49
End: 2024-04-01

## 2024-04-01 ENCOUNTER — APPOINTMENT (OUTPATIENT)
Dept: LAB | Facility: HOSPITAL | Age: 49
End: 2024-04-01
Attending: INTERNAL MEDICINE
Payer: COMMERCIAL

## 2024-04-01 ENCOUNTER — HOSPITAL ENCOUNTER (OUTPATIENT)
Dept: RADIOLOGY | Facility: HOSPITAL | Age: 49
Discharge: HOME | End: 2024-04-01
Payer: COMMERCIAL

## 2024-04-01 ENCOUNTER — TELEMEDICINE (OUTPATIENT)
Dept: BEHAVIORAL/MENTAL HEALTH CLINIC | Facility: CLINIC | Age: 49
End: 2024-04-01
Payer: COMMERCIAL

## 2024-04-01 DIAGNOSIS — N39.8 VOIDING DYSFUNCTION: ICD-10-CM

## 2024-04-01 DIAGNOSIS — R73.9 HYPERGLYCEMIA: ICD-10-CM

## 2024-04-01 DIAGNOSIS — E83.51 HYPOCALCEMIA: ICD-10-CM

## 2024-04-01 DIAGNOSIS — F41.0 GENERALIZED ANXIETY DISORDER WITH PANIC ATTACKS: ICD-10-CM

## 2024-04-01 DIAGNOSIS — F41.1 GENERALIZED ANXIETY DISORDER WITH PANIC ATTACKS: ICD-10-CM

## 2024-04-01 DIAGNOSIS — F17.210 NICOTINE DEPENDENCE, CIGARETTES, UNCOMPLICATED: ICD-10-CM

## 2024-04-01 DIAGNOSIS — F33.1 MODERATE EPISODE OF RECURRENT MAJOR DEPRESSIVE DISORDER (HCC): Primary | ICD-10-CM

## 2024-04-01 LAB — CALCIUM SERPL-MCNC: 9.6 MG/DL (ref 8.4–10.2)

## 2024-04-01 PROCEDURE — 76770 US EXAM ABDO BACK WALL COMP: CPT

## 2024-04-01 PROCEDURE — 90834 PSYTX W PT 45 MINUTES: CPT | Performed by: SOCIAL WORKER

## 2024-04-01 PROCEDURE — 36415 COLL VENOUS BLD VENIPUNCTURE: CPT

## 2024-04-01 RX ORDER — BLOOD SUGAR DIAGNOSTIC
STRIP MISCELLANEOUS
Qty: 100 EACH | Refills: 1 | Status: SHIPPED | OUTPATIENT
Start: 2024-04-01

## 2024-04-01 RX ORDER — LANCETS 33 GAUGE
EACH MISCELLANEOUS
Qty: 100 EACH | Refills: 0 | Status: SHIPPED | OUTPATIENT
Start: 2024-04-01 | End: 2024-04-01

## 2024-04-01 RX ORDER — LANCETS 33 GAUGE
EACH MISCELLANEOUS
Qty: 100 EACH | Refills: 1 | Status: SHIPPED | OUTPATIENT
Start: 2024-04-01

## 2024-04-01 NOTE — TELEPHONE ENCOUNTER
Pt called back. She is waiting for a callback on her UTI, she needs meds called into pharmacy.    Also needs a standing order for her calcium and ionized calcium to be drawn 3-4 times a week.  Please put order in for both

## 2024-04-01 NOTE — TELEPHONE ENCOUNTER
Patient called with concerns about her urinalysis. She said the Nitrite UA is positive. And she said the RBC, WBC, Mucus Threads. She wants to be called by the provider so this can be discussed further.

## 2024-04-01 NOTE — RESULT ENCOUNTER NOTE
Please let Yamileth know that I received the results of urinalysis which do not appear to be showing a urinary tract infection at this time.  There were no significant white blood cells or bacteria present.  I checked the amount of albumin which was within normal limits which is a very positive finding.  Please let me know if there is any further issues or concerns that Yamileth may have and I would be happy to call her back.  Thank you.

## 2024-04-02 ENCOUNTER — TELEPHONE (OUTPATIENT)
Dept: NEPHROLOGY | Facility: CLINIC | Age: 49
End: 2024-04-02

## 2024-04-02 DIAGNOSIS — R30.0 DYSURIA: Primary | ICD-10-CM
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
## 2024-04-02 NOTE — TELEPHONE ENCOUNTER
Pt called the office and is asking to speak directly with provider.  Message forwarded to Sonja.    ----- Message from ELIF Duron sent at 4/1/2024  3:15 PM EDT -----  Please let Yamileth know that I received the results of urinalysis which do not appear to be showing a urinary tract infection at this time.  There were no significant white blood cells or bacteria present.  I checked the amount of albumin which was within normal limits which is a very positive finding.  Please let me know if there is any further issues or concerns that Yamileth may have and I would be happy to call her back.  Thank you.

## 2024-04-02 NOTE — TELEPHONE ENCOUNTER
Talked with patient and relayed Sonja's message in detail regarding UA results.  Pt is asking to have a urine culture done.  The positive nitrites are concerning to her and she feels she has issues voiding at times and requests that UTI be ruled out definitively.  Order placed for urine culture.

## 2024-04-03 ENCOUNTER — APPOINTMENT (OUTPATIENT)
Dept: LAB | Facility: HOSPITAL | Age: 49
End: 2024-04-03
Attending: INTERNAL MEDICINE
Payer: COMMERCIAL

## 2024-04-03 DIAGNOSIS — E83.51 HYPOCALCEMIA: ICD-10-CM

## 2024-04-03 LAB — CALCIUM SERPL-MCNC: 10.5 MG/DL (ref 8.4–10.2)

## 2024-04-03 PROCEDURE — 36415 COLL VENOUS BLD VENIPUNCTURE: CPT

## 2024-04-05 ENCOUNTER — APPOINTMENT (OUTPATIENT)
Dept: LAB | Facility: HOSPITAL | Age: 49
End: 2024-04-05
Payer: COMMERCIAL

## 2024-04-05 DIAGNOSIS — E83.51 HYPOCALCEMIA: ICD-10-CM

## 2024-04-05 LAB — CALCIUM SERPL-MCNC: 10.4 MG/DL (ref 8.4–10.2)

## 2024-04-05 PROCEDURE — 36415 COLL VENOUS BLD VENIPUNCTURE: CPT

## 2024-04-08 ENCOUNTER — APPOINTMENT (OUTPATIENT)
Dept: LAB | Facility: HOSPITAL | Age: 49
End: 2024-04-08
Payer: COMMERCIAL

## 2024-04-08 ENCOUNTER — TELEPHONE (OUTPATIENT)
Age: 49
End: 2024-04-08

## 2024-04-08 DIAGNOSIS — E83.51 HYPOCALCEMIA: ICD-10-CM

## 2024-04-08 LAB — CALCIUM SERPL-MCNC: 9.9 MG/DL (ref 8.4–10.2)

## 2024-04-08 PROCEDURE — 36415 COLL VENOUS BLD VENIPUNCTURE: CPT

## 2024-04-08 RX ORDER — PHENOL 1.4 %
AEROSOL, SPRAY (ML) MUCOUS MEMBRANE
Qty: 180 TABLET | Refills: 10 | Status: CANCELLED | OUTPATIENT
Start: 2024-04-08

## 2024-04-08 NOTE — TELEPHONE ENCOUNTER
Patient is at the lab to get her blood work to check for her calcium and calcium ionized.  There is usually a standing 6 month order in her chart bur apparently it .  Can someone please put a new 6 month standing order in her chart

## 2024-04-09 ENCOUNTER — TELEMEDICINE (OUTPATIENT)
Dept: BEHAVIORAL/MENTAL HEALTH CLINIC | Facility: CLINIC | Age: 49
End: 2024-04-09
Payer: COMMERCIAL

## 2024-04-09 DIAGNOSIS — F41.0 PANIC DISORDER: ICD-10-CM

## 2024-04-09 DIAGNOSIS — F41.1 GAD (GENERALIZED ANXIETY DISORDER): ICD-10-CM

## 2024-04-09 DIAGNOSIS — F41.0 GENERALIZED ANXIETY DISORDER WITH PANIC ATTACKS: ICD-10-CM

## 2024-04-09 DIAGNOSIS — F33.1 MODERATE EPISODE OF RECURRENT MAJOR DEPRESSIVE DISORDER (HCC): Primary | ICD-10-CM

## 2024-04-09 DIAGNOSIS — F41.1 GENERALIZED ANXIETY DISORDER WITH PANIC ATTACKS: ICD-10-CM

## 2024-04-09 DIAGNOSIS — F17.210 NICOTINE DEPENDENCE, CIGARETTES, UNCOMPLICATED: ICD-10-CM

## 2024-04-09 PROCEDURE — 90834 PSYTX W PT 45 MINUTES: CPT | Performed by: SOCIAL WORKER

## 2024-04-09 RX ORDER — ALPRAZOLAM 2 MG/1
2 TABLET, EXTENDED RELEASE ORAL
Qty: 60 TABLET | Refills: 0 | Status: SHIPPED | OUTPATIENT
Start: 2024-04-09

## 2024-04-09 RX ORDER — DESVENLAFAXINE SUCCINATE 50 MG/1
50 TABLET, EXTENDED RELEASE ORAL DAILY
Qty: 7 TABLET | Refills: 0 | Status: SHIPPED | OUTPATIENT
Start: 2024-04-09 | End: 2024-04-10

## 2024-04-10 ENCOUNTER — TELEPHONE (OUTPATIENT)
Age: 49
End: 2024-04-10

## 2024-04-10 ENCOUNTER — TELEPHONE (OUTPATIENT)
Dept: NEPHROLOGY | Facility: CLINIC | Age: 49
End: 2024-04-10

## 2024-04-10 ENCOUNTER — HOSPITAL ENCOUNTER (EMERGENCY)
Facility: HOSPITAL | Age: 49
Discharge: HOME | End: 2024-04-10
Attending: EMERGENCY MEDICINE | Admitting: EMERGENCY MEDICINE
Payer: COMMERCIAL

## 2024-04-10 VITALS
BODY MASS INDEX: 39.56 KG/M2 | HEART RATE: 98 BPM | SYSTOLIC BLOOD PRESSURE: 142 MMHG | TEMPERATURE: 98.2 F | OXYGEN SATURATION: 95 % | WEIGHT: 214.95 LBS | HEIGHT: 62 IN | DIASTOLIC BLOOD PRESSURE: 77 MMHG | RESPIRATION RATE: 18 BRPM

## 2024-04-10 DIAGNOSIS — N18.31 STAGE 3A CHRONIC KIDNEY DISEASE (HCC): Primary | ICD-10-CM

## 2024-04-10 DIAGNOSIS — E83.51 HYPOCALCEMIA: Primary | ICD-10-CM

## 2024-04-10 DIAGNOSIS — R30.0 DYSURIA: ICD-10-CM

## 2024-04-10 DIAGNOSIS — G43.909 MIGRAINE: Primary | ICD-10-CM

## 2024-04-10 DIAGNOSIS — Z01.89 ENCOUNTER FOR LABORATORY TEST: ICD-10-CM

## 2024-04-10 DIAGNOSIS — E89.2 POST-SURGICAL HYPOPARATHYROIDISM (HCC): ICD-10-CM

## 2024-04-10 LAB
ATRIAL RATE: 89 BPM
CA-I BLD-SCNC: 1.33 MMOL/L (ref 1.12–1.32)
CALCIUM SERPL-MCNC: 11.7 MG/DL (ref 8.4–10.2)
P AXIS: 49 DEGREES
PR INTERVAL: 176 MS
QRS AXIS: 26 DEGREES
QRSD INTERVAL: 80 MS
QT INTERVAL: 374 MS
QTC INTERVAL: 455 MS
T WAVE AXIS: 44 DEGREES
VENTRICULAR RATE: 89 BPM

## 2024-04-10 PROCEDURE — 93005 ELECTROCARDIOGRAM TRACING: CPT

## 2024-04-10 PROCEDURE — 36415 COLL VENOUS BLD VENIPUNCTURE: CPT | Performed by: EMERGENCY MEDICINE

## 2024-04-10 PROCEDURE — 93010 ELECTROCARDIOGRAM REPORT: CPT | Performed by: INTERNAL MEDICINE

## 2024-04-10 PROCEDURE — 82330 ASSAY OF CALCIUM: CPT | Performed by: EMERGENCY MEDICINE

## 2024-04-10 RX ORDER — BUTALBITAL, ACETAMINOPHEN AND CAFFEINE 300; 40; 50 MG/1; MG/1; MG/1
1 CAPSULE ORAL EVERY 6 HOURS PRN
Qty: 8 CAPSULE | Refills: 0 | Status: SHIPPED | OUTPATIENT
Start: 2024-04-10

## 2024-04-10 RX ORDER — ACETAMINOPHEN 10 MG/ML
1000 INJECTION, SOLUTION INTRAVENOUS ONCE
Status: COMPLETED | OUTPATIENT
Start: 2024-04-10 | End: 2024-04-10

## 2024-04-10 RX ADMIN — ACETAMINOPHEN 1000 MG: 10 INJECTION INTRAVENOUS at 01:28

## 2024-04-10 NOTE — TELEPHONE ENCOUNTER
Spoke with Yamileth approximately 15 minutes regarding issues with dysuria.  She reports that she had a prior bladder prolapse that was corrected by Dr. Guillermo.  Recently she has been having problems with incontinence and difficulty passing urine at times.  Renal ultrasound was assessed with no hydronephrosis but thickened bladder wall.  Results were discussed with Yamileth.  Recommend follow-up with Dr. Guillermo/urology.  Referral placed.  Contact information given to Yamileth.  Continue outpatient follow-up with Dr. Pabon regarding hypoparathyroidism.

## 2024-04-10 NOTE — TELEPHONE ENCOUNTER
LM for patient about the following, asked her to call back if she has any questions or would like to speak with Sonja directly:    ELIF Duron [facility] [place] [facility]  Please let Yamileth know that I received the results of her ultrasound.  There are some simple cysts left kidney which is not a concerning finding.  There was no urinary retention noted.  She sufficiently her emptied bladder.  Postvoid residual was negligible.  Bladder wall was slightly thickened which can indicate chronic urinary retention versus mild inflammation.  If she is still having symptoms encouraged her to go for another urine sample with reflex to culture in order to completely investigate.  Please let me know if there are any questions or concerns at this time I will be happy to call her back later.

## 2024-04-10 NOTE — TELEPHONE ENCOUNTER
This pt has been calling numerous times  can u put a  standing calcium  and iodized calcium order in for her, she gets bloodwork 3 days a week. She has 1 day left.  Please put the order in. And let her know its in there.
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
## 2024-04-10 NOTE — DISCHARGE INSTRUCTIONS
You can take 650 mg of Tylenol every 6 hours for headache.  If it is still not relieved with the Tylenol you can take 1 Fioricet every 6 hours.  Follow-up with your primary care doctor.

## 2024-04-10 NOTE — TELEPHONE ENCOUNTER
Patient should follow-up with nephrology regarding ultrasound results  I will put in calcium orders, recommend decreasing blood work to twice a week rather than 3 times a week for now and then gradually decreasing further. Does not need such frequent testing

## 2024-04-11 ENCOUNTER — TELEPHONE (OUTPATIENT)
Dept: PSYCHIATRY | Facility: CLINIC | Age: 49
End: 2024-04-11

## 2024-04-11 DIAGNOSIS — F41.1 GAD (GENERALIZED ANXIETY DISORDER): Primary | ICD-10-CM

## 2024-04-11 RX ORDER — DESVENLAFAXINE 100 MG/1
100 TABLET, EXTENDED RELEASE ORAL DAILY
Qty: 90 TABLET | Refills: 0 | Status: SHIPPED | OUTPATIENT
Start: 2024-04-11 | End: 2024-07-10

## 2024-04-11 NOTE — PROGRESS NOTES
Called and spoke with patient about Cymbalta.  She reported that after our last encounter she did trial the Cymbalta for about a week.  She reports that she did not like the way it made her feel so she switched back to Pristiq 50 mg/week and then began taking Pristiq 100 mg.    Confirmed that this provider would send a supply of Pristiq 100 mg over to her pharmacy and Cymbalta was discontinued off her chart.    She was urged to call the office with any additional questions or concerns.  We also briefly discussed her question about alprazolam ER versus Xanax XR.  Discussed that sometimes medication generic forms change and that could be why her medication looks different in the bottle than it normally does.  She verbalized her understanding and will call the office with any additional concerns.

## 2024-04-11 NOTE — TELEPHONE ENCOUNTER
LM on Yamileth's VM informing her that Alprazolam ER is the generic of Xanax XR and it is what we have been prescribing her all along.  LM that ER and XR are both the same; standing for extended release.  Instructed her to call me back if she has any further questions.  Nursing number provided.

## 2024-04-12 ENCOUNTER — PATIENT OUTREACH (OUTPATIENT)
Dept: CASE MANAGEMENT | Facility: OTHER | Age: 49
End: 2024-04-12

## 2024-04-12 ENCOUNTER — HOSPITAL ENCOUNTER (EMERGENCY)
Facility: HOSPITAL | Age: 49
Discharge: HOME | End: 2024-04-12
Attending: EMERGENCY MEDICINE
Payer: COMMERCIAL

## 2024-04-12 VITALS
TEMPERATURE: 98.1 F | OXYGEN SATURATION: 97 % | HEART RATE: 100 BPM | SYSTOLIC BLOOD PRESSURE: 146 MMHG | DIASTOLIC BLOOD PRESSURE: 70 MMHG | RESPIRATION RATE: 20 BRPM

## 2024-04-12 DIAGNOSIS — K29.00 ACUTE GASTRITIS: Primary | ICD-10-CM

## 2024-04-12 PROCEDURE — 96374 THER/PROPH/DIAG INJ IV PUSH: CPT

## 2024-04-12 PROCEDURE — 99283 EMERGENCY DEPT VISIT LOW MDM: CPT

## 2024-04-12 PROCEDURE — 99285 EMERGENCY DEPT VISIT HI MDM: CPT | Performed by: EMERGENCY MEDICINE

## 2024-04-12 PROCEDURE — 99285 EMERGENCY DEPT VISIT HI MDM: CPT

## 2024-04-12 RX ORDER — FAMOTIDINE 20 MG/1
20 TABLET, FILM COATED ORAL 2 TIMES DAILY
Qty: 30 TABLET | Refills: 0 | Status: SHIPPED | OUTPATIENT
Start: 2024-04-12

## 2024-04-12 RX ORDER — SUCRALFATE 1 G/1
1 TABLET ORAL
Qty: 60 TABLET | Refills: 0 | Status: SHIPPED | OUTPATIENT
Start: 2024-04-12 | End: 2024-04-27

## 2024-04-12 RX ORDER — FAMOTIDINE 20 MG/1
20 TABLET, FILM COATED ORAL ONCE
Status: COMPLETED | OUTPATIENT
Start: 2024-04-12 | End: 2024-04-12

## 2024-04-12 RX ORDER — HYDROMORPHONE HCL/PF 1 MG/ML
1 SYRINGE (ML) INJECTION ONCE
Status: COMPLETED | OUTPATIENT
Start: 2024-04-12 | End: 2024-04-12

## 2024-04-12 RX ORDER — MAGNESIUM HYDROXIDE/ALUMINUM HYDROXICE/SIMETHICONE 120; 1200; 1200 MG/30ML; MG/30ML; MG/30ML
30 SUSPENSION ORAL ONCE
Status: COMPLETED | OUTPATIENT
Start: 2024-04-12 | End: 2024-04-12

## 2024-04-12 RX ORDER — LIDOCAINE HYDROCHLORIDE 20 MG/ML
15 SOLUTION OROPHARYNGEAL ONCE
Status: COMPLETED | OUTPATIENT
Start: 2024-04-12 | End: 2024-04-12

## 2024-04-12 RX ORDER — OXYCODONE HYDROCHLORIDE 5 MG/1
5 TABLET ORAL ONCE
Status: COMPLETED | OUTPATIENT
Start: 2024-04-12 | End: 2024-04-12

## 2024-04-12 RX ORDER — ONDANSETRON 4 MG/1
4 TABLET, ORALLY DISINTEGRATING ORAL ONCE
Status: COMPLETED | OUTPATIENT
Start: 2024-04-12 | End: 2024-04-12

## 2024-04-12 RX ADMIN — LIDOCAINE HYDROCHLORIDE 15 ML: 20 SOLUTION ORAL at 01:23

## 2024-04-12 RX ADMIN — OXYCODONE HYDROCHLORIDE 5 MG: 5 TABLET ORAL at 01:35

## 2024-04-12 RX ADMIN — FAMOTIDINE 20 MG: 20 TABLET, FILM COATED ORAL at 01:22

## 2024-04-12 RX ADMIN — HYDROMORPHONE HYDROCHLORIDE 1 MG: 1 INJECTION, SOLUTION INTRAMUSCULAR; INTRAVENOUS; SUBCUTANEOUS at 01:55

## 2024-04-12 RX ADMIN — ALUMINUM HYDROXIDE, MAGNESIUM HYDROXIDE, AND DIMETHICONE 30 ML: 200; 20; 200 SUSPENSION ORAL at 01:22

## 2024-04-12 RX ADMIN — ONDANSETRON 4 MG: 4 TABLET, ORALLY DISINTEGRATING ORAL at 01:22

## 2024-04-12 NOTE — PROGRESS NOTES
Outpatient Care Management      Patient had the following ED visits recently:    4/10: migraine. Treated with IV Ofirmev. Discharged with recommendations to take Fioricet and to follow up with her PCP.    4/12: acute gastritis. Treated with Maalox, viscous lidocaine, Zofran ODT, Pepcid, oxycodone and IV dilaudid. She was discharged & should follow up with her PCP and start taking Carafate & Pepcid.     Spoke briefly with Yamileth. Introduced myself and reviewed her 2 ED visits. She told me that she didn't have all of her new medications, as she has to pay for them. She did confirm that she has a PDP & that she is able to pay the copays. She commented that her pharmacy usually checks [facility] for better prices for her.     I explained and offered OP Care Management again. She verbalized that she is not interested & that she's feeling better & doesn't need any assistance. She declined to take Danie's phone #. Will continue to follow in CM Surveillance, since patient has a High Utilizer Care Plan.    Note routed to Danie.

## 2024-04-12 NOTE — DISCHARGE INSTRUCTIONS
You likely have gastritis which was precipitated by the food that you ate earlier.    You will take pepcid twice a day for the next two weeks  You will take carafate 1 tablet 4 times a day before meals and at bedtime     Follow up with your primary care provider.    Return to the ER if you develop fever.
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
## 2024-04-13 ENCOUNTER — APPOINTMENT (EMERGENCY)
Dept: RADIOLOGY | Facility: HOSPITAL | Age: 49
End: 2024-04-13
Payer: COMMERCIAL

## 2024-04-13 ENCOUNTER — HOSPITAL ENCOUNTER (OUTPATIENT)
Facility: HOSPITAL | Age: 49
Setting detail: OBSERVATION
Discharge: HOME | End: 2024-04-13
Attending: EMERGENCY MEDICINE | Admitting: FAMILY MEDICINE
Payer: COMMERCIAL

## 2024-04-13 VITALS
HEART RATE: 92 BPM | DIASTOLIC BLOOD PRESSURE: 75 MMHG | SYSTOLIC BLOOD PRESSURE: 132 MMHG | RESPIRATION RATE: 19 BRPM | TEMPERATURE: 98 F | OXYGEN SATURATION: 95 %

## 2024-04-13 DIAGNOSIS — N17.9 AKI (ACUTE KIDNEY INJURY) (HCC): Primary | ICD-10-CM

## 2024-04-13 DIAGNOSIS — K80.50 BILIARY COLIC: ICD-10-CM

## 2024-04-13 DIAGNOSIS — K81.9 GALL BLADDER INFLAMMATION: ICD-10-CM

## 2024-04-13 LAB
2HR DELTA HS TROPONIN: 0 NG/L
ALBUMIN SERPL BCP-MCNC: 3.9 G/DL (ref 3.5–5)
ALBUMIN SERPL BCP-MCNC: 4.2 G/DL (ref 3.5–5)
ALP SERPL-CCNC: 58 U/L (ref 34–104)
ALP SERPL-CCNC: 58 U/L (ref 34–104)
ALT SERPL W P-5'-P-CCNC: 44 U/L (ref 7–52)
ALT SERPL W P-5'-P-CCNC: 50 U/L (ref 7–52)
ANION GAP SERPL CALCULATED.3IONS-SCNC: 13 MMOL/L (ref 4–13)
ANION GAP SERPL CALCULATED.3IONS-SCNC: 8 MMOL/L (ref 4–13)
AST SERPL W P-5'-P-CCNC: 17 U/L (ref 13–39)
AST SERPL W P-5'-P-CCNC: 19 U/L (ref 13–39)
ATRIAL RATE: 101 BPM
BASOPHILS # BLD AUTO: 0.03 THOUSANDS/ÂΜL (ref 0–0.1)
BASOPHILS NFR BLD AUTO: 1 % (ref 0–1)
BILIRUB SERPL-MCNC: 0.33 MG/DL (ref 0.2–1)
BILIRUB SERPL-MCNC: 0.36 MG/DL (ref 0.2–1)
BILIRUB UR QL STRIP: NEGATIVE
BUN SERPL-MCNC: 18 MG/DL (ref 5–25)
BUN SERPL-MCNC: 19 MG/DL (ref 5–25)
CALCIUM SERPL-MCNC: 10.3 MG/DL (ref 8.4–10.2)
CALCIUM SERPL-MCNC: 11.1 MG/DL (ref 8.4–10.2)
CARDIAC TROPONIN I PNL SERPL HS: 4 NG/L
CARDIAC TROPONIN I PNL SERPL HS: 4 NG/L
CHLORIDE SERPL-SCNC: 100 MMOL/L (ref 96–108)
CHLORIDE SERPL-SCNC: 103 MMOL/L (ref 96–108)
CLARITY UR: NORMAL
CO2 SERPL-SCNC: 26 MMOL/L (ref 21–32)
CO2 SERPL-SCNC: 29 MMOL/L (ref 21–32)
COLOR UR: NORMAL
CREAT SERPL-MCNC: 1.72 MG/DL (ref 0.6–1.3)
CREAT SERPL-MCNC: 1.75 MG/DL (ref 0.6–1.3)
EOSINOPHIL # BLD AUTO: 0.06 THOUSAND/ÂΜL (ref 0–0.61)
EOSINOPHIL NFR BLD AUTO: 1 % (ref 0–6)
ERYTHROCYTE [DISTWIDTH] IN BLOOD BY AUTOMATED COUNT: 17 % (ref 11.6–15.1)
GFR SERPL CREATININE-BSD FRML MDRD: 34 ML/MIN/1.73SQ M
GFR SERPL CREATININE-BSD FRML MDRD: 34 ML/MIN/1.73SQ M
GLUCOSE SERPL-MCNC: 139 MG/DL (ref 65–140)
GLUCOSE SERPL-MCNC: 156 MG/DL (ref 65–140)
GLUCOSE UR STRIP-MCNC: NEGATIVE MG/DL
HCT VFR BLD AUTO: 37.9 % (ref 34.8–46.1)
HGB BLD-MCNC: 12.2 G/DL (ref 11.5–15.4)
HGB UR QL STRIP.AUTO: NEGATIVE
IMM GRANULOCYTES # BLD AUTO: 0.02 THOUSAND/UL (ref 0–0.2)
IMM GRANULOCYTES NFR BLD AUTO: 0 % (ref 0–2)
KETONES UR STRIP-MCNC: NEGATIVE MG/DL
LEUKOCYTE ESTERASE UR QL STRIP: NEGATIVE
LIPASE SERPL-CCNC: 107 U/L (ref 11–82)
LYMPHOCYTES # BLD AUTO: 2.32 THOUSANDS/ÂΜL (ref 0.6–4.47)
LYMPHOCYTES NFR BLD AUTO: 43 % (ref 14–44)
MCH RBC QN AUTO: 29.7 PG (ref 26.8–34.3)
MCHC RBC AUTO-ENTMCNC: 32.2 G/DL (ref 31.4–37.4)
MCV RBC AUTO: 92 FL (ref 82–98)
MONOCYTES # BLD AUTO: 0.4 THOUSAND/ÂΜL (ref 0.17–1.22)
MONOCYTES NFR BLD AUTO: 8 % (ref 4–12)
NEUTROPHILS # BLD AUTO: 2.53 THOUSANDS/ÂΜL (ref 1.85–7.62)
NEUTS SEG NFR BLD AUTO: 47 % (ref 43–75)
NITRITE UR QL STRIP: NEGATIVE
NRBC BLD AUTO-RTO: 0 /100 WBCS
P AXIS: 46 DEGREES
PH UR STRIP.AUTO: 8 [PH]
PLATELET # BLD AUTO: 173 THOUSANDS/UL (ref 149–390)
PMV BLD AUTO: 10.4 FL (ref 8.9–12.7)
POTASSIUM SERPL-SCNC: 3.6 MMOL/L (ref 3.5–5.3)
POTASSIUM SERPL-SCNC: 4 MMOL/L (ref 3.5–5.3)
PR INTERVAL: 168 MS
PROT SERPL-MCNC: 6.6 G/DL (ref 6.4–8.4)
PROT SERPL-MCNC: 7.3 G/DL (ref 6.4–8.4)
PROT UR STRIP-MCNC: NEGATIVE MG/DL
QRS AXIS: 27 DEGREES
QRSD INTERVAL: 82 MS
QT INTERVAL: 360 MS
QTC INTERVAL: 466 MS
RBC # BLD AUTO: 4.11 MILLION/UL (ref 3.81–5.12)
SODIUM SERPL-SCNC: 139 MMOL/L (ref 135–147)
SODIUM SERPL-SCNC: 140 MMOL/L (ref 135–147)
SP GR UR STRIP.AUTO: 1.01 (ref 1–1.03)
T WAVE AXIS: 30 DEGREES
UROBILINOGEN UR QL STRIP.AUTO: 0.2 E.U./DL
VENTRICULAR RATE: 101 BPM
WBC # BLD AUTO: 5.36 THOUSAND/UL (ref 4.31–10.16)

## 2024-04-13 PROCEDURE — 96375 TX/PRO/DX INJ NEW DRUG ADDON: CPT

## 2024-04-13 PROCEDURE — 74177 CT ABD & PELVIS W/CONTRAST: CPT

## 2024-04-13 PROCEDURE — 81003 URINALYSIS AUTO W/O SCOPE: CPT | Performed by: EMERGENCY MEDICINE

## 2024-04-13 PROCEDURE — 36415 COLL VENOUS BLD VENIPUNCTURE: CPT | Performed by: EMERGENCY MEDICINE

## 2024-04-13 PROCEDURE — 84484 ASSAY OF TROPONIN QUANT: CPT | Performed by: EMERGENCY MEDICINE

## 2024-04-13 PROCEDURE — 83690 ASSAY OF LIPASE: CPT | Performed by: EMERGENCY MEDICINE

## 2024-04-13 PROCEDURE — 99285 EMERGENCY DEPT VISIT HI MDM: CPT | Performed by: EMERGENCY MEDICINE

## 2024-04-13 PROCEDURE — 80053 COMPREHEN METABOLIC PANEL: CPT | Performed by: EMERGENCY MEDICINE

## 2024-04-13 PROCEDURE — 96361 HYDRATE IV INFUSION ADD-ON: CPT

## 2024-04-13 PROCEDURE — 93005 ELECTROCARDIOGRAM TRACING: CPT

## 2024-04-13 PROCEDURE — 96365 THER/PROPH/DIAG IV INF INIT: CPT

## 2024-04-13 PROCEDURE — 85025 COMPLETE CBC W/AUTO DIFF WBC: CPT | Performed by: EMERGENCY MEDICINE

## 2024-04-13 RX ORDER — ACETAMINOPHEN 10 MG/ML
1000 INJECTION, SOLUTION INTRAVENOUS ONCE
Status: COMPLETED | OUTPATIENT
Start: 2024-04-13 | End: 2024-04-13

## 2024-04-13 RX ORDER — LIDOCAINE HYDROCHLORIDE 20 MG/ML
15 SOLUTION OROPHARYNGEAL ONCE
Status: COMPLETED | OUTPATIENT
Start: 2024-04-13 | End: 2024-04-13

## 2024-04-13 RX ORDER — ONDANSETRON 2 MG/ML
4 INJECTION INTRAMUSCULAR; INTRAVENOUS ONCE
Status: COMPLETED | OUTPATIENT
Start: 2024-04-13 | End: 2024-04-13

## 2024-04-13 RX ORDER — MAGNESIUM HYDROXIDE/ALUMINUM HYDROXICE/SIMETHICONE 120; 1200; 1200 MG/30ML; MG/30ML; MG/30ML
30 SUSPENSION ORAL ONCE
Status: COMPLETED | OUTPATIENT
Start: 2024-04-13 | End: 2024-04-13

## 2024-04-13 RX ORDER — MORPHINE SULFATE 4 MG/ML
4 INJECTION, SOLUTION INTRAMUSCULAR; INTRAVENOUS ONCE
Status: COMPLETED | OUTPATIENT
Start: 2024-04-13 | End: 2024-04-13

## 2024-04-13 RX ADMIN — SODIUM CHLORIDE 1000 ML: 0.9 INJECTION, SOLUTION INTRAVENOUS at 00:47

## 2024-04-13 RX ADMIN — ALUMINUM HYDROXIDE, MAGNESIUM HYDROXIDE, AND DIMETHICONE 30 ML: 200; 20; 200 SUSPENSION ORAL at 00:10

## 2024-04-13 RX ADMIN — MORPHINE SULFATE 4 MG: 4 INJECTION INTRAVENOUS at 00:48

## 2024-04-13 RX ADMIN — ONDANSETRON 4 MG: 2 INJECTION INTRAMUSCULAR; INTRAVENOUS at 00:47

## 2024-04-13 RX ADMIN — ACETAMINOPHEN 1000 MG: 10 INJECTION INTRAVENOUS at 01:30

## 2024-04-13 RX ADMIN — IOHEXOL 100 ML: 350 INJECTION, SOLUTION INTRAVENOUS at 02:12

## 2024-04-13 RX ADMIN — LIDOCAINE HYDROCHLORIDE 15 ML: 20 SOLUTION ORAL at 00:10

## 2024-04-13 NOTE — Clinical Note
Case was discussed with gabriel and the patient's admission status was agreed to be Admission Status: observation status to the service of Dr. Modi   .

## 2024-04-13 NOTE — ED PROVIDER NOTES
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       Portions of the record may have been created with voice recognition software. Occasional wrong word or \"sound a like\" substitutions may have occurred due to the inherent limitations of voice recognition software. Read the chart carefully and recognize, using context, where substitutions have occurred.    Electronically signed by:  MD Philip Platt MD  [id number]    "

## 2024-04-14 ENCOUNTER — HOSPITAL ENCOUNTER (EMERGENCY)
Facility: HOSPITAL | Age: 49
Discharge: HOME | End: 2024-04-14
Attending: EMERGENCY MEDICINE | Admitting: EMERGENCY MEDICINE
Payer: COMMERCIAL

## 2024-04-14 VITALS
TEMPERATURE: 98 F | DIASTOLIC BLOOD PRESSURE: 95 MMHG | RESPIRATION RATE: 14 BRPM | OXYGEN SATURATION: 100 % | WEIGHT: 218 LBS | SYSTOLIC BLOOD PRESSURE: 167 MMHG | HEART RATE: 92 BPM | BODY MASS INDEX: 39.87 KG/M2

## 2024-04-14 DIAGNOSIS — N17.9 AKI (ACUTE KIDNEY INJURY) (HCC): Primary | ICD-10-CM

## 2024-04-14 DIAGNOSIS — Z01.89 ENCOUNTER FOR BLOOD TEST: ICD-10-CM

## 2024-04-14 LAB
ALBUMIN SERPL BCP-MCNC: 4 G/DL (ref 3.5–5)
ALP SERPL-CCNC: 56 U/L (ref 34–104)
ALT SERPL W P-5'-P-CCNC: 44 U/L (ref 7–52)
ANION GAP SERPL CALCULATED.3IONS-SCNC: 11 MMOL/L (ref 4–13)
AST SERPL W P-5'-P-CCNC: 17 U/L (ref 13–39)
BILIRUB SERPL-MCNC: 0.33 MG/DL (ref 0.2–1)
BUN SERPL-MCNC: 14 MG/DL (ref 5–25)
CALCIUM SERPL-MCNC: 9.6 MG/DL (ref 8.4–10.2)
CHLORIDE SERPL-SCNC: 102 MMOL/L (ref 96–108)
CO2 SERPL-SCNC: 29 MMOL/L (ref 21–32)
CREAT SERPL-MCNC: 1.53 MG/DL (ref 0.6–1.3)
GFR SERPL CREATININE-BSD FRML MDRD: 40 ML/MIN/1.73SQ M
GLUCOSE SERPL-MCNC: 139 MG/DL (ref 65–140)
POTASSIUM SERPL-SCNC: 3.4 MMOL/L (ref 3.5–5.3)
PROT SERPL-MCNC: 6.9 G/DL (ref 6.4–8.4)
SODIUM SERPL-SCNC: 142 MMOL/L (ref 135–147)

## 2024-04-14 PROCEDURE — 99284 EMERGENCY DEPT VISIT MOD MDM: CPT | Performed by: EMERGENCY MEDICINE

## 2024-04-14 PROCEDURE — 36415 COLL VENOUS BLD VENIPUNCTURE: CPT | Performed by: EMERGENCY MEDICINE

## 2024-04-14 PROCEDURE — 80053 COMPREHEN METABOLIC PANEL: CPT | Performed by: EMERGENCY MEDICINE

## 2024-04-14 PROCEDURE — 96360 HYDRATION IV INFUSION INIT: CPT

## 2024-04-14 PROCEDURE — 99284 EMERGENCY DEPT VISIT MOD MDM: CPT

## 2024-04-14 RX ADMIN — SODIUM CHLORIDE 1000 ML: 0.9 INJECTION, SOLUTION INTRAVENOUS at 02:03

## 2024-04-14 NOTE — ED PROVIDER NOTES
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 % PTCH  Self Yes No   Sig: if needed     Movantik 25 MG tablet  Self Yes No   Si mg in the morning   OneTouch Delica Lancets 33G MISC   No No   Sig: May substitute brand based on insurance coverage. Check glucose TID   Senna Plus 8.6-50 MG per tablet  Self Yes No   Patient not taking: Reported on 3/22/2024   Varenicline Tartrate, Starter, 0.5 MG X 11 & 1 MG X 42 TBPK  Self No No   Sig: Start 0.5 mg daily for 3 days then increase to 0.5 mg twice daily for 4 days.  After that take 1 mg twice daily   acetaminophen (TYLENOL) 500 mg tablet  Self No No   Sig: Take 1 tablet (500 mg total) by mouth every 6 (six) hours as needed for mild pain or moderate pain   albuterol (PROVENTIL HFA,VENTOLIN HFA) 90 mcg/act inhaler  Self No No   Sig: INHALE 1 PUFF BY MOUTH EVERY 6 HOURS AS NEEDED FOR WHEEZE   bacitracin topical ointment 500 units/g topical ointment  Self No No   Sig: Apply 1 large application topically 2 (two) times a day   baclofen 10 mg tablet  Self Yes No   Sig: Take 10 mg by mouth 3 (three) times a day as needed   Patient not taking: Reported on 3/20/2024   calcitriol (ROCALTROL) 0.25 mcg capsule   No No   Sig: Take 1 capsule (0.25 mcg total) by mouth 3 (three) times a day   calcium carbonate (OS-EDER) 600 MG tablet  Self No No   Sig: Take 1 pill daily twice a day   desvenlafaxine succinate (PRISTIQ) 100 mg 24 hr tablet   No No   Sig: Take 1 tablet (100 mg total) by mouth daily   famotidine (PEPCID) 20 mg tablet   No No   Sig: Take 1 tablet (20 mg total) by mouth 2 (two) times a day    fenofibrate 160 MG tablet  Self No No   Sig: Take 1 tablet (160 mg total) by mouth daily   ferrous sulfate 325 (65 Fe) mg tablet  Self No No   Sig: TAKE 1 TABLET BY MOUTH 2 TIMES A DAY WITH MEALS.   fluticasone (FLONASE) 50 mcg/act nasal spray  Self No No   Si spray into each nostril daily   fluticasone-umeclidinium-vilanterol (Trelegy Ellipta) 100-62.5-25 mcg/actuation inhaler  Self No No   Sig: Inhale 1 puff daily Rinse mouth after use.   glucose blood (OneTouch Verio) test strip   No No   Sig: May substitute brand based on insurance coverage. Check glucose TID.   hydrocortisone 2.5 % cream  Self Yes No   Sig: Apply 1 application. topically 2 (two) times a day   ibuprofen (MOTRIN) 400 mg tablet  Self No No   Sig: Take 1 tablet (400 mg total) by mouth every 6 (six) hours as needed for mild pain or moderate pain   levothyroxine 150 mcg tablet  Self No No   Sig: Take 1 tablet (150 mcg total) by mouth daily at bedtime   magnesium Oxide (MAG-OX) 400 mg TABS  Self No No   Sig: Take 1 tablet (400 mg total) by mouth 3 (three) times a day   metFORMIN (GLUCOPHAGE) 500 mg tablet  Self No No   Sig: Take 1 tablet (500 mg total) by mouth 2 (two) times a day with meals   Patient taking differently: Take by mouth 3 (three) times a day   methylPREDNISolone 4 MG tablet therapy pack  Self No No   Sig: Use as directed on package   Patient not taking: Reported on 2024   mupirocin (BACTROBAN) 2 % ointment  Self No No   Sig: Daily dressing once a day affected area   naloxone (NARCAN) 4 mg/0.1 mL nasal spray  Self No No   Sig: Administer 1 spray into a nostril. If no response after 2-3 minutes, give another dose in the other nostril using a new spray.   Patient not taking: Reported on 2024   nystatin (MYCOSTATIN) powder  Self No No   Sig: Apply under the breast twice a day for 1 week   ondansetron (ZOFRAN) 4 mg tablet  Self No No   Sig: TAKE 1 TABLET BY MOUTH EVERY 8 HOURS AS NEEDED FOR NAUSEA   ondansetron (ZOFRAN) 4 mg  "tablet  Self No No   Sig: Take 1 tablet (4 mg total) by mouth every 6 (six) hours   Patient not taking: Reported on 2024   oxyCODONE-acetaminophen (PERCOCET)  mg per tablet  Self Yes No   Si tablet 4 (four) times a day   potassium chloride (K-DUR,KLOR-CON) 20 mEq tablet  Self No No   Sig: Take 1 tablet (20 mEq total) by mouth 2 (two) times a day   sucralfate (CARAFATE) 1 g tablet   No No   Sig: Take 1 tablet (1 g total) by mouth 4 (four) times a day (before meals and at bedtime) for 15 days   tiZANidine (ZANAFLEX) 4 mg tablet  Self Yes No   Sig: Take 4 mg by mouth Three times daily as needed   traZODone (DESYREL) 100 mg tablet  Self No No   Sig: Take 1 tablet (100 mg total) by mouth daily at bedtime   varenicline (CHANTIX) 1 mg tablet  Self No No   Sig: Take 1 tablet (1 mg total) by mouth 2 (two) times a day      Facility-Administered Medications: None       Portions of the record may have been created with voice recognition software. Occasional wrong word or \"sound a like\" substitutions may have occurred due to the inherent limitations of voice recognition software. Read the chart carefully and recognize, using context, where substitutions have occurred.    Electronically signed by:  MD Philip Platt MD  04/15/24 0457    "

## 2024-04-14 NOTE — DISCHARGE INSTRUCTIONS
"You're having improvement in your LELAND, continue drinking lots of water at home    On Monday, call the surgery office and schedule a follow up for \"biliary colic\". Then go to lab on Monday and get your kidney and liver function tested.     Then call central scheduling and schedule your ultrasound for within 1 week.     Eat bland foods.     If your pain comes back, come back to the ER for admission for ultrasound and surgery eval.   "

## 2024-04-15 ENCOUNTER — HOSPITAL ENCOUNTER (OUTPATIENT)
Facility: HOSPITAL | Age: 49
Setting detail: OBSERVATION
Discharge: HOME | End: 2024-04-17
Attending: EMERGENCY MEDICINE | Admitting: SURGERY
Payer: COMMERCIAL

## 2024-04-15 ENCOUNTER — APPOINTMENT (EMERGENCY)
Dept: RADIOLOGY | Facility: HOSPITAL | Age: 49
End: 2024-04-15
Payer: COMMERCIAL

## 2024-04-15 ENCOUNTER — APPOINTMENT (OUTPATIENT)
Dept: RADIOLOGY | Facility: HOSPITAL | Age: 49
End: 2024-04-15
Payer: COMMERCIAL

## 2024-04-15 DIAGNOSIS — R10.11 RUQ PAIN: Primary | ICD-10-CM

## 2024-04-15 DIAGNOSIS — K80.20 CHOLELITHIASES: ICD-10-CM

## 2024-04-15 PROBLEM — R10.9 ABDOMINAL PAIN: Status: ACTIVE | Noted: 2024-04-15

## 2024-04-15 LAB
ALBUMIN SERPL BCP-MCNC: 3.9 G/DL (ref 3.5–5)
ALP SERPL-CCNC: 50 U/L (ref 34–104)
ALT SERPL W P-5'-P-CCNC: 41 U/L (ref 7–52)
ANION GAP SERPL CALCULATED.3IONS-SCNC: 12 MMOL/L (ref 4–13)
AST SERPL W P-5'-P-CCNC: 16 U/L (ref 13–39)
ATRIAL RATE: 101 BPM
BASOPHILS # BLD AUTO: 0.03 THOUSANDS/ÂΜL (ref 0–0.1)
BASOPHILS NFR BLD AUTO: 1 % (ref 0–1)
BILIRUB SERPL-MCNC: 0.31 MG/DL (ref 0.2–1)
BUN SERPL-MCNC: 12 MG/DL (ref 5–25)
CALCIUM SERPL-MCNC: 9.6 MG/DL (ref 8.4–10.2)
CHLORIDE SERPL-SCNC: 104 MMOL/L (ref 96–108)
CO2 SERPL-SCNC: 23 MMOL/L (ref 21–32)
CREAT SERPL-MCNC: 1.29 MG/DL (ref 0.6–1.3)
EOSINOPHIL # BLD AUTO: 0.05 THOUSAND/ÂΜL (ref 0–0.61)
EOSINOPHIL NFR BLD AUTO: 1 % (ref 0–6)
ERYTHROCYTE [DISTWIDTH] IN BLOOD BY AUTOMATED COUNT: 16.5 % (ref 11.6–15.1)
EST. AVERAGE GLUCOSE BLD GHB EST-MCNC: 123 MG/DL
GFR SERPL CREATININE-BSD FRML MDRD: 49 ML/MIN/1.73SQ M
GLUCOSE SERPL-MCNC: 111 MG/DL (ref 65–140)
GLUCOSE SERPL-MCNC: 127 MG/DL (ref 65–140)
GLUCOSE SERPL-MCNC: 131 MG/DL (ref 65–140)
GLUCOSE SERPL-MCNC: 142 MG/DL (ref 65–140)
GLUCOSE SERPL-MCNC: 174 MG/DL (ref 65–140)
HBA1C MFR BLD: 5.9 %
HCT VFR BLD AUTO: 37.3 % (ref 34.8–46.1)
HGB BLD-MCNC: 12 G/DL (ref 11.5–15.4)
IMM GRANULOCYTES # BLD AUTO: 0.03 THOUSAND/UL (ref 0–0.2)
IMM GRANULOCYTES NFR BLD AUTO: 1 % (ref 0–2)
LYMPHOCYTES # BLD AUTO: 2.25 THOUSANDS/ÂΜL (ref 0.6–4.47)
LYMPHOCYTES NFR BLD AUTO: 36 % (ref 14–44)
MCH RBC QN AUTO: 29.6 PG (ref 26.8–34.3)
MCHC RBC AUTO-ENTMCNC: 32.2 G/DL (ref 31.4–37.4)
MCV RBC AUTO: 92 FL (ref 82–98)
MONOCYTES # BLD AUTO: 0.5 THOUSAND/ÂΜL (ref 0.17–1.22)
MONOCYTES NFR BLD AUTO: 8 % (ref 4–12)
NEUTROPHILS # BLD AUTO: 3.43 THOUSANDS/ÂΜL (ref 1.85–7.62)
NEUTS SEG NFR BLD AUTO: 53 % (ref 43–75)
NRBC BLD AUTO-RTO: 0 /100 WBCS
P AXIS: 46 DEGREES
PLATELET # BLD AUTO: 183 THOUSANDS/UL (ref 149–390)
PMV BLD AUTO: 10.3 FL (ref 8.9–12.7)
POTASSIUM SERPL-SCNC: 3.7 MMOL/L (ref 3.5–5.3)
PR INTERVAL: 168 MS
PROT SERPL-MCNC: 6.9 G/DL (ref 6.4–8.4)
QRS AXIS: 27 DEGREES
QRSD INTERVAL: 82 MS
QT INTERVAL: 360 MS
QTC INTERVAL: 466 MS
RBC # BLD AUTO: 4.05 MILLION/UL (ref 3.81–5.12)
SODIUM SERPL-SCNC: 139 MMOL/L (ref 135–147)
T WAVE AXIS: 30 DEGREES
VENTRICULAR RATE: 101 BPM
WBC # BLD AUTO: 6.29 THOUSAND/UL (ref 4.31–10.16)

## 2024-04-15 PROCEDURE — 96374 THER/PROPH/DIAG INJ IV PUSH: CPT

## 2024-04-15 PROCEDURE — 76705 ECHO EXAM OF ABDOMEN: CPT

## 2024-04-15 PROCEDURE — 99223 1ST HOSP IP/OBS HIGH 75: CPT | Performed by: FAMILY MEDICINE

## 2024-04-15 PROCEDURE — 36415 COLL VENOUS BLD VENIPUNCTURE: CPT | Performed by: EMERGENCY MEDICINE

## 2024-04-15 PROCEDURE — 99205 OFFICE O/P NEW HI 60 MIN: CPT | Performed by: SPECIALIST

## 2024-04-15 PROCEDURE — 80053 COMPREHEN METABOLIC PANEL: CPT | Performed by: EMERGENCY MEDICINE

## 2024-04-15 PROCEDURE — 99285 EMERGENCY DEPT VISIT HI MDM: CPT | Performed by: EMERGENCY MEDICINE

## 2024-04-15 PROCEDURE — 96361 HYDRATE IV INFUSION ADD-ON: CPT

## 2024-04-15 PROCEDURE — A9537 TC99M MEBROFENIN: HCPCS

## 2024-04-15 PROCEDURE — 78226 HEPATOBILIARY SYSTEM IMAGING: CPT

## 2024-04-15 PROCEDURE — 85025 COMPLETE CBC W/AUTO DIFF WBC: CPT | Performed by: EMERGENCY MEDICINE

## 2024-04-15 PROCEDURE — 83036 HEMOGLOBIN GLYCOSYLATED A1C: CPT | Performed by: PHYSICIAN ASSISTANT

## 2024-04-15 PROCEDURE — 82948 REAGENT STRIP/BLOOD GLUCOSE: CPT

## 2024-04-15 PROCEDURE — 99284 EMERGENCY DEPT VISIT MOD MDM: CPT

## 2024-04-15 PROCEDURE — 93010 ELECTROCARDIOGRAM REPORT: CPT | Performed by: INTERNAL MEDICINE

## 2024-04-15 PROCEDURE — 96375 TX/PRO/DX INJ NEW DRUG ADDON: CPT

## 2024-04-15 RX ORDER — ALPRAZOLAM 0.5 MG/1
2 TABLET ORAL 2 TIMES DAILY
Status: DISCONTINUED | OUTPATIENT
Start: 2024-04-15 | End: 2024-04-17 | Stop reason: HOSPADM

## 2024-04-15 RX ORDER — HYDROMORPHONE HCL/PF 1 MG/ML
1 SYRINGE (ML) INJECTION ONCE
Status: COMPLETED | OUTPATIENT
Start: 2024-04-15 | End: 2024-04-15

## 2024-04-15 RX ORDER — DEXTROSE AND SODIUM CHLORIDE 5; .45 G/100ML; G/100ML
100 INJECTION, SOLUTION INTRAVENOUS CONTINUOUS
Status: DISCONTINUED | OUTPATIENT
Start: 2024-04-15 | End: 2024-04-17 | Stop reason: HOSPADM

## 2024-04-15 RX ORDER — ONDANSETRON 2 MG/ML
4 INJECTION INTRAMUSCULAR; INTRAVENOUS ONCE
Status: COMPLETED | OUTPATIENT
Start: 2024-04-15 | End: 2024-04-15

## 2024-04-15 RX ORDER — MORPHINE SULFATE 4 MG/ML
4 INJECTION, SOLUTION INTRAMUSCULAR; INTRAVENOUS ONCE
Status: COMPLETED | OUTPATIENT
Start: 2024-04-15 | End: 2024-04-15

## 2024-04-15 RX ORDER — INSULIN LISPRO 100 [IU]/ML
1-5 INJECTION, SOLUTION INTRAVENOUS; SUBCUTANEOUS EVERY 6 HOURS SCHEDULED
Status: DISCONTINUED | OUTPATIENT
Start: 2024-04-15 | End: 2024-04-17

## 2024-04-15 RX ORDER — CALCIUM CARBONATE 500 MG/1
500 TABLET, CHEWABLE ORAL 2 TIMES DAILY
Status: DISCONTINUED | OUTPATIENT
Start: 2024-04-15 | End: 2024-04-16

## 2024-04-15 RX ORDER — DESVENLAFAXINE SUCCINATE 50 MG/1
100 TABLET, EXTENDED RELEASE ORAL DAILY
Status: DISCONTINUED | OUTPATIENT
Start: 2024-04-15 | End: 2024-04-15

## 2024-04-15 RX ORDER — FENOFIBRATE 145 MG/1
145 TABLET, COATED ORAL DAILY
Status: DISCONTINUED | OUTPATIENT
Start: 2024-04-15 | End: 2024-04-17 | Stop reason: HOSPADM

## 2024-04-15 RX ORDER — FAMOTIDINE 20 MG/1
20 TABLET, FILM COATED ORAL DAILY
Status: DISCONTINUED | OUTPATIENT
Start: 2024-04-15 | End: 2024-04-15

## 2024-04-15 RX ORDER — DESVENLAFAXINE SUCCINATE 50 MG/1
100 TABLET, EXTENDED RELEASE ORAL DAILY
Status: DISCONTINUED | OUTPATIENT
Start: 2024-04-15 | End: 2024-04-17 | Stop reason: HOSPADM

## 2024-04-15 RX ORDER — TRAZODONE HYDROCHLORIDE 100 MG/1
100 TABLET ORAL
Status: DISCONTINUED | OUTPATIENT
Start: 2024-04-15 | End: 2024-04-17 | Stop reason: HOSPADM

## 2024-04-15 RX ORDER — LEVOTHYROXINE SODIUM 0.15 MG/1
150 TABLET ORAL
Status: DISCONTINUED | OUTPATIENT
Start: 2024-04-15 | End: 2024-04-17 | Stop reason: HOSPADM

## 2024-04-15 RX ORDER — SUCRALFATE 1 G/1
1 TABLET ORAL
Status: DISCONTINUED | OUTPATIENT
Start: 2024-04-15 | End: 2024-04-17 | Stop reason: HOSPADM

## 2024-04-15 RX ORDER — ACETAMINOPHEN 325 MG/1
650 TABLET ORAL EVERY 6 HOURS PRN
Status: DISCONTINUED | OUTPATIENT
Start: 2024-04-15 | End: 2024-04-17 | Stop reason: HOSPADM

## 2024-04-15 RX ORDER — ONDANSETRON 2 MG/ML
4 INJECTION INTRAMUSCULAR; INTRAVENOUS EVERY 6 HOURS PRN
Status: DISCONTINUED | OUTPATIENT
Start: 2024-04-15 | End: 2024-04-17 | Stop reason: HOSPADM

## 2024-04-15 RX ORDER — METRONIDAZOLE 500 MG/1
500 TABLET ORAL EVERY 8 HOURS SCHEDULED
Status: DISCONTINUED | OUTPATIENT
Start: 2024-04-15 | End: 2024-04-17 | Stop reason: HOSPADM

## 2024-04-15 RX ORDER — CEFTRIAXONE 1 G/50ML
1000 INJECTION, SOLUTION INTRAVENOUS EVERY 24 HOURS
Status: DISCONTINUED | OUTPATIENT
Start: 2024-04-15 | End: 2024-04-17 | Stop reason: HOSPADM

## 2024-04-15 RX ORDER — FLUTICASONE FUROATE AND VILANTEROL 100; 25 UG/1; UG/1
1 POWDER RESPIRATORY (INHALATION) DAILY
Status: DISCONTINUED | OUTPATIENT
Start: 2024-04-15 | End: 2024-04-17 | Stop reason: HOSPADM

## 2024-04-15 RX ORDER — CALCITRIOL 0.25 UG/1
0.25 CAPSULE, LIQUID FILLED ORAL 3 TIMES DAILY
Status: DISCONTINUED | OUTPATIENT
Start: 2024-04-15 | End: 2024-04-17 | Stop reason: HOSPADM

## 2024-04-15 RX ORDER — MORPHINE SULFATE 4 MG/ML
4 INJECTION, SOLUTION INTRAMUSCULAR; INTRAVENOUS EVERY 4 HOURS PRN
Status: DISCONTINUED | OUTPATIENT
Start: 2024-04-15 | End: 2024-04-15

## 2024-04-15 RX ORDER — ALBUTEROL SULFATE 90 UG/1
1 AEROSOL, METERED RESPIRATORY (INHALATION) EVERY 6 HOURS PRN
Status: DISCONTINUED | OUTPATIENT
Start: 2024-04-15 | End: 2024-04-17 | Stop reason: HOSPADM

## 2024-04-15 RX ORDER — HYDROMORPHONE HCL/PF 1 MG/ML
0.5 SYRINGE (ML) INJECTION EVERY 4 HOURS PRN
Status: DISCONTINUED | OUTPATIENT
Start: 2024-04-15 | End: 2024-04-16

## 2024-04-15 RX ORDER — NICOTINE 21 MG/24HR
1 PATCH, TRANSDERMAL 24 HOURS TRANSDERMAL DAILY
Status: DISCONTINUED | OUTPATIENT
Start: 2024-04-15 | End: 2024-04-17 | Stop reason: HOSPADM

## 2024-04-15 RX ADMIN — HYDROMORPHONE HYDROCHLORIDE 1 MG: 1 INJECTION, SOLUTION INTRAMUSCULAR; INTRAVENOUS; SUBCUTANEOUS at 09:15

## 2024-04-15 RX ADMIN — METRONIDAZOLE 500 MG: 500 TABLET ORAL at 14:45

## 2024-04-15 RX ADMIN — UMECLIDINIUM 1 PUFF: 62.5 AEROSOL, POWDER ORAL at 09:15

## 2024-04-15 RX ADMIN — HYDROMORPHONE HYDROCHLORIDE 0.5 MG: 1 INJECTION, SOLUTION INTRAMUSCULAR; INTRAVENOUS; SUBCUTANEOUS at 06:56

## 2024-04-15 RX ADMIN — DESVENLAFAXINE 100 MG: 50 TABLET, FILM COATED, EXTENDED RELEASE ORAL at 09:15

## 2024-04-15 RX ADMIN — CALCITRIOL CAPSULES 0.25 MCG 0.25 MCG: 0.25 CAPSULE ORAL at 21:56

## 2024-04-15 RX ADMIN — METRONIDAZOLE 500 MG: 500 TABLET ORAL at 21:55

## 2024-04-15 RX ADMIN — HYDROMORPHONE HYDROCHLORIDE 1 MG: 1 INJECTION, SOLUTION INTRAMUSCULAR; INTRAVENOUS; SUBCUTANEOUS at 03:12

## 2024-04-15 RX ADMIN — DEXTROSE AND SODIUM CHLORIDE 100 ML/HR: 5; .45 INJECTION, SOLUTION INTRAVENOUS at 06:58

## 2024-04-15 RX ADMIN — HYDROMORPHONE HYDROCHLORIDE 0.5 MG: 1 INJECTION, SOLUTION INTRAMUSCULAR; INTRAVENOUS; SUBCUTANEOUS at 19:48

## 2024-04-15 RX ADMIN — FLUTICASONE FUROATE AND VILANTEROL TRIFENATATE 1 PUFF: 100; 25 POWDER RESPIRATORY (INHALATION) at 09:15

## 2024-04-15 RX ADMIN — ALPRAZOLAM 2 MG: 0.5 TABLET ORAL at 09:14

## 2024-04-15 RX ADMIN — ONDANSETRON 4 MG: 2 INJECTION INTRAMUSCULAR; INTRAVENOUS at 02:18

## 2024-04-15 RX ADMIN — CEFTRIAXONE 1000 MG: 1 INJECTION, SOLUTION INTRAVENOUS at 06:57

## 2024-04-15 RX ADMIN — CALCITRIOL CAPSULES 0.25 MCG 0.25 MCG: 0.25 CAPSULE ORAL at 09:15

## 2024-04-15 RX ADMIN — METRONIDAZOLE 500 MG: 500 TABLET ORAL at 06:58

## 2024-04-15 RX ADMIN — SUCRALFATE 1 G: 1 TABLET ORAL at 16:49

## 2024-04-15 RX ADMIN — SUCRALFATE 1 G: 1 TABLET ORAL at 06:58

## 2024-04-15 RX ADMIN — DEXTROSE AND SODIUM CHLORIDE 100 ML/HR: 5; .45 INJECTION, SOLUTION INTRAVENOUS at 19:58

## 2024-04-15 RX ADMIN — ANTACID TABLETS 500 MG: 500 TABLET, CHEWABLE ORAL at 09:15

## 2024-04-15 RX ADMIN — ACETAMINOPHEN 650 MG: 325 TABLET ORAL at 14:45

## 2024-04-15 RX ADMIN — MORPHINE SULFATE 4 MG: 4 INJECTION INTRAVENOUS at 02:19

## 2024-04-15 RX ADMIN — ALPRAZOLAM 2 MG: 0.5 TABLET ORAL at 17:16

## 2024-04-15 RX ADMIN — LEVOTHYROXINE SODIUM 150 MCG: 150 TABLET ORAL at 06:58

## 2024-04-15 RX ADMIN — CALCITRIOL CAPSULES 0.25 MCG 0.25 MCG: 0.25 CAPSULE ORAL at 16:49

## 2024-04-15 RX ADMIN — SODIUM CHLORIDE 1000 ML: 0.9 INJECTION, SOLUTION INTRAVENOUS at 02:17

## 2024-04-15 RX ADMIN — FENOFIBRATE 145 MG: 145 TABLET, FILM COATED ORAL at 09:14

## 2024-04-15 NOTE — PLAN OF CARE
Problem: Potential for Falls  Goal: Patient will remain free of falls  Description: INTERVENTIONS:  - Educate patient/family on patient safety including physical limitations  - Instruct patient to call for assistance with activity   - Consult OT/PT to assist with strengthening/mobility   - Keep Call bell within reach  - Keep bed low and locked with side rails adjusted as appropriate  - Keep care items and personal belongings within reach  - Initiate and maintain comfort rounds  - Make Fall Risk Sign visible to staff  - Offer Toileting every  Hours, in advance of need  - Initiate/Maintain alarm  - Obtain necessary fall risk management equipment:   - Apply yellow socks and bracelet for high fall risk patients  - Consider moving patient to room near nurses station  4/15/2024 0826 by Karuna Qureshi RN  Outcome: Progressing  4/15/2024 0825 by Karuna Qureshi RN  Outcome: Progressing     Problem: PAIN - ADULT  Goal: Verbalizes/displays adequate comfort level or baseline comfort level  Description: Interventions:  - Encourage patient to monitor pain and request assistance  - Assess pain using appropriate pain scale  - Administer analgesics based on type and severity of pain and evaluate response  - Implement non-pharmacological measures as appropriate and evaluate response  - Consider cultural and social influences on pain and pain management  - Notify physician/advanced practitioner if interventions unsuccessful or patient reports new pain  Outcome: Progressing     Problem: INFECTION - ADULT  Goal: Absence or prevention of progression during hospitalization  Description: INTERVENTIONS:  - Assess and monitor for signs and symptoms of infection  - Monitor lab/diagnostic results  - Monitor all insertion sites, i.e. indwelling lines, tubes, and drains  - Monitor endotracheal if appropriate and nasal secretions for changes in amount and color  - [place] appropriate cooling/warming therapies per order  -  Administer medications as ordered  - Instruct and encourage patient and family to use good hand hygiene technique  - Identify and instruct in appropriate isolation precautions for identified infection/condition  Outcome: Progressing     Problem: SAFETY ADULT  Goal: Patient will remain free of falls  Description: INTERVENTIONS:  - Educate patient/family on patient safety including physical limitations  - Instruct patient to call for assistance with activity   - Consult OT/PT to assist with strengthening/mobility   - Keep Call bell within reach  - Keep bed low and locked with side rails adjusted as appropriate  - Keep care items and personal belongings within reach  - Initiate and maintain comfort rounds  - Make Fall Risk Sign visible to staff  - Offer Toileting every  Hours, in advance of need  - Initiate/Maintain alarm  - Obtain necessary fall risk management equipment:  - Apply yellow socks and bracelet for high fall risk patients  - Consider moving patient to room near nurses station  4/15/2024 0826 by Karuna Qureshi RN  Outcome: Progressing  4/15/2024 0825 by Karuna Qureshi RN  Outcome: Progressing     Problem: DISCHARGE PLANNING  Goal: Discharge to home or other facility with appropriate resources  Description: INTERVENTIONS:  - Identify barriers to discharge w/patient and caregiver  - Arrange for needed discharge resources and transportation as appropriate  - Identify discharge learning needs (meds, wound care, etc.)  - Arrange for interpretive services to assist at discharge as needed  - Refer to Case Management Department for coordinating discharge planning if the patient needs post-hospital services based on physician/advanced practitioner order or complex needs related to functional status, cognitive ability, or social support system  Outcome: Progressing     Problem: NEUROSENSORY - ADULT  Goal: Achieves stable or improved neurological status  Description: INTERVENTIONS  - Monitor and report  changes in neurological status  - Monitor vital signs such as temperature, blood pressure, glucose, and any other labs ordered   - Initiate measures to prevent increased intracranial pressure  - Monitor for seizure activity and implement precautions if appropriate      Outcome: Progressing     Problem: GASTROINTESTINAL - ADULT  Goal: Minimal or absence of nausea and/or vomiting  Description: INTERVENTIONS:  - Administer IV fluids if ordered to ensure adequate hydration  - Maintain NPO status until nausea and vomiting are resolved  - Nasogastric tube if ordered  - Administer ordered antiemetic medications as needed  - Provide nonpharmacologic comfort measures as appropriate  - Advance diet as tolerated, if ordered  - Consider nutrition services referral to assist patient with adequate nutrition and appropriate food choices  Outcome: Progressing  Goal: Maintains or returns to baseline bowel function  Description: INTERVENTIONS:  - Assess bowel function  - Encourage oral fluids to ensure adequate hydration  - Administer IV fluids if ordered to ensure adequate hydration  - Administer ordered medications as needed  - Encourage mobilization and activity  - Consider nutritional services referral to assist patient with adequate nutrition and appropriate food choices  Outcome: Progressing  Goal: Maintains adequate nutritional intake  Description: INTERVENTIONS:  - Monitor percentage of each meal consumed  - Identify factors contributing to decreased intake, treat as appropriate  - Assist with meals as needed  - Monitor I&O, weight, and lab values if indicated  - Obtain nutrition services referral as needed  Outcome: Progressing

## 2024-04-15 NOTE — ASSESSMENT & PLAN NOTE
Patient with history of hypo and hypercalcemia  Calcium level today within normal limits  Continue calcitriol  Repeat lab work in a.m.

## 2024-04-15 NOTE — ASSESSMENT & PLAN NOTE
Lab Results   Component Value Date    EGFR 49 04/15/2024    EGFR 40 04/14/2024    EGFR 34 04/13/2024    CREATININE 1.29 04/15/2024    CREATININE 1.53 (H) 04/14/2024    CREATININE 1.72 (H) 04/13/2024     Per outpatient nephrology note, baseline creatinine appears to be 1-1.2 but in 2023 was as high as 1.7  Creatinine today close to baseline  Repeat lab work in a.m.

## 2024-04-15 NOTE — PSYCH
Behavioral Health Psychotherapy Progress Note    Psychotherapy Provided: Individual Psychotherapy     1. Moderate episode of recurrent major depressive disorder (HCC)        2. Generalized anxiety disorder with panic attacks        3. Nicotine dependence, cigarettes, uncomplicated            Goals addressed in session: Goal 1     DATA: Michelle reports feeling well. She continues to use art and crafts as a way to stay active. She is trying to move around her house more as well. She is not enjoying the nicer weather because she feels that it impacts her calcium levels. She is willing to sit near a window though to help absorb vitamin D. Her children are considering a move and she and Chato will accompany them. She is hopeful that they will remain close although she does have hopes that Alicia one day will be more independent. She does not anticipate Kt doing much independently. She has already spoken to Alicia about being available to him as needed. We talked about the burden siblings sometimes inherit and that it should be up to them to decide what/how they will live. She was receptive to this having reflected on her own relationships. Reviewed relaxation and CBT skills.   During this session, this clinician used the following therapeutic modalities: Cognitive Behavioral Therapy, Cognitive Processing Therapy, and Supportive Psychotherapy    Substance Abuse was not addressed during this session. If the client is diagnosed with a co-occurring substance use disorder, please indicate any changes in the frequency or amount of use: NA. Stage of change for addressing substance use diagnoses: No substance use/Not applicable    ASSESSMENT:  Yamileth Vale presents with a Euthymic/ normal mood.     her affect is Normal range and intensity, which is congruent, with her mood and the content of the session. The client has made progress on their goals.    During this session the client was oriented to person, place, and  "time. The client was engaged in the session. The client was able to sustain direct eye contact and was without psychomotor agitation. The client's thought processes were linear and clear.   Yamileth Vale presents with a none risk of suicide, none risk of self-harm, and none risk of harm to others.    For any risk assessment that surpasses a \"low\" rating, a safety plan must be developed.    A safety plan was indicated: no  If yes, describe in detail NA    PLAN: Between sessions, Yamileth Vale will take medication as prescribed and practice positive coping strategies as needed . At the next session, the therapist will use Cognitive Behavioral Therapy, Cognitive Processing Therapy, and Supportive Psychotherapy to address stressors.    Behavioral Health Treatment Plan and Discharge Planning: Yamileth Vale is aware of and agrees to continue to work on their treatment plan. They have identified and are working toward their discharge goals. yes    Visit start and stop times:    04/15/24  Start Time: 1330  Stop Time: 1420  Total Visit Time: 50 minutes  "
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
## 2024-04-15 NOTE — QUICK NOTE
Patient seen and examined at bedside, reported just received morphine and was little elevated, reported epigastric pain that radiates to the right upper quadrant and around back.  Reported not eating much since Thursday besides soup and crackers.  Requested adjustment in calcium medication from home instead of Tums on formulary.  Awaiting general surgery recs.      PE:  General- NAD, obese  HEENT- no nystagmus, perrla  Respi- non-labored,CTA bilaterally  Cardio- no murmor, no rubs, no gallops  Abdomen-positive Lopez sign, voluntary guarding  Msk- no BLLE  Psych- cooperative     A/P:  Abdominal pain/cholelithiasis, HIDA pending, empiric Rocephin/Flagyl, surgical recs for lap rossi  Hypoparathyroidism-continue home supplement nonformulary 600 mg  Hypothyroidism-contributing home levothyroxine  Opioid dependence-continue home Percocet

## 2024-04-15 NOTE — ASSESSMENT & PLAN NOTE
Patient presents to the ER with epigastric/right upper quadrant abdominal pain radiating all around to the back since 4/11.  Seen in the ER multiple times but did not want to be admitted  CT abdomen/pelvis from 4/13 showed gallbladder wall edema, hepatic steatosis  Right upper quadrant ultrasound done today showed cholelithiasis with gallbladder wall edema and trace pericholecystic fluid.  Acute cholecystitis could not be ruled out and therefore HIDA recommended  Patient afebrile without leukocytosis however will start IV Rocephin and Flagyl for now until cholecystitis can be ruled out  N.p.o., IV fluids, IV pain medication as needed  Surgery consult

## 2024-04-16 ENCOUNTER — PATIENT OUTREACH (OUTPATIENT)
Dept: CASE MANAGEMENT | Facility: OTHER | Age: 49
End: 2024-04-16

## 2024-04-16 ENCOUNTER — ANESTHESIA EVENT (OUTPATIENT)
Dept: PERIOP | Facility: HOSPITAL | Age: 49
End: 2024-04-16
Payer: COMMERCIAL

## 2024-04-16 ENCOUNTER — ANESTHESIA (OUTPATIENT)
Dept: PERIOP | Facility: HOSPITAL | Age: 49
End: 2024-04-16
Payer: COMMERCIAL

## 2024-04-16 LAB
ALBUMIN SERPL BCP-MCNC: 3.6 G/DL (ref 3.5–5)
ALP SERPL-CCNC: 45 U/L (ref 34–104)
ALT SERPL W P-5'-P-CCNC: 44 U/L (ref 7–52)
ANION GAP SERPL CALCULATED.3IONS-SCNC: 8 MMOL/L (ref 4–13)
AST SERPL W P-5'-P-CCNC: 24 U/L (ref 13–39)
BILIRUB SERPL-MCNC: 0.34 MG/DL (ref 0.2–1)
BUN SERPL-MCNC: 8 MG/DL (ref 5–25)
CALCIUM SERPL-MCNC: 8.1 MG/DL (ref 8.4–10.2)
CHLORIDE SERPL-SCNC: 103 MMOL/L (ref 96–108)
CO2 SERPL-SCNC: 26 MMOL/L (ref 21–32)
CREAT SERPL-MCNC: 1.18 MG/DL (ref 0.6–1.3)
ERYTHROCYTE [DISTWIDTH] IN BLOOD BY AUTOMATED COUNT: 16.7 % (ref 11.6–15.1)
GFR SERPL CREATININE-BSD FRML MDRD: 55 ML/MIN/1.73SQ M
GLUCOSE P FAST SERPL-MCNC: 94 MG/DL (ref 65–99)
GLUCOSE SERPL-MCNC: 115 MG/DL (ref 65–140)
GLUCOSE SERPL-MCNC: 124 MG/DL (ref 65–140)
GLUCOSE SERPL-MCNC: 134 MG/DL (ref 65–140)
GLUCOSE SERPL-MCNC: 170 MG/DL (ref 65–140)
GLUCOSE SERPL-MCNC: 176 MG/DL (ref 65–140)
GLUCOSE SERPL-MCNC: 94 MG/DL (ref 65–140)
HCT VFR BLD AUTO: 33.6 % (ref 34.8–46.1)
HGB BLD-MCNC: 10.6 G/DL (ref 11.5–15.4)
MCH RBC QN AUTO: 29.5 PG (ref 26.8–34.3)
MCHC RBC AUTO-ENTMCNC: 31.5 G/DL (ref 31.4–37.4)
MCV RBC AUTO: 94 FL (ref 82–98)
PLATELET # BLD AUTO: 143 THOUSANDS/UL (ref 149–390)
PMV BLD AUTO: 10.4 FL (ref 8.9–12.7)
POTASSIUM SERPL-SCNC: 3.7 MMOL/L (ref 3.5–5.3)
PROT SERPL-MCNC: 6.1 G/DL (ref 6.4–8.4)
RBC # BLD AUTO: 3.59 MILLION/UL (ref 3.81–5.12)
SODIUM SERPL-SCNC: 137 MMOL/L (ref 135–147)
WBC # BLD AUTO: 4.82 THOUSAND/UL (ref 4.31–10.16)

## 2024-04-16 PROCEDURE — 85027 COMPLETE CBC AUTOMATED: CPT | Performed by: STUDENT IN AN ORGANIZED HEALTH CARE EDUCATION/TRAINING PROGRAM

## 2024-04-16 PROCEDURE — 88304 TISSUE EXAM BY PATHOLOGIST: CPT | Performed by: STUDENT IN AN ORGANIZED HEALTH CARE EDUCATION/TRAINING PROGRAM

## 2024-04-16 PROCEDURE — 80053 COMPREHEN METABOLIC PANEL: CPT | Performed by: STUDENT IN AN ORGANIZED HEALTH CARE EDUCATION/TRAINING PROGRAM

## 2024-04-16 PROCEDURE — 99214 OFFICE O/P EST MOD 30 MIN: CPT | Performed by: SPECIALIST

## 2024-04-16 PROCEDURE — 99232 SBSQ HOSP IP/OBS MODERATE 35: CPT | Performed by: INTERNAL MEDICINE

## 2024-04-16 PROCEDURE — 47562 LAPAROSCOPIC CHOLECYSTECTOMY: CPT | Performed by: SPECIALIST

## 2024-04-16 PROCEDURE — 47562 LAPAROSCOPIC CHOLECYSTECTOMY: CPT | Performed by: PHYSICIAN ASSISTANT

## 2024-04-16 PROCEDURE — 82948 REAGENT STRIP/BLOOD GLUCOSE: CPT

## 2024-04-16 RX ORDER — LIDOCAINE HYDROCHLORIDE 10 MG/ML
INJECTION, SOLUTION EPIDURAL; INFILTRATION; INTRACAUDAL; PERINEURAL AS NEEDED
Status: DISCONTINUED | OUTPATIENT
Start: 2024-04-16 | End: 2024-04-16

## 2024-04-16 RX ORDER — METOCLOPRAMIDE HYDROCHLORIDE 5 MG/ML
10 INJECTION INTRAMUSCULAR; INTRAVENOUS ONCE AS NEEDED
Status: DISCONTINUED | OUTPATIENT
Start: 2024-04-16 | End: 2024-04-16 | Stop reason: HOSPADM

## 2024-04-16 RX ORDER — SODIUM CHLORIDE, SODIUM LACTATE, POTASSIUM CHLORIDE, CALCIUM CHLORIDE 600; 310; 30; 20 MG/100ML; MG/100ML; MG/100ML; MG/100ML
INJECTION, SOLUTION INTRAVENOUS CONTINUOUS PRN
Status: DISCONTINUED | OUTPATIENT
Start: 2024-04-16 | End: 2024-04-16

## 2024-04-16 RX ORDER — HEPARIN SODIUM 5000 [USP'U]/ML
5000 INJECTION, SOLUTION INTRAVENOUS; SUBCUTANEOUS ONCE
Status: COMPLETED | OUTPATIENT
Start: 2024-04-16 | End: 2024-04-16

## 2024-04-16 RX ORDER — PHENYLEPHRINE HCL IN 0.9% NACL 1 MG/10 ML
SYRINGE (ML) INTRAVENOUS AS NEEDED
Status: DISCONTINUED | OUTPATIENT
Start: 2024-04-16 | End: 2024-04-16

## 2024-04-16 RX ORDER — FENTANYL CITRATE 50 UG/ML
INJECTION, SOLUTION INTRAMUSCULAR; INTRAVENOUS AS NEEDED
Status: DISCONTINUED | OUTPATIENT
Start: 2024-04-16 | End: 2024-04-16

## 2024-04-16 RX ORDER — GLYCOPYRROLATE 0.2 MG/ML
INJECTION INTRAMUSCULAR; INTRAVENOUS AS NEEDED
Status: DISCONTINUED | OUTPATIENT
Start: 2024-04-16 | End: 2024-04-16

## 2024-04-16 RX ORDER — BUPIVACAINE HYDROCHLORIDE AND EPINEPHRINE 5; 5 MG/ML; UG/ML
INJECTION, SOLUTION EPIDURAL; INTRACAUDAL; PERINEURAL AS NEEDED
Status: DISCONTINUED | OUTPATIENT
Start: 2024-04-16 | End: 2024-04-16 | Stop reason: HOSPADM

## 2024-04-16 RX ORDER — HYDROMORPHONE HCL/PF 1 MG/ML
0.5 SYRINGE (ML) INJECTION
Status: DISCONTINUED | OUTPATIENT
Start: 2024-04-16 | End: 2024-04-16 | Stop reason: HOSPADM

## 2024-04-16 RX ORDER — DEXAMETHASONE SODIUM PHOSPHATE 10 MG/ML
INJECTION, SOLUTION INTRAMUSCULAR; INTRAVENOUS AS NEEDED
Status: DISCONTINUED | OUTPATIENT
Start: 2024-04-16 | End: 2024-04-16

## 2024-04-16 RX ORDER — KETOROLAC TROMETHAMINE 30 MG/ML
INJECTION, SOLUTION INTRAMUSCULAR; INTRAVENOUS AS NEEDED
Status: DISCONTINUED | OUTPATIENT
Start: 2024-04-16 | End: 2024-04-16

## 2024-04-16 RX ORDER — MAGNESIUM HYDROXIDE 1200 MG/15ML
LIQUID ORAL AS NEEDED
Status: DISCONTINUED | OUTPATIENT
Start: 2024-04-16 | End: 2024-04-16 | Stop reason: HOSPADM

## 2024-04-16 RX ORDER — HYDROMORPHONE HCL/PF 1 MG/ML
0.5 SYRINGE (ML) INJECTION
Status: DISCONTINUED | OUTPATIENT
Start: 2024-04-16 | End: 2024-04-17 | Stop reason: HOSPADM

## 2024-04-16 RX ORDER — KETAMINE HCL IN NACL, ISO-OSM 100MG/10ML
SYRINGE (ML) INJECTION AS NEEDED
Status: DISCONTINUED | OUTPATIENT
Start: 2024-04-16 | End: 2024-04-16

## 2024-04-16 RX ORDER — FENTANYL CITRATE/PF 50 MCG/ML
25 SYRINGE (ML) INJECTION
Status: DISCONTINUED | OUTPATIENT
Start: 2024-04-16 | End: 2024-04-16 | Stop reason: HOSPADM

## 2024-04-16 RX ORDER — ONDANSETRON 2 MG/ML
INJECTION INTRAMUSCULAR; INTRAVENOUS AS NEEDED
Status: DISCONTINUED | OUTPATIENT
Start: 2024-04-16 | End: 2024-04-16

## 2024-04-16 RX ORDER — ONDANSETRON 2 MG/ML
4 INJECTION INTRAMUSCULAR; INTRAVENOUS ONCE AS NEEDED
Status: DISCONTINUED | OUTPATIENT
Start: 2024-04-16 | End: 2024-04-16 | Stop reason: HOSPADM

## 2024-04-16 RX ORDER — PROPOFOL 10 MG/ML
INJECTION, EMULSION INTRAVENOUS AS NEEDED
Status: DISCONTINUED | OUTPATIENT
Start: 2024-04-16 | End: 2024-04-16

## 2024-04-16 RX ORDER — LANOLIN ALCOHOL/MO/W.PET/CERES
400 CREAM (GRAM) TOPICAL 2 TIMES DAILY
Status: DISCONTINUED | OUTPATIENT
Start: 2024-04-16 | End: 2024-04-17 | Stop reason: HOSPADM

## 2024-04-16 RX ORDER — HYDROMORPHONE HCL IN WATER/PF 6 MG/30 ML
0.2 PATIENT CONTROLLED ANALGESIA SYRINGE INTRAVENOUS ONCE
Status: COMPLETED | OUTPATIENT
Start: 2024-04-16 | End: 2024-04-16

## 2024-04-16 RX ORDER — SODIUM CHLORIDE, SODIUM LACTATE, POTASSIUM CHLORIDE, CALCIUM CHLORIDE 600; 310; 30; 20 MG/100ML; MG/100ML; MG/100ML; MG/100ML
20 INJECTION, SOLUTION INTRAVENOUS CONTINUOUS
Status: DISCONTINUED | OUTPATIENT
Start: 2024-04-16 | End: 2024-04-17

## 2024-04-16 RX ORDER — ROCURONIUM BROMIDE 10 MG/ML
INJECTION, SOLUTION INTRAVENOUS AS NEEDED
Status: DISCONTINUED | OUTPATIENT
Start: 2024-04-16 | End: 2024-04-16

## 2024-04-16 RX ORDER — CALCIUM GLUCONATE 20 MG/ML
1 INJECTION, SOLUTION INTRAVENOUS ONCE
Status: COMPLETED | OUTPATIENT
Start: 2024-04-16 | End: 2024-04-16

## 2024-04-16 RX ORDER — OXYCODONE HYDROCHLORIDE 10 MG/1
10 TABLET ORAL EVERY 4 HOURS PRN
Status: DISCONTINUED | OUTPATIENT
Start: 2024-04-16 | End: 2024-04-17 | Stop reason: HOSPADM

## 2024-04-16 RX ADMIN — Medication 400 MG: at 10:08

## 2024-04-16 RX ADMIN — CALCITRIOL CAPSULES 0.25 MCG 0.25 MCG: 0.25 CAPSULE ORAL at 21:35

## 2024-04-16 RX ADMIN — Medication 100 MCG: at 14:16

## 2024-04-16 RX ADMIN — KETOROLAC TROMETHAMINE 15 MG: 30 INJECTION, SOLUTION INTRAMUSCULAR at 15:02

## 2024-04-16 RX ADMIN — OXYCODONE HYDROCHLORIDE 10 MG: 10 TABLET ORAL at 19:25

## 2024-04-16 RX ADMIN — HYDROMORPHONE HYDROCHLORIDE 0.5 MG: 1 INJECTION, SOLUTION INTRAMUSCULAR; INTRAVENOUS; SUBCUTANEOUS at 05:51

## 2024-04-16 RX ADMIN — FENTANYL CITRATE 100 MCG: 50 INJECTION, SOLUTION INTRAMUSCULAR; INTRAVENOUS at 14:02

## 2024-04-16 RX ADMIN — HYDROMORPHONE HYDROCHLORIDE 0.2 MG: 0.2 INJECTION, SOLUTION INTRAMUSCULAR; INTRAVENOUS; SUBCUTANEOUS at 03:20

## 2024-04-16 RX ADMIN — FLUTICASONE FUROATE AND VILANTEROL TRIFENATATE 1 PUFF: 100; 25 POWDER RESPIRATORY (INHALATION) at 10:08

## 2024-04-16 RX ADMIN — INSULIN LISPRO 1 UNITS: 100 INJECTION, SOLUTION INTRAVENOUS; SUBCUTANEOUS at 19:25

## 2024-04-16 RX ADMIN — CALCIUM GLUCONATE 1 G: 20 INJECTION, SOLUTION INTRAVENOUS at 09:23

## 2024-04-16 RX ADMIN — INSULIN LISPRO 1 UNITS: 100 INJECTION, SOLUTION INTRAVENOUS; SUBCUTANEOUS at 00:27

## 2024-04-16 RX ADMIN — METRONIDAZOLE 500 MG: 500 TABLET ORAL at 05:18

## 2024-04-16 RX ADMIN — Medication 100 MCG: at 14:12

## 2024-04-16 RX ADMIN — GLYCOPYRROLATE 0.1 MG: 0.2 INJECTION, SOLUTION INTRAMUSCULAR; INTRAVENOUS at 14:02

## 2024-04-16 RX ADMIN — DESVENLAFAXINE 100 MG: 50 TABLET, EXTENDED RELEASE ORAL at 01:32

## 2024-04-16 RX ADMIN — SUGAMMADEX 200 MG: 100 INJECTION, SOLUTION INTRAVENOUS at 15:26

## 2024-04-16 RX ADMIN — ALPRAZOLAM 2 MG: 0.5 TABLET ORAL at 19:25

## 2024-04-16 RX ADMIN — Medication 50 MG: at 14:04

## 2024-04-16 RX ADMIN — ALPRAZOLAM 2 MG: 0.5 TABLET ORAL at 08:29

## 2024-04-16 RX ADMIN — HEPARIN SODIUM 5000 UNITS: 5000 INJECTION, SOLUTION INTRAVENOUS; SUBCUTANEOUS at 10:08

## 2024-04-16 RX ADMIN — LIDOCAINE HYDROCHLORIDE 50 MG: 10 INJECTION, SOLUTION EPIDURAL; INFILTRATION; INTRACAUDAL; PERINEURAL at 14:02

## 2024-04-16 RX ADMIN — CALCITRIOL CAPSULES 0.25 MCG 0.25 MCG: 0.25 CAPSULE ORAL at 08:29

## 2024-04-16 RX ADMIN — LEVOTHYROXINE SODIUM 150 MCG: 150 TABLET ORAL at 05:18

## 2024-04-16 RX ADMIN — SODIUM CHLORIDE, SODIUM LACTATE, POTASSIUM CHLORIDE, AND CALCIUM CHLORIDE: .6; .31; .03; .02 INJECTION, SOLUTION INTRAVENOUS at 13:54

## 2024-04-16 RX ADMIN — ROCURONIUM BROMIDE 50 MG: 10 INJECTION, SOLUTION INTRAVENOUS at 14:02

## 2024-04-16 RX ADMIN — HYDROMORPHONE HYDROCHLORIDE 0.5 MG: 1 INJECTION, SOLUTION INTRAMUSCULAR; INTRAVENOUS; SUBCUTANEOUS at 00:14

## 2024-04-16 RX ADMIN — HYDROMORPHONE HYDROCHLORIDE 0.5 MG: 1 INJECTION, SOLUTION INTRAMUSCULAR; INTRAVENOUS; SUBCUTANEOUS at 10:23

## 2024-04-16 RX ADMIN — METRONIDAZOLE 500 MG: 500 TABLET ORAL at 21:34

## 2024-04-16 RX ADMIN — FENOFIBRATE 145 MG: 145 TABLET, FILM COATED ORAL at 08:29

## 2024-04-16 RX ADMIN — ROCURONIUM BROMIDE 20 MG: 10 INJECTION, SOLUTION INTRAVENOUS at 14:34

## 2024-04-16 RX ADMIN — DEXTROSE AND SODIUM CHLORIDE 100 ML/HR: 5; .45 INJECTION, SOLUTION INTRAVENOUS at 03:24

## 2024-04-16 RX ADMIN — DEXAMETHASONE SODIUM PHOSPHATE 10 MG: 10 INJECTION, SOLUTION INTRAMUSCULAR; INTRAVENOUS at 14:10

## 2024-04-16 RX ADMIN — Medication 400 MG: at 19:12

## 2024-04-16 RX ADMIN — CEFTRIAXONE 1000 MG: 1 INJECTION, SOLUTION INTRAVENOUS at 05:51

## 2024-04-16 RX ADMIN — UMECLIDINIUM 1 PUFF: 62.5 AEROSOL, POWDER ORAL at 10:09

## 2024-04-16 RX ADMIN — PROPOFOL 200 MG: 10 INJECTION, EMULSION INTRAVENOUS at 14:02

## 2024-04-16 RX ADMIN — ONDANSETRON 4 MG: 2 INJECTION INTRAMUSCULAR; INTRAVENOUS at 14:10

## 2024-04-16 NOTE — ANESTHESIA POSTPROCEDURE EVALUATION
Post-Op Assessment Note    CV Status:  Stable    Pain management: adequate       Mental Status:  Lethargic   Hydration Status:  Euvolemic   PONV Controlled:  Controlled   Airway Patency:  Patent     Post Op Vitals Reviewed: Yes    No anethesia notable event occurred.    Staff: CRNA               BP   124/68   Temp   97.9   Pulse  74   Resp   16   SpO2   93

## 2024-04-16 NOTE — PROGRESS NOTES
[facility]  Progress Note  Name: Yamileth Vale I  MRN: [id number]  Unit/Bed#: OR POOL I Date of Admission: 4/15/2024   Date of Service: 4/16/2024 I Hospital Day: 0      Assessment & Plan:    * Cholecystitis  Assessment & Plan  Patient presents to the ER with epigastric/right upper quadrant abdominal pain radiating all around to the back since 4/11.  Seen in the ER multiple times but did not want to be admitted  CT abdomen/pelvis from 4/13 showed gallbladder wall edema, hepatic steatosis  Right upper quadrant ultrasound done today showed cholelithiasis with gallbladder wall edema and trace pericholecystic fluid.  Acute cholecystitis could not be ruled out and therefore HIDA recommended  Patient afebrile without leukocytosis however will start IV Rocephin and Flagyl for now until cholecystitis can be ruled out  N.p.o., IV fluids, IV pain medication as needed  Surgery consult    4/16 - continue empiric IV Flagyl/Rocephin - plan for laparoscopic cholecystectomy today     Stage 3 chronic kidney disease (HCC)  Assessment & Plan  Baseline creatinine of approximately 0.9-1.1 -> currently stable at 1.18  Continue preoperative IV fluid infusion  Monitor renal function and urine output  Limit/avoid nephrotoxins and hypotension as possible     Continuous opioid dependence (HCC)  Assessment & Plan  PRN pain control - titrate requirements postoperatively as necessary     Postoperative hypothyroidism  Assessment & Plan  Continue Synthroid     Post-surgical hypoparathyroidism (HCC)  Assessment & Plan  Patient with history of hypo- and hypercalcemia  Continue Calcitriol and Tums supplementation    Tobacco abuse  Assessment & Plan  Cessation counseling  Transdermal nicotine patch on board    History of asthma  Assessment & Plan  Stable  Continue inhaled corticosteroid/LABA regimen - PRN Albuterol on board    Morbid obesity  Assessment & Plan  BMI of 39.35 currently  Lifestyle/diet  modifications    Hyperlipidemia  Assessment & Plan  Continue Tricor    GERD  Assessment & Plan  Continue Carafate    Depression with anxiety  Assessment & Plan  Continue Pristiq/Trazodone/Xanax    Bicytopenia  Assessment & Plan  Monitor CBC and transfuse as necessary      DVT Prophylaxis:  Heparin SC    AM-PAC Basic Mobility:  Basic Mobility Inpatient Raw Score: 24  JH-HLM Achieved: 7: Walk 25 feet or more  JH-HLM Goal: 8: Walk 250 feet or more    Patient Centered Rounds:  I have performed bedside rounds and discussed plan of care with nursing today.  Discussions with Specialists or Other Care Team Provider:  see above assessments if applicable    Education and Discussions with Family / Patient:  Patient at bedside, who will self-update family, as necessary    Time Spent for Care:  35 minutes. More than 50% of total time spent on counseling and coordination of care, on one or more of the following: performing physical exam; counseling and coordination of care, obtaining or reviewing history, documenting in the medical record, reviewing/ordering tests/medications/procedures, and communicating with other healthcare professionals.    Current Length of Stay: 0 day(s)  Current Patient Status: Observation   Certification Statement:  Patient will continue to require additional hospital stay due to assessments as noted above.    Code Status: Level 1 - Full Code        Subjective:     Encountered earlier in the morning.  Resting in bed still complaining of intermittent epigastric and right upper quadrant discomfort.  Denies fever/chills at this time.        Objective:     Vitals:   Temp (24hrs), Av.9 °F (36.6 °C), Min:97.5 °F (36.4 °C), Max:98.2 °F (36.8 °C)    Temp:  [97.5 °F (36.4 °C)-98.2 °F (36.8 °C)] 98 °F (36.7 °C)  HR:  [85-99] 85  Resp:  [18-19] 18  BP: (113-152)/(76-98) 141/93  SpO2:  [88 %-95 %] 95 %  Body mass index is 39.35 kg/m².     Input and Output Summary (last 24 hours):       Intake/Output Summary (Last  24 hours) at 4/16/2024 1539  Last data filed at 4/16/2024 1526  Gross per 24 hour   Intake 3103.33 ml   Output --   Net 3103.33 ml       Physical Exam:     GENERAL Obese - distress from pain   HEAD   Normocephalic - atraumatic   EYES Nonicteric   MOUTH   Mucosa moist   NECK   Supple - full range of motion   CARDIAC Rate controlled   PULMONARY    Fairly clear to auscultation   ABDOMEN Epigastric/RUQ tenderness   MUSCULOSKELETAL   Motor strength/range of motion intact   NEUROLOGIC   Alert/oriented at baseline   SKIN   Chronic wrinkles/blemishes    PSYCHIATRIC   Mood/affect stable         Additional Data:     Labs & Recent Cultures:    Results from last 7 days   Lab Units 04/16/24  0544 04/15/24  0216   WBC Thousand/uL 4.82 6.29   HEMOGLOBIN g/dL 10.6* 12.0   HEMATOCRIT % 33.6* 37.3   PLATELETS Thousands/uL 143* 183   SEGS PCT %  --  53   LYMPHO PCT %  --  36   MONO PCT %  --  8   EOS PCT %  --  1     Results from last 7 days   Lab Units 04/16/24  0544   POTASSIUM mmol/L 3.7   CHLORIDE mmol/L 103   CO2 mmol/L 26   BUN mg/dL 8   CREATININE mg/dL 1.18   CALCIUM mg/dL 8.1*   ALK PHOS U/L 45   ALT U/L 44   AST U/L 24         Results from last 7 days   Lab Units 04/16/24  1112 04/16/24  0725 04/16/24  0512 04/16/24  0024 04/15/24  2106 04/15/24  1613 04/15/24  1205 04/15/24  0717   POC GLUCOSE mg/dl 124 134 115 170* 174* 111 127 142*     Results from last 7 days   Lab Units 04/15/24  0216   HEMOGLOBIN A1C % 5.9*                     Lines/Drains:  Invasive Devices       Peripheral Intravenous Line  Duration             Peripheral IV 04/15/24 Right Antecubital 1 day                      Last 24 Hours Medication List:   Current Facility-Administered Medications   Medication Dose Route Frequency Provider Last Rate    [Transfer Hold] acetaminophen  650 mg Oral Q6H PRN Shirin Revankar, DO      [Transfer Hold] albuterol  1 puff Inhalation Q6H PRN Shirin Revankar, DO      [Transfer Hold] ALPRAZolam  2 mg Oral BID Shirin Modi,  DO      bupivacaine-epinephrine (PF)    PRN Jerome Valero MD      [Transfer Hold] calcitriol  0.25 mcg Oral TID Shirin Revankar, DO      [Transfer Hold] cefTRIAXone  1,000 mg Intravenous Q24H Shirin Revankar, DO 1,000 mg (04/16/24 0551)    [Transfer Hold] desvenlafaxine  100 mg Oral Daily ELIF Early      dextrose 5 % and sodium chloride 0.45 %  100 mL/hr Intravenous Continuous Shirin Revankar,  mL/hr (04/16/24 0324)    [Transfer Hold] fenofibrate  145 mg Oral Daily Shirin Revankar, DO      [Transfer Hold] Fluticasone Furoate-Vilanterol  1 puff Inhalation Daily Sihrin Revankar, DO      And    [Transfer Hold] umeclidinium  1 puff Inhalation Daily Shirin Revankar, DO      [Transfer Hold] HYDROmorphone  0.5 mg Intravenous Q3H PRN Ronny Turner PA-C      [Transfer Hold] insulin lispro  1-5 Units Subcutaneous Q6H [facility] Shirin Revankar, DO      [Transfer Hold] levothyroxine  150 mcg Oral Early Morning Shirin Revankar, DO      [Transfer Hold] magnesium Oxide  400 mg Oral BID Antonella Carolina MD      [Transfer Hold] metroNIDAZOLE  500 mg Oral Q8H [facility] Shirin Revankar, DO      [Transfer Hold] nicotine  1 patch Transdermal Daily Shirin Revankar, DO      [Transfer Hold] ondansetron  4 mg Intravenous Q6H PRN Shirin Revankar, DO      [Transfer Hold] oxyCODONE  10 mg Oral Q4H PRN Ronny Turner PA-C      [Transfer Hold] patient supplied medication  2 each Oral BID Antonella Carolina MD      sodium chloride    PRN Jerome Valero MD      [Transfer Hold] sucralfate  1 g Oral 4x Daily (AC & HS) Shirin Revankar, DO      [Transfer Hold] traZODone  100 mg Oral HS Shirin Revankar, DO                ANTONELLA CAROLINA MD   Hospitalist - [facility]        ** Please Note: This note is constructed using a voice recognition dictation system.  An occasional wrong word/phrase or “sound-a-like” substitution may have been picked up by dictation device due to the inherent limitations of voice recognition software.  Read the  chart carefully and recognize, using reasonable context, where substitutions may have occurred.**

## 2024-04-16 NOTE — PROGRESS NOTES
"Progress Note - General Surgery   Yamileth Vale 48 y.o. female MRN: [id number]  Unit/Bed#: [address] [place] [address] Encounter: [id number]    Assessment:  48- yo female with 5 days of right upper quadrant pain with large gallstone  AVSS      Plan:  Plan for laparoscopic cholecystectomy in the OR today  NPO, sips with meds ok  SQ heparin this morning  Continue rocephin/flagyl  Appreciate aid in calcium repletion per primary team      Subjective/Objective   Chief Complaint:  I couldn't sleep I'm still in pain    Subjective:    Patient still in significant pain and has no interest in food. She would like to pursue elective cholecystectomy.  She is aware that surgery may not completely resolve her symptoms of pain and nausea.  Patient is postmenopausal. She has history of cystocele repair in 2017 and supracervical hysterectomy with left salpingectomy in December 2018.    Objective:     Blood pressure 141/93, pulse 85, temperature 98 °F (36.7 °C), resp. rate 18, height 5' 2\" (1.575 m), weight 97.6 kg (215 lb 2.7 oz), last menstrual period 12/06/2018, SpO2 95%, not currently breastfeeding.,Body mass index is 39.35 kg/m².    No intake or output data in the 24 hours ending 04/16/24 0847    Invasive Devices       Peripheral Intravenous Line  Duration             Peripheral IV 04/15/24 Right Antecubital 1 day                    Physical Exam: /93   Pulse 85   Temp 98 °F (36.7 °C)   Resp 18   Ht 5' 2\" (1.575 m)   Wt 97.6 kg (215 lb 2.7 oz)   LMP 12/06/2018 Comment: partial hysterectomy   SpO2 95%   BMI 39.35 kg/m²   General appearance: alert and oriented, in no acute distress  Head: Normocephalic, without obvious abnormality, atraumatic  Lungs: clear to auscultation bilaterally  Heart: regular rate and rhythm, S1, S2 normal, no murmur, click, rub or gallop  Abdomen:  Soft, obese, bowel sounds present, tenderness to palpation in epigastrium and right upper quadrant  Extremities: extremities normal, warm and " well-perfused; no cyanosis, clubbing, or edema  Skin: Skin color, texture, turgor normal. No rashes or lesions    Lab, Imaging and other studies:I have personally reviewed pertinent lab results.  , CBC:   Lab Results   Component Value Date    WBC 4.82 04/16/2024    HGB 10.6 (L) 04/16/2024    HCT 33.6 (L) 04/16/2024    MCV 94 04/16/2024     (L) 04/16/2024    RBC 3.59 (L) 04/16/2024    MCH 29.5 04/16/2024    MCHC 31.5 04/16/2024    RDW 16.7 (H) 04/16/2024    MPV 10.4 04/16/2024   , CMP:   Lab Results   Component Value Date    SODIUM 137 04/16/2024    K 3.7 04/16/2024     04/16/2024    CO2 26 04/16/2024    BUN 8 04/16/2024    CREATININE 1.18 04/16/2024    CALCIUM 8.1 (L) 04/16/2024    AST 24 04/16/2024    ALT 44 04/16/2024    ALKPHOS 45 04/16/2024    EGFR 55 04/16/2024     VTE Pharmacologic Prophylaxis: Heparin SQ  VTE Mechanical Prophylaxis: sequential compression device

## 2024-04-16 NOTE — ANESTHESIA PREPROCEDURE EVALUATION
Procedure:  CHOLECYSTECTOMY LAPAROSCOPIC (Abdomen)    Relevant Problems   CARDIO   (+) Hypertriglyceridemia      ENDO   (+) Hypoparathyroidism (HCC)   (+) Post-surgical hypoparathyroidism (HCC)   (+) Postoperative hypothyroidism      GI/HEPATIC   (+) Fatty liver disease, nonalcoholic      /RENAL   (+) Stage 3a chronic kidney disease (HCC)   (+) Stage 3b chronic kidney disease (HCC)      GYN   (+) History of hysterectomy      HEMATOLOGY   (+) Anemia   (+) Blood coagulation disorder (HCC)      NEURO/PSYCH   (+) Anxiety   (+) Anxiety and depression   (+) Chronic intractable pain   (+) Continuous opioid dependence (HCC)   (+) PTSD (post-traumatic stress disorder)   (+) Panic attacks      PULMONARY   (+) Smoking        Physical Exam    Airway    Mallampati score: III  TM Distance: >3 FB  Neck ROM: full     Dental   Comment: Denies loose teeth     Cardiovascular  Cardiovascular exam normal    Pulmonary  Pulmonary exam normal     Other Findings  Portions of exam deferred due to low yield and/or unknown COVID statuspost-pubertal.      Anesthesia Plan  ASA Score- 2     Anesthesia Type- general with ASA Monitors.         Additional Monitors:     Airway Plan: ETT.           Plan Factors-Exercise tolerance (METS): >4 METS.    Chart reviewed.   Existing labs reviewed. Patient summary reviewed.    Patient is a current smoker.              Induction- intravenous.    Postoperative Plan-     Informed Consent- Anesthetic plan and risks discussed with patient.  I personally reviewed this patient with the CRNA. Discussed and agreed on the Anesthesia Plan with the CRNA..
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
## 2024-04-16 NOTE — PROGRESS NOTES
In basket ADT notifications received for patient being treated and released from  Emergency Department.     4/13/24:  ED Visit for c/o chest pain and vomiting. Symptoms primarily started after eating cheese steak. Patient states only dilaudid controlled her pain with ED visit one day prior.  Elevated labs noted:  Glucose: 156; Creatinine: 1.75. CT yielded Hepatomegaly, possible hepatic steatosis and Gallbladder wall edema with debris in the gallbladder with recommendation to US RUQ to r/o acute cholecystitis.  Patient administered IV fluids, IV morphine, GI cocktail and was encouraged for admission however patient declined stating desire to f/u outpatient.     4/14/24:  ED Visit for abdominal pain.  Patient reports pain level has decreased and she has been hydrating at home.  Creatinine level of 1.53.   She received IV fluids and was discharged home.     4/15/24:  ED Visit for c/o severe RUQ pain and non bloody vomiting. RUQ US yielded Hepatomegaly, possible hepatic steatosis and while supportive of acute cholecystitis although inconclusive. She was admitted for observation, kept NPO and surg consult placed.     At time of this note, patient post op laparoscopic cholecystectomy.

## 2024-04-16 NOTE — UTILIZATION REVIEW
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            1441 [C]     1442 [C]     1545-D/C'd       Sugammadex Sodium (BRIDION)  Freq: As needed Route: IV  Start: 04/16/24 1526 End: 04/16/24 1533            1526     1533-D/C'd           Network Utilization Review Department  ATTENTION: Please call with any questions or concerns to [phone] and carefully listen to the prompts so that you are directed to the right person. All voicemails are confidential.   For Discharge needs, contact Care Management DC Support Team at [phone] opt. 2  Send all requests for admission clinical reviews, approved or denied determinations and any other requests to dedicated fax number below belonging to the [place] where the patient is receiving treatment. List of dedicated fax numbers for the Facilities:  FACILITY NAME UR FAX NUMBER   ADMISSION DENIALS (Administrative/Medical Necessity) [phone]   DISCHARGE SUPPORT TEAM (NETWORK) [phone]   PARENT CHILD HEALTH (Maternity/NICU/Pediatrics) [phone]   [facility] [phone]   [facility] [phone]   [facility] [phone]   [facility] [phone]   [facility] [phone]   [facility] [phone]   [facility] [phone]   [facility] [phone]   [facility] [phone]   [facility] [phone]   [facility] [phone]   [facility] [phone]

## 2024-04-17 ENCOUNTER — TELEMEDICINE (OUTPATIENT)
Dept: BEHAVIORAL/MENTAL HEALTH CLINIC | Facility: CLINIC | Age: 49
End: 2024-04-17
Payer: COMMERCIAL

## 2024-04-17 VITALS
WEIGHT: 215.17 LBS | SYSTOLIC BLOOD PRESSURE: 117 MMHG | TEMPERATURE: 98.3 F | HEART RATE: 100 BPM | RESPIRATION RATE: 19 BRPM | DIASTOLIC BLOOD PRESSURE: 91 MMHG | BODY MASS INDEX: 39.6 KG/M2 | HEIGHT: 62 IN | OXYGEN SATURATION: 95 %

## 2024-04-17 DIAGNOSIS — F33.1 MODERATE EPISODE OF RECURRENT MAJOR DEPRESSIVE DISORDER (HCC): Primary | ICD-10-CM

## 2024-04-17 DIAGNOSIS — F41.0 GENERALIZED ANXIETY DISORDER WITH PANIC ATTACKS: ICD-10-CM

## 2024-04-17 DIAGNOSIS — F41.1 GENERALIZED ANXIETY DISORDER WITH PANIC ATTACKS: ICD-10-CM

## 2024-04-17 DIAGNOSIS — F17.210 NICOTINE DEPENDENCE, CIGARETTES, UNCOMPLICATED: ICD-10-CM

## 2024-04-17 LAB
ALBUMIN SERPL BCP-MCNC: 3.6 G/DL (ref 3.5–5)
ALP SERPL-CCNC: 59 U/L (ref 34–104)
ALT SERPL W P-5'-P-CCNC: 57 U/L (ref 7–52)
ANION GAP SERPL CALCULATED.3IONS-SCNC: 11 MMOL/L (ref 4–13)
AST SERPL W P-5'-P-CCNC: 33 U/L (ref 13–39)
BASOPHILS # BLD AUTO: 0.01 THOUSANDS/ÂΜL (ref 0–0.1)
BASOPHILS NFR BLD AUTO: 0 % (ref 0–1)
BILIRUB SERPL-MCNC: 0.32 MG/DL (ref 0.2–1)
BUN SERPL-MCNC: 20 MG/DL (ref 5–25)
CALCIUM SERPL-MCNC: 8.9 MG/DL (ref 8.4–10.2)
CHLORIDE SERPL-SCNC: 100 MMOL/L (ref 96–108)
CO2 SERPL-SCNC: 26 MMOL/L (ref 21–32)
CREAT SERPL-MCNC: 1.57 MG/DL (ref 0.6–1.3)
EOSINOPHIL # BLD AUTO: 0.01 THOUSAND/ÂΜL (ref 0–0.61)
EOSINOPHIL NFR BLD AUTO: 0 % (ref 0–6)
ERYTHROCYTE [DISTWIDTH] IN BLOOD BY AUTOMATED COUNT: 16.3 % (ref 11.6–15.1)
GFR SERPL CREATININE-BSD FRML MDRD: 38 ML/MIN/1.73SQ M
GLUCOSE SERPL-MCNC: 223 MG/DL (ref 65–140)
GLUCOSE SERPL-MCNC: 226 MG/DL (ref 65–140)
GLUCOSE SERPL-MCNC: 274 MG/DL (ref 65–140)
GLUCOSE SERPL-MCNC: 292 MG/DL (ref 65–140)
HCT VFR BLD AUTO: 32.9 % (ref 34.8–46.1)
HGB BLD-MCNC: 10.6 G/DL (ref 11.5–15.4)
IMM GRANULOCYTES # BLD AUTO: 0.04 THOUSAND/UL (ref 0–0.2)
IMM GRANULOCYTES NFR BLD AUTO: 1 % (ref 0–2)
LYMPHOCYTES # BLD AUTO: 0.96 THOUSANDS/ÂΜL (ref 0.6–4.47)
LYMPHOCYTES NFR BLD AUTO: 12 % (ref 14–44)
MCH RBC QN AUTO: 29.9 PG (ref 26.8–34.3)
MCHC RBC AUTO-ENTMCNC: 32.2 G/DL (ref 31.4–37.4)
MCV RBC AUTO: 93 FL (ref 82–98)
MONOCYTES # BLD AUTO: 0.36 THOUSAND/ÂΜL (ref 0.17–1.22)
MONOCYTES NFR BLD AUTO: 5 % (ref 4–12)
NEUTROPHILS # BLD AUTO: 6.35 THOUSANDS/ÂΜL (ref 1.85–7.62)
NEUTS SEG NFR BLD AUTO: 82 % (ref 43–75)
NRBC BLD AUTO-RTO: 0 /100 WBCS
PLATELET # BLD AUTO: 160 THOUSANDS/UL (ref 149–390)
PMV BLD AUTO: 11.1 FL (ref 8.9–12.7)
POTASSIUM SERPL-SCNC: 3.9 MMOL/L (ref 3.5–5.3)
PROT SERPL-MCNC: 6.2 G/DL (ref 6.4–8.4)
RBC # BLD AUTO: 3.54 MILLION/UL (ref 3.81–5.12)
SODIUM SERPL-SCNC: 137 MMOL/L (ref 135–147)
WBC # BLD AUTO: 7.73 THOUSAND/UL (ref 4.31–10.16)

## 2024-04-17 PROCEDURE — 82948 REAGENT STRIP/BLOOD GLUCOSE: CPT

## 2024-04-17 PROCEDURE — 90834 PSYTX W PT 45 MINUTES: CPT | Performed by: SOCIAL WORKER

## 2024-04-17 PROCEDURE — 99024 POSTOP FOLLOW-UP VISIT: CPT | Performed by: PHYSICIAN ASSISTANT

## 2024-04-17 PROCEDURE — 85025 COMPLETE CBC W/AUTO DIFF WBC: CPT | Performed by: INTERNAL MEDICINE

## 2024-04-17 PROCEDURE — 80053 COMPREHEN METABOLIC PANEL: CPT | Performed by: INTERNAL MEDICINE

## 2024-04-17 RX ORDER — POTASSIUM CHLORIDE 20 MEQ/1
20 TABLET, EXTENDED RELEASE ORAL ONCE
Status: COMPLETED | OUTPATIENT
Start: 2024-04-17 | End: 2024-04-17

## 2024-04-17 RX ORDER — SODIUM CHLORIDE, SODIUM GLUCONATE, SODIUM ACETATE, POTASSIUM CHLORIDE, MAGNESIUM CHLORIDE, SODIUM PHOSPHATE, DIBASIC, AND POTASSIUM PHOSPHATE .53; .5; .37; .037; .03; .012; .00082 G/100ML; G/100ML; G/100ML; G/100ML; G/100ML; G/100ML; G/100ML
500 INJECTION, SOLUTION INTRAVENOUS ONCE
Status: COMPLETED | OUTPATIENT
Start: 2024-04-17 | End: 2024-04-17

## 2024-04-17 RX ORDER — POTASSIUM CHLORIDE 20 MEQ/1
20 TABLET, EXTENDED RELEASE ORAL 2 TIMES DAILY
Status: DISCONTINUED | OUTPATIENT
Start: 2024-04-17 | End: 2024-04-17 | Stop reason: HOSPADM

## 2024-04-17 RX ORDER — INSULIN LISPRO 100 [IU]/ML
1-5 INJECTION, SOLUTION INTRAVENOUS; SUBCUTANEOUS EVERY 6 HOURS SCHEDULED
Status: DISCONTINUED | OUTPATIENT
Start: 2024-04-17 | End: 2024-04-17 | Stop reason: HOSPADM

## 2024-04-17 RX ADMIN — LEVOTHYROXINE SODIUM 150 MCG: 150 TABLET ORAL at 05:09

## 2024-04-17 RX ADMIN — CEFTRIAXONE 1000 MG: 1 INJECTION, SOLUTION INTRAVENOUS at 06:10

## 2024-04-17 RX ADMIN — OXYCODONE HYDROCHLORIDE 10 MG: 10 TABLET ORAL at 00:10

## 2024-04-17 RX ADMIN — HYDROMORPHONE HYDROCHLORIDE 0.5 MG: 1 INJECTION, SOLUTION INTRAMUSCULAR; INTRAVENOUS; SUBCUTANEOUS at 02:03

## 2024-04-17 RX ADMIN — POTASSIUM CHLORIDE 20 MEQ: 1500 TABLET, EXTENDED RELEASE ORAL at 01:25

## 2024-04-17 RX ADMIN — Medication 400 MG: at 08:44

## 2024-04-17 RX ADMIN — INSULIN LISPRO 2 UNITS: 100 INJECTION, SOLUTION INTRAVENOUS; SUBCUTANEOUS at 08:44

## 2024-04-17 RX ADMIN — INSULIN LISPRO 2 UNITS: 100 INJECTION, SOLUTION INTRAVENOUS; SUBCUTANEOUS at 11:56

## 2024-04-17 RX ADMIN — CALCITRIOL CAPSULES 0.25 MCG 0.25 MCG: 0.25 CAPSULE ORAL at 08:44

## 2024-04-17 RX ADMIN — METRONIDAZOLE 500 MG: 500 TABLET ORAL at 05:09

## 2024-04-17 RX ADMIN — INSULIN LISPRO 2 UNITS: 100 INJECTION, SOLUTION INTRAVENOUS; SUBCUTANEOUS at 00:28

## 2024-04-17 RX ADMIN — SODIUM CHLORIDE, SODIUM GLUCONATE, SODIUM ACETATE, POTASSIUM CHLORIDE, MAGNESIUM CHLORIDE, SODIUM PHOSPHATE, DIBASIC, AND POTASSIUM PHOSPHATE 500 ML: .53; .5; .37; .037; .03; .012; .00082 INJECTION, SOLUTION INTRAVENOUS at 09:40

## 2024-04-17 RX ADMIN — POTASSIUM CHLORIDE 20 MEQ: 1500 TABLET, EXTENDED RELEASE ORAL at 08:49

## 2024-04-17 RX ADMIN — FENOFIBRATE 145 MG: 145 TABLET, FILM COATED ORAL at 08:44

## 2024-04-17 RX ADMIN — DESVENLAFAXINE 100 MG: 50 TABLET, EXTENDED RELEASE ORAL at 00:45

## 2024-04-17 RX ADMIN — FLUTICASONE FUROATE AND VILANTEROL TRIFENATATE 1 PUFF: 100; 25 POWDER RESPIRATORY (INHALATION) at 08:46

## 2024-04-17 RX ADMIN — ALPRAZOLAM 2 MG: 0.5 TABLET ORAL at 08:44

## 2024-04-17 NOTE — DISCHARGE INSTR - AVS FIRST PAGE
Postoperative Care Instructions      1. General: You may feel pulling sensations around the wound or funny aches and pains around the incisions. This is normal. Even minor surgery is a change in your body and this is your body's reaction to it. If you have had abdominal surgery, it may help to support the incision with a small pillow or blanket for comfort when moving or coughing.    2. Wound care: You can remove the clear tape and gauze over the incisions on 4/17. Please allow the white strips closest to your skin to stay and fall off by themselves over the next week or so. If you have staples or stitches, they will be removed by the physician at your follow up appointment.    3. Showering: You may shower again after removing the gauze dressings. Please do not soak wound in standing water such as a bath, hot tub, pool, lake, etc. Do not scrub or use exfoliants on the surgical wounds.    4. Activity: You may go up and down stairs, walk as much as you are comfortable, but walk at least 3 times each day. If you have had abdominal surgery, do not perform any strenuous exercise or lift anything heavier than 20 pounds for at least 3-4 weeks, unless cleared by your physician.    5. Diet: You may resume your regular diet. Please drink lots of water.    6. Medications: Resume all of your previous medications, unless told otherwise by the doctor.  A good option for pain control is to start with acetaminophen(Tylenol) 650mg every 6 hours.  If this is not sufficient then you make take the narcotic pain medicine as prescribed. You do not need to take the narcotic pain medication unless you are having significant pain and discomfort. Please take the narcotic medication with food. Insure that you do not take more than 4000 mg of Tylenol per day.     7. Driving: You will need someone to drive you home on the day of surgery. Do not drive or make any important decisions while on narcotic pain medication. Generally, you may drive  48 hours after you've stopped taking all narcotic pain medications.    8. Upset Stomach: You may take Maalox, Tums, or similar items for an upset stomach. If your narcotic pain medication causes an upset stomach, do not take it on an empty stomach. Try taking it with at least some crackers or toast.     9. Constipation: Patients often experience constipation after surgery, especially if taking narcotic pain medications. We recommend starting an over-the-counter medication for this, such as Metamucil, Senokot, Colace, milk of magnesia, etc. You may stop taking these medications a couple days after your last dose of narcotic medication. If you experience significant nausea or vomiting after abdominal surgery, call the office before trying any of these medications.     10. Call the office: If you are experiencing any of the following: fevers above 101.5°, significant nausea or vomiting, if the wound develops drainage and/or excessive redness around the wound, or if you have significant diarrhea or other worsening symptoms.    11. Pain: A prescription for narcotic pain medication will be sent to your pharmacy upon discharge from the hospital. Please only take this medication if absolutely necessary. Please return extra narcotics to your local pharmacy.

## 2024-04-17 NOTE — NURSING NOTE
Per pt, OR team had patient remove rings prior to surgery. Pt claims she had 3 rings on admission, but only received 2 rings back. Will communicate with OR staff tomorrow and see if patient's missing ring is in OR.

## 2024-04-17 NOTE — NURSING NOTE
Per OR staff, pt put rings in sterile container and gave to  prior to going into surgery on 4/16/24. Pt did not mention missing ring today during shift. Pt only seen with two rings prior to surgery per staff.
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
## 2024-04-17 NOTE — DISCHARGE SUMMARY
Discharge Summary - Yamileth Vale 48 y.o. female MRN: [id number]    Unit/Bed#: [address] [place] [address] Encounter: [id number]    Admission Date: 4/15/2024   Discharge Date: 4/17/2024    Admitting Diagnosis:   Cholelithiases [K80.20]  Abdominal pain [R10.9]  RUQ pain [R10.11]    Discharge Diagnoses: Principal Problem:    Abdominal pain  Active Problems:    Post-surgical hypoparathyroidism (HCC)    Postoperative hypothyroidism    Hypoparathyroidism (HCC)    Continuous opioid dependence (HCC)    Stage 3a chronic kidney disease (HCC)      Consultations: General surgery    Procedures Performed: Laparoscopic cholecystectomy    HPI per admission H&P:  Yamileth Vale is a 48 y.o. female with a PMH of hypoparathyroidism, diabetes who presents with epigastric/right upper quadrant abdominal pain that started on 4/11.  States pain is constant, 12/10 in intensity at home but currently 6/10.  States pain initially started out in the epigastric region and now more in the right upper quadrant with radiation around to the back.  Patient was seen in the ER and had CT scan done but declined admission. states she was attempting to eat has been eat very light meals although p.o. intake would make her nauseated and has had decreased p.o. intake.  Reports nausea, vomiting at home.  Last bowel movement was today       Hospital Course: Yamileth Vale is a 48 y.o. female admitted for several days of worsening right upper quadrant pain.  Right upper quadrant ultrasound confirmed cholelithiasis.  HIDA scan confirmed acute cholecystitis.  Patient was taken to the OR for laparoscopic cholecystectomy.  Patient tolerated procedure well and there were no intraoperative complications.  Patient was sent home postoperative day 1 after tolerating regular diet and discussing postoperative care instructions in detail with her.  No narcotic prescription was sent to patient pharmacy considering she is already on chronic opioids at  home.        Condition at Discharge: good     Discharge instructions/Information to patient and family:   See after visit summary for information provided to patient and family.      Provisions for Follow-Up Care:  See after visit summary for information related to follow-up care and any pertinent home health orders.      Disposition: Home    Planned Readmission: No    Discharge Statement   I spent 20 minutes discharging the patient. This time was spent on the day of discharge. I had direct contact with the patient on the day of discharge. Additional documentation is required if more than 30 minutes were spent on discharge.     Discharge Medications:  See after visit summary for reconciled discharge medications provided to patient and family.

## 2024-04-18 ENCOUNTER — PATIENT OUTREACH (OUTPATIENT)
Dept: CASE MANAGEMENT | Facility: OTHER | Age: 49
End: 2024-04-18

## 2024-04-18 NOTE — PROGRESS NOTES
In basket ADT notification received for patient discharge from hospital and is s/p lap rossi.     Patient discharged from 4/17/24.  Discharge follow up call.  Is this a good time for you to talk?     Yes  We want to make sure you continue to improve now that you are home. Do you have your discharge instructions/AVS?   Yes.  AVS reviewed with patient for wound care and when to call provider.   Do you have your medication list?     Yes  Do you have your medications?     Yes  Are you taking your medications as ordered?     Yes  Do you have any questions about your medications?     No  Reviewed medication list with patient?     No; patient declined stating 'there is a lot of them'  Are you experiencing any new or worsening symptoms?     No  Did you make your follow-up appointments?     Yes  Do you need assistance scheduling follow-up appointments?      No  Do you have transportation for appointments & to  medications?     Yes  Do you have sufficient caregiver support at home?     Yes  Have you been contacted by, or visited by, Home Care Services?     No  Home health agency is NA.  May I follow up with you again?    Yes. Agreed to another call in two weeks.     Yamileth reports surgical bandage remains in place and is clean, dry and intact.    She denies fever or drainage from incision site.   She does admit to having pain that is not controlled with tylenol, percocet or motrin.    Encouraged her to call gen surg to discuss pain management.    Encouraged patient to note call back number of this RN CM in phone and call sooner than next planned outreach date if needed.   Yamileth expressed agreement.     Episode changed from surveillance to complex.     Next outreach scheduled.

## 2024-04-19 ENCOUNTER — APPOINTMENT (OUTPATIENT)
Dept: LAB | Facility: HOSPITAL | Age: 49
End: 2024-04-19
Payer: COMMERCIAL

## 2024-04-19 DIAGNOSIS — E83.51 HYPOCALCEMIA: ICD-10-CM

## 2024-04-19 DIAGNOSIS — E89.2 POST-SURGICAL HYPOPARATHYROIDISM (HCC): ICD-10-CM

## 2024-04-19 LAB — CALCIUM SERPL-MCNC: 9.8 MG/DL (ref 8.4–10.2)

## 2024-04-19 PROCEDURE — 36415 COLL VENOUS BLD VENIPUNCTURE: CPT

## 2024-04-19 PROCEDURE — 88304 TISSUE EXAM BY PATHOLOGIST: CPT | Performed by: STUDENT IN AN ORGANIZED HEALTH CARE EDUCATION/TRAINING PROGRAM

## 2024-04-22 ENCOUNTER — TELEPHONE (OUTPATIENT)
Age: 49
End: 2024-04-22

## 2024-04-22 ENCOUNTER — APPOINTMENT (OUTPATIENT)
Dept: LAB | Facility: HOSPITAL | Age: 49
End: 2024-04-22
Payer: COMMERCIAL

## 2024-04-22 DIAGNOSIS — E89.2 POST-SURGICAL HYPOPARATHYROIDISM (HCC): ICD-10-CM

## 2024-04-22 DIAGNOSIS — E83.51 HYPOCALCEMIA: ICD-10-CM

## 2024-04-22 LAB — CALCIUM SERPL-MCNC: 9.9 MG/DL (ref 8.4–10.2)

## 2024-04-22 PROCEDURE — 36415 COLL VENOUS BLD VENIPUNCTURE: CPT

## 2024-04-22 NOTE — TELEPHONE ENCOUNTER
Patient called, she gets labs done weekly so she usually has a standing order in for calcium and calcium, ionized. However, neither of them are showing up for her. She said they are usually ordered for 6 months so she doesn't have to keep calling back.    I do see the Calcium ordered on 4/10, but not the calcium, ionized. She is going over later today, so please advise ASAP.

## 2024-04-23 ENCOUNTER — TELEPHONE (OUTPATIENT)
Age: 49
End: 2024-04-23

## 2024-04-23 NOTE — TELEPHONE ENCOUNTER
There are total calcium orders for twice a week till next year.  She does not need ionized calcium every week

## 2024-04-26 ENCOUNTER — APPOINTMENT (OUTPATIENT)
Dept: LAB | Facility: HOSPITAL | Age: 49
End: 2024-04-26
Payer: COMMERCIAL

## 2024-04-26 ENCOUNTER — TELEMEDICINE (OUTPATIENT)
Dept: BEHAVIORAL/MENTAL HEALTH CLINIC | Facility: CLINIC | Age: 49
End: 2024-04-26

## 2024-04-26 DIAGNOSIS — F33.1 MODERATE EPISODE OF RECURRENT MAJOR DEPRESSIVE DISORDER (HCC): Primary | ICD-10-CM

## 2024-04-26 DIAGNOSIS — F17.210 NICOTINE DEPENDENCE, CIGARETTES, UNCOMPLICATED: ICD-10-CM

## 2024-04-26 DIAGNOSIS — F41.1 GENERALIZED ANXIETY DISORDER WITH PANIC ATTACKS: ICD-10-CM

## 2024-04-26 DIAGNOSIS — F41.0 GENERALIZED ANXIETY DISORDER WITH PANIC ATTACKS: ICD-10-CM

## 2024-04-26 DIAGNOSIS — E83.51 HYPOCALCEMIA: ICD-10-CM

## 2024-04-26 DIAGNOSIS — E89.2 POST-SURGICAL HYPOPARATHYROIDISM (HCC): ICD-10-CM

## 2024-04-26 LAB — CALCIUM SERPL-MCNC: 9.3 MG/DL (ref 8.4–10.2)

## 2024-04-26 PROCEDURE — 36415 COLL VENOUS BLD VENIPUNCTURE: CPT
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
## 2024-04-27 VITALS
OXYGEN SATURATION: 97 % | DIASTOLIC BLOOD PRESSURE: 91 MMHG | RESPIRATION RATE: 22 BRPM | TEMPERATURE: 98.2 F | SYSTOLIC BLOOD PRESSURE: 145 MMHG | HEART RATE: 116 BPM

## 2024-04-27 PROCEDURE — 93005 ELECTROCARDIOGRAM TRACING: CPT

## 2024-04-27 PROCEDURE — 99284 EMERGENCY DEPT VISIT MOD MDM: CPT

## 2024-04-28 ENCOUNTER — APPOINTMENT (EMERGENCY)
Dept: RADIOLOGY | Facility: HOSPITAL | Age: 49
End: 2024-04-28
Payer: COMMERCIAL

## 2024-04-28 ENCOUNTER — HOSPITAL ENCOUNTER (EMERGENCY)
Facility: HOSPITAL | Age: 49
Discharge: HOME | End: 2024-04-28
Attending: EMERGENCY MEDICINE
Payer: COMMERCIAL

## 2024-04-28 DIAGNOSIS — R10.13 EPIGASTRIC PAIN: Primary | ICD-10-CM

## 2024-04-28 DIAGNOSIS — K21.9 GERD (GASTROESOPHAGEAL REFLUX DISEASE): ICD-10-CM

## 2024-04-28 LAB
ANION GAP SERPL CALCULATED.3IONS-SCNC: 11 MMOL/L (ref 4–13)
BASOPHILS # BLD AUTO: 0.11 THOUSANDS/ÂΜL (ref 0–0.1)
BASOPHILS NFR BLD AUTO: 2 % (ref 0–1)
BUN SERPL-MCNC: 24 MG/DL (ref 5–25)
CA-I BLD-SCNC: 1.15 MMOL/L (ref 1.12–1.32)
CALCIUM SERPL-MCNC: 9.7 MG/DL (ref 8.4–10.2)
CARDIAC TROPONIN I PNL SERPL HS: 3 NG/L
CHLORIDE SERPL-SCNC: 104 MMOL/L (ref 96–108)
CO2 SERPL-SCNC: 22 MMOL/L (ref 21–32)
CREAT SERPL-MCNC: 1 MG/DL (ref 0.6–1.3)
EOSINOPHIL # BLD AUTO: 0.13 THOUSAND/ÂΜL (ref 0–0.61)
EOSINOPHIL NFR BLD AUTO: 2 % (ref 0–6)
ERYTHROCYTE [DISTWIDTH] IN BLOOD BY AUTOMATED COUNT: 17.2 % (ref 11.6–15.1)
GFR SERPL CREATININE-BSD FRML MDRD: 67 ML/MIN/1.73SQ M
GLUCOSE SERPL-MCNC: 153 MG/DL (ref 65–140)
HCT VFR BLD AUTO: 38.4 % (ref 34.8–46.1)
HGB BLD-MCNC: 12.1 G/DL (ref 11.5–15.4)
IMM GRANULOCYTES # BLD AUTO: 0.05 THOUSAND/UL (ref 0–0.2)
IMM GRANULOCYTES NFR BLD AUTO: 1 % (ref 0–2)
LYMPHOCYTES # BLD AUTO: 2.26 THOUSANDS/ÂΜL (ref 0.6–4.47)
LYMPHOCYTES NFR BLD AUTO: 32 % (ref 14–44)
MAGNESIUM SERPL-MCNC: 2.1 MG/DL (ref 1.9–2.7)
MCH RBC QN AUTO: 29.6 PG (ref 26.8–34.3)
MCHC RBC AUTO-ENTMCNC: 31.5 G/DL (ref 31.4–37.4)
MCV RBC AUTO: 94 FL (ref 82–98)
MONOCYTES # BLD AUTO: 0.5 THOUSAND/ÂΜL (ref 0.17–1.22)
MONOCYTES NFR BLD AUTO: 7 % (ref 4–12)
NEUTROPHILS # BLD AUTO: 4.12 THOUSANDS/ÂΜL (ref 1.85–7.62)
NEUTS SEG NFR BLD AUTO: 56 % (ref 43–75)
NRBC BLD AUTO-RTO: 0 /100 WBCS
PLATELET # BLD AUTO: 237 THOUSANDS/UL (ref 149–390)
PMV BLD AUTO: 10.7 FL (ref 8.9–12.7)
POTASSIUM SERPL-SCNC: 4.1 MMOL/L (ref 3.5–5.3)
RBC # BLD AUTO: 4.09 MILLION/UL (ref 3.81–5.12)
SODIUM SERPL-SCNC: 137 MMOL/L (ref 135–147)
WBC # BLD AUTO: 7.17 THOUSAND/UL (ref 4.31–10.16)

## 2024-04-28 PROCEDURE — 84484 ASSAY OF TROPONIN QUANT: CPT | Performed by: EMERGENCY MEDICINE

## 2024-04-28 PROCEDURE — 85025 COMPLETE CBC W/AUTO DIFF WBC: CPT | Performed by: EMERGENCY MEDICINE

## 2024-04-28 PROCEDURE — 99285 EMERGENCY DEPT VISIT HI MDM: CPT | Performed by: EMERGENCY MEDICINE

## 2024-04-28 PROCEDURE — 83735 ASSAY OF MAGNESIUM: CPT | Performed by: EMERGENCY MEDICINE

## 2024-04-28 PROCEDURE — 71045 X-RAY EXAM CHEST 1 VIEW: CPT

## 2024-04-28 PROCEDURE — 36415 COLL VENOUS BLD VENIPUNCTURE: CPT | Performed by: EMERGENCY MEDICINE

## 2024-04-28 PROCEDURE — 82330 ASSAY OF CALCIUM: CPT | Performed by: EMERGENCY MEDICINE

## 2024-04-28 PROCEDURE — 80048 BASIC METABOLIC PNL TOTAL CA: CPT | Performed by: EMERGENCY MEDICINE

## 2024-04-28 RX ORDER — ONDANSETRON 4 MG/1
4 TABLET, ORALLY DISINTEGRATING ORAL EVERY 6 HOURS PRN
Qty: 12 TABLET | Refills: 0 | Status: SHIPPED | OUTPATIENT
Start: 2024-04-28 | End: 2024-05-09 | Stop reason: SDUPTHER

## 2024-04-28 RX ORDER — FAMOTIDINE 20 MG/1
20 TABLET, FILM COATED ORAL 2 TIMES DAILY
Qty: 30 TABLET | Refills: 0 | Status: SHIPPED | OUTPATIENT
Start: 2024-04-28 | End: 2024-05-09 | Stop reason: ALTCHOICE

## 2024-04-28 RX ORDER — FAMOTIDINE 20 MG/1
20 TABLET, FILM COATED ORAL ONCE
Status: COMPLETED | OUTPATIENT
Start: 2024-04-28 | End: 2024-04-28

## 2024-04-28 RX ORDER — ONDANSETRON 4 MG/1
4 TABLET, ORALLY DISINTEGRATING ORAL ONCE
Status: COMPLETED | OUTPATIENT
Start: 2024-04-28 | End: 2024-04-28

## 2024-04-28 RX ADMIN — ONDANSETRON 4 MG: 4 TABLET, ORALLY DISINTEGRATING ORAL at 01:22

## 2024-04-28 RX ADMIN — FAMOTIDINE 20 MG: 20 TABLET, FILM COATED ORAL at 01:22
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
## 2024-04-29 LAB
ATRIAL RATE: 109 BPM
P AXIS: 40 DEGREES
PR INTERVAL: 152 MS
QRS AXIS: 33 DEGREES
QRSD INTERVAL: 74 MS
QT INTERVAL: 334 MS
QTC INTERVAL: 449 MS
T WAVE AXIS: 63 DEGREES
VENTRICULAR RATE: 109 BPM

## 2024-04-29 PROCEDURE — 93010 ELECTROCARDIOGRAM REPORT: CPT | Performed by: INTERNAL MEDICINE

## 2024-04-30 ENCOUNTER — PATIENT OUTREACH (OUTPATIENT)
Dept: CASE MANAGEMENT | Facility: OTHER | Age: 49
End: 2024-04-30

## 2024-04-30 ENCOUNTER — TELEMEDICINE (OUTPATIENT)
Dept: BEHAVIORAL/MENTAL HEALTH CLINIC | Facility: CLINIC | Age: 49
End: 2024-04-30

## 2024-04-30 DIAGNOSIS — F17.210 NICOTINE DEPENDENCE, CIGARETTES, UNCOMPLICATED: ICD-10-CM

## 2024-04-30 DIAGNOSIS — F33.1 MODERATE EPISODE OF RECURRENT MAJOR DEPRESSIVE DISORDER (HCC): Primary | ICD-10-CM

## 2024-04-30 DIAGNOSIS — F41.1 GENERALIZED ANXIETY DISORDER WITH PANIC ATTACKS: ICD-10-CM

## 2024-04-30 DIAGNOSIS — F41.0 GENERALIZED ANXIETY DISORDER WITH PANIC ATTACKS: ICD-10-CM
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
## 2024-04-30 NOTE — PROGRESS NOTES
In basket ADT notifications received for patient being treated and released from  Emergency Department for c/o epigastric abdominal pain with intermittent nausea.  She is 12 days post op rossi and reports not taking anything to ease pain at home. Glucose slightly elevated at 153 in triage; other lab work unremarkable. Troponin  negative, CXR neg for acute findings and EKG showed NSR with HR of 109. Pepcid and Zofran Rx given and patient directed to f/u with PCP,  and Rheumatologist.       Patient discharged from 4/28/24.   ED discharge follow up call placed.    Is this a good time for you to talk?     Yes    We want to make sure you continue to improve now that you are home. Do you have your discharge instructions/AVS?      Yes    Do you have your medication list?     Yes  Do you have your medications?     Yes  Are you able to afford your medications?     Yes    Are you taking your medications as ordered?     Yes  Do you have any questions about your medications?     No  Reviewed medication list with patient?     No; Yamileth declined stating no medication changes  Are you experiencing any new or worsening symptoms?     No    Did you make your follow-up appointments?     Yes  Do you need assistance scheduling follow-up appointments?      No;  patient acknowledges need to f/u appt with GI and stated intent to call.     Do you have transportation for appointments & to  medications?     Yes    Do you have sufficient caregiver support at home?     Yes    Have you been contacted by, or visited by, Home Care Services?     N/A  Home health agency is NA.    Educated on appropriate ED use?     Yes.  Patient admits to having weird chest pain upon dc from hospital admission but states this resolved     May I follow up with you again?    Yes.  Patient agreeable to outreach in 2-3 weeks and was informed this RN CM may call sooner should she return to ED or become hospitalized. She verbalized understanding.      Yamileth reports being pain free at time of call and denies any sign of infection from rossi incision sites. She was encouraged to call her PCP or Gen Surgeon should she develop fevers, drainage or redness at incision sites.     expressed dissatisfaction with recent hospital admission stating a few of her supplements (magnesium and calcium) were withheld.  She is awaiting the survey to be able to fill out and return.  RN CM encouraged her to complete when received, return it and offered [id number] P# to complete a grievance.     All questions answered. No needs verbalized. Patient has call back number of RN CM and was encouraged to call.     Next outreach scheduled.

## 2024-05-01 ENCOUNTER — APPOINTMENT (OUTPATIENT)
Dept: LAB | Facility: HOSPITAL | Age: 49
End: 2024-05-01
Payer: COMMERCIAL

## 2024-05-01 DIAGNOSIS — E89.2 POST-SURGICAL HYPOPARATHYROIDISM (HCC): ICD-10-CM

## 2024-05-01 DIAGNOSIS — E83.51 HYPOCALCEMIA: ICD-10-CM

## 2024-05-01 LAB — CALCIUM SERPL-MCNC: 8.9 MG/DL (ref 8.4–10.2)

## 2024-05-01 PROCEDURE — 36415 COLL VENOUS BLD VENIPUNCTURE: CPT

## 2024-05-02 ENCOUNTER — OFFICE VISIT (OUTPATIENT)
Dept: NEPHROLOGY | Facility: CLINIC | Age: 49
End: 2024-05-02

## 2024-05-02 VITALS
SYSTOLIC BLOOD PRESSURE: 118 MMHG | DIASTOLIC BLOOD PRESSURE: 78 MMHG | HEART RATE: 82 BPM | WEIGHT: 208 LBS | BODY MASS INDEX: 38.28 KG/M2 | HEIGHT: 62 IN

## 2024-05-02 DIAGNOSIS — E83.52 HYPERCALCEMIA: Primary | ICD-10-CM

## 2024-05-02 DIAGNOSIS — E55.9 VITAMIN D DEFICIENCY: ICD-10-CM

## 2024-05-02 NOTE — PATIENT INSTRUCTIONS
It was a pleasure seeing you today thank you for your visit.  At this time we will continue to work in conjunction with endocrinology regarding your calcium levels and your kidney function.  Some concern regarding kidney was noted on renal ultrasound in April which show a very small stone in each kidney.  Both of the stones were nonobstructing and at this time causing no problem but we would like to not for more stones    Recommendations:  Continue calcium supplement 3 times a day  Calcitriol also 3 times a day 0.25 with each dose  Follow-up blood work as needed to maintain normal calcium levels  We will try to avoid high calcium levels which could adversely affect the kidneys.  Obviously low calcium levels may affect you in other ways and make you more symptomatic  I will message Dr. Pabon with the suggestions regarding our conversation and she can send you a MyChart message regarding.  You have a follow-up appointment with Dr. Jeong  Continue to avoid NSAIDs such as Motrin Aleve ibuprofen and Advil.  These medications can be injuries to your kidneys  If you have a question about the safety of the medication call our office or asked the pharmacist   149.6 [phone]

## 2024-05-02 NOTE — PROGRESS NOTES
NEPHROLOGY OFFICE VISIT   Yamileth Vale 48 y.o. female MRN: [id number]  5/2/2024    Reason for Visit: follow up    INTERVAL HISTORY and SUBJECTIVE:    I had the pleasure of seeing Yamileth today in the renal clinic. She is a 48-year-old female who was previously seen by Dr. Zambrano but more recently has been followed by this provider and has a follow-up appointment with Dr. Jeong in September.  She has a past medical history of tobacco dependence, hypothyroidism status post thyroid resection, chronic hypocalcemia following parathyroidectomy, chronic back pain, anxiety, depression and GERD, iron deficiency anemia.   He was recently hospitalized for abdominal pain and found to have cholelithiasis.  She is status post laparoscopic cholecystectomy and was discharged on 4/17/2024.  Yamileth continues to have intermittent symptoms which she relates to fluctuations in her calcium level.    ASSESSMENT AND PLAN:    CKD, stage IIIa:  In September/October/November 2023 creatinine near 1.5-1.7  Albumin creatinine ratio within normal limits  Baseline creatinine near 1 to 1.2 mg/dL  Current creatinine 1 mg/dL.  Renal function stable at this time.  Concern for renal effects of hypercalcemia and I discussed this at length with Yamileth.  I explained the effects on the kidney related to hypercalcemia and also pointed out that she had a stone in each kidney.  Plan:  At this time Yamileth will try to continue with regimen of calcium 600 mg 3 times a day and calcitriol 0.25 mcg 3 times a day  I messaged Dr. Pabon regarding patient's requests/blood work.  I explained that Dr. Jeong and I would recommend checking calcium every 2 weeks.  She is currently going for calcium checks twice a week and requests additional orders to get her blood work checked 3 times a week.  Follow-up appointment with Dr. Jeong in September     Postoperative hypoparathyroidism:  Recent PTH level less than 1 in the setting of  hypercalcemia.  Management: Following with Dr. Pabon.    Medical management: Calcitriol 0.25 mcg 3 times a day, calcium supplement 600 mg 3 times a day     Hypomagnesemia: on supplement 3 times a day.  Magnesium level normal     Hypercalcemia:  Serum levels have been within normal range the last several weeks except for a decline in calcium to 8.1 on 4/16 when Yamileth was hospitalized due to cholecystitis/cholecystectomy.  She relates that it happened because she was not getting her supplements.  She did receive calcium gluconate infusion.  Continues to have intermittent signs and symptoms such as paresthesia/facial numbness.  Requests that she can receive an infusion if she is less than 8.2/below normal  I did explain to her that endocrinology will still be primarily responsible for managing calcium.  Dr. Pabon messaged     Tobacco dependence:   Follows with pulmonary services for management of shortness of breath and cough.     Hypertriglyceridemia: On fenofibrate     Hypothyroidism on levothyroxine     Other problems:     Fatty liver  Elevated BMI  Prediabetes: Follows with Dr. Pabon    PATIENT INSTRUCTIONS:    Patient Instructions   It was a pleasure seeing you today thank you for your visit.  At this time we will continue to work in conjunction with endocrinology regarding your calcium levels and your kidney function.  Some concern regarding kidney was noted on renal ultrasound in April which show a very small stone in each kidney.  Both of the stones were nonobstructing and at this time causing no problem but we would like to not for more stones    Recommendations:  Continue calcium supplement 3 times a day  Calcitriol also 3 times a day 0.25 with each dose  Follow-up blood work as needed to maintain normal calcium levels  We will try to avoid high calcium levels which could adversely affect the kidneys.  Obviously low calcium levels may affect you in other ways and make you more symptomatic  I will message  "Dr. Pabon with the suggestions regarding our conversation and she can send you a [employer] message regarding.  You have a follow-up appointment with Dr. Jeong  Continue to avoid NSAIDs such as Motrin Aleve ibuprofen and Advil.  These medications can be injuries to your kidneys  If you have a question about the safety of the medication call our office or asked the pharmacist      No orders of the defined types were placed in this encounter.      OBJECTIVE:  Current Weight: Weight - Scale: 94.3 kg (208 lb)  Vitals:    05/02/24 1437 05/02/24 1439   BP:  118/78   BP Location:  Left arm   Pulse:  82   Weight: 94.3 kg (208 lb)    Height: 5' 2\" (1.575 m)     Body mass index is 38.04 kg/m².      REVIEW OF SYSTEMS:    Review of Systems    PHYSICAL EXAM:      Physical Exam    Medications:    Current Outpatient Medications:     albuterol (PROVENTIL HFA,VENTOLIN HFA) 90 mcg/act inhaler, INHALE 1 PUFF BY MOUTH EVERY 6 HOURS AS NEEDED FOR WHEEZE, Disp: 18 g, Rfl: 1    ALPRAZolam ER (XANAX XR) 2 MG 24 hr tablet, Take 1 tablet (2 mg total) by mouth 2 (two) times a day before breakfast and lunch, Disp: 60 tablet, Rfl: 0    calcitriol (ROCALTROL) 0.25 mcg capsule, Take 1 capsule (0.25 mcg total) by mouth 3 (three) times a day, Disp: 270 capsule, Rfl: 2    calcium carbonate (OS-EDER) 600 MG tablet, Take 1 pill daily twice a day, Disp: 180 tablet, Rfl: 10    Cholecalciferol (Vitamin D3) 125 MCG (5000 UT) CAPS, Take 5000 IU daily, Disp: , Rfl:     desvenlafaxine succinate (PRISTIQ) 100 mg 24 hr tablet, Take 1 tablet (100 mg total) by mouth daily, Disp: 90 tablet, Rfl: 0    famotidine (PEPCID) 20 mg tablet, Take 1 tablet (20 mg total) by mouth 2 (two) times a day, Disp: 30 tablet, Rfl: 0    fenofibrate 160 MG tablet, Take 1 tablet (160 mg total) by mouth daily, Disp: 30 tablet, Rfl: 1    ferrous sulfate 325 (65 Fe) mg tablet, TAKE 1 TABLET BY MOUTH 2 TIMES A DAY WITH MEALS., Disp: 60 tablet, Rfl: 5    fluticasone-umeclidinium-vilanterol " (Trelegy Ellipta) 100-62.5-25 mcg/actuation inhaler, Inhale 1 puff daily Rinse mouth after use., Disp: 60 blister, Rfl: 7    levothyroxine 150 mcg tablet, Take 1 tablet (150 mcg total) by mouth daily at bedtime, Disp: 90 tablet, Rfl: 3    magnesium Oxide (MAG-OX) 400 mg TABS, Take 1 tablet (400 mg total) by mouth 3 (three) times a day, Disp: 90 tablet, Rfl: 10    metFORMIN (GLUCOPHAGE) 500 mg tablet, Take 1 tablet (500 mg total) by mouth 2 (two) times a day with meals (Patient taking differently: Take by mouth 3 (three) times a day), Disp: 180 tablet, Rfl: 0    Movantik 25 MG tablet, 25 mg in the morning, Disp: , Rfl:     oxyCODONE-acetaminophen (PERCOCET)  mg per tablet, 1 tablet 4 (four) times a day, Disp: , Rfl:     potassium chloride (K-DUR,KLOR-CON) 20 mEq tablet, Take 1 tablet (20 mEq total) by mouth 2 (two) times a day, Disp: 60 tablet, Rfl: 10    Psyllium (Metamucil) 0.36 g CAPS, Take 1 g by oral route., Disp: , Rfl:     Senna Plus 8.6-50 MG per tablet, , Disp: , Rfl:     sucralfate (CARAFATE) 1 g tablet, Take 1 tablet (1 g total) by mouth 4 (four) times a day (before meals and at bedtime) for 15 days, Disp: 60 tablet, Rfl: 0    tiZANidine (ZANAFLEX) 4 mg tablet, Take 4 mg by mouth Three times daily as needed, Disp: , Rfl:     traZODone (DESYREL) 100 mg tablet, Take 1 tablet (100 mg total) by mouth daily at bedtime, Disp: 90 tablet, Rfl: 0    acetaminophen (TYLENOL) 500 mg tablet, Take 1 tablet (500 mg total) by mouth every 6 (six) hours as needed for mild pain or moderate pain, Disp: 30 tablet, Rfl: 0    Alcohol Swabs 70 % PADS, May substitute brand based on insurance coverage. Check glucose BID., Disp: 100 each, Rfl: 0    bacitracin topical ointment 500 units/g topical ointment, Apply 1 large application topically 2 (two) times a day, Disp: 28 g, Rfl: 0    Blood Glucose Monitoring Suppl (OneTouch Verio Reflect) w/Device KIT, May substitute brand based on insurance coverage. Check glucose BID., Disp:  1 kit, Rfl: 0    Butalbital-APAP-Caffeine (Fioricet) -40 MG CAPS, Take 1 capsule by mouth every 6 (six) hours as needed (migraine) for up to 8 doses (Patient not taking: Reported on 4/15/2024), Disp: 8 capsule, Rfl: 0    famotidine (PEPCID) 20 mg tablet, Take 1 tablet (20 mg total) by mouth 2 (two) times a day (Patient not taking: Reported on 4/15/2024), Disp: 30 tablet, Rfl: 0    fluticasone (FLONASE) 50 mcg/act nasal spray, 1 spray into each nostril daily, Disp: 16 g, Rfl: 0    glucose blood (OneTouch Verio) test strip, May substitute brand based on insurance coverage. Check glucose TID., Disp: 100 each, Rfl: 1    hydrocortisone 2.5 % cream, Apply 1 application. topically 2 (two) times a day, Disp: , Rfl:     Icosapent Ethyl (Vascepa) 1 g CAPS, Take 2 capsules (2 g total) by mouth 2 (two) times a day (Patient not taking: Reported on 1/12/2024), Disp: 180 capsule, Rfl: 4    Lidocaine-Menthol 4-1 % PTCH, if needed  , Disp: , Rfl:     methylPREDNISolone 4 MG tablet therapy pack, Use as directed on package (Patient not taking: Reported on 2/1/2024), Disp: 21 tablet, Rfl: 0    nystatin (MYCOSTATIN) powder, Apply under the breast twice a day for 1 week, Disp: 60 g, Rfl: 1    ondansetron (ZOFRAN) 4 mg tablet, TAKE 1 TABLET BY MOUTH EVERY 8 HOURS AS NEEDED FOR NAUSEA, Disp: 18 tablet, Rfl: 2    ondansetron (ZOFRAN) 4 mg tablet, Take 1 tablet (4 mg total) by mouth every 6 (six) hours, Disp: 20 tablet, Rfl: 0    ondansetron (ZOFRAN-ODT) 4 mg disintegrating tablet, Take 1 tablet (4 mg total) by mouth every 6 (six) hours as needed for nausea or vomiting, Disp: 12 tablet, Rfl: 0    OneTouch Delica Lancets 33G MISC, May substitute brand based on insurance coverage. Check glucose TID, Disp: 100 each, Rfl: 1    varenicline (CHANTIX) 1 mg tablet, Take 1 tablet (1 mg total) by mouth 2 (two) times a day (Patient not taking: Reported on 4/15/2024), Disp: 60 tablet, Rfl: 2    Varenicline Tartrate, Starter, 0.5 MG X 11 & 1 MG X  42 TBPK, Start 0.5 mg daily for 3 days then increase to 0.5 mg twice daily for 4 days.  After that take 1 mg twice daily (Patient not taking: Reported on 4/15/2024), Disp: 53 each, Rfl: 0    Laboratory Results:  Results from last 7 days   Lab Units 05/01/24  1337 04/28/24  0034 04/26/24  1321   WBC Thousand/uL  --  7.17  --    HEMOGLOBIN g/dL  --  12.1  --    HEMATOCRIT %  --  38.4  --    PLATELETS Thousands/uL  --  237  --    POTASSIUM mmol/L  --  4.1  --    CHLORIDE mmol/L  --  104  --    CO2 mmol/L  --  22  --    BUN mg/dL  --  24  --    CREATININE mg/dL  --  1.00  --    CALCIUM mg/dL 8.9 9.7 9.3   MAGNESIUM mg/dL  --  2.1  --        Results for orders placed or performed in visit on 05/01/24   Calcium   Result Value Ref Range    Calcium 8.9 8.4 - 10.2 mg/dL     *Note: Due to a large number of results and/or encounters for the requested time period, some results have not been displayed. A complete set of results can be found in Results Review.

## 2024-05-03 ENCOUNTER — LAB (OUTPATIENT)
Dept: LAB | Facility: HOSPITAL | Age: 49
End: 2024-05-03
Payer: COMMERCIAL

## 2024-05-03 DIAGNOSIS — N18.31 STAGE 3A CHRONIC KIDNEY DISEASE (HCC): ICD-10-CM

## 2024-05-03 DIAGNOSIS — E89.2 POST-SURGICAL HYPOPARATHYROIDISM (HCC): ICD-10-CM

## 2024-05-03 DIAGNOSIS — E83.51 HYPOCALCEMIA: ICD-10-CM

## 2024-05-03 DIAGNOSIS — R30.0 DYSURIA: ICD-10-CM

## 2024-05-03 LAB
BILIRUB UR QL STRIP: NEGATIVE
CALCIUM SERPL-MCNC: 11.6 MG/DL (ref 8.4–10.2)
CLARITY UR: NORMAL
COLOR UR: YELLOW
GLUCOSE UR STRIP-MCNC: NEGATIVE MG/DL
HGB UR QL STRIP.AUTO: NEGATIVE
KETONES UR STRIP-MCNC: NEGATIVE MG/DL
LEUKOCYTE ESTERASE UR QL STRIP: NEGATIVE
NITRITE UR QL STRIP: NEGATIVE
PH UR STRIP.AUTO: 5.5 [PH]
PROT UR STRIP-MCNC: NEGATIVE MG/DL
SP GR UR STRIP.AUTO: 1.02 (ref 1–1.03)
UROBILINOGEN UR STRIP-ACNC: <2 MG/DL

## 2024-05-03 PROCEDURE — 81003 URINALYSIS AUTO W/O SCOPE: CPT

## 2024-05-03 PROCEDURE — 87086 URINE CULTURE/COLONY COUNT: CPT

## 2024-05-03 PROCEDURE — 36415 COLL VENOUS BLD VENIPUNCTURE: CPT

## 2024-05-05 LAB — BACTERIA UR CULT: NORMAL

## 2024-05-06 ENCOUNTER — TELEPHONE (OUTPATIENT)
Dept: NEPHROLOGY | Facility: CLINIC | Age: 49
End: 2024-05-06

## 2024-05-06 ENCOUNTER — TELEPHONE (OUTPATIENT)
Age: 49
End: 2024-05-06

## 2024-05-06 ENCOUNTER — TELEPHONE (OUTPATIENT)
Dept: ENDOCRINOLOGY | Facility: CLINIC | Age: 49
End: 2024-05-06

## 2024-05-06 DIAGNOSIS — E83.51 HYPOCALCEMIA: Primary | ICD-10-CM

## 2024-05-06 NOTE — TELEPHONE ENCOUNTER
----- Message from ELIF Duron sent at 5/3/2024  5:11 PM EDT -----  Please let Yamileth know that I received the results of her urinalysis which was completely normal.

## 2024-05-06 NOTE — TELEPHONE ENCOUNTER
I placed an order for pt to get her calcium done tomorrow. Can you please place the standing order for her to get it done twice a [place] until next year?

## 2024-05-06 NOTE — TELEPHONE ENCOUNTER
Pt advised that urine culture was negative for infection per Sonja.    ----- Message from ELIF Duron sent at 5/5/2024  2:49 PM EDT -----  Please let Yamileth know that urine culture was negative for infection.  Thank you.

## 2024-05-06 NOTE — TELEPHONE ENCOUNTER
A phlebotomist called from [facility] and said that the patient needs more calcium blood tests put into the system as she says she has a bunch in there for when she needs them. The phleb asked for us to call the patient and let her know when the bloodwork is put in. Please advise.

## 2024-05-06 NOTE — TELEPHONE ENCOUNTER
Patient also called about this, she said that she needs to get this done today.  She asks if provider can get order in system.

## 2024-05-06 NOTE — TELEPHONE ENCOUNTER
Patient was calling to see why her lab order for calcium has not been put in yet. She called 2x herself today, as well as the labs. She wants this order placed for tomorrow because she is not feeling well. Please call patient when labs are ordered.

## 2024-05-07 ENCOUNTER — TELEMEDICINE (OUTPATIENT)
Dept: BEHAVIORAL/MENTAL HEALTH CLINIC | Facility: CLINIC | Age: 49
End: 2024-05-07
Payer: COMMERCIAL

## 2024-05-07 ENCOUNTER — APPOINTMENT (OUTPATIENT)
Dept: LAB | Facility: HOSPITAL | Age: 49
End: 2024-05-07
Payer: COMMERCIAL

## 2024-05-07 DIAGNOSIS — F41.0 GENERALIZED ANXIETY DISORDER WITH PANIC ATTACKS: ICD-10-CM

## 2024-05-07 DIAGNOSIS — E83.51 HYPOCALCEMIA: ICD-10-CM

## 2024-05-07 DIAGNOSIS — E83.51 HYPOCALCEMIA: Primary | ICD-10-CM

## 2024-05-07 DIAGNOSIS — F41.1 GENERALIZED ANXIETY DISORDER WITH PANIC ATTACKS: ICD-10-CM

## 2024-05-07 DIAGNOSIS — F33.1 MODERATE EPISODE OF RECURRENT MAJOR DEPRESSIVE DISORDER (HCC): Primary | ICD-10-CM

## 2024-05-07 DIAGNOSIS — F17.210 NICOTINE DEPENDENCE, CIGARETTES, UNCOMPLICATED: ICD-10-CM

## 2024-05-07 LAB — CALCIUM SERPL-MCNC: 9.4 MG/DL (ref 8.4–10.2)

## 2024-05-07 PROCEDURE — 36415 COLL VENOUS BLD VENIPUNCTURE: CPT

## 2024-05-07 PROCEDURE — 90834 PSYTX W PT 45 MINUTES: CPT | Performed by: SOCIAL WORKER
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
## 2024-05-09 ENCOUNTER — TELEPHONE (OUTPATIENT)
Dept: SURGERY | Facility: CLINIC | Age: 49
End: 2024-05-09

## 2024-05-09 ENCOUNTER — TELEPHONE (OUTPATIENT)
Dept: GASTROENTEROLOGY | Facility: CLINIC | Age: 49
End: 2024-05-09

## 2024-05-09 ENCOUNTER — OFFICE VISIT (OUTPATIENT)
Dept: GASTROENTEROLOGY | Facility: CLINIC | Age: 49
End: 2024-05-09
Payer: COMMERCIAL

## 2024-05-09 VITALS
HEIGHT: 62 IN | WEIGHT: 208 LBS | BODY MASS INDEX: 38.28 KG/M2 | SYSTOLIC BLOOD PRESSURE: 121 MMHG | DIASTOLIC BLOOD PRESSURE: 98 MMHG | HEART RATE: 105 BPM

## 2024-05-09 DIAGNOSIS — K21.00 GASTROESOPHAGEAL REFLUX DISEASE WITH ESOPHAGITIS WITHOUT HEMORRHAGE: ICD-10-CM

## 2024-05-09 DIAGNOSIS — K29.00 ACUTE GASTRITIS: ICD-10-CM

## 2024-05-09 DIAGNOSIS — Z90.49 HX OF CHOLECYSTECTOMY: ICD-10-CM

## 2024-05-09 DIAGNOSIS — R13.10 ODYNOPHAGIA: ICD-10-CM

## 2024-05-09 DIAGNOSIS — K21.9 GERD (GASTROESOPHAGEAL REFLUX DISEASE): ICD-10-CM

## 2024-05-09 DIAGNOSIS — R11.2 NAUSEA AND VOMITING, UNSPECIFIED VOMITING TYPE: Primary | ICD-10-CM

## 2024-05-09 PROCEDURE — 99214 OFFICE O/P EST MOD 30 MIN: CPT | Performed by: NURSE PRACTITIONER

## 2024-05-09 RX ORDER — FAMOTIDINE 20 MG/1
20 TABLET, FILM COATED ORAL 2 TIMES DAILY
Qty: 60 TABLET | Refills: 3 | Status: SHIPPED | OUTPATIENT
Start: 2024-05-09 | End: 2024-05-13 | Stop reason: ALTCHOICE

## 2024-05-09 RX ORDER — ONDANSETRON 4 MG/1
4 TABLET, ORALLY DISINTEGRATING ORAL EVERY 6 HOURS PRN
Qty: 30 TABLET | Refills: 1 | Status: SHIPPED | OUTPATIENT
Start: 2024-05-09

## 2024-05-09 RX ORDER — SUCRALFATE 1 G/1
1 TABLET ORAL
Qty: 120 TABLET | Refills: 0 | Status: SHIPPED | OUTPATIENT
Start: 2024-05-09 | End: 2024-05-13 | Stop reason: ALTCHOICE

## 2024-05-09 NOTE — TELEPHONE ENCOUNTER
Benita from Dr. Villanueva's office called, patient is scheduled for EGD on 5/13/2024.  Due to recent Gallbladder surgery, Gastro would like medical clearance prior to EGD.  Called patient and scheduled a post op appointment for 5/10/2024.

## 2024-05-09 NOTE — TELEPHONE ENCOUNTER
Patient is scheduled for EGD with Dr. Villanueva  for Monday 5/13.  Spoke with Karla at Dr. Valero office to get clearance for this procedure.  Karla will reach out to patient to have her seen tomorrow at their office.
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
## 2024-05-09 NOTE — PATIENT INSTRUCTIONS
Scheduled date of EGD(as of today):05/13/24  Physician performing EGD:Rich  Location of EGD:[facility]  Instructions reviewed with patient by:Benita HUDSON  Clearances:None   NUTRITION RECOMMENDATIONS FOR PATIENTS WITH   DELAYED GASTRIC EMPTYING **    Nutrition education and diet modifications are important factors for controlling the symptoms of chronic nausea and vomiting associated with impaired stomach emptying. Maintaining good nutrition can be achieved with modest changes in your diet while at the same time help to reduce symptoms.     Basic Points to Remember:    1. Eat smaller portions. The greater the meal volume, the slower the stomach empties. Eat smaller meals more frequently for easier digestion.  2. Sit up or stand during eating and after meals. Body positioning during meals is very important. Avoid lying down while eating. Try to sit up or stand and walk around during and at least 3 hours after meals.   3. Choose liquid or pureed foods should empty even on a bad day. Liquid food exits the stomach more easily than solids. Switch to a liquid diet or pureed/ground foods if necessary. The same thing can be accomplished in most patients my thoroughly chewing your food. Drink caloric drinks rather than water (e.g. Peach/pear nectar, cranberry juice, Gator Aide, soup broth, [place]-Aid, etc.).  4. Be aware of side effects from medications. Some medications may irritate the stomach or cause further delay of your stomach emptying. Either can lead to nausea and vomiting. Ask your doctor if you are currently taking any medications that may be causing chronic nausea and/or vomiting.  5. If you have diabetes, the most important thing to do is to control your glucose levels. Elevated glucose levels >200 mg/dL can delay stomach emptying in healthy people. If you have diabetes, take frequent glucose measurements and make insulin adjustments as needed for glucose control.  6. Avoid excess fiber intake. Some fiber is important to  "maintain normal intestinal functions. On the other hand, a high-fiber diet slows stomach emptying and increases the presence of food residue in the stomach. Avoid foods like oranges, berries, green beans, figs, skin on apples, potato peels, broccoli, cauliflower, and lettuce. Most fruits and vegetables can be digested successfully if they are smaller than 1/4 inch. Again, chewing and cutting up your food and eating with plenty of liquid is important. Avoid excess high-fiber supplements such as Metamucil, Citrucel, etc.  7. Avoid high-fat foods. Fat slows the exit of food from the stomach. A low-fat diet is recommended; however, some liquid containing fat can be a good source of calories.  8. Take a daily multi-vitamin supplement. A multi-vitamin supplement (specifically vitamins A and C, and the mineral iron) should be considered.  9. Eat nutritious foods. Eat nutritiously before filling up on empty calories found in foods such as cakes, candy and pastries.  10. Be aware of \"trigger foods\". Some foods may cause symptoms more readily than others. Take note of these foods and avoid them if possible.     Recommended Foods:    Skim milk, low-fat yogurt, low-fat milkshakes, low-fat cheeses, hot cereals (cream of wheat, grits).  Fat-free soups and bouillon with noodles and vegetables.  Fruit juice, canned fruits without skin (applesauce, peaches, pears); add honey or syrup for extra calories.  Eggs, peanut butter.  Meats, fish, poultry; mix with broth, water, vegetable or V-8 juice.  Low-grain bread and cereals, pasta, rice, crackers.  Vegetable juices, cooked vegetables without skins (beets, carrots, mushrooms, potatoes, including mashed potatoes)  Tea, water, coffee, soda    ** These recommendations have been modified from a publication from the American [employer] Society. It was written by the following authors:  Jcarlos Andrade, David WARD, Sonam TENORIO                      GERD " (Gastroesophageal Reflux Disease)   WHAT YOU NEED TO KNOW:   Gastroesophageal reflux disease (GERD) is reflux that happens more than 2 times a week for a few weeks. Reflux means acid and food in your stomach back up into your esophagus. GERD can cause other health problems over time if it is not treated.       DISCHARGE INSTRUCTIONS:   Call your local emergency number (911 in the [place]) if:   You have severe chest pain and sudden trouble breathing.      Return to the emergency department if:   You have trouble breathing after you vomit.    You have trouble swallowing, or pain with swallowing.    Your bowel movements are black, bloody, or tarry-looking.    Your vomit looks like coffee grounds or has blood in it.    Call your doctor or gastroenterologist if:   You feel full and cannot burp or vomit.    You vomit large amounts, or you vomit often.    You are losing weight without trying.    Your symptoms get worse or do not improve with treatment.    You have questions or concerns about your condition or care.    Medicines:   Medicines  are used to decrease stomach acid. Medicine may also be used to help your lower esophageal sphincter and stomach contract (tighten) more.    Take your medicine as directed.  Contact your healthcare provider if you think your medicine is not helping or if you have side effects. Tell your provider if you are allergic to any medicine. Keep a list of the medicines, vitamins, and herbs you take. Include the amounts, and when and why you take them. Bring the list or the pill bottles to follow-up visits. Carry your medicine list with you in case of an emergency.    Manage GERD:       Do not have foods or drinks that may increase heartburn.  These include chocolate, peppermint, fried or fatty foods, drinks that contain caffeine, or carbonated drinks (soda). Other foods include spicy foods, onions, tomatoes, and tomato-based foods. Do not have foods or drinks that can irritate your esophagus, such  as citrus fruits, juices, and alcohol.    Do not eat large meals.  When you eat a lot of food at one time, your stomach needs more acid to digest it. Eat 6 small meals each day instead of 3 large ones, and eat slowly. Do not eat meals 2 to 3 hours before bedtime.    Elevate the head of your bed.  Place 6-inch blocks under the head of your bed frame. You may also use more than one pillow under your head and shoulders while you sleep.    Maintain a healthy weight.  If you are overweight, weight loss may help relieve symptoms of GERD.    Do not smoke.  Smoking weakens the lower esophageal sphincter and increases the risk of GERD. Ask your healthcare provider for information if you currently smoke and need help to quit. E-cigarettes or smokeless tobacco still contain nicotine. Talk to your healthcare provider before you use these products.    Do not put pressure on your abdomen.  Pressure pushes acid up into your esophagus. Do not wear clothing that is tight around your waist. Do not bend over. Bend at the knees if you need to pick something up.  Follow up with your doctor or gastroenterologist as directed:  Write down your questions so you remember to ask them during your visits.  © Copyright [employer] 2023 Information is for End User's use only and may not be sold, redistributed or otherwise used for commercial purposes.  The above information is an  only. It is not intended as medical advice for individual conditions or treatments. Talk to your doctor, nurse or pharmacist before following any medical regimen to see if it is safe and effective for you.

## 2024-05-10 ENCOUNTER — APPOINTMENT (OUTPATIENT)
Dept: LAB | Facility: HOSPITAL | Age: 49
End: 2024-05-10
Payer: COMMERCIAL

## 2024-05-10 ENCOUNTER — OFFICE VISIT (OUTPATIENT)
Dept: SURGERY | Facility: CLINIC | Age: 49
End: 2024-05-10

## 2024-05-10 VITALS — WEIGHT: 210.4 LBS | HEIGHT: 62 IN | TEMPERATURE: 96.3 F | BODY MASS INDEX: 38.72 KG/M2

## 2024-05-10 DIAGNOSIS — E83.51 HYPOCALCEMIA: ICD-10-CM

## 2024-05-10 DIAGNOSIS — Z09 SURGICAL FOLLOW-UP CARE: Primary | ICD-10-CM

## 2024-05-10 DIAGNOSIS — R11.2 NAUSEA AND VOMITING, UNSPECIFIED VOMITING TYPE: ICD-10-CM

## 2024-05-10 LAB
ALBUMIN SERPL BCP-MCNC: 4.2 G/DL (ref 3.5–5)
ALP SERPL-CCNC: 73 U/L (ref 34–104)
ALT SERPL W P-5'-P-CCNC: 46 U/L (ref 7–52)
AST SERPL W P-5'-P-CCNC: 25 U/L (ref 13–39)
BILIRUB DIRECT SERPL-MCNC: 0.06 MG/DL (ref 0–0.2)
BILIRUB SERPL-MCNC: 0.37 MG/DL (ref 0.2–1)
CALCIUM SERPL-MCNC: 8.7 MG/DL (ref 8.4–10.2)
PROT SERPL-MCNC: 6.9 G/DL (ref 6.4–8.4)

## 2024-05-10 PROCEDURE — 80076 HEPATIC FUNCTION PANEL: CPT

## 2024-05-10 PROCEDURE — 36415 COLL VENOUS BLD VENIPUNCTURE: CPT

## 2024-05-10 PROCEDURE — 99024 POSTOP FOLLOW-UP VISIT: CPT | Performed by: SPECIALIST

## 2024-05-10 NOTE — PROGRESS NOTES
" General Surgery Office Visit Follow up   [facility]  Patient: Yamileth Vale   : 1975 Sex: female MRN: [id number]   CSN: [id number] PCP: Matthew Ramos MD    Assessment/ Plan:  Yamileth Vale is a 48 y.o. female  day(s) POD # 3 weeks s/p laparoscopic cholecystectomy for acute cholecystitis gallstones date of surgery 2024  Surgical follow-up care [Z09]    Plan  Stable postop   Okay to proceed with EGD for her symptoms of food sticking to her throat    SUBJECTIVE:   I am doing very well from the gallbladder point of view my incision is healing well abdominal pain  My abdominal pain has improved markedly  Now I have some difficulty in swallowing food gets stuck unable to drink well  Denies constipation  OBJECTIVE:  No fever no chills no rigors  Tolerating p.o. Diet well  Normal bowel movement no constipation or diarrhea  Ambulating well   Vitals:   Temp (!) 96.3 °F (35.7 °C) (Core)   Ht 5' 2\" (1.575 m)   Wt 95.4 kg (210 lb 6.4 oz)   LMP 2018 Comment: partial hysterectomy   BMI 38.48 kg/m²     Active medications:    Current Outpatient Medications:     acetaminophen (TYLENOL) 500 mg tablet, Take 1 tablet (500 mg total) by mouth every 6 (six) hours as needed for mild pain or moderate pain, Disp: 30 tablet, Rfl: 0    albuterol (PROVENTIL HFA,VENTOLIN HFA) 90 mcg/act inhaler, INHALE 1 PUFF BY MOUTH EVERY 6 HOURS AS NEEDED FOR WHEEZE, Disp: 18 g, Rfl: 1    Alcohol Swabs 70 % PADS, May substitute brand based on insurance coverage. Check glucose BID., Disp: 100 each, Rfl: 0    ALPRAZolam ER (XANAX XR) 2 MG 24 hr tablet, Take 1 tablet (2 mg total) by mouth 2 (two) times a day before breakfast and lunch, Disp: 60 tablet, Rfl: 0    bacitracin topical ointment 500 units/g topical ointment, Apply 1 large application topically 2 (two) times a day, Disp: 28 g, Rfl: 0    Blood Glucose Monitoring Suppl (OneTouch Verio Reflect) w/Device KIT, May substitute brand " based on insurance coverage. Check glucose BID., Disp: 1 kit, Rfl: 0    calcitriol (ROCALTROL) 0.25 mcg capsule, Take 1 capsule (0.25 mcg total) by mouth 3 (three) times a day, Disp: 270 capsule, Rfl: 2    calcium carbonate (OS-EDER) 600 MG tablet, Take 1 pill daily twice a day, Disp: 180 tablet, Rfl: 10    Cholecalciferol (Vitamin D3) 125 MCG (5000 UT) CAPS, Take 5000 IU daily, Disp: , Rfl:     desvenlafaxine succinate (PRISTIQ) 100 mg 24 hr tablet, Take 1 tablet (100 mg total) by mouth daily, Disp: 90 tablet, Rfl: 0    famotidine (PEPCID) 20 mg tablet, Take 1 tablet (20 mg total) by mouth 2 (two) times a day, Disp: 60 tablet, Rfl: 3    fenofibrate 160 MG tablet, Take 1 tablet (160 mg total) by mouth daily, Disp: 30 tablet, Rfl: 1    ferrous sulfate 325 (65 Fe) mg tablet, TAKE 1 TABLET BY MOUTH 2 TIMES A DAY WITH MEALS., Disp: 60 tablet, Rfl: 5    fluticasone (FLONASE) 50 mcg/act nasal spray, 1 spray into each nostril daily, Disp: 16 g, Rfl: 0    fluticasone-umeclidinium-vilanterol (Trelegy Ellipta) 100-62.5-25 mcg/actuation inhaler, Inhale 1 puff daily Rinse mouth after use., Disp: 60 blister, Rfl: 7    glucose blood (OneTouch Verio) test strip, May substitute brand based on insurance coverage. Check glucose TID., Disp: 100 each, Rfl: 1    hydrocortisone 2.5 % cream, Apply 1 application. topically 2 (two) times a day, Disp: , Rfl:     levothyroxine 150 mcg tablet, Take 1 tablet (150 mcg total) by mouth daily at bedtime, Disp: 90 tablet, Rfl: 3    Lidocaine-Menthol 4-1 % PTCH, if needed  , Disp: , Rfl:     magnesium Oxide (MAG-OX) 400 mg TABS, Take 1 tablet (400 mg total) by mouth 3 (three) times a day, Disp: 90 tablet, Rfl: 10    metFORMIN (GLUCOPHAGE) 500 mg tablet, Take 1 tablet (500 mg total) by mouth 2 (two) times a day with meals (Patient taking differently: Take by mouth 3 (three) times a day), Disp: 180 tablet, Rfl: 0    Movantik 25 MG tablet, 25 mg in the morning, Disp: , Rfl:     nystatin (MYCOSTATIN)  powder, Apply under the breast twice a day for 1 week, Disp: 60 g, Rfl: 1    ondansetron (ZOFRAN-ODT) 4 mg disintegrating tablet, Take 1 tablet (4 mg total) by mouth every 6 (six) hours as needed for nausea or vomiting, Disp: 30 tablet, Rfl: 1    OneTouch Delica Lancets 33G MISC, May substitute brand based on insurance coverage. Check glucose TID, Disp: 100 each, Rfl: 1    oxyCODONE-acetaminophen (PERCOCET)  mg per tablet, 1 tablet 4 (four) times a day, Disp: , Rfl:     potassium chloride (K-DUR,KLOR-CON) 20 mEq tablet, Take 1 tablet (20 mEq total) by mouth 2 (two) times a day, Disp: 60 tablet, Rfl: 10    Psyllium (Metamucil) 0.36 g CAPS, Take 1 g by oral route., Disp: , Rfl:     sucralfate (CARAFATE) 1 g tablet, Take 1 tablet (1 g total) by mouth 4 (four) times a day (before meals and at bedtime), Disp: 120 tablet, Rfl: 0    tiZANidine (ZANAFLEX) 4 mg tablet, Take 4 mg by mouth Three times daily as needed, Disp: , Rfl:     traZODone (DESYREL) 100 mg tablet, Take 1 tablet (100 mg total) by mouth daily at bedtime, Disp: 90 tablet, Rfl: 0    Butalbital-APAP-Caffeine (Fioricet) -40 MG CAPS, Take 1 capsule by mouth every 6 (six) hours as needed (migraine) for up to 8 doses (Patient not taking: Reported on 4/15/2024), Disp: 8 capsule, Rfl: 0    Icosapent Ethyl (Vascepa) 1 g CAPS, Take 2 capsules (2 g total) by mouth 2 (two) times a day (Patient not taking: Reported on 1/12/2024), Disp: 180 capsule, Rfl: 4    methylPREDNISolone 4 MG tablet therapy pack, Use as directed on package (Patient not taking: Reported on 2/1/2024), Disp: 21 tablet, Rfl: 0    ondansetron (ZOFRAN) 4 mg tablet, Take 1 tablet (4 mg total) by mouth every 6 (six) hours (Patient not taking: Reported on 5/10/2024), Disp: 20 tablet, Rfl: 0    Senna Plus 8.6-50 MG per tablet, , Disp: , Rfl:     varenicline (CHANTIX) 1 mg tablet, Take 1 tablet (1 mg total) by mouth 2 (two) times a day (Patient not taking: Reported on 4/15/2024), Disp: 60 tablet,  Rfl: 2    Varenicline Tartrate, Starter, 0.5 MG X 11 & 1 MG X 42 TBPK, Start 0.5 mg daily for 3 days then increase to 0.5 mg twice daily for 4 days.  After that take 1 mg twice daily (Patient not taking: Reported on 4/15/2024), Disp: 53 each, Rfl: 0    Physical Exam:   General Alert awake   Normocephalic atraumatic PERRLA   Lungs clear bilaterally  Cardiac normal S1 normal S2  Abdomen soft,non tender Bowel sounds present  Skin: surgical dressing is C/D/I  Ext: No clubbing, cyanosis, edema  Surgical wound well healing without signs of infection cellulitis    Visit Diagnosis: . Diagnoses and all orders for this visit:    Surgical follow-up care       Plan of care was discussed with patient in detail    Pertinent labs reviewed  Most Recent Labs:   Appointment on 05/07/2024   Component Date Value Ref Range Status    Calcium 05/07/2024 9.4  8.4 - 10.2 mg/dL Final   Lab on 05/03/2024   Component Date Value Ref Range Status    Calcium 05/03/2024 11.6 (H)  8.4 - 10.2 mg/dL Final    Urine Culture 05/03/2024 10,000-19,000 cfu/ml   Final    Mixed Contaminants X3    Color, UA 05/03/2024 Yellow   Final    Clarity, UA 05/03/2024 Slightly Cloudy   Final    Specific Gravity, UA 05/03/2024 1.020  1.005 - 1.030 Final    pH, UA 05/03/2024 5.5  4.5, 5.0, 5.5, 6.0, 6.5, 7.0, 7.5, 8.0 Final    Leukocytes, UA 05/03/2024 Negative  Negative Final    Nitrite, UA 05/03/2024 Negative  Negative Final    Protein, UA 05/03/2024 Negative  Negative mg/dl Final    Glucose, UA 05/03/2024 Negative  Negative mg/dl Final    Ketones, UA 05/03/2024 Negative  Negative mg/dl Final    Urobilinogen, UA 05/03/2024 <2.0  <2.0 mg/dl mg/dl Final    Bilirubin, UA 05/03/2024 Negative  Negative Final    Occult Blood, UA 05/03/2024 Negative  Negative Final   Appointment on 05/01/2024   Component Date Value Ref Range Status    Calcium 05/01/2024 8.9  8.4 - 10.2 mg/dL Final   Admission on 04/28/2024, Discharged on 04/28/2024   Component Date Value Ref Range Status     "Sodium 04/28/2024 137  135 - 147 mmol/L Final    Potassium 04/28/2024 4.1  3.5 - 5.3 mmol/L Final    Chloride 04/28/2024 104  96 - 108 mmol/L Final    CO2 04/28/2024 22  21 - 32 mmol/L Final    ANION GAP 04/28/2024 11  4 - 13 mmol/L Final    BUN 04/28/2024 24  5 - 25 mg/dL Final    Creatinine 04/28/2024 1.00  0.60 - 1.30 mg/dL Final    Standardized to IDMS reference method    Glucose 04/28/2024 153 (H)  65 - 140 mg/dL Final    If the patient is fasting, the ADA then defines impaired fasting glucose as > 100 mg/dL and diabetes as > or equal to 123 mg/dL.    Calcium 04/28/2024 9.7  8.4 - 10.2 mg/dL Final    eGFR 04/28/2024 67  ml/min/1.73sq m Final    Magnesium 04/28/2024 2.1  1.9 - 2.7 mg/dL Final    Calcium, Ionized 04/28/2024 1.15  1.12 - 1.32 mmol/L Final    hs TnI 0hr 04/28/2024 3  \"Refer to ACS Flowchart\"- see link ng/L Final    Comment:                                              Initial (time 0) result  If >=50 ng/L, Myocardial injury suggested ;  Type of myocardial injury and treatment strategy  to be determined.  If 5-49 ng/L, a delta result at 2 hours and or 4 hours will be needed to further evaluate.  If <4 ng/L, and chest pain has been >3 hours since onset, patient may qualify for discharge based on the HEART score in the ED.  If <5 ng/L and <3hours since onset of chest pain, a delta result at 2 hours will be needed to further evaluate.    HS Troponin 99th Percentile URL of a Health Population=12 ng/L with a 95% Confidence Interval of 8-18 ng/L.    Second Troponin (time 2 hours)  If calculated delta >= 20 ng/L,  Myocardial injury suggested ; Type of myocardial injury and treatment strategy to be determined.  If 5-49 ng/L and the calculated delta is 5-19 ng/L, consult medical service for evaluation.  Continue evaluation for ischemia on ecg and other possible etiology and repeat hs troponin at 4 hours.  If delta                            is <5 ng/L at 2 hours, consider discharge based on risk " stratification via the HEART score (if in ED), or AB risk score in IP/Observation.    HS Troponin 99th Percentile URL of a Health Population=12 ng/L with a 95% Confidence Interval of 8-18 ng/L.    WBC 04/28/2024 7.17  4.31 - 10.16 Thousand/uL Final    RBC 04/28/2024 4.09  3.81 - 5.12 Million/uL Final    Hemoglobin 04/28/2024 12.1  11.5 - 15.4 g/dL Final    Hematocrit 04/28/2024 38.4  34.8 - 46.1 % Final    MCV 04/28/2024 94  82 - 98 fL Final    MCH 04/28/2024 29.6  26.8 - 34.3 pg Final    MCHC 04/28/2024 31.5  31.4 - 37.4 g/dL Final    RDW 04/28/2024 17.2 (H)  11.6 - 15.1 % Final    MPV 04/28/2024 10.7  8.9 - 12.7 fL Final    Platelets 04/28/2024 237  149 - 390 Thousands/uL Final    nRBC 04/28/2024 0  /100 WBCs Final    Segmented % 04/28/2024 56  43 - 75 % Final    Immature Grans % 04/28/2024 1  0 - 2 % Final    Lymphocytes % 04/28/2024 32  14 - 44 % Final    Monocytes % 04/28/2024 7  4 - 12 % Final    Eosinophils Relative 04/28/2024 2  0 - 6 % Final    Basophils Relative 04/28/2024 2 (H)  0 - 1 % Final    Absolute Neutrophils 04/28/2024 4.12  1.85 - 7.62 Thousands/µL Final    Absolute Immature Grans 04/28/2024 0.05  0.00 - 0.20 Thousand/uL Final    Absolute Lymphocytes 04/28/2024 2.26  0.60 - 4.47 Thousands/µL Final    Absolute Monocytes 04/28/2024 0.50  0.17 - 1.22 Thousand/µL Final    Eosinophils Absolute 04/28/2024 0.13  0.00 - 0.61 Thousand/µL Final    Basophils Absolute 04/28/2024 0.11 (H)  0.00 - 0.10 Thousands/µL Final    Ventricular Rate 04/27/2024 109  BPM Final    Atrial Rate 04/27/2024 109  BPM Final    DC Interval 04/27/2024 152  ms Final    QRSD Interval 04/27/2024 74  ms Final    QT Interval 04/27/2024 334  ms Final    QTC Interval 04/27/2024 449  ms Final    P Axis 04/27/2024 40  degrees Final    QRS Axis 04/27/2024 33  degrees Final    T Wave Carroll 04/27/2024 63  degrees Final       Pertinent images and available reads personally reviewed  Procedure: XR chest 1 view portable    Result Date:  4/28/2024  Narrative: XR CHEST PORTABLE INDICATION: pain. COMPARISON: CXR 11/24/2023, abdomen CT 4/13/2024, chest CT  11/07/2022. FINDINGS: Clear lungs. No pneumothorax or pleural effusion. Normal cardiomediastinal silhouette. Bones are unremarkable for age. Normal upper abdomen.     Impression: No acute cardiopulmonary disease. Workstation performed: [id number]     Procedure: NM hepatobiliary    Result Date: 4/15/2024  Narrative: HEPATOBILIARY SCAN INDICATION: abdominal pain, possible cholecystitis  Right upper quadrant pain COMPARISON: CT abdomen pelvis 4/13/2024 TECHNIQUE:   Following the intravenous administration of 5.22 mCi Tc-99m labeled mebrofenin, dynamic abdominal imaging was obtained in the anterior projection over a 60 minute time period. FINDINGS: There is prompt, uniform accumulation with normal clearance of the radiopharmaceutical by the liver. There is normal filling of the intrahepatic ducts, common bile duct with normal excretion of the radiopharmaceutical into the duodenum. There is at least partial filling of the gallbladder indicating a patent cystic duct. However this may just correspond with the gallbladder neck as seen on recent CT, with incomplete filling of the gallbladder fundus beyond a region of possible luminal narrowing and enhancement suggested on recent CT.     Impression: There is at least partial filling of the gallbladder indicating a patent cystic duct. However this may just correspond with the gallbladder neck as seen on recent CT, with incomplete filling of the gallbladder fundus beyond a region of possible luminal narrowing and enhancement suggested on recent CT. Consequently, inflammation of the gallbladder fundus may still be present, and clinical correlation is recommended. Resident: SHAGUFTA RAY I, the attending radiologist, have reviewed the images and agree with the final report above. Workstation performed: [id number]     Procedure: US right upper quadrant    Result  Date: 4/15/2024  Narrative: RIGHT UPPER QUADRANT ULTRASOUND INDICATION: r/o rossi cystitis. COMPARISON: Multiple priors most recently 4/13/2024 TECHNIQUE: Real-time ultrasound of the right upper quadrant was performed with a curvilinear transducer with both volumetric sweeps and still imaging techniques. FINDINGS: PANCREAS: Portions of the pancreas are obscured by bowel gas. Visualized portions of the pancreas are unremarkable. AORTA AND IVC: Obscured by bowel gas. LIVER: Size: Enlarged. The liver measures 22.0 cm in the midclavicular line. Contour: Surface contour is smooth. Parenchyma: Echogenic No liver mass identified. Limited imaging of the main portal vein shows it to be patent and hepatopetal. BILIARY: Cholelithiasis. Gallbladder wall thickened up to 5 mm. Trace pericholecystic fluid sonographic Lopez sign not accurate assessed due to analgesic administration No intrahepatic biliary dilatation. CBD measures 4.0 mm. No choledocholithiasis. KIDNEY: Right kidney measures 11.1 x 4.0 x 4.6 cm. Volume 106.9 mL 0.3 cm nonobstructing interpolar calculus. ASCITES: None.     Impression: Cholelithiasis with gallbladder wall edema. Trace pericholecystic fluid sonographic Lopez sign cannot be assessed due to analgesic administration. Findings nonspecific but may suggest acute cholecystitis. If there is further concern, HIDA scan can be performed Hepatomegaly and possible hepatic steatosis The study was marked in EPIC for immediate notification. Workstation performed: [id number]     Procedure: CT abdomen pelvis with contrast    Result Date: 4/13/2024  Narrative: CT ABDOMEN AND PELVIS WITH IV CONTRAST INDICATION: r/o rossi r/o ureterolithiasis. COMPARISON: Multiple priors most recently 4/1/2024 TECHNIQUE: CT examination of the abdomen and pelvis was performed. Multiplanar 2D reformatted images were created from the source data. This examination, like all CT scans performed in the [facility], was performed  utilizing techniques to minimize radiation dose exposure, including the use of iterative reconstruction and automated exposure control. Radiation dose length product (DLP) for this visit: 1213.72 mGy-cm IV Contrast: 100 mL of iohexol (OMNIPAQUE) Enteric Contrast: Not administered. FINDINGS: ABDOMEN LOWER CHEST: No clinically significant abnormality in the visualized lower chest. LIVER/BILIARY TREE: Hepatic steatosis. No suspicious mass. Normal hepatic contours. No biliary dilation. GALLBLADDER: Gallbladder wall edema. Debris within the gallbladder SPLEEN: Unremarkable. PANCREAS: Unremarkable. ADRENAL GLANDS: Unremarkable. KIDNEYS/URETERS: 0.3 cm nonobstructing right renal calculus. No hydronephrosis. STOMACH AND BOWEL: Colonic diverticulosis without findings of acute diverticulitis. APPENDIX: Normal. ABDOMINOPELVIC CAVITY: No ascites. No pneumoperitoneum. No lymphadenopathy. VESSELS: Atherosclerosis without abdominal aortic aneurysm. PELVIS REPRODUCTIVE ORGANS: Unremarkable for patient's age. URINARY BLADDER: Unremarkable. ABDOMINAL WALL/INGUINAL REGIONS: Fat-containing ventral abdominal wall hernia. BONES: No acute fracture or suspicious osseous lesion. Spinal degenerative changes. Posterior lumbosacral fusion and lower lumbar decompression.     Impression: Gallbladder wall edema with debris in the gallbladder. If there is concern for acute cholecystitis, recommend right upper quadrant ultrasound. Hepatic steatosis. Small nonobstructing right renal calculus. The study was marked in EPIC for immediate notification. Workstation performed: [id number]     Procedure: US kidney and bladder with pvr    Result Date: 4/9/2024  Narrative: RENAL ULTRASOUND WITH PVR INDICATION: N39.8: Other specified disorders of urinary system. Urinary urgency; UTI COMPARISON: Ultrasound 9/21/2022 TECHNIQUE: Ultrasound of the retroperitoneum was performed with a curvilinear transducer utilizing volumetric sweeps and still imaging techniques.  FINDINGS: KIDNEYS: Symmetric and normal size. Right kidney: 10.6 x 3.9 x 4.9 cm. Volume 105.8 mL Left kidney: 12.4 x 4.8 x 5.3 cm. Volume 163.0 mL Right kidney Normal echogenicity and contour. No mass is identified. No hydronephrosis. Nonobstructing 3 mm lower pole calculus. No perinephric fluid collections. Left kidney Normal contour with slightly increased echogenicity compared to the right kidney. Simple appearing lower pole cyst measures 1.4 x 1.0 x 1.6 cm and upper pole cyst measures 0.9 x 0.7 x 1.6 cm. No hydronephrosis. Nonobstructing 2 mm interpolar calculus. No perinephric fluid collections. URETERS: Nonvisualized. BLADDER: Normally distended. No focal thickening or mass lesions. Mild circumferential bladder wall thickening. Bilateral ureteral jets detected. Prevoid: 106.5 No significant post void volume. Measured post void volume in mL: 3.3     Impression: 1. Nonobstructing bilateral intrarenal calculi. 2. Simple appearing left renal cysts. 3. No significant post void urinary bladder residual volume. Mild circumferential bladder wall thickening may be due to chronic bladder outlet obstruction and/or inflammation. Workstation performed: [id number]     Procedure: XR foot 2 vw left    Result Date: 3/28/2024  Narrative: XR FOOT 2 VW LEFT INDICATION: M79.672: Pain in left foot. COMPARISON: Left foot x-ray 3/17/2024 FINDINGS: No acute fracture or dislocation. No significant degenerative changes. No widening of the Lisfranc space on weightbearing view. No lytic or blastic osseous lesion. Unremarkable soft tissues.     Impression: No acute osseous abnormality. Workstation performed: [id number]     Procedure: XR foot 3+ views LEFT    Result Date: 3/18/2024  Narrative: XR FOOT 3+ VW LEFT INDICATION: History of osteoporosis history of fracture. COMPARISON: 8/11/2020 FINDINGS: No acute fracture or dislocation. No significant degenerative changes. No lytic or blastic osseous lesion. Unremarkable soft tissues.  "    Impression: No acute osseous abnormality. Workstation performed: [id number]        Pertinent notes reviewed  Final Diagnosis  A. Gallbladder, cholecystectomy:  - Acute and chronic cholecystitis with cholelithiasis.  Electronically signed by Michael Rojas MD on 4/19/2024 at 1524    Counseling / Coordination of Care  Total Office time /unit time spent today 15minutes. Greater than 50% of total time was spent with the patient and / or family counseling and / or coordination of care. A description of the counseling / coordination of care:  I performed an interim history, pertinent images and labs, performed a physical examination to arrive at the plan delineated above with associated thought processes.       Jerome Valero MD MS FRCS FACS  [facility]  05/10/24 10:55 AM        Portions of the record may have been created with voice recognition software. Occasional wrong word or \"sound a like\" substitutions may have occurred due to the inherent limitations of voice recognition software. Read the chart carefully and recognize, using context, where substitutions have occurred.        "

## 2024-05-13 ENCOUNTER — ANESTHESIA EVENT (OUTPATIENT)
Dept: GASTROENTEROLOGY | Facility: AMBULARY SURGERY CENTER | Age: 49
End: 2024-05-13

## 2024-05-13 ENCOUNTER — APPOINTMENT (OUTPATIENT)
Dept: LAB | Facility: HOSPITAL | Age: 49
End: 2024-05-13
Payer: COMMERCIAL

## 2024-05-13 ENCOUNTER — ANESTHESIA (OUTPATIENT)
Dept: GASTROENTEROLOGY | Facility: AMBULARY SURGERY CENTER | Age: 49
End: 2024-05-13

## 2024-05-13 ENCOUNTER — HOSPITAL ENCOUNTER (OUTPATIENT)
Dept: GASTROENTEROLOGY | Facility: AMBULARY SURGERY CENTER | Age: 49
Setting detail: OUTPATIENT SURGERY
Discharge: HOME | End: 2024-05-13
Attending: INTERNAL MEDICINE | Admitting: INTERNAL MEDICINE
Payer: COMMERCIAL

## 2024-05-13 VITALS
WEIGHT: 208.4 LBS | SYSTOLIC BLOOD PRESSURE: 115 MMHG | TEMPERATURE: 97.5 F | OXYGEN SATURATION: 96 % | HEART RATE: 91 BPM | RESPIRATION RATE: 18 BRPM | DIASTOLIC BLOOD PRESSURE: 73 MMHG | BODY MASS INDEX: 38.12 KG/M2

## 2024-05-13 DIAGNOSIS — E83.51 HYPOCALCEMIA: ICD-10-CM

## 2024-05-13 DIAGNOSIS — R13.10 ODYNOPHAGIA: ICD-10-CM

## 2024-05-13 DIAGNOSIS — K21.00 GASTROESOPHAGEAL REFLUX DISEASE WITH ESOPHAGITIS WITHOUT HEMORRHAGE: ICD-10-CM

## 2024-05-13 DIAGNOSIS — R11.2 NAUSEA AND VOMITING, UNSPECIFIED VOMITING TYPE: ICD-10-CM

## 2024-05-13 LAB
CALCIUM SERPL-MCNC: 10.8 MG/DL (ref 8.4–10.2)
GLUCOSE SERPL-MCNC: 150 MG/DL (ref 65–140)

## 2024-05-13 PROCEDURE — 36415 COLL VENOUS BLD VENIPUNCTURE: CPT

## 2024-05-13 PROCEDURE — 82948 REAGENT STRIP/BLOOD GLUCOSE: CPT

## 2024-05-13 PROCEDURE — 88305 TISSUE EXAM BY PATHOLOGIST: CPT | Performed by: PATHOLOGY

## 2024-05-13 PROCEDURE — 43239 EGD BIOPSY SINGLE/MULTIPLE: CPT | Performed by: INTERNAL MEDICINE

## 2024-05-13 RX ORDER — PROPOFOL 10 MG/ML
INJECTION, EMULSION INTRAVENOUS AS NEEDED
Status: DISCONTINUED | OUTPATIENT
Start: 2024-05-13 | End: 2024-05-13 | Stop reason: HOSPADM

## 2024-05-13 RX ORDER — FAMOTIDINE 40 MG/1
40 TABLET, FILM COATED ORAL
Qty: 30 TABLET | Refills: 3 | Status: SHIPPED | OUTPATIENT
Start: 2024-05-13

## 2024-05-13 RX ORDER — SODIUM CHLORIDE, SODIUM LACTATE, POTASSIUM CHLORIDE, CALCIUM CHLORIDE 600; 310; 30; 20 MG/100ML; MG/100ML; MG/100ML; MG/100ML
INJECTION, SOLUTION INTRAVENOUS CONTINUOUS PRN
Status: DISCONTINUED | OUTPATIENT
Start: 2024-05-13 | End: 2024-05-13 | Stop reason: HOSPADM

## 2024-05-13 RX ORDER — LIDOCAINE HYDROCHLORIDE 10 MG/ML
INJECTION, SOLUTION EPIDURAL; INFILTRATION; INTRACAUDAL; PERINEURAL AS NEEDED
Status: DISCONTINUED | OUTPATIENT
Start: 2024-05-13 | End: 2024-05-13 | Stop reason: HOSPADM

## 2024-05-13 RX ORDER — PANTOPRAZOLE SODIUM 40 MG/1
40 TABLET, DELAYED RELEASE ORAL DAILY
Qty: 30 TABLET | Refills: 3 | Status: SHIPPED | OUTPATIENT
Start: 2024-05-13

## 2024-05-13 RX ORDER — CHOLESTYRAMINE 4 G/9G
1 POWDER, FOR SUSPENSION ORAL DAILY
Qty: 30 EACH | Refills: 3 | Status: SHIPPED | OUTPATIENT
Start: 2024-05-13 | End: 2024-05-17

## 2024-05-13 RX ADMIN — PROPOFOL 50 MG: 10 INJECTION, EMULSION INTRAVENOUS at 08:46

## 2024-05-13 RX ADMIN — PROPOFOL 100 MG: 10 INJECTION, EMULSION INTRAVENOUS at 08:44

## 2024-05-13 RX ADMIN — SODIUM CHLORIDE, SODIUM LACTATE, POTASSIUM CHLORIDE, AND CALCIUM CHLORIDE: .6; .31; .03; .02 INJECTION, SOLUTION INTRAVENOUS at 08:35

## 2024-05-13 RX ADMIN — LIDOCAINE HYDROCHLORIDE 50 MG: 10 INJECTION, SOLUTION EPIDURAL; INFILTRATION; INTRACAUDAL; PERINEURAL at 08:44

## 2024-05-13 RX ADMIN — PROPOFOL 50 MG: 10 INJECTION, EMULSION INTRAVENOUS at 08:48
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
## 2024-05-13 NOTE — ANESTHESIA PREPROCEDURE EVALUATION
Procedure:  EGD    Relevant Problems   CARDIO   (+) Hypertriglyceridemia      ENDO   (+) Post-surgical hypoparathyroidism (HCC)   (+) Postoperative hypothyroidism      GI/HEPATIC   (+) Fatty liver disease, nonalcoholic      /RENAL   (+) Stage 3b chronic kidney disease (HCC)      GYN   (+) History of hysterectomy      HEMATOLOGY   (+) Blood coagulation disorder (HCC)      NEURO/PSYCH   (+) Anxiety   (+) Anxiety and depression   (+) Continuous opioid dependence (HCC)   (+) PTSD (post-traumatic stress disorder)      PULMONARY   (+) Smoking      Other   (+) Class 2 obesity due to excess calories without serious comorbidity with body mass index (BMI) of 38.0 to 38.9 in adult   Lap rossi last month, reports n/v since then. Last vomited this morning, and reports that there was visible food in the vomitus.     Physical Exam    Airway    Mallampati score: III  TM Distance: >3 FB  Neck ROM: full     Dental   Comment: Denies loose teeth  Poor dentition     Cardiovascular  Cardiovascular exam normal    Pulmonary  Pulmonary exam normal     Other Findings  Portions of exam deferred due to low yield and/or unknown COVID statuspost-pubertal.      Anesthesia Plan  ASA Score- 2     Anesthesia Type- IV sedation with anesthesia with ASA Monitors.         Additional Monitors:     Airway Plan: ETT.           Plan Factors-Exercise tolerance (METS): >4 METS.    Chart reviewed.   Existing labs reviewed. Patient summary reviewed.    Patient is a current smoker.              Induction- intravenous.    Postoperative Plan-     Informed Consent- Anesthetic plan and risks discussed with patient.  I personally reviewed this patient with the CRNA. Discussed and agreed on the Anesthesia Plan with the CRNA..

## 2024-05-13 NOTE — ANESTHESIA POSTPROCEDURE EVALUATION
Post-Op Assessment Note    CV Status:  Stable  Pain Score: 0    Pain management: adequate       Mental Status:  Sleepy and arousable   Hydration Status:  Stable   PONV Controlled:  None   Airway Patency:  Patent  Two or more mitigation strategies used for obstructive sleep apnea   Post Op Vitals Reviewed: Yes    No anethesia notable event occurred.    Staff: JOSS               BP 91/53 (05/13/24 0856)    Temp      Pulse 95 (05/13/24 0856)   Resp 18 (05/13/24 0856)    SpO2 99 % (05/13/24 0856)

## 2024-05-15 ENCOUNTER — TELEMEDICINE (OUTPATIENT)
Dept: BEHAVIORAL/MENTAL HEALTH CLINIC | Facility: CLINIC | Age: 49
End: 2024-05-15
Payer: COMMERCIAL

## 2024-05-15 DIAGNOSIS — F41.1 GENERALIZED ANXIETY DISORDER WITH PANIC ATTACKS: ICD-10-CM

## 2024-05-15 DIAGNOSIS — F41.0 GENERALIZED ANXIETY DISORDER WITH PANIC ATTACKS: ICD-10-CM

## 2024-05-15 DIAGNOSIS — F33.1 MODERATE EPISODE OF RECURRENT MAJOR DEPRESSIVE DISORDER (HCC): Primary | ICD-10-CM

## 2024-05-15 PROCEDURE — 90834 PSYTX W PT 45 MINUTES: CPT | Performed by: SOCIAL WORKER

## 2024-05-16 ENCOUNTER — TELEPHONE (OUTPATIENT)
Age: 49
End: 2024-05-16

## 2024-05-16 DIAGNOSIS — F51.01 PRIMARY INSOMNIA: ICD-10-CM

## 2024-05-16 DIAGNOSIS — F41.0 PANIC DISORDER: ICD-10-CM

## 2024-05-16 PROCEDURE — 88305 TISSUE EXAM BY PATHOLOGIST: CPT | Performed by: PATHOLOGY

## 2024-05-16 RX ORDER — TRAZODONE HYDROCHLORIDE 100 MG/1
100 TABLET ORAL
Qty: 90 TABLET | Refills: 0 | Status: SHIPPED | OUTPATIENT
Start: 2024-05-16

## 2024-05-16 RX ORDER — ALPRAZOLAM 2 MG/1
2 TABLET, EXTENDED RELEASE ORAL
Qty: 60 TABLET | Refills: 0 | Status: SHIPPED | OUTPATIENT
Start: 2024-05-16

## 2024-05-16 NOTE — PSYCH
"Behavioral Health Psychotherapy Progress Note    Psychotherapy Provided: Individual Psychotherapy     1. Moderate episode of recurrent major depressive disorder (HCC)        2. Generalized anxiety disorder with panic attacks            Goals addressed in session: Goal 1     DATA: Michelle reports feeling ok stating that she has been mindful of how much exposure she has to her sister Trudy whether that be looking her up on social media or ruminating on the decisions that she has made and the childhood that they shared. She is aware of how this impacts her and how it keeps her stuck in a place of despair and anger. She is working on getting out of the house daily and doing art work which she is enjoying. Her son is planning a trip to [place] and she is allowing him to take her car. We acknowledged her \"surveillance mom\" tendencies and how this creates more anxiety than anything. Writer continues to promote focus on her own life and creating an outward focus of doing something meaningful. She is receptive to feedback. No new issues impacting mood or sleep.  During this session, this clinician used the following therapeutic modalities: Cognitive Behavioral Therapy and Cognitive Processing Therapy    Substance Abuse was not addressed during this session. If the client is diagnosed with a co-occurring substance use disorder, please indicate any changes in the frequency or amount of use: NA. Stage of change for addressing substance use diagnoses: No substance use/Not applicable    ASSESSMENT:  Yamileth Vale presents with a Euthymic/ normal mood.     her affect is Normal range and intensity, which is congruent, with her mood and the content of the session. The client has made progress on their goals.    During this session the client was oriented to person, place, and time. The client was engaged in the session. The client was able to sustain direct eye contact and was without psychomotor agitation. The client's " "thought processes were linear and clear.   Yamileth Vale presents with a none risk of suicide, none risk of self-harm, and none risk of harm to others.    For any risk assessment that surpasses a \"low\" rating, a safety plan must be developed.    A safety plan was indicated: no  If yes, describe in detail NA    PLAN: Between sessions, Yamileth Vale will take medication as prescribed and practice positive coping strategies as needed . At the next session, the therapist will use Cognitive Behavioral Therapy and Cognitive Processing Therapy to address stressors.    Behavioral Health Treatment Plan and Discharge Planning: Yamileth Vale is aware of and agrees to continue to work on their treatment plan. They have identified and are working toward their discharge goals. yes    Visit start and stop times:    05/16/24  Start Time: 1230  Stop Time: 1320  Total Visit Time: 50 minutes  "
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
## 2024-05-17 ENCOUNTER — APPOINTMENT (OUTPATIENT)
Dept: LAB | Facility: HOSPITAL | Age: 49
End: 2024-05-17
Payer: COMMERCIAL

## 2024-05-17 ENCOUNTER — OFFICE VISIT (OUTPATIENT)
Dept: INTERNAL MEDICINE CLINIC | Facility: CLINIC | Age: 49
End: 2024-05-17
Payer: COMMERCIAL

## 2024-05-17 VITALS
OXYGEN SATURATION: 98 % | BODY MASS INDEX: 38.46 KG/M2 | HEART RATE: 102 BPM | WEIGHT: 209 LBS | TEMPERATURE: 98.2 F | DIASTOLIC BLOOD PRESSURE: 84 MMHG | HEIGHT: 62 IN | RESPIRATION RATE: 17 BRPM | SYSTOLIC BLOOD PRESSURE: 126 MMHG

## 2024-05-17 DIAGNOSIS — K62.3 RECTAL PROLAPSE: ICD-10-CM

## 2024-05-17 DIAGNOSIS — Z87.898 HISTORY OF PREDIABETES: ICD-10-CM

## 2024-05-17 DIAGNOSIS — E83.51 HYPOCALCEMIA: ICD-10-CM

## 2024-05-17 DIAGNOSIS — D50.8 IRON DEFICIENCY ANEMIA SECONDARY TO INADEQUATE DIETARY IRON INTAKE: ICD-10-CM

## 2024-05-17 DIAGNOSIS — N18.31 STAGE 3A CHRONIC KIDNEY DISEASE (HCC): ICD-10-CM

## 2024-05-17 DIAGNOSIS — R05.3 CHRONIC COUGH: ICD-10-CM

## 2024-05-17 DIAGNOSIS — R32 URINARY INCONTINENCE, UNSPECIFIED TYPE: ICD-10-CM

## 2024-05-17 DIAGNOSIS — R32 URINARY INCONTINENCE, UNSPECIFIED TYPE: Primary | ICD-10-CM

## 2024-05-17 LAB
ALBUMIN SERPL BCP-MCNC: 4.2 G/DL (ref 3.5–5)
ALP SERPL-CCNC: 62 U/L (ref 34–104)
ALT SERPL W P-5'-P-CCNC: 44 U/L (ref 7–52)
ANION GAP SERPL CALCULATED.3IONS-SCNC: 11 MMOL/L (ref 4–13)
AST SERPL W P-5'-P-CCNC: 20 U/L (ref 13–39)
BASOPHILS # BLD AUTO: 0.05 THOUSANDS/ÂΜL (ref 0–0.1)
BASOPHILS NFR BLD AUTO: 1 % (ref 0–1)
BILIRUB SERPL-MCNC: 0.42 MG/DL (ref 0.2–1)
BUN SERPL-MCNC: 22 MG/DL (ref 5–25)
CALCIUM SERPL-MCNC: 10.3 MG/DL (ref 8.4–10.2)
CHLORIDE SERPL-SCNC: 102 MMOL/L (ref 96–108)
CO2 SERPL-SCNC: 22 MMOL/L (ref 21–32)
CREAT SERPL-MCNC: 1.42 MG/DL (ref 0.6–1.3)
EOSINOPHIL # BLD AUTO: 0.14 THOUSAND/ÂΜL (ref 0–0.61)
EOSINOPHIL NFR BLD AUTO: 3 % (ref 0–6)
ERYTHROCYTE [DISTWIDTH] IN BLOOD BY AUTOMATED COUNT: 16.8 % (ref 11.6–15.1)
GFR SERPL CREATININE-BSD FRML MDRD: 44 ML/MIN/1.73SQ M
GLUCOSE P FAST SERPL-MCNC: 132 MG/DL (ref 65–99)
HCT VFR BLD AUTO: 40.1 % (ref 34.8–46.1)
HGB BLD-MCNC: 12.9 G/DL (ref 11.5–15.4)
IMM GRANULOCYTES # BLD AUTO: 0.02 THOUSAND/UL (ref 0–0.2)
IMM GRANULOCYTES NFR BLD AUTO: 0 % (ref 0–2)
LYMPHOCYTES # BLD AUTO: 1.79 THOUSANDS/ÂΜL (ref 0.6–4.47)
LYMPHOCYTES NFR BLD AUTO: 33 % (ref 14–44)
MCH RBC QN AUTO: 29.9 PG (ref 26.8–34.3)
MCHC RBC AUTO-ENTMCNC: 32.2 G/DL (ref 31.4–37.4)
MCV RBC AUTO: 93 FL (ref 82–98)
MONOCYTES # BLD AUTO: 0.36 THOUSAND/ÂΜL (ref 0.17–1.22)
MONOCYTES NFR BLD AUTO: 7 % (ref 4–12)
NEUTROPHILS # BLD AUTO: 3.14 THOUSANDS/ÂΜL (ref 1.85–7.62)
NEUTS SEG NFR BLD AUTO: 56 % (ref 43–75)
NRBC BLD AUTO-RTO: 0 /100 WBCS
PLATELET # BLD AUTO: 172 THOUSANDS/UL (ref 149–390)
PMV BLD AUTO: 10.1 FL (ref 8.9–12.7)
POTASSIUM SERPL-SCNC: 4.1 MMOL/L (ref 3.5–5.3)
PROT SERPL-MCNC: 7 G/DL (ref 6.4–8.4)
RBC # BLD AUTO: 4.32 MILLION/UL (ref 3.81–5.12)
SODIUM SERPL-SCNC: 135 MMOL/L (ref 135–147)
WBC # BLD AUTO: 5.5 THOUSAND/UL (ref 4.31–10.16)

## 2024-05-17 PROCEDURE — 85025 COMPLETE CBC W/AUTO DIFF WBC: CPT

## 2024-05-17 PROCEDURE — 36415 COLL VENOUS BLD VENIPUNCTURE: CPT

## 2024-05-17 PROCEDURE — 80053 COMPREHEN METABOLIC PANEL: CPT

## 2024-05-17 PROCEDURE — 99214 OFFICE O/P EST MOD 30 MIN: CPT | Performed by: INTERNAL MEDICINE

## 2024-05-17 PROCEDURE — 87086 URINE CULTURE/COLONY COUNT: CPT

## 2024-05-17 RX ORDER — FERROUS SULFATE 325(65) MG
325 TABLET ORAL 2 TIMES DAILY WITH MEALS
Qty: 60 TABLET | Refills: 5 | Status: SHIPPED | OUTPATIENT
Start: 2024-05-17

## 2024-05-17 RX ORDER — BENZONATATE 100 MG/1
100 CAPSULE ORAL 3 TIMES DAILY PRN
Qty: 20 CAPSULE | Refills: 0 | Status: SHIPPED | OUTPATIENT
Start: 2024-05-17 | End: 2024-05-30 | Stop reason: SDUPTHER

## 2024-05-17 NOTE — PATIENT INSTRUCTIONS
Go for go for blood test and urine test today at your convenience.  Set up an appointment with GYN urologist for further evaluation.    Set up an appointment with colorectal surgeon for further evaluation.    Follow with Consultants as per their and our suggestion    Follow up in approximately one week or as needed earlier    Follow all instructions as advised and discussed.    Take your medications as prescribed.    Call the office immediately if you experience any side effects.    Ask questions if you do not understand.    Keep your scheduled appointment as advised or come sooner if necessary or in doubt.    Best time to call for non-urgent matter or questions on weekdays is between 9am and 12 noon.    See physician for any new symptoms or worsening of current symptoms.    Urgent or emergent situations call 911 and report to nearest emergency room.    I spent  time taking care of this patient including clinical care, conseling, collaboration, chart, lab and consultant's follow up note,images report, documentation, pre visit  review as appropriate.    Patient is to get labs today
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
## 2024-05-19 ENCOUNTER — HOSPITAL ENCOUNTER (EMERGENCY)
Facility: HOSPITAL | Age: 49
Discharge: HOME | End: 2024-05-19
Attending: STUDENT IN AN ORGANIZED HEALTH CARE EDUCATION/TRAINING PROGRAM
Payer: COMMERCIAL

## 2024-05-19 ENCOUNTER — APPOINTMENT (EMERGENCY)
Dept: RADIOLOGY | Facility: HOSPITAL | Age: 49
End: 2024-05-19
Payer: COMMERCIAL

## 2024-05-19 VITALS
DIASTOLIC BLOOD PRESSURE: 75 MMHG | OXYGEN SATURATION: 98 % | BODY MASS INDEX: 38.3 KG/M2 | RESPIRATION RATE: 20 BRPM | TEMPERATURE: 98.2 F | SYSTOLIC BLOOD PRESSURE: 125 MMHG | WEIGHT: 209.4 LBS | HEART RATE: 109 BPM

## 2024-05-19 DIAGNOSIS — M25.511 ACUTE PAIN OF RIGHT SHOULDER: Primary | ICD-10-CM

## 2024-05-19 PROCEDURE — 93005 ELECTROCARDIOGRAM TRACING: CPT

## 2024-05-19 PROCEDURE — 99283 EMERGENCY DEPT VISIT LOW MDM: CPT

## 2024-05-19 PROCEDURE — 99284 EMERGENCY DEPT VISIT MOD MDM: CPT | Performed by: STUDENT IN AN ORGANIZED HEALTH CARE EDUCATION/TRAINING PROGRAM

## 2024-05-19 PROCEDURE — 73030 X-RAY EXAM OF SHOULDER: CPT

## 2024-05-19 RX ORDER — PREDNISONE 20 MG/1
60 TABLET ORAL ONCE
Status: COMPLETED | OUTPATIENT
Start: 2024-05-19 | End: 2024-05-19

## 2024-05-19 RX ORDER — LIDOCAINE 50 MG/G
1 PATCH TOPICAL ONCE
Status: DISCONTINUED | OUTPATIENT
Start: 2024-05-19 | End: 2024-05-20 | Stop reason: HOSPADM

## 2024-05-19 RX ORDER — OXYCODONE HYDROCHLORIDE 5 MG/1
5 TABLET ORAL ONCE
Status: COMPLETED | OUTPATIENT
Start: 2024-05-19 | End: 2024-05-19

## 2024-05-19 RX ORDER — PREDNISONE 20 MG/1
40 TABLET ORAL DAILY
Qty: 8 TABLET | Refills: 0 | Status: SHIPPED | OUTPATIENT
Start: 2024-05-20 | End: 2024-05-24

## 2024-05-19 RX ADMIN — PREDNISONE 60 MG: 20 TABLET ORAL at 23:01

## 2024-05-19 RX ADMIN — LIDOCAINE 5% 1 PATCH: 700 PATCH TOPICAL at 21:53

## 2024-05-19 RX ADMIN — OXYCODONE HYDROCHLORIDE 5 MG: 5 TABLET ORAL at 21:52

## 2024-05-20 ENCOUNTER — TELEMEDICINE (OUTPATIENT)
Dept: BEHAVIORAL/MENTAL HEALTH CLINIC | Facility: CLINIC | Age: 49
End: 2024-05-20
Payer: COMMERCIAL

## 2024-05-20 DIAGNOSIS — F33.1 MODERATE EPISODE OF RECURRENT MAJOR DEPRESSIVE DISORDER (HCC): Primary | ICD-10-CM

## 2024-05-20 DIAGNOSIS — F17.210 NICOTINE DEPENDENCE, CIGARETTES, UNCOMPLICATED: ICD-10-CM

## 2024-05-20 DIAGNOSIS — F41.0 GENERALIZED ANXIETY DISORDER WITH PANIC ATTACKS: ICD-10-CM

## 2024-05-20 DIAGNOSIS — F41.1 GENERALIZED ANXIETY DISORDER WITH PANIC ATTACKS: ICD-10-CM

## 2024-05-20 PROCEDURE — 90834 PSYTX W PT 45 MINUTES: CPT | Performed by: SOCIAL WORKER
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
## 2024-05-20 NOTE — DISCHARGE INSTRUCTIONS
You were seen in the emergency room for shoulder pain.  As discussed please follow-up with your family doctor and your pain management doctor.  Return to the emergency department for any new or concerning symptoms.
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
## 2024-05-21 ENCOUNTER — APPOINTMENT (OUTPATIENT)
Dept: LAB | Facility: HOSPITAL | Age: 49
End: 2024-05-21
Payer: COMMERCIAL

## 2024-05-21 DIAGNOSIS — E83.51 HYPOCALCEMIA: ICD-10-CM

## 2024-05-21 LAB — CALCIUM SERPL-MCNC: 9.6 MG/DL (ref 8.4–10.2)

## 2024-05-21 PROCEDURE — 36415 COLL VENOUS BLD VENIPUNCTURE: CPT

## 2024-05-22 ENCOUNTER — PATIENT OUTREACH (OUTPATIENT)
Dept: CASE MANAGEMENT | Facility: OTHER | Age: 49
End: 2024-05-22

## 2024-05-22 ENCOUNTER — APPOINTMENT (OUTPATIENT)
Dept: LAB | Facility: HOSPITAL | Age: 49
End: 2024-05-22
Payer: COMMERCIAL

## 2024-05-22 DIAGNOSIS — E83.51 HYPOCALCEMIA: ICD-10-CM

## 2024-05-22 LAB — CALCIUM SERPL-MCNC: 9.3 MG/DL (ref 8.4–10.2)

## 2024-05-22 PROCEDURE — 36415 COLL VENOUS BLD VENIPUNCTURE: CPT

## 2024-05-22 NOTE — PROGRESS NOTES
In basket ADT notifications received for patient being treated and released from  Emergency Department for c/o right shoulder pain.  Pain started three days prior to presenting, no trauma or injury noted.  Xray neg for acute findings and EKG showed NSR with tachycardia.  She was administered 5mg oxycodone, prednisone  60 mg and lidocaine patch applied.     Yamileth is scheduled to see her PCP tomorrow at 940a.     Discharge follow up call. Patient discharged from 5/19/24.     Is this a good time for you to talk?     No answer when contacted.  Left brief msg with PCP appt reminder and encouragement to return call.     Will remain available to assist and review chart again 30 days from today or sooner if utilization present.

## 2024-05-23 LAB
ATRIAL RATE: 105 BPM
P AXIS: 36 DEGREES
PR INTERVAL: 160 MS
QRS AXIS: 32 DEGREES
QRSD INTERVAL: 74 MS
QT INTERVAL: 334 MS
QTC INTERVAL: 441 MS
T WAVE AXIS: 66 DEGREES
VENTRICULAR RATE: 105 BPM

## 2024-05-23 PROCEDURE — 93010 ELECTROCARDIOGRAM REPORT: CPT | Performed by: INTERNAL MEDICINE

## 2024-05-23 NOTE — ASSESSMENT & PLAN NOTE
C/o rectal irritation for few months. Denies any hematocezia, melena, rectal pain. Pt thinks she was told to have rectal prolapse but no surgery was done. Denies any stool incontinence or constipation.    9- Colonoscopy screening reviewed as follows:IMPRESSION:  8 mm transverse colon polyp by cold snare  2 sigmoid polyp removed by cold snare  Several small rectal polyps removed by cold snare  Mild sigmoid diverticulosis and internal hemorrhoids  Otherwise normal colonoscopy        RECOMMENDATION:    Repeat colonoscopy in 5 years     Personal history of colon polyps         Discharge home  Resume regular diet  Resume home medications  Continue take 1 tablespoon of fiber supplement daily, consider 1 capful of MiraLAX  Follow-up biopsy results  Call with any abdominal pain, bleeding, fevers    Recently under care of GI MD, recent EGD and GI office note of 5-9-2024 reviewed; pt discussed with GI MD about chronic diarrhea, was on mag supplement, mentioned to be chronic.also recently underwent EGD reviewed, for odynophagia, ongoing nausea but no vomiting or hemetemsis or weight loss. Nausea is better last coupe of days and odynophagia is better    Differntial dx disucssed  Rec to see GI MD and Colorectal surgeon as pt is interested in getting her rectal prolapse evaluated    Odynophagia and  nausea is better    Will check labs as ordered

## 2024-05-23 NOTE — ASSESSMENT & PLAN NOTE
Lab Results   Component Value Date    EGFR 44 05/17/2024    EGFR 67 04/28/2024    EGFR 38 04/17/2024    CREATININE 1.42 (H) 05/17/2024    CREATININE 1.00 04/28/2024    CREATININE 1.57 (H) 04/17/2024   Will need follow up lab for re eval due to her urinary symptoms

## 2024-05-23 NOTE — ASSESSMENT & PLAN NOTE
Urinary Incontinence: Pt had hx of previous bladder lift done few years ago. She is under care of Dr Guillermo Urologist and Dr Dillard.Denies any urinary burning, hematuria, unclear about frequence or urgency but coughing and sneezing does precipitates Incontinence. She had US of Kidney and bladder done report as follows:  1. Nonobstructing bilateral intrarenal calculi.  2. Simple appearing left renal cysts.  3. No significant post void urinary bladder residual volume. Mild circumferential bladder wall thickening may be due to chronic bladder outlet obstruction and/or inflammation.  5-3-2024: Urine analysis was unremarkable.  Differntial dx reviewed with pt  Recent calcium issue reviewed    Rec cbc cmp and urine culture  Will refer to Urogynecologist    Pt was seen presence of our

## 2024-05-24 ENCOUNTER — APPOINTMENT (OUTPATIENT)
Dept: LAB | Facility: HOSPITAL | Age: 49
End: 2024-05-24
Payer: COMMERCIAL

## 2024-05-24 DIAGNOSIS — E83.51 HYPOCALCEMIA: ICD-10-CM

## 2024-05-24 LAB — CALCIUM SERPL-MCNC: 8.6 MG/DL (ref 8.4–10.2)

## 2024-05-24 PROCEDURE — 36415 COLL VENOUS BLD VENIPUNCTURE: CPT

## 2024-05-25 ENCOUNTER — APPOINTMENT (OUTPATIENT)
Dept: LAB | Facility: HOSPITAL | Age: 49
End: 2024-05-25
Attending: INTERNAL MEDICINE
Payer: COMMERCIAL

## 2024-05-25 DIAGNOSIS — E83.51 HYPOCALCEMIA: ICD-10-CM

## 2024-05-25 LAB — CALCIUM SERPL-MCNC: 9.2 MG/DL (ref 8.4–10.2)

## 2024-05-25 PROCEDURE — 36415 COLL VENOUS BLD VENIPUNCTURE: CPT

## 2024-05-28 ENCOUNTER — TELEMEDICINE (OUTPATIENT)
Dept: PSYCHIATRY | Facility: CLINIC | Age: 49
End: 2024-05-28
Payer: COMMERCIAL

## 2024-05-28 DIAGNOSIS — F41.1 GAD (GENERALIZED ANXIETY DISORDER): ICD-10-CM

## 2024-05-28 DIAGNOSIS — F41.0 PANIC DISORDER: Primary | ICD-10-CM

## 2024-05-28 DIAGNOSIS — F51.01 PRIMARY INSOMNIA: ICD-10-CM

## 2024-05-28 PROCEDURE — 99214 OFFICE O/P EST MOD 30 MIN: CPT | Performed by: PHYSICIAN ASSISTANT

## 2024-05-28 RX ORDER — DESVENLAFAXINE 100 MG/1
100 TABLET, EXTENDED RELEASE ORAL DAILY
Qty: 90 TABLET | Refills: 0 | Status: SHIPPED | OUTPATIENT
Start: 2024-05-28 | End: 2024-08-26
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
## 2024-05-29 ENCOUNTER — APPOINTMENT (OUTPATIENT)
Dept: LAB | Facility: HOSPITAL | Age: 49
End: 2024-05-29
Payer: COMMERCIAL

## 2024-05-29 DIAGNOSIS — E83.51 HYPOCALCEMIA: ICD-10-CM

## 2024-05-29 LAB — CALCIUM SERPL-MCNC: 10.8 MG/DL (ref 8.4–10.2)

## 2024-05-29 PROCEDURE — 36415 COLL VENOUS BLD VENIPUNCTURE: CPT

## 2024-05-30 ENCOUNTER — TELEPHONE (OUTPATIENT)
Age: 49
End: 2024-05-30

## 2024-05-30 ENCOUNTER — OFFICE VISIT (OUTPATIENT)
Dept: INTERNAL MEDICINE CLINIC | Facility: CLINIC | Age: 49
End: 2024-05-30
Payer: COMMERCIAL

## 2024-05-30 ENCOUNTER — TELEMEDICINE (OUTPATIENT)
Dept: BEHAVIORAL/MENTAL HEALTH CLINIC | Facility: CLINIC | Age: 49
End: 2024-05-30
Payer: COMMERCIAL

## 2024-05-30 VITALS
DIASTOLIC BLOOD PRESSURE: 72 MMHG | TEMPERATURE: 97.9 F | HEART RATE: 98 BPM | RESPIRATION RATE: 18 BRPM | SYSTOLIC BLOOD PRESSURE: 108 MMHG | OXYGEN SATURATION: 98 % | HEIGHT: 62 IN | WEIGHT: 206 LBS | BODY MASS INDEX: 37.91 KG/M2

## 2024-05-30 DIAGNOSIS — J31.0 CHRONIC RHINITIS: ICD-10-CM

## 2024-05-30 DIAGNOSIS — F33.1 MODERATE EPISODE OF RECURRENT MAJOR DEPRESSIVE DISORDER (HCC): Primary | ICD-10-CM

## 2024-05-30 DIAGNOSIS — F41.0 GENERALIZED ANXIETY DISORDER WITH PANIC ATTACKS: ICD-10-CM

## 2024-05-30 DIAGNOSIS — R11.2 NAUSEA AND VOMITING, UNSPECIFIED VOMITING TYPE: ICD-10-CM

## 2024-05-30 DIAGNOSIS — F17.210 NICOTINE DEPENDENCE, CIGARETTES, UNCOMPLICATED: ICD-10-CM

## 2024-05-30 DIAGNOSIS — J02.9 SORE THROAT: Primary | ICD-10-CM

## 2024-05-30 DIAGNOSIS — F41.1 GENERALIZED ANXIETY DISORDER WITH PANIC ATTACKS: ICD-10-CM

## 2024-05-30 DIAGNOSIS — M25.511 RIGHT SHOULDER PAIN, UNSPECIFIED CHRONICITY: ICD-10-CM

## 2024-05-30 DIAGNOSIS — E83.42 HYPOMAGNESEMIA: ICD-10-CM

## 2024-05-30 DIAGNOSIS — R05.3 CHRONIC COUGH: ICD-10-CM

## 2024-05-30 LAB
S PYO AG THROAT QL: NEGATIVE
SARS-COV-2 AG UPPER RESP QL IA: NEGATIVE
SL AMB POCT RAPID FLU A: NORMAL
SL AMB POCT RAPID FLU B: NORMAL
VALID CONTROL: NORMAL

## 2024-05-30 PROCEDURE — 87804 INFLUENZA ASSAY W/OPTIC: CPT | Performed by: STUDENT IN AN ORGANIZED HEALTH CARE EDUCATION/TRAINING PROGRAM

## 2024-05-30 PROCEDURE — 99214 OFFICE O/P EST MOD 30 MIN: CPT | Performed by: STUDENT IN AN ORGANIZED HEALTH CARE EDUCATION/TRAINING PROGRAM

## 2024-05-30 PROCEDURE — 87811 SARS-COV-2 COVID19 W/OPTIC: CPT | Performed by: STUDENT IN AN ORGANIZED HEALTH CARE EDUCATION/TRAINING PROGRAM

## 2024-05-30 PROCEDURE — 90834 PSYTX W PT 45 MINUTES: CPT | Performed by: SOCIAL WORKER

## 2024-05-30 PROCEDURE — 87880 STREP A ASSAY W/OPTIC: CPT | Performed by: STUDENT IN AN ORGANIZED HEALTH CARE EDUCATION/TRAINING PROGRAM

## 2024-05-30 RX ORDER — LANOLIN ALCOHOL/MO/W.PET/CERES
400 CREAM (GRAM) TOPICAL 3 TIMES DAILY
Qty: 90 TABLET | Refills: 10 | Status: CANCELLED | OUTPATIENT
Start: 2024-05-30

## 2024-05-30 RX ORDER — AZITHROMYCIN 250 MG/1
TABLET, FILM COATED ORAL
Qty: 6 TABLET | Refills: 0 | Status: SHIPPED | OUTPATIENT
Start: 2024-05-30 | End: 2024-06-04

## 2024-05-30 RX ORDER — FLUTICASONE PROPIONATE 50 MCG
1 SPRAY, SUSPENSION (ML) NASAL DAILY
Qty: 16 G | Refills: 0 | Status: SHIPPED | OUTPATIENT
Start: 2024-05-30

## 2024-05-30 RX ORDER — BENZONATATE 100 MG/1
100 CAPSULE ORAL EVERY 8 HOURS PRN
Qty: 30 CAPSULE | Refills: 0 | Status: SHIPPED | OUTPATIENT
Start: 2024-05-30 | End: 2024-06-29

## 2024-05-30 RX ORDER — LANOLIN ALCOHOL/MO/W.PET/CERES
400 CREAM (GRAM) TOPICAL 3 TIMES DAILY
Qty: 90 TABLET | Refills: 0 | Status: SHIPPED | OUTPATIENT
Start: 2024-05-30 | End: 2024-06-29

## 2024-05-30 NOTE — ASSESSMENT & PLAN NOTE
"Reports chronic cough  Smoking cigarettes for\" longtime\"  Counseling provided.  Not ready to quit  Benzonatate as needed  On Trelegy Ellipta daily and albuterol as needed  Under care of pulmonology.  Continue follow-up.  "

## 2024-05-30 NOTE — PROGRESS NOTES
"Ambulatory Visit  Name: Yamileth Vale      : 1975      MRN: [id number]  Encounter Provider: Meño Ravi MD  Encounter Date: 2024   Encounter department: [facility] INTERNAL MEDICINE    Assessment & Plan   1. Sore throat  Assessment & Plan:  In office tested negative for COVID, flu A&B, and strep  Azithromycin as directed  Frequent hydration  Return or to ER if symptoms worsen or develop new symptoms.  Orders:  -     POCT Rapid Covid Ag  -     POCT rapid flu A and B  -     POCT rapid ANTIGEN strepA  -     azithromycin (Zithromax) 250 mg tablet; Take 2 tablets (500 mg total) by mouth daily for 1 day, THEN 1 tablet (250 mg total) daily for 4 days.  2. Chronic cough  Assessment & Plan:  Reports chronic cough  Smoking cigarettes for\" longtime\"  Counseling provided.  Not ready to quit  Benzonatate as needed  On Trelegy Ellipta daily and albuterol as needed  Under care of pulmonology.  Continue follow-up.  Orders:  -     benzonatate (TESSALON PERLES) 100 mg capsule; Take 1 capsule (100 mg total) by mouth every 8 (eight) hours as needed for cough  3. Hypomagnesemia  Assessment & Plan:  Patient currently on magnesium 400 mg 3 times a day as per endocrinology recommendations  Continue follow-up with endocrinology  Orders:  -     magnesium Oxide (MAG-OX) 400 mg TABS; Take 1 tablet (400 mg total) by mouth 3 (three) times a day  4. Chronic rhinitis  Assessment & Plan:  Flonase as directed  Avoid allergens  Monitor      Orders:  -     fluticasone (FLONASE) 50 mcg/act nasal spray; 1 spray into each nostril daily  5. Right shoulder pain, unspecified chronicity  Assessment & Plan:  Acute, s/p ER evaluation  ER records reviewed  Reports pain controlled by lidocaine patch  To orthopedic for evaluation  Orders:  -     Ambulatory referral to Orthopedic Surgery; Future  6. Nausea and vomiting, unspecified vomiting type  Assessment & Plan:  Intermittent nausea and vomiting since " "cholecystectomy 1 month ago  No signs of dehydration  Frequent hydration discussed  Under care of GI.  Continue follow-up.       History of Present Illness     Here for chronic cough worsening for 3 weeks, worse at night and morning, denies any sob or wheezing.   Assoc with sore throat for 3 days ago and nasal congestion, worse at night.   Also c/o right shoulder pain s/p Er visit. Denies any injury or trauma.  Reports cholecystectomy 1 month ago, complains of intermittent nausea and vomiting since the surgery, under care of GI.  Denies any fever, chills, headache, dizziness, cp, sob, abd pain, any numbness or weakness.    Cough  Associated symptoms include postnasal drip, rhinorrhea and a sore throat. Pertinent negatives include no chest pain, chills, ear pain, fever, headaches, rash, shortness of breath or wheezing.       Review of Systems   Constitutional:  Negative for chills and fever.   HENT:  Positive for postnasal drip, rhinorrhea and sore throat. Negative for ear pain.    Eyes:  Negative for pain and visual disturbance.   Respiratory:  Positive for cough. Negative for shortness of breath and wheezing.    Cardiovascular:  Negative for chest pain and palpitations.   Gastrointestinal:  Positive for nausea and vomiting. Negative for abdominal pain.   Genitourinary:  Negative for dysuria and hematuria.   Musculoskeletal:  Positive for arthralgias.   Skin:  Negative for rash.   Neurological:  Negative for dizziness, seizures, syncope, weakness, numbness and headaches.   All other systems reviewed and are negative.      Objective     /72   Pulse 98   Temp 97.9 °F (36.6 °C)   Resp 18   Ht 5' 2\" (1.575 m)   Wt 93.4 kg (206 lb)   LMP 12/06/2018 Comment: partial hysterectomy   SpO2 98%   BMI 37.68 kg/m²     Physical Exam  Vitals reviewed.   Constitutional:       General: She is not in acute distress.     Appearance: She is well-developed.   HENT:      Head: Normocephalic and atraumatic.      Nose: " Congestion and rhinorrhea present.      Comments: Nasal mucosa erythematous     Mouth/Throat:      Mouth: Mucous membranes are moist.      Pharynx: Oropharyngeal exudate present.   Eyes:      Extraocular Movements: Extraocular movements intact.      Conjunctiva/sclera: Conjunctivae normal.      Pupils: Pupils are equal, round, and reactive to light.   Cardiovascular:      Rate and Rhythm: Normal rate and regular rhythm.   Pulmonary:      Effort: Pulmonary effort is normal. No respiratory distress.      Breath sounds: Normal breath sounds. No wheezing, rhonchi or rales.   Abdominal:      General: Bowel sounds are normal.      Palpations: Abdomen is soft.      Tenderness: There is no abdominal tenderness.   Musculoskeletal:      Cervical back: Neck supple.      Right lower leg: No edema.      Left lower leg: No edema.   Skin:     General: Skin is warm and dry.      Capillary Refill: Capillary refill takes less than 2 seconds.   Neurological:      General: No focal deficit present.      Mental Status: She is alert.   Psychiatric:         Mood and Affect: Mood normal.

## 2024-05-30 NOTE — ASSESSMENT & PLAN NOTE
In office tested negative for COVID, flu A&B, and strep  Azithromycin as directed  Frequent hydration  Return or to ER if symptoms worsen or develop new symptoms.

## 2024-05-30 NOTE — ASSESSMENT & PLAN NOTE
Patient currently on magnesium 400 mg 3 times a day as per endocrinology recommendations  Continue follow-up with endocrinology

## 2024-05-30 NOTE — ASSESSMENT & PLAN NOTE
Intermittent nausea and vomiting since cholecystectomy 1 month ago  No signs of dehydration  Frequent hydration discussed  Under care of GI.  Continue follow-up.

## 2024-05-30 NOTE — ASSESSMENT & PLAN NOTE
Acute, s/p ER evaluation  ER records reviewed  Reports pain controlled by lidocaine patch  To orthopedic for evaluation

## 2024-05-31 ENCOUNTER — TELEPHONE (OUTPATIENT)
Age: 49
End: 2024-05-31

## 2024-05-31 ENCOUNTER — APPOINTMENT (OUTPATIENT)
Dept: LAB | Facility: HOSPITAL | Age: 49
End: 2024-05-31
Payer: COMMERCIAL

## 2024-05-31 DIAGNOSIS — E83.51 HYPOCALCEMIA: ICD-10-CM

## 2024-05-31 LAB — CALCIUM SERPL-MCNC: 9.5 MG/DL (ref 8.4–10.2)

## 2024-05-31 PROCEDURE — 36415 COLL VENOUS BLD VENIPUNCTURE: CPT

## 2024-05-31 NOTE — TELEPHONE ENCOUNTER
Patients GI provider:  Dr. Villanueva    Number to return call: [phone]    Reason for call: Pt calling requesting to be prescribed another medication as she stopped the 2 that was prescribed as she was told that they lowered her calcium levels. And now that she stopped the medications that Dr. Villanueva prescribed she is having issues again. The two medications were Famotidine 40mg and Pantoprazole 40mg    Scheduled procedure/appointment date if applicable: n/a
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
## 2024-06-03 ENCOUNTER — APPOINTMENT (OUTPATIENT)
Dept: LAB | Facility: HOSPITAL | Age: 49
End: 2024-06-03
Payer: COMMERCIAL

## 2024-06-03 DIAGNOSIS — E83.51 HYPOCALCEMIA: ICD-10-CM

## 2024-06-03 LAB — CALCIUM SERPL-MCNC: 9.3 MG/DL (ref 8.4–10.2)

## 2024-06-03 PROCEDURE — 36415 COLL VENOUS BLD VENIPUNCTURE: CPT

## 2024-06-03 NOTE — TELEPHONE ENCOUNTER
LOV 5/9/24 SANTA Gonzalez (n/v, GERD, odynophagia), Procedure EGD 5/13/24, next F/U currently scheduled 9/24/24.     I spoke with patient, she is not taking the PPI or Pepcid due to lowering calcium. She states she is no longer on the sucralfate. She continues with nausea and vomiting and takes the Zofran without relief. Please advise.

## 2024-06-04 NOTE — TELEPHONE ENCOUNTER
Spoke to patient, she felt much better with Pantoprazole in the AM, Pepcid at bedtime but stopped due to concerns about calcium levels dropping. Reviewed calcium labs which have been normal if not slightly elevated. Discussed options are limited if she can't take PPI & trial of Carafate or trial of Questran would be difficult as she wouldn't be able to space them appropriately to avoid interference with her other pills. She is agreeable to starting Pepcid 40mg daily at HS & obtaining GES as previously ordered. She will follow antireflux precautions/gastroparesis diet in the interim as well & continue Zofran PRN.

## 2024-06-05 ENCOUNTER — TELEMEDICINE (OUTPATIENT)
Dept: BEHAVIORAL/MENTAL HEALTH CLINIC | Facility: CLINIC | Age: 49
End: 2024-06-05
Payer: COMMERCIAL

## 2024-06-05 DIAGNOSIS — F41.1 GENERALIZED ANXIETY DISORDER WITH PANIC ATTACKS: ICD-10-CM

## 2024-06-05 DIAGNOSIS — F41.0 GENERALIZED ANXIETY DISORDER WITH PANIC ATTACKS: ICD-10-CM

## 2024-06-05 DIAGNOSIS — F33.1 MODERATE EPISODE OF RECURRENT MAJOR DEPRESSIVE DISORDER (HCC): Primary | ICD-10-CM

## 2024-06-05 DIAGNOSIS — F17.210 NICOTINE DEPENDENCE, CIGARETTES, UNCOMPLICATED: ICD-10-CM

## 2024-06-05 PROCEDURE — 90834 PSYTX W PT 45 MINUTES: CPT | Performed by: SOCIAL WORKER

## 2024-06-07 ENCOUNTER — APPOINTMENT (OUTPATIENT)
Dept: LAB | Facility: HOSPITAL | Age: 49
End: 2024-06-07
Payer: COMMERCIAL

## 2024-06-07 DIAGNOSIS — E83.51 HYPOCALCEMIA: ICD-10-CM

## 2024-06-07 LAB — CALCIUM SERPL-MCNC: 9.7 MG/DL (ref 8.4–10.2)

## 2024-06-07 PROCEDURE — 36415 COLL VENOUS BLD VENIPUNCTURE: CPT
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
## 2024-06-11 ENCOUNTER — APPOINTMENT (OUTPATIENT)
Dept: LAB | Facility: HOSPITAL | Age: 49
End: 2024-06-11
Attending: INTERNAL MEDICINE
Payer: COMMERCIAL

## 2024-06-11 DIAGNOSIS — E83.51 HYPOCALCEMIA: ICD-10-CM

## 2024-06-11 LAB — CALCIUM SERPL-MCNC: 9.3 MG/DL (ref 8.4–10.2)

## 2024-06-11 PROCEDURE — 36415 COLL VENOUS BLD VENIPUNCTURE: CPT

## 2024-06-12 ENCOUNTER — TELEMEDICINE (OUTPATIENT)
Dept: BEHAVIORAL/MENTAL HEALTH CLINIC | Facility: CLINIC | Age: 49
End: 2024-06-12
Payer: COMMERCIAL

## 2024-06-12 ENCOUNTER — APPOINTMENT (OUTPATIENT)
Dept: LAB | Facility: HOSPITAL | Age: 49
End: 2024-06-12
Payer: COMMERCIAL

## 2024-06-12 DIAGNOSIS — F17.210 NICOTINE DEPENDENCE, CIGARETTES, UNCOMPLICATED: ICD-10-CM

## 2024-06-12 DIAGNOSIS — F41.1 GENERALIZED ANXIETY DISORDER WITH PANIC ATTACKS: ICD-10-CM

## 2024-06-12 DIAGNOSIS — E83.51 HYPOCALCEMIA: ICD-10-CM

## 2024-06-12 DIAGNOSIS — F33.1 MODERATE EPISODE OF RECURRENT MAJOR DEPRESSIVE DISORDER (HCC): Primary | ICD-10-CM

## 2024-06-12 DIAGNOSIS — F41.0 GENERALIZED ANXIETY DISORDER WITH PANIC ATTACKS: ICD-10-CM

## 2024-06-12 LAB — CALCIUM SERPL-MCNC: 9.9 MG/DL (ref 8.4–10.2)

## 2024-06-12 PROCEDURE — 90834 PSYTX W PT 45 MINUTES: CPT | Performed by: SOCIAL WORKER

## 2024-06-12 PROCEDURE — 36415 COLL VENOUS BLD VENIPUNCTURE: CPT
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
## 2024-06-17 ENCOUNTER — APPOINTMENT (OUTPATIENT)
Dept: LAB | Facility: HOSPITAL | Age: 49
End: 2024-06-17
Payer: COMMERCIAL

## 2024-06-17 DIAGNOSIS — F41.0 PANIC DISORDER: ICD-10-CM

## 2024-06-17 DIAGNOSIS — E83.51 HYPOCALCEMIA: ICD-10-CM

## 2024-06-17 DIAGNOSIS — Z79.899 HIGH RISK MEDICATION USE: Primary | ICD-10-CM

## 2024-06-17 LAB — CALCIUM SERPL-MCNC: 8.8 MG/DL (ref 8.4–10.2)

## 2024-06-17 PROCEDURE — 36415 COLL VENOUS BLD VENIPUNCTURE: CPT

## 2024-06-17 RX ORDER — ALPRAZOLAM 2 MG/1
2 TABLET, EXTENDED RELEASE ORAL
Qty: 60 TABLET | Refills: 0 | Status: SHIPPED | OUTPATIENT
Start: 2024-06-17

## 2024-06-17 NOTE — TELEPHONE ENCOUNTER
Medication Refill Request     Name of Medication  ALPRAZolam ER (XANAX XR) 2 MG 24 hr tablet   Dose/Frequency Take 1 tablet (2 mg total) by mouth 2 (two) times a day before breakfast and lunch   Quantity 60  Verified pharmacy   [x]  Verified ordering Provider   [x]  Does patient have enough for the next 3 days? Yes [] No [x]  Does patient have a follow-up appointment scheduled? Yes [x] No []   If so when is appointment: 08/12/24 @ 3 pm

## 2024-06-18 ENCOUNTER — DOCUMENTATION (OUTPATIENT)
Dept: CASE MANAGEMENT | Facility: OTHER | Age: 49
End: 2024-06-18

## 2024-06-18 ENCOUNTER — TELEMEDICINE (OUTPATIENT)
Dept: BEHAVIORAL/MENTAL HEALTH CLINIC | Facility: CLINIC | Age: 49
End: 2024-06-18
Payer: COMMERCIAL

## 2024-06-18 DIAGNOSIS — F41.0 GENERALIZED ANXIETY DISORDER WITH PANIC ATTACKS: ICD-10-CM

## 2024-06-18 DIAGNOSIS — F17.210 NICOTINE DEPENDENCE, CIGARETTES, UNCOMPLICATED: ICD-10-CM

## 2024-06-18 DIAGNOSIS — F41.1 GENERALIZED ANXIETY DISORDER WITH PANIC ATTACKS: ICD-10-CM

## 2024-06-18 DIAGNOSIS — F33.1 MODERATE EPISODE OF RECURRENT MAJOR DEPRESSIVE DISORDER (HCC): Primary | ICD-10-CM

## 2024-06-18 PROCEDURE — 90834 PSYTX W PT 45 MINUTES: CPT | Performed by: SOCIAL WORKER

## 2024-06-18 NOTE — MEDICAL HIGH UTILIZER
High Utilizer Care Plan for: Yamileth Vale  Updated 2024  Patient Name: Yamileth Vale MRN: [id number]       : 1975    Age: 48   Sex: F     Utilization Background: In the 6 months prior to the care plan being written the patient has had 18 ED visits secondary to calcium related complaints. At this time, she has not had concerning radiation exposures. There were 5 occurrences in  where the patient received IV calcium gluconate for normal calcium/ionized calcium levels.     In the last 90 Days: 7 ED visits, 1 IP surgical admission     Most Recent Work Up:  Noncontributory to utilization at this time. Treatment Recommendations:    ED Recommendations: Patient will typically present secondary to numbness or tingling that she will attribute to her calcium levels being off. There has been times where she will self-adjust medications and take large doses of exogenous calcium.     Recommend checking serum calcium and ionized calcium levels. Would only offer IV calcium gluconate if calcium levels are 7.5.     If the patient has hypocalcemic symptoms and her levels are > 7.5 recommend oral calcium intake. This does not need to be ordered in the ED.     Since patient at times over adjusts her medications which results in high serum calcium levels and potentially LELAND, low threshold to supply IV fluids if there is a rise in creatinine or hypercalcemia present.     At every visit patient should be encouraged to reach out to her Endocrinology team regarding her calcium levels and appropriate course of action prior to presenting to the ED.      Inpatient Recommendations: Patient is low risk for readmission at this time. Please see above recommendations regarding calcium supplementation.       Outpatient recommendations: Patient should maintain close follow up with Endocrinology and be encouraged to call Endocrinology office with symptoms or questions regarding labs and medication adjustments.  Consideration for outpatient infusions of calcium if needed.       Situation: Patient is a 48 year old female with history of hypoparathyroidism who presents frequently with symptoms perceived to be from hypocalcemia. She has been hypercalcemic at times due to self adjusting medications and has received doses of IV calcium gluconate with normal calcium and ionized calcium levels.      PMH/PSH: Hypoparathyroidism, Prediabetes, CKD 3, Obesity       Assessment                              Drivers of repeated utilization:                 Numbness/tingling thought to be due to low calcium levels     Community Resources in place:    N/A    Patient Care Team:   Matthew Ramos MD - PCP ([facility])  ELIF Roblero, Yisel Pabon MD - Endocrinology ([facility])  Husam Jeong MD - Nephrology ([facility])  Danie Morrell - [facility] Care Manager            Care plan date and owner: Rhett Salas Jr., PA-C 2/26/2024       Reviewed with patient before discharge?   3/20/2024

## 2024-06-19 ENCOUNTER — APPOINTMENT (OUTPATIENT)
Dept: LAB | Facility: HOSPITAL | Age: 49
End: 2024-06-19
Payer: COMMERCIAL

## 2024-06-19 DIAGNOSIS — E83.51 HYPOCALCEMIA: ICD-10-CM

## 2024-06-19 LAB — CALCIUM SERPL-MCNC: 9.8 MG/DL (ref 8.4–10.2)

## 2024-06-19 PROCEDURE — 36415 COLL VENOUS BLD VENIPUNCTURE: CPT
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
## 2024-06-21 ENCOUNTER — APPOINTMENT (OUTPATIENT)
Dept: LAB | Facility: HOSPITAL | Age: 49
End: 2024-06-21
Payer: COMMERCIAL

## 2024-06-21 DIAGNOSIS — E83.51 HYPOCALCEMIA: ICD-10-CM

## 2024-06-21 LAB — CALCIUM SERPL-MCNC: 10.3 MG/DL (ref 8.4–10.2)

## 2024-06-21 PROCEDURE — 36415 COLL VENOUS BLD VENIPUNCTURE: CPT

## 2024-06-22 DIAGNOSIS — J45.40 MODERATE PERSISTENT ASTHMA, UNSPECIFIED WHETHER COMPLICATED: ICD-10-CM

## 2024-06-22 RX ORDER — ALBUTEROL SULFATE 90 UG/1
AEROSOL, METERED RESPIRATORY (INHALATION)
Qty: 8.5 G | Refills: 1 | Status: SHIPPED | OUTPATIENT
Start: 2024-06-22

## 2024-06-24 ENCOUNTER — PATIENT OUTREACH (OUTPATIENT)
Dept: CASE MANAGEMENT | Facility: OTHER | Age: 49
End: 2024-06-24

## 2024-06-24 ENCOUNTER — APPOINTMENT (OUTPATIENT)
Dept: LAB | Facility: HOSPITAL | Age: 49
End: 2024-06-24
Payer: COMMERCIAL

## 2024-06-24 DIAGNOSIS — R73.9 HYPERGLYCEMIA: ICD-10-CM

## 2024-06-24 DIAGNOSIS — E83.42 HYPOMAGNESEMIA: ICD-10-CM

## 2024-06-24 DIAGNOSIS — E83.51 HYPOCALCEMIA: ICD-10-CM

## 2024-06-24 LAB — CALCIUM SERPL-MCNC: 9.4 MG/DL (ref 8.4–10.2)

## 2024-06-24 PROCEDURE — 36415 COLL VENOUS BLD VENIPUNCTURE: CPT

## 2024-06-24 RX ORDER — LANOLIN ALCOHOL/MO/W.PET/CERES
400 CREAM (GRAM) TOPICAL 3 TIMES DAILY
Qty: 90 TABLET | Refills: 5 | Status: SHIPPED | OUTPATIENT
Start: 2024-06-24

## 2024-06-24 NOTE — TELEPHONE ENCOUNTER
Patient called to request medication, I advised her we just received script from pharmacy. She also wants to know about getting her SIG adjusted on the script due to her having to take it 2-3 times a day depending on how her sugars are. She does not have an appointment until 10/10

## 2024-06-25 NOTE — PROGRESS NOTES
In basket reminder received; 30 day review of chart and care plan completed.      No utilization since last outreach.  Patient seemingly compliant with attending scheduled appts and is followed closely by  Psych and PCP.  Yamileth noted to have virtual visit w/[facility] orthopedics on 6/4.   She will be scheduled for caudal ANTONETTE and provider refilled oxycodone-acetaminophen  10-325mg.      Will remain available to assist; next outreach scheduled.

## 2024-06-26 ENCOUNTER — TELEMEDICINE (OUTPATIENT)
Dept: BEHAVIORAL/MENTAL HEALTH CLINIC | Facility: CLINIC | Age: 49
End: 2024-06-26
Payer: COMMERCIAL

## 2024-06-26 DIAGNOSIS — F41.0 GENERALIZED ANXIETY DISORDER WITH PANIC ATTACKS: ICD-10-CM

## 2024-06-26 DIAGNOSIS — F33.1 MODERATE EPISODE OF RECURRENT MAJOR DEPRESSIVE DISORDER (HCC): Primary | ICD-10-CM

## 2024-06-26 DIAGNOSIS — F41.1 GENERALIZED ANXIETY DISORDER WITH PANIC ATTACKS: ICD-10-CM

## 2024-06-26 PROCEDURE — 90834 PSYTX W PT 45 MINUTES: CPT | Performed by: SOCIAL WORKER
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
## 2024-06-28 ENCOUNTER — TELEMEDICINE (OUTPATIENT)
Dept: BEHAVIORAL/MENTAL HEALTH CLINIC | Facility: CLINIC | Age: 49
End: 2024-06-28
Payer: COMMERCIAL

## 2024-06-28 ENCOUNTER — APPOINTMENT (OUTPATIENT)
Dept: LAB | Facility: HOSPITAL | Age: 49
End: 2024-06-28
Payer: COMMERCIAL

## 2024-06-28 DIAGNOSIS — F41.1 GENERALIZED ANXIETY DISORDER WITH PANIC ATTACKS: ICD-10-CM

## 2024-06-28 DIAGNOSIS — E83.51 HYPOCALCEMIA: ICD-10-CM

## 2024-06-28 DIAGNOSIS — F33.1 MODERATE EPISODE OF RECURRENT MAJOR DEPRESSIVE DISORDER (HCC): Primary | ICD-10-CM

## 2024-06-28 DIAGNOSIS — F41.0 GENERALIZED ANXIETY DISORDER WITH PANIC ATTACKS: ICD-10-CM

## 2024-06-28 LAB — CALCIUM SERPL-MCNC: 9.7 MG/DL (ref 8.4–10.2)

## 2024-06-28 PROCEDURE — 36415 COLL VENOUS BLD VENIPUNCTURE: CPT

## 2024-06-28 PROCEDURE — 90834 PSYTX W PT 45 MINUTES: CPT | Performed by: SOCIAL WORKER

## 2024-06-28 NOTE — PSYCH
Behavioral Health Psychotherapy Progress Note    Psychotherapy Provided: Individual Psychotherapy     1. Moderate episode of recurrent major depressive disorder (HCC)        2. Generalized anxiety disorder with panic attacks            Goals addressed in session: Goal 1     DATA: Yamileth presented to this session feeling ok. We talked through various stressors related to her immediate environment and chronic pain. We discussed possible lifestyle and diet changes, which she was receptive to. Writer utilized motivation interviewing to help move Yamileth from contemplation to preparation. Her resistance lies in uncertainty that it will help, including possible surgical intervention to help her reduce body weight (breast reduction, weight loss surgery). She was receptive to feedback. Confirmed next appointment in 1 week with new day and time. No safety concerns.   During this session, this clinician used the following therapeutic modalities: Cognitive Behavioral Therapy and Cognitive Processing Therapy    Substance Abuse was not addressed during this session. If the client is diagnosed with a co-occurring substance use disorder, please indicate any changes in the frequency or amount of use: NA. Stage of change for addressing substance use diagnoses: No substance use/Not applicable    ASSESSMENT:  Yamileth Vale presents with a Euthymic/ normal mood.     her affect is Normal range and intensity, which is congruent, with her mood and the content of the session. The client has made progress on their goals.    During this session the client was oriented to person, place, and time. The client was engaged in the session. The client was able to sustain direct eye contact and was without psychomotor agitation. The client's thought processes were linear and clear.   Yamileth Vale presents with a none risk of suicide, none risk of self-harm, and none risk of harm to others.    For any risk assessment that  "surpasses a \"low\" rating, a safety plan must be developed.    A safety plan was indicated: no  If yes, describe in detail NA    PLAN: Between sessions, Yamileth Vale will take medication as prescribed and practice positive coping strategies as needed . At the next session, the therapist will use Cognitive Behavioral Therapy and Cognitive Processing Therapy to address stressors.    Behavioral Health Treatment Plan and Discharge Planning: Yamileth Vale is aware of and agrees to continue to work on their treatment plan. They have identified and are working toward their discharge goals. yes    Visit start and stop times:    06/28/24  Start Time: 0930  Stop Time: 1020  Total Visit Time: 50 minutes  "

## 2024-07-03 ENCOUNTER — APPOINTMENT (OUTPATIENT)
Dept: LAB | Facility: HOSPITAL | Age: 49
End: 2024-07-03
Payer: COMMERCIAL

## 2024-07-03 ENCOUNTER — TELEMEDICINE (OUTPATIENT)
Dept: BEHAVIORAL/MENTAL HEALTH CLINIC | Facility: CLINIC | Age: 49
End: 2024-07-03
Payer: COMMERCIAL

## 2024-07-03 DIAGNOSIS — F41.0 GENERALIZED ANXIETY DISORDER WITH PANIC ATTACKS: ICD-10-CM

## 2024-07-03 DIAGNOSIS — E83.51 HYPOCALCEMIA: ICD-10-CM

## 2024-07-03 DIAGNOSIS — F41.1 GENERALIZED ANXIETY DISORDER WITH PANIC ATTACKS: ICD-10-CM

## 2024-07-03 DIAGNOSIS — F33.1 MODERATE EPISODE OF RECURRENT MAJOR DEPRESSIVE DISORDER (HCC): Primary | ICD-10-CM

## 2024-07-03 LAB — CALCIUM SERPL-MCNC: 10.2 MG/DL (ref 8.4–10.2)

## 2024-07-03 PROCEDURE — 90837 PSYTX W PT 60 MINUTES: CPT | Performed by: SOCIAL WORKER

## 2024-07-03 PROCEDURE — 36415 COLL VENOUS BLD VENIPUNCTURE: CPT
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
## 2024-07-05 ENCOUNTER — TELEPHONE (OUTPATIENT)
Age: 49
End: 2024-07-05

## 2024-07-05 ENCOUNTER — PATIENT MESSAGE (OUTPATIENT)
Dept: ENDOCRINOLOGY | Facility: CLINIC | Age: 49
End: 2024-07-05

## 2024-07-05 ENCOUNTER — APPOINTMENT (OUTPATIENT)
Dept: LAB | Facility: HOSPITAL | Age: 49
End: 2024-07-05
Payer: COMMERCIAL

## 2024-07-05 DIAGNOSIS — E89.2 POST-SURGICAL HYPOPARATHYROIDISM (HCC): ICD-10-CM

## 2024-07-05 DIAGNOSIS — E83.51 HYPOCALCEMIA: Primary | ICD-10-CM

## 2024-07-05 DIAGNOSIS — E83.51 HYPOCALCEMIA: ICD-10-CM

## 2024-07-05 LAB — CALCIUM SERPL-MCNC: 10.3 MG/DL (ref 8.4–10.2)

## 2024-07-05 PROCEDURE — 36415 COLL VENOUS BLD VENIPUNCTURE: CPT

## 2024-07-05 NOTE — TELEPHONE ENCOUNTER
Patient calling because she was told that she would have a standing lab order for calcium. She gets it checked 2x a week, usually Monday and Friday. She said she only has 1 order left. She was told she would have the calcium listed as a standing order for 2x a week until the end of the year. Can we get this updated and notify the patient?

## 2024-07-06 DIAGNOSIS — E87.6 HYPOKALEMIA: ICD-10-CM

## 2024-07-07 RX ORDER — POTASSIUM CHLORIDE 1500 MG/1
20 TABLET, EXTENDED RELEASE ORAL 2 TIMES DAILY
Qty: 180 TABLET | Refills: 1 | Status: SHIPPED | OUTPATIENT
Start: 2024-07-07

## 2024-07-09 ENCOUNTER — APPOINTMENT (OUTPATIENT)
Dept: LAB | Facility: HOSPITAL | Age: 49
End: 2024-07-09
Payer: COMMERCIAL

## 2024-07-09 DIAGNOSIS — E83.51 HYPOCALCEMIA: ICD-10-CM

## 2024-07-09 LAB — CALCIUM SERPL-MCNC: 10 MG/DL (ref 8.4–10.2)

## 2024-07-09 PROCEDURE — 36415 COLL VENOUS BLD VENIPUNCTURE: CPT

## 2024-07-10 ENCOUNTER — TELEMEDICINE (OUTPATIENT)
Dept: BEHAVIORAL/MENTAL HEALTH CLINIC | Facility: CLINIC | Age: 49
End: 2024-07-10
Payer: COMMERCIAL

## 2024-07-10 DIAGNOSIS — F17.210 NICOTINE DEPENDENCE, CIGARETTES, UNCOMPLICATED: ICD-10-CM

## 2024-07-10 DIAGNOSIS — F33.1 MODERATE EPISODE OF RECURRENT MAJOR DEPRESSIVE DISORDER (HCC): Primary | ICD-10-CM

## 2024-07-10 DIAGNOSIS — F41.0 GENERALIZED ANXIETY DISORDER WITH PANIC ATTACKS: ICD-10-CM

## 2024-07-10 DIAGNOSIS — F41.1 GENERALIZED ANXIETY DISORDER WITH PANIC ATTACKS: ICD-10-CM

## 2024-07-10 PROCEDURE — 90837 PSYTX W PT 60 MINUTES: CPT | Performed by: SOCIAL WORKER
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
## 2024-07-15 ENCOUNTER — APPOINTMENT (OUTPATIENT)
Dept: LAB | Facility: HOSPITAL | Age: 49
End: 2024-07-15
Payer: COMMERCIAL

## 2024-07-15 DIAGNOSIS — E83.51 HYPOCALCEMIA: ICD-10-CM

## 2024-07-15 DIAGNOSIS — E89.2 POST-SURGICAL HYPOPARATHYROIDISM (HCC): ICD-10-CM

## 2024-07-15 LAB — CALCIUM SERPL-MCNC: 10.7 MG/DL (ref 8.4–10.2)

## 2024-07-15 PROCEDURE — 36415 COLL VENOUS BLD VENIPUNCTURE: CPT

## 2024-07-17 ENCOUNTER — TELEMEDICINE (OUTPATIENT)
Dept: BEHAVIORAL/MENTAL HEALTH CLINIC | Facility: CLINIC | Age: 49
End: 2024-07-17
Payer: COMMERCIAL

## 2024-07-17 DIAGNOSIS — F33.1 MODERATE EPISODE OF RECURRENT MAJOR DEPRESSIVE DISORDER (HCC): Primary | ICD-10-CM

## 2024-07-17 DIAGNOSIS — F41.0 GENERALIZED ANXIETY DISORDER WITH PANIC ATTACKS: ICD-10-CM

## 2024-07-17 DIAGNOSIS — F41.1 GENERALIZED ANXIETY DISORDER WITH PANIC ATTACKS: ICD-10-CM

## 2024-07-17 PROCEDURE — 90837 PSYTX W PT 60 MINUTES: CPT | Performed by: SOCIAL WORKER
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
## 2024-07-19 ENCOUNTER — LAB (OUTPATIENT)
Dept: LAB | Facility: HOSPITAL | Age: 49
End: 2024-07-19
Payer: COMMERCIAL

## 2024-07-19 DIAGNOSIS — E83.51 HYPOCALCEMIA: ICD-10-CM

## 2024-07-19 DIAGNOSIS — E89.2 POST-SURGICAL HYPOPARATHYROIDISM (HCC): ICD-10-CM

## 2024-07-19 LAB — CALCIUM SERPL-MCNC: 9.1 MG/DL (ref 8.4–10.2)

## 2024-07-19 PROCEDURE — 36415 COLL VENOUS BLD VENIPUNCTURE: CPT

## 2024-07-22 ENCOUNTER — APPOINTMENT (OUTPATIENT)
Dept: LAB | Facility: HOSPITAL | Age: 49
End: 2024-07-22
Payer: COMMERCIAL

## 2024-07-22 DIAGNOSIS — E89.2 POST-SURGICAL HYPOPARATHYROIDISM (HCC): ICD-10-CM

## 2024-07-22 DIAGNOSIS — E83.51 HYPOCALCEMIA: ICD-10-CM

## 2024-07-22 LAB — CALCIUM SERPL-MCNC: 10.5 MG/DL (ref 8.4–10.2)

## 2024-07-22 PROCEDURE — 36415 COLL VENOUS BLD VENIPUNCTURE: CPT

## 2024-07-23 DIAGNOSIS — F51.01 PRIMARY INSOMNIA: ICD-10-CM

## 2024-07-23 DIAGNOSIS — J45.20 MILD INTERMITTENT REACTIVE AIRWAY DISEASE WITHOUT COMPLICATION: ICD-10-CM

## 2024-07-23 DIAGNOSIS — F41.1 GAD (GENERALIZED ANXIETY DISORDER): ICD-10-CM

## 2024-07-23 DIAGNOSIS — F41.0 PANIC DISORDER: ICD-10-CM

## 2024-07-23 RX ORDER — TRAZODONE HYDROCHLORIDE 100 MG/1
100 TABLET ORAL
Qty: 90 TABLET | Refills: 0 | Status: SHIPPED | OUTPATIENT
Start: 2024-07-23

## 2024-07-23 RX ORDER — ALPRAZOLAM 2 MG/1
2 TABLET, EXTENDED RELEASE ORAL
Qty: 60 TABLET | Refills: 0 | Status: SHIPPED | OUTPATIENT
Start: 2024-07-23

## 2024-07-23 RX ORDER — DESVENLAFAXINE 100 MG/1
100 TABLET, EXTENDED RELEASE ORAL DAILY
Qty: 90 TABLET | Refills: 0 | Status: SHIPPED | OUTPATIENT
Start: 2024-07-23 | End: 2024-10-21

## 2024-07-23 NOTE — TELEPHONE ENCOUNTER
Medication Refill Request     Name of Medication Trazodone  Dose/Frequency 100mg  Quantity 90  Verified pharmacy   [x]  Verified ordering Provider   [x]  Does patient have enough for the next 3 days? Yes [x] No []  Does patient have a follow-up appointment scheduled? Yes [x] No []   If so when is appointment: 8/12/24    Medication Refill Request     Name of Medication Pristiq  Dose/Frequency 100mg  Quantity 90  Verified pharmacy   [x]  Verified ordering Provider   [x]  Does patient have enough for the next 3 days? Yes [x] No []    Medication Refill Request     Name of Medication Xanax  Dose/Frequency 2mg  Quantity 60  Verified pharmacy   [x]  Verified ordering Provider   [x]  Does patient have enough for the next 3 days? Yes [x] No []

## 2024-07-24 ENCOUNTER — TELEMEDICINE (OUTPATIENT)
Dept: BEHAVIORAL/MENTAL HEALTH CLINIC | Facility: CLINIC | Age: 49
End: 2024-07-24
Payer: COMMERCIAL

## 2024-07-24 DIAGNOSIS — F41.0 GENERALIZED ANXIETY DISORDER WITH PANIC ATTACKS: ICD-10-CM

## 2024-07-24 DIAGNOSIS — F17.210 NICOTINE DEPENDENCE, CIGARETTES, UNCOMPLICATED: ICD-10-CM

## 2024-07-24 DIAGNOSIS — F41.1 GENERALIZED ANXIETY DISORDER WITH PANIC ATTACKS: ICD-10-CM

## 2024-07-24 DIAGNOSIS — F33.1 MODERATE EPISODE OF RECURRENT MAJOR DEPRESSIVE DISORDER (HCC): Primary | ICD-10-CM

## 2024-07-24 PROCEDURE — 90837 PSYTX W PT 60 MINUTES: CPT | Performed by: SOCIAL WORKER

## 2024-07-24 NOTE — PSYCH
Behavioral Health Psychotherapy Progress Note    Psychotherapy Provided: Individual Psychotherapy     1. Moderate episode of recurrent major depressive disorder (HCC)        2. Generalized anxiety disorder with panic attacks        3. Nicotine dependence, cigarettes, uncomplicated            Goals addressed in session: Goal 1     DATA: Yamileth reports feeling well stating that she had a nice birthday. She spent the day tidying up her home and then went out to eat with her daughter, Nuzhat. She reported having a few bad pain days and has requested getting another injection from her pain management doctor. She reported trying to be as active as possible though. Discussed her invention and how this brings her confidence and hope for the future. Writer continues to encourage that she tap into her artistic capabilities as a way to improve upon self efficacy. No new symptoms or safety concerns. Return in 1 week or sooner if needed.   During this session, this clinician used the following therapeutic modalities: Cognitive Behavioral Therapy and Cognitive Processing Therapy    Substance Abuse was not addressed during this session. If the client is diagnosed with a co-occurring substance use disorder, please indicate any changes in the frequency or amount of use: NA. Stage of change for addressing substance use diagnoses: No substance use/Not applicable    ASSESSMENT:  Yamileth Vale presents with a Euthymic/ normal mood.     her affect is Normal range and intensity, which is congruent, with her mood and the content of the session. The client has made progress on their goals.    During this session the client was oriented to person, place, and time. The client was engaged in the session. The client was able to sustain direct eye contact and was without psychomotor agitation. The client's thought processes were linear and clear.   Yamileth Vale presents with a none risk of suicide, none risk of self-harm,  "and none risk of harm to others.    For any risk assessment that surpasses a \"low\" rating, a safety plan must be developed.    A safety plan was indicated: no  If yes, describe in detail NA    PLAN: Between sessions, Yamileth Vale will take medication as prescribed and practice positive coping strategies as needed . At the next session, the therapist will use Cognitive Behavioral Therapy and Cognitive Processing Therapy to address stressors.    Behavioral Health Treatment Plan and Discharge Planning: Yamileth Vale is aware of and agrees to continue to work on their treatment plan. They have identified and are working toward their discharge goals. yes    Visit start and stop times:    07/24/24  Start Time: 1200  Stop Time: 1300  Total Visit Time: 60 minutes  "

## 2024-07-25 ENCOUNTER — APPOINTMENT (OUTPATIENT)
Dept: LAB | Facility: HOSPITAL | Age: 49
End: 2024-07-25
Attending: INTERNAL MEDICINE
Payer: COMMERCIAL

## 2024-07-25 DIAGNOSIS — E89.2 POST-SURGICAL HYPOPARATHYROIDISM (HCC): ICD-10-CM

## 2024-07-25 DIAGNOSIS — E83.51 HYPOCALCEMIA: ICD-10-CM

## 2024-07-25 LAB — CALCIUM SERPL-MCNC: 10.1 MG/DL (ref 8.4–10.2)

## 2024-07-25 PROCEDURE — 36415 COLL VENOUS BLD VENIPUNCTURE: CPT

## 2024-07-30 ENCOUNTER — APPOINTMENT (OUTPATIENT)
Dept: LAB | Facility: HOSPITAL | Age: 49
End: 2024-07-30
Attending: INTERNAL MEDICINE
Payer: COMMERCIAL

## 2024-07-30 DIAGNOSIS — E83.51 HYPOCALCEMIA: ICD-10-CM

## 2024-07-30 DIAGNOSIS — E89.2 POST-SURGICAL HYPOPARATHYROIDISM (HCC): ICD-10-CM

## 2024-07-30 LAB — CALCIUM SERPL-MCNC: 10.2 MG/DL (ref 8.4–10.2)

## 2024-07-30 PROCEDURE — 36415 COLL VENOUS BLD VENIPUNCTURE: CPT

## 2024-07-31 ENCOUNTER — TELEMEDICINE (OUTPATIENT)
Dept: BEHAVIORAL/MENTAL HEALTH CLINIC | Facility: CLINIC | Age: 49
End: 2024-07-31
Payer: COMMERCIAL

## 2024-07-31 DIAGNOSIS — F17.210 NICOTINE DEPENDENCE, CIGARETTES, UNCOMPLICATED: ICD-10-CM

## 2024-07-31 DIAGNOSIS — F33.1 MODERATE EPISODE OF RECURRENT MAJOR DEPRESSIVE DISORDER (HCC): Primary | ICD-10-CM

## 2024-07-31 DIAGNOSIS — F41.0 GENERALIZED ANXIETY DISORDER WITH PANIC ATTACKS: ICD-10-CM

## 2024-07-31 DIAGNOSIS — F41.1 GENERALIZED ANXIETY DISORDER WITH PANIC ATTACKS: ICD-10-CM

## 2024-07-31 PROCEDURE — 90834 PSYTX W PT 45 MINUTES: CPT | Performed by: SOCIAL WORKER
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
## 2024-08-04 ENCOUNTER — APPOINTMENT (OUTPATIENT)
Dept: LAB | Facility: HOSPITAL | Age: 49
End: 2024-08-04
Attending: INTERNAL MEDICINE
Payer: COMMERCIAL

## 2024-08-04 DIAGNOSIS — N39.8 VOIDING DYSFUNCTION: ICD-10-CM

## 2024-08-04 DIAGNOSIS — D64.9 ANEMIA, UNSPECIFIED TYPE: ICD-10-CM

## 2024-08-04 DIAGNOSIS — E55.9 VITAMIN D DEFICIENCY: ICD-10-CM

## 2024-08-04 DIAGNOSIS — E83.52 HYPERCALCEMIA: ICD-10-CM

## 2024-08-04 DIAGNOSIS — E83.51 HYPOCALCEMIA: ICD-10-CM

## 2024-08-04 DIAGNOSIS — F17.200 SMOKING: ICD-10-CM

## 2024-08-04 DIAGNOSIS — K76.0 FATTY LIVER DISEASE, NONALCOHOLIC: ICD-10-CM

## 2024-08-04 DIAGNOSIS — E89.2 POST-SURGICAL HYPOPARATHYROIDISM (HCC): ICD-10-CM

## 2024-08-04 DIAGNOSIS — E78.1 HYPERTRIGLYCERIDEMIA: ICD-10-CM

## 2024-08-04 LAB
ALBUMIN SERPL BCG-MCNC: 4.4 G/DL (ref 3.5–5)
ANION GAP SERPL CALCULATED.3IONS-SCNC: 15 MMOL/L (ref 4–13)
BUN SERPL-MCNC: 17 MG/DL (ref 5–25)
CALCIUM SERPL-MCNC: 9 MG/DL (ref 8.4–10.2)
CALCIUM SERPL-MCNC: 9.1 MG/DL (ref 8.4–10.2)
CHLORIDE SERPL-SCNC: 99 MMOL/L (ref 96–108)
CO2 SERPL-SCNC: 19 MMOL/L (ref 21–32)
CREAT SERPL-MCNC: 1.21 MG/DL (ref 0.6–1.3)
ERYTHROCYTE [DISTWIDTH] IN BLOOD BY AUTOMATED COUNT: 16.7 % (ref 11.6–15.1)
GFR SERPL CREATININE-BSD FRML MDRD: 53 ML/MIN/1.73SQ M
GLUCOSE P FAST SERPL-MCNC: 130 MG/DL (ref 65–99)
HCT VFR BLD AUTO: 43.1 % (ref 34.8–46.1)
HGB BLD-MCNC: 13.8 G/DL (ref 11.5–15.4)
MAGNESIUM SERPL-MCNC: 2 MG/DL (ref 1.9–2.7)
MCH RBC QN AUTO: 29.7 PG (ref 26.8–34.3)
MCHC RBC AUTO-ENTMCNC: 32 G/DL (ref 31.4–37.4)
MCV RBC AUTO: 93 FL (ref 82–98)
PHOSPHATE SERPL-MCNC: 4 MG/DL (ref 2.7–4.5)
PLATELET # BLD AUTO: 213 THOUSANDS/UL (ref 149–390)
PMV BLD AUTO: 10.6 FL (ref 8.9–12.7)
POTASSIUM SERPL-SCNC: 4.4 MMOL/L (ref 3.5–5.3)
PTH-INTACT SERPL-MCNC: <1 PG/ML (ref 12–88)
RBC # BLD AUTO: 4.65 MILLION/UL (ref 3.81–5.12)
SODIUM SERPL-SCNC: 133 MMOL/L (ref 135–147)
WBC # BLD AUTO: 6.2 THOUSAND/UL (ref 4.31–10.16)

## 2024-08-04 PROCEDURE — 80069 RENAL FUNCTION PANEL: CPT

## 2024-08-04 PROCEDURE — 83970 ASSAY OF PARATHORMONE: CPT

## 2024-08-04 PROCEDURE — 85027 COMPLETE CBC AUTOMATED: CPT

## 2024-08-04 PROCEDURE — 36415 COLL VENOUS BLD VENIPUNCTURE: CPT

## 2024-08-04 PROCEDURE — 83735 ASSAY OF MAGNESIUM: CPT

## 2024-08-05 ENCOUNTER — APPOINTMENT (OUTPATIENT)
Dept: LAB | Facility: HOSPITAL | Age: 49
End: 2024-08-05
Attending: INTERNAL MEDICINE
Payer: COMMERCIAL

## 2024-08-05 DIAGNOSIS — E83.51 HYPOCALCEMIA: ICD-10-CM

## 2024-08-05 DIAGNOSIS — E89.2 POST-SURGICAL HYPOPARATHYROIDISM (HCC): ICD-10-CM

## 2024-08-05 LAB — CALCIUM SERPL-MCNC: 11.3 MG/DL (ref 8.4–10.2)

## 2024-08-05 PROCEDURE — 36415 COLL VENOUS BLD VENIPUNCTURE: CPT

## 2024-08-07 ENCOUNTER — APPOINTMENT (OUTPATIENT)
Dept: LAB | Facility: HOSPITAL | Age: 49
End: 2024-08-07
Attending: INTERNAL MEDICINE
Payer: COMMERCIAL

## 2024-08-07 ENCOUNTER — TELEMEDICINE (OUTPATIENT)
Dept: BEHAVIORAL/MENTAL HEALTH CLINIC | Facility: CLINIC | Age: 49
End: 2024-08-07
Payer: COMMERCIAL

## 2024-08-07 DIAGNOSIS — F33.1 MODERATE EPISODE OF RECURRENT MAJOR DEPRESSIVE DISORDER (HCC): Primary | ICD-10-CM

## 2024-08-07 DIAGNOSIS — F41.0 GENERALIZED ANXIETY DISORDER WITH PANIC ATTACKS: ICD-10-CM

## 2024-08-07 DIAGNOSIS — F41.1 GENERALIZED ANXIETY DISORDER WITH PANIC ATTACKS: ICD-10-CM

## 2024-08-07 DIAGNOSIS — F17.210 NICOTINE DEPENDENCE, CIGARETTES, UNCOMPLICATED: ICD-10-CM

## 2024-08-07 DIAGNOSIS — E83.51 HYPOCALCEMIA: ICD-10-CM

## 2024-08-07 DIAGNOSIS — E89.2 POST-SURGICAL HYPOPARATHYROIDISM (HCC): ICD-10-CM

## 2024-08-07 LAB — CALCIUM SERPL-MCNC: 9.8 MG/DL (ref 8.4–10.2)

## 2024-08-07 PROCEDURE — 90834 PSYTX W PT 45 MINUTES: CPT | Performed by: SOCIAL WORKER

## 2024-08-07 PROCEDURE — 36415 COLL VENOUS BLD VENIPUNCTURE: CPT
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
## 2024-08-09 ENCOUNTER — APPOINTMENT (OUTPATIENT)
Dept: LAB | Facility: HOSPITAL | Age: 49
End: 2024-08-09
Attending: INTERNAL MEDICINE
Payer: COMMERCIAL

## 2024-08-09 DIAGNOSIS — E83.51 HYPOCALCEMIA: ICD-10-CM

## 2024-08-09 DIAGNOSIS — E89.2 POST-SURGICAL HYPOPARATHYROIDISM (HCC): ICD-10-CM

## 2024-08-09 LAB — CALCIUM SERPL-MCNC: 10 MG/DL (ref 8.4–10.2)

## 2024-08-09 PROCEDURE — 36415 COLL VENOUS BLD VENIPUNCTURE: CPT

## 2024-08-12 ENCOUNTER — TELEMEDICINE (OUTPATIENT)
Dept: PSYCHIATRY | Facility: CLINIC | Age: 49
End: 2024-08-12
Payer: COMMERCIAL

## 2024-08-12 ENCOUNTER — LAB (OUTPATIENT)
Dept: LAB | Facility: HOSPITAL | Age: 49
End: 2024-08-12
Attending: INTERNAL MEDICINE
Payer: COMMERCIAL

## 2024-08-12 DIAGNOSIS — F51.01 PRIMARY INSOMNIA: ICD-10-CM

## 2024-08-12 DIAGNOSIS — F41.0 PANIC DISORDER: ICD-10-CM

## 2024-08-12 DIAGNOSIS — E83.51 HYPOCALCEMIA: ICD-10-CM

## 2024-08-12 DIAGNOSIS — F41.1 GAD (GENERALIZED ANXIETY DISORDER): Primary | ICD-10-CM

## 2024-08-12 DIAGNOSIS — E78.1 HYPERTRIGLYCERIDEMIA: ICD-10-CM

## 2024-08-12 DIAGNOSIS — E89.2 POST-SURGICAL HYPOPARATHYROIDISM (HCC): ICD-10-CM

## 2024-08-12 LAB — CALCIUM SERPL-MCNC: 10.6 MG/DL (ref 8.4–10.2)

## 2024-08-12 PROCEDURE — 99214 OFFICE O/P EST MOD 30 MIN: CPT | Performed by: PHYSICIAN ASSISTANT

## 2024-08-12 PROCEDURE — 36415 COLL VENOUS BLD VENIPUNCTURE: CPT

## 2024-08-12 RX ORDER — FENOFIBRATE 160 MG/1
160 TABLET ORAL DAILY
Qty: 90 TABLET | Refills: 1 | Status: SHIPPED | OUTPATIENT
Start: 2024-08-12
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
## 2024-08-13 ENCOUNTER — TELEMEDICINE (OUTPATIENT)
Dept: BEHAVIORAL/MENTAL HEALTH CLINIC | Facility: CLINIC | Age: 49
End: 2024-08-13
Payer: COMMERCIAL

## 2024-08-13 DIAGNOSIS — F41.0 GENERALIZED ANXIETY DISORDER WITH PANIC ATTACKS: ICD-10-CM

## 2024-08-13 DIAGNOSIS — F17.210 NICOTINE DEPENDENCE, CIGARETTES, UNCOMPLICATED: ICD-10-CM

## 2024-08-13 DIAGNOSIS — F41.1 GENERALIZED ANXIETY DISORDER WITH PANIC ATTACKS: ICD-10-CM

## 2024-08-13 DIAGNOSIS — F33.1 MODERATE EPISODE OF RECURRENT MAJOR DEPRESSIVE DISORDER (HCC): Primary | ICD-10-CM

## 2024-08-13 PROCEDURE — 90834 PSYTX W PT 45 MINUTES: CPT | Performed by: SOCIAL WORKER

## 2024-08-13 NOTE — RESULT ENCOUNTER NOTE
Hi  I am covering for Cm Rocha  Your calcium is with in normal range   If you have any questions please reach out to Dr. Pabon once she is back     Lamonte Mayberry
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
## 2024-08-15 PROBLEM — Z86.010 HISTORY OF COLON POLYPS: Status: ACTIVE | Noted: 2024-08-15

## 2024-08-15 PROBLEM — Z86.0100 HISTORY OF COLON POLYPS: Status: ACTIVE | Noted: 2024-08-15

## 2024-08-15 PROBLEM — Z86.0101 HISTORY OF ADENOMATOUS POLYP OF COLON: Status: ACTIVE | Noted: 2024-08-15

## 2024-08-16 ENCOUNTER — APPOINTMENT (OUTPATIENT)
Dept: LAB | Facility: HOSPITAL | Age: 49
End: 2024-08-16
Attending: INTERNAL MEDICINE
Payer: COMMERCIAL

## 2024-08-16 DIAGNOSIS — E83.51 HYPOCALCEMIA: ICD-10-CM

## 2024-08-16 DIAGNOSIS — E89.2 POST-SURGICAL HYPOPARATHYROIDISM (HCC): ICD-10-CM

## 2024-08-16 LAB — CALCIUM SERPL-MCNC: 10.3 MG/DL (ref 8.4–10.2)

## 2024-08-16 PROCEDURE — 36415 COLL VENOUS BLD VENIPUNCTURE: CPT

## 2024-08-19 ENCOUNTER — APPOINTMENT (OUTPATIENT)
Dept: LAB | Facility: HOSPITAL | Age: 49
End: 2024-08-19
Attending: INTERNAL MEDICINE
Payer: COMMERCIAL

## 2024-08-19 DIAGNOSIS — E83.51 HYPOCALCEMIA: ICD-10-CM

## 2024-08-19 DIAGNOSIS — E89.2 POST-SURGICAL HYPOPARATHYROIDISM (HCC): ICD-10-CM

## 2024-08-19 LAB — CALCIUM SERPL-MCNC: 9.7 MG/DL (ref 8.4–10.2)

## 2024-08-19 PROCEDURE — 36415 COLL VENOUS BLD VENIPUNCTURE: CPT

## 2024-08-23 ENCOUNTER — APPOINTMENT (OUTPATIENT)
Dept: LAB | Facility: HOSPITAL | Age: 49
End: 2024-08-23
Attending: INTERNAL MEDICINE
Payer: COMMERCIAL

## 2024-08-23 DIAGNOSIS — E83.51 HYPOCALCEMIA: ICD-10-CM

## 2024-08-23 DIAGNOSIS — E89.2 POST-SURGICAL HYPOPARATHYROIDISM (HCC): ICD-10-CM

## 2024-08-23 LAB — CALCIUM SERPL-MCNC: 10.1 MG/DL (ref 8.4–10.2)

## 2024-08-23 PROCEDURE — 36415 COLL VENOUS BLD VENIPUNCTURE: CPT

## 2024-08-26 ENCOUNTER — APPOINTMENT (OUTPATIENT)
Dept: LAB | Facility: HOSPITAL | Age: 49
End: 2024-08-26
Attending: INTERNAL MEDICINE
Payer: COMMERCIAL

## 2024-08-26 DIAGNOSIS — E83.51 HYPOCALCEMIA: ICD-10-CM

## 2024-08-26 DIAGNOSIS — E89.2 POST-SURGICAL HYPOPARATHYROIDISM (HCC): ICD-10-CM

## 2024-08-26 LAB — CALCIUM SERPL-MCNC: 11 MG/DL (ref 8.4–10.2)

## 2024-08-26 PROCEDURE — 36415 COLL VENOUS BLD VENIPUNCTURE: CPT

## 2024-08-28 ENCOUNTER — TELEMEDICINE (OUTPATIENT)
Dept: BEHAVIORAL/MENTAL HEALTH CLINIC | Facility: CLINIC | Age: 49
End: 2024-08-28
Payer: COMMERCIAL

## 2024-08-28 DIAGNOSIS — F17.210 NICOTINE DEPENDENCE, CIGARETTES, UNCOMPLICATED: ICD-10-CM

## 2024-08-28 DIAGNOSIS — F33.1 MODERATE EPISODE OF RECURRENT MAJOR DEPRESSIVE DISORDER (HCC): Primary | ICD-10-CM

## 2024-08-28 DIAGNOSIS — F41.1 GENERALIZED ANXIETY DISORDER WITH PANIC ATTACKS: ICD-10-CM

## 2024-08-28 DIAGNOSIS — F41.0 GENERALIZED ANXIETY DISORDER WITH PANIC ATTACKS: ICD-10-CM

## 2024-08-28 PROCEDURE — 90834 PSYTX W PT 45 MINUTES: CPT | Performed by: SOCIAL WORKER

## 2024-08-29 ENCOUNTER — APPOINTMENT (OUTPATIENT)
Dept: LAB | Facility: HOSPITAL | Age: 49
End: 2024-08-29
Attending: INTERNAL MEDICINE
Payer: COMMERCIAL

## 2024-08-29 DIAGNOSIS — E89.2 POST-SURGICAL HYPOPARATHYROIDISM (HCC): ICD-10-CM

## 2024-08-29 DIAGNOSIS — E83.51 HYPOCALCEMIA: Primary | ICD-10-CM

## 2024-08-29 DIAGNOSIS — E83.51 HYPOCALCEMIA: ICD-10-CM

## 2024-08-29 LAB — CALCIUM SERPL-MCNC: 8.7 MG/DL (ref 8.4–10.2)

## 2024-08-29 PROCEDURE — 36415 COLL VENOUS BLD VENIPUNCTURE: CPT

## 2024-08-29 NOTE — TELEPHONE ENCOUNTER
Patient stated that she would like the doctor to put in more lab work for her calcium. She stated that Dr. Pabon wanted her to keep getting labs done until the end of the year and she only has one more left. Thank you    Please schedule the patient for a sooner appointment with Dr. Pabon.  Thank you

## 2024-08-29 NOTE — PATIENT COMMUNICATION
She is on wait list.  Her appointment was effected by reschedules and there still is no availability.
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
## 2024-08-29 NOTE — PATIENT COMMUNICATION
Patient called in stating that she needs more calcium orders to be placed. She explained that she goes normally twice a week and sometimes 3. There is only one order left and she needs to go today to have her labs done for an appointment tomorrow.

## 2024-08-31 ENCOUNTER — APPOINTMENT (OUTPATIENT)
Dept: LAB | Facility: HOSPITAL | Age: 49
End: 2024-08-31
Payer: COMMERCIAL

## 2024-08-31 DIAGNOSIS — E89.2 POST-SURGICAL HYPOPARATHYROIDISM (HCC): ICD-10-CM

## 2024-08-31 DIAGNOSIS — E83.51 HYPOCALCEMIA: ICD-10-CM

## 2024-08-31 LAB
ALBUMIN SERPL BCG-MCNC: 4.3 G/DL (ref 3.5–5)
ANION GAP SERPL CALCULATED.3IONS-SCNC: 11 MMOL/L (ref 4–13)
BUN SERPL-MCNC: 26 MG/DL (ref 5–25)
CALCIUM SERPL-MCNC: 11.1 MG/DL (ref 8.4–10.2)
CHLORIDE SERPL-SCNC: 101 MMOL/L (ref 96–108)
CO2 SERPL-SCNC: 24 MMOL/L (ref 21–32)
CREAT SERPL-MCNC: 1.25 MG/DL (ref 0.6–1.3)
ERYTHROCYTE [DISTWIDTH] IN BLOOD BY AUTOMATED COUNT: 16.2 % (ref 11.6–15.1)
GFR SERPL CREATININE-BSD FRML MDRD: 50 ML/MIN/1.73SQ M
GLUCOSE P FAST SERPL-MCNC: 71 MG/DL (ref 65–99)
HCT VFR BLD AUTO: 40.1 % (ref 34.8–46.1)
HGB BLD-MCNC: 12.9 G/DL (ref 11.5–15.4)
MCH RBC QN AUTO: 29.5 PG (ref 26.8–34.3)
MCHC RBC AUTO-ENTMCNC: 32.2 G/DL (ref 31.4–37.4)
MCV RBC AUTO: 92 FL (ref 82–98)
PHOSPHATE SERPL-MCNC: 4.5 MG/DL (ref 2.7–4.5)
PLATELET # BLD AUTO: 177 THOUSANDS/UL (ref 149–390)
PMV BLD AUTO: 11.6 FL (ref 8.9–12.7)
POTASSIUM SERPL-SCNC: 4.3 MMOL/L (ref 3.5–5.3)
PTH-INTACT SERPL-MCNC: <1 PG/ML (ref 12–88)
RBC # BLD AUTO: 4.38 MILLION/UL (ref 3.81–5.12)
SODIUM SERPL-SCNC: 136 MMOL/L (ref 135–147)
WBC # BLD AUTO: 5.55 THOUSAND/UL (ref 4.31–10.16)

## 2024-08-31 PROCEDURE — 80069 RENAL FUNCTION PANEL: CPT

## 2024-08-31 PROCEDURE — 85027 COMPLETE CBC AUTOMATED: CPT

## 2024-08-31 PROCEDURE — 83970 ASSAY OF PARATHORMONE: CPT

## 2024-09-04 ENCOUNTER — TELEMEDICINE (OUTPATIENT)
Dept: BEHAVIORAL/MENTAL HEALTH CLINIC | Facility: CLINIC | Age: 49
End: 2024-09-04
Payer: COMMERCIAL

## 2024-09-04 DIAGNOSIS — F33.1 MODERATE EPISODE OF RECURRENT MAJOR DEPRESSIVE DISORDER (HCC): Primary | ICD-10-CM

## 2024-09-04 PROCEDURE — 90834 PSYTX W PT 45 MINUTES: CPT | Performed by: SOCIAL WORKER
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
## 2024-09-06 ENCOUNTER — APPOINTMENT (OUTPATIENT)
Dept: LAB | Facility: HOSPITAL | Age: 49
End: 2024-09-06
Attending: INTERNAL MEDICINE
Payer: COMMERCIAL

## 2024-09-06 DIAGNOSIS — E89.2 POST-SURGICAL HYPOPARATHYROIDISM (HCC): ICD-10-CM

## 2024-09-06 DIAGNOSIS — E83.51 HYPOCALCEMIA: ICD-10-CM

## 2024-09-06 LAB — CALCIUM SERPL-MCNC: 10.4 MG/DL (ref 8.4–10.2)

## 2024-09-06 PROCEDURE — 36415 COLL VENOUS BLD VENIPUNCTURE: CPT

## 2024-09-10 ENCOUNTER — APPOINTMENT (OUTPATIENT)
Dept: LAB | Facility: HOSPITAL | Age: 49
End: 2024-09-10
Attending: INTERNAL MEDICINE
Payer: COMMERCIAL

## 2024-09-10 DIAGNOSIS — E89.2 POST-SURGICAL HYPOPARATHYROIDISM (HCC): ICD-10-CM

## 2024-09-10 DIAGNOSIS — E83.51 HYPOCALCEMIA: ICD-10-CM

## 2024-09-10 LAB — CALCIUM SERPL-MCNC: 10.7 MG/DL (ref 8.4–10.2)

## 2024-09-10 PROCEDURE — 36415 COLL VENOUS BLD VENIPUNCTURE: CPT

## 2024-09-11 ENCOUNTER — TELEMEDICINE (OUTPATIENT)
Dept: BEHAVIORAL/MENTAL HEALTH CLINIC | Facility: CLINIC | Age: 49
End: 2024-09-11
Payer: COMMERCIAL

## 2024-09-11 DIAGNOSIS — F33.1 MODERATE EPISODE OF RECURRENT MAJOR DEPRESSIVE DISORDER (HCC): Primary | ICD-10-CM

## 2024-09-11 PROCEDURE — 90834 PSYTX W PT 45 MINUTES: CPT | Performed by: SOCIAL WORKER

## 2024-09-11 NOTE — PSYCH
Behavioral Health Psychotherapy Progress Note    Psychotherapy Provided: Individual Psychotherapy     1. Moderate episode of recurrent major depressive disorder (HCC)            Goals addressed in session: Goal 1     DATA: Michelle reports feeling well stating that she has been mindful of her stress and how ti relates to Trudy. She is talking to Erick and has offered to help him as needed financially. We talked about the short and long term consequences of this and ways for her to monitor herself as time goes on. Has a planned medical procedure tomorrow and is hoping that it goes well. Is preparing today by making food for her family. Reported stable and optimistic mood for most of the week and less anxiety. Credits our work together and expresses appreciation towards this office. Reminded Michelle of her role and what she is capable of changing. No new symptoms or safety concerns. Return in 1 week.   During this session, this clinician used the following therapeutic modalities: Client-centered Therapy    Substance Abuse was not addressed during this session. If the client is diagnosed with a co-occurring substance use disorder, please indicate any changes in the frequency or amount of use: NA. Stage of change for addressing substance use diagnoses: No substance use/Not applicable    ASSESSMENT:  Yamileth Vale presents with a Euthymic/ normal mood.     her affect is Normal range and intensity, which is congruent, with her mood and the content of the session. The client has made progress on their goals.    During this session the client was oriented to person, place, and time. The client was engaged in the session. The client was able to sustain direct eye contact and was without psychomotor agitation. The client's thought processes were linear and clear.   Yamileth Vale presents with a none risk of suicide, none risk of self-harm, and none risk of harm to others.    For any risk assessment that surpasses  "a \"low\" rating, a safety plan must be developed.    A safety plan was indicated: no  If yes, describe in detail NA    PLAN: Between sessions, Yamileth Vale will take medication as prescribed and practice positive coping strategies as needed . At the next session, the therapist will use Client-centered Therapy to address stressors.    Behavioral Health Treatment Plan and Discharge Planning: Yamileth Vale is aware of and agrees to continue to work on their treatment plan. They have identified and are working toward their discharge goals. yes    Visit start and stop times:    09/11/24  Start Time: 1000  Stop Time: 1050  Total Visit Time: 50 minutes  "

## 2024-09-15 ENCOUNTER — APPOINTMENT (OUTPATIENT)
Dept: LAB | Facility: HOSPITAL | Age: 49
End: 2024-09-15
Attending: INTERNAL MEDICINE
Payer: COMMERCIAL

## 2024-09-15 ENCOUNTER — DOCUMENTATION (OUTPATIENT)
Dept: CASE MANAGEMENT | Facility: OTHER | Age: 49
End: 2024-09-15

## 2024-09-15 DIAGNOSIS — E83.51 HYPOCALCEMIA: ICD-10-CM

## 2024-09-15 DIAGNOSIS — E89.2 POST-SURGICAL HYPOPARATHYROIDISM (HCC): ICD-10-CM

## 2024-09-15 LAB — CALCIUM SERPL-MCNC: 8.9 MG/DL (ref 8.4–10.2)

## 2024-09-15 PROCEDURE — 36415 COLL VENOUS BLD VENIPUNCTURE: CPT

## 2024-09-15 NOTE — MEDICAL HIGH UTILIZER
High Utilizer Care Plan for: Yamileth Vale  Updated 2024  Patient Name: Yamileth Vale MRN: [id number]       : 1975    Age: 49   Sex: F     Utilization Background: In the 6 months prior to the care plan being written the patient has had 18 ED visits secondary to calcium related complaints. At this time, she has not had concerning radiation exposures. There were 5 occurrences in  where the patient received IV calcium gluconate for normal calcium/ionized calcium levels.     In the last 90 Days: 0 encounters     Most Recent Work Up:  Noncontributory to utilization at this time. Treatment Recommendations:    ED Recommendations: Patient will typically present secondary to numbness or tingling that she will attribute to her calcium levels being off. There has been times where she will self-adjust medications and take large doses of exogenous calcium.     Recommend checking serum calcium and ionized calcium levels. Would only offer IV calcium gluconate if calcium levels are 7.5.     If the patient has hypocalcemic symptoms and her levels are > 7.5 recommend oral calcium intake. This does not need to be ordered in the ED.     Since patient at times over adjusts her medications which results in high serum calcium levels and potentially LELAND, low threshold to supply IV fluids if there is a rise in creatinine or hypercalcemia present.     At every visit patient should be encouraged to reach out to her Endocrinology team regarding her calcium levels and appropriate course of action prior to presenting to the ED.      Inpatient Recommendations: Patient is low risk for readmission at this time. Please see above recommendations regarding calcium supplementation.       Outpatient recommendations: Patient should maintain close follow up with Endocrinology and be encouraged to call Endocrinology office with symptoms or questions regarding labs and medication adjustments. Consideration for outpatient  infusions of calcium if needed.       Situation: Patient is a 48 year old female with history of hypoparathyroidism who presents frequently with symptoms perceived to be from hypocalcemia. She has been hypercalcemic at times due to self adjusting medications and has received doses of IV calcium gluconate with normal calcium and ionized calcium levels.      PMH/PSH: Hypoparathyroidism, Prediabetes, CKD 3, Obesity       Assessment                              Drivers of repeated utilization:                 Numbness/tingling thought to be due to low calcium levels     Community Resources in place:    N/A    Patient Care Team:   Matthew Ramos MD - PCP ([facility])  ELIF Roblero, Yisel Pabon MD - Endocrinology ([facility])  Husam Jeong MD - Nephrology ([facility])  Danie Morrell - NETTE RN Care Manager            Care plan date and owner: Rhett Salas Jr., PA-C 2/26/2024       Reviewed with patient before discharge?   3/20/2024

## 2024-09-18 ENCOUNTER — TELEMEDICINE (OUTPATIENT)
Dept: BEHAVIORAL/MENTAL HEALTH CLINIC | Facility: CLINIC | Age: 49
End: 2024-09-18
Payer: COMMERCIAL

## 2024-09-18 ENCOUNTER — APPOINTMENT (OUTPATIENT)
Dept: LAB | Facility: HOSPITAL | Age: 49
End: 2024-09-18
Attending: INTERNAL MEDICINE
Payer: COMMERCIAL

## 2024-09-18 DIAGNOSIS — E89.2 POST-SURGICAL HYPOPARATHYROIDISM (HCC): ICD-10-CM

## 2024-09-18 DIAGNOSIS — F33.1 MODERATE EPISODE OF RECURRENT MAJOR DEPRESSIVE DISORDER (HCC): Primary | ICD-10-CM

## 2024-09-18 DIAGNOSIS — E83.51 HYPOCALCEMIA: ICD-10-CM

## 2024-09-18 LAB — CALCIUM SERPL-MCNC: 11 MG/DL (ref 8.4–10.2)

## 2024-09-18 PROCEDURE — 90834 PSYTX W PT 45 MINUTES: CPT | Performed by: SOCIAL WORKER

## 2024-09-18 PROCEDURE — 36415 COLL VENOUS BLD VENIPUNCTURE: CPT

## 2024-09-20 NOTE — PSYCH
Behavioral Health Psychotherapy Progress Note    Psychotherapy Provided: Individual Psychotherapy     1. Moderate episode of recurrent major depressive disorder (HCC)            Goals addressed in session: Goal 1     DATA: Yamileth reports feeling ok overall but did share feeling at times annoyed with her sister who continues to post false information about her online. Acknowledged ways in which this anger builds within her and how she ultimately wishes to retain a  who can joon Trudy for defamation of character. Writer assisted Yamileth in taking a pause, acknowledging where she is feeling the anger in her body, and coaching her through the physiological sigh to help release the tension. Writer then assisted in examining thought process which supports spending time connected to Trudy/her family, and acting out of defense for herself vs. Letting Trudy/family go and leaning into her own life, interests, and wellbeing. She was receptive to this approach. No new symptoms or safety concerns. Return in 1 week, and at which time we will review her treatment plan.   During this session, this clinician used the following therapeutic modalities: Cognitive Behavioral Therapy, Cognitive Processing Therapy, and Motivational Interviewing    Substance Abuse was not addressed during this session. If the client is diagnosed with a co-occurring substance use disorder, please indicate any changes in the frequency or amount of use: NA. Stage of change for addressing substance use diagnoses: No substance use/Not applicable    ASSESSMENT:  Yamileth Vale presents with a Euthymic/ normal mood.     her affect is Normal range and intensity, which is congruent, with her mood and the content of the session. The client has made progress on their goals.    During this session the client was oriented to person, place, and time. The client was engaged in the session. The client was able to sustain direct eye contact and was without  "psychomotor agitation. The client's thought processes were linear and clear.   Yamileth Vale presents with a none risk of suicide, none risk of self-harm, and none risk of harm to others.    For any risk assessment that surpasses a \"low\" rating, a safety plan must be developed.    A safety plan was indicated: no  If yes, describe in detail NA    PLAN: Between sessions, Yamileth Vale will take medication as prescribed and practice positive coping strategies as needed . At the next session, the therapist will use Cognitive Behavioral Therapy and Cognitive Processing Therapy to address stress management.    Behavioral Health Treatment Plan and Discharge Planning: Yamileth Vale is aware of and agrees to continue to work on their treatment plan. They have identified and are working toward their discharge goals. yes    Visit start and stop times:    09/20/24  Start Time: 1000  Stop Time: 1050  Total Visit Time: 50 minutes  "

## 2024-09-23 ENCOUNTER — TELEMEDICINE (OUTPATIENT)
Dept: PSYCHIATRY | Facility: CLINIC | Age: 49
End: 2024-09-23
Payer: COMMERCIAL

## 2024-09-23 ENCOUNTER — APPOINTMENT (OUTPATIENT)
Dept: LAB | Facility: HOSPITAL | Age: 49
End: 2024-09-23
Attending: INTERNAL MEDICINE
Payer: COMMERCIAL

## 2024-09-23 DIAGNOSIS — F33.1 MDD (MAJOR DEPRESSIVE DISORDER), RECURRENT EPISODE, MODERATE (HCC): Primary | ICD-10-CM

## 2024-09-23 DIAGNOSIS — F41.1 GAD (GENERALIZED ANXIETY DISORDER): ICD-10-CM

## 2024-09-23 DIAGNOSIS — E89.2 POST-SURGICAL HYPOPARATHYROIDISM (HCC): ICD-10-CM

## 2024-09-23 DIAGNOSIS — F41.0 PANIC DISORDER: ICD-10-CM

## 2024-09-23 DIAGNOSIS — G47.09 OTHER INSOMNIA: ICD-10-CM

## 2024-09-23 DIAGNOSIS — E83.51 HYPOCALCEMIA: ICD-10-CM

## 2024-09-23 LAB — CALCIUM SERPL-MCNC: 13 MG/DL (ref 8.4–10.2)

## 2024-09-23 PROCEDURE — 36415 COLL VENOUS BLD VENIPUNCTURE: CPT

## 2024-09-23 PROCEDURE — 99214 OFFICE O/P EST MOD 30 MIN: CPT | Performed by: PHYSICIAN ASSISTANT

## 2024-09-23 RX ORDER — ALPRAZOLAM 2 MG/1
2 TABLET, EXTENDED RELEASE ORAL
Qty: 60 TABLET | Refills: 0 | Status: SHIPPED | OUTPATIENT
Start: 2024-09-23

## 2024-09-23 RX ORDER — DESVENLAFAXINE 100 MG/1
100 TABLET, EXTENDED RELEASE ORAL DAILY
Qty: 90 TABLET | Refills: 1 | Status: SHIPPED | OUTPATIENT
Start: 2024-09-23 | End: 2025-03-22

## 2024-09-23 RX ORDER — TRAZODONE HYDROCHLORIDE 100 MG/1
100 TABLET ORAL
Qty: 90 TABLET | Refills: 1 | Status: SHIPPED | OUTPATIENT
Start: 2024-09-23 | End: 2025-03-22
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
## 2024-09-24 DIAGNOSIS — E83.51 HYPOCALCEMIA: ICD-10-CM

## 2024-09-24 RX ORDER — LEVOTHYROXINE SODIUM 150 UG/1
150 TABLET ORAL
Qty: 30 TABLET | Refills: 0 | Status: SHIPPED | OUTPATIENT
Start: 2024-09-24

## 2024-09-24 NOTE — TELEPHONE ENCOUNTER
Reason for call:   [x] Refill   [] Prior Auth  [x] Other: PATIENT IS COMPLETELY OUT OF MEDICATION    Office:   [] PCP/Provider -   [x] Specialty/Provider - Patricia/ MD mC    Medication: levothyroxine 150 mcg tablet     Dose/Frequency: Take 1 tablet (150 mcg total) by mouth daily at bedtime     Quantity: 90    Pharmacy: [facility]acy #[id number] [address] [phone]    Does the patient have enough for 3 days?   [] Yes   [x] No - Send as HP to POD

## 2024-09-25 ENCOUNTER — TELEPHONE (OUTPATIENT)
Dept: ENDOCRINOLOGY | Facility: CLINIC | Age: 49
End: 2024-09-25

## 2024-09-25 ENCOUNTER — TELEMEDICINE (OUTPATIENT)
Dept: BEHAVIORAL/MENTAL HEALTH CLINIC | Facility: CLINIC | Age: 49
End: 2024-09-25
Payer: COMMERCIAL

## 2024-09-25 DIAGNOSIS — E83.51 HYPOCALCEMIA: Primary | ICD-10-CM

## 2024-09-25 DIAGNOSIS — E83.52 HYPERCALCEMIA: ICD-10-CM

## 2024-09-25 DIAGNOSIS — F17.210 NICOTINE DEPENDENCE, CIGARETTES, UNCOMPLICATED: ICD-10-CM

## 2024-09-25 DIAGNOSIS — F33.1 MODERATE EPISODE OF RECURRENT MAJOR DEPRESSIVE DISORDER (HCC): Primary | ICD-10-CM

## 2024-09-25 DIAGNOSIS — F41.0 GENERALIZED ANXIETY DISORDER WITH PANIC ATTACKS: ICD-10-CM

## 2024-09-25 DIAGNOSIS — E20.89 OTHER HYPOPARATHYROIDISM (HCC): ICD-10-CM

## 2024-09-25 DIAGNOSIS — N18.31 STAGE 3A CHRONIC KIDNEY DISEASE (HCC): ICD-10-CM

## 2024-09-25 DIAGNOSIS — F41.1 GENERALIZED ANXIETY DISORDER WITH PANIC ATTACKS: ICD-10-CM

## 2024-09-25 PROCEDURE — 90834 PSYTX W PT 45 MINUTES: CPT | Performed by: SOCIAL WORKER

## 2024-09-25 NOTE — TELEPHONE ENCOUNTER
Tried to call and speak with Ms. Yamileth INFANTE Kavin to discuss recent lab/imaging results, however I was unable to get through and reached voicemail.  I have left a voicemail informing that I called.  On her recent labs, noted to have calcium level of 13; I have advised her to keep well-hydrated, not take any extra calcium supplementation/calcitriol beyond what has been prescribed.  Should repeat labs in 1-2 days. Advised to call me back if she has any questions.

## 2024-09-26 ENCOUNTER — PATIENT OUTREACH (OUTPATIENT)
Dept: CASE MANAGEMENT | Facility: OTHER | Age: 49
End: 2024-09-26

## 2024-09-27 ENCOUNTER — APPOINTMENT (OUTPATIENT)
Dept: LAB | Facility: HOSPITAL | Age: 49
End: 2024-09-27
Attending: INTERNAL MEDICINE
Payer: COMMERCIAL

## 2024-09-27 DIAGNOSIS — E20.89 OTHER HYPOPARATHYROIDISM (HCC): ICD-10-CM

## 2024-09-27 DIAGNOSIS — E83.52 HYPERCALCEMIA: ICD-10-CM

## 2024-09-27 DIAGNOSIS — E83.51 HYPOCALCEMIA: ICD-10-CM

## 2024-09-27 DIAGNOSIS — E89.2 POST-SURGICAL HYPOPARATHYROIDISM (HCC): ICD-10-CM

## 2024-09-27 LAB
ANION GAP SERPL CALCULATED.3IONS-SCNC: 11 MMOL/L (ref 4–13)
BUN SERPL-MCNC: 24 MG/DL (ref 5–25)
CALCIUM SERPL-MCNC: 9.8 MG/DL (ref 8.4–10.2)
CHLORIDE SERPL-SCNC: 101 MMOL/L (ref 96–108)
CO2 SERPL-SCNC: 22 MMOL/L (ref 21–32)
CREAT SERPL-MCNC: 1.26 MG/DL (ref 0.6–1.3)
GFR SERPL CREATININE-BSD FRML MDRD: 50 ML/MIN/1.73SQ M
GLUCOSE SERPL-MCNC: 78 MG/DL (ref 65–140)
POTASSIUM SERPL-SCNC: 4.2 MMOL/L (ref 3.5–5.3)
SODIUM SERPL-SCNC: 134 MMOL/L (ref 135–147)

## 2024-09-27 PROCEDURE — 36415 COLL VENOUS BLD VENIPUNCTURE: CPT

## 2024-09-27 PROCEDURE — 80048 BASIC METABOLIC PNL TOTAL CA: CPT

## 2024-09-27 NOTE — PROGRESS NOTES
In basket received for care plan re-upload on 9/15.   Outside records through Care Everywhere requested / refreshed and care plan reviewed.        No utilization since 5/19.      Patient follows closely with  [facility].     Future appointments:  10/2/2024 10:00 AM Appointment with Elizabeth Ball LCSW at [facility] ([phone])   10/9/2024 10:00 AM Appointment with Elizabeth Ball LCSW at [facility] ([phone])   10/16/2024 10:00 AM Appointment with Elizabeth Ball LCSW at [facility] ([phone])   10/23/2024 10:00 AM Appointment with Elizabeth Ball LCSW at [facility] ([phone])    10/28/2024 8:20 AM Appointment with Yisel Pabon MD at [facility] [place] ([phone])       Will continue to follow electronically.  In basket sent to self with reminder for next chart review.
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
## 2024-09-30 ENCOUNTER — TELEPHONE (OUTPATIENT)
Age: 49
End: 2024-09-30

## 2024-09-30 ENCOUNTER — APPOINTMENT (OUTPATIENT)
Dept: LAB | Facility: HOSPITAL | Age: 49
End: 2024-09-30
Attending: INTERNAL MEDICINE
Payer: COMMERCIAL

## 2024-09-30 DIAGNOSIS — G62.9 PERIPHERAL POLYNEUROPATHY: Primary | ICD-10-CM

## 2024-09-30 DIAGNOSIS — E89.2 POST-SURGICAL HYPOPARATHYROIDISM (HCC): ICD-10-CM

## 2024-09-30 DIAGNOSIS — E83.51 HYPOCALCEMIA: ICD-10-CM

## 2024-09-30 DIAGNOSIS — M54.16 LUMBAR RADICULOPATHY: ICD-10-CM

## 2024-09-30 LAB — CALCIUM SERPL-MCNC: 9.8 MG/DL (ref 8.4–10.2)

## 2024-09-30 PROCEDURE — 36415 COLL VENOUS BLD VENIPUNCTURE: CPT

## 2024-09-30 NOTE — TELEPHONE ENCOUNTER
Patient called and stated she just spoke to her pain management doctor and they requested for her to call Dr. Ramos to order a nerve test for her lower back and legs as they are all the way in [place] and difficult for the patient to get to. Patient stated she can barely walk, and pain management is looking to have this done as soon as possible as the patient will need to have another back injection and they will need the test done before scheduling.  Please advise.  Thank you!

## 2024-10-01 ENCOUNTER — TELEPHONE (OUTPATIENT)
Age: 49
End: 2024-10-01

## 2024-10-01 DIAGNOSIS — M54.6 CHRONIC BILATERAL THORACIC BACK PAIN: Primary | ICD-10-CM

## 2024-10-01 DIAGNOSIS — N64.89 BILATERAL PENDULOUS BREASTS: ICD-10-CM

## 2024-10-01 DIAGNOSIS — G89.29 CHRONIC BILATERAL THORACIC BACK PAIN: Primary | ICD-10-CM

## 2024-10-01 NOTE — TELEPHONE ENCOUNTER
Patient called asking for an apt for breast reduction and excessive skin removal from belly area. She gained weight and she has back pain.  She advised that she currently takes 9 injections in her back for the pain and her doctor referred her to us.    Patient has both [facility] she wants to use and [employer] which she's aware that they will not cover in [place].     Please call patient and assist.    Thank you

## 2024-10-02 ENCOUNTER — TELEMEDICINE (OUTPATIENT)
Dept: BEHAVIORAL/MENTAL HEALTH CLINIC | Facility: CLINIC | Age: 49
End: 2024-10-02
Payer: COMMERCIAL

## 2024-10-02 DIAGNOSIS — F33.1 MODERATE EPISODE OF RECURRENT MAJOR DEPRESSIVE DISORDER (HCC): Primary | ICD-10-CM

## 2024-10-02 PROCEDURE — 90834 PSYTX W PT 45 MINUTES: CPT | Performed by: SOCIAL WORKER

## 2024-10-04 ENCOUNTER — APPOINTMENT (OUTPATIENT)
Dept: LAB | Facility: HOSPITAL | Age: 49
End: 2024-10-04
Attending: INTERNAL MEDICINE
Payer: COMMERCIAL

## 2024-10-04 ENCOUNTER — TELEPHONE (OUTPATIENT)
Dept: ENDOCRINOLOGY | Facility: CLINIC | Age: 49
End: 2024-10-04

## 2024-10-04 DIAGNOSIS — E89.2 POST-SURGICAL HYPOPARATHYROIDISM (HCC): ICD-10-CM

## 2024-10-04 DIAGNOSIS — E83.51 HYPOCALCEMIA: ICD-10-CM

## 2024-10-04 LAB — CALCIUM SERPL-MCNC: 13.8 MG/DL (ref 8.4–10.2)

## 2024-10-04 PROCEDURE — 36415 COLL VENOUS BLD VENIPUNCTURE: CPT

## 2024-10-04 NOTE — TELEPHONE ENCOUNTER
Called and spoke with Ms.Jennifer ARELIS Vale to discuss recent lab/imaging results. Calcium 13.8  Patient states that she got a new juicer and did not realize that the fruits she was putting have more calcium than normal   States that she has been keeping well hydrated; however since yesterday feeling tired and experienced nausea/ vomiting yesterday and today.     The patient has been experiencing nausea, vomiting, with the elevated calcium levels from this morning, I recommended that she go to urgent care/ER as she would benefit from IV fluid hydration.  Will also need a full metabolic panel  Advised not to take any calcium supplementation at this time.

## 2024-10-04 NOTE — PSYCH
Behavioral Health Psychotherapy Progress Note    Psychotherapy Provided: Individual Psychotherapy     1. Moderate episode of recurrent major depressive disorder (HCC)            Goals addressed in session: Goal 1     DATA: Michelle reports feeling ok overall. She continues to distance herself from her sister, brother in law, and their overall family court drama. She is refocusing her attention to her physical health concerns and finding ways to feel and be empowered with what she can do for herself. We discussed this and how it relates to her overall treatment focus, which we admitted we have gotten away from because her sister and family problems have created a lot of distraction. We related this to getting lost and holding onto pain as the major problem area and will work this into her treatment plan. We discussed growth opportunities such as going to [employer] school as she has an interest in justice and the law. She was receptive to this and from there expressed feeling hopeful. No new symptoms or safety concerns. Return in 1 week.   During this session, this clinician used the following therapeutic modalities: Client-centered Therapy and Cognitive Processing Therapy    Substance Abuse was not addressed during this session. If the client is diagnosed with a co-occurring substance use disorder, please indicate any changes in the frequency or amount of use: NA. Stage of change for addressing substance use diagnoses: No substance use/Not applicable    ASSESSMENT:  Yamileth Vale presents with a Euthymic/ normal mood.     her affect is Normal range and intensity, which is congruent, with her mood and the content of the session. The client has made progress on their goals.    During this session the client was oriented to person, place, and time. The client was engaged in the session. The client was able to sustain direct eye contact and was without psychomotor agitation. The client's thought processes were  "linear and clear.   Yamileth Vale presents with a none risk of suicide, none risk of self-harm, and none risk of harm to others.    For any risk assessment that surpasses a \"low\" rating, a safety plan must be developed.    A safety plan was indicated: no  If yes, describe in detail NA    PLAN: Between sessions, Yamileth Vale will take medication as prescribed and practice positive coping strategies as needed . At the next session, the therapist will use Client-centered Therapy and Cognitive Processing Therapy to address stressors.    Behavioral Health Treatment Plan and Discharge Planning: Yamileth Vale is aware of and agrees to continue to work on their treatment plan. They have identified and are working toward their discharge goals. yes    Visit start and stop times:    10/04/24  Start Time: 1000  Stop Time: 1050  Total Visit Time: 50 minutes  "

## 2024-10-04 NOTE — BH TREATMENT PLAN
"Outpatient Behavioral Health Psychotherapy Treatment Plan    Yamileth INFANTE Kavin  1975     Date of Initial Psychotherapy Assessment: 3/27/2024     Date of Current Treatment Plan: 10/04/24  Treatment Plan Target Date: 4/4/2025  Treatment Plan Expiration Date: 4/4/2025    Diagnosis:   1. Moderate episode of recurrent major depressive disorder (HCC)            Area(s) of Need: Emotional regulation, increasing competence and confidence    Long Term Goal 1 (in the client's own words): \"Feel better about myself\"    Stage of Change: Contemplation    Target Date for completion: 4/4/2025     Anticipated therapeutic modalities: Psychoeducation, motivational interviewing, CBT, ACT, DBT, somatic exercises, mindfulness skills training, and supportive psychotherapy.      People identified to complete this goal: Yamileth Vale and Elizabeth Ball      Objective 1: (identify the means of measuring success in meeting the objective): Michelle will be able to acknowledge stressors and triggers as they arise and report using at least 2 positive coping skills to help observe and let go of negative emotion.       Objective 2: (identify the means of measuring success in meeting the objective): Michelle will participate in at least 3 positive self care efforts per week.      I am currently under the care of a [facility] psychiatric provider: yes    My [facility] psychiatric provider is: Dali Izquierdo PA-C    I am currently taking psychiatric medications: Yes, as prescribed    I feel that I will be ready for discharge from mental health care when I reach the following (measurable goal/objective): I honestly don't know    For children and adults who have a legal guardian:   Has there been any change to custody orders and/or guardianship status? NA. If yes, attach updated documentation.    I have created my Crisis Plan and have been offered a copy of this plan    Behavioral Health Treatment Plan  Luke: Diagnosis and Treatment " Plan explained to Yamileth Vale acknowledges an understanding of their diagnosis. Yamileth Vale agrees to this treatment plan.    I have been offered a copy of this Treatment Plan. yes

## 2024-10-05 ENCOUNTER — APPOINTMENT (OUTPATIENT)
Dept: LAB | Facility: HOSPITAL | Age: 49
End: 2024-10-05
Payer: COMMERCIAL

## 2024-10-05 DIAGNOSIS — E89.2 POST-SURGICAL HYPOPARATHYROIDISM (HCC): ICD-10-CM

## 2024-10-05 DIAGNOSIS — E83.51 HYPOCALCEMIA: ICD-10-CM

## 2024-10-05 LAB — CALCIUM SERPL-MCNC: 12.7 MG/DL (ref 8.4–10.2)

## 2024-10-05 PROCEDURE — 36415 COLL VENOUS BLD VENIPUNCTURE: CPT

## 2024-10-07 ENCOUNTER — APPOINTMENT (OUTPATIENT)
Dept: LAB | Facility: HOSPITAL | Age: 49
End: 2024-10-07
Attending: INTERNAL MEDICINE
Payer: COMMERCIAL

## 2024-10-07 DIAGNOSIS — E83.51 HYPOCALCEMIA: ICD-10-CM

## 2024-10-07 DIAGNOSIS — E89.2 POST-SURGICAL HYPOPARATHYROIDISM (HCC): ICD-10-CM

## 2024-10-07 LAB — CALCIUM SERPL-MCNC: 10.1 MG/DL (ref 8.4–10.2)

## 2024-10-07 PROCEDURE — 36415 COLL VENOUS BLD VENIPUNCTURE: CPT

## 2024-10-09 ENCOUNTER — TELEMEDICINE (OUTPATIENT)
Dept: BEHAVIORAL/MENTAL HEALTH CLINIC | Facility: CLINIC | Age: 49
End: 2024-10-09
Payer: COMMERCIAL

## 2024-10-09 DIAGNOSIS — F33.1 MODERATE EPISODE OF RECURRENT MAJOR DEPRESSIVE DISORDER (HCC): Primary | ICD-10-CM

## 2024-10-09 PROCEDURE — 90834 PSYTX W PT 45 MINUTES: CPT | Performed by: SOCIAL WORKER

## 2024-10-11 ENCOUNTER — APPOINTMENT (OUTPATIENT)
Dept: LAB | Facility: HOSPITAL | Age: 49
End: 2024-10-11
Payer: COMMERCIAL

## 2024-10-11 DIAGNOSIS — E89.2 POST-SURGICAL HYPOPARATHYROIDISM (HCC): ICD-10-CM

## 2024-10-11 DIAGNOSIS — E83.51 HYPOCALCEMIA: ICD-10-CM

## 2024-10-11 LAB — CALCIUM SERPL-MCNC: 9.4 MG/DL (ref 8.4–10.2)

## 2024-10-11 PROCEDURE — 36415 COLL VENOUS BLD VENIPUNCTURE: CPT

## 2024-10-14 ENCOUNTER — APPOINTMENT (OUTPATIENT)
Dept: LAB | Facility: HOSPITAL | Age: 49
End: 2024-10-14
Attending: INTERNAL MEDICINE
Payer: COMMERCIAL

## 2024-10-14 DIAGNOSIS — E83.51 HYPOCALCEMIA: ICD-10-CM

## 2024-10-14 DIAGNOSIS — E89.2 POST-SURGICAL HYPOPARATHYROIDISM (HCC): ICD-10-CM

## 2024-10-14 LAB — CALCIUM SERPL-MCNC: 10.5 MG/DL (ref 8.4–10.2)

## 2024-10-14 PROCEDURE — 36415 COLL VENOUS BLD VENIPUNCTURE: CPT
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
## 2024-10-15 RX ORDER — LEVOTHYROXINE SODIUM 150 UG/1
150 TABLET ORAL
Qty: 30 TABLET | Refills: 0 | Status: SHIPPED | OUTPATIENT
Start: 2024-10-15

## 2024-10-16 ENCOUNTER — TELEMEDICINE (OUTPATIENT)
Dept: BEHAVIORAL/MENTAL HEALTH CLINIC | Facility: CLINIC | Age: 49
End: 2024-10-16
Payer: COMMERCIAL

## 2024-10-16 DIAGNOSIS — F41.1 GENERALIZED ANXIETY DISORDER WITH PANIC ATTACKS: ICD-10-CM

## 2024-10-16 DIAGNOSIS — F41.0 GENERALIZED ANXIETY DISORDER WITH PANIC ATTACKS: ICD-10-CM

## 2024-10-16 DIAGNOSIS — F17.210 NICOTINE DEPENDENCE, CIGARETTES, UNCOMPLICATED: ICD-10-CM

## 2024-10-16 DIAGNOSIS — F33.1 MODERATE EPISODE OF RECURRENT MAJOR DEPRESSIVE DISORDER (HCC): Primary | ICD-10-CM

## 2024-10-16 PROCEDURE — 90834 PSYTX W PT 45 MINUTES: CPT | Performed by: SOCIAL WORKER

## 2024-10-17 ENCOUNTER — TELEPHONE (OUTPATIENT)
Dept: PLASTIC SURGERY | Facility: CLINIC | Age: 49
End: 2024-10-17

## 2024-10-17 ENCOUNTER — APPOINTMENT (OUTPATIENT)
Dept: LAB | Facility: HOSPITAL | Age: 49
End: 2024-10-17
Attending: INTERNAL MEDICINE
Payer: COMMERCIAL

## 2024-10-17 DIAGNOSIS — E89.2 POST-SURGICAL HYPOPARATHYROIDISM (HCC): ICD-10-CM

## 2024-10-17 DIAGNOSIS — E83.51 HYPOCALCEMIA: ICD-10-CM

## 2024-10-17 LAB — CALCIUM SERPL-MCNC: 10.9 MG/DL (ref 8.4–10.2)

## 2024-10-17 PROCEDURE — 36415 COLL VENOUS BLD VENIPUNCTURE: CPT

## 2024-10-17 NOTE — TELEPHONE ENCOUNTER
Emailed criteria for panniculectomy and breast reduction and advised patient we do not take her strictly [place] Insurance which is listed as secondary.  Asked to reach out if she would like to discuss.

## 2024-10-18 NOTE — TELEPHONE ENCOUNTER
Rec'd call from patient wishing to discuss next steps to schedule for pann and breast reduction sx. I advised of previous note from Annalee. Unfortunately, Marian was not available at the time of the call.    Incorrect email previously attached to chart but has been update. Patient did not receive initial email. Correct email is [email or web address]    Please contact patient at your earliest convenience. Thank you.

## 2024-10-19 ENCOUNTER — NURSE TRIAGE (OUTPATIENT)
Dept: OTHER | Facility: OTHER | Age: 49
End: 2024-10-19

## 2024-10-19 DIAGNOSIS — E20.89 OTHER HYPOPARATHYROIDISM (HCC): Primary | ICD-10-CM

## 2024-10-19 NOTE — TELEPHONE ENCOUNTER
"Answer Assessment - Initial Assessment Questions  1. REASON FOR CALL: \"What is the main reason for your call?\" or \"How can I best help you?\"      Patient calling to have new standing order placed for calcium level. Patient currently at the lab.    2. SYMPTOMS : \"Do you have any symptoms?\"       Dizziness    Protocols used: Information Only Call - No Triage-Adult-    "

## 2024-10-19 NOTE — TELEPHONE ENCOUNTER
On call provider paged. On call provider discussed patient with attending on call. Both agreed to advise patient to be evaluated at Urgent Care or Emergency Department for persistent high calcium levels. Patient refused to go to ED at this time. On call provider aware. On called placed one time ionized calcium level for evaluation and recommended patient follow up with Dr. Pabon on Monday. Patient made aware and agreeable to plan of care.

## 2024-10-19 NOTE — TELEPHONE ENCOUNTER
"Regarding: need calcium order  ----- Message from Ness OSULLIVAN sent at 10/19/2024 11:29 AM EDT -----  \"Pt is here to get blood work for calcium but there is no order. I wanted to know if an order can be put in\"    "

## 2024-10-21 ENCOUNTER — TELEPHONE (OUTPATIENT)
Age: 49
End: 2024-10-21

## 2024-10-21 ENCOUNTER — APPOINTMENT (OUTPATIENT)
Dept: LAB | Facility: HOSPITAL | Age: 49
End: 2024-10-21
Payer: COMMERCIAL

## 2024-10-21 DIAGNOSIS — E83.51 HYPOCALCEMIA: Primary | ICD-10-CM

## 2024-10-21 DIAGNOSIS — E20.89 OTHER HYPOPARATHYROIDISM (HCC): ICD-10-CM

## 2024-10-21 DIAGNOSIS — E83.51 HYPOCALCEMIA: ICD-10-CM

## 2024-10-21 LAB
CA-I BLD-SCNC: 1.19 MMOL/L (ref 1.12–1.32)
CALCIUM SERPL-MCNC: 9.8 MG/DL (ref 8.4–10.2)

## 2024-10-21 PROCEDURE — 82330 ASSAY OF CALCIUM: CPT

## 2024-10-21 PROCEDURE — 36415 COLL VENOUS BLD VENIPUNCTURE: CPT

## 2024-10-21 NOTE — TELEPHONE ENCOUNTER
Patient called in as she needs more lab orders put in for calcium. She is getting it checked today and will be using her last one. She has to go again on Wednesday as she gets injections on Thursday. Patient advises that she get her calcium checked 2X a week so she needs a few put in.
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
## 2024-10-21 NOTE — TELEPHONE ENCOUNTER
I do not see any lab work/ calcium results for labs done over the weekend. I have put in standing orders for calcium twice a week.

## 2024-10-22 ENCOUNTER — TELEPHONE (OUTPATIENT)
Dept: PLASTIC SURGERY | Facility: CLINIC | Age: 49
End: 2024-10-22

## 2024-10-22 NOTE — TELEPHONE ENCOUNTER
Re-emailed criteria for panniculectomy and breast reduction to patient for her to review and contact me to discuss.  Need all to be met and also advised we do not take her [place] insurance.

## 2024-10-23 ENCOUNTER — TELEMEDICINE (OUTPATIENT)
Dept: BEHAVIORAL/MENTAL HEALTH CLINIC | Facility: CLINIC | Age: 49
End: 2024-10-23
Payer: COMMERCIAL

## 2024-10-23 ENCOUNTER — APPOINTMENT (OUTPATIENT)
Dept: LAB | Facility: HOSPITAL | Age: 49
End: 2024-10-23
Attending: INTERNAL MEDICINE
Payer: COMMERCIAL

## 2024-10-23 DIAGNOSIS — F41.0 PANIC DISORDER: ICD-10-CM

## 2024-10-23 DIAGNOSIS — E83.51 HYPOCALCEMIA: ICD-10-CM

## 2024-10-23 DIAGNOSIS — F33.1 MODERATE EPISODE OF RECURRENT MAJOR DEPRESSIVE DISORDER (HCC): Primary | ICD-10-CM

## 2024-10-23 LAB — CALCIUM SERPL-MCNC: 10.4 MG/DL (ref 8.4–10.2)

## 2024-10-23 PROCEDURE — 90834 PSYTX W PT 45 MINUTES: CPT | Performed by: SOCIAL WORKER

## 2024-10-23 PROCEDURE — 36415 COLL VENOUS BLD VENIPUNCTURE: CPT

## 2024-10-24 RX ORDER — ALPRAZOLAM 2 MG/1
TABLET, EXTENDED RELEASE ORAL
Qty: 60 TABLET | Refills: 0 | Status: SHIPPED | OUTPATIENT
Start: 2024-10-24

## 2024-10-28 ENCOUNTER — OFFICE VISIT (OUTPATIENT)
Dept: ENDOCRINOLOGY | Facility: CLINIC | Age: 49
End: 2024-10-28
Payer: COMMERCIAL

## 2024-10-28 ENCOUNTER — APPOINTMENT (OUTPATIENT)
Dept: LAB | Facility: HOSPITAL | Age: 49
End: 2024-10-28
Payer: COMMERCIAL

## 2024-10-28 VITALS
BODY MASS INDEX: 39.01 KG/M2 | OXYGEN SATURATION: 98 % | HEART RATE: 89 BPM | WEIGHT: 212 LBS | SYSTOLIC BLOOD PRESSURE: 150 MMHG | DIASTOLIC BLOOD PRESSURE: 90 MMHG | HEIGHT: 62 IN

## 2024-10-28 DIAGNOSIS — E66.01 CLASS 2 SEVERE OBESITY WITH SERIOUS COMORBIDITY AND BODY MASS INDEX (BMI) OF 38.0 TO 38.9 IN ADULT, UNSPECIFIED OBESITY TYPE (HCC): ICD-10-CM

## 2024-10-28 DIAGNOSIS — E66.812 CLASS 2 SEVERE OBESITY WITH SERIOUS COMORBIDITY AND BODY MASS INDEX (BMI) OF 38.0 TO 38.9 IN ADULT, UNSPECIFIED OBESITY TYPE (HCC): ICD-10-CM

## 2024-10-28 DIAGNOSIS — R73.03 PREDIABETES: ICD-10-CM

## 2024-10-28 DIAGNOSIS — E89.0 POSTOPERATIVE HYPOTHYROIDISM: ICD-10-CM

## 2024-10-28 DIAGNOSIS — E83.52 HYPERCALCEMIA: ICD-10-CM

## 2024-10-28 DIAGNOSIS — E55.9 VITAMIN D DEFICIENCY: ICD-10-CM

## 2024-10-28 DIAGNOSIS — E78.1 HYPERTRIGLYCERIDEMIA: ICD-10-CM

## 2024-10-28 DIAGNOSIS — N18.31 STAGE 3A CHRONIC KIDNEY DISEASE (HCC): ICD-10-CM

## 2024-10-28 DIAGNOSIS — E83.51 HYPOCALCEMIA: ICD-10-CM

## 2024-10-28 DIAGNOSIS — E89.2 POST-SURGICAL HYPOPARATHYROIDISM (HCC): Primary | ICD-10-CM

## 2024-10-28 DIAGNOSIS — R32 URINARY INCONTINENCE, UNSPECIFIED TYPE: ICD-10-CM

## 2024-10-28 DIAGNOSIS — E89.2 POST-SURGICAL HYPOPARATHYROIDISM (HCC): ICD-10-CM

## 2024-10-28 LAB
25(OH)D3 SERPL-MCNC: 35.2 NG/ML (ref 30–100)
ALBUMIN SERPL BCG-MCNC: 4.5 G/DL (ref 3.5–5)
ALP SERPL-CCNC: 66 U/L (ref 34–104)
ALT SERPL W P-5'-P-CCNC: 61 U/L (ref 7–52)
ANION GAP SERPL CALCULATED.3IONS-SCNC: 10 MMOL/L (ref 4–13)
AST SERPL W P-5'-P-CCNC: 27 U/L (ref 13–39)
BILIRUB SERPL-MCNC: 0.35 MG/DL (ref 0.2–1)
BUN SERPL-MCNC: 22 MG/DL (ref 5–25)
CALCIUM SERPL-MCNC: 9.9 MG/DL (ref 8.4–10.2)
CHLORIDE SERPL-SCNC: 103 MMOL/L (ref 96–108)
CHOLEST SERPL-MCNC: 557 MG/DL
CO2 SERPL-SCNC: 22 MMOL/L (ref 21–32)
CREAT SERPL-MCNC: 1.1 MG/DL (ref 0.6–1.3)
CREAT UR-MCNC: 101.5 MG/DL
EST. AVERAGE GLUCOSE BLD GHB EST-MCNC: 123 MG/DL
GFR SERPL CREATININE-BSD FRML MDRD: 59 ML/MIN/1.73SQ M
GLUCOSE P FAST SERPL-MCNC: 91 MG/DL (ref 65–99)
HBA1C MFR BLD: 5.9 %
HDLC SERPL-MCNC: 39 MG/DL
IRON SATN MFR SERPL: 17 % (ref 15–50)
IRON SERPL-MCNC: 68 UG/DL (ref 50–212)
MICROALBUMIN UR-MCNC: 9.2 MG/L
MICROALBUMIN/CREAT 24H UR: 9 MG/G CREATININE (ref 0–30)
NONHDLC SERPL-MCNC: 518 MG/DL
PHOSPHATE SERPL-MCNC: 3.3 MG/DL (ref 2.7–4.5)
POTASSIUM SERPL-SCNC: 4.2 MMOL/L (ref 3.5–5.3)
PROT SERPL-MCNC: 7.4 G/DL (ref 6.4–8.4)
SODIUM SERPL-SCNC: 135 MMOL/L (ref 135–147)
T4 FREE SERPL-MCNC: 1.1 NG/DL (ref 0.61–1.12)
TIBC SERPL-MCNC: 394 UG/DL (ref 250–450)
TRIGL SERPL-MCNC: 872 MG/DL
TSH SERPL DL<=0.05 MIU/L-ACNC: 2.5 UIU/ML (ref 0.45–4.5)
UIBC SERPL-MCNC: 326 UG/DL (ref 155–355)

## 2024-10-28 PROCEDURE — 99214 OFFICE O/P EST MOD 30 MIN: CPT | Performed by: INTERNAL MEDICINE

## 2024-10-28 PROCEDURE — 84100 ASSAY OF PHOSPHORUS: CPT

## 2024-10-28 PROCEDURE — 83036 HEMOGLOBIN GLYCOSYLATED A1C: CPT

## 2024-10-28 PROCEDURE — 82570 ASSAY OF URINE CREATININE: CPT

## 2024-10-28 PROCEDURE — 36415 COLL VENOUS BLD VENIPUNCTURE: CPT

## 2024-10-28 PROCEDURE — 84439 ASSAY OF FREE THYROXINE: CPT

## 2024-10-28 PROCEDURE — 83540 ASSAY OF IRON: CPT

## 2024-10-28 PROCEDURE — 84443 ASSAY THYROID STIM HORMONE: CPT

## 2024-10-28 PROCEDURE — 83550 IRON BINDING TEST: CPT

## 2024-10-28 PROCEDURE — 80053 COMPREHEN METABOLIC PANEL: CPT

## 2024-10-28 PROCEDURE — 80061 LIPID PANEL: CPT

## 2024-10-28 PROCEDURE — 82306 VITAMIN D 25 HYDROXY: CPT

## 2024-10-28 PROCEDURE — 82043 UR ALBUMIN QUANTITATIVE: CPT

## 2024-10-28 NOTE — PATIENT INSTRUCTIONS
Please get labs done as ordered   Continue current medications     24 Hour urine collection instructions     Please  a specimen container from the lab.      On day 1, urinate into the toilet when you wake up in the morning. Collect all of your urine in a special container for the next 24 hours. On day 2, urinate into the container in the morning when you wake up. Cap the container. Keep it in the refrigerator or a cool place during the collection period. Label the container with your name, the date, and the time you complete the sample, and return it as instructed
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
## 2024-10-29 ENCOUNTER — APPOINTMENT (OUTPATIENT)
Dept: LAB | Facility: HOSPITAL | Age: 49
End: 2024-10-29
Attending: INTERNAL MEDICINE
Payer: COMMERCIAL

## 2024-10-29 DIAGNOSIS — E83.51 HYPOCALCEMIA: ICD-10-CM

## 2024-10-29 DIAGNOSIS — E89.2 POST-SURGICAL HYPOPARATHYROIDISM (HCC): ICD-10-CM

## 2024-10-29 DIAGNOSIS — E55.9 VITAMIN D DEFICIENCY: ICD-10-CM

## 2024-10-29 DIAGNOSIS — N18.31 STAGE 3A CHRONIC KIDNEY DISEASE (HCC): ICD-10-CM

## 2024-10-29 DIAGNOSIS — E89.0 POSTOPERATIVE HYPOTHYROIDISM: ICD-10-CM

## 2024-10-29 LAB — CALCIUM SERPL-MCNC: 9.7 MG/DL (ref 8.4–10.2)

## 2024-10-29 PROCEDURE — 36415 COLL VENOUS BLD VENIPUNCTURE: CPT

## 2024-10-29 RX ORDER — CALCITRIOL 0.25 UG/1
0.25 CAPSULE, LIQUID FILLED ORAL 2 TIMES DAILY
Qty: 180 CAPSULE | Refills: 2 | Status: SHIPPED | OUTPATIENT
Start: 2024-10-29

## 2024-10-30 ENCOUNTER — TELEMEDICINE (OUTPATIENT)
Dept: BEHAVIORAL/MENTAL HEALTH CLINIC | Facility: CLINIC | Age: 49
End: 2024-10-30
Payer: COMMERCIAL

## 2024-10-30 DIAGNOSIS — F17.210 NICOTINE DEPENDENCE, CIGARETTES, UNCOMPLICATED: ICD-10-CM

## 2024-10-30 DIAGNOSIS — F41.1 GENERALIZED ANXIETY DISORDER WITH PANIC ATTACKS: ICD-10-CM

## 2024-10-30 DIAGNOSIS — F33.1 MODERATE EPISODE OF RECURRENT MAJOR DEPRESSIVE DISORDER (HCC): Primary | ICD-10-CM

## 2024-10-30 DIAGNOSIS — F41.0 GENERALIZED ANXIETY DISORDER WITH PANIC ATTACKS: ICD-10-CM

## 2024-10-30 PROCEDURE — 90834 PSYTX W PT 45 MINUTES: CPT | Performed by: SOCIAL WORKER

## 2024-10-31 ENCOUNTER — HOSPITAL ENCOUNTER (OUTPATIENT)
Dept: NEUROLOGY | Facility: HOSPITAL | Age: 49
End: 2024-10-31
Payer: COMMERCIAL

## 2024-10-31 DIAGNOSIS — M54.16 LUMBAR RADICULOPATHY: ICD-10-CM

## 2024-10-31 PROCEDURE — 95910 NRV CNDJ TEST 7-8 STUDIES: CPT | Performed by: PSYCHIATRY & NEUROLOGY

## 2024-10-31 PROCEDURE — 95886 MUSC TEST DONE W/N TEST COMP: CPT | Performed by: PSYCHIATRY & NEUROLOGY

## 2024-10-31 NOTE — PSYCH
"Behavioral Health Psychotherapy Progress Note    Psychotherapy Provided: Individual Psychotherapy     1. Moderate episode of recurrent major depressive disorder (HCC)        2. Generalized anxiety disorder with panic attacks        3. Nicotine dependence, cigarettes, uncomplicated            Goals addressed in session: Goal 1     DATA: Yamileth reports feeling well stating that she has been enjoying the outside weather. Is following up with various doctors and trying to comply with all of their instructions. Feels like she is managing all of her problems on her own whether that be legal, medical, or practical. Assessed whether that is absolutely true or just a feeling, which is temporary. Worked on identifying opportunities wherein she can ground herself in safety when feeling unprotected. No new symptoms or safety concerns. Return in 1 week.   During this session, this clinician used the following therapeutic modalities: Cognitive Behavioral Therapy and Cognitive Processing Therapy    Substance Abuse was not addressed during this session. If the client is diagnosed with a co-occurring substance use disorder, please indicate any changes in the frequency or amount of use: NA. Stage of change for addressing substance use diagnoses: No substance use/Not applicable    ASSESSMENT:  Yamileth Vale presents with a Euthymic/ normal mood.     her affect is Normal range and intensity, which is congruent, with her mood and the content of the session. The client has made progress on their goals.    During this session the client was oriented to person, place, and time. The client was engaged in the session. The client was able to sustain direct eye contact and was without psychomotor agitation. The client's thought processes were linear and clear.   Yamileth Vale presents with a none risk of suicide, none risk of self-harm, and none risk of harm to others.    For any risk assessment that surpasses a \"low\" " rating, a safety plan must be developed.    A safety plan was indicated: no  If yes, describe in detail NA    PLAN: Between sessions, Yamileth Vale will take medication as prescribed and practice positive coping strategies as needed . At the next session, the therapist will use Cognitive Behavioral Therapy and Cognitive Processing Therapy to address stressors.    Behavioral Health Treatment Plan and Discharge Planning: Yamileth Vale is aware of and agrees to continue to work on their treatment plan. They have identified and are working toward their discharge goals. yes    Visit start and stop times:    10/31/24  Start Time: 1000  Stop Time: 1050  Total Visit Time: 50 minutes

## 2024-11-01 ENCOUNTER — APPOINTMENT (OUTPATIENT)
Dept: LAB | Facility: HOSPITAL | Age: 49
End: 2024-11-01
Attending: INTERNAL MEDICINE
Payer: COMMERCIAL

## 2024-11-01 DIAGNOSIS — E83.51 HYPOCALCEMIA: ICD-10-CM

## 2024-11-01 LAB
CALCIUM 24H UR-MCNC: 411.6 MG/24 HRS (ref 100–300)
CALCIUM SERPL-MCNC: 11.1 MG/DL (ref 8.4–10.2)
CREAT 24H UR-MRATE: 1.3 G/24HR (ref 0.6–1.8)
SPECIMEN VOL UR: 2100 ML
SPECIMEN VOL UR: 2100 ML

## 2024-11-01 PROCEDURE — 82570 ASSAY OF URINE CREATININE: CPT

## 2024-11-01 PROCEDURE — 82340 ASSAY OF CALCIUM IN URINE: CPT

## 2024-11-01 PROCEDURE — 36415 COLL VENOUS BLD VENIPUNCTURE: CPT

## 2024-11-03 ENCOUNTER — HOSPITAL ENCOUNTER (EMERGENCY)
Facility: HOSPITAL | Age: 49
Discharge: HOME | End: 2024-11-03
Payer: COMMERCIAL

## 2024-11-03 ENCOUNTER — APPOINTMENT (EMERGENCY)
Dept: RADIOLOGY | Facility: HOSPITAL | Age: 49
End: 2024-11-03
Payer: COMMERCIAL

## 2024-11-03 VITALS
RESPIRATION RATE: 20 BRPM | TEMPERATURE: 97.6 F | BODY MASS INDEX: 38.81 KG/M2 | SYSTOLIC BLOOD PRESSURE: 118 MMHG | WEIGHT: 212.2 LBS | HEART RATE: 116 BPM | OXYGEN SATURATION: 98 % | DIASTOLIC BLOOD PRESSURE: 81 MMHG

## 2024-11-03 DIAGNOSIS — M79.601 RIGHT ARM PAIN: Primary | ICD-10-CM

## 2024-11-03 PROCEDURE — 99283 EMERGENCY DEPT VISIT LOW MDM: CPT

## 2024-11-03 PROCEDURE — 99284 EMERGENCY DEPT VISIT MOD MDM: CPT

## 2024-11-03 PROCEDURE — 73110 X-RAY EXAM OF WRIST: CPT

## 2024-11-03 RX ORDER — CYCLOBENZAPRINE HCL 10 MG
10 TABLET ORAL 2 TIMES DAILY PRN
Qty: 20 TABLET | Refills: 0 | Status: SHIPPED | OUTPATIENT
Start: 2024-11-03

## 2024-11-04 ENCOUNTER — APPOINTMENT (OUTPATIENT)
Dept: LAB | Facility: HOSPITAL | Age: 49
End: 2024-11-04
Payer: COMMERCIAL

## 2024-11-04 DIAGNOSIS — E89.2 POST-SURGICAL HYPOPARATHYROIDISM (HCC): ICD-10-CM

## 2024-11-04 DIAGNOSIS — E83.51 HYPOCALCEMIA: ICD-10-CM

## 2024-11-04 LAB
ALBUMIN SERPL BCG-MCNC: 4.4 G/DL (ref 3.5–5)
ALP SERPL-CCNC: 75 U/L (ref 34–104)
ALT SERPL W P-5'-P-CCNC: 48 U/L (ref 7–52)
ANION GAP SERPL CALCULATED.3IONS-SCNC: 12 MMOL/L (ref 4–13)
AST SERPL W P-5'-P-CCNC: 23 U/L (ref 13–39)
BILIRUB SERPL-MCNC: 0.32 MG/DL (ref 0.2–1)
BUN SERPL-MCNC: 30 MG/DL (ref 5–25)
CALCIUM SERPL-MCNC: 10.4 MG/DL (ref 8.4–10.2)
CHLORIDE SERPL-SCNC: 99 MMOL/L (ref 96–108)
CO2 SERPL-SCNC: 24 MMOL/L (ref 21–32)
CREAT SERPL-MCNC: 1.37 MG/DL (ref 0.6–1.3)
GFR SERPL CREATININE-BSD FRML MDRD: 45 ML/MIN/1.73SQ M
GLUCOSE P FAST SERPL-MCNC: 126 MG/DL (ref 65–99)
POTASSIUM SERPL-SCNC: 4.2 MMOL/L (ref 3.5–5.3)
PROT SERPL-MCNC: 7.6 G/DL (ref 6.4–8.4)
SODIUM SERPL-SCNC: 135 MMOL/L (ref 135–147)

## 2024-11-04 PROCEDURE — 36415 COLL VENOUS BLD VENIPUNCTURE: CPT

## 2024-11-04 PROCEDURE — 80053 COMPREHEN METABOLIC PANEL: CPT

## 2024-11-04 NOTE — DISCHARGE INSTRUCTIONS
I recommend using the wrist brace especially while sleeping to prevent further injury to the wrist. You can also take the flexeril to try and help along with your other medications.    If your symptoms do not improve, then I would follow up with your pain management specialist as they can do the trigger point injections to help with your pain. You can also try lidoderm patches which can be purchased over the counter to help with this.     Please return with any other concerns.
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
## 2024-11-06 ENCOUNTER — PATIENT OUTREACH (OUTPATIENT)
Dept: CASE MANAGEMENT | Facility: OTHER | Age: 49
End: 2024-11-06

## 2024-11-06 ENCOUNTER — TELEMEDICINE (OUTPATIENT)
Dept: BEHAVIORAL/MENTAL HEALTH CLINIC | Facility: CLINIC | Age: 49
End: 2024-11-06
Payer: COMMERCIAL

## 2024-11-06 DIAGNOSIS — F41.1 GENERALIZED ANXIETY DISORDER WITH PANIC ATTACKS: ICD-10-CM

## 2024-11-06 DIAGNOSIS — F41.0 GENERALIZED ANXIETY DISORDER WITH PANIC ATTACKS: ICD-10-CM

## 2024-11-06 DIAGNOSIS — F33.1 MODERATE EPISODE OF RECURRENT MAJOR DEPRESSIVE DISORDER (HCC): Primary | ICD-10-CM

## 2024-11-06 PROCEDURE — 90834 PSYTX W PT 45 MINUTES: CPT | Performed by: SOCIAL WORKER

## 2024-11-06 NOTE — PROGRESS NOTES
In basket received ED visit on 11/3 for right arm pain that started 1.5 weeks prior.  No known injury.  Xray neg for acute finding.  She was discharged within 1 hour with Rx for muscle relaxer.     Discharge follow up call placed to patient.     Is this a good time for you to talk?     Yes    Meds  Do you have your medication list?     Yes  Do you have your medications?     Yes  Are you able to afford your medications?     Yes  Are you taking your medications as ordered?     Yes  Do you have any questions about your medications?     Ye  Reviewed medication list with patient?     No, patient declined.  She did not  cyclobenzaprine stating that does not help and is taking Tizanidine to help with right arm pain. She reports compliance with all other prescribed medications.   Are you experiencing any new or worsening symptoms?     No    Appts  Did you make your follow-up appointments?     Yes  Do you need assistance scheduling follow-up appointments?      No.  Yamileth sees pain management monthly for low back and upper back injections.  She will be asking her provider to inject her right arm today.     Psychosocial  Do you have transportation for appointments & to  medications?     Yes  Do you have sufficient caregiver support at home?     Yes  Any difficulty affording food, heating or housing? No    May I follow up with you again?    Yes, agrees to use of Shane.     All questions answered. No needs verbalized. Shane msg sent with RN [facility]  info. Will remain available to assist.  Next outreach scheduled.

## 2024-11-07 DIAGNOSIS — F41.1 GAD (GENERALIZED ANXIETY DISORDER): ICD-10-CM

## 2024-11-07 DIAGNOSIS — F41.0 PANIC DISORDER: ICD-10-CM

## 2024-11-07 DIAGNOSIS — G47.09 OTHER INSOMNIA: ICD-10-CM

## 2024-11-07 RX ORDER — TRAZODONE HYDROCHLORIDE 100 MG/1
100 TABLET ORAL
Qty: 90 TABLET | Refills: 1 | Status: SHIPPED | OUTPATIENT
Start: 2024-11-07 | End: 2025-05-06

## 2024-11-07 RX ORDER — DESVENLAFAXINE 100 MG/1
100 TABLET, EXTENDED RELEASE ORAL DAILY
Qty: 90 TABLET | Refills: 1 | Status: SHIPPED | OUTPATIENT
Start: 2024-11-07 | End: 2025-05-06

## 2024-11-07 RX ORDER — ALPRAZOLAM 2 MG/1
2 TABLET, EXTENDED RELEASE ORAL 2 TIMES DAILY
Qty: 60 TABLET | Refills: 0 | Status: SHIPPED | OUTPATIENT
Start: 2024-11-07

## 2024-11-07 NOTE — TELEPHONE ENCOUNTER
Patient called in to schedule their 3 month medication management follow up.  Patient is now scheduled on 12/23 @11am virtually.    Patient also requested medication refills.    Medication Refill Request     Name of Medication Trazodone  Dose/Frequency 100mg, Take 1 tablet (100 mg total) by mouth daily at bedtime,   Quantity 90 tablet  Verified pharmacy   [x]  Verified ordering Provider   [x]  Does patient have enough for the next 3 days? Yes [] No [x]  Does patient have a follow-up appointment scheduled? Yes [x] No []   If so when is appointment: 12/23 @11am virtually    Medication Refill Request     Name of Medication Pristiq  Dose/Frequency 100mg 24hr, Take 1 tablet (100 mg total) by mouth daily   Quantity 90 tablet  Verified pharmacy   [x]  Verified ordering Provider   [x]  Does patient have enough for the next 3 days? Yes [] No [x]  Does patient have a follow-up appointment scheduled? Yes [x] No []   If so when is appointment: 12/23 @11am virtually    Medication Refill Request     Name of Medication Xanax  Dose/Frequency 2mg 24hr, TAKE 1 TABLET BY MOUTH 2 TIMES A DAY BEFORE BREAKFAST AND LUNCH   Quantity 60 tablet  Verified pharmacy   [x]  Verified ordering Provider   [x]  Does patient have enough for the next 3 days? Yes [] No [x]  Does patient have a follow-up appointment scheduled? Yes [x] No []   If so when is appointment: 12/23 @11am virtually

## 2024-11-08 ENCOUNTER — APPOINTMENT (OUTPATIENT)
Dept: LAB | Facility: HOSPITAL | Age: 49
End: 2024-11-08
Attending: INTERNAL MEDICINE
Payer: COMMERCIAL

## 2024-11-08 DIAGNOSIS — E83.51 HYPOCALCEMIA: ICD-10-CM

## 2024-11-08 LAB — CALCIUM SERPL-MCNC: 9.4 MG/DL (ref 8.4–10.2)

## 2024-11-08 PROCEDURE — 36415 COLL VENOUS BLD VENIPUNCTURE: CPT

## 2024-11-11 ENCOUNTER — TELEPHONE (OUTPATIENT)
Dept: ENDOCRINOLOGY | Facility: CLINIC | Age: 49
End: 2024-11-11

## 2024-11-11 ENCOUNTER — APPOINTMENT (OUTPATIENT)
Dept: LAB | Facility: HOSPITAL | Age: 49
End: 2024-11-11
Attending: INTERNAL MEDICINE
Payer: COMMERCIAL

## 2024-11-11 DIAGNOSIS — E83.51 HYPOCALCEMIA: ICD-10-CM

## 2024-11-11 DIAGNOSIS — R73.03 PREDIABETES: Primary | ICD-10-CM

## 2024-11-11 LAB — CALCIUM SERPL-MCNC: 9.6 MG/DL (ref 8.4–10.2)

## 2024-11-11 PROCEDURE — 36415 COLL VENOUS BLD VENIPUNCTURE: CPT

## 2024-11-11 RX ORDER — LEVOTHYROXINE SODIUM 150 UG/1
150 TABLET ORAL
Qty: 90 TABLET | Refills: 1 | Status: SHIPPED | OUTPATIENT
Start: 2024-11-11

## 2024-11-11 RX ORDER — METFORMIN HYDROCHLORIDE 500 MG/1
500 TABLET, EXTENDED RELEASE ORAL 2 TIMES DAILY WITH MEALS
Qty: 180 TABLET | Refills: 2 | Status: SHIPPED | OUTPATIENT
Start: 2024-11-11
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
## 2024-11-11 NOTE — TELEPHONE ENCOUNTER
Reason for call:   [x] Refill   [] Prior Auth  [] Other:     Office:   [] PCP/Provider -   [x] Specialty/Provider - [facility] [place] / Yisel Pabon MD     Medication:   ~ levothyroxine 150 mcg tablet - Take 1 tablet (150 mcg total) by mouth daily at bedtime    Pharmacy:   [facility]/pharmacy #[id number] - [address]     Does the patient have enough for 3 days?   [] Yes   [x] No - Send as HP to POD

## 2024-11-11 NOTE — TELEPHONE ENCOUNTER
"Pt called asking for the extended release Metformin that Dr. Pabon recommended. Per note linked to last A1c, \" A1c stable at 5.9-continue current dose of metformin I will change to an extended release if you would like to try it and see if you tolerate it better\"    Pt would like to try it and have it sent to [facility]/pharmacy #[id number] - [address]   "

## 2024-11-13 ENCOUNTER — TELEMEDICINE (OUTPATIENT)
Dept: BEHAVIORAL/MENTAL HEALTH CLINIC | Facility: CLINIC | Age: 49
End: 2024-11-13
Payer: COMMERCIAL

## 2024-11-13 DIAGNOSIS — F33.1 MODERATE EPISODE OF RECURRENT MAJOR DEPRESSIVE DISORDER (HCC): Primary | ICD-10-CM

## 2024-11-13 DIAGNOSIS — F41.1 GENERALIZED ANXIETY DISORDER WITH PANIC ATTACKS: ICD-10-CM

## 2024-11-13 DIAGNOSIS — F41.0 GENERALIZED ANXIETY DISORDER WITH PANIC ATTACKS: ICD-10-CM

## 2024-11-13 DIAGNOSIS — F17.210 NICOTINE DEPENDENCE, CIGARETTES, UNCOMPLICATED: ICD-10-CM

## 2024-11-13 PROCEDURE — 90834 PSYTX W PT 45 MINUTES: CPT | Performed by: SOCIAL WORKER

## 2024-11-15 ENCOUNTER — RESULTS FOLLOW-UP (OUTPATIENT)
Dept: LAB | Facility: HOSPITAL | Age: 49
End: 2024-11-15

## 2024-11-15 ENCOUNTER — APPOINTMENT (OUTPATIENT)
Dept: LAB | Facility: HOSPITAL | Age: 49
End: 2024-11-15
Attending: INTERNAL MEDICINE
Payer: COMMERCIAL

## 2024-11-15 DIAGNOSIS — E83.51 HYPOCALCEMIA: ICD-10-CM

## 2024-11-15 LAB — CALCIUM SERPL-MCNC: 8.8 MG/DL (ref 8.4–10.2)

## 2024-11-15 PROCEDURE — 36415 COLL VENOUS BLD VENIPUNCTURE: CPT

## 2024-11-16 ENCOUNTER — APPOINTMENT (OUTPATIENT)
Dept: LAB | Facility: HOSPITAL | Age: 49
End: 2024-11-16
Attending: INTERNAL MEDICINE
Payer: COMMERCIAL

## 2024-11-16 DIAGNOSIS — E83.51 HYPOCALCEMIA: ICD-10-CM

## 2024-11-16 LAB — CALCIUM SERPL-MCNC: 9.3 MG/DL (ref 8.4–10.2)

## 2024-11-16 PROCEDURE — 36415 COLL VENOUS BLD VENIPUNCTURE: CPT
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
## 2024-11-19 ENCOUNTER — APPOINTMENT (OUTPATIENT)
Dept: LAB | Facility: HOSPITAL | Age: 49
End: 2024-11-19
Attending: INTERNAL MEDICINE
Payer: COMMERCIAL

## 2024-11-19 ENCOUNTER — TELEPHONE (OUTPATIENT)
Dept: ENDOCRINOLOGY | Facility: CLINIC | Age: 49
End: 2024-11-19

## 2024-11-19 DIAGNOSIS — E83.51 HYPOCALCEMIA: ICD-10-CM

## 2024-11-19 LAB — CALCIUM SERPL-MCNC: 10.4 MG/DL (ref 8.4–10.2)

## 2024-11-19 PROCEDURE — 36415 COLL VENOUS BLD VENIPUNCTURE: CPT

## 2024-11-20 ENCOUNTER — TELEMEDICINE (OUTPATIENT)
Dept: BEHAVIORAL/MENTAL HEALTH CLINIC | Facility: CLINIC | Age: 49
End: 2024-11-20
Payer: COMMERCIAL

## 2024-11-20 DIAGNOSIS — F33.1 MODERATE EPISODE OF RECURRENT MAJOR DEPRESSIVE DISORDER (HCC): Primary | ICD-10-CM

## 2024-11-20 PROCEDURE — 90834 PSYTX W PT 45 MINUTES: CPT | Performed by: SOCIAL WORKER

## 2024-11-24 DIAGNOSIS — J45.40 MODERATE PERSISTENT ASTHMA, UNSPECIFIED WHETHER COMPLICATED: ICD-10-CM

## 2024-11-25 ENCOUNTER — APPOINTMENT (OUTPATIENT)
Dept: LAB | Facility: HOSPITAL | Age: 49
End: 2024-11-25
Attending: INTERNAL MEDICINE
Payer: COMMERCIAL

## 2024-11-25 ENCOUNTER — RESULTS FOLLOW-UP (OUTPATIENT)
Dept: ENDOCRINOLOGY | Facility: CLINIC | Age: 49
End: 2024-11-25

## 2024-11-25 DIAGNOSIS — E83.51 HYPOCALCEMIA: ICD-10-CM

## 2024-11-25 LAB — CALCIUM SERPL-MCNC: 9.3 MG/DL (ref 8.4–10.2)

## 2024-11-25 PROCEDURE — 36415 COLL VENOUS BLD VENIPUNCTURE: CPT

## 2024-11-26 RX ORDER — ALBUTEROL SULFATE 90 UG/1
INHALANT RESPIRATORY (INHALATION)
Qty: 8.5 G | Refills: 5 | Status: SHIPPED | OUTPATIENT
Start: 2024-11-26

## 2024-11-27 ENCOUNTER — TELEMEDICINE (OUTPATIENT)
Dept: BEHAVIORAL/MENTAL HEALTH CLINIC | Facility: CLINIC | Age: 49
End: 2024-11-27
Payer: COMMERCIAL

## 2024-11-27 DIAGNOSIS — F33.1 MODERATE EPISODE OF RECURRENT MAJOR DEPRESSIVE DISORDER (HCC): Primary | ICD-10-CM

## 2024-11-27 PROCEDURE — 90834 PSYTX W PT 45 MINUTES: CPT | Performed by: SOCIAL WORKER
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
## 2024-11-29 ENCOUNTER — APPOINTMENT (OUTPATIENT)
Dept: LAB | Facility: HOSPITAL | Age: 49
End: 2024-11-29
Attending: INTERNAL MEDICINE
Payer: COMMERCIAL

## 2024-11-29 DIAGNOSIS — E83.51 HYPOCALCEMIA: ICD-10-CM

## 2024-11-29 LAB — CALCIUM SERPL-MCNC: 9.8 MG/DL (ref 8.4–10.2)

## 2024-11-29 PROCEDURE — 36415 COLL VENOUS BLD VENIPUNCTURE: CPT

## 2024-11-29 NOTE — RESULT ENCOUNTER NOTE
Hi   Am covering for Dr. Pabon, your calcium looks normal.  Continue current dose of calcium supplementation.  Please follow-up with Dr. Pabon as scheduled
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
## 2024-12-02 ENCOUNTER — APPOINTMENT (OUTPATIENT)
Dept: LAB | Facility: HOSPITAL | Age: 49
End: 2024-12-02
Attending: INTERNAL MEDICINE
Payer: COMMERCIAL

## 2024-12-02 DIAGNOSIS — E83.51 HYPOCALCEMIA: ICD-10-CM

## 2024-12-02 LAB — CALCIUM SERPL-MCNC: 9.7 MG/DL (ref 8.4–10.2)

## 2024-12-02 PROCEDURE — 36415 COLL VENOUS BLD VENIPUNCTURE: CPT

## 2024-12-04 ENCOUNTER — TELEMEDICINE (OUTPATIENT)
Dept: BEHAVIORAL/MENTAL HEALTH CLINIC | Facility: CLINIC | Age: 49
End: 2024-12-04
Payer: COMMERCIAL

## 2024-12-04 DIAGNOSIS — F33.1 MODERATE EPISODE OF RECURRENT MAJOR DEPRESSIVE DISORDER (HCC): Primary | ICD-10-CM

## 2024-12-04 PROCEDURE — 90834 PSYTX W PT 45 MINUTES: CPT | Performed by: SOCIAL WORKER

## 2024-12-06 ENCOUNTER — APPOINTMENT (OUTPATIENT)
Dept: LAB | Facility: HOSPITAL | Age: 49
End: 2024-12-06
Attending: INTERNAL MEDICINE
Payer: COMMERCIAL

## 2024-12-06 ENCOUNTER — TELEPHONE (OUTPATIENT)
Dept: NEPHROLOGY | Facility: CLINIC | Age: 49
End: 2024-12-06

## 2024-12-06 DIAGNOSIS — E83.51 HYPOCALCEMIA: ICD-10-CM

## 2024-12-06 LAB — CALCIUM SERPL-MCNC: 9.7 MG/DL (ref 8.4–10.2)

## 2024-12-06 PROCEDURE — 36415 COLL VENOUS BLD VENIPUNCTURE: CPT

## 2024-12-06 NOTE — TELEPHONE ENCOUNTER
Patient requested to be seen before her scheduled follow up. She saw one of her other providers that advised her to follow up sooner. She has requested to see Sonja Mcclellan or Dr. Jeong. Waitlist added Please schedule as soon as permits. She does need to be scheduled in [place] with her insurance.
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
## 2024-12-11 ENCOUNTER — APPOINTMENT (OUTPATIENT)
Dept: LAB | Facility: HOSPITAL | Age: 49
End: 2024-12-11
Payer: COMMERCIAL

## 2024-12-11 ENCOUNTER — TELEPHONE (OUTPATIENT)
Dept: ENDOCRINOLOGY | Facility: CLINIC | Age: 49
End: 2024-12-11

## 2024-12-11 ENCOUNTER — HOSPITAL ENCOUNTER (OUTPATIENT)
Dept: RADIOLOGY | Facility: HOSPITAL | Age: 49
Discharge: HOME | End: 2024-12-11
Payer: COMMERCIAL

## 2024-12-11 ENCOUNTER — TELEMEDICINE (OUTPATIENT)
Dept: BEHAVIORAL/MENTAL HEALTH CLINIC | Facility: CLINIC | Age: 49
End: 2024-12-11
Payer: COMMERCIAL

## 2024-12-11 ENCOUNTER — OFFICE VISIT (OUTPATIENT)
Dept: NEPHROLOGY | Facility: CLINIC | Age: 49
End: 2024-12-11
Payer: COMMERCIAL

## 2024-12-11 VITALS
RESPIRATION RATE: 97 BRPM | BODY MASS INDEX: 40.12 KG/M2 | WEIGHT: 218 LBS | SYSTOLIC BLOOD PRESSURE: 108 MMHG | HEART RATE: 100 BPM | HEIGHT: 62 IN | DIASTOLIC BLOOD PRESSURE: 86 MMHG

## 2024-12-11 VITALS — HEIGHT: 62 IN | WEIGHT: 205 LBS | BODY MASS INDEX: 37.73 KG/M2

## 2024-12-11 DIAGNOSIS — E83.9 CHRONIC KIDNEY DISEASE-MINERAL AND BONE DISORDER: ICD-10-CM

## 2024-12-11 DIAGNOSIS — Z12.39 BREAST CANCER SCREENING, HIGH RISK PATIENT: ICD-10-CM

## 2024-12-11 DIAGNOSIS — M89.9 CHRONIC KIDNEY DISEASE-MINERAL AND BONE DISORDER: ICD-10-CM

## 2024-12-11 DIAGNOSIS — N18.31 STAGE 3A CHRONIC KIDNEY DISEASE (HCC): ICD-10-CM

## 2024-12-11 DIAGNOSIS — N18.31 STAGE 3A CHRONIC KIDNEY DISEASE (HCC): Primary | ICD-10-CM

## 2024-12-11 DIAGNOSIS — E89.2 POST-SURGICAL HYPOPARATHYROIDISM (HCC): ICD-10-CM

## 2024-12-11 DIAGNOSIS — E83.51 HYPOCALCEMIA: ICD-10-CM

## 2024-12-11 DIAGNOSIS — N18.9 CHRONIC KIDNEY DISEASE-MINERAL AND BONE DISORDER: ICD-10-CM

## 2024-12-11 DIAGNOSIS — F33.1 MODERATE EPISODE OF RECURRENT MAJOR DEPRESSIVE DISORDER (HCC): Primary | ICD-10-CM

## 2024-12-11 DIAGNOSIS — E83.52 HYPERCALCEMIA: ICD-10-CM

## 2024-12-11 LAB
ANION GAP SERPL CALCULATED.3IONS-SCNC: 12 MMOL/L (ref 4–13)
BUN SERPL-MCNC: 30 MG/DL (ref 5–25)
CALCIUM SERPL-MCNC: 10.6 MG/DL (ref 8.4–10.2)
CHLORIDE SERPL-SCNC: 103 MMOL/L (ref 96–108)
CO2 SERPL-SCNC: 20 MMOL/L (ref 21–32)
CREAT SERPL-MCNC: 1.16 MG/DL (ref 0.6–1.3)
GFR SERPL CREATININE-BSD FRML MDRD: 55 ML/MIN/1.73SQ M
GLUCOSE SERPL-MCNC: 83 MG/DL (ref 65–140)
MAGNESIUM SERPL-MCNC: 2.3 MG/DL (ref 1.9–2.7)
PHOSPHATE SERPL-MCNC: 3.1 MG/DL (ref 2.7–4.5)
POTASSIUM SERPL-SCNC: 4.2 MMOL/L (ref 3.5–5.3)
SODIUM SERPL-SCNC: 135 MMOL/L (ref 135–147)

## 2024-12-11 PROCEDURE — 90834 PSYTX W PT 45 MINUTES: CPT | Performed by: SOCIAL WORKER

## 2024-12-11 PROCEDURE — 84100 ASSAY OF PHOSPHORUS: CPT

## 2024-12-11 PROCEDURE — 76642 ULTRASOUND BREAST LIMITED: CPT

## 2024-12-11 PROCEDURE — 83735 ASSAY OF MAGNESIUM: CPT

## 2024-12-11 PROCEDURE — G0279 TOMOSYNTHESIS, MAMMO: HCPCS

## 2024-12-11 PROCEDURE — 36415 COLL VENOUS BLD VENIPUNCTURE: CPT

## 2024-12-11 PROCEDURE — 99214 OFFICE O/P EST MOD 30 MIN: CPT | Performed by: INTERNAL MEDICINE

## 2024-12-11 PROCEDURE — 77066 DX MAMMO INCL CAD BI: CPT

## 2024-12-11 PROCEDURE — 80048 BASIC METABOLIC PNL TOTAL CA: CPT

## 2024-12-11 NOTE — PATIENT INSTRUCTIONS
You have chronic kidney disease (CKD) and your kidney function is stable.   I recommend no changes to your medications today.   Check labs today, in Feb 2025, April 2025, and June 2025  Follow up in 6 months - please obtain blood work 1-2 weeks prior to the appointment.     Please avoid taking NSAIDs (Ibuprofen, Motrin, Aleve, Advil, Naproxen, Celebrex, etc.)  Make sure you are eating healthy and have adequate exercise.

## 2024-12-11 NOTE — TELEPHONE ENCOUNTER
Request a refill:  [facility]   Medication  calcitriol (ROCALTROL) 0.25 mcg capsule [9350]  calcitriol (ROCALTROL) 0.25 mcg capsule [[id number]]    Order Details  Dose: 0.25 mcg Route: Oral Frequency: 2 times daily   Dispense Quantity: 180 capsule Refills: 2          Sig: Take 1 capsule (0.25 mcg total) by mouth 2 (two) times a day         Start Date: 10/29/24 End Date: --   Written Date: 10/29/24 Expiration Date: 10/29/25       [facility]/pharmacy #[id number] [address] [address]  Phone: [phone]  Fax: [phone]  [place] #: [id number]       Patient stopped in to request a refill also asking if adjustments can be made that she have 3 a day. Statig that is what she is doing to maintain 9.7. And she has decreased the other rx to achive maintaining 9.7 otherwise she goes over 10. Please Advise.

## 2024-12-11 NOTE — PROGRESS NOTES
NEPHROLOGY OFFICE PROGRESS NOTE   Yamileth Vale 49 y.o. female MRN: [id number]  DATE: 12/11/24  Reason for visit: Continued evaluation and management of CKD    ASSESSMENT & PLAN:  Chronic kidney disease, stage III  Baseline creatinine 1.1 to 1.4.   Etiology for CKD is felt to be hemodynamic effects of chronic hypercalcemia + arterio and arteriolosclerosis  Renal function is stable.  Creatinine 1.37.  Treatment: Maintenance of normocalcemia, good glycemic control.  We discussed the importance of keeping her Ca levels at goal as a way to help prevent progression of CKD.     Hypercalcemia:  She has chronic intermittent hypercalcemia which is iatrogenic.  She reports symptoms which she attributes to hypocalcemia despite her Ca being around low 9s which prompts to increase her Ca intake.  Fortunately, her Ca over the past month has been reasonable.   Most recent Ca is 9.7.   See below regarding meds.   She gets Ca checked twice a week.     Postop hypoparathyroidism  She is prescribed Calcitriol 0.25 mcg BID + Ca carbonate 600 mg TID but reports that her Ca was high with this regimen.   As such, she has maintained on Calcitriol 0.25 mcg 2-3 times per day + Ca carbonate 600 mg BID.  Defer to Dr. Pabon.   Again, we discussed the importance of avoiding overtreating.     Mineral bone disease  Vitamin D level is at goal - defer to endocrinology.   Mg is at goal.   Continue Mg oxide 400 mg TID.     Diabetes mellitus  She is on Metformin.     Obesity    Patient Instructions   You have chronic kidney disease (CKD) and your kidney function is stable.   I recommend no changes to your medications today.   Check labs today, in Feb 2025, April 2025, and June 2025  Follow up in 6 months - please obtain blood work 1-2 weeks prior to the appointment.     Please avoid taking NSAIDs (Ibuprofen, Motrin, Aleve, Advil, Naproxen, Celebrex, etc.)  Make sure you are eating healthy and have adequate exercise.     SUBJECTIVE / INTERVAL  HISTORY:  Yamileth Vale is a 49-year-old lady who I am seeing for the first time in the office for continued evaluation of chronic kidney disease.  She was last seen in the office by our AP, Sonja Mcclellan, in May 2024. She previously saw Dr. Zambrano over a year ago.     Her last hospitalization was in April 2024 when she had a cholecystectomy.  There have been no unplanned hospitalizations since her last office visit.  She follows closely with Dr. Pabon of endocrinology for Ca management.   She gets her calcium checked twice a week.  She takes calcium 600 mg twice a day and calcitriol 0.25 mcg twice or thrice a day. Of note, she is prescribed Calcium 600 mg TID and Calcitriol 0.25 mcg BID.   Her calcium levels have been anywhere between 8.8 and 10.4 over the past month.  She reports that she has symptoms (tingling and numbness) when her calcium goes down to low 9s -    Her biggest complaint right now is her weight gain.  She reports being unable to exercise due to her back pain.    PMH/PSH:   Thyroid nodules s/p thyroidectomy  Hypothyroidism: She is on thyroid hormone replacement  Post op hypoparathyroidism: She is on calcitriol and calcium supplements  Diabetes mellitus: She is on metformin.  Hyperlipidemia: She is on fenofibrate  Obesity  Reactive airway disease:  GERD: Not on meds now. She takes Tums PRN.   Anxiety/depression  DDD s/p back surgery  Fatty liver  Colonic polyps  DVT: She was previously on anticoagulation.   Nephrolithiasis: s/p cystoscopy x 2-3 times in the past.     No HTN, CAD, CVA, CHF, cancer    Previous work up:   4/1/14 Renal US: R 10.6 cm, L 12.4 cm, no hydronephrosis, nonobstructing calculi on both sides, simple appearing cysts on left. PVR 3.3 cc.     ALLERGIES:   Allergies   Allergen Reactions    Hydrocodone-Acetaminophen Rash    Morphine And Codeine GI Intolerance and Nausea Only     Nausea/vomiting      Vicodin [Hydrocodone-Acetaminophen] Rash    Adhesive [Medical Tape] Rash      "Bandaids     REVIEW OF SYSTEMS:  Review of Systems   Constitutional:  Positive for fatigue. Negative for chills and fever.        Weight gain.    Respiratory:  Positive for cough. Negative for shortness of breath.    Cardiovascular:  Negative for chest pain and leg swelling.   Gastrointestinal:  Positive for blood in stool (Resolved.). Negative for abdominal pain, diarrhea, nausea and vomiting.   Genitourinary:  Negative for hematuria.   Musculoskeletal:  Positive for back pain. Negative for arthralgias.   Allergic/Immunologic: Negative for immunocompromised state.   Neurological:  Negative for dizziness and light-headedness.     OBJECTIVE:  /86 (BP Location: Left arm, Patient Position: Sitting, Cuff Size: Large)   Pulse 100   Resp (!) 97   Ht 5' 2\" (1.575 m)   Wt 98.9 kg (218 lb)   LMP 12/06/2018 Comment: partial hysterectomy   BMI 39.87 kg/m²   Current Weight:   There is no height or weight on file to calculate BMI.  Physical Exam  Constitutional:       General: She is not in acute distress.     Appearance: Normal appearance. She is well-developed. She is obese. She is not ill-appearing or toxic-appearing.   HENT:      Head: Normocephalic and atraumatic.   Eyes:      General: No scleral icterus.     Conjunctiva/sclera: Conjunctivae normal.   Neck:      Vascular: No JVD.   Cardiovascular:      Rate and Rhythm: Normal rate and regular rhythm.      Heart sounds: Normal heart sounds.   Pulmonary:      Effort: Pulmonary effort is normal.      Breath sounds: Normal breath sounds.   Abdominal:      General: Bowel sounds are normal.      Palpations: Abdomen is soft.   Musculoskeletal:      Right lower leg: No edema.      Left lower leg: No edema.   Skin:     General: Skin is warm and dry.   Neurological:      Mental Status: She is alert and oriented to person, place, and time.   Psychiatric:         Mood and Affect: Mood normal.         Behavior: Behavior normal. Behavior is cooperative.         Judgment: " Judgment normal.       Medications:  Current Outpatient Medications:     acetaminophen (TYLENOL) 500 mg tablet, Take 1 tablet (500 mg total) by mouth every 6 (six) hours as needed for mild pain or moderate pain, Disp: 30 tablet, Rfl: 0    albuterol (PROVENTIL HFA,VENTOLIN HFA) 90 mcg/act inhaler, INHALE 1 PUFF BY MOUTH EVERY 6 HOURS AS NEEDED FOR WHEEZE, Disp: 8.5 g, Rfl: 5    Alcohol Swabs 70 % PADS, May substitute brand based on insurance coverage. Check glucose BID., Disp: 100 each, Rfl: 0    ALPRAZolam ER (XANAX XR) 2 MG 24 hr tablet, Take 1 tablet (2 mg total) by mouth 2 (two) times a day Before breakfast and lunch, Disp: 60 tablet, Rfl: 0    bacitracin topical ointment 500 units/g topical ointment, Apply 1 large application topically 2 (two) times a day (Patient not taking: Reported on 10/28/2024), Disp: 28 g, Rfl: 0    Blood Glucose Monitoring Suppl (OneTouch Verio Reflect) w/Device KIT, May substitute brand based on insurance coverage. Check glucose BID., Disp: 1 kit, Rfl: 0    calcitriol (ROCALTROL) 0.25 mcg capsule, Take 1 capsule (0.25 mcg total) by mouth 2 (two) times a day, Disp: 180 capsule, Rfl: 2    calcium carbonate (OS-EDER) 600 MG tablet, Take 1 pill daily twice a day, Disp: 180 tablet, Rfl: 10    Cholecalciferol (Vitamin D3) 125 MCG (5000 UT) CAPS, Take 5000 IU daily, Disp: , Rfl:     cyclobenzaprine (FLEXERIL) 10 mg tablet, Take 1 tablet (10 mg total) by mouth 2 (two) times a day as needed for muscle spasms, Disp: 20 tablet, Rfl: 0    desvenlafaxine (PRISTIQ) 100 mg 24 hr tablet, Take 1 tablet (100 mg total) by mouth daily, Disp: 90 tablet, Rfl: 1    famotidine (PEPCID) 40 MG tablet, Take 1 tablet (40 mg total) by mouth daily at bedtime (Patient not taking: Reported on 10/28/2024), Disp: 30 tablet, Rfl: 3    fenofibrate 160 MG tablet, Take 1 tablet (160 mg total) by mouth daily, Disp: 90 tablet, Rfl: 1    ferrous sulfate 325 (65 Fe) mg tablet, Take 1 tablet (325 mg total) by mouth 2 (two) times a  day with meals, Disp: 60 tablet, Rfl: 5    fluticasone (FLONASE) 50 mcg/act nasal spray, 1 spray into each nostril daily, Disp: 16 g, Rfl: 0    fluticasone-umeclidinium-vilanterol (Trelegy Ellipta) 100-62.5-25 mcg/actuation inhaler, Inhale 1 puff daily Rinse mouth after use., Disp: 60 blister, Rfl: 7    glucose blood (OneTouch Verio) test strip, May substitute brand based on insurance coverage. Check glucose TID., Disp: 100 each, Rfl: 1    hydrocortisone 2.5 % cream, Apply 1 application. topically 2 (two) times a day, Disp: , Rfl:     levothyroxine 150 mcg tablet, Take 1 tablet (150 mcg total) by mouth daily at bedtime, Disp: 90 tablet, Rfl: 1    Lidocaine-Menthol 4-1 % PTCH, if needed  , Disp: , Rfl:     magnesium Oxide (MAG-OX) 400 mg TABS, TAKE 1 TABLET BY MOUTH THREE TIMES A DAY, Disp: 90 tablet, Rfl: 5    metFORMIN (GLUCOPHAGE-XR) 500 mg 24 hr tablet, Take 1 tablet (500 mg total) by mouth 2 (two) times a day with meals, Disp: 180 tablet, Rfl: 2    Movantik 25 MG tablet, 25 mg in the morning, Disp: , Rfl:     naloxone (NARCAN) 4 mg/0.1 mL nasal spray, Administer 1 spray into a nostril. If no response after 2-3 minutes, give another dose in the other nostril using a new spray. (Patient not taking: Reported on 10/28/2024), Disp: 1 each, Rfl: 1    nystatin (MYCOSTATIN) powder, Apply under the breast twice a day for 1 week, Disp: 60 g, Rfl: 1    ondansetron (ZOFRAN-ODT) 4 mg disintegrating tablet, Take 1 tablet (4 mg total) by mouth every 6 (six) hours as needed for nausea or vomiting, Disp: 30 tablet, Rfl: 1    OneTouch Delica Lancets 33G MISC, May substitute brand based on insurance coverage. Check glucose TID, Disp: 100 each, Rfl: 1    oxyCODONE-acetaminophen (PERCOCET)  mg per tablet, 1 tablet 4 (four) times a day, Disp: , Rfl:     pantoprazole (PROTONIX) 40 mg tablet, Take 1 tablet (40 mg total) by mouth daily (Patient not taking: Reported on 10/28/2024), Disp: 30 tablet, Rfl: 3    potassium chloride  (Klor-Con M20) 20 mEq tablet, TAKE 1 TABLET BY MOUTH 2 TIMES A DAY., Disp: 180 tablet, Rfl: 1    Psyllium (Metamucil) 0.36 g CAPS, Take 1 g by oral route., Disp: , Rfl:     Senna Plus 8.6-50 MG per tablet, , Disp: , Rfl:     tiZANidine (ZANAFLEX) 4 mg tablet, Take 4 mg by mouth Three times daily as needed, Disp: , Rfl:     traZODone (DESYREL) 100 mg tablet, Take 1 tablet (100 mg total) by mouth daily at bedtime, Disp: 90 tablet, Rfl: 1    Laboratory Results:  Lab Results   Component Value Date    WBC 5.55 08/31/2024    HGB 12.9 08/31/2024    HCT 40.1 08/31/2024    MCV 92 08/31/2024     08/31/2024     Lab Results   Component Value Date    SODIUM 135 11/04/2024    K 4.2 11/04/2024    CL 99 11/04/2024    CO2 24 11/04/2024    BUN 30 (H) 11/04/2024    CREATININE 1.37 (H) 11/04/2024    GLUC 78 09/27/2024    CALCIUM 9.7 12/06/2024

## 2024-12-12 ENCOUNTER — TELEPHONE (OUTPATIENT)
Dept: NEPHROLOGY | Facility: CLINIC | Age: 49
End: 2024-12-12

## 2024-12-12 DIAGNOSIS — E89.2 POST-SURGICAL HYPOPARATHYROIDISM (HCC): ICD-10-CM

## 2024-12-12 DIAGNOSIS — E83.51 HYPOCALCEMIA: ICD-10-CM

## 2024-12-12 DIAGNOSIS — E55.9 VITAMIN D DEFICIENCY: ICD-10-CM

## 2024-12-12 DIAGNOSIS — N18.31 STAGE 3A CHRONIC KIDNEY DISEASE (HCC): ICD-10-CM

## 2024-12-12 DIAGNOSIS — E89.0 POSTOPERATIVE HYPOTHYROIDISM: ICD-10-CM

## 2024-12-12 RX ORDER — CALCITRIOL 0.25 UG/1
0.25 CAPSULE, LIQUID FILLED ORAL 2 TIMES DAILY
Qty: 180 CAPSULE | Refills: 2 | Status: SHIPPED | OUTPATIENT
Start: 2024-12-12

## 2024-12-12 NOTE — TELEPHONE ENCOUNTER
Prescription of calcitriol is being renewed  Please advise patient to take it as it has been prescribed -  twice a day and not 3 times a day

## 2024-12-12 NOTE — TELEPHONE ENCOUNTER
I called the patient and we spoke over the phone.   Recent laboratory data were reviewed.     Lab Results   Component Value Date    SODIUM 135 12/11/2024    K 4.2 12/11/2024     12/11/2024    CO2 20 (L) 12/11/2024    BUN 30 (H) 12/11/2024    CREATININE 1.16 12/11/2024    GLUC 83 12/11/2024    CALCIUM 10.6 (H) 12/11/2024     Renal function is stable.  Ca is up to 10.6.     She is on Calcium carb 600 mg BID and Calcitriol 0.25 mcg TID.    Plan:  Advised to decrease Calcitriol to 0.25 mcg BID (as previously prescribed)

## 2024-12-12 NOTE — TELEPHONE ENCOUNTER
I called and left a voicemail for the patient stating Dr. Pabon stated she Prescription of calcitriol is being renewed  Please advise patient to take it as it has been prescribed -  twice a day and not 3 times a day

## 2024-12-13 ENCOUNTER — APPOINTMENT (OUTPATIENT)
Dept: LAB | Facility: HOSPITAL | Age: 49
End: 2024-12-13
Attending: INTERNAL MEDICINE
Payer: COMMERCIAL

## 2024-12-13 DIAGNOSIS — E83.51 HYPOCALCEMIA: ICD-10-CM

## 2024-12-13 LAB — CALCIUM SERPL-MCNC: 9.6 MG/DL (ref 8.4–10.2)

## 2024-12-13 PROCEDURE — 36415 COLL VENOUS BLD VENIPUNCTURE: CPT
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
## 2024-12-14 ENCOUNTER — DOCUMENTATION (OUTPATIENT)
Dept: CASE MANAGEMENT | Facility: OTHER | Age: 49
End: 2024-12-14

## 2024-12-14 NOTE — MEDICAL HIGH UTILIZER
High Utilizer Care Plan for: Yamileth Vale  Updated 2024  Patient Name: Yamileth Vale MRN: [id number]       : 1975    Age: 49   Sex: F     Utilization Background: In the 6 months prior to the care plan being written the patient has had 18 ED visits secondary to calcium related complaints. At this time, she has not had concerning radiation exposures. There were 5 occurrences in  where the patient received IV calcium gluconate for normal calcium/ionized calcium levels.     In the last 90 Days: 1 ED visit     Most Recent Work Up:  Noncontributory to utilization at this time. Treatment Recommendations:  ED Recommendations: Patient will typically present secondary to numbness or tingling that she will attribute to her calcium levels being off. There has been times where she will self-adjust medications and take large doses of exogenous calcium.     Recommend checking serum calcium and ionized calcium levels. Would only offer IV calcium gluconate if calcium levels are 7.5.     If the patient has hypocalcemic symptoms and her levels are > 7.5 recommend oral calcium intake. This does not need to be ordered in the ED.     Since patient at times over adjusts her medications which results in high serum calcium levels and potentially LELAND, low threshold to supply IV fluids if there is a rise in creatinine or hypercalcemia present.     At every visit patient should be encouraged to reach out to her Endocrinology team regarding her calcium levels and appropriate course of action prior to presenting to the ED.        Inpatient Recommendations: Patient is low risk for readmission at this time. Please see above recommendations regarding calcium supplementation.       Outpatient recommendations: Patient should maintain close follow up with Endocrinology and be encouraged to call Endocrinology office with symptoms or questions regarding labs and medication adjustments. Consideration for outpatient  infusions of calcium if needed.     Situation: Patient is a 48 year old female with history of hypoparathyroidism who presents frequently with symptoms perceived to be from hypocalcemia. She has been hypercalcemic at times due to self adjusting medications and has received doses of IV calcium gluconate with normal calcium and ionized calcium levels.      PMH/PSH: Hypoparathyroidism, Prediabetes, CKD 3, Obesity       Assessment                              Drivers of repeated utilization:                 Numbness/tingling thought to be due to low calcium levels     Community Resources in place:    N/A    Patient Care Team:   Matthew Ramos MD - PCP ([facility])  ELIF Roblero, Yisel Pabon MD - Endocrinology ([facility])  Husam Jeong MD - Nephrology ([facility])  Danie Morrell - NETTE RN Care Manager            Care plan date and owner: Rhett Salas Jr., PA-C 2/26/2024       Reviewed with patient before discharge?   3/20/2024

## 2024-12-16 ENCOUNTER — APPOINTMENT (OUTPATIENT)
Dept: LAB | Facility: HOSPITAL | Age: 49
End: 2024-12-16
Attending: INTERNAL MEDICINE
Payer: COMMERCIAL

## 2024-12-16 DIAGNOSIS — E83.51 HYPOCALCEMIA: ICD-10-CM

## 2024-12-16 LAB — CALCIUM SERPL-MCNC: 9.2 MG/DL (ref 8.4–10.2)

## 2024-12-16 PROCEDURE — 36415 COLL VENOUS BLD VENIPUNCTURE: CPT

## 2024-12-18 ENCOUNTER — TELEPHONE (OUTPATIENT)
Dept: OTHER | Facility: OTHER | Age: 49
End: 2024-12-18

## 2024-12-18 ENCOUNTER — TELEMEDICINE (OUTPATIENT)
Dept: BEHAVIORAL/MENTAL HEALTH CLINIC | Facility: CLINIC | Age: 49
End: 2024-12-18
Payer: COMMERCIAL

## 2024-12-18 ENCOUNTER — APPOINTMENT (OUTPATIENT)
Dept: LAB | Facility: HOSPITAL | Age: 49
End: 2024-12-18
Attending: INTERNAL MEDICINE

## 2024-12-18 ENCOUNTER — HOSPITAL ENCOUNTER (EMERGENCY)
Facility: HOSPITAL | Age: 49
Discharge: HOME | End: 2024-12-18
Attending: EMERGENCY MEDICINE
Payer: COMMERCIAL

## 2024-12-18 VITALS
HEIGHT: 62 IN | OXYGEN SATURATION: 96 % | WEIGHT: 205 LBS | BODY MASS INDEX: 37.73 KG/M2 | HEART RATE: 99 BPM | RESPIRATION RATE: 18 BRPM | SYSTOLIC BLOOD PRESSURE: 139 MMHG | TEMPERATURE: 98.3 F | DIASTOLIC BLOOD PRESSURE: 90 MMHG

## 2024-12-18 DIAGNOSIS — F33.1 MODERATE EPISODE OF RECURRENT MAJOR DEPRESSIVE DISORDER (HCC): Primary | ICD-10-CM

## 2024-12-18 DIAGNOSIS — E83.51 HYPOCALCEMIA: ICD-10-CM

## 2024-12-18 DIAGNOSIS — R20.2 PARESTHESIAS: Primary | ICD-10-CM

## 2024-12-18 LAB
ALBUMIN SERPL BCG-MCNC: 4 G/DL (ref 3.5–5)
ALP SERPL-CCNC: 68 U/L (ref 34–104)
ALT SERPL W P-5'-P-CCNC: 45 U/L (ref 7–52)
ANION GAP SERPL CALCULATED.3IONS-SCNC: 10 MMOL/L (ref 4–13)
AST SERPL W P-5'-P-CCNC: 23 U/L (ref 13–39)
ATRIAL RATE: 88 BPM
BILIRUB SERPL-MCNC: 0.35 MG/DL (ref 0.2–1)
BUN SERPL-MCNC: 24 MG/DL (ref 5–25)
CA-I BLD-SCNC: 1.09 MMOL/L (ref 1.12–1.32)
CALCIUM SERPL-MCNC: 10.6 MG/DL (ref 8.4–10.2)
CALCIUM SERPL-MCNC: 8.9 MG/DL (ref 8.4–10.2)
CHLORIDE SERPL-SCNC: 101 MMOL/L (ref 96–108)
CO2 SERPL-SCNC: 24 MMOL/L (ref 21–32)
CREAT SERPL-MCNC: 1.11 MG/DL (ref 0.6–1.3)
GFR SERPL CREATININE-BSD FRML MDRD: 58 ML/MIN/1.73SQ M
GLUCOSE SERPL-MCNC: 144 MG/DL (ref 65–140)
P AXIS: 49 DEGREES
POTASSIUM SERPL-SCNC: 4.1 MMOL/L (ref 3.5–5.3)
PR INTERVAL: 164 MS
PROT SERPL-MCNC: 6.6 G/DL (ref 6.4–8.4)
QRS AXIS: 38 DEGREES
QRSD INTERVAL: 76 MS
QT INTERVAL: 386 MS
QTC INTERVAL: 467 MS
SODIUM SERPL-SCNC: 135 MMOL/L (ref 135–147)
T WAVE AXIS: 52 DEGREES
VENTRICULAR RATE: 88 BPM

## 2024-12-18 PROCEDURE — 99284 EMERGENCY DEPT VISIT MOD MDM: CPT | Performed by: EMERGENCY MEDICINE

## 2024-12-18 PROCEDURE — 99284 EMERGENCY DEPT VISIT MOD MDM: CPT

## 2024-12-18 PROCEDURE — 90834 PSYTX W PT 45 MINUTES: CPT | Performed by: SOCIAL WORKER

## 2024-12-18 PROCEDURE — 80053 COMPREHEN METABOLIC PANEL: CPT | Performed by: EMERGENCY MEDICINE

## 2024-12-18 PROCEDURE — 82330 ASSAY OF CALCIUM: CPT | Performed by: EMERGENCY MEDICINE

## 2024-12-18 PROCEDURE — 36415 COLL VENOUS BLD VENIPUNCTURE: CPT | Performed by: EMERGENCY MEDICINE

## 2024-12-18 PROCEDURE — 93010 ELECTROCARDIOGRAM REPORT: CPT | Performed by: INTERNAL MEDICINE

## 2024-12-18 PROCEDURE — 93005 ELECTROCARDIOGRAM TRACING: CPT

## 2024-12-18 NOTE — TELEPHONE ENCOUNTER
Received priority message to call patient because she has low calcium levels.   On chart review, patient noted to be in the ED. Labs pending initially.    Upon speaking with patient, she notes that she has been experiencing numbness and tingling all over, and went to the ED when she started feeling the sensation in her face. Patient upset  because ED provider refusing to give IV calcium, as it is noted in her chart that she should not get IV calcium until calcium levels are <7.5.  Upon resulting, Ca level noted to be 8.9. At this point, Yamileth tells me that she is leaving the ED as the provider there is refusing to give IV calcium, so she is planning on going home and taking 2 additional calcium tablets.     Explained to Yamileth that per guidelines, it is generally not recommended to get IV calcium until levels are <7.0 or in the presence of significant neurological, cardiac or respiratory symptoms. Cautioned patient against too aggressive oversupplementation as she was noted to be hypercalcemic approximately 1 week ago.  Additionally advised patient to call the office later today to see if they have additional recommendations.

## 2024-12-18 NOTE — TELEPHONE ENCOUNTER
Patient requested to speak to the on call provider. The Patient is stating that she has numbness and tingling. Was told that her calcium levels are too low.     Contacted the on call via secure chat.

## 2024-12-18 NOTE — TELEPHONE ENCOUNTER
I called and spoke with the patient and she stated that she change her mind she is not going to the ED. She is going to get her blood work done and rest today. Thank you

## 2024-12-18 NOTE — TELEPHONE ENCOUNTER
If patient is not feeling well/right, I would strongly recommend that she does not drive to the ED herself and that she asks someone to take her.

## 2024-12-18 NOTE — TELEPHONE ENCOUNTER
I called and spoke with the patient and she stated that she is going to go to the ED once she gets a hold on her vehicle. She is still not feel right. She got home last night and took 2 tablets of the calcium and was able to fall a sleep for a little bit. Thank you
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
## 2024-12-18 NOTE — TELEPHONE ENCOUNTER
Please call the patient and ask her how she is feeling now.   Reviewed labs, they were within normal limits.

## 2024-12-20 NOTE — PSYCH
"Behavioral Health Psychotherapy Progress Note    Psychotherapy Provided: Individual Psychotherapy     1. Moderate episode of recurrent major depressive disorder (HCC)            Goals addressed in session: Goal 1     DATA: Michelle reports feeling well stating that she did go to the hospital during the night but that it resolved easily. She discussed her plans for the day as well as coming week. Depression and anxiety symptoms continue to be low. Has spoken with her nieces and nephew which was enjoyable for her. Is grateful for their relationship despite no longer speaking to their mother; her sister, Trudy. Discussed the benefits of a grateful mindset and how recognition of repeated wins feels within her body. Return in 1 week.   During this session, this clinician used the following therapeutic modalities: Client-centered Therapy    Substance Abuse was not addressed during this session. If the client is diagnosed with a co-occurring substance use disorder, please indicate any changes in the frequency or amount of use: NA. Stage of change for addressing substance use diagnoses: No substance use/Not applicable    ASSESSMENT:  Yamileth Vale presents with a Euthymic/ normal mood.     her affect is Normal range and intensity, which is congruent, with her mood and the content of the session. The client has made progress on their goals.    During this session the client was oriented to person, place, and time. The client was engaged in the session. The client was able to sustain direct eye contact and was without psychomotor agitation. The client's thought processes were linear and clear.   Yamileth Vale presents with a none risk of suicide, none risk of self-harm, and none risk of harm to others.    For any risk assessment that surpasses a \"low\" rating, a safety plan must be developed.    A safety plan was indicated: no  If yes, describe in detail NA    PLAN: Between sessions, Yamileth Vale " will take medication as prescribed and practice positive coping strategies as needed . At the next session, the therapist will use Client-centered Therapy, Cognitive Behavioral Therapy, Cognitive Processing Therapy, Mindfulness-based Strategies, and Supportive Psychotherapy to address stressors.    Behavioral Health Treatment Plan and Discharge Planning: Yamileth Vale is aware of and agrees to continue to work on their treatment plan. They have identified and are working toward their discharge goals. yes    Depression Follow-up Plan Completed: Not applicable    Visit start and stop times:    12/20/24  Start Time: 1000  Stop Time: 1050  Total Visit Time: 50 minutes

## 2024-12-23 ENCOUNTER — PATIENT OUTREACH (OUTPATIENT)
Dept: CASE MANAGEMENT | Facility: OTHER | Age: 49
End: 2024-12-23

## 2024-12-23 ENCOUNTER — TELEMEDICINE (OUTPATIENT)
Dept: PSYCHIATRY | Facility: CLINIC | Age: 49
End: 2024-12-23
Payer: COMMERCIAL

## 2024-12-23 ENCOUNTER — APPOINTMENT (OUTPATIENT)
Dept: LAB | Facility: HOSPITAL | Age: 49
End: 2024-12-23
Attending: INTERNAL MEDICINE
Payer: COMMERCIAL

## 2024-12-23 DIAGNOSIS — F41.0 PANIC DISORDER: ICD-10-CM

## 2024-12-23 DIAGNOSIS — F33.1 MDD (MAJOR DEPRESSIVE DISORDER), RECURRENT EPISODE, MODERATE (HCC): Primary | ICD-10-CM

## 2024-12-23 DIAGNOSIS — F41.1 GAD (GENERALIZED ANXIETY DISORDER): ICD-10-CM

## 2024-12-23 DIAGNOSIS — E83.51 HYPOCALCEMIA: ICD-10-CM

## 2024-12-23 DIAGNOSIS — F43.10 PTSD (POST-TRAUMATIC STRESS DISORDER): ICD-10-CM

## 2024-12-23 LAB — CALCIUM SERPL-MCNC: 10.2 MG/DL (ref 8.4–10.2)

## 2024-12-23 PROCEDURE — 99214 OFFICE O/P EST MOD 30 MIN: CPT | Performed by: PHYSICIAN ASSISTANT

## 2024-12-23 PROCEDURE — 36415 COLL VENOUS BLD VENIPUNCTURE: CPT

## 2024-12-23 RX ORDER — ALPRAZOLAM 2 MG/1
2 TABLET, EXTENDED RELEASE ORAL 2 TIMES DAILY
Qty: 60 TABLET | Refills: 0 | Status: SHIPPED | OUTPATIENT
Start: 2024-12-23

## 2024-12-23 NOTE — ASSESSMENT & PLAN NOTE
Continue Pristiq 100 mg daily for depression  Continue trazodone 100 mg nightly for sleep  Continue Xanax XR milligrams twice daily for anxiety
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
## 2024-12-23 NOTE — PROGRESS NOTES
"Covering RN received notification that the patient went to the ED with c/o numbness & tingling & concern for hypocalcemia. Calcium was WNL. Per chart notes, patient left the ED because they wouldn't give her IV Calcium. Endocrinology spoke to her & educated that IV calcium is not recommended unless levels are less than <7.0. Patient reported she was going to take 2 additional calcium tablets. She was advised not to self-medicate as she was recently hypercalcemic. Patient's repeat calcium level was 10.6 after taking extra calcium. Her next appointment with endocrinology is 2/5/25.    Called the patient. She reports feeling \"okay\". She stated she went to the ED so she wouldn't have to take extra calcium. Re-educated to take her meds as prescribed & that giving IV calcium also could have resulted in hypercalcemia. Educated on complications of hypercalcemia. Patient stated understanding.    Update sent to DELMER Helton CM.  "

## 2024-12-26 ENCOUNTER — TELEMEDICINE (OUTPATIENT)
Dept: BEHAVIORAL/MENTAL HEALTH CLINIC | Facility: CLINIC | Age: 49
End: 2024-12-26

## 2024-12-26 DIAGNOSIS — Z91.199 NO-SHOW FOR APPOINTMENT: Primary | ICD-10-CM

## 2024-12-27 NOTE — PSYCH
No Call. No Show. Charge    Yamileth Vale no showed 12/27/24 appointment , staff called and left message to reschedule appointment     Treatment Plan not due at this session.

## 2024-12-28 ENCOUNTER — HOSPITAL ENCOUNTER (EMERGENCY)
Facility: HOSPITAL | Age: 49
Discharge: HOME | End: 2024-12-28
Attending: EMERGENCY MEDICINE | Admitting: EMERGENCY MEDICINE
Payer: COMMERCIAL

## 2024-12-28 VITALS
HEART RATE: 103 BPM | WEIGHT: 219.2 LBS | OXYGEN SATURATION: 98 % | RESPIRATION RATE: 19 BRPM | TEMPERATURE: 98.2 F | BODY MASS INDEX: 40.09 KG/M2 | SYSTOLIC BLOOD PRESSURE: 136 MMHG | DIASTOLIC BLOOD PRESSURE: 83 MMHG

## 2024-12-28 DIAGNOSIS — L03.211 FACIAL CELLULITIS: ICD-10-CM

## 2024-12-28 DIAGNOSIS — K04.7 DENTAL INFECTION: Primary | ICD-10-CM

## 2024-12-28 LAB
ALBUMIN SERPL BCG-MCNC: 4.4 G/DL (ref 3.5–5)
ALP SERPL-CCNC: 84 U/L (ref 34–104)
ALT SERPL W P-5'-P-CCNC: 68 U/L (ref 7–52)
ANION GAP SERPL CALCULATED.3IONS-SCNC: 12 MMOL/L (ref 4–13)
AST SERPL W P-5'-P-CCNC: 29 U/L (ref 13–39)
BASOPHILS # BLD AUTO: 0.05 THOUSANDS/ÂΜL (ref 0–0.1)
BASOPHILS NFR BLD AUTO: 1 % (ref 0–1)
BILIRUB SERPL-MCNC: 0.34 MG/DL (ref 0.2–1)
BUN SERPL-MCNC: 15 MG/DL (ref 5–25)
CALCIUM SERPL-MCNC: 9.8 MG/DL (ref 8.4–10.2)
CHLORIDE SERPL-SCNC: 98 MMOL/L (ref 96–108)
CO2 SERPL-SCNC: 25 MMOL/L (ref 21–32)
CREAT SERPL-MCNC: 1.21 MG/DL (ref 0.6–1.3)
EOSINOPHIL # BLD AUTO: 0.03 THOUSAND/ÂΜL (ref 0–0.61)
EOSINOPHIL NFR BLD AUTO: 0 % (ref 0–6)
ERYTHROCYTE [DISTWIDTH] IN BLOOD BY AUTOMATED COUNT: 16.2 % (ref 11.6–15.1)
GFR SERPL CREATININE-BSD FRML MDRD: 53 ML/MIN/1.73SQ M
GLUCOSE SERPL-MCNC: 251 MG/DL (ref 65–140)
HCT VFR BLD AUTO: 43.1 % (ref 34.8–46.1)
HGB BLD-MCNC: 14 G/DL (ref 11.5–15.4)
IMM GRANULOCYTES # BLD AUTO: 0.04 THOUSAND/UL (ref 0–0.2)
IMM GRANULOCYTES NFR BLD AUTO: 1 % (ref 0–2)
LYMPHOCYTES # BLD AUTO: 1.8 THOUSANDS/ÂΜL (ref 0.6–4.47)
LYMPHOCYTES NFR BLD AUTO: 26 % (ref 14–44)
MCH RBC QN AUTO: 30.8 PG (ref 26.8–34.3)
MCHC RBC AUTO-ENTMCNC: 32.5 G/DL (ref 31.4–37.4)
MCV RBC AUTO: 95 FL (ref 82–98)
MONOCYTES # BLD AUTO: 0.4 THOUSAND/ÂΜL (ref 0.17–1.22)
MONOCYTES NFR BLD AUTO: 6 % (ref 4–12)
NEUTROPHILS # BLD AUTO: 4.51 THOUSANDS/ÂΜL (ref 1.85–7.62)
NEUTS SEG NFR BLD AUTO: 66 % (ref 43–75)
NRBC BLD AUTO-RTO: 0 /100 WBCS
PLATELET # BLD AUTO: 202 THOUSANDS/UL (ref 149–390)
PMV BLD AUTO: 10.7 FL (ref 8.9–12.7)
POTASSIUM SERPL-SCNC: 3.7 MMOL/L (ref 3.5–5.3)
PROT SERPL-MCNC: 7.6 G/DL (ref 6.4–8.4)
RBC # BLD AUTO: 4.55 MILLION/UL (ref 3.81–5.12)
SODIUM SERPL-SCNC: 135 MMOL/L (ref 135–147)
WBC # BLD AUTO: 6.83 THOUSAND/UL (ref 4.31–10.16)

## 2024-12-28 PROCEDURE — 96375 TX/PRO/DX INJ NEW DRUG ADDON: CPT

## 2024-12-28 PROCEDURE — 99284 EMERGENCY DEPT VISIT MOD MDM: CPT | Performed by: EMERGENCY MEDICINE

## 2024-12-28 PROCEDURE — 99285 EMERGENCY DEPT VISIT HI MDM: CPT

## 2024-12-28 PROCEDURE — 80053 COMPREHEN METABOLIC PANEL: CPT | Performed by: EMERGENCY MEDICINE

## 2024-12-28 PROCEDURE — 96365 THER/PROPH/DIAG IV INF INIT: CPT

## 2024-12-28 PROCEDURE — 36415 COLL VENOUS BLD VENIPUNCTURE: CPT | Performed by: EMERGENCY MEDICINE

## 2024-12-28 PROCEDURE — 85025 COMPLETE CBC W/AUTO DIFF WBC: CPT | Performed by: EMERGENCY MEDICINE

## 2024-12-28 RX ORDER — CLINDAMYCIN HYDROCHLORIDE 150 MG/1
150 CAPSULE ORAL EVERY 6 HOURS
Qty: 28 CAPSULE | Refills: 0 | Status: SHIPPED | OUTPATIENT
Start: 2024-12-28 | End: 2025-01-04

## 2024-12-28 RX ORDER — HYDROMORPHONE HCL/PF 1 MG/ML
0.5 SYRINGE (ML) INJECTION ONCE
Status: COMPLETED | OUTPATIENT
Start: 2024-12-28 | End: 2024-12-28

## 2024-12-28 RX ORDER — CLINDAMYCIN PHOSPHATE 900 MG/50ML
900 INJECTION, SOLUTION INTRAVENOUS ONCE
Status: DISCONTINUED | OUTPATIENT
Start: 2024-12-28 | End: 2024-12-28

## 2024-12-28 RX ORDER — CLINDAMYCIN HYDROCHLORIDE 150 MG/1
300 CAPSULE ORAL ONCE
Status: COMPLETED | OUTPATIENT
Start: 2024-12-28 | End: 2024-12-28

## 2024-12-28 RX ADMIN — HYDROMORPHONE HYDROCHLORIDE 0.5 MG: 1 INJECTION, SOLUTION INTRAMUSCULAR; INTRAVENOUS; SUBCUTANEOUS at 16:16

## 2024-12-28 RX ADMIN — CLINDAMYCIN HYDROCHLORIDE 300 MG: 150 CAPSULE ORAL at 14:44

## 2024-12-28 RX ADMIN — AMPICILLIN SODIUM AND SULBACTAM SODIUM 3 G: 2; 1 INJECTION, POWDER, FOR SOLUTION INTRAMUSCULAR; INTRAVENOUS at 14:44
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
## 2024-12-30 ENCOUNTER — TELEPHONE (OUTPATIENT)
Dept: PLASTIC SURGERY | Facility: CLINIC | Age: 49
End: 2024-12-30

## 2024-12-30 NOTE — TELEPHONE ENCOUNTER
Spoke to patient and reviewed previously sent criteria for panniculectomy and breast reduction.  Conversation focused on main concern of breast reduction.  Patient states she had done 3 months of physical therapy but was discharged due to having difficulty with program and then saw orthopedics and pain management.  Advised we would need the physical therapy notes and letter from provider stating why she is unable to do physical therapy or chiropractic care as well as the need for a breast reduction.  Will set up consultation once we have this documentation.    Patient will work on getting criteria met for panniculectomy and advise when complete.

## 2024-12-31 ENCOUNTER — APPOINTMENT (OUTPATIENT)
Dept: LAB | Facility: HOSPITAL | Age: 49
End: 2024-12-31
Attending: INTERNAL MEDICINE
Payer: COMMERCIAL

## 2024-12-31 DIAGNOSIS — E83.51 HYPOCALCEMIA: ICD-10-CM

## 2024-12-31 LAB — CALCIUM SERPL-MCNC: 11.6 MG/DL (ref 8.4–10.2)

## 2024-12-31 PROCEDURE — 36415 COLL VENOUS BLD VENIPUNCTURE: CPT

## 2025-01-02 ENCOUNTER — PATIENT OUTREACH (OUTPATIENT)
Dept: CASE MANAGEMENT | Facility: OTHER | Age: 50
End: 2025-01-02

## 2025-01-02 ENCOUNTER — APPOINTMENT (OUTPATIENT)
Dept: LAB | Facility: HOSPITAL | Age: 50
End: 2025-01-02
Attending: INTERNAL MEDICINE
Payer: COMMERCIAL

## 2025-01-02 DIAGNOSIS — E83.51 HYPOCALCEMIA: ICD-10-CM

## 2025-01-02 LAB — CALCIUM SERPL-MCNC: 9.4 MG/DL (ref 8.4–10.2)

## 2025-01-02 PROCEDURE — 36415 COLL VENOUS BLD VENIPUNCTURE: CPT

## 2025-01-02 NOTE — PROGRESS NOTES
Outpatient Care Management   Patient was in the ED on 12/28 with dental/facial pain. She was found to have facial cellulitis due to a fractured right upper molar and overall, poor dentition. She may have received a dose of IV dilaudid. IV Unasyn & IV clindamycin were given.    Patient was then stable for discharge to home with a prescription for clindamycin.She also received instructions to follow up with her PCP.    Called patient and introduced myself as a Nurse Care Manager who is assisting Danie. She informed me that she is taking the clindamycin as prescribed and that she continues to have pain and swelling around her right eye and the right side of her nose. She explained that is not worse. Discussed additional remedies such as an ice pack or a heating pad to help reduce the swelling and pain.    Patient told me that she's trying to get in with a new dentist, but, cannot get an appointment until April. At this time, she has a dental appointment in February with another provider. Patient was agreeable to try to find another dentist to see her earlier than February, as she told me that she has to be seen by a dentist 1st and then she would go to have a tooth extraction. She was agreeable to some guidance. [employer] is her dental carrier.    Gave patient the following contacts:    June Martinez/Wendy Estrada/Cleo Hernandez  [facility]  [address]  [address] [place] [address], [address]  Phone  [phone]    [address] [address] [phone]    Patient will call to see if she can get an appointment as soon as possible. Shared Danie's phone # and office hours should she have any questions or need more assistance.     Patient informed me that someone told her that [facility] has a dentist/dental surgeon there. I recommended that she not use the ED unless the pain & swelling are getting worse.    Will continue to follow this patient with a High  Utilizer Care Plan.    Note routed to Danie.

## 2025-01-02 NOTE — RESULT ENCOUNTER NOTE
Thank you. Her calcium level today is back to normal so no changes are needed. And yes, spinach has calcium in it. Dehydration can also increase calcium levels if she was having trouble eating or drinking with her dental problem.

## 2025-01-02 NOTE — RESULT ENCOUNTER NOTE
Please let her know that the calcium level is elevated. I know she will sometimes take additional doses of her medications when she is worried about symptoms. Please ask her how much of her calcium, calcitriol, and vitamin D she is taking now. Thank you

## 2025-01-06 ENCOUNTER — APPOINTMENT (OUTPATIENT)
Dept: LAB | Facility: HOSPITAL | Age: 50
End: 2025-01-06
Attending: INTERNAL MEDICINE
Payer: COMMERCIAL

## 2025-01-06 DIAGNOSIS — E83.51 HYPOCALCEMIA: ICD-10-CM

## 2025-01-06 LAB — CALCIUM SERPL-MCNC: 9.5 MG/DL (ref 8.4–10.2)

## 2025-01-06 PROCEDURE — 36415 COLL VENOUS BLD VENIPUNCTURE: CPT

## 2025-01-07 DIAGNOSIS — E83.42 HYPOMAGNESEMIA: ICD-10-CM

## 2025-01-07 RX ORDER — LANOLIN ALCOHOL/MO/W.PET/CERES
400 CREAM (GRAM) TOPICAL 3 TIMES DAILY
Qty: 90 TABLET | Refills: 5 | Status: SHIPPED | OUTPATIENT
Start: 2025-01-07

## 2025-01-08 ENCOUNTER — TELEMEDICINE (OUTPATIENT)
Dept: BEHAVIORAL/MENTAL HEALTH CLINIC | Facility: CLINIC | Age: 50
End: 2025-01-08
Payer: COMMERCIAL

## 2025-01-08 DIAGNOSIS — F33.1 MODERATE EPISODE OF RECURRENT MAJOR DEPRESSIVE DISORDER (HCC): Primary | ICD-10-CM

## 2025-01-08 DIAGNOSIS — E87.6 HYPOKALEMIA: ICD-10-CM

## 2025-01-08 PROCEDURE — 90834 PSYTX W PT 45 MINUTES: CPT | Performed by: SOCIAL WORKER

## 2025-01-08 RX ORDER — POTASSIUM CHLORIDE 1500 MG/1
20 TABLET, EXTENDED RELEASE ORAL 2 TIMES DAILY
Qty: 180 TABLET | Refills: 1 | Status: SHIPPED | OUTPATIENT
Start: 2025-01-08

## 2025-01-14 ENCOUNTER — NURSE TRIAGE (OUTPATIENT)
Age: 50
End: 2025-01-14

## 2025-01-14 DIAGNOSIS — K62.5 RECTAL BLEEDING: Primary | ICD-10-CM

## 2025-01-14 NOTE — TELEPHONE ENCOUNTER
Patient called in stating she has not received word that referrals were placed. Patient is requesting an orthopedic referral due to right arm pain and would like to see an orthopedic. Also requesting a plastic surgery referral and stated she does not want a referral to the one that was given to her before in October because she is having a difficult time with that provider. Please advise. Thank you.

## 2025-01-14 NOTE — TELEPHONE ENCOUNTER
"LOV 5/8/24 BECCA Gonzalez (n/v, odynophagia, GERD), EGD 5/9/24, Colon 9/11/23 (noted internal hemorrhoids)    Patient states she started with rectal bleeding last month (5 days straight at first) with dark blood, BRB to lighter. Blood mixed with stool, with wiping and filling toilet bowl. She did have rectal bleeding in past (2022). She states stool pattern depends on her calcium level, usually soft but could be hard formed at times, she notes rectal itching, burning.She has been using daily Prep H but symptoms continue. She notes some improvement this week but does still pass lighter blood.    Patient has fiber in diet and supplement and hydrates adequately. She had 3 episodes of vomiting last week, will note dizziness at times but not regular basis and notes she \"will not feel well\" at times and will lay down to rest until it goes away. She was told to contact GI by nephrologist. Patient scheduled for urgent visit 1/27/25 w/K Lisa.  Leeann bath reviewed. She is willing to have colonoscopy if needed and would prefer Dr. Villanueva as physician. Patient on wait list for earlier appointment.    I did review to report to ED for worsening symptoms, lightheadedness, dizziness. Please review/advise.    Reason for Disposition   MODERATE rectal bleeding (small blood clots, passing blood without stool, or toilet water turns red)    Answer Assessment - Initial Assessment Questions  1. APPEARANCE of BLOOD: \"What color is it?\" \"Is it passed separately, on the surface of the stool, or mixed in with the stool?\"       BRB at times can be dark red, all in toilet bowl and sometimes when wiping  2. AMOUNT: \"How much blood was passed?\"       Toilet paper was saturated with wiping 4-5 days today improved with lighter color blood  3. FREQUENCY: \"How many times has blood been passed with the stools?\"       Depends on calcium level and can be 4-5 a day soft stool, if calcium level off no BM  4. ONSET: \"When was the blood first seen in the " "stools?\" (Days or weeks)       Started last month, bleeding for five days and intermittently since then  5. DIARRHEA: \"Is there also some diarrhea?\" If Yes, ask: \"How many diarrhea stools in the past 24 hours?\"       Stool can be soft or hard (not a lot of hard more soft) with magnesium  6. CONSTIPATION: \"Do you have constipation?\" If Yes, ask: \"How bad is it?\"      no  7. RECURRENT SYMPTOMS: \"Have you had blood in your stools before?\" If Yes, ask: \"When was the last time?\" and \"What happened that time?\"       Blood in stool prior  8. BLOOD THINNERS: \"Do you take any blood thinners?\" (e.g., Coumadin/warfarin, Pradaxa/dabigatran, aspirin)      denies  9. OTHER SYMPTOMS: \"Do you have any other symptoms?\"  (e.g., abdomen pain, vomiting, dizziness, fever)      2 or 3 days vomited in the morning (last week), at times dizziness and sometimes doesn't feel right so will lay down until it goes away  10. PREGNANCY: \"Is there any chance you are pregnant?\" \"When was your last menstrual period?\"        No (partial hysterectomy), does not get period    Protocols used: Rectal Bleeding-Adult-[place]    "

## 2025-01-14 NOTE — TELEPHONE ENCOUNTER
Regarding: bright red blood filling toilet  ----- Message from Prerna INFANTE sent at 1/14/2025 11:36 AM EST -----  Pt calling with bright red blood filling the toilet for 5 days following bowel movements. Pt is still bleeding. I looked for an appt but was unable to find one in the next few months. Pt wanted to schedule a colonoscopy but she is not due until 2028

## 2025-01-15 ENCOUNTER — TELEMEDICINE (OUTPATIENT)
Dept: BEHAVIORAL/MENTAL HEALTH CLINIC | Facility: CLINIC | Age: 50
End: 2025-01-15
Payer: COMMERCIAL

## 2025-01-15 ENCOUNTER — APPOINTMENT (OUTPATIENT)
Dept: LAB | Facility: HOSPITAL | Age: 50
End: 2025-01-15
Attending: INTERNAL MEDICINE
Payer: COMMERCIAL

## 2025-01-15 DIAGNOSIS — F41.1 GENERALIZED ANXIETY DISORDER WITH PANIC ATTACKS: ICD-10-CM

## 2025-01-15 DIAGNOSIS — F17.210 NICOTINE DEPENDENCE, CIGARETTES, UNCOMPLICATED: ICD-10-CM

## 2025-01-15 DIAGNOSIS — N18.31 STAGE 3A CHRONIC KIDNEY DISEASE (HCC): ICD-10-CM

## 2025-01-15 DIAGNOSIS — F33.1 MODERATE EPISODE OF RECURRENT MAJOR DEPRESSIVE DISORDER (HCC): Primary | ICD-10-CM

## 2025-01-15 DIAGNOSIS — F41.0 GENERALIZED ANXIETY DISORDER WITH PANIC ATTACKS: ICD-10-CM

## 2025-01-15 DIAGNOSIS — E83.51 HYPOCALCEMIA: ICD-10-CM

## 2025-01-15 LAB
ANION GAP SERPL CALCULATED.3IONS-SCNC: 12 MMOL/L (ref 4–13)
BUN SERPL-MCNC: 28 MG/DL (ref 5–25)
CALCIUM SERPL-MCNC: 10.3 MG/DL (ref 8.4–10.2)
CHLORIDE SERPL-SCNC: 100 MMOL/L (ref 96–108)
CO2 SERPL-SCNC: 22 MMOL/L (ref 21–32)
CREAT SERPL-MCNC: 1.12 MG/DL (ref 0.6–1.3)
GFR SERPL CREATININE-BSD FRML MDRD: 58 ML/MIN/1.73SQ M
GLUCOSE P FAST SERPL-MCNC: 92 MG/DL (ref 65–99)
POTASSIUM SERPL-SCNC: 4.1 MMOL/L (ref 3.5–5.3)
SODIUM SERPL-SCNC: 134 MMOL/L (ref 135–147)

## 2025-01-15 PROCEDURE — 36415 COLL VENOUS BLD VENIPUNCTURE: CPT

## 2025-01-15 PROCEDURE — 80048 BASIC METABOLIC PNL TOTAL CA: CPT

## 2025-01-15 PROCEDURE — 90834 PSYTX W PT 45 MINUTES: CPT | Performed by: SOCIAL WORKER
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
## 2025-01-15 NOTE — TELEPHONE ENCOUNTER
Patient very insistent  on wanting the colonoscopy done. I explained she has an appt on 1/27/25 with Charleen and we would not be chin to get her  in by then for the procedure. The last colonoscopy when she had bleeding showed internal hemorrhoids.  Patient would also like an endoscopy done. Also told patient she can use the zofran when nauseated. Patient agreeable to plan. Thank you

## 2025-01-15 NOTE — TELEPHONE ENCOUNTER
Spoke to patient and stated just a light pink today. The blood would come out like stated for 5 days, then stop and then come back and stop. She stated she does have hemorrhoids. Is patient to schedule endoscopy and/or colonocopy?

## 2025-01-16 ENCOUNTER — APPOINTMENT (OUTPATIENT)
Dept: LAB | Facility: HOSPITAL | Age: 50
End: 2025-01-16
Attending: INTERNAL MEDICINE
Payer: COMMERCIAL

## 2025-01-16 DIAGNOSIS — K62.5 RECTAL BLEEDING: ICD-10-CM

## 2025-01-16 DIAGNOSIS — E83.51 HYPOCALCEMIA: ICD-10-CM

## 2025-01-16 DIAGNOSIS — K64.9 HEMORRHOIDS, UNSPECIFIED HEMORRHOID TYPE: Primary | ICD-10-CM

## 2025-01-16 LAB
CALCIUM SERPL-MCNC: 9.2 MG/DL (ref 8.4–10.2)
ERYTHROCYTE [DISTWIDTH] IN BLOOD BY AUTOMATED COUNT: 17.4 % (ref 11.6–15.1)
HCT VFR BLD AUTO: 42.4 % (ref 34.8–46.1)
HGB BLD-MCNC: 13.7 G/DL (ref 11.5–15.4)
MCH RBC QN AUTO: 30.6 PG (ref 26.8–34.3)
MCHC RBC AUTO-ENTMCNC: 32.3 G/DL (ref 31.4–37.4)
MCV RBC AUTO: 95 FL (ref 82–98)
PLATELET # BLD AUTO: 203 THOUSANDS/UL (ref 149–390)
PMV BLD AUTO: 10.9 FL (ref 8.9–12.7)
RBC # BLD AUTO: 4.48 MILLION/UL (ref 3.81–5.12)
WBC # BLD AUTO: 6.8 THOUSAND/UL (ref 4.31–10.16)

## 2025-01-16 PROCEDURE — 36415 COLL VENOUS BLD VENIPUNCTURE: CPT

## 2025-01-16 PROCEDURE — 85027 COMPLETE CBC AUTOMATED: CPT

## 2025-01-16 RX ORDER — HYDROCORTISONE ACETATE 25 MG/1
25 SUPPOSITORY RECTAL 2 TIMES DAILY
Qty: 12 SUPPOSITORY | Refills: 2 | Status: SHIPPED | OUTPATIENT
Start: 2025-01-16

## 2025-01-16 NOTE — TELEPHONE ENCOUNTER
Pt calling in because she had her CBC done today as advised and she see the results have already come back in for her, she would like to speak with someone who can walk her through them when possible. I let her know I would reach out to th office and once the Dr/Pa is able to advise on them we will give her a call back.   She is fine with this and also asked me to let the office know her Calcium was check today as well and has dropped over a point since yesterday- she was a 10.3 and is now at a 9.2 after the bleeding yesterday.

## 2025-01-16 NOTE — TELEPHONE ENCOUNTER
Pt calling back in, reports yesterday rectal bleeding was pink in color and then last night started again with bright red rectal bleeding filling toilet and this morning again another episode of bright red bleeding with small clot on toilet paper.   Pt has little pressure by hips.   No straining or dizziness.     I advised pt to have CBC done as ordered by Charleen yesterday. Pt looking for sooner OV or colonoscopy. Pt has soonest Urgent visit at either [place] office. I explained to have CBC done first and will see if provider think anusol suppositories would be helpful for internal hemorrhoids?     Pt asking for message to be sent to Dr. Villanueva as well since he knows her case. She explained in 2023 when she had colonoscopy she had rectal bleeding then too and once her polyps were removed she stopped bleeding. I explained that may have been due to internal hemorrhoids and we can discuss at next OV if another colonoscopy would be needed,   ED precautions given for severe rectal bleeding and dizziness.

## 2025-01-16 NOTE — TELEPHONE ENCOUNTER
Please call and inform patient RX for Anusol suppositories sent to her pharmacy. CBC showed normal except for elevated RDW  which has been ongoing and can occur due to medication and chronic disease . Recommend she follow up with her PCP concerning her calcium.

## 2025-01-17 DIAGNOSIS — M79.601 RIGHT ARM PAIN: Primary | ICD-10-CM

## 2025-01-20 ENCOUNTER — APPOINTMENT (OUTPATIENT)
Dept: LAB | Facility: HOSPITAL | Age: 50
End: 2025-01-20
Attending: INTERNAL MEDICINE
Payer: COMMERCIAL

## 2025-01-20 DIAGNOSIS — E83.51 HYPOCALCEMIA: ICD-10-CM

## 2025-01-20 PROBLEM — N62 BREAST HYPERTROPHY: Status: ACTIVE | Noted: 2025-01-20

## 2025-01-20 LAB — CALCIUM SERPL-MCNC: 8.7 MG/DL (ref 8.4–10.2)

## 2025-01-20 PROCEDURE — 36415 COLL VENOUS BLD VENIPUNCTURE: CPT

## 2025-01-22 ENCOUNTER — TELEMEDICINE (OUTPATIENT)
Dept: BEHAVIORAL/MENTAL HEALTH CLINIC | Facility: CLINIC | Age: 50
End: 2025-01-22
Payer: COMMERCIAL

## 2025-01-22 ENCOUNTER — APPOINTMENT (OUTPATIENT)
Dept: LAB | Facility: HOSPITAL | Age: 50
End: 2025-01-22
Attending: INTERNAL MEDICINE
Payer: COMMERCIAL

## 2025-01-22 DIAGNOSIS — F33.1 MODERATE EPISODE OF RECURRENT MAJOR DEPRESSIVE DISORDER (HCC): Primary | ICD-10-CM

## 2025-01-22 DIAGNOSIS — E83.51 HYPOCALCEMIA: ICD-10-CM

## 2025-01-22 LAB — CALCIUM SERPL-MCNC: 12.7 MG/DL (ref 8.4–10.2)

## 2025-01-22 PROCEDURE — 90834 PSYTX W PT 45 MINUTES: CPT | Performed by: SOCIAL WORKER

## 2025-01-22 PROCEDURE — 36415 COLL VENOUS BLD VENIPUNCTURE: CPT

## 2025-01-22 RX ORDER — VARENICLINE TARTRATE 1 MG/561
1 TABLET, FILM COATED ORAL 2 TIMES DAILY
COMMUNITY

## 2025-01-22 RX ORDER — PREGABALIN 75 MG/1
CAPSULE ORAL
COMMUNITY
Start: 2024-12-26

## 2025-01-24 DIAGNOSIS — F41.0 PANIC DISORDER: ICD-10-CM

## 2025-01-24 RX ORDER — ALPRAZOLAM 2 MG/1
2 TABLET, EXTENDED RELEASE ORAL 2 TIMES DAILY
Qty: 60 TABLET | Refills: 0 | Status: SHIPPED | OUTPATIENT
Start: 2025-01-24

## 2025-01-24 NOTE — TELEPHONE ENCOUNTER
Patient needs refill on Xanax XR 2 mg tabs.  Patient uses [facility] [place], [place].  Patient has only 2 tablets left.

## 2025-01-27 ENCOUNTER — OFFICE VISIT (OUTPATIENT)
Age: 50
End: 2025-01-27
Payer: COMMERCIAL

## 2025-01-27 ENCOUNTER — APPOINTMENT (OUTPATIENT)
Dept: LAB | Facility: HOSPITAL | Age: 50
End: 2025-01-27
Attending: INTERNAL MEDICINE
Payer: COMMERCIAL

## 2025-01-27 VITALS
SYSTOLIC BLOOD PRESSURE: 129 MMHG | BODY MASS INDEX: 39.34 KG/M2 | HEART RATE: 112 BPM | DIASTOLIC BLOOD PRESSURE: 98 MMHG | HEIGHT: 62 IN | WEIGHT: 213.8 LBS

## 2025-01-27 DIAGNOSIS — K62.5 RECTAL BLEEDING: ICD-10-CM

## 2025-01-27 DIAGNOSIS — Z90.49 HX OF CHOLECYSTECTOMY: ICD-10-CM

## 2025-01-27 DIAGNOSIS — R19.7 DIARRHEA, UNSPECIFIED TYPE: ICD-10-CM

## 2025-01-27 DIAGNOSIS — E83.51 HYPOCALCEMIA: ICD-10-CM

## 2025-01-27 DIAGNOSIS — K64.9 HEMORRHOIDS, UNSPECIFIED HEMORRHOID TYPE: Primary | ICD-10-CM

## 2025-01-27 DIAGNOSIS — K21.00 GASTROESOPHAGEAL REFLUX DISEASE WITH ESOPHAGITIS WITHOUT HEMORRHAGE: ICD-10-CM

## 2025-01-27 LAB — CALCIUM SERPL-MCNC: 10.1 MG/DL (ref 8.4–10.2)

## 2025-01-27 PROCEDURE — 36415 COLL VENOUS BLD VENIPUNCTURE: CPT

## 2025-01-27 PROCEDURE — 99214 OFFICE O/P EST MOD 30 MIN: CPT | Performed by: NURSE PRACTITIONER

## 2025-01-27 RX ORDER — CHOLESTYRAMINE 4 G/9G
1 POWDER, FOR SUSPENSION ORAL
Qty: 30 PACKET | Refills: 0 | Status: SHIPPED | OUTPATIENT
Start: 2025-01-27

## 2025-01-27 NOTE — PROGRESS NOTES
Name: Yamileth Vale      : 1975      MRN: [id number]  Encounter Provider: ELIF Kumar  Encounter Date: 2025   Encounter department: [facility]  :  Assessment & Plan  Rectal bleeding  Pt w/ intermittent rectal bleeding with wiping/in the toilet bowl, Hgb stable 14  Likely secondary to hx of internal hemorrhoids as seen on Colonoscopy 2023, also with hx of sigmoid diverticula, polyps removed on that exam  Pt requesting repeat colonoscopy due ot elevated anxiety levels, continued rectal bleeding despite hemorrhoid cream/fiber. Discussed that colonoscopy is not going to help with the bleeding itself just with identifying etiology and she understands.     Continue OTC hemorrhoid cream, daily fiber supplement  Questran added as below to help with diarrhea  Colonoscopy scheduled  Colorectal referral was previously given but pt has not yet made apt. Encouraged patient to make an appointment for discussion of alternative treatments given failure of conservative management thus far    Orders:    Colonoscopy; Future    Hemorrhoids, unspecified hemorrhoid type  See above       Diarrhea, unspecified type  Reports chronic but worse after Cholecystectomy 3-4 times a day typically after meals despite stopping Movantik  Hx of weak positive tissue transglutaminase Iga 4 in , bx with focally increased intraepithelial lymphocytes without villous atrophy  Recently had Clindamycin for a dental infection at the end of Dec but denies any worsening of symptoms  -Avoid gluten  -Start Questran for bile salt diarrhea, cautioned to stop if she experiences constipation as she is on daily percocet which may also cause constipation  -Colonoscopy scheduled  Orders:    cholestyramine (QUESTRAN) 4 g packet; Take 1 packet (4 g total) by mouth daily with dinner    Colonoscopy; Future    Hx of cholecystectomy  2024       Gastroesophageal reflux disease with  "esophagitis without hemorrhage  Hx of GERD with grade B esophagitis, mild gastritis on last EGD in May 2024 done for n/v, odynophagia, reflux following cholecystectomy  S/p course of Pantoprazole and Pepcid but stopped due to hypocalcemia, now n/v has improved but she continues with reflux primarily at night & c/o globus sensation  Unwilling to take PPI or Pepcid  Recommend strict anti relfux diet, avoid eating/drinking 3-4 hrs prior to laying down, elevate HOB at night  Smoking cessation, weight loss  Can take Gaviscon at night if no contraindications, otherwise Carafate?           History of Present Illness   HPI  Yamileth Vale is a 49 y.o. female with PMH of hypoparathyroidism, diabetes, chronic pain medication use, CKD 3, fatty liver, GERD, tobacco use, and cholecystectomy 4/16/24 presenting for follow up due to intermittent rectal bleeding. Reports hx of chronic diarrhea since thyroid removed, but it worsened after gallbaldder removal.  Reports continued diarrhea 3-4 times a day despite stopping Movantik. Prior nausea/vomiting has improved but she still has a globus like sensation which is ongoing. Did take a course of PPI/Pepcid as instructed by Dr. Villanueva but then stopped because she felt it made her calcium go down. Is having acid reflux/regurg at night.       Review of Systems   Constitutional:  Negative for unexpected weight change.   HENT:  Negative for trouble swallowing.    Gastrointestinal:  Positive for anal bleeding, blood in stool and diarrhea. Negative for constipation, nausea, rectal pain and vomiting.       Objective   /98 (BP Location: Left arm, Patient Position: Sitting, Cuff Size: Standard)   Pulse (!) 112   Ht 5' 2\" (1.575 m)   Wt 97 kg (213 lb 12.8 oz)   LMP 12/06/2018 Comment: partial hysterectomy   BMI 39.10 kg/m²      Physical Exam  Vitals and nursing note reviewed.   Constitutional:       General: She is not in acute distress.     Appearance: She is well-developed. "   HENT:      Head: Normocephalic and atraumatic.   Eyes:      Conjunctiva/sclera: Conjunctivae normal.   Cardiovascular:      Rate and Rhythm: Normal rate and regular rhythm.      Heart sounds: No murmur heard.  Pulmonary:      Effort: Pulmonary effort is normal. No respiratory distress.      Breath sounds: Normal breath sounds.   Abdominal:      Palpations: Abdomen is soft.      Tenderness: There is no abdominal tenderness.   Musculoskeletal:         General: No swelling.      Cervical back: Neck supple.   Skin:     General: Skin is warm and dry.      Capillary Refill: Capillary refill takes less than 2 seconds.   Neurological:      Mental Status: She is alert.   Psychiatric:         Mood and Affect: Mood is anxious.

## 2025-01-27 NOTE — PATIENT INSTRUCTIONS
-Continue daily fiber supplement  -Continue hemorrhoid treatment twice a day for a week at a time  -Try Questran daily with dinner to help with diarrhea-stop if you become constipated  -Sleep with HOB elevated, avoid laying down for 3-4 hours after eating.   -Stop smoking!!!  -Try taking Gaviscon at night prior to bed if no contraindications  -Make an appointment with colorectal re hemorrhoids

## 2025-01-28 ENCOUNTER — TELEPHONE (OUTPATIENT)
Age: 50
End: 2025-01-28

## 2025-01-28 ENCOUNTER — APPOINTMENT (OUTPATIENT)
Dept: RADIOLOGY | Facility: CLINIC | Age: 50
End: 2025-01-28
Payer: COMMERCIAL

## 2025-01-28 ENCOUNTER — OFFICE VISIT (OUTPATIENT)
Dept: OBGYN CLINIC | Facility: CLINIC | Age: 50
End: 2025-01-28
Payer: COMMERCIAL

## 2025-01-28 VITALS — BODY MASS INDEX: 39.2 KG/M2 | HEIGHT: 62 IN | WEIGHT: 213 LBS

## 2025-01-28 DIAGNOSIS — M79.601 RIGHT ARM PAIN: ICD-10-CM

## 2025-01-28 DIAGNOSIS — M24.811 INTERNAL DERANGEMENT OF RIGHT SHOULDER: Primary | ICD-10-CM

## 2025-01-28 DIAGNOSIS — M25.511 RIGHT SHOULDER PAIN, UNSPECIFIED CHRONICITY: ICD-10-CM

## 2025-01-28 PROCEDURE — 73030 X-RAY EXAM OF SHOULDER: CPT

## 2025-01-28 PROCEDURE — 99214 OFFICE O/P EST MOD 30 MIN: CPT | Performed by: ORTHOPAEDIC SURGERY

## 2025-01-28 NOTE — TELEPHONE ENCOUNTER
Patients GI provider:  Dr. Frias    Number to return call: [phone]    Reason for call: Pt called for an appt with Dr Frias due to being told her rectum is falling out. I transferred the call to GI triage and Arabella took the call.    Scheduled procedure/appointment date if applicable: Apt/procedure N/A
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
## 2025-01-29 ENCOUNTER — TELEMEDICINE (OUTPATIENT)
Dept: BEHAVIORAL/MENTAL HEALTH CLINIC | Facility: CLINIC | Age: 50
End: 2025-01-29
Payer: COMMERCIAL

## 2025-01-29 DIAGNOSIS — F33.1 MODERATE EPISODE OF RECURRENT MAJOR DEPRESSIVE DISORDER (HCC): Primary | ICD-10-CM

## 2025-01-29 PROCEDURE — 90834 PSYTX W PT 45 MINUTES: CPT | Performed by: SOCIAL WORKER
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
## 2025-01-31 ENCOUNTER — APPOINTMENT (OUTPATIENT)
Dept: LAB | Facility: HOSPITAL | Age: 50
End: 2025-01-31
Attending: INTERNAL MEDICINE
Payer: COMMERCIAL

## 2025-01-31 ENCOUNTER — ANESTHESIA EVENT (OUTPATIENT)
Dept: ANESTHESIOLOGY | Facility: HOSPITAL | Age: 50
End: 2025-01-31

## 2025-01-31 ENCOUNTER — ANESTHESIA (OUTPATIENT)
Dept: ANESTHESIOLOGY | Facility: HOSPITAL | Age: 50
End: 2025-01-31

## 2025-01-31 ENCOUNTER — HOSPITAL ENCOUNTER (OUTPATIENT)
Dept: RADIOLOGY | Facility: HOSPITAL | Age: 50
Discharge: HOME | End: 2025-01-31
Payer: COMMERCIAL

## 2025-01-31 DIAGNOSIS — E83.51 HYPOCALCEMIA: ICD-10-CM

## 2025-01-31 DIAGNOSIS — M54.59 OTHER LOW BACK PAIN: ICD-10-CM

## 2025-01-31 LAB — CALCIUM SERPL-MCNC: 9.5 MG/DL (ref 8.4–10.2)

## 2025-01-31 PROCEDURE — 72131 CT LUMBAR SPINE W/O DYE: CPT

## 2025-01-31 PROCEDURE — 36415 COLL VENOUS BLD VENIPUNCTURE: CPT

## 2025-02-02 DIAGNOSIS — E78.1 HYPERTRIGLYCERIDEMIA: ICD-10-CM

## 2025-02-03 ENCOUNTER — TELEPHONE (OUTPATIENT)
Age: 50
End: 2025-02-03

## 2025-02-03 RX ORDER — FENOFIBRATE 160 MG/1
160 TABLET ORAL DAILY
Qty: 30 TABLET | Refills: 0 | Status: SHIPPED | OUTPATIENT
Start: 2025-02-03

## 2025-02-05 ENCOUNTER — TELEMEDICINE (OUTPATIENT)
Dept: BEHAVIORAL/MENTAL HEALTH CLINIC | Facility: CLINIC | Age: 50
End: 2025-02-05
Payer: COMMERCIAL

## 2025-02-05 ENCOUNTER — RESULTS FOLLOW-UP (OUTPATIENT)
Dept: ENDOCRINOLOGY | Facility: CLINIC | Age: 50
End: 2025-02-05

## 2025-02-05 ENCOUNTER — OFFICE VISIT (OUTPATIENT)
Dept: ENDOCRINOLOGY | Facility: CLINIC | Age: 50
End: 2025-02-05
Payer: COMMERCIAL

## 2025-02-05 ENCOUNTER — APPOINTMENT (OUTPATIENT)
Dept: LAB | Facility: HOSPITAL | Age: 50
End: 2025-02-05
Attending: INTERNAL MEDICINE
Payer: COMMERCIAL

## 2025-02-05 VITALS
OXYGEN SATURATION: 98 % | DIASTOLIC BLOOD PRESSURE: 78 MMHG | WEIGHT: 212 LBS | BODY MASS INDEX: 39.01 KG/M2 | HEART RATE: 72 BPM | SYSTOLIC BLOOD PRESSURE: 120 MMHG | HEIGHT: 62 IN

## 2025-02-05 DIAGNOSIS — E78.1 HYPERTRIGLYCERIDEMIA: ICD-10-CM

## 2025-02-05 DIAGNOSIS — E55.9 VITAMIN D DEFICIENCY: ICD-10-CM

## 2025-02-05 DIAGNOSIS — E78.1 HYPERTRIGLYCERIDEMIA: Primary | ICD-10-CM

## 2025-02-05 DIAGNOSIS — R73.03 PREDIABETES: ICD-10-CM

## 2025-02-05 DIAGNOSIS — N18.31 STAGE 3A CHRONIC KIDNEY DISEASE (HCC): ICD-10-CM

## 2025-02-05 DIAGNOSIS — F41.0 GENERALIZED ANXIETY DISORDER WITH PANIC ATTACKS: ICD-10-CM

## 2025-02-05 DIAGNOSIS — F41.1 GENERALIZED ANXIETY DISORDER WITH PANIC ATTACKS: ICD-10-CM

## 2025-02-05 DIAGNOSIS — F33.1 MODERATE EPISODE OF RECURRENT MAJOR DEPRESSIVE DISORDER (HCC): Primary | ICD-10-CM

## 2025-02-05 DIAGNOSIS — E89.0 POSTOPERATIVE HYPOTHYROIDISM: ICD-10-CM

## 2025-02-05 DIAGNOSIS — E89.2 POST-SURGICAL HYPOPARATHYROIDISM (HCC): ICD-10-CM

## 2025-02-05 DIAGNOSIS — E83.51 HYPOCALCEMIA: Primary | ICD-10-CM

## 2025-02-05 DIAGNOSIS — E83.51 HYPOCALCEMIA: ICD-10-CM

## 2025-02-05 LAB
CALCIUM SERPL-MCNC: 9.5 MG/DL (ref 8.4–10.2)
EST. AVERAGE GLUCOSE BLD GHB EST-MCNC: 148 MG/DL
HBA1C MFR BLD: 6.8 %

## 2025-02-05 PROCEDURE — 99214 OFFICE O/P EST MOD 30 MIN: CPT | Performed by: INTERNAL MEDICINE

## 2025-02-05 PROCEDURE — 83036 HEMOGLOBIN GLYCOSYLATED A1C: CPT

## 2025-02-05 PROCEDURE — 90834 PSYTX W PT 45 MINUTES: CPT | Performed by: SOCIAL WORKER

## 2025-02-05 RX ORDER — ICOSAPENT ETHYL 1 G/1
2 CAPSULE ORAL 2 TIMES DAILY
Qty: 360 CAPSULE | Refills: 3 | Status: SHIPPED | OUTPATIENT
Start: 2025-02-05

## 2025-02-05 RX ORDER — ATORVASTATIN CALCIUM 80 MG/1
80 TABLET, FILM COATED ORAL DAILY
Qty: 90 TABLET | Refills: 1 | Status: SHIPPED | OUTPATIENT
Start: 2025-02-05
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
## 2025-02-05 NOTE — PATIENT INSTRUCTIONS
Decrease Calcitriol back to 0.25 mcg twice a day  Okay to increase calcium 600 mg orally 3 times a day, okay to take 1-2 extra calcium if you have symptoms of hypocalcemia  Continue vitamin D supplementation  Continue metformin, fenofibrate  Please have labs done as ordered  Follow-up with primary care
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
## 2025-02-05 NOTE — TELEPHONE ENCOUNTER
Called and spoke with Ms.Yamileth INFANTE Kavin to discuss recent lab/imaging results.      Lab Results   Component Value Date    CHOLESTEROL 419 (H) 02/05/2025    CHOLESTEROL 557 (H) 10/28/2024    CHOLESTEROL 454 (H) 01/03/2024     Lab Results   Component Value Date    HDL 24 (L) 02/05/2025    HDL 39 (L) 10/28/2024    HDL 40 (L) 01/03/2024     Lab Results   Component Value Date    TRIG 1,335 (H) 02/05/2025    TRIG 872 (H) 10/28/2024    TRIG 469 (H) 01/03/2024     Lab Results   Component Value Date    NONHDLC 395 02/05/2025    NONHDLC 518 10/28/2024    NONHDLC 418 09/15/2023     Triglycerides elevated at 1335.  Patient states that she has been compliant with fenofibrate 160 mg orally daily, has not missed any doses  Denies alcohol intake  Has history of prediabetes, A1c results pending    Recommend the following   -Continue fenofibrate  -Start atorvastatin 80 mg orally daily  -Start fish oil 3 times a day  - dietary recommendations as discussed   Repeat lipid panel in 1 month   Patient would like to meet with cardiology, will put in a consult for Dr. Singleton

## 2025-02-06 ENCOUNTER — ANESTHESIA (OUTPATIENT)
Dept: GASTROENTEROLOGY | Facility: AMBULARY SURGERY CENTER | Age: 50
End: 2025-02-06
Payer: COMMERCIAL

## 2025-02-06 ENCOUNTER — HOSPITAL ENCOUNTER (OUTPATIENT)
Dept: GASTROENTEROLOGY | Facility: AMBULARY SURGERY CENTER | Age: 50
Setting detail: OUTPATIENT SURGERY
End: 2025-02-06
Attending: INTERNAL MEDICINE
Payer: COMMERCIAL

## 2025-02-06 VITALS
OXYGEN SATURATION: 97 % | HEART RATE: 97 BPM | TEMPERATURE: 97.9 F | RESPIRATION RATE: 18 BRPM | DIASTOLIC BLOOD PRESSURE: 63 MMHG | SYSTOLIC BLOOD PRESSURE: 96 MMHG

## 2025-02-06 DIAGNOSIS — R19.7 DIARRHEA, UNSPECIFIED TYPE: ICD-10-CM

## 2025-02-06 DIAGNOSIS — K62.5 RECTAL BLEEDING: ICD-10-CM

## 2025-02-06 DIAGNOSIS — K64.9 HEMORRHOIDS, UNSPECIFIED HEMORRHOID TYPE: ICD-10-CM

## 2025-02-06 LAB — GLUCOSE SERPL-MCNC: 168 MG/DL (ref 65–140)

## 2025-02-06 PROCEDURE — 45380 COLONOSCOPY AND BIOPSY: CPT | Performed by: INTERNAL MEDICINE

## 2025-02-06 PROCEDURE — 88305 TISSUE EXAM BY PATHOLOGIST: CPT | Performed by: PATHOLOGY

## 2025-02-06 PROCEDURE — 82948 REAGENT STRIP/BLOOD GLUCOSE: CPT

## 2025-02-06 RX ORDER — HYDROCORTISONE ACETATE 25 MG/1
25 SUPPOSITORY RECTAL 2 TIMES DAILY
Qty: 12 SUPPOSITORY | Refills: 0 | Status: SHIPPED | OUTPATIENT
Start: 2025-02-06

## 2025-02-06 RX ORDER — SODIUM CHLORIDE, SODIUM LACTATE, POTASSIUM CHLORIDE, CALCIUM CHLORIDE 600; 310; 30; 20 MG/100ML; MG/100ML; MG/100ML; MG/100ML
INJECTION, SOLUTION INTRAVENOUS CONTINUOUS PRN
Status: DISCONTINUED | OUTPATIENT
Start: 2025-02-06 | End: 2025-02-06

## 2025-02-06 RX ORDER — SODIUM CHLORIDE, SODIUM LACTATE, POTASSIUM CHLORIDE, CALCIUM CHLORIDE 600; 310; 30; 20 MG/100ML; MG/100ML; MG/100ML; MG/100ML
125 INJECTION, SOLUTION INTRAVENOUS CONTINUOUS
Status: CANCELLED | OUTPATIENT
Start: 2025-02-06

## 2025-02-06 RX ORDER — LIDOCAINE HYDROCHLORIDE 10 MG/ML
INJECTION, SOLUTION EPIDURAL; INFILTRATION; INTRACAUDAL; PERINEURAL AS NEEDED
Status: DISCONTINUED | OUTPATIENT
Start: 2025-02-06 | End: 2025-02-06

## 2025-02-06 RX ORDER — PROPOFOL 10 MG/ML
INJECTION, EMULSION INTRAVENOUS CONTINUOUS PRN
Status: DISCONTINUED | OUTPATIENT
Start: 2025-02-06 | End: 2025-02-06

## 2025-02-06 RX ORDER — PROPOFOL 10 MG/ML
INJECTION, EMULSION INTRAVENOUS AS NEEDED
Status: DISCONTINUED | OUTPATIENT
Start: 2025-02-06 | End: 2025-02-06

## 2025-02-06 RX ADMIN — PROPOFOL 50 MG: 10 INJECTION, EMULSION INTRAVENOUS at 09:09

## 2025-02-06 RX ADMIN — LIDOCAINE HYDROCHLORIDE 50 MG: 10 INJECTION, SOLUTION EPIDURAL; INFILTRATION; INTRACAUDAL; PERINEURAL at 09:08

## 2025-02-06 RX ADMIN — SODIUM CHLORIDE, SODIUM LACTATE, POTASSIUM CHLORIDE, AND CALCIUM CHLORIDE: .6; .31; .03; .02 INJECTION, SOLUTION INTRAVENOUS at 08:57

## 2025-02-06 RX ADMIN — PROPOFOL 140 MCG/KG/MIN: 10 INJECTION, EMULSION INTRAVENOUS at 09:09

## 2025-02-06 RX ADMIN — PROPOFOL 100 MG: 10 INJECTION, EMULSION INTRAVENOUS at 09:08
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
## 2025-02-06 NOTE — ANESTHESIA POSTPROCEDURE EVALUATION
Post-Op Assessment Note    CV Status:  Stable  Pain Score: 0    Pain management: adequate       Mental Status:  Sleepy   Hydration Status:  Stable   PONV Controlled:  None   Airway Patency:  Patent  Two or more mitigation strategies used for obstructive sleep apnea   Post Op Vitals Reviewed: Yes    No anethesia notable event occurred.    Staff: CRNA           Last Filed PACU Vitals:  Vitals Value Taken Time   Temp     Pulse 94    /64    Resp 16    SpO2 99

## 2025-02-06 NOTE — PERIOPERATIVE NURSING NOTE
Patient brought from procedure to phase two. Sbar given at bedside to GI rn  . Patient resting in bed, bed locked in lowest position with side rails raise, call light in reach and environment cleared. Plan of care ongoing. Patient brought from procedure to phase two. Sbar given at bedside to GI rn  . Patient resting in bed, bed locked in lowest position with side rails raise, call light in reach and environment cleared. Plan of care ongoing. Patient brought from procedure to phase two. Sbar given at bedside to GI rn  . Patient resting in bed, bed locked in lowest position with side rails raise, call light in reach and environment cleared. Plan of care ongoing.

## 2025-02-06 NOTE — ANESTHESIA PREPROCEDURE EVALUATION
Procedure:  COLONOSCOPY    Relevant Problems   CARDIO   (+) Hypertriglyceridemia      ENDO   (+) Hypoparathyroidism (HCC)   (+) Post-surgical hypoparathyroidism (HCC)   (+) Postoperative hypothyroidism      GI/HEPATIC   (+) Fatty liver disease, nonalcoholic   (+) Gastroesophageal reflux disease without esophagitis      /RENAL   (+) Stage 3a chronic kidney disease (HCC)   (+) Stage 3b chronic kidney disease (HCC)      GYN   (+) History of hysterectomy      HEMATOLOGY   (+) Anemia   (+) Blood coagulation disorder (HCC)      NEURO/PSYCH   (+) Anxiety   (+) Anxiety and depression   (+) Chronic intractable pain   (+) Continuous opioid dependence (HCC)   (+) ADITYA (generalized anxiety disorder)   (+) MDD (major depressive disorder), recurrent episode, moderate (HCC)   (+) PTSD (post-traumatic stress disorder)   (+) Panic disorder      PULMONARY   (+) Smoking      Other   (+) Class 2 obesity due to excess calories without serious comorbidity with body mass index (BMI) of 38.0 to 38.9 in adult   (+) Splenomegaly        Physical Exam    Airway    Mallampati score: II  TM Distance: >3 FB  Neck ROM: full     Dental   Comment: Denies loose teeth     Cardiovascular  Cardiovascular exam normal    Pulmonary  Pulmonary exam normal     Other Findings  Portions of exam deferred due to low yield and/or unknown COVID statuspost-pubertal.      Anesthesia Plan  ASA Score- 2     Anesthesia Type- IV sedation with anesthesia with ASA Monitors.         Additional Monitors:     Airway Plan:            Plan Factors-Exercise tolerance (METS): >4 METS.    Chart reviewed.   Existing labs reviewed. Patient summary reviewed.    Patient is not a current smoker.              Induction- intravenous.    Postoperative Plan-         Informed Consent- Anesthetic plan and risks discussed with patient.  I personally reviewed this patient with the CRNA. Discussed and agreed on the Anesthesia Plan with the CRNA..      NPO Status:  Vitals Value Taken Time    Date of last liquid 02/06/25 02/06/25 0717   Time of last liquid 0500 02/06/25 0717   Date of last solid 02/04/25 02/06/25 0717   Time of last solid 1900 02/06/25 0717

## 2025-02-07 ENCOUNTER — TELEMEDICINE (OUTPATIENT)
Dept: PSYCHIATRY | Facility: CLINIC | Age: 50
End: 2025-02-07
Payer: COMMERCIAL

## 2025-02-07 ENCOUNTER — TELEPHONE (OUTPATIENT)
Dept: ENDOCRINOLOGY | Facility: CLINIC | Age: 50
End: 2025-02-07

## 2025-02-07 ENCOUNTER — APPOINTMENT (OUTPATIENT)
Dept: LAB | Facility: HOSPITAL | Age: 50
End: 2025-02-07
Attending: INTERNAL MEDICINE
Payer: COMMERCIAL

## 2025-02-07 ENCOUNTER — TELEPHONE (OUTPATIENT)
Age: 50
End: 2025-02-07

## 2025-02-07 ENCOUNTER — PATIENT OUTREACH (OUTPATIENT)
Dept: CASE MANAGEMENT | Facility: OTHER | Age: 50
End: 2025-02-07

## 2025-02-07 DIAGNOSIS — F41.1 GAD (GENERALIZED ANXIETY DISORDER): ICD-10-CM

## 2025-02-07 DIAGNOSIS — F41.0 PANIC DISORDER: ICD-10-CM

## 2025-02-07 DIAGNOSIS — R19.7 DIARRHEA, UNSPECIFIED TYPE: ICD-10-CM

## 2025-02-07 DIAGNOSIS — E83.51 HYPOCALCEMIA: ICD-10-CM

## 2025-02-07 DIAGNOSIS — F43.10 PTSD (POST-TRAUMATIC STRESS DISORDER): ICD-10-CM

## 2025-02-07 DIAGNOSIS — F33.1 MDD (MAJOR DEPRESSIVE DISORDER), RECURRENT EPISODE, MODERATE (HCC): Primary | ICD-10-CM

## 2025-02-07 LAB
CALCIUM SERPL-MCNC: 11.2 MG/DL (ref 8.4–10.2)
IGA SERPL-MCNC: 209 MG/DL (ref 66–433)

## 2025-02-07 PROCEDURE — 99214 OFFICE O/P EST MOD 30 MIN: CPT | Performed by: PHYSICIAN ASSISTANT

## 2025-02-07 PROCEDURE — 82784 ASSAY IGA/IGD/IGG/IGM EACH: CPT

## 2025-02-07 PROCEDURE — 36415 COLL VENOUS BLD VENIPUNCTURE: CPT

## 2025-02-07 PROCEDURE — 86364 TISS TRNSGLTMNASE EA IG CLAS: CPT

## 2025-02-07 RX ORDER — ALPRAZOLAM 2 MG/1
2 TABLET, EXTENDED RELEASE ORAL 2 TIMES DAILY
Qty: 60 TABLET | Refills: 0 | Status: SHIPPED | OUTPATIENT
Start: 2025-02-07

## 2025-02-07 NOTE — ASSESSMENT & PLAN NOTE
Continue Pristiq 100 mg daily  Utilize trazodone for sleep  Continue Xanax XR 2 mg as needed twice a day for anxiety
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
## 2025-02-07 NOTE — PROGRESS NOTES
Outpatient Care Management Note.    In basket reminder received; chart review completed.  High Utilizer care plan in place and committee to review next month.  No utilization since 12/28/24.     Patient following with endo for hypocalcemia and hypoparathyroidism.  Calcitrol was decreased to 0.25mg BID.  Patient can increase calcium 600mg to TID based on symptoms. Patient had colonoscopy performed 2/6.  She follows with GI, psych and ortho.  Will continue to follow in chart review at this time.   Reminder sent for next review.

## 2025-02-07 NOTE — TELEPHONE ENCOUNTER
Pt. Is at the Pharmacy  Fish oil capsule 1000 mg  since Vascepa needs prior auth per Pharmacist . Pt. Wants to know how many capsule for fish oil she should take per days ? Thanks

## 2025-02-07 NOTE — TELEPHONE ENCOUNTER
Patient states she is at the pharmacy to  fish oil but needs to know how many milligrams she should take. Please advise, thank you.

## 2025-02-10 PROCEDURE — 88305 TISSUE EXAM BY PATHOLOGIST: CPT | Performed by: PATHOLOGY

## 2025-02-12 ENCOUNTER — TELEMEDICINE (OUTPATIENT)
Dept: BEHAVIORAL/MENTAL HEALTH CLINIC | Facility: CLINIC | Age: 50
End: 2025-02-12
Payer: COMMERCIAL

## 2025-02-12 ENCOUNTER — APPOINTMENT (OUTPATIENT)
Dept: LAB | Facility: HOSPITAL | Age: 50
End: 2025-02-12
Attending: INTERNAL MEDICINE
Payer: COMMERCIAL

## 2025-02-12 DIAGNOSIS — F41.1 GENERALIZED ANXIETY DISORDER WITH PANIC ATTACKS: ICD-10-CM

## 2025-02-12 DIAGNOSIS — E83.51 HYPOCALCEMIA: ICD-10-CM

## 2025-02-12 DIAGNOSIS — F41.0 GENERALIZED ANXIETY DISORDER WITH PANIC ATTACKS: ICD-10-CM

## 2025-02-12 DIAGNOSIS — F17.210 NICOTINE DEPENDENCE, CIGARETTES, UNCOMPLICATED: ICD-10-CM

## 2025-02-12 DIAGNOSIS — F33.1 MODERATE EPISODE OF RECURRENT MAJOR DEPRESSIVE DISORDER (HCC): Primary | ICD-10-CM

## 2025-02-12 LAB — CALCIUM SERPL-MCNC: 10.7 MG/DL (ref 8.4–10.2)

## 2025-02-12 PROCEDURE — 36415 COLL VENOUS BLD VENIPUNCTURE: CPT

## 2025-02-12 PROCEDURE — 90834 PSYTX W PT 45 MINUTES: CPT | Performed by: SOCIAL WORKER

## 2025-02-12 NOTE — TELEPHONE ENCOUNTER
PA for Icosapent Ethyl 1GM capsulesSUBMITTED     via    [x]CMM-KEY: [id number]      [x]PA sent as URGENT    All office notes, labs and other pertaining documents and studies sent. Clinical questions answered. Awaiting determination from insurance company.     Turnaround time for your insurance to make a decision on your Prior Authorization can take 7-21 business days.

## 2025-02-12 NOTE — TELEPHONE ENCOUNTER
Prior Auth  CoverMyMeds  Icosapent Ethyl 1 GM Capsules  KEMP:[id number]  Dr Pabon    See Media    (COPY OF ORIGINAL AND ADDITIONAL PAPER)

## 2025-02-12 NOTE — TELEPHONE ENCOUNTER
Duplicate encounter created, please see telephone encounter from 02/07/2025 regarding  PA status. Please review patient's chart to see if there is already an encounter regarding the medication in question and to document anything regarding this medication in regards to anything regarding the authorization process etc before creating another encounter Thank You.

## 2025-02-13 LAB — TTG IGA SER IA-ACNC: 2.5 U/ML (ref ?–10)

## 2025-02-14 ENCOUNTER — RESULTS FOLLOW-UP (OUTPATIENT)
Dept: GASTROENTEROLOGY | Facility: AMBULARY SURGERY CENTER | Age: 50
End: 2025-02-14
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
## 2025-02-19 ENCOUNTER — TELEMEDICINE (OUTPATIENT)
Dept: BEHAVIORAL/MENTAL HEALTH CLINIC | Facility: CLINIC | Age: 50
End: 2025-02-19
Payer: COMMERCIAL

## 2025-02-19 DIAGNOSIS — F33.1 MODERATE EPISODE OF RECURRENT MAJOR DEPRESSIVE DISORDER (HCC): Primary | ICD-10-CM

## 2025-02-19 PROCEDURE — 90834 PSYTX W PT 45 MINUTES: CPT | Performed by: SOCIAL WORKER

## 2025-02-21 ENCOUNTER — APPOINTMENT (OUTPATIENT)
Dept: LAB | Facility: HOSPITAL | Age: 50
End: 2025-02-21
Attending: INTERNAL MEDICINE
Payer: COMMERCIAL

## 2025-02-21 DIAGNOSIS — E83.51 HYPOCALCEMIA: ICD-10-CM

## 2025-02-21 LAB — CALCIUM SERPL-MCNC: 9.4 MG/DL (ref 8.4–10.2)

## 2025-02-21 PROCEDURE — 36415 COLL VENOUS BLD VENIPUNCTURE: CPT

## 2025-02-23 ENCOUNTER — PATIENT OUTREACH (OUTPATIENT)
Dept: CASE MANAGEMENT | Facility: OTHER | Age: 50
End: 2025-02-23

## 2025-02-24 ENCOUNTER — TELEPHONE (OUTPATIENT)
Dept: PLASTIC SURGERY | Facility: CLINIC | Age: 50
End: 2025-02-24

## 2025-02-24 ENCOUNTER — TELEPHONE (OUTPATIENT)
Age: 50
End: 2025-02-24

## 2025-02-24 DIAGNOSIS — R19.7 DIARRHEA, UNSPECIFIED TYPE: ICD-10-CM
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
## 2025-02-24 NOTE — TELEPHONE ENCOUNTER
Advised patient I do not see any records from an outside provider scanned into her chart.  Gave my direct fax number and asked to advise when records are being sent so I can look for them.

## 2025-02-24 NOTE — TELEPHONE ENCOUNTER
Patient called asking for a fax her doctor faxed over to let us know that she can't do physical therapy and wants to know if we received it.  I couldn't locate it and patient advised this happened before and it was somewhere other than her chart and she would like us to look before she requests it to be refaxed.  Confirmed fax# and if you can please look into this and advise if it was received   Thank you

## 2025-02-25 ENCOUNTER — CONSULT (OUTPATIENT)
Dept: NEUROSURGERY | Facility: CLINIC | Age: 50
End: 2025-02-25
Payer: COMMERCIAL

## 2025-02-25 VITALS
BODY MASS INDEX: 39.01 KG/M2 | WEIGHT: 212 LBS | SYSTOLIC BLOOD PRESSURE: 122 MMHG | DIASTOLIC BLOOD PRESSURE: 70 MMHG | HEART RATE: 120 BPM | TEMPERATURE: 97.4 F | HEIGHT: 62 IN | OXYGEN SATURATION: 98 %

## 2025-02-25 DIAGNOSIS — M54.50 LUMBAR BACK PAIN: Primary | ICD-10-CM

## 2025-02-25 PROCEDURE — 99203 OFFICE O/P NEW LOW 30 MIN: CPT | Performed by: PHYSICIAN ASSISTANT

## 2025-02-25 RX ORDER — METHOCARBAMOL 500 MG/1
500 TABLET, FILM COATED ORAL
Qty: 45 TABLET | Refills: 0 | Status: SHIPPED | OUTPATIENT
Start: 2025-02-25
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
## 2025-02-26 RX ORDER — CHOLESTYRAMINE 4 G/9G
1 POWDER, FOR SUSPENSION ORAL
Qty: 30 PACKET | Refills: 0 | Status: SHIPPED | OUTPATIENT
Start: 2025-02-26

## 2025-02-27 NOTE — TELEPHONE ENCOUNTER
Patient called and stated that the fax was sent /in chart/ so she can be scheduled   If you can please call her back   Thank you

## 2025-02-28 ENCOUNTER — APPOINTMENT (OUTPATIENT)
Dept: LAB | Facility: HOSPITAL | Age: 50
End: 2025-02-28
Attending: INTERNAL MEDICINE
Payer: COMMERCIAL

## 2025-02-28 ENCOUNTER — TELEPHONE (OUTPATIENT)
Dept: ENDOCRINOLOGY | Facility: CLINIC | Age: 50
End: 2025-02-28

## 2025-02-28 DIAGNOSIS — E83.51 HYPOCALCEMIA: ICD-10-CM

## 2025-02-28 LAB — CALCIUM SERPL-MCNC: 9.9 MG/DL (ref 8.4–10.2)

## 2025-02-28 PROCEDURE — 36415 COLL VENOUS BLD VENIPUNCTURE: CPT

## 2025-02-28 NOTE — TELEPHONE ENCOUNTER
I called and left a voicemail requesting for the patient [employer] insurance information for her may submit her Prior Auth. Thank you

## 2025-03-01 DIAGNOSIS — E78.1 HYPERTRIGLYCERIDEMIA: ICD-10-CM

## 2025-03-03 ENCOUNTER — TELEPHONE (OUTPATIENT)
Age: 50
End: 2025-03-03

## 2025-03-03 ENCOUNTER — TELEPHONE (OUTPATIENT)
Dept: PLASTIC SURGERY | Facility: CLINIC | Age: 50
End: 2025-03-03

## 2025-03-03 RX ORDER — FENOFIBRATE 160 MG/1
160 TABLET ORAL DAILY
Qty: 30 TABLET | Refills: 5 | Status: SHIPPED | OUTPATIENT
Start: 2025-03-03

## 2025-03-03 NOTE — TELEPHONE ENCOUNTER
Called patient to advise we were notified that we are not able to see patients in the practice who have out of state Medicaid, even if secondary or tertiary.  Advised patient to call her insurance company and find participating provider in [place].

## 2025-03-05 ENCOUNTER — TELEMEDICINE (OUTPATIENT)
Dept: BEHAVIORAL/MENTAL HEALTH CLINIC | Facility: CLINIC | Age: 50
End: 2025-03-05
Payer: COMMERCIAL

## 2025-03-05 DIAGNOSIS — F33.1 MODERATE EPISODE OF RECURRENT MAJOR DEPRESSIVE DISORDER (HCC): Primary | ICD-10-CM

## 2025-03-05 PROCEDURE — 90834 PSYTX W PT 45 MINUTES: CPT | Performed by: SOCIAL WORKER

## 2025-03-06 ENCOUNTER — TELEMEDICINE (OUTPATIENT)
Dept: PSYCHIATRY | Facility: CLINIC | Age: 50
End: 2025-03-06

## 2025-03-06 ENCOUNTER — RESULTS FOLLOW-UP (OUTPATIENT)
Dept: ENDOCRINOLOGY | Facility: CLINIC | Age: 50
End: 2025-03-06

## 2025-03-06 ENCOUNTER — APPOINTMENT (OUTPATIENT)
Dept: LAB | Facility: HOSPITAL | Age: 50
End: 2025-03-06
Attending: INTERNAL MEDICINE
Payer: COMMERCIAL

## 2025-03-06 DIAGNOSIS — Z91.199 NO-SHOW FOR APPOINTMENT: Primary | ICD-10-CM

## 2025-03-06 DIAGNOSIS — E83.51 HYPOCALCEMIA: ICD-10-CM

## 2025-03-06 DIAGNOSIS — E78.1 HYPERTRIGLYCERIDEMIA: ICD-10-CM

## 2025-03-06 LAB
ALBUMIN SERPL BCG-MCNC: 4.4 G/DL (ref 3.5–5)
ALP SERPL-CCNC: 75 U/L (ref 34–104)
ALT SERPL W P-5'-P-CCNC: 61 U/L (ref 7–52)
ANION GAP SERPL CALCULATED.3IONS-SCNC: 17 MMOL/L (ref 4–13)
AST SERPL W P-5'-P-CCNC: 37 U/L (ref 13–39)
BILIRUB SERPL-MCNC: 0.48 MG/DL (ref 0.2–1)
BUN SERPL-MCNC: 21 MG/DL (ref 5–25)
CALCIUM SERPL-MCNC: 10.1 MG/DL (ref 8.4–10.2)
CHLORIDE SERPL-SCNC: 100 MMOL/L (ref 96–108)
CHOLEST SERPL-MCNC: 214 MG/DL (ref ?–200)
CO2 SERPL-SCNC: 18 MMOL/L (ref 21–32)
CREAT SERPL-MCNC: 1.04 MG/DL (ref 0.6–1.3)
GFR SERPL CREATININE-BSD FRML MDRD: 63 ML/MIN/1.73SQ M
GLUCOSE P FAST SERPL-MCNC: 174 MG/DL (ref 65–99)
HDLC SERPL-MCNC: 31 MG/DL
NONHDLC SERPL-MCNC: 183 MG/DL
POTASSIUM SERPL-SCNC: 4 MMOL/L (ref 3.5–5.3)
PROT SERPL-MCNC: 7.6 G/DL (ref 6.4–8.4)
SODIUM SERPL-SCNC: 135 MMOL/L (ref 135–147)
TRIGL SERPL-MCNC: 711 MG/DL (ref ?–150)

## 2025-03-06 PROCEDURE — 80061 LIPID PANEL: CPT

## 2025-03-06 PROCEDURE — 36415 COLL VENOUS BLD VENIPUNCTURE: CPT

## 2025-03-06 PROCEDURE — 80053 COMPREHEN METABOLIC PANEL: CPT

## 2025-03-06 NOTE — TELEPHONE ENCOUNTER
Please call and inform patient of results. Total cholesterol as well as triglyceride levels have improved. Triglyceride levels continue 711 mg/dL. He is confirmed as compliant with her medications including fenofibrate, cholestyramine, and Lipitor.

## 2025-03-06 NOTE — PSYCH
No Call. No Show. No Charge    Yamileth Vale no showed 03/06/25 appointment , staff called and left message to reschedule appointment     Treatment Plan not due at this session.
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
## 2025-03-07 ENCOUNTER — TELEPHONE (OUTPATIENT)
Dept: NEPHROLOGY | Facility: CLINIC | Age: 50
End: 2025-03-07

## 2025-03-07 DIAGNOSIS — N18.31 STAGE 3A CHRONIC KIDNEY DISEASE (HCC): Primary | ICD-10-CM

## 2025-03-07 DIAGNOSIS — E83.52 HYPERCALCEMIA: ICD-10-CM

## 2025-03-07 NOTE — TELEPHONE ENCOUNTER
LM on patient's VM that labs of 3/6/25 show stable kidney function and stable calcium level.  CO2 was low.  Per , to repeat BMP and UA in one week.  Order placed.    ----- Message from Husam Jeong MD sent at 3/6/2025  5:51 PM EST -----  Please inform patient that most recent labs (3/6/25) show that kidney function and calcium level are stable. Her CO2 level is unusually low - this can be caused by abdominal pain, diarrhea or starvation. Please have her repeat a BMP and check a UA in 1 week to follow up on the lab abnormalities.

## 2025-03-10 ENCOUNTER — TELEPHONE (OUTPATIENT)
Dept: ENDOCRINOLOGY | Facility: CLINIC | Age: 50
End: 2025-03-10

## 2025-03-10 NOTE — TELEPHONE ENCOUNTER
Pt called refill line and stated that she has been taking the calcitriol (ROCALTROL) 0.25 mcg capsule 1 tablet by mouth 3 times a day,because that is how she has always taken the medication that way. Pt  realized that the doctor changed the directions to take 1 tablet by mouth 2 times a day. SO now the pt only has enough medication for today, and the pharmacy stated they can not fill the script till the 17 th of the month. Pt is asking if a script can be sent for take 1 tablet by mouth daily for a quantity of 18 tablets till the 17 th when the script can be filled for the 1 tablet 2 times a day. Please call the pt back regarding this medication

## 2025-03-10 NOTE — TELEPHONE ENCOUNTER
Calcium level continues to be high on current labs, most recently 10.1  Recommend decreasing calcitriol to 0.25 mcg orally twice a day.  I am confused, why would she need a prescription for calcitriol once a day if she does not have enough for the next 7 days.  Patient may  a partial prescription in the meantime.

## 2025-03-10 NOTE — TELEPHONE ENCOUNTER
Patient states that pharmacy will not fill the prescription on a short term supply with the same sig as the full script because it would be a duplicate. That's why she needs it as 3 times a day or 1 time a day to hold her over until the 17th

## 2025-03-14 ENCOUNTER — DOCUMENTATION (OUTPATIENT)
Dept: CASE MANAGEMENT | Facility: OTHER | Age: 50
End: 2025-03-14

## 2025-03-14 NOTE — MEDICAL HIGH UTILIZER
High Utilizer Care Plan for: Yamileth Vale  Updated: 3/8/2025  Patient Name: Yamileth Vale MRN: [id number]       : 1975    Age: 49   Sex: F     Utilization Background: In the 6 months prior to the care plan being written the patient has had 18 ED visits secondary to calcium related complaints. At this time, she has not had concerning radiation exposures. There were 5 occurrences in  where the patient received IV calcium gluconate for normal calcium/ionized calcium levels.     In the last 90 Days: 2 ED visit     Most Recent Work Up:  Noncontributory to utilization at this time. Treatment Recommendations:  ED Recommendations: Patient will typically present secondary to numbness or tingling that she will attribute to her calcium levels being off. There has been times where she will self-adjust medications and take large doses of exogenous calcium.   Recommend checking serum calcium and ionized calcium levels. Would only offer IV calcium gluconate if calcium levels are 7.5.   If the patient has hypocalcemic symptoms and her levels are > 7.5 recommend oral calcium intake. This does not need to be ordered in the ED.   Since patient at times over adjusts her medications which results in high serum calcium levels and potentially LELAND, low threshold to supply IV fluids if there is a rise in creatinine or hypercalcemia present.   At every visit patient should be encouraged to reach out to her Endocrinology team regarding her calcium levels and appropriate course of action prior to presenting to the ED.    Inpatient Recommendations: Patient is low risk for readmission at this time. Please see above recommendations regarding calcium supplementation.       Outpatient recommendations: Patient should maintain close follow up with Endocrinology and be encouraged to call Endocrinology office with symptoms or questions regarding labs and medication adjustments. Consideration for outpatient infusions of  calcium if needed.     Situation: Patient is a 48 year old female with history of hypoparathyroidism who presents frequently with symptoms perceived to be from hypocalcemia. She has been hypercalcemic at times due to self adjusting medications and has received doses of IV calcium gluconate with normal calcium and ionized calcium levels.      PMH/PSH: Hypoparathyroidism, Prediabetes, CKD 3, Obesity       Assessment                              Drivers of repeated utilization:                 Numbness/tingling thought to be due to low calcium levels     Community Resources in place:    N/A    Patient Care Team:   Matthew Ramos MD - PCP ([facility])  ELIF Roblero, Yisel Pabon MD - Endocrinology ([facility])  Husam Jeong MD - Nephrology ([facility])  Danie Morrell - OP RN Care Manager            Care plan date and owner: Rhett Salas Jr., PA-C 2/26/2024       Reviewed with patient before discharge?   3/20/2024

## 2025-03-17 ENCOUNTER — PATIENT MESSAGE (OUTPATIENT)
Dept: NEUROSURGERY | Facility: CLINIC | Age: 50
End: 2025-03-17

## 2025-03-17 DIAGNOSIS — F41.0 PANIC DISORDER: ICD-10-CM

## 2025-03-17 DIAGNOSIS — G47.09 OTHER INSOMNIA: ICD-10-CM

## 2025-03-17 RX ORDER — TRAZODONE HYDROCHLORIDE 100 MG/1
100 TABLET ORAL
Qty: 90 TABLET | Refills: 1 | Status: SHIPPED | OUTPATIENT
Start: 2025-03-17 | End: 2025-09-13

## 2025-03-17 RX ORDER — ALPRAZOLAM 2 MG/1
2 TABLET, EXTENDED RELEASE ORAL 2 TIMES DAILY
Qty: 60 TABLET | Refills: 0 | Status: SHIPPED | OUTPATIENT
Start: 2025-03-17

## 2025-03-17 NOTE — PATIENT COMMUNICATION
Pt called in and was unable to get xays. Moved appt to 4/4/25 @ 9:30 with [facility] in [place].

## 2025-03-19 ENCOUNTER — APPOINTMENT (OUTPATIENT)
Dept: LAB | Facility: HOSPITAL | Age: 50
End: 2025-03-19
Payer: COMMERCIAL

## 2025-03-19 DIAGNOSIS — E83.51 HYPOCALCEMIA: ICD-10-CM

## 2025-03-19 DIAGNOSIS — E83.52 HYPERCALCEMIA: ICD-10-CM

## 2025-03-19 DIAGNOSIS — N18.31 STAGE 3A CHRONIC KIDNEY DISEASE (HCC): ICD-10-CM

## 2025-03-19 LAB
ANION GAP SERPL CALCULATED.3IONS-SCNC: 14 MMOL/L (ref 4–13)
BUN SERPL-MCNC: 24 MG/DL (ref 5–25)
CALCIUM SERPL-MCNC: 9.5 MG/DL (ref 8.4–10.2)
CALCIUM SERPL-MCNC: 9.5 MG/DL (ref 8.4–10.2)
CHLORIDE SERPL-SCNC: 101 MMOL/L (ref 96–108)
CO2 SERPL-SCNC: 19 MMOL/L (ref 21–32)
CREAT SERPL-MCNC: 1.06 MG/DL (ref 0.6–1.3)
GFR SERPL CREATININE-BSD FRML MDRD: 62 ML/MIN/1.73SQ M
GLUCOSE P FAST SERPL-MCNC: 140 MG/DL (ref 65–99)
POTASSIUM SERPL-SCNC: 4.2 MMOL/L (ref 3.5–5.3)
SODIUM SERPL-SCNC: 134 MMOL/L (ref 135–147)

## 2025-03-19 PROCEDURE — 80048 BASIC METABOLIC PNL TOTAL CA: CPT

## 2025-03-19 PROCEDURE — 36415 COLL VENOUS BLD VENIPUNCTURE: CPT

## 2025-03-20 ENCOUNTER — TELEMEDICINE (OUTPATIENT)
Dept: BEHAVIORAL/MENTAL HEALTH CLINIC | Facility: CLINIC | Age: 50
End: 2025-03-20

## 2025-03-20 DIAGNOSIS — Z91.199 NO-SHOW FOR APPOINTMENT: Primary | ICD-10-CM

## 2025-03-20 PROCEDURE — NOSHOW: Performed by: SOCIAL WORKER

## 2025-03-21 ENCOUNTER — TELEPHONE (OUTPATIENT)
Dept: PSYCHIATRY | Facility: CLINIC | Age: 50
End: 2025-03-21

## 2025-03-21 DIAGNOSIS — N18.31 STAGE 3A CHRONIC KIDNEY DISEASE (HCC): Primary | ICD-10-CM

## 2025-03-21 DIAGNOSIS — E87.20 METABOLIC ACIDOSIS: Primary | ICD-10-CM

## 2025-03-21 RX ORDER — SODIUM BICARBONATE 650 MG/1
650 TABLET ORAL 2 TIMES DAILY
Qty: 60 TABLET | Refills: 4 | Status: SHIPPED | OUTPATIENT
Start: 2025-03-21

## 2025-03-21 RX ORDER — SODIUM BICARBONATE 650 MG/1
650 TABLET ORAL 2 TIMES DAILY
COMMUNITY
End: 2025-03-21 | Stop reason: SDUPTHER

## 2025-03-21 NOTE — TELEPHONE ENCOUNTER
Lvm cancelling her virtual appt with Dali Izquierdo due to provider is working from home and pt has medicaid. Pt needs to r/s when provider is in office

## 2025-03-21 NOTE — TELEPHONE ENCOUNTER
Message left on patient's VM that labs of  3/19/25 show stable kidney function and stable calcium level.  CO2 better but still low.  Per Dr. Jeong, to start sodium bicarb 650 mg. BID and repeat BMP in one month.    ----- Message from Husam Jeong MD sent at 3/19/2025  5:11 PM EDT -----  Please inform patient that most recent labs (3/19/25) show that kidney function and calcium level are stable. Her CO2 level is better but remains low. Please have her start sodium bicarbonate 650 mg twice a day and repeat a BMP in 1 month.

## 2025-03-21 NOTE — PSYCH
No Call. No Show. Charge    Yamileth Vale no showed 03/20/25 appointment , staff called and left message to reschedule appointment     Treatment Plan not due at this session.

## 2025-03-23 DIAGNOSIS — E83.51 HYPOCALCEMIA: ICD-10-CM

## 2025-03-23 DIAGNOSIS — D50.8 IRON DEFICIENCY ANEMIA SECONDARY TO INADEQUATE DIETARY IRON INTAKE: ICD-10-CM

## 2025-03-23 DIAGNOSIS — M54.50 LUMBAR BACK PAIN: ICD-10-CM

## 2025-03-24 RX ORDER — METHOCARBAMOL 500 MG/1
500 TABLET, FILM COATED ORAL
Qty: 30 TABLET | Refills: 1 | Status: SHIPPED | OUTPATIENT
Start: 2025-03-24

## 2025-03-24 RX ORDER — LEVOTHYROXINE SODIUM 150 UG/1
150 TABLET ORAL
Qty: 90 TABLET | Refills: 1 | Status: SHIPPED | OUTPATIENT
Start: 2025-03-24

## 2025-03-24 RX ORDER — LEVOTHYROXINE SODIUM 150 UG/1
150 TABLET ORAL
Qty: 30 TABLET | OUTPATIENT
Start: 2025-03-24

## 2025-03-24 NOTE — TELEPHONE ENCOUNTER
Please assist with refills/management of medication. As the surgical office we typically only prescribe 6 weeks after surgery    Thanks

## 2025-03-25 RX ORDER — FERROUS SULFATE 325(65) MG
1 TABLET ORAL 2 TIMES DAILY WITH MEALS
Qty: 60 TABLET | Refills: 5 | Status: SHIPPED | OUTPATIENT
Start: 2025-03-25

## 2025-03-31 ENCOUNTER — APPOINTMENT (OUTPATIENT)
Dept: LAB | Facility: HOSPITAL | Age: 50
End: 2025-03-31
Payer: COMMERCIAL

## 2025-03-31 DIAGNOSIS — E83.51 HYPOCALCEMIA: ICD-10-CM

## 2025-03-31 LAB — CALCIUM SERPL-MCNC: 9.7 MG/DL (ref 8.4–10.2)

## 2025-03-31 PROCEDURE — 36415 COLL VENOUS BLD VENIPUNCTURE: CPT

## 2025-04-02 ENCOUNTER — CONSULT (OUTPATIENT)
Dept: INTERNAL MEDICINE CLINIC | Facility: CLINIC | Age: 50
End: 2025-04-02
Payer: COMMERCIAL

## 2025-04-02 ENCOUNTER — TELEMEDICINE (OUTPATIENT)
Dept: BEHAVIORAL/MENTAL HEALTH CLINIC | Facility: CLINIC | Age: 50
End: 2025-04-02
Payer: COMMERCIAL

## 2025-04-02 VITALS
OXYGEN SATURATION: 97 % | HEIGHT: 62 IN | DIASTOLIC BLOOD PRESSURE: 68 MMHG | BODY MASS INDEX: 39.38 KG/M2 | HEART RATE: 100 BPM | WEIGHT: 214 LBS | RESPIRATION RATE: 16 BRPM | TEMPERATURE: 98.2 F | SYSTOLIC BLOOD PRESSURE: 108 MMHG

## 2025-04-02 DIAGNOSIS — M24.811 INTERNAL DERANGEMENT OF RIGHT SHOULDER: ICD-10-CM

## 2025-04-02 DIAGNOSIS — E87.6 HYPOKALEMIA: ICD-10-CM

## 2025-04-02 DIAGNOSIS — E78.1 HYPERTRIGLYCERIDEMIA: ICD-10-CM

## 2025-04-02 DIAGNOSIS — F32.A ANXIETY AND DEPRESSION: ICD-10-CM

## 2025-04-02 DIAGNOSIS — M54.50 LUMBAR BACK PAIN: ICD-10-CM

## 2025-04-02 DIAGNOSIS — J45.40 MODERATE PERSISTENT ASTHMA, UNSPECIFIED WHETHER COMPLICATED: ICD-10-CM

## 2025-04-02 DIAGNOSIS — Z01.818 PRE-OP EXAM: Primary | ICD-10-CM

## 2025-04-02 DIAGNOSIS — M54.6 CHRONIC BILATERAL THORACIC BACK PAIN: ICD-10-CM

## 2025-04-02 DIAGNOSIS — F33.1 MODERATE EPISODE OF RECURRENT MAJOR DEPRESSIVE DISORDER (HCC): Primary | ICD-10-CM

## 2025-04-02 DIAGNOSIS — G89.29 CHRONIC BILATERAL THORACIC BACK PAIN: ICD-10-CM

## 2025-04-02 DIAGNOSIS — D50.8 IRON DEFICIENCY ANEMIA SECONDARY TO INADEQUATE DIETARY IRON INTAKE: ICD-10-CM

## 2025-04-02 DIAGNOSIS — N64.89 BILATERAL PENDULOUS BREASTS: ICD-10-CM

## 2025-04-02 DIAGNOSIS — F41.9 ANXIETY AND DEPRESSION: ICD-10-CM

## 2025-04-02 DIAGNOSIS — E83.42 HYPOMAGNESEMIA: ICD-10-CM

## 2025-04-02 DIAGNOSIS — R05.1 ACUTE COUGH: ICD-10-CM

## 2025-04-02 DIAGNOSIS — N62 MACROMASTIA: Primary | ICD-10-CM

## 2025-04-02 PROCEDURE — 93000 ELECTROCARDIOGRAM COMPLETE: CPT

## 2025-04-02 PROCEDURE — 90834 PSYTX W PT 45 MINUTES: CPT | Performed by: SOCIAL WORKER

## 2025-04-02 PROCEDURE — 99214 OFFICE O/P EST MOD 30 MIN: CPT

## 2025-04-02 RX ORDER — ALBUTEROL SULFATE 90 UG/1
2 INHALANT RESPIRATORY (INHALATION) EVERY 6 HOURS PRN
Qty: 8.5 G | Refills: 5 | Status: SHIPPED | OUTPATIENT
Start: 2025-04-02

## 2025-04-02 RX ORDER — FERROUS SULFATE 325(65) MG
1 TABLET ORAL 2 TIMES DAILY WITH MEALS
Qty: 60 TABLET | Refills: 5 | Status: SHIPPED | OUTPATIENT
Start: 2025-04-02

## 2025-04-02 RX ORDER — BENZONATATE 100 MG/1
100 CAPSULE ORAL 3 TIMES DAILY PRN
Qty: 20 CAPSULE | Refills: 0 | Status: SHIPPED | OUTPATIENT
Start: 2025-04-02

## 2025-04-02 RX ORDER — LANOLIN ALCOHOL/MO/W.PET/CERES
400 CREAM (GRAM) TOPICAL 3 TIMES DAILY
Qty: 90 TABLET | Refills: 5 | Status: SHIPPED | OUTPATIENT
Start: 2025-04-02

## 2025-04-02 RX ORDER — METHOCARBAMOL 500 MG/1
500 TABLET, FILM COATED ORAL
Qty: 30 TABLET | Refills: 1 | Status: SHIPPED | OUTPATIENT
Start: 2025-04-02

## 2025-04-02 RX ORDER — FENOFIBRATE 160 MG/1
160 TABLET ORAL DAILY
Qty: 30 TABLET | Refills: 5 | Status: SHIPPED | OUTPATIENT
Start: 2025-04-02

## 2025-04-02 RX ORDER — POTASSIUM CHLORIDE 1500 MG/1
20 TABLET, EXTENDED RELEASE ORAL 2 TIMES DAILY
Qty: 180 TABLET | Refills: 1 | Status: SHIPPED | OUTPATIENT
Start: 2025-04-02

## 2025-04-02 NOTE — ASSESSMENT & PLAN NOTE
Depression Screening Follow-up Plan: Patient's depression screening was positive with a PHQ-9 score of 7. Patient with underlying depression and was advised to continue current medications as prescribed. Continue regular follow-up with their psychologist/therapist/psychiatrist who is managing their mental health condition(s).

## 2025-04-02 NOTE — ASSESSMENT & PLAN NOTE
Refill sent  Orders:  •  magnesium Oxide (MAG-OX) 400 mg TABS; Take 1 tablet (400 mg total) by mouth 3 (three) times a day
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
## 2025-04-02 NOTE — ASSESSMENT & PLAN NOTE
Refill sent  Orders:  •  potassium chloride (Klor-Con M20) 20 mEq tablet; Take 1 tablet (20 mEq total) by mouth 2 (two) times a day

## 2025-04-02 NOTE — PROGRESS NOTES
Previous referral to Plastic Surgery in [place] was not supported by patient's insurance. She reports preference for [place] provider, however, unable to find appropriate specialist within the network.    Will provide referral to local [place] provider [facility]---    Arben Glass MD  [phone]      She can also try calling her insurance for a recommendation to a local Plastic Surgery provider within her insurance network.

## 2025-04-02 NOTE — ASSESSMENT & PLAN NOTE
Refill sent  Orders:  •  ferrous sulfate 325 (65 Fe) mg tablet; Take 1 tablet (325 mg total) by mouth 2 (two) times a day with meals

## 2025-04-03 ENCOUNTER — HOSPITAL ENCOUNTER (OUTPATIENT)
Dept: RADIOLOGY | Facility: HOSPITAL | Age: 50
Discharge: HOME | End: 2025-04-03
Attending: ORTHOPAEDIC SURGERY
Payer: COMMERCIAL

## 2025-04-03 ENCOUNTER — ANESTHESIA (OUTPATIENT)
Dept: RADIOLOGY | Facility: HOSPITAL | Age: 50
End: 2025-04-03
Payer: COMMERCIAL

## 2025-04-03 ENCOUNTER — ANESTHESIA EVENT (OUTPATIENT)
Dept: RADIOLOGY | Facility: HOSPITAL | Age: 50
End: 2025-04-03
Payer: COMMERCIAL

## 2025-04-03 VITALS
HEART RATE: 85 BPM | DIASTOLIC BLOOD PRESSURE: 88 MMHG | HEIGHT: 62 IN | OXYGEN SATURATION: 93 % | BODY MASS INDEX: 39.39 KG/M2 | RESPIRATION RATE: 18 BRPM | WEIGHT: 214.07 LBS | SYSTOLIC BLOOD PRESSURE: 134 MMHG | TEMPERATURE: 97 F

## 2025-04-03 DIAGNOSIS — M24.811 INTERNAL DERANGEMENT OF RIGHT SHOULDER: ICD-10-CM

## 2025-04-03 LAB — GLUCOSE SERPL-MCNC: 180 MG/DL (ref 65–140)

## 2025-04-03 PROCEDURE — 82948 REAGENT STRIP/BLOOD GLUCOSE: CPT

## 2025-04-03 PROCEDURE — 73221 MRI JOINT UPR EXTREM W/O DYE: CPT

## 2025-04-03 RX ORDER — DEXAMETHASONE SODIUM PHOSPHATE 10 MG/ML
8 INJECTION, SOLUTION INTRAMUSCULAR; INTRAVENOUS ONCE AS NEEDED
Status: CANCELLED | OUTPATIENT
Start: 2025-04-03

## 2025-04-03 RX ORDER — METOCLOPRAMIDE HYDROCHLORIDE 5 MG/ML
10 INJECTION INTRAMUSCULAR; INTRAVENOUS ONCE AS NEEDED
Status: CANCELLED | OUTPATIENT
Start: 2025-04-03

## 2025-04-03 RX ORDER — DIPHENHYDRAMINE HYDROCHLORIDE 50 MG/ML
12.5 INJECTION, SOLUTION INTRAMUSCULAR; INTRAVENOUS ONCE AS NEEDED
Status: CANCELLED | OUTPATIENT
Start: 2025-04-03

## 2025-04-03 RX ORDER — LIDOCAINE HYDROCHLORIDE 10 MG/ML
INJECTION, SOLUTION EPIDURAL; INFILTRATION; INTRACAUDAL; PERINEURAL AS NEEDED
Status: DISCONTINUED | OUTPATIENT
Start: 2025-04-03 | End: 2025-04-03

## 2025-04-03 RX ORDER — PROPOFOL 10 MG/ML
INJECTION, EMULSION INTRAVENOUS AS NEEDED
Status: DISCONTINUED | OUTPATIENT
Start: 2025-04-03 | End: 2025-04-03

## 2025-04-03 RX ORDER — ONDANSETRON 2 MG/ML
INJECTION INTRAMUSCULAR; INTRAVENOUS AS NEEDED
Status: DISCONTINUED | OUTPATIENT
Start: 2025-04-03 | End: 2025-04-03

## 2025-04-03 RX ORDER — SODIUM CHLORIDE, SODIUM LACTATE, POTASSIUM CHLORIDE, CALCIUM CHLORIDE 600; 310; 30; 20 MG/100ML; MG/100ML; MG/100ML; MG/100ML
125 INJECTION, SOLUTION INTRAVENOUS CONTINUOUS
Status: DISCONTINUED | OUTPATIENT
Start: 2025-04-03 | End: 2025-04-04 | Stop reason: HOSPADM

## 2025-04-03 RX ADMIN — SODIUM CHLORIDE, SODIUM LACTATE, POTASSIUM CHLORIDE, AND CALCIUM CHLORIDE 125 ML/HR: .6; .31; .03; .02 INJECTION, SOLUTION INTRAVENOUS at 09:49

## 2025-04-03 RX ADMIN — ONDANSETRON 4 MG: 2 INJECTION INTRAMUSCULAR; INTRAVENOUS at 10:20

## 2025-04-03 RX ADMIN — LIDOCAINE HYDROCHLORIDE 50 MG: 10 INJECTION, SOLUTION EPIDURAL; INFILTRATION; INTRACAUDAL; PERINEURAL at 10:20

## 2025-04-03 RX ADMIN — PROPOFOL 200 MG: 10 INJECTION, EMULSION INTRAVENOUS at 10:20

## 2025-04-03 NOTE — ANESTHESIA PREPROCEDURE EVALUATION
Procedure:  MRI SHOULDER RIGHT WO CONTRAST    Relevant Problems   CARDIO   (+) Hypertriglyceridemia      ENDO   (+) Hypoparathyroidism (HCC)   (+) Post-surgical hypoparathyroidism (HCC)   (+) Postoperative hypothyroidism      GI/HEPATIC   (+) Fatty liver disease, nonalcoholic   (+) Gastroesophageal reflux disease without esophagitis      /RENAL   (+) Stage 3a chronic kidney disease (HCC)   (+) Stage 3b chronic kidney disease (HCC)      GYN   (+) History of hysterectomy      HEMATOLOGY   (+) Anemia   (+) Blood coagulation disorder (HCC)      NEURO/PSYCH   (+) Anxiety   (+) Anxiety and depression   (+) Chronic intractable pain   (+) Continuous opioid dependence (HCC)   (+) ADITYA (generalized anxiety disorder)   (+) MDD (major depressive disorder), recurrent episode, moderate (HCC)   (+) PTSD (post-traumatic stress disorder)   (+) Panic disorder      Other   (+) Splenomegaly        Physical Exam    Airway    Mallampati score: II         Dental   No notable dental hx     Cardiovascular      Pulmonary      Other Findings  post-pubertal.      Anesthesia Plan  ASA Score- 3     Anesthesia Type- general with ASA Monitors.         Additional Monitors:     Airway Plan: LMA.    Comment: I, Dr. Bullard, the attending physician, have personally seen and evaluated the patient prior to anesthetic care.  I have reviewed the pre-anesthetic record, and other medical records if appropriate to the anesthetic care.  If a CRNA is involved in the case, I have reviewed the CRNA assessment, if present, and agree.  The patient is in a suitable condition to proceed with my formulated anesthetic plan.  .       Plan Factors-    Chart reviewed.                      Induction- intravenous.    Postoperative Plan-         Informed Consent- Anesthetic plan and risks discussed with patient.  I personally reviewed this patient with the CRNA. Discussed and agreed on the Anesthesia Plan with the CRNA..      NPO Status:  Vitals Value Taken Time   Date of  last liquid 04/02/25 04/03/25 0921   Time of last liquid 2355 04/03/25 0921   Date of last solid 04/02/25 04/03/25 0921   Time of last solid 1800 04/03/25 0921

## 2025-04-03 NOTE — NURSING NOTE
Right shoulder MRI with and w/o contrast completed and patient tolerated procedure well. Post-procedure vital signs taken and recorded. Report given to APU nurse Jovita. Patient placed in for transport to be taken to APU. Patient offers no complaints or verbalizes any issues upon leaving MRI. Patient alert but sleepy in recovery and would desaturate to 88% when asleep and snoring. Patient placed on 3L nasal cannula and encouraged to stay awake and deep breathe. Patient's SPO2 improved but continued to drop when she fell back to sleep. Patient titrated down to 1.5L nasal cannula. APU RN aware.

## 2025-04-03 NOTE — ANESTHESIA POSTPROCEDURE EVALUATION
Post-Op Assessment Note    CV Status:  Stable  Pain Score: 0    Pain management: adequate       Mental Status:  Alert and awake   Hydration Status:  Euvolemic   PONV Controlled:  Controlled   Airway Patency:  Patent  Two or more mitigation strategies used for obstructive sleep apnea   Post Op Vitals Reviewed: Yes    No anethesia notable event occurred.    Staff: CRNA           Last Filed PACU Vitals:  Vitals Value Taken Time   Temp 97.7 °F (36.5 °C) 04/03/25 1055   Pulse 97 04/03/25 1055   /81 04/03/25 1055   Resp 20 04/03/25 1055   SpO2 93 % 04/03/25 1055

## 2025-04-04 ENCOUNTER — TELEPHONE (OUTPATIENT)
Dept: NEUROSURGERY | Facility: CLINIC | Age: 50
End: 2025-04-04

## 2025-04-04 NOTE — ANESTHESIA POSTPROCEDURE EVALUATION
Post-Op Assessment Note    CV Status:  Stable    Pain management: adequate       Mental Status:  Alert   Hydration Status:  Stable   PONV Controlled:  None   Airway Patency:  Patent     Post Op Vitals Reviewed: Yes    No anethesia notable event occurred.    Staff: Anesthesiologist           Last Filed PACU Vitals:  Vitals Value Taken Time   Temp     Pulse     BP     Resp     SpO2

## 2025-04-08 ENCOUNTER — HOSPITAL ENCOUNTER (EMERGENCY)
Facility: HOSPITAL | Age: 50
Discharge: HOME | End: 2025-04-08
Payer: COMMERCIAL

## 2025-04-08 VITALS
TEMPERATURE: 98.1 F | BODY MASS INDEX: 38.28 KG/M2 | SYSTOLIC BLOOD PRESSURE: 148 MMHG | HEIGHT: 62 IN | HEART RATE: 98 BPM | RESPIRATION RATE: 19 BRPM | WEIGHT: 208 LBS | OXYGEN SATURATION: 97 % | DIASTOLIC BLOOD PRESSURE: 82 MMHG

## 2025-04-08 DIAGNOSIS — R00.0 SINUS TACHYCARDIA: ICD-10-CM

## 2025-04-08 DIAGNOSIS — R07.9 CHEST PAIN: Primary | ICD-10-CM

## 2025-04-08 LAB
ANION GAP SERPL CALCULATED.3IONS-SCNC: 15 MMOL/L (ref 4–13)
BASOPHILS # BLD AUTO: 0.05 THOUSANDS/ÂΜL (ref 0–0.1)
BASOPHILS NFR BLD AUTO: 1 % (ref 0–1)
BUN SERPL-MCNC: 20 MG/DL (ref 5–25)
CA-I BLD-SCNC: 1.17 MMOL/L (ref 1.12–1.32)
CALCIUM SERPL-MCNC: 10.2 MG/DL (ref 8.4–10.2)
CARDIAC TROPONIN I PNL SERPL HS: 4 NG/L (ref ?–50)
CHLORIDE SERPL-SCNC: 101 MMOL/L (ref 96–108)
CO2 SERPL-SCNC: 23 MMOL/L (ref 21–32)
CREAT SERPL-MCNC: 1.09 MG/DL (ref 0.6–1.3)
EOSINOPHIL # BLD AUTO: 0.02 THOUSAND/ÂΜL (ref 0–0.61)
EOSINOPHIL NFR BLD AUTO: 0 % (ref 0–6)
ERYTHROCYTE [DISTWIDTH] IN BLOOD BY AUTOMATED COUNT: 17.1 % (ref 11.6–15.1)
GFR SERPL CREATININE-BSD FRML MDRD: 60 ML/MIN/1.73SQ M
GLUCOSE SERPL-MCNC: 122 MG/DL (ref 65–140)
HCT VFR BLD AUTO: 41.4 % (ref 34.8–46.1)
HGB BLD-MCNC: 13.5 G/DL (ref 11.5–15.4)
IMM GRANULOCYTES # BLD AUTO: 0.03 THOUSAND/UL (ref 0–0.2)
IMM GRANULOCYTES NFR BLD AUTO: 0 % (ref 0–2)
LYMPHOCYTES # BLD AUTO: 2.51 THOUSANDS/ÂΜL (ref 0.6–4.47)
LYMPHOCYTES NFR BLD AUTO: 31 % (ref 14–44)
MCH RBC QN AUTO: 30.4 PG (ref 26.8–34.3)
MCHC RBC AUTO-ENTMCNC: 32.6 G/DL (ref 31.4–37.4)
MCV RBC AUTO: 93 FL (ref 82–98)
MONOCYTES # BLD AUTO: 0.43 THOUSAND/ÂΜL (ref 0.17–1.22)
MONOCYTES NFR BLD AUTO: 5 % (ref 4–12)
NEUTROPHILS # BLD AUTO: 4.97 THOUSANDS/ÂΜL (ref 1.85–7.62)
NEUTS SEG NFR BLD AUTO: 63 % (ref 43–75)
NRBC BLD AUTO-RTO: 0 /100 WBCS
PLATELET # BLD AUTO: 221 THOUSANDS/UL (ref 149–390)
PMV BLD AUTO: 10.3 FL (ref 8.9–12.7)
POTASSIUM SERPL-SCNC: 4.1 MMOL/L (ref 3.5–5.3)
RBC # BLD AUTO: 4.44 MILLION/UL (ref 3.81–5.12)
SODIUM SERPL-SCNC: 139 MMOL/L (ref 135–147)
WBC # BLD AUTO: 8.01 THOUSAND/UL (ref 4.31–10.16)

## 2025-04-08 PROCEDURE — 96361 HYDRATE IV INFUSION ADD-ON: CPT

## 2025-04-08 PROCEDURE — 80048 BASIC METABOLIC PNL TOTAL CA: CPT

## 2025-04-08 PROCEDURE — 84484 ASSAY OF TROPONIN QUANT: CPT

## 2025-04-08 PROCEDURE — 93005 ELECTROCARDIOGRAM TRACING: CPT

## 2025-04-08 PROCEDURE — 99285 EMERGENCY DEPT VISIT HI MDM: CPT

## 2025-04-08 PROCEDURE — 85025 COMPLETE CBC W/AUTO DIFF WBC: CPT

## 2025-04-08 PROCEDURE — 96374 THER/PROPH/DIAG INJ IV PUSH: CPT

## 2025-04-08 PROCEDURE — 36415 COLL VENOUS BLD VENIPUNCTURE: CPT

## 2025-04-08 PROCEDURE — 82330 ASSAY OF CALCIUM: CPT

## 2025-04-08 PROCEDURE — 99284 EMERGENCY DEPT VISIT MOD MDM: CPT

## 2025-04-08 RX ORDER — KETOROLAC TROMETHAMINE 30 MG/ML
15 INJECTION, SOLUTION INTRAMUSCULAR; INTRAVENOUS ONCE
Status: COMPLETED | OUTPATIENT
Start: 2025-04-08 | End: 2025-04-08

## 2025-04-08 RX ADMIN — KETOROLAC TROMETHAMINE 15 MG: 30 INJECTION, SOLUTION INTRAMUSCULAR; INTRAVENOUS at 17:58

## 2025-04-08 RX ADMIN — SODIUM CHLORIDE 1000 ML: 0.9 INJECTION, SOLUTION INTRAVENOUS at 17:58
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
## 2025-04-09 ENCOUNTER — TELEMEDICINE (OUTPATIENT)
Dept: BEHAVIORAL/MENTAL HEALTH CLINIC | Facility: CLINIC | Age: 50
End: 2025-04-09
Payer: COMMERCIAL

## 2025-04-09 DIAGNOSIS — F33.1 MODERATE EPISODE OF RECURRENT MAJOR DEPRESSIVE DISORDER (HCC): Primary | ICD-10-CM

## 2025-04-09 PROCEDURE — 90834 PSYTX W PT 45 MINUTES: CPT | Performed by: SOCIAL WORKER

## 2025-04-10 ENCOUNTER — TELEPHONE (OUTPATIENT)
Dept: OBGYN CLINIC | Facility: CLINIC | Age: 50
End: 2025-04-10

## 2025-04-10 ENCOUNTER — PATIENT OUTREACH (OUTPATIENT)
Dept: CASE MANAGEMENT | Facility: OTHER | Age: 50
End: 2025-04-10

## 2025-04-10 NOTE — PROGRESS NOTES
Outpatient Care Management Note.    ADT in basket received. Yamileth presented to the ED with c/o chest pain and right sided headache. Overall workup benign.  Sx attributed to anxiety.  She was administered IVF, IV Toradol and discharged within 1 hr.     High utilizer careplan in place and uploaded on 3/14. Patient received preop clearance from PCP office for MRI with anesthesia of right shoulder (completed on 4/2).

## 2025-04-10 NOTE — PROGRESS NOTES
Discharge follow up call placed to patient. No answer when contacted.  Left generic msg requesting return call. Will continue to follow and remain available to assist. Reminder sent for next outreach.
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
## 2025-04-14 ENCOUNTER — TELEPHONE (OUTPATIENT)
Dept: ENDOCRINOLOGY | Facility: CLINIC | Age: 50
End: 2025-04-14

## 2025-04-14 DIAGNOSIS — R73.03 PREDIABETES: ICD-10-CM

## 2025-04-14 LAB
ATRIAL RATE: 114 BPM
P AXIS: 58 DEGREES
PR INTERVAL: 154 MS
QRS AXIS: 62 DEGREES
QRSD INTERVAL: 74 MS
QT INTERVAL: 336 MS
QTC INTERVAL: 463 MS
T WAVE AXIS: 67 DEGREES
VENTRICULAR RATE: 114 BPM

## 2025-04-14 PROCEDURE — 93010 ELECTROCARDIOGRAM REPORT: CPT | Performed by: INTERNAL MEDICINE

## 2025-04-14 RX ORDER — METFORMIN HYDROCHLORIDE 500 MG/1
1000 TABLET, EXTENDED RELEASE ORAL 2 TIMES DAILY WITH MEALS
Qty: 360 TABLET | Refills: 3 | Status: SHIPPED | OUTPATIENT
Start: 2025-04-14 | End: 2025-07-13

## 2025-04-14 NOTE — TELEPHONE ENCOUNTER
Patient called the RX Refill Line. Message is being forwarded to the office.     Patient is requesting a updated script for metformin, states Dr Pabon increase her metformin to 2 tabs twice daily the last last time she was seen.     Patient will be out of medication today.    [facility]    Please contact patient at [phone]

## 2025-04-15 DIAGNOSIS — E83.51 HYPOCALCEMIA: ICD-10-CM

## 2025-04-15 RX ORDER — LEVOTHYROXINE SODIUM 150 UG/1
150 TABLET ORAL
Qty: 90 TABLET | Refills: 1 | Status: SHIPPED | OUTPATIENT
Start: 2025-04-15

## 2025-04-16 ENCOUNTER — TELEMEDICINE (OUTPATIENT)
Dept: BEHAVIORAL/MENTAL HEALTH CLINIC | Facility: CLINIC | Age: 50
End: 2025-04-16
Payer: COMMERCIAL

## 2025-04-16 DIAGNOSIS — F33.1 MODERATE EPISODE OF RECURRENT MAJOR DEPRESSIVE DISORDER (HCC): Primary | ICD-10-CM

## 2025-04-16 PROCEDURE — 90834 PSYTX W PT 45 MINUTES: CPT | Performed by: SOCIAL WORKER

## 2025-04-17 ENCOUNTER — OFFICE VISIT (OUTPATIENT)
Dept: OBGYN CLINIC | Facility: CLINIC | Age: 50
End: 2025-04-17
Payer: COMMERCIAL

## 2025-04-17 VITALS — HEIGHT: 62 IN | WEIGHT: 208 LBS | BODY MASS INDEX: 38.28 KG/M2

## 2025-04-17 DIAGNOSIS — S46.011A TRAUMATIC INCOMPLETE TEAR OF RIGHT ROTATOR CUFF, INITIAL ENCOUNTER: Primary | ICD-10-CM

## 2025-04-17 PROCEDURE — 99213 OFFICE O/P EST LOW 20 MIN: CPT | Performed by: ORTHOPAEDIC SURGERY
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
## 2025-04-21 ENCOUNTER — PREP FOR PROCEDURE (OUTPATIENT)
Dept: OBGYN CLINIC | Facility: CLINIC | Age: 50
End: 2025-04-21

## 2025-04-21 ENCOUNTER — CONSULT (OUTPATIENT)
Dept: OBGYN CLINIC | Facility: CLINIC | Age: 50
End: 2025-04-21
Payer: COMMERCIAL

## 2025-04-21 VITALS — WEIGHT: 212 LBS | BODY MASS INDEX: 39.01 KG/M2 | HEIGHT: 62 IN

## 2025-04-21 DIAGNOSIS — S46.011A TRAUMATIC INCOMPLETE TEAR OF RIGHT ROTATOR CUFF, INITIAL ENCOUNTER: Primary | ICD-10-CM

## 2025-04-21 PROCEDURE — 99214 OFFICE O/P EST MOD 30 MIN: CPT | Performed by: ORTHOPAEDIC SURGERY

## 2025-04-21 RX ORDER — CHLORHEXIDINE GLUCONATE ORAL RINSE 1.2 MG/ML
15 SOLUTION DENTAL ONCE
OUTPATIENT
Start: 2025-04-21 | End: 2025-04-21

## 2025-04-21 RX ORDER — CEFAZOLIN SODIUM 2 G/50ML
2000 SOLUTION INTRAVENOUS ONCE
OUTPATIENT
Start: 2025-04-21 | End: 2025-04-21

## 2025-04-21 NOTE — ASSESSMENT & PLAN NOTE
Will follow-up on day of surgery. All risks and benefits were discussed at length.  Orders:    Ambulatory referral to Orthopedic Surgery    Arc 2.0    Ambulatory Referral to Physical Therapy; Future    Case request operating room: REPAIR ROTATOR CUFF  ARTHROSCOPIC; Standing
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
## 2025-04-22 ENCOUNTER — APPOINTMENT (OUTPATIENT)
Dept: LAB | Facility: HOSPITAL | Age: 50
End: 2025-04-22
Attending: INTERNAL MEDICINE
Payer: COMMERCIAL

## 2025-04-22 ENCOUNTER — TELEPHONE (OUTPATIENT)
Dept: INTERNAL MEDICINE CLINIC | Facility: CLINIC | Age: 50
End: 2025-04-22

## 2025-04-22 DIAGNOSIS — N18.31 STAGE 3A CHRONIC KIDNEY DISEASE (HCC): ICD-10-CM

## 2025-04-22 DIAGNOSIS — E83.51 HYPOCALCEMIA: ICD-10-CM

## 2025-04-22 LAB
ANION GAP SERPL CALCULATED.3IONS-SCNC: 15 MMOL/L (ref 4–13)
BUN SERPL-MCNC: 17 MG/DL (ref 5–25)
CALCIUM SERPL-MCNC: 9.8 MG/DL (ref 8.4–10.2)
CHLORIDE SERPL-SCNC: 103 MMOL/L (ref 96–108)
CO2 SERPL-SCNC: 20 MMOL/L (ref 21–32)
CREAT SERPL-MCNC: 1.19 MG/DL (ref 0.6–1.3)
GFR SERPL CREATININE-BSD FRML MDRD: 54 ML/MIN/1.73SQ M
GLUCOSE SERPL-MCNC: 184 MG/DL (ref 65–140)
POTASSIUM SERPL-SCNC: 4.2 MMOL/L (ref 3.5–5.3)
SODIUM SERPL-SCNC: 138 MMOL/L (ref 135–147)

## 2025-04-22 PROCEDURE — 36415 COLL VENOUS BLD VENIPUNCTURE: CPT

## 2025-04-22 PROCEDURE — 80048 BASIC METABOLIC PNL TOTAL CA: CPT

## 2025-04-22 NOTE — TELEPHONE ENCOUNTER
Follow up with urologist and gynecologist for further management evaluation.  Return to ED for any worsening symptoms.  Urine culture pending   Left message please call us back and let us know the coumadin dosage your  any is taking INR is low Dr. Ramos needs to change the dosage.

## 2025-04-23 ENCOUNTER — TELEPHONE (OUTPATIENT)
Dept: NEPHROLOGY | Facility: CLINIC | Age: 50
End: 2025-04-23

## 2025-04-23 ENCOUNTER — TELEPHONE (OUTPATIENT)
Dept: INTERNAL MEDICINE CLINIC | Facility: CLINIC | Age: 50
End: 2025-04-23

## 2025-04-23 ENCOUNTER — TELEMEDICINE (OUTPATIENT)
Dept: BEHAVIORAL/MENTAL HEALTH CLINIC | Facility: CLINIC | Age: 50
End: 2025-04-23
Payer: COMMERCIAL

## 2025-04-23 ENCOUNTER — ANESTHESIA EVENT (OUTPATIENT)
Dept: PERIOP | Facility: HOSPITAL | Age: 50
End: 2025-04-23
Payer: COMMERCIAL

## 2025-04-23 DIAGNOSIS — F33.1 MODERATE EPISODE OF RECURRENT MAJOR DEPRESSIVE DISORDER (HCC): Primary | ICD-10-CM

## 2025-04-23 PROCEDURE — 90834 PSYTX W PT 45 MINUTES: CPT | Performed by: SOCIAL WORKER

## 2025-04-23 NOTE — TELEPHONE ENCOUNTER
Left message for patient to call back on Husbands INR dosage needs to be changed we have him on 5 mg daily. Want to see if that's what he is still taking. So Dr. Ramos can change the dosage. Please put the call through if they call back.

## 2025-04-23 NOTE — TELEPHONE ENCOUNTER
Message left on patient's VM that labs of  4/22/25 show stable kidney function and calcium level per Dr. Jeong.      ----- Message from Husam Jeong MD sent at 4/22/2025  4:49 PM EDT -----  Please inform patient that most recent labs (4/22/25) show that kidney function and calcium level are stable.

## 2025-04-24 NOTE — BH TREATMENT PLAN
"Outpatient Behavioral Health Psychotherapy Treatment Plan    Yamileth INFANTE Kavin  1975     Date of Initial Psychotherapy Assessment: 3/27/2024     Date of Current Treatment Plan: 04/24/25  Treatment Plan Target Date: 10/24/2025  Treatment Plan Expiration Date: 10/24/2025    Diagnosis:   1. Moderate episode of recurrent major depressive disorder (HCC)            Area(s) of Need: Emotional regulation, increasing competence and confidence    Long Term Goal 1 (in the client's own words): \"Feel better about myself\"    Stage of Change: Action    Target Date for completion: 4/4/2025     Anticipated therapeutic modalities: Psychoeducation, motivational interviewing, CBT, ACT, DBT, somatic exercises, mindfulness skills training, and supportive psychotherapy.      People identified to complete this goal: Yamileth Vale and Elizabeth Ball      Objective 1: (identify the means of measuring success in meeting the objective): Michelle will be able to acknowledge stressors and triggers as they arise and report using at least 2 positive coping skills to help observe and let go of negative emotion. REVIEW: Michelle has been working on acknowledging triggers as they arise, namely her sister and her family's family court problems. She has discussed the many negative effects that this has on her and has worked hard to establish boundaries with her brother in law, Erick. In doing so she has been able to reflect on what it is like to have less stress vs. More.       Objective 2: (identify the means of measuring success in meeting the objective): Michelle will participate in at least 3 positive self care efforts per week. REVIEW 4/24/2025: Michelle has been working on prioritizing movement even that means just walking around the house. She is spending less time ruminating on her physical limitations and putting more effort into acknowledging what she can do. She has been spending more time online with friends.       I am currently " under the care of a [facility] psychiatric provider: yes    My [facility] psychiatric provider is: Dali Izquierdo PA-C    I am currently taking psychiatric medications: Yes, as prescribed    I feel that I will be ready for discharge from mental health care when I reach the following (measurable goal/objective): I honestly don't know    For children and adults who have a legal guardian:   Has there been any change to custody orders and/or guardianship status? NA. If yes, attach updated documentation.    I have created my Crisis Plan and have been offered a copy of this plan    Behavioral Health Treatment Plan [facility]: Diagnosis and Treatment Plan explained to Yamileth Vale acknowledges an understanding of their diagnosis. Yamileth Vale agrees to this treatment plan.    I have been offered a copy of this Treatment Plan. yes

## 2025-04-24 NOTE — BH CRISIS PLAN
Client Name: Yamileth Vale       Client YOB: 1975    [facility] Safety Plan      Creation Date: 3/27/24 Update Date: 4/24/26   Created By: Elizabeth Ball LCSW Last Updated By: Elizabeth Ball LCSW      Step 1: Warning Signs:   Warning Signs   Don't want to do anything   Don't care about anything            Step 2: Internal Coping Strategies:   Internal Coping Strategies   Watch TV   Cook            Step 3: People and social settings that provide distraction:   Name Contact Information   Children In cell phone    In cell phone    Places   Grocery store           Step 4: People whom I can ask for help during a crisis:      Name Contact Information     In cell phone      Step 5: Professionals or agencies I can contact during a crisis:      Clinican/Agency Name Phone Emergency Contact    [facility] [phone]       [facility] Emergency Department Emergency Department Phone Emergency Department Address    [facility]          Crisis Phone Numbers:   Suicide Prevention Lifeline: Call or Text  417 Crisis Text Line: Text HOME to 832-136   Please note: Some [facility] do not have a separate number for Child/Adolescent specific crisis. If your county is not listed under Child/Adolescent, please call the adult number for your county      Adult Crisis Numbers: Child/Adolescent Crisis Numbers   [facility]: [phone] [facility]: [phone]   [facility]: [phone] [facility]: [phone]   [facility]: [phone] [place]: [phone]   [facility]: [phone] [place]: [phone]   [place]: [phone]   [facility]: [phone]   [facility]: [phone]   [facility]: [phone] (daytime) [phone] (after hours, weekends, holidays)      Step 6: Making the environment safer (plan for lethal means safety):   Patient did not identify any lethal  methods: Yes     Optional: What is most important to me and worth living for?   My kids, nieces and nephew, I want to live for me     [facility]ekha Safety Plan. Joanne Ross and Yogi Guevara. Used with permission of the authors.
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
## 2025-04-25 ENCOUNTER — TELEPHONE (OUTPATIENT)
Dept: INTERNAL MEDICINE CLINIC | Facility: CLINIC | Age: 50
End: 2025-04-25

## 2025-04-27 ENCOUNTER — APPOINTMENT (OUTPATIENT)
Dept: LAB | Facility: HOSPITAL | Age: 50
End: 2025-04-27
Payer: COMMERCIAL

## 2025-04-27 DIAGNOSIS — E83.51 HYPOCALCEMIA: ICD-10-CM

## 2025-04-27 LAB — CALCIUM SERPL-MCNC: 10.5 MG/DL (ref 8.4–10.2)

## 2025-04-27 PROCEDURE — 36415 COLL VENOUS BLD VENIPUNCTURE: CPT

## 2025-04-28 NOTE — PRE-PROCEDURE INSTRUCTIONS
Pre-Surgery Instructions:   Medication Instructions    acetaminophen (TYLENOL) 500 mg tablet Uses PRN- OK to take day of surgery    albuterol (PROVENTIL HFA,VENTOLIN HFA) 90 mcg/act inhaler Uses PRN- OK to take day of surgery    ALPRAZolam ER (XANAX XR) 2 MG 24 hr tablet Take day of surgery.    atorvastatin (LIPITOR) 80 mg tablet Take night before surgery    calcium carbonate (OS-EDER) 600 MG tablet Hold day of surgery.    Cholecalciferol (Vitamin D3) 125 MCG (5000 UT) CAPS Hold day of surgery.    desvenlafaxine (PRISTIQ) 100 mg 24 hr tablet Take night before surgery    fenofibrate 160 MG tablet Take night before surgery    ferrous sulfate 325 (65 Fe) mg tablet Hold day of surgery.    Icosapent Ethyl (Vascepa) 1 g CAPS Stop taking 7 days prior to surgery.    levothyroxine 150 mcg tablet Take day of surgery.    magnesium Oxide (MAG-OX) 400 mg TABS Hold day of surgery.    metFORMIN (GLUCOPHAGE-XR) 500 mg 24 hr tablet Hold day of surgery.    methocarbamol (ROBAXIN) 500 mg tablet Uses PRN- OK to take day of surgery    oxyCODONE-acetaminophen (PERCOCET)  mg per tablet Uses PRN- OK to take day of surgery    potassium chloride (Klor-Con M20) 20 mEq tablet Hold day of surgery.    Psyllium (Metamucil) 0.36 g CAPS Hold day of surgery.    Senna Plus 8.6-50 MG per tablet Hold day of surgery.    tiZANidine (ZANAFLEX) 4 mg tablet Uses PRN- OK to take day of surgery    traZODone (DESYREL) 100 mg tablet Take night before surgery      Medication instructions for day of surgery reviewed. Please take all instructed medications with only a sip of water.       You will receive a call one business day prior to surgery with an arrival time and hospital directions. If your surgery is scheduled on a Monday, the hospital will be calling you on the Friday prior to your surgery. If you have not heard from anyone by 8pm, please call the hospital supervisor through the hospital  at [phone]. ([place] [phone] or [place]  [phone]).    Do not eat or drink anything after midnight the night before your surgery, including candy, mints, lifesavers, or chewing gum. Do not drink alcohol 24hrs before your surgery. Try not to smoke at least 24hrs before your surgery.       Follow the pre surgery showering instructions as listed in the “My Surgical Experience Booklet” or otherwise provided by your surgeon's office. Do not use a blade to shave the surgical area 1 week before surgery. It is okay to use a clean electric clippers up to 24 hours before surgery. Do not apply any lotions, creams, including makeup, cologne, deodorant, or perfumes after showering on the day of your surgery. Do not use dry shampoo, hair spray, hair gel, or any type of hair products.     No contact lenses, eye make-up, or artificial eyelashes. Remove nail polish, including gel polish, and any artificial, gel, or acrylic nails if possible. Remove all jewelry including rings and body piercing jewelry.     Wear causal clothing that is easy to take on and off. Consider your type of surgery.    Keep any valuables, jewelry, piercings at home. Please bring any specially ordered equipment (sling, braces) if indicated.    Arrange for a responsible person to drive you to and from the hospital on the day of your surgery. Please confirm the visitor policy for the day of your procedure when you receive your phone call with an arrival time.     Call the surgeon's office with any new illnesses, exposures, or additional questions prior to surgery.    Please reference your “My Surgical Experience Booklet” for additional information to prepare for your upcoming surgery.

## 2025-04-30 ENCOUNTER — TELEMEDICINE (OUTPATIENT)
Dept: BEHAVIORAL/MENTAL HEALTH CLINIC | Facility: CLINIC | Age: 50
End: 2025-04-30
Payer: COMMERCIAL

## 2025-04-30 DIAGNOSIS — F33.1 MODERATE EPISODE OF RECURRENT MAJOR DEPRESSIVE DISORDER (HCC): Primary | ICD-10-CM

## 2025-04-30 PROCEDURE — 90834 PSYTX W PT 45 MINUTES: CPT | Performed by: SOCIAL WORKER
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
## 2025-05-06 ENCOUNTER — APPOINTMENT (OUTPATIENT)
Dept: LAB | Facility: HOSPITAL | Age: 50
End: 2025-05-06
Attending: INTERNAL MEDICINE
Payer: COMMERCIAL

## 2025-05-06 DIAGNOSIS — E83.51 HYPOCALCEMIA: ICD-10-CM

## 2025-05-06 LAB — CALCIUM SERPL-MCNC: 8.8 MG/DL (ref 8.4–10.2)

## 2025-05-06 PROCEDURE — 36415 COLL VENOUS BLD VENIPUNCTURE: CPT

## 2025-05-06 NOTE — ANESTHESIA PREPROCEDURE EVALUATION
Procedure:  REPAIR ROTATOR CUFF  ARTHROSCOPIC (Right: Shoulder)    Relevant Problems   CARDIO   (+) DVT (deep venous thrombosis) (HCC) - 2009   (+) Hypertriglyceridemia      ENDO   (+) Hypoparathyroidism (HCC)   (+) Post-surgical hypoparathyroidism (HCC)   (+) Postoperative hypothyroidism      GI/HEPATIC   (+) Fatty liver disease, nonalcoholic   (+) Gastroesophageal reflux disease without esophagitis      /RENAL   (+) Stage 3a chronic kidney disease (HCC)   (+) Stage 3b chronic kidney disease (HCC)      GYN   (+) History of hysterectomy      HEMATOLOGY   (+) Anemia   (+) Blood coagulation disorder (HCC)      MUSCULOSKELETAL  S/p lumbar fusion      NEURO/PSYCH   (+) Anxiety   (+) Anxiety and depression   (+) Chronic intractable pain   (+) Continuous opioid dependence (HCC)   (+) ADITYA (generalized anxiety disorder)   (+) MDD (major depressive disorder), recurrent episode, moderate (HCC)   (+) PTSD (post-traumatic stress disorder)   (+) Panic disorder      Behavioral Health   (+) Smoking      Neurology/Sleep   (+) S/P lumbar fusion      Respiratory/Allergy   (+) Reactive airway disease      Other   (+) Prediabetes   (+) Splenomegaly        Physical Exam    Airway    Mallampati score: III  TM Distance: >3 FB  Neck ROM: full     Dental        Cardiovascular  Rhythm: regular, Rate: normal    Pulmonary   Breath sounds clear to auscultation    Other Findings  post-pubertal.      Anesthesia Plan  ASA Score- 3     Anesthesia Type- general with ASA Monitors.         Additional Monitors:     Airway Plan: ETT.    Comment: GA with ETT; pre-procedure interscalene block, pre-procedure albuterol neb.       Plan Factors-    Chart reviewed.        Patient is a current smoker.  Patient instructed to abstain from smoking on day of procedure. Patient smoked on day of surgery.            Induction- intravenous.    Postoperative Plan- Plan for postoperative opioid use.     Perioperative Resuscitation Plan -  The DNR status was discussed  with the patient and/or POA, and Level 2 code status (DNR but accepts intubation) will be maintained throughout the perioperative period.      Informed Consent- Anesthetic plan and risks discussed with patient.  I personally reviewed this patient with the CRNA. Discussed and agreed on the Anesthesia Plan with the CRNA..      NPO Status:  No vitals data found for the desired time range.

## 2025-05-07 ENCOUNTER — ANESTHESIA (OUTPATIENT)
Dept: PERIOP | Facility: HOSPITAL | Age: 50
End: 2025-05-07
Payer: COMMERCIAL

## 2025-05-07 ENCOUNTER — HOSPITAL ENCOUNTER (OUTPATIENT)
Facility: HOSPITAL | Age: 50
Setting detail: OUTPATIENT SURGERY
Discharge: HOME | End: 2025-05-07
Attending: ORTHOPAEDIC SURGERY | Admitting: ORTHOPAEDIC SURGERY
Payer: COMMERCIAL

## 2025-05-07 ENCOUNTER — RESULTS FOLLOW-UP (OUTPATIENT)
Dept: ENDOCRINOLOGY | Facility: CLINIC | Age: 50
End: 2025-05-07

## 2025-05-07 VITALS
WEIGHT: 211.64 LBS | HEART RATE: 98 BPM | TEMPERATURE: 97.4 F | SYSTOLIC BLOOD PRESSURE: 142 MMHG | HEIGHT: 62 IN | RESPIRATION RATE: 16 BRPM | DIASTOLIC BLOOD PRESSURE: 88 MMHG | OXYGEN SATURATION: 95 % | BODY MASS INDEX: 38.95 KG/M2

## 2025-05-07 DIAGNOSIS — Z98.890 S/P RIGHT ROTATOR CUFF REPAIR: ICD-10-CM

## 2025-05-07 DIAGNOSIS — M75.101 TEAR OF RIGHT ROTATOR CUFF, UNSPECIFIED TEAR EXTENT, UNSPECIFIED WHETHER TRAUMATIC: ICD-10-CM

## 2025-05-07 DIAGNOSIS — I82.4Y9 DEEP VEIN THROMBOSIS (DVT) OF PROXIMAL LOWER EXTREMITY, UNSPECIFIED CHRONICITY, UNSPECIFIED LATERALITY (HCC): ICD-10-CM

## 2025-05-07 DIAGNOSIS — S46.011A TRAUMATIC INCOMPLETE TEAR OF RIGHT ROTATOR CUFF, INITIAL ENCOUNTER: Primary | ICD-10-CM

## 2025-05-07 PROBLEM — M75.100 ROTATOR CUFF TEAR: Status: ACTIVE | Noted: 2025-05-07

## 2025-05-07 LAB
GLUCOSE SERPL-MCNC: 187 MG/DL (ref 65–140)
GLUCOSE SERPL-MCNC: 201 MG/DL (ref 65–140)

## 2025-05-07 PROCEDURE — 29827 SHO ARTHRS SRG RT8TR CUF RPR: CPT

## 2025-05-07 PROCEDURE — 29827 SHO ARTHRS SRG RT8TR CUF RPR: CPT | Performed by: ORTHOPAEDIC SURGERY

## 2025-05-07 PROCEDURE — 29826 SHO ARTHRS SRG DECOMPRESSION: CPT

## 2025-05-07 PROCEDURE — C1713 ANCHOR/SCREW BN/BN,TIS/BN: HCPCS | Performed by: ORTHOPAEDIC SURGERY

## 2025-05-07 PROCEDURE — 29826 SHO ARTHRS SRG DECOMPRESSION: CPT | Performed by: ORTHOPAEDIC SURGERY

## 2025-05-07 PROCEDURE — NC001 PR NO CHARGE: Performed by: ORTHOPAEDIC SURGERY

## 2025-05-07 PROCEDURE — 82948 REAGENT STRIP/BLOOD GLUCOSE: CPT

## 2025-05-07 DEVICE — SP FBRTAK RC FBRTPE BLU & STTPE WH/BLK
Type: IMPLANTABLE DEVICE | Site: SHOULDER | Status: FUNCTIONAL
Brand: ARTHREX®

## 2025-05-07 DEVICE — SP FBRTAK RC FBRTPE BLK/BLU & STTPE BLU
Type: IMPLANTABLE DEVICE | Site: SHOULDER | Status: FUNCTIONAL
Brand: ARTHREX®

## 2025-05-07 DEVICE — 4.75MM BC KNOTLESS SWIVELOCK
Type: IMPLANTABLE DEVICE | Site: SHOULDER | Status: FUNCTIONAL
Brand: ARTHREX®

## 2025-05-07 RX ORDER — FENTANYL CITRATE 50 UG/ML
INJECTION, SOLUTION INTRAMUSCULAR; INTRAVENOUS AS NEEDED
Status: DISCONTINUED | OUTPATIENT
Start: 2025-05-07 | End: 2025-05-07

## 2025-05-07 RX ORDER — CEFAZOLIN SODIUM 2 G/50ML
2000 SOLUTION INTRAVENOUS ONCE
Status: COMPLETED | OUTPATIENT
Start: 2025-05-07 | End: 2025-05-07

## 2025-05-07 RX ORDER — ACETAMINOPHEN 325 MG/1
650 TABLET ORAL EVERY 6 HOURS PRN
Status: DISCONTINUED | OUTPATIENT
Start: 2025-05-07 | End: 2025-05-07 | Stop reason: HOSPADM

## 2025-05-07 RX ORDER — OXYCODONE HYDROCHLORIDE 5 MG/1
5 TABLET ORAL EVERY 4 HOURS PRN
Status: DISCONTINUED | OUTPATIENT
Start: 2025-05-07 | End: 2025-05-07 | Stop reason: HOSPADM

## 2025-05-07 RX ORDER — SUCCINYLCHOLINE/SOD CL,ISO/PF 100 MG/5ML
SYRINGE (ML) INTRAVENOUS AS NEEDED
Status: DISCONTINUED | OUTPATIENT
Start: 2025-05-07 | End: 2025-05-07

## 2025-05-07 RX ORDER — ASPIRIN 81 MG/1
81 TABLET, CHEWABLE ORAL 2 TIMES DAILY
Qty: 84 TABLET | Refills: 0 | Status: SHIPPED | OUTPATIENT
Start: 2025-05-07 | End: 2025-06-18

## 2025-05-07 RX ORDER — ONDANSETRON 2 MG/ML
4 INJECTION INTRAMUSCULAR; INTRAVENOUS EVERY 6 HOURS PRN
Status: DISCONTINUED | OUTPATIENT
Start: 2025-05-07 | End: 2025-05-07 | Stop reason: HOSPADM

## 2025-05-07 RX ORDER — ALBUTEROL SULFATE 0.83 MG/ML
2.5 SOLUTION RESPIRATORY (INHALATION) ONCE
Status: COMPLETED | OUTPATIENT
Start: 2025-05-07 | End: 2025-05-07

## 2025-05-07 RX ORDER — CHLORHEXIDINE GLUCONATE ORAL RINSE 1.2 MG/ML
15 SOLUTION DENTAL ONCE
Status: COMPLETED | OUTPATIENT
Start: 2025-05-07 | End: 2025-05-07

## 2025-05-07 RX ORDER — ROCURONIUM BROMIDE 10 MG/ML
INJECTION, SOLUTION INTRAVENOUS AS NEEDED
Status: DISCONTINUED | OUTPATIENT
Start: 2025-05-07 | End: 2025-05-07

## 2025-05-07 RX ORDER — ONDANSETRON 2 MG/ML
INJECTION INTRAMUSCULAR; INTRAVENOUS AS NEEDED
Status: DISCONTINUED | OUTPATIENT
Start: 2025-05-07 | End: 2025-05-07

## 2025-05-07 RX ORDER — PROPOFOL 10 MG/ML
INJECTION, EMULSION INTRAVENOUS AS NEEDED
Status: DISCONTINUED | OUTPATIENT
Start: 2025-05-07 | End: 2025-05-07

## 2025-05-07 RX ORDER — FENTANYL CITRATE/PF 50 MCG/ML
25 SYRINGE (ML) INJECTION
Status: DISCONTINUED | OUTPATIENT
Start: 2025-05-07 | End: 2025-05-07 | Stop reason: HOSPADM

## 2025-05-07 RX ORDER — SODIUM CHLORIDE, SODIUM LACTATE, POTASSIUM CHLORIDE, CALCIUM CHLORIDE 600; 310; 30; 20 MG/100ML; MG/100ML; MG/100ML; MG/100ML
100 INJECTION, SOLUTION INTRAVENOUS CONTINUOUS
Status: DISCONTINUED | OUTPATIENT
Start: 2025-05-07 | End: 2025-05-07 | Stop reason: HOSPADM

## 2025-05-07 RX ORDER — DEXAMETHASONE SODIUM PHOSPHATE 10 MG/ML
INJECTION, SOLUTION INTRAMUSCULAR; INTRAVENOUS AS NEEDED
Status: DISCONTINUED | OUTPATIENT
Start: 2025-05-07 | End: 2025-05-07

## 2025-05-07 RX ORDER — SODIUM CHLORIDE 9 MG/ML
INJECTION, SOLUTION INTRAVENOUS CONTINUOUS PRN
Status: DISCONTINUED | OUTPATIENT
Start: 2025-05-07 | End: 2025-05-07

## 2025-05-07 RX ORDER — LIDOCAINE HYDROCHLORIDE 10 MG/ML
INJECTION, SOLUTION EPIDURAL; INFILTRATION; INTRACAUDAL; PERINEURAL AS NEEDED
Status: DISCONTINUED | OUTPATIENT
Start: 2025-05-07 | End: 2025-05-07

## 2025-05-07 RX ORDER — BUPIVACAINE HYDROCHLORIDE 5 MG/ML
INJECTION, SOLUTION EPIDURAL; INTRACAUDAL; PERINEURAL
Status: COMPLETED | OUTPATIENT
Start: 2025-05-07 | End: 2025-05-07

## 2025-05-07 RX ORDER — MIDAZOLAM HYDROCHLORIDE 2 MG/2ML
INJECTION, SOLUTION INTRAMUSCULAR; INTRAVENOUS AS NEEDED
Status: DISCONTINUED | OUTPATIENT
Start: 2025-05-07 | End: 2025-05-07

## 2025-05-07 RX ORDER — ALBUTEROL SULFATE 0.83 MG/ML
SOLUTION RESPIRATORY (INHALATION) AS NEEDED
Status: DISCONTINUED | OUTPATIENT
Start: 2025-05-07 | End: 2025-05-07

## 2025-05-07 RX ADMIN — LIDOCAINE HYDROCHLORIDE 50 MG: 10 INJECTION, SOLUTION EPIDURAL; INFILTRATION; INTRACAUDAL; PERINEURAL at 09:12

## 2025-05-07 RX ADMIN — BUPIVACAINE HYDROCHLORIDE 10 ML: 5 INJECTION, SOLUTION EPIDURAL; INTRACAUDAL; PERINEURAL at 09:59

## 2025-05-07 RX ADMIN — SODIUM CHLORIDE, SODIUM LACTATE, POTASSIUM CHLORIDE, AND CALCIUM CHLORIDE 100 ML/HR: .6; .31; .03; .02 INJECTION, SOLUTION INTRAVENOUS at 08:33

## 2025-05-07 RX ADMIN — ALBUTEROL SULFATE 2.5 MG: 2.5 SOLUTION RESPIRATORY (INHALATION) at 10:23

## 2025-05-07 RX ADMIN — FENTANYL CITRATE 50 MCG: 50 INJECTION, SOLUTION INTRAMUSCULAR; INTRAVENOUS at 08:53

## 2025-05-07 RX ADMIN — Medication 160 MG: at 09:12

## 2025-05-07 RX ADMIN — ONDANSETRON 4 MG: 2 INJECTION INTRAMUSCULAR; INTRAVENOUS at 09:41

## 2025-05-07 RX ADMIN — MIDAZOLAM 2 MG: 1 INJECTION INTRAMUSCULAR; INTRAVENOUS at 08:53

## 2025-05-07 RX ADMIN — BUPIVACAINE 20 ML: 13.3 INJECTION, SUSPENSION, LIPOSOMAL INFILTRATION at 09:59

## 2025-05-07 RX ADMIN — ALBUTEROL SULFATE 2.5 MG: 2.5 SOLUTION RESPIRATORY (INHALATION) at 08:10

## 2025-05-07 RX ADMIN — ROCURONIUM BROMIDE 50 MG: 10 INJECTION, SOLUTION INTRAVENOUS at 09:21

## 2025-05-07 RX ADMIN — CEFAZOLIN SODIUM 2000 MG: 2 SOLUTION INTRAVENOUS at 09:29

## 2025-05-07 RX ADMIN — CHLORHEXIDINE GLUCONATE 15 ML: 1.2 SOLUTION ORAL at 08:10

## 2025-05-07 RX ADMIN — DEXAMETHASONE SODIUM PHOSPHATE 10 MG: 10 INJECTION, SOLUTION INTRAMUSCULAR; INTRAVENOUS at 09:41

## 2025-05-07 RX ADMIN — PROPOFOL 200 MG: 10 INJECTION, EMULSION INTRAVENOUS at 09:12

## 2025-05-07 RX ADMIN — PHENYLEPHRINE HYDROCHLORIDE 30 MCG/MIN: 10 INJECTION INTRAVENOUS at 09:39

## 2025-05-07 RX ADMIN — SUGAMMADEX 200 MG: 100 INJECTION, SOLUTION INTRAVENOUS at 10:28

## 2025-05-07 RX ADMIN — SODIUM CHLORIDE: 0.9 INJECTION, SOLUTION INTRAVENOUS at 11:01

## 2025-05-07 RX ADMIN — FENTANYL CITRATE 50 MCG: 50 INJECTION, SOLUTION INTRAMUSCULAR; INTRAVENOUS at 09:12

## 2025-05-07 NOTE — OP NOTE
OPERATIVE REPORT  PATIENT NAME: Yamileth Vale    :  1975  MRN: [id number]  Pt Location: [facility] OR ROOM 03    SURGERY DATE: 2025    Surgeons and Role:     * Gael Jean MD - Primary     * Sharda El PA-C - Assisting    The presence of Sharda El PA-C was required for poisitioning, retraction, assistance, and closure. No qualified resident was available.      Preop Diagnosis:  Traumatic incomplete tear of right rotator cuff, initial encounter [S46.011A]    Post-Op Diagnosis Codes:     * Traumatic incomplete tear of right rotator cuff, initial encounter [S46.011A]    Procedure(s):  Right - REPAIR ROTATOR CUFF  ARTHROSCOPIC. SUBACROMIAL DECOMPRESSION    Specimen(s):  * No specimens in log *    Estimated Blood Loss:   Minimal    Drains:  * No LDAs found *    Anesthesia Type:   General w/ Interscalene Block    Implants:   Implant Name Type Inv. Item Serial No.  Lot No. LRB No. Used Action   ANCHOR SUT 2.6 FIBERTAK  RC 1.7MMTGRTPE BLK/BL 1.3MM STTPE BL/BL SP - [id number]  ANCHOR SUT 2.6 FIBERTAK  RC 1.7MMTGRTPE BLK/BL 1.3MM STTPE BL/BL SP  [employer] [id number] Right 1 Implanted   ANCHOR SUT 2.6 FIBERTAK  RC 1.7MMTGRTPE BLUE 1.3MM STTPE WH/BLK SP - [id number]  ANCHOR SUT 2.6 FIBERTAK  RC 1.7MMTGRTPE BLUE 1.3MM STTPE WH/BLK SP  [employer] [id number] Right 1 Implanted   ANCHOR SUT 4.75 X 19.1MM KNOTTLESS  SWIVELOCK STRL - [id number]  ANCHOR SUT 4.75 X 19.1MM KNOTTLESS  SWIVELOCK STRL  [employer]  Right 1 Implanted   ANCHOR SUT 4.75 X 19.1MM KNOTTLESS  SWIVELOCK STRL - [id number]  ANCHOR SUT 4.75 X 19.1MM KNOTTLESS  SWIVELOCK STRL  [employer] [id number] Right 1 Implanted         Operative Indications:  49 y.o. female with a traumatic near full thickness tear of the right rotator cuff that affected their quality of life including ability to perform ADLs, work duties, and recreational activities.  Patient's function was very poor and she had significant pain after the traumatic  injury.  Given her significant weakness on exam that correlated well with her near full-thickness tear on MRI in the setting of an acute traumatic injury I did recommend early surgical intervention to include right shoulder arthroscopic rotator cuff repair.  Risks, benefits, and alternatives to surgery were discussed. The patient expressed understanding and elected to proceed with surgery    The patient was greeted in the preoperative holding area, where history, physical exam, review of surgical plan, and operative site bruce were performed. The patient was transferred to the operative suite, placed supine on the operative table.  General endotracheal anesthesia was induced, and perioperative intravenous antibiotics were administered.  The patient was safely repositioned to the beach-chair position with cervical spine in neutral position. All bony prominences were well padded. SCDs were placed on bilateral lower extremities.      Exam under anesthesia was then performed which demonstrated forward flexion of 160, external rotation of 85, internal rotation of 60. Stability exam showed no evidence of instability with tear or posterior load and shift.      The operative shoulder was prepped and draped in usual sterile fashion. A time out was performed with the participation of the entire operative and anesthesia team. Standard posterior glenohumeral and anterior rotator interval portals were established, through which the scope and probe were inserted respectively, which aided and assisted in performance of a complete and thorough diagnostic arthroscopy, which demonstrated the following findings:    Operative Findings:  Humeral head: Normal cartilage  Glenoid: Normal cartilage  Biceps tendon and biceps anchor: No significant biceps tearing and biceps is stable in the groove with some fraying of the biceps sling  Rotator Cuff: Subscapularis is intact as is the infraspinatus.  The supraspinatus has a near full-thickness  tear with only a few millimeters of bursal sided fibers intact.  Glenoid Labrum showed degenerative fraying with no focal tearing  Subacromial space showed significant bursal adhesions and bursitis.     I then performed an limited glenohumeral debridement of  frayed labral tissue, edge of the torn rotator cuff.  All debris was removed from the glenohumeral joint.      I then placed the arthroscope into the subacromial space. After localization with a spinal needle, I made a lateral incision to establish a lateral portal.  I performed an extensive bursectomy , removed the soft tissue from the undersurface of the acromion. There was a prominent subacromial spur with evidence of impingement.   Due to the prominent subacromial spur I performed a limited subacromial decompression to using an arthroscopic bradley from the lateral portal the type III acromion to a type I acromion.     After bursectomy  and SAD was completed evaluation of the rotator cuff was completed for the bursal side.  The bursal sided fibers are intact were stretched and a poor quality.  I used an arthroscopic elevator to probe these fibers.  They very easily detach from the greater tuberosity and fell into the remaining portion of the tear.  At this point the full-thickness tear was addressed.  I used with some shaver to clean the frayed tendon edges and create a the leading bone of bed on the greater tuberosity.  A posterolateral viewing portal was created and the scope was moved to the portal.  I placed 2 all suture anchors at the articular margin to create a medial row.  These were passed through the tendon edge using a scorpion suture passer with appropriate spread.  Traction on the sutures provided excellent reduction of the tendon down to the tuberosity.     In order to complete a knotless double row repair, the lateral aspect of the tuberosity was debrided of soft tissue using the radiofrequency ablator. Through the lateral cannula the punch was  placed and tapped into position.  One Suture  from each medial row anchor was passed through a swivel lock anchor. The anchor was then placed through the cannulae and into the previously punched hole.  Once anchor was in the hole the sutures were tightened.  With the help of a mallet the anchor was tapped down to the bone.   Sutures were again tensioned and the anchor was screwed into place.  Next the suture cutter was used to cut the excess suture.   This process was repeated for a second lateral row anchor roughly 2 cm posterior to the first.  The rotator cuff repair was then evaluated with a probe and the scope and felt to have excellent reduction onto the greater tuberosity.  There was a small dog ear anterior. A knotless repair stitch from the lateral swivel lock anchor was passed through this portion of tendon, shuttled through the anchor, and secured. This provided excellent reduction of the dog ear.  The repair was stable during humeral motion and with evaluation with the probe.      The shoulder was copiously irrigated and drained. The arthroscopic wounds were closed with 3-0 Nylon sutures. A sterile dressing and shoulder immobilizer were placed on the operative extremity.  The patient emerged from anesthesia and was taken to the recovery room in stable condition.  There were no apparent complications.        SIGNATURE: Gael Jean MD  DATE: May 7, 2025  TIME: 10:56 AM

## 2025-05-07 NOTE — ANESTHESIA PROCEDURE NOTES
Peripheral Block    Patient location during procedure: holding area  Start time: 5/7/2025 8:59 AM  Reason for block: procedure for pain, at surgeon's request and post-op pain management  Staffing  Performed by: Yosef Colindres MD  Authorized by: Yosef Colindres MD    Preanesthetic Checklist  Completed: patient identified, IV checked, site marked, risks and benefits discussed, surgical consent, monitors and equipment checked, pre-op evaluation and timeout performed  Peripheral Block  Patient position: supine  Prep: ChloraPrep  Patient monitoring: continuous pulse oximetry and heart rate  Block type: Interscalene  Laterality: right  Injection technique: single-shot  Procedures: ultrasound guided, Ultrasound guidance required for the procedure to increase accuracy and safety of medication placement and decrease risk of complications.  Ultrasound permanent image saved  bupivacaine (PF) (MARCAINE) 0.5 % injection 20 mL - Perineural   10 mL - 5/7/2025 9:59:00 AM  bupivacaine liposomal (EXPAREL) 1.3 % injection 20 mL - Perineural   20 mL - 5/7/2025 9:59:00 AM  Needle  Needle type: Stimuplex   Needle gauge: 22 G  Needle length: 4 in  Needle localization: ultrasound guidance  Assessment  Injection assessment: frequent aspiration, incremental injection, injected with ease, needle tip visualized at all times, negative aspiration, negative for heart rate change, no paresthesia on injection and no symptoms of intraneural/intravenous injection  Paresthesia pain: none  Post-procedure:  site cleaned  patient tolerated the procedure well with no immediate complications

## 2025-05-07 NOTE — PERIOPERATIVE NURSING NOTE
Provider made aware that patient will not be able to  prescribed anticoagulation medication due to cost. Provider will review.

## 2025-05-07 NOTE — PROGRESS NOTES
Patient alert and awake, dressing clean, dry and intact, vital signs WNL. Patient transferred to APU via stretcher with Candy. Bedside report given to DELMER Anderson. Stretcher in lowest position and locked, side rails up, call bell within reach.

## 2025-05-07 NOTE — PERIOPERATIVE NURSING NOTE
Discharge instructions reviewed with patient verbally and written, patient receptive to instruction. Patient will be on Aspirin 81 mg twice a day for 6 weeks starting tomorrow for a blood clot prevention. Peripheral IV removed, dry dressing applied. Patient assisted with dressing and educated with proper brace application, dressing and undressing upper body. Dressing to right shoulder x 2 clean, dry, intact. Numbness to right upper extremity continues. No changes to neurovascular assessment.
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
## 2025-05-07 NOTE — ANESTHESIA POSTPROCEDURE EVALUATION
Post-Op Assessment Note    CV Status:  Stable  Pain Score: 0    Pain management: adequate       Mental Status:  Sleepy   Hydration Status:  Stable   PONV Controlled:  None   Airway Patency:  Patent     Post Op Vitals Reviewed: Yes    No anethesia notable event occurred.    Staff: Anesthesiologist           Last Filed PACU Vitals:  Vitals Value Taken Time   Temp 97.4 °F (36.3 °C) 05/07/25 1110   Pulse 109 05/07/25 1145   /79 05/07/25 1145   Resp 18 05/07/25 1145   SpO2 94 % 05/07/25 1145       Modified Leticia:     Vitals Value Taken Time   Activity 2 05/07/25 1110   Respiration 2 05/07/25 1110   Circulation 2 05/07/25 1110   Consciousness 1 05/07/25 1110   Oxygen Saturation 1 05/07/25 1110     Modified Leticia Score: 8

## 2025-05-07 NOTE — DISCHARGE INSTR - AVS FIRST PAGE
POSTOPERATIVE INSTRUCTIONS following SHOULDER SURGERY    MEDICATIONS:  Resume all home medications unless otherwise instructed by your surgeon.  Pain Medication:   Take Tylenol on a schedule for 7 days. Do not exceed 3000 mg of Tylenol daily while taking Tylenol and Percocet. Avoid NSAIDs due to kidney disease  Take Percocet as needed for severe pain as previously prescribed by another physician  Take daily Vitamin D supplementation to promote tendon healing  If you were given a regional anesthetic (nerve block), it is helpful to take your pain medication before the block wear off.    Possible side effects include nausea, constipation, and urinary retention.  If you experience these side effects, please call our office for assistance.  Pain med refills are authorized only during office hours (8am-4pm, Mon-Fri).  Blood Clot Prevention: start taking Aspirin 81 mg twice daily for 6 weeks for DVT prevention on POD #1 (5/8/25).    WOUND CARE:  Keep the dressing clean and dry.  Light drainage may occur the first 2 days postop.  You may remove the dressings and get the incision wet in the shower 48 hours after surgery.  DO NOT remove steri-strips or sutures.  DO NOT immerse the incision under water.  Carefully pat the incision dry.  If there is wound drainage, re-apply a fresh dry gauze dressing.  Please call our office ([phone]) if you experience either of the following:  Sudden increase in swelling, redness, or warmth at the surgical site  Excessive incisional drainage that persists beyond the 3rd day after surgery  Oral temperature greater than 101 degrees, not relieved with Tylenol  Shortness of breath, chest pain, nausea, or any other concerning symptoms    ICE:  You may use Ice to help with pain control   Place ice on the operative site for 20 minutes at a time when you are in pain     SLING:  Wear your sling AT ALL TIMES (including sleep) until your first postoperative office visit.  You may remove the sling  for showering but must keep your arm at your side.    ACTIVITY:   DO NOT lift, carry, push, or pull anything with your operative arm.  Shoulder:  DO NOT actively (on your own) raise your operative arm away from the side of you body or rotate it out away from your body unless instructed by your surgeon or physical therapist.  Place a pillow behind the elbow while lying down.  Sleeping in a more upright position (recliner) may be more comfortable initially.  Elbow, Wrist, and Finger Motion:  You may take your elbow out of the sling carefully to move your elbow, wrist, and fingers. Follow your motion precautions for the shoulder. Replace the sling immediately after.     PHYSICAL THERAPY:  Begin therapy 5 TO 7 DAYS AFTER SURGERY.  You were given a prescription for therapy at your preoperative office visit or on the day of surgery.  If you do not have physical therapy scheduled yet, please call our office for assistance.    FOLLOW-UP APPOINTMENT:  10-14 days following surgery with:      Dr. Dk Jean MD    [facility]  [address], [address]  [place] [address]    [facility] [address] [address]    Phone:   [phone]

## 2025-05-07 NOTE — PERIOPERATIVE NURSING NOTE
Received patient from PACU post procedure, alert and oriented x4, vital signs are stable. Patient verbalizes pain as 0/10, denies need for any intervention at this time. Brace to right upper extremity in place. Right radial pulse at +3, capillary refill less than 2 seconds. Ice pack to right shoulder in place. Patient given juice and is tolerating fluids orally. Patient  resting comfortably, stretcher in the lowest position and locked, side rails in upward position on both sides, call bell within reach and patient surroundings cleared. Plan of care ongoing.

## 2025-05-08 ENCOUNTER — TELEPHONE (OUTPATIENT)
Dept: ENDOCRINOLOGY | Facility: CLINIC | Age: 50
End: 2025-05-08

## 2025-05-08 DIAGNOSIS — E78.1 HYPERTRIGLYCERIDEMIA: ICD-10-CM

## 2025-05-08 RX ORDER — ATORVASTATIN CALCIUM 80 MG/1
80 TABLET, FILM COATED ORAL DAILY
Qty: 90 TABLET | Refills: 1 | Status: SHIPPED | OUTPATIENT
Start: 2025-05-08

## 2025-05-08 NOTE — TELEPHONE ENCOUNTER
Patient called the RX Refill Line. Message is being forwarded to the office.     Patient is requesting a refill of Lipitor 80 mg. States the pharmacy told her that it can not be filled until 5/11/25, but patient took her last one yesterday and is out of medication.     Please contact patient at [phone]

## 2025-05-09 ENCOUNTER — APPOINTMENT (OUTPATIENT)
Dept: LAB | Facility: HOSPITAL | Age: 50
End: 2025-05-09
Attending: INTERNAL MEDICINE
Payer: COMMERCIAL

## 2025-05-09 ENCOUNTER — PATIENT OUTREACH (OUTPATIENT)
Dept: CASE MANAGEMENT | Facility: OTHER | Age: 50
End: 2025-05-09

## 2025-05-09 DIAGNOSIS — E83.51 HYPOCALCEMIA: ICD-10-CM

## 2025-05-09 LAB — CALCIUM SERPL-MCNC: 9 MG/DL (ref 8.4–10.2)

## 2025-05-09 PROCEDURE — 36415 COLL VENOUS BLD VENIPUNCTURE: CPT

## 2025-05-09 NOTE — PROGRESS NOTES
In basket ADTs received. Yamileth had outpatient Right repair rotator cuff arthroscopic, subacromial decompression on 5/7.  Patient seemingly compliant with attending appointments and contacting her providers appropriately.  Upcoming appts:  PCP 5/12 @ 3:40p, Psych 5/14 @ 10a, Cardiology on 5/16 @ 1p and Ortho 5/19 @ 9:30a.     Discharge follow up call placed to patient. No answer when contacted.  Left generic msg without PHI as VM did not contain any personal identifiers. Brief explanation of CM services, office hours and phone number provided. MyChart msg sent with the same. Will continue to follow and await response. Reminder for next follow up.

## 2025-05-14 ENCOUNTER — TELEMEDICINE (OUTPATIENT)
Dept: BEHAVIORAL/MENTAL HEALTH CLINIC | Facility: CLINIC | Age: 50
End: 2025-05-14
Payer: COMMERCIAL

## 2025-05-14 DIAGNOSIS — F33.1 MODERATE EPISODE OF RECURRENT MAJOR DEPRESSIVE DISORDER (HCC): Primary | ICD-10-CM

## 2025-05-14 PROCEDURE — 90834 PSYTX W PT 45 MINUTES: CPT | Performed by: SOCIAL WORKER
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
## 2025-05-18 ENCOUNTER — APPOINTMENT (OUTPATIENT)
Dept: LAB | Facility: HOSPITAL | Age: 50
End: 2025-05-18
Attending: INTERNAL MEDICINE
Payer: COMMERCIAL

## 2025-05-18 ENCOUNTER — NURSE TRIAGE (OUTPATIENT)
Dept: OTHER | Facility: OTHER | Age: 50
End: 2025-05-18

## 2025-05-18 DIAGNOSIS — E83.51 HYPOCALCEMIA: ICD-10-CM

## 2025-05-18 LAB
ANION GAP SERPL CALCULATED.3IONS-SCNC: 13 MMOL/L (ref 4–13)
BUN SERPL-MCNC: 17 MG/DL (ref 5–25)
CALCIUM SERPL-MCNC: 9.5 MG/DL (ref 8.4–10.2)
CHLORIDE SERPL-SCNC: 101 MMOL/L (ref 96–108)
CO2 SERPL-SCNC: 21 MMOL/L (ref 21–32)
CREAT SERPL-MCNC: 1.06 MG/DL (ref 0.6–1.3)
GFR SERPL CREATININE-BSD FRML MDRD: 62 ML/MIN/1.73SQ M
GLUCOSE SERPL-MCNC: 136 MG/DL (ref 65–140)
MAGNESIUM SERPL-MCNC: 2 MG/DL (ref 1.9–2.7)
PHOSPHATE SERPL-MCNC: 3.8 MG/DL (ref 2.7–4.5)
POTASSIUM SERPL-SCNC: 4.3 MMOL/L (ref 3.5–5.3)
SODIUM SERPL-SCNC: 135 MMOL/L (ref 135–147)

## 2025-05-19 ENCOUNTER — OFFICE VISIT (OUTPATIENT)
Dept: OBGYN CLINIC | Facility: CLINIC | Age: 50
End: 2025-05-19

## 2025-05-19 VITALS — WEIGHT: 211 LBS | HEIGHT: 62 IN | BODY MASS INDEX: 38.83 KG/M2

## 2025-05-19 DIAGNOSIS — Z98.890 S/P RIGHT ROTATOR CUFF REPAIR: Primary | ICD-10-CM

## 2025-05-19 PROCEDURE — 99024 POSTOP FOLLOW-UP VISIT: CPT | Performed by: ORTHOPAEDIC SURGERY

## 2025-05-19 NOTE — PROGRESS NOTES
Patient Name: Yamileth Aldridgeoolnaman      : 1975       MRN: [id number]   Encounter Provider: Gael Jean MD   Encounter Date: 25  Encounter department: [facility]         Assessment & Plan  S/P right rotator cuff repair  We reviewed operative imaging and reviewed the procedure in detail. I believe the patient is progressing well.     Sutures were removed, and Steri-strips were applied in the office today.    I recommended the following weight bearing status: NWB in sling x 4 weeks    Continue physical therapy per provided protocol with focus on gentle range of motion.     Tylenol and/or Naprosyn for pain control.     Follow up in 4 weeks.   Orders:    Ambulatory referral to Physical Therapy; Future       _____________________________________________________  CHIEF COMPLAINT:  Chief Complaint   Patient presents with    Right Shoulder - Post-op         SUBJECTIVE:  Patient is a 49 y.o. year old female who presents for follow up now 12 days s/p Repair Rotator Cuff  Arthroscopic, Subacromial Decompression - Right performed on 2025.  Today patient has no significant pain. She has not yet begun physical therapy. She denies taking any Tylenol or Naproxen.    _____________________________________________________  PHYSICAL EXAM:  General/Constitutional: NAD, well developed, well nourished  HENT: Normocephalic, atraumatic  CV: Intact distal pulses, regular rate  Resp: No respiratory distress or labored breathing  Abdomen: soft, nondistended   Lymphatic: No lymphadenopathy palpated  Neuro: Alert and Oriented x 3, no focal deficits  Psych: Normal mood, normal affect  Skin: Warm, dry, no rashes, no erythema    MUSCULOSKELETAL EXAMINATION:    Right Upper Extremity    Incision: Clean, dry, and intact  Range of Motion:  passive ER 40°  Strength: deferred  +Axillary/AIN/PIN/Ulnar Motor Function   SILT throughout   Palpable Radial pulse     Scribe Attestation       I,:  Arlene Viramontes am acting as a scribe while in the presence of the attending physician.:       I,:  Gael Jean MD personally performed the services described in this documentation    as scribed in my presence.:

## 2025-05-19 NOTE — PROGRESS NOTES
Patient Name: Yamileth Lopezowoolf      : 1975       MRN: [id number]   Encounter Provider: Gael Jean MD   Encounter Date: 25  Encounter department: [facility]         Assessment & Plan       _____________________________________________________  CHIEF COMPLAINT:  Chief Complaint   Patient presents with   • Right Shoulder - Post-op         SUBJECTIVE:  Patient is a 49 y.o. year old female who presents for follow up now *** s/p Repair Rotator Cuff  Arthroscopic, Subacromial Decompression - Right performed on 2025.  Today patient has ***.     _____________________________________________________  PHYSICAL EXAM:  General/Constitutional: NAD, well developed, well nourished  HENT: Normocephalic, atraumatic  CV: Intact distal pulses, regular rate  Resp: No respiratory distress or labored breathing  Abdomen: soft, nondistended   Lymphatic: No lymphadenopathy palpated  Neuro: Alert and Oriented x 3, no focal deficits  Psych: Normal mood, normal affect  Skin: Warm, dry, no rashes, no erythema    MUSCULOSKELETAL EXAMINATION:    ***Upper Extremity    Incision: *** Clean, dry, and intact  Range of Motion: ***  Strength: ***  +Axillary/AIN/PIN/Ulnar Motor Function   SILT throughout   Palpable Radial pulse     ***Lower Extremity   Incision: *** Clean, dry, and intact  Range of Motion: ***  Strength: ***  +EHL/FHL/TA/GS  SILT throughout  Palpable DP pulse     Scribe Attestation    I,:   am acting as a scribe while in the presence of the attending physician.:       I,:   personally performed the services described in this documentation    as scribed in my presence.:

## 2025-05-19 NOTE — TELEPHONE ENCOUNTER
"FOLLOW UP: Pt states she stopped her atorvastatin. Please follow up with patient regarding her statin and next steps.     REASON FOR CONVERSATION: Medication Problem    SYMPTOMS: muscle cramps from medication.    OTHER: n/a    DISPOSITION: Call PCP When Office is Open. Advised patient that I will send message to the office to notify Dr. Pabon of her discontinuing medication.       Reason for Disposition   [1] Caller has NON-URGENT medicine question about med that PCP prescribed AND [2] triager unable to answer question    Answer Assessment - Initial Assessment Questions  1. NAME of MEDICINE: \"What medicine(s) are you calling about?\"        Atorvastatin     2. QUESTION: \"What is your question?\" (e.g., double dose of medicine, side effect)       Side effects    3. PRESCRIBER: \"Who prescribed the medicine?\" Reason: if prescribed by specialist, call should be referred to that group.        Dr. Pabon    4. SYMPTOMS: \"Do you have any symptoms?\" If Yes, ask: \"What symptoms are you having?\"  \"How bad are the symptoms (e.g., mild, moderate, severe)         severe muscle cramps in legs and toes since starting medication    Protocols used: Medication Question Call-Adult-AH    "
"Regarding: Lab results/patient requesting to speak with a nurse  ----- Message from Mishel ZHANG sent at 5/18/2025 11:55 AM EDT -----  \"I am calling because I got a message that my lab results are in and [facility]. I cannot wait until tomorrow because I do not feel right and I know it is my calcium levels. I need a nurse to call me back.\"    "
I called and left a voicemail for the patient. Thank you  
I called and spoke with the patient and she stated that she stop taking her atorvastatin last night she did not take it and no her symptoms has not resolved yet. She stated that she is willing to try a statin as long as they do not give her any  muscle cramps because they hurt really badly. Please advise thank you  
It appears that the patient's initial call was regarding her calcium levels, labs reviewed, 9.5, within normal limits.    In regards to the statin medication, patient was prescribed the medication in February and from dispensing history, it looks like it has been dispensed multiple times.  Please asked the patient when she stopped taking it?  Have her symptoms resolved?  If they have, is he willing to restart at atorvastatin 10 mg or a different statin medication?       
Please review the message above. Thank you  
recommend holding statin for now.the patient is asked Rensing muscle cramps from the medication, it can take a few days for the symptoms to resolve.  Can be discussed during follow-up/with her primary care physician    
/

## 2025-05-19 NOTE — ASSESSMENT & PLAN NOTE
We reviewed operative imaging and reviewed the procedure in detail. I believe the patient is progressing well.     Sutures were removed, and Steri-strips were applied in the office today.    I recommended the following weight bearing status: NWB in sling x 4 weeks    Continue physical therapy per provided protocol with focus on gentle range of motion.     Tylenol and/or Naprosyn for pain control.     Follow up in 4 weeks.   Orders:    Ambulatory referral to Physical Therapy; Future

## 2025-05-19 NOTE — PROGRESS NOTES
Patient Name: Yamileth Lopezowoolf      : 1975       MRN: [id number]   Encounter Provider: Gael Jean MD   Encounter Date: 25  Encounter department: [facility]         Assessment & Plan       _____________________________________________________  CHIEF COMPLAINT:  Chief Complaint   Patient presents with   • Right Shoulder - Post-op         SUBJECTIVE:  Patient is a 49 y.o. year old female who presents for follow up now *** s/p Repair Rotator Cuff  Arthroscopic, Subacromial Decompression - Right performed on 2025.  Today patient has ***.     _____________________________________________________  PHYSICAL EXAM:  General/Constitutional: NAD, well developed, well nourished  HENT: Normocephalic, atraumatic  CV: Intact distal pulses, regular rate  Resp: No respiratory distress or labored breathing  Abdomen: soft, nondistended   Lymphatic: No lymphadenopathy palpated  Neuro: Alert and Oriented x 3, no focal deficits  Psych: Normal mood, normal affect  Skin: Warm, dry, no rashes, no erythema    MUSCULOSKELETAL EXAMINATION:    ***Upper Extremity    Incision: *** Clean, dry, and intact  Range of Motion: ***  Strength: ***  +Axillary/AIN/PIN/Ulnar Motor Function   SILT throughout   Palpable Radial pulse     ***Lower Extremity   Incision: *** Clean, dry, and intact  Range of Motion: ***  Strength: ***  +EHL/FHL/TA/GS  SILT throughout  Palpable DP pulse     Scribe Attestation    I,:   am acting as a scribe while in the presence of the attending physician.:       I,:   personally performed the services described in this documentation    as scribed in my presence.:

## 2025-05-21 ENCOUNTER — TELEMEDICINE (OUTPATIENT)
Dept: BEHAVIORAL/MENTAL HEALTH CLINIC | Facility: CLINIC | Age: 50
End: 2025-05-21
Payer: COMMERCIAL

## 2025-05-21 ENCOUNTER — TELEPHONE (OUTPATIENT)
Dept: NEPHROLOGY | Facility: CLINIC | Age: 50
End: 2025-05-21

## 2025-05-21 DIAGNOSIS — F33.1 MODERATE EPISODE OF RECURRENT MAJOR DEPRESSIVE DISORDER (HCC): Primary | ICD-10-CM

## 2025-05-21 PROCEDURE — 90834 PSYTX W PT 45 MINUTES: CPT | Performed by: SOCIAL WORKER

## 2025-05-21 NOTE — TELEPHONE ENCOUNTER
Message left on patient's VM that labs of  5/18/25 show stable kidney function per  Dr. Jeong.      ----- Message from Husam Jeong MD sent at 5/20/2025  7:45 AM EDT -----  Please inform patient that most recent labs (5/18/25) show that kidney function is stable.
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
## 2025-05-23 ENCOUNTER — TELEPHONE (OUTPATIENT)
Age: 50
End: 2025-05-23

## 2025-05-23 ENCOUNTER — APPOINTMENT (OUTPATIENT)
Dept: LAB | Facility: HOSPITAL | Age: 50
End: 2025-05-23
Payer: COMMERCIAL

## 2025-05-23 DIAGNOSIS — E83.51 HYPOCALCEMIA: ICD-10-CM

## 2025-05-23 LAB — CALCIUM SERPL-MCNC: 8.7 MG/DL (ref 8.4–10.2)

## 2025-05-23 PROCEDURE — 36415 COLL VENOUS BLD VENIPUNCTURE: CPT

## 2025-05-23 NOTE — TELEPHONE ENCOUNTER
Pt called reporting that she completed calcium lab today and wanted to know what the result was. Aware it was 8.7. Reported that this is low for her and was having symptoms including numbness and tingling and decreased urination. Reported she is taking oscal 600 mg one table twice daily and reports that she will double her dose.
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
## 2025-05-28 ENCOUNTER — EVALUATION (OUTPATIENT)
Dept: PHYSICAL THERAPY | Facility: CLINIC | Age: 50
End: 2025-05-28
Attending: ORTHOPAEDIC SURGERY
Payer: COMMERCIAL

## 2025-05-28 DIAGNOSIS — Z98.890 S/P ROTATOR CUFF REPAIR: Primary | ICD-10-CM

## 2025-05-28 PROCEDURE — 97161 PT EVAL LOW COMPLEX 20 MIN: CPT

## 2025-05-28 PROCEDURE — 97530 THERAPEUTIC ACTIVITIES: CPT

## 2025-06-03 ENCOUNTER — APPOINTMENT (OUTPATIENT)
Dept: LAB | Facility: HOSPITAL | Age: 50
End: 2025-06-03
Attending: INTERNAL MEDICINE
Payer: COMMERCIAL

## 2025-06-03 DIAGNOSIS — E83.51 HYPOCALCEMIA: ICD-10-CM

## 2025-06-03 LAB — CALCIUM SERPL-MCNC: 9 MG/DL (ref 8.4–10.2)

## 2025-06-03 PROCEDURE — 36415 COLL VENOUS BLD VENIPUNCTURE: CPT

## 2025-06-04 ENCOUNTER — TELEMEDICINE (OUTPATIENT)
Dept: BEHAVIORAL/MENTAL HEALTH CLINIC | Facility: CLINIC | Age: 50
End: 2025-06-04
Payer: COMMERCIAL

## 2025-06-04 DIAGNOSIS — F33.1 MODERATE EPISODE OF RECURRENT MAJOR DEPRESSIVE DISORDER (HCC): Primary | ICD-10-CM

## 2025-06-04 PROCEDURE — 90834 PSYTX W PT 45 MINUTES: CPT | Performed by: SOCIAL WORKER
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
## 2025-06-09 ENCOUNTER — APPOINTMENT (OUTPATIENT)
Dept: LAB | Facility: HOSPITAL | Age: 50
End: 2025-06-09
Attending: INTERNAL MEDICINE
Payer: COMMERCIAL

## 2025-06-09 DIAGNOSIS — E83.51 HYPOCALCEMIA: ICD-10-CM

## 2025-06-09 LAB — CALCIUM SERPL-MCNC: 9 MG/DL (ref 8.4–10.2)

## 2025-06-09 PROCEDURE — 36415 COLL VENOUS BLD VENIPUNCTURE: CPT
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
## 2025-06-11 ENCOUNTER — APPOINTMENT (OUTPATIENT)
Dept: LAB | Facility: HOSPITAL | Age: 50
End: 2025-06-11
Attending: INTERNAL MEDICINE
Payer: COMMERCIAL

## 2025-06-11 ENCOUNTER — TELEMEDICINE (OUTPATIENT)
Dept: BEHAVIORAL/MENTAL HEALTH CLINIC | Facility: CLINIC | Age: 50
End: 2025-06-11
Payer: COMMERCIAL

## 2025-06-11 DIAGNOSIS — E83.51 HYPOCALCEMIA: ICD-10-CM

## 2025-06-11 DIAGNOSIS — F33.1 MODERATE EPISODE OF RECURRENT MAJOR DEPRESSIVE DISORDER (HCC): Primary | ICD-10-CM

## 2025-06-11 LAB — CALCIUM SERPL-MCNC: 9.7 MG/DL (ref 8.4–10.2)

## 2025-06-11 PROCEDURE — 90834 PSYTX W PT 45 MINUTES: CPT | Performed by: SOCIAL WORKER

## 2025-06-11 PROCEDURE — 36415 COLL VENOUS BLD VENIPUNCTURE: CPT
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
## 2025-06-12 ENCOUNTER — DOCUMENTATION (OUTPATIENT)
Dept: CASE MANAGEMENT | Facility: OTHER | Age: 50
End: 2025-06-12

## 2025-06-12 DIAGNOSIS — F41.0 PANIC DISORDER: ICD-10-CM

## 2025-06-12 DIAGNOSIS — G47.09 OTHER INSOMNIA: ICD-10-CM

## 2025-06-12 RX ORDER — TRAZODONE HYDROCHLORIDE 100 MG/1
100 TABLET ORAL
Qty: 90 TABLET | Refills: 1 | Status: SHIPPED | OUTPATIENT
Start: 2025-06-12 | End: 2025-12-09

## 2025-06-12 RX ORDER — ALPRAZOLAM 2 MG/1
2 TABLET, EXTENDED RELEASE ORAL 2 TIMES DAILY
Qty: 60 TABLET | Refills: 0 | Status: SHIPPED | OUTPATIENT
Start: 2025-06-12

## 2025-06-12 NOTE — MEDICAL HIGH UTILIZER
High Utilizer Care Plan for: Yamileth Vale  Updated: 6/10/2025  Patient Name: Yamileth Vale MRN: [id number]       : 1975    Age: 49   Sex: F     Utilization Background: In the 6 months prior to the care plan being written the patient has had 18 ED visits secondary to calcium related complaints. At this time, she has not had concerning radiation exposures. There were 5 occurrences in  where the patient received IV calcium gluconate for normal calcium/ionized calcium levels.     In the last 90 Days: 1 ED visit, 1 Planned Orthopedic Admission     Most Recent Work Up:  Noncontributory to utilization at this time. Treatment Recommendations:  ED Recommendations: Patient will typically present secondary to numbness or tingling that she will attribute to her calcium levels being off. There has been times where she will self-adjust medications and take large doses of exogenous calcium.   Recommend checking serum calcium and ionized calcium levels. Would only offer IV calcium gluconate if calcium levels are 7.5.   If the patient has hypocalcemic symptoms and her levels are > 7.5 recommend oral calcium intake. This does not need to be ordered in the ED.   Since patient at times over adjusts her medications which results in high serum calcium levels and potentially LELAND, low threshold to supply IV fluids if there is a rise in creatinine or hypercalcemia present.   At every visit patient should be encouraged to reach out to her Endocrinology team regarding her calcium levels and appropriate course of action prior to presenting to the ED.    Inpatient Recommendations: Patient is low risk for readmission at this time. Please see above recommendations regarding calcium supplementation.       Outpatient recommendations: Patient should maintain close follow up with Endocrinology and be encouraged to call Endocrinology office with symptoms or questions regarding labs and medication adjustments. Consideration  for outpatient infusions of calcium if needed.     Situation: Patient is a 48 year old female with history of hypoparathyroidism who presents frequently with symptoms perceived to be from hypocalcemia. She has been hypercalcemic at times due to self adjusting medications and has received doses of IV calcium gluconate with normal calcium and ionized calcium levels.      PMH/PSH: Hypoparathyroidism, Prediabetes, CKD 3, Obesity       Assessment                              Drivers of repeated utilization:                 Numbness/tingling thought to be due to low calcium levels     Community Resources in place:    N/A    Patient Care Team:   Matthew Ramos MD - PCP ([facility])  ELIF Roblero, Yisel Pabon MD - Endocrinology ([facility])  Husam Jeong MD - Nephrology ([facility])  Danie Morrell - NETTE DOWELL Care Manager            Care plan date and owner: Rhett Salas Jr., PA-C 2/26/2024       Reviewed with patient before discharge?   3/20/2024

## 2025-06-14 DIAGNOSIS — M54.50 LUMBAR BACK PAIN: ICD-10-CM

## 2025-06-15 ENCOUNTER — PATIENT OUTREACH (OUTPATIENT)
Dept: CASE MANAGEMENT | Facility: OTHER | Age: 50
End: 2025-06-15

## 2025-06-15 NOTE — PROGRESS NOTES
Outpatient Care Management Note.    Reminder received. High Utilizer care plan in place and uploaded on 6/12.  Review of chart and careplan completed. No known utilization since 4/8. Patient had right rotator cuff surgery on 5/7.  Her sutures were removed at ortho OV on 5/19.  She was directed to continue NWB status in sling x4 weeks. Patient was seen on 5/27 by [facility] for her chronic neck and low back pain.  Oxy  refilled by provider along with Tizanidine 4mg TID x30d.     Yamileth failed to show for the following appts:   PCP on 5/12  Cardiology on 5/16  PT on 6/11  Nephrology on 6/12    Upcoming appts:  6/16/2025 11:15 AM Appointment with Gael Jean MD at [facility] ([phone])   6/17/2025 11:30 AM Appointment with Sade Borjas PT at Physical Therapy at [facility] ([phone])   6/18/2025 10:00 AM Virtual Appointment with Elizabeth Ball LCSW at [facility] Therapist [place] ([phone])    6/19/2025 4:30 PM Virtual Appointment with Dali Izquierdo PA-C at [facility] ([phone])    6/25/2025 10:00 AM Virtual Appointment with Elizabeth Ball LCSW at [facility]rap[facility] [place] ([phone])    6/25/2025 11:30 AM Appointment with Erica Raymond at Physical Therapy at [facility] ([phone])   6/26/26   1:30 PM Lumbar MBB injection      Patient has not responded to VMs left by this writer. Will continue to follow electronically.  Reminder sent for next chart review.

## 2025-06-16 RX ORDER — METHOCARBAMOL 500 MG/1
500 TABLET, FILM COATED ORAL
Qty: 30 TABLET | Refills: 1 | OUTPATIENT
Start: 2025-06-16

## 2025-06-19 ENCOUNTER — TELEPHONE (OUTPATIENT)
Dept: PSYCHIATRY | Facility: CLINIC | Age: 50
End: 2025-06-19

## 2025-06-19 ENCOUNTER — TELEMEDICINE (OUTPATIENT)
Dept: PSYCHIATRY | Facility: CLINIC | Age: 50
End: 2025-06-19

## 2025-06-19 DIAGNOSIS — Z91.199 NO-SHOW FOR APPOINTMENT: Primary | ICD-10-CM

## 2025-06-19 PROCEDURE — NOSHOW: Performed by: PHYSICIAN ASSISTANT

## 2025-06-19 NOTE — PSYCH
No Call. No Show. No Charge    Yamileth Vale no showed 06/19/25 appointment , staff called and left message to reschedule appointment     Treatment Plan not due at this session.

## 2025-06-20 ENCOUNTER — APPOINTMENT (OUTPATIENT)
Dept: LAB | Facility: HOSPITAL | Age: 50
End: 2025-06-20
Attending: INTERNAL MEDICINE
Payer: COMMERCIAL

## 2025-06-20 ENCOUNTER — RESULTS FOLLOW-UP (OUTPATIENT)
Dept: ENDOCRINOLOGY | Facility: CLINIC | Age: 50
End: 2025-06-20

## 2025-06-20 DIAGNOSIS — E83.51 HYPOCALCEMIA: ICD-10-CM

## 2025-06-20 LAB — CALCIUM SERPL-MCNC: 9 MG/DL (ref 8.4–10.2)

## 2025-06-20 PROCEDURE — 36415 COLL VENOUS BLD VENIPUNCTURE: CPT

## 2025-06-20 NOTE — TELEPHONE ENCOUNTER
I called and inform the patient of the provider message. Patient is going to wait until Monday about the injection. Thank you

## 2025-06-20 NOTE — TELEPHONE ENCOUNTER
----- Message from Felicity ALVAREZ sent at 6/20/2025  1:25 PM EDT -----  I called and spoke with the patient and she stated that she has not been feeling well the past couple of days and that is do to her calcium is low for her, she has been taking nap for up to 8 hours   and she'll get up and for an hour and go back to sleep. The last time it was low like the she had to take extra 2 tablets Os-klaudia just to feel normal. The past 2 days patient have been throwing up   because her calcium is low.  She is also asking if Dr. Pabon about a shot that Dr. Pabon would like the patient to start. Please advise thank you   ----- Message -----  From: Lamonte Mayberry MD  Sent: 6/20/2025   1:02 PM EDT  To: Felicity Sierrae    Please inform patient that she should take Os-Klaudia, 600 mg once daily, continue Rocaltrol.  She should have basic metabolic profile done in 2 weeks for follow-up.  Please order blood work  ----- Message -----  From: Felicity Silver  Sent: 6/20/2025  12:55 PM EDT  To: Lamonte Mayberry MD    I called and spoke with the patient and she stated that she is just taking her OS-KLAUDIA 600 mg 2 tablets daily. Please advise thank you  ----- Message -----  From: Lamonte Mayberry MD  Sent: 6/20/2025  12:48 PM EDT  To: [facility] [place]#    Please verify with pt if she is taking calcium tablet in addition to rocaltrol and how much ? We need to adjust the dose slightly   Thanks   ----- Message -----  From: Lab, Background User  Sent: 6/20/2025  12:35 PM EDT  To: Yisel Pabon MD

## 2025-06-20 NOTE — TELEPHONE ENCOUNTER
Please inform her calcium has been normal , 9.0 Sp she should not take extra calcium, continue current regimen rocaltrol and calcium 600 mg ( 2 tabs daily)   She can discuss about injection with mesfin Rocha when she is back

## 2025-06-20 NOTE — RESULT ENCOUNTER NOTE
Please verify with pt if she is taking calcium tablet in addition to rocaltrol and how much ? We need to adjust the dose slightly   Thanks

## 2025-06-25 ENCOUNTER — TELEMEDICINE (OUTPATIENT)
Dept: BEHAVIORAL/MENTAL HEALTH CLINIC | Facility: CLINIC | Age: 50
End: 2025-06-25
Payer: COMMERCIAL

## 2025-06-25 ENCOUNTER — APPOINTMENT (OUTPATIENT)
Dept: LAB | Facility: HOSPITAL | Age: 50
End: 2025-06-25
Attending: INTERNAL MEDICINE
Payer: COMMERCIAL

## 2025-06-25 DIAGNOSIS — E83.51 HYPOCALCEMIA: ICD-10-CM

## 2025-06-25 DIAGNOSIS — F33.1 MODERATE EPISODE OF RECURRENT MAJOR DEPRESSIVE DISORDER (HCC): Primary | ICD-10-CM

## 2025-06-25 LAB — CALCIUM SERPL-MCNC: 9.2 MG/DL (ref 8.4–10.2)

## 2025-06-25 PROCEDURE — 90834 PSYTX W PT 45 MINUTES: CPT | Performed by: SOCIAL WORKER

## 2025-06-25 PROCEDURE — 36415 COLL VENOUS BLD VENIPUNCTURE: CPT

## 2025-06-25 NOTE — TELEPHONE ENCOUNTER
I called and left a voicemail for the patient and stated I had sent a message a few months ago regarding discussing potential use of Yorvipath. It is available and FDA approved. I would like to discuss the medication with the patient in a follow up before prescribing if the patient is interested. If patient would like a sooner appointment we can add her to a wait list.
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
## 2025-07-02 ENCOUNTER — RESULTS FOLLOW-UP (OUTPATIENT)
Dept: ENDOCRINOLOGY | Facility: CLINIC | Age: 50
End: 2025-07-02

## 2025-07-02 ENCOUNTER — TELEMEDICINE (OUTPATIENT)
Dept: BEHAVIORAL/MENTAL HEALTH CLINIC | Facility: CLINIC | Age: 50
End: 2025-07-02
Payer: COMMERCIAL

## 2025-07-02 ENCOUNTER — APPOINTMENT (OUTPATIENT)
Dept: LAB | Facility: HOSPITAL | Age: 50
End: 2025-07-02
Attending: INTERNAL MEDICINE
Payer: COMMERCIAL

## 2025-07-02 DIAGNOSIS — E89.2 POST-SURGICAL HYPOPARATHYROIDISM (HCC): ICD-10-CM

## 2025-07-02 DIAGNOSIS — E83.51 HYPOCALCEMIA: ICD-10-CM

## 2025-07-02 DIAGNOSIS — F33.1 MODERATE EPISODE OF RECURRENT MAJOR DEPRESSIVE DISORDER (HCC): Primary | ICD-10-CM

## 2025-07-02 LAB
BACTERIA UR QL AUTO: ABNORMAL /HPF
BILIRUB UR QL STRIP: NEGATIVE
CALCIUM SERPL-MCNC: 9.1 MG/DL (ref 8.4–10.2)
CLARITY UR: CLEAR
COLOR UR: YELLOW
CREAT UR-MCNC: 102.4 MG/DL
GLUCOSE UR STRIP-MCNC: NEGATIVE MG/DL
HGB UR QL STRIP.AUTO: NEGATIVE
KETONES UR STRIP-MCNC: NEGATIVE MG/DL
LEUKOCYTE ESTERASE UR QL STRIP: NEGATIVE
MICROALBUMIN UR-MCNC: 10.5 MG/L
MICROALBUMIN/CREAT 24H UR: 10 MG/G CREATININE (ref 0–30)
NITRITE UR QL STRIP: NEGATIVE
NON-SQ EPI CELLS URNS QL MICRO: ABNORMAL /HPF
PH UR STRIP.AUTO: 7 [PH]
PROT UR STRIP-MCNC: NEGATIVE MG/DL
RBC #/AREA URNS AUTO: ABNORMAL /HPF
SP GR UR STRIP.AUTO: 1.02 (ref 1–1.03)
UROBILINOGEN UR STRIP-ACNC: <2 MG/DL
WBC #/AREA URNS AUTO: ABNORMAL /HPF

## 2025-07-02 PROCEDURE — 36415 COLL VENOUS BLD VENIPUNCTURE: CPT

## 2025-07-02 PROCEDURE — 82570 ASSAY OF URINE CREATININE: CPT

## 2025-07-02 PROCEDURE — 82043 UR ALBUMIN QUANTITATIVE: CPT

## 2025-07-02 PROCEDURE — 81001 URINALYSIS AUTO W/SCOPE: CPT

## 2025-07-02 PROCEDURE — 90837 PSYTX W PT 60 MINUTES: CPT | Performed by: SOCIAL WORKER
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
## 2025-07-05 DIAGNOSIS — R73.03 PREDIABETES: ICD-10-CM

## 2025-07-07 RX ORDER — METFORMIN HYDROCHLORIDE 500 MG/1
1000 TABLET, EXTENDED RELEASE ORAL 2 TIMES DAILY WITH MEALS
Qty: 360 TABLET | Refills: 1 | Status: SHIPPED | OUTPATIENT
Start: 2025-07-07

## 2025-07-09 ENCOUNTER — APPOINTMENT (OUTPATIENT)
Dept: LAB | Facility: HOSPITAL | Age: 50
End: 2025-07-09
Attending: INTERNAL MEDICINE
Payer: COMMERCIAL

## 2025-07-09 ENCOUNTER — TELEMEDICINE (OUTPATIENT)
Dept: BEHAVIORAL/MENTAL HEALTH CLINIC | Facility: CLINIC | Age: 50
End: 2025-07-09
Payer: COMMERCIAL

## 2025-07-09 DIAGNOSIS — E83.51 HYPOCALCEMIA: ICD-10-CM

## 2025-07-09 DIAGNOSIS — F33.1 MODERATE EPISODE OF RECURRENT MAJOR DEPRESSIVE DISORDER (HCC): Primary | ICD-10-CM

## 2025-07-09 DIAGNOSIS — E89.2 POST-SURGICAL HYPOPARATHYROIDISM (HCC): ICD-10-CM

## 2025-07-09 LAB — CALCIUM SERPL-MCNC: 9.3 MG/DL (ref 8.4–10.2)

## 2025-07-09 PROCEDURE — 36415 COLL VENOUS BLD VENIPUNCTURE: CPT

## 2025-07-09 PROCEDURE — 90837 PSYTX W PT 60 MINUTES: CPT | Performed by: SOCIAL WORKER
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
## 2025-07-18 ENCOUNTER — TELEPHONE (OUTPATIENT)
Dept: NEPHROLOGY | Facility: CLINIC | Age: 50
End: 2025-07-18

## 2025-07-18 NOTE — TELEPHONE ENCOUNTER
Left msg to re-schedule appt 10/03 with Sonja due to a provider schedule change.  Informed to call to re-schedule.  Dr. Jeong has 09/26 open.  Please transfer to [facility].

## 2025-07-19 DIAGNOSIS — F41.1 GAD (GENERALIZED ANXIETY DISORDER): ICD-10-CM

## 2025-07-22 ENCOUNTER — TELEPHONE (OUTPATIENT)
Dept: PSYCHIATRY | Facility: CLINIC | Age: 50
End: 2025-07-22

## 2025-07-22 RX ORDER — DESVENLAFAXINE 100 MG/1
100 TABLET, EXTENDED RELEASE ORAL DAILY
Qty: 90 TABLET | Refills: 1 | OUTPATIENT
Start: 2025-07-22

## 2025-07-23 ENCOUNTER — TELEPHONE (OUTPATIENT)
Age: 50
End: 2025-07-23

## 2025-07-23 ENCOUNTER — TELEMEDICINE (OUTPATIENT)
Dept: BEHAVIORAL/MENTAL HEALTH CLINIC | Facility: CLINIC | Age: 50
End: 2025-07-23
Payer: COMMERCIAL

## 2025-07-23 DIAGNOSIS — F41.0 PANIC DISORDER: ICD-10-CM

## 2025-07-23 DIAGNOSIS — F33.1 MODERATE EPISODE OF RECURRENT MAJOR DEPRESSIVE DISORDER (HCC): Primary | ICD-10-CM

## 2025-07-23 PROCEDURE — 90837 PSYTX W PT 60 MINUTES: CPT | Performed by: SOCIAL WORKER

## 2025-07-23 RX ORDER — ALPRAZOLAM 2 MG/1
2 TABLET, EXTENDED RELEASE ORAL 2 TIMES DAILY
Qty: 60 TABLET | Refills: 0 | Status: SHIPPED | OUTPATIENT
Start: 2025-07-23

## 2025-07-23 NOTE — TELEPHONE ENCOUNTER
Medication Refill Request     Name of Medication ALPRAZolam ER (XANAX XR) 2 MG 24 hr tablet   Dose/Frequency Take 1 tablet (2 mg total) by mouth in the morning and 1 tablet (2 mg total) before bedtime. Before breakfast and lunch.   Quantity 60 tabs  Verified pharmacy   [x]  Verified ordering Provider   [x]  Does patient have enough for the next 3 days? Yes [] No [x]  Does patient have a follow-up appointment scheduled? Yes [x] No []   If so when is appointment: 8/14/25

## 2025-07-23 NOTE — TELEPHONE ENCOUNTER
Patient called and stated her handicap placard is due to be renewed.  The only day she will be able to go to the [employer] will be on Friday.  She would like to know if the office has access to the forms needed to renew the placard and if it can be completed by Friday. Please advise.  Thank you!
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
## 2025-07-28 ENCOUNTER — APPOINTMENT (OUTPATIENT)
Dept: LAB | Facility: HOSPITAL | Age: 50
End: 2025-07-28
Attending: INTERNAL MEDICINE
Payer: COMMERCIAL

## 2025-07-28 DIAGNOSIS — E89.2 POST-SURGICAL HYPOPARATHYROIDISM (HCC): ICD-10-CM

## 2025-07-28 DIAGNOSIS — E83.51 HYPOCALCEMIA: ICD-10-CM

## 2025-07-28 LAB — CALCIUM SERPL-MCNC: 9.3 MG/DL (ref 8.4–10.2)

## 2025-07-28 PROCEDURE — 36415 COLL VENOUS BLD VENIPUNCTURE: CPT

## 2025-08-01 ENCOUNTER — TELEPHONE (OUTPATIENT)
Dept: ENDOCRINOLOGY | Facility: CLINIC | Age: 50
End: 2025-08-01

## 2025-08-02 ENCOUNTER — APPOINTMENT (OUTPATIENT)
Dept: LAB | Facility: HOSPITAL | Age: 50
End: 2025-08-02
Attending: INTERNAL MEDICINE
Payer: COMMERCIAL

## 2025-08-02 DIAGNOSIS — N18.31 STAGE 3A CHRONIC KIDNEY DISEASE (HCC): ICD-10-CM

## 2025-08-02 DIAGNOSIS — E78.1 HYPERTRIGLYCERIDEMIA: ICD-10-CM

## 2025-08-02 DIAGNOSIS — E89.2 POST-SURGICAL HYPOPARATHYROIDISM (HCC): ICD-10-CM

## 2025-08-02 DIAGNOSIS — E83.51 HYPOCALCEMIA: ICD-10-CM

## 2025-08-02 DIAGNOSIS — E55.9 VITAMIN D DEFICIENCY: ICD-10-CM

## 2025-08-02 DIAGNOSIS — E83.52 HYPERCALCEMIA: ICD-10-CM

## 2025-08-02 DIAGNOSIS — E89.0 POSTOPERATIVE HYPOTHYROIDISM: ICD-10-CM

## 2025-08-02 LAB — CALCIUM SERPL-MCNC: 9.3 MG/DL (ref 8.4–10.2)

## 2025-08-02 PROCEDURE — 36415 COLL VENOUS BLD VENIPUNCTURE: CPT

## 2025-08-05 RX ORDER — FENOFIBRATE 160 MG/1
160 TABLET ORAL DAILY
Qty: 90 TABLET | Refills: 1 | OUTPATIENT
Start: 2025-08-05

## 2025-08-06 ENCOUNTER — TELEMEDICINE (OUTPATIENT)
Dept: BEHAVIORAL/MENTAL HEALTH CLINIC | Facility: CLINIC | Age: 50
End: 2025-08-06
Payer: COMMERCIAL

## 2025-08-06 DIAGNOSIS — F33.1 MODERATE EPISODE OF RECURRENT MAJOR DEPRESSIVE DISORDER (HCC): Primary | ICD-10-CM

## 2025-08-06 PROCEDURE — 90837 PSYTX W PT 60 MINUTES: CPT | Performed by: SOCIAL WORKER

## 2025-08-08 ENCOUNTER — APPOINTMENT (OUTPATIENT)
Dept: LAB | Facility: HOSPITAL | Age: 50
End: 2025-08-08
Attending: INTERNAL MEDICINE
Payer: COMMERCIAL

## 2025-08-08 LAB — CALCIUM SERPL-MCNC: 9 MG/DL (ref 8.4–10.2)

## 2025-08-08 PROCEDURE — 36415 COLL VENOUS BLD VENIPUNCTURE: CPT

## 2025-08-12 ENCOUNTER — NURSE TRIAGE (OUTPATIENT)
Age: 50
End: 2025-08-12

## 2025-08-12 ENCOUNTER — TELEPHONE (OUTPATIENT)
Age: 50
End: 2025-08-12

## 2025-08-12 ENCOUNTER — APPOINTMENT (OUTPATIENT)
Dept: LAB | Facility: HOSPITAL | Age: 50
End: 2025-08-12
Attending: INTERNAL MEDICINE
Payer: COMMERCIAL

## 2025-08-13 ENCOUNTER — TELEMEDICINE (OUTPATIENT)
Dept: BEHAVIORAL/MENTAL HEALTH CLINIC | Facility: CLINIC | Age: 50
End: 2025-08-13
Payer: COMMERCIAL

## 2025-08-13 ENCOUNTER — APPOINTMENT (OUTPATIENT)
Dept: LAB | Facility: HOSPITAL | Age: 50
End: 2025-08-13
Payer: COMMERCIAL

## 2025-08-13 DIAGNOSIS — E83.51 HYPOCALCEMIA: ICD-10-CM

## 2025-08-13 DIAGNOSIS — K62.5 RECTAL BLEEDING: ICD-10-CM

## 2025-08-13 DIAGNOSIS — E89.2 POST-SURGICAL HYPOPARATHYROIDISM (HCC): ICD-10-CM

## 2025-08-13 LAB
CALCIUM SERPL-MCNC: 9.8 MG/DL (ref 8.4–10.2)
HGB BLD-MCNC: 13.3 G/DL (ref 11.5–15.4)

## 2025-08-13 PROCEDURE — 85018 HEMOGLOBIN: CPT

## 2025-08-13 PROCEDURE — 36415 COLL VENOUS BLD VENIPUNCTURE: CPT

## 2025-08-14 ENCOUNTER — TELEPHONE (OUTPATIENT)
Dept: PSYCHIATRY | Facility: CLINIC | Age: 50
End: 2025-08-14

## 2025-08-14 ENCOUNTER — TELEMEDICINE (OUTPATIENT)
Dept: PSYCHIATRY | Facility: CLINIC | Age: 50
End: 2025-08-14
Payer: COMMERCIAL

## 2025-08-14 PROBLEM — F51.01 PRIMARY INSOMNIA: Status: ACTIVE | Noted: 2024-09-23

## 2025-08-15 ENCOUNTER — APPOINTMENT (OUTPATIENT)
Dept: LAB | Facility: HOSPITAL | Age: 50
End: 2025-08-15
Payer: COMMERCIAL

## (undated) DEVICE — FABRIC REINFORCED, SURGICAL GOWN, XL: Brand: CONVERTORS

## (undated) DEVICE — SPONGE LAP 18 X 18 IN

## (undated) DEVICE — DUAL SPIKE ADAPTER: Brand: CONMED

## (undated) DEVICE — STERILE DOUBLE BASIN SET PACK: Brand: CARDINAL HEALTH

## (undated) DEVICE — DRAPE EQUIPMENT RF WAND

## (undated) DEVICE — SUT VICRYL 2-0 SH 27 IN UNDYED J417H

## (undated) DEVICE — CHLORAPREP HI-LITE 26ML ORANGE

## (undated) DEVICE — 3000CC GUARDIAN II: Brand: GUARDIAN

## (undated) DEVICE — TOWEL SET X-RAY

## (undated) DEVICE — TISSUE RETRIEVAL SYSTEM: Brand: INZII RETRIEVAL SYSTEM

## (undated) DEVICE — GLOVE SRG BIOGEL ECLIPSE 8

## (undated) DEVICE — RADIOLOGY STERILE LABELS: Brand: CENTURION

## (undated) DEVICE — SLING TRACTION LATERAL S3

## (undated) DEVICE — DRAPE UTILITY

## (undated) DEVICE — SPONGE RAYTEX

## (undated) DEVICE — SHEARS: Brand: ENDO MINI-SHEARS

## (undated) DEVICE — GLOVE SRG BIOGEL 7.5

## (undated) DEVICE — TROCAR: Brand: KII FIOS FIRST ENTRY

## (undated) DEVICE — DISPOSABLE BRIEF/UNDERWEAR

## (undated) DEVICE — ASTOUND STANDARD SURGICAL GOWN, XL: Brand: CONVERTORS

## (undated) DEVICE — SUT SILK 0 FSL 18 IN 678G

## (undated) DEVICE — GLOVE INDICATOR PI UNDERGLOVE SZ 6.5 BLUE

## (undated) DEVICE — LUBRICANT SURGILUBE TUBE 4 OZ  FLIP TOP

## (undated) DEVICE — GLOVE SRG BIOGEL 7

## (undated) DEVICE — SUT VICRYL 2-0 18 IN J905T

## (undated) DEVICE — GLOVE INDICATOR PI UNDERGLOVE SZ 8 BLUE

## (undated) DEVICE — GLOVE SRG BIOGEL 8

## (undated) DEVICE — CURITY STRETCH BANDAGE: Brand: CURITY

## (undated) DEVICE — COBAN 4 IN STERILE

## (undated) DEVICE — SUT CHROMIC 3-0 PS-2 27 1638H

## (undated) DEVICE — TUBING ARTHROSCOPIC WAVE  MAIN PUMP

## (undated) DEVICE — SCD SEQUENTIAL COMPRESSION COMFORT SLEEVE MEDIUM KNEE LENGTH: Brand: KENDALL SCD

## (undated) DEVICE — INTENDED FOR TISSUE SEPARATION, AND OTHER PROCEDURES THAT REQUIRE A SHARP SURGICAL BLADE TO PUNCTURE OR CUT.: Brand: BARD-PARKER ® CARBON RIB-BACK BLADES

## (undated) DEVICE — BETADINE SURGICAL SCRUB 32OZ

## (undated) DEVICE — TIBURON SPLIT SHEET: Brand: CONVERTORS

## (undated) DEVICE — ADHESIVE SKIN HIGH VISCOSITY EXOFIN 1ML

## (undated) DEVICE — POV-IOD SOLUTION 4OZ BT

## (undated) DEVICE — TROCAR: Brand: KII SLEEVE

## (undated) DEVICE — SUT MONOCRYL 4-0 PC-5 18 IN Y823G

## (undated) DEVICE — ANTI-FOG SOLUTION WITH FOAM PAD: Brand: DEVON

## (undated) DEVICE — CYSTO TUBING TUR Y IRRIGATION

## (undated) DEVICE — TROCARS: Brand: KII® BALLOON BLUNT TIP SYSTEM

## (undated) DEVICE — SUT CHROMIC 2-0 CT-1 27 IN 811H

## (undated) DEVICE — 3M™ TEGADERM™ TRANSPARENT FILM DRESSING FRAME STYLE, 1626W, 4 IN X 4-3/4 IN (10 CM X 12 CM), 50/CT 4CT/CASE: Brand: 3M™ TEGADERM™

## (undated) DEVICE — SLEEVE ARM SHOULDER SUSPENSION SYS

## (undated) DEVICE — BLADE SHAVER TORPEDO 4MM 13CM  COOLCUT

## (undated) DEVICE — STANDARD SURGICAL GOWN, L: Brand: CONVERTORS

## (undated) DEVICE — GLOVE INDICATOR PI UNDERGLOVE SZ 7.5 BLUE

## (undated) DEVICE — 3M™ IOBAN™ 2 ANTIMICROBIAL INCISE DRAPE 6650EZ: Brand: IOBAN™ 2

## (undated) DEVICE — 2, DISPOSABLE SUCTION/IRRIGATOR WITHOUT DISPOSABLE TIP: Brand: STRYKEFLOW

## (undated) DEVICE — TUBING SUCTION 5MM X 12 FT

## (undated) DEVICE — PROBE ABLATION  APOLLORF 90 DEG MULTI PORT

## (undated) DEVICE — CYSTOSCOPY PACK: Brand: CONVERTORS

## (undated) DEVICE — FIBERTAK ROTATOR CUFF DISPOSABLES KIT

## (undated) DEVICE — DISPOSABLE EQUIPMENT COVER: Brand: LARGE TOWEL DRAPE

## (undated) DEVICE — CANNULA BUTTON 8 X 30MM PASSPORT

## (undated) DEVICE — SPONGE GAUZE 4 X 8 12 PLY STRL LF

## (undated) DEVICE — BASIC DOUBLE BASIN 2-LF: Brand: MEDLINE INDUSTRIES, INC.

## (undated) DEVICE — POOLE SUCTION HANDLE: Brand: CARDINAL HEALTH

## (undated) DEVICE — TOWEL SURG XR DETECT GREEN STRL RFD

## (undated) DEVICE — ASTOUND IMPERVIOUS SURGICAL GOWN: Brand: CONVERTORS

## (undated) DEVICE — 3M™ STERI-STRIP™ REINFORCED ADHESIVE SKIN CLOSURES, R1547, 1/2 IN X 4 IN (12 MM X 100 MM), 6 STRIPS/ENVELOPE: Brand: 3M™ STERI-STRIP™

## (undated) DEVICE — SUT PLAIN 2-0 CTX 27 IN 872H

## (undated) DEVICE — PACK GENERAL LF

## (undated) DEVICE — SUT CHROMIC 2-0 SH 27 IN G123H

## (undated) DEVICE — FOOT SWITCH DRAPE: Brand: UNBRANDED

## (undated) DEVICE — SUT MONOCRYL 4-0 PS-2 18 IN Y496G

## (undated) DEVICE — 3M™ STERI-DRAPE™ U-DRAPE 1015: Brand: STERI-DRAPE™

## (undated) DEVICE — IMPERVIOUS SURGICAL GOWN, LG: Brand: CONVERTORS

## (undated) DEVICE — DRAPE SHEET THREE QUARTER

## (undated) DEVICE — SUT VICRYL PLUS 0 CT-3 27 IN VCP329H

## (undated) DEVICE — CLIP APPLIER: Brand: ENDO CLIP II

## (undated) DEVICE — NEEDLE TIP BOVIE

## (undated) DEVICE — NEEDLE 25G X 1 1/2

## (undated) DEVICE — PACK ARTHROSCOPY

## (undated) DEVICE — SUT VICRYL 0 UR-5 27 IN J376H

## (undated) DEVICE — PLUMEPEN PRO 10FT

## (undated) DEVICE — INTERCEED

## (undated) DEVICE — DRAPE LAPAROTOMY W/POUCHES

## (undated) DEVICE — MAXI PAD5.51 X 13.78 IN. (14.0 X 35.0 CM)HEAVYCONTOUREDUNSCENTED: Brand: CURITY

## (undated) DEVICE — TRAY FOLEY 16FR URIMETER SURESTEP

## (undated) DEVICE — BURR  OVAL 4MM 13CM 8 FLUTE COOLCUT

## (undated) DEVICE — DRESSING MEPILEX AG BORDER 4 X 12 IN

## (undated) DEVICE — GLOVE SRG BIOGEL 6.5

## (undated) DEVICE — LAVH/LAPAROSCOPY DRAPE: Brand: CONVERTORS

## (undated) DEVICE — DRESSING MEPILEX AG BORDER 3 X 3 IN

## (undated) DEVICE — ELECTRODE BLADE MOD  E-Z CLEAN 6.5IN -0014M

## (undated) DEVICE — ELECTRODE LAP SPATULA STR E-Z CLEAN 33CM -0018

## (undated) DEVICE — CANNULA BUTTON 8 X 40MM PASSPORT

## (undated) DEVICE — SYRINGE 10ML LL CONTROL TOP

## (undated) DEVICE — SUT VICRYL 0 CT-1 18 IN J740D

## (undated) DEVICE — NON-WOVEN ADHESIVE WOUND DRESSING: Brand: PRIMAPORE ADHESIVE DRESSING 30*10CM

## (undated) DEVICE — PLASTIC ADHESIVE BANDAGE: Brand: CURITY

## (undated) DEVICE — NEEDLE SUT SCORPION MEGALOADER

## (undated) DEVICE — ASTOUND SURGICAL GOWN, XXX LARGE, X-LONG: Brand: CONVERTORS

## (undated) DEVICE — TUBE SET SMOKE EVAC PNEUMOCLEAR HUMIDIFIED

## (undated) DEVICE — INTENDED FOR TISSUE SEPARATION, AND OTHER PROCEDURES THAT REQUIRE A SHARP SURGICAL BLADE TO PUNCTURE OR CUT.: Brand: BARD-PARKER SAFETY BLADES SIZE 15, STERILE

## (undated) DEVICE — PERI/GYN PACK: Brand: CONVERTORS

## (undated) DEVICE — POSITIONER TRIMANO LIMB BEACH CHAIR

## (undated) DEVICE — SUT VICRYL 3-0 SH 27 IN J416H

## (undated) DEVICE — DRAPE TOWEL: Brand: CONVERTORS

## (undated) DEVICE — PADS GROUND

## (undated) DEVICE — SUT VICRYL 0 18 IN J906G

## (undated) DEVICE — 3M™ TEGADERM™ TRANSPARENT FILM DRESSING FRAME STYLE, 1624W, 2-3/8 IN X 2-3/4 IN (6 CM X 7 CM), 100/CT 4CT/CASE: Brand: 3M™ TEGADERM™

## (undated) DEVICE — SUT PDS II 0 CT-2 27 IN Z334H

## (undated) DEVICE — TIBURON BEACH CHAIR SHOULDER DRAPE: Brand: CONVERTORS

## (undated) DEVICE — U-DRAPE: Brand: CONVERTORS

## (undated) DEVICE — SUT ETHILON 3-0 PS-1 18 IN 1663G

## (undated) DEVICE — 3M™ TEGADERM™ CHG DRESSING 25/CARTON 4 CARTONS/CASE 1659: Brand: TEGADERM™

## (undated) DEVICE — NEEDLE 18 G X 1 1/2

## (undated) DEVICE — POSITIONER HEAD DISP STRL

## (undated) DEVICE — CURITY NON-ADHERENT STRIPS: Brand: CURITY